# Patient Record
Sex: FEMALE | Race: BLACK OR AFRICAN AMERICAN | NOT HISPANIC OR LATINO | Employment: OTHER | URBAN - NONMETROPOLITAN AREA
[De-identification: names, ages, dates, MRNs, and addresses within clinical notes are randomized per-mention and may not be internally consistent; named-entity substitution may affect disease eponyms.]

---

## 2013-06-12 VITALS
TEMPERATURE: 98.7 F | TEMPERATURE: 98.7 F | SYSTOLIC BLOOD PRESSURE: 151 MMHG | TEMPERATURE: 98.7 F | TEMPERATURE: 98.7 F | DIASTOLIC BLOOD PRESSURE: 78 MMHG | TEMPERATURE: 98.7 F | TEMPERATURE: 98.7 F | TEMPERATURE: 98.7 F | TEMPERATURE: 98.7 F | TEMPERATURE: 98.7 F | TEMPERATURE: 98.7 F | TEMPERATURE: 98.7 F | TEMPERATURE: 98.7 F | TEMPERATURE: 98.7 F | TEMPERATURE: 98.7 F | TEMPERATURE: 98.7 F | TEMPERATURE: 98.7 F | TEMPERATURE: 98.7 F | TEMPERATURE: 98.7 F | TEMPERATURE: 98.7 F | TEMPERATURE: 98.7 F | TEMPERATURE: 98.7 F | TEMPERATURE: 98.7 F | TEMPERATURE: 98.7 F | TEMPERATURE: 98.7 F | TEMPERATURE: 98.7 F | TEMPERATURE: 98.7 F

## 2017-01-06 ENCOUNTER — HOSPITAL ENCOUNTER (OUTPATIENT)
Dept: GENERAL RADIOLOGY | Facility: HOSPITAL | Age: 42
Discharge: HOME | End: 2017-01-06
Admitting: NURSE PRACTITIONER

## 2017-01-06 ENCOUNTER — TELEPHONE (OUTPATIENT)
Dept: GASTROENTEROLOGY | Facility: CLINIC | Age: 42
End: 2017-01-06

## 2017-01-06 DIAGNOSIS — R10.13 EPIGASTRIC PAIN: ICD-10-CM

## 2017-01-06 PROCEDURE — 74240 X-RAY XM UPR GI TRC 1CNTRST: CPT

## 2017-01-09 ENCOUNTER — ANESTHESIA EVENT (OUTPATIENT)
Dept: GASTROENTEROLOGY | Facility: HOSPITAL | Age: 42
End: 2017-01-09

## 2017-01-10 ENCOUNTER — ANESTHESIA (OUTPATIENT)
Dept: GASTROENTEROLOGY | Facility: HOSPITAL | Age: 42
End: 2017-01-10

## 2017-01-10 ENCOUNTER — TELEPHONE (OUTPATIENT)
Dept: GASTROENTEROLOGY | Facility: CLINIC | Age: 42
End: 2017-01-10

## 2017-01-10 PROCEDURE — 25010000002 PROPOFOL 10 MG/ML EMULSION: Performed by: NURSE ANESTHETIST, CERTIFIED REGISTERED

## 2017-01-10 RX ORDER — LIDOCAINE HYDROCHLORIDE 20 MG/ML
INJECTION, SOLUTION INFILTRATION; PERINEURAL AS NEEDED
Status: DISCONTINUED | OUTPATIENT
Start: 2017-01-10 | End: 2017-01-10 | Stop reason: SURG

## 2017-01-10 RX ORDER — PROPOFOL 10 MG/ML
VIAL (ML) INTRAVENOUS AS NEEDED
Status: DISCONTINUED | OUTPATIENT
Start: 2017-01-10 | End: 2017-01-10 | Stop reason: SURG

## 2017-01-10 RX ADMIN — PROPOFOL 75 MG: 10 INJECTION, EMULSION INTRAVENOUS at 11:05

## 2017-01-10 RX ADMIN — LIDOCAINE HYDROCHLORIDE 50 MG: 20 INJECTION, SOLUTION INFILTRATION; PERINEURAL at 10:45

## 2017-01-10 RX ADMIN — PROPOFOL 75 MG: 10 INJECTION, EMULSION INTRAVENOUS at 11:00

## 2017-01-10 RX ADMIN — PROPOFOL 75 MG: 10 INJECTION, EMULSION INTRAVENOUS at 10:55

## 2017-01-10 RX ADMIN — PROPOFOL 75 MG: 10 INJECTION, EMULSION INTRAVENOUS at 10:50

## 2017-01-10 RX ADMIN — PROPOFOL 75 MG: 10 INJECTION, EMULSION INTRAVENOUS at 10:45

## 2017-01-10 NOTE — ANESTHESIA POSTPROCEDURE EVALUATION
Patient: Chelo Swenson    Procedure Summary     Date Anesthesia Start Anesthesia Stop Room / Location    01/10/17 1044 1123 [facility] ENDOSCOPY 6 / [facility]       Procedure Diagnosis Surgeon Provider    ESOPHAGOGASTRODUODENOSCOPY WITH ANESTHESIA (N/A Esophagus); COLONOSCOPY WITH ANESTHESIA (N/A ) Epigastric pain; Chronic constipation; Family hx of colon cancer; Hematemesis, presence of nausea not specified  (Epigastric pain [R10.13]; Chronic constipation [K59.09]; Family hx of colon cancer [Z80.0]; Hematemesis, presence of nausea not specified [K92.0]) MD Ashvin Lee CRNA          Anesthesia Type: general  Last vitals  BP      Temp      Pulse     Resp      SpO2        Post Anesthesia Care and Evaluation    Patient location during evaluation: PHASE II  Patient participation: complete - patient participated  Level of consciousness: awake  Pain score: 0  Pain management: adequate  Airway patency: patent  Anesthetic complications: No anesthetic complications  Cardiovascular status: acceptable  Respiratory status: acceptable  Hydration status: acceptable

## 2017-01-10 NOTE — ANESTHESIA PREPROCEDURE EVALUATION
Anesthesia Evaluation     Patient summary reviewed and Nursing notes reviewed    No history of anesthetic complications   Airway   Mallampati: I  TM distance: >3 FB  Neck ROM: full  no difficulty expected  Dental - normal exam     Pulmonary - normal exam   (+) hx of smoking,   Cardiovascular - normal exam  (+) hypertension well controlled,     Neuro/Psych  (+) neuromuscular disease, psychiatric history Schizophrenia, Anxiety, Depression and Bipolar,    GI/Hepatic/Renal/Endo      Musculoskeletal     (+) myalgias,   Abdominal  - normal exam   Substance History - negative use     OB/GYN negative ob/gyn ROS         Other       ROS/Med Hx Other: Hx MS, Fibromyalgia                        Anesthesia Plan    ASA 3     general     intravenous induction   Anesthetic plan and risks discussed with patient and spouse/significant other.

## 2017-01-16 ENCOUNTER — HOSPITAL ENCOUNTER (OUTPATIENT)
Dept: HOSPITAL 58 - RAD | Age: 42
Discharge: HOME | End: 2017-01-16
Attending: SPECIALIST

## 2017-01-16 VITALS — BODY MASS INDEX: 30.7 KG/M2

## 2017-01-16 DIAGNOSIS — M79.89: ICD-10-CM

## 2017-01-16 DIAGNOSIS — M79.671: Primary | ICD-10-CM

## 2017-01-18 ENCOUNTER — TELEPHONE (OUTPATIENT)
Dept: GASTROENTEROLOGY | Facility: CLINIC | Age: 42
End: 2017-01-18

## 2017-01-18 NOTE — TELEPHONE ENCOUNTER
Pt called stating she is having severe abd pain. N/V, can't burp or pass gas with abd distention. She is coming to the ER later this evening when her son gets home.

## 2017-01-19 ENCOUNTER — HOSPITAL ENCOUNTER (EMERGENCY)
Facility: HOSPITAL | Age: 42
Discharge: HOME | End: 2017-01-19
Attending: FAMILY MEDICINE | Admitting: EMERGENCY MEDICINE

## 2017-01-19 ENCOUNTER — APPOINTMENT (OUTPATIENT)
Dept: CT IMAGING | Facility: HOSPITAL | Age: 42
End: 2017-01-19

## 2017-01-19 VITALS
DIASTOLIC BLOOD PRESSURE: 92 MMHG | OXYGEN SATURATION: 96 % | HEIGHT: 66 IN | RESPIRATION RATE: 18 BRPM | BODY MASS INDEX: 28.45 KG/M2 | WEIGHT: 177 LBS | SYSTOLIC BLOOD PRESSURE: 146 MMHG | HEART RATE: 80 BPM | TEMPERATURE: 97.9 F

## 2017-01-19 DIAGNOSIS — R10.84 GENERALIZED ABDOMINAL PAIN: Primary | ICD-10-CM

## 2017-01-19 DIAGNOSIS — K59.00 CONSTIPATION, UNSPECIFIED CONSTIPATION TYPE: ICD-10-CM

## 2017-01-19 LAB
ALBUMIN SERPL-MCNC: 3.8 G/DL (ref 3.5–5)
ALBUMIN/GLOB SERPL: 1.1 G/DL (ref 1.1–2.5)
ALP SERPL-CCNC: 60 U/L (ref 24–120)
ALT SERPL W P-5'-P-CCNC: 28 U/L (ref 0–54)
AMPHET+METHAMPHET UR QL: NEGATIVE
AMYLASE SERPL-CCNC: 57 U/L (ref 30–110)
ANION GAP SERPL CALCULATED.3IONS-SCNC: 11 MMOL/L (ref 4–13)
APTT PPP: 27.4 SECONDS (ref 24.1–34.8)
AST SERPL-CCNC: 25 U/L (ref 7–45)
BARBITURATES UR QL SCN: NEGATIVE
BENZODIAZ UR QL SCN: POSITIVE
BILIRUB SERPL-MCNC: 0.4 MG/DL (ref 0.1–1)
BUN BLD-MCNC: 7 MG/DL (ref 5–21)
BUN/CREAT SERPL: 8.5 (ref 7–25)
CALCIUM SPEC-SCNC: 8.9 MG/DL (ref 8.4–10.4)
CANNABINOIDS SERPL QL: NEGATIVE
CHLORIDE SERPL-SCNC: 102 MMOL/L (ref 98–110)
CO2 SERPL-SCNC: 25 MMOL/L (ref 24–31)
COCAINE UR QL: NEGATIVE
CREAT BLD-MCNC: 0.82 MG/DL (ref 0.5–1.4)
DEPRECATED RDW RBC AUTO: 41.8 FL (ref 40–54)
ERYTHROCYTE [DISTWIDTH] IN BLOOD BY AUTOMATED COUNT: 13.8 % (ref 12–15)
ETHANOL UR QL: <0.01 %
GFR SERPL CREATININE-BSD FRML MDRD: 94 ML/MIN/1.73
GLOBULIN UR ELPH-MCNC: 3.4 GM/DL
GLUCOSE BLD-MCNC: 73 MG/DL (ref 70–100)
HCT VFR BLD AUTO: 37.5 % (ref 37–47)
HGB BLD-MCNC: 13.1 G/DL (ref 12–16)
INR PPP: 1.01 (ref 0.91–1.09)
LIPASE SERPL-CCNC: 25 U/L (ref 23–203)
LYMPHOCYTES # BLD MANUAL: 1.7 10*3/MM3 (ref 0.72–4.86)
LYMPHOCYTES NFR BLD MANUAL: 10 % (ref 4–12)
LYMPHOCYTES NFR BLD MANUAL: 29 % (ref 15–45)
MCH RBC QN AUTO: 29.8 PG (ref 28–32)
MCHC RBC AUTO-ENTMCNC: 34.9 G/DL (ref 33–36)
MCV RBC AUTO: 85.2 FL (ref 82–98)
METHADONE UR QL SCN: NEGATIVE
MONOCYTES # BLD AUTO: 0.59 10*3/MM3 (ref 0.19–1.3)
NEUTROPHILS # BLD AUTO: 3.57 10*3/MM3 (ref 1.87–8.4)
NEUTROPHILS NFR BLD MANUAL: 60 % (ref 39–78)
NEUTS BAND NFR BLD MANUAL: 1 % (ref 0–10)
OPIATES UR QL: NEGATIVE
PCP UR QL SCN: NEGATIVE
PLAT MORPH BLD: NORMAL
PLATELET # BLD AUTO: 244 10*3/MM3 (ref 130–400)
PMV BLD AUTO: 9.7 FL (ref 6–12)
POTASSIUM BLD-SCNC: 2.9 MMOL/L (ref 3.5–5.3)
PROT SERPL-MCNC: 7.2 G/DL (ref 6.3–8.7)
PROTHROMBIN TIME: 13.6 SECONDS (ref 11.9–14.6)
RBC # BLD AUTO: 4.4 10*6/MM3 (ref 4.2–5.4)
RBC MORPH BLD: NORMAL
SODIUM BLD-SCNC: 138 MMOL/L (ref 135–145)
WBC MORPH BLD: NORMAL
WBC NRBC COR # BLD: 5.85 10*3/MM3 (ref 4.8–10.8)

## 2017-01-19 PROCEDURE — 85730 THROMBOPLASTIN TIME PARTIAL: CPT | Performed by: FAMILY MEDICINE

## 2017-01-19 PROCEDURE — 83690 ASSAY OF LIPASE: CPT | Performed by: FAMILY MEDICINE

## 2017-01-19 PROCEDURE — 80307 DRUG TEST PRSMV CHEM ANLYZR: CPT | Performed by: FAMILY MEDICINE

## 2017-01-19 PROCEDURE — 0 IOHEXOL 300 MG/ML SOLUTION: Performed by: FAMILY MEDICINE

## 2017-01-19 PROCEDURE — 99283 EMERGENCY DEPT VISIT LOW MDM: CPT

## 2017-01-19 PROCEDURE — 0 IOPAMIDOL 61 % SOLUTION: Performed by: EMERGENCY MEDICINE

## 2017-01-19 PROCEDURE — 80053 COMPREHEN METABOLIC PANEL: CPT | Performed by: FAMILY MEDICINE

## 2017-01-19 PROCEDURE — 74177 CT ABD & PELVIS W/CONTRAST: CPT

## 2017-01-19 PROCEDURE — 82150 ASSAY OF AMYLASE: CPT | Performed by: FAMILY MEDICINE

## 2017-01-19 PROCEDURE — 85610 PROTHROMBIN TIME: CPT | Performed by: FAMILY MEDICINE

## 2017-01-19 PROCEDURE — 25010000002 KETOROLAC TROMETHAMINE PER 15 MG: Performed by: FAMILY MEDICINE

## 2017-01-19 PROCEDURE — 85007 BL SMEAR W/DIFF WBC COUNT: CPT | Performed by: FAMILY MEDICINE

## 2017-01-19 PROCEDURE — 96374 THER/PROPH/DIAG INJ IV PUSH: CPT

## 2017-01-19 PROCEDURE — 85025 COMPLETE CBC W/AUTO DIFF WBC: CPT | Performed by: FAMILY MEDICINE

## 2017-01-19 RX ORDER — FENTANYL 50 UG/H
1 PATCH TRANSDERMAL
COMMUNITY

## 2017-01-19 RX ORDER — POTASSIUM CHLORIDE 750 MG/1
40 CAPSULE, EXTENDED RELEASE ORAL ONCE
Status: COMPLETED | OUTPATIENT
Start: 2017-01-19 | End: 2017-01-19

## 2017-01-19 RX ORDER — KETOROLAC TROMETHAMINE 30 MG/ML
30 INJECTION, SOLUTION INTRAMUSCULAR; INTRAVENOUS ONCE
Status: COMPLETED | OUTPATIENT
Start: 2017-01-19 | End: 2017-01-19

## 2017-01-19 RX ADMIN — IOHEXOL 50 ML: 300 INJECTION, SOLUTION INTRAVENOUS at 05:43

## 2017-01-19 RX ADMIN — IOPAMIDOL 100 ML: 612 INJECTION, SOLUTION INTRAVENOUS at 07:49

## 2017-01-19 RX ADMIN — POTASSIUM CHLORIDE 40 MEQ: 750 CAPSULE, EXTENDED RELEASE ORAL at 06:16

## 2017-01-19 RX ADMIN — KETOROLAC TROMETHAMINE 30 MG: 30 INJECTION, SOLUTION INTRAMUSCULAR at 05:01

## 2017-01-30 ENCOUNTER — OFFICE VISIT (OUTPATIENT)
Dept: NEUROLOGY | Age: 42
End: 2017-01-30
Payer: MEDICARE

## 2017-01-30 VITALS
RESPIRATION RATE: 18 BRPM | WEIGHT: 180 LBS | DIASTOLIC BLOOD PRESSURE: 90 MMHG | HEIGHT: 66 IN | HEART RATE: 94 BPM | BODY MASS INDEX: 28.93 KG/M2 | SYSTOLIC BLOOD PRESSURE: 138 MMHG

## 2017-01-30 DIAGNOSIS — M32.9 SYSTEMIC LUPUS ERYTHEMATOSUS (HCC): ICD-10-CM

## 2017-01-30 DIAGNOSIS — G35 MULTIPLE SCLEROSIS (HCC): Primary | ICD-10-CM

## 2017-01-30 DIAGNOSIS — G47.01 INSOMNIA DUE TO MEDICAL CONDITION: ICD-10-CM

## 2017-01-30 DIAGNOSIS — G43.019 INTRACTABLE MIGRAINE WITHOUT AURA AND WITHOUT STATUS MIGRAINOSUS: ICD-10-CM

## 2017-01-30 PROCEDURE — G8484 FLU IMMUNIZE NO ADMIN: HCPCS | Performed by: PSYCHIATRY & NEUROLOGY

## 2017-01-30 PROCEDURE — 99214 OFFICE O/P EST MOD 30 MIN: CPT | Performed by: PSYCHIATRY & NEUROLOGY

## 2017-01-30 PROCEDURE — G8427 DOCREV CUR MEDS BY ELIG CLIN: HCPCS | Performed by: PSYCHIATRY & NEUROLOGY

## 2017-01-30 PROCEDURE — G8419 CALC BMI OUT NRM PARAM NOF/U: HCPCS | Performed by: PSYCHIATRY & NEUROLOGY

## 2017-01-30 PROCEDURE — 4004F PT TOBACCO SCREEN RCVD TLK: CPT | Performed by: PSYCHIATRY & NEUROLOGY

## 2017-01-30 RX ORDER — FENTANYL 50 UG/H
1 PATCH TRANSDERMAL
COMMUNITY
End: 2017-05-01

## 2017-01-30 RX ORDER — NORTRIPTYLINE HYDROCHLORIDE 25 MG/1
50 CAPSULE ORAL NIGHTLY
Qty: 60 CAPSULE | Refills: 5 | Status: ON HOLD | OUTPATIENT
Start: 2017-01-30 | End: 2020-07-22 | Stop reason: HOSPADM

## 2017-02-02 ENCOUNTER — TELEPHONE (OUTPATIENT)
Dept: NEUROLOGY | Age: 42
End: 2017-02-02

## 2017-02-07 ENCOUNTER — HOSPITAL ENCOUNTER (OUTPATIENT)
Dept: HOSPITAL 58 - LAB | Age: 42
Discharge: HOME | End: 2017-02-07
Attending: NURSE PRACTITIONER

## 2017-02-07 VITALS — BODY MASS INDEX: 30.7 KG/M2

## 2017-02-07 DIAGNOSIS — R52: ICD-10-CM

## 2017-02-07 DIAGNOSIS — R50.9: ICD-10-CM

## 2017-02-07 DIAGNOSIS — J02.9: Primary | ICD-10-CM

## 2017-02-07 LAB
FLU INTERNAL QC: (no result)
FLUAV AG NPH QL: NEGATIVE
FLUBV AG NPH QL: NEGATIVE

## 2017-02-07 PROCEDURE — 87880 STREP A ASSAY W/OPTIC: CPT

## 2017-02-07 PROCEDURE — 87804 INFLUENZA ASSAY W/OPTIC: CPT

## 2017-02-07 PROCEDURE — 87651 STREP A DNA AMP PROBE: CPT

## 2017-02-15 ENCOUNTER — HOSPITAL ENCOUNTER (OUTPATIENT)
Dept: MRI IMAGING | Age: 42
Discharge: HOME | End: 2017-02-15
Payer: MEDICARE

## 2017-02-15 DIAGNOSIS — M32.9 SYSTEMIC LUPUS ERYTHEMATOSUS (HCC): ICD-10-CM

## 2017-02-15 DIAGNOSIS — G43.019 INTRACTABLE MIGRAINE WITHOUT AURA AND WITHOUT STATUS MIGRAINOSUS: ICD-10-CM

## 2017-02-15 DIAGNOSIS — G35 MULTIPLE SCLEROSIS (HCC): ICD-10-CM

## 2017-02-15 PROCEDURE — 70544 MR ANGIOGRAPHY HEAD W/O DYE: CPT

## 2017-02-21 ENCOUNTER — TELEPHONE (OUTPATIENT)
Dept: NEUROLOGY | Age: 42
End: 2017-02-21

## 2017-02-27 ENCOUNTER — TRANSCRIBE ORDERS (OUTPATIENT)
Dept: ADMINISTRATIVE | Facility: HOSPITAL | Age: 42
End: 2017-02-27

## 2017-02-27 ENCOUNTER — APPOINTMENT (OUTPATIENT)
Dept: LAB | Facility: HOSPITAL | Age: 42
End: 2017-02-27

## 2017-02-27 DIAGNOSIS — M32.9 SYSTEMIC LUPUS ERYTHEMATOSUS (HCC): Primary | ICD-10-CM

## 2017-02-27 DIAGNOSIS — Z51.81 ENCOUNTER FOR THERAPEUTIC DRUG MONITORING: ICD-10-CM

## 2017-02-27 LAB
ALBUMIN SERPL-MCNC: 4.1 G/DL (ref 3.5–5)
ALBUMIN/GLOB SERPL: 1.4 G/DL (ref 1.1–2.5)
ALP SERPL-CCNC: 55 U/L (ref 24–120)
ALT SERPL W P-5'-P-CCNC: 31 U/L (ref 0–54)
ANION GAP SERPL CALCULATED.3IONS-SCNC: 10 MMOL/L (ref 4–13)
AST SERPL-CCNC: 27 U/L (ref 7–45)
BILIRUB SERPL-MCNC: 0.5 MG/DL (ref 0.1–1)
BILIRUB UR QL STRIP: NEGATIVE
BUN BLD-MCNC: 8 MG/DL (ref 5–21)
BUN/CREAT SERPL: 8.1 (ref 7–25)
CALCIUM SPEC-SCNC: 9.3 MG/DL (ref 8.4–10.4)
CHLORIDE SERPL-SCNC: 104 MMOL/L (ref 98–110)
CLARITY UR: CLEAR
CO2 SERPL-SCNC: 24 MMOL/L (ref 24–31)
COLOR UR: YELLOW
CREAT BLD-MCNC: 0.99 MG/DL (ref 0.5–1.4)
DEPRECATED RDW RBC AUTO: 42.2 FL (ref 40–54)
ERYTHROCYTE [DISTWIDTH] IN BLOOD BY AUTOMATED COUNT: 13.8 % (ref 12–15)
GFR SERPL CREATININE-BSD FRML MDRD: 76 ML/MIN/1.73
GLOBULIN UR ELPH-MCNC: 3 GM/DL
GLUCOSE BLD-MCNC: 85 MG/DL (ref 70–100)
GLUCOSE UR STRIP-MCNC: NEGATIVE MG/DL
HCT VFR BLD AUTO: 39 % (ref 37–47)
HGB BLD-MCNC: 13.8 G/DL (ref 12–16)
HGB UR QL STRIP.AUTO: NEGATIVE
KETONES UR QL STRIP: NEGATIVE
LEUKOCYTE ESTERASE UR QL STRIP.AUTO: NEGATIVE
MCH RBC QN AUTO: 29.9 PG (ref 28–32)
MCHC RBC AUTO-ENTMCNC: 35.4 G/DL (ref 33–36)
MCV RBC AUTO: 84.4 FL (ref 82–98)
NITRITE UR QL STRIP: NEGATIVE
PH UR STRIP.AUTO: 7 [PH] (ref 5–8)
PLATELET # BLD AUTO: 302 10*3/MM3 (ref 130–400)
PMV BLD AUTO: 10 FL (ref 6–12)
POTASSIUM BLD-SCNC: 3.1 MMOL/L (ref 3.5–5.3)
PROT SERPL-MCNC: 7.1 G/DL (ref 6.3–8.7)
PROT UR QL STRIP: NEGATIVE
RBC # BLD AUTO: 4.62 10*6/MM3 (ref 4.2–5.4)
SODIUM BLD-SCNC: 138 MMOL/L (ref 135–145)
SP GR UR STRIP: 1.01 (ref 1–1.03)
UROBILINOGEN UR QL STRIP: NORMAL
WBC NRBC COR # BLD: 6.75 10*3/MM3 (ref 4.8–10.8)

## 2017-02-27 PROCEDURE — 81003 URINALYSIS AUTO W/O SCOPE: CPT | Performed by: INTERNAL MEDICINE

## 2017-02-27 PROCEDURE — 86160 COMPLEMENT ANTIGEN: CPT | Performed by: INTERNAL MEDICINE

## 2017-02-27 PROCEDURE — 80053 COMPREHEN METABOLIC PANEL: CPT | Performed by: INTERNAL MEDICINE

## 2017-02-27 PROCEDURE — 36415 COLL VENOUS BLD VENIPUNCTURE: CPT | Performed by: INTERNAL MEDICINE

## 2017-02-27 PROCEDURE — 85027 COMPLETE CBC AUTOMATED: CPT | Performed by: INTERNAL MEDICINE

## 2017-02-27 PROCEDURE — 86225 DNA ANTIBODY NATIVE: CPT | Performed by: INTERNAL MEDICINE

## 2017-02-28 LAB
C3 SERPL-MCNC: 77 MG/DL (ref 82–167)
C4 SERPL-MCNC: 14 MG/DL (ref 14–44)
DSDNA AB SER-ACNC: 11 IU/ML (ref 0–9)

## 2017-03-04 ENCOUNTER — HOSPITAL ENCOUNTER (EMERGENCY)
Facility: HOSPITAL | Age: 42
Discharge: HOME | End: 2017-03-04
Admitting: EMERGENCY MEDICINE

## 2017-03-04 VITALS
BODY MASS INDEX: 27.42 KG/M2 | DIASTOLIC BLOOD PRESSURE: 96 MMHG | SYSTOLIC BLOOD PRESSURE: 141 MMHG | HEART RATE: 87 BPM | HEIGHT: 66 IN | RESPIRATION RATE: 18 BRPM | OXYGEN SATURATION: 98 % | WEIGHT: 170.6 LBS | TEMPERATURE: 99.1 F

## 2017-03-04 DIAGNOSIS — G43.009 NONINTRACTABLE MIGRAINE, UNSPECIFIED MIGRAINE TYPE: Primary | ICD-10-CM

## 2017-03-04 PROCEDURE — 99283 EMERGENCY DEPT VISIT LOW MDM: CPT

## 2017-03-04 PROCEDURE — 25010000002 HYDROMORPHONE PER 4 MG: Performed by: EMERGENCY MEDICINE

## 2017-03-04 PROCEDURE — 96372 THER/PROPH/DIAG INJ SC/IM: CPT

## 2017-03-04 PROCEDURE — 25010000002 DIPHENHYDRAMINE PER 50 MG: Performed by: EMERGENCY MEDICINE

## 2017-03-04 PROCEDURE — 25010000002 PROMETHAZINE PER 50 MG: Performed by: EMERGENCY MEDICINE

## 2017-03-04 RX ORDER — DIPHENHYDRAMINE HYDROCHLORIDE 50 MG/ML
25 INJECTION INTRAMUSCULAR; INTRAVENOUS ONCE
Status: COMPLETED | OUTPATIENT
Start: 2017-03-04 | End: 2017-03-04

## 2017-03-04 RX ORDER — HYDROMORPHONE HCL 110MG/55ML
2 PATIENT CONTROLLED ANALGESIA SYRINGE INTRAVENOUS ONCE
Status: COMPLETED | OUTPATIENT
Start: 2017-03-04 | End: 2017-03-04

## 2017-03-04 RX ORDER — PREDNISONE 1 MG/1
8 TABLET ORAL DAILY
COMMUNITY

## 2017-03-04 RX ORDER — PROMETHAZINE HYDROCHLORIDE 25 MG/ML
25 INJECTION, SOLUTION INTRAMUSCULAR; INTRAVENOUS ONCE
Status: COMPLETED | OUTPATIENT
Start: 2017-03-04 | End: 2017-03-04

## 2017-03-04 RX ADMIN — HYDROMORPHONE HYDROCHLORIDE 2 MG: 2 INJECTION, SOLUTION INTRAMUSCULAR; INTRAVENOUS; SUBCUTANEOUS at 15:36

## 2017-03-04 RX ADMIN — PROMETHAZINE HYDROCHLORIDE 25 MG: 25 INJECTION INTRAMUSCULAR; INTRAVENOUS at 15:39

## 2017-03-04 RX ADMIN — DIPHENHYDRAMINE HYDROCHLORIDE 25 MG: 50 INJECTION, SOLUTION INTRAMUSCULAR; INTRAVENOUS at 15:39

## 2017-03-04 NOTE — ED PROVIDER NOTES
Subjective   Patient is a 41 y.o. female presenting with migraines.   Migraine   Associated symptoms: fatigue    Associated symptoms: no dizziness, no numbness, no seizures and no weakness        Patient is a pleasant 41-year-old black female with chief complaint of migraine.  She reports this began yesterday.  She felt pain in her bilateral temporal region going to her occipital region as well.  She has a history of chronic migraines that occurs infrequently.  She reports usually when she takes ibuprofen and rest, then headache resides within the day.  However, her headache persisted.  She came to the ER for evaluation.  The patient does have history of multiple sclerosis and lupus as well. she is being closely followed by her neurologist and rheumatologist.  The patient denies any fever.  She does complain of nausea without any vomiting.  She denies neck stiffness.  She denies recent illnesses.  She denies any injections.  She denies any focal deficits. She reports this a typical, long-acting headache.    Review of Systems   Constitutional: Positive for activity change and fatigue.   Eyes: Negative.    Respiratory: Negative.    Cardiovascular: Negative.    Genitourinary: Negative.    Neurological: Positive for headaches. Negative for dizziness, tremors, seizures, syncope, facial asymmetry, speech difficulty, weakness, light-headedness and numbness.         Past Medical History   Diagnosis Date   • Bipolar disorder    • Fibromyalgia    • H. pylori infection    • Lupus    • Multiple sclerosis    • Schizophrenia        Allergies   Allergen Reactions   • Avelox [Moxifloxacin]    • Copaxone [Glatiramer Acetate]    • Depakote  [Divalproex Sodium]    • Lamictal [Lamotrigine]    • Lithium Itching   • Paliperidone    • Plaquenil [Hydroxychloroquine] Other (See Comments)     Vision loss   • Pregabalin Mental Status Change   • Glatiramer Rash       Past Surgical History   Procedure Laterality Date   • Gastric bypass     •  Hernia repair     • Septoplasty     • Tonsillectomy     • Gastric banding     • Colonoscopy  03/2007   • Endoscopy  05/01/2007   • Endoscopy N/A 1/10/2017     Procedure: ESOPHAGOGASTRODUODENOSCOPY WITH ANESTHESIA;  Surgeon: Andreas Fairbanks MD;  Location:  [facility];  Service:    • Colonoscopy N/A 1/10/2017     Procedure: COLONOSCOPY WITH ANESTHESIA;  Surgeon: Andreas Fairbanks MD;  Location:  [facility];  Service:        Family History   Problem Relation Age of Onset   • Colon polyps Father    • Colon cancer Father      Late 50s       Social History     Social History   • Marital status: Single     Spouse name: N/A   • Number of children: N/A   • Years of education: N/A     Social History Main Topics   • Smoking status: Current Every Day Smoker   • Smokeless tobacco: None   • Alcohol use Yes      Comment: rare   • Drug use: No   • Sexual activity: Not Asked     Other Topics Concern   • None     Social History Narrative       Prior to Admission medications    Medication Sig Start Date End Date Taking? Authorizing Provider   alendronate (FOSAMAX) 70 MG tablet Take 70 mg by mouth Every 7 (Seven) Days.   Yes Historical Provider, MD   ALPRAZolam (XANAX) 0.5 MG tablet Take 0.5 mg by mouth 2 (Two) Times a Day As Needed for anxiety.   Yes Historical Provider, MD   busPIRone (BUSPAR) 10 MG tablet Take 10 mg by mouth 3 (Three) Times a Day.   Yes Historical Provider, MD   Calcium Carbonate-Vit D-Min (CALCIUM 1200 PO) Take  by mouth.   Yes Historical Provider, MD   cetirizine (ZyrTEC) 10 MG tablet Take 10 mg by mouth Daily.   Yes Historical Provider, MD   cyanocobalamin 1000 MCG/ML injection Inject 1,000 mcg into the muscle once Indications: monthly   Yes Historical Provider, MD   desvenlafaxine (PRISTIQ) 100 MG 24 hr tablet Take by mouth daily   Yes Historical Provider, MD   ferrous sulfate 325 (65 FE) MG tablet Take 325 mg by mouth daily (with breakfast)   Yes Historical Provider, MD   furosemide (LASIX) 20 MG  tablet Take 1 tablet by mouth daily 11/14/16  Yes Historical Provider, MD   hydrochlorothiazide (HYDRODIURIL) 12.5 MG tablet Take 12.5 mg by mouth Daily.   Yes Historical Provider, MD   hydrocortisone 2.5 % cream  10/19/16  Yes Historical Provider, MD   hydroxychloroquine (PLAQUENIL) 200 MG tablet Take 200 mg by mouth daily   Yes Historical Provider, MD   hydrOXYzine (ATARAX) 50 MG tablet Take 50 mg by mouth 3 (Three) Times a Day As Needed for itching.   Yes Historical Provider, MD   Interferon Beta-1a (AVONEX IM) Inject  into the shoulder, thigh, or buttocks.   Yes Historical Provider, MD   ketoconazole (NIZORAL) 2 % cream  10/19/16  Yes Historical Provider, MD   ketoconazole (NIZORAL) 2 % shampoo  10/19/16  Yes Historical Provider, MD   LUPRON DEPOT 3.75 MG injection  12/6/16  Yes Historical Provider, MD   Multiple Vitamins-Minerals (MULTIVITAMIN ADULT PO) Take  by mouth.   Yes Historical Provider, MD   nortriptyline (PAMELOR) 10 MG capsule Take 10 mg by mouth Every Night.   Yes Historical Provider, MD   pantoprazole (PROTONIX) 40 MG EC tablet Take 40 mg by mouth bid   Yes Historical Provider, MD   prazosin (MINIPRESS) 2 MG capsule Take 2 mg by mouth Every Night.   Yes Historical Provider, MD   predniSONE (DELTASONE) 1 MG tablet Take 8 mg by mouth Daily.   Yes Historical Provider, MD   topiramate (TOPAMAX) 50 MG tablet Take 200 mg by mouth Daily.   Yes Historical Provider, MD   ALPRAZolam (XANAX) 1 MG tablet  11/25/16   Historical Provider, MD   amantadine (SYMMETREL) 100 MG capsule Take 100 mg by mouth    Historical Provider, MD   amantadine (SYMMETREL) 100 MG tablet  11/28/16   Historical Provider, MD   amoxicillin-clavulanate (AUGMENTIN) 500-125 MG per tablet  11/23/16   Historical Provider, MD   amoxicillin-clavulanate (AUGMENTIN) 875-125 MG per tablet  9/28/16   Historical Provider, MD   aspirin 81 MG tablet Take 81 mg by mouth daily    Historical Provider, MD   baclofen (LIORESAL) 10 MG tablet Take 10 mg by  mouth 3 times daily    Historical Provider, MD   baclofen (LIORESAL) 10 MG tablet TAKE ONE TABLET THREE TIMES DAILY 11/3/16   Historical Provider, MD   busPIRone (BUSPAR) 30 MG tablet  12/2/16   Historical Provider, MD   calcium carbonate (OS-KLEBER) 600 MG tablet Take 1 tablet by mouth daily    Historical Provider, MD   Cholecalciferol 1000 UNITS tablet Take 1,000 Units by mouth daily    Historical Provider, MD   citalopram (CeleXA) 40 MG tablet Take 40 mg by mouth Daily.    Historical Provider, MD   fentaNYL (DURAGESIC) 50 MCG/HR patch Place 1 patch on the skin Every 72 (Seventy-Two) Hours.    Historical Provider, MD   gabapentin, once-daily, (GRALISE) 600 MG tablet tablet 3 (Three) Times a Day.    Historical Provider, MD   HYDROcodone-acetaminophen (NORCO)  MG per tablet Take 1 tablet by mouth Every 6 (Six) Hours As Needed for moderate pain (4-6).    Historical Provider, MD   hydroxychloroquine (PLAQUENIL) 200 MG tablet  12/7/16   Historical Provider, MD   Leuprolide Acetate 11.25 MG kit Inject into the muscle every 30 days    Historical Provider, MD   meloxicam (MOBIC) 15 MG tablet Take 15 mg by mouth daily    Historical Provider, MD   meloxicam (MOBIC) 7.5 MG tablet Take 7.5 mg by mouth.    Historical Provider, MD   naproxen (NAPROSYN) 250 MG tablet Take 250 mg by mouth. 4/14/16   Historical Provider, MD   norethindrone (AYGESTIN) 5 MG tablet Take 5 mg by mouth Daily.    Historical Provider, MD   ondansetron (ZOFRAN) 4 MG tablet Take 4 mg by mouth Every 8 (Eight) Hours As Needed for nausea or vomiting.    Historical Provider, MD   oxyCODONE-acetaminophen (PERCOCET) 7.5-325 MG per tablet Every 8 (Eight) Hours.    Historical Provider, MD   potassium chloride (K-DUR,KLOR-CON) 20 MEQ CR tablet  10/3/16   Historical Provider, MD   predniSONE (DELTASONE) 1 MG tablet Take 5 mg by mouth daily Indications: 2 PO QD     Historical Provider, MD   sucralfate (CARAFATE) 1 G tablet Take 1 g by mouth 4 (Four) Times a Day.    " Historical Provider, MD   tiZANidine (ZANAFLEX) 4 MG tablet  11/5/16   Historical Provider, MD   topiramate (TOPAMAX) 200 MG tablet  11/17/16   Historical Provider, MD   vitamin D (ERGOCALCIFEROL) 79580 UNITS capsule capsule Take 50,000 Units by mouth once a week    Historical Provider, MD       Medications   HYDROmorphone (DILAUDID) injection 2 mg (not administered)   promethazine (PHENERGAN) injection 25 mg (not administered)   diphenhydrAMINE (BENADRYL) injection 25 mg (not administered)       Visit Vitals   • /86   • Pulse 88   • Temp 98.9 °F (37.2 °C) (Temporal Artery )   • Resp 12   • Ht 66\" (167.6 cm)   • Wt 170 lb 9.6 oz (77.4 kg)   • SpO2 98%   • BMI 27.54 kg/m2         Objective   Physical Exam   Constitutional: She is oriented to person, place, and time. She appears well-developed and well-nourished. No distress.   HENT:   Head: Normocephalic and atraumatic.   Eyes: Conjunctivae and EOM are normal. Pupils are equal, round, and reactive to light.   Neck: Normal range of motion. Neck supple. No tracheal deviation present.   Cardiovascular: Normal rate, regular rhythm, normal heart sounds and intact distal pulses.    No murmur heard.  Pulmonary/Chest: Effort normal and breath sounds normal.   Abdominal: Soft. Bowel sounds are normal. She exhibits no distension and no mass. There is no tenderness. There is no rebound and no guarding.   Musculoskeletal: Normal range of motion. She exhibits no edema.   Neurological: She is alert and oriented to person, place, and time. She has normal strength and normal reflexes. No cranial nerve deficit or sensory deficit. She displays a negative Romberg sign. Coordination and gait normal. GCS eye subscore is 4. GCS verbal subscore is 5. GCS motor subscore is 6.   Cranial nerve evaluation:  No aphasia.  PERRLA. Normal visual fields. Normal smooth eye movement.   No facial assymetry.  Tongue is midline with normal gag reflex.   Symmetrical/normal sternocleidomastoid " movement.   No pronator drift.  No sensory deficits.  No motor deficits.   No ataxia.    Skin: Skin is warm and dry. She is not diaphoretic.   Psychiatric: She has a normal mood and affect. Her behavior is normal. Judgment and thought content normal.   Nursing note and vitals reviewed.      Procedures         Lab Results (last 24 hours)     ** No results found for the last 24 hours. **          No results found.    ED Course  ED Course          MDM    Final diagnoses:   Nonintractable migraine, unspecified migraine type          Thu-Emma DERICK Marcos  03/04/17 9878

## 2017-03-06 NOTE — ED PROVIDER NOTES
Subjective   Patient is a 41 y.o. female presenting with abdominal pain.   History provided by:  Patient   used: No    Abdominal Pain   Pain location:  Generalized  Pain quality: aching, bloating and cramping    Pain radiates to:  Does not radiate  Pain severity:  Severe  Onset quality:  Gradual  Timing:  Constant  Progression:  Unchanged  Chronicity:  New  Context: not alcohol use, not awakening from sleep, not diet changes, not eating, not laxative use, not medication withdrawal, not previous surgeries, not recent illness, not recent sexual activity, not recent travel, not retching, not sick contacts, not suspicious food intake and not trauma    Relieved by:  None tried  Worsened by:  Nothing  Ineffective treatments:  None tried  Associated symptoms: no anorexia, no belching, no chest pain, no chills, no constipation, no cough, no diarrhea, no dysuria, no fatigue, no fever, no flatus, no hematemesis, no hematochezia, no hematuria, no melena, no nausea, no shortness of breath, no sore throat, no vaginal bleeding, no vaginal discharge and no vomiting    Risk factors: no alcohol abuse, no aspirin use, not elderly, has not had multiple surgeries, no NSAID use, not obese, not pregnant and no recent hospitalization        Review of Systems   Constitutional: Negative for activity change, appetite change, chills, fatigue and fever.   HENT: Negative for congestion, dental problem, drooling and sore throat.    Eyes: Negative for discharge and itching.   Respiratory: Negative for apnea, cough, chest tightness and shortness of breath.    Cardiovascular: Negative for chest pain.   Gastrointestinal: Positive for abdominal pain. Negative for anorexia, constipation, diarrhea, flatus, hematemesis, hematochezia, melena, nausea and vomiting.   Endocrine: Negative for polydipsia and polyuria.   Genitourinary: Negative for difficulty urinating, dysuria, hematuria, vaginal bleeding and vaginal discharge.   Skin:  Negative for color change, pallor and rash.   Neurological: Negative for dizziness, facial asymmetry and headaches.   Hematological: Negative for adenopathy. Does not bruise/bleed easily.   Psychiatric/Behavioral: Negative for behavioral problems and suicidal ideas.   All other systems reviewed and are negative.      Past Medical History   Diagnosis Date   • Bipolar disorder    • Fibromyalgia    • H. pylori infection    • Lupus    • Multiple sclerosis    • Schizophrenia        Allergies   Allergen Reactions   • Avelox [Moxifloxacin]    • Copaxone [Glatiramer Acetate]    • Depakote  [Divalproex Sodium]    • Lamictal [Lamotrigine]    • Lithium Itching   • Paliperidone    • Plaquenil [Hydroxychloroquine] Other (See Comments)     Vision loss   • Pregabalin Mental Status Change   • Glatiramer Rash       Past Surgical History   Procedure Laterality Date   • Gastric bypass     • Hernia repair     • Septoplasty     • Tonsillectomy     • Gastric banding     • Colonoscopy  03/2007   • Endoscopy  05/01/2007   • Endoscopy N/A 1/10/2017     Procedure: ESOPHAGOGASTRODUODENOSCOPY WITH ANESTHESIA;  Surgeon: Andreas Fairbanks MD;  Location: [facility] ENDOSCOPY;  Service:    • Colonoscopy N/A 1/10/2017     Procedure: COLONOSCOPY WITH ANESTHESIA;  Surgeon: Andreas Fairbanks MD;  Location: [facility] ENDOSCOPY;  Service:        Family History   Problem Relation Age of Onset   • Colon polyps Father    • Colon cancer Father      Late 50s       Social History     Social History   • Marital status: Single     Spouse name: N/A   • Number of children: N/A   • Years of education: N/A     Social History Main Topics   • Smoking status: Current Every Day Smoker   • Smokeless tobacco: None   • Alcohol use Yes      Comment: rare   • Drug use: No   • Sexual activity: Not Asked     Other Topics Concern   • None     Social History Narrative       Lab Results (last 24 hours)     ** No results found for the last 24 hours. **          Objective   Physical Exam  "  Constitutional: She is oriented to person, place, and time. She appears well-developed and well-nourished.   HENT:   Head: Normocephalic and atraumatic.   Eyes: Conjunctivae and EOM are normal. Pupils are equal, round, and reactive to light.   Neck: Normal range of motion. Neck supple.   Cardiovascular: Normal rate, regular rhythm, normal heart sounds and intact distal pulses.    Pulmonary/Chest: Effort normal and breath sounds normal.   Abdominal: Soft. Bowel sounds are normal.   Neurological: She is alert and oriented to person, place, and time.   Skin: Skin is warm and dry.   Psychiatric: She has a normal mood and affect. Her behavior is normal.   Nursing note and vitals reviewed.      Procedures         CT Abdomen Pelvis With Contrast   Final Result          Visit Vitals   • /92   • Pulse 80   • Temp 97.9 °F (36.6 °C) (Oral)   • Resp 18   • Ht 66\" (167.6 cm)   • Wt 177 lb (80.3 kg)   • LMP 01/10/2013   • SpO2 96%   • BMI 28.57 kg/m2       ED Course    ED Course   Comment By Time    THIS PAT WAS SIGNED OUT BY DR. Johnston patient has been apprised of the findings.  She has pain medication at home and continues to complain of this abdominal discomfort.  I basically have recommended that she return to the emergency room in 24 hours for recheck.  Her CT does not reveal any evidence of an acute process. Ann Villegas MD 01/19 9665       Medications   ketorolac (TORADOL) injection 30 mg (30 mg Intravenous Given 1/19/17 0771)   potassium chloride (MICRO-K) CR capsule 40 mEq (40 mEq Oral Given 1/19/17 0616)   iohexol (OMNIPAQUE) 300 MG/ML injection 50 mL (50 mL Oral Given 1/19/17 7297)   iopamidol (ISOVUE-300) 61 % injection 100 mL (100 mL Intravenous Given 1/19/17 4027)            MDM  Number of Diagnoses or Management Options  Constipation, unspecified constipation type: new and requires workup  Generalized abdominal pain: new and requires workup     Amount and/or Complexity of Data Reviewed  Clinical " lab tests: ordered and reviewed  Tests in the radiology section of CPT®: ordered and reviewed  Tests in the medicine section of CPT®: ordered and reviewed    Risk of Complications, Morbidity, and/or Mortality  Presenting problems: moderate  Diagnostic procedures: moderate  Management options: moderate    Patient Progress  Patient progress: improved      Final diagnoses:   Generalized abdominal pain   Constipation, unspecified constipation type          Nayla Dickey DO  03/06/17 0542

## 2017-03-24 ENCOUNTER — TELEPHONE (OUTPATIENT)
Dept: NEUROLOGY | Age: 42
End: 2017-03-24

## 2017-05-01 ENCOUNTER — OFFICE VISIT (OUTPATIENT)
Dept: NEUROLOGY | Age: 42
End: 2017-05-01
Payer: MEDICARE

## 2017-05-01 VITALS
HEIGHT: 66 IN | SYSTOLIC BLOOD PRESSURE: 136 MMHG | RESPIRATION RATE: 18 BRPM | BODY MASS INDEX: 25.88 KG/M2 | WEIGHT: 161 LBS | DIASTOLIC BLOOD PRESSURE: 86 MMHG | HEART RATE: 92 BPM

## 2017-05-01 DIAGNOSIS — G35 MULTIPLE SCLEROSIS (HCC): Primary | ICD-10-CM

## 2017-05-01 DIAGNOSIS — G43.719 INTRACTABLE CHRONIC MIGRAINE WITHOUT AURA AND WITHOUT STATUS MIGRAINOSUS: ICD-10-CM

## 2017-05-01 PROCEDURE — G8427 DOCREV CUR MEDS BY ELIG CLIN: HCPCS | Performed by: PSYCHIATRY & NEUROLOGY

## 2017-05-01 PROCEDURE — 4004F PT TOBACCO SCREEN RCVD TLK: CPT | Performed by: PSYCHIATRY & NEUROLOGY

## 2017-05-01 PROCEDURE — 99213 OFFICE O/P EST LOW 20 MIN: CPT | Performed by: PSYCHIATRY & NEUROLOGY

## 2017-05-01 PROCEDURE — G8419 CALC BMI OUT NRM PARAM NOF/U: HCPCS | Performed by: PSYCHIATRY & NEUROLOGY

## 2017-05-17 ENCOUNTER — TELEPHONE (OUTPATIENT)
Dept: NEUROLOGY | Age: 42
End: 2017-05-17

## 2017-05-19 ENCOUNTER — TELEPHONE (OUTPATIENT)
Dept: NEUROLOGY | Age: 42
End: 2017-05-19

## 2017-06-20 ENCOUNTER — TELEPHONE (OUTPATIENT)
Dept: NEUROLOGY | Age: 42
End: 2017-06-20

## 2017-07-18 ENCOUNTER — HOSPITAL ENCOUNTER (EMERGENCY)
Dept: HOSPITAL 58 - ED | Age: 42
LOS: 1 days | Discharge: HOME | End: 2017-07-19
Payer: COMMERCIAL

## 2017-07-18 ENCOUNTER — HOSPITAL ENCOUNTER (OUTPATIENT)
Dept: HOSPITAL 58 - LAB | Age: 42
Discharge: HOME | End: 2017-07-18
Attending: NURSE PRACTITIONER

## 2017-07-18 VITALS — BODY MASS INDEX: 30.7 KG/M2

## 2017-07-18 VITALS — SYSTOLIC BLOOD PRESSURE: 134 MMHG | TEMPERATURE: 98.2 F | DIASTOLIC BLOOD PRESSURE: 89 MMHG

## 2017-07-18 VITALS — BODY MASS INDEX: 26.8 KG/M2

## 2017-07-18 DIAGNOSIS — J02.9: ICD-10-CM

## 2017-07-18 DIAGNOSIS — T50.1X5A: Primary | ICD-10-CM

## 2017-07-18 DIAGNOSIS — Z79.899: ICD-10-CM

## 2017-07-18 DIAGNOSIS — E87.6: Primary | ICD-10-CM

## 2017-07-18 LAB
ALBUMIN SERPL-MCNC: 4 G/DL (ref 3.4–5)
ALBUMIN/GLOB SERPL: 1.18 {RATIO}
ALP SERPL-CCNC: 43 U/L (ref 42–98)
ALT SERPL-CCNC: 28 U/L (ref 12–78)
ANION GAP SERPL CALC-SCNC: 17.7 MMOL/L
AST SERPL-CCNC: 38 U/L (ref 15–37)
BILIRUB SERPL-MCNC: 0.64 MG/DL (ref 0–1.2)
BUN SERPL-MCNC: 6 MG/DL (ref 7–18)
BUN/CREAT SERPL: 6.31
CALCIUM SERPL-MCNC: 9.1 MG/DL (ref 8.2–10.2)
CHLORIDE SERPL-SCNC: 94 MMOL/L (ref 98–107)
CO2 BLD-SCNC: 22 MMOL/L (ref 21–32)
CREAT SERPL-MCNC: 0.95 MG/DL (ref 0.6–1.3)
GFR SERPLBLD BASED ON 1.73 SQ M-ARVRAT: 79 ML/MIN
GLOBULIN SER CALC-MCNC: 3.4 G/L
GLUCOSE SERPL-MCNC: 78 MG/DL (ref 70–110)
POTASSIUM SERPL-SCNC: 2.7 MMOL/L (ref 3.5–5.1)
PROT SERPL-MCNC: 7.4 G/DL (ref 6.4–8.2)
SODIUM SERPL-SCNC: 131 MMOL/L (ref 136–145)

## 2017-07-18 PROCEDURE — 80053 COMPREHEN METABOLIC PANEL: CPT

## 2017-07-18 PROCEDURE — 99283 EMERGENCY DEPT VISIT LOW MDM: CPT

## 2017-07-18 PROCEDURE — 36415 COLL VENOUS BLD VENIPUNCTURE: CPT

## 2017-07-18 PROCEDURE — 96361 HYDRATE IV INFUSION ADD-ON: CPT

## 2017-07-18 PROCEDURE — 84132 ASSAY OF SERUM POTASSIUM: CPT

## 2017-07-18 PROCEDURE — 96360 HYDRATION IV INFUSION INIT: CPT

## 2017-07-18 PROCEDURE — 87880 STREP A ASSAY W/OPTIC: CPT

## 2017-07-26 ENCOUNTER — HOSPITAL ENCOUNTER (INPATIENT)
Dept: HOSPITAL 58 - ED | Age: 42
LOS: 1 days | Discharge: LEFT BEFORE DISCHARGE | DRG: 313 | End: 2017-07-27
Attending: INTERNAL MEDICINE | Admitting: INTERNAL MEDICINE

## 2017-07-26 VITALS — BODY MASS INDEX: 26.8 KG/M2

## 2017-07-26 DIAGNOSIS — F32.9: ICD-10-CM

## 2017-07-26 DIAGNOSIS — K27.9: ICD-10-CM

## 2017-07-26 DIAGNOSIS — E87.6: ICD-10-CM

## 2017-07-26 DIAGNOSIS — R07.9: Primary | ICD-10-CM

## 2017-07-26 DIAGNOSIS — M32.9: ICD-10-CM

## 2017-07-26 DIAGNOSIS — J06.9: ICD-10-CM

## 2017-07-26 DIAGNOSIS — Z79.899: ICD-10-CM

## 2017-07-26 DIAGNOSIS — G35: ICD-10-CM

## 2017-07-26 DIAGNOSIS — F10.129: ICD-10-CM

## 2017-07-26 LAB
ALBUMIN SERPL-MCNC: 3.4 G/DL (ref 3.4–5)
ALBUMIN/GLOB SERPL: 1.21 {RATIO}
ALP SERPL-CCNC: 36 U/L (ref 42–98)
ALT SERPL-CCNC: 23 U/L (ref 12–78)
ANION GAP SERPL CALC-SCNC: 12.4 MMOL/L
ANION GAP SERPL CALC-SCNC: 12.8 MMOL/L
AST SERPL-CCNC: 26 U/L (ref 15–37)
BASOPHILS # BLD AUTO: 0 K/UL (ref 0–0.2)
BASOPHILS NFR BLD AUTO: 0.5 % (ref 0–3)
BILIRUB SERPL-MCNC: 0.23 MG/DL (ref 0–1.2)
BUN SERPL-MCNC: 7 MG/DL (ref 7–18)
BUN SERPL-MCNC: 7 MG/DL (ref 7–18)
BUN/CREAT SERPL: 8.64
BUN/CREAT SERPL: 9.58
CALCIUM SERPL-MCNC: 8.6 MG/DL (ref 8.2–10.2)
CALCIUM SERPL-MCNC: 8.8 MG/DL (ref 8.2–10.2)
CHLORIDE SERPL-SCNC: 106 MMOL/L (ref 98–107)
CHLORIDE SERPL-SCNC: 106 MMOL/L (ref 98–107)
CK MB SERPL-MCNC: 1.1 NG/ML (ref 0–3.6)
CK MB SERPL-MCNC: 1.3 NG/ML (ref 0–3.6)
CK MB SERPL-MCNC: 1.8 NG/ML (ref 0–3.6)
CK SERPL-CCNC: 183 U/L
CK SERPL-CCNC: 215 U/L
CK SERPL-CCNC: 260 U/L
CO2 BLD-SCNC: 23 MMOL/L (ref 21–32)
CO2 BLD-SCNC: 23 MMOL/L (ref 21–32)
COCAINE UR QL SCN: NEGATIVE
CREAT SERPL-MCNC: 0.73 MG/DL (ref 0.6–1.3)
CREAT SERPL-MCNC: 0.81 MG/DL (ref 0.6–1.3)
EOSINOPHIL # BLD AUTO: 0.1 K/UL (ref 0–0.7)
EOSINOPHIL NFR BLD AUTO: 0.6 % (ref 0–7)
GFR SERPLBLD BASED ON 1.73 SQ M-ARVRAT: 108 ML/MIN
GFR SERPLBLD BASED ON 1.73 SQ M-ARVRAT: 95 ML/MIN
GLOBULIN SER CALC-MCNC: 2.8 G/L
GLUCOSE SERPL-MCNC: 60 MG/DL (ref 70–110)
GLUCOSE SERPL-MCNC: 70 MG/DL (ref 70–110)
HCT VFR BLD AUTO: 36.2 % (ref 37–47)
HGB BLD-MCNC: 13.3 G/DL (ref 12–16)
IMM GRANULOCYTES NFR BLD AUTO: 0.8 % (ref 0–5)
LYMPHOCYTES # SPEC AUTO: 2.2 K/UL (ref 0.6–3.4)
LYMPHOCYTES NFR BLD AUTO: 26.8 % (ref 10–50)
MCH RBC QN: 32 PG (ref 27–31)
MCHC RBC AUTO-ENTMCNC: 36.7 G/DL (ref 31.8–35.4)
MCV RBC: 87.2 FL (ref 81–99)
MONOCYTES # BLD AUTO: 0.8 K/UL (ref 0.4–2)
MONOCYTES NFR BLD AUTO: 9.1 % (ref 0–10)
NEUTROPHILS # BLD AUTO: 5.2 K/UL (ref 2–6.9)
NEUTROPHILS NFR BLD AUTO: 62.2 %
PLATELET # BLD AUTO: 281 10^3/UL (ref 140–440)
POTASSIUM SERPL-SCNC: 2.8 MMOL/L (ref 3.5–5.1)
POTASSIUM SERPL-SCNC: 3.4 MMOL/L (ref 3.5–5.1)
PROT SERPL-MCNC: 6.2 G/DL (ref 6.4–8.2)
RBC # BLD AUTO: 4.15 10^6/UL (ref 4.2–5.4)
SERUM PREGNANCY INTERNAL QC: (no result)
SODIUM SERPL-SCNC: 138 MMOL/L (ref 136–145)
SODIUM SERPL-SCNC: 139 MMOL/L (ref 136–145)
WBC # BLD AUTO: 8.37 K/UL (ref 4.6–10.2)

## 2017-07-26 PROCEDURE — 83735 ASSAY OF MAGNESIUM: CPT

## 2017-07-26 PROCEDURE — 84132 ASSAY OF SERUM POTASSIUM: CPT

## 2017-07-26 PROCEDURE — 80048 BASIC METABOLIC PNL TOTAL CA: CPT

## 2017-07-26 PROCEDURE — 85025 COMPLETE CBC W/AUTO DIFF WBC: CPT

## 2017-07-26 PROCEDURE — 82553 CREATINE MB FRACTION: CPT

## 2017-07-26 PROCEDURE — 93010 ELECTROCARDIOGRAM REPORT: CPT

## 2017-07-26 PROCEDURE — 84703 CHORIONIC GONADOTROPIN ASSAY: CPT

## 2017-07-26 PROCEDURE — 84484 ASSAY OF TROPONIN QUANT: CPT

## 2017-07-26 PROCEDURE — 99284 EMERGENCY DEPT VISIT MOD MDM: CPT

## 2017-07-26 PROCEDURE — 80307 DRUG TEST PRSMV CHEM ANLYZR: CPT

## 2017-07-26 PROCEDURE — 93005 ELECTROCARDIOGRAM TRACING: CPT

## 2017-07-26 PROCEDURE — 82550 ASSAY OF CK (CPK): CPT

## 2017-07-26 PROCEDURE — 80053 COMPREHEN METABOLIC PANEL: CPT

## 2017-07-26 PROCEDURE — 80306 DRUG TEST PRSMV INSTRMNT: CPT

## 2017-07-26 PROCEDURE — 36415 COLL VENOUS BLD VENIPUNCTURE: CPT

## 2017-07-26 RX ADMIN — AMOXICILLIN AND CLAVULANATE POTASSIUM SCH TAB: 875; 125 TABLET, FILM COATED ORAL at 17:16

## 2017-07-26 RX ADMIN — ALPRAZOLAM SCH MG: 0.5 TABLET ORAL at 20:40

## 2017-07-26 RX ADMIN — AMOXICILLIN AND CLAVULANATE POTASSIUM SCH TAB: 875; 125 TABLET, FILM COATED ORAL at 10:16

## 2017-07-26 RX ADMIN — AMANTADINE HYDROCHLORIDE SCH MG: 100 CAPSULE ORAL at 10:15

## 2017-07-26 RX ADMIN — SODIUM CHLORIDE SCH MLS/HR: 0.9 INJECTION, SOLUTION INTRAVENOUS at 10:18

## 2017-07-26 RX ADMIN — GABAPENTIN SCH MG: 300 CAPSULE ORAL at 20:41

## 2017-07-26 RX ADMIN — AMANTADINE HYDROCHLORIDE SCH MG: 100 CAPSULE ORAL at 20:40

## 2017-07-26 RX ADMIN — ALPRAZOLAM SCH MG: 0.5 TABLET ORAL at 10:16

## 2017-07-26 RX ADMIN — ALPRAZOLAM SCH MG: 0.5 TABLET ORAL at 14:44

## 2017-07-27 VITALS — TEMPERATURE: 98 F | SYSTOLIC BLOOD PRESSURE: 109 MMHG | DIASTOLIC BLOOD PRESSURE: 84 MMHG

## 2017-07-27 LAB
ALBUMIN SERPL-MCNC: 2.8 G/DL (ref 3.4–5)
ALBUMIN/GLOB SERPL: 1.27 {RATIO}
ALP SERPL-CCNC: 33 U/L (ref 42–98)
ALT SERPL-CCNC: 17 U/L (ref 12–78)
ANION GAP SERPL CALC-SCNC: 12.2 MMOL/L
AST SERPL-CCNC: 21 U/L (ref 15–37)
BASOPHILS # BLD AUTO: 0 K/UL (ref 0–0.2)
BASOPHILS NFR BLD AUTO: 0.5 % (ref 0–3)
BILIRUB SERPL-MCNC: 0.38 MG/DL (ref 0–1.2)
BUN SERPL-MCNC: 11 MG/DL (ref 7–18)
BUN/CREAT SERPL: 15.94
CALCIUM SERPL-MCNC: 8.1 MG/DL (ref 8.2–10.2)
CHLORIDE SERPL-SCNC: 109 MMOL/L (ref 98–107)
CO2 BLD-SCNC: 20 MMOL/L (ref 21–32)
CREAT SERPL-MCNC: 0.69 MG/DL (ref 0.6–1.3)
EOSINOPHIL # BLD AUTO: 0.1 K/UL (ref 0–0.7)
EOSINOPHIL NFR BLD AUTO: 1.3 % (ref 0–7)
GFR SERPLBLD BASED ON 1.73 SQ M-ARVRAT: 115 ML/MIN
GLOBULIN SER CALC-MCNC: 2.2 G/L
GLUCOSE SERPL-MCNC: 69 MG/DL (ref 70–110)
HCT VFR BLD AUTO: 32.4 % (ref 37–47)
HGB BLD-MCNC: 11.5 G/DL (ref 12–16)
IMM GRANULOCYTES NFR BLD AUTO: 0.5 % (ref 0–5)
LYMPHOCYTES # SPEC AUTO: 2.5 K/UL (ref 0.6–3.4)
LYMPHOCYTES NFR BLD AUTO: 40.3 % (ref 10–50)
MAGNESIUM SERPL-MCNC: 2.1 MG/DL (ref 1.7–2.2)
MCH RBC QN: 31.8 PG (ref 27–31)
MCHC RBC AUTO-ENTMCNC: 35.5 G/DL (ref 31.8–35.4)
MCV RBC: 89.5 FL (ref 81–99)
MONOCYTES # BLD AUTO: 0.5 K/UL (ref 0.4–2)
MONOCYTES NFR BLD AUTO: 8 % (ref 0–10)
NEUTROPHILS # BLD AUTO: 3.1 K/UL (ref 2–6.9)
NEUTROPHILS NFR BLD AUTO: 49.4 %
PLATELET # BLD AUTO: 219 10^3/UL (ref 140–440)
POTASSIUM SERPL-SCNC: 3.2 MMOL/L (ref 3.5–5.1)
POTASSIUM SERPL-SCNC: 4 MMOL/L (ref 3.5–5.1)
PROT SERPL-MCNC: 5 G/DL (ref 6.4–8.2)
RBC # BLD AUTO: 3.62 10^6/UL (ref 4.2–5.4)
SODIUM SERPL-SCNC: 138 MMOL/L (ref 136–145)
WBC # BLD AUTO: 6.28 K/UL (ref 4.6–10.2)

## 2017-07-27 RX ADMIN — ALPRAZOLAM SCH MG: 0.5 TABLET ORAL at 20:27

## 2017-07-27 RX ADMIN — AMANTADINE HYDROCHLORIDE SCH MG: 100 CAPSULE ORAL at 08:57

## 2017-07-27 RX ADMIN — ALPRAZOLAM SCH MG: 0.5 TABLET ORAL at 16:56

## 2017-07-27 RX ADMIN — SODIUM CHLORIDE SCH MLS/HR: 0.9 INJECTION, SOLUTION INTRAVENOUS at 02:30

## 2017-07-27 RX ADMIN — AMOXICILLIN AND CLAVULANATE POTASSIUM SCH TAB: 875; 125 TABLET, FILM COATED ORAL at 08:57

## 2017-07-27 RX ADMIN — AMOXICILLIN AND CLAVULANATE POTASSIUM SCH TAB: 875; 125 TABLET, FILM COATED ORAL at 16:56

## 2017-07-27 RX ADMIN — AMANTADINE HYDROCHLORIDE SCH MG: 100 CAPSULE ORAL at 20:27

## 2017-07-27 RX ADMIN — SODIUM CHLORIDE SCH: 0.9 INJECTION, SOLUTION INTRAVENOUS at 16:57

## 2017-07-27 RX ADMIN — SODIUM CHLORIDE SCH MLS/HR: 0.9 INJECTION, SOLUTION INTRAVENOUS at 19:22

## 2017-07-27 RX ADMIN — GABAPENTIN SCH MG: 300 CAPSULE ORAL at 20:27

## 2017-07-27 RX ADMIN — ALPRAZOLAM SCH MG: 0.5 TABLET ORAL at 11:10

## 2017-07-29 ENCOUNTER — APPOINTMENT (OUTPATIENT)
Dept: GENERAL RADIOLOGY | Facility: HOSPITAL | Age: 42
End: 2017-07-29

## 2017-07-29 ENCOUNTER — HOSPITAL ENCOUNTER (EMERGENCY)
Facility: HOSPITAL | Age: 42
Discharge: HOME | End: 2017-07-29
Admitting: FAMILY MEDICINE

## 2017-07-29 VITALS
OXYGEN SATURATION: 100 % | BODY MASS INDEX: 28.51 KG/M2 | HEART RATE: 61 BPM | SYSTOLIC BLOOD PRESSURE: 151 MMHG | HEIGHT: 66 IN | DIASTOLIC BLOOD PRESSURE: 95 MMHG | RESPIRATION RATE: 16 BRPM | WEIGHT: 177.4 LBS | TEMPERATURE: 98.1 F

## 2017-07-29 DIAGNOSIS — S20.211A CHEST WALL CONTUSION, RIGHT, INITIAL ENCOUNTER: Primary | ICD-10-CM

## 2017-07-29 LAB
B-HCG UR QL: NEGATIVE
INTERNAL NEGATIVE CONTROL: NEGATIVE
INTERNAL POSITIVE CONTROL: POSITIVE
Lab: NORMAL

## 2017-07-29 PROCEDURE — 25010000002 KETOROLAC TROMETHAMINE PER 15 MG: Performed by: PHYSICIAN ASSISTANT

## 2017-07-29 PROCEDURE — 96372 THER/PROPH/DIAG INJ SC/IM: CPT

## 2017-07-29 PROCEDURE — 71020 HC CHEST PA AND LATERAL: CPT

## 2017-07-29 PROCEDURE — 99283 EMERGENCY DEPT VISIT LOW MDM: CPT

## 2017-07-29 RX ORDER — KETOROLAC TROMETHAMINE 30 MG/ML
60 INJECTION, SOLUTION INTRAMUSCULAR; INTRAVENOUS ONCE
Status: COMPLETED | OUTPATIENT
Start: 2017-07-29 | End: 2017-07-29

## 2017-07-29 RX ORDER — DICLOFENAC SODIUM 75 MG/1
75 TABLET, DELAYED RELEASE ORAL 2 TIMES DAILY
Qty: 14 TABLET | Refills: 0 | Status: SHIPPED | OUTPATIENT
Start: 2017-07-29

## 2017-07-29 RX ADMIN — KETOROLAC TROMETHAMINE 60 MG: 30 INJECTION, SOLUTION INTRAMUSCULAR at 22:57

## 2017-08-01 ENCOUNTER — HOSPITAL ENCOUNTER (OUTPATIENT)
Dept: HOSPITAL 58 - LAB | Age: 42
Discharge: HOME | End: 2017-08-01
Attending: INTERNAL MEDICINE

## 2017-08-01 VITALS — BODY MASS INDEX: 26.8 KG/M2

## 2017-08-01 DIAGNOSIS — E87.6: Primary | ICD-10-CM

## 2017-08-01 LAB
ALBUMIN SERPL-MCNC: 3.7 G/DL (ref 3.4–5)
ALBUMIN/GLOB SERPL: 1.09 {RATIO}
ALP SERPL-CCNC: 44 U/L (ref 42–98)
ALT SERPL-CCNC: 31 U/L (ref 12–78)
ANION GAP SERPL CALC-SCNC: 13.3 MMOL/L
AST SERPL-CCNC: 30 U/L (ref 15–37)
BILIRUB SERPL-MCNC: 0.37 MG/DL (ref 0–1.2)
BUN SERPL-MCNC: 7 MG/DL (ref 7–18)
BUN/CREAT SERPL: 7.29
CALCIUM SERPL-MCNC: 9.6 MG/DL (ref 8.2–10.2)
CHLORIDE SERPL-SCNC: 104 MMOL/L (ref 98–107)
CO2 BLD-SCNC: 24 MMOL/L (ref 21–32)
CREAT SERPL-MCNC: 0.96 MG/DL (ref 0.6–1.3)
GFR SERPLBLD BASED ON 1.73 SQ M-ARVRAT: 78 ML/MIN
GLOBULIN SER CALC-MCNC: 3.4 G/L
GLUCOSE SERPL-MCNC: 68 MG/DL (ref 70–110)
POTASSIUM SERPL-SCNC: 3.3 MMOL/L (ref 3.5–5.1)
PROT SERPL-MCNC: 7.1 G/DL (ref 6.4–8.2)
SODIUM SERPL-SCNC: 138 MMOL/L (ref 136–145)

## 2017-08-01 PROCEDURE — 80053 COMPREHEN METABOLIC PANEL: CPT

## 2017-08-01 PROCEDURE — 36415 COLL VENOUS BLD VENIPUNCTURE: CPT

## 2017-08-30 ENCOUNTER — HOSPITAL ENCOUNTER (OUTPATIENT)
Dept: HOSPITAL 58 - LAB | Age: 42
Discharge: HOME | End: 2017-08-30
Attending: INTERNAL MEDICINE

## 2017-08-30 VITALS — BODY MASS INDEX: 26.8 KG/M2

## 2017-08-30 DIAGNOSIS — M32.9: ICD-10-CM

## 2017-08-30 DIAGNOSIS — Z51.81: Primary | ICD-10-CM

## 2017-08-30 LAB
ADD URINE MICROSCOPIC: NO
ALBUMIN SERPL-MCNC: 3.6 G/DL (ref 3.4–5)
ALBUMIN/GLOB SERPL: 1.09 {RATIO}
ALP SERPL-CCNC: 56 U/L (ref 42–98)
ALT SERPL-CCNC: 22 U/L (ref 12–78)
ANION GAP SERPL CALC-SCNC: 16.7 MMOL/L
AST SERPL-CCNC: 26 U/L (ref 15–37)
BILIRUB SERPL-MCNC: 0.35 MG/DL (ref 0–1.2)
BUN SERPL-MCNC: 6 MG/DL (ref 7–18)
BUN/CREAT SERPL: 6.25
CALCIUM SERPL-MCNC: 9.4 MG/DL (ref 8.2–10.2)
CHLORIDE SERPL-SCNC: 108 MMOL/L (ref 98–107)
CO2 BLD-SCNC: 19 MMOL/L (ref 21–32)
CREAT SERPL-MCNC: 0.96 MG/DL (ref 0.6–1.3)
GFR SERPLBLD BASED ON 1.73 SQ M-ARVRAT: 78 ML/MIN
GLOBULIN SER CALC-MCNC: 3.3 G/L
GLUCOSE SERPL-MCNC: 62 MG/DL (ref 70–110)
HCT VFR BLD AUTO: 37.8 % (ref 37–47)
HGB BLD-MCNC: 13.2 G/DL (ref 12–16)
MCH RBC QN: 32 PG (ref 27–31)
MCHC RBC AUTO-ENTMCNC: 34.9 G/DL (ref 31.8–35.4)
MCV RBC: 91.7 FL (ref 81–99)
PH UR: 6 [PH] (ref 5–9)
PLATELET # BLD AUTO: 274 10^3/UL (ref 140–440)
POTASSIUM SERPL-SCNC: 3.7 MMOL/L (ref 3.5–5.1)
PROT SERPL-MCNC: 6.9 G/DL (ref 6.4–8.2)
RBC # BLD AUTO: 4.12 10^6/UL (ref 4.2–5.4)
SODIUM SERPL-SCNC: 140 MMOL/L (ref 136–145)
SP GR UR: 1.01 (ref 1–1.03)
WBC # BLD AUTO: 6.11 K/UL (ref 4.6–10.2)

## 2017-08-30 PROCEDURE — 86160 COMPLEMENT ANTIGEN: CPT

## 2017-08-30 PROCEDURE — 80053 COMPREHEN METABOLIC PANEL: CPT

## 2017-08-30 PROCEDURE — 36415 COLL VENOUS BLD VENIPUNCTURE: CPT

## 2017-08-30 PROCEDURE — 85027 COMPLETE CBC AUTOMATED: CPT

## 2017-08-30 PROCEDURE — 86225 DNA ANTIBODY NATIVE: CPT

## 2017-08-30 PROCEDURE — 81001 URINALYSIS AUTO W/SCOPE: CPT

## 2017-08-30 PROCEDURE — 85025 COMPLETE CBC W/AUTO DIFF WBC: CPT

## 2017-08-31 ENCOUNTER — TRANSCRIBE ORDERS (OUTPATIENT)
Dept: ADMINISTRATIVE | Facility: HOSPITAL | Age: 42
End: 2017-08-31

## 2017-08-31 DIAGNOSIS — G35 MS (MULTIPLE SCLEROSIS) (HCC): Primary | ICD-10-CM

## 2017-08-31 LAB
C3 SERPL-MCNC: 87 MG/DL (ref 82–167)
C4 SERPL-MCNC: 16 MG/DL (ref 14–44)

## 2017-09-05 ENCOUNTER — APPOINTMENT (OUTPATIENT)
Dept: MRI IMAGING | Facility: HOSPITAL | Age: 42
End: 2017-09-05

## 2017-09-07 ENCOUNTER — TRANSCRIBE ORDERS (OUTPATIENT)
Dept: ADMINISTRATIVE | Facility: HOSPITAL | Age: 42
End: 2017-09-07

## 2017-09-07 DIAGNOSIS — M85.80 OSTEOPENIA: Primary | ICD-10-CM

## 2017-09-07 DIAGNOSIS — Z79.52 LONG TERM CURRENT USE OF SYSTEMIC STEROIDS: ICD-10-CM

## 2017-09-12 ENCOUNTER — APPOINTMENT (OUTPATIENT)
Dept: MRI IMAGING | Facility: HOSPITAL | Age: 42
End: 2017-09-12

## 2017-09-12 ENCOUNTER — APPOINTMENT (OUTPATIENT)
Dept: BONE DENSITY | Facility: HOSPITAL | Age: 42
End: 2017-09-12

## 2017-09-20 ENCOUNTER — HOSPITAL ENCOUNTER (OUTPATIENT)
Dept: MRI IMAGING | Facility: HOSPITAL | Age: 42
Discharge: HOME | End: 2017-09-20

## 2017-09-20 ENCOUNTER — HOSPITAL ENCOUNTER (OUTPATIENT)
Dept: BONE DENSITY | Facility: HOSPITAL | Age: 42
Discharge: HOME | End: 2017-09-20
Admitting: INTERNAL MEDICINE

## 2017-09-20 DIAGNOSIS — Z79.52 LONG TERM CURRENT USE OF SYSTEMIC STEROIDS: ICD-10-CM

## 2017-09-20 DIAGNOSIS — G35 MS (MULTIPLE SCLEROSIS) (HCC): ICD-10-CM

## 2017-09-20 LAB — CREAT BLDA-MCNC: 1 MG/DL (ref 0.6–1.3)

## 2017-09-20 PROCEDURE — A9577 INJ MULTIHANCE: HCPCS | Performed by: INTERNAL MEDICINE

## 2017-09-20 PROCEDURE — 77080 DXA BONE DENSITY AXIAL: CPT

## 2017-09-20 PROCEDURE — 25510000001 GADOBENATE DIMEGLUMINE 529 MG/ML SOLUTION: Performed by: INTERNAL MEDICINE

## 2017-09-20 PROCEDURE — 82565 ASSAY OF CREATININE: CPT

## 2017-09-20 PROCEDURE — 70553 MRI BRAIN STEM W/O & W/DYE: CPT

## 2017-09-20 RX ADMIN — GADOBENATE DIMEGLUMINE 15 ML: 529 INJECTION, SOLUTION INTRAVENOUS at 12:30

## 2017-11-02 ENCOUNTER — OFFICE VISIT (OUTPATIENT)
Dept: NEUROLOGY | Age: 42
End: 2017-11-02
Payer: MEDICARE

## 2017-11-02 VITALS
HEART RATE: 80 BPM | RESPIRATION RATE: 16 BRPM | WEIGHT: 169 LBS | BODY MASS INDEX: 27.16 KG/M2 | HEIGHT: 66 IN | SYSTOLIC BLOOD PRESSURE: 117 MMHG | DIASTOLIC BLOOD PRESSURE: 77 MMHG

## 2017-11-02 DIAGNOSIS — G35 MULTIPLE SCLEROSIS (HCC): Primary | ICD-10-CM

## 2017-11-02 DIAGNOSIS — G43.719 INTRACTABLE CHRONIC MIGRAINE WITHOUT AURA AND WITHOUT STATUS MIGRAINOSUS: ICD-10-CM

## 2017-11-02 PROCEDURE — 4004F PT TOBACCO SCREEN RCVD TLK: CPT | Performed by: PSYCHIATRY & NEUROLOGY

## 2017-11-02 PROCEDURE — G8427 DOCREV CUR MEDS BY ELIG CLIN: HCPCS | Performed by: PSYCHIATRY & NEUROLOGY

## 2017-11-02 PROCEDURE — G8419 CALC BMI OUT NRM PARAM NOF/U: HCPCS | Performed by: PSYCHIATRY & NEUROLOGY

## 2017-11-02 PROCEDURE — G8484 FLU IMMUNIZE NO ADMIN: HCPCS | Performed by: PSYCHIATRY & NEUROLOGY

## 2017-11-02 PROCEDURE — 99214 OFFICE O/P EST MOD 30 MIN: CPT | Performed by: PSYCHIATRY & NEUROLOGY

## 2017-11-02 RX ORDER — AMANTADINE HYDROCHLORIDE 100 MG/1
TABLET ORAL
Refills: 0 | Status: ON HOLD | COMMUNITY
Start: 2017-10-23 | End: 2018-09-13 | Stop reason: HOSPADM

## 2017-11-02 RX ORDER — HYDROXYZINE 50 MG/1
50 TABLET, FILM COATED ORAL
Status: ON HOLD | COMMUNITY
End: 2018-09-13 | Stop reason: HOSPADM

## 2017-11-02 RX ORDER — LISINOPRIL 40 MG/1
TABLET ORAL
Refills: 0 | Status: ON HOLD | COMMUNITY
Start: 2017-08-30 | End: 2018-09-13 | Stop reason: HOSPADM

## 2017-11-02 RX ORDER — AZELASTINE 1 MG/ML
SPRAY, METERED NASAL
Refills: 0 | COMMUNITY
Start: 2017-10-03 | End: 2021-05-13

## 2017-11-02 RX ORDER — HYDROCODONE BITARTRATE AND ACETAMINOPHEN 5; 325 MG/1; MG/1
TABLET ORAL
Refills: 0 | Status: ON HOLD | COMMUNITY
Start: 2017-10-20 | End: 2018-09-13 | Stop reason: HOSPADM

## 2017-11-02 RX ORDER — POTASSIUM CHLORIDE 1500 MG/1
TABLET, FILM COATED, EXTENDED RELEASE ORAL
Refills: 0 | Status: ON HOLD | COMMUNITY
Start: 2017-09-12 | End: 2019-05-10

## 2017-11-02 RX ORDER — HYDROCODONE BITARTRATE AND ACETAMINOPHEN 10; 325 MG/1; MG/1
1 TABLET ORAL
COMMUNITY
End: 2017-11-02

## 2017-11-02 NOTE — PROGRESS NOTES
tobacco. She reports that she does not drink alcohol or use drugs. History reviewed. No pertinent family history. Social History  Jessi Stevens is single, lives in [place], [place], and is disabled.     Medications:  Current Outpatient Prescriptions   Medication Sig Dispense Refill    hydrOXYzine (ATARAX) 50 MG tablet Take 50 mg by mouth      Amantadine (SYMMETREL) 100 MG TABS tablet   0    azelastine (ASTELIN) 0.1 % nasal spray SPRAY IN EACH NOSTRIL QD  0    HYDROcodone-acetaminophen (NORCO) 5-325 MG per tablet TK 1 T PO BID PRN FOR PAIN  0    lisinopril (PRINIVIL;ZESTRIL) 40 MG tablet TK 1 T PO D  0    potassium chloride (KLOR-CON M) 20 MEQ TBCR extended release tablet TK 1 T PO BID  0    leuprolide (LUPRON) 11.25 MG injection Inject 11.25 mg into the muscle every 30 days      norethindrone (AYGESTIN) 5 MG tablet Take 5 mg by mouth daily      alendronate (FOSAMAX) 70 MG tablet Take 70 mg by mouth every 7 days      Multiple Vitamins-Minerals (THERAPEUTIC MULTIVITAMIN-MINERALS) tablet Take 1 tablet by mouth daily      ferrous sulfate 325 (65 FE) MG tablet Take 325 mg by mouth daily (with breakfast)      calcium carbonate 600 MG TABS tablet Take 1 tablet by mouth daily      hydrochlorothiazide (MICROZIDE) 12.5 MG capsule Take 12.5 mg by mouth every morning      hydroxychloroquine (PLAQUENIL) 200 MG tablet Take 200 mg by mouth 2 times daily      Gabapentin, Once-Daily, (GRALISE) 600 MG TABS Take by mouth 3 times daily as needed      interferon beta-1a (AVONEX) 30 MCG injection Inject 30 mcg into the muscle once a week      desvenlafaxine succinate (PRISTIQ) 100 MG TB24 Take by mouth daily      busPIRone (BUSPAR) 15 MG tablet Take 30 mg by mouth daily       pantoprazole (PROTONIX) 40 MG tablet Take 40 mg by mouth three times daily      ALPRAZolam (XANAX) 1 MG tablet Take 1 mg by mouth Indications: 1 PO QIS PRN       cetirizine (ZYRTEC) 10 MG tablet Take 10 mg by mouth daily      topiramate (TOPAMAX) 200 MG tablet Take 200 mg by mouth daily       prazosin (MINIPRESS) 2 MG capsule Take 2 mg by mouth nightly      predniSONE (DELTASONE) 1 MG tablet Take 5 mg by mouth daily Indications: 2 PO QD       cyanocobalamin 1000 MCG/ML injection Inject 1,000 mcg into the muscle once Indications: monthly      nortriptyline (PAMELOR) 25 MG capsule Take 2 capsules by mouth nightly 60 capsule 5     No current facility-administered medications for this visit. Allergies: Allergies as of 11/02/2017 - Review Complete 11/02/2017   Allergen Reaction Noted    Sugar-protein-starch Other (See Comments) 11/02/2017    Avelox [moxifloxacin]  02/09/2016    Copaxone [glatiramer acetate]  02/09/2016    Depakote [divalproex sodium]  10/27/2016    Invega [paliperidone]  02/09/2016    Lamictal [lamotrigine]  02/09/2016    Lithium  02/09/2016       Examination:  Vitals:  /77   Pulse 80   Resp 16   Ht 5' 6\" (1.676 m)   Wt 169 lb (76.7 kg)   BMI 27.28 kg/m²   General appearance: alert and cooperative with exam  HEENT: Sclera clear, anicteric, Oropharynx clear, no lesions, Neck supple with midline trachea, Thyroid without masses and Trachea midline  Heart[de-identified] regular rate and rhythm, S1, S2 normal, no murmur, click, rub or gallop  Lungs: clear to auscultation bilaterally  Extremities: extremities normal, atraumatic, no cyanosis or edema  Neurologic: Extraocular movements are intact without nystagmus. Visual fields are full to confrontation. Facial movements are symmetrical and normal. Speech is precise. Extremity strength is normal in both uppers and lowers. Deep tendon reflexes are intact and symmetrical. Rapid alternating movements are unimpaired. Finger-to-nose testing is performed well, without dysmetria. Gait is normal.    Pertinent Diagnostic Studies:  Result Impression   1. Unchanged burden of demyelinating disease in bilateral cerebral  hemispheres. 2. No evidence of active or new disease.        This

## 2017-11-02 NOTE — PATIENT INSTRUCTIONS
INSTRUCTIONS:  1. Continue the same medications  2. Get the blood test for the PIERO virus at a [address] [place] [address] laboratory  3.  Will arrange the BOTOX for the migraines

## 2017-11-21 ENCOUNTER — HOSPITAL ENCOUNTER (OUTPATIENT)
Dept: HOSPITAL 58 - LAB | Age: 42
Discharge: HOME | End: 2017-11-21
Attending: PSYCHIATRY & NEUROLOGY

## 2017-11-21 VITALS — BODY MASS INDEX: 26.8 KG/M2

## 2017-11-21 DIAGNOSIS — G35: Primary | ICD-10-CM

## 2017-11-21 PROCEDURE — 36415 COLL VENOUS BLD VENIPUNCTURE: CPT

## 2017-12-19 ENCOUNTER — HOSPITAL ENCOUNTER (OUTPATIENT)
Dept: HOSPITAL 58 - LAB | Age: 42
Discharge: HOME | End: 2017-12-19
Attending: NURSE PRACTITIONER

## 2017-12-19 VITALS — BODY MASS INDEX: 26.8 KG/M2

## 2017-12-19 DIAGNOSIS — R52: ICD-10-CM

## 2017-12-19 DIAGNOSIS — J02.9: Primary | ICD-10-CM

## 2017-12-19 LAB
FLU INTERNAL QC: (no result)
FLUAV AG NPH QL: NEGATIVE
FLUBV AG NPH QL: NEGATIVE

## 2017-12-19 PROCEDURE — 87880 STREP A ASSAY W/OPTIC: CPT

## 2017-12-19 PROCEDURE — 87804 INFLUENZA ASSAY W/OPTIC: CPT

## 2017-12-19 PROCEDURE — 87651 STREP A DNA AMP PROBE: CPT

## 2017-12-29 ENCOUNTER — TELEPHONE (OUTPATIENT)
Dept: NEUROLOGY | Age: 42
End: 2017-12-29

## 2017-12-29 NOTE — TELEPHONE ENCOUNTER
Patient called she stated she was seen a month ago an hasn't heard from anyone about her Botox. She stated that she has been having some incontinence.

## 2018-01-02 ENCOUNTER — TELEPHONE (OUTPATIENT)
Dept: NEUROLOGY | Age: 43
End: 2018-01-02

## 2018-01-02 NOTE — TELEPHONE ENCOUNTER
Patient called to check on Botox prior auth. This was done on Medicaid of [place] which we don't take, so will need to get prior auth on botox thru her Medicare.

## 2018-01-09 ENCOUNTER — TELEPHONE (OUTPATIENT)
Dept: NEUROLOGY | Age: 43
End: 2018-01-09

## 2018-01-09 NOTE — TELEPHONE ENCOUNTER
Left message for patient to return call to let us know if she has Medicare Part D and if so which insurancy.

## 2018-02-05 ENCOUNTER — HOSPITAL ENCOUNTER (EMERGENCY)
Dept: HOSPITAL 58 - ED | Age: 43
Discharge: HOME | End: 2018-02-05

## 2018-02-05 VITALS — TEMPERATURE: 98.1 F | DIASTOLIC BLOOD PRESSURE: 88 MMHG | SYSTOLIC BLOOD PRESSURE: 135 MMHG

## 2018-02-05 VITALS — BODY MASS INDEX: 28.7 KG/M2

## 2018-02-05 DIAGNOSIS — F17.210: ICD-10-CM

## 2018-02-05 DIAGNOSIS — R53.1: Primary | ICD-10-CM

## 2018-02-05 DIAGNOSIS — I10: ICD-10-CM

## 2018-02-05 DIAGNOSIS — E78.5: ICD-10-CM

## 2018-02-05 DIAGNOSIS — Z79.899: ICD-10-CM

## 2018-02-05 PROCEDURE — 99283 EMERGENCY DEPT VISIT LOW MDM: CPT

## 2018-02-05 PROCEDURE — 93010 ELECTROCARDIOGRAM REPORT: CPT

## 2018-02-05 PROCEDURE — 80053 COMPREHEN METABOLIC PANEL: CPT

## 2018-02-05 PROCEDURE — 81025 URINE PREGNANCY TEST: CPT

## 2018-02-05 PROCEDURE — 36415 COLL VENOUS BLD VENIPUNCTURE: CPT

## 2018-02-05 PROCEDURE — 93005 ELECTROCARDIOGRAM TRACING: CPT

## 2018-02-05 PROCEDURE — 87651 STREP A DNA AMP PROBE: CPT

## 2018-02-05 PROCEDURE — 87502 INFLUENZA DNA AMP PROBE: CPT

## 2018-02-05 PROCEDURE — 85025 COMPLETE CBC W/AUTO DIFF WBC: CPT

## 2018-02-16 ENCOUNTER — HOSPITAL ENCOUNTER (OUTPATIENT)
Dept: HOSPITAL 58 - LAB | Age: 43
Discharge: HOME | End: 2018-02-16
Attending: NURSE PRACTITIONER

## 2018-02-16 VITALS — BODY MASS INDEX: 28.7 KG/M2

## 2018-02-16 DIAGNOSIS — R05: Primary | ICD-10-CM

## 2018-02-16 PROCEDURE — 87804 INFLUENZA ASSAY W/OPTIC: CPT

## 2018-02-19 ENCOUNTER — HOSPITAL ENCOUNTER (EMERGENCY)
Age: 43
Discharge: LEFT BEFORE DISCHARGE | End: 2018-02-19
Payer: MEDICARE

## 2018-02-19 VITALS
RESPIRATION RATE: 18 BRPM | DIASTOLIC BLOOD PRESSURE: 77 MMHG | BODY MASS INDEX: 27.97 KG/M2 | SYSTOLIC BLOOD PRESSURE: 118 MMHG | HEART RATE: 73 BPM | TEMPERATURE: 98.3 F | WEIGHT: 174 LBS | OXYGEN SATURATION: 98 % | HEIGHT: 66 IN

## 2018-02-19 PROCEDURE — 4500000002 HC ER NO CHARGE

## 2018-02-19 ASSESSMENT — PAIN SCALES - GENERAL: PAINLEVEL_OUTOF10: 7

## 2018-02-19 ASSESSMENT — PAIN DESCRIPTION - DESCRIPTORS: DESCRIPTORS: ACHING

## 2018-02-19 ASSESSMENT — PAIN DESCRIPTION - LOCATION: LOCATION: GENERALIZED

## 2018-02-20 ENCOUNTER — HOSPITAL ENCOUNTER (OUTPATIENT)
Dept: HOSPITAL 58 - LAB | Age: 43
Discharge: HOME | End: 2018-02-20
Attending: NURSE PRACTITIONER
Payer: COMMERCIAL

## 2018-02-20 VITALS — BODY MASS INDEX: 28.7 KG/M2

## 2018-02-20 DIAGNOSIS — J02.9: Primary | ICD-10-CM

## 2018-02-20 PROCEDURE — 87651 STREP A DNA AMP PROBE: CPT

## 2018-02-26 ENCOUNTER — HOSPITAL ENCOUNTER (OUTPATIENT)
Dept: HOSPITAL 58 - FCC-LAB | Age: 43
Discharge: HOME | End: 2018-02-26
Attending: SPECIALIST

## 2018-02-26 VITALS — BODY MASS INDEX: 28.7 KG/M2

## 2018-02-26 DIAGNOSIS — R50.9: Primary | ICD-10-CM

## 2018-02-26 DIAGNOSIS — M32.9: ICD-10-CM

## 2018-02-26 PROCEDURE — 36415 COLL VENOUS BLD VENIPUNCTURE: CPT

## 2018-02-26 PROCEDURE — 85025 COMPLETE CBC W/AUTO DIFF WBC: CPT

## 2018-02-26 PROCEDURE — 80053 COMPREHEN METABOLIC PANEL: CPT

## 2018-02-26 PROCEDURE — 87804 INFLUENZA ASSAY W/OPTIC: CPT

## 2018-02-27 ENCOUNTER — HOSPITAL ENCOUNTER (OUTPATIENT)
Dept: HOSPITAL 58 - FCC-LAB | Age: 43
Discharge: HOME | End: 2018-02-27
Attending: SPECIALIST
Payer: COMMERCIAL

## 2018-02-27 VITALS — BODY MASS INDEX: 28.7 KG/M2

## 2018-02-27 DIAGNOSIS — M32.9: Primary | ICD-10-CM

## 2018-02-27 PROCEDURE — 36415 COLL VENOUS BLD VENIPUNCTURE: CPT

## 2018-02-27 PROCEDURE — 85651 RBC SED RATE NONAUTOMATED: CPT

## 2018-03-26 ENCOUNTER — HOSPITAL ENCOUNTER (OUTPATIENT)
Dept: HOSPITAL 58 - RAD | Age: 43
Discharge: HOME | End: 2018-03-26
Attending: NURSE PRACTITIONER

## 2018-03-26 VITALS — BODY MASS INDEX: 28.7 KG/M2

## 2018-03-26 DIAGNOSIS — M79.601: ICD-10-CM
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
## 2018-03-26 DIAGNOSIS — M54.6: Primary | ICD-10-CM

## 2018-04-24 ENCOUNTER — TELEPHONE (OUTPATIENT)
Dept: NEUROLOGY | Age: 43
End: 2018-04-24

## 2018-04-25 ENCOUNTER — TRANSCRIBE ORDERS (OUTPATIENT)
Dept: ADMINISTRATIVE | Facility: HOSPITAL | Age: 43
End: 2018-04-25

## 2018-04-25 DIAGNOSIS — G35 MULTIPLE SCLEROSIS (HCC): Primary | ICD-10-CM

## 2018-05-02 ENCOUNTER — APPOINTMENT (OUTPATIENT)
Dept: MRI IMAGING | Facility: HOSPITAL | Age: 43
End: 2018-05-02

## 2018-05-07 ENCOUNTER — APPOINTMENT (OUTPATIENT)
Dept: MRI IMAGING | Facility: HOSPITAL | Age: 43
End: 2018-05-07

## 2018-05-08 ENCOUNTER — APPOINTMENT (OUTPATIENT)
Dept: MRI IMAGING | Facility: HOSPITAL | Age: 43
End: 2018-05-08

## 2018-05-21 ENCOUNTER — APPOINTMENT (OUTPATIENT)
Dept: MRI IMAGING | Facility: HOSPITAL | Age: 43
End: 2018-05-21

## 2018-05-22 ENCOUNTER — HOSPITAL ENCOUNTER (OUTPATIENT)
Dept: MRI IMAGING | Facility: HOSPITAL | Age: 43
Discharge: HOME | End: 2018-05-22
Admitting: INTERNAL MEDICINE

## 2018-05-22 DIAGNOSIS — G35 MULTIPLE SCLEROSIS (HCC): ICD-10-CM

## 2018-05-22 LAB — CREAT BLDA-MCNC: 0.9 MG/DL (ref 0.6–1.3)

## 2018-05-22 PROCEDURE — 72156 MRI NECK SPINE W/O & W/DYE: CPT

## 2018-05-22 PROCEDURE — 82565 ASSAY OF CREATININE: CPT

## 2018-05-22 PROCEDURE — A9577 INJ MULTIHANCE: HCPCS | Performed by: INTERNAL MEDICINE

## 2018-05-22 PROCEDURE — 0 GADOBENATE DIMEGLUMINE 529 MG/ML SOLUTION: Performed by: INTERNAL MEDICINE

## 2018-05-22 RX ADMIN — GADOBENATE DIMEGLUMINE 16 ML: 529 INJECTION, SOLUTION INTRAVENOUS at 10:15

## 2018-05-30 ENCOUNTER — APPOINTMENT (OUTPATIENT)
Dept: MRI IMAGING | Facility: HOSPITAL | Age: 43
End: 2018-05-30

## 2018-05-30 ENCOUNTER — HOSPITAL ENCOUNTER (OUTPATIENT)
Dept: MRI IMAGING | Facility: HOSPITAL | Age: 43
End: 2018-05-30

## 2018-06-14 ENCOUNTER — HOSPITAL ENCOUNTER (EMERGENCY)
Dept: HOSPITAL 58 - ED | Age: 43
LOS: 1 days | Discharge: HOME | End: 2018-06-15

## 2018-06-14 VITALS — TEMPERATURE: 97.9 F | SYSTOLIC BLOOD PRESSURE: 125 MMHG | DIASTOLIC BLOOD PRESSURE: 76 MMHG

## 2018-06-14 VITALS — BODY MASS INDEX: 28.8 KG/M2

## 2018-06-14 DIAGNOSIS — R51: Primary | ICD-10-CM

## 2018-06-14 DIAGNOSIS — F17.210: ICD-10-CM

## 2018-06-14 PROCEDURE — 96372 THER/PROPH/DIAG INJ SC/IM: CPT

## 2018-06-14 PROCEDURE — 99282 EMERGENCY DEPT VISIT SF MDM: CPT

## 2018-06-18 ENCOUNTER — HOSPITAL ENCOUNTER (OUTPATIENT)
Dept: HOSPITAL 58 - RAD | Age: 43
Discharge: HOME | End: 2018-06-18
Attending: SPECIALIST

## 2018-06-18 VITALS — BODY MASS INDEX: 28.8 KG/M2

## 2018-06-18 DIAGNOSIS — R10.84: Primary | ICD-10-CM

## 2018-06-18 DIAGNOSIS — J02.9: ICD-10-CM

## 2018-06-18 DIAGNOSIS — R19.7: ICD-10-CM

## 2018-06-18 PROCEDURE — 83690 ASSAY OF LIPASE: CPT

## 2018-06-18 PROCEDURE — 81001 URINALYSIS AUTO W/SCOPE: CPT

## 2018-06-18 PROCEDURE — 87651 STREP A DNA AMP PROBE: CPT

## 2018-06-18 PROCEDURE — 82150 ASSAY OF AMYLASE: CPT

## 2018-06-18 PROCEDURE — 80053 COMPREHEN METABOLIC PANEL: CPT

## 2018-06-18 PROCEDURE — 36415 COLL VENOUS BLD VENIPUNCTURE: CPT

## 2018-06-18 PROCEDURE — 85025 COMPLETE CBC W/AUTO DIFF WBC: CPT

## 2018-06-20 ENCOUNTER — HOSPITAL ENCOUNTER (INPATIENT)
Dept: HOSPITAL 58 - MEDSURG B | Age: 43
LOS: 3 days | Discharge: HOME | DRG: 392 | End: 2018-06-23
Attending: INTERNAL MEDICINE | Admitting: INTERNAL MEDICINE
Payer: COMMERCIAL

## 2018-06-20 VITALS — BODY MASS INDEX: 29.9 KG/M2

## 2018-06-20 DIAGNOSIS — E78.5: ICD-10-CM

## 2018-06-20 DIAGNOSIS — F31.9: ICD-10-CM

## 2018-06-20 DIAGNOSIS — Z98.890: ICD-10-CM

## 2018-06-20 DIAGNOSIS — F32.9: ICD-10-CM

## 2018-06-20 DIAGNOSIS — I10: ICD-10-CM

## 2018-06-20 DIAGNOSIS — E66.9: ICD-10-CM

## 2018-06-20 DIAGNOSIS — M19.90: ICD-10-CM

## 2018-06-20 DIAGNOSIS — Z72.0: ICD-10-CM

## 2018-06-20 DIAGNOSIS — R19.7: ICD-10-CM

## 2018-06-20 DIAGNOSIS — R10.9: ICD-10-CM

## 2018-06-20 DIAGNOSIS — K52.9: Primary | ICD-10-CM

## 2018-06-20 DIAGNOSIS — G35: ICD-10-CM

## 2018-06-20 DIAGNOSIS — M79.7: ICD-10-CM

## 2018-06-20 DIAGNOSIS — M32.9: ICD-10-CM

## 2018-06-20 DIAGNOSIS — Z87.898: ICD-10-CM

## 2018-06-20 PROCEDURE — 84443 ASSAY THYROID STIM HORMONE: CPT

## 2018-06-20 PROCEDURE — 85025 COMPLETE CBC W/AUTO DIFF WBC: CPT

## 2018-06-20 PROCEDURE — 84479 ASSAY OF THYROID (T3 OR T4): CPT

## 2018-06-20 PROCEDURE — 80053 COMPREHEN METABOLIC PANEL: CPT

## 2018-06-20 PROCEDURE — 36415 COLL VENOUS BLD VENIPUNCTURE: CPT

## 2018-06-20 PROCEDURE — 81001 URINALYSIS AUTO W/SCOPE: CPT

## 2018-06-20 PROCEDURE — 84436 ASSAY OF TOTAL THYROXINE: CPT

## 2018-06-20 PROCEDURE — 81025 URINE PREGNANCY TEST: CPT

## 2018-06-20 RX ADMIN — ENOXAPARIN SODIUM SCH MG: 40 INJECTION SUBCUTANEOUS at 16:10

## 2018-06-20 RX ADMIN — SUCRALFATE SCH GM: 1 TABLET ORAL at 21:03

## 2018-06-20 RX ADMIN — Medication SCH PATCH: at 16:45

## 2018-06-20 RX ADMIN — MEPERIDINE HYDROCHLORIDE PRN MG: 50 INJECTION INTRAMUSCULAR; INTRAVENOUS; SUBCUTANEOUS at 16:48

## 2018-06-20 RX ADMIN — PANTOPRAZOLE SODIUM SCH MG: 40 INJECTION, POWDER, FOR SOLUTION INTRAVENOUS at 16:10

## 2018-06-20 RX ADMIN — ALPRAZOLAM SCH MG: 0.5 TABLET ORAL at 21:03

## 2018-06-20 RX ADMIN — POTASSIUM CHLORIDE SCH MEQ: 20 TABLET, EXTENDED RELEASE ORAL at 21:03

## 2018-06-20 RX ADMIN — PRAZOSIN HYDROCHLORIDE SCH MG: 1 CAPSULE ORAL at 21:03

## 2018-06-20 RX ADMIN — SODIUM CHLORIDE SCH MLS/HR: 0.9 INJECTION, SOLUTION INTRAVENOUS at 15:45

## 2018-06-20 RX ADMIN — SUCRALFATE SCH GM: 1 TABLET ORAL at 16:46

## 2018-06-21 ENCOUNTER — APPOINTMENT (OUTPATIENT)
Dept: MRI IMAGING | Facility: HOSPITAL | Age: 43
End: 2018-06-21

## 2018-06-21 RX ADMIN — POTASSIUM CHLORIDE SCH MEQ: 20 TABLET, EXTENDED RELEASE ORAL at 08:44

## 2018-06-21 RX ADMIN — POTASSIUM CHLORIDE SCH MEQ: 20 TABLET, EXTENDED RELEASE ORAL at 20:27

## 2018-06-21 RX ADMIN — HYDROCODONE BITARTRATE AND ACETAMINOPHEN PRN TAB: 5; 325 TABLET ORAL at 01:13

## 2018-06-21 RX ADMIN — HYDROCHLOROTHIAZIDE SCH MG: 25 TABLET ORAL at 08:43

## 2018-06-21 RX ADMIN — SODIUM CHLORIDE SCH MLS/HR: 0.9 INJECTION, SOLUTION INTRAVENOUS at 04:33

## 2018-06-21 RX ADMIN — DESVENLAFAXINE SUCCINATE SCH MG: 50 TABLET, EXTENDED RELEASE ORAL at 08:44

## 2018-06-21 RX ADMIN — PREDNISONE SCH MG: 1 TABLET ORAL at 09:14

## 2018-06-21 RX ADMIN — PRAZOSIN HYDROCHLORIDE SCH MG: 1 CAPSULE ORAL at 20:27

## 2018-06-21 RX ADMIN — SUCRALFATE SCH GM: 1 TABLET ORAL at 10:52

## 2018-06-21 RX ADMIN — ENOXAPARIN SODIUM SCH MG: 40 INJECTION SUBCUTANEOUS at 08:46

## 2018-06-21 RX ADMIN — SUCRALFATE SCH GM: 1 TABLET ORAL at 05:51

## 2018-06-21 RX ADMIN — PANTOPRAZOLE SODIUM SCH MG: 40 INJECTION, POWDER, FOR SOLUTION INTRAVENOUS at 08:44

## 2018-06-21 RX ADMIN — SODIUM CHLORIDE SCH MLS/HR: 0.9 INJECTION, SOLUTION INTRAVENOUS at 16:44

## 2018-06-21 RX ADMIN — ALPRAZOLAM SCH MG: 0.5 TABLET ORAL at 08:44

## 2018-06-21 RX ADMIN — SUCRALFATE SCH GM: 1 TABLET ORAL at 16:38

## 2018-06-21 RX ADMIN — ALPRAZOLAM SCH MG: 0.5 TABLET ORAL at 15:03

## 2018-06-21 RX ADMIN — ALPRAZOLAM SCH MG: 0.5 TABLET ORAL at 20:27

## 2018-06-21 RX ADMIN — CETIRIZINE HYDROCHLORIDE SCH MG: 10 TABLET, FILM COATED ORAL at 08:44

## 2018-06-21 RX ADMIN — HYDROXYZINE HYDROCHLORIDE SCH MG: 25 TABLET, FILM COATED ORAL at 08:43

## 2018-06-21 RX ADMIN — Medication SCH PATCH: at 08:44

## 2018-06-21 RX ADMIN — HYDROCODONE BITARTRATE AND ACETAMINOPHEN PRN TAB: 5; 325 TABLET ORAL at 20:27

## 2018-06-21 RX ADMIN — LISINOPRIL SCH MG: 40 TABLET ORAL at 08:44

## 2018-06-21 RX ADMIN — MULTIVITAMIN TABLET SCH TAB: TABLET at 08:43

## 2018-06-21 RX ADMIN — SUCRALFATE SCH GM: 1 TABLET ORAL at 20:27

## 2018-06-22 RX ADMIN — MEPERIDINE HYDROCHLORIDE PRN MG: 50 INJECTION INTRAMUSCULAR; INTRAVENOUS; SUBCUTANEOUS at 13:02

## 2018-06-22 RX ADMIN — HYDROCODONE BITARTRATE AND ACETAMINOPHEN PRN TAB: 5; 325 TABLET ORAL at 16:45

## 2018-06-22 RX ADMIN — SUCRALFATE SCH GM: 1 TABLET ORAL at 20:55

## 2018-06-22 RX ADMIN — HYDROCODONE BITARTRATE AND ACETAMINOPHEN PRN TAB: 5; 325 TABLET ORAL at 22:28

## 2018-06-22 RX ADMIN — Medication SCH PATCH: at 09:01

## 2018-06-22 RX ADMIN — POTASSIUM CHLORIDE SCH MEQ: 20 TABLET, EXTENDED RELEASE ORAL at 08:59

## 2018-06-22 RX ADMIN — ALPRAZOLAM SCH MG: 0.5 TABLET ORAL at 20:55

## 2018-06-22 RX ADMIN — PREDNISONE SCH MG: 1 TABLET ORAL at 08:59

## 2018-06-22 RX ADMIN — SUCRALFATE SCH GM: 1 TABLET ORAL at 16:45

## 2018-06-22 RX ADMIN — CETIRIZINE HYDROCHLORIDE SCH MG: 10 TABLET, FILM COATED ORAL at 09:00

## 2018-06-22 RX ADMIN — ALPRAZOLAM SCH MG: 0.5 TABLET ORAL at 08:59

## 2018-06-22 RX ADMIN — POTASSIUM CHLORIDE SCH MEQ: 20 TABLET, EXTENDED RELEASE ORAL at 20:55

## 2018-06-22 RX ADMIN — HYDROCODONE BITARTRATE AND ACETAMINOPHEN PRN TAB: 5; 325 TABLET ORAL at 09:00

## 2018-06-22 RX ADMIN — SODIUM CHLORIDE SCH MLS/HR: 0.9 INJECTION, SOLUTION INTRAVENOUS at 05:02

## 2018-06-22 RX ADMIN — HYDROXYZINE HYDROCHLORIDE SCH MG: 25 TABLET, FILM COATED ORAL at 08:59

## 2018-06-22 RX ADMIN — MULTIVITAMIN TABLET SCH TAB: TABLET at 08:58

## 2018-06-22 RX ADMIN — DESVENLAFAXINE SUCCINATE SCH MG: 50 TABLET, EXTENDED RELEASE ORAL at 08:59

## 2018-06-22 RX ADMIN — SUCRALFATE SCH GM: 1 TABLET ORAL at 13:01

## 2018-06-22 RX ADMIN — ENOXAPARIN SODIUM SCH MG: 40 INJECTION SUBCUTANEOUS at 09:01

## 2018-06-22 RX ADMIN — LISINOPRIL SCH MG: 40 TABLET ORAL at 09:00

## 2018-06-22 RX ADMIN — PANTOPRAZOLE SODIUM SCH MG: 40 INJECTION, POWDER, FOR SOLUTION INTRAVENOUS at 09:00

## 2018-06-22 RX ADMIN — PRAZOSIN HYDROCHLORIDE SCH MG: 1 CAPSULE ORAL at 20:55

## 2018-06-22 RX ADMIN — HYDROCHLOROTHIAZIDE SCH MG: 25 TABLET ORAL at 09:00

## 2018-06-22 RX ADMIN — ALPRAZOLAM SCH MG: 0.5 TABLET ORAL at 15:23

## 2018-06-22 RX ADMIN — SODIUM CHLORIDE SCH: 0.9 INJECTION, SOLUTION INTRAVENOUS at 09:02

## 2018-06-22 RX ADMIN — SUCRALFATE SCH GM: 1 TABLET ORAL at 05:45

## 2018-06-22 RX ADMIN — SODIUM CHLORIDE SCH MLS/HR: 0.9 INJECTION, SOLUTION INTRAVENOUS at 21:37

## 2018-06-23 VITALS — DIASTOLIC BLOOD PRESSURE: 66 MMHG | SYSTOLIC BLOOD PRESSURE: 101 MMHG

## 2018-06-23 VITALS — TEMPERATURE: 98 F

## 2018-06-23 RX ADMIN — ALPRAZOLAM SCH MG: 0.5 TABLET ORAL at 08:10

## 2018-06-23 RX ADMIN — LISINOPRIL SCH MG: 40 TABLET ORAL at 08:10

## 2018-06-23 RX ADMIN — SUCRALFATE SCH GM: 1 TABLET ORAL at 05:50

## 2018-06-23 RX ADMIN — HYDROXYZINE HYDROCHLORIDE SCH MG: 25 TABLET, FILM COATED ORAL at 08:09

## 2018-06-23 RX ADMIN — DESVENLAFAXINE SUCCINATE SCH MG: 50 TABLET, EXTENDED RELEASE ORAL at 08:09

## 2018-06-23 RX ADMIN — SUCRALFATE SCH: 1 TABLET ORAL at 17:12

## 2018-06-23 RX ADMIN — CETIRIZINE HYDROCHLORIDE SCH MG: 10 TABLET, FILM COATED ORAL at 08:09

## 2018-06-23 RX ADMIN — POTASSIUM CHLORIDE SCH MEQ: 20 TABLET, EXTENDED RELEASE ORAL at 08:09

## 2018-06-23 RX ADMIN — ALPRAZOLAM SCH MG: 0.5 TABLET ORAL at 16:59

## 2018-06-23 RX ADMIN — MULTIVITAMIN TABLET SCH TAB: TABLET at 08:10

## 2018-06-23 RX ADMIN — HYDROCHLOROTHIAZIDE SCH MG: 25 TABLET ORAL at 08:10

## 2018-06-23 RX ADMIN — SUCRALFATE SCH GM: 1 TABLET ORAL at 11:25

## 2018-06-23 RX ADMIN — PANTOPRAZOLE SODIUM SCH: 40 INJECTION, POWDER, FOR SOLUTION INTRAVENOUS at 08:11

## 2018-06-23 RX ADMIN — PREDNISONE SCH MG: 1 TABLET ORAL at 08:09

## 2018-06-23 RX ADMIN — MEPERIDINE HYDROCHLORIDE PRN MG: 50 INJECTION INTRAMUSCULAR; INTRAVENOUS; SUBCUTANEOUS at 00:25

## 2018-06-23 RX ADMIN — Medication SCH PATCH: at 08:10

## 2018-06-23 RX ADMIN — ENOXAPARIN SODIUM SCH: 40 INJECTION SUBCUTANEOUS at 10:23

## 2018-06-27 ENCOUNTER — HOSPITAL ENCOUNTER (OUTPATIENT)
Dept: HOSPITAL 58 - CAR | Age: 43
Discharge: HOME | End: 2018-06-27
Attending: INTERNAL MEDICINE

## 2018-06-27 VITALS — BODY MASS INDEX: 29.9 KG/M2

## 2018-06-27 DIAGNOSIS — R55: ICD-10-CM

## 2018-06-27 DIAGNOSIS — R00.2: Primary | ICD-10-CM

## 2018-06-29 ENCOUNTER — HOSPITAL ENCOUNTER (OUTPATIENT)
Dept: HOSPITAL 58 - CAR | Age: 43
Discharge: HOME | End: 2018-06-29
Attending: INTERNAL MEDICINE

## 2018-06-29 VITALS — BODY MASS INDEX: 29.9 KG/M2

## 2018-06-29 DIAGNOSIS — R55: Primary | ICD-10-CM

## 2018-06-29 DIAGNOSIS — R07.2: ICD-10-CM

## 2018-06-29 DIAGNOSIS — R00.2: ICD-10-CM

## 2018-06-29 PROCEDURE — 93227 XTRNL ECG REC<48 HR R&I: CPT

## 2018-08-27 ENCOUNTER — HOSPITAL ENCOUNTER (OUTPATIENT)
Dept: HOSPITAL 58 - RAD | Age: 43
Discharge: HOME | End: 2018-08-27
Attending: INTERNAL MEDICINE
Payer: COMMERCIAL

## 2018-08-27 VITALS — BODY MASS INDEX: 29.9 KG/M2

## 2018-08-27 DIAGNOSIS — M54.2: Primary | ICD-10-CM

## 2018-08-27 DIAGNOSIS — M54.9: ICD-10-CM

## 2018-08-27 DIAGNOSIS — G35: ICD-10-CM

## 2018-08-27 DIAGNOSIS — M54.5: ICD-10-CM

## 2018-08-27 PROCEDURE — 36415 COLL VENOUS BLD VENIPUNCTURE: CPT

## 2018-08-27 PROCEDURE — 82565 ASSAY OF CREATININE: CPT

## 2018-09-04 ENCOUNTER — HOSPITAL ENCOUNTER (OUTPATIENT)
Dept: PSYCHIATRY | Age: 43
Setting detail: THERAPIES SERIES
Discharge: HOME | End: 2018-09-04
Payer: MEDICARE

## 2018-09-04 VITALS
HEART RATE: 62 BPM | SYSTOLIC BLOOD PRESSURE: 141 MMHG | OXYGEN SATURATION: 100 % | BODY MASS INDEX: 31.34 KG/M2 | WEIGHT: 195 LBS | RESPIRATION RATE: 18 BRPM | HEIGHT: 66 IN | DIASTOLIC BLOOD PRESSURE: 93 MMHG | TEMPERATURE: 97.5 F

## 2018-09-04 DIAGNOSIS — F41.1 GENERALIZED ANXIETY DISORDER: Primary | ICD-10-CM

## 2018-09-04 PROCEDURE — 90832 PSYTX W PT 30 MINUTES: CPT | Performed by: SOCIAL WORKER

## 2018-09-04 RX ORDER — BUSPIRONE HYDROCHLORIDE 15 MG/1
15 TABLET ORAL 3 TIMES DAILY
Qty: 90 TABLET | Refills: 1 | Status: ON HOLD | OUTPATIENT
Start: 2018-09-04 | End: 2018-09-13 | Stop reason: HOSPADM

## 2018-09-04 RX ORDER — PRAZOSIN HYDROCHLORIDE 2 MG/1
2 CAPSULE ORAL NIGHTLY
Status: ON HOLD | COMMUNITY
End: 2018-09-13 | Stop reason: HOSPADM

## 2018-09-04 RX ORDER — DESVENLAFAXINE 100 MG/1
100 TABLET, EXTENDED RELEASE ORAL DAILY
Status: ON HOLD | COMMUNITY
End: 2018-09-13 | Stop reason: HOSPADM

## 2018-09-04 RX ORDER — HYDROCHLOROTHIAZIDE 12.5 MG/1
12.5 CAPSULE, GELATIN COATED ORAL DAILY
Status: ON HOLD | COMMUNITY
End: 2018-09-13 | Stop reason: HOSPADM

## 2018-09-04 RX ORDER — GABAPENTIN 600 MG/1
1800 TABLET ORAL NIGHTLY
Status: ON HOLD | COMMUNITY
End: 2018-09-13 | Stop reason: HOSPADM

## 2018-09-04 RX ORDER — CLONAZEPAM 1 MG/1
1 TABLET ORAL 3 TIMES DAILY PRN
Qty: 90 TABLET | Refills: 0 | Status: ON HOLD | OUTPATIENT
Start: 2018-09-04 | End: 2018-09-13 | Stop reason: HOSPADM

## 2018-09-04 RX ORDER — AMANTADINE HYDROCHLORIDE 100 MG/1
100 TABLET ORAL 2 TIMES DAILY
COMMUNITY
End: 2021-05-13

## 2018-09-04 RX ORDER — ALPRAZOLAM 1 MG/1
1 TABLET ORAL 3 TIMES DAILY
Status: ON HOLD | COMMUNITY
End: 2018-09-13 | Stop reason: HOSPADM

## 2018-09-04 RX ORDER — LISINOPRIL 40 MG/1
40 TABLET ORAL DAILY
COMMUNITY
End: 2022-03-18

## 2018-09-04 RX ORDER — POTASSIUM BICARBONATE 25 MEQ/1
25 TABLET, EFFERVESCENT ORAL 2 TIMES DAILY
Status: ON HOLD | COMMUNITY
End: 2020-07-20

## 2018-09-04 RX ORDER — BUSPIRONE HYDROCHLORIDE 15 MG/1
15 TABLET ORAL 2 TIMES DAILY
Status: ON HOLD | COMMUNITY
End: 2018-09-13 | Stop reason: HOSPADM

## 2018-09-04 RX ORDER — CYANOCOBALAMIN 1000 UG/ML
1000 INJECTION INTRAMUSCULAR; SUBCUTANEOUS
COMMUNITY

## 2018-09-04 RX ORDER — PANTOPRAZOLE SODIUM 40 MG/1
40 GRANULE, DELAYED RELEASE ORAL 2 TIMES DAILY
COMMUNITY

## 2018-09-04 RX ORDER — HYDROXYZINE HYDROCHLORIDE 25 MG/1
25 TABLET, FILM COATED ORAL 2 TIMES DAILY
Status: ON HOLD | COMMUNITY
End: 2018-09-13 | Stop reason: HOSPADM

## 2018-09-04 RX ORDER — DESVENLAFAXINE 100 MG/1
100 TABLET, EXTENDED RELEASE ORAL DAILY
Qty: 30 TABLET | Refills: 1 | Status: ON HOLD | OUTPATIENT
Start: 2018-09-04 | End: 2018-09-13 | Stop reason: HOSPADM

## 2018-09-04 RX ORDER — PRAZOSIN HYDROCHLORIDE 1 MG/1
3 CAPSULE ORAL NIGHTLY
Qty: 90 CAPSULE | Refills: 1 | Status: ON HOLD | OUTPATIENT
Start: 2018-09-04 | End: 2018-09-13 | Stop reason: HOSPADM

## 2018-09-04 RX ORDER — PREDNISONE 10 MG/1
13 TABLET ORAL DAILY
COMMUNITY
End: 2019-10-05 | Stop reason: ALTCHOICE

## 2018-09-04 ASSESSMENT — SLEEP AND FATIGUE QUESTIONNAIRES
RESTFUL SLEEP: NO
SLEEP PATTERN: DIFFICULTY FALLING ASLEEP;DISTURBED/INTERRUPTED SLEEP;NIGHTMARES/TERRORS
DIFFICULTY FALLING ASLEEP: YES
DIFFICULTY STAYING ASLEEP: YES
DO YOU HAVE DIFFICULTY SLEEPING: YES
DO YOU USE A SLEEP AID: YES
AVERAGE NUMBER OF SLEEP HOURS: 3
DIFFICULTY ARISING: NO

## 2018-09-04 ASSESSMENT — PATIENT HEALTH QUESTIONNAIRE - PHQ9: SUM OF ALL RESPONSES TO PHQ QUESTIONS 1-9: 8

## 2018-09-04 ASSESSMENT — LIFESTYLE VARIABLES: HISTORY_ALCOHOL_USE: COMMENT

## 2018-09-04 NOTE — PROGRESS NOTES
Admission Note      Reason for admission/Target Symptom: increasing anxiety and depression was in IOP in the past and is reaching out for help. Denies SI, HI, AVH. Diagnoses: Bipolar      Pt was admitted to [facility]- Intensive Outpatient Group Therapy Program for 3 days a week from 8:00-12:30pm.     LCSW and Treatment team has  identified patients discharge needs as medication management and therapy. Pt validated need for appointments. Pt was also provided a handout of contact information for drug and alcohol treatment centers and other community support service such as [facility] and [facility] . LCSW met with pt for 30+ minutes to complete Psychosocial, CSSRS, initial treatment plan, and treatment plan signature sheet. In the last 6 months has the pt been a danger to self: NO  In the last 6 months has the pt been a danger to others: NO    Patient refused/declined practical counseling of tobacco practical counseling during the hospital stay. Community Support Groups Pt was informed about: [facility], DREA VALERO, NA, [facility] [address] [place], [facility], Div[facility], Grief Support, and offered to connect with any other community support groups. Pt was also informed about [facility] numbers, [facility], Local ER, and 911.

## 2018-09-04 NOTE — PROGRESS NOTES
CARE PLAN/ TREATMENT PLAN:      LCSW met with pt to review care plan goals. Pt verbalized understanding of care plan goals and is in agreement with goals. Treatment team and pt completed the signature sheet.

## 2018-09-10 ENCOUNTER — HOSPITAL ENCOUNTER (OUTPATIENT)
Dept: HOSPITAL 58 - AMBL | Age: 43
Discharge: ANOTHER HOSPITAL | End: 2018-09-10
Attending: FAMILY MEDICINE

## 2018-09-10 ENCOUNTER — HOSPITAL ENCOUNTER (EMERGENCY)
Facility: HOSPITAL | Age: 43
Discharge: HOME | End: 2018-09-11
Attending: EMERGENCY MEDICINE | Admitting: EMERGENCY MEDICINE

## 2018-09-10 VITALS — BODY MASS INDEX: 29.9 KG/M2

## 2018-09-10 DIAGNOSIS — R45.851: Primary | ICD-10-CM

## 2018-09-10 DIAGNOSIS — F43.20 ADJUSTMENT DISORDER, UNSPECIFIED TYPE: Primary | ICD-10-CM

## 2018-09-10 DIAGNOSIS — R40.4: ICD-10-CM

## 2018-09-10 DIAGNOSIS — F32.9: ICD-10-CM

## 2018-09-10 DIAGNOSIS — M79.7: ICD-10-CM

## 2018-09-10 DIAGNOSIS — M32.9: ICD-10-CM

## 2018-09-10 DIAGNOSIS — G35: ICD-10-CM

## 2018-09-10 PROCEDURE — 99285 EMERGENCY DEPT VISIT HI MDM: CPT

## 2018-09-11 ENCOUNTER — TELEPHONE (OUTPATIENT)
Dept: PSYCHIATRY | Age: 43
End: 2018-09-11

## 2018-09-11 ENCOUNTER — HOSPITAL ENCOUNTER (INPATIENT)
Age: 43
LOS: 2 days | Discharge: PSYCHIATRIC OR SUBSTANCE USE FACILITY | DRG: 885 | End: 2018-09-13
Attending: EMERGENCY MEDICINE | Admitting: PSYCHIATRY & NEUROLOGY
Payer: MEDICARE

## 2018-09-11 VITALS
HEIGHT: 66 IN | HEART RATE: 100 BPM | BODY MASS INDEX: 31.98 KG/M2 | DIASTOLIC BLOOD PRESSURE: 67 MMHG | WEIGHT: 199 LBS | SYSTOLIC BLOOD PRESSURE: 109 MMHG | OXYGEN SATURATION: 99 % | TEMPERATURE: 98.1 F | RESPIRATION RATE: 16 BRPM

## 2018-09-11 DIAGNOSIS — R45.851 DEPRESSION WITH SUICIDAL IDEATION: Primary | ICD-10-CM

## 2018-09-11 DIAGNOSIS — F31.9 BIPOLAR 1 DISORDER (HCC): ICD-10-CM

## 2018-09-11 DIAGNOSIS — F32.A DEPRESSION WITH SUICIDAL IDEATION: Primary | ICD-10-CM

## 2018-09-11 LAB
ACETAMINOPHEN LEVEL: <15 UG/ML
ALBUMIN SERPL-MCNC: 3.7 G/DL (ref 3.5–5)
ALBUMIN SERPL-MCNC: 4 G/DL (ref 3.5–5.2)
ALBUMIN/GLOB SERPL: 1.3 G/DL (ref 1.1–2.5)
ALP BLD-CCNC: 62 U/L (ref 35–104)
ALP SERPL-CCNC: 49 U/L (ref 24–120)
ALT SERPL W P-5'-P-CCNC: 36 U/L (ref 0–54)
ALT SERPL-CCNC: 26 U/L (ref 5–33)
AMPHET+METHAMPHET UR QL: NEGATIVE
AMPHETAMINE SCREEN, URINE: NEGATIVE
ANION GAP SERPL CALCULATED.3IONS-SCNC: 14 MMOL/L (ref 4–13)
ANION GAP SERPL CALCULATED.3IONS-SCNC: 15 MMOL/L (ref 7–19)
APAP SERPL-MCNC: <10 MCG/ML (ref 10–30)
AST SERPL-CCNC: 26 U/L (ref 5–32)
AST SERPL-CCNC: 36 U/L (ref 7–45)
BARBITURATE SCREEN URINE: NEGATIVE
BARBITURATES UR QL SCN: NEGATIVE
BASOPHILS # BLD AUTO: 0.03 10*3/MM3 (ref 0–0.2)
BASOPHILS ABSOLUTE: 0 K/UL (ref 0–0.2)
BASOPHILS NFR BLD AUTO: 0.3 % (ref 0–2)
BASOPHILS RELATIVE PERCENT: 0.5 % (ref 0–1)
BENZODIAZ UR QL SCN: NEGATIVE
BENZODIAZEPINE SCREEN, URINE: POSITIVE
BILIRUB SERPL-MCNC: 0.2 MG/DL (ref 0.1–1)
BILIRUB SERPL-MCNC: 0.3 MG/DL (ref 0.2–1.2)
BILIRUB UR QL STRIP: NEGATIVE
BILIRUBIN URINE: NEGATIVE
BLOOD, URINE: NEGATIVE
BUN BLD-MCNC: 17 MG/DL (ref 5–21)
BUN BLDV-MCNC: 16 MG/DL (ref 6–20)
BUN/CREAT SERPL: 15.5 (ref 7–25)
CALCIUM SERPL-MCNC: 9 MG/DL (ref 8.6–10)
CALCIUM SPEC-SCNC: 9 MG/DL (ref 8.4–10.4)
CANNABINOID SCREEN URINE: POSITIVE
CANNABINOIDS SERPL QL: NEGATIVE
CHLORIDE BLD-SCNC: 108 MMOL/L (ref 98–111)
CHLORIDE SERPL-SCNC: 110 MMOL/L (ref 98–110)
CLARITY UR: CLEAR
CLARITY: CLEAR
CO2 SERPL-SCNC: 17 MMOL/L (ref 24–31)
CO2: 18 MMOL/L (ref 22–29)
COCAINE METABOLITE SCREEN URINE: NEGATIVE
COCAINE UR QL: NEGATIVE
COLOR UR: YELLOW
COLOR: YELLOW
CREAT BLD-MCNC: 1.1 MG/DL (ref 0.5–1.4)
CREAT SERPL-MCNC: 1.1 MG/DL (ref 0.5–0.9)
DEPRECATED RDW RBC AUTO: 49.5 FL (ref 40–54)
EOSINOPHIL # BLD AUTO: 0.09 10*3/MM3 (ref 0–0.7)
EOSINOPHIL NFR BLD AUTO: 1 % (ref 0–4)
EOSINOPHILS ABSOLUTE: 0.1 K/UL (ref 0–0.6)
EOSINOPHILS RELATIVE PERCENT: 1.8 % (ref 0–5)
ERYTHROCYTE [DISTWIDTH] IN BLOOD BY AUTOMATED COUNT: 15.2 % (ref 12–15)
ETHANOL UR QL: <0.01 %
ETHANOL: <10 MG/DL (ref 0–0.08)
GFR NON-AFRICAN AMERICAN: 55
GFR SERPL CREATININE-BSD FRML MDRD: 66 ML/MIN/1.73
GLOBULIN UR ELPH-MCNC: 2.9 GM/DL
GLUCOSE BLD-MCNC: 75 MG/DL (ref 74–109)
GLUCOSE BLD-MCNC: 86 MG/DL (ref 70–100)
GLUCOSE UR STRIP-MCNC: NEGATIVE MG/DL
GLUCOSE URINE: NEGATIVE MG/DL
HCG(URINE) PREGNANCY TEST: NEGATIVE
HCT VFR BLD AUTO: 37.7 % (ref 37–47)
HCT VFR BLD CALC: 41.9 % (ref 37–47)
HEMOGLOBIN: 13.7 G/DL (ref 12–16)
HGB BLD-MCNC: 12.8 G/DL (ref 12–16)
HGB UR QL STRIP.AUTO: NEGATIVE
HOLD SPECIMEN: NORMAL
HOLD SPECIMEN: NORMAL
IMM GRANULOCYTES # BLD: 0.08 10*3/MM3 (ref 0–0.03)
IMM GRANULOCYTES NFR BLD: 0.9 % (ref 0–5)
INR BLD: 1.2 (ref 0.88–1.18)
KETONES UR QL STRIP: NEGATIVE
KETONES, URINE: NEGATIVE MG/DL
LEUKOCYTE ESTERASE UR QL STRIP.AUTO: NEGATIVE
LEUKOCYTE ESTERASE, URINE: NEGATIVE
LYMPHOCYTES # BLD AUTO: 2.66 10*3/MM3 (ref 0.72–4.86)
LYMPHOCYTES ABSOLUTE: 2.3 K/UL (ref 1.1–4.5)
LYMPHOCYTES NFR BLD AUTO: 28.9 % (ref 15–45)
LYMPHOCYTES RELATIVE PERCENT: 29.1 % (ref 20–40)
Lab: ABNORMAL
MCH RBC QN AUTO: 29.8 PG (ref 27–31)
MCH RBC QN AUTO: 30.4 PG (ref 28–32)
MCHC RBC AUTO-ENTMCNC: 32.7 G/DL (ref 33–37)
MCHC RBC AUTO-ENTMCNC: 34 G/DL (ref 33–36)
MCV RBC AUTO: 89.5 FL (ref 82–98)
MCV RBC AUTO: 91.1 FL (ref 81–99)
METHADONE UR QL SCN: NEGATIVE
MONOCYTES # BLD AUTO: 0.39 10*3/MM3 (ref 0.19–1.3)
MONOCYTES ABSOLUTE: 0.4 K/UL (ref 0–0.9)
MONOCYTES NFR BLD AUTO: 4.2 % (ref 4–12)
MONOCYTES RELATIVE PERCENT: 5.3 % (ref 0–10)
NEUTROPHILS # BLD AUTO: 5.94 10*3/MM3 (ref 1.87–8.4)
NEUTROPHILS ABSOLUTE: 4.9 K/UL (ref 1.5–7.5)
NEUTROPHILS NFR BLD AUTO: 64.7 % (ref 39–78)
NEUTROPHILS RELATIVE PERCENT: 62.2 % (ref 50–65)
NITRITE UR QL STRIP: NEGATIVE
NITRITE, URINE: NEGATIVE
NRBC BLD MANUAL-RTO: 0 /100 WBC (ref 0–0)
OPIATE SCREEN URINE: NEGATIVE
OPIATES UR QL: NEGATIVE
PCP UR QL SCN: NEGATIVE
PDW BLD-RTO: 15.3 % (ref 11.5–14.5)
PH UA: 6
PH UR STRIP.AUTO: <=5 [PH] (ref 5–8)
PLATELET # BLD AUTO: 247 10*3/MM3 (ref 130–400)
PLATELET # BLD: 254 K/UL (ref 130–400)
PMV BLD AUTO: 9.2 FL (ref 9.4–12.3)
PMV BLD AUTO: 9.5 FL (ref 6–12)
POTASSIUM BLD-SCNC: 3.7 MMOL/L (ref 3.5–5.3)
POTASSIUM SERPL-SCNC: 3.9 MMOL/L (ref 3.5–5)
PROT SERPL-MCNC: 6.6 G/DL (ref 6.3–8.7)
PROT UR QL STRIP: NEGATIVE
PROTEIN UA: NEGATIVE MG/DL
PROTHROMBIN TIME: 15.1 SEC (ref 12–14.6)
RBC # BLD AUTO: 4.21 10*6/MM3 (ref 4.2–5.4)
RBC # BLD: 4.6 M/UL (ref 4.2–5.4)
SALICYLATE, SERUM: <3 MG/DL (ref 3–10)
SALICYLATES SERPL-MCNC: <1 MG/DL (ref 15–30)
SODIUM BLD-SCNC: 141 MMOL/L (ref 135–145)
SODIUM BLD-SCNC: 141 MMOL/L (ref 136–145)
SP GR UR STRIP: 1.01 (ref 1–1.03)
SPECIFIC GRAVITY UA: 1.02
TOTAL PROTEIN: 6.5 G/DL (ref 6.6–8.7)
URINE REFLEX TO CULTURE: NORMAL
UROBILINOGEN UR QL STRIP: NORMAL
UROBILINOGEN, URINE: 0.2 E.U./DL
WBC # BLD: 7.9 K/UL (ref 4.8–10.8)
WBC NRBC COR # BLD: 9.19 10*3/MM3 (ref 4.8–10.8)
WHOLE BLOOD HOLD SPECIMEN: NORMAL
WHOLE BLOOD HOLD SPECIMEN: NORMAL

## 2018-09-11 PROCEDURE — 80307 DRUG TEST PRSMV CHEM ANLYZR: CPT | Performed by: EMERGENCY MEDICINE

## 2018-09-11 PROCEDURE — 36415 COLL VENOUS BLD VENIPUNCTURE: CPT

## 2018-09-11 PROCEDURE — 6370000000 HC RX 637 (ALT 250 FOR IP): Performed by: PSYCHIATRY & NEUROLOGY

## 2018-09-11 PROCEDURE — 99285 EMERGENCY DEPT VISIT HI MDM: CPT

## 2018-09-11 PROCEDURE — 81003 URINALYSIS AUTO W/O SCOPE: CPT | Performed by: EMERGENCY MEDICINE

## 2018-09-11 PROCEDURE — 85025 COMPLETE CBC W/AUTO DIFF WBC: CPT

## 2018-09-11 PROCEDURE — 1240000000 HC EMOTIONAL WELLNESS R&B

## 2018-09-11 PROCEDURE — 85610 PROTHROMBIN TIME: CPT

## 2018-09-11 PROCEDURE — 25010000002 LORAZEPAM PER 2 MG: Performed by: EMERGENCY MEDICINE

## 2018-09-11 PROCEDURE — 81025 URINE PREGNANCY TEST: CPT

## 2018-09-11 PROCEDURE — 6370000000 HC RX 637 (ALT 250 FOR IP): Performed by: FAMILY MEDICINE

## 2018-09-11 PROCEDURE — 96374 THER/PROPH/DIAG INJ IV PUSH: CPT

## 2018-09-11 PROCEDURE — 80053 COMPREHEN METABOLIC PANEL: CPT

## 2018-09-11 PROCEDURE — 80053 COMPREHEN METABOLIC PANEL: CPT | Performed by: EMERGENCY MEDICINE

## 2018-09-11 PROCEDURE — G0480 DRUG TEST DEF 1-7 CLASSES: HCPCS

## 2018-09-11 PROCEDURE — 99285 EMERGENCY DEPT VISIT HI MDM: CPT | Performed by: EMERGENCY MEDICINE

## 2018-09-11 PROCEDURE — 85025 COMPLETE CBC W/AUTO DIFF WBC: CPT | Performed by: EMERGENCY MEDICINE

## 2018-09-11 PROCEDURE — 81003 URINALYSIS AUTO W/O SCOPE: CPT

## 2018-09-11 PROCEDURE — 80307 DRUG TEST PRSMV CHEM ANLYZR: CPT

## 2018-09-11 RX ORDER — PROMETHAZINE HYDROCHLORIDE 12.5 MG/1
12.5 TABLET ORAL EVERY 8 HOURS PRN
Status: ON HOLD | COMMUNITY
End: 2018-09-13 | Stop reason: HOSPADM

## 2018-09-11 RX ORDER — PANTOPRAZOLE SODIUM 40 MG/1
40 TABLET, DELAYED RELEASE ORAL
Status: DISCONTINUED | OUTPATIENT
Start: 2018-09-11 | End: 2018-09-13 | Stop reason: HOSPADM

## 2018-09-11 RX ORDER — CLONAZEPAM 1 MG/1
1 TABLET ORAL 3 TIMES DAILY PRN
Status: DISCONTINUED | OUTPATIENT
Start: 2018-09-11 | End: 2018-09-12

## 2018-09-11 RX ORDER — METRONIDAZOLE 500 MG/1
500 TABLET ORAL 2 TIMES DAILY
Status: ON HOLD | COMMUNITY
End: 2018-09-13 | Stop reason: HOSPADM

## 2018-09-11 RX ORDER — PREDNISONE 10 MG/1
10 TABLET ORAL 2 TIMES DAILY
Status: DISCONTINUED | OUTPATIENT
Start: 2018-09-11 | End: 2018-09-13 | Stop reason: HOSPADM

## 2018-09-11 RX ORDER — TEMAZEPAM 15 MG/1
30 CAPSULE ORAL ONCE
Status: COMPLETED | OUTPATIENT
Start: 2018-09-11 | End: 2018-09-11

## 2018-09-11 RX ORDER — OSELTAMIVIR PHOSPHATE 75 MG/1
75 CAPSULE ORAL 2 TIMES DAILY
Status: ON HOLD | COMMUNITY
End: 2018-09-13 | Stop reason: HOSPADM

## 2018-09-11 RX ORDER — BUSPIRONE HYDROCHLORIDE 15 MG/1
15 TABLET ORAL 3 TIMES DAILY
Status: DISCONTINUED | OUTPATIENT
Start: 2018-09-11 | End: 2018-09-12

## 2018-09-11 RX ORDER — LISINOPRIL 40 MG/1
40 TABLET ORAL 2 TIMES DAILY
COMMUNITY

## 2018-09-11 RX ORDER — PANTOPRAZOLE SODIUM 40 MG/10ML
40 INJECTION, POWDER, LYOPHILIZED, FOR SOLUTION INTRAVENOUS 2 TIMES DAILY
Status: DISCONTINUED | OUTPATIENT
Start: 2018-09-11 | End: 2018-09-11

## 2018-09-11 RX ORDER — POTASSIUM CHLORIDE 20 MEQ/1
20 TABLET, EXTENDED RELEASE ORAL 2 TIMES DAILY
Status: DISCONTINUED | OUTPATIENT
Start: 2018-09-11 | End: 2018-09-13 | Stop reason: HOSPADM

## 2018-09-11 RX ORDER — CETIRIZINE HYDROCHLORIDE 10 MG/1
10 TABLET ORAL DAILY
Status: DISCONTINUED | OUTPATIENT
Start: 2018-09-11 | End: 2018-09-13 | Stop reason: HOSPADM

## 2018-09-11 RX ORDER — CYCLOBENZAPRINE HCL 10 MG
10 TABLET ORAL 2 TIMES DAILY PRN
COMMUNITY
End: 2021-01-18

## 2018-09-11 RX ORDER — ONDANSETRON HYDROCHLORIDE 8 MG/1
8 TABLET, FILM COATED ORAL EVERY 12 HOURS PRN
Status: ON HOLD | COMMUNITY
End: 2018-09-13 | Stop reason: HOSPADM

## 2018-09-11 RX ORDER — HYDROXYZINE HYDROCHLORIDE 25 MG/1
25 TABLET, FILM COATED ORAL 2 TIMES DAILY
Status: DISCONTINUED | OUTPATIENT
Start: 2018-09-11 | End: 2018-09-13 | Stop reason: HOSPADM

## 2018-09-11 RX ORDER — CYCLOBENZAPRINE HCL 5 MG
5 TABLET ORAL 3 TIMES DAILY PRN
Status: DISCONTINUED | OUTPATIENT
Start: 2018-09-11 | End: 2018-09-13 | Stop reason: HOSPADM

## 2018-09-11 RX ORDER — AMANTADINE HYDROCHLORIDE 100 MG/1
100 CAPSULE, GELATIN COATED ORAL 2 TIMES DAILY
Status: DISCONTINUED | OUTPATIENT
Start: 2018-09-11 | End: 2018-09-13 | Stop reason: HOSPADM

## 2018-09-11 RX ORDER — TOPIRAMATE 100 MG/1
200 TABLET, FILM COATED ORAL NIGHTLY
Status: DISCONTINUED | OUTPATIENT
Start: 2018-09-11 | End: 2018-09-13 | Stop reason: HOSPADM

## 2018-09-11 RX ORDER — SUMATRIPTAN 50 MG/1
50 TABLET, FILM COATED ORAL DAILY PRN
Status: ON HOLD | COMMUNITY
End: 2018-09-13 | Stop reason: HOSPADM

## 2018-09-11 RX ORDER — LORAZEPAM 2 MG/ML
1 INJECTION INTRAMUSCULAR ONCE
Status: COMPLETED | OUTPATIENT
Start: 2018-09-11 | End: 2018-09-11

## 2018-09-11 RX ORDER — ACETAMINOPHEN 500 MG
1000 TABLET ORAL ONCE
Status: COMPLETED | OUTPATIENT
Start: 2018-09-11 | End: 2018-09-11

## 2018-09-11 RX ORDER — LISINOPRIL 20 MG/1
40 TABLET ORAL DAILY
Status: DISCONTINUED | OUTPATIENT
Start: 2018-09-11 | End: 2018-09-13 | Stop reason: HOSPADM

## 2018-09-11 RX ADMIN — PREDNISONE 10 MG: 10 TABLET ORAL at 22:14

## 2018-09-11 RX ADMIN — BUSPIRONE HYDROCHLORIDE 15 MG: 15 TABLET ORAL at 22:09

## 2018-09-11 RX ADMIN — PANTOPRAZOLE SODIUM 40 MG: 40 TABLET, DELAYED RELEASE ORAL at 22:09

## 2018-09-11 RX ADMIN — LURASIDONE HYDROCHLORIDE 40 MG: 40 TABLET, FILM COATED ORAL at 22:15

## 2018-09-11 RX ADMIN — ACETAMINOPHEN 1000 MG: 500 TABLET, FILM COATED ORAL at 06:18

## 2018-09-11 RX ADMIN — AMANTADINE HYDROCHLORIDE 100 MG: 100 CAPSULE ORAL at 22:13

## 2018-09-11 RX ADMIN — CETIRIZINE HYDROCHLORIDE 10 MG: 10 TABLET ORAL at 22:30

## 2018-09-11 RX ADMIN — LORAZEPAM 1 MG: 2 INJECTION INTRAMUSCULAR at 01:03

## 2018-09-11 RX ADMIN — TEMAZEPAM 30 MG: 15 CAPSULE ORAL at 22:09

## 2018-09-11 RX ADMIN — POTASSIUM CHLORIDE 20 MEQ: 20 TABLET, EXTENDED RELEASE ORAL at 22:14

## 2018-09-11 RX ADMIN — TOPIRAMATE 200 MG: 100 TABLET, FILM COATED ORAL at 22:08

## 2018-09-11 RX ADMIN — LISINOPRIL 40 MG: 20 TABLET ORAL at 22:15

## 2018-09-11 RX ADMIN — HYDROXYZINE HYDROCHLORIDE 25 MG: 25 TABLET, FILM COATED ORAL at 22:09

## 2018-09-11 RX ADMIN — PRAZOSIN HYDROCHLORIDE 3 MG: 2 CAPSULE ORAL at 22:09

## 2018-09-11 ASSESSMENT — SLEEP AND FATIGUE QUESTIONNAIRES
DIFFICULTY STAYING ASLEEP: YES
DO YOU HAVE DIFFICULTY SLEEPING: YES
DIFFICULTY FALLING ASLEEP: YES
AVERAGE NUMBER OF SLEEP HOURS: 3
RESTFUL SLEEP: NO
DIFFICULTY ARISING: YES
DO YOU USE A SLEEP AID: YES
SLEEP PATTERN: DIFFICULTY FALLING ASLEEP;DISTURBED/INTERRUPTED SLEEP;NIGHTMARES/TERRORS

## 2018-09-11 ASSESSMENT — ENCOUNTER SYMPTOMS
COUGH: 0
ABDOMINAL PAIN: 0

## 2018-09-11 ASSESSMENT — PATIENT HEALTH QUESTIONNAIRE - PHQ9: SUM OF ALL RESPONSES TO PHQ QUESTIONS 1-9: 27

## 2018-09-11 ASSESSMENT — LIFESTYLE VARIABLES: HISTORY_ALCOHOL_USE: YES

## 2018-09-11 NOTE — ED NOTES
PT ASSISTED TO BATHROOM PER [facility] FOR URINE SPECIMEN COLLECTION .     Reji Christian, RN  09/11/18 0017

## 2018-09-11 NOTE — ED PROVIDER NOTES
problem          SURGICAL HISTORY       Past Surgical History:   Procedure Laterality Date    ABDOMINAL EXPLORATION SURGERY      ADENOIDECTOMY       SECTION      x 2     SECTION, CLASSIC N/A     DILATATION, ESOPHAGUS      EXTERNAL EAR SURGERY      lobe    GASTRIC BAND      GASTRIC BYPASS SURGERY      HERNIA REPAIR      HERNIA REPAIR N/A     SEPTOPLASTY      TONSILLECTOMY      TONSILLECTOMY AND ADENOIDECTOMY      UPPER GASTROINTESTINAL ENDOSCOPY           CURRENT MEDICATIONS       Current Discharge Medication List      CONTINUE these medications which have NOT CHANGED    Details   !! lurasidone (LATUDA) 40 MG TABS tablet Take 1 tablet by mouth daily  Qty: 30 tablet, Refills: 1      !! prazosin (MINIPRESS) 1 MG capsule Take 3 capsules by mouth nightly For nightmares  Qty: 90 capsule, Refills: 1      !! desvenlafaxine succinate (PRISTIQ) 100 MG TB24 extended release tablet Take 1 tablet by mouth daily For mood and anxiety  Qty: 30 tablet, Refills: 1      !! busPIRone (BUSPAR) 15 MG tablet Take 15 mg by mouth 3 times daily  Qty: 90 tablet, Refills: 1      !! topiramate (TOPAMAX) 200 MG tablet Take 1 tablet by mouth nightly For mood  Qty: 30 tablet, Refills: 1      clonazePAM (KLONOPIN) 1 MG tablet Take 1 tablet by mouth 3 times daily as needed for Anxiety for up to 30 days. Baomelissa Rafiq: 90 tablet, Refills: 0    Associated Diagnoses: Generalized anxiety disorder      !! leuprolide (LUPRON) 22.5 MG injection Inject 22.5 mg into the muscle once      !! hydrOXYzine (ATARAX) 25 MG tablet Take 25 mg by mouth 2 times daily      !! topiramate (TOPAMAX) 200 MG tablet Take 200 mg by mouth daily      gabapentin (NEURONTIN) 600 MG tablet Take 1,800 mg by mouth nightly. .      !! predniSONE (DELTASONE) 1 MG tablet Take 5 mg by mouth daily      !! prazosin (MINIPRESS) 2 MG capsule Take 2 mg by mouth nightly      !!  Amantadine (SYMMETREL) 100 MG TABS tablet Take 100 mg by mouth 2 times daily      !! desvenlafaxine succinate (PRISTIQ) 100 MG TB24 extended release tablet Take 100 mg by mouth daily      !! norethindrone (AYGESTIN) 5 MG tablet Take 5 mg by mouth daily      !! lisinopril (PRINIVIL;ZESTRIL) 40 MG tablet Take 40 mg by mouth daily      !! lurasidone (LATUDA) 40 MG TABS tablet Take 40 mg by mouth daily      potassium bicarbonate (K-LYTE) 25 MEQ disintegrating tablet Take 25 mEq by mouth 2 times daily      !! cyanocobalamin 1000 MCG/ML injection Inject 1,000 mcg into the muscle once      !! ALPRAZolam (XANAX) 1 MG tablet Take 1 mg by mouth three times daily. .      !! hydrochlorothiazide (MICROZIDE) 12.5 MG capsule Take 12.5 mg by mouth daily      !! busPIRone (BUSPAR) 15 MG tablet Take 15 mg by mouth 2 times daily      pantoprazole sodium (PROTONIX) 40 MG PACK packet Take 40 mg by mouth 2 times daily      !! hydrOXYzine (ATARAX) 50 MG tablet Take 50 mg by mouth      !! Amantadine (SYMMETREL) 100 MG TABS tablet Refills: 0      azelastine (ASTELIN) 0.1 % nasal spray SPRAY IN EACH NOSTRIL QD  Refills: 0      HYDROcodone-acetaminophen (NORCO) 5-325 MG per tablet TK 1 T PO BID PRN FOR PAIN  Refills: 0      !! lisinopril (PRINIVIL;ZESTRIL) 40 MG tablet TK 1 T PO D  Refills: 0      potassium chloride (KLOR-CON M) 20 MEQ TBCR extended release tablet TK 1 T PO BID  Refills: 0      !! leuprolide (LUPRON) 11.25 MG injection Inject 11.25 mg into the muscle every 30 days      !! norethindrone (AYGESTIN) 5 MG tablet Take 5 mg by mouth daily      nortriptyline (PAMELOR) 25 MG capsule Take 2 capsules by mouth nightly  Qty: 60 capsule, Refills: 5      alendronate (FOSAMAX) 70 MG tablet Take 70 mg by mouth every 7 days      Multiple Vitamins-Minerals (THERAPEUTIC MULTIVITAMIN-MINERALS) tablet Take 1 tablet by mouth daily      ferrous sulfate 325 (65 FE) MG tablet Take 325 mg by mouth daily (with breakfast)      calcium carbonate 600 MG TABS tablet Take 1 tablet by mouth daily      !! hydrochlorothiazide (MICROZIDE) 12.5 MG capsule Take 12.5 mg by mouth every morning      hydroxychloroquine (PLAQUENIL) 200 MG tablet Take 200 mg by mouth 2 times daily      Gabapentin, Once-Daily, (GRALISE) 600 MG TABS Take by mouth 3 times daily as needed      interferon beta-1a (AVONEX) 30 MCG injection Inject 30 mcg into the muscle once a week      !! desvenlafaxine succinate (PRISTIQ) 100 MG TB24 Take by mouth daily      !! busPIRone (BUSPAR) 15 MG tablet Take 30 mg by mouth daily       pantoprazole (PROTONIX) 40 MG tablet Take 40 mg by mouth three times daily      !! ALPRAZolam (XANAX) 1 MG tablet Take 1 mg by mouth Indications: 1 PO QIS PRN       cetirizine (ZYRTEC) 10 MG tablet Take 10 mg by mouth daily      !! topiramate (TOPAMAX) 200 MG tablet Take 200 mg by mouth daily       !! prazosin (MINIPRESS) 2 MG capsule Take 2 mg by mouth nightly      !! predniSONE (DELTASONE) 1 MG tablet Take 5 mg by mouth daily Indications: 2 PO QD       !! cyanocobalamin 1000 MCG/ML injection Inject 1,000 mcg into the muscle once Indications: monthly       !! - Potential duplicate medications found. Please discuss with provider. ALLERGIES     Avelox [moxifloxacin]; Sugar-protein-starch; Avelox [moxifloxacin]; Copaxone [glatiramer acetate]; Depakote [divalproex sodium]; Gabapentin; Invega [paliperidone]; Lamictal [lamotrigine];  Lithium; Lamictal [lamotrigine]; and Siletz    FAMILY HISTORY       Family History   Problem Relation Age of Onset    Anemia Mother     Breast Cancer Mother     Depression Mother     High Blood Pressure Mother     Mental Illness Mother    [de-identified] / [place] Mother     Colon Cancer Father     Depression Father     High Blood Pressure Father     Mental Illness Father     Breast Cancer Maternal Grandmother     Alcohol Abuse Maternal Grandfather     Anemia Paternal Grandmother     Alcohol Abuse Paternal Grandfather     Osteoporosis Paternal Grandfather     Stroke Paternal Grandfather     Substance Abuse
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
## 2018-09-11 NOTE — ED NOTES
PSYCH EVAL COMPLETED WITH EVALUATOR SPEAKING TO MD LAGOS ADVISING DISCHARGE WITH FOLLOW UP.      Reji Christian, RN  09/11/18 6203
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
## 2018-09-11 NOTE — ED NOTES
PT ATTEMPTING TO PHONE SOMEONE FOR TRANSPORT UPON DISCHARGE .     Reji Christian, RN  09/11/18 0557
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
## 2018-09-11 NOTE — ED NOTES
[facility] STILL COMPLETING ASSESSMENT.   PT RESTING IN POSITION OF COMFORT.  WATER PROVIDED.      Reji Christian, RN  09/11/18 6750

## 2018-09-12 PROBLEM — F33.1 MAJOR DEPRESSIVE DISORDER, RECURRENT, MODERATE (HCC): Status: ACTIVE | Noted: 2018-09-12

## 2018-09-12 LAB
ANION GAP SERPL CALCULATED.3IONS-SCNC: 11 MMOL/L (ref 7–19)
BUN BLDV-MCNC: 11 MG/DL (ref 6–20)
CALCIUM SERPL-MCNC: 9.1 MG/DL (ref 8.6–10)
CHLORIDE BLD-SCNC: 111 MMOL/L (ref 98–111)
CO2: 18 MMOL/L (ref 22–29)
CREAT SERPL-MCNC: 0.7 MG/DL (ref 0.5–0.9)
GFR NON-AFRICAN AMERICAN: >60
GLUCOSE BLD-MCNC: 86 MG/DL (ref 74–109)
POTASSIUM SERPL-SCNC: 3.8 MMOL/L (ref 3.5–5)
SODIUM BLD-SCNC: 140 MMOL/L (ref 136–145)
TSH SERPL DL<=0.05 MIU/L-ACNC: 0.87 UIU/ML (ref 0.27–4.2)
VITAMIN B-12: 753 PG/ML (ref 211–946)
VITAMIN D 25-HYDROXY: 36.6 NG/ML

## 2018-09-12 PROCEDURE — 1240000000 HC EMOTIONAL WELLNESS R&B

## 2018-09-12 PROCEDURE — 84443 ASSAY THYROID STIM HORMONE: CPT

## 2018-09-12 PROCEDURE — 6370000000 HC RX 637 (ALT 250 FOR IP): Performed by: PSYCHIATRY & NEUROLOGY

## 2018-09-12 PROCEDURE — 99222 1ST HOSP IP/OBS MODERATE 55: CPT | Performed by: PSYCHIATRY & NEUROLOGY

## 2018-09-12 PROCEDURE — 36415 COLL VENOUS BLD VENIPUNCTURE: CPT

## 2018-09-12 PROCEDURE — 82306 VITAMIN D 25 HYDROXY: CPT

## 2018-09-12 PROCEDURE — 6370000000 HC RX 637 (ALT 250 FOR IP): Performed by: FAMILY MEDICINE

## 2018-09-12 PROCEDURE — 82607 VITAMIN B-12: CPT

## 2018-09-12 PROCEDURE — 6360000002 HC RX W HCPCS

## 2018-09-12 PROCEDURE — 80048 BASIC METABOLIC PNL TOTAL CA: CPT

## 2018-09-12 RX ORDER — LORAZEPAM 2 MG/ML
INJECTION INTRAMUSCULAR
Status: COMPLETED
Start: 2018-09-12 | End: 2018-09-12

## 2018-09-12 RX ORDER — M-VIT,TX,IRON,MINS/CALC/FOLIC 27MG-0.4MG
1 TABLET ORAL DAILY
Status: DISCONTINUED | OUTPATIENT
Start: 2018-09-12 | End: 2018-09-13 | Stop reason: HOSPADM

## 2018-09-12 RX ORDER — HALOPERIDOL 5 MG/ML
INJECTION INTRAMUSCULAR
Status: COMPLETED
Start: 2018-09-12 | End: 2018-09-12

## 2018-09-12 RX ORDER — BENZTROPINE MESYLATE 1 MG/ML
1 INJECTION INTRAMUSCULAR; INTRAVENOUS ONCE
Status: COMPLETED | OUTPATIENT
Start: 2018-09-12 | End: 2018-09-12

## 2018-09-12 RX ORDER — DESVENLAFAXINE 100 MG/1
100 TABLET, EXTENDED RELEASE ORAL DAILY
Status: DISCONTINUED | OUTPATIENT
Start: 2018-09-12 | End: 2018-09-13 | Stop reason: HOSPADM

## 2018-09-12 RX ORDER — LORAZEPAM 2 MG/ML
2 INJECTION INTRAMUSCULAR ONCE
Status: COMPLETED | OUTPATIENT
Start: 2018-09-12 | End: 2018-09-12

## 2018-09-12 RX ORDER — CLONAZEPAM 0.5 MG/1
0.5 TABLET ORAL 3 TIMES DAILY PRN
Status: DISCONTINUED | OUTPATIENT
Start: 2018-09-12 | End: 2018-09-13 | Stop reason: HOSPADM

## 2018-09-12 RX ORDER — HALOPERIDOL 5 MG/ML
5 INJECTION INTRAMUSCULAR ONCE
Status: DISCONTINUED | OUTPATIENT
Start: 2018-09-12 | End: 2018-09-13 | Stop reason: HOSPADM

## 2018-09-12 RX ORDER — BENZTROPINE MESYLATE 1 MG/ML
INJECTION INTRAMUSCULAR; INTRAVENOUS
Status: COMPLETED
Start: 2018-09-12 | End: 2018-09-12

## 2018-09-12 RX ORDER — HALOPERIDOL 5 MG/ML
5 INJECTION INTRAMUSCULAR ONCE
Status: COMPLETED | OUTPATIENT
Start: 2018-09-12 | End: 2018-09-12

## 2018-09-12 RX ORDER — OLANZAPINE 5 MG/1
5 TABLET ORAL 2 TIMES DAILY PRN
Status: DISCONTINUED | OUTPATIENT
Start: 2018-09-12 | End: 2018-09-13 | Stop reason: HOSPADM

## 2018-09-12 RX ORDER — HYDROXYZINE HYDROCHLORIDE 25 MG/1
50 TABLET, FILM COATED ORAL 4 TIMES DAILY PRN
Status: DISCONTINUED | OUTPATIENT
Start: 2018-09-12 | End: 2018-09-13 | Stop reason: HOSPADM

## 2018-09-12 RX ADMIN — BUSPIRONE HYDROCHLORIDE 15 MG: 15 TABLET ORAL at 10:23

## 2018-09-12 RX ADMIN — AMANTADINE HYDROCHLORIDE 100 MG: 100 CAPSULE ORAL at 19:42

## 2018-09-12 RX ADMIN — LISINOPRIL 40 MG: 20 TABLET ORAL at 10:23

## 2018-09-12 RX ADMIN — HALOPERIDOL LACTATE 5 MG: 5 INJECTION INTRAMUSCULAR at 19:20

## 2018-09-12 RX ADMIN — TOPIRAMATE 200 MG: 100 TABLET, FILM COATED ORAL at 19:44

## 2018-09-12 RX ADMIN — MULTIPLE VITAMINS W/ MINERALS TAB 1 TABLET: TAB at 14:33

## 2018-09-12 RX ADMIN — PREDNISONE 10 MG: 10 TABLET ORAL at 10:23

## 2018-09-12 RX ADMIN — PREDNISONE 10 MG: 10 TABLET ORAL at 19:43

## 2018-09-12 RX ADMIN — POTASSIUM CHLORIDE 20 MEQ: 20 TABLET, EXTENDED RELEASE ORAL at 19:43

## 2018-09-12 RX ADMIN — LURASIDONE HYDROCHLORIDE 40 MG: 40 TABLET, FILM COATED ORAL at 10:22

## 2018-09-12 RX ADMIN — PANTOPRAZOLE SODIUM 40 MG: 40 TABLET, DELAYED RELEASE ORAL at 12:20

## 2018-09-12 RX ADMIN — BENZTROPINE MESYLATE 1 MG: 1 INJECTION INTRAMUSCULAR; INTRAVENOUS at 19:20

## 2018-09-12 RX ADMIN — CETIRIZINE HYDROCHLORIDE 10 MG: 10 TABLET ORAL at 10:22

## 2018-09-12 RX ADMIN — LORAZEPAM 2 MG: 2 INJECTION INTRAMUSCULAR; INTRAVENOUS at 19:20

## 2018-09-12 RX ADMIN — HALOPERIDOL 5 MG: 5 INJECTION INTRAMUSCULAR at 19:20

## 2018-09-12 RX ADMIN — POTASSIUM CHLORIDE 20 MEQ: 20 TABLET, EXTENDED RELEASE ORAL at 10:22

## 2018-09-12 RX ADMIN — CLONAZEPAM 0.5 MG: 0.5 TABLET ORAL at 14:33

## 2018-09-12 RX ADMIN — AMANTADINE HYDROCHLORIDE 100 MG: 100 CAPSULE ORAL at 10:22

## 2018-09-12 RX ADMIN — HYDROXYZINE HYDROCHLORIDE 25 MG: 25 TABLET, FILM COATED ORAL at 19:43

## 2018-09-12 RX ADMIN — HYDROXYZINE HYDROCHLORIDE 25 MG: 25 TABLET, FILM COATED ORAL at 10:23

## 2018-09-12 RX ADMIN — PRAZOSIN HYDROCHLORIDE 3 MG: 2 CAPSULE ORAL at 19:43

## 2018-09-12 RX ADMIN — HALOPERIDOL LACTATE 5 MG: 5 INJECTION INTRAMUSCULAR at 19:48

## 2018-09-12 RX ADMIN — LORAZEPAM 2 MG: 2 INJECTION INTRAMUSCULAR at 19:20

## 2018-09-12 NOTE — PROGRESS NOTES
Dr Facundo Mccullough notified that patient can only take name brand neurontin and has no one to bring it from home therefore we will not be ordering it.  md also ordered patient something to help her sleep.
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
## 2018-09-12 NOTE — PROGRESS NOTES
Group Therapy Note    Date: 9/11/18  Start Time: 2030  End Time:  2045  Number of Participants: 10    Type of Group: Wrap-up    Notes:  Patient refused to attend Wrap-Up group, staff encouraged patient to attend group sessions. Notified nurse. Status After Intervention: pt. Refused to attend group    Participation Level: Pt. Refused to participate in group session at this time. Participation Quality: patient refused to attend. Speech: Thought Process/Content: pt. Did not participate in group session      Affective Functioning: non-participating      Mood: pt declined to participate      Level of consciousness:        Response to Learning: pt. Declined patient education and group therapy at this time. Endings: Patient did not participate in session at this time. Modes of Intervention: Staff did encourage patient to participate in session, pt. Continued to decline at this time.       Discipline Responsible: Behavioral Health Tech      Signature:  Shanika Sanches

## 2018-09-12 NOTE — PLAN OF CARE
Problem: Depressive Behavior With or Without Suicide Precautions:  Intervention: Assess coping skills and behavior                                                                      Group Therapy Note    Date: 9/12/2018  Start Time: 1430  End Time:  9428  Number of Participants: 6    Type of Group: Cognitive Skills    Wellness Binder Information  Module Name:  Emotional wellness  Session Number:  3    Patient's Goal:  Happiness    Notes:  Pt was verbally prompted to attend group. Pt refused. Information about emotional wellness was provided. Status After Intervention:      Participation Level:     Participation Quality:       Speech: Thought Process/Content:       Affective Functioning:       Mood:       Level of consciousness:        Response to Learning:       Endings:     Modes of Intervention:       Discipline Responsible: Psychoeducational Specialist      Signature:  Josefina Avila      Problem: Altered Mood, Depressive Behavior:  Intervention: Assess coping skills and behavior                                                                      Group Therapy Note    Date: 9/12/2018  Start Time: 1430  End Time:  7370  Number of Participants: 6    Type of Group: Cognitive Skills    Wellness Binder Information  Module Name:  Emotional wellness  Session Number:  3    Patient's Goal:  Happiness    Notes:  Pt was verbally prompted to attend group. Pt refused. Information about emotional wellness was provided. Status After Intervention:      Participation Level:     Participation Quality:       Speech:         Thought Process/Content:       Affective Functioning:       Mood:       Level of consciousness:        Response to Learning:       Endings:     Modes of Intervention:       Discipline Responsible: Psychoeducational Specialist      Signature:  Josefina Avila

## 2018-09-12 NOTE — PROGRESS NOTES
Treatment Team Note:    SW met with [address] team to discuss Pts [address] plans. Progress/Behavior/Group Attendance: did not attend groups    Target Symptoms/Reason for admission: transferred by [facility], Patient admitted to [facility] due to Pt stated she present to [facility] early morning hours with SI, no plan.  Assessed by [facility], and released from [facility].  Pt denies being SI at present while on the phone with Lovilia.  Pt reports anxiety to the point she is \"eating my Buspar and anxiety meds    Diagnoses: Major Depressive Disorder    UDS: [facility]     BAL: Neg    AftercarePlan: IOP    Pt lives with: alone with her two childrenW will meet with pt to gather information. Collateral obtained from: patients mother.   On:9/12/18    Family Session: TIARA    Misc:

## 2018-09-12 NOTE — PROGRESS NOTES
Patient is loud, angry, and noncompliant with care. Patient, at one point, was in the dayroom with the  when she was heard screaming. Presented to see what was wrong with patient and patient screamed \"Stop fucking staring at me! You just keep looking at me because I'm black. \" After this outburst patient went back to her room, but then presented to the desk complaining because she wanted her injectable medications. Explain to patient that the medications had not been ordered and that they would have to be prescribed by the medical doctor. Patient stated, \"No this is how it's going to be. You are going to give me my medications whether they are ordered or not. \" Again explained to the patient that I could not administer anything that was not prescribed. Patient then stated, \"You are a fucking bitch and I am not talking to you anymore. \" Patient then threatened nursing staff stating, \"I am going to kick your ass before I leave here. I hope your kids get autoimmune disorders. That is my curse for you. \" Dr. Miah Lambert was contacted and some of patient's medications were ordered. JOURDAN Tapia attempted to administer the patient's medications but patient became angry because all of her medications were not supplied by the pharmacy. Patient then stated that she wanted a . See progress notes by Precilla Core as well.

## 2018-09-12 NOTE — PROGRESS NOTES
Nursing staff continues to try and calm patient through out the shift. Clinical house did come to the floor and spoke with patient to try and calm patient. Patient is med compliant and continues to complain about wanting to go home tonight that we have lied to her. Patient was provided a complaint form to fill out and informed where to place in the patient satisfaction box and shown that we have no access to the box or her note once placed in the box.
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
## 2018-09-12 NOTE — PROGRESS NOTES
Patient has continued to come to the desk multiple times and curse, cry and scream at staff about her medications and leaving and that she needs them all including ones that she has not had filled in 2-3 months stating that she has refilled for multiple month. Pharmacy was called and med was verified and ordered. Patient has also stated that she is suppose to get 3 injections and she has to have them all tomorrow. staff will report to day shift to clarify if these meds are to be resumed. Every time staff has tried to clarify any information about patients home medications patient becomes very belligerent, volital and argumentative. Patient continues to ask for the house supervisor.

## 2018-09-12 NOTE — H&P
HISTORY and PHYSICAL      CHIEF COMPLAINT:  Depression, SI    Reason for Admission:  Depression, SI    History Obtained From:  patient    HISTORY OF PRESENT ILLNESS:      The patient is a 41 y.o. female who is admitted to the [place] [address] unit with worsening mood issues. She has no c/o CP or SOA. She has no c/o abdominal pain or N/V. No dysuria. No new pain issues. No HA or dizziness currently. No fevers.       Past Medical History:        Diagnosis Date    Arthritis     [facility]     Fibromyalgia     GERD (gastroesophageal reflux disease)     Hypertension     Lupus     Movement disorder     Multiple sclerosis (HCC)     [facility]rom[facility]     Psychiatric problem      Past Surgical History:        Procedure Laterality Date    ABDOMINAL EXPLORATION SURGERY      ADENOIDECTOMY       SECTION      x 2     SECTION, CLASSIC N/A     DILATATION, ESOPHAGUS      EXTERNAL EAR SURGERY      lobe    GASTRIC BAND      GASTRIC BYPASS SURGERY      HERNIA REPAIR      HERNIA REPAIR N/A     SEPTOPLASTY      TONSILLECTOMY      TONSILLECTOMY AND ADENOIDECTOMY      UPPER GASTROINTESTINAL ENDOSCOPY           Medications Prior to Admission:    Prescriptions Prior to Admission: cyclobenzaprine (FLEXERIL) 10 MG tablet, Take 10 mg by mouth 2 times daily as needed for Muscle spasms  metroNIDAZOLE (FLAGYL) 500 MG tablet, Take 500 mg by mouth 2 times daily  ondansetron (ZOFRAN) 8 MG tablet, Take 8 mg by mouth every 12 hours as needed for Nausea or Vomiting  oseltamivir (TAMIFLU) 75 MG capsule, Take 75 mg by mouth 2 times daily  promethazine (PHENERGAN) 12.5 MG tablet, Take 12.5 mg by mouth every 8 hours as needed for Nausea  SUMAtriptan (IMITREX) 50 MG tablet, Take 50 mg by mouth daily as needed for Migraine  lurasidone (LATUDA) 40 MG TABS tablet, Take 1 tablet by mouth daily  prazosin (MINIPRESS) 1 MG capsule, Take 3 capsules by mouth nightly For nightmares  desvenlafaxine succinate (PRISTIQ) 100 MG TB24 extended release tablet, Take 1 tablet by mouth daily For mood and anxiety  busPIRone (BUSPAR) 15 MG tablet, Take 15 mg by mouth 3 times daily  topiramate (TOPAMAX) 200 MG tablet, Take 1 tablet by mouth nightly For mood  clonazePAM (KLONOPIN) 1 MG tablet, Take 1 tablet by mouth 3 times daily as needed for Anxiety for up to 30 days. Jayne Christopher leuprolide (LUPRON) 22.5 MG injection, Inject 22.5 mg into the muscle once  hydrOXYzine (ATARAX) 25 MG tablet, Take 25 mg by mouth 2 times daily  topiramate (TOPAMAX) 200 MG tablet, Take 200 mg by mouth daily  gabapentin (NEURONTIN) 600 MG tablet, Take 1,800 mg by mouth nightly. .  predniSONE (DELTASONE) 10 MG tablet, Take 10 mg by mouth 2 times daily   prazosin (MINIPRESS) 2 MG capsule, Take 2 mg by mouth nightly  Amantadine (SYMMETREL) 100 MG TABS tablet, Take 100 mg by mouth 2 times daily  desvenlafaxine succinate (PRISTIQ) 100 MG TB24 extended release tablet, Take 100 mg by mouth daily  norethindrone (AYGESTIN) 5 MG tablet, Take 5 mg by mouth daily  lisinopril (PRINIVIL;ZESTRIL) 40 MG tablet, Take 40 mg by mouth daily  lurasidone (LATUDA) 40 MG TABS tablet, Take 40 mg by mouth daily  potassium bicarbonate (K-LYTE) 25 MEQ disintegrating tablet, Take 25 mEq by mouth 2 times daily  cyanocobalamin 1000 MCG/ML injection, Inject 1,000 mcg into the muscle every 21 days   ALPRAZolam (XANAX) 1 MG tablet, Take 1 mg by mouth three times daily. .  hydrochlorothiazide (MICROZIDE) 12.5 MG capsule, Take 12.5 mg by mouth daily  busPIRone (BUSPAR) 15 MG tablet, Take 15 mg by mouth 2 times daily  pantoprazole sodium (PROTONIX) 40 MG PACK packet, Take 40 mg by mouth 2 times daily  hydrOXYzine (ATARAX) 50 MG tablet, Take 50 mg by mouth  Amantadine (SYMMETREL) 100 MG TABS tablet,   azelastine (ASTELIN) 0.1 % nasal spray, SPRAY IN EACH NOSTRIL QD  HYDROcodone-acetaminophen (NORCO) 5-325 MG per tablet, TK 1 T PO BID PRN FOR PAIN  lisinopril (PRINIVIL;ZESTRIL) 40 MG tablet, TK 1 T PO D  potassium chloride (KLOR-CON M) 20 MEQ TBCR extended release tablet, TK 1 T PO BID  LEUPROLIDE ACETATE, 3 MONTH, IM, Inject 7.5 mg into the muscle every 30 days IM injection every 2 months as directed. norethindrone (AYGESTIN) 5 MG tablet, Take 5 mg by mouth daily  nortriptyline (PAMELOR) 25 MG capsule, Take 2 capsules by mouth nightly  alendronate (FOSAMAX) 70 MG tablet, Take 70 mg by mouth every 7 days  Multiple Vitamins-Minerals (THERAPEUTIC MULTIVITAMIN-MINERALS) tablet, Take 1 tablet by mouth daily  ferrous sulfate 325 (65 FE) MG tablet, Take 325 mg by mouth daily (with breakfast)  calcium carbonate 600 MG TABS tablet, Take 1 tablet by mouth daily  hydrochlorothiazide (MICROZIDE) 12.5 MG capsule, Take 12.5 mg by mouth every morning  hydroxychloroquine (PLAQUENIL) 200 MG tablet, Take 200 mg by mouth 2 times daily  Gabapentin, Once-Daily, (GRALISE) 600 MG TABS, Take 1,800 mg by mouth nightly. Take 3 tablets nightly at bedtime. interferon beta-1a (AVONEX) 30 MCG injection, Inject 30 mcg into the muscle once a week  desvenlafaxine succinate (PRISTIQ) 100 MG TB24, Take by mouth daily  busPIRone (BUSPAR) 15 MG tablet, Take 30 mg by mouth daily   pantoprazole (PROTONIX) 40 MG tablet, Take 40 mg by mouth three times daily  ALPRAZolam (XANAX) 1 MG tablet, Take 1 mg by mouth Indications: 1 PO QIS PRN   cetirizine (ZYRTEC) 10 MG tablet, Take 10 mg by mouth daily  topiramate (TOPAMAX) 200 MG tablet, Take 200 mg by mouth daily   prazosin (MINIPRESS) 2 MG capsule, Take 2 mg by mouth nightly  predniSONE (DELTASONE) 1 MG tablet, Take 5 mg by mouth daily Indications: 2 PO QD   cyanocobalamin 1000 MCG/ML injection, Inject 1,000 mcg into the muscle once Indications: monthly    Allergies: Avelox [moxifloxacin]; Sugar-protein-starch; Avelox [moxifloxacin]; Copaxone [glatiramer acetate]; Depakote [divalproex sodium]; Gabapentin; Invega [paliperidone];  Lamictal [lamotrigine]; Lithium; Lamictal [lamotrigine]; and Lithium    Social History:   TOBACCO:   reports that she has been smoking Cigarettes. She has a 15.00 pack-year smoking history. She has never used smokeless tobacco.  ETOH:   reports that she drinks about 1.2 oz of alcohol per week . DRUGS:   reports that she does not use drugs. MARITAL STATUS: single  OCCUPATION:  Not working  Patient currently lives with family       Family History:   Family History   Problem Relation Age of Onset    Anemia Mother     Breast Cancer Mother     Depression Mother     High Blood Pressure Mother     Mental Illness Mother    [de-identified] / [place] Mother     Colon Cancer Father     Depression Father     High Blood Pressure Father     Mental Illness Father     Breast Cancer Maternal Grandmother     Alcohol Abuse Maternal Grandfather     Anemia Paternal Grandmother     Alcohol Abuse Paternal Grandfather     Osteoporosis Paternal Grandfather     Stroke Paternal Grandfather     Substance Abuse Maternal Cousin     Substance Abuse Paternal Cousin     Asthma Other      REVIEW OF SYSTEMS:  Constitutional: neg  CV: neg  Pulmonary: neg  GI: neg  : neg  Psych: depression, SI  Neuro: neg  Skin: neg  MusculoSkeletal: chronic pain  HEENT: neg  Joints: neg    Vitals:  /87   Pulse 94   Temp 97 °F (36.1 °C) (Temporal)   Resp 16   Ht 5' 6\" (1.676 m)   Wt 190 lb (86.2 kg)   LMP  (Approximate) Comment: 3 mths ago  SpO2 95%   BMI 30.67 kg/m²     PHYSICAL EXAM:  Gen: NAD, alert  HEENT: WNL  Lymph: no LAD  Neck: no JVD or masses  Chest: CTA bilat  CV: RRR  Abdomen: NT/ND  Extrem: no C/C/E  Neuro: non focal  Skin: no rashes  Joints: no redness    DATA:  I have reviewed the admission labs and imaging tests.     ASSESSMENT AND PLAN:      Patient Active Hospital Problem List:  Depression, SI--follow with Psych  Chronic Pain, Fibromyalgia--supportive care  HTN--follow with BP  GERD--follow with care  H/O MS  THC Use          Nasim Bustillo MD  12:14 AM 9/12/2018

## 2018-09-13 ENCOUNTER — APPOINTMENT (OUTPATIENT)
Dept: PSYCHIATRY | Age: 43
End: 2018-09-13
Payer: MEDICARE

## 2018-09-13 VITALS
BODY MASS INDEX: 30.53 KG/M2 | WEIGHT: 190 LBS | HEIGHT: 66 IN | SYSTOLIC BLOOD PRESSURE: 133 MMHG | HEART RATE: 75 BPM | OXYGEN SATURATION: 98 % | TEMPERATURE: 98.1 F | DIASTOLIC BLOOD PRESSURE: 83 MMHG | RESPIRATION RATE: 18 BRPM

## 2018-09-13 PROCEDURE — 6370000000 HC RX 637 (ALT 250 FOR IP): Performed by: PSYCHIATRY & NEUROLOGY

## 2018-09-13 PROCEDURE — 5130000000 HC BRIDGE APPOINTMENT

## 2018-09-13 PROCEDURE — 99238 HOSP IP/OBS DSCHRG MGMT 30/<: CPT | Performed by: PSYCHIATRY & NEUROLOGY

## 2018-09-13 PROCEDURE — 6370000000 HC RX 637 (ALT 250 FOR IP): Performed by: FAMILY MEDICINE

## 2018-09-13 RX ORDER — LORAZEPAM 2 MG/ML
2 INJECTION INTRAMUSCULAR EVERY 6 HOURS PRN
Status: DISCONTINUED | OUTPATIENT
Start: 2018-09-13 | End: 2018-09-13 | Stop reason: HOSPADM

## 2018-09-13 RX ORDER — DIPHENHYDRAMINE HYDROCHLORIDE 50 MG/ML
50 INJECTION INTRAMUSCULAR; INTRAVENOUS EVERY 6 HOURS PRN
Status: DISCONTINUED | OUTPATIENT
Start: 2018-09-13 | End: 2018-09-13 | Stop reason: HOSPADM

## 2018-09-13 RX ORDER — SUMATRIPTAN 25 MG/1
50 TABLET, FILM COATED ORAL DAILY PRN
Status: DISCONTINUED | OUTPATIENT
Start: 2018-09-13 | End: 2018-09-13 | Stop reason: HOSPADM

## 2018-09-13 RX ORDER — HALOPERIDOL 2 MG/ML
10 SOLUTION ORAL EVERY 6 HOURS PRN
Status: DISCONTINUED | OUTPATIENT
Start: 2018-09-13 | End: 2018-09-13 | Stop reason: HOSPADM

## 2018-09-13 RX ADMIN — POTASSIUM CHLORIDE 20 MEQ: 20 TABLET, EXTENDED RELEASE ORAL at 10:53

## 2018-09-13 RX ADMIN — AMANTADINE HYDROCHLORIDE 100 MG: 100 CAPSULE ORAL at 19:59

## 2018-09-13 RX ADMIN — LISINOPRIL 40 MG: 20 TABLET ORAL at 10:53

## 2018-09-13 RX ADMIN — OLANZAPINE 5 MG: 5 TABLET, FILM COATED ORAL at 13:40

## 2018-09-13 RX ADMIN — POTASSIUM CHLORIDE 20 MEQ: 20 TABLET, EXTENDED RELEASE ORAL at 19:59

## 2018-09-13 RX ADMIN — HYDROXYZINE HYDROCHLORIDE 25 MG: 25 TABLET, FILM COATED ORAL at 20:00

## 2018-09-13 RX ADMIN — TOPIRAMATE 200 MG: 100 TABLET, FILM COATED ORAL at 19:58

## 2018-09-13 RX ADMIN — CETIRIZINE HYDROCHLORIDE 10 MG: 10 TABLET ORAL at 10:52

## 2018-09-13 RX ADMIN — OLANZAPINE 5 MG: 5 TABLET, FILM COATED ORAL at 19:59

## 2018-09-13 RX ADMIN — AMANTADINE HYDROCHLORIDE 100 MG: 100 CAPSULE ORAL at 10:53

## 2018-09-13 RX ADMIN — DESVENLAFAXINE SUCCINATE 100 MG: 100 TABLET, EXTENDED RELEASE ORAL at 10:53

## 2018-09-13 RX ADMIN — HYDROXYZINE HYDROCHLORIDE 25 MG: 25 TABLET, FILM COATED ORAL at 10:53

## 2018-09-13 RX ADMIN — SUMATRIPTAN SUCCINATE 50 MG: 25 TABLET ORAL at 13:40

## 2018-09-13 RX ADMIN — LURASIDONE HYDROCHLORIDE 40 MG: 40 TABLET, FILM COATED ORAL at 10:55

## 2018-09-13 RX ADMIN — PANTOPRAZOLE SODIUM 40 MG: 40 TABLET, DELAYED RELEASE ORAL at 10:54

## 2018-09-13 RX ADMIN — PREDNISONE 10 MG: 10 TABLET ORAL at 20:00

## 2018-09-13 RX ADMIN — PREDNISONE 10 MG: 10 TABLET ORAL at 10:53

## 2018-09-13 RX ADMIN — PRAZOSIN HYDROCHLORIDE 3 MG: 2 CAPSULE ORAL at 19:59

## 2018-09-13 RX ADMIN — CLONAZEPAM 0.5 MG: 0.5 TABLET ORAL at 20:00

## 2018-09-13 RX ADMIN — CYCLOBENZAPRINE HYDROCHLORIDE 5 MG: 5 TABLET, FILM COATED ORAL at 19:59

## 2018-09-13 RX ADMIN — MULTIPLE VITAMINS W/ MINERALS TAB 1 TABLET: TAB at 10:54

## 2018-09-13 RX ADMIN — CLONAZEPAM 0.5 MG: 0.5 TABLET ORAL at 10:52

## 2018-09-13 ASSESSMENT — PAIN SCALES - GENERAL: PAINLEVEL_OUTOF10: 6
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
## 2018-09-13 NOTE — DISCHARGE INSTR - DIET

## 2018-09-13 NOTE — PLAN OF CARE
Problem: Fluid Volume - Deficit:  Goal: Absence of fluid volume deficit signs and symptoms  Absence of fluid volume deficit signs and symptoms   Outcome: Ongoing      Problem: Sleep Pattern Disturbance:  Goal: Appears well-rested  Appears well-rested   Outcome: Ongoing

## 2018-09-13 NOTE — PROGRESS NOTES
[facility] Daily Shift Assessment  Nursing Progress Note    Room: 0602/602-01 Name: Danelle Bashir Age: 41 y.o. Ethnicity: -American Gender: female   Dx: <principal problem not specified>  Precautions: suicide risk  CPAP: No Accu-Chek: No    Sleep: Yes, Very good, no sleep issues Hours Slept: 12 Sched Sleep Meds: No PRN Sleep Meds: Yes Other PRN Meds: Yes Med Compliant: No Appetite: poor Percent Meals: skipped breakfast Social: No ADLs: Yes Speech: pressured Depression: wouldn't rate Anxiety: wouldn't rate  Patient is irritable and agitated. Pressured speech. Flat affect noted. Patient says staff is not listening to her and there is no need to be here. Appearance and attire is appropriate. Said her [employer] and family are meeting up today to have a meeting about how all this hospital wants is her money and how her  mom is going to call [facility] today so the news can know about what is going on in here. Dr Miky Ross told patient she is going to [place] [facility] today. Patient didn't understand why and said she didn't need to go. She was however calm.     Bandar Villafuerte RN
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
## 2018-09-13 NOTE — PROGRESS NOTES
[facility] transfer paperwork completed, signed by , and notarized. Called report to Dada Koch. Awaiting transport.

## 2018-09-14 ENCOUNTER — APPOINTMENT (OUTPATIENT)
Dept: PSYCHIATRY | Age: 43
End: 2018-09-14
Payer: MEDICARE

## 2018-09-14 NOTE — PROGRESS NOTES
Progress Note  Laura Cadena  9/13/2018 11:55 PM  Subjective:   Admit Date:   9/11/2018      CC/ADMIT DX:       Interval History:   Reviewed overnight events and nursing notes. She denies any new physical complaints. No CP or SOA. No new pain issues. I have reviewed all labs/diagnostics from the last 24hrs. ROS:   I have done a 10 point ROS and all are negative, except what is mentioned in the HPI. DIET GENERAL; Carb Control: 3 carb choices (45 gms)/meal    Medications:      therapeutic multivitamin-minerals  1 tablet Oral Daily    desvenlafaxine succinate  100 mg Oral Daily    interferon beta-1a  30 mcg Intramuscular Weekly    leuprolide  7.5 mg Intramuscular Once    haloperidol lactate  5 mg Intramuscular Once    topiramate  200 mg Oral Nightly    predniSONE  10 mg Oral BID    amantadine  100 mg Oral BID    cetirizine  10 mg Oral Daily    lisinopril  40 mg Oral Daily    hydrOXYzine  25 mg Oral BID    prazosin  3 mg Oral Nightly    lurasidone  40 mg Oral Daily    potassium chloride  20 mEq Oral BID    pantoprazole  40 mg Oral BID AC           Objective:   Vitals: /83   Pulse 75   Temp 98.1 °F (36.7 °C) (Temporal)   Resp 18   Ht 5' 6\" (1.676 m)   Wt 190 lb (86.2 kg)   LMP  (Approximate) Comment: 3 mths ago  SpO2 98%   BMI 30.67 kg/m²  No intake or output data in the 24 hours ending 09/13/18 2355  General appearance: alert in bed  Lungs: clear to auscultation bilaterally  Heart: RRR  Abdomen: NT, ND  Extremities: extremities normal, atraumatic, no cyanosis or edema  Neurologic:  No obvious focal neurologic deficits. Assessment and Plan: Active Problems:    Major depressive disorder, recurrent, moderate (HCC)  Resolved Problems:    * No resolved hospital problems. *    MS    Chronic Pain    Plan:  1. Continue present medication(s)   2. Follow with Psych  3. She remains medically stable. I will follow for any changes or any new concerns.        Discharge planning:   her home     Reviewed treatment plans with the patient and/or family.              Electronically signed by Kymberly Lynn MD on 9/13/2018 at 11:55 PM

## 2018-09-21 ENCOUNTER — HOSPITAL ENCOUNTER (EMERGENCY)
Dept: HOSPITAL 58 - ED | Age: 43
LOS: 1 days | Discharge: HOME | End: 2018-09-22
Payer: COMMERCIAL

## 2018-09-21 VITALS — BODY MASS INDEX: 31.6 KG/M2

## 2018-09-21 VITALS — DIASTOLIC BLOOD PRESSURE: 74 MMHG | TEMPERATURE: 97.8 F | SYSTOLIC BLOOD PRESSURE: 120 MMHG

## 2018-09-21 DIAGNOSIS — M32.9: ICD-10-CM

## 2018-09-21 DIAGNOSIS — Z79.899: ICD-10-CM

## 2018-09-21 DIAGNOSIS — E78.5: ICD-10-CM

## 2018-09-21 DIAGNOSIS — G89.29: ICD-10-CM

## 2018-09-21 DIAGNOSIS — I10: ICD-10-CM

## 2018-09-21 DIAGNOSIS — G35: ICD-10-CM

## 2018-09-21 DIAGNOSIS — F17.210: ICD-10-CM

## 2018-09-21 DIAGNOSIS — F32.0: Primary | ICD-10-CM

## 2018-09-21 PROCEDURE — 36415 COLL VENOUS BLD VENIPUNCTURE: CPT

## 2018-09-21 PROCEDURE — 80306 DRUG TEST PRSMV INSTRMNT: CPT

## 2018-09-21 PROCEDURE — 85025 COMPLETE CBC W/AUTO DIFF WBC: CPT

## 2018-09-21 PROCEDURE — 80053 COMPREHEN METABOLIC PANEL: CPT

## 2018-09-21 PROCEDURE — 99283 EMERGENCY DEPT VISIT LOW MDM: CPT

## 2018-09-21 PROCEDURE — 80307 DRUG TEST PRSMV CHEM ANLYZR: CPT

## 2018-09-21 PROCEDURE — 81001 URINALYSIS AUTO W/SCOPE: CPT

## 2018-09-30 ENCOUNTER — HOSPITAL ENCOUNTER (EMERGENCY)
Facility: HOSPITAL | Age: 43
Discharge: HOME | End: 2018-09-30
Attending: EMERGENCY MEDICINE | Admitting: EMERGENCY MEDICINE

## 2018-09-30 VITALS
WEIGHT: 187.6 LBS | OXYGEN SATURATION: 100 % | HEART RATE: 66 BPM | BODY MASS INDEX: 30.15 KG/M2 | DIASTOLIC BLOOD PRESSURE: 101 MMHG | SYSTOLIC BLOOD PRESSURE: 132 MMHG | TEMPERATURE: 98.2 F | HEIGHT: 66 IN | RESPIRATION RATE: 15 BRPM

## 2018-09-30 DIAGNOSIS — F13.930 BENZODIAZEPINE WITHDRAWAL WITHOUT COMPLICATION (HCC): Primary | ICD-10-CM

## 2018-09-30 PROCEDURE — 99283 EMERGENCY DEPT VISIT LOW MDM: CPT

## 2018-09-30 RX ORDER — CLONAZEPAM 0.5 MG/1
0.5 TABLET ORAL ONCE
Status: COMPLETED | OUTPATIENT
Start: 2018-09-30 | End: 2018-09-30

## 2018-09-30 RX ORDER — CLONAZEPAM 1 MG/1
1 TABLET ORAL NIGHTLY PRN
Qty: 5 TABLET | Refills: 0 | Status: SHIPPED | OUTPATIENT
Start: 2018-09-30

## 2018-09-30 RX ADMIN — CLONAZEPAM 0.5 MG: 0.5 TABLET ORAL at 19:49

## 2018-10-01 NOTE — ED PROVIDER NOTES
Subjective   History of Present Illness   43-year-old female presenting with benzodiazepine withdrawal.    Patient notes that her psychiatrist and primary care provider have recently quit.  She no longer has any doctor from which to receive her Klonopin.  Patient was recently hospitalized at a psychiatric facility last month for suicidal ideation.  At this time patient denies any suicidal or homicidal ideation, auditory or visual hallucinations.  Patient does report some withdrawal symptoms such as anxiety, tremors, nausea.    Patient states she has an appointment with his psychiatrist coming up in 5 days    Patient denies any associated seizures, confusion, hallucinations.    Review of Systems   Constitutional: Negative for fever.   HENT: Negative for nosebleeds.    Eyes: Negative for redness.   Respiratory: Negative for shortness of breath.    Cardiovascular: Negative for chest pain.   Gastrointestinal: Positive for nausea. Negative for abdominal pain.   Genitourinary: Negative for flank pain.   Musculoskeletal: Negative for gait problem.   Skin: Negative for wound.   Neurological: Negative for seizures, syncope and weakness.   Psychiatric/Behavioral: Negative for hallucinations. The patient is nervous/anxious. The patient is not hyperactive.        Past Medical History:   Diagnosis Date   • Bipolar disorder (CMS/HCC)    • Fibromyalgia    • H. pylori infection    • Lupus    • Multiple sclerosis (CMS/HCC)    • Schizophrenia (CMS/HCC)        Allergies   Allergen Reactions   • Avelox [Moxifloxacin]    • Copaxone [Glatiramer Acetate]    • Depakote  [Divalproex Sodium]    • Lamictal [Lamotrigine]    • Lithium Itching   • Plaquenil [Hydroxychloroquine] Other (See Comments)     Vision loss   • Pregabalin Mental Status Change   • Glatiramer Rash   • Paliperidone Rash       Past Surgical History:   Procedure Laterality Date   • COLONOSCOPY  03/2007   • COLONOSCOPY N/A 1/10/2017    Procedure: COLONOSCOPY WITH ANESTHESIA;   Surgeon: Andreas Fairbanks MD;  Location: [facility] ENDOSCOPY;  Service:    • ENDOSCOPY  05/01/2007   • ENDOSCOPY N/A 1/10/2017    Procedure: ESOPHAGOGASTRODUODENOSCOPY WITH ANESTHESIA;  Surgeon: Andreas Fairbanks MD;  Location: [facility] ENDOSCOPY;  Service:    • GASTRIC BANDING     • GASTRIC BYPASS     • HERNIA REPAIR     • SEPTOPLASTY     • TONSILLECTOMY         Family History   Problem Relation Age of Onset   • Colon polyps Father    • Colon cancer Father         Late 50s       Social History     Social History   • Marital status: Single     Social History Main Topics   • Smoking status: Current Every Day Smoker     Packs/day: 0.50   • Alcohol use Yes      Comment: 1 BEER PTA @ 2100   • Drug use: No     Other Topics Concern   • Not on file           Objective   Physical Exam   Constitutional: She is oriented to person, place, and time. She appears well-developed and well-nourished.   HENT:   Head: Normocephalic and atraumatic.   Eyes: Conjunctivae are normal.   Neck: Normal range of motion.   Cardiovascular: Normal rate.    Pulmonary/Chest: Effort normal.   Abdominal: She exhibits no distension.   Musculoskeletal: She exhibits no edema.   Neurological: She is alert and oriented to person, place, and time.   Skin: Skin is warm and dry.   Psychiatric: Her speech is normal and behavior is normal. Judgment and thought content normal. Her mood appears anxious. She is not actively hallucinating. Cognition and memory are normal. She is attentive.       Procedures           ED Course                  MDM  Number of Diagnoses or Management Options  Diagnosis management comments: 43-year-old female presenting with request for medication refill.  Patient has run out of her Klonopin prescription and feels like she is going into benzodiazepine withdrawal.  We will provide 5 days worth of Klonopin to avoid the most severe symptoms.  Patient follow up with psychiatry in 5 days.  Time patient does not display any signs of suicidal  ideation, or severe benzodiazepine withdrawal.  Patient has normal vital signs.        Final diagnoses:   Benzodiazepine withdrawal without complication (CMS/HCC)            Reza Luis MD  10/01/18 2683

## 2018-10-01 NOTE — DISCHARGE INSTRUCTIONS
Follow up with one of the [facility] physician groups below to setup primary care. If you have trouble following up, please call the [facility] Nurse Line at [phone]    (Dr. Vik Chan DO,  Jumana Fu APRJOHANNA, and MOE Goldstein)  [facility], Primary Care   [address], [address], [place], [place] [address] [phone]     (Dr. Kassidy Carolina MD, MOE Michaels, and MOE Reveles)  [facility], Primary Care   [address], [place], [place] [address] [phone]    (Dr. Eduard Yang MD and Dr. Luis Fernando Pride MD)  [facility], Primary Care  [address], [address] [phone]    (Dr. Jem Caldwell MD)  [facility], Primary Care  [address], [address], [place], [place], [address] [phone]

## 2018-10-09 ENCOUNTER — HOSPITAL ENCOUNTER (OUTPATIENT)
Dept: HOSPITAL 58 - LAB | Age: 43
Discharge: HOME | End: 2018-10-09
Attending: PSYCHIATRY & NEUROLOGY

## 2018-10-09 VITALS — BODY MASS INDEX: 31.6 KG/M2

## 2018-10-09 DIAGNOSIS — Z79.899: Primary | ICD-10-CM

## 2018-10-09 PROCEDURE — 82542 COL CHROMOTOGRAPHY QUAL/QUAN: CPT

## 2018-10-09 PROCEDURE — 80306 DRUG TEST PRSMV INSTRMNT: CPT

## 2018-10-09 PROCEDURE — 36415 COLL VENOUS BLD VENIPUNCTURE: CPT

## 2018-10-20 ENCOUNTER — NURSE TRIAGE (OUTPATIENT)
Dept: CALL CENTER | Facility: HOSPITAL | Age: 43
End: 2018-10-20

## 2018-10-20 NOTE — TELEPHONE ENCOUNTER
States she was in [place] a month ago. States she had several shots of Haldol. States she has not been the same since. States she shakes all the time involuntarily, states she tried stopping caffeine, hasn't had benzos in a month. States she is freaking out. Sees psychiatrist at [facility]. Asking what to do? Provided her with crisis number for [facility]. Reassurance given.  Instructed she will need to see her MD when office open as well.     Reason for Disposition  • Muscle jerks, tics, or shudders are a chronic symptom (recurrent or ongoing AND present > 4 weeks)    Additional Information  • Negative: Difficult to awaken or acting confused (e.g., disoriented, slurred speech)  • Negative: Sounds like a life-threatening emergency to the triager  • Negative: Abnormal twitch, blinking, tic, or spasm of eyelid(s)  • Negative: Sounds like a seizure (generalized or focal)  • Negative: Alcohol withdrawal suspected  • Negative: Substance abuse or withdrawal suspected  • Negative: [1] Shivering or chills AND [2] fever  • Negative: [1] Shivering or chills AND [2] cold exposure (R/O hypothermia)  • Negative: Face, arm, or leg weakness  • Negative: [1] Muscle rigidity or tightness AND [2] present now  • Negative: [1] New-onset muscle jerks (twitches, spasms) AND [2] present now  • Negative: [1] New-onset muscle jerks AND [2] episode lasts > 1 minute AND [3] resolved  • Negative: Patient sounds very sick or weak to the triager  • Negative: [1] Muscle rigidity or tightness AND [2] brief (now gone)  • Negative: [1] New-onset muscle jerks AND [2] unexplained AND [3] 3 or more times/day  • Negative: Shuddering (trembling) occurs without an obvious cause  • Negative: [1] Muscle jerks or tics without an obvious cause AND [2] brief (seconds, gone now) AND [3] otherwise feels normal (well)  • Negative: Tics occur frequently  • Negative: Tics interfere with work, school, or relationships    Answer Assessment -  "Initial Assessment Questions  1. APPEARANCE of MOVEMENT: \"What did the jerking or twitching look like?\" (e.g., body area)      \"involuntary shaking\"  2. ONSET: \"When did this start happening?\" (e.g., hours, days, weeks, months ago)      A month; worsening  3. DURATION: \"How long does the jerk, twitch, or spasm last?\"      constant  4. FREQUENCY:  \"How often does this happen?\"       constant  5. WHEN: \"When does this happen?\" (e.g., while awake, while falling asleep, while sleeping)      While awake  6. CAUSE: \"What do you think caused the ___________?\"      unknown  7. OTHER SYMPTOMS: \"Are there any other symptoms?\" (e.g., fever, headache)      anxiety  8. PREGNANCY: \"Is there any chance you are pregnant?\" \"When was your last menstrual period?\"      no    Protocols used: MUSCLE JERKS - TICS - SHUDDERS-ADULT-AH      "

## 2019-01-30 ENCOUNTER — HOSPITAL ENCOUNTER (OUTPATIENT)
Dept: HOSPITAL 58 - RHC-LAB | Age: 44
Discharge: HOME | End: 2019-01-30
Attending: NURSE PRACTITIONER
Payer: COMMERCIAL

## 2019-01-30 VITALS — BODY MASS INDEX: 31.6 KG/M2

## 2019-01-30 DIAGNOSIS — R50.9: Primary | ICD-10-CM

## 2019-01-30 DIAGNOSIS — R11.0: ICD-10-CM

## 2019-01-30 DIAGNOSIS — R51: ICD-10-CM

## 2019-01-30 PROCEDURE — 87502 INFLUENZA DNA AMP PROBE: CPT
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
## 2019-02-14 ENCOUNTER — HOSPITAL ENCOUNTER (OUTPATIENT)
Dept: HOSPITAL 58 - RHC-LAB | Age: 44
Discharge: HOME | End: 2019-02-14
Attending: NURSE PRACTITIONER

## 2019-02-14 VITALS — BODY MASS INDEX: 31.6 KG/M2

## 2019-02-14 DIAGNOSIS — R05: Primary | ICD-10-CM

## 2019-02-14 PROCEDURE — 87502 INFLUENZA DNA AMP PROBE: CPT

## 2019-02-19 ENCOUNTER — HOSPITAL ENCOUNTER (EMERGENCY)
Dept: HOSPITAL 58 - ED | Age: 44
Discharge: HOME | End: 2019-02-19
Payer: COMMERCIAL

## 2019-02-19 VITALS — DIASTOLIC BLOOD PRESSURE: 107 MMHG | SYSTOLIC BLOOD PRESSURE: 160 MMHG | TEMPERATURE: 98.8 F

## 2019-02-19 VITALS — BODY MASS INDEX: 33 KG/M2

## 2019-02-19 DIAGNOSIS — Z98.84: ICD-10-CM

## 2019-02-19 DIAGNOSIS — F17.210: ICD-10-CM

## 2019-02-19 DIAGNOSIS — K52.9: Primary | ICD-10-CM

## 2019-02-19 PROCEDURE — 99283 EMERGENCY DEPT VISIT LOW MDM: CPT

## 2019-03-03 ENCOUNTER — HOSPITAL ENCOUNTER (EMERGENCY)
Dept: HOSPITAL 58 - ED | Age: 44
Discharge: HOME | End: 2019-03-03
Payer: COMMERCIAL

## 2019-03-03 VITALS — SYSTOLIC BLOOD PRESSURE: 146 MMHG | TEMPERATURE: 98.9 F | DIASTOLIC BLOOD PRESSURE: 94 MMHG

## 2019-03-03 VITALS — BODY MASS INDEX: 32.7 KG/M2

## 2019-03-03 DIAGNOSIS — G35: Primary | ICD-10-CM

## 2019-03-03 DIAGNOSIS — F17.210: ICD-10-CM

## 2019-03-03 DIAGNOSIS — M79.10: ICD-10-CM

## 2019-03-03 DIAGNOSIS — M32.9: ICD-10-CM

## 2019-03-03 DIAGNOSIS — Z79.899: ICD-10-CM

## 2019-03-03 PROCEDURE — 80053 COMPREHEN METABOLIC PANEL: CPT

## 2019-03-03 PROCEDURE — 93010 ELECTROCARDIOGRAM REPORT: CPT

## 2019-03-03 PROCEDURE — 86140 C-REACTIVE PROTEIN: CPT

## 2019-03-03 PROCEDURE — 84443 ASSAY THYROID STIM HORMONE: CPT

## 2019-03-03 PROCEDURE — 81001 URINALYSIS AUTO W/SCOPE: CPT

## 2019-03-03 PROCEDURE — 99283 EMERGENCY DEPT VISIT LOW MDM: CPT

## 2019-03-03 PROCEDURE — 85651 RBC SED RATE NONAUTOMATED: CPT

## 2019-03-03 PROCEDURE — 80306 DRUG TEST PRSMV INSTRMNT: CPT

## 2019-03-03 PROCEDURE — 36415 COLL VENOUS BLD VENIPUNCTURE: CPT

## 2019-03-03 PROCEDURE — 82550 ASSAY OF CK (CPK): CPT

## 2019-03-03 PROCEDURE — 86038 ANTINUCLEAR ANTIBODIES: CPT

## 2019-03-03 PROCEDURE — 87502 INFLUENZA DNA AMP PROBE: CPT

## 2019-03-03 PROCEDURE — 86430 RHEUMATOID FACTOR TEST QUAL: CPT

## 2019-03-03 PROCEDURE — 83735 ASSAY OF MAGNESIUM: CPT

## 2019-03-03 PROCEDURE — 93005 ELECTROCARDIOGRAM TRACING: CPT

## 2019-03-03 PROCEDURE — 85025 COMPLETE CBC W/AUTO DIFF WBC: CPT

## 2019-03-03 PROCEDURE — 82553 CREATINE MB FRACTION: CPT

## 2019-03-03 PROCEDURE — 96372 THER/PROPH/DIAG INJ SC/IM: CPT

## 2019-03-13 ENCOUNTER — HOSPITAL ENCOUNTER (OUTPATIENT)
Dept: HOSPITAL 58 - RAD | Age: 44
Discharge: HOME | End: 2019-03-13
Attending: INTERNAL MEDICINE

## 2019-03-13 VITALS — BODY MASS INDEX: 32.7 KG/M2

## 2019-03-13 DIAGNOSIS — M25.562: ICD-10-CM

## 2019-03-13 DIAGNOSIS — G35: Primary | ICD-10-CM

## 2019-03-20 ENCOUNTER — HOSPITAL ENCOUNTER (EMERGENCY)
Facility: HOSPITAL | Age: 44
Discharge: HOME | End: 2019-03-20
Admitting: EMERGENCY MEDICINE

## 2019-03-20 VITALS
HEIGHT: 66 IN | BODY MASS INDEX: 31.98 KG/M2 | WEIGHT: 199 LBS | HEART RATE: 66 BPM | RESPIRATION RATE: 16 BRPM | SYSTOLIC BLOOD PRESSURE: 156 MMHG | DIASTOLIC BLOOD PRESSURE: 105 MMHG | OXYGEN SATURATION: 100 % | TEMPERATURE: 98 F

## 2019-03-20 DIAGNOSIS — R10.9 ABDOMINAL PAIN, UNSPECIFIED ABDOMINAL LOCATION: Primary | ICD-10-CM

## 2019-03-20 LAB
ALBUMIN SERPL-MCNC: 4.3 G/DL (ref 3.5–5)
ALBUMIN/GLOB SERPL: 1.5 G/DL (ref 1.1–2.5)
ALP SERPL-CCNC: 59 U/L (ref 24–120)
ALT SERPL W P-5'-P-CCNC: 17 U/L (ref 0–54)
ANION GAP SERPL CALCULATED.3IONS-SCNC: 10 MMOL/L (ref 4–13)
AST SERPL-CCNC: 22 U/L (ref 7–45)
B-HCG UR QL: NEGATIVE
BASOPHILS # BLD AUTO: 0.03 10*3/MM3 (ref 0–0.2)
BASOPHILS NFR BLD AUTO: 0.4 % (ref 0–2)
BILIRUB SERPL-MCNC: 0.4 MG/DL (ref 0.1–1)
BILIRUB UR QL STRIP: NEGATIVE
BUN BLD-MCNC: 7 MG/DL (ref 5–21)
BUN/CREAT SERPL: 8 (ref 7–25)
CALCIUM SPEC-SCNC: 9 MG/DL (ref 8.4–10.4)
CHLORIDE SERPL-SCNC: 105 MMOL/L (ref 98–110)
CLARITY UR: CLEAR
CO2 SERPL-SCNC: 23 MMOL/L (ref 24–31)
COLOR UR: YELLOW
CREAT BLD-MCNC: 0.88 MG/DL (ref 0.5–1.4)
DEPRECATED RDW RBC AUTO: 46.7 FL (ref 40–54)
EOSINOPHIL # BLD AUTO: 0.07 10*3/MM3 (ref 0–0.7)
EOSINOPHIL NFR BLD AUTO: 0.8 % (ref 0–4)
ERYTHROCYTE [DISTWIDTH] IN BLOOD BY AUTOMATED COUNT: 14.2 % (ref 12–15)
GFR SERPL CREATININE-BSD FRML MDRD: 86 ML/MIN/1.73
GLOBULIN UR ELPH-MCNC: 2.8 GM/DL
GLUCOSE BLD-MCNC: 82 MG/DL (ref 70–100)
GLUCOSE UR STRIP-MCNC: NEGATIVE MG/DL
HCT VFR BLD AUTO: 40.7 % (ref 37–47)
HGB BLD-MCNC: 13.7 G/DL (ref 12–16)
HGB UR QL STRIP.AUTO: NEGATIVE
IMM GRANULOCYTES # BLD AUTO: 0.07 10*3/MM3 (ref 0–0.05)
IMM GRANULOCYTES NFR BLD AUTO: 0.8 % (ref 0–5)
INTERNAL NEGATIVE CONTROL: NEGATIVE
INTERNAL POSITIVE CONTROL: POSITIVE
KETONES UR QL STRIP: NEGATIVE
LEUKOCYTE ESTERASE UR QL STRIP.AUTO: NEGATIVE
LIPASE SERPL-CCNC: 46 U/L (ref 23–203)
LYMPHOCYTES # BLD AUTO: 2.73 10*3/MM3 (ref 0.72–4.86)
LYMPHOCYTES NFR BLD AUTO: 32.7 % (ref 15–45)
Lab: NORMAL
MCH RBC QN AUTO: 30.3 PG (ref 28–32)
MCHC RBC AUTO-ENTMCNC: 33.7 G/DL (ref 33–36)
MCV RBC AUTO: 90 FL (ref 82–98)
MONOCYTES # BLD AUTO: 0.46 10*3/MM3 (ref 0.19–1.3)
MONOCYTES NFR BLD AUTO: 5.5 % (ref 4–12)
NEUTROPHILS # BLD AUTO: 4.98 10*3/MM3 (ref 1.87–8.4)
NEUTROPHILS NFR BLD AUTO: 59.8 % (ref 39–78)
NITRITE UR QL STRIP: NEGATIVE
NRBC BLD AUTO-RTO: 0 /100 WBC (ref 0–0)
PH UR STRIP.AUTO: 6.5 [PH] (ref 5–8)
PLATELET # BLD AUTO: 300 10*3/MM3 (ref 130–400)
PMV BLD AUTO: 10 FL (ref 6–12)
POTASSIUM BLD-SCNC: 3.6 MMOL/L (ref 3.5–5.3)
PROT SERPL-MCNC: 7.1 G/DL (ref 6.3–8.7)
PROT UR QL STRIP: NEGATIVE
RBC # BLD AUTO: 4.52 10*6/MM3 (ref 4.2–5.4)
SODIUM BLD-SCNC: 138 MMOL/L (ref 135–145)
SP GR UR STRIP: 1.01 (ref 1–1.03)
UROBILINOGEN UR QL STRIP: NORMAL
WBC NRBC COR # BLD: 8.34 10*3/MM3 (ref 4.8–10.8)

## 2019-03-20 PROCEDURE — 85025 COMPLETE CBC W/AUTO DIFF WBC: CPT | Performed by: EMERGENCY MEDICINE

## 2019-03-20 PROCEDURE — 81003 URINALYSIS AUTO W/O SCOPE: CPT | Performed by: EMERGENCY MEDICINE

## 2019-03-20 PROCEDURE — 83690 ASSAY OF LIPASE: CPT | Performed by: EMERGENCY MEDICINE

## 2019-03-20 PROCEDURE — 99283 EMERGENCY DEPT VISIT LOW MDM: CPT

## 2019-03-20 PROCEDURE — 80053 COMPREHEN METABOLIC PANEL: CPT | Performed by: EMERGENCY MEDICINE

## 2019-03-20 PROCEDURE — 81025 URINE PREGNANCY TEST: CPT | Performed by: EMERGENCY MEDICINE

## 2019-03-20 RX ORDER — ACETAMINOPHEN 500 MG
1000 TABLET ORAL ONCE
Status: DISCONTINUED | OUTPATIENT
Start: 2019-03-20 | End: 2019-03-20 | Stop reason: HOSPADM

## 2019-03-20 NOTE — ED PROVIDER NOTES
Subjective   History of Present Illness  43-year-old female presents the chief complaint of intermittent abdominal pain for the past 2-6 weeks.  The patient reports when she performs abdominal crunches she feels like her lower abdomen is rolling she feels a discomfort.  The patient reports she has had this pain for weeks.  She had been evaluated for previously and had a CAT scan performed which lower that her lap band was in good placement and I did not see a hernia.  The patient believes she must have a hernia as she had one repaired previously.  She had seen Dr. Boone at [facility] for a hernia repair and she had seen a surgeon in [place] for the lap band.  She requests referral to a general surgeon and a bariatric surgeon.  Patient denies fevers nausea vomiting or diarrhea.  She presents whilst eating a bag of chips and a Diet Coke provided by the emergency room.  Review of Systems   All other systems reviewed and are negative.      Past Medical History:   Diagnosis Date   • Bipolar disorder (CMS/HCC)    • Fibromyalgia    • H. pylori infection    • Lupus    • Multiple sclerosis (CMS/HCC)    • Schizophrenia (CMS/HCC)        Allergies   Allergen Reactions   • Avelox [Moxifloxacin]    • Copaxone [Glatiramer Acetate]    • Depakote  [Divalproex Sodium]    • Lamictal [Lamotrigine]    • Lithium Itching   • Plaquenil [Hydroxychloroquine] Other (See Comments)     Vision loss   • Pregabalin Mental Status Change   • Glatiramer Rash   • Paliperidone Rash       Past Surgical History:   Procedure Laterality Date   • COLONOSCOPY  03/2007   • COLONOSCOPY N/A 1/10/2017    Procedure: COLONOSCOPY WITH ANESTHESIA;  Surgeon: Andreas Fairbanks MD;  Location: [facility] ENDOSCOPY;  Service:    • ENDOSCOPY  05/01/2007   • ENDOSCOPY N/A 1/10/2017    Procedure: ESOPHAGOGASTRODUODENOSCOPY WITH ANESTHESIA;  Surgeon: Andreas Fairbanks MD;  Location: [facility] ENDOSCOPY;  Service:    • GASTRIC BANDING     • GASTRIC BYPASS     • HERNIA REPAIR     •  SEPTOPLASTY     • TONSILLECTOMY         Family History   Problem Relation Age of Onset   • Colon polyps Father    • Colon cancer Father         Late 50s       Social History     Socioeconomic History   • Marital status: Single     Spouse name: Not on file   • Number of children: Not on file   • Years of education: Not on file   • Highest education level: Not on file   Tobacco Use   • Smoking status: Current Every Day Smoker     Packs/day: 0.50   Substance and Sexual Activity   • Alcohol use: Yes     Comment: 1 BEER PTA @ 2100   • Drug use: No           Objective   Physical Exam   Constitutional: She is oriented to person, place, and time. She appears well-developed and well-nourished.   HENT:   Head: Normocephalic and atraumatic.   Eyes: EOM are normal. Pupils are equal, round, and reactive to light.   Cardiovascular: Normal rate and normal heart sounds.   Pulmonary/Chest: Effort normal and breath sounds normal.   Abdominal: Normal appearance and normal aorta.   Neurological: She is alert and oriented to person, place, and time.   Skin: Skin is warm and dry. Capillary refill takes less than 2 seconds.   Psychiatric: She has a normal mood and affect. Her behavior is normal.   Nursing note and vitals reviewed.      Procedures           ED Course          Labs Reviewed   COMPREHENSIVE METABOLIC PANEL - Abnormal; Notable for the following components:       Result Value    CO2 23.0 (*)     All other components within normal limits    Narrative:     GFR Normal >60  Chronic Kidney Disease <60  Kidney Failure <15   CBC WITH AUTO DIFFERENTIAL - Abnormal; Notable for the following components:    Immature Grans, Absolute 0.07 (*)     All other components within normal limits   LIPASE - Normal   URINALYSIS W/ CULTURE IF INDICATED - Normal    Narrative:     Urine microscopic not indicated.   POCT PEFORM URINE PREGNANCY - Normal   CBC AND DIFFERENTIAL    Narrative:     The following orders were created for panel order CBC &  Differential.  Procedure                               Abnormality         Status                     ---------                               -----------         ------                     CBC Auto Differential[[id number]]        Abnormal            Final result                 Please view results for these tests on the individual orders.     No orders to display             MDM  Number of Diagnoses or Management Options  Diagnosis management comments: Negative labs, I have ordered tylenol for patient, she has refused, she is asking for narcotics, I have informed patient it is not indicated to treat her pain with narcotics at this time.  Her abdomen is soft and non-distended.         Amount and/or Complexity of Data Reviewed  Clinical lab tests: reviewed and ordered  Tests in the radiology section of CPT®: ordered and reviewed  Tests in the medicine section of CPT®: ordered and reviewed    Risk of Complications, Morbidity, and/or Mortality  Presenting problems: moderate  Diagnostic procedures: moderate  Management options: moderate    Patient Progress  Patient progress: stable        Final diagnoses:   Abdominal pain, unspecified abdominal location            Gianni Townsend PA-C  03/20/19 1861

## 2019-05-10 ENCOUNTER — HOSPITAL ENCOUNTER (INPATIENT)
Age: 44
LOS: 2 days | Discharge: HOME | DRG: 123 | End: 2019-05-12
Attending: INTERNAL MEDICINE | Admitting: INTERNAL MEDICINE
Payer: MEDICARE

## 2019-05-10 ENCOUNTER — HOSPITAL ENCOUNTER (OUTPATIENT)
Dept: HOSPITAL 58 - AMBL | Age: 44
Discharge: ANOTHER HOSPITAL | End: 2019-05-10
Attending: EMERGENCY MEDICINE

## 2019-05-10 ENCOUNTER — APPOINTMENT (OUTPATIENT)
Dept: GENERAL RADIOLOGY | Age: 44
DRG: 123 | End: 2019-05-10
Attending: INTERNAL MEDICINE
Payer: MEDICARE

## 2019-05-10 ENCOUNTER — HOSPITAL ENCOUNTER (EMERGENCY)
Dept: HOSPITAL 58 - ED | Age: 44
Discharge: ANOTHER HOSPITAL | End: 2019-05-10
Payer: COMMERCIAL

## 2019-05-10 VITALS — BODY MASS INDEX: 32.8 KG/M2

## 2019-05-10 VITALS — SYSTOLIC BLOOD PRESSURE: 128 MMHG | TEMPERATURE: 98.8 F | DIASTOLIC BLOOD PRESSURE: 86 MMHG

## 2019-05-10 DIAGNOSIS — H46.9: Primary | ICD-10-CM

## 2019-05-10 DIAGNOSIS — Z79.899: ICD-10-CM

## 2019-05-10 DIAGNOSIS — E78.5: ICD-10-CM

## 2019-05-10 DIAGNOSIS — R10.9: ICD-10-CM

## 2019-05-10 DIAGNOSIS — G35: ICD-10-CM

## 2019-05-10 DIAGNOSIS — M32.9: ICD-10-CM

## 2019-05-10 DIAGNOSIS — I10: ICD-10-CM

## 2019-05-10 DIAGNOSIS — F17.210: ICD-10-CM

## 2019-05-10 DIAGNOSIS — H54.62: ICD-10-CM

## 2019-05-10 PROBLEM — H57.12 PAIN OF LEFT EYE: Status: ACTIVE | Noted: 2019-05-10

## 2019-05-10 LAB
ANION GAP SERPL CALCULATED.3IONS-SCNC: 13 MMOL/L (ref 7–19)
BASOPHILS ABSOLUTE: 0 K/UL (ref 0–0.2)
BASOPHILS RELATIVE PERCENT: 0.2 % (ref 0–1)
BILIRUBIN URINE: NEGATIVE
BLOOD, URINE: NEGATIVE
BUN BLDV-MCNC: 9 MG/DL (ref 6–20)
CALCIUM SERPL-MCNC: 9.1 MG/DL (ref 8.6–10)
CHLORIDE BLD-SCNC: 107 MMOL/L (ref 98–111)
CLARITY: CLEAR
CO2: 21 MMOL/L (ref 22–29)
COLOR: YELLOW
CREAT SERPL-MCNC: 0.6 MG/DL (ref 0.5–0.9)
EOSINOPHILS ABSOLUTE: 0 K/UL (ref 0–0.6)
EOSINOPHILS RELATIVE PERCENT: 0.3 % (ref 0–5)
GFR NON-AFRICAN AMERICAN: >60
GLUCOSE BLD-MCNC: 205 MG/DL (ref 70–99)
GLUCOSE BLD-MCNC: 85 MG/DL (ref 74–109)
GLUCOSE BLD-MCNC: 99 MG/DL (ref 70–99)
GLUCOSE URINE: NEGATIVE MG/DL
HCG UR QL: NEGATIVE
HCT VFR BLD CALC: 38.4 % (ref 37–47)
HEMOGLOBIN: 13 G/DL (ref 12–16)
KETONES, URINE: NEGATIVE MG/DL
LACTIC ACID: 0.9 MMOL/L (ref 0.5–1.9)
LEUKOCYTE ESTERASE, URINE: NEGATIVE
LYMPHOCYTES ABSOLUTE: 2.2 K/UL (ref 1.1–4.5)
LYMPHOCYTES RELATIVE PERCENT: 22.4 % (ref 20–40)
MCH RBC QN AUTO: 30.4 PG (ref 27–31)
MCHC RBC AUTO-ENTMCNC: 33.9 G/DL (ref 33–37)
MCV RBC AUTO: 89.9 FL (ref 81–99)
MONOCYTES ABSOLUTE: 0.7 K/UL (ref 0–0.9)
MONOCYTES RELATIVE PERCENT: 6.7 % (ref 0–10)
NEUTROPHILS ABSOLUTE: 6.8 K/UL (ref 1.5–7.5)
NEUTROPHILS RELATIVE PERCENT: 69.9 % (ref 50–65)
NITRITE, URINE: NEGATIVE
PDW BLD-RTO: 14.3 % (ref 11.5–14.5)
PERFORMED ON: ABNORMAL
PERFORMED ON: NORMAL
PH UA: 7.5 (ref 5–8)
PLATELET # BLD: 280 K/UL (ref 130–400)
PMV BLD AUTO: 9.7 FL (ref 9.4–12.3)
POTASSIUM SERPL-SCNC: 3.7 MMOL/L (ref 3.5–5)
PROTEIN UA: NEGATIVE MG/DL
RBC # BLD: 4.27 M/UL (ref 4.2–5.4)
SODIUM BLD-SCNC: 141 MMOL/L (ref 136–145)
SPECIFIC GRAVITY UA: 1.01 (ref 1–1.03)
UROBILINOGEN, URINE: 1 E.U./DL
WBC # BLD: 9.7 K/UL (ref 4.8–10.8)

## 2019-05-10 PROCEDURE — 81025 URINE PREGNANCY TEST: CPT

## 2019-05-10 PROCEDURE — 99285 EMERGENCY DEPT VISIT HI MDM: CPT

## 2019-05-10 PROCEDURE — 6370000000 HC RX 637 (ALT 250 FOR IP): Performed by: HOSPITALIST

## 2019-05-10 PROCEDURE — 85025 COMPLETE CBC W/AUTO DIFF WBC: CPT

## 2019-05-10 PROCEDURE — 71046 X-RAY EXAM CHEST 2 VIEWS: CPT

## 2019-05-10 PROCEDURE — 36415 COLL VENOUS BLD VENIPUNCTURE: CPT

## 2019-05-10 PROCEDURE — 80048 BASIC METABOLIC PNL TOTAL CA: CPT

## 2019-05-10 PROCEDURE — 1210000000 HC MED SURG R&B

## 2019-05-10 PROCEDURE — 81001 URINALYSIS AUTO W/SCOPE: CPT

## 2019-05-10 PROCEDURE — 81003 URINALYSIS AUTO W/O SCOPE: CPT

## 2019-05-10 PROCEDURE — 2580000003 HC RX 258: Performed by: HOSPITALIST

## 2019-05-10 PROCEDURE — 82948 REAGENT STRIP/BLOOD GLUCOSE: CPT

## 2019-05-10 PROCEDURE — 6370000000 HC RX 637 (ALT 250 FOR IP): Performed by: PSYCHIATRY & NEUROLOGY

## 2019-05-10 PROCEDURE — 6360000002 HC RX W HCPCS: Performed by: PSYCHIATRY & NEUROLOGY

## 2019-05-10 PROCEDURE — 85651 RBC SED RATE NONAUTOMATED: CPT

## 2019-05-10 PROCEDURE — 80053 COMPREHEN METABOLIC PANEL: CPT

## 2019-05-10 PROCEDURE — 87040 BLOOD CULTURE FOR BACTERIA: CPT

## 2019-05-10 PROCEDURE — 83605 ASSAY OF LACTIC ACID: CPT

## 2019-05-10 PROCEDURE — 99223 1ST HOSP IP/OBS HIGH 75: CPT | Performed by: PSYCHIATRY & NEUROLOGY

## 2019-05-10 PROCEDURE — 99222 1ST HOSP IP/OBS MODERATE 55: CPT | Performed by: HOSPITALIST

## 2019-05-10 PROCEDURE — 2500000003 HC RX 250 WO HCPCS: Performed by: HOSPITALIST

## 2019-05-10 PROCEDURE — 2580000003 HC RX 258: Performed by: PSYCHIATRY & NEUROLOGY

## 2019-05-10 RX ORDER — POTASSIUM BICARBONATE 25 MEQ/1
25 TABLET, EFFERVESCENT ORAL 2 TIMES DAILY
Status: DISCONTINUED | OUTPATIENT
Start: 2019-05-10 | End: 2019-05-10 | Stop reason: RX

## 2019-05-10 RX ORDER — METHYLPREDNISOLONE SODIUM SUCCINATE 125 MG/2ML
80 INJECTION, POWDER, LYOPHILIZED, FOR SOLUTION INTRAMUSCULAR; INTRAVENOUS EVERY 6 HOURS
Status: DISCONTINUED | OUTPATIENT
Start: 2019-05-10 | End: 2019-05-10

## 2019-05-10 RX ORDER — CYCLOBENZAPRINE HCL 10 MG
10 TABLET ORAL 2 TIMES DAILY PRN
Status: DISCONTINUED | OUTPATIENT
Start: 2019-05-10 | End: 2019-05-12 | Stop reason: HOSPADM

## 2019-05-10 RX ORDER — SODIUM CHLORIDE 9 MG/ML
INJECTION, SOLUTION INTRAVENOUS CONTINUOUS
Status: DISCONTINUED | OUTPATIENT
Start: 2019-05-10 | End: 2019-05-12 | Stop reason: HOSPADM

## 2019-05-10 RX ORDER — CETIRIZINE HYDROCHLORIDE 10 MG/1
10 TABLET ORAL DAILY
Status: DISCONTINUED | OUTPATIENT
Start: 2019-05-10 | End: 2019-05-12 | Stop reason: HOSPADM

## 2019-05-10 RX ORDER — PHENOL 1.4 %
600 AEROSOL, SPRAY (ML) MUCOUS MEMBRANE DAILY
Status: DISCONTINUED | OUTPATIENT
Start: 2019-05-10 | End: 2019-05-12 | Stop reason: HOSPADM

## 2019-05-10 RX ORDER — HYDROCODONE BITARTRATE AND ACETAMINOPHEN 5; 325 MG/1; MG/1
1 TABLET ORAL EVERY 6 HOURS PRN
Status: DISCONTINUED | OUTPATIENT
Start: 2019-05-10 | End: 2019-05-12 | Stop reason: HOSPADM

## 2019-05-10 RX ORDER — PANTOPRAZOLE SODIUM 40 MG/1
40 TABLET, DELAYED RELEASE ORAL 2 TIMES DAILY
Status: DISCONTINUED | OUTPATIENT
Start: 2019-05-10 | End: 2019-05-12 | Stop reason: HOSPADM

## 2019-05-10 RX ORDER — NICOTINE 21 MG/24HR
1 PATCH, TRANSDERMAL 24 HOURS TRANSDERMAL DAILY
Status: DISCONTINUED | OUTPATIENT
Start: 2019-05-10 | End: 2019-05-12 | Stop reason: HOSPADM

## 2019-05-10 RX ORDER — M-VIT,TX,IRON,MINS/CALC/FOLIC 27MG-0.4MG
1 TABLET ORAL DAILY
Status: DISCONTINUED | OUTPATIENT
Start: 2019-05-10 | End: 2019-05-12 | Stop reason: HOSPADM

## 2019-05-10 RX ORDER — NICOTINE POLACRILEX 4 MG
15 LOZENGE BUCCAL PRN
Status: DISCONTINUED | OUTPATIENT
Start: 2019-05-10 | End: 2019-05-12 | Stop reason: HOSPADM

## 2019-05-10 RX ORDER — DEXTROSE MONOHYDRATE 25 G/50ML
12.5 INJECTION, SOLUTION INTRAVENOUS PRN
Status: DISCONTINUED | OUTPATIENT
Start: 2019-05-10 | End: 2019-05-12 | Stop reason: HOSPADM

## 2019-05-10 RX ORDER — FERROUS SULFATE 325(65) MG
325 TABLET ORAL
Status: DISCONTINUED | OUTPATIENT
Start: 2019-05-11 | End: 2019-05-12 | Stop reason: HOSPADM

## 2019-05-10 RX ORDER — DEXTROSE MONOHYDRATE 50 MG/ML
100 INJECTION, SOLUTION INTRAVENOUS PRN
Status: DISCONTINUED | OUTPATIENT
Start: 2019-05-10 | End: 2019-05-12 | Stop reason: HOSPADM

## 2019-05-10 RX ORDER — AMANTADINE HYDROCHLORIDE 100 MG/1
100 CAPSULE, GELATIN COATED ORAL 2 TIMES DAILY
Status: DISCONTINUED | OUTPATIENT
Start: 2019-05-10 | End: 2019-05-12 | Stop reason: HOSPADM

## 2019-05-10 RX ORDER — DESVENLAFAXINE 100 MG/1
100 TABLET, EXTENDED RELEASE ORAL DAILY
Status: DISCONTINUED | OUTPATIENT
Start: 2019-05-10 | End: 2019-05-12 | Stop reason: HOSPADM

## 2019-05-10 RX ADMIN — INSULIN LISPRO 1 UNITS: 100 INJECTION, SOLUTION INTRAVENOUS; SUBCUTANEOUS at 20:44

## 2019-05-10 RX ADMIN — DOXYCYCLINE 100 MG: 100 INJECTION, POWDER, LYOPHILIZED, FOR SOLUTION INTRAVENOUS at 23:48

## 2019-05-10 RX ADMIN — POTASSIUM BICARBONATE 20 MEQ: 782 TABLET, EFFERVESCENT ORAL at 20:40

## 2019-05-10 RX ADMIN — HYDROCODONE BITARTRATE AND ACETAMINOPHEN 1 TABLET: 5; 325 TABLET ORAL at 21:07

## 2019-05-10 RX ADMIN — MULTIPLE VITAMINS W/ MINERALS TAB 1 TABLET: TAB at 14:30

## 2019-05-10 RX ADMIN — CETIRIZINE HYDROCHLORIDE 10 MG: 10 TABLET, FILM COATED ORAL at 13:22

## 2019-05-10 RX ADMIN — AMANTADINE HYDROCHLORIDE 100 MG: 100 CAPSULE ORAL at 20:41

## 2019-05-10 RX ADMIN — CYCLOBENZAPRINE HYDROCHLORIDE 10 MG: 10 TABLET, FILM COATED ORAL at 20:41

## 2019-05-10 RX ADMIN — SODIUM CHLORIDE: 9 INJECTION, SOLUTION INTRAVENOUS at 14:30

## 2019-05-10 RX ADMIN — Medication 600 MG: at 13:21

## 2019-05-10 RX ADMIN — PANTOPRAZOLE SODIUM 40 MG: 40 TABLET, DELAYED RELEASE ORAL at 20:41

## 2019-05-10 RX ADMIN — AMANTADINE HYDROCHLORIDE 100 MG: 100 CAPSULE ORAL at 13:22

## 2019-05-10 RX ADMIN — DESVENLAFAXINE SUCCINATE 100 MG: 100 TABLET, FILM COATED, EXTENDED RELEASE ORAL at 14:30

## 2019-05-10 RX ADMIN — SODIUM CHLORIDE 1000 MG: 9 INJECTION, SOLUTION INTRAVENOUS at 14:30

## 2019-05-10 RX ADMIN — POTASSIUM BICARBONATE 20 MEQ: 782 TABLET, EFFERVESCENT ORAL at 14:30

## 2019-05-10 RX ADMIN — PANTOPRAZOLE SODIUM 40 MG: 40 TABLET, DELAYED RELEASE ORAL at 13:21

## 2019-05-10 ASSESSMENT — PAIN SCALES - GENERAL
PAINLEVEL_OUTOF10: 0
PAINLEVEL_OUTOF10: 0
PAINLEVEL_OUTOF10: 9

## 2019-05-10 NOTE — CONSULTS
Inpatient consult to Neurology  Consult performed by: Cirilo Osullivan MD  Consult ordered by: Stefanie Brooks MD            [facility] Neurology Consult        Patient:   Guille Joyce  MR#:    [id number]  Account Number:                   [id number]      Room:    0505/505-01   YOB: 1975  Date of Progress Note: 5/10/2019  Time of Note                           2:42 PM  Attending Physician:  Stefanie Brooks, *  Consulting Physician:   Cirilo Osullivan M.D.        [address]:  Left visual change eye pain       HISTORY OF PRESENT ILLNESS:   This 41 y.o. female with a history of multiple sclerosis and lupus along with chronic headaches is seen for left visual change which began about 2 days ago with eye pain. This is quite similar to her previous episodes of optic neuritis, of which she has had about 4 previous episodes treated with steroids. She was tried on oral steroids as an outpatient but was not effective and now transferred from outside ER for further management. She has some chronic weakness and numbness and weakness on the left. She does have bowel and bladder incontinence but denies any recent illnesses although her temperature slightly elevated here. She is on a avonex. Has not followed up with Dr Renetta Fortune in about 1 1/2 years. REVIEW OF SYSTEMS:  Constitutional - No fever or chills. No diaphoresis or significant fatigue. HENT -  No tinnitus or significant hearing loss. Eyes - no sudden vision change or eye pain  Respiratory - no significant shortness of breath or cough  Cardiovascular - no chest pain No palpitations or significant leg swelling  Gastrointestinal - no abdominal swelling or pain. Genitourinary - No difficulty urinating, dysuria  Musculoskeletal - no back pain or myalgia. Skin - no color change or rash  Neurologic - No seizures. No lateralizing weakness. Hematologic - no easy bruising or excessive bleeding.   Psychiatric - no severe anxiety or nervousness. All other review of systems are negative.       Past Medical History:      Diagnosis Date    Arthritis     Bipolar 1 disorder ([facility] [address]     Fibromyalgia     GERD (gastroesophageal reflux disease)     Hypertension     Lupus     Movement disorder     Multiple sclerosis (HCC)     Neuromuscular disorder ([facility] [address]     Psychiatric problem        Past Surgical History:      Procedure Laterality Date    ABDOMINAL EXPLORATION SURGERY      ADENOIDECTOMY       SECTION      x 2     SECTION, CLASSIC N/A     DILATATION, ESOPHAGUS      EXTERNAL EAR SURGERY      lobe    GASTRIC BAND      GASTRIC BYPASS SURGERY      HERNIA REPAIR      HERNIA REPAIR N/A     SEPTOPLASTY      TONSILLECTOMY      TONSILLECTOMY AND ADENOIDECTOMY      UPPER GASTROINTESTINAL ENDOSCOPY         Medications in Hospital:      Current Facility-Administered Medications:     amantadine (SYMMETREL) capsule 100 mg, 100 mg, Oral, BID, Brit Doan MD, 100 mg at 05/10/19 1322    calcium carbonate tablet 600 mg, 600 mg, Oral, Daily, Brit Doan MD, 600 mg at 05/10/19 1321    cetirizine (ZYRTEC) tablet 10 mg, 10 mg, Oral, Daily, Brit Doan MD, 10 mg at 05/10/19 1322    cyclobenzaprine (FLEXERIL) tablet 10 mg, 10 mg, Oral, BID PRN, Brit Doan MD  [facility]  [START ON 2019] ferrous sulfate tablet 325 mg, 325 mg, Oral, Daily with breakfast, Brit Doan MD    pantoprazole (PROTONIX) tablet 40 mg, 40 mg, Oral, BID, Brit Doan MD, 40 mg at 05/10/19 1321    methylPREDNISolone sodium (SOLU-MEDROL) 1,000 mg in sodium chloride 0.9 % 250 mL IVPB, 1,000 mg, Intravenous, Daily, Chava Lacey MD, Last Rate: 500 mL/hr at 05/10/19 1430, 1,000 mg at 05/10/19 1430    desvenlafaxine succinate (PRISTIQ) extended release tablet 100 mg, 100 mg, Oral, Daily, Brit Doan MD, 100 mg at 05/10/19 1430    therapeutic multivitamin-minerals 1 tablet, 1 tablet, Paternal Cousin     Asthma Other            Physical Exam:    Vitals: /85   Pulse 65   Temp 100.4 °F (38 °C) (Tympanic)   Resp 14   Ht 5' 6\" (1.676 m)   Wt 200 lb (90.7 kg)   SpO2 99%   BMI 32.28 kg/m²     Constitutional - well developed, well nourished. Eyes - conjunctiva normal.  Pupils react to light  Ear, nose, throat -hearing intact to finger rub No scars, masses, or lesions over external nose or ears, no atrophy of tongue  Neck-symmetric, no masses noted, no jugular vein distension  Respiration- chest wall appears symmetric, good expansion,   normal effort without use of accessory muscles  Musculoskeletal - no significant wasting of muscles noted, no bony deformities  Extremities-no clubbing, cyanosis or edema  Skin - warm, dry, and intact. No rash, erythema, or pallor.   Psychiatric - mood, affect, and behavior appear normal.      Neurological exam  Awake, alert, fluent oriented x 3 appropriate affect  Attention and concentration appear appropriate  Recent and remote memory appears unremarkable  Speech normal without dysarthria  No clear issues with language of fund of knowledge    Cranial Nerve Exam   CN II- Visual fields grossly unremarkable decreased vision left eye medially  CN III, IV,VI-EOMI, No nystagmus, conjugate eye movements, no ptosis  CN V-sensation intact to LT over face  CN VII-no facial assymetry  CN VIII-Hearing intact to finger rub  CN IX and X- Palate elevates in midline  CN XI-good shoulder shrug  CN XII-Tongue midline with no fasciculations or fibrillations    Motor Exam  V/V throughout upper and lower extremities bilaterally, no cogwheeling, normal tone    Sensory Exam  Sensation intact to light touch and temperature upper and lower extremities bilaterally    Reflexes   2+ biceps bilaterally  2+ brachioradialis  2+patella  2+ ankle jerks  No clonus ankles  No Hartman's sign bilateral hands    Tremors- no tremors in hands or head noted    Gait  Not tested    Coordination  Finger to nose-unremarkable        CBC:   Recent Labs     05/10/19  1244   WBC 9.7   HGB 13.0          BMP:    Recent Labs     05/10/19  1244      K 3.7      CO2 21*   BUN 9   CREATININE 0.6   GLUCOSE 85       Hepatic: No results for input(s): AST, ALT, ALB, BILITOT, ALKPHOS in the last 72 hours. Lipids: No results for input(s): CHOL, HDL in the last 72 hours. Invalid input(s): LDLCALCU    INR: No results for input(s): INR in the last 72 hours. Assessment and Plan     Optic neuritis similar to her previous episodes  Solumedrol 1 gm IV x 3 days  Monitor blood sugars  On PPI  PT/OT  Supportive care        Please feel free to call with any questions   [phone] (cell phone)    Dr Stormy Monteiro certified in Neurology  Board Certified in Clinical Neurophysiology  Fellowship Trained in [facility] Neurophysiology    EMR Dragon/transcription disclaimer:Significant part of this  encounter note is electronic transcription/translation of spoken language to printed text. The electronic translation of spoken language may be erroneous, or at times, nonsensical words or phrases may be inadvertently transcribed.  Although I have reviewed the note for such errors, some may still exist.

## 2019-05-10 NOTE — H&P
History and Physical    Patient Name:  Yi Espinosa    :  1975    Chief Complaint:   Visual changes     History of Present Illness:   iY Espinosa with PMH of MS and SLE presents to [facility] with 2 day history of blurry vision in her right eye and limit field of vision of the left eye. She has had visual loss in the past related to MS AE. She also noticed urinary incontinence and tingling/numbness/some weakness of her LLE. She was treated with IV steroid as OP (low dose) but her symptoms got worse. She has had cough for the last 2 days as well, no fever or chills at home. Here she is febrile with temp 100.4. UA and CXR are pending. She is started on 1g solumedrol daily for 3 days for MS AE. Past Medical History:   has a past medical history of Arthritis, Bipolar 1 disorder ([address] Fibromyalgia, GERD (gastroesophageal reflux disease), Hypertension, Lupus, Movement disorder, Multiple sclerosis ([address] Neuromuscular disorder ([address] and Psychiatric problem. Surgical History:   has a past surgical history that includes Septoplasty; External ear surgery; hernia repair; Gastric bypass surgery; Gastric Band; Tonsillectomy; Adenoidectomy;  section; Abdominal exploration surgery; Upper gastrointestinal endoscopy; Dilatation, esophagus; hernia repair (N/A);  section, classic (N/A); and Tonsillectomy and adenoidectomy. Social History:   reports that she has been smoking cigarettes. She has a 15.00 pack-year smoking history. She has never used smokeless tobacco. She reports that she drinks about 1.2 oz of alcohol per week. She reports that she does not use drugs. Family History:  family history includes Alcohol Abuse in her maternal grandfather and paternal grandfather;  Anemia in her mother and paternal grandmother; Asthma in an other family member; Breast Cancer in her maternal grandmother and mother; Colon Cancer in her father; Depression in her father and mother; High Blood Pressure in her father and mother; Mental Illness in her father and mother; [place] in her mother; Osteoporosis in her paternal grandfather; Stroke in her paternal grandfather; Substance Abuse in her maternal cousin and paternal cousin. Medications:  Prior to Admission medications    Medication Sig Start Date End Date Taking?  Authorizing Provider   topiramate (TOPAMAX) 200 MG tablet Take 1 tablet by mouth nightly 9/13/18   Jamil Dorman DO   cyclobenzaprine (FLEXERIL) 10 MG tablet Take 10 mg by mouth 2 times daily as needed for Muscle spasms    Historical Provider, MD   leuprolide (LUPRON) 22.5 MG injection Inject 22.5 mg into the muscle once Indications: Endometriosis, Started 2 yrs ago Takes very two months and had last month 4/2019    Historical Provider, MD   predniSONE (DELTASONE) 10 MG tablet Take 13 mg by mouth daily     Historical Provider, MD   Amantadine (SYMMETREL) 100 MG TABS tablet Take 100 mg by mouth 2 times daily    Historical Provider, MD   lisinopril (PRINIVIL;ZESTRIL) 40 MG tablet Take 40 mg by mouth daily    Historical Provider, MD   potassium bicarbonate (K-LYTE) 25 MEQ disintegrating tablet Take 25 mEq by mouth 2 times daily    Historical Provider, MD   cyanocobalamin 1000 MCG/ML injection Inject 1,000 mcg into the muscle every 21 days     Historical Provider, MD   pantoprazole sodium (PROTONIX) 40 MG PACK packet Take 40 mg by mouth 2 times daily    Historical Provider, MD   azelastine (ASTELIN) 0.1 % nasal spray SPRAY IN EACH NOSTRIL PRN 10/3/17   Historical Provider, MD   nortriptyline (PAMELOR) 25 MG capsule Take 2 capsules by mouth nightly 1/30/17 5/1/17  Kristin Antoine MD   alendronate (FOSAMAX) 70 MG tablet Take 70 mg by mouth every 7 days    Historical Provider, MD   Multiple Vitamins-Minerals (THERAPEUTIC MULTIVITAMIN-MINERALS) tablet Take 1 tablet by mouth daily    Historical Provider, MD   ferrous sulfate 325 (65 FE) MG tablet Take 325 mg by mouth daily (with breakfast)    Historical Provider, MD   calcium carbonate 600 MG TABS tablet Take 1 tablet by mouth daily    Historical Provider, MD   interferon beta-1a (AVONEX) 30 MCG injection Inject 30 mcg into the muscle once a week    Historical Provider, MD   desvenlafaxine succinate (PRISTIQ) 100 MG TB24 Take by mouth daily    Historical Provider, MD   cetirizine (ZYRTEC) 10 MG tablet Take 10 mg by mouth daily    Historical Provider, MD       Allergies: Avelox [moxifloxacin]; Sugar-protein-starch; Avelox [moxifloxacin]; Copaxone [glatiramer acetate]; Depakote [divalproex sodium]; Gabapentin; Invega [paliperidone]; Lamictal [lamotrigine]; Lithium; Lamictal [lamotrigine]; and Lithium     Review of Systems:   · Constitutional: there has been no unanticipated weight loss. There's been no change in energy level, sleep pattern, or activity level. · Eyes: visual changes, no diplopia. No scleral icterus. · ENT: Headaches, no hearing loss or vertigo. No mouth sores or sore throat. · Cardiovascular: No chest pain, palpitations or loss of consciousness. No pleuritic pain or phlebitis. No orthopnea, PND or peripheral edema. · Respiratory: cough, no wheezing, no sputum production. No hemoptysis. No dyspnea     · Gastrointestinal: No abdominal pain, appetite loss, blood in stools. No change in bowel habits. No N/V. No blood per rectum. · Genitourinary: no dysuria but urinary incontinence   · Musculoskeletal:  gait disturbance with LLE weakness   · Integumentary: No rash or pruritis. · Neurological: headache, no diplopia, she has change in muscle strength, numbness or tingling of LLE. · Psychiatric: anxiety, depression. · Endocrine: No temperature intolerance. No excessive thirst, fluid intake, or urination. No tremor. · Hematologic/Lymphatic: No abnormal bruising or bleeding, blood clots or swollen lymph nodes. · Allergic/Immunologic: No nasal congestion or hives.     Physical Examination:    Vital Signs: /85   Pulse 65   Temp 100.4 °F (38 °C) (Tympanic)   Resp 14   Ht 5' 6\" (1.676 m)   Wt 200 lb (90.7 kg)   SpO2 99%   BMI 32.28 kg/m²   General appearance: Well preserved, mesomorphic body habitus, alert, no distress. Skin: Skin color, texture, turgor normal. No rashes or lesions. No induration or tightening palpated. Head: Normocephalic. No masses, lesions, tenderness or abnormalities  Eyes: conjunctivae/corneas clear. EOM's intact. Sclera non icteric. Ears: External ears normal.  Hearing normal to finger rub. Nose/Sinuses: Nares normal. Septum midline. Mucosa normal. No drainage or sinus tenderness. Oropharynx: Lips, mucosa, and tongue normal. Oropharynx clear with no exudate seen. Neck: Neck supple, and symmetric. No adenopathy. Trachea is midline. Carotids brisk in upstroke without bruits, No abnormal JVP noted at 45°. Lungs: Lungs clear to auscultation bilaterally. No retractions or use of accessory muscles. No vocal fremitus. No ronchi, crackle or rale. Heart:  S1 > S2. Regular rate and rhythm. No gallop, murmur, rub, palpable thrill or heave noted. Abdomen: Abdomen soft, non-tender. BS normal. No masses, organomegaly. No hernia noted. Extremities: Extremities normal. No deformities, edema, or skin discoloration. No cyanosis or clubbing noted to the nails. Peripheral pulses 4/4. Musculoskeletal: Spine ROM normal. Muscular strength intact. Neuro: Cranial nerves intact. Motor: Strength 5/5 in all extremities. No focal weakness. Sensory: grossly normal to touch.      Pertinent Labs:  CBC:   Recent Labs     05/10/19  1244   WBC 9.7   RBC 4.27   HGB 13.0   HCT 38.4   MCV 89.9   MCH 30.4   MCHC 33.9   RDW 14.3      MPV 9.7     BMP: pending    TSH:   Lab Results   Component Value Date    TSH 0.868 09/12/2018         Assessment/Plan:  · MS AE- steroids IV  · Fever- cxr, UA, lactic   · Anxiety with depression- home meds  · GERD- home meds                   I have reviewed my findings and recommendations in detail with Guille Joyce.     Leda Valdivia MD

## 2019-05-11 PROBLEM — J40 BRONCHITIS: Status: ACTIVE | Noted: 2019-05-11

## 2019-05-11 LAB
ANION GAP SERPL CALCULATED.3IONS-SCNC: 10 MMOL/L (ref 7–19)
BUN BLDV-MCNC: 13 MG/DL (ref 6–20)
CALCIUM SERPL-MCNC: 8.3 MG/DL (ref 8.6–10)
CHLORIDE BLD-SCNC: 106 MMOL/L (ref 98–111)
CO2: 21 MMOL/L (ref 22–29)
CREAT SERPL-MCNC: 0.7 MG/DL (ref 0.5–0.9)
GFR NON-AFRICAN AMERICAN: >60
GLUCOSE BLD-MCNC: 128 MG/DL (ref 70–99)
GLUCOSE BLD-MCNC: 152 MG/DL (ref 70–99)
GLUCOSE BLD-MCNC: 166 MG/DL (ref 70–99)
GLUCOSE BLD-MCNC: 172 MG/DL (ref 70–99)
GLUCOSE BLD-MCNC: 216 MG/DL (ref 74–109)
HCT VFR BLD CALC: 36.5 % (ref 37–47)
HEMOGLOBIN: 12 G/DL (ref 12–16)
MAGNESIUM: 2.2 MG/DL (ref 1.6–2.6)
MCH RBC QN AUTO: 29.7 PG (ref 27–31)
MCHC RBC AUTO-ENTMCNC: 32.9 G/DL (ref 33–37)
MCV RBC AUTO: 90.3 FL (ref 81–99)
PDW BLD-RTO: 14.4 % (ref 11.5–14.5)
PERFORMED ON: ABNORMAL
PLATELET # BLD: 242 K/UL (ref 130–400)
PMV BLD AUTO: 10 FL (ref 9.4–12.3)
POTASSIUM SERPL-SCNC: 4.6 MMOL/L (ref 3.5–5)
RBC # BLD: 4.04 M/UL (ref 4.2–5.4)
SODIUM BLD-SCNC: 137 MMOL/L (ref 136–145)
TSH SERPL DL<=0.05 MIU/L-ACNC: 0.29 UIU/ML (ref 0.27–4.2)
WBC # BLD: 5.9 K/UL (ref 4.8–10.8)

## 2019-05-11 PROCEDURE — 2500000003 HC RX 250 WO HCPCS: Performed by: HOSPITALIST

## 2019-05-11 PROCEDURE — 85027 COMPLETE CBC AUTOMATED: CPT

## 2019-05-11 PROCEDURE — 6370000000 HC RX 637 (ALT 250 FOR IP): Performed by: PSYCHIATRY & NEUROLOGY

## 2019-05-11 PROCEDURE — 80048 BASIC METABOLIC PNL TOTAL CA: CPT

## 2019-05-11 PROCEDURE — 99232 SBSQ HOSP IP/OBS MODERATE 35: CPT | Performed by: INTERNAL MEDICINE

## 2019-05-11 PROCEDURE — 82948 REAGENT STRIP/BLOOD GLUCOSE: CPT

## 2019-05-11 PROCEDURE — 84443 ASSAY THYROID STIM HORMONE: CPT

## 2019-05-11 PROCEDURE — 2580000003 HC RX 258: Performed by: HOSPITALIST

## 2019-05-11 PROCEDURE — 36415 COLL VENOUS BLD VENIPUNCTURE: CPT

## 2019-05-11 PROCEDURE — 97161 PT EVAL LOW COMPLEX 20 MIN: CPT

## 2019-05-11 PROCEDURE — 2580000003 HC RX 258: Performed by: PSYCHIATRY & NEUROLOGY

## 2019-05-11 PROCEDURE — 97116 GAIT TRAINING THERAPY: CPT

## 2019-05-11 PROCEDURE — 1210000000 HC MED SURG R&B

## 2019-05-11 PROCEDURE — 83735 ASSAY OF MAGNESIUM: CPT

## 2019-05-11 PROCEDURE — 6370000000 HC RX 637 (ALT 250 FOR IP): Performed by: HOSPITALIST

## 2019-05-11 PROCEDURE — 6360000002 HC RX W HCPCS: Performed by: PSYCHIATRY & NEUROLOGY

## 2019-05-11 PROCEDURE — 99233 SBSQ HOSP IP/OBS HIGH 50: CPT | Performed by: PSYCHIATRY & NEUROLOGY

## 2019-05-11 RX ADMIN — AMANTADINE HYDROCHLORIDE 100 MG: 100 CAPSULE ORAL at 20:28

## 2019-05-11 RX ADMIN — DOXYCYCLINE 100 MG: 100 INJECTION, POWDER, LYOPHILIZED, FOR SOLUTION INTRAVENOUS at 10:26

## 2019-05-11 RX ADMIN — INSULIN LISPRO 1 UNITS: 100 INJECTION, SOLUTION INTRAVENOUS; SUBCUTANEOUS at 20:27

## 2019-05-11 RX ADMIN — HYDROCODONE BITARTRATE AND ACETAMINOPHEN 1 TABLET: 5; 325 TABLET ORAL at 18:35

## 2019-05-11 RX ADMIN — CETIRIZINE HYDROCHLORIDE 10 MG: 10 TABLET, FILM COATED ORAL at 09:38

## 2019-05-11 RX ADMIN — MULTIPLE VITAMINS W/ MINERALS TAB 1 TABLET: TAB at 09:38

## 2019-05-11 RX ADMIN — CYCLOBENZAPRINE HYDROCHLORIDE 10 MG: 10 TABLET, FILM COATED ORAL at 20:28

## 2019-05-11 RX ADMIN — DESVENLAFAXINE SUCCINATE 100 MG: 100 TABLET, FILM COATED, EXTENDED RELEASE ORAL at 09:37

## 2019-05-11 RX ADMIN — FERROUS SULFATE TAB 325 MG (65 MG ELEMENTAL FE) 325 MG: 325 (65 FE) TAB at 09:40

## 2019-05-11 RX ADMIN — PANTOPRAZOLE SODIUM 40 MG: 40 TABLET, DELAYED RELEASE ORAL at 20:28

## 2019-05-11 RX ADMIN — POTASSIUM BICARBONATE 20 MEQ: 782 TABLET, EFFERVESCENT ORAL at 09:38

## 2019-05-11 RX ADMIN — SODIUM CHLORIDE 1000 MG: 9 INJECTION, SOLUTION INTRAVENOUS at 09:37

## 2019-05-11 RX ADMIN — HYDROCODONE BITARTRATE AND ACETAMINOPHEN 1 TABLET: 5; 325 TABLET ORAL at 09:40

## 2019-05-11 RX ADMIN — POTASSIUM BICARBONATE 20 MEQ: 782 TABLET, EFFERVESCENT ORAL at 20:27

## 2019-05-11 RX ADMIN — PANTOPRAZOLE SODIUM 40 MG: 40 TABLET, DELAYED RELEASE ORAL at 09:38

## 2019-05-11 RX ADMIN — Medication 600 MG: at 09:38

## 2019-05-11 RX ADMIN — INSULIN LISPRO 1 UNITS: 100 INJECTION, SOLUTION INTRAVENOUS; SUBCUTANEOUS at 12:12

## 2019-05-11 RX ADMIN — HYDROCODONE BITARTRATE AND ACETAMINOPHEN 1 TABLET: 5; 325 TABLET ORAL at 02:44

## 2019-05-11 RX ADMIN — DOXYCYCLINE 100 MG: 100 INJECTION, POWDER, LYOPHILIZED, FOR SOLUTION INTRAVENOUS at 20:27

## 2019-05-11 RX ADMIN — AMANTADINE HYDROCHLORIDE 100 MG: 100 CAPSULE ORAL at 09:38

## 2019-05-11 ASSESSMENT — PAIN SCALES - GENERAL
PAINLEVEL_OUTOF10: 0
PAINLEVEL_OUTOF10: 5
PAINLEVEL_OUTOF10: 0
PAINLEVEL_OUTOF10: 8
PAINLEVEL_OUTOF10: 9
PAINLEVEL_OUTOF10: 7

## 2019-05-11 ASSESSMENT — PAIN SCALES - WONG BAKER: WONGBAKER_NUMERICALRESPONSE: 4

## 2019-05-11 ASSESSMENT — PAIN DESCRIPTION - LOCATION: LOCATION: GENERALIZED

## 2019-05-11 ASSESSMENT — PAIN DESCRIPTION - PAIN TYPE: TYPE: CHRONIC PAIN

## 2019-05-11 NOTE — PROGRESS NOTES
Patient noted to have headache this am and norco po given, up x 1 to void, will cont to monitor,  noted bilaterally, pupils equal and reactive Electronically signed by Anna Skinner RN on 5/11/2019 at 5:40 AM

## 2019-05-11 NOTE — PROGRESS NOTES
Patient:   China Zuniga  MR#:    [id number]   Room:    0505/505-01   YOB: 1975  Date of Progress Note: 2019  Time of Note                           3:10 PM  Consulting Physician:   Mera Sandhu M.D. Attending Physician:  Gonzalo Chen DO     Chief complaint left visual field deficit    S:This 41 y.o. female  with a history of multiple sclerosis and lupus along with chronic headaches is seen for left visual change which began about 2 days ago with eye pain. This is quite similar to her previous episodes of optic neuritis, of which she has had about 4 previous episodes treated with steroids. She was tried on oral steroids as an outpatient but was not effective and now transferred from outside ER for further management. She has some chronic weakness and numbness and weakness on the left. She does have bowel and bladder incontinence but denies any recent illnesses although her temperature slightly elevated here. She is on a avonex. Has not followed up with Dr Tahmina Awan in about 1 1/2 years. No new issues. Vision in left eye slightly better.         REVIEW OF SYSTEMS:  Constitutional: No fevers No chills  Neck:No stiffness  Respiratory: No shortness of breath  Cardiovascular: No chest pain No palpitations  Gastrointestinal: No abdominal pain    Genitourinary: No Dysuria  Neurological: No headache, no confusion    Past Medical History:      Diagnosis Date    Arthritis     Bipolar 1 disorder ([address]     Fibromyalgia     GERD (gastroesophageal reflux disease)     Hypertension     Lupus     Movement disorder     Multiple sclerosis (HCC)     Neuromuscular disorder ([address]     Psychiatric problem        Past Surgical History:      Procedure Laterality Date    ABDOMINAL EXPLORATION SURGERY      ADENOIDECTOMY       SECTION      x 2     SECTION, CLASSIC N/A     DILATATION, ESOPHAGUS      EXTERNAL EAR SURGERY      lobe    GASTRIC BAND      GASTRIC BYPASS SURGERY      HERNIA REPAIR      HERNIA REPAIR N/A     SEPTOPLASTY      TONSILLECTOMY      TONSILLECTOMY AND ADENOIDECTOMY      UPPER GASTROINTESTINAL ENDOSCOPY         Medications in Hospital:      Current Facility-Administered Medications:     amantadine (SYMMETREL) capsule 100 mg, 100 mg, Oral, BID, Claudette Muñoz MD, 100 mg at 05/11/19 2354    calcium carbonate tablet 600 mg, 600 mg, Oral, Daily, Claudette Muñoz MD, 600 mg at 05/11/19 6559    cetirizine (ZYRTEC) tablet 10 mg, 10 mg, Oral, Daily, Claudette Muñoz MD, 10 mg at 05/11/19 6301    cyclobenzaprine (FLEXERIL) tablet 10 mg, 10 mg, Oral, BID PRN, Claudette Muñoz MD, 10 mg at 05/10/19 2041    ferrous sulfate tablet 325 mg, 325 mg, Oral, Daily with breakfast, Claudette Muñoz MD, 325 mg at 05/11/19 0940    pantoprazole (PROTONIX) tablet 40 mg, 40 mg, Oral, BID, Claudette Mñuoz MD, 40 mg at 05/11/19 8342    methylPREDNISolone sodium (SOLU-MEDROL) 1,000 mg in sodium chloride 0.9 % 250 mL IVPB, 1,000 mg, Intravenous, Daily, Jt Cook MD, Stopped at 05/11/19 1007    desvenlafaxine succinate (PRISTIQ) extended release tablet 100 mg, 100 mg, Oral, Daily, Claudette Muñoz MD, 100 mg at 05/11/19 4673    therapeutic multivitamin-minerals 1 tablet, 1 tablet, Oral, Daily, Claudette Muñoz MD, 1 tablet at 05/11/19 9007    potassium bicarb-citric acid (EFFER-K) effervescent tablet 20 mEq, 20 mEq, Oral, BID, Claudette Muñoz MD, 20 mEq at 05/11/19 0938    0.9 % sodium chloride infusion, , Intravenous, Continuous, Claudette Muñoz MD, Last Rate: 100 mL/hr at 05/10/19 1430    insulin lispro (HUMALOG) injection vial 0-6 Units, 0-6 Units, Subcutaneous, TID WC, Jt Cook MD, 1 Units at 05/11/19 1212    insulin lispro (HUMALOG) injection vial 0-3 Units, 0-3 Units, Subcutaneous, Nightly, Jt Cook MD, 1 Units at 05/10/19 2044    glucose (GLUTOSE) 40 % oral gel 15 g, 15 g, Oral, PRN, Jim Hager MD    dextrose 50 % solution 12.5 g, 12.5 g, Intravenous, PRN, Jim Hager MD    glucagon (rDNA) injection 1 mg, 1 mg, Intramuscular, PRN, Jim Hager MD    dextrose 5 % solution, 100 mL/hr, Intravenous, PRN, Jim Hager MD    nicotine (NICODERM CQ) 14 MG/24HR 1 patch, 1 patch, Transdermal, Daily, Jeremiah Odom MD, 1 patch at 05/11/19 0939    HYDROcodone-acetaminophen (NORCO) 5-325 MG per tablet 1 tablet, 1 tablet, Oral, Q6H PRN, Jeremiah Odom MD, 1 tablet at 05/11/19 0940    doxycycline (VIBRAMYCIN) 100 mg in dextrose 5 % 100 mL IVPB, 100 mg, Intravenous, Q12H, Jeremiah Odom MD, Stopped at 05/11/19 1126    Allergies: Avelox [moxifloxacin]; Sugar-protein-starch; Avelox [moxifloxacin]; Copaxone [glatiramer acetate]; Depakote [divalproex sodium]; Gabapentin; Invega [paliperidone]; Lamictal [lamotrigine]; Lithium; Lamictal [lamotrigine]; and Lithium    Social History:   TOBACCO:   reports that she has been smoking cigarettes. She has a 15.00 pack-year smoking history. She has never used smokeless tobacco.  ETOH:   reports that she drinks about 1.2 oz of alcohol per week.     Family History:       Problem Relation Age of Onset    Anemia Mother     Breast Cancer Mother     Depression Mother     High Blood Pressure Mother     Mental Illness Mother    [de-identified] / [place] Mother     Colon Cancer Father     Depression Father     High Blood Pressure Father     Mental Illness Father     Breast Cancer Maternal Grandmother     Alcohol Abuse Maternal Grandfather     Anemia Paternal Grandmother     Alcohol Abuse Paternal Grandfather     Osteoporosis Paternal Grandfather     Stroke Paternal Grandfather     Substance Abuse Maternal Cousin     Substance Abuse Paternal Cousin     Asthma Other          PHYSICAL EXAM:  /89   Pulse 62   Temp 99.2 °F (37.3 °C) (Temporal)   Resp 16   Ht 5' 6\" (1.676 m)   Wt 200 lb (90.7 kg)   SpO2 98%   BMI 32.28 kg/m²     Constitutional - well developed, well nourished. Eyes - conjunctiva normal.   Ear, nose, throat - No scars, masses, or lesions over external nose or ears, no atrophy of tongue  Neck-symmetric, no masses noted, no jugular vein distension  Respiration- chest wall appears symmetric, good expansion,   normal effort without use of accessory muscles  Musculoskeletal - no significant wasting of muscles noted, no bony deformities  Extremities-no clubbing, cyanosis or edema  Skin - warm, dry, and intact. No rash, erythema, or pallor. Psychiatric - mood, affect, and behavior appear normal.      Neurological exam  Awake, alert, fluent oriented appropriate affect  Attention and concentration appear appropriate  Recent and remote memory appears unremarkable  Speech normal without dysarthria  No clear issues with language of fund of knowledge     Cranial Nerve Exam     CN III, IV,VI-EOMI, No nystagmus, conjugate eye movements, no ptosis    CN VII-no facial assymetry       Motor Exam  antigravity throughout upper and lower extremities bilaterally      Tremors- no tremors in hands or head noted     Gait  Not tested      Nursing/pcp notes, imaging,labs and vitals reviewed.      PT,OT and/or speech notes reviewed    Lab Results   Component Value Date    WBC 5.9 05/11/2019    HGB 12.0 05/11/2019    HCT 36.5 (L) 05/11/2019    MCV 90.3 05/11/2019     05/11/2019     Lab Results   Component Value Date     05/11/2019    K 4.6 05/11/2019     05/11/2019    CO2 21 (L) 05/11/2019    BUN 13 05/11/2019    CREATININE 0.7 05/11/2019    GLUCOSE 216 (H) 05/11/2019    CALCIUM 8.3 (L) 05/11/2019    PROT 6.5 (L) 09/11/2018    LABALBU 4.0 09/11/2018    BILITOT 0.3 09/11/2018    ALKPHOS 62 09/11/2018    AST 26 09/11/2018    ALT 26 09/11/2018    LABGLOM >60 05/11/2019    GLOB 3.0 11/14/2016     Lab Results   Component Value Date    INR 1.20 (H) 09/11/2018    INR 1.01 11/14/2016    INR 0.99 11/01/2015    PROTIME 15.1 (H) 09/11/2018    PROTIME 13.3 11/14/2016    PROTIME 12.8 11/01/2015             RECORD REVIEW: Previous medical records, medications were reviewed at today's visit    IMPRESSION:   Optic neuritis similar to her previous episodes  Solumedrol 1 gm IV x day3 days  Monitor blood sugars  On PPI  PT/OT  Supportive care    Ok to dc home after dose of solumedrol with medrol dose blake to complete as outpatient      CALL WITH ANY QUESTIONS  [phone] CELL  Dr Jim Hager

## 2019-05-11 NOTE — PROGRESS NOTES
Physical Therapy  Colten Arroyo  [id number]     05/11/19 1444   Subjective   Subjective Attempt, patient states not feeling well at this time and asked if therapy would check back. Will cont to follow.    Electronically signed by Jose White PTA on 5/11/2019 at 2:46 PM

## 2019-05-11 NOTE — PROGRESS NOTES
Physical Therapy    Facility/Department: [facility] SURG SERVICES  Initial Assessment    NAME: Liana Medeiros  : 1975  MRN: [id number]    Date of Service: 2019    Discharge Recommendations:  Continue to assess pending progress        Assessment   Body structures, Functions, Activity limitations: Decreased functional mobility ; Decreased strength;Decreased endurance  Assessment: Pt FATIGUES WITH INC DISTANCE. WILL FOLLOW FOR INC ACTIVITY. REQUIRES PT FOLLOW UP: Yes  Activity Tolerance  Activity Tolerance: Patient Tolerated treatment well;Patient limited by endurance       Patient Diagnosis(es): There were no encounter diagnoses. has a past medical history of Arthritis, Bipolar 1 disorder ([facility] [address] Fibromyalgia, GERD (gastroesophageal reflux disease), Hypertension, Lupus, Movement disorder, Multiple sclerosis ([facility] [address] Neuromuscular disorder ([facility] [address] and Psychiatric problem. has a past surgical history that includes Septoplasty; External ear surgery; hernia repair; Gastric bypass surgery; Gastric Band; Tonsillectomy; Adenoidectomy;  section; Abdominal exploration surgery; Upper gastrointestinal endoscopy; Dilatation, esophagus; hernia repair (N/A);  section, classic (N/A); and Tonsillectomy and adenoidectomy.     Restrictions  Restrictions/Precautions  Restrictions/Precautions: Fall Risk  Vision/Hearing  Vision: Within Functional Limits  Hearing: Within functional limits     Subjective  General  Diagnosis: MS, LUPUS  Subjective  Subjective: Pt HAS BEEN UP WITH ASSISTANCE, WILLING TO PARTICIPATE  Pain Screening  Patient Currently in Pain: Yes  Pain Assessment  Pain Assessment: Faces  Ku-Baker Pain Rating: Hurts little more  Pain Type: Chronic pain  Pain Location: Generalized  Vital Signs  Patient Currently in Pain: Yes       Orientation  Orientation  Overall Orientation Status: Within Normal Limits  Social/Functional History  Social/Functional History  Lives With: Family  Type of Home: Apartment  Home Layout: One level  Home Access: Level entry  Bathroom Shower/Tub: Tub/Shower unit  Bathroom Toilet: Standard  Bathroom Equipment: Grab bars in shower  Home Equipment: Rolling walker  ADL Assistance: [address]: [address] [place] [address] Responsibilities: Yes  Ambulation Assistance: Independent  Transfer Assistance: Independent  Active : Yes  Cognition   Cognition  Overall Cognitive Status: WNL    Objective          AROM RLE (degrees)  RLE AROM: WFL  AROM LLE (degrees)  LLE AROM : WFL  Strength RLE  R Hip Flexion: 4+/5  R Knee Extension: 5/5  R Ankle Dorsiflexion: 5/5  Strength LLE  L Hip Flexion: 4-/5  L Knee Extension: 5/5  L Ankle Dorsiflexion: 5/5  Strength RUE  Strength RUE: WFL  Strength LUE  Strength LUE: WFL        Bed mobility  Supine to Sit: Independent  Sit to Supine: Independent  Transfers  Sit to Stand: Supervision  Bed to Chair: Supervision  Ambulation 1  Device: No Device  Assistance: Contact guard assistance  Quality of Gait: GUARDED PACE, DEC STEP LENGTH, LE'S SLIGHTLY SHAKY  Distance: 100'     Balance  Sitting - Dynamic: Good  Standing - Dynamic: Good;-        Plan   Plan  Times per week: AT LEAST 6-7  Current Treatment Recommendations: Strengthening, Balance Training, Functional Mobility Training, Transfer Training, Gait Training, Patient/Caregiver Education & Training, Safety Education & Training, Endurance Training  Safety Devices  Type of devices: Left in bed, Call light within reach    G-Code       OutComes Score                                                  AM-PAC Score             Goals  Short term goals  Time Frame for Short term goals: 14 DAYS  Short term goal 1: TRANSFERS INDEPENDENT  Short term goal 2: ' SUP-IND       Therapy Time   Individual Concurrent Group Co-treatment   Time In           Time Out           Minutes                   Liz Yates, PT

## 2019-05-11 NOTE — PROGRESS NOTES
Progress Note  5/11/2019 12:51 PM  Subjective:   Admit Date: 5/10/2019  PCP: Robina Briones MD    CC: vision loss/eye pain    Subjective: pt seen and examined, states she feels her vision is slowly improving. Still complains of general body aches. No dizziness. No focal weakness. No chest pain or sob. ROS: 14 point review of systems is negative except as specifically addressed above. DIET GENERAL;     Intake/Output Summary (Last 24 hours) at 5/11/2019 1251  Last data filed at 5/11/2019 1035  Gross per 24 hour   Intake 2755 ml   Output 1970 ml   Net 785 ml     Medications:   sodium chloride 100 mL/hr at 05/10/19 1430    dextrose       Current Facility-Administered Medications   Medication Dose Route Frequency Provider Last Rate Last Dose    amantadine (SYMMETREL) capsule 100 mg  100 mg Oral BID Kehinde Houser MD   100 mg at 05/11/19 5220    calcium carbonate tablet 600 mg  600 mg Oral Daily Kehinde Houser MD   600 mg at 05/11/19 0358    cetirizine (ZYRTEC) tablet 10 mg  10 mg Oral Daily Kehinde Houser MD   10 mg at 05/11/19 5969    cyclobenzaprine (FLEXERIL) tablet 10 mg  10 mg Oral BID PRN Kehinde Houser MD   10 mg at 05/10/19 2041    ferrous sulfate tablet 325 mg  325 mg Oral Daily with breakfast Kehinde Houser MD   325 mg at 05/11/19 0940    pantoprazole (PROTONIX) tablet 40 mg  40 mg Oral BID Kehinde Houser MD   40 mg at 05/11/19 6559    methylPREDNISolone sodium (SOLU-MEDROL) 1,000 mg in sodium chloride 0.9 % 250 mL IVPB  1,000 mg Intravenous Daily Julieta Bustos MD   Stopped at 05/11/19 1007    desvenlafaxine succinate (PRISTIQ) extended release tablet 100 mg  100 mg Oral Daily Kehinde Houser MD   100 mg at 05/11/19 0738    therapeutic multivitamin-minerals 1 tablet  1 tablet Oral Daily Kehinde Houser MD   1 tablet at 05/11/19 0938    potassium bicarb-citric acid (EFFER-K) effervescent tablet 20 mEq  20 mEq Oral BID Charles Jordan MD   20 mEq at 05/11/19 0938    0.9 % sodium chloride infusion   Intravenous Continuous Charles Jordan  mL/hr at 05/10/19 1430      insulin lispro (HUMALOG) injection vial 0-6 Units  0-6 Units Subcutaneous TID WC Chetan Alonzo MD   1 Units at 05/11/19 1212    insulin lispro (HUMALOG) injection vial 0-3 Units  0-3 Units Subcutaneous Nightly Chetan Alonzo MD   1 Units at 05/10/19 2044    glucose (GLUTOSE) 40 % oral gel 15 g  15 g Oral PRN Chetan Alonzo MD        dextrose 50 % solution 12.5 g  12.5 g Intravenous PRN Chetan Alonzo MD        glucagon (rDNA) injection 1 mg  1 mg Intramuscular PRN Chetan Alonzo MD        dextrose 5 % solution  100 mL/hr Intravenous PRN Chetan Alonzo MD        nicotine (NICODERM CQ) 14 MG/24HR 1 patch  1 patch Transdermal Daily Charles Jordan MD   1 patch at 05/11/19 0939    HYDROcodone-acetaminophen (NORCO) 5-325 MG per tablet 1 tablet  1 tablet Oral Q6H PRN Charles Jordan MD   1 tablet at 05/11/19 0940    doxycycline (VIBRAMYCIN) 100 mg in dextrose 5 % 100 mL IVPB  100 mg Intravenous Q12H Charles Jordan MD   Stopped at 05/11/19 1126        Labs:     Recent Labs     05/10/19  1244 05/11/19  0205   WBC 9.7 5.9   RBC 4.27 4.04*   HGB 13.0 12.0   HCT 38.4 36.5*   MCV 89.9 90.3   MCH 30.4 29.7   MCHC 33.9 32.9*    242     Recent Labs     05/10/19  1244 05/11/19  0205    137   K 3.7 4.6   ANIONGAP 13 10    106   CO2 21* 21*   BUN 9 13   CREATININE 0.6 0.7   GLUCOSE 85 216*   CALCIUM 9.1 8.3*     Recent Labs     05/11/19  0205   MG 2.2     TSH:    Lab Results   Component Value Date    TSH 0.290 05/11/2019     XR CHEST STANDARD (2 VW) [[id number]] Resulted: 05/10/19 1548      Order Status: Completed Specimen: Chest Updated: 05/10/19 1550     Narrative:       History:  41year-old cough. Reference:  Chest radiograph November 2016. Findings:  Frontal and lateral chest radiographs performed.   Normal

## 2019-05-12 VITALS
TEMPERATURE: 98.9 F | RESPIRATION RATE: 16 BRPM | SYSTOLIC BLOOD PRESSURE: 137 MMHG | DIASTOLIC BLOOD PRESSURE: 93 MMHG | OXYGEN SATURATION: 100 % | HEART RATE: 69 BPM | HEIGHT: 66 IN | BODY MASS INDEX: 32.14 KG/M2 | WEIGHT: 200 LBS

## 2019-05-12 LAB
ANION GAP SERPL CALCULATED.3IONS-SCNC: 12 MMOL/L (ref 7–19)
BUN BLDV-MCNC: 12 MG/DL (ref 6–20)
CALCIUM SERPL-MCNC: 8.3 MG/DL (ref 8.6–10)
CHLORIDE BLD-SCNC: 104 MMOL/L (ref 98–111)
CO2: 21 MMOL/L (ref 22–29)
CREAT SERPL-MCNC: 0.7 MG/DL (ref 0.5–0.9)
GFR NON-AFRICAN AMERICAN: >60
GLUCOSE BLD-MCNC: 127 MG/DL (ref 70–99)
GLUCOSE BLD-MCNC: 137 MG/DL (ref 74–109)
HCT VFR BLD CALC: 36.1 % (ref 37–47)
HEMOGLOBIN: 11.8 G/DL (ref 12–16)
MCH RBC QN AUTO: 29.2 PG (ref 27–31)
MCHC RBC AUTO-ENTMCNC: 32.7 G/DL (ref 33–37)
MCV RBC AUTO: 89.4 FL (ref 81–99)
PDW BLD-RTO: 14.3 % (ref 11.5–14.5)
PERFORMED ON: ABNORMAL
PLATELET # BLD: 248 K/UL (ref 130–400)
PMV BLD AUTO: 10.7 FL (ref 9.4–12.3)
POTASSIUM SERPL-SCNC: 4.6 MMOL/L (ref 3.5–5)
RBC # BLD: 4.04 M/UL (ref 4.2–5.4)
SODIUM BLD-SCNC: 137 MMOL/L (ref 136–145)
WBC # BLD: 12.2 K/UL (ref 4.8–10.8)

## 2019-05-12 PROCEDURE — 99238 HOSP IP/OBS DSCHRG MGMT 30/<: CPT | Performed by: INTERNAL MEDICINE

## 2019-05-12 PROCEDURE — 6360000002 HC RX W HCPCS: Performed by: PSYCHIATRY & NEUROLOGY

## 2019-05-12 PROCEDURE — 6370000000 HC RX 637 (ALT 250 FOR IP): Performed by: HOSPITALIST

## 2019-05-12 PROCEDURE — 85027 COMPLETE CBC AUTOMATED: CPT

## 2019-05-12 PROCEDURE — 2580000003 HC RX 258: Performed by: PSYCHIATRY & NEUROLOGY

## 2019-05-12 PROCEDURE — 80048 BASIC METABOLIC PNL TOTAL CA: CPT

## 2019-05-12 PROCEDURE — 36415 COLL VENOUS BLD VENIPUNCTURE: CPT

## 2019-05-12 PROCEDURE — 97116 GAIT TRAINING THERAPY: CPT

## 2019-05-12 PROCEDURE — 97110 THERAPEUTIC EXERCISES: CPT

## 2019-05-12 PROCEDURE — 2580000003 HC RX 258: Performed by: HOSPITALIST

## 2019-05-12 PROCEDURE — 99233 SBSQ HOSP IP/OBS HIGH 50: CPT | Performed by: PSYCHIATRY & NEUROLOGY

## 2019-05-12 PROCEDURE — 82948 REAGENT STRIP/BLOOD GLUCOSE: CPT

## 2019-05-12 RX ORDER — METHYLPREDNISOLONE 4 MG/1
TABLET ORAL
Qty: 1 KIT | Refills: 0 | Status: SHIPPED | OUTPATIENT
Start: 2019-05-13 | End: 2019-05-18

## 2019-05-12 RX ADMIN — AMANTADINE HYDROCHLORIDE 100 MG: 100 CAPSULE ORAL at 07:42

## 2019-05-12 RX ADMIN — PANTOPRAZOLE SODIUM 40 MG: 40 TABLET, DELAYED RELEASE ORAL at 07:41

## 2019-05-12 RX ADMIN — POTASSIUM BICARBONATE 20 MEQ: 782 TABLET, EFFERVESCENT ORAL at 07:41

## 2019-05-12 RX ADMIN — MULTIPLE VITAMINS W/ MINERALS TAB 1 TABLET: TAB at 07:42

## 2019-05-12 RX ADMIN — HYDROCODONE BITARTRATE AND ACETAMINOPHEN 1 TABLET: 5; 325 TABLET ORAL at 06:02

## 2019-05-12 RX ADMIN — Medication 600 MG: at 07:42

## 2019-05-12 RX ADMIN — SODIUM CHLORIDE 1000 MG: 9 INJECTION, SOLUTION INTRAVENOUS at 07:42

## 2019-05-12 RX ADMIN — FERROUS SULFATE TAB 325 MG (65 MG ELEMENTAL FE) 325 MG: 325 (65 FE) TAB at 07:42

## 2019-05-12 RX ADMIN — DESVENLAFAXINE SUCCINATE 100 MG: 100 TABLET, FILM COATED, EXTENDED RELEASE ORAL at 07:42

## 2019-05-12 RX ADMIN — CETIRIZINE HYDROCHLORIDE 10 MG: 10 TABLET, FILM COATED ORAL at 07:41

## 2019-05-12 RX ADMIN — SODIUM CHLORIDE: 9 INJECTION, SOLUTION INTRAVENOUS at 07:42

## 2019-05-12 ASSESSMENT — PAIN SCALES - GENERAL
PAINLEVEL_OUTOF10: 8
PAINLEVEL_OUTOF10: 0

## 2019-05-12 NOTE — DISCHARGE SUMMARY
Hospitalist  Discharge Summary    Patient ID: Luis Alberto Harrington      Patient's PCP: Truman Parrish MD    Admit Date: 5/10/2019     Discharge Date:   5/12/2019    Admitting Physician: Roni Todd DO     Discharge Physician: Lenin Diaz DO     Discharge Diagnoses:  Principal Problem:    Optic neuritis  Active Problems:    Intractable chronic migraine without aura and without status migrainosus    Major depressive disorder, recurrent, moderate (HCC)    Exacerbation of multiple sclerosis ([facility] [address]    Bronchitis  Resolved Problems:    * No resolved hospital problems. *       Active Hospital Problems    Diagnosis Date Noted    Bronchitis [J40] 05/11/2019    Exacerbation of multiple sclerosis ([address] [G35] 05/10/2019    Optic neuritis [H46.9] 05/10/2019    Major depressive disorder, recurrent, moderate (HCC) [F33.1] 09/12/2018    Intractable chronic migraine without aura and without status migrainosus [G43.719] 05/01/2017       The patient was seen and examined on day of discharge and this discharge summary is in conjunction with any daily progress note from day of discharge. Hospital Course:   Ms Farhan Roy is a 42 y/o female w/ hx of MS and lupus, previous optic neuritis, presenting to OSH due to eye pain and blurred vision. Pt transferred to [facility] for neuro evaluation. Recommendation for 1gm daily solumedrol x 3 days. Pt did well and improved, vision not back to baseline but improved. At this time recs for d/c home on medrol dose pack. Will d/c home today. F/u as outpatient w/ pcp, neuro, and rheumatology.          Exam:   Vitals: BP (!) 137/93   Pulse 69   Temp 98.9 °F (37.2 °C) (Temporal)   Resp 16   Ht 5' 6\" (1.676 m)   Wt 200 lb (90.7 kg)   SpO2 100%   BMI 32.28 kg/m²   24HR INTAKE/OUTPUT:      Intake/Output Summary (Last 24 hours) at 5/12/2019 1409  Last data filed at 5/12/2019 0853  Gross per 24 hour   Intake 2304 ml   Output 710 ml   Net 1594 ml     General appearance: NAD, alert and cooperative with exam  HEENT: atraumatic, PERRLA, EOMI, anicteric, trachea midline  Lungs: no increased work of breathing, CTAB, no wheezing/rhonchi/rales  Heart: RRR, +s1/s2, no rub  Abdomen: soft, nt/nd, +BS, no rebound/gaurding  Extremities: atraumatic, no edema, no cyanosis   Neurologic: AAOx3, no focal deficits  Skin: no rashes  Psych: Normal affect        Consults:   IP CONSULT TO NEUROLOGY    Significant Diagnostic Studies:     XR CHEST STANDARD (2 VW) [[id number]] Resulted: 05/10/19 1548      Order Status: Completed Specimen: Chest Updated: 05/10/19 1550     Narrative:       History:  41year-old cough. Reference:  Chest radiograph November 2016. Findings:  Frontal and lateral chest radiographs performed. Normal heart size. Lungs and pleural spaces are clear. Peribronchial  thickening. No pleural effusion or pneumothorax. The gastric lap band  has near horizontal orientation which is abnormal. This is unchanged  however from at least 2014.     Impression:       Peribronchial thickening, correlate for asthma or bronchitis. Can also  be seen with long-term smoking. No pneumonia. Signed by Dr Jessica Cox on 5/10/2019 3:48 PM          Disposition:  home     Condition at discharge: stable/improved     Discharge Instructions/Follow-up:  Pcp, neurology, rheumatology    Code Status:  Prior     Activity: activity as tolerated    Labs:  For convenience and continuity at follow-up the following most recent labs are provided:  CBC:    Lab Results   Component Value Date    WBC 12.2 05/12/2019    HGB 11.8 05/12/2019    HCT 36.1 05/12/2019    HCT 41.1 06/04/2012     05/12/2019     06/04/2012       Renal:    Lab Results   Component Value Date     05/12/2019     06/04/2012    K 4.6 05/12/2019    K 4.0 06/04/2012     05/12/2019     06/04/2012    CO2 21 05/12/2019    BUN 12 05/12/2019    CREATININE 0.7 05/12/2019    CREATININE 0.9 06/04/2012    CALCIUM 8.3 05/12/2019       Discharge Medications:   Current Discharge Medication List           Details   methylPREDNISolone (MEDROL DOSEPACK) 4 MG tablet Take by mouth.   Qty: 1 kit, Refills: 0              Details   topiramate (TOPAMAX) 200 MG tablet Take 1 tablet by mouth nightly  Qty: 60 tablet, Refills: 3      cyclobenzaprine (FLEXERIL) 10 MG tablet Take 10 mg by mouth 2 times daily as needed for Muscle spasms      leuprolide (LUPRON) 22.5 MG injection Inject 22.5 mg into the muscle once Indications: Endometriosis, Started 2 yrs ago Takes very two months and had last month 4/2019      predniSONE (DELTASONE) 10 MG tablet Take 13 mg by mouth daily       Amantadine (SYMMETREL) 100 MG TABS tablet Take 100 mg by mouth 2 times daily      lisinopril (PRINIVIL;ZESTRIL) 40 MG tablet Take 40 mg by mouth daily      potassium bicarbonate (K-LYTE) 25 MEQ disintegrating tablet Take 25 mEq by mouth 2 times daily      cyanocobalamin 1000 MCG/ML injection Inject 1,000 mcg into the muscle every 21 days       pantoprazole sodium (PROTONIX) 40 MG PACK packet Take 40 mg by mouth 2 times daily      azelastine (ASTELIN) 0.1 % nasal spray SPRAY IN EACH NOSTRIL PRN  Refills: 0      nortriptyline (PAMELOR) 25 MG capsule Take 2 capsules by mouth nightly  Qty: 60 capsule, Refills: 5      alendronate (FOSAMAX) 70 MG tablet Take 70 mg by mouth every 7 days      Multiple Vitamins-Minerals (THERAPEUTIC MULTIVITAMIN-MINERALS) tablet Take 1 tablet by mouth daily      ferrous sulfate 325 (65 FE) MG tablet Take 325 mg by mouth daily (with breakfast)      calcium carbonate 600 MG TABS tablet Take 1 tablet by mouth daily      interferon beta-1a (AVONEX) 30 MCG injection Inject 30 mcg into the muscle once a week      desvenlafaxine succinate (PRISTIQ) 100 MG TB24 Take by mouth daily      cetirizine (ZYRTEC) 10 MG tablet Take 10 mg by mouth daily           Current Facility-Administered Medications   Medication Dose Route Frequency Provider Last Rate Last Dose    amantadine (SYMMETREL) Oral Q6H PRN Damion Goldberg MD   1 tablet at 05/12/19 0602    doxycycline (VIBRAMYCIN) 100 mg in dextrose 5 % 100 mL IVPB  100 mg Intravenous Q12H Damion Goldberg MD   Stopped at 05/11/19 2632       Time Spent on discharge is more than 25 minutes in the examination, evaluation, counseling and review of medications and discharge plan. Jackie Vasquez D.O. Internal Medicine Hospitalist    Thank you Maynor Garcia MD for the opportunity to be involved in this patient's care.  If you have any questions or concerns please feel free to contact me at [phone]

## 2019-05-12 NOTE — PROGRESS NOTES
Discharge instructions, follow up appointments, and prescriptions reviewed with patient who verbalized understanding. Patient agreeable to schedule follow up appointments tomorrow morning. Patient stable and agreeable to discharge. Patient to discharge home via private vehicle.

## 2019-05-12 NOTE — PROGRESS NOTES
Physical Therapy  Divinaoral Crista  [id number]     05/12/19 0951   Subjective   Subjective Agrees to work with therapy. Patient states she is hoping to go home today. Bed Mobility   Supine to Sit Independent   Transfers   Sit to Stand Supervision   Stand to sit Supervision   Ambulation   Ambulation? Yes   Ambulation 1   Surface level tile   Device No Device   Other Apparatus   (IV)   Assistance Stand by assistance   Distance 500'   Comments tolerated increased gait distance this treatment   Exercises   Hip Flexion 10   Hip Abduction 10   Knee Long Arc Quad 10   Ankle Pumps 10   Comments B LE ex's in sitting   Other Activities   Comment Patient did well with therapy. Patient agreed to sit up in recliner after treatment. Patient positioned for comfort with all needs in reach. Short term goals   Time Frame for Short term goals 14 DAYS   Short term goal 1 TRANSFERS INDEPENDENT   Short term goal 2 ' SUP-IND   Activity Tolerance   Activity Tolerance Patient Tolerated treatment well   Safety Devices   Type of devices Call light within reach; Left in chair   Electronically signed by Valeriano Arora PTA on 5/12/2019 at 9:54 AM

## 2019-05-12 NOTE — PROGRESS NOTES
Patient:   Yemi Jurado  MR#:    [id number]   Room:    0505/505-01   YOB: 1975  Date of Progress Note: 2019  Time of Note                           10:33 AM  Consulting Physician:   Goran Wiley M.D. Attending Physician:  Brianna Stewart DO     Chief complaint left visual field deficit    S:This 41 y.o. female  with a history of multiple sclerosis and lupus along with chronic headaches is seen for left visual change which began about 2 days ago with eye pain. This is quite similar to her previous episodes of optic neuritis, of which she has had about 4 previous episodes treated with steroids. She was tried on oral steroids as an outpatient but was not effective and now transferred from outside ER for further management. She has some chronic weakness and numbness and weakness on the left. She does have bowel and bladder incontinence but denies any recent illnesses although her temperature slightly elevated here. She is on a avonex. Has not followed up with Dr Hamilton Reyes in about 1 1/2 years. No new issues. Vision in left eye continues to improve.         REVIEW OF SYSTEMS:  Constitutional: No fevers No chills  Neck:No stiffness  Respiratory: No shortness of breath  Cardiovascular: No chest pain No palpitations  Gastrointestinal: No abdominal pain    Genitourinary: No Dysuria  Neurological: No headache, no confusion    Past Medical History:      Diagnosis Date    Arthritis     Bipolar 1 disorder ([address]     Fibromyalgia     GERD (gastroesophageal reflux disease)     Hypertension     Lupus     Movement disorder     Multiple sclerosis (HCC)     Neuromuscular disorder ([address]     Psychiatric problem        Past Surgical History:      Procedure Laterality Date    ABDOMINAL EXPLORATION SURGERY      ADENOIDECTOMY       SECTION      x 2     SECTION, CLASSIC N/A     DILATATION, ESOPHAGUS      EXTERNAL EAR SURGERY      lobe    GASTRIC BAND      GASTRIC BYPASS SURGERY  HERNIA REPAIR      HERNIA REPAIR N/A     SEPTOPLASTY      TONSILLECTOMY      TONSILLECTOMY AND ADENOIDECTOMY      UPPER GASTROINTESTINAL ENDOSCOPY         Medications in Hospital:      Current Facility-Administered Medications:     amantadine (SYMMETREL) capsule 100 mg, 100 mg, Oral, BID, Scott Arriaza MD, 100 mg at 05/12/19 2486    calcium carbonate tablet 600 mg, 600 mg, Oral, Daily, Scott Arriaza MD, 600 mg at 05/12/19 1879    cetirizine (ZYRTEC) tablet 10 mg, 10 mg, Oral, Daily, Scott Arriaza MD, 10 mg at 05/12/19 0741    cyclobenzaprine (FLEXERIL) tablet 10 mg, 10 mg, Oral, BID PRN, Scott Arriaza MD, 10 mg at 05/11/19 2028    ferrous sulfate tablet 325 mg, 325 mg, Oral, Daily with breakfast, Scott Arriaza MD, 325 mg at 05/12/19 0742    pantoprazole (PROTONIX) tablet 40 mg, 40 mg, Oral, BID, Scott Arriaza MD, 40 mg at 05/12/19 0741    desvenlafaxine succinate (PRISTIQ) extended release tablet 100 mg, 100 mg, Oral, Daily, Scott Arriaza MD, 100 mg at 05/12/19 4831    therapeutic multivitamin-minerals 1 tablet, 1 tablet, Oral, Daily, Scott Arrizaa MD, 1 tablet at 05/12/19 0742    potassium bicarb-citric acid (EFFER-K) effervescent tablet 20 mEq, 20 mEq, Oral, BID, Scott Arriaza MD, 20 mEq at 05/12/19 0741    0.9 % sodium chloride infusion, , Intravenous, Continuous, Scott Arriaza MD, Last Rate: 100 mL/hr at 05/12/19 0742    insulin lispro (HUMALOG) injection vial 0-6 Units, 0-6 Units, Subcutaneous, TID WC, Jt Cook MD, 1 Units at 05/11/19 1212    insulin lispro (HUMALOG) injection vial 0-3 Units, 0-3 Units, Subcutaneous, Nightly, Jt Cook MD, 1 Units at 05/11/19 2027    glucose (GLUTOSE) 40 % oral gel 15 g, 15 g, Oral, PRN, Jt Cook MD    dextrose 50 % solution 12.5 g, 12.5 g, Intravenous, PRN, Jt Cook MD    glucagon (rDNA) injection 1 mg, 1 mg, Intramuscular, PRN, Cirilo Osullivan MD    dextrose 5 % solution, 100 mL/hr, Intravenous, PRN, Cirilo Osullivan MD    nicotine (NICODERM CQ) 14 MG/24HR 1 patch, 1 patch, Transdermal, Daily, Leda Valdivia MD, 1 patch at 05/12/19 0743    HYDROcodone-acetaminophen (NORCO) 5-325 MG per tablet 1 tablet, 1 tablet, Oral, Q6H PRN, Leda Valdivia MD, 1 tablet at 05/12/19 0602    doxycycline (VIBRAMYCIN) 100 mg in dextrose 5 % 100 mL IVPB, 100 mg, Intravenous, Q12H, Leda Valdivia MD, Stopped at 05/11/19 2232    Allergies: Avelox [moxifloxacin]; Sugar-protein-starch; Avelox [moxifloxacin]; Copaxone [glatiramer acetate]; Depakote [divalproex sodium]; Gabapentin; Invega [paliperidone]; Lamictal [lamotrigine]; Lithium; Lamictal [lamotrigine]; and Lithium    Social History:   TOBACCO:   reports that she has been smoking cigarettes. She has a 15.00 pack-year smoking history. She has never used smokeless tobacco.  ETOH:   reports that she drinks about 1.2 oz of alcohol per week. Family History:       Problem Relation Age of Onset    Anemia Mother     Breast Cancer Mother     Depression Mother     High Blood Pressure Mother     Mental Illness Mother    [facility]     Colon Cancer Father     Depression Father     High Blood Pressure Father     Mental Illness Father     Breast Cancer Maternal Grandmother     Alcohol Abuse Maternal Grandfather     Anemia Paternal Grandmother     Alcohol Abuse Paternal Grandfather     Osteoporosis Paternal Grandfather     Stroke Paternal Grandfather     Substance Abuse Maternal Cousin     Substance Abuse Paternal Cousin     Asthma Other          PHYSICAL EXAM:  BP (!) 137/93   Pulse 69   Temp 98.9 °F (37.2 °C) (Temporal)   Resp 16   Ht 5' 6\" (1.676 m)   Wt 200 lb (90.7 kg)   SpO2 100%   BMI 32.28 kg/m²     Constitutional - well developed, well nourished.    Eyes - conjunctiva normal.   Ear, nose, throat - No scars, masses, or lesions over external nose or ears, no atrophy of tongue  Neck-symmetric, no masses noted, no jugular vein distension  Respiration- chest wall appears symmetric, good expansion,   normal effort without use of accessory muscles  Musculoskeletal - no significant wasting of muscles noted, no bony deformities  Extremities-no clubbing, cyanosis or edema  Skin - warm, dry, and intact. No rash, erythema, or pallor. Psychiatric - mood, affect, and behavior appear normal.      Neurological exam  Awake, alert, fluent oriented appropriate affect  Attention and concentration appear appropriate  Recent and remote memory appears unremarkable  Speech normal without dysarthria  No clear issues with language of fund of knowledge     Cranial Nerve Exam     CN III, IV,VI-EOMI, No nystagmus, conjugate eye movements, no ptosis    CN VII-no facial assymetry       Motor Exam  antigravity throughout upper and lower extremities bilaterally      Tremors- no tremors in hands or head noted     Gait  Not tested      Nursing/pcp notes, imaging,labs and vitals reviewed.      PT,OT and/or speech notes reviewed    Lab Results   Component Value Date    WBC 12.2 (H) 05/12/2019    HGB 11.8 (L) 05/12/2019    HCT 36.1 (L) 05/12/2019    MCV 89.4 05/12/2019     05/12/2019     Lab Results   Component Value Date     05/12/2019    K 4.6 05/12/2019     05/12/2019    CO2 21 (L) 05/12/2019    BUN 12 05/12/2019    CREATININE 0.7 05/12/2019    GLUCOSE 137 (H) 05/12/2019    CALCIUM 8.3 (L) 05/12/2019    PROT 6.5 (L) 09/11/2018    LABALBU 4.0 09/11/2018    BILITOT 0.3 09/11/2018    ALKPHOS 62 09/11/2018    AST 26 09/11/2018    ALT 26 09/11/2018    LABGLOM >60 05/12/2019    GLOB 3.0 11/14/2016     Lab Results   Component Value Date    INR 1.20 (H) 09/11/2018    INR 1.01 11/14/2016    INR 0.99 11/01/2015    PROTIME 15.1 (H) 09/11/2018    PROTIME 13.3 11/14/2016    PROTIME 12.8 11/01/2015             RECORD REVIEW: Previous medical records, medications were

## 2019-05-15 LAB — BLOOD CULTURE, ROUTINE: NORMAL

## 2019-05-19 ENCOUNTER — APPOINTMENT (OUTPATIENT)
Dept: GENERAL RADIOLOGY | Age: 44
End: 2019-05-19
Payer: MEDICARE

## 2019-05-19 ENCOUNTER — HOSPITAL ENCOUNTER (OUTPATIENT)
Dept: HOSPITAL 58 - AMBL | Age: 44
Discharge: ANOTHER HOSPITAL | End: 2019-05-19
Attending: GENERAL PRACTICE

## 2019-05-19 ENCOUNTER — HOSPITAL ENCOUNTER (EMERGENCY)
Age: 44
Discharge: HOME | End: 2019-05-19
Attending: FAMILY MEDICINE
Payer: MEDICARE

## 2019-05-19 VITALS
TEMPERATURE: 98.6 F | SYSTOLIC BLOOD PRESSURE: 129 MMHG | OXYGEN SATURATION: 100 % | HEART RATE: 78 BPM | RESPIRATION RATE: 17 BRPM | DIASTOLIC BLOOD PRESSURE: 104 MMHG

## 2019-05-19 VITALS — BODY MASS INDEX: 32.8 KG/M2

## 2019-05-19 DIAGNOSIS — R07.1: Primary | ICD-10-CM

## 2019-05-19 DIAGNOSIS — R07.89 OTHER CHEST PAIN: Primary | ICD-10-CM

## 2019-05-19 DIAGNOSIS — M79.601: ICD-10-CM

## 2019-05-19 DIAGNOSIS — R05: ICD-10-CM

## 2019-05-19 LAB
ALBUMIN SERPL-MCNC: 3.3 G/DL (ref 3.5–5.2)
ALP BLD-CCNC: 52 U/L (ref 35–104)
ALT SERPL-CCNC: 18 U/L (ref 5–33)
ANION GAP SERPL CALCULATED.3IONS-SCNC: 9 MMOL/L (ref 7–19)
AST SERPL-CCNC: 14 U/L (ref 5–32)
BASOPHILS ABSOLUTE: 0 K/UL (ref 0–0.2)
BASOPHILS RELATIVE PERCENT: 0.5 % (ref 0–1)
BILIRUB SERPL-MCNC: <0.2 MG/DL (ref 0.2–1.2)
BUN BLDV-MCNC: 13 MG/DL (ref 6–20)
CALCIUM SERPL-MCNC: 8.6 MG/DL (ref 8.6–10)
CHLORIDE BLD-SCNC: 104 MMOL/L (ref 98–111)
CO2: 24 MMOL/L (ref 22–29)
CREAT SERPL-MCNC: 0.7 MG/DL (ref 0.5–0.9)
D DIMER: <0.27 UG/ML FEU (ref 0–0.48)
EOSINOPHILS ABSOLUTE: 0.1 K/UL (ref 0–0.6)
EOSINOPHILS RELATIVE PERCENT: 0.6 % (ref 0–5)
GFR NON-AFRICAN AMERICAN: >60
GLUCOSE BLD-MCNC: 115 MG/DL (ref 74–109)
HCT VFR BLD CALC: 40.7 % (ref 37–47)
HEMOGLOBIN: 13.1 G/DL (ref 12–16)
LYMPHOCYTES ABSOLUTE: 2.2 K/UL (ref 1.1–4.5)
LYMPHOCYTES RELATIVE PERCENT: 26.3 % (ref 20–40)
MCH RBC QN AUTO: 30.3 PG (ref 27–31)
MCHC RBC AUTO-ENTMCNC: 32.2 G/DL (ref 33–37)
MCV RBC AUTO: 94 FL (ref 81–99)
MONOCYTES ABSOLUTE: 0.6 K/UL (ref 0–0.9)
MONOCYTES RELATIVE PERCENT: 7.7 % (ref 0–10)
NEUTROPHILS ABSOLUTE: 5.2 K/UL (ref 1.5–7.5)
NEUTROPHILS RELATIVE PERCENT: 62.7 % (ref 50–65)
PDW BLD-RTO: 14.7 % (ref 11.5–14.5)
PLATELET # BLD: 273 K/UL (ref 130–400)
PMV BLD AUTO: 9.7 FL (ref 9.4–12.3)
POTASSIUM REFLEX MAGNESIUM: 4.4 MMOL/L (ref 3.5–5)
RBC # BLD: 4.33 M/UL (ref 4.2–5.4)
SODIUM BLD-SCNC: 137 MMOL/L (ref 136–145)
TOTAL PROTEIN: 5.8 G/DL (ref 6.6–8.7)
TROPONIN: <0.01 NG/ML (ref 0–0.03)
WBC # BLD: 8.3 K/UL (ref 4.8–10.8)

## 2019-05-19 PROCEDURE — 93005 ELECTROCARDIOGRAM TRACING: CPT

## 2019-05-19 PROCEDURE — 80053 COMPREHEN METABOLIC PANEL: CPT

## 2019-05-19 PROCEDURE — 99284 EMERGENCY DEPT VISIT MOD MDM: CPT | Performed by: FAMILY MEDICINE

## 2019-05-19 PROCEDURE — 85025 COMPLETE CBC W/AUTO DIFF WBC: CPT

## 2019-05-19 PROCEDURE — 71045 X-RAY EXAM CHEST 1 VIEW: CPT

## 2019-05-19 PROCEDURE — 85379 FIBRIN DEGRADATION QUANT: CPT

## 2019-05-19 PROCEDURE — 84484 ASSAY OF TROPONIN QUANT: CPT

## 2019-05-19 PROCEDURE — 99285 EMERGENCY DEPT VISIT HI MDM: CPT

## 2019-05-19 PROCEDURE — 36415 COLL VENOUS BLD VENIPUNCTURE: CPT

## 2019-05-19 ASSESSMENT — ENCOUNTER SYMPTOMS
APNEA: 0
SORE THROAT: 0
VOMITING: 0
SHORTNESS OF BREATH: 0
BACK PAIN: 0
DIARRHEA: 0
ABDOMINAL DISTENTION: 0
COUGH: 0
CHEST TIGHTNESS: 0
CONSTIPATION: 0
ABDOMINAL PAIN: 0
TROUBLE SWALLOWING: 0
WHEEZING: 0

## 2019-05-19 ASSESSMENT — PAIN SCALES - GENERAL: PAINLEVEL_OUTOF10: 9

## 2019-05-19 ASSESSMENT — PAIN DESCRIPTION - PAIN TYPE: TYPE: ACUTE PAIN

## 2019-05-19 NOTE — ED NOTES
Bed: 03  Expected date:   Expected time:   Means of arrival:   Comments:  KAIA Krueger RN  05/19/19 3941

## 2019-05-19 NOTE — ED PROVIDER NOTES
[facility] [place] [facility] EMERGENCY DEPT  eMERGENCY dEPARTMENT eNCOUnter      Pt Name: Vania Mata  MRN: [id number]  [place] 1975  Date of evaluation: 5/19/2019  Provider: Khushbu Paul MD    CHIEF COMPLAINT       Chief Complaint   Patient presents with    Chest Pain         HISTORY OF PRESENT ILLNESS   (Location/Symptom, Timing/Onset,Context/Setting, Quality, Duration, Modifying Factors, Severity)  Note limiting factors. Vania Mata is a 41 y.o. female who presents to the emergency department . Patient is a 46-year-old female with a history of fibromyalgia, multiple sclerosis, hypertension, anxiety, depression who presents with intermittent chest pain ×2 days. She states it is sharp. There is no shortness of breath. She states she had cardiac stress test done approximately 2 months ago which was negative. HPI    NursingNotes were reviewed. REVIEW OF SYSTEMS    (2-9 systems for level 4, 10 or more for level 5)     Review of Systems   Constitutional: Negative for diaphoresis and fever. HENT: Negative for sore throat and trouble swallowing. Eyes: Negative for visual disturbance. Respiratory: Negative for apnea, cough, chest tightness, shortness of breath and wheezing. Cardiovascular: Positive for chest pain. Negative for palpitations and leg swelling. Gastrointestinal: Negative for abdominal distention, abdominal pain, constipation, diarrhea and vomiting. Musculoskeletal: Negative for back pain and myalgias. Skin: Negative for pallor. Neurological: Negative for dizziness, weakness, light-headedness and headaches. Psychiatric/Behavioral: Negative for behavioral problems and suicidal ideas. All other systems reviewed and are negative.            PAST MEDICALHISTORY     Past Medical History:   Diagnosis Date    Arthritis     Bipolar 1 disorder ([facility] [address]     Fibromyalgia     GERD (gastroesophageal reflux disease)     Hypertension     Lupus     Movement disorder     Multiple sclerosis ([facility]     Neuromuscular disorder ([facility]     Psychiatric problem          SURGICAL HISTORY       Past Surgical History:   Procedure Laterality Date    ABDOMINAL EXPLORATION SURGERY      ADENOIDECTOMY       SECTION      x 2     SECTION, CLASSIC N/A     DILATATION, ESOPHAGUS      EXTERNAL EAR SURGERY      lobe    GASTRIC BAND      GASTRIC BYPASS SURGERY      HERNIA REPAIR      HERNIA REPAIR N/A     SEPTOPLASTY      TONSILLECTOMY      TONSILLECTOMY AND ADENOIDECTOMY      UPPER GASTROINTESTINAL ENDOSCOPY           CURRENT MEDICATIONS     Previous Medications    ALENDRONATE (FOSAMAX) 70 MG TABLET    Take 70 mg by mouth every 7 days    AMANTADINE (SYMMETREL) 100 MG TABS TABLET    Take 100 mg by mouth 2 times daily    AZELASTINE (ASTELIN) 0.1 % NASAL SPRAY    SPRAY IN EACH NOSTRIL PRN    CALCIUM CARBONATE 600 MG TABS TABLET    Take 1 tablet by mouth daily    CETIRIZINE (ZYRTEC) 10 MG TABLET    Take 10 mg by mouth daily    CYANOCOBALAMIN 1000 MCG/ML INJECTION    Inject 1,000 mcg into the muscle every 21 days     CYCLOBENZAPRINE (FLEXERIL) 10 MG TABLET    Take 10 mg by mouth 2 times daily as needed for Muscle spasms    DESVENLAFAXINE SUCCINATE (PRISTIQ) 100 MG TB24    Take by mouth daily    FERROUS SULFATE 325 (65 FE) MG TABLET    Take 325 mg by mouth daily (with breakfast)    INTERFERON BETA-1A (AVONEX) 30 MCG INJECTION    Inject 30 mcg into the muscle once a week    LEUPROLIDE (LUPRON) 22.5 MG INJECTION    Inject 22.5 mg into the muscle once Indications: Endometriosis, Started 2 yrs ago Takes very two months and had last month 2019    LISINOPRIL (PRINIVIL;ZESTRIL) 40 MG TABLET    Take 40 mg by mouth daily    MULTIPLE VITAMINS-MINERALS (THERAPEUTIC MULTIVITAMIN-MINERALS) TABLET    Take 1 tablet by mouth daily    NORTRIPTYLINE (PAMELOR) 25 MG CAPSULE    Take 2 capsules by mouth nightly    PANTOPRAZOLE SODIUM (PROTONIX) 40 MG PACK PACKET    Take 40 mg by mouth 2 times daily    POTASSIUM follow up with her cardiologist on an outpatient basis. CONSULTS:  None    PROCEDURES:  Unless otherwise noted below, none     Procedures    FINAL IMPRESSION      1.  Other chest pain          DISPOSITION/PLAN   DISPOSITION Decision To Discharge 05/19/2019 03:35:36 PM      PATIENT REFERRED TO:  Judi Marie [facility]  [phone]    In 3 days        DISCHARGE MEDICATIONS:  New Prescriptions    No medications on file          (Please note that portions of this note were completed with a voice recognition program.  Efforts were made to edit thedictations but occasionally words are mis-transcribed.)    Andrew Martins MD (electronically signed)  Attending Emergency Physician          Andrew Martins MD  05/19/19 [id number]

## 2019-05-20 LAB
EKG P AXIS: 38 DEGREES
EKG P-R INTERVAL: 120 MS
EKG Q-T INTERVAL: 358 MS
EKG QRS DURATION: 76 MS
EKG QTC CALCULATION (BAZETT): 384 MS
EKG T AXIS: 11 DEGREES

## 2019-08-16 ENCOUNTER — HOSPITAL ENCOUNTER (OUTPATIENT)
Dept: HOSPITAL 58 - AMBL | Age: 44
Discharge: ANOTHER HOSPITAL | End: 2019-08-16
Attending: INTERNAL MEDICINE

## 2019-08-16 ENCOUNTER — HOSPITAL ENCOUNTER (EMERGENCY)
Dept: HOSPITAL 58 - ED | Age: 44
Discharge: HOME | End: 2019-08-16

## 2019-08-16 VITALS — DIASTOLIC BLOOD PRESSURE: 97 MMHG | SYSTOLIC BLOOD PRESSURE: 147 MMHG | TEMPERATURE: 97.5 F

## 2019-08-16 VITALS — BODY MASS INDEX: 35.2 KG/M2

## 2019-08-16 DIAGNOSIS — R32: ICD-10-CM

## 2019-08-16 DIAGNOSIS — I10: ICD-10-CM

## 2019-08-16 DIAGNOSIS — R20.0: Primary | ICD-10-CM

## 2019-08-16 DIAGNOSIS — R25.2: ICD-10-CM

## 2019-08-16 DIAGNOSIS — M32.9: ICD-10-CM

## 2019-08-16 DIAGNOSIS — Z98.84: ICD-10-CM

## 2019-08-16 DIAGNOSIS — R52: Primary | ICD-10-CM

## 2019-08-16 DIAGNOSIS — G35: ICD-10-CM

## 2019-08-16 DIAGNOSIS — Z79.899: ICD-10-CM

## 2019-08-16 DIAGNOSIS — F17.210: ICD-10-CM

## 2019-08-16 DIAGNOSIS — E78.5: ICD-10-CM

## 2019-08-16 DIAGNOSIS — S99.919A: ICD-10-CM

## 2019-08-16 PROCEDURE — 36415 COLL VENOUS BLD VENIPUNCTURE: CPT

## 2019-08-16 PROCEDURE — 80053 COMPREHEN METABOLIC PANEL: CPT

## 2019-08-16 PROCEDURE — 85025 COMPLETE CBC W/AUTO DIFF WBC: CPT

## 2019-08-16 PROCEDURE — 99283 EMERGENCY DEPT VISIT LOW MDM: CPT

## 2019-08-16 PROCEDURE — 96372 THER/PROPH/DIAG INJ SC/IM: CPT

## 2019-08-16 PROCEDURE — 93005 ELECTROCARDIOGRAM TRACING: CPT

## 2019-08-16 PROCEDURE — 83735 ASSAY OF MAGNESIUM: CPT

## 2019-08-16 PROCEDURE — 81001 URINALYSIS AUTO W/SCOPE: CPT

## 2019-08-16 PROCEDURE — 93010 ELECTROCARDIOGRAM REPORT: CPT

## 2019-09-04 ENCOUNTER — HOSPITAL ENCOUNTER (OUTPATIENT)
Dept: HOSPITAL 58 - RAD | Age: 44
Discharge: HOME | End: 2019-09-04
Attending: NURSE PRACTITIONER
Payer: COMMERCIAL

## 2019-09-04 VITALS — BODY MASS INDEX: 35.2 KG/M2

## 2019-09-04 DIAGNOSIS — M25.551: Primary | ICD-10-CM

## 2019-09-04 DIAGNOSIS — M32.9: ICD-10-CM

## 2019-09-04 DIAGNOSIS — R20.0: ICD-10-CM

## 2019-09-04 DIAGNOSIS — M79.645: ICD-10-CM

## 2019-09-04 DIAGNOSIS — M25.552: ICD-10-CM

## 2019-09-04 DIAGNOSIS — G35: ICD-10-CM

## 2019-10-05 ENCOUNTER — HOSPITAL ENCOUNTER (EMERGENCY)
Age: 44
Discharge: ANOTHER HOSPITAL | End: 2019-10-05
Attending: EMERGENCY MEDICINE
Payer: MEDICARE

## 2019-10-05 ENCOUNTER — APPOINTMENT (OUTPATIENT)
Dept: CT IMAGING | Age: 44
End: 2019-10-05
Payer: MEDICARE

## 2019-10-05 VITALS
TEMPERATURE: 98.7 F | HEIGHT: 65 IN | DIASTOLIC BLOOD PRESSURE: 74 MMHG | BODY MASS INDEX: 33.32 KG/M2 | SYSTOLIC BLOOD PRESSURE: 120 MMHG | HEART RATE: 70 BPM | OXYGEN SATURATION: 98 % | WEIGHT: 200 LBS | RESPIRATION RATE: 17 BRPM

## 2019-10-05 DIAGNOSIS — K56.691 OTHER COMPLETE INTESTINAL OBSTRUCTION (HCC): Primary | ICD-10-CM

## 2019-10-05 DIAGNOSIS — Z98.84 HX OF GASTRIC BYPASS: ICD-10-CM

## 2019-10-05 DIAGNOSIS — Z98.84 HX OF LAPAROSCOPIC GASTRIC BANDING: ICD-10-CM

## 2019-10-05 LAB
ALBUMIN SERPL-MCNC: 4.3 G/DL (ref 3.5–5.2)
ALP BLD-CCNC: 74 U/L (ref 35–104)
ALT SERPL-CCNC: 47 U/L (ref 5–33)
ANION GAP SERPL CALCULATED.3IONS-SCNC: 15 MMOL/L (ref 7–19)
AST SERPL-CCNC: 73 U/L (ref 5–32)
BASOPHILS ABSOLUTE: 0 K/UL (ref 0–0.2)
BASOPHILS RELATIVE PERCENT: 0.6 % (ref 0–1)
BILIRUB SERPL-MCNC: 0.3 MG/DL (ref 0.2–1.2)
BILIRUBIN URINE: NEGATIVE
BLOOD, URINE: NEGATIVE
BUN BLDV-MCNC: 13 MG/DL (ref 6–20)
CALCIUM SERPL-MCNC: 9.6 MG/DL (ref 8.6–10)
CHLORIDE BLD-SCNC: 100 MMOL/L (ref 98–111)
CLARITY: ABNORMAL
CO2: 22 MMOL/L (ref 22–29)
COLOR: ABNORMAL
CREAT SERPL-MCNC: 1 MG/DL (ref 0.5–0.9)
EOSINOPHILS ABSOLUTE: 0 K/UL (ref 0–0.6)
EOSINOPHILS RELATIVE PERCENT: 0.6 % (ref 0–5)
GFR NON-AFRICAN AMERICAN: >60
GLUCOSE BLD-MCNC: 107 MG/DL
GLUCOSE BLD-MCNC: 107 MG/DL (ref 70–99)
GLUCOSE BLD-MCNC: 65 MG/DL
GLUCOSE BLD-MCNC: 65 MG/DL (ref 70–99)
GLUCOSE BLD-MCNC: 68 MG/DL (ref 74–109)
GLUCOSE BLD-MCNC: 94 MG/DL
GLUCOSE BLD-MCNC: 94 MG/DL (ref 70–99)
GLUCOSE URINE: NEGATIVE MG/DL
HCT VFR BLD CALC: 43 % (ref 37–47)
HEMOGLOBIN: 14.7 G/DL (ref 12–16)
IMMATURE GRANULOCYTES #: 0.1 K/UL
INR BLD: 1.09 (ref 0.88–1.18)
KETONES, URINE: ABNORMAL MG/DL
LACTIC ACID: 1.7 MMOL/L (ref 0.5–1.9)
LEUKOCYTE ESTERASE, URINE: NEGATIVE
LIPASE: 23 U/L (ref 13–60)
LYMPHOCYTES ABSOLUTE: 1.5 K/UL (ref 1.1–4.5)
LYMPHOCYTES RELATIVE PERCENT: 21.1 % (ref 20–40)
MCH RBC QN AUTO: 30.1 PG (ref 27–31)
MCHC RBC AUTO-ENTMCNC: 34.2 G/DL (ref 33–37)
MCV RBC AUTO: 88.1 FL (ref 81–99)
MONOCYTES ABSOLUTE: 0.7 K/UL (ref 0–0.9)
MONOCYTES RELATIVE PERCENT: 9.3 % (ref 0–10)
NEUTROPHILS ABSOLUTE: 4.7 K/UL (ref 1.5–7.5)
NEUTROPHILS RELATIVE PERCENT: 67.3 % (ref 50–65)
NITRITE, URINE: NEGATIVE
PDW BLD-RTO: 14 % (ref 11.5–14.5)
PERFORMED ON: ABNORMAL
PERFORMED ON: ABNORMAL
PERFORMED ON: NORMAL
PH UA: 5.5 (ref 5–8)
PLATELET # BLD: 314 K/UL (ref 130–400)
PMV BLD AUTO: 9.5 FL (ref 9.4–12.3)
POTASSIUM SERPL-SCNC: 4.1 MMOL/L (ref 3.5–5)
PROTEIN UA: ABNORMAL MG/DL
PROTHROMBIN TIME: 13.5 SEC (ref 12–14.6)
RBC # BLD: 4.88 M/UL (ref 4.2–5.4)
SODIUM BLD-SCNC: 137 MMOL/L (ref 136–145)
SPECIFIC GRAVITY UA: 1.03 (ref 1–1.03)
TOTAL PROTEIN: 8.2 G/DL (ref 6.6–8.7)
URINE REFLEX TO CULTURE: ABNORMAL
UROBILINOGEN, URINE: 0.2 E.U./DL
WBC # BLD: 7 K/UL (ref 4.8–10.8)

## 2019-10-05 PROCEDURE — 85610 PROTHROMBIN TIME: CPT

## 2019-10-05 PROCEDURE — 83605 ASSAY OF LACTIC ACID: CPT

## 2019-10-05 PROCEDURE — 36415 COLL VENOUS BLD VENIPUNCTURE: CPT

## 2019-10-05 PROCEDURE — 6360000002 HC RX W HCPCS: Performed by: EMERGENCY MEDICINE

## 2019-10-05 PROCEDURE — 6360000004 HC RX CONTRAST MEDICATION: Performed by: EMERGENCY MEDICINE

## 2019-10-05 PROCEDURE — 96374 THER/PROPH/DIAG INJ IV PUSH: CPT

## 2019-10-05 PROCEDURE — 82948 REAGENT STRIP/BLOOD GLUCOSE: CPT

## 2019-10-05 PROCEDURE — 80053 COMPREHEN METABOLIC PANEL: CPT

## 2019-10-05 PROCEDURE — 96376 TX/PRO/DX INJ SAME DRUG ADON: CPT

## 2019-10-05 PROCEDURE — 2580000003 HC RX 258: Performed by: EMERGENCY MEDICINE

## 2019-10-05 PROCEDURE — 96375 TX/PRO/DX INJ NEW DRUG ADDON: CPT

## 2019-10-05 PROCEDURE — 74177 CT ABD & PELVIS W/CONTRAST: CPT

## 2019-10-05 PROCEDURE — 85025 COMPLETE CBC W/AUTO DIFF WBC: CPT

## 2019-10-05 PROCEDURE — 93005 ELECTROCARDIOGRAM TRACING: CPT | Performed by: EMERGENCY MEDICINE

## 2019-10-05 PROCEDURE — 81003 URINALYSIS AUTO W/O SCOPE: CPT

## 2019-10-05 PROCEDURE — 83690 ASSAY OF LIPASE: CPT

## 2019-10-05 PROCEDURE — 99284 EMERGENCY DEPT VISIT MOD MDM: CPT

## 2019-10-05 RX ORDER — BUSPIRONE HYDROCHLORIDE 15 MG/1
20 TABLET ORAL 2 TIMES DAILY
COMMUNITY
End: 2021-01-18

## 2019-10-05 RX ORDER — SODIUM CHLORIDE, SODIUM LACTATE, POTASSIUM CHLORIDE, CALCIUM CHLORIDE 600; 310; 30; 20 MG/100ML; MG/100ML; MG/100ML; MG/100ML
INJECTION, SOLUTION INTRAVENOUS CONTINUOUS
Status: DISCONTINUED | OUTPATIENT
Start: 2019-10-05 | End: 2019-10-05 | Stop reason: HOSPADM

## 2019-10-05 RX ORDER — PROMETHAZINE HYDROCHLORIDE 25 MG/ML
12.5 INJECTION, SOLUTION INTRAMUSCULAR; INTRAVENOUS ONCE
Status: COMPLETED | OUTPATIENT
Start: 2019-10-05 | End: 2019-10-05

## 2019-10-05 RX ORDER — ALPRAZOLAM 1 MG/1
1 TABLET ORAL 3 TIMES DAILY
COMMUNITY
End: 2020-10-20

## 2019-10-05 RX ORDER — MORPHINE SULFATE 4 MG/ML
4 INJECTION, SOLUTION INTRAMUSCULAR; INTRAVENOUS ONCE
Status: COMPLETED | OUTPATIENT
Start: 2019-10-05 | End: 2019-10-05

## 2019-10-05 RX ORDER — ONDANSETRON 2 MG/ML
4 INJECTION INTRAMUSCULAR; INTRAVENOUS ONCE
Status: COMPLETED | OUTPATIENT
Start: 2019-10-05 | End: 2019-10-05

## 2019-10-05 RX ORDER — DEXTROSE AND SODIUM CHLORIDE 5; .9 G/100ML; G/100ML
INJECTION, SOLUTION INTRAVENOUS CONTINUOUS
Status: DISCONTINUED | OUTPATIENT
Start: 2019-10-05 | End: 2019-10-05 | Stop reason: HOSPADM

## 2019-10-05 RX ORDER — SODIUM CHLORIDE, SODIUM LACTATE, POTASSIUM CHLORIDE, CALCIUM CHLORIDE 600; 310; 30; 20 MG/100ML; MG/100ML; MG/100ML; MG/100ML
1000 INJECTION, SOLUTION INTRAVENOUS ONCE
Status: COMPLETED | OUTPATIENT
Start: 2019-10-05 | End: 2019-10-05

## 2019-10-05 RX ADMIN — IOPAMIDOL 90 ML: 755 INJECTION, SOLUTION INTRAVENOUS at 13:57

## 2019-10-05 RX ADMIN — DEXTROSE AND SODIUM CHLORIDE: 5; 900 INJECTION, SOLUTION INTRAVENOUS at 13:35

## 2019-10-05 RX ADMIN — ONDANSETRON 4 MG: 2 INJECTION INTRAMUSCULAR; INTRAVENOUS at 12:15

## 2019-10-05 RX ADMIN — SODIUM CHLORIDE, POTASSIUM CHLORIDE, SODIUM LACTATE AND CALCIUM CHLORIDE 1000 ML: 600; 310; 30; 20 INJECTION, SOLUTION INTRAVENOUS at 11:53

## 2019-10-05 RX ADMIN — ONDANSETRON 4 MG: 2 INJECTION INTRAMUSCULAR; INTRAVENOUS at 18:19

## 2019-10-05 RX ADMIN — MORPHINE SULFATE 4 MG: 4 INJECTION INTRAVENOUS at 18:19

## 2019-10-05 RX ADMIN — PROMETHAZINE HYDROCHLORIDE 12.5 MG: 25 INJECTION INTRAMUSCULAR; INTRAVENOUS at 15:08

## 2019-10-05 RX ADMIN — MORPHINE SULFATE 4 MG: 4 INJECTION INTRAVENOUS at 12:15

## 2019-10-05 RX ADMIN — SODIUM CHLORIDE, POTASSIUM CHLORIDE, SODIUM LACTATE AND CALCIUM CHLORIDE: 600; 310; 30; 20 INJECTION, SOLUTION INTRAVENOUS at 17:03

## 2019-10-05 ASSESSMENT — ENCOUNTER SYMPTOMS
ABDOMINAL PAIN: 1
VOMITING: 1
COUGH: 0
BACK PAIN: 0
CONSTIPATION: 1
SHORTNESS OF BREATH: 0
NAUSEA: 1

## 2019-10-05 ASSESSMENT — PAIN SCALES - GENERAL
PAINLEVEL_OUTOF10: 7
PAINLEVEL_OUTOF10: 3

## 2019-10-06 LAB
EKG P AXIS: 45 DEGREES
EKG P-R INTERVAL: 120 MS
EKG Q-T INTERVAL: 384 MS
EKG QRS DURATION: 82 MS
EKG QTC CALCULATION (BAZETT): 387 MS
EKG T AXIS: 37 DEGREES

## 2019-10-06 PROCEDURE — 93010 ELECTROCARDIOGRAM REPORT: CPT | Performed by: INTERNAL MEDICINE

## 2019-12-19 ENCOUNTER — HOSPITAL ENCOUNTER (EMERGENCY)
Age: 44
Discharge: HOME | End: 2019-12-20
Attending: FAMILY MEDICINE
Payer: MEDICARE

## 2019-12-19 ENCOUNTER — APPOINTMENT (OUTPATIENT)
Dept: CT IMAGING | Age: 44
End: 2019-12-19
Payer: MEDICARE

## 2019-12-19 ENCOUNTER — APPOINTMENT (OUTPATIENT)
Dept: GENERAL RADIOLOGY | Age: 44
End: 2019-12-19
Payer: MEDICARE

## 2019-12-19 VITALS
BODY MASS INDEX: 32.32 KG/M2 | OXYGEN SATURATION: 100 % | HEIGHT: 65 IN | SYSTOLIC BLOOD PRESSURE: 148 MMHG | TEMPERATURE: 97.9 F | DIASTOLIC BLOOD PRESSURE: 102 MMHG | HEART RATE: 61 BPM | RESPIRATION RATE: 18 BRPM | WEIGHT: 194 LBS

## 2019-12-19 DIAGNOSIS — R55 NEAR SYNCOPE: Primary | ICD-10-CM

## 2019-12-19 DIAGNOSIS — T88.7XXA SIDE EFFECT OF DRUG: ICD-10-CM

## 2019-12-19 DIAGNOSIS — I10 HYPERTENSION, UNSPECIFIED TYPE: ICD-10-CM

## 2019-12-19 DIAGNOSIS — F41.1 ANXIETY STATE: ICD-10-CM

## 2019-12-19 LAB
ALBUMIN SERPL-MCNC: 4.1 G/DL (ref 3.5–5.2)
ALP BLD-CCNC: 74 U/L (ref 35–104)
ALT SERPL-CCNC: 37 U/L (ref 5–33)
AMPHETAMINE SCREEN, URINE: NEGATIVE
ANION GAP SERPL CALCULATED.3IONS-SCNC: 13 MMOL/L (ref 7–19)
AST SERPL-CCNC: 28 U/L (ref 5–32)
BARBITURATE SCREEN URINE: NEGATIVE
BASOPHILS ABSOLUTE: 0 K/UL (ref 0–0.2)
BASOPHILS RELATIVE PERCENT: 0.4 % (ref 0–1)
BENZODIAZEPINE SCREEN, URINE: POSITIVE
BILIRUB SERPL-MCNC: 0.3 MG/DL (ref 0.2–1.2)
BILIRUBIN URINE: NEGATIVE
BLOOD, URINE: NEGATIVE
BUN BLDV-MCNC: 11 MG/DL (ref 6–20)
CALCIUM SERPL-MCNC: 9 MG/DL (ref 8.6–10)
CANNABINOID SCREEN URINE: POSITIVE
CHLORIDE BLD-SCNC: 100 MMOL/L (ref 98–111)
CLARITY: CLEAR
CO2: 23 MMOL/L (ref 22–29)
COCAINE METABOLITE SCREEN URINE: NEGATIVE
COLOR: YELLOW
CREAT SERPL-MCNC: 0.9 MG/DL (ref 0.5–0.9)
EOSINOPHILS ABSOLUTE: 0.1 K/UL (ref 0–0.6)
EOSINOPHILS RELATIVE PERCENT: 0.8 % (ref 0–5)
GFR NON-AFRICAN AMERICAN: >60
GLUCOSE BLD-MCNC: 84 MG/DL (ref 74–109)
GLUCOSE URINE: NEGATIVE MG/DL
HCT VFR BLD CALC: 41.2 % (ref 37–47)
HEMOGLOBIN: 13.8 G/DL (ref 12–16)
IMMATURE GRANULOCYTES #: 0.1 K/UL
KETONES, URINE: NEGATIVE MG/DL
LEUKOCYTE ESTERASE, URINE: NEGATIVE
LYMPHOCYTES ABSOLUTE: 2.2 K/UL (ref 1.1–4.5)
LYMPHOCYTES RELATIVE PERCENT: 29 % (ref 20–40)
Lab: ABNORMAL
MCH RBC QN AUTO: 30 PG (ref 27–31)
MCHC RBC AUTO-ENTMCNC: 33.5 G/DL (ref 33–37)
MCV RBC AUTO: 89.6 FL (ref 81–99)
MONOCYTES ABSOLUTE: 0.5 K/UL (ref 0–0.9)
MONOCYTES RELATIVE PERCENT: 6.7 % (ref 0–10)
NEUTROPHILS ABSOLUTE: 4.8 K/UL (ref 1.5–7.5)
NEUTROPHILS RELATIVE PERCENT: 62.5 % (ref 50–65)
NITRITE, URINE: NEGATIVE
OPIATE SCREEN URINE: NEGATIVE
PDW BLD-RTO: 14.2 % (ref 11.5–14.5)
PH UA: 6 (ref 5–8)
PLATELET # BLD: 264 K/UL (ref 130–400)
PMV BLD AUTO: 9.5 FL (ref 9.4–12.3)
POTASSIUM REFLEX MAGNESIUM: 3.9 MMOL/L (ref 3.5–5)
PROTEIN UA: NEGATIVE MG/DL
RAPID INFLUENZA  B AGN: NEGATIVE
RAPID INFLUENZA A AGN: NEGATIVE
RBC # BLD: 4.6 M/UL (ref 4.2–5.4)
SODIUM BLD-SCNC: 136 MMOL/L (ref 136–145)
SPECIFIC GRAVITY UA: 1.02 (ref 1–1.03)
TOTAL PROTEIN: 6.8 G/DL (ref 6.6–8.7)
TROPONIN: <0.01 NG/ML (ref 0–0.03)
UROBILINOGEN, URINE: 0.2 E.U./DL
WBC # BLD: 7.7 K/UL (ref 4.8–10.8)

## 2019-12-19 PROCEDURE — 87804 INFLUENZA ASSAY W/OPTIC: CPT

## 2019-12-19 PROCEDURE — 85025 COMPLETE CBC W/AUTO DIFF WBC: CPT

## 2019-12-19 PROCEDURE — 80307 DRUG TEST PRSMV CHEM ANLYZR: CPT

## 2019-12-19 PROCEDURE — 36415 COLL VENOUS BLD VENIPUNCTURE: CPT

## 2019-12-19 PROCEDURE — 80053 COMPREHEN METABOLIC PANEL: CPT

## 2019-12-19 PROCEDURE — 93005 ELECTROCARDIOGRAM TRACING: CPT | Performed by: FAMILY MEDICINE

## 2019-12-19 PROCEDURE — 6370000000 HC RX 637 (ALT 250 FOR IP): Performed by: FAMILY MEDICINE

## 2019-12-19 PROCEDURE — 99284 EMERGENCY DEPT VISIT MOD MDM: CPT

## 2019-12-19 PROCEDURE — 70450 CT HEAD/BRAIN W/O DYE: CPT

## 2019-12-19 PROCEDURE — 84484 ASSAY OF TROPONIN QUANT: CPT

## 2019-12-19 PROCEDURE — 71045 X-RAY EXAM CHEST 1 VIEW: CPT

## 2019-12-19 PROCEDURE — 2580000003 HC RX 258: Performed by: FAMILY MEDICINE

## 2019-12-19 PROCEDURE — 81003 URINALYSIS AUTO W/O SCOPE: CPT

## 2019-12-19 RX ORDER — OXYCODONE AND ACETAMINOPHEN 10; 325 MG/1; MG/1
1 TABLET ORAL ONCE
Status: COMPLETED | OUTPATIENT
Start: 2019-12-19 | End: 2019-12-19

## 2019-12-19 RX ORDER — 0.9 % SODIUM CHLORIDE 0.9 %
1000 INTRAVENOUS SOLUTION INTRAVENOUS ONCE
Status: COMPLETED | OUTPATIENT
Start: 2019-12-19 | End: 2019-12-19

## 2019-12-19 RX ORDER — ALPRAZOLAM 0.5 MG/1
0.5 TABLET ORAL ONCE
Status: COMPLETED | OUTPATIENT
Start: 2019-12-19 | End: 2019-12-19

## 2019-12-19 RX ADMIN — ALPRAZOLAM 0.5 MG: 0.5 TABLET ORAL at 23:14

## 2019-12-19 RX ADMIN — SODIUM CHLORIDE 1000 ML: 9 INJECTION, SOLUTION INTRAVENOUS at 20:19

## 2019-12-19 RX ADMIN — OXYCODONE HYDROCHLORIDE AND ACETAMINOPHEN 1 TABLET: 10; 325 TABLET ORAL at 20:31

## 2019-12-19 ASSESSMENT — ENCOUNTER SYMPTOMS
BACK PAIN: 0
APNEA: 0
CONSTIPATION: 0
ABDOMINAL PAIN: 0
TROUBLE SWALLOWING: 0
WHEEZING: 0
CHEST TIGHTNESS: 0
DIARRHEA: 0
ABDOMINAL DISTENTION: 0
SHORTNESS OF BREATH: 0
SORE THROAT: 0
VOMITING: 0
COUGH: 0

## 2019-12-19 ASSESSMENT — PAIN DESCRIPTION - LOCATION: LOCATION: GENERALIZED

## 2019-12-19 ASSESSMENT — PAIN SCALES - GENERAL
PAINLEVEL_OUTOF10: 9
PAINLEVEL_OUTOF10: 9

## 2019-12-19 ASSESSMENT — PAIN DESCRIPTION - DESCRIPTORS: DESCRIPTORS: PATIENT UNABLE TO DESCRIBE

## 2019-12-20 LAB
EKG P AXIS: 29 DEGREES
EKG P-R INTERVAL: 130 MS
EKG Q-T INTERVAL: 400 MS
EKG QRS DURATION: 82 MS
EKG QTC CALCULATION (BAZETT): 401 MS
EKG T AXIS: 32 DEGREES

## 2019-12-20 PROCEDURE — 93010 ELECTROCARDIOGRAM REPORT: CPT | Performed by: INTERNAL MEDICINE

## 2020-02-06 ENCOUNTER — TELEPHONE (OUTPATIENT)
Dept: GASTROENTEROLOGY | Facility: CLINIC | Age: 45
End: 2020-02-06

## 2020-07-04 ENCOUNTER — APPOINTMENT (OUTPATIENT)
Dept: CT IMAGING | Age: 45
End: 2020-07-04
Payer: MEDICARE

## 2020-07-04 ENCOUNTER — HOSPITAL ENCOUNTER (EMERGENCY)
Age: 45
Discharge: HOME | End: 2020-07-04
Attending: EMERGENCY MEDICINE
Payer: MEDICARE

## 2020-07-04 ENCOUNTER — APPOINTMENT (OUTPATIENT)
Dept: MRI IMAGING | Age: 45
End: 2020-07-04
Payer: MEDICARE

## 2020-07-04 VITALS
DIASTOLIC BLOOD PRESSURE: 88 MMHG | OXYGEN SATURATION: 98 % | BODY MASS INDEX: 33.28 KG/M2 | SYSTOLIC BLOOD PRESSURE: 131 MMHG | HEART RATE: 67 BPM | TEMPERATURE: 98.7 F | RESPIRATION RATE: 15 BRPM | WEIGHT: 200 LBS

## 2020-07-04 LAB
ALBUMIN SERPL-MCNC: 4 G/DL (ref 3.5–5.2)
ALP BLD-CCNC: 74 U/L (ref 35–104)
ALT SERPL-CCNC: 21 U/L (ref 5–33)
AMPHETAMINE SCREEN, URINE: NEGATIVE
ANION GAP SERPL CALCULATED.3IONS-SCNC: 13 MMOL/L (ref 7–19)
AST SERPL-CCNC: 24 U/L (ref 5–32)
BARBITURATE SCREEN URINE: POSITIVE
BASOPHILS ABSOLUTE: 0 K/UL (ref 0–0.2)
BASOPHILS RELATIVE PERCENT: 0.6 % (ref 0–1)
BENZODIAZEPINE SCREEN, URINE: POSITIVE
BILIRUB SERPL-MCNC: <0.2 MG/DL (ref 0.2–1.2)
BILIRUBIN URINE: NEGATIVE
BLOOD, URINE: NEGATIVE
BUN BLDV-MCNC: 14 MG/DL (ref 6–20)
C-REACTIVE PROTEIN: 0.06 MG/DL (ref 0–0.5)
CALCIUM SERPL-MCNC: 9 MG/DL (ref 8.6–10)
CANNABINOID SCREEN URINE: POSITIVE
CHLORIDE BLD-SCNC: 103 MMOL/L (ref 98–111)
CLARITY: CLEAR
CO2: 20 MMOL/L (ref 22–29)
COCAINE METABOLITE SCREEN URINE: NEGATIVE
COLOR: YELLOW
CREAT SERPL-MCNC: 1.2 MG/DL (ref 0.5–0.9)
EOSINOPHILS ABSOLUTE: 0.1 K/UL (ref 0–0.6)
EOSINOPHILS RELATIVE PERCENT: 1.4 % (ref 0–5)
GFR NON-AFRICAN AMERICAN: 49
GLUCOSE BLD-MCNC: 77 MG/DL (ref 74–109)
GLUCOSE URINE: NEGATIVE MG/DL
HCG(URINE) PREGNANCY TEST: NEGATIVE
HCT VFR BLD CALC: 34.5 % (ref 37–47)
HEMOGLOBIN: 11.7 G/DL (ref 12–16)
IMMATURE GRANULOCYTES #: 0 K/UL
KETONES, URINE: ABNORMAL MG/DL
LEUKOCYTE ESTERASE, URINE: NEGATIVE
LYMPHOCYTES ABSOLUTE: 2.3 K/UL (ref 1.1–4.5)
LYMPHOCYTES RELATIVE PERCENT: 33.2 % (ref 20–40)
Lab: ABNORMAL
MCH RBC QN AUTO: 30.6 PG (ref 27–31)
MCHC RBC AUTO-ENTMCNC: 33.9 G/DL (ref 33–37)
MCV RBC AUTO: 90.3 FL (ref 81–99)
MONOCYTES ABSOLUTE: 0.6 K/UL (ref 0–0.9)
MONOCYTES RELATIVE PERCENT: 8.9 % (ref 0–10)
NEUTROPHILS ABSOLUTE: 3.9 K/UL (ref 1.5–7.5)
NEUTROPHILS RELATIVE PERCENT: 55.5 % (ref 50–65)
NITRITE, URINE: NEGATIVE
OPIATE SCREEN URINE: NEGATIVE
PDW BLD-RTO: 13.8 % (ref 11.5–14.5)
PH UA: 6 (ref 5–8)
PLATELET # BLD: 265 K/UL (ref 130–400)
PMV BLD AUTO: 9.7 FL (ref 9.4–12.3)
POTASSIUM SERPL-SCNC: 4 MMOL/L (ref 3.5–5)
PROTEIN UA: NEGATIVE MG/DL
RBC # BLD: 3.82 M/UL (ref 4.2–5.4)
SEDIMENTATION RATE, ERYTHROCYTE: 21 MM/HR (ref 0–20)
SODIUM BLD-SCNC: 136 MMOL/L (ref 136–145)
SPECIFIC GRAVITY UA: 1.01 (ref 1–1.03)
TOTAL PROTEIN: 6.7 G/DL (ref 6.6–8.7)
UROBILINOGEN, URINE: 0.2 E.U./DL
WBC # BLD: 7 K/UL (ref 4.8–10.8)

## 2020-07-04 PROCEDURE — 2580000003 HC RX 258: Performed by: PHYSICIAN ASSISTANT

## 2020-07-04 PROCEDURE — 6360000002 HC RX W HCPCS: Performed by: EMERGENCY MEDICINE

## 2020-07-04 PROCEDURE — 85652 RBC SED RATE AUTOMATED: CPT

## 2020-07-04 PROCEDURE — 70450 CT HEAD/BRAIN W/O DYE: CPT

## 2020-07-04 PROCEDURE — 84703 CHORIONIC GONADOTROPIN ASSAY: CPT

## 2020-07-04 PROCEDURE — 96365 THER/PROPH/DIAG IV INF INIT: CPT

## 2020-07-04 PROCEDURE — 6360000004 HC RX CONTRAST MEDICATION: Performed by: PHYSICIAN ASSISTANT

## 2020-07-04 PROCEDURE — 80053 COMPREHEN METABOLIC PANEL: CPT

## 2020-07-04 PROCEDURE — 85025 COMPLETE CBC W/AUTO DIFF WBC: CPT

## 2020-07-04 PROCEDURE — 70553 MRI BRAIN STEM W/O & W/DYE: CPT

## 2020-07-04 PROCEDURE — 81003 URINALYSIS AUTO W/O SCOPE: CPT

## 2020-07-04 PROCEDURE — A9577 INJ MULTIHANCE: HCPCS | Performed by: PHYSICIAN ASSISTANT

## 2020-07-04 PROCEDURE — 86140 C-REACTIVE PROTEIN: CPT

## 2020-07-04 PROCEDURE — 80307 DRUG TEST PRSMV CHEM ANLYZR: CPT

## 2020-07-04 PROCEDURE — 36415 COLL VENOUS BLD VENIPUNCTURE: CPT

## 2020-07-04 PROCEDURE — 99283 EMERGENCY DEPT VISIT LOW MDM: CPT

## 2020-07-04 PROCEDURE — 6360000002 HC RX W HCPCS: Performed by: PHYSICIAN ASSISTANT

## 2020-07-04 PROCEDURE — 96375 TX/PRO/DX INJ NEW DRUG ADDON: CPT

## 2020-07-04 PROCEDURE — 2580000003 HC RX 258: Performed by: EMERGENCY MEDICINE

## 2020-07-04 RX ORDER — ZOLPIDEM TARTRATE 10 MG/1
TABLET ORAL NIGHTLY PRN
COMMUNITY
End: 2021-05-13

## 2020-07-04 RX ORDER — MORPHINE SULFATE 4 MG/ML
2 INJECTION, SOLUTION INTRAMUSCULAR; INTRAVENOUS ONCE
Status: COMPLETED | OUTPATIENT
Start: 2020-07-04 | End: 2020-07-04

## 2020-07-04 RX ORDER — 0.9 % SODIUM CHLORIDE 0.9 %
500 INTRAVENOUS SOLUTION INTRAVENOUS ONCE
Status: COMPLETED | OUTPATIENT
Start: 2020-07-04 | End: 2020-07-04

## 2020-07-04 RX ORDER — HYDROXYZINE 50 MG/1
50 TABLET, FILM COATED ORAL 2 TIMES DAILY
COMMUNITY

## 2020-07-04 RX ORDER — METHYLPHENIDATE HYDROCHLORIDE 36 MG/1
54 TABLET ORAL EVERY MORNING
COMMUNITY
End: 2021-01-18

## 2020-07-04 RX ORDER — METHYLPREDNISOLONE SODIUM SUCCINATE 125 MG/2ML
125 INJECTION, POWDER, LYOPHILIZED, FOR SOLUTION INTRAMUSCULAR; INTRAVENOUS ONCE
Status: DISCONTINUED | OUTPATIENT
Start: 2020-07-04 | End: 2020-07-04

## 2020-07-04 RX ORDER — TOPIRAMATE 25 MG/1
25 CAPSULE, COATED PELLETS ORAL 2 TIMES DAILY
Status: ON HOLD | COMMUNITY
End: 2020-07-20

## 2020-07-04 RX ORDER — BUTALBITAL, ACETAMINOPHEN AND CAFFEINE 50; 325; 40 MG/1; MG/1; MG/1
1 TABLET ORAL EVERY 4 HOURS PRN
COMMUNITY

## 2020-07-04 RX ORDER — HYDROCHLOROTHIAZIDE 12.5 MG/1
12.5 CAPSULE, GELATIN COATED ORAL DAILY
COMMUNITY
End: 2022-03-18

## 2020-07-04 RX ADMIN — GADOBENATE DIMEGLUMINE 18 ML: 529 INJECTION, SOLUTION INTRAVENOUS at 18:52

## 2020-07-04 RX ADMIN — MORPHINE SULFATE 2 MG: 4 INJECTION INTRAVENOUS at 16:52

## 2020-07-04 RX ADMIN — SODIUM CHLORIDE 500 ML: 9 INJECTION, SOLUTION INTRAVENOUS at 17:22

## 2020-07-04 RX ADMIN — SODIUM CHLORIDE 1000 MG: 900 INJECTION, SOLUTION INTRAVENOUS at 16:36

## 2020-07-04 NOTE — ED PROVIDER NOTES
[facility] EMERGENCY DEPT  eMERGENCYdEPARTMENT eNCOUnter      Pt Name: Evert Renteria  MRN: [id number]  [place] 1975  Date of evaluation: 7/4/2020  Provider:CELENA Mckee    CHIEF COMPLAINT       Chief Complaint   Patient presents with    Generalized Body Aches    Numbness     hx of MS and lupus and think she is having a flare up         HISTORY OF PRESENT ILLNESS  (Location/Symptom, Timing/Onset, Context/Setting, Quality, Duration, Modifying Factors, Severity.)   Evert Renteria is a 45 y.o. female who presents to the emergency department with complaints of generalized body aches she has history of fibromyalgia lupus and multiple sclerosis. Patient admits to history of optic neuritis she states I typically get lupus in my right eye and MS in my left she is having a migraine today denies any new symptoms in that regard. She does admit to speech difficulty she is trembling she has right sided arm and leg weakness no facial droop. She told me that Dr. Ashley Champion her PCP told her to come to ER 2 weeks ago she has tried to treat at home. She thinks she is having a flareup at this time. When asked what normally works she states \"pain medicine and steroids\" she was followed by Dr. Marko Solomon with neurology has not been seen by them in 2 years. She ambulated him here without issue I observed. No fever. She was taking atarax that seemed to help. No longer taking. HPI    Nursing Notes were reviewed and I agree. REVIEW OF SYSTEMS    (2-9 systems for level 4, 10 or more for level 5)     Review of Systems   Constitutional: Negative for activity change, appetite change, chills and fever. HENT: Negative for congestion, postnasal drip, rhinorrhea and sore throat. Eyes: Positive for pain. Negative for photophobia, discharge and visual disturbance. Respiratory: Negative for apnea, cough and shortness of breath. Cardiovascular: Negative for chest pain and leg swelling.    Gastrointestinal: Negative for abdominal distention, abdominal pain and nausea. Genitourinary: Negative for vaginal bleeding. Musculoskeletal: Positive for myalgias. Negative for arthralgias, back pain, joint swelling, neck pain and neck stiffness. Skin: Negative for color change and rash. Neurological: Positive for speech difficulty, weakness, numbness and headaches. Negative for dizziness, syncope and facial asymmetry. Hematological: Negative for adenopathy. Does not bruise/bleed easily. Psychiatric/Behavioral: Negative for agitation, behavioral problems and confusion. Except as noted above the remainder of the review of systems was reviewed and negative. PAST MEDICAL HISTORY     Past Medical History:   Diagnosis Date    Arthritis     B[facility] 1 disorder ([facility] [address]     Fibromyalgia     GERD (gastroesophageal reflux disease)     Hypertension     Lupus ([facility] [address]     Movement disorder     Multiple sclerosis ([facility] [address] [facility]     Neuromuscular disorder ([facility] [address]     Psychiatric problem          SURGICAL HISTORY       Past Surgical History:   Procedure Laterality Date    ABDOMINAL EXPLORATION SURGERY      ADENOIDECTOMY       SECTION      x 2     SECTION, CLASSIC N/A     DILATATION, ESOPHAGUS      EXTERNAL EAR SURGERY      lobe    GASTRIC BAND      GASTRIC BYPASS SURGERY      HERNIA REPAIR      HERNIA REPAIR N/A     SEPTOPLASTY      TONSILLECTOMY      TONSILLECTOMY AND ADENOIDECTOMY      UPPER GASTROINTESTINAL ENDOSCOPY           CURRENT MEDICATIONS       Discharge Medication List as of 2020  7:49 PM      CONTINUE these medications which have NOT CHANGED    Details   hydrOXYzine (ATARAX) 50 MG tablet Take 50 mg by mouth 3 times daily as needed for ItchingHistorical Med      hydroCHLOROthiazide (MICROZIDE) 12.5 MG capsule Take 12.5 mg by mouth dailyHistorical Med      methylphenidate (CONCERTA) 36 MG extended release tablet Take 36 mg by mouth every morning. Historical Med      topiramate (TOPAMAX SPRINKLE) 25 MG capsule Take 25 mg by mouth 2 times dailyHistorical Med      zolpidem (AMBIEN) 10 MG tablet Take by mouth nightly as needed for Sleep. Historical Med      lurasidone (LATUDA) 60 MG TABS tablet Take 60 mg by mouth dailyHistorical Med      butalbital-acetaminophen-caffeine (FIORICET, ESGIC) -40 MG per tablet Take 1 tablet by mouth every 4 hours as needed for HeadachesHistorical Med      busPIRone (BUSPAR) 15 MG tablet Take 15 mg by mouth 3 times dailyHistorical Med      ALPRAZolam (XANAX) 1 MG tablet Take 1 mg by mouth 4 times daily. Historical Med      Amantadine (SYMMETREL) 100 MG TABS tablet Take 100 mg by mouth 2 times dailyHistorical Med      lisinopril (PRINIVIL;ZESTRIL) 40 MG tablet Take 40 mg by mouth dailyHistorical Med      pantoprazole sodium (PROTONIX) 40 MG PACK packet Take 40 mg by mouth 2 times dailyHistorical Med      alendronate (FOSAMAX) 70 MG tablet Take 70 mg by mouth every 7 days      desvenlafaxine succinate (PRISTIQ) 100 MG TB24 Take by mouth daily      cyclobenzaprine (FLEXERIL) 10 MG tablet Take 10 mg by mouth 2 times daily as needed for Muscle spasmsHistorical Med      potassium bicarbonate (K-LYTE) 25 MEQ disintegrating tablet Take 25 mEq by mouth 2 times dailyHistorical Med      cyanocobalamin 1000 MCG/ML injection Inject 1,000 mcg into the muscle every 21 days Historical Med      azelastine (ASTELIN) 0.1 % nasal spray SPRAY IN EACH NOSTRIL PRN, R-0Historical Med      nortriptyline (PAMELOR) 25 MG capsule Take 2 capsules by mouth nightly, Disp-60 capsule, R-5Normal      Multiple Vitamins-Minerals (THERAPEUTIC MULTIVITAMIN-MINERALS) tablet Take 1 tablet by mouth daily      ferrous sulfate 325 (65 FE) MG tablet Take 325 mg by mouth daily (with breakfast)      calcium carbonate 600 MG TABS tablet Take 1 tablet by mouth daily      interferon beta-1a (AVONEX) 30 MCG injection Inject 30 mcg into the muscle once a week      cetirizine (ZYRTEC) 10 MG tablet Take 10 mg by mouth daily ALLERGIES     Avelox [moxifloxacin]; Sugar-protein-starch; Avelox [moxifloxacin]; Copaxone [glatiramer acetate]; Depakote [divalproex sodium]; Gabapentin; Hydroxychloroquine; Invega [paliperidone]; Lamictal [lamotrigine]; Lithium; Pregabalin; Lamictal [lamotrigine]; and [place]    FAMILY HISTORY       Family History   Problem Relation Age of Onset    Anemia Mother     Breast Cancer Mother     Depression Mother     High Blood Pressure Mother     Mental Illness Mother    Shannan Cons / Parvin See Mother     Colon Cancer Father     Depression Father     High Blood Pressure Father     Mental Illness Father     Breast Cancer Maternal Grandmother     Alcohol Abuse Maternal Grandfather     Anemia Paternal Grandmother     Alcohol Abuse Paternal Grandfather     Osteoporosis Paternal Grandfather     Stroke Paternal Grandfather     Substance Abuse Maternal Cousin     Substance Abuse Paternal Cousin     Asthma Other           SOCIAL HISTORY       Social History     Socioeconomic History    Marital status: Single     Spouse name: None    Number of children: 3    Years of education: Some College    Highest education level: None   Occupational History    Occupation: Disability   Social Needs    Financial resource strain: None    Food insecurity     Worry: None     Inability: None    Transportation needs     Medical: None     Non-medical: None   Tobacco Use    Smoking status: Current Every Day Smoker     Packs/day: 1.00     Years: 15.00     Pack years: 15.00     Types: Cigarettes    Smokeless tobacco: Never Used   Substance and Sexual Activity    Alcohol use:  Yes     Alcohol/week: 2.0 standard drinks     Types: 2 Shots of liquor per week     Comment: couple of shots per day    Drug use: No    Sexual activity: Never     Partners: Male   Lifestyle    Physical activity     Days per week: None     Minutes per session: None    Stress: None   Relationships    Social connections     Talks on phone: None     Gets together: None     Attends Latter-day service: None     Active member of club or organization: None     Attends meetings of clubs or organizations: None     Relationship status: None    Intimate partner violence     Fear of current or ex partner: None     Emotionally abused: None     Physically abused: None     Forced sexual activity: None   Other Topics Concern    None   Social History Narrative    ** Merged History Encounter **            SCREENINGS           PHYSICAL EXAM    (up to 7 forlevel 4, 8 or more for level 5)     ED Triage Vitals [07/04/20 1552]   BP Temp Temp Source Pulse Resp SpO2 Height Weight   138/89 98.7 °F (37.1 °C) Oral 77 20 99 % -- 200 lb (90.7 kg)       Physical Exam  Vitals signs and nursing note reviewed. Constitutional:       General: She is not in acute distress. Appearance: She is well-developed. She is not diaphoretic. HENT:      Head: Normocephalic and atraumatic. Right Ear: External ear normal.      Left Ear: External ear normal.      Mouth/Throat:      Pharynx: No oropharyngeal exudate. Eyes:      General:         Right eye: No discharge. Left eye: No discharge. Pupils: Pupils are equal, round, and reactive to light. Neck:      Musculoskeletal: Normal range of motion and neck supple. Thyroid: No thyromegaly. Cardiovascular:      Rate and Rhythm: Normal rate and regular rhythm. Heart sounds: Normal heart sounds. No murmur. No friction rub. Pulmonary:      Effort: Pulmonary effort is normal. No respiratory distress. Breath sounds: Normal breath sounds. No stridor. No wheezing. Abdominal:      General: Bowel sounds are normal. There is no distension. Palpations: Abdomen is soft. Tenderness: There is no abdominal tenderness. Musculoskeletal: Normal range of motion. Skin:     General: Skin is warm and dry. Capillary Refill: Capillary refill takes less than 2 seconds. Findings: No rash. Neurological:      General: No focal deficit present. Mental Status: She is alert and oriented to person, place, and time. Mental status is at baseline. Cranial Nerves: No cranial nerve deficit. Sensory: No sensory deficit. Motor: No weakness. Coordination: Coordination normal.      Comments: No obvious right sided weakness to me on exam.   Psychiatric:         Mood and Affect: Mood normal.         Behavior: Behavior normal.         Thought Content: Thought content normal.         Judgment: Judgment normal.           DIAGNOSTIC RESULTS     RADIOLOGY:   Non-plain film images such as CT, Ultrasound and MRI are read by the radiologist. Plain radiographic images are visualized and preliminarilyinterpreted by No att. providers found with the below findings:      Interpretation per the Radiologist below, if available at the time of this note:    MRI BRAIN W WO CONTRAST   Final Result   1. No diffusion restriction to indicate acute ischemia. 2. Scattered nonenhancing hyperintense T2/FLAIR signal changes within   the periventricular and white matter regions favorable for MS plaques. No abnormal enhancement to indicate active demyelination. 3. Partial opacification inferior right mastoid air cells, mastoid   effusion considered. Signed by Dr Neil Interiano on 7/4/2020 7:10 PM      CT Head WO Contrast   Final Result   1. No acute intracranial process identified. No intracranial   hemorrhage. 2. Scattered white matter hypodensities are similar and may relate to   sequelae of multiple sclerosis.    Signed by Dr Neil Interiano on 7/4/2020 5:00 PM          LABS:  Labs Reviewed   CBC WITH AUTO DIFFERENTIAL - Abnormal; Notable for the following components:       Result Value    RBC 3.82 (*)     Hemoglobin 11.7 (*)     Hematocrit 34.5 (*)     All other components within normal limits   COMPREHENSIVE METABOLIC PANEL - Abnormal; Notable for the following components:    CO2 20 (*)     CREATININE 1.2 (*)     GFR Non- 49 (*)     All other components within normal limits   URINE RT REFLEX TO CULTURE - Abnormal; Notable for the following components:    Ketones, Urine TRACE (*)     All other components within normal limits   URINE DRUG SCREEN - Abnormal; Notable for the following components:    Barbiturate Screen, Ur Positive (*)     Benzodiazepine Screen, Urine Positive (*)     Cannabinoid Scrn, Ur Positive (*)     All other components within normal limits   SEDIMENTATION RATE - Abnormal; Notable for the following components:    Sed Rate 21 (*)     All other components within normal limits   PREGNANCY, URINE   C-REACTIVE PROTEIN       All other labs were within normal range or notreturned as of this dictation. RE-ASSESSMENT        EMERGENCY DEPARTMENT COURSE and DIFFERENTIAL DIAGNOSIS/MDM:   Vitals:    Vitals:    07/04/20 1552 07/04/20 1951   BP: 138/89 131/88   Pulse: 77 67   Resp: 20 15   Temp: 98.7 °F (37.1 °C)    TempSrc: Oral    SpO2: 99% 98%   Weight: 200 lb (90.7 kg)          MDM  Spoke with Dr. Josef Couch on call for neurology who asked us to get MRI went over results with him her speech remains an issue has been now for 1 mo. No emergent findings on CT or MRI head today. Has been given 1 g solumedrol for possible flare. [facility] per Dr Josef Couch. PROCEDURES:    Procedures      FINAL IMPRESSION      1. Difficulty with speech    2.  Right sided weakness          DISPOSITION/PLAN   DISPOSITION Decision To Discharge 07/04/2020 07:37:07 PM      PATIENT REFERRED TO:  [facility] EMERGENCY DEPT  Doe Chapman  [phone]    If symptoms worsen    Channing Deluca MD  [id number] [facility]      Call [phone] Monday      DISCHARGE MEDICATIONS:  Discharge Medication List as of 7/4/2020  7:49 PM          (Please note that portions of this note were completed with a voice recognition program.  Efforts were made to edit the dictations but occasionallywords are mis-transcribed.)    CELENA Dias [place]  07/05/20 1003

## 2020-07-04 NOTE — ED NOTES
Pt to ED with muscle spasms, numbness, aphasia, and bilateral eye pain. Pt has hx of lupus and and MS and believes she is having a flare up.       Sofiya Cortez RN  07/04/20 0270

## 2020-07-05 ASSESSMENT — ENCOUNTER SYMPTOMS
COUGH: 0
SHORTNESS OF BREATH: 0
SORE THROAT: 0
PHOTOPHOBIA: 0
COLOR CHANGE: 0
NAUSEA: 0
ABDOMINAL DISTENTION: 0
APNEA: 0
ABDOMINAL PAIN: 0
RHINORRHEA: 0
EYE PAIN: 1
EYE DISCHARGE: 0
BACK PAIN: 0

## 2020-07-05 NOTE — ED PROVIDER NOTES
See PA note. Reviewed HPI, PE, labs, imaging. PA discussed with Neurology. Agree with A/P.      Ronaldo Mullen MD  07/05/20 [id number]

## 2020-07-06 ENCOUNTER — CARE COORDINATION (OUTPATIENT)
Dept: CARE COORDINATION | Age: 45
End: 2020-07-06

## 2020-07-06 ENCOUNTER — TELEPHONE (OUTPATIENT)
Dept: NEUROLOGY | Age: 45
End: 2020-07-06

## 2020-07-06 NOTE — TELEPHONE ENCOUNTER
Patient called needing to get in to see Dr Englnad. Dr England is completely booked until Sept, Oct. Dr Denys Hager had an opening on July 7, 2020 at 1:30pm. Patient was placed on the schedule for that date and time, patient voiced ok thank you. Patient was marked as a new patient due to not being seen since 2017. Patient saw Dr Denys Hager in hospital as a consult in May of 2019.

## 2020-07-07 ENCOUNTER — OFFICE VISIT (OUTPATIENT)
Dept: NEUROLOGY | Age: 45
End: 2020-07-07
Payer: MEDICARE

## 2020-07-07 VITALS
HEART RATE: 65 BPM | BODY MASS INDEX: 28.99 KG/M2 | WEIGHT: 174 LBS | HEIGHT: 65 IN | DIASTOLIC BLOOD PRESSURE: 78 MMHG | SYSTOLIC BLOOD PRESSURE: 106 MMHG

## 2020-07-07 DIAGNOSIS — G35 MULTIPLE SCLEROSIS (HCC): ICD-10-CM

## 2020-07-07 PROBLEM — R25.1 TREMOR: Status: ACTIVE | Noted: 2020-07-07

## 2020-07-07 PROBLEM — M79.7 FIBROMYALGIA: Status: ACTIVE | Noted: 2020-07-07

## 2020-07-07 PROBLEM — R47.9 DIFFICULTY WITH SPEECH: Status: ACTIVE | Noted: 2020-07-07

## 2020-07-07 PROBLEM — R20.0 NUMBNESS: Status: ACTIVE | Noted: 2020-07-07

## 2020-07-07 PROBLEM — R53.1 WEAKNESS: Status: ACTIVE | Noted: 2020-07-07

## 2020-07-07 PROBLEM — M32.9 LUPUS (HCC): Status: ACTIVE | Noted: 2020-07-07

## 2020-07-07 PROBLEM — H53.9 VISUAL DISTURBANCE: Status: ACTIVE | Noted: 2020-07-07

## 2020-07-07 LAB
ALBUMIN SERPL-MCNC: 4.1 G/DL (ref 3.5–5.2)
ALP BLD-CCNC: 78 U/L (ref 35–104)
ALT SERPL-CCNC: 25 U/L (ref 5–33)
ANION GAP SERPL CALCULATED.3IONS-SCNC: 12 MMOL/L (ref 7–19)
AST SERPL-CCNC: 22 U/L (ref 5–32)
BILIRUB SERPL-MCNC: <0.2 MG/DL (ref 0.2–1.2)
BUN BLDV-MCNC: 18 MG/DL (ref 6–20)
CALCIUM SERPL-MCNC: 9.2 MG/DL (ref 8.6–10)
CHLORIDE BLD-SCNC: 102 MMOL/L (ref 98–111)
CO2: 25 MMOL/L (ref 22–29)
CREAT SERPL-MCNC: 0.8 MG/DL (ref 0.5–0.9)
GFR AFRICAN AMERICAN: >59
GFR NON-AFRICAN AMERICAN: >60
GLUCOSE BLD-MCNC: 60 MG/DL (ref 74–109)
HEPATITIS C ANTIBODY INTERPRETATION: NORMAL
POTASSIUM SERPL-SCNC: 3.5 MMOL/L (ref 3.5–5)
SODIUM BLD-SCNC: 139 MMOL/L (ref 136–145)
TOTAL PROTEIN: 6.9 G/DL (ref 6.6–8.7)

## 2020-07-07 PROCEDURE — 4004F PT TOBACCO SCREEN RCVD TLK: CPT | Performed by: PSYCHIATRY & NEUROLOGY

## 2020-07-07 PROCEDURE — 99215 OFFICE O/P EST HI 40 MIN: CPT | Performed by: PSYCHIATRY & NEUROLOGY

## 2020-07-07 PROCEDURE — G8427 DOCREV CUR MEDS BY ELIG CLIN: HCPCS | Performed by: PSYCHIATRY & NEUROLOGY

## 2020-07-07 PROCEDURE — G8419 CALC BMI OUT NRM PARAM NOF/U: HCPCS | Performed by: PSYCHIATRY & NEUROLOGY

## 2020-07-07 RX ORDER — METHYLPREDNISOLONE SODIUM SUCCINATE 1 G/16ML
1000 INJECTION, POWDER, LYOPHILIZED, FOR SOLUTION INTRAMUSCULAR; INTRAVENOUS DAILY
Qty: 3 EACH | Refills: 0 | Status: SHIPPED | OUTPATIENT
Start: 2020-07-07 | End: 2020-07-10

## 2020-07-07 RX ORDER — DIMETHYL FUMARATE 240 MG/1
240 CAPSULE ORAL 2 TIMES DAILY
Status: ON HOLD | COMMUNITY
End: 2020-07-20 | Stop reason: ALTCHOICE

## 2020-07-07 NOTE — PROGRESS NOTES
Review of Systems    Constitutional - No fever or chills. yes diaphoresis or significant fatigue. HENT -  No tinnitus or significant hearing loss. Eyes - no sudden vision change or eye pain  Respiratory - no significant shortness of breath or cough  Cardiovascular - no chest pain No palpitations or significant leg swelling  Gastrointestinal - no abdominal swelling or pain. Genitourinary - No difficulty urinating, dysuria  Musculoskeletal - no back pain or myalgia. Skin - no color change or rash  Neurologic - No seizures. No lateralizing weakness. Hematologic - no easy bruising or excessive bleeding. Psychiatric - no severe anxiety or nervousness. All other review of systems are negative.

## 2020-07-07 NOTE — PROGRESS NOTES
Chief Complaint   Patient presents with    Follow-up     pt is new to provider. she was referred back due to having trouble with her speech, writing, facial twitching, and trouble walking       [facility] 45y.[facility] female who is seen for evaluation of follow for multiple sclerosis following an admission in 5/19 for optic neuritis treated with IV solumedrol. The patient has a history of multiple sclerosis, fibromyalgia and lupus along with chronic headaches was seen at that time for left visual change which began about 2 days prior to admission with eye pain. This was quite similar to her previous episodes of optic neuritis, of which she has had about 4 previous episodes treated with steroids. She was tried on oral steroids as an outpatient but this was not effective and was transferred from outside ER for further management. She has some chronic weakness and numbness and weakness on the left. She does have bowel and bladder incontinence. She is on a Avonex. Has not followed up with Dr Ines Patrick since 2017. She did not follow up in the office since 5/19. On 7/4/20 she was in the ER with generalized body aches. She apparently had some trembling on the right side. She had an MRI of the brain in the ER which revealed no acute ischemic change but did have scattered non enhancing white matter lesions in periventricular and white matter. She was given 1 gm Solumedrol for a possible flair up and told to follow up with neurology. She indicates that she typically does relatively well for a few weeks and then she feels severely fatigued or worsening vision. She has been in a wheelchair in 2017. She had her initial testing in 2017 at [facility] which was significant for non enhancing white matter lesions and a CSF with 6 oligoclonal bands seen only in the CSF. She still feels her face twitching and right sided numbness. He speech is not normal for her as she has to think about things carefully.  Her Paternal Grandmother     Alcohol Abuse Paternal Grandfather     Osteoporosis Paternal Grandfather     Stroke Paternal Grandfather     Substance Abuse Maternal Cousin     Substance Abuse Paternal Cousin     Asthma Other        Allergies   Allergen Reactions    Avelox [Moxifloxacin] Anaphylaxis    Sugar-Protein-Starch Other (See Comments)    Avelox [Moxifloxacin]     Copaxone [Glatiramer Acetate]     Depakote [Divalproex Sodium]     Gabapentin Itching    Hydroxychloroquine      Other reaction(s): Other (See Comments)  Vision loss   [place] [Paliperidone]     Lamictal [Lamotrigine]     Lithium     Pregabalin      Other reaction(s): Mental Status Change    Lamictal [Lamotrigine] Rash    Lithium Rash       Social History     Socioeconomic History    Marital status: Single     Spouse name: Not on file    Number of children: 3    Years of education: Some College    Highest education level: Not on file   Occupational History    Occupation: Disability   Social Needs    Financial resource strain: Not on file    Food insecurity     Worry: Not on file     Inability: Not on file   [employer] needs     Medical: Not on file     Non-medical: Not on file   Tobacco Use    Smoking status: Current Every Day Smoker     Packs/day: 1.50     Years: 15.00     Pack years: 22.50     Types: Cigarettes    Smokeless tobacco: Never Used   Substance and Sexual Activity    Alcohol use:  Yes     Alcohol/week: 2.0 standard drinks     Types: 2 Shots of liquor per week     Comment: couple of shots per day    Drug use: No    Sexual activity: Never     Partners: Male   Lifestyle    Physical activity     Days per week: Not on file     Minutes per session: Not on file    Stress: Not on file   Relationships    Social connections     Talks on phone: Not on file     Gets together: Not on file     Attends Jew service: Not on file     Active member of club or organization: Not on file     Attends meetings of clubs or organizations: Not on file     Relationship status: Not on file    Intimate partner violence     Fear of current or ex partner: Not on file     Emotionally abused: Not on file     Physically abused: Not on file     Forced sexual activity: Not on file   Other Topics Concern    Not on file   Social History Narrative    ** Merged History Encounter **            Review of Systems     Constitutional - No fever or chills. yes diaphoresis or significant fatigue. HENT -  No tinnitus or significant hearing loss. Eyes - no sudden vision change or eye pain  Respiratory - no significant shortness of breath or cough  Cardiovascular - no chest pain No palpitations or significant leg swelling  Gastrointestinal - no abdominal swelling or pain. Genitourinary - No difficulty urinating, dysuria  Musculoskeletal - no back pain or myalgia. Skin - no color change or rash  Neurologic - No seizures. No lateralizing weakness. Hematologic - no easy bruising or excessive bleeding. Psychiatric - no severe anxiety or nervousness. All other review of systems are negative. Current Outpatient Medications   Medication Sig Dispense Refill    dimethyl fumarate (TECFIDERA) 240 MG delayed release capsule Take 240 mg by mouth 2 times daily      methylPREDNISolone sodium (SOLU-MEDROL) 1000 MG injection Infuse 16 mLs intravenously daily for 3 days 3 each 0    hydrOXYzine (ATARAX) 50 MG tablet Take 50 mg by mouth 3 times daily as needed for Itching      hydroCHLOROthiazide (MICROZIDE) 12.5 MG capsule Take 12.5 mg by mouth daily      methylphenidate (CONCERTA) 36 MG extended release tablet Take 36 mg by mouth every morning.  topiramate (TOPAMAX SPRINKLE) 25 MG capsule Take 25 mg by mouth 2 times daily      zolpidem (AMBIEN) 10 MG tablet Take by mouth nightly as needed for Sleep.       lurasidone (LATUDA) 60 MG TABS tablet Take 60 mg by mouth daily      butalbital-acetaminophen-caffeine (FIORICET, ESGIC) -40 MG per tablet muscles noted, no bony deformities  Extremities-no clubbing, cyanosis or edema  Skin - warm, dry, and intact. No rash, erythema, or pallor. Psychiatric - mood, affect, and behavior appear normal.      Neurological exam  Awake, alert, fluent oriented  appropriate affect  Attention and concentration appear appropriate  Recent and remote memory appears unremarkable  Speech normal without dysarthria  Some hesitancy of speech  No clear issues with language of fund of knowledge    Cranial Nerve Exam     CN III, IV,VI-EOMI, No nystagmus, conjugate eye movements, no ptosis  CN VII-no facial assymetry        Motor Exam  Generalized mild giveway weakness    Sensory  Decreased sensation right arm and right leg worse in the foot    Tremors- no tremors in hands or head noted    Gait  Slightly slow    Lab Results   Component Value Date    [id number] 753 09/12/2018     Lab Results   Component Value Date    WBC 7.0 07/04/2020    HGB 11.7 (L) 07/04/2020    HCT 34.5 (L) 07/04/2020    MCV 90.3 07/04/2020     07/04/2020     Lab Results   Component Value Date     07/04/2020    K 4.0 07/04/2020     07/04/2020    CO2 20 (L) 07/04/2020    BUN 14 07/04/2020    CREATININE 1.2 (H) 07/04/2020    GLUCOSE 77 07/04/2020    CALCIUM 9.0 07/04/2020    PROT 6.7 07/04/2020    LABALBU 4.0 07/04/2020    BILITOT <0.2 07/04/2020    ALKPHOS 74 07/04/2020    AST 24 07/04/2020    ALT 21 07/04/2020    LABGLOM 49 (A) 07/04/2020    GLOB 3.0 11/14/2016       Impression   1. No diffusion restriction to indicate acute ischemia. 2. Scattered nonenhancing hyperintense T2/FLAIR signal changes within   the periventricular and white matter regions favorable for MS plaques. No abnormal enhancement to indicate active demyelination. 3. Partial opacification inferior right mastoid air cells, mastoid   effusion considered.    Signed by Dr Cortney Brunner on 7/4/2020 7:10 PM         Assessment    ICD-10-CM    1. Multiple sclerosis ([facility] [address] G35 PIERO Virus Miscellaneous Sendout 1     CBC Auto Differential     Comprehensive Metabolic Panel     Hepatitis C RNA QNT W Genotype RFLX     Hepatitis B surface antigen confirmation reflex     Hepatitis Be Antibody     Hepatitis C Antibody     Hepatitis B Surface Antigen   2. Visual disturbance H53.9    3. Tremor R25.1    4. Weakness R53.1    5. Numbness R20.0    6. Difficulty with speech R47.9    7. Lupus ([address] M32.9    8. Fibromyalgia M79.7        Her neurological examination was significant for some hesitance of speech, decreased visual acutiy, right sided numbness and some mild slowness of speech. She appears to have having a flair up  Of her multiple sclerosis at this time. At this time she will be started on Solumedrol 1 gm IV daily x 3 days. She will be switched from Tecfidera to Gloria Fend as she does not want to be on Tecfidera due to side effects. She will have an hepatitis panel followed by CMP, CBC and PIERO virus titers every 3 months. She is scheduled to see an MS specialist in [place]. The patient indicated understanding of the management plan. She is to follow up with me in one month and call with any issues. Plan    Orders Placed This Encounter   Procedures    PIERO Virus    Miscellaneous Sendout 1    CBC Auto Differential    Comprehensive Metabolic Panel    Hepatitis C RNA QNT W Genotype RFLX    Hepatitis B surface antigen confirmation reflex    Hepatitis Be Antibody    Hepatitis C Antibody    Hepatitis B Surface Antigen       Orders Placed This Encounter   Medications    methylPREDNISolone sodium (SOLU-MEDROL) 1000 MG injection     Sig: Infuse 16 mLs intravenously daily for 3 days     Dispense:  3 each     Refill:  0       Return in about 4 weeks (around 8/4/2020). EMR Dragon/transcription disclaimer:Significant part of this  encounter note is electronic transcription/translation of spoken language to printed text.  The electronic translation of spoken language may be erroneous, or at times, nonsensical words or phrases may be inadvertently transcribed.  Although I have reviewed the note for such errors, some may still exist.

## 2020-07-10 LAB
HEPATITIS B SURFACE ANTIGEN CONFIRMATION: NORMAL
HEPATITIS BE ANTIBODY: NEGATIVE

## 2020-07-12 LAB
HCV QNT BY NAAT IU/ML: NOT DETECTED IU/ML
HCV QNT BY NAAT LOG IU/ML: NOT DETECTED LOG IU/ML
INTERPRETATION: NOT DETECTED

## 2020-07-13 ENCOUNTER — TELEPHONE (OUTPATIENT)
Dept: NEUROLOGY | Age: 45
End: 2020-07-13

## 2020-07-13 NOTE — TELEPHONE ENCOUNTER
Antoni BAEZA requests that someone return their call. The best time to reach her is Anytime today. Pt has finished her Infusion at [address] [place] [address] Dr. She is still having vision loss left and numbness on right side. Thank you.

## 2020-07-14 LAB
JC VIRUS SOURCE: NORMAL
JC VIRUS, DNA: NOT DETECTED

## 2020-07-14 RX ORDER — METHYLPREDNISOLONE SODIUM SUCCINATE 1 G/16ML
1000 INJECTION, POWDER, LYOPHILIZED, FOR SOLUTION INTRAMUSCULAR; INTRAVENOUS DAILY
Qty: 2 EACH | Refills: 0 | Status: SHIPPED | OUTPATIENT
Start: 2020-07-14 | End: 2020-07-17

## 2020-07-15 LAB
Lab: NORMAL
REPORT: NORMAL
THIS TEST SENT TO: NORMAL

## 2020-07-16 ENCOUNTER — OFFICE VISIT (OUTPATIENT)
Age: 45
End: 2020-07-16

## 2020-07-16 VITALS — OXYGEN SATURATION: 99 % | HEART RATE: 71 BPM | TEMPERATURE: 97.4 F

## 2020-07-18 LAB
REPORT: NORMAL
SARS-COV-2: NOT DETECTED
THIS TEST SENT TO: NORMAL

## 2020-07-20 ENCOUNTER — HOSPITAL ENCOUNTER (OUTPATIENT)
Dept: NEUROLOGY | Age: 45
Setting detail: OBSERVATION
Discharge: HOME | End: 2020-07-22
Attending: PSYCHIATRY & NEUROLOGY | Admitting: PSYCHIATRY & NEUROLOGY
Payer: MEDICARE

## 2020-07-20 PROBLEM — R56.9 CONVULSIONS (HCC): Status: ACTIVE | Noted: 2020-07-20

## 2020-07-20 PROCEDURE — 95714 VEEG EA 12-26 HR UNMNTR: CPT

## 2020-07-20 PROCEDURE — 99219 PR INITIAL OBSERVATION CARE/DAY 50 MINUTES: CPT | Performed by: PSYCHIATRY & NEUROLOGY

## 2020-07-20 PROCEDURE — 6370000000 HC RX 637 (ALT 250 FOR IP): Performed by: PSYCHIATRY & NEUROLOGY

## 2020-07-20 PROCEDURE — G0378 HOSPITAL OBSERVATION PER HR: HCPCS

## 2020-07-20 RX ORDER — ZOLPIDEM TARTRATE 5 MG/1
5 TABLET ORAL NIGHTLY PRN
Status: DISCONTINUED | OUTPATIENT
Start: 2020-07-20 | End: 2020-07-22 | Stop reason: HOSPADM

## 2020-07-20 RX ORDER — AZELASTINE 1 MG/ML
1 SPRAY, METERED NASAL 2 TIMES DAILY PRN
Status: DISCONTINUED | OUTPATIENT
Start: 2020-07-20 | End: 2020-07-20 | Stop reason: RX

## 2020-07-20 RX ORDER — LORAZEPAM 2 MG/ML
1 INJECTION INTRAMUSCULAR PRN
Status: DISCONTINUED | OUTPATIENT
Start: 2020-07-20 | End: 2020-07-22 | Stop reason: HOSPADM

## 2020-07-20 RX ORDER — ALPRAZOLAM 0.25 MG/1
1 TABLET ORAL 4 TIMES DAILY
Status: DISCONTINUED | OUTPATIENT
Start: 2020-07-20 | End: 2020-07-20

## 2020-07-20 RX ORDER — BUTALBITAL, ACETAMINOPHEN AND CAFFEINE 50; 325; 40 MG/1; MG/1; MG/1
1 TABLET ORAL EVERY 4 HOURS PRN
Status: DISCONTINUED | OUTPATIENT
Start: 2020-07-20 | End: 2020-07-22 | Stop reason: HOSPADM

## 2020-07-20 RX ORDER — PRAZOSIN HYDROCHLORIDE 2 MG/1
2 CAPSULE ORAL NIGHTLY
Status: DISCONTINUED | OUTPATIENT
Start: 2020-07-20 | End: 2020-07-22 | Stop reason: HOSPADM

## 2020-07-20 RX ORDER — PANTOPRAZOLE SODIUM 40 MG/1
40 TABLET, DELAYED RELEASE ORAL
Status: DISCONTINUED | OUTPATIENT
Start: 2020-07-20 | End: 2020-07-22 | Stop reason: HOSPADM

## 2020-07-20 RX ORDER — PHENOL 1.4 %
1 AEROSOL, SPRAY (ML) MUCOUS MEMBRANE DAILY
Status: DISCONTINUED | OUTPATIENT
Start: 2020-07-20 | End: 2020-07-20

## 2020-07-20 RX ORDER — ACETAMINOPHEN 325 MG/1
650 TABLET ORAL EVERY 6 HOURS PRN
Status: DISCONTINUED | OUTPATIENT
Start: 2020-07-20 | End: 2020-07-22 | Stop reason: HOSPADM

## 2020-07-20 RX ORDER — POTASSIUM CHLORIDE 20 MEQ/1
20 TABLET, EXTENDED RELEASE ORAL 2 TIMES DAILY
COMMUNITY

## 2020-07-20 RX ORDER — SUCRALFATE 1 G/1
1 TABLET ORAL 2 TIMES DAILY
COMMUNITY
End: 2021-09-28

## 2020-07-20 RX ORDER — DESVENLAFAXINE 50 MG/1
50 TABLET, EXTENDED RELEASE ORAL DAILY
Status: DISCONTINUED | OUTPATIENT
Start: 2020-07-21 | End: 2020-07-22 | Stop reason: HOSPADM

## 2020-07-20 RX ORDER — ALPRAZOLAM 0.25 MG/1
1 TABLET ORAL 3 TIMES DAILY
Status: DISCONTINUED | OUTPATIENT
Start: 2020-07-20 | End: 2020-07-22 | Stop reason: HOSPADM

## 2020-07-20 RX ORDER — HYDROXYZINE HYDROCHLORIDE 25 MG/1
50 TABLET, FILM COATED ORAL 3 TIMES DAILY PRN
Status: DISCONTINUED | OUTPATIENT
Start: 2020-07-20 | End: 2020-07-22 | Stop reason: HOSPADM

## 2020-07-20 RX ORDER — METHYLPHENIDATE HYDROCHLORIDE 18 MG/1
36 TABLET ORAL EVERY MORNING
Status: DISCONTINUED | OUTPATIENT
Start: 2020-07-20 | End: 2020-07-22 | Stop reason: HOSPADM

## 2020-07-20 RX ORDER — AMANTADINE HYDROCHLORIDE 100 MG/1
100 CAPSULE, GELATIN COATED ORAL 2 TIMES DAILY
Status: DISCONTINUED | OUTPATIENT
Start: 2020-07-20 | End: 2020-07-22 | Stop reason: HOSPADM

## 2020-07-20 RX ORDER — FOLIC ACID 1 MG/1
1 TABLET ORAL DAILY
Status: DISCONTINUED | OUTPATIENT
Start: 2020-07-20 | End: 2020-07-22 | Stop reason: HOSPADM

## 2020-07-20 RX ORDER — TOPIRAMATE 25 MG/1
25 TABLET ORAL DAILY
COMMUNITY
End: 2021-05-13

## 2020-07-20 RX ORDER — LISINOPRIL 20 MG/1
40 TABLET ORAL DAILY
Status: DISCONTINUED | OUTPATIENT
Start: 2020-07-20 | End: 2020-07-22 | Stop reason: HOSPADM

## 2020-07-20 RX ORDER — M-VIT,TX,IRON,MINS/CALC/FOLIC 27MG-0.4MG
1 TABLET ORAL DAILY
Status: DISCONTINUED | OUTPATIENT
Start: 2020-07-20 | End: 2020-07-22 | Stop reason: HOSPADM

## 2020-07-20 RX ORDER — FERROUS SULFATE 325(65) MG
325 TABLET ORAL
Status: DISCONTINUED | OUTPATIENT
Start: 2020-07-21 | End: 2020-07-20

## 2020-07-20 RX ORDER — SODIUM CHLORIDE 0.9 % (FLUSH) 0.9 %
10 SYRINGE (ML) INJECTION PRN
Status: DISCONTINUED | OUTPATIENT
Start: 2020-07-20 | End: 2020-07-22 | Stop reason: HOSPADM

## 2020-07-20 RX ORDER — SUCRALFATE 1 G/1
1 TABLET ORAL 2 TIMES DAILY
Status: DISCONTINUED | OUTPATIENT
Start: 2020-07-20 | End: 2020-07-22 | Stop reason: HOSPADM

## 2020-07-20 RX ORDER — NORTRIPTYLINE HYDROCHLORIDE 25 MG/1
50 CAPSULE ORAL NIGHTLY
Status: DISCONTINUED | OUTPATIENT
Start: 2020-07-20 | End: 2020-07-20

## 2020-07-20 RX ORDER — HYDROCHLOROTHIAZIDE 12.5 MG/1
12.5 CAPSULE, GELATIN COATED ORAL DAILY
Status: DISCONTINUED | OUTPATIENT
Start: 2020-07-20 | End: 2020-07-22 | Stop reason: HOSPADM

## 2020-07-20 RX ORDER — DESVENLAFAXINE 50 MG/1
100 TABLET, EXTENDED RELEASE ORAL DAILY
Status: DISCONTINUED | OUTPATIENT
Start: 2020-07-20 | End: 2020-07-20

## 2020-07-20 RX ORDER — TOPIRAMATE 50 MG/1
25 TABLET, FILM COATED ORAL DAILY
Status: DISCONTINUED | OUTPATIENT
Start: 2020-07-20 | End: 2020-07-22 | Stop reason: HOSPADM

## 2020-07-20 RX ORDER — TOPIRAMATE 25 MG/1
25 CAPSULE, COATED PELLETS ORAL 2 TIMES DAILY
Status: DISCONTINUED | OUTPATIENT
Start: 2020-07-20 | End: 2020-07-20

## 2020-07-20 RX ORDER — PRAZOSIN HYDROCHLORIDE 2 MG/1
2 CAPSULE ORAL NIGHTLY
COMMUNITY
End: 2021-05-13

## 2020-07-20 RX ORDER — FOLIC ACID 1 MG/1
1 TABLET ORAL DAILY
COMMUNITY
End: 2022-03-18

## 2020-07-20 RX ORDER — GABAPENTIN 600 MG/1
1800 TABLET, FILM COATED ORAL NIGHTLY
COMMUNITY
End: 2021-09-28

## 2020-07-20 RX ORDER — BUSPIRONE HYDROCHLORIDE 15 MG/1
15 TABLET ORAL 3 TIMES DAILY
Status: DISCONTINUED | OUTPATIENT
Start: 2020-07-20 | End: 2020-07-20

## 2020-07-20 RX ORDER — BUSPIRONE HYDROCHLORIDE 10 MG/1
20 TABLET ORAL 2 TIMES DAILY
Status: DISCONTINUED | OUTPATIENT
Start: 2020-07-20 | End: 2020-07-22 | Stop reason: HOSPADM

## 2020-07-20 RX ORDER — SODIUM CHLORIDE 0.9 % (FLUSH) 0.9 %
10 SYRINGE (ML) INJECTION EVERY 12 HOURS SCHEDULED
Status: DISCONTINUED | OUTPATIENT
Start: 2020-07-20 | End: 2020-07-22 | Stop reason: HOSPADM

## 2020-07-20 RX ORDER — CETIRIZINE HYDROCHLORIDE 10 MG/1
10 TABLET ORAL DAILY
Status: DISCONTINUED | OUTPATIENT
Start: 2020-07-20 | End: 2020-07-20

## 2020-07-20 RX ORDER — NICOTINE 21 MG/24HR
1 PATCH, TRANSDERMAL 24 HOURS TRANSDERMAL DAILY
Status: DISCONTINUED | OUTPATIENT
Start: 2020-07-20 | End: 2020-07-22 | Stop reason: HOSPADM

## 2020-07-20 RX ADMIN — LURASIDONE HYDROCHLORIDE 60 MG: 40 TABLET, FILM COATED ORAL at 21:22

## 2020-07-20 RX ADMIN — SUCRALFATE 1 G: 1 TABLET ORAL at 21:23

## 2020-07-20 RX ADMIN — BUSPIRONE HYDROCHLORIDE 20 MG: 10 TABLET ORAL at 21:23

## 2020-07-20 RX ADMIN — AMANTADINE HYDROCHLORIDE 100 MG: 100 CAPSULE ORAL at 21:23

## 2020-07-20 RX ADMIN — PRAZOSIN HYDROCHLORIDE 2 MG: 2 CAPSULE ORAL at 21:23

## 2020-07-20 RX ADMIN — PANTOPRAZOLE SODIUM 40 MG: 40 TABLET, DELAYED RELEASE ORAL at 17:56

## 2020-07-20 RX ADMIN — ALPRAZOLAM 1 MG: 0.25 TABLET ORAL at 21:22

## 2020-07-20 NOTE — H&P
HISTORY AND PHYSICAL      Patient:   Caleb Shrestha  MR#:    [id number]  Account Number:                   [id number]      Room:    57418-35   YOB: 1975  Date of Progress Note: 7/20/2020  Time of Note                           1:06 PM   Attending Physician:  Kian Olivia DO.       CHIEF COMPLAINT:  Convulsions    HISTORY OF PRESENT ILLNESS:   This 45 y.o. female who presents with possible seizures. The patient is followed by Dr. Ping Mascorro. She has an underlying history of multiple sclerosis. She has been treated with Tecfidera but is currently not on any MS therapy. She has been having recurrent episodes of a transient loss of awareness with intermittent generalized shaking and tremulousness. No overt tongue biting noted. She does report possible incontinence. No prior history of traumatic brain injury meningitis or encephalitis. She is not currently on antiepileptic medications. REVIEW OF SYSTEMS    Constitutional:  Denies fever or chills   Eyes:  Denies change in visual acuity   HENT:  Denies nasal congestion or sore throat   Respiratory:  Denies cough or shortness of breath   Cardiovascular:  Denies chest pain or edema   GI:  Denies abdominal pain, nausea, vomiting, bloody stools or diarrhea   :  Denies dysuria   Musculoskeletal:  Denies back pain or joint pain   Integument:  Denies rash   Neurologic:  Denies headache, focal weakness or sensory changes   Endocrine:  Denies polyuria or polydipsia   Lymphatic:  Denies swollen glands   Psychiatric:  Denies depression or anxiety      All other review of systems are negative.     Past Medical History:  Past Medical History:   Diagnosis Date    Arthritis     Bipolar 1 disorder ([facility] [address]     Convulsions ([facility] [address] 7/20/2020    Fibromyalgia     GERD (gastroesophageal reflux disease)     Hypertension     Lupus ([facility] [address]     Movement disorder     Multiple sclerosis (HCC)     Neuromuscular disorder ([facility] [address]     Psychiatric problem        Past cetirizine (ZYRTEC) tablet 10 mg, 10 mg, Oral, Daily, Jose Purdue, DO    desvenlafaxine succinate (PRISTIQ) extended release tablet 100 mg, 100 mg, Oral, Daily, Jose Purdue, DO    [START ON 7/21/2020] ferrous sulfate (IRON 325) tablet 325 mg, 325 mg, Oral, Daily with breakfast, Jose Purdue, DO    hydroCHLOROthiazide (MICROZIDE) capsule 12.5 mg, 12.5 mg, Oral, Daily, Jose Purdue, DO    hydrOXYzine (ATARAX) tablet 50 mg, 50 mg, Oral, TID PRN, Jose Purdue, DO    lisinopril (PRINIVIL;ZESTRIL) tablet 40 mg, 40 mg, Oral, Daily, Jose Purdue, DO    lurasidone (LATUDA) tablet 60 mg, 60 mg, Oral, Daily, Jose Purdue, DO    methylphenidate (CONCERTA) extended release tablet 36 mg, 36 mg, Oral, QAM, Jose Purdue, DO    therapeutic multivitamin-minerals 1 tablet, 1 tablet, Oral, Daily, Jose Purdue, DO    nortriptyline (PAMELOR) capsule 50 mg, 50 mg, Oral, Nightly, Ronnie Woods Couasad, DO    pantoprazole (PROTONIX) tablet 40 mg, 40 mg, Oral, BID AC, Ronnie Woods Couasad,     potassium bicarbonate (K-LYTE) disintegrating tablet 25 mEq, 25 mEq, Oral, BID, Jose Purdue, DO    topiramate (TOPAMAX SPRINKLE) capsule 25 mg, 25 mg, Oral, BID, Jose Purdue, DO    zolpidem (AMBIEN) tablet 5 mg, 5 mg, Oral, Nightly PRN, Jose Purdue, DO    sodium chloride flush 0.9 % injection 10 mL, 10 mL, Intravenous, 2 times per day, Jose Purdue, DO    sodium chloride flush 0.9 % injection 10 mL, 10 mL, Intravenous, PRN, Jose Purdue, DO    enoxaparin (LOVENOX) injection 40 mg, 40 mg, Subcutaneous, Daily, Jose Purdue, DO    nicotine (NICODERM CQ) 14 MG/24HR 1 patch, 1 patch, Transdermal, Daily, Jose Purdue, DO    acetaminophen (TYLENOL) tablet 650 mg, 650 mg, Oral, Q6H PRN, Jose Muro, DO    LORazepam (ATIVAN) injection 1 mg, 1 mg, Intravenous, PRN, Jose Muro, DO    Allergies: Avelox [moxifloxacin]; Sugar-protein-starch; Avelox [moxifloxacin]; Copaxone [glatiramer acetate];  Depakote [divalproex sodium]; Gabapentin; Hydroxychloroquine; Invega [paliperidone]; Lamictal [lamotrigine]; Lithium; Pregabalin; Lamictal [lamotrigine]; and Lithium    PHYSICAL EXAM:    Constitutional -   /73   Pulse 69   Temp 97.7 °F (36.5 °C) (Temporal)   Resp 20   SpO2 100%   General appearance: No acute distress   EYES -   Conjunctiva normal  Pupillary exam as below, see CN exam in the neurologic exam  ENT-    No scars, masses, or lesions over external nose or ears  Hearing normal bilaterally to finger rub  Cardiovascular -   No clubbing, cyanosis, or edema   Respiratory-   Good expansion, normal effort without use of accessory muscles  Musculoskeletal -   No significant wasting of muscles noted  Gait as below, see gait exam in the neurologic exam  Muscle strength, tone, stability as below. No bony deformities  Skin -   Warm, dry, and intact to inspection and palpation. No rash, erythema, or pallor  Psychiatric -   Mood, affect, and behavior appear normal    Memory as below see mental status examination in the neurologic exam      NEUROLOGICAL EXAM    Mental status   [x]Awake, alert, oriented   [x]Affect attention and concentration appear appropriate  [x]Recent and remote memory appears unremarkable  [x]Speech normal without dysarthria or aphasia, comprehension and repetition intact.    COMMENTS:    Cranial Nerves [x]No VF deficit to confrontation  [x]PERRLA, EOMI, no nystagmus, conjugate eye movements, no ptosis  [x]Face symmetric  [x]Facial sensation intact  [x]Tongue midline no atrophy or fasciculations present  [x]Palate midline, hearing to finger rub normal  [x]Shoulder shrug and SCM testing normal  COMMENTS:   Motor   []5/5 strength x 4 extremities  [x]Normal bulk and tone  [x]No tremor present  [x]No rigidity or bradykinesia noted  COMMENTS: 4+/5 globally   Sensory  []Sensation intact to light touch, pin prick, vibration, and proprioception BLE  []Sensation intact to light touch, pin prick, vibration, and proprioception SHIELA  COMMENTS:   Coordination [x]FTN normal bilaterally   []HTS normal bilaterally  []NATALIE normal.   COMMENTS:   Reflexes  [x]Symmetric and non-pathological  [x]Toes down going bilaterally  [x]No clonus present  COMMENTS:   Gait                  []Normal steady gait    []Ataxic    []Spastic     []Magnetic     []Shuffling  COMMENTS: Not assessed             LABS AND IMAGING:  As below and per HPI. ASSESSMENT:  45 y.o. here with possible seizures. The patient has been admitted to epilepsy monitoring unit for event clarification. The patient was referred by Dr. Mervyn Prader. PLAN:  1. Video EEG monitoring 48-72 hours. 2.  Ativan prn, seizure precautions. 3.  Final AED recommendations pending above.      Rosa Neal DO  Board Certified Neurologist

## 2020-07-20 NOTE — PROGRESS NOTES
PHARMACY NOTE  Evert Myra was ordered Astelin nasal spray prn. . Per the [address], this medication is non-formulary and not stocked by pharmacy. It has been discontinued. The medication can be reordered at discharge.      Electronically signed by Yelitza [facility] on 7/20/2020 at 1:20 PM

## 2020-07-21 ENCOUNTER — TELEPHONE (OUTPATIENT)
Dept: NEUROLOGY | Age: 45
End: 2020-07-21

## 2020-07-21 PROCEDURE — 99225 PR SBSQ OBSERVATION CARE/DAY 25 MINUTES: CPT | Performed by: PSYCHIATRY & NEUROLOGY

## 2020-07-21 PROCEDURE — 6370000000 HC RX 637 (ALT 250 FOR IP): Performed by: PSYCHIATRY & NEUROLOGY

## 2020-07-21 PROCEDURE — G0378 HOSPITAL OBSERVATION PER HR: HCPCS

## 2020-07-21 PROCEDURE — 2580000003 HC RX 258: Performed by: PSYCHIATRY & NEUROLOGY

## 2020-07-21 PROCEDURE — 95720 EEG PHY/QHP EA INCR W/VEEG: CPT | Performed by: PSYCHIATRY & NEUROLOGY

## 2020-07-21 RX ADMIN — TOPIRAMATE 25 MG: 50 TABLET, FILM COATED ORAL at 09:25

## 2020-07-21 RX ADMIN — SODIUM CHLORIDE, PRESERVATIVE FREE 10 ML: 5 INJECTION INTRAVENOUS at 20:37

## 2020-07-21 RX ADMIN — ALPRAZOLAM 1 MG: 0.25 TABLET ORAL at 09:25

## 2020-07-21 RX ADMIN — FOLIC ACID 1 MG: 1 TABLET ORAL at 09:25

## 2020-07-21 RX ADMIN — BUSPIRONE HYDROCHLORIDE 20 MG: 10 TABLET ORAL at 20:29

## 2020-07-21 RX ADMIN — AMANTADINE HYDROCHLORIDE 100 MG: 100 CAPSULE ORAL at 20:29

## 2020-07-21 RX ADMIN — HYDROCHLOROTHIAZIDE 12.5 MG: 12.5 CAPSULE ORAL at 09:25

## 2020-07-21 RX ADMIN — PANTOPRAZOLE SODIUM 40 MG: 40 TABLET, DELAYED RELEASE ORAL at 17:44

## 2020-07-21 RX ADMIN — SUCRALFATE 1 G: 1 TABLET ORAL at 09:25

## 2020-07-21 RX ADMIN — PANTOPRAZOLE SODIUM 40 MG: 40 TABLET, DELAYED RELEASE ORAL at 06:19

## 2020-07-21 RX ADMIN — SODIUM CHLORIDE, PRESERVATIVE FREE 10 ML: 5 INJECTION INTRAVENOUS at 09:24

## 2020-07-21 RX ADMIN — BUSPIRONE HYDROCHLORIDE 20 MG: 10 TABLET ORAL at 09:25

## 2020-07-21 RX ADMIN — LURASIDONE HYDROCHLORIDE 60 MG: 40 TABLET, FILM COATED ORAL at 20:30

## 2020-07-21 RX ADMIN — AMANTADINE HYDROCHLORIDE 100 MG: 100 CAPSULE ORAL at 09:25

## 2020-07-21 RX ADMIN — SUCRALFATE 1 G: 1 TABLET ORAL at 20:29

## 2020-07-21 RX ADMIN — MULTIPLE VITAMINS W/ MINERALS TAB 1 TABLET: TAB at 09:25

## 2020-07-21 RX ADMIN — PRAZOSIN HYDROCHLORIDE 2 MG: 2 CAPSULE ORAL at 20:29

## 2020-07-21 RX ADMIN — DESVENLAFAXINE SUCCINATE 50 MG: 50 TABLET, EXTENDED RELEASE ORAL at 09:25

## 2020-07-21 RX ADMIN — ALPRAZOLAM 1 MG: 0.25 TABLET ORAL at 20:30

## 2020-07-21 RX ADMIN — LISINOPRIL 40 MG: 20 TABLET ORAL at 09:25

## 2020-07-21 NOTE — PLAN OF CARE
Problem: Coping:  Goal: Ability to cope will improve  Description: Ability to cope will improve  7/21/2020 0431 by Joanne Hollins RN  Outcome: Ongoing  7/21/2020 0425 by Joanne Hollins RN  Outcome: Ongoing  Goal: Ability to identify appropriate support needs will improve  Description: Ability to identify appropriate support needs will improve  7/21/2020 0431 by Joanne Hollins RN  Outcome: Ongoing  7/21/2020 0425 by Joanne Hollins RN  Outcome: Ongoing     Problem: Health Behavior:  Goal: Ability to manage health-related needs will improve  Description: Ability to manage health-related needs will improve  7/21/2020 0431 by Joanne Hollins RN  Outcome: Ongoing  7/21/2020 0425 by Joanne Hollins RN  Outcome: Ongoing     Problem: Physical Regulation:  Goal: Signs of adequate cerebral perfusion will increase  Description: Signs of adequate cerebral perfusion will increase  7/21/2020 0431 by Joanne Hollins RN  Outcome: Ongoing  7/21/2020 0425 by Joanne Hollins RN  Outcome: Ongoing  Goal: Ability to maintain a stable neurologic state will improve  Description: Ability to maintain a stable neurologic state will improve  7/21/2020 0431 by Joanne Hollins RN  Outcome: Ongoing  7/21/2020 0425 by Joanne Hollins RN  Outcome: Ongoing     Problem: Safety:  Goal: Ability to remain free from injury will improve  Description: Ability to remain free from injury will improve  7/21/2020 0431 by Joanne Hollins RN  Outcome: Ongoing  7/21/2020 0425 by Joanne Hollins RN  Outcome: Ongoing     Problem: Self-Concept:  Goal: Level of anxiety will decrease  Description: Level of anxiety will decrease  7/21/2020 0431 by Joanne Hollins RN  Outcome: Ongoing  7/21/2020 0425 by Joanne Hollins RN  Outcome: Ongoing  Goal: Ability to verbalize feelings about condition will improve  Description: Ability to verbalize feelings about condition will improve  7/21/2020 0431 by Joanne Hollins RN  Outcome: Ongoing  7/21/2020 0425 by Joanne Hollins RN  Outcome: RN  Outcome: Ongoing

## 2020-07-21 NOTE — TELEPHONE ENCOUNTER
Pt called requesting you to order an upper and lower Nerve Conduction Study. Pt also needs the results faxed to Dr. Bin Schmid office when completed. Pt says she used to get them frequently but she hasn't had one done in a year and she would like to know the condition of her nerves. Please advise.

## 2020-07-21 NOTE — PROGRESS NOTES
[facility] Progress Note      Patient:   Chioma Philip  MR#:    [id number]   Room:    [facility] [place]29   YOB: 1975  Date of Progress Note: 7/21/2020  Time of Note                           8:47 AM  Attending Physician:  Joe Christian DO      INTERVAL HISTORY:  No acute events overnight, no seizures. REVIEW OF SYSTEMS:  Constitutional: No fevers No chills  Neck:No stiffness  Respiratory: No shortness of breath  Cardiovascular: No chest pain No palpitations  Gastrointestinal: No abdominal pain    Genitourinary: No Dysuria  Neurological: No headache, no confusion    PHYSICAL EXAM:    Constitutional -   /69   Pulse 66   Temp 97.9 °F (36.6 °C) (Temporal)   Resp 20   SpO2 99%   General appearance: No acute distress   EYES -   Conjunctiva normal  Pupillary exam as below, see CN exam in the neurologic exam  ENT-    No scars, masses, or lesions over external nose or ears  Hearing normal bilaterally to finger rub  Neck-   No neck masses noted  Thyroid normal   No jugular vein distension  Cardiovascular -   RRR  No clubbing, cyanosis, or edema   Pulmonary-   Good expansion, normal effort without use of accessory muscles  CTA  Musculoskeletal -   No significant wasting of muscles noted  Gait as below, see gait exam in the neurologic exam  Muscle strength, tone, stability as below see the motor exam in the neurologic exam.   No bony deformities  Skin -   Warm, dry, and intact to inspection and palpation. No rash, erythema, or pallor  Psychiatric -   Mood, affect, and behavior appear normal    Memory as below see mental status examination in the neurologic exam      NEUROLOGICAL EXAM    Mental status   [x] Awake, alert, oriented   [x] Affect attention and concentration appear appropriate  [x] Recent and remote memory appears unremarkable  [x] Speech normal without dysarthria or aphasia, comprehension and repetition intact.    COMMENTS:   Cranial Nerves [x] No VF deficit to confrontation  [x] PERRLA, EOMI, no nystagmus, conjugate eye movements, no ptosis  [x] Face symmetric  [x] Facial sensation intact  [x] Tongue midline no atrophy or fasciculations present  [x] Palate midline, hearing to finger rub normal  [x] Shoulder shrug and SCM testing normal  COMMENTS:   Motor   [x] 5/5 strength x 4 extremities  [x] Normal bulk and tone  [x] No tremor present  [x] No rigidity or bradykinesia noted  COMMENTS:   Sensory  [x] Sensation intact to light touch, pin prick, vibration, and proprioception BLE  [] Sensation intact to light touch, pin prick, vibration, and proprioception BUE  COMMENTS:   Coordination [x] FTN normal bilaterally   [] HTS normal bilaterally  [] NATALIE normal.   COMMENTS:   Reflexes  [x] Symmetric and non-pathological  [x] Toes downgoing bilaterally  [x] No clonus present  COMMENTS:   Gait                  [x] Normal steady gait    [] Ataxic    [] Spastic     [] Magnetic     [] Shuffling  [] Not assessed  COMMENTS:       LABS/IMAGING:    Lab Results   Component Value Date    WBC 7.0 07/04/2020    HGB 11.7 (L) 07/04/2020    HCT 34.5 (L) 07/04/2020    MCV 90.3 07/04/2020     07/04/2020     Lab Results   Component Value Date     07/07/2020    K 3.5 07/07/2020     07/07/2020    CO2 25 07/07/2020    BUN 18 07/07/2020    CREATININE 0.8 07/07/2020    GLUCOSE 60 (L) 07/07/2020    CALCIUM 9.2 07/07/2020    PROT 6.9 07/07/2020    LABALBU 4.1 07/07/2020    BILITOT <0.2 07/07/2020    ALKPHOS 78 07/07/2020    AST 22 07/07/2020    ALT 25 07/07/2020    LABGLOM >60 07/07/2020    GFRAA >59 07/07/2020    GLOB 3.0 11/14/2016     Lab Results   Component Value Date    INR 1.09 10/05/2019    INR 1.20 (H) 09/11/2018    INR 1.01 11/14/2016    PROTIME 13.5 10/05/2019    PROTIME 15.1 (H) 09/11/2018    PROTIME 13.3 11/14/2016       RECORD REVIEW:   Previous medical records, medications were reviewed at today's visit. Nursing/physician notes, imaging, labs and vitals reviewed.  PT,OT and/or speech notes reviewed       ASSESSMENT:  45 y.o. here with possible seizures. The patient has been admitted to our epilepsy monitoring unit for event clarification. The patient was referred by Dr. Jacob Nieves. No events thus far.      PLAN:  1. Continue Video EEG monitoring 72-96 hours. 2.  Ativan prn, seizure precautions. 3.  Final AED recommendations pending above. Please feel free to call with any questions. [phone] (cell phone).       Yovani Edwards DO  Board Certified Neurology

## 2020-07-21 NOTE — PLAN OF CARE
Problem: Coping:  Goal: Ability to cope will improve  Description: Ability to cope will improve  Outcome: Ongoing  Goal: Ability to identify appropriate support needs will improve  Description: Ability to identify appropriate support needs will improve  Outcome: Ongoing     Problem: Health Behavior:  Goal: Ability to manage health-related needs will improve  Description: Ability to manage health-related needs will improve  Outcome: Ongoing     Problem: Physical Regulation:  Goal: Signs of adequate cerebral perfusion will increase  Description: Signs of adequate cerebral perfusion will increase  Outcome: Ongoing  Goal: Ability to maintain a stable neurologic state will improve  Description: Ability to maintain a stable neurologic state will improve  Outcome: Ongoing     Problem: Safety:  Goal: Ability to remain free from injury will improve  Description: Ability to remain free from injury will improve  Outcome: Ongoing     Problem: Self-Concept:  Goal: Level of anxiety will decrease  Description: Level of anxiety will decrease  Outcome: Ongoing  Goal: Ability to verbalize feelings about condition will improve  Description: Ability to verbalize feelings about condition will improve  Outcome: Ongoing

## 2020-07-21 NOTE — TELEPHONE ENCOUNTER
Im not sure both arms and both legs at the same time will be covered. Lets start with the right arm and right leg and go from there.  Order placed

## 2020-07-21 NOTE — PROCEDURES
DAILY ADULT INPATIENT VIDEO-EEG EVENT MONITORING REPORT    Patient:   Amber Curiel  MR#:    [id number]  Room #:    INPATIENT   YOB: 1975  Study Date:    July 20, 2020  Primary Physician:     LYSSA Her   Referring Physician:   Rosa Neal DO      CLINICAL INFORMATION:     This patient is a 45 y.o. female with a history of seizures. The specific reason we are doing this study is for event clarification and to evaluate for an underlying seizure focus. MEDICATIONS:     See MAR    RECORDING CONDITIONS:     Continuous video-EEG monitoring was performed with [employer] equiptment. The recorded period occurred on July 20, 2020. Electrodes were applied according to the International 10-20 System. Data were obtained, stored, and interpreted according to ACNS guidelines (J Clin Neurophysiol 2006;23(2):) utilizing referential montage recording, with reformatting to longitudinal, transverse bipolar, and referential montages as necessary for interpretation, along with digital/automated EEG analysis. Physician access and review of the EEG and Video data occurred actively during the recording. RECORDING DURATION:  14 hours, 1 minute. DESCRIPTION OF BASELINE RECORD:    During the waking state, the predominant posterior resting frequency was rhythmic, symmetric, 9-10 Hz, 30-40 uV rhythm with reactivity to eye opening and closure demonstrated. Drowsiness was demonstrated by slow rolling eye movements followed by loss of background waking activities. Onset of Stage I sleep was demonstrated by gradual disappearance of background waking rhythms and the onset of gradual symmetric mixed-frequency 4-7 Hz slowing. Stage II sleep was characterized by vertex transients, sleep-spindles, and K-complexes, at times with shifting asymmetry demonstrated. Stage III and IV of sleep were characterized by progressive proportion of high voltage slowing in the delta frequency.  No ECG abnormalities were

## 2020-07-22 VITALS
SYSTOLIC BLOOD PRESSURE: 107 MMHG | RESPIRATION RATE: 20 BRPM | OXYGEN SATURATION: 100 % | TEMPERATURE: 98.7 F | HEART RATE: 60 BPM | DIASTOLIC BLOOD PRESSURE: 66 MMHG

## 2020-07-22 PROCEDURE — 95714 VEEG EA 12-26 HR UNMNTR: CPT

## 2020-07-22 PROCEDURE — 95720 EEG PHY/QHP EA INCR W/VEEG: CPT | Performed by: PSYCHIATRY & NEUROLOGY

## 2020-07-22 PROCEDURE — 2580000003 HC RX 258: Performed by: PSYCHIATRY & NEUROLOGY

## 2020-07-22 PROCEDURE — G0378 HOSPITAL OBSERVATION PER HR: HCPCS

## 2020-07-22 PROCEDURE — 6370000000 HC RX 637 (ALT 250 FOR IP): Performed by: PSYCHIATRY & NEUROLOGY

## 2020-07-22 PROCEDURE — 99217 PR OBSERVATION CARE DISCHARGE MANAGEMENT: CPT | Performed by: PSYCHIATRY & NEUROLOGY

## 2020-07-22 RX ORDER — DESVENLAFAXINE 50 MG/1
50 TABLET, EXTENDED RELEASE ORAL DAILY
Qty: 30 TABLET | Refills: 3 | Status: SHIPPED | OUTPATIENT
Start: 2020-07-23 | End: 2021-01-18

## 2020-07-22 RX ADMIN — TOPIRAMATE 25 MG: 50 TABLET, FILM COATED ORAL at 09:37

## 2020-07-22 RX ADMIN — AMANTADINE HYDROCHLORIDE 100 MG: 100 CAPSULE ORAL at 09:37

## 2020-07-22 RX ADMIN — PANTOPRAZOLE SODIUM 40 MG: 40 TABLET, DELAYED RELEASE ORAL at 09:40

## 2020-07-22 RX ADMIN — SUCRALFATE 1 G: 1 TABLET ORAL at 09:37

## 2020-07-22 RX ADMIN — LISINOPRIL 40 MG: 20 TABLET ORAL at 09:37

## 2020-07-22 RX ADMIN — SODIUM CHLORIDE, PRESERVATIVE FREE 10 ML: 5 INJECTION INTRAVENOUS at 09:40

## 2020-07-22 RX ADMIN — FOLIC ACID 1 MG: 1 TABLET ORAL at 09:37

## 2020-07-22 RX ADMIN — MULTIPLE VITAMINS W/ MINERALS TAB 1 TABLET: TAB at 09:37

## 2020-07-22 RX ADMIN — ALPRAZOLAM 1 MG: 0.25 TABLET ORAL at 09:36

## 2020-07-22 RX ADMIN — BUSPIRONE HYDROCHLORIDE 20 MG: 10 TABLET ORAL at 09:38

## 2020-07-22 RX ADMIN — DESVENLAFAXINE SUCCINATE 50 MG: 50 TABLET, EXTENDED RELEASE ORAL at 09:38

## 2020-07-22 RX ADMIN — HYDROCHLOROTHIAZIDE 12.5 MG: 12.5 CAPSULE ORAL at 09:37

## 2020-07-22 NOTE — PROCEDURES
DAILY ADULT INPATIENT VIDEO-EEG EVENT MONITORING REPORT    Patient:   Rocco Velazco  MR#:    [id number]  Room #:    INPATIENT   YOB: 1975  Study Date:    July 21, 2020  Primary Physician:     LYSSA Jack   Referring Physician:   Lincoln Nelson DO      CLINICAL INFORMATION:     This patient is a 45 y.o. female with a history of seizures. The specific reason we are doing this study is for event clarification and to evaluate for an underlying seizure focus. MEDICATIONS:     See MAR    RECORDING CONDITIONS:     Continuous video-EEG monitoring was performed with [employer] equiptment. The recorded period occurred on July 21, 2020. Electrodes were applied according to the International 10-20 System. Data were obtained, stored, and interpreted according to ACNS guidelines (J Clin Neurophysiol 2006;23(2):) utilizing referential montage recording, with reformatting to longitudinal, transverse bipolar, and referential montages as necessary for interpretation, along with digital/automated EEG analysis. Physician access and review of the EEG and Video data occurred actively during the recording. RECORDING DURATION:  24 hours. DESCRIPTION OF BASELINE RECORD:    During the waking state, the predominant posterior resting frequency was rhythmic, symmetric, 9-10 Hz, 30-40 uV rhythm with reactivity to eye opening and closure demonstrated. Drowsiness was demonstrated by slow rolling eye movements followed by loss of background waking activities. Onset of Stage I sleep was demonstrated by gradual disappearance of background waking rhythms and the onset of gradual symmetric mixed-frequency 4-7 Hz slowing. Stage II sleep was characterized by vertex transients, sleep-spindles, and K-complexes, at times with shifting asymmetry demonstrated. Stage III and IV of sleep were characterized by progressive proportion of high voltage slowing in the delta frequency. No ECG abnormalities were noted.  Muscle,

## 2020-07-22 NOTE — DISCHARGE SUMMARY
EPILEPSY MONITORING UNIT DISCHARGE SUMMARY      Patient Name: Jia Priest    :  1975    MRN:  [id number]  Admit date:  2020    Discharge date:  2020  Admitting Physician:  Jayy Don DO    Primary Care Physician:  LYSSA Schuler    Discharge Diagnoses:  Convulsions    Diagnostic Studies: 24 hour video EEG monitoring    Hospital Course: The patient was admitted to epilepsy monitoring unit. Seizure precautions were put in place. Continuous video EEG monitoring was obtained. No events were captured during her hospitalization. No clear overt epileptiform abnormalities were noted. The patient will be discharged home today. She will continue to follow event precautions as outlined below. She will follow-up with Dr. Martin Morfin in 2 weeks. Discharge Medications:      Manisha Lies   Home Medication Instructions [facility]:[id number]    Printed on:20 1231   Medication Information                      alendronate (FOSAMAX) 70 MG tablet  Take 70 mg by mouth every 7 days             ALPRAZolam (XANAX) 1 MG tablet  Take 1 mg by mouth 3 times daily.               Amantadine (SYMMETREL) 100 MG TABS tablet  Take 100 mg by mouth 2 times daily             azelastine (ASTELIN) 0.1 % nasal spray  SPRAY IN EACH NOSTRIL PRN             busPIRone (BUSPAR) 15 MG tablet  Take 20 mg by mouth 2 times daily              butalbital-acetaminophen-caffeine (FIORICET, ESGIC) -40 MG per tablet  Take 1 tablet by mouth every 4 hours as needed for Headaches             cyanocobalamin 1000 MCG/ML injection  Inject 1,000 mcg into the muscle every 21 days              cyclobenzaprine (FLEXERIL) 10 MG tablet  Take 10 mg by mouth 2 times daily as needed for Muscle spasms             desvenlafaxine succinate (PRISTIQ) 50 MG TB24 extended release tablet  Take 1 tablet by mouth daily             ferrous sulfate 325 (65 FE) MG tablet  Take 325 mg by mouth daily (with breakfast)             folic acid (FOLVITE) 1 MG tablet  Take 1 mg by mouth daily             Gabapentin, Once-Daily, (GRALISE) 600 MG TABS  Take 1,800 mg by mouth nightly. hydroCHLOROthiazide (MICROZIDE) 12.5 MG capsule  Take 12.5 mg by mouth daily             hydrOXYzine (ATARAX) 50 MG tablet  Take 50 mg by mouth 2 times daily              lisinopril (PRINIVIL;ZESTRIL) 40 MG tablet  Take 40 mg by mouth daily             lurasidone (LATUDA) 60 MG TABS tablet  Take 60 mg by mouth nightly              methylphenidate (CONCERTA) 36 MG extended release tablet  Take 54 mg by mouth every morning. Multiple Vitamins-Minerals (THERAPEUTIC MULTIVITAMIN-MINERALS) tablet  Take 1 tablet by mouth daily             pantoprazole sodium (PROTONIX) 40 MG PACK packet  Take 40 mg by mouth 2 times daily             potassium chloride (KLOR-CON M) 20 MEQ extended release tablet  Take 20 mEq by mouth 2 times daily             prazosin (MINIPRESS) 2 MG capsule  Take 2 mg by mouth nightly             sucralfate (CARAFATE) 1 GM tablet  Take 1 g by mouth 2 times daily             topiramate (TOPAMAX) 25 MG tablet  Take 25 mg by mouth daily             zolpidem (AMBIEN) 10 MG tablet  Take by mouth nightly as needed for Sleep. Discharge Instructions: Follow up with Dr. Sonya Zaidi in 2 weeks. Take medications as directed. Resume activity as tolerated. Epilepsy precautions and seizure first aid discussed. No driving, heights, swimming, tub baths, open flames, or heavy machinery. Disposition: Patient is medically stable and will be discharged home. Time spent on discharge 20 min.       Gaudencio Bright DO  Board Certified Neurologist

## 2020-07-22 NOTE — PROGRESS NOTES
confrontation  [x] PERRLA, EOMI, no nystagmus, conjugate eye movements, no ptosis  [x] Face symmetric  [x] Facial sensation intact  [x] Tongue midline no atrophy or fasciculations present  [x] Palate midline, hearing to finger rub normal  [x] Shoulder shrug and SCM testing normal  COMMENTS:   Motor   [x] 5/5 strength x 4 extremities  [x] Normal bulk and tone  [x] No tremor present  [x] No rigidity or bradykinesia noted  COMMENTS:   Sensory  [x] Sensation intact to light touch, pin prick, vibration, and proprioception BLE  [] Sensation intact to light touch, pin prick, vibration, and proprioception BUE  COMMENTS:   Coordination [x] FTN normal bilaterally   [] HTS normal bilaterally  [] NATALIE normal.   COMMENTS:   Reflexes  [x] Symmetric and non-pathological  [x] Toes downgoing bilaterally  [x] No clonus present  COMMENTS:   Gait                  [x] Normal steady gait    [] Ataxic    [] Spastic     [] Magnetic     [] Shuffling  [] Not assessed  COMMENTS:       LABS/IMAGING:    Lab Results   Component Value Date    WBC 7.0 07/04/2020    HGB 11.7 (L) 07/04/2020    HCT 34.5 (L) 07/04/2020    MCV 90.3 07/04/2020     07/04/2020     Lab Results   Component Value Date     07/07/2020    K 3.5 07/07/2020     07/07/2020    CO2 25 07/07/2020    BUN 18 07/07/2020    CREATININE 0.8 07/07/2020    GLUCOSE 60 (L) 07/07/2020    CALCIUM 9.2 07/07/2020    PROT 6.9 07/07/2020    LABALBU 4.1 07/07/2020    BILITOT <0.2 07/07/2020    ALKPHOS 78 07/07/2020    AST 22 07/07/2020    ALT 25 07/07/2020    LABGLOM >60 07/07/2020    GFRAA >59 07/07/2020    GLOB 3.0 11/14/2016     Lab Results   Component Value Date    INR 1.09 10/05/2019    INR 1.20 (H) 09/11/2018    INR 1.01 11/14/2016    PROTIME 13.5 10/05/2019    PROTIME 15.1 (H) 09/11/2018    PROTIME 13.3 11/14/2016       RECORD REVIEW:   Previous medical records, medications were reviewed at today's visit. Nursing/physician notes, imaging, labs and vitals reviewed.  PT,OT and/or speech notes reviewed       ASSESSMENT:  45 y.o. here with possible seizures. The patient has been admitted to our epilepsy monitoring unit for event clarification. The patient was referred by Dr. Jamila Hood. Still no events thus far.      PLAN:  1. Continue Video EEG monitoring  hours. 2.  Ativan prn, seizure precautions. 3.  Final AED recommendations pending above. Please feel free to call with any questions. [phone] (cell phone).       Fabiola Lizarraga DO  Board Certified Neurology

## 2020-07-22 NOTE — TELEPHONE ENCOUNTER
Called pt and spoke with her about the NCV. I voiced that he would start with her right side and then proceed. I also gave her the scheduling information.

## 2020-07-22 NOTE — PROCEDURES
DAILY ADULT INPATIENT VIDEO-EEG EVENT MONITORING REPORT    Patient:   [facility]  MR#:    [id number]  Room #:    INPATIENT   YOB: 1975  Study Date:    July 22, 2020  Primary Physician:     LYSSA Bradley   Referring Physician:   Jeremy Flores DO      CLINICAL INFORMATION:     This patient is a 45 y.o. female with a history of seizures. The specific reason we are doing this study is for event clarification and to evaluate for an underlying seizure focus. MEDICATIONS:     See MAR    RECORDING CONDITIONS:     Continuous video-EEG monitoring was performed with [employer] equiptment. The recorded period occurred on July 22, 2020. Electrodes were applied according to the International 10-20 System. Data were obtained, stored, and interpreted according to ACNS guidelines (J Clin Neurophysiol 2006;23(2):) utilizing referential montage recording, with reformatting to longitudinal, transverse bipolar, and referential montages as necessary for interpretation, along with digital/automated EEG analysis. Physician access and review of the EEG and Video data occurred actively during the recording. RECORDING DURATION:  12 hours, 39 minutes. DESCRIPTION OF BASELINE RECORD:    During the waking state, the predominant posterior resting frequency was rhythmic, symmetric, 9-10 Hz, 30-40 uV rhythm with reactivity to eye opening and closure demonstrated. Drowsiness was demonstrated by slow rolling eye movements followed by loss of background waking activities. Onset of Stage I sleep was demonstrated by gradual disappearance of background waking rhythms and the onset of gradual symmetric mixed-frequency 4-7 Hz slowing. Stage II sleep was characterized by vertex transients, sleep-spindles, and K-complexes, at times with shifting asymmetry demonstrated. Stage III and IV of sleep were characterized by progressive proportion of high voltage slowing in the delta frequency.  No ECG abnormalities were noted. Muscle, motion, and eye movement artifacts were occasionally noted. DESCRIPTION OF EVENTS:    July 22, 2020  The Interical files were as described in trhe baseline recording. Digital spike and event analysis reviewed and showed no abnormalities. Left temporal sharply contoured discharges were noted but are most suggestive of benign wicket spikes. No events were recorded. E.E.G. INTERPRETATION:    1. Interictal Abnormalities: None. Left temporal sharply contoured discharges were noted but are most suggestive of benign wicket spikes. 2. Ictal Events: None. 3. Non-Epileptic Events: None. CLINICAL CORRELATION:    No events were captured. No interictal abnormalities were noted. Discontinue video EEG monitoring. EMU FINAL RESULTS SUMMARY:  During this 3-day study no overt epileptiform abnormalities were noted. No events were captured.     Marcela Blas DO  Board Certified Neurologist

## 2020-07-22 NOTE — PROGRESS NOTES
Called to room per patient states, \"I cant do this anymore, my head is itching I stink and I want outta here\". Call placed to Dr Connie Muñoz awaiting orders.

## 2020-07-30 ENCOUNTER — TELEPHONE (OUTPATIENT)
Dept: NEUROSURGERY | Age: 45
End: 2020-07-30

## 2020-07-30 NOTE — TELEPHONE ENCOUNTER
Patient called and states a few weeks ago she had trouble speaking. Words would get discombobulated and she couldn't quite get words out6. She states she ended up going to the er at that time. Its happening again and she doesn't want to keep going to the er. Is there another solution? What can she do?

## 2020-07-30 NOTE — TELEPHONE ENCOUNTER
I tried to call patient to notify her of message but no answer. It says vm is full and cannot take another message.

## 2020-07-31 NOTE — TELEPHONE ENCOUNTER
Tried to reach patient Per Dr Kuldeep Jackson to let her know that he would talk to her on Monday at her NC appointment. Patient has a vm but full unable to leave message.

## 2020-07-31 NOTE — TELEPHONE ENCOUNTER
The patient is scheduled for a nerve conduction on Monday at 1:15. Do you still want her to come in the office Monday morning?

## 2020-07-31 NOTE — TELEPHONE ENCOUNTER
Patient called she needs to speak with a nurse she still having issue speaking its been going on for few days. Patient LVM  Today.

## 2020-08-05 ENCOUNTER — OFFICE VISIT (OUTPATIENT)
Dept: NEUROLOGY | Age: 45
End: 2020-08-05
Payer: MEDICARE

## 2020-08-05 VITALS
WEIGHT: 175 LBS | SYSTOLIC BLOOD PRESSURE: 114 MMHG | HEART RATE: 60 BPM | RESPIRATION RATE: 14 BRPM | BODY MASS INDEX: 29.16 KG/M2 | DIASTOLIC BLOOD PRESSURE: 79 MMHG | HEIGHT: 65 IN

## 2020-08-05 PROCEDURE — G8419 CALC BMI OUT NRM PARAM NOF/U: HCPCS | Performed by: PSYCHIATRY & NEUROLOGY

## 2020-08-05 PROCEDURE — 99214 OFFICE O/P EST MOD 30 MIN: CPT | Performed by: PSYCHIATRY & NEUROLOGY

## 2020-08-05 PROCEDURE — G8427 DOCREV CUR MEDS BY ELIG CLIN: HCPCS | Performed by: PSYCHIATRY & NEUROLOGY

## 2020-08-05 PROCEDURE — 4004F PT TOBACCO SCREEN RCVD TLK: CPT | Performed by: PSYCHIATRY & NEUROLOGY

## 2020-08-05 RX ORDER — TIZANIDINE 4 MG/1
TABLET ORAL
COMMUNITY
Start: 2020-07-12 | End: 2021-01-18

## 2020-08-05 NOTE — PROGRESS NOTES
Chief Complaint   Patient presents with    Follow-up    Multiple Sclerosis       Marilee Bustamante is a 45y.o. year old female who is seen for evaluation of follow for multiple sclerosis following an admission in 5/19 for optic neuritis treated with IV solumedrol. The patient has a history of multiple sclerosis, fibromyalgia and lupus along with chronic headaches was seen at that time for left visual change which began about 2 days prior to admission with eye pain. This was quite similar to her previous episodes of optic neuritis, of which she has had about 4 previous episodes treated with steroids. She was tried on oral steroids as an outpatient but this was not effective and was transferred from outside ER for further management. She has some chronic weakness and numbness and weakness on the left. She does have bowel and bladder incontinence. She was on Avonex. Had not followed up with Dr Xenia Penn since 2017. She did not follow up in the office since 5/19. On 7/4/20 she was in the ER with generalized body aches. She apparently had some trembling on the right side. She had an MRI of the brain in the ER which revealed no acute ischemic change but did have scattered non enhancing white matter lesions in periventricular and white matter. She was given 1 gm Solumedrol for a possible flair up and told to follow up with neurology. She indicates that she typically does relatively well for a few weeks and then she feels severely fatigued or worsening vision. She has been in a wheelchair in 2017. She had her initial testing in 2017 at [facility] which was significant for non enhancing white matter lesions and a CSF with 6 oligoclonal bands seen only in the CSF. She still feels her face twitching and right sided numbness. He speech is not normal for her as she has to think about things carefully. Her rheumatologist started her on Tecfidera but she felt worse on this.  She had been on Avonex since her diagnosis prior to with no clear benefit according to her. Still waiting on Ocrevus. Having a lupus flair up.      Active Ambulatory Problems     Diagnosis Date Noted    Multiple sclerosis ([address]     Intractable chronic migraine without aura and without status migrainosus 2017    Major depressive disorder, recurrent, moderate (HCC) 2018    Exacerbation of multiple sclerosis ([address] 05/10/2019    Optic neuritis 05/10/2019    Pain of left eye 05/10/2019    Bronchitis 2019    Visual disturbance 2020    Tremor 2020    Weakness 2020    Numbness 2020    Difficulty with speech 2020    Lupus ([address] 2020    Fibromyalgia 2020    Convulsions ([address] 2020     Resolved Ambulatory Problems     Diagnosis Date Noted    No Resolved Ambulatory Problems     Past Medical History:   Diagnosis Date    Arthritis     [facility] ([facility]     GERD (gastroesophageal reflux disease)     Hypertension     Movement disorder     Neuromuscular disorder ([address]     Psychiatric problem        Past Surgical History:   Procedure Laterality Date    ABDOMINAL EXPLORATION SURGERY      ADENOIDECTOMY       SECTION      x 2     SECTION, CLASSIC N/A     DILATATION, ESOPHAGUS      EXTERNAL EAR SURGERY      lobe    GASTRIC BAND      GASTRIC BYPASS SURGERY      HERNIA REPAIR      HERNIA REPAIR N/A     SEPTOPLASTY      TONSILLECTOMY      TONSILLECTOMY AND ADENOIDECTOMY      UPPER GASTROINTESTINAL ENDOSCOPY         Family History   Problem Relation Age of Onset    Anemia Mother     Breast Cancer Mother     Depression Mother     High Blood Pressure Mother     Mental Illness Mother    Mary De La Cruz / [place] Mother     Colon Cancer Father     Depression Father     High Blood Pressure Father     Mental Illness Father     Breast Cancer Maternal Grandmother     Alcohol Abuse Maternal Grandfather     Anemia Paternal Grandmother     Alcohol Abuse Paternal Grandfather     Osteoporosis Paternal Grandfather     Stroke Paternal Grandfather     Substance Abuse Maternal Cousin     Substance Abuse Paternal Cousin     Asthma Other        Allergies   Allergen Reactions    Avelox [Moxifloxacin] Anaphylaxis    Sugar-Protein-Starch Other (See Comments)    Avelox [Moxifloxacin]     Copaxone [Glatiramer Acetate]     Depakote [Divalproex Sodium]     Gabapentin Itching    Hydroxychloroquine      Other reaction(s): Other (See Comments)  Vision loss   Gerhard Hazy [Paliperidone]     Lamictal [Lamotrigine]     Lithium     Pregabalin      Other reaction(s): Mental Status Change    Lamictal [Lamotrigine] Rash    Lithium Rash       Social History     Socioeconomic History    Marital status: Single     Spouse name: Not on file    Number of children: 3    Years of education: Some College    Highest education level: Not on file   Occupational History    Occupation: Disability   Social Needs    Financial resource strain: Not on file    Food insecurity     Worry: Not on file     Inability: Not on file   [employer] needs     Medical: Not on file     Non-medical: Not on file   Tobacco Use    Smoking status: Current Every Day Smoker     Packs/day: 1.50     Years: 15.00     Pack years: 22.50     Types: Cigarettes    Smokeless tobacco: Never Used   Substance and Sexual Activity    Alcohol use:  Yes     Alcohol/week: 2.0 standard drinks     Types: 2 Shots of liquor per week     Comment: couple of shots per day    Drug use: No    Sexual activity: Never     Partners: Male   Lifestyle    Physical activity     Days per week: Not on file     Minutes per session: Not on file    Stress: Not on file   Relationships    Social connections     Talks on phone: Not on file     Gets together: Not on file     Attends Church service: Not on file     Active member of club or organization: Not on file     Attends meetings of clubs or organizations: Not on file Relationship status: Not on file    Intimate partner violence     Fear of current or ex partner: Not on file     Emotionally abused: Not on file     Physically abused: Not on file     Forced sexual activity: Not on file   Other Topics Concern    Not on file   Social History Narrative    ** Merged History Encounter **            Review of Systems     Constitutional - No fever or chills. yes diaphoresis or significant fatigue. HENT -  No tinnitus or significant hearing loss. Eyes - no sudden vision change or eye pain  Respiratory - no significant shortness of breath or cough  Cardiovascular - no chest pain No palpitations or significant leg swelling  Gastrointestinal - no abdominal swelling or pain. Genitourinary - No difficulty urinating, dysuria  Musculoskeletal - no back pain or myalgia. Skin - no color change or rash  Neurologic - No seizures. No lateralizing weakness. Hematologic - no easy bruising or excessive bleeding. Psychiatric - no severe anxiety or nervousness. All other review of systems are negative. Current Outpatient Medications   Medication Sig Dispense Refill    tiZANidine (ZANAFLEX) 4 MG tablet       desvenlafaxine succinate (PRISTIQ) 50 MG TB24 extended release tablet Take 1 tablet by mouth daily 30 tablet 3    Gabapentin, Once-Daily, (GRALISE) 600 MG TABS Take 1,800 mg by mouth nightly.       folic acid (FOLVITE) 1 MG tablet Take 1 mg by mouth daily      potassium chloride (KLOR-CON M) 20 MEQ extended release tablet Take 20 mEq by mouth 2 times daily      sucralfate (CARAFATE) 1 GM tablet Take 1 g by mouth 2 times daily      topiramate (TOPAMAX) 25 MG tablet Take 25 mg by mouth daily      prazosin (MINIPRESS) 2 MG capsule Take 2 mg by mouth nightly      hydrOXYzine (ATARAX) 50 MG tablet Take 50 mg by mouth 2 times daily       hydroCHLOROthiazide (MICROZIDE) 12.5 MG capsule Take 12.5 mg by mouth daily      methylphenidate (CONCERTA) 36 MG extended release tablet Take 54 mg by mouth every morning.  zolpidem (AMBIEN) 10 MG tablet Take by mouth nightly as needed for Sleep.  lurasidone (LATUDA) 60 MG TABS tablet Take 60 mg by mouth nightly       butalbital-acetaminophen-caffeine (FIORICET, ESGIC) -40 MG per tablet Take 1 tablet by mouth every 4 hours as needed for Headaches      busPIRone (BUSPAR) 15 MG tablet Take 20 mg by mouth 2 times daily       ALPRAZolam (XANAX) 1 MG tablet Take 1 mg by mouth 3 times daily.  Amantadine (SYMMETREL) 100 MG TABS tablet Take 100 mg by mouth 2 times daily      lisinopril (PRINIVIL;ZESTRIL) 40 MG tablet Take 40 mg by mouth daily      cyanocobalamin 1000 MCG/ML injection Inject 1,000 mcg into the muscle every 21 days       pantoprazole sodium (PROTONIX) 40 MG PACK packet Take 40 mg by mouth 2 times daily      azelastine (ASTELIN) 0.1 % nasal spray SPRAY IN EACH NOSTRIL PRN  0    alendronate (FOSAMAX) 70 MG tablet Take 70 mg by mouth every 7 days      Multiple Vitamins-Minerals (THERAPEUTIC MULTIVITAMIN-MINERALS) tablet Take 1 tablet by mouth daily      ferrous sulfate 325 (65 FE) MG tablet Take 325 mg by mouth daily (with breakfast)      cyclobenzaprine (FLEXERIL) 10 MG tablet Take 10 mg by mouth 2 times daily as needed for Muscle spasms       No current facility-administered medications for this visit. /79   Pulse 60   Resp 14   Ht 5' 5\" (1.651 m)   Wt 175 lb (79.4 kg)   BMI 29.12 kg/m²     Constitutional - well developed, well nourished. Eyes - conjunctiva normal.  Ear, nose, throat - No scars, masses, or lesions over external nose or ears, no atrophy of tongue  Neck-symmetric, no masses noted, no jugular vein distension  Respiration- chest wall appears symmetric, good expansion,   normal effort without use of accessory muscles  Musculoskeletal - no significant wasting of muscles noted, no bony deformities  Extremities-no clubbing, cyanosis or edema  Skin - warm, dry, and intact.   No rash, erythema, or pallor. Psychiatric - mood, affect, and behavior appear normal.      Neurological exam  Awake, alert, fluent oriented  appropriate affect  Attention and concentration appear appropriate  Recent and remote memory appears unremarkable  Speech normal without dysarthria  Some hesitancy of speech  No clear issues with language of fund of knowledge    Cranial Nerve Exam     CN III, IV,VI-EOMI, No nystagmus, conjugate eye movements, no ptosis  CN VII-no facial assymetry        Motor Exam  Generalized mild giveway weakness    Sensory  Decreased sensation right arm and right leg worse in the foot    Tremors- no tremors in hands or head noted    Gait  Slightly slow    Lab Results   Component Value Date    [id number] 753 09/12/2018     Lab Results   Component Value Date    WBC 7.0 07/04/2020    HGB 11.7 (L) 07/04/2020    HCT 34.5 (L) 07/04/2020    MCV 90.3 07/04/2020     07/04/2020     Lab Results   Component Value Date     07/07/2020    K 3.5 07/07/2020     07/07/2020    CO2 25 07/07/2020    BUN 18 07/07/2020    CREATININE 0.8 07/07/2020    GLUCOSE 60 (L) 07/07/2020    CALCIUM 9.2 07/07/2020    PROT 6.9 07/07/2020    LABALBU 4.1 07/07/2020    BILITOT <0.2 07/07/2020    ALKPHOS 78 07/07/2020    AST 22 07/07/2020    ALT 25 07/07/2020    LABGLOM >60 07/07/2020    GFRAA >59 07/07/2020    GLOB 3.0 11/14/2016       Impression   1. No diffusion restriction to indicate acute ischemia. 2. Scattered nonenhancing hyperintense T2/FLAIR signal changes within   the periventricular and white matter regions favorable for MS plaques. No abnormal enhancement to indicate active demyelination. 3. Partial opacification inferior right mastoid air cells, mastoid   effusion considered. Signed by Dr Hien Solomon on 7/4/2020 7:10 PM         Assessment    ICD-10-CM    1. Multiple sclerosis ([address]  G35    2. Convulsions, unspecified convulsion type ([address]  R56.9    3. Visual disturbance  H53.9    4.  Tremor  R25.1 5. Weakness  R53.1    6. Numbness  R20.0    7. Fibromyalgia  M79.7    8. Lupus ([address]  M32.9    9. Optic neuritis  H46.9        Her neurological examination was significant for some hesitance of speech, decreased visual acutiy, right sided numbness and some mild slowness of speech. She appears to have having a flair up  Of her multiple sclerosis at this time. At this time she will be started on Solumedrol 1 gm IV daily x 5 days. She will be [facility] from [facility] to [facility] as she does not want to be on Tecfidera due to side effects. Her hepatitis panel was unremarkable. She is to have a CMP, CBC and PIERO virus titers every 3 months. Her PIERO virus titer was negative in 7/20. She is seeing  MS specialist in [place]. Her video EEG x 3 days was unremarkable with no events captured. She is awaiting another rheumatologist in [facility],The patient indicated understanding of the management plan. She is to follow up with me in two months and call with any issues. Plan    No orders of the defined types were placed in this encounter. No orders of the defined types were placed in this encounter. Return in about 2 months (around 10/5/2020). EMR Dragon/transcription disclaimer:Significant part of this  encounter note is electronic transcription/translation of spoken language to printed text. The electronic translation of spoken language may be erroneous, or at times, nonsensical words or phrases may be inadvertently transcribed.  Although I have reviewed the note for such errors, some may still exist.

## 2020-08-31 ENCOUNTER — TELEPHONE (OUTPATIENT)
Dept: NEUROLOGY | Age: 45
End: 2020-08-31

## 2020-08-31 NOTE — TELEPHONE ENCOUNTER
Patient  Left message stating she is having some speech difficulties connecting words, and her jaw is having spasms, she also reports having went to the [address] [place] [address] Dr HYLTON on Saturday and was given a small script of Norco 5mg for pain. Patient believes she is having an MS flare. Patient complains of being in pain still and has already used all the Norcos. Allyssa Innocent in chart.

## 2020-09-01 RX ORDER — DEXAMETHASONE 6 MG/1
TABLET ORAL
Qty: 25 TABLET | Refills: 0 | Status: SHIPPED | OUTPATIENT
Start: 2020-09-01 | End: 2020-10-20

## 2020-09-01 NOTE — TELEPHONE ENCOUNTER
Spoke with Monica Ortiz and she is filling out the Ocrevus orders and will hand deliver them to [facility]. I have notified the patient via voicemail.

## 2020-09-04 RX ORDER — DIPHENHYDRAMINE HCL 25 MG
50 TABLET ORAL ONCE
Status: CANCELLED
Start: 2020-09-07

## 2020-09-04 RX ORDER — ACETAMINOPHEN 325 MG/1
650 TABLET ORAL ONCE
Status: CANCELLED | OUTPATIENT
Start: 2020-09-07

## 2020-09-04 RX ORDER — METHYLPREDNISOLONE SODIUM SUCCINATE 125 MG/2ML
125 INJECTION, POWDER, LYOPHILIZED, FOR SOLUTION INTRAMUSCULAR; INTRAVENOUS ONCE
Status: CANCELLED | OUTPATIENT
Start: 2020-09-07

## 2020-09-04 RX ORDER — DIPHENHYDRAMINE HYDROCHLORIDE 50 MG/ML
25 INJECTION INTRAMUSCULAR; INTRAVENOUS PRN
Status: CANCELLED | OUTPATIENT
Start: 2020-09-07

## 2020-09-04 RX ORDER — EPINEPHRINE 1 MG/ML
0.3 INJECTION, SOLUTION, CONCENTRATE INTRAVENOUS
Status: CANCELLED | OUTPATIENT
Start: 2020-09-07

## 2020-09-18 ENCOUNTER — TELEPHONE (OUTPATIENT)
Dept: NEUROLOGY | Age: 45
End: 2020-09-18

## 2020-09-18 NOTE — TELEPHONE ENCOUNTER
Patient called and I voiced that she would need to come to the office to sign a medical records release. Patient voiced she would come to the office ASAP.

## 2020-09-18 NOTE — TELEPHONE ENCOUNTER
Patient left message requesting medical records sent to the . I attempted to call patient back but she did not answer so I left a voicemail.

## 2020-09-21 ENCOUNTER — HOSPITAL ENCOUNTER (OUTPATIENT)
Dept: INFUSION THERAPY | Age: 45
Setting detail: INFUSION SERIES
Discharge: HOME | End: 2020-09-21
Payer: MEDICARE

## 2020-09-21 VITALS
TEMPERATURE: 97.4 F | HEART RATE: 79 BPM | RESPIRATION RATE: 17 BRPM | WEIGHT: 170 LBS | DIASTOLIC BLOOD PRESSURE: 90 MMHG | BODY MASS INDEX: 28.29 KG/M2 | SYSTOLIC BLOOD PRESSURE: 133 MMHG

## 2020-09-21 DIAGNOSIS — G35 MULTIPLE SCLEROSIS (HCC): Primary | ICD-10-CM

## 2020-09-21 PROCEDURE — 96413 CHEMO IV INFUSION 1 HR: CPT

## 2020-09-21 PROCEDURE — 96375 TX/PRO/DX INJ NEW DRUG ADDON: CPT

## 2020-09-21 PROCEDURE — 6370000000 HC RX 637 (ALT 250 FOR IP): Performed by: PSYCHIATRY & NEUROLOGY

## 2020-09-21 PROCEDURE — 96415 CHEMO IV INFUSION ADDL HR: CPT

## 2020-09-21 PROCEDURE — 2580000003 HC RX 258: Performed by: PSYCHIATRY & NEUROLOGY

## 2020-09-21 PROCEDURE — 6360000002 HC RX W HCPCS: Performed by: PSYCHIATRY & NEUROLOGY

## 2020-09-21 RX ORDER — METHYLPREDNISOLONE SODIUM SUCCINATE 125 MG/2ML
125 INJECTION, POWDER, LYOPHILIZED, FOR SOLUTION INTRAMUSCULAR; INTRAVENOUS ONCE
Status: CANCELLED | OUTPATIENT
Start: 2020-10-05

## 2020-09-21 RX ORDER — DIPHENHYDRAMINE HCL 25 MG
50 TABLET ORAL ONCE
Status: CANCELLED
Start: 2020-10-05

## 2020-09-21 RX ORDER — EPINEPHRINE 1 MG/ML
0.3 INJECTION, SOLUTION, CONCENTRATE INTRAVENOUS
Status: CANCELLED | OUTPATIENT
Start: 2020-10-05

## 2020-09-21 RX ORDER — METHYLPREDNISOLONE SODIUM SUCCINATE 125 MG/2ML
125 INJECTION, POWDER, LYOPHILIZED, FOR SOLUTION INTRAMUSCULAR; INTRAVENOUS ONCE
Status: COMPLETED | OUTPATIENT
Start: 2020-09-21 | End: 2020-09-21

## 2020-09-21 RX ORDER — ACETAMINOPHEN 325 MG/1
650 TABLET ORAL ONCE
Status: CANCELLED | OUTPATIENT
Start: 2020-10-05

## 2020-09-21 RX ORDER — DIPHENHYDRAMINE HYDROCHLORIDE 50 MG/ML
25 INJECTION INTRAMUSCULAR; INTRAVENOUS PRN
Status: CANCELLED | OUTPATIENT
Start: 2020-10-05

## 2020-09-21 RX ORDER — DIPHENHYDRAMINE HCL 25 MG
50 TABLET ORAL ONCE
Status: COMPLETED | OUTPATIENT
Start: 2020-09-21 | End: 2020-09-21

## 2020-09-21 RX ORDER — ACETAMINOPHEN 325 MG/1
650 TABLET ORAL ONCE
Status: COMPLETED | OUTPATIENT
Start: 2020-09-21 | End: 2020-09-21

## 2020-09-21 RX ADMIN — ACETAMINOPHEN 650 MG: 325 TABLET ORAL at 08:41

## 2020-09-21 RX ADMIN — DIPHENHYDRAMINE HCL 50 MG: 25 TABLET ORAL at 08:41

## 2020-09-21 RX ADMIN — METHYLPREDNISOLONE SODIUM SUCCINATE 125 MG: 125 INJECTION, POWDER, FOR SOLUTION INTRAMUSCULAR; INTRAVENOUS at 08:41

## 2020-09-21 RX ADMIN — OCRELIZUMAB 300 MG: 300 INJECTION INTRAVENOUS at 09:09

## 2020-09-21 ASSESSMENT — PAIN SCALES - GENERAL: PAINLEVEL_OUTOF10: 0

## 2020-09-22 ENCOUNTER — TELEPHONE (OUTPATIENT)
Dept: NEUROSURGERY | Age: 45
End: 2020-09-22

## 2020-09-22 NOTE — TELEPHONE ENCOUNTER
Patient called stated that she must have had a reaction to the Ocrevus infusion she  received yesterday went to ED vomiting, hurting really bad also can't stay awake.  Please advise

## 2020-09-23 ENCOUNTER — TELEPHONE (OUTPATIENT)
Dept: NEUROSURGERY | Age: 45
End: 2020-09-23

## 2020-09-23 NOTE — TELEPHONE ENCOUNTER
Called patient per Dr Meek Doctor to check in on her, she stated that the vomiting is better but she was pre medicated with benadryl. She also stated that the hurting was in her muscles in her arms like she had been lifting weights.

## 2020-09-25 ENCOUNTER — TELEPHONE (OUTPATIENT)
Dept: NEUROLOGY | Age: 45
End: 2020-09-25

## 2020-09-27 ENCOUNTER — HOSPITAL ENCOUNTER (EMERGENCY)
Age: 45
Discharge: HOME | End: 2020-09-27
Attending: EMERGENCY MEDICINE
Payer: MEDICARE

## 2020-09-27 ENCOUNTER — APPOINTMENT (OUTPATIENT)
Dept: GENERAL RADIOLOGY | Age: 45
End: 2020-09-27
Payer: MEDICARE

## 2020-09-27 VITALS
HEART RATE: 65 BPM | HEIGHT: 65 IN | TEMPERATURE: 98.2 F | DIASTOLIC BLOOD PRESSURE: 71 MMHG | SYSTOLIC BLOOD PRESSURE: 122 MMHG | RESPIRATION RATE: 14 BRPM | BODY MASS INDEX: 28.32 KG/M2 | OXYGEN SATURATION: 97 % | WEIGHT: 170 LBS

## 2020-09-27 LAB
ALBUMIN SERPL-MCNC: 3.8 G/DL (ref 3.5–5.2)
ALP BLD-CCNC: 93 U/L (ref 35–104)
ALT SERPL-CCNC: 20 U/L (ref 5–33)
AMPHETAMINE SCREEN, URINE: NEGATIVE
ANION GAP SERPL CALCULATED.3IONS-SCNC: 14 MMOL/L (ref 7–19)
AST SERPL-CCNC: 24 U/L (ref 5–32)
BARBITURATE SCREEN URINE: POSITIVE
BASOPHILS ABSOLUTE: 0 K/UL (ref 0–0.2)
BASOPHILS RELATIVE PERCENT: 0.4 % (ref 0–1)
BENZODIAZEPINE SCREEN, URINE: POSITIVE
BILIRUB SERPL-MCNC: <0.2 MG/DL (ref 0.2–1.2)
BILIRUBIN URINE: NEGATIVE
BLOOD, URINE: NEGATIVE
BUN BLDV-MCNC: 8 MG/DL (ref 6–20)
CALCIUM SERPL-MCNC: 9.3 MG/DL (ref 8.6–10)
CANNABINOID SCREEN URINE: POSITIVE
CHLORIDE BLD-SCNC: 100 MMOL/L (ref 98–111)
CLARITY: CLEAR
CO2: 20 MMOL/L (ref 22–29)
COCAINE METABOLITE SCREEN URINE: NEGATIVE
COLOR: YELLOW
CREAT SERPL-MCNC: 0.9 MG/DL (ref 0.5–0.9)
EOSINOPHILS ABSOLUTE: 0.1 K/UL (ref 0–0.6)
EOSINOPHILS RELATIVE PERCENT: 1.1 % (ref 0–5)
ETHANOL: <10 MG/DL (ref 0–0.08)
GFR AFRICAN AMERICAN: >59
GFR NON-AFRICAN AMERICAN: >60
GLUCOSE BLD-MCNC: 88 MG/DL (ref 74–109)
GLUCOSE URINE: NEGATIVE MG/DL
HCG QUALITATIVE: NEGATIVE
HCT VFR BLD CALC: 36.2 % (ref 37–47)
HEMOGLOBIN: 11.9 G/DL (ref 12–16)
IMMATURE GRANULOCYTES #: 0 K/UL
KETONES, URINE: NEGATIVE MG/DL
LEUKOCYTE ESTERASE, URINE: NEGATIVE
LYMPHOCYTES ABSOLUTE: 1.1 K/UL (ref 1.1–4.5)
LYMPHOCYTES RELATIVE PERCENT: 24.4 % (ref 20–40)
Lab: ABNORMAL
MCH RBC QN AUTO: 28.6 PG (ref 27–31)
MCHC RBC AUTO-ENTMCNC: 32.9 G/DL (ref 33–37)
MCV RBC AUTO: 87 FL (ref 81–99)
MONOCYTES ABSOLUTE: 0.3 K/UL (ref 0–0.9)
MONOCYTES RELATIVE PERCENT: 5.8 % (ref 0–10)
NEUTROPHILS ABSOLUTE: 3.1 K/UL (ref 1.5–7.5)
NEUTROPHILS RELATIVE PERCENT: 67.7 % (ref 50–65)
NITRITE, URINE: NEGATIVE
OPIATE SCREEN URINE: NEGATIVE
PDW BLD-RTO: 14.6 % (ref 11.5–14.5)
PH UA: 6.5 (ref 5–8)
PLATELET # BLD: 240 K/UL (ref 130–400)
PMV BLD AUTO: 10.1 FL (ref 9.4–12.3)
POTASSIUM SERPL-SCNC: 3.9 MMOL/L (ref 3.5–5)
PROTEIN UA: NEGATIVE MG/DL
RBC # BLD: 4.16 M/UL (ref 4.2–5.4)
SODIUM BLD-SCNC: 134 MMOL/L (ref 136–145)
SPECIFIC GRAVITY UA: 1.01 (ref 1–1.03)
TOTAL PROTEIN: 6.7 G/DL (ref 6.6–8.7)
UROBILINOGEN, URINE: 1 E.U./DL
WBC # BLD: 4.6 K/UL (ref 4.8–10.8)

## 2020-09-27 PROCEDURE — 81003 URINALYSIS AUTO W/O SCOPE: CPT

## 2020-09-27 PROCEDURE — 71045 X-RAY EXAM CHEST 1 VIEW: CPT

## 2020-09-27 PROCEDURE — G0480 DRUG TEST DEF 1-7 CLASSES: HCPCS

## 2020-09-27 PROCEDURE — 6370000000 HC RX 637 (ALT 250 FOR IP): Performed by: EMERGENCY MEDICINE

## 2020-09-27 PROCEDURE — 99284 EMERGENCY DEPT VISIT MOD MDM: CPT

## 2020-09-27 PROCEDURE — 85025 COMPLETE CBC W/AUTO DIFF WBC: CPT

## 2020-09-27 PROCEDURE — 80307 DRUG TEST PRSMV CHEM ANLYZR: CPT

## 2020-09-27 PROCEDURE — 80053 COMPREHEN METABOLIC PANEL: CPT

## 2020-09-27 PROCEDURE — 99285 EMERGENCY DEPT VISIT HI MDM: CPT

## 2020-09-27 PROCEDURE — 36415 COLL VENOUS BLD VENIPUNCTURE: CPT

## 2020-09-27 PROCEDURE — 99999 PR OFFICE/OUTPT VISIT,PROCEDURE ONLY: CPT | Performed by: EMERGENCY MEDICINE

## 2020-09-27 PROCEDURE — 84703 CHORIONIC GONADOTROPIN ASSAY: CPT

## 2020-09-27 RX ORDER — LORAZEPAM 1 MG/1
1 TABLET ORAL ONCE
Status: COMPLETED | OUTPATIENT
Start: 2020-09-27 | End: 2020-09-27

## 2020-09-27 RX ADMIN — LORAZEPAM 1 MG: 1 TABLET ORAL at 13:52

## 2020-09-27 ASSESSMENT — PAIN DESCRIPTION - LOCATION
LOCATION: RIB CAGE
LOCATION: BACK

## 2020-09-27 ASSESSMENT — PAIN DESCRIPTION - PAIN TYPE
TYPE: ACUTE PAIN
TYPE: ACUTE PAIN

## 2020-09-27 ASSESSMENT — PAIN SCALES - GENERAL: PAINLEVEL_OUTOF10: 3

## 2020-09-27 ASSESSMENT — PAIN DESCRIPTION - ORIENTATION: ORIENTATION: RIGHT
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
## 2020-09-27 NOTE — ED NOTES
Patient resting with eyes closed. Placed on monitor. Now cooperative for blood work and medical evaluation.      Elizabeth Delgado RN  09/27/20 3427
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
## 2020-09-28 ENCOUNTER — LAB REQUISITION (OUTPATIENT)
Dept: LAB | Facility: HOSPITAL | Age: 45
End: 2020-09-28

## 2020-09-28 DIAGNOSIS — Z00.00 ENCOUNTER FOR GENERAL ADULT MEDICAL EXAMINATION WITHOUT ABNORMAL FINDINGS: ICD-10-CM

## 2020-09-28 LAB — SARS-COV-2 RNA RESP QL NAA+PROBE: NOT DETECTED

## 2020-09-28 PROCEDURE — 87635 SARS-COV-2 COVID-19 AMP PRB: CPT | Performed by: NURSE PRACTITIONER

## 2020-09-28 NOTE — ED NOTES
Patient c/o right side ribs hurting and states she would like XR. Requested XR from Dr. Sonya Duckworth.        Homer Dee RN  09/27/20 6295

## 2020-10-05 ENCOUNTER — TELEPHONE (OUTPATIENT)
Dept: NEUROLOGY | Age: 45
End: 2020-10-05

## 2020-10-05 NOTE — TELEPHONE ENCOUNTER
Patient needs a letter faxed to [address] [place] [address] that tells them she has appointment for her Ocrevus infusion on 10-6-20  faxed [phone]

## 2020-10-06 ENCOUNTER — HOSPITAL ENCOUNTER (OUTPATIENT)
Dept: INFUSION THERAPY | Age: 45
Setting detail: INFUSION SERIES
Discharge: HOME | End: 2020-10-06
Payer: MEDICARE

## 2020-10-06 VITALS
DIASTOLIC BLOOD PRESSURE: 84 MMHG | HEART RATE: 78 BPM | TEMPERATURE: 98 F | SYSTOLIC BLOOD PRESSURE: 132 MMHG | RESPIRATION RATE: 18 BRPM

## 2020-10-06 DIAGNOSIS — G35 MULTIPLE SCLEROSIS (HCC): Primary | ICD-10-CM

## 2020-10-06 PROCEDURE — 6370000000 HC RX 637 (ALT 250 FOR IP): Performed by: PSYCHIATRY & NEUROLOGY

## 2020-10-06 PROCEDURE — 6360000002 HC RX W HCPCS: Performed by: PSYCHIATRY & NEUROLOGY

## 2020-10-06 PROCEDURE — 96413 CHEMO IV INFUSION 1 HR: CPT

## 2020-10-06 PROCEDURE — 96415 CHEMO IV INFUSION ADDL HR: CPT

## 2020-10-06 PROCEDURE — 2580000003 HC RX 258: Performed by: PSYCHIATRY & NEUROLOGY

## 2020-10-06 RX ORDER — ACETAMINOPHEN 325 MG/1
650 TABLET ORAL ONCE
Status: COMPLETED | OUTPATIENT
Start: 2020-10-06 | End: 2020-10-06

## 2020-10-06 RX ORDER — EPINEPHRINE 1 MG/ML
0.3 INJECTION, SOLUTION, CONCENTRATE INTRAVENOUS
Status: CANCELLED | OUTPATIENT
Start: 2021-03-22

## 2020-10-06 RX ORDER — DIPHENHYDRAMINE HYDROCHLORIDE 50 MG/ML
25 INJECTION INTRAMUSCULAR; INTRAVENOUS PRN
Status: CANCELLED | OUTPATIENT
Start: 2021-03-22

## 2020-10-06 RX ORDER — METHYLPREDNISOLONE SODIUM SUCCINATE 125 MG/2ML
125 INJECTION, POWDER, LYOPHILIZED, FOR SOLUTION INTRAMUSCULAR; INTRAVENOUS ONCE
Status: CANCELLED | OUTPATIENT
Start: 2021-03-22

## 2020-10-06 RX ORDER — METHYLPREDNISOLONE SODIUM SUCCINATE 125 MG/2ML
125 INJECTION, POWDER, LYOPHILIZED, FOR SOLUTION INTRAMUSCULAR; INTRAVENOUS ONCE
Status: COMPLETED | OUTPATIENT
Start: 2020-10-06 | End: 2020-10-06

## 2020-10-06 RX ORDER — ACETAMINOPHEN 325 MG/1
650 TABLET ORAL ONCE
Status: CANCELLED | OUTPATIENT
Start: 2021-03-22

## 2020-10-06 RX ORDER — DIPHENHYDRAMINE HCL 25 MG
50 TABLET ORAL ONCE
Status: CANCELLED
Start: 2021-03-22

## 2020-10-06 RX ORDER — DIPHENHYDRAMINE HCL 25 MG
50 TABLET ORAL ONCE
Status: COMPLETED | OUTPATIENT
Start: 2020-10-06 | End: 2020-10-06

## 2020-10-06 RX ADMIN — DIPHENHYDRAMINE HCL 50 MG: 25 TABLET ORAL at 09:43

## 2020-10-06 RX ADMIN — METHYLPREDNISOLONE SODIUM SUCCINATE 125 MG: 125 INJECTION, POWDER, FOR SOLUTION INTRAMUSCULAR; INTRAVENOUS at 09:43

## 2020-10-06 RX ADMIN — ACETAMINOPHEN 650 MG: 325 TABLET ORAL at 09:43

## 2020-10-06 RX ADMIN — OCRELIZUMAB 300 MG: 300 INJECTION INTRAVENOUS at 09:58

## 2020-10-06 ASSESSMENT — PAIN SCALES - GENERAL: PAINLEVEL_OUTOF10: 0

## 2020-10-15 ENCOUNTER — TELEPHONE (OUTPATIENT)
Dept: NEUROLOGY | Age: 45
End: 2020-10-15

## 2020-10-15 RX ORDER — METHYLPREDNISOLONE 4 MG/1
TABLET ORAL
Qty: 1 KIT | Refills: 1 | Status: SHIPPED | OUTPATIENT
Start: 2020-10-15 | End: 2020-10-20

## 2020-10-15 NOTE — TELEPHONE ENCOUNTER
Pt left message stating that after getting her Ocrevus infusion she felt some MS symptoms She states that her feet are numb and swollen and \"she can't feel them\", stated that her face is breaking out, and she has had diarrhea, and her vision was affected making things blurry.  Please advise

## 2020-10-15 NOTE — TELEPHONE ENCOUNTER
Called the patient and let her know what Dr. Lesly Justin said. I also told her that we can change her premeds before her next infusion and to please call me next week if she is not feeling better. I told her if she gets worse to go to the ED. She gave a verbal understanding.

## 2020-10-15 NOTE — TELEPHONE ENCOUNTER
Its probably an allergic reaction. I sent in a medrol dose blake. She can take benadryl 25 mg q 6 prn if she wants to as well.  If she gets worse she should go to the ER

## 2020-10-20 ENCOUNTER — OFFICE VISIT (OUTPATIENT)
Dept: NEUROLOGY | Age: 45
End: 2020-10-20
Payer: MEDICARE

## 2020-10-20 ENCOUNTER — TELEPHONE (OUTPATIENT)
Dept: NEUROLOGY | Age: 45
End: 2020-10-20

## 2020-10-20 VITALS
DIASTOLIC BLOOD PRESSURE: 70 MMHG | SYSTOLIC BLOOD PRESSURE: 100 MMHG | WEIGHT: 170 LBS | HEART RATE: 72 BPM | BODY MASS INDEX: 28.32 KG/M2 | HEIGHT: 65 IN

## 2020-10-20 DIAGNOSIS — G35 MULTIPLE SCLEROSIS (HCC): ICD-10-CM

## 2020-10-20 DIAGNOSIS — M32.9 LUPUS (HCC): ICD-10-CM

## 2020-10-20 DIAGNOSIS — R53.1 WEAKNESS: ICD-10-CM

## 2020-10-20 DIAGNOSIS — M79.7 FIBROMYALGIA: ICD-10-CM

## 2020-10-20 LAB
ALBUMIN SERPL-MCNC: 3.7 G/DL (ref 3.5–5.2)
ALP BLD-CCNC: 70 U/L (ref 35–104)
ALT SERPL-CCNC: 11 U/L (ref 5–33)
ANION GAP SERPL CALCULATED.3IONS-SCNC: 11 MMOL/L (ref 7–19)
AST SERPL-CCNC: 15 U/L (ref 5–32)
BASOPHILS ABSOLUTE: 0 K/UL (ref 0–0.2)
BASOPHILS RELATIVE PERCENT: 0.6 % (ref 0–1)
BILIRUB SERPL-MCNC: <0.2 MG/DL (ref 0.2–1.2)
BUN BLDV-MCNC: 8 MG/DL (ref 6–20)
CALCIUM SERPL-MCNC: 8 MG/DL (ref 8.6–10)
CHLORIDE BLD-SCNC: 102 MMOL/L (ref 98–111)
CO2: 22 MMOL/L (ref 22–29)
CREAT SERPL-MCNC: 0.7 MG/DL (ref 0.5–0.9)
EOSINOPHILS ABSOLUTE: 0.1 K/UL (ref 0–0.6)
EOSINOPHILS RELATIVE PERCENT: 0.8 % (ref 0–5)
GFR AFRICAN AMERICAN: >59
GFR NON-AFRICAN AMERICAN: >60
GLUCOSE BLD-MCNC: 73 MG/DL (ref 74–109)
HCT VFR BLD CALC: 32.3 % (ref 37–47)
HEMOGLOBIN: 10.6 G/DL (ref 12–16)
IMMATURE GRANULOCYTES #: 0 K/UL
LYMPHOCYTES ABSOLUTE: 2.3 K/UL (ref 1.1–4.5)
LYMPHOCYTES RELATIVE PERCENT: 36.4 % (ref 20–40)
MCH RBC QN AUTO: 28.3 PG (ref 27–31)
MCHC RBC AUTO-ENTMCNC: 32.8 G/DL (ref 33–37)
MCV RBC AUTO: 86.1 FL (ref 81–99)
MONOCYTES ABSOLUTE: 0.6 K/UL (ref 0–0.9)
MONOCYTES RELATIVE PERCENT: 9.1 % (ref 0–10)
NEUTROPHILS ABSOLUTE: 3.3 K/UL (ref 1.5–7.5)
NEUTROPHILS RELATIVE PERCENT: 52.6 % (ref 50–65)
PDW BLD-RTO: 15.8 % (ref 11.5–14.5)
PLATELET # BLD: 328 K/UL (ref 130–400)
PMV BLD AUTO: 10 FL (ref 9.4–12.3)
POTASSIUM SERPL-SCNC: 4.4 MMOL/L (ref 3.5–5)
RBC # BLD: 3.75 M/UL (ref 4.2–5.4)
SODIUM BLD-SCNC: 135 MMOL/L (ref 136–145)
TOTAL PROTEIN: 6.2 G/DL (ref 6.6–8.7)
WBC # BLD: 6.4 K/UL (ref 4.8–10.8)

## 2020-10-20 PROCEDURE — G8484 FLU IMMUNIZE NO ADMIN: HCPCS | Performed by: PSYCHIATRY & NEUROLOGY

## 2020-10-20 PROCEDURE — 99214 OFFICE O/P EST MOD 30 MIN: CPT | Performed by: PSYCHIATRY & NEUROLOGY

## 2020-10-20 PROCEDURE — 4004F PT TOBACCO SCREEN RCVD TLK: CPT | Performed by: PSYCHIATRY & NEUROLOGY

## 2020-10-20 PROCEDURE — G8427 DOCREV CUR MEDS BY ELIG CLIN: HCPCS | Performed by: PSYCHIATRY & NEUROLOGY

## 2020-10-20 PROCEDURE — G8419 CALC BMI OUT NRM PARAM NOF/U: HCPCS | Performed by: PSYCHIATRY & NEUROLOGY

## 2020-10-20 RX ORDER — DRONABINOL 5 MG/1
5 CAPSULE ORAL
Qty: 90 CAPSULE | Refills: 5 | Status: SHIPPED | OUTPATIENT
Start: 2020-10-20 | End: 2021-01-18 | Stop reason: SDUPTHER

## 2020-10-20 RX ORDER — CLONAZEPAM 0.5 MG/1
TABLET ORAL
COMMUNITY
Start: 2020-10-14 | End: 2021-04-05

## 2020-10-20 NOTE — PROGRESS NOTES
Chief Complaint   Patient presents with    Multiple Sclerosis     2 month follow up        Fco Reed is a 39y.o. year old female who is seen for evaluation of follow for multiple sclerosis following an admission in 5/19 for optic neuritis treated with IV solumedrol. The patient has a history of multiple sclerosis, fibromyalgia and lupus along with chronic headaches was seen at that time for left visual change which began about 2 days prior to admission with eye pain. This was quite similar to her previous episodes of optic neuritis, of which she has had about 4 previous episodes treated with steroids. She was tried on oral steroids as an outpatient but this was not effective and was transferred from outside ER for further management. She has some chronic weakness and numbness and weakness on the left. She does have bowel and bladder incontinence. She was on Avonex. Had not followed up with Dr Sung Navas since 2017. She did not follow up in the office since 5/19. On 7/4/20 she was in the ER with generalized body aches. She apparently had some trembling on the right side. She had an MRI of the brain in the ER which revealed no acute ischemic change but did have scattered non enhancing white matter lesions in periventricular and white matter. She was given 1 gm Solumedrol for a possible flair up and told to follow up with neurology. She indicates that she typically does relatively well for a few weeks and then she feels severely fatigued or worsening vision. She has been in a wheelchair in 2017. She had her initial testing in 2017 at [facility] which was significant for non enhancing white matter lesions and a CSF with 6 oligoclonal bands seen only in the CSF. She still feels her face twitching and right sided numbness. He speech is not normal for her as she has to think about things carefully. Her rheumatologist started her on Tecfidera but she felt worse on this.  She had been on Avonex since her diagnosis prior to with no clear benefit according to her. Having a lupus flair up. [place] since with Ocrevus infusion 2020.     Active Ambulatory Problems     Diagnosis Date Noted    Multiple sclerosis ([address]     Intractable chronic migraine without aura and without status migrainosus 2017    Major depressive disorder, recurrent, moderate (HCC) 2018    Exacerbation of multiple sclerosis ([address] 05/10/2019    Optic neuritis 05/10/2019    Pain of left eye 05/10/2019    Bronchitis 2019    Visual disturbance 2020    Tremor 2020    Weakness 2020    Numbness 2020    Difficulty with speech 2020    Lupus ([address] 2020    Fibromyalgia 2020    Convulsions ([address] 2020     Resolved Ambulatory Problems     Diagnosis Date Noted    No Resolved Ambulatory Problems     Past Medical History:   Diagnosis Date    Arthritis     B[facility] ([facility]     GERD (gastroesophageal reflux disease)     Hypertension     Movement disorder     Neuromuscular disorder ([address]     Psychiatric problem        Past Surgical History:   Procedure Laterality Date    ABDOMINAL EXPLORATION SURGERY      ADENOIDECTOMY       SECTION      x 2     SECTION, CLASSIC N/A     DILATATION, ESOPHAGUS      EXTERNAL EAR SURGERY      lobe    GASTRIC BAND      GASTRIC BYPASS SURGERY      HERNIA REPAIR      HERNIA REPAIR N/A     SEPTOPLASTY      TONSILLECTOMY      TONSILLECTOMY AND ADENOIDECTOMY      UPPER GASTROINTESTINAL ENDOSCOPY         Family History   Problem Relation Age of Onset    Anemia Mother     Breast Cancer Mother     Depression Mother     High Blood Pressure Mother     Mental Illness Mother    Lonie Clyde / [place] Mother     Colon Cancer Father     Depression Father     High Blood Pressure Father     Mental Illness Father     Breast Cancer Maternal Grandmother     Alcohol Abuse Maternal Grandfather     Anemia Paternal Grandmother     Alcohol Abuse Paternal Grandfather     Osteoporosis Paternal Grandfather     Stroke Paternal Grandfather     Substance Abuse Maternal Cousin     Substance Abuse Paternal Cousin     Asthma Other        Allergies   Allergen Reactions    Avelox [Moxifloxacin] Anaphylaxis    Sugar-Protein-Starch Other (See Comments)    Avelox [Moxifloxacin]     Copaxone [Glatiramer Acetate]     Depakote [Divalproex Sodium]     Gabapentin Itching    Hydroxychloroquine      Other reaction(s): Other (See Comments)  Vision loss   Caty Pine [Paliperidone]     Lamictal [Lamotrigine]     Lithium     Pregabalin      Other reaction(s): Mental Status Change    Lamictal [Lamotrigine] Rash    Lithium Rash       Social History     Socioeconomic History    Marital status: Single     Spouse name: Not on file    Number of children: 3    Years of education: Some College    Highest education level: Not on file   Occupational History    Occupation: Disability   Social Needs    Financial resource strain: Not on file    Food insecurity     Worry: Not on file     Inability: Not on file   [employer] needs     Medical: Not on file     Non-medical: Not on file   Tobacco Use    Smoking status: Current Every Day Smoker     Packs/day: 1.50     Years: 15.00     Pack years: 22.50     Types: Cigarettes    Smokeless tobacco: Never Used   Substance and Sexual Activity    Alcohol use:  Yes     Alcohol/week: 2.0 standard drinks     Types: 2 Shots of liquor per week     Comment: couple of shots per day    Drug use: No    Sexual activity: Never     Partners: Male   Lifestyle    Physical activity     Days per week: Not on file     Minutes per session: Not on file    Stress: Not on file   Relationships    Social connections     Talks on phone: Not on file     Gets together: Not on file     Attends Anabaptist service: Not on file     Active member of club or organization: Not on file     Attends meetings of clubs or organizations: Not on file     Relationship status: Not on file    Intimate partner violence     Fear of current or ex partner: Not on file     Emotionally abused: Not on file     Physically abused: Not on file     Forced sexual activity: Not on file   Other Topics Concern    Not on file   Social History Narrative    ** Merged History Encounter **            Review of Systems     Constitutional - No fever or chills. yes diaphoresis or significant fatigue. HENT -  No tinnitus or significant hearing loss. Eyes - no sudden vision change or eye pain  Respiratory - no significant shortness of breath or cough  Cardiovascular - no chest pain No palpitations or significant leg swelling  Gastrointestinal - no abdominal swelling or pain. Genitourinary - No difficulty urinating, dysuria  Musculoskeletal - no back pain or myalgia. Skin - no color change or rash  Neurologic - No seizures. No lateralizing weakness. Hematologic - no easy bruising or excessive bleeding. Psychiatric - yes severe anxiety or nervousness. All other review of systems are negative. Current Outpatient Medications   Medication Sig Dispense Refill    clonazePAM (KLONOPIN) 0.5 MG tablet TK 1 T PO TID PRA      dronabinol (MARINOL) 5 MG capsule Take 1 capsule by mouth 2 times daily (before meals) for 30 days. 90 capsule 5    tiZANidine (ZANAFLEX) 4 MG tablet       desvenlafaxine succinate (PRISTIQ) 50 MG TB24 extended release tablet Take 1 tablet by mouth daily 30 tablet 3    Gabapentin, Once-Daily, (GRALISE) 600 MG TABS Take 1,800 mg by mouth nightly.       potassium chloride (KLOR-CON M) 20 MEQ extended release tablet Take 20 mEq by mouth 2 times daily      sucralfate (CARAFATE) 1 GM tablet Take 1 g by mouth 2 times daily      topiramate (TOPAMAX) 25 MG tablet Take 25 mg by mouth daily      prazosin (MINIPRESS) 2 MG capsule Take 2 mg by mouth nightly      hydrOXYzine (ATARAX) 50 MG tablet Take 50 mg by mouth 2 times daily       edema  Skin - warm, dry, and intact. No rash, erythema, or pallor. Psychiatric - mood, affect, and behavior appear normal.      Neurological exam  Awake, alert, fluent oriented  appropriate affect  Attention and concentration appear appropriate  Recent and remote memory appears unremarkable  Speech normal without dysarthria  Some hesitancy of speech  No clear issues with language of fund of knowledge    Cranial Nerve Exam     CN III, IV,VI-EOMI, No nystagmus, conjugate eye movements, no ptosis  CN VII-no facial assymetry        Motor Exam  Generalized mild giveway weakness    Sensory  Decreased sensation right arm and right leg worse in the foot    Tremors- no tremors in hands or head noted    Gait  Slightly slow    Lab Results   Component Value Date    LLNVFRVF19 753 09/12/2018     Lab Results   Component Value Date    WBC 4.6 (L) 09/27/2020    HGB 11.9 (L) 09/27/2020    HCT 36.2 (L) 09/27/2020    MCV 87.0 09/27/2020     09/27/2020     Lab Results   Component Value Date     (L) 09/27/2020    K 3.9 09/27/2020     09/27/2020    CO2 20 (L) 09/27/2020    BUN 8 09/27/2020    CREATININE 0.9 09/27/2020    GLUCOSE 88 09/27/2020    CALCIUM 9.3 09/27/2020    PROT 6.7 09/27/2020    LABALBU 3.8 09/27/2020    BILITOT <0.2 09/27/2020    ALKPHOS 93 09/27/2020    AST 24 09/27/2020    ALT 20 09/27/2020    LABGLOM >60 09/27/2020    GFRAA >59 09/27/2020    GLOB 3.0 11/14/2016       Impression   1. No diffusion restriction to indicate acute ischemia. 2. Scattered nonenhancing hyperintense T2/FLAIR signal changes within   the periventricular and white matter regions favorable for MS plaques. No abnormal enhancement to indicate active demyelination. 3. Partial opacification inferior right mastoid air cells, mastoid   effusion considered.    Signed by Dr Kya Franco on 7/4/2020 7:10 PM         Assessment    ICD-10-CM    1. Multiple sclerosis (HCC)  G35 Miscellaneous Sendout 1     PIERO Virus     CBC Auto Differential     Aquaporin-4 Receptor Antibody     Comprehensive Metabolic Panel     dronabinol (MARINOL) 5 MG capsule   2. Convulsions, unspecified convulsion type ([address]  R56.9    3. Visual disturbance  H53.9    4. Weakness  R53.1 Miscellaneous Sendout 1     PIERO Virus     CBC Auto Differential     Aquaporin-4 Receptor Antibody     Comprehensive Metabolic Panel     dronabinol (MARINOL) 5 MG capsule   5. Fibromyalgia  M79.7 Miscellaneous Sendout 1     PIERO Virus     CBC Auto Differential     Aquaporin-4 Receptor Antibody     Comprehensive Metabolic Panel     dronabinol (MARINOL) 5 MG capsule   6. Tremor  R25.1    7. Lupus (HCC)  M32.9 Miscellaneous Sendout 1     PIERO Virus     CBC Auto Differential     Aquaporin-4 Receptor Antibody     Comprehensive Metabolic Panel     dronabinol (MARINOL) 5 MG capsule   8. Optic neuritis  H46.9    9. Pain, neck  M54.2    10. Difficulty with speech  R47.9        Her neurological examination was significant for some hesitance of speech, decreased visual acutiy, right sided numbness and some mild slowness of speech. She appears to have having a flair up  Of her multiple sclerosis at this time. At this time she will be started on Solumedrol 1 gm IV daily x 5 days. [facility] will be [facility] as she does not want to be on Tecfidera due to side effects. Her hepatitis panel was unremarkable. She is to have a CMP, CBC and PIERO virus titers every 3 months. Her PIERO virus titer was negative in 7/20. She is seeing  MS specialist in [place]. Her video EEG x 3 days was unremarkable with no events captured. She is awaiting another rheumatologist in [facility],She will be tried on Marinol She will need something for fatigue. The patient indicated understanding of the management plan. She is to follow up with me in one months and call with any issues.      Plan    Orders Placed This Encounter   Procedures    Miscellaneous Sendout 1    PIERO Virus    CBC Auto Differential    Aquaporin-4 Receptor Antibody    Comprehensive Metabolic Panel       Orders Placed This Encounter   Medications    dronabinol (MARINOL) 5 MG capsule     Sig: Take 1 capsule by mouth 2 times daily (before meals) for 30 days. Dispense:  90 capsule     Refill:  5       Return in about 4 weeks (around 11/17/2020). EMR Dragon/transcription disclaimer:Significant part of this  encounter note is electronic transcription/translation of spoken language to printed text. The electronic translation of spoken language may be erroneous, or at times, nonsensical words or phrases may be inadvertently transcribed.  Although I have reviewed the note for such errors, some may still exist.

## 2020-10-26 LAB — AQUAPORIN 4 RECEPTOR AB: <1.5 U/ML

## 2020-10-27 LAB
JC VIRUS SOURCE: NORMAL
JC VIRUS, DNA: NOT DETECTED

## 2020-11-05 ENCOUNTER — TELEPHONE (OUTPATIENT)
Dept: NEUROLOGY | Age: 45
End: 2020-11-05

## 2020-11-05 RX ORDER — DEXAMETHASONE 6 MG/1
TABLET ORAL
Qty: 15 TABLET | Refills: 0 | Status: SHIPPED | OUTPATIENT
Start: 2020-11-05 | End: 2021-01-18

## 2020-11-05 NOTE — TELEPHONE ENCOUNTER
Its too early to know if Lydia Ivy is working or not. I sent in a 3 day course of Decadron.  She might want to call her MS specialist as well to see if he has any other thoughts

## 2020-11-10 NOTE — TELEPHONE ENCOUNTER
Called the patient and she has already taken the Decadron and doesn't feel any better. I did tell her that Dr. Radha Hernandez is out of the office until next week but I would send a message to the doctor on call and see if he has any suggestions?

## 2020-11-16 NOTE — TELEPHONE ENCOUNTER
Spoke with patient and she has contacted her MS specialist. She was advised to go to the ED but she said that's a waste of her time and money. Patient said her main complaint is the uncontrollable shaking. She states that the amantadine isn't helping at all. I advised pt to keep follow up appointment and discuss with Dr. Gabrielle Kothari then. Pt voiced understanding.

## 2020-11-17 NOTE — TELEPHONE ENCOUNTER
Pt left another message stating that her hands are trembling excessively. She is on amantadine 100mg twice daily and it isn't helping at all. Pt is complaining of lots of pain in her hands especially in the joints and her wrists. Dr. Michelle Barrera didn't have any recommendations for her. Please advise.      Pt has upcoming appointment on 11/24/20

## 2020-11-18 RX ORDER — TRIHEXYPHENIDYL HYDROCHLORIDE 2 MG/1
2 TABLET ORAL 2 TIMES DAILY
Qty: 60 TABLET | Refills: 0 | Status: SHIPPED | OUTPATIENT
Start: 2020-11-18 | End: 2021-01-18 | Stop reason: SDUPTHER

## 2020-11-19 NOTE — TELEPHONE ENCOUNTER
Called the patient and let her know that Dr. Hong Wang sent some medication in for her and to start that and I would either get back with her once the on call doctor responds or when Dr. Hong Wang gets back. She gave a verbal understanding.

## 2020-11-19 NOTE — TELEPHONE ENCOUNTER
Pt called stating that she is having extreme leg weakness and is falling multiple times in her house. This has been going on all week but the last couple of days has been worse.  Please advise    Arnulfo pt hog

## 2020-11-24 NOTE — TELEPHONE ENCOUNTER
Called the patient to check on her and she stated that she is still having a little trouble with her Lupus. She was scheduled for a follow up with us today but stated she is doing ok right now and would reschedule.

## 2020-11-30 ENCOUNTER — TELEPHONE (OUTPATIENT)
Dept: NEUROLOGY | Age: 45
End: 2020-11-30

## 2020-11-30 RX ORDER — REPOSITORY CORTICOTROPIN 80 [USP'U]/ML
80 INJECTION INTRAMUSCULAR; SUBCUTANEOUS DAILY
Qty: 3 ML | Refills: 0 | Status: SHIPPED | OUTPATIENT
Start: 2020-11-30 | End: 2020-12-22 | Stop reason: SDUPTHER

## 2020-12-14 NOTE — TELEPHONE ENCOUNTER
Tara BAEZA called stating that their Acthar gel  medication requires prior authorization Please call her to advise.     Last Appt:  10/20/2020  Next Appt:   Visit date not found  Preferred Pharmacy: [facility]

## 2020-12-22 RX ORDER — REPOSITORY CORTICOTROPIN 80 [USP'U]/ML
80 INJECTION INTRAMUSCULAR; SUBCUTANEOUS DAILY
Qty: 3 ML | Refills: 5 | Status: SHIPPED | OUTPATIENT
Start: 2020-12-22 | End: 2021-06-15 | Stop reason: SDUPTHER

## 2020-12-22 NOTE — TELEPHONE ENCOUNTER
Submitted today via cover my meds, awaiting response from patients insurance plan. [place]: [id number] - [place] Case ID: [id number] - Rx #: [id number] Need help?  Call us at [phone]   Status   [facility]   DrugActhar 80UNIT/ML gel   [facility] [place] [facility]   Original [facility]

## 2020-12-22 NOTE — TELEPHONE ENCOUNTER
Medication has no been approved. [place]: [id number] - [place] Case ID: [id number] - Rx #: [id number] Need help? Call us at [phone]   Outcome   Approvedtoday   CaseId:[id number];Status:Approved; Review Type:Prior Auth; Coverage Start Date:11/22/2020; Coverage End Date:12/22/2021;   DrugActhar 80UNIT/ML gel   [facility] [place] [facility]   Original Claim [facility]

## 2020-12-29 ENCOUNTER — HOSPITAL ENCOUNTER (EMERGENCY)
Age: 45
Discharge: HOME | End: 2020-12-30
Attending: EMERGENCY MEDICINE
Payer: MEDICARE

## 2020-12-29 PROCEDURE — 99285 EMERGENCY DEPT VISIT HI MDM: CPT

## 2020-12-29 PROCEDURE — 96374 THER/PROPH/DIAG INJ IV PUSH: CPT

## 2020-12-29 PROCEDURE — 99999 PR OFFICE/OUTPT VISIT,PROCEDURE ONLY: CPT | Performed by: EMERGENCY MEDICINE

## 2020-12-29 PROCEDURE — 96375 TX/PRO/DX INJ NEW DRUG ADDON: CPT

## 2020-12-29 RX ORDER — DIPHENHYDRAMINE HYDROCHLORIDE 50 MG/ML
50 INJECTION INTRAMUSCULAR; INTRAVENOUS ONCE
Status: COMPLETED | OUTPATIENT
Start: 2020-12-29 | End: 2020-12-30

## 2020-12-29 RX ORDER — DEXAMETHASONE SODIUM PHOSPHATE 10 MG/ML
10 INJECTION, SOLUTION INTRAMUSCULAR; INTRAVENOUS ONCE
Status: COMPLETED | OUTPATIENT
Start: 2020-12-29 | End: 2020-12-30

## 2020-12-29 RX ORDER — METOCLOPRAMIDE HYDROCHLORIDE 5 MG/ML
10 INJECTION INTRAMUSCULAR; INTRAVENOUS ONCE
Status: COMPLETED | OUTPATIENT
Start: 2020-12-29 | End: 2020-12-30

## 2020-12-29 RX ORDER — KETOROLAC TROMETHAMINE 30 MG/ML
30 INJECTION, SOLUTION INTRAMUSCULAR; INTRAVENOUS ONCE
Status: COMPLETED | OUTPATIENT
Start: 2020-12-29 | End: 2020-12-30

## 2020-12-29 ASSESSMENT — PAIN SCALES - GENERAL: PAINLEVEL_OUTOF10: 5

## 2020-12-30 VITALS
OXYGEN SATURATION: 99 % | WEIGHT: 170 LBS | SYSTOLIC BLOOD PRESSURE: 122 MMHG | RESPIRATION RATE: 18 BRPM | DIASTOLIC BLOOD PRESSURE: 75 MMHG | TEMPERATURE: 97.9 F | HEIGHT: 65 IN | HEART RATE: 78 BPM | BODY MASS INDEX: 28.32 KG/M2

## 2020-12-30 PROCEDURE — 6360000002 HC RX W HCPCS: Performed by: EMERGENCY MEDICINE

## 2020-12-30 PROCEDURE — 6370000000 HC RX 637 (ALT 250 FOR IP): Performed by: EMERGENCY MEDICINE

## 2020-12-30 RX ORDER — BUTALBITAL, ACETAMINOPHEN, CAFFEINE AND CODEINE PHOSPHATE 50; 325; 40; 30 MG/1; MG/1; MG/1; MG/1
1 CAPSULE ORAL ONCE
Status: DISCONTINUED | OUTPATIENT
Start: 2020-12-30 | End: 2020-12-30 | Stop reason: CLARIF

## 2020-12-30 RX ORDER — BUTALBITAL, ACETAMINOPHEN AND CAFFEINE 50; 325; 40 MG/1; MG/1; MG/1
1 TABLET ORAL ONCE
Status: COMPLETED | OUTPATIENT
Start: 2020-12-30 | End: 2020-12-30

## 2020-12-30 RX ORDER — BUTALBITAL, ACETAMINOPHEN AND CAFFEINE 50; 325; 40 MG/1; MG/1; MG/1
1 TABLET ORAL EVERY 4 HOURS PRN
Qty: 18 TABLET | Refills: 0 | Status: SHIPPED | OUTPATIENT
Start: 2020-12-30 | End: 2021-01-18

## 2020-12-30 RX ORDER — CODEINE SULFATE 30 MG/1
30 TABLET ORAL ONCE
Status: COMPLETED | OUTPATIENT
Start: 2020-12-30 | End: 2020-12-30

## 2020-12-30 RX ADMIN — DIPHENHYDRAMINE HYDROCHLORIDE 50 MG: 50 INJECTION, SOLUTION INTRAMUSCULAR; INTRAVENOUS at 00:01

## 2020-12-30 RX ADMIN — CODEINE SULFATE 30 MG: 30 TABLET ORAL at 04:25

## 2020-12-30 RX ADMIN — KETOROLAC TROMETHAMINE 30 MG: 30 INJECTION, SOLUTION INTRAMUSCULAR; INTRAVENOUS at 00:01

## 2020-12-30 RX ADMIN — DEXAMETHASONE SODIUM PHOSPHATE 10 MG: 10 INJECTION INTRAMUSCULAR; INTRAVENOUS at 00:00

## 2020-12-30 RX ADMIN — BUTALBITAL, ACETAMINOPHEN, AND CAFFEINE 1 TABLET: 50; 325; 40 TABLET ORAL at 04:25

## 2020-12-30 RX ADMIN — METOCLOPRAMIDE 10 MG: 5 INJECTION, SOLUTION INTRAMUSCULAR; INTRAVENOUS at 00:01

## 2020-12-30 ASSESSMENT — ENCOUNTER SYMPTOMS
WHEEZING: 0
COLOR CHANGE: 0
CHEST TIGHTNESS: 0
COUGH: 0
PHOTOPHOBIA: 0
SORE THROAT: 0
BACK PAIN: 0
RHINORRHEA: 0
TROUBLE SWALLOWING: 0
VOMITING: 0
NAUSEA: 0
ABDOMINAL PAIN: 0
EYE PAIN: 0
SHORTNESS OF BREATH: 0
CONSTIPATION: 0
DIARRHEA: 0
ABDOMINAL DISTENTION: 0

## 2020-12-30 ASSESSMENT — PAIN SCALES - GENERAL
PAINLEVEL_OUTOF10: 5
PAINLEVEL_OUTOF10: 2
PAINLEVEL_OUTOF10: 4

## 2020-12-30 ASSESSMENT — VISUAL ACUITY
OU: 20/50
OS: 20/70
OD: 20/70

## 2020-12-30 NOTE — ED PROVIDER NOTES
[facility] EMERGENCY DEPT  eMERGENCY dEPARTMENT eNCOUnter      Pt Name: Everett Calvillo  MRN: [id number]  [place] 1975  Date of evaluation: 12/29/2020  Provider: Katia Bates MD    [address] [place] [address]       Chief Complaint   Patient presents with    Migraine     vision changes         HISTORY OF PRESENT ILLNESS   (Location/Symptom, Timing/Onset,Context/Setting, Quality, Duration, Modifying Factors, Severity)  Note limiting factors. Everett Calvillo is a 39 y.o. female who presents to the emergency department for headache and vision changes. Patient reports a history of migraines. She tells me that the headache began roughly 4 days ago. Patient reports that the headache is in the back of her head and is \"burning\" in nature. Patient states that she has blurred vision with a headache this time. This is not typical for what she experiences with her headaches. The history is limited. I treated her for migraine and prior to being able to get it to fully evaluate her she was groggy from the medication. She did tell me that her headache has improved with treatment though. HPI    NursingNotes were reviewed. REVIEW OF SYSTEMS    (2-9 systems for level 4, 10 or more for level 5)     Review of Systems   Constitutional: Negative for activity change, appetite change, chills, fatigue and fever. HENT: Negative for congestion, ear pain, rhinorrhea, sore throat and trouble swallowing. Eyes: Positive for visual disturbance (blurry). Negative for photophobia and pain. Respiratory: Negative for cough, chest tightness, shortness of breath and wheezing. Cardiovascular: Negative for chest pain, palpitations and leg swelling. Gastrointestinal: Negative for abdominal distention, abdominal pain, constipation, diarrhea, nausea and vomiting. Genitourinary: Negative for difficulty urinating, dysuria, flank pain, urgency, vaginal bleeding and vaginal discharge.    Musculoskeletal: Negative for back pain, myalgias and neck stiffness. Skin: Negative for color change, pallor and rash. Neurological: Positive for headaches. Negative for dizziness, weakness, light-headedness and numbness. Psychiatric/Behavioral: Negative for agitation, behavioral problems, confusion, hallucinations and suicidal ideas.             PAST MEDICALHISTORY     Past Medical History:   Diagnosis Date    Arthritis     Bipolar 1 disorder ([facility]     Convulsions ([facility] 2020    Fibromyalgia     GERD (gastroesophageal reflux disease)     Hypertension     Lupus ([facility]     Movement disorder     [facility]     [facility]     Psychiatric problem          SURGICAL HISTORY       Past Surgical History:   Procedure Laterality Date    ABDOMINAL EXPLORATION SURGERY      ADENOIDECTOMY       SECTION      x 2     SECTION, CLASSIC N/A     DILATATION, ESOPHAGUS      EXTERNAL EAR SURGERY      lobe    GASTRIC BAND      GASTRIC BYPASS SURGERY      HERNIA REPAIR      HERNIA REPAIR N/A     SEPTOPLASTY      TONSILLECTOMY      TONSILLECTOMY AND ADENOIDECTOMY      UPPER GASTROINTESTINAL ENDOSCOPY           CURRENT MEDICATIONS     Previous Medications    ALENDRONATE (FOSAMAX) 70 MG TABLET    Take 70 mg by mouth every 7 days    AMANTADINE (SYMMETREL) 100 MG TABS TABLET    Take 100 mg by mouth 2 times daily    AZELASTINE (ASTELIN) 0.1 % NASAL SPRAY    SPRAY IN EACH NOSTRIL PRN    BUSPIRONE (BUSPAR) 15 MG TABLET    Take 20 mg by mouth 2 times daily     BUTALBITAL-ACETAMINOPHEN-CAFFEINE (FIORICET, ESGIC) -40 MG PER TABLET    Take 1 tablet by mouth every 4 hours as needed for Headaches    CLONAZEPAM (KLONOPIN) 0.5 MG TABLET    TK 1 T PO TID PRA    CORTICOTROPIN (ACTHAR) 80 UNIT/ML INJECTABLE GEL    Inject 1 mL into the muscle daily for 3 days    CYANOCOBALAMIN 1000 MCG/ML INJECTION    Inject 1,000 mcg into the muscle every 21 days     CYCLOBENZAPRINE (FLEXERIL) 10 MG TABLET    Take 10 mg by mouth 2 times daily as needed for Muscle spasms    DESVENLAFAXINE SUCCINATE (PRISTIQ) 50 MG TB24 EXTENDED RELEASE TABLET    Take 1 tablet by mouth daily    DEXAMETHASONE (DECADRON) 6 MG TABLET    5 tabs daily x 3 days    ELAGOLIX SODIUM (ORILISSA PO)    Take by mouth    FERROUS SULFATE 325 (65 FE) MG TABLET    Take 325 mg by mouth daily (with breakfast)    FOLIC ACID (FOLVITE) 1 MG TABLET    Take 1 mg by mouth daily    GABAPENTIN, ONCE-DAILY, (GRALISE) 600 MG TABS    Take 1,800 mg by mouth nightly. HYDROCHLOROTHIAZIDE (MICROZIDE) 12.5 MG CAPSULE    Take 12.5 mg by mouth daily    HYDROXYZINE (ATARAX) 50 MG TABLET    Take 50 mg by mouth 2 times daily     LISINOPRIL (PRINIVIL;ZESTRIL) 40 MG TABLET    Take 40 mg by mouth daily    METHYLPHENIDATE (CONCERTA) 36 MG EXTENDED RELEASE TABLET    Take 54 mg by mouth every morning. MULTIPLE VITAMINS-MINERALS (THERAPEUTIC MULTIVITAMIN-MINERALS) TABLET    Take 1 tablet by mouth daily    PANTOPRAZOLE SODIUM (PROTONIX) 40 MG PACK PACKET    Take 40 mg by mouth 2 times daily    POTASSIUM CHLORIDE (KLOR-CON M) 20 MEQ EXTENDED RELEASE TABLET    Take 20 mEq by mouth 2 times daily    PRAZOSIN (MINIPRESS) 2 MG CAPSULE    Take 2 mg by mouth nightly    SUCRALFATE (CARAFATE) 1 GM TABLET    Take 1 g by mouth 2 times daily    TIZANIDINE (ZANAFLEX) 4 MG TABLET        TOPIRAMATE (TOPAMAX) 25 MG TABLET    Take 25 mg by mouth daily    TRIHEXYPHENIDYL (ARTANE) 2 MG TABLET    Take 1 tablet by mouth 2 times daily    ZOLPIDEM (AMBIEN) 10 MG TABLET    Take by mouth nightly as needed for Sleep.        ALLERGIES     Avelox [moxifloxacin], Sugar-protein-starch, Avelox [moxifloxacin], Copaxone [glatiramer acetate], Depakote [divalproex sodium], Gabapentin, Hydroxychloroquine, Invega [paliperidone], Lamictal [lamotrigine], Lithium, Pregabalin, Lamictal [lamotrigine], and Lithium    FAMILY HISTORY       Family History   Problem Relation Age of Onset    Anemia Mother    Daksha Donis Breast Cancer Mother     Depression Mother Merged History Encounter **            SCREENINGS    [place] Coma Scale  Eye Opening: Spontaneous  Best Verbal Response: Oriented  Best Motor Response: Obeys commands  Jesus Coma Scale Score: 15        PHYSICAL EXAM    (up to 7 for level 4, 8 or more for level 5)     ED Triage Vitals   BP Temp Temp src Pulse Resp SpO2 Height Weight   12/29/20 2342 12/29/20 2339 -- 12/29/20 2339 12/29/20 2339 12/29/20 2339 12/29/20 2338 12/29/20 2338   (!) 119/95 97.9 °F (36.6 °C)  93 18 95 % 5' 5\" (1.651 m) 170 lb (77.1 kg)       Physical Exam  Vitals signs and nursing note reviewed. Constitutional:       General: She is not in acute distress. Appearance: She is well-developed. HENT:      Head: Normocephalic and atraumatic. Mouth/Throat:      Mouth: Mucous membranes are moist.      Pharynx: Oropharynx is clear. Eyes:      Conjunctiva/sclera: Conjunctivae normal.      Pupils: Pupils are equal, round, and reactive to light. Neck:      Musculoskeletal: Normal range of motion and neck supple. Vascular: No JVD. Cardiovascular:      Rate and Rhythm: Normal rate and regular rhythm. Heart sounds: Normal heart sounds. No murmur. Pulmonary:      Effort: Pulmonary effort is normal.      Breath sounds: Normal breath sounds. No wheezing or rales. Abdominal:      General: Bowel sounds are normal. There is no distension. Palpations: Abdomen is soft. Tenderness: There is no abdominal tenderness. There is no guarding or rebound. Musculoskeletal: Normal range of motion. Skin:     General: Skin is warm and dry. Capillary Refill: Capillary refill takes less than 2 seconds. Neurological:      General: No focal deficit present. Mental Status: She is alert and oriented to person, place, and time. Cranial Nerves: No cranial nerve deficit. Psychiatric:         Behavior: Behavior normal.         Thought Content:  Thought content normal.         Judgment: Judgment normal.         DIAGNOSTIC RESULTS     EKG: All EKG's areinterpreted by the Emergency Department Physician who either signs or Co-signs this chart in the absence of a cardiologist.        RADIOLOGY:  Non-plain film images such as CT, Ultrasound and MRI are read by the radiologist. Plain radiographic images are visualized and preliminarily interpreted bythe emergency physician with the below findings:          No orders to display           LABS:  Labs Reviewed - No data to display    All other labs were within normal range or not returned as of this dictation. EMERGENCY DEPARTMENT COURSE and DIFFERENTIAL DIAGNOSIS/MDM:   Vitals:    Vitals:    12/30/20 0030 12/30/20 0130 12/30/20 0200 12/30/20 0345   BP: (!) 129/99 (!) 133/104 (!) 138/91    Pulse:   84 84   Resp:   18    Temp:       SpO2: 99% 98% 97%    Weight:       Height:           MDM  Number of Diagnoses or Management Options  Migraine without status migrainosus, not intractable, unspecified migraine type: new and requires workup  Diagnosis management comments: Patient reports that her headache has improved, but not fully resolved. I was able to go back and speak to and examine her more thoroughly once the medication had an opportunity to wear off. She is neurologically intact. She looks much better than she did when she initially arrived. Patient has made a trip to the bathroom unassisted this evening. She tells me that she has not had a her Fioricet to help [place] the headaches here recently. She does have a history of lupus and MS which contribute to her visual disturbances. Tonight she is 20/50 OU. She asked if I could refill her Fioricet and I agreed to give her 3 days until she can get in to see one of her primaries. The patient has no history of constitutional symptoms, \"Thunder clap\"/acute onset of symptoms, or vision changes; no physical exam evidence of meningeal signs, neurologic deficit, or fever.  The patient was reexamined prior to discharge, the patient feels improved. Exam continues to be nonfocal. Given lack of fever, reassuring history, lack of meningismus, nonfocal exam, and reassuring clinical picture, I feel the likelihood of SAH, meningitis, or other significant neurologic pathology is low. Questions were invited and answered. Strict return precautions reviewed. Patient and/or guardian demonstrate understanding and agreement with the proposed plan. Patient Progress  Patient progress: stable      Reassessment  Visual Acuity  Bilateral Distance: 20/50  R Distance: 20/70  L Distance: 20/70  Visual aides: none    CONSULTS:  None    PROCEDURES:  Unless otherwise noted below, none     Procedures    FINAL IMPRESSION      1.  Migraine without status migrainosus, not intractable, unspecified migraine type          DISPOSITION/PLAN   DISPOSITION Decision To Discharge 12/30/2020 04:10:22 AM      PATIENT REFERRED TO:  Colleen Bobby MD  [address] [phone]    Call in 3 days  For follow up      [address]:  New Prescriptions    BUTALBITAL-ACETAMINOPHEN-CAFFEINE (FIORICET, ESGIC) -40 MG PER TABLET    Take 1 tablet by mouth every 4 hours as needed for Headaches          (Please note that portions of this note were completed with a voice recognition program.  Efforts were made to edit thedictations but occasionally words are mis-transcribed.)    Nicanor Logan MD (electronically signed)  Attending Emergency Physician         Edgardo Bello MD  12/30/20 1961

## 2020-12-31 NOTE — TELEPHONE ENCOUNTER
Spoke with Pharmacist Flo Martinez, she actually needed an order for supplies for the patient. I voiced it was ok to fill needles, syringes and alcohol pads. Flo Martinez voiced understanding and stated the patient would probably have the medicine by Tuesday.       Any Questions call Flo Martinez at [phone]

## 2021-01-04 ENCOUNTER — HOSPITAL ENCOUNTER (OUTPATIENT)
Dept: PAIN MANAGEMENT | Age: 46
Discharge: HOME | End: 2021-01-04
Payer: MEDICARE

## 2021-01-04 DIAGNOSIS — M79.18 BILATERAL MYOFASCIAL PAIN: ICD-10-CM

## 2021-01-04 PROCEDURE — 99204 OFFICE O/P NEW MOD 45 MIN: CPT | Performed by: NURSE PRACTITIONER

## 2021-01-04 PROCEDURE — 99215 OFFICE O/P EST HI 40 MIN: CPT

## 2021-01-04 ASSESSMENT — ENCOUNTER SYMPTOMS
CONSTIPATION: 0
BACK PAIN: 1

## 2021-01-04 NOTE — H&P
[facility] Physical & Pain Medicine    History and Physical    Patient Name: Jose Miguel Mena    MR #: [id number]    Account [de-identified]    : 1975    Age: 39 y.o. Sex: female    Date: 2021    PCP: LYSSA Jaquez    Referring Provider: Dr. Su Manriquez    Chief Complaint:   Chief Complaint   Patient presents with    Neck Pain       History of Present Illness: The patient is a 39 y.o. female who was referrred with primary complaints of chronic neck pain. Patient has an extensive medical health history of fibromyalgia, MS. Lupus, neuromuscular disorder etc. Patient had been seen by [address] [place] [address] in which she is to bring a dc letters. Patient has had numerous UDS + for THC in this system since 2019 with most recent 2020. Patient no showed her first appointment in October. Provider agreed to see patient as non-narcotic only. Review of records indicate that patient had an MRI of C-SPine in  which was normal.     Patient is on gralise and baclofen. Patient was on flexeril and tizanidine but this was changed baclofen. Patient gets [place] 5 from her rheumatologies. Patient has tried trigger points in the past which caused more pain than relief. Patient states that she has had imaging at [facility] Patient has not under gone PT in some time. Patient states that Pt did not help. Neck Pain   This is a chronic problem. The current episode started more than 1 year ago. The problem occurs constantly. The problem has been gradually worsening. The pain is associated with nothing. The pain is present in the midline. Quality: pulling apart. The symptoms are aggravated by stress. Worse during: progresses during the day. Associated symptoms include headaches and weakness. Pertinent negatives include no numbness.        Screening Tools:     PE    ORT: 6    PHQ-9: 0    Current Pain Assessment       Employment: various job - house keeping, desk work    Past Medical History  Past Medical History:   Diagnosis Date    Arthritis     Bipolar 1 disorder ([facility]     Convulsions ([facility] 7/20/2020    Fibromyalgia     GERD (gastroesophageal reflux disease)     Hypertension     Lupus ([facility]     Movement disorder     Multiple sclerosis (HCC)     Neuromuscular disorder ([facility]     Psychiatric problem        Allergies  Avelox [moxifloxacin], Sugar-protein-starch, Avelox [moxifloxacin], Copaxone [glatiramer acetate], Depakote [divalproex sodium], Gabapentin, Hydroxychloroquine, Invega [paliperidone], Lamictal [lamotrigine], Lithium, Pregabalin, Lamictal [lamotrigine], and Lithium    Current Medications  Current Outpatient Medications   Medication Sig Dispense Refill    butalbital-acetaminophen-caffeine (FIORICET, ESGIC) -40 MG per tablet Take 1 tablet by mouth every 4 hours as needed for Headaches 18 tablet 0    Elagolix Sodium (ORILISSA PO) Take by mouth      corticotropin (ACTHAR) 80 UNIT/ML injectable gel Inject 1 mL into the muscle daily for 3 days 3 mL 5    trihexyphenidyl (ARTANE) 2 MG tablet Take 1 tablet by mouth 2 times daily 60 tablet 0    dexamethasone (DECADRON) 6 MG tablet 5 tabs daily x 3 days 15 tablet 0    clonazePAM (KLONOPIN) 0.5 MG tablet TK 1 T PO TID PRA      tiZANidine (ZANAFLEX) 4 MG tablet       desvenlafaxine succinate (PRISTIQ) 50 MG TB24 extended release tablet Take 1 tablet by mouth daily 30 tablet 3    Gabapentin, Once-Daily, (GRALISE) 600 MG TABS Take 1,800 mg by mouth nightly.       folic acid (FOLVITE) 1 MG tablet Take 1 mg by mouth daily      potassium chloride (KLOR-CON M) 20 MEQ extended release tablet Take 20 mEq by mouth 2 times daily      sucralfate (CARAFATE) 1 GM tablet Take 1 g by mouth 2 times daily      topiramate (TOPAMAX) 25 MG tablet Take 25 mg by mouth daily      prazosin (MINIPRESS) 2 MG capsule Take 2 mg by mouth nightly      hydrOXYzine (ATARAX) 50 MG tablet Take 50 mg by mouth 2 times daily       hydroCHLOROthiazide (MICROZIDE) 12.5 MG capsule Take 12.5 mg by mouth daily      methylphenidate (CONCERTA) 36 MG extended release tablet Take 54 mg by mouth every morning.  zolpidem (AMBIEN) 10 MG tablet Take by mouth nightly as needed for Sleep.  butalbital-acetaminophen-caffeine (FIORICET, ESGIC) -40 MG per tablet Take 1 tablet by mouth every 4 hours as needed for Headaches      busPIRone (BUSPAR) 15 MG tablet Take 20 mg by mouth 2 times daily       cyclobenzaprine (FLEXERIL) 10 MG tablet Take 10 mg by mouth 2 times daily as needed for Muscle spasms      Amantadine (SYMMETREL) 100 MG TABS tablet Take 100 mg by mouth 2 times daily      lisinopril (PRINIVIL;ZESTRIL) 40 MG tablet Take 40 mg by mouth daily      cyanocobalamin 1000 MCG/ML injection Inject 1,000 mcg into the muscle every 21 days       pantoprazole sodium (PROTONIX) 40 MG PACK packet Take 40 mg by mouth 2 times daily      azelastine (ASTELIN) 0.1 % nasal spray SPRAY IN EACH NOSTRIL PRN  0    alendronate (FOSAMAX) 70 MG tablet Take 70 mg by mouth every 7 days      Multiple Vitamins-Minerals (THERAPEUTIC MULTIVITAMIN-MINERALS) tablet Take 1 tablet by mouth daily      ferrous sulfate 325 (65 FE) MG tablet Take 325 mg by mouth daily (with breakfast)       No current facility-administered medications for this encounter.         Social History    Social History     Socioeconomic History    Marital status: Single     Spouse name: Not on file    Number of children: 3    Years of education: Some College    Highest education level: Not on file   Occupational History    Occupation: Disability   Social Needs    Financial resource strain: Not on file    Food insecurity     Worry: Not on file     Inability: Not on file   [employer] needs     Medical: Not on file     Non-medical: Not on file   Tobacco Use    Smoking status: Current Every Day Smoker     Packs/day: 1.50     Years: 15.00     Pack years: 22.50     Types: Cigarettes    Smokeless tobacco: Never Used   Substance and Sexual Activity    Alcohol use: Yes     Alcohol/week: 2.0 standard drinks     Types: 2 Shots of liquor per week     Comment: couple of shots per day    Drug use: No    Sexual activity: Never     Partners: Male   Lifestyle    Physical activity     Days per week: Not on file     Minutes per session: Not on file    Stress: Not on file   Relationships    Social connections     Talks on phone: Not on file     Gets together: Not on file     Attends Jainism service: Not on file     Active member of club or organization: Not on file     Attends meetings of clubs or organizations: Not on file     Relationship status: Not on file    Intimate partner violence     Fear of current or ex partner: Not on file     Emotionally abused: Not on file     Physically abused: Not on file     Forced sexual activity: Not on file   Other Topics Concern    Not on file   Social History Narrative    ** Merged History Encounter **              Family History  family history includes Alcohol Abuse in her maternal grandfather and paternal grandfather; Anemia in her mother and paternal grandmother; Asthma in an other family member; Breast Cancer in her maternal grandmother and mother; Colon Cancer in her father; Depression in her father and mother; High Blood Pressure in her father and mother; Mental Illness in her father and mother; Miscarriages / Van Pinch in her mother; Osteoporosis in her paternal grandfather; Stroke in her paternal grandfather; Substance Abuse in her maternal cousin and paternal cousin. Review of Systems:  Review of Systems   Constitutional: Positive for activity change. Gastrointestinal: Negative for constipation. Musculoskeletal: Positive for arthralgias, back pain, gait problem, myalgias, neck pain and neck stiffness. Neurological: Positive for weakness and headaches. Negative for numbness. Psychiatric/Behavioral: Positive for sleep disturbance.  Negative for agitation, self-injury and suicidal ideas. The patient is not nervous/anxious. 14 point ROS negative besides that noted in HPI    Physical exam:     There were no vitals filed for this visit. There is no height or weight on file to calculate BMI. Physical Exam  Vitals signs and nursing note reviewed. Constitutional:       General: She is not in acute distress. Appearance: She is well-developed. HENT:      Head: Normocephalic. Right Ear: External ear normal.      Left Ear: External ear normal.      Nose: Nose normal.   Eyes:      Conjunctiva/sclera: Conjunctivae normal.      Pupils: Pupils are equal, round, and reactive to light. Neck:      Vascular: No JVD. Trachea: No tracheal deviation. Cardiovascular:      Rate and Rhythm: Normal rate. Pulmonary:      Effort: Pulmonary effort is normal.   Abdominal:      General: There is no distension. Tenderness: There is no abdominal tenderness. Musculoskeletal:      Cervical back: She exhibits tenderness, pain and spasm. Thoracic back: She exhibits tenderness, pain and spasm. Comments: Trigger points indentified  Strengths normal   Skin:     General: Skin is warm and dry. Neurological:      Mental Status: She is alert and oriented to person, place, and time. Cranial Nerves: No cranial nerve deficit. Psychiatric:         Behavior: Behavior normal.         Thought Content:  Thought content normal.         Judgment: Judgment normal.       Labs:     Lab Results   Component Value Date     10/20/2020     06/04/2012    K 4.4 10/20/2020    K 3.9 12/19/2019    K 4.0 06/04/2012     10/20/2020     06/04/2012    CO2 22 10/20/2020    BUN 8 10/20/2020    CREATININE 0.7 10/20/2020    CREATININE 0.9 06/04/2012    GLUCOSE 73 10/20/2020    CALCIUM 8.0 10/20/2020        Lab Results   Component Value Date    WBC 6.4 10/20/2020    HGB 10.6 (L) 10/20/2020    HCT 32.3 (L) 10/20/2020    MCV 86.1 10/20/2020     10/20/2020 Assessment:                                                                                                                                        Active Problems:    Multiple sclerosis (HCC)    Weakness    Fibromyalgia    Bilateral myofascial pain  Resolved Problems:    * No resolved hospital problems. *      PLAN:    1. Bilateral myofascial pain     Obtain images from [facility]. Depending on when images  my need to repeat. No pain medication from this office    Patient was offered injections however patient refused at this time stating that injections were more painful than relief received. Discussed possible use of L DN therapy however patient would have to come off her Norco and this is a compound drug which patient states she cannot afford. Will make patient 3-month follow-up if she chooses to attend. Informed patient that we will review imaging received from [facility] however patient is already on medication for fibromyalgia and without receiving injections there is not much this office can offer patient as she smokes THC and narcotics will not be used. Discussion: Discussed exam findings and plan of care with patient. Patient agreed with POC and questions were asked and answered. Activity: discussed exercise as beneficial to pain reduction, encouraged stretching exercise with a focus on torso strengthening. Goal:  Pain Management Goals of Therapy:   [] Resolution in pain  [x] Decrease in pain level  [x] Improvement in ADL's  [x] Increase in activities with less pain  [] Decrease in medication     Controlled substance monitoring:    [x] Discussed medication side effects, risk of tolerance and/or dependence, and/or alternative treatment  [] Discussed the detrimental effects of long term narcotic use in younger patients especially women of childbearing years.   [] No signs and symptoms of potential drug abuse or diversion were identified  [x] Signs of potential drug abuse or diversion were identified   [] ORT Score   [] UDS non-compliant   [x] See Note  [] Random urine drug screen sent today  [x] Random urine drug screen not completed today   [x] Deferred New Patient  [] Compliant   [] Not Compliant see note  [] Medication agreement with provider signed today  [] Medication agreement with provider on file under media   [] Medication regimen effective with no c/o side effects and continued   [] New patient continuing current medication while developing POC   [] On going assessment and evaluation of medication regimen  [] Medication regimen not effective see plan for changes  [] Shelly Nguyen reviewed & on file under media     CC:  Maria Dolores Gloria, LYSSA Valdes, APRN - CNP, 1/4/2021 at 1:11 PM    EMR dragon/transcription disclaimer: Much of this encounter note is electronic transcription/translation of spoken language to printed tach. Electronic translation of spoken language may be erroneous, or at times, nonsensical words or phrases may be inadvertently transcribed.  Although, I have reviewed the note for such errors, some may still exist.

## 2021-01-18 ENCOUNTER — OFFICE VISIT (OUTPATIENT)
Dept: NEUROLOGY | Age: 46
End: 2021-01-18
Payer: MEDICARE

## 2021-01-18 VITALS
HEART RATE: 73 BPM | WEIGHT: 170 LBS | DIASTOLIC BLOOD PRESSURE: 92 MMHG | HEIGHT: 65 IN | BODY MASS INDEX: 28.32 KG/M2 | SYSTOLIC BLOOD PRESSURE: 137 MMHG

## 2021-01-18 DIAGNOSIS — M79.7 FIBROMYALGIA: ICD-10-CM

## 2021-01-18 DIAGNOSIS — M32.9 LUPUS (HCC): ICD-10-CM

## 2021-01-18 DIAGNOSIS — R53.1 WEAKNESS: ICD-10-CM

## 2021-01-18 DIAGNOSIS — M54.2 PAIN, NECK: ICD-10-CM

## 2021-01-18 DIAGNOSIS — R25.1 TREMOR: ICD-10-CM

## 2021-01-18 DIAGNOSIS — G35 MULTIPLE SCLEROSIS (HCC): Primary | ICD-10-CM

## 2021-01-18 DIAGNOSIS — R47.9 DIFFICULTY WITH SPEECH: ICD-10-CM

## 2021-01-18 DIAGNOSIS — H46.9 OPTIC NEURITIS: ICD-10-CM

## 2021-01-18 DIAGNOSIS — R56.9 CONVULSIONS, UNSPECIFIED CONVULSION TYPE (HCC): ICD-10-CM

## 2021-01-18 DIAGNOSIS — H53.9 VISUAL DISTURBANCE: ICD-10-CM

## 2021-01-18 DIAGNOSIS — R20.0 NUMBNESS: ICD-10-CM

## 2021-01-18 PROCEDURE — 4004F PT TOBACCO SCREEN RCVD TLK: CPT | Performed by: PSYCHIATRY & NEUROLOGY

## 2021-01-18 PROCEDURE — G8484 FLU IMMUNIZE NO ADMIN: HCPCS | Performed by: PSYCHIATRY & NEUROLOGY

## 2021-01-18 PROCEDURE — 99214 OFFICE O/P EST MOD 30 MIN: CPT | Performed by: PSYCHIATRY & NEUROLOGY

## 2021-01-18 PROCEDURE — G8427 DOCREV CUR MEDS BY ELIG CLIN: HCPCS | Performed by: PSYCHIATRY & NEUROLOGY

## 2021-01-18 PROCEDURE — G8419 CALC BMI OUT NRM PARAM NOF/U: HCPCS | Performed by: PSYCHIATRY & NEUROLOGY

## 2021-01-18 RX ORDER — DRONABINOL 5 MG/1
5 CAPSULE ORAL
Qty: 60 CAPSULE | Refills: 5 | Status: SHIPPED | OUTPATIENT
Start: 2021-01-18 | End: 2021-03-03

## 2021-01-18 RX ORDER — DIVALPROEX SODIUM 500 MG/1
500 TABLET, DELAYED RELEASE ORAL
COMMUNITY
End: 2021-05-13

## 2021-01-18 RX ORDER — FLUOXETINE HYDROCHLORIDE 20 MG/1
40 CAPSULE ORAL DAILY
COMMUNITY
End: 2021-05-13

## 2021-01-18 RX ORDER — TRIHEXYPHENIDYL HYDROCHLORIDE 2 MG/1
2 TABLET ORAL 3 TIMES DAILY
Qty: 90 TABLET | Refills: 5 | Status: SHIPPED | OUTPATIENT
Start: 2021-01-18 | End: 2021-05-13

## 2021-01-18 NOTE — PROGRESS NOTES
Chief Complaint   Patient presents with    Multiple Sclerosis     3 month follow up, pt states she took 3 doses of Acthar last week and still feels bad. She also has not beed taking the Artane but she is out of the medication and she states she has been shaking a lot more        Mace Ana is a 39y.o. year old female who is seen for evaluation of follow for multiple sclerosis following an admission in 5/19 for optic neuritis treated with IV solumedrol. The patient has a history of multiple sclerosis, fibromyalgia and lupus along with chronic headaches was seen at that time for left visual change which began about 2 days prior to admission with eye pain. This was quite similar to her previous episodes of optic neuritis, of which she has had about 4 previous episodes treated with steroids. She was tried on oral steroids as an outpatient but this was not effective and was transferred from outside ER for further management. She has some chronic weakness and numbness and weakness on the left. She does have bowel and bladder incontinence. She was on Avonex. Had not followed up with Dr Michelle Banegas since 2017. She did not follow up in the office since 5/19. On 7/4/20 she was in the ER with generalized body aches. She apparently had some trembling on the right side. She had an MRI of the brain in the ER which revealed no acute ischemic change but did have scattered non enhancing white matter lesions in periventricular and white matter. She was given 1 gm Solumedrol for a possible flair up and told to follow up with neurology. She indicates that she typically does relatively well for a few weeks and then she feels severely fatigued or worsening vision. She has been in a wheelchair in 2017. She had her initial testing in 2017 at [facility] which was significant for non enhancing white matter lesions and a CSF with 6 oligoclonal bands seen only in the CSF.  She still feels her face twitching and right sided numbness. He speech is not normal for her as she has to think about things carefully. Her rheumatologist started her on Tecfidera but she felt worse on this. She had been on Avonex since her diagnosis prior to with no clear benefit according to her. Having a lupus flair up. This usually feels pain in eyes, skin of in mouth may fall off. Felt since with 2  Ocrevus infusion 2020 and a bit better . Mario Bowman with unclear benefit. Saw rheumatologist in [place] and plan a new infusion.     Active Ambulatory Problems     Diagnosis Date Noted    Multiple sclerosis ([address]     Intractable chronic migraine without aura and without status migrainosus 2017    Major depressive disorder, recurrent, moderate ([address] 2018    Exacerbation of multiple sclerosis ([address] 05/10/2019    Optic neuritis 05/10/2019    Pain of left eye 05/10/2019    Bronchitis 2019    Visual disturbance 2020    Tremor 2020    Weakness 2020    Numbness 2020    Difficulty with speech 2020    Lupus ([address] 2020    Fibromyalgia 2020    Convulsions ([address] 2020    Bilateral myofascial pain 2021     Resolved Ambulatory Problems     Diagnosis Date Noted    No Resolved Ambulatory Problems     Past Medical History:   Diagnosis Date    Arthritis     Bipolar 1 disorder ([address]     GERD (gastroesophageal reflux disease)     Hypertension     Movement disorder     Neuromuscular disorder ([address]     Psychiatric problem        Past Surgical History:   Procedure Laterality Date    ABDOMINAL EXPLORATION SURGERY      ADENOIDECTOMY       SECTION      x 2     SECTION, CLASSIC N/A     DILATATION, ESOPHAGUS      EXTERNAL EAR SURGERY      lobe    GASTRIC BAND      GASTRIC BYPASS SURGERY      HERNIA REPAIR      HERNIA REPAIR N/A     SEPTOPLASTY      TONSILLECTOMY      TONSILLECTOMY AND ADENOIDECTOMY      UPPER GASTROINTESTINAL ENDOSCOPY         Family History Problem Relation Age of Onset    Anemia Mother     Breast Cancer Mother     Depression Mother     High Blood Pressure Mother     Mental Illness Mother    [de-identified] / [place] Mother     Colon Cancer Father     Depression Father     High Blood Pressure Father     Mental Illness Father     Breast Cancer Maternal Grandmother     Alcohol Abuse Maternal Grandfather     Anemia Paternal Grandmother     Alcohol Abuse Paternal Grandfather     Osteoporosis Paternal Grandfather     Stroke Paternal Grandfather     Substance Abuse Maternal Cousin     Substance Abuse Paternal Cousin     Asthma Other        Allergies   Allergen Reactions    Avelox [Moxifloxacin] Anaphylaxis    Sugar-Protein-Starch Other (See Comments)    Avelox [Moxifloxacin]     Copaxone [Glatiramer Acetate]     Depakote [Divalproex Sodium]     Gabapentin Itching    Hydroxychloroquine      Other reaction(s): Other (See Comments)  Vision loss   Opal Stalling [Paliperidone]     Lamictal [Lamotrigine]     Lithium     Pregabalin      Other reaction(s): Mental Status Change    Lamictal [Lamotrigine] Rash    Lithium Rash       Social History     Socioeconomic History    Marital status: Single     Spouse name: Not on file    Number of children: 3    Years of education: Some College    Highest education level: Not on file   Occupational History    Occupation: Disability   Social Needs    Financial resource strain: Not on file    Food insecurity     Worry: Not on file     Inability: Not on file   [employer] needs     Medical: Not on file     Non-medical: Not on file   Tobacco Use    Smoking status: Current Every Day Smoker     Packs/day: 1.00     Years: 15.00     Pack years: 15.00     Types: Cigarettes    Smokeless tobacco: Never Used   Substance and Sexual Activity    Alcohol use:  Yes     Alcohol/week: 2.0 standard drinks     Types: 2 Shots of liquor per week     Comment: couple of shots per day    Drug use: No    Sexual activity: Never     Partners: Male   Lifestyle    Physical activity     Days per week: Not on file     Minutes per session: Not on file    Stress: Not on file   Relationships    Social connections     Talks on phone: Not on file     Gets together: Not on file     Attends Christianity service: Not on file     Active member of club or organization: Not on file     Attends meetings of clubs or organizations: Not on file     Relationship status: Not on file    Intimate partner violence     Fear of current or ex partner: Not on file     Emotionally abused: Not on file     Physically abused: Not on file     Forced sexual activity: Not on file   Other Topics Concern    Not on file   Social History Narrative    ** Merged History Encounter **            Review of Systems     Constitutional - No fever or chills. yes diaphoresis or significant fatigue. HENT -  No tinnitus or significant hearing loss. Eyes - no sudden vision change or eye pain  Respiratory - no significant shortness of breath or cough  Cardiovascular - no chest pain No palpitations or significant leg swelling  Gastrointestinal - no abdominal swelling or pain. Genitourinary - No difficulty urinating, dysuria  Musculoskeletal - no back pain or myalgia. Skin - no color change or rash  Neurologic - No seizures. No lateralizing weakness. Hematologic - no easy bruising or excessive bleeding. Psychiatric - yes severe anxiety or nervousness. All other review of systems are negative. Current Outpatient Medications   Medication Sig Dispense Refill    FLUoxetine (PROZAC) 20 MG capsule Take 40 mg by mouth daily      divalproex (DEPAKOTE) 500 MG DR tablet Take 500 mg by mouth 2 nightly      norethindrone (AYGESTIN) 5 MG tablet 5 mg      trihexyphenidyl (ARTANE) 2 MG tablet Take 1 tablet by mouth 3 times daily 90 tablet 5    dronabinol (MARINOL) 5 MG capsule Take 1 capsule by mouth 2 times daily (before meals) for 30 days.  60 capsule 5    Elagolix Sodium (ORILISSA PO) Take by mouth      clonazePAM (KLONOPIN) 0.5 MG tablet TK 1 T PO TID PRA      Gabapentin, Once-Daily, (GRALISE) 600 MG TABS Take 1,800 mg by mouth nightly.  folic acid (FOLVITE) 1 MG tablet Take 1 mg by mouth daily      potassium chloride (KLOR-CON M) 20 MEQ extended release tablet Take 20 mEq by mouth 2 times daily      sucralfate (CARAFATE) 1 GM tablet Take 1 g by mouth 2 times daily      topiramate (TOPAMAX) 25 MG tablet Take 25 mg by mouth daily      prazosin (MINIPRESS) 2 MG capsule Take 2 mg by mouth nightly      hydrOXYzine (ATARAX) 50 MG tablet Take 50 mg by mouth 2 times daily       hydroCHLOROthiazide (MICROZIDE) 12.5 MG capsule Take 12.5 mg by mouth daily      zolpidem (AMBIEN) 10 MG tablet Take by mouth nightly as needed for Sleep.  butalbital-acetaminophen-caffeine (FIORICET, ESGIC) -40 MG per tablet Take 1 tablet by mouth every 4 hours as needed for Headaches      Amantadine (SYMMETREL) 100 MG TABS tablet Take 100 mg by mouth 2 times daily      lisinopril (PRINIVIL;ZESTRIL) 40 MG tablet Take 40 mg by mouth daily      cyanocobalamin 1000 MCG/ML injection Inject 1,000 mcg into the muscle every 21 days       pantoprazole sodium (PROTONIX) 40 MG PACK packet Take 40 mg by mouth 2 times daily      azelastine (ASTELIN) 0.1 % nasal spray SPRAY IN EACH NOSTRIL PRN  0    alendronate (FOSAMAX) 70 MG tablet Take 70 mg by mouth every 7 days      Multiple Vitamins-Minerals (THERAPEUTIC MULTIVITAMIN-MINERALS) tablet Take 1 tablet by mouth daily      ferrous sulfate 325 (65 FE) MG tablet Take 325 mg by mouth daily (with breakfast)      corticotropin (ACTHAR) 80 UNIT/ML injectable gel Inject 1 mL into the muscle daily for 3 days 3 mL 5     No current facility-administered medications for this visit. BP (!) 137/92   Pulse 73   Ht 5' 5\" (1.651 m)   Wt 170 lb (77.1 kg)   BMI 28.29 kg/m²     Constitutional - well developed, well nourished.     Eyes - conjunctiva normal.  Ear, nose, throat - No scars, masses, or lesions over external nose or ears, no atrophy of tongue  Neck-symmetric, no masses noted, no jugular vein distension  Respiration- chest wall appears symmetric, good expansion,   normal effort without use of accessory muscles  Musculoskeletal - no significant wasting of muscles noted, no bony deformities  Extremities-no clubbing, cyanosis or edema  Skin - warm, dry, and intact. No rash, erythema, or pallor. Psychiatric - mood, affect, and behavior appear normal.      Neurological exam  Awake, alert, fluent oriented  appropriate affect  Attention and concentration appear appropriate  Recent and remote memory appears unremarkable  Speech normal without dysarthria  Some hesitancy of speech  No clear issues with language of fund of knowledge    Cranial Nerve Exam     CN III, IV,VI-EOMI, No nystagmus, conjugate eye movements, no ptosis  CN VII-no facial assymetry        Motor Exam  Generalized mild giveway weakness    Sensory  Decreased sensation right arm and right leg worse in the foot    Tremors- no tremors in hands or head noted    Gait  Slightly slow    Lab Results   Component Value Date    UGBMCIGA35 753 09/12/2018     Lab Results   Component Value Date    WBC 6.4 10/20/2020    HGB 10.6 (L) 10/20/2020    HCT 32.3 (L) 10/20/2020    MCV 86.1 10/20/2020     10/20/2020     Lab Results   Component Value Date     (L) 10/20/2020    K 4.4 10/20/2020     10/20/2020    CO2 22 10/20/2020    BUN 8 10/20/2020    CREATININE 0.7 10/20/2020    GLUCOSE 73 (L) 10/20/2020    CALCIUM 8.0 (L) 10/20/2020    PROT 6.2 (L) 10/20/2020    LABALBU 3.7 10/20/2020    BILITOT <0.2 10/20/2020    ALKPHOS 70 10/20/2020    AST 15 10/20/2020    ALT 11 10/20/2020    LABGLOM >60 10/20/2020    GFRAA >59 10/20/2020    GLOB 3.0 11/14/2016       Impression   1. No diffusion restriction to indicate acute ischemia.    2. Scattered nonenhancing hyperintense T2/FLAIR signal changes within   the periventricular and white matter regions favorable for MS plaques. No abnormal enhancement to indicate active demyelination. 3. Partial opacification inferior right mastoid air cells, mastoid   effusion considered. Signed by Dr Carla Lance on 7/4/2020 7:10 PM         Assessment    ICD-10-CM    1. Multiple sclerosis ([facility] [address]  G35 dronabinol (MARINOL) 5 MG capsule   2. Weakness  R53.1 dronabinol (MARINOL) 5 MG capsule   3. Fibromyalgia  M79.7 dronabinol (MARINOL) 5 MG capsule   4. Lupus (H[facility])  M32.9 dronabinol (MARINOL) 5 MG capsule   5. Convulsions, unspecified convulsion type ([address]  R56.9    6. Visual disturbance  H53.9    7. Tremor  R25.1    8. Optic neuritis  H46.9    9. Pain, neck  M54.2    10. Numbness  R20.0    11. Difficulty with speech  R47.9        Her neurological examination was significant for some hesitance of speech, decreased visual acuity, right sided numbness and some mild slowness of speech. Previously she was given Solumedrol 1 gm IV daily x 5 days. She was switched from Tecfidera to [place] as she does not want to be on Tecfidera due to side effects. Her hepatitis panel was unremarkable. She is to have a CMP, CBC and PIERO virus titers every 3 months. Her PIERO virus titer was negative in 7/20. She is seeing  MS specialist in [place]. Her video EEG x 3 days was unremarkable with no events captured. She is seeing another rheumatologist in [facility],She will be tried on Marinol She will need something for fatigue. Artane for tremor helps. She can come off Ocrevus start lupus medicines. The patient indicated understanding of the management plan. She is to follow up with me in  6 weeks and call with any issues. Plan    No orders of the defined types were placed in this encounter.       Orders Placed This Encounter   Medications    trihexyphenidyl (ARTANE) 2 MG tablet     Sig: Take 1 tablet by mouth 3 times daily     Dispense:  90 tablet     Refill:  5    dronabinol (Chantel Osorio) 5 MG capsule     Sig: Take 1 capsule by mouth 2 times daily (before meals) for 30 days. Dispense:  60 capsule     Refill:  5       Return in about 6 weeks (around 3/1/2021). EMR Dragon/transcription disclaimer:Significant part of this  encounter note is electronic transcription/translation of spoken language to printed text. The electronic translation of spoken language may be erroneous, or at times, nonsensical words or phrases may be inadvertently transcribed.  Although I have reviewed the note for such errors, some may still exist.

## 2021-01-20 ENCOUNTER — APPOINTMENT (OUTPATIENT)
Dept: GENERAL RADIOLOGY | Age: 46
DRG: 546 | End: 2021-01-20
Payer: MEDICARE

## 2021-01-20 ENCOUNTER — HOSPITAL ENCOUNTER (INPATIENT)
Age: 46
LOS: 2 days | Discharge: HOME HEALTH | DRG: 546 | End: 2021-01-22
Attending: EMERGENCY MEDICINE | Admitting: FAMILY MEDICINE
Payer: MEDICARE

## 2021-01-20 DIAGNOSIS — R63.1 POLYDIPSIA: ICD-10-CM

## 2021-01-20 DIAGNOSIS — M25.50 PAIN IN JOINTS: ICD-10-CM

## 2021-01-20 DIAGNOSIS — E87.1 HYPONATREMIA: ICD-10-CM

## 2021-01-20 DIAGNOSIS — R53.83 OTHER FATIGUE: Primary | ICD-10-CM

## 2021-01-20 LAB
ADENOVIRUS BY PCR: NOT DETECTED
ALBUMIN SERPL-MCNC: 3.4 G/DL (ref 3.5–5.2)
ALP BLD-CCNC: 63 U/L (ref 35–104)
ALT SERPL-CCNC: 14 U/L (ref 5–33)
ANION GAP SERPL CALCULATED.3IONS-SCNC: 7 MMOL/L (ref 7–19)
AST SERPL-CCNC: 14 U/L (ref 5–32)
BASOPHILS ABSOLUTE: 0.1 K/UL (ref 0–0.2)
BASOPHILS RELATIVE PERCENT: 0.9 % (ref 0–1)
BILIRUB SERPL-MCNC: <0.2 MG/DL (ref 0.2–1.2)
BILIRUBIN URINE: NEGATIVE
BLOOD, URINE: NEGATIVE
BORDETELLA PARAPERTUSSIS BY PCR: NOT DETECTED
BORDETELLA PERTUSSIS BY PCR: NOT DETECTED
BUN BLDV-MCNC: 8 MG/DL (ref 6–20)
CALCIUM SERPL-MCNC: 8.4 MG/DL (ref 8.6–10)
CHLAMYDOPHILIA PNEUMONIAE BY PCR: NOT DETECTED
CHLORIDE BLD-SCNC: 93 MMOL/L (ref 98–111)
CLARITY: CLEAR
CO2: 25 MMOL/L (ref 22–29)
COLOR: YELLOW
CORONAVIRUS 229E BY PCR: NOT DETECTED
CORONAVIRUS HKU1 BY PCR: NOT DETECTED
CORONAVIRUS NL63 BY PCR: NOT DETECTED
CORONAVIRUS OC43 BY PCR: NOT DETECTED
CREAT SERPL-MCNC: 0.7 MG/DL (ref 0.5–0.9)
EOSINOPHILS ABSOLUTE: 0.1 K/UL (ref 0–0.6)
EOSINOPHILS RELATIVE PERCENT: 1.3 % (ref 0–5)
GFR AFRICAN AMERICAN: >59
GFR NON-AFRICAN AMERICAN: >60
GLUCOSE BLD-MCNC: 76 MG/DL (ref 74–109)
GLUCOSE URINE: NEGATIVE MG/DL
HCG QUALITATIVE: NEGATIVE
HCT VFR BLD CALC: 35.2 % (ref 37–47)
HEMOGLOBIN: 11.6 G/DL (ref 12–16)
HUMAN METAPNEUMOVIRUS BY PCR: NOT DETECTED
HUMAN RHINOVIRUS/ENTEROVIRUS BY PCR: NOT DETECTED
IMMATURE GRANULOCYTES #: 0 K/UL
INFLUENZA A BY PCR: NOT DETECTED
INFLUENZA B BY PCR: NOT DETECTED
KETONES, URINE: NEGATIVE MG/DL
LEUKOCYTE ESTERASE, URINE: NEGATIVE
LYMPHOCYTES ABSOLUTE: 1.6 K/UL (ref 1.1–4.5)
LYMPHOCYTES RELATIVE PERCENT: 30.2 % (ref 20–40)
MCH RBC QN AUTO: 26.5 PG (ref 27–31)
MCHC RBC AUTO-ENTMCNC: 33 G/DL (ref 33–37)
MCV RBC AUTO: 80.4 FL (ref 81–99)
MONOCYTES ABSOLUTE: 0.6 K/UL (ref 0–0.9)
MONOCYTES RELATIVE PERCENT: 10.7 % (ref 0–10)
MYCOPLASMA PNEUMONIAE BY PCR: NOT DETECTED
NEUTROPHILS ABSOLUTE: 3 K/UL (ref 1.5–7.5)
NEUTROPHILS RELATIVE PERCENT: 56.3 % (ref 50–65)
NITRITE, URINE: NEGATIVE
OSMOLALITY URINE: 571 MOSM/KG (ref 250–1200)
OSMOLALITY: 259 MOSM/KG (ref 275–300)
PARAINFLUENZA VIRUS 1 BY PCR: NOT DETECTED
PARAINFLUENZA VIRUS 2 BY PCR: NOT DETECTED
PARAINFLUENZA VIRUS 3 BY PCR: NOT DETECTED
PARAINFLUENZA VIRUS 4 BY PCR: NOT DETECTED
PDW BLD-RTO: 15.5 % (ref 11.5–14.5)
PH UA: 8.5 (ref 5–8)
PLATELET # BLD: 369 K/UL (ref 130–400)
PMV BLD AUTO: 8.7 FL (ref 9.4–12.3)
POTASSIUM REFLEX MAGNESIUM: 5 MMOL/L (ref 3.5–5)
PRO-BNP: 150 PG/ML (ref 0–450)
PROTEIN UA: NEGATIVE MG/DL
RBC # BLD: 4.38 M/UL (ref 4.2–5.4)
RESPIRATORY SYNCYTIAL VIRUS BY PCR: NOT DETECTED
SARS-COV-2, PCR: NOT DETECTED
SODIUM BLD-SCNC: 125 MMOL/L (ref 136–145)
SODIUM URINE: 120 MMOL/L
SPECIFIC GRAVITY UA: 1.02 (ref 1–1.03)
TOTAL PROTEIN: 6.4 G/DL (ref 6.6–8.7)
UROBILINOGEN, URINE: 1 E.U./DL
VALPROIC ACID LEVEL: 22.1 UG/ML (ref 50–100)
WBC # BLD: 5.4 K/UL (ref 4.8–10.8)

## 2021-01-20 PROCEDURE — 84703 CHORIONIC GONADOTROPIN ASSAY: CPT

## 2021-01-20 PROCEDURE — 83935 ASSAY OF URINE OSMOLALITY: CPT

## 2021-01-20 PROCEDURE — 6360000002 HC RX W HCPCS: Performed by: PHYSICIAN ASSISTANT

## 2021-01-20 PROCEDURE — 80164 ASSAY DIPROPYLACETIC ACD TOT: CPT

## 2021-01-20 PROCEDURE — 83930 ASSAY OF BLOOD OSMOLALITY: CPT

## 2021-01-20 PROCEDURE — 6370000000 HC RX 637 (ALT 250 FOR IP): Performed by: STUDENT IN AN ORGANIZED HEALTH CARE EDUCATION/TRAINING PROGRAM

## 2021-01-20 PROCEDURE — 71045 X-RAY EXAM CHEST 1 VIEW: CPT

## 2021-01-20 PROCEDURE — 81003 URINALYSIS AUTO W/O SCOPE: CPT

## 2021-01-20 PROCEDURE — 1210000000 HC MED SURG R&B

## 2021-01-20 PROCEDURE — 36415 COLL VENOUS BLD VENIPUNCTURE: CPT

## 2021-01-20 PROCEDURE — 0202U NFCT DS 22 TRGT SARS-COV-2: CPT

## 2021-01-20 PROCEDURE — 6370000000 HC RX 637 (ALT 250 FOR IP): Performed by: PHYSICIAN ASSISTANT

## 2021-01-20 PROCEDURE — 83880 ASSAY OF NATRIURETIC PEPTIDE: CPT

## 2021-01-20 PROCEDURE — 99284 EMERGENCY DEPT VISIT MOD MDM: CPT

## 2021-01-20 PROCEDURE — 85025 COMPLETE CBC W/AUTO DIFF WBC: CPT

## 2021-01-20 PROCEDURE — 2580000003 HC RX 258: Performed by: PHYSICIAN ASSISTANT

## 2021-01-20 PROCEDURE — 80053 COMPREHEN METABOLIC PANEL: CPT

## 2021-01-20 PROCEDURE — 84300 ASSAY OF URINE SODIUM: CPT

## 2021-01-20 RX ORDER — POTASSIUM CHLORIDE 7.45 MG/ML
10 INJECTION INTRAVENOUS PRN
Status: DISCONTINUED | OUTPATIENT
Start: 2021-01-20 | End: 2021-01-22 | Stop reason: HOSPADM

## 2021-01-20 RX ORDER — SUCRALFATE 1 G/1
1 TABLET ORAL 2 TIMES DAILY
Status: DISCONTINUED | OUTPATIENT
Start: 2021-01-20 | End: 2021-01-21

## 2021-01-20 RX ORDER — ONDANSETRON 2 MG/ML
4 INJECTION INTRAMUSCULAR; INTRAVENOUS EVERY 6 HOURS PRN
Status: DISCONTINUED | OUTPATIENT
Start: 2021-01-20 | End: 2021-01-22 | Stop reason: HOSPADM

## 2021-01-20 RX ORDER — PANTOPRAZOLE SODIUM 40 MG/1
40 TABLET, DELAYED RELEASE ORAL
Status: DISCONTINUED | OUTPATIENT
Start: 2021-01-21 | End: 2021-01-22 | Stop reason: HOSPADM

## 2021-01-20 RX ORDER — TOPIRAMATE 25 MG/1
25 TABLET ORAL DAILY
Status: DISCONTINUED | OUTPATIENT
Start: 2021-01-20 | End: 2021-01-22 | Stop reason: HOSPADM

## 2021-01-20 RX ORDER — PRAZOSIN HYDROCHLORIDE 1 MG/1
2 CAPSULE ORAL NIGHTLY
Status: DISCONTINUED | OUTPATIENT
Start: 2021-01-20 | End: 2021-01-22 | Stop reason: HOSPADM

## 2021-01-20 RX ORDER — ALENDRONATE SODIUM 70 MG/1
70 TABLET ORAL
Status: DISCONTINUED | OUTPATIENT
Start: 2021-01-20 | End: 2021-01-20

## 2021-01-20 RX ORDER — MAGNESIUM SULFATE IN WATER 40 MG/ML
2000 INJECTION, SOLUTION INTRAVENOUS PRN
Status: DISCONTINUED | OUTPATIENT
Start: 2021-01-20 | End: 2021-01-22 | Stop reason: HOSPADM

## 2021-01-20 RX ORDER — FLUOXETINE HYDROCHLORIDE 20 MG/1
40 CAPSULE ORAL DAILY
Status: DISCONTINUED | OUTPATIENT
Start: 2021-01-20 | End: 2021-01-22 | Stop reason: HOSPADM

## 2021-01-20 RX ORDER — SODIUM CHLORIDE 0.9 % (FLUSH) 0.9 %
10 SYRINGE (ML) INJECTION EVERY 12 HOURS SCHEDULED
Status: DISCONTINUED | OUTPATIENT
Start: 2021-01-20 | End: 2021-01-22 | Stop reason: HOSPADM

## 2021-01-20 RX ORDER — KETOROLAC TROMETHAMINE 30 MG/ML
30 INJECTION, SOLUTION INTRAMUSCULAR; INTRAVENOUS EVERY 6 HOURS PRN
Status: DISCONTINUED | OUTPATIENT
Start: 2021-01-20 | End: 2021-01-22 | Stop reason: HOSPADM

## 2021-01-20 RX ORDER — DRONABINOL 2.5 MG/1
5 CAPSULE ORAL
Status: DISCONTINUED | OUTPATIENT
Start: 2021-01-20 | End: 2021-01-22 | Stop reason: HOSPADM

## 2021-01-20 RX ORDER — DEXAMETHASONE SODIUM PHOSPHATE 10 MG/ML
10 INJECTION, SOLUTION INTRAMUSCULAR; INTRAVENOUS ONCE
Status: COMPLETED | OUTPATIENT
Start: 2021-01-20 | End: 2021-01-20

## 2021-01-20 RX ORDER — LISINOPRIL 20 MG/1
40 TABLET ORAL DAILY
Status: DISCONTINUED | OUTPATIENT
Start: 2021-01-20 | End: 2021-01-22 | Stop reason: HOSPADM

## 2021-01-20 RX ORDER — HYDROXYZINE HYDROCHLORIDE 25 MG/1
50 TABLET, FILM COATED ORAL 2 TIMES DAILY
Status: DISCONTINUED | OUTPATIENT
Start: 2021-01-20 | End: 2021-01-22 | Stop reason: HOSPADM

## 2021-01-20 RX ORDER — BUTALBITAL, ACETAMINOPHEN AND CAFFEINE 50; 325; 40 MG/1; MG/1; MG/1
1 TABLET ORAL EVERY 4 HOURS PRN
Status: DISCONTINUED | OUTPATIENT
Start: 2021-01-20 | End: 2021-01-22 | Stop reason: HOSPADM

## 2021-01-20 RX ORDER — AMANTADINE HYDROCHLORIDE 100 MG/1
100 CAPSULE, GELATIN COATED ORAL 2 TIMES DAILY
Status: DISCONTINUED | OUTPATIENT
Start: 2021-01-20 | End: 2021-01-22 | Stop reason: HOSPADM

## 2021-01-20 RX ORDER — HYDROCHLOROTHIAZIDE 12.5 MG/1
12.5 CAPSULE, GELATIN COATED ORAL DAILY
Status: DISCONTINUED | OUTPATIENT
Start: 2021-01-20 | End: 2021-01-22 | Stop reason: HOSPADM

## 2021-01-20 RX ORDER — DIVALPROEX SODIUM 500 MG/1
500 TABLET, DELAYED RELEASE ORAL EVERY 12 HOURS SCHEDULED
Status: DISCONTINUED | OUTPATIENT
Start: 2021-01-20 | End: 2021-01-22 | Stop reason: HOSPADM

## 2021-01-20 RX ORDER — SODIUM CHLORIDE 0.9 % (FLUSH) 0.9 %
10 SYRINGE (ML) INJECTION PRN
Status: DISCONTINUED | OUTPATIENT
Start: 2021-01-20 | End: 2021-01-22 | Stop reason: HOSPADM

## 2021-01-20 RX ORDER — PROMETHAZINE HYDROCHLORIDE 12.5 MG/1
12.5 TABLET ORAL EVERY 6 HOURS PRN
Status: DISCONTINUED | OUTPATIENT
Start: 2021-01-20 | End: 2021-01-22 | Stop reason: HOSPADM

## 2021-01-20 RX ORDER — PREDNISONE 20 MG/1
20 TABLET ORAL DAILY
Status: DISCONTINUED | OUTPATIENT
Start: 2021-01-20 | End: 2021-01-22 | Stop reason: HOSPADM

## 2021-01-20 RX ORDER — ACETAMINOPHEN 325 MG/1
650 TABLET ORAL EVERY 6 HOURS PRN
Status: DISCONTINUED | OUTPATIENT
Start: 2021-01-20 | End: 2021-01-22 | Stop reason: HOSPADM

## 2021-01-20 RX ORDER — NICOTINE 21 MG/24HR
1 PATCH, TRANSDERMAL 24 HOURS TRANSDERMAL DAILY
Status: DISCONTINUED | OUTPATIENT
Start: 2021-01-20 | End: 2021-01-22 | Stop reason: HOSPADM

## 2021-01-20 RX ORDER — BACLOFEN 10 MG/1
10 TABLET ORAL 2 TIMES DAILY
COMMUNITY
End: 2021-05-13

## 2021-01-20 RX ORDER — ACETAMINOPHEN 650 MG/1
650 SUPPOSITORY RECTAL EVERY 6 HOURS PRN
Status: DISCONTINUED | OUTPATIENT
Start: 2021-01-20 | End: 2021-01-22 | Stop reason: HOSPADM

## 2021-01-20 RX ORDER — TRIHEXYPHENIDYL HYDROCHLORIDE 2 MG/1
2 TABLET ORAL 3 TIMES DAILY
Status: DISCONTINUED | OUTPATIENT
Start: 2021-01-20 | End: 2021-01-22 | Stop reason: HOSPADM

## 2021-01-20 RX ORDER — POTASSIUM CHLORIDE 20 MEQ/1
40 TABLET, EXTENDED RELEASE ORAL PRN
Status: DISCONTINUED | OUTPATIENT
Start: 2021-01-20 | End: 2021-01-22 | Stop reason: HOSPADM

## 2021-01-20 RX ORDER — HYDRALAZINE HYDROCHLORIDE 20 MG/ML
10 INJECTION INTRAMUSCULAR; INTRAVENOUS EVERY 4 HOURS PRN
Status: DISCONTINUED | OUTPATIENT
Start: 2021-01-20 | End: 2021-01-22 | Stop reason: HOSPADM

## 2021-01-20 RX ORDER — POLYETHYLENE GLYCOL 3350 17 G/17G
17 POWDER, FOR SOLUTION ORAL DAILY PRN
Status: DISCONTINUED | OUTPATIENT
Start: 2021-01-20 | End: 2021-01-22 | Stop reason: HOSPADM

## 2021-01-20 RX ADMIN — ONDANSETRON HYDROCHLORIDE 4 MG: 2 SOLUTION INTRAMUSCULAR; INTRAVENOUS at 15:13

## 2021-01-20 RX ADMIN — KETOROLAC TROMETHAMINE 30 MG: 30 INJECTION, SOLUTION INTRAMUSCULAR; INTRAVENOUS at 15:13

## 2021-01-20 RX ADMIN — DRONABINOL 5 MG: 2.5 CAPSULE ORAL at 17:09

## 2021-01-20 RX ADMIN — HYDROXYZINE HYDROCHLORIDE 50 MG: 25 TABLET, FILM COATED ORAL at 20:16

## 2021-01-20 RX ADMIN — BUTALBITAL, ACETAMINOPHEN, AND CAFFEINE 1 TABLET: 50; 325; 40 TABLET ORAL at 17:09

## 2021-01-20 RX ADMIN — HYDROCHLOROTHIAZIDE 12.5 MG: 12.5 CAPSULE ORAL at 17:08

## 2021-01-20 RX ADMIN — HYDRALAZINE HYDROCHLORIDE 10 MG: 20 INJECTION INTRAMUSCULAR; INTRAVENOUS at 22:15

## 2021-01-20 RX ADMIN — AMANTADINE HYDROCHLORIDE 100 MG: 100 CAPSULE ORAL at 20:16

## 2021-01-20 RX ADMIN — KETOROLAC TROMETHAMINE 30 MG: 30 INJECTION, SOLUTION INTRAMUSCULAR; INTRAVENOUS at 22:14

## 2021-01-20 RX ADMIN — DIVALPROEX SODIUM 500 MG: 500 TABLET, DELAYED RELEASE ORAL at 20:16

## 2021-01-20 RX ADMIN — LISINOPRIL 40 MG: 20 TABLET ORAL at 17:08

## 2021-01-20 RX ADMIN — PRAZOSIN HYDROCHLORIDE 2 MG: 1 CAPSULE ORAL at 20:16

## 2021-01-20 RX ADMIN — ENOXAPARIN SODIUM 40 MG: 40 INJECTION SUBCUTANEOUS at 15:14

## 2021-01-20 RX ADMIN — DEXAMETHASONE SODIUM PHOSPHATE 10 MG: 10 INJECTION, SOLUTION INTRAMUSCULAR; INTRAVENOUS at 13:29

## 2021-01-20 RX ADMIN — SODIUM CHLORIDE, PRESERVATIVE FREE 10 ML: 5 INJECTION INTRAVENOUS at 20:16

## 2021-01-20 RX ADMIN — FLUOXETINE HYDROCHLORIDE 40 MG: 20 CAPSULE ORAL at 17:09

## 2021-01-20 RX ADMIN — PREDNISONE 20 MG: 20 TABLET ORAL at 15:14

## 2021-01-20 ASSESSMENT — ENCOUNTER SYMPTOMS
EYE PAIN: 0
DIARRHEA: 0
WHEEZING: 0
BACK PAIN: 1
BACK PAIN: 0
TROUBLE SWALLOWING: 0
SINUS PAIN: 0
EYE DISCHARGE: 0
SORE THROAT: 0
PHOTOPHOBIA: 0
VOMITING: 0
ABDOMINAL DISTENTION: 0
RHINORRHEA: 0
APNEA: 0
COLOR CHANGE: 0
ABDOMINAL PAIN: 0
NAUSEA: 1
SHORTNESS OF BREATH: 0
CONSTIPATION: 0
NAUSEA: 0
STRIDOR: 0
COUGH: 0

## 2021-01-20 ASSESSMENT — PAIN SCALES - GENERAL
PAINLEVEL_OUTOF10: 6
PAINLEVEL_OUTOF10: 7
PAINLEVEL_OUTOF10: 7

## 2021-01-20 NOTE — ED PROVIDER NOTES
[facility] 5 SURG SERVICES  eMERGENCYdEPARTMENT eNCOUnter      Pt Name: Armand Perez  MRN: [id number]  [place] 1975  Date of evaluation: 1/20/2021  Provider:CELENA Mckee    CHIEF COMPLAINT       Chief Complaint   Patient presents with    Lupus     flare. had toradol and zofran per ems         HISTORY OF PRESENT ILLNESS  (Location/Symptom, Timing/Onset, Context/Setting, Quality, Duration, Modifying Factors, Severity.)   Armand Perez is a 39 y.o. female who presents to the emergency department with complaints of lupus flare up. Patient has history of seizures she has a history of fibromyalgia she has a history of lupus she has a history of multiple sclerosis. Patient feels that this is a lupus flareup this is been ongoing for couple of weeks worsened this past Sunday she saw her neurologist locally Dr. Conner Marcus who felt this was non-MS related but more lupus-like symptoms with her systemic joint aching and pain. He recommended she call [facility] [place] where she sees her rheumatologist.  She has been having trouble coming up with the name of this provider is looking it up in her phone. She tells me this is the worst when she has had in the 5 years since being diagnosed with lupus she averages a flareup 3-4 times a year she tells me. She denies any fever 99 temperature here she denies any respiratory symptoms or chills. No loss of taste or smell. Has been tested for Covid in the past. Does not take hydroxychloroquine due to vision loss allergy complaint. New meds include dronabinol with Dr Conner Marcus on Monday just picked up started taking and baclofen with PCP. She admits to drinking fluids orange juice and tea. Admits to constant thirst. Sodium 125 today. [facility] request #[id number]    0 active cumulative morphine equivalents    Per care every sees Dr Leia Mejia with  group in [place]. HPI    Nursing Notes were reviewed and I agree.     REVIEW OF SYSTEMS    (2-9 systems for level 4, 10 or more for level 5)     Review of Systems   Constitutional: Positive for fatigue. Negative for activity change, appetite change, chills and fever. HENT: Negative for congestion, postnasal drip, rhinorrhea and sore throat. Eyes: Negative for photophobia, pain, discharge and visual disturbance. Respiratory: Negative for apnea, cough and shortness of breath. Cardiovascular: Negative for chest pain and leg swelling. Gastrointestinal: Negative for abdominal distention, abdominal pain and nausea. Endocrine: Positive for polydipsia. Genitourinary: Negative for vaginal bleeding. Musculoskeletal: Positive for arthralgias and myalgias. Negative for back pain, joint swelling, neck pain and neck stiffness. Skin: Negative for color change and rash. Neurological: Negative for dizziness, syncope, facial asymmetry and headaches. Hematological: Negative for adenopathy. Does not bruise/bleed easily. Psychiatric/Behavioral: Negative for agitation, behavioral problems and confusion. Except as noted above the remainder of the review of systems was reviewed and negative.        PAST MEDICAL HISTORY     Past Medical History:   Diagnosis Date    Arthritis     Bipolar 1 disorder ([address]     Convulsions ([address] 2020    Fibromyalgia     GERD (gastroesophageal reflux disease)     Hypertension     Lupus ([address]     Movement disorder     Multiple sclerosis ([address]     Neuromuscular disorder ([address]     Psychiatric problem          SURGICAL HISTORY       Past Surgical History:   Procedure Laterality Date    ABDOMINAL EXPLORATION SURGERY      ADENOIDECTOMY       SECTION      x 2     SECTION, CLASSIC N/A     DILATATION, ESOPHAGUS      EXTERNAL EAR SURGERY      lobe    GASTRIC BAND      GASTRIC BYPASS SURGERY      HERNIA REPAIR      HERNIA REPAIR N/A     SEPTOPLASTY      TONSILLECTOMY      TONSILLECTOMY AND ADENOIDECTOMY      UPPER GASTROINTESTINAL ENDOSCOPY           CURRENT MEDICATIONS pantoprazole sodium (PROTONIX) 40 MG PACK packet Take 40 mg by mouth 2 times daily      azelastine (ASTELIN) 0.1 % nasal spray SPRAY IN EACH NOSTRIL PRN  Refills: 0      alendronate (FOSAMAX) 70 MG tablet Take 70 mg by mouth every 7 days      Multiple Vitamins-Minerals (THERAPEUTIC MULTIVITAMIN-MINERALS) tablet Take 1 tablet by mouth daily      ferrous sulfate 325 (65 FE) MG tablet Take 325 mg by mouth daily (with breakfast)             ALLERGIES     Avelox [moxifloxacin], Sugar-protein-starch, Avelox [moxifloxacin], Copaxone [glatiramer acetate], Depakote [divalproex sodium], Gabapentin, Hydroxychloroquine, Invega [paliperidone], Lamictal [lamotrigine], Lithium, Pregabalin, Lamictal [lamotrigine], and Lithium    FAMILY HISTORY       Family History   Problem Relation Age of Onset    Anemia Mother     Breast Cancer Mother     Depression Mother     High Blood Pressure Mother     Mental Illness Mother     Miscarriages / [place] Mother     Colon Cancer Father     Depression Father     High Blood Pressure Father     Mental Illness Father     Breast Cancer Maternal Grandmother     Alcohol Abuse Maternal Grandfather     Anemia Paternal Grandmother     Alcohol Abuse Paternal Grandfather     Osteoporosis Paternal Grandfather     Stroke Paternal Grandfather     Substance Abuse Maternal Cousin     Substance Abuse Paternal Cousin     Asthma Other           SOCIAL HISTORY       Social History     Socioeconomic History    Marital status: Single     Spouse name: None    Number of children: 3    Years of education: Some College    Highest education level: None   Occupational History    Occupation: Disability   Social Needs    Financial resource strain: None    Food insecurity     Worry: None     Inability: None    Transportation needs     Medical: None     Non-medical: None   Tobacco Use    Smoking status: Current Every Day Smoker     Packs/day: 1.00     Years: 15.00     Pack years: 15.00 Types: Cigarettes    Smokeless tobacco: Never Used   Substance and Sexual Activity    Alcohol use: Yes     Alcohol/week: 2.0 standard drinks     Types: 2 Shots of liquor per week     Comment: couple of shots per day    Drug use: No    Sexual activity: Never     Partners: Male   Lifestyle    Physical activity     Days per week: None     Minutes per session: None    Stress: None   Relationships    Social connections     Talks on phone: None     Gets together: None     Attends Confucianist service: None     Active member of club or organization: None     Attends meetings of clubs or organizations: None     Relationship status: None    Intimate partner violence     Fear of current or ex partner: None     Emotionally abused: None     Physically abused: None     Forced sexual activity: None   Other Topics Concern    None   Social History Narrative    ** Merged History Encounter **            SCREENINGS    Merrill Coma Scale  Eye Opening: Spontaneous  Best Verbal Response: Oriented  Best Motor Response: Obeys commands  Jesus Coma Scale Score: 15      PHYSICAL EXAM    (up to 7 forlevel 4, 8 or more for level 5)     ED Triage Vitals [01/20/21 0955]   BP Temp Temp src Pulse Resp SpO2 Height Weight   (!) 157/112 99 °F (37.2 °C) -- 73 20 100 % 5' 5\" (1.651 m) 170 lb (77.1 kg)       Physical Exam  Vitals signs and nursing note reviewed. Constitutional:       General: She is not in acute distress. Appearance: Normal appearance. She is well-developed. She is not diaphoretic. HENT:      Head: Normocephalic and atraumatic. Right Ear: Tympanic membrane, ear canal and external ear normal.      Left Ear: Tympanic membrane, ear canal and external ear normal.      Nose: Nose normal.      Mouth/Throat:      Mouth: Mucous membranes are moist.      Pharynx: No oropharyngeal exudate. Eyes:      General:         Right eye: No discharge. Left eye: No discharge.       Pupils: Pupils are equal, round, and reactive to light. Neck:      Musculoskeletal: Normal range of motion and neck supple. Thyroid: No thyromegaly. Cardiovascular:      Rate and Rhythm: Normal rate and regular rhythm. Pulses: Normal pulses. Heart sounds: Normal heart sounds. No murmur. No friction rub. Pulmonary:      Effort: Pulmonary effort is normal. No respiratory distress. Breath sounds: Normal breath sounds. No stridor. No wheezing. Abdominal:      General: Abdomen is flat. Bowel sounds are normal. There is no distension. Palpations: Abdomen is soft. Tenderness: There is no abdominal tenderness. Musculoskeletal: Normal range of motion. Skin:     General: Skin is warm and dry. Capillary Refill: Capillary refill takes less than 2 seconds. Findings: No rash. Neurological:      General: No focal deficit present. Mental Status: She is alert and oriented to person, place, and time. Mental status is at baseline. Cranial Nerves: No cranial nerve deficit. Sensory: No sensory deficit. Coordination: Coordination normal.   Psychiatric:         Mood and Affect: Mood normal.         Behavior: Behavior normal.         Thought Content: Thought content normal.         Judgment: Judgment normal.           DIAGNOSTIC RESULTS     RADIOLOGY:   Non-plain film images such as CT, Ultrasound and MRI are read by the radiologist. Plain radiographic images are visualized and preliminarilyinterpreted by Kami Rosales MD with the below findings:      Interpretation per the Radiologist below, if available at the time of this note:    XR CHEST PORTABLE   Final Result   Impression:   No acute cardiopulmonary disease.    Signed by Dr Jen Collins on 1/20/2021 1:44 PM          LABS:  Labs Reviewed   CBC WITH AUTO DIFFERENTIAL - Abnormal; Notable for the following components:       Result Value    Hemoglobin 11.6 (*)     Hematocrit 35.2 (*)     MCV 80.4 (*)     MCH 26.5 (*)     RDW 15.5 (*)     MPV 8.7 (*) Monocytes % 10.7 (*)     All other components within normal limits   COMPREHENSIVE METABOLIC PANEL W/ REFLEX TO MG FOR LOW K - Abnormal; Notable for the following components:    Sodium 125 (*)     Chloride 93 (*)     Calcium 8.4 (*)     Total Protein 6.4 (*)     Alb 3.4 (*)     All other components within normal limits   VALPROIC ACID LEVEL, TOTAL - Abnormal; Notable for the following components:    Valproic Acid Lvl 22.1 (*)     All other components within normal limits   URINE RT REFLEX TO CULTURE - Abnormal; Notable for the following components:    pH, UA 8.5 (*)     All other components within normal limits   OSMOLALITY, SERUM - Abnormal; Notable for the following components:    Osmolality 259 (*)     All other components within normal limits   RESPIRATORY PANEL, MOLECULAR, WITH COVID-19   SODIUM, URINE, RANDOM   OSMOLALITY, URINE   HCG, SERUM, QUALITATIVE   BRAIN NATRIURETIC PEPTIDE       All other labs were within normal range or notreturned as of this dictation. RE-ASSESSMENT        EMERGENCY DEPARTMENT COURSE and DIFFERENTIAL DIAGNOSIS/MDM:   Vitals:    Vitals:    01/20/21 1028 01/20/21 1644 01/20/21 1849 01/20/21 2237   BP:  (!) 154/102 (!) 164/100 (!) 150/88   Pulse: 70 78 75 105   Resp:  18 18 16   Temp:  98.7 °F (37.1 °C) 99.2 °F (37.3 °C) 98.6 °F (37 °C)   TempSrc:  Temporal Temporal Temporal   SpO2:  100% 97% 96%   Weight:       Height:           MDM  At this time the plan will be for admission treatment of the hyponatremia and pain control. With her having fatigue and polydipsia we are waiting on a urine osmolality to result. Dr. Jacob Viera agrees to take over care of patient with hospitalist group. PROCEDURES:    Procedures      FINAL IMPRESSION      1. Other fatigue    2. Hyponatremia    3. Polydipsia          DISPOSITION/PLAN   DISPOSITION Admitted 01/20/2021 01:19:02 PM      PATIENT REFERRED TO:  No follow-up provider specified.     DISCHARGE MEDICATIONS:  Current Discharge Medication List (Please note that portions of this note were completed with a voice recognition program.  Efforts were made to edit the dictations but occasionallywords are mis-transcribed.)    Vanessa G[address], [place]  01/20/21 5526

## 2021-01-20 NOTE — H&P
[facility] - History & Physical      PCP: LYSSA Chua    Date of Admission: 2021    Date of Service: 2021    Chief Complaint:  Arthralgias     History Of Present Illness: The patient is a 39 y.o. female with PMH of Lupus, fibromyalgia, Bipolar type 1, MS, and HTN who presented to [address] [place] [address] ED complaining of joint pain. She is resting comfortably in bed in no acute distress. Pt states she began to have pain \"everywhere\" Monday. She admits to knots under her skin in BUE, neck and jaw, face pain, uterus pain \"endometreosis\", BLE pain in joints, muscles and skin. She states that her BLE are also weak and tingling. She states she drinks a lot of fluids but has been nauseous over the last few days. She was seen by Dr. Maria Del Rosario Singh who treats her MS on Monday and he advised her to reach out to her Dr. At [facility]. She states she has been in e-mail communication and they planned on ordering her blood work. Pt also states that she recently had her gastric band adjusted. Upon arrival pt is afebrile at 99 and Hypertensive at 157/112 with SpO2 at 100% room air. Sodium is low at 125. CXR with no acute process.        Past Medical History:        Diagnosis Date    Arthritis     Bipolar 1 disorder ([address]     Convulsions ([address] 2020    Fibromyalgia     GERD (gastroesophageal reflux disease)     Hypertension     Lupus ([address]     Movement disorder     Multiple sclerosis (HCC)     Neuromuscular disorder ([address]     Psychiatric problem        Past Surgical History:        Procedure Laterality Date    ABDOMINAL EXPLORATION SURGERY      ADENOIDECTOMY       SECTION      x 2     SECTION, CLASSIC N/A     DILATATION, ESOPHAGUS      EXTERNAL EAR SURGERY      lobe    GASTRIC BAND      GASTRIC BYPASS SURGERY      HERNIA REPAIR      HERNIA REPAIR N/A     SEPTOPLASTY      TONSILLECTOMY      TONSILLECTOMY AND ADENOIDECTOMY      UPPER GASTROINTESTINAL ENDOSCOPY         Home Medications:  Prior to Admission medications    Medication Sig Start Date End Date Taking? Authorizing Provider   FLUoxetine (PROZAC) 20 MG capsule Take 40 mg by mouth daily    Historical Provider, MD   divalproex (DEPAKOTE) 500 MG DR tablet Take 500 mg by mouth 2 nightly    Historical Provider, MD   norethindrone (AYGESTIN) 5 MG tablet 5 mg    Historical Provider, MD   trihexyphenidyl (ARTANE) 2 MG tablet Take 1 tablet by mouth 3 times daily 1/18/21   [facility] MD Brennen   dronabinol (MARINOL) 5 MG capsule Take 1 capsule by mouth 2 times daily (before meals) for 30 days. 1/18/21 2/17/21  [facility] MD Brennen   Elagolix Sodium (ORILISSA PO) Take by mouth    Historical Provider, MD   corticotropin (ACTHAR) 80 UNIT/ML injectable gel Inject 1 mL into the muscle daily for 3 days 12/22/20 12/25/20  [facility] MD Brennen   clonazePAM (KLONOPIN) 0.5 MG tablet TK 1 T PO TID PRA 10/14/20   Historical Provider, MD   Gabapentin, Once-Daily, (GRALISE) 600 MG TABS Take 1,800 mg by mouth nightly. Historical Provider, MD   folic acid (FOLVITE) 1 MG tablet Take 1 mg by mouth daily    Historical Provider, MD   potassium chloride (KLOR-CON M) 20 MEQ extended release tablet Take 20 mEq by mouth 2 times daily    Historical Provider, MD   sucralfate (CARAFATE) 1 GM tablet Take 1 g by mouth 2 times daily    Historical Provider, MD   topiramate (TOPAMAX) 25 MG tablet Take 25 mg by mouth daily    Historical Provider, MD   prazosin (MINIPRESS) 2 MG capsule Take 2 mg by mouth nightly    Historical Provider, MD   hydrOXYzine (ATARAX) 50 MG tablet Take 50 mg by mouth 2 times daily     Historical Provider, MD   hydroCHLOROthiazide (MICROZIDE) 12.5 MG capsule Take 12.5 mg by mouth daily    Historical Provider, MD   zolpidem (AMBIEN) 10 MG tablet Take by mouth nightly as needed for Sleep.     Historical Provider, MD   butalbital-acetaminophen-caffeine (FIORICET, ESGIC) -40 MG per tablet Take 1 tablet by mouth every 4 hours as needed for Headaches    Historical Provider, MD   Amantadine (SYMMETREL) 100 MG TABS tablet Take 100 mg by mouth 2 times daily    Historical Provider, MD   lisinopril (PRINIVIL;ZESTRIL) 40 MG tablet Take 40 mg by mouth daily    Historical Provider, MD   cyanocobalamin 1000 MCG/ML injection Inject 1,000 mcg into the muscle every 21 days     Historical Provider, MD   pantoprazole sodium (PROTONIX) 40 MG PACK packet Take 40 mg by mouth 2 times daily    Historical Provider, MD   azelastine (ASTELIN) 0.1 % nasal spray SPRAY IN EACH NOSTRIL PRN 10/3/17   Historical Provider, MD   alendronate (FOSAMAX) 70 MG tablet Take 70 mg by mouth every 7 days    Historical Provider, MD   Multiple Vitamins-Minerals (THERAPEUTIC MULTIVITAMIN-MINERALS) tablet Take 1 tablet by mouth daily    Historical Provider, MD   ferrous sulfate 325 (65 FE) MG tablet Take 325 mg by mouth daily (with breakfast)    Historical Provider, MD       Allergies: Avelox [moxifloxacin], Sugar-protein-starch, Avelox [moxifloxacin], Copaxone [glatiramer acetate], Depakote [divalproex sodium], Gabapentin, Hydroxychloroquine, Invega [paliperidone], Lamictal [lamotrigine], Lithium, Pregabalin, Lamictal [lamotrigine], and Lithium    Social History:    Tobacco:   reports that she has been smoking cigarettes. She has a 15.00 pack-year smoking history. She has never used smokeless tobacco.  Alcohol:   reports current alcohol use of about 2.0 standard drinks of alcohol per week.       Family History:      Problem Relation Age of Onset    Anemia Mother     Breast Cancer Mother     Depression Mother     High Blood Pressure Mother     Mental Illness Mother    Asim Louis / [place] Mother     Colon Cancer Father     Depression Father     High Blood Pressure Father     Mental Illness Father     Breast Cancer Maternal Grandmother     Alcohol Abuse Maternal Grandfather     Anemia Paternal Grandmother     Alcohol Abuse Paternal Jamie Larsen Osteoporosis Paternal Grandfather     Stroke Paternal Grandfather     Substance Abuse Maternal Cousin     Substance Abuse Paternal Cousin     Asthma Other        Review of Systems:   Review of Systems   Constitutional: Negative for chills, diaphoresis, fatigue and fever. HENT: Negative for congestion, ear pain, sinus pain, sore throat and trouble swallowing. Eyes: Negative for photophobia and visual disturbance. Respiratory: Negative for cough, shortness of breath, wheezing and stridor. Cardiovascular: Negative for chest pain, palpitations and leg swelling. Gastrointestinal: Positive for nausea. Negative for abdominal distention, abdominal pain, constipation, diarrhea and vomiting. Endocrine: Negative for cold intolerance and heat intolerance. Genitourinary: Negative for difficulty urinating, flank pain, frequency and urgency. Musculoskeletal: Positive for arthralgias, back pain, gait problem, myalgias and neck pain. Neurological: Negative for dizziness, syncope, weakness, light-headedness, numbness and headaches. Hematological: Does not bruise/bleed easily. Psychiatric/Behavioral: Negative for agitation, confusion and dysphoric mood. Physical Examination:  VITALS: BP (!) 157/112   Pulse 70   Temp 99 °F (37.2 °C)   Resp 20   Ht 5' 5\" (1.651 m)   Wt 170 lb (77.1 kg)   SpO2 100%   BMI 28.29 kg/m²   Physical Exam  Constitutional:       General: She is not in acute distress. Appearance: Normal appearance. She is not toxic-appearing or diaphoretic. HENT:      Head: Normocephalic and atraumatic. Right Ear: External ear normal.      Left Ear: External ear normal.      Nose: Nose normal. No congestion or rhinorrhea. Mouth/Throat:      Mouth: Mucous membranes are moist.      Pharynx: Oropharynx is clear. Eyes:      General: No scleral icterus. Extraocular Movements: Extraocular movements intact.       Conjunctiva/sclera: Conjunctivae normal.   Neck: Musculoskeletal: Normal range of motion and neck supple. Cardiovascular:      Rate and Rhythm: Normal rate and regular rhythm. Pulses: Normal pulses. Heart sounds: Normal heart sounds. No murmur. No friction rub. No gallop. Pulmonary:      Effort: Pulmonary effort is normal. No respiratory distress. Breath sounds: Normal breath sounds. No stridor. No wheezing, rhonchi or rales. Abdominal:      General: Abdomen is flat. Bowel sounds are normal. There is no distension. Palpations: Abdomen is soft. Tenderness: There is no abdominal tenderness. Musculoskeletal: Normal range of motion. General: Tenderness present. No swelling. Right lower leg: No edema. Left lower leg: No edema. Skin:     General: Skin is warm and dry. Coloration: Skin is not jaundiced. Findings: No erythema, lesion or rash. Neurological:      General: No focal deficit present. Mental Status: She is alert and oriented to person, place, and time. Mental status is at baseline. Cranial Nerves: No cranial nerve deficit. Sensory: No sensory deficit. Motor: No weakness. Psychiatric:         Mood and Affect: Mood normal.         Behavior: Behavior normal.         Thought Content: Thought content normal.         Judgment: Judgment normal.          Diagnostic Data:  CBC:  Recent Labs     01/20/21  1035   WBC 5.4   HGB 11.6*   HCT 35.2*        BMP:  Recent Labs     01/20/21  1035   *   K 5.0   CL 93*   CO2 25   BUN 8   CREATININE 0.7   CALCIUM 8.4*     Recent Labs     01/20/21  1035   AST 14   ALT 14   BILITOT <0.2   ALKPHOS 63     Urinalysis:  Lab Results   Component Value Date    NITRU Negative 01/20/2021    WBCUA 0-1 02/09/2016    BACTERIA trace 02/09/2016    BLOODU Negative 01/20/2021    SPECGRAV 1.017 01/20/2021    GLUCOSEU Negative 01/20/2021       Radiology:  XR CHEST PORTABLE   Impression:   No acute cardiopulmonary disease.    Signed by Dr Pennie Enciso on 1/20/2021 1:44 PM    Assessment/Plan:  Principal Problem:    Pain in joints most likely due to Lupus flare- Pt had adverse reaction to Hydroxychloroquine in past, will avoid. Initiate Torodol and Prednisone 20daily for pain control. PT/OT. Pt has [facility] at [facility] with Dr. Sha Beatty. Active Problems:    Multiple sclerosis ([address]- Follow with Dr. Zia Hester. Continue home medications. HTN- monitor closely. Likely elevated here secondary to pain. Bipolar 1 disorder- continue Depakote      Intractable chronic migraine without aura and without status migrainosus- continue Fioricet as needed. Weakness- PT/OT      Difficulty with speech- noted. pt had no obvious difficulties today. Lupus ([address]- noted, treat as above and continue home meds. Fibromyalgia- continue home meds.        Convulsions ([address]- continue Artane      DVT prophylaxis- Lovenox     Further orders per clinical course/attending    Signed:  Svetlana Urban PA-C  1/20/2021, 2:58 PM

## 2021-01-20 NOTE — ED PROVIDER NOTES
Attending Supervisory Note/Shared Visit   I have personally performed a face to face diagnostic evaluation on this patient. I have reviewed the mid-levels findings and agree. Ms. Lyudmila Viramontes is here for generalized weakness and fatigue today and hurting all over. Hx of MS and Lupus. Found to have hyponatremia which supposed could contribute to her gen weakness but complicated underlying history. Admits to polydipsia. Will admit for obs to make sure Na improves. I did not re interview or re examine but Ani Le and I have discussed patient in detail. FINAL IMPRESSION      1. Other fatigue    2. Hyponatremia    3.  Luis Angel Valdivia MD  Attending Emergency Physician        Ramon Leslei MD  01/20/21 4311

## 2021-01-20 NOTE — PROGRESS NOTES
PHARMACY NOTE  Lexie Joyce was ordered Fosamax. Per the [address], this medication is non-formulary and not stocked by pharmacy. It has been discontinued. The medication can be reordered at discharge.      Electronically signed by Qasim Breaux, [address] [place] [address] on 1/20/2021 at 4:51 PM

## 2021-01-20 NOTE — ED NOTES
Bed: 18  Expected date:   Expected time:   Means of arrival:   Comments:  Open at [place], [address]  01/20/21 5429

## 2021-01-21 LAB
ALBUMIN SERPL-MCNC: 3.4 G/DL (ref 3.5–5.2)
ALP BLD-CCNC: 61 U/L (ref 35–104)
ALT SERPL-CCNC: 14 U/L (ref 5–33)
ANION GAP SERPL CALCULATED.3IONS-SCNC: 12 MMOL/L (ref 7–19)
AST SERPL-CCNC: 18 U/L (ref 5–32)
BILIRUB SERPL-MCNC: <0.2 MG/DL (ref 0.2–1.2)
BUN BLDV-MCNC: 10 MG/DL (ref 6–20)
C-REACTIVE PROTEIN: 0.08 MG/DL (ref 0–0.5)
CALCIUM SERPL-MCNC: 8.1 MG/DL (ref 8.6–10)
CHLORIDE BLD-SCNC: 92 MMOL/L (ref 98–111)
CO2: 21 MMOL/L (ref 22–29)
CREAT SERPL-MCNC: 0.6 MG/DL (ref 0.5–0.9)
EKG P AXIS: 70 DEGREES
EKG P-R INTERVAL: 128 MS
EKG Q-T INTERVAL: 390 MS
EKG QRS DURATION: 84 MS
EKG QTC CALCULATION (BAZETT): 421 MS
EKG T AXIS: 33 DEGREES
GFR AFRICAN AMERICAN: >59
GFR NON-AFRICAN AMERICAN: >60
GLUCOSE BLD-MCNC: 133 MG/DL (ref 74–109)
HCT VFR BLD CALC: 32.9 % (ref 37–47)
HEMOGLOBIN: 11 G/DL (ref 12–16)
MCH RBC QN AUTO: 26.6 PG (ref 27–31)
MCHC RBC AUTO-ENTMCNC: 33.4 G/DL (ref 33–37)
MCV RBC AUTO: 79.5 FL (ref 81–99)
PDW BLD-RTO: 15 % (ref 11.5–14.5)
PLATELET # BLD: 341 K/UL (ref 130–400)
PMV BLD AUTO: 8.7 FL (ref 9.4–12.3)
POTASSIUM SERPL-SCNC: 3.8 MMOL/L (ref 3.5–5)
RBC # BLD: 4.14 M/UL (ref 4.2–5.4)
SEDIMENTATION RATE, ERYTHROCYTE: 8 MM/HR (ref 0–20)
SODIUM BLD-SCNC: 125 MMOL/L (ref 136–145)
TOTAL CK: 96 U/L (ref 26–192)
TOTAL PROTEIN: 5.6 G/DL (ref 6.6–8.7)
WBC # BLD: 8.3 K/UL (ref 4.8–10.8)

## 2021-01-21 PROCEDURE — 6360000002 HC RX W HCPCS: Performed by: STUDENT IN AN ORGANIZED HEALTH CARE EDUCATION/TRAINING PROGRAM

## 2021-01-21 PROCEDURE — 1210000000 HC MED SURG R&B

## 2021-01-21 PROCEDURE — 36415 COLL VENOUS BLD VENIPUNCTURE: CPT

## 2021-01-21 PROCEDURE — 86038 ANTINUCLEAR ANTIBODIES: CPT

## 2021-01-21 PROCEDURE — 6370000000 HC RX 637 (ALT 250 FOR IP): Performed by: STUDENT IN AN ORGANIZED HEALTH CARE EDUCATION/TRAINING PROGRAM

## 2021-01-21 PROCEDURE — 85613 RUSSELL VIPER VENOM DILUTED: CPT

## 2021-01-21 PROCEDURE — 2580000003 HC RX 258: Performed by: PHYSICIAN ASSISTANT

## 2021-01-21 PROCEDURE — 85027 COMPLETE CBC AUTOMATED: CPT

## 2021-01-21 PROCEDURE — 6370000000 HC RX 637 (ALT 250 FOR IP): Performed by: FAMILY MEDICINE

## 2021-01-21 PROCEDURE — 86160 COMPLEMENT ANTIGEN: CPT

## 2021-01-21 PROCEDURE — 6360000002 HC RX W HCPCS: Performed by: PHYSICIAN ASSISTANT

## 2021-01-21 PROCEDURE — 85610 PROTHROMBIN TIME: CPT

## 2021-01-21 PROCEDURE — 82550 ASSAY OF CK (CPK): CPT

## 2021-01-21 PROCEDURE — 85652 RBC SED RATE AUTOMATED: CPT

## 2021-01-21 PROCEDURE — 6370000000 HC RX 637 (ALT 250 FOR IP): Performed by: PHYSICIAN ASSISTANT

## 2021-01-21 PROCEDURE — 97161 PT EVAL LOW COMPLEX 20 MIN: CPT

## 2021-01-21 PROCEDURE — 86146 BETA-2 GLYCOPROTEIN ANTIBODY: CPT

## 2021-01-21 PROCEDURE — 86140 C-REACTIVE PROTEIN: CPT

## 2021-01-21 PROCEDURE — 93005 ELECTROCARDIOGRAM TRACING: CPT | Performed by: STUDENT IN AN ORGANIZED HEALTH CARE EDUCATION/TRAINING PROGRAM

## 2021-01-21 PROCEDURE — 86235 NUCLEAR ANTIGEN ANTIBODY: CPT

## 2021-01-21 PROCEDURE — 80053 COMPREHEN METABOLIC PANEL: CPT

## 2021-01-21 PROCEDURE — 85730 THROMBOPLASTIN TIME PARTIAL: CPT

## 2021-01-21 PROCEDURE — 86147 CARDIOLIPIN ANTIBODY EA IG: CPT

## 2021-01-21 PROCEDURE — 82085 ASSAY OF ALDOLASE: CPT

## 2021-01-21 PROCEDURE — 86225 DNA ANTIBODY NATIVE: CPT

## 2021-01-21 RX ORDER — SUCRALFATE 1 G/1
1 TABLET ORAL EVERY 6 HOURS SCHEDULED
Status: DISCONTINUED | OUTPATIENT
Start: 2021-01-21 | End: 2021-01-22 | Stop reason: HOSPADM

## 2021-01-21 RX ORDER — MORPHINE SULFATE 4 MG/ML
INJECTION, SOLUTION INTRAMUSCULAR; INTRAVENOUS
Status: DISPENSED
Start: 2021-01-21 | End: 2021-01-21

## 2021-01-21 RX ORDER — ZOLPIDEM TARTRATE 5 MG/1
5 TABLET ORAL NIGHTLY PRN
Status: DISCONTINUED | OUTPATIENT
Start: 2021-01-21 | End: 2021-01-22 | Stop reason: HOSPADM

## 2021-01-21 RX ORDER — BACLOFEN 10 MG/1
10 TABLET ORAL 2 TIMES DAILY
Status: DISCONTINUED | OUTPATIENT
Start: 2021-01-21 | End: 2021-01-22 | Stop reason: HOSPADM

## 2021-01-21 RX ORDER — LORAZEPAM 2 MG/ML
0.5 INJECTION INTRAMUSCULAR ONCE
Status: COMPLETED | OUTPATIENT
Start: 2021-01-21 | End: 2021-01-21

## 2021-01-21 RX ORDER — CLONAZEPAM 0.5 MG/1
0.5 TABLET ORAL 3 TIMES DAILY
Status: DISCONTINUED | OUTPATIENT
Start: 2021-01-21 | End: 2021-01-22 | Stop reason: HOSPADM

## 2021-01-21 RX ORDER — SODIUM CHLORIDE 1000 MG
1 TABLET, SOLUBLE MISCELLANEOUS
Status: DISCONTINUED | OUTPATIENT
Start: 2021-01-21 | End: 2021-01-22 | Stop reason: HOSPADM

## 2021-01-21 RX ORDER — FOLIC ACID 1 MG/1
1 TABLET ORAL DAILY
Status: DISCONTINUED | OUTPATIENT
Start: 2021-01-21 | End: 2021-01-22 | Stop reason: HOSPADM

## 2021-01-21 RX ORDER — FERROUS SULFATE 325(65) MG
325 TABLET ORAL
Status: DISCONTINUED | OUTPATIENT
Start: 2021-01-21 | End: 2021-01-22 | Stop reason: HOSPADM

## 2021-01-21 RX ORDER — LORAZEPAM 2 MG/ML
INJECTION INTRAMUSCULAR
Status: DISPENSED
Start: 2021-01-21 | End: 2021-01-21

## 2021-01-21 RX ORDER — MORPHINE SULFATE 4 MG/ML
1 INJECTION, SOLUTION INTRAMUSCULAR; INTRAVENOUS ONCE
Status: COMPLETED | OUTPATIENT
Start: 2021-01-21 | End: 2021-01-21

## 2021-01-21 RX ORDER — SUMATRIPTAN 25 MG/1
50 TABLET, FILM COATED ORAL ONCE
Status: COMPLETED | OUTPATIENT
Start: 2021-01-21 | End: 2021-01-21

## 2021-01-21 RX ADMIN — PRAZOSIN HYDROCHLORIDE 2 MG: 1 CAPSULE ORAL at 20:12

## 2021-01-21 RX ADMIN — HYDROCHLOROTHIAZIDE 12.5 MG: 12.5 CAPSULE ORAL at 07:57

## 2021-01-21 RX ADMIN — PANTOPRAZOLE SODIUM 40 MG: 40 TABLET, DELAYED RELEASE ORAL at 07:58

## 2021-01-21 RX ADMIN — Medication 1 G: at 17:43

## 2021-01-21 RX ADMIN — TRIHEXYPHENIDYL HYDROCHLORIDE 2 MG: 2 TABLET ORAL at 20:12

## 2021-01-21 RX ADMIN — LISINOPRIL 40 MG: 20 TABLET ORAL at 07:57

## 2021-01-21 RX ADMIN — SUMATRIPTAN 50 MG: 25 TABLET ORAL at 14:23

## 2021-01-21 RX ADMIN — SODIUM CHLORIDE, PRESERVATIVE FREE 10 ML: 5 INJECTION INTRAVENOUS at 08:10

## 2021-01-21 RX ADMIN — KETOROLAC TROMETHAMINE 30 MG: 30 INJECTION, SOLUTION INTRAMUSCULAR; INTRAVENOUS at 12:47

## 2021-01-21 RX ADMIN — TRIHEXYPHENIDYL HYDROCHLORIDE 2 MG: 2 TABLET ORAL at 07:58

## 2021-01-21 RX ADMIN — ONDANSETRON HYDROCHLORIDE 4 MG: 2 SOLUTION INTRAMUSCULAR; INTRAVENOUS at 12:47

## 2021-01-21 RX ADMIN — CLONAZEPAM 0.5 MG: 0.5 TABLET ORAL at 08:44

## 2021-01-21 RX ADMIN — KETOROLAC TROMETHAMINE 30 MG: 30 INJECTION, SOLUTION INTRAMUSCULAR; INTRAVENOUS at 20:12

## 2021-01-21 RX ADMIN — DIVALPROEX SODIUM 500 MG: 500 TABLET, DELAYED RELEASE ORAL at 20:12

## 2021-01-21 RX ADMIN — FERROUS SULFATE TAB 325 MG (65 MG ELEMENTAL FE) 325 MG: 325 (65 FE) TAB at 08:44

## 2021-01-21 RX ADMIN — SUCRALFATE 1 G: 1 TABLET ORAL at 17:43

## 2021-01-21 RX ADMIN — BACLOFEN 10 MG: 10 TABLET ORAL at 08:44

## 2021-01-21 RX ADMIN — DIVALPROEX SODIUM 500 MG: 500 TABLET, DELAYED RELEASE ORAL at 07:56

## 2021-01-21 RX ADMIN — LORAZEPAM 0.5 MG: 2 INJECTION INTRAMUSCULAR; INTRAVENOUS at 06:36

## 2021-01-21 RX ADMIN — DRONABINOL 5 MG: 2.5 CAPSULE ORAL at 07:57

## 2021-01-21 RX ADMIN — HYDROXYZINE HYDROCHLORIDE 50 MG: 25 TABLET, FILM COATED ORAL at 20:12

## 2021-01-21 RX ADMIN — AMANTADINE HYDROCHLORIDE 100 MG: 100 CAPSULE ORAL at 20:12

## 2021-01-21 RX ADMIN — SODIUM CHLORIDE, PRESERVATIVE FREE 10 ML: 5 INJECTION INTRAVENOUS at 20:11

## 2021-01-21 RX ADMIN — CLONAZEPAM 0.5 MG: 0.5 TABLET ORAL at 20:11

## 2021-01-21 RX ADMIN — DRONABINOL 5 MG: 2.5 CAPSULE ORAL at 15:42

## 2021-01-21 RX ADMIN — CLONAZEPAM 0.5 MG: 0.5 TABLET ORAL at 14:24

## 2021-01-21 RX ADMIN — HYDROXYZINE HYDROCHLORIDE 50 MG: 25 TABLET, FILM COATED ORAL at 07:57

## 2021-01-21 RX ADMIN — PROMETHAZINE HYDROCHLORIDE 12.5 MG: 12.5 TABLET ORAL at 05:29

## 2021-01-21 RX ADMIN — FLUOXETINE HYDROCHLORIDE 40 MG: 20 CAPSULE ORAL at 07:59

## 2021-01-21 RX ADMIN — TRIHEXYPHENIDYL HYDROCHLORIDE 2 MG: 2 TABLET ORAL at 14:24

## 2021-01-21 RX ADMIN — FOLIC ACID 1 MG: 1 TABLET ORAL at 08:44

## 2021-01-21 RX ADMIN — AMANTADINE HYDROCHLORIDE 100 MG: 100 CAPSULE ORAL at 07:58

## 2021-01-21 RX ADMIN — ENOXAPARIN SODIUM 40 MG: 40 INJECTION SUBCUTANEOUS at 15:42

## 2021-01-21 RX ADMIN — MORPHINE SULFATE 1 MG: 4 INJECTION, SOLUTION INTRAMUSCULAR; INTRAVENOUS at 06:37

## 2021-01-21 RX ADMIN — ZOLPIDEM TARTRATE 5 MG: 5 TABLET ORAL at 20:12

## 2021-01-21 RX ADMIN — PREDNISONE 20 MG: 20 TABLET ORAL at 07:59

## 2021-01-21 RX ADMIN — BACLOFEN 10 MG: 10 TABLET ORAL at 20:12

## 2021-01-21 RX ADMIN — ONDANSETRON HYDROCHLORIDE 4 MG: 2 SOLUTION INTRAMUSCULAR; INTRAVENOUS at 20:12

## 2021-01-21 ASSESSMENT — ENCOUNTER SYMPTOMS
ABDOMINAL DISTENTION: 0
NAUSEA: 0
DIARRHEA: 0
PHOTOPHOBIA: 0
TROUBLE SWALLOWING: 0
ABDOMINAL PAIN: 0
SINUS PAIN: 0
COUGH: 0
CONSTIPATION: 0
STRIDOR: 0
SORE THROAT: 0
VOMITING: 0
WHEEZING: 0
SHORTNESS OF BREATH: 0

## 2021-01-21 ASSESSMENT — PAIN SCALES - GENERAL
PAINLEVEL_OUTOF10: 5
PAINLEVEL_OUTOF10: 8

## 2021-01-21 NOTE — PROGRESS NOTES
Physical Therapy    Facility/Department: [facility] SURG SERVICES  Initial Assessment    NAME: Ashley Milton  : 1975  MRN: [id number]    Date of Service: 2021    Discharge Recommendations:  Home with assist PRN   PT Equipment Recommendations  Other: possibly a bsc. Assessment   Assessment: per pt report she appears to be at baseline for mobility and plans to d/c home IND with adl's and amb. reports she might be interested in a [facility]. Prognosis: Good  Decision Making: Low Complexity  PT Education: General Safety  Patient Education: advised Pt to obtain the following: hand rails for steps, grab bar for shower, and possibly a reacher. No Skilled PT: No PT goals identified  REQUIRES PT FOLLOW UP: No  Activity Tolerance  Activity Tolerance: Patient Tolerated treatment well  Activity Tolerance: subjective report of le pain       Patient Diagnosis(es): The primary encounter diagnosis was Other fatigue. Diagnoses of Hyponatremia and Polydipsia were also pertinent to this visit. has a past medical history of Arthritis, Bipolar 1 disorder ([address] Convulsions ([address] Fibromyalgia, GERD (gastroesophageal reflux disease), Hypertension, Lupus ([address] Movement disorder, Multiple sclerosis ([address] Neuromuscular disorder ([address] and Psychiatric problem. has a past surgical history that includes Septoplasty; External ear surgery; hernia repair; Gastric bypass surgery; Gastric Band; Tonsillectomy; Adenoidectomy;  section; Abdominal exploration surgery; Upper gastrointestinal endoscopy; Dilatation, esophagus; hernia repair (N/A);  section, classic (N/A); and Tonsillectomy and adenoidectomy. Restrictions     Vision/Hearing        Subjective  General  Diagnosis: pain in joints. Pt has a hx of lupus, fibro and MS.   Subjective  Subjective: Pt states she normally is ind with amb/adls and has been up ad anisha in room to BR. reports she feels like she would be able to d/c home and return to ind adl's and amb Orientation  Orientation  Overall Orientation Status: Within Functional Limits  Social/Functional History  Social/Functional History  Lives With: Alone  Home Access: Stairs to enter without rails  Bathroom Shower/Tub: Tub/Shower unit  Bathroom Toilet: Standard  Home Equipment: Cane  Cognition        Objective          AROM RLE (degrees)  RLE AROM: WFL  AROM LLE (degrees)  LLE AROM : WFL  Strength Other  Other: antigravity bilat le's  Tone RLE  RLE Tone: Normotonic  Tone LLE  LLE Tone: Normotonic  Motor Control  Gross Motor?: WFL  Sensation  Overall Sensation Status: (subjective report of numbness in bilat hands/le's)  Bed mobility  Sit to Supine: Independent  Transfers  Sit to Stand: Independent  Stand to sit:  Independent  Bed to Chair: Independent  Ambulation  Ambulation?: Yes  Ambulation 1  Surface: level tile  Device: Single point cane  Assistance: Independent  Quality of Gait: slow  Distance: approx 35 ft  Comments: demonstrates ability to continue with ind amb              Plan   Plan  Plan weeks: eval only    G-Code       OutComes Score                                                  AM-PAC Score             Goals  Short term goals  Time Frame for Short term goals: eval only       Therapy Time   Individual Concurrent Group Co-treatment   Time In           Time Out           Minutes                   Ruchi Cantrell PT    Electronically signed by Ruchi Cantrell PT on 1/21/2021 at 11:38 AM

## 2021-01-21 NOTE — PROGRESS NOTES
Physician Progress Note      PATIENT:               Marco Santoyo  CSN #:                  [id number]  :                       1975  ADMIT DATE:       2021 9:54 AM  DISCH DATE:  RESPONDING  PROVIDER #:        Amarilis Colindres MD          QUERY TEXT:    Pt admitted with hyponatremia. Pt noted to have pain in joints most likely   due to Lupus flare. If possible, please document in progress notes and   discharge summary if you are evaluating and/or treating any of the following: The medical record reflects the following:  Risk Factors: hx of htn  Clinical Indicators: bp's of 158/112 and 157/112; c/o \"pressure behind her   eyes\"  Treatment: labetalol IV prn    Hypertensive Crisis, unspecified: at least 2 consecutive readings of SBP > 180   mmHg or DBP > 110 mmHg    - Hypertensive Urgency: Hypertensive crisis w/o associated organ dysfunction. S/s may or may not be present, but can include severe headache, SOB,   epistaxis, severe anxiety. Tx: adjustment of oral antihypertensives; IV meds   not usually required. [email or web address]    Thank you,  Nichol Ryan RN, CDS  [phone]  Options provided:  -- Hypertensive Emergency  -- Hypertensive Crisis  -- Other - I will add my own diagnosis  -- Disagree - Not applicable / Not valid  -- Disagree - Clinically unable to determine / Unknown  -- Refer to Clinical Documentation Reviewer    PROVIDER RESPONSE TEXT:    This patient has hypertensive crisis.     Query created by: Joaquin Amaya on 2021 12:06 PM      Electronically signed by:  Amarilis Colindres MD 2021 1:24 PM

## 2021-01-21 NOTE — PROGRESS NOTES
[facility] Hospitalists      Patient:  Laurita Stephens  YOB: 1975  Date of Service: 1/21/2021  MRN: [id number]   Acct: [de-identified]   Primary Care Physician: LYSSA Zaragoza  Advance Directive: Full Code  Admit Date: 1/20/2021       Hospital Day: 1   Portions of this note have been copied forward, however, updated to reflect the most current clinical status of this patient. CHIEF COMPLAINT Arthralgias    SUBJECTIVE:  Ms. Maxime Gibson is laying in bed sleeping, easily awoken. She states that she has a headache today, that it has been going on for about 5 days. She states her legs are  but feels a slight improvement from yesterday. Cumulative Hospital Course:     1/20/21 The patient is a 39 y.o. female with PMH of Lupus, fibromyalgia, Bipolar type 1, MS, and HTN who presented to [address] [place] [address] ED complaining of joint pain. She is resting comfortably in bed in no acute distress. Pt states she began to have pain \"everywhere\" Monday. She admits to knots under her skin in BUE, neck and jaw, face pain, uterus pain \"endometreosis\", BLE pain in joints, muscles and skin. She states that her BLE are also weak and tingling. She states she drinks a lot of fluids but has been nauseous over the last few days. She was seen by Dr. Yared Mitchell who treats her MS on Monday and he advised her to reach out to her Dr. At [facility]. She states she has been in e-mail communication and they planned on ordering her blood work. Pt also states that she recently had her gastric band adjusted. Upon arrival pt is afebrile at 99 and Hypertensive at 157/112 with SpO2 at 100% room air. Sodium is low at 125. CXR with no acute process. 1/21/21 Pt still with pain and headache. Slight improvement today. Labs ordered for in-house that pt will need for rheumatology follow up. Sodium remains 125. Review of Systems:   Review of Systems   Constitutional: Positive for fatigue. Negative for chills, diaphoresis and fever. HENT: Negative for congestion, ear pain, sinus pain, sore throat and trouble swallowing. Eyes: Negative for photophobia and visual disturbance. Respiratory: Negative for cough, shortness of breath, wheezing and stridor. Cardiovascular: Negative for chest pain, palpitations and leg swelling. Gastrointestinal: Negative for abdominal distention, abdominal pain, constipation, diarrhea, nausea and vomiting. Endocrine: Negative for cold intolerance and heat intolerance. Genitourinary: Negative for difficulty urinating, flank pain, frequency and urgency. Musculoskeletal: Positive for arthralgias, myalgias and neck pain. Neurological: Positive for headaches. Negative for dizziness, syncope, weakness, light-headedness and numbness. Hematological: Does not bruise/bleed easily. Psychiatric/Behavioral: Negative for agitation, confusion and dysphoric mood. Objective:   VITALS:  /89   Pulse 75   Temp 98.4 °F (36.9 °C)   Resp 16   Ht 5' 5\" (1.651 m)   Wt 170 lb (77.1 kg)   SpO2 98%   BMI 28.29 kg/m²   24HR INTAKE/OUTPUT:      Intake/Output Summary (Last 24 hours) at 1/21/2021 1313  Last data filed at 1/21/2021 0900  Gross per 24 hour   Intake 450 ml   Output --   Net 450 ml       Physical Exam  Constitutional:       General: She is not in acute distress. Appearance: Normal appearance. She is not toxic-appearing or diaphoretic. HENT:      Head: Normocephalic and atraumatic. Right Ear: External ear normal.      Left Ear: External ear normal.      Nose: Nose normal. No congestion or rhinorrhea. Mouth/Throat:      Mouth: Mucous membranes are moist.      Pharynx: Oropharynx is clear. Eyes:      General: No scleral icterus. Extraocular Movements: Extraocular movements intact. Conjunctiva/sclera: Conjunctivae normal.   Neck:      Musculoskeletal: Normal range of motion and neck supple. Cardiovascular:      Rate and Rhythm: Normal rate and regular rhythm.  pantoprazole  40 mg Oral QAM AC    Gabapentin (Once-Daily)  1,800 mg Oral Nightly    nicotine  1 patch Transdermal Daily     zolpidem, sodium chloride flush, promethazine **OR** ondansetron, polyethylene glycol, acetaminophen **OR** acetaminophen, potassium chloride **OR** potassium alternative oral replacement **OR** potassium chloride, magnesium sulfate, ketorolac, butalbital-acetaminophen-caffeine, hydrALAZINE  DIET GENERAL; Daily Fluid Restriction: 1800 ml     Lab and other Data:     Recent Labs     01/20/21  1035 01/21/21  0921   WBC 5.4 8.3   HGB 11.6* 11.0*    341     Recent Labs     01/20/21  1035 01/21/21  0921   * 125*   K 5.0 3.8   CL 93* 92*   CO2 25 21*   BUN 8 10   CREATININE 0.7 0.6   GLUCOSE 76 133*     Recent Labs     01/20/21  1035 01/21/21  0921   AST 14 18   ALT 14 14   BILITOT <0.2 <0.2   ALKPHOS 63 61     UA:  Recent Labs     01/20/21  1130   COLORU YELLOW   PHUR 8.5*   CLARITYU Clear   SPECGRAV 1.017   LEUKOCYTESUR Negative   UROBILINOGEN 1.0   BILIRUBINUR Negative   BLOODU Negative   GLUCOSEU Negative       RAD:   Xr Chest Portable  Result Date: 1/20/2021  Impression: No acute cardiopulmonary disease. Signed by Dr Matt Balbuena on 1/20/2021 1:44 PM      Micro: Respiratory panel with COVID: negative    Assessment/Plan   Principal Problem:    Pain in joints most likely due to Lupus flare- Pt had adverse reaction to Hydroxychloroquine in past, will avoid. Initiate Torodol and Prednisone 20daily for pain control. PT/OT. Pt has established care at [facility] with Dr. Lilia Fraga. Labs ordered for pt to have in-house for follow up with Rheumatology.      Active Problems:    Multiple sclerosis ([address]- Follow with Dr. Bigg Freedman. Continue home medications.        HTN- monitor closely.  Likely elevated here secondary to pain.       Bipolar 1 disorder- continue Depakote       Intractable chronic migraine without aura and without status migrainosus- continue Fioricet as needed.     Weakness- PT/OT       Difficulty with speech- noted.  pt had no obvious difficulties today.        Lupus ([facility]- noted, treat as above and continue home meds.        Fibromyalgia- continue home meds.        Convulsions ([address]- continue Artane       DVT prophylaxis- Lovenox      Further orders per clinical course/attending    Chelsie Choi PA-C  1/21/2021, 1:13 PM

## 2021-01-21 NOTE — PROGRESS NOTES
Patient refused toradol. Patient states that toradol does not help and she is calling her son to pick her up.

## 2021-01-21 NOTE — PLAN OF CARE
Chart Note    Date:2021  Patient: Lexie Joyce  : 1975  SXS:[id number]  PCP:Eron Wagoner [facility] rheumatology office phone  [phone]  Attempted call to office of Dr. Scooby Cordero    Outstanding labs have been reviewed in care everywhere, we will obtain these modalities while patient is in-house so she can appropriately follow-up with rheumatology upon discharge    BRITTANY qualitative with reflex to BRITTANY quantitative  JUAN antibody evaluation with reflex  C4 complement  C3 complement  Creatinine kinase, total  Aldolase  Antidouble-stranded DNA  CRP  Erythrocyte sedimentation rate  Cardiolipin IgG, IgM  Beta-2 glycoprotein I Ab, IgG, IgM, IgA  Lupus anticoagulant  Protein/creatinine ratio urine, random  Miscellaneous Send-out - Myomarker panel 3          EMR Dragon/Transcription disclaimer:   Much of this encounter note is an electronic transcription/translation of spoken language to printed text.  The electronic translation of spoken language may permit erroneous, or at times, nonsensical words or phrases to be inadvertently transcribed; although attempts have made to review the note for such errors, some may still exist.    Electronically signed by   Salud Aguayo   Internal Medicine Hospitalist  On 2021  At 12:12 PM

## 2021-01-22 VITALS
WEIGHT: 170 LBS | HEIGHT: 65 IN | RESPIRATION RATE: 16 BRPM | HEART RATE: 71 BPM | BODY MASS INDEX: 28.32 KG/M2 | DIASTOLIC BLOOD PRESSURE: 75 MMHG | TEMPERATURE: 99.2 F | SYSTOLIC BLOOD PRESSURE: 114 MMHG | OXYGEN SATURATION: 97 %

## 2021-01-22 LAB
ALBUMIN SERPL-MCNC: 3 G/DL (ref 3.5–5.2)
ALP BLD-CCNC: 53 U/L (ref 35–104)
ALT SERPL-CCNC: 11 U/L (ref 5–33)
ANION GAP SERPL CALCULATED.3IONS-SCNC: 9 MMOL/L (ref 7–19)
AST SERPL-CCNC: 13 U/L (ref 5–32)
BILIRUB SERPL-MCNC: <0.2 MG/DL (ref 0.2–1.2)
BUN BLDV-MCNC: 13 MG/DL (ref 6–20)
CALCIUM SERPL-MCNC: 7.6 MG/DL (ref 8.6–10)
CHLORIDE BLD-SCNC: 95 MMOL/L (ref 98–111)
CO2: 23 MMOL/L (ref 22–29)
CREAT SERPL-MCNC: 0.7 MG/DL (ref 0.5–0.9)
CREATININE URINE: 136.6 MG/DL (ref 4.2–622)
GFR AFRICAN AMERICAN: >59
GFR NON-AFRICAN AMERICAN: >60
GLUCOSE BLD-MCNC: 67 MG/DL (ref 74–109)
HCT VFR BLD CALC: 30.1 % (ref 37–47)
HEMOGLOBIN: 10 G/DL (ref 12–16)
MCH RBC QN AUTO: 26.3 PG (ref 27–31)
MCHC RBC AUTO-ENTMCNC: 33.2 G/DL (ref 33–37)
MCV RBC AUTO: 79.2 FL (ref 81–99)
MICROALBUMIN UR-MCNC: <1.2 MG/DL (ref 0–19)
MICROALBUMIN/CREAT UR-RTO: NORMAL MG/G
PDW BLD-RTO: 15.2 % (ref 11.5–14.5)
PLATELET # BLD: 303 K/UL (ref 130–400)
PMV BLD AUTO: 9.1 FL (ref 9.4–12.3)
POTASSIUM SERPL-SCNC: 4.4 MMOL/L (ref 3.5–5)
RBC # BLD: 3.8 M/UL (ref 4.2–5.4)
SODIUM BLD-SCNC: 127 MMOL/L (ref 136–145)
TOTAL PROTEIN: 4.8 G/DL (ref 6.6–8.7)
WBC # BLD: 6.8 K/UL (ref 4.8–10.8)

## 2021-01-22 PROCEDURE — 6370000000 HC RX 637 (ALT 250 FOR IP): Performed by: FAMILY MEDICINE

## 2021-01-22 PROCEDURE — 6370000000 HC RX 637 (ALT 250 FOR IP): Performed by: STUDENT IN AN ORGANIZED HEALTH CARE EDUCATION/TRAINING PROGRAM

## 2021-01-22 PROCEDURE — 80053 COMPREHEN METABOLIC PANEL: CPT

## 2021-01-22 PROCEDURE — 6370000000 HC RX 637 (ALT 250 FOR IP): Performed by: PHYSICIAN ASSISTANT

## 2021-01-22 PROCEDURE — 82043 UR ALBUMIN QUANTITATIVE: CPT

## 2021-01-22 PROCEDURE — 6360000002 HC RX W HCPCS: Performed by: PHYSICIAN ASSISTANT

## 2021-01-22 PROCEDURE — 82570 ASSAY OF URINE CREATININE: CPT

## 2021-01-22 PROCEDURE — 36415 COLL VENOUS BLD VENIPUNCTURE: CPT

## 2021-01-22 PROCEDURE — 2580000003 HC RX 258: Performed by: PHYSICIAN ASSISTANT

## 2021-01-22 PROCEDURE — 85027 COMPLETE CBC AUTOMATED: CPT

## 2021-01-22 RX ORDER — SODIUM CHLORIDE 1000 MG
1 TABLET, SOLUBLE MISCELLANEOUS
Qty: 9 TABLET | Refills: 0 | Status: SHIPPED | OUTPATIENT
Start: 2021-01-22 | End: 2021-09-28

## 2021-01-22 RX ORDER — PREDNISONE 20 MG/1
TABLET ORAL
Qty: 8 TABLET | Refills: 0 | Status: SHIPPED | OUTPATIENT
Start: 2021-01-22 | End: 2021-02-01

## 2021-01-22 RX ORDER — PROMETHAZINE HYDROCHLORIDE 12.5 MG/1
12.5 TABLET ORAL 4 TIMES DAILY PRN
Qty: 20 TABLET | Refills: 0 | Status: SHIPPED | OUTPATIENT
Start: 2021-01-22 | End: 2021-01-29

## 2021-01-22 RX ORDER — TRAMADOL HYDROCHLORIDE 50 MG/1
50 TABLET ORAL EVERY 6 HOURS PRN
Qty: 12 TABLET | Refills: 0 | Status: SHIPPED | OUTPATIENT
Start: 2021-01-22 | End: 2021-01-25

## 2021-01-22 RX ORDER — NAPROXEN 500 MG/1
500 TABLET ORAL DAILY PRN
Qty: 10 TABLET | Refills: 0 | Status: SHIPPED | OUTPATIENT
Start: 2021-01-22 | End: 2021-05-13

## 2021-01-22 RX ORDER — DEXTROSE MONOHYDRATE 25 G/50ML
12.5 INJECTION, SOLUTION INTRAVENOUS PRN
Status: DISCONTINUED | OUTPATIENT
Start: 2021-01-22 | End: 2021-01-22 | Stop reason: HOSPADM

## 2021-01-22 RX ORDER — NICOTINE POLACRILEX 4 MG
15 LOZENGE BUCCAL PRN
Status: DISCONTINUED | OUTPATIENT
Start: 2021-01-22 | End: 2021-01-22 | Stop reason: HOSPADM

## 2021-01-22 RX ORDER — DEXTROSE MONOHYDRATE 50 MG/ML
100 INJECTION, SOLUTION INTRAVENOUS PRN
Status: DISCONTINUED | OUTPATIENT
Start: 2021-01-22 | End: 2021-01-22 | Stop reason: HOSPADM

## 2021-01-22 RX ADMIN — KETOROLAC TROMETHAMINE 30 MG: 30 INJECTION, SOLUTION INTRAMUSCULAR; INTRAVENOUS at 02:43

## 2021-01-22 RX ADMIN — TRIHEXYPHENIDYL HYDROCHLORIDE 2 MG: 2 TABLET ORAL at 08:11

## 2021-01-22 RX ADMIN — SODIUM CHLORIDE, PRESERVATIVE FREE 10 ML: 5 INJECTION INTRAVENOUS at 08:17

## 2021-01-22 RX ADMIN — DIVALPROEX SODIUM 500 MG: 500 TABLET, DELAYED RELEASE ORAL at 08:11

## 2021-01-22 RX ADMIN — FOLIC ACID 1 MG: 1 TABLET ORAL at 08:11

## 2021-01-22 RX ADMIN — PANTOPRAZOLE SODIUM 40 MG: 40 TABLET, DELAYED RELEASE ORAL at 06:17

## 2021-01-22 RX ADMIN — Medication 1 G: at 08:11

## 2021-01-22 RX ADMIN — ONDANSETRON HYDROCHLORIDE 4 MG: 2 SOLUTION INTRAMUSCULAR; INTRAVENOUS at 02:43

## 2021-01-22 RX ADMIN — FERROUS SULFATE TAB 325 MG (65 MG ELEMENTAL FE) 325 MG: 325 (65 FE) TAB at 08:11

## 2021-01-22 RX ADMIN — HYDROXYZINE HYDROCHLORIDE 50 MG: 25 TABLET, FILM COATED ORAL at 08:11

## 2021-01-22 RX ADMIN — FLUOXETINE HYDROCHLORIDE 40 MG: 20 CAPSULE ORAL at 08:11

## 2021-01-22 RX ADMIN — BUTALBITAL, ACETAMINOPHEN, AND CAFFEINE 1 TABLET: 50; 325; 40 TABLET ORAL at 02:43

## 2021-01-22 RX ADMIN — CLONAZEPAM 0.5 MG: 0.5 TABLET ORAL at 08:11

## 2021-01-22 RX ADMIN — AMANTADINE HYDROCHLORIDE 100 MG: 100 CAPSULE ORAL at 08:11

## 2021-01-22 RX ADMIN — PREDNISONE 20 MG: 20 TABLET ORAL at 08:11

## 2021-01-22 RX ADMIN — DRONABINOL 5 MG: 2.5 CAPSULE ORAL at 06:17

## 2021-01-22 RX ADMIN — HYDROCHLOROTHIAZIDE 12.5 MG: 12.5 CAPSULE ORAL at 08:11

## 2021-01-22 RX ADMIN — BACLOFEN 10 MG: 10 TABLET ORAL at 08:11

## 2021-01-22 RX ADMIN — SUCRALFATE 1 G: 1 TABLET ORAL at 06:17

## 2021-01-22 RX ADMIN — LISINOPRIL 40 MG: 20 TABLET ORAL at 08:11

## 2021-01-22 RX ADMIN — ACETAMINOPHEN 650 MG: 325 TABLET ORAL at 02:43

## 2021-01-22 ASSESSMENT — PAIN SCALES - GENERAL: PAINLEVEL_OUTOF10: 9

## 2021-01-22 NOTE — DISCHARGE SUMMARY
Kathleen Mcdonnell  :  1975  MRN:  [id number]    Admit date:  2021  Discharge date:   2021    Discharging Physician:  Luz Maria Davis MD    Advance Directive: Full Code    Consults: none    Primary Care Physician:  LYSSA Vladez    Discharge Diagnoses:  Principal Problem:    Pain in joints  Active Problems:    Multiple sclerosis ([address]    Intractable chronic migraine without aura and without status migrainosus    Weakness    Difficulty with speech    Lupus ([address]    Fibromyalgia    Convulsions ([facility]    Hypertension    Bipolar 1 disorder ([facility] [address]  Resolved Problems:    * No resolved hospital problems. *    Portions of this note have been copied forward, however, changed to reflect the most current clinical status of this patient. Hospital Course:    21 The patient is a 39 y.o. female with PMH of Lupus, fibromyalgia, Bipolar type 1, MS, and HTN who presented to [facility] ED complaining of joint pain. She is resting comfortably in bed in no acute distress. Pt states she began to have pain \"everywhere\" Monday. She admits to knots under her skin in BUE, neck and jaw, face pain, uterus pain \"endometreosis\", BLE pain in joints, muscles and skin. She states that her BLE are also weak and tingling. She states she drinks a lot of fluids but has been nauseous over the last few days. She was seen by Dr. Marlene Vázquez who treats her MS on Monday and he advised her to reach out to her Dr. At [facility]. She states she has been in e-mail communication and they planned on ordering her blood work. Pt also states that she recently had her gastric band adjusted. Upon arrival pt is afebrile at 99 and Hypertensive at 157/112 with SpO2 at 100% room air. Sodium is low at 125. CXR with no acute process.               21 Pt still with pain and headache. Slight improvement today. Labs ordered for in-house that pt will need for rheumatology follow up.  Sodium remains 125.   21 Pt feeling significantly better today. Only complaint is headache, but states it is better today than it has been. Pt is stable and ready for discharge per attending Hospialist. Pt is encouraged to have close follow up with her Rheumatologist. Pt expresses understanding. She is to continue on prednisone 20 mg for 5 days then 10 mg for 5 days after that. Pt to also have close follow up with PCP. Significant Diagnostic Studies:   Xr Chest Portable  Impression: No acute cardiopulmonary disease. Signed by Dr Rob Yoo on 1/20/2021 1:44 PM      Pertinent Labs:   CBC:   Recent Labs     01/20/21  1035 01/21/21  0921 01/22/21  0328   WBC 5.4 8.3 6.8   HGB 11.6* 11.0* 10.0*    341 303     BMP:    Recent Labs     01/20/21  1035 01/21/21  0921 01/22/21  0328   * 125* 127*   K 5.0 3.8 4.4   CL 93* 92* 95*   CO2 25 21* 23   BUN 8 10 13   CREATININE 0.7 0.6 0.7   GLUCOSE 76 133* 67*       Physical Exam:  Vital Signs: /75   Pulse 71   Temp 99.2 °F (37.3 °C) (Temporal)   Resp 16   Ht 5' 5\" (1.651 m)   Wt 170 lb (77.1 kg)   SpO2 97%   BMI 28.29 kg/m²   General appearance:. Alert and Cooperative   HEENT: Normocephalic. Chest: clear to auscultation bilaterally without wheezes or rhonchi. Cardiac: Normal heart tones with regular rate and rhythm, S1, S2 normal. No murmurs, gallops, or rubs auscultated. Abdomen:soft, non-tender; non-distended normal bowel sounds no masses, no organomegaly. Extremities: No clubbing or cyanosis. No peripheral edema. Peripheral pulses palpable. Neurologic: Grossly intact.     Discharge Medications:       Imelda Parker   Home Medication Instructions FRK:[id number]    Printed on:01/22/21 7774   Medication Information                      alendronate (FOSAMAX) 70 MG tablet  Take 70 mg by mouth every 7 days             Amantadine (SYMMETREL) 100 MG TABS tablet  Take 100 mg by mouth 2 times daily             azelastine (ASTELIN) 0.1 % nasal spray  SPRAY IN EACH NOSTRIL PRN baclofen (LIORESAL) 10 MG tablet  Take 10 mg by mouth 2 times daily             butalbital-acetaminophen-caffeine (FIORICET, ESGIC) -40 MG per tablet  Take 1 tablet by mouth every 4 hours as needed for Headaches             clonazePAM (KLONOPIN) 0.5 MG tablet  TK 1 T PO TID PRA             corticotropin (ACTHAR) 80 UNIT/ML injectable gel  Inject 1 mL into the muscle daily for 3 days             cyanocobalamin 1000 MCG/ML injection  Inject 1,000 mcg into the muscle every 21 days              divalproex (DEPAKOTE) 500 MG DR tablet  Take 500 mg by mouth 2 nightly             dronabinol (MARINOL) 5 MG capsule  Take 1 capsule by mouth 2 times daily (before meals) for 30 days. Elagolix Sodium (ORILISSA PO)  Take by mouth             ferrous sulfate 325 (65 FE) MG tablet  Take 325 mg by mouth daily (with breakfast)             FLUoxetine (PROZAC) 20 MG capsule  Take 40 mg by mouth daily             folic acid (FOLVITE) 1 MG tablet  Take 1 mg by mouth daily             Gabapentin, Once-Daily, (GRALISE) 600 MG TABS  Take 1,800 mg by mouth nightly.              hydroCHLOROthiazide (MICROZIDE) 12.5 MG capsule  Take 12.5 mg by mouth daily             hydrOXYzine (ATARAX) 50 MG tablet  Take 50 mg by mouth 2 times daily              lisinopril (PRINIVIL;ZESTRIL) 40 MG tablet  Take 40 mg by mouth daily             Multiple Vitamins-Minerals (THERAPEUTIC MULTIVITAMIN-MINERALS) tablet  Take 1 tablet by mouth daily             naproxen (NAPROSYN) 500 MG tablet  Take 1 tablet by mouth daily as needed for Pain             norethindrone (AYGESTIN) 5 MG tablet  Take 1 tablet by mouth daily             pantoprazole sodium (PROTONIX) 40 MG PACK packet  Take 40 mg by mouth 2 times daily             potassium chloride (KLOR-CON M) 20 MEQ extended release tablet  Take 20 mEq by mouth 2 times daily             prazosin (MINIPRESS) 2 MG capsule  Take 2 mg by mouth nightly             predniSONE (DELTASONE) 20 MG tablet  Take 1 tablet by mouth daily for 5 days, THEN 0.5 tablets daily for 5 days. promethazine (PHENERGAN) 12.5 MG tablet  Take 1 tablet by mouth 4 times daily as needed for Nausea             sodium chloride 1 g tablet  Take 1 tablet by mouth 3 times daily (with meals) for 3 days             sucralfate (CARAFATE) 1 GM tablet  Take 1 g by mouth 2 times daily             topiramate (TOPAMAX) 25 MG tablet  Take 25 mg by mouth daily             traMADol (ULTRAM) 50 MG tablet  Take 1 tablet by mouth every 6 hours as needed for Pain for up to 3 days. Intended supply: 3 days. Take lowest dose possible to manage pain             trihexyphenidyl (ARTANE) 2 MG tablet  Take 1 tablet by mouth 3 times daily             zolpidem (AMBIEN) 10 MG tablet  Take by mouth nightly as needed for Sleep. Discharge Instructions: Follow up with LYSSA Lee in 3-5 days. Take medications as directed. Resume activity as tolerated. Diet: DIET GENERAL; Daily Fluid Restriction: 1800 ml     Disposition: Patient is medically stable and will be discharged home. Time spent on discharge 35 minutes spent in assessing patient, reviewing medications, discussion with nursing, confirming safe discharge plan and preparation of discharge summary.     Signed:  Ada Umaña PA-C

## 2021-01-22 NOTE — DISCHARGE INSTR - DIET

## 2021-01-22 NOTE — CARE COORDINATION
Spoke with patient regarding MD orders for [facility] services. Patient agreeable and has chosen [facility]. Referral Faxed. [address] [place] [address] [phone]. [phone]. Please notify [address] [place] [address] when patient discharges and fax DC Summary,  DC med list and any new [facility] orders. The Patient  was provided with a choice of provider and agrees   with the discharge plan. [x] Yes [] No    Freedom of choice list was provided with basic dialogue that supports the patient's individualized plan of care/goals, treatment preferences and shares the quality data associated with the providers.  [x] Yes [] No  Electronically signed by Darlyn Mohs on 1/22/2021 at 10:17 AM

## 2021-01-23 LAB — ALDOLASE: 2.3 U/L (ref 1.5–8.1)

## 2021-01-24 LAB
ANA IGG, ELISA: NORMAL
BETA-2 GLYCOPROTEIN 1 IGG ANTIBODY: 0 SGU (ref 0–20)
BETA-2 GLYCOPROTEIN 1 IGM ANTIBODY: 3 SMU (ref 0–20)
C3 COMPLEMENT: 87 MG/DL (ref 88–201)
C4 COMPLEMENT: 8 MG/DL (ref 10–40)

## 2021-01-25 ENCOUNTER — TELEPHONE (OUTPATIENT)
Dept: NEUROLOGY | Age: 46
End: 2021-01-25

## 2021-01-25 LAB
DRVVT CONFIRMATION TEST: ABNORMAL RATIO
DRVVT SCREEN: 32 SEC (ref 33–44)
DRVVT,DIL: ABNORMAL SEC (ref 33–44)
HEXAGONAL PHOSPHOLIPID NEUTRALIZAT TEST: ABNORMAL
LUPUS ANTICOAG INTERP: ABNORMAL
PLT NEUTA: ABNORMAL
PT D: 12.9 SEC (ref 12–15.5)
PTT D: 34 SEC (ref 32–48)
PTT-D CORR REFLEX: ABNORMAL SEC (ref 32–48)
PTT-HEPARIN NEUTRALIZED: ABNORMAL SEC (ref 32–48)
REPTILASE TIME: ABNORMAL SEC
THROMBIN TIME: ABNORMAL SEC (ref 14.7–19.5)

## 2021-01-25 NOTE — TELEPHONE ENCOUNTER
The patient stated she would like to try the oral steroids first. I told her to check with her pharmacy tomorrow.

## 2021-01-26 ENCOUNTER — TELEPHONE (OUTPATIENT)
Dept: NEUROLOGY | Age: 46
End: 2021-01-26

## 2021-01-26 LAB
ANTICARDIOLIPIN IGG ANTIBODY: 5 GPL (ref 0–14)
CARDIOLIPIN AB IGM: 8 MPL (ref 0–12)

## 2021-01-27 ENCOUNTER — TELEPHONE (OUTPATIENT)
Dept: NEUROLOGY | Age: 46
End: 2021-01-27

## 2021-01-27 NOTE — TELEPHONE ENCOUNTER
Spoke to [facility] about the script for dexamethasone 20mg tablets 4 PO Daily X 5 days. They are not able to get 20mg tablets, the highest dose they can get is 6mg tablets. Instructed to give dexamethasone 6mg tablets 10 PO QD X 5 days per Dr Rafia Nicole order.

## 2021-01-28 LAB — ENA TO SMITH (SM) ANTIBODY: 0 AU/ML (ref 0–40)

## 2021-02-01 LAB — ANTI DNA DOUBLE STRANDED: 0 IU/ML

## 2021-02-02 ENCOUNTER — TELEPHONE (OUTPATIENT)
Dept: NEUROLOGY | Age: 46
End: 2021-02-02

## 2021-02-03 ENCOUNTER — TELEPHONE (OUTPATIENT)
Dept: NEUROLOGY | Age: 46
End: 2021-02-03

## 2021-02-03 NOTE — TELEPHONE ENCOUNTER
The patient stated she would like to do the IV steroids. I told her that Dr. Conner Marcus would place an order and someone from our infusion center would give her a call. She also stated that she is needing clearance to have a hysterectomy. I told her as long as you approved of it then we would get something typed up and she could pick it up when she comes to have her infusion she can stop by the office to get it. She gave a verbal understanding. Patient called stating she is scheduled to have a hysterectomy in 2 weeks and she will be needing clearance from our office.     I attempted to call patient back so that the requesting office could fax a document to us requesting clearance but the patient;s phone line was busy and I could not leave a voicemail.

## 2021-02-03 NOTE — TELEPHONE ENCOUNTER
Patient called stating she is scheduled to have a hysterectomy in 2 weeks and she will be needing clearance from our office. I attempted to call patient back so that the requesting office could fax a document to us requesting clearance but the patient;s phone line was busy and I could not leave a voicemail.

## 2021-02-04 RX ORDER — SODIUM CHLORIDE 0.9 % (FLUSH) 0.9 %
5 SYRINGE (ML) INJECTION PRN
Status: CANCELLED | OUTPATIENT
Start: 2021-02-04

## 2021-02-04 RX ORDER — SODIUM CHLORIDE 0.9 % (FLUSH) 0.9 %
10 SYRINGE (ML) INJECTION PRN
Status: CANCELLED | OUTPATIENT
Start: 2021-02-04

## 2021-02-04 RX ORDER — HEPARIN SODIUM (PORCINE) LOCK FLUSH IV SOLN 100 UNIT/ML 100 UNIT/ML
500 SOLUTION INTRAVENOUS PRN
Status: CANCELLED | OUTPATIENT
Start: 2021-02-04

## 2021-02-09 ENCOUNTER — HOSPITAL ENCOUNTER (OUTPATIENT)
Dept: INFUSION THERAPY | Age: 46
Setting detail: INFUSION SERIES
Discharge: HOME | End: 2021-02-09
Payer: MEDICARE

## 2021-02-09 DIAGNOSIS — M32.9 LUPUS (HCC): Primary | ICD-10-CM

## 2021-02-09 DIAGNOSIS — G35 EXACERBATION OF MULTIPLE SCLEROSIS (HCC): ICD-10-CM

## 2021-02-09 PROCEDURE — 2580000003 HC RX 258: Performed by: PSYCHIATRY & NEUROLOGY

## 2021-02-09 PROCEDURE — 6360000002 HC RX W HCPCS: Performed by: PSYCHIATRY & NEUROLOGY

## 2021-02-09 PROCEDURE — 96365 THER/PROPH/DIAG IV INF INIT: CPT

## 2021-02-09 RX ORDER — ACETAMINOPHEN 325 MG/1
650 TABLET ORAL ONCE
Status: CANCELLED | OUTPATIENT
Start: 2021-03-23

## 2021-02-09 RX ORDER — EPINEPHRINE 1 MG/ML
0.3 INJECTION, SOLUTION, CONCENTRATE INTRAVENOUS
Status: CANCELLED | OUTPATIENT
Start: 2021-03-23

## 2021-02-09 RX ORDER — METHYLPREDNISOLONE SODIUM SUCCINATE 125 MG/2ML
125 INJECTION, POWDER, LYOPHILIZED, FOR SOLUTION INTRAMUSCULAR; INTRAVENOUS ONCE
Status: CANCELLED | OUTPATIENT
Start: 2021-03-23

## 2021-02-09 RX ORDER — DIPHENHYDRAMINE HCL 25 MG
50 TABLET ORAL ONCE
Status: CANCELLED
Start: 2021-03-23 | End: 2021-03-23

## 2021-02-09 RX ORDER — DIPHENHYDRAMINE HYDROCHLORIDE 50 MG/ML
25 INJECTION INTRAMUSCULAR; INTRAVENOUS PRN
Status: CANCELLED | OUTPATIENT
Start: 2021-03-23

## 2021-02-09 RX ORDER — SODIUM CHLORIDE 0.9 % (FLUSH) 0.9 %
10 SYRINGE (ML) INJECTION PRN
Status: CANCELLED | OUTPATIENT
Start: 2021-02-10

## 2021-02-09 RX ORDER — SODIUM CHLORIDE 0.9 % (FLUSH) 0.9 %
5 SYRINGE (ML) INJECTION PRN
Status: CANCELLED | OUTPATIENT
Start: 2021-02-10

## 2021-02-09 RX ORDER — HEPARIN SODIUM (PORCINE) LOCK FLUSH IV SOLN 100 UNIT/ML 100 UNIT/ML
500 SOLUTION INTRAVENOUS PRN
Status: CANCELLED | OUTPATIENT
Start: 2021-02-10

## 2021-02-09 RX ADMIN — SODIUM CHLORIDE 1000 MG: 9 INJECTION, SOLUTION INTRAVENOUS at 14:14

## 2021-02-12 ENCOUNTER — HOSPITAL ENCOUNTER (OUTPATIENT)
Dept: INFUSION THERAPY | Age: 46
Setting detail: INFUSION SERIES
Discharge: HOME | End: 2021-02-12
Payer: MEDICARE

## 2021-02-12 VITALS
SYSTOLIC BLOOD PRESSURE: 124 MMHG | DIASTOLIC BLOOD PRESSURE: 87 MMHG | OXYGEN SATURATION: 100 % | TEMPERATURE: 98.6 F | RESPIRATION RATE: 18 BRPM | HEART RATE: 84 BPM

## 2021-02-12 DIAGNOSIS — M32.9 LUPUS (HCC): Primary | ICD-10-CM

## 2021-02-12 DIAGNOSIS — G35 EXACERBATION OF MULTIPLE SCLEROSIS (HCC): ICD-10-CM

## 2021-02-12 PROCEDURE — 2580000003 HC RX 258: Performed by: PSYCHIATRY & NEUROLOGY

## 2021-02-12 PROCEDURE — 96365 THER/PROPH/DIAG IV INF INIT: CPT

## 2021-02-12 PROCEDURE — 6360000002 HC RX W HCPCS: Performed by: PSYCHIATRY & NEUROLOGY

## 2021-02-12 RX ORDER — HEPARIN SODIUM (PORCINE) LOCK FLUSH IV SOLN 100 UNIT/ML 100 UNIT/ML
500 SOLUTION INTRAVENOUS PRN
Status: CANCELLED | OUTPATIENT
Start: 2021-02-13

## 2021-02-12 RX ORDER — SODIUM CHLORIDE 0.9 % (FLUSH) 0.9 %
10 SYRINGE (ML) INJECTION PRN
Status: CANCELLED | OUTPATIENT
Start: 2021-02-13

## 2021-02-12 RX ORDER — SODIUM CHLORIDE 0.9 % (FLUSH) 0.9 %
5 SYRINGE (ML) INJECTION PRN
Status: CANCELLED | OUTPATIENT
Start: 2021-02-13

## 2021-02-12 RX ADMIN — SODIUM CHLORIDE 1000 MG: 9 INJECTION, SOLUTION INTRAVENOUS at 14:15

## 2021-02-13 ENCOUNTER — HOSPITAL ENCOUNTER (OUTPATIENT)
Age: 46
Discharge: HOME | End: 2021-02-13
Attending: INTERNAL MEDICINE | Admitting: INTERNAL MEDICINE
Payer: MEDICARE

## 2021-02-13 VITALS
HEART RATE: 79 BPM | OXYGEN SATURATION: 100 % | SYSTOLIC BLOOD PRESSURE: 126 MMHG | DIASTOLIC BLOOD PRESSURE: 85 MMHG | RESPIRATION RATE: 17 BRPM | TEMPERATURE: 99.3 F

## 2021-02-13 DIAGNOSIS — G35 EXACERBATION OF MULTIPLE SCLEROSIS (HCC): ICD-10-CM

## 2021-02-13 DIAGNOSIS — M32.9 LUPUS (HCC): Primary | ICD-10-CM

## 2021-02-13 PROCEDURE — 6360000002 HC RX W HCPCS: Performed by: PSYCHIATRY & NEUROLOGY

## 2021-02-13 PROCEDURE — 96365 THER/PROPH/DIAG IV INF INIT: CPT

## 2021-02-13 PROCEDURE — 2580000003 HC RX 258: Performed by: PSYCHIATRY & NEUROLOGY

## 2021-02-13 RX ORDER — HEPARIN SODIUM (PORCINE) LOCK FLUSH IV SOLN 100 UNIT/ML 100 UNIT/ML
500 SOLUTION INTRAVENOUS PRN
Status: CANCELLED | OUTPATIENT
Start: 2021-02-13

## 2021-02-13 RX ORDER — SODIUM CHLORIDE 0.9 % (FLUSH) 0.9 %
10 SYRINGE (ML) INJECTION PRN
Status: CANCELLED | OUTPATIENT
Start: 2021-02-13

## 2021-02-13 RX ORDER — SODIUM CHLORIDE 0.9 % (FLUSH) 0.9 %
5 SYRINGE (ML) INJECTION PRN
Status: CANCELLED | OUTPATIENT
Start: 2021-02-13

## 2021-02-13 RX ADMIN — SODIUM CHLORIDE 1000 MG: 9 INJECTION, SOLUTION INTRAVENOUS at 14:13

## 2021-02-13 NOTE — PROGRESS NOTES
Patient arrived for outpatient infusion patient is alert and oriented and able to make needs know has IV to right wrist flushes well no issues noted VSS IV solumedrol hung for patients infusion, snacks provided to patient while receiving infusion will continue to monitor throughout process

## 2021-02-16 VITALS
HEART RATE: 68 BPM | RESPIRATION RATE: 17 BRPM | TEMPERATURE: 99.4 F | SYSTOLIC BLOOD PRESSURE: 141 MMHG | DIASTOLIC BLOOD PRESSURE: 87 MMHG

## 2021-02-19 ENCOUNTER — TELEPHONE (OUTPATIENT)
Dept: NEUROSURGERY | Age: 46
End: 2021-02-19

## 2021-02-20 NOTE — TELEPHONE ENCOUNTER
I would prefer not to give her more medicine. Its better if I see her in person to look at her tremors and decide what to do. Its pretty busy this week.  Maybe the week or two after that unless its emergent

## 2021-02-22 ENCOUNTER — HOSPITAL ENCOUNTER (EMERGENCY)
Age: 46
Discharge: HOME | End: 2021-02-22
Attending: EMERGENCY MEDICINE
Payer: MEDICARE

## 2021-02-22 VITALS
WEIGHT: 170 LBS | HEART RATE: 71 BPM | HEIGHT: 65 IN | SYSTOLIC BLOOD PRESSURE: 119 MMHG | RESPIRATION RATE: 16 BRPM | OXYGEN SATURATION: 95 % | TEMPERATURE: 98.9 F | DIASTOLIC BLOOD PRESSURE: 80 MMHG | BODY MASS INDEX: 28.32 KG/M2

## 2021-02-22 DIAGNOSIS — K64.8 INTERNAL HEMORRHOIDS: Primary | ICD-10-CM

## 2021-02-22 DIAGNOSIS — E87.1 HYPONATREMIA: ICD-10-CM

## 2021-02-22 LAB
ALBUMIN SERPL-MCNC: 3.4 G/DL (ref 3.5–5.2)
ALP BLD-CCNC: 46 U/L (ref 35–104)
ALT SERPL-CCNC: 13 U/L (ref 5–33)
ANION GAP SERPL CALCULATED.3IONS-SCNC: 6 MMOL/L (ref 7–19)
AST SERPL-CCNC: 14 U/L (ref 5–32)
BASOPHILS ABSOLUTE: 0.1 K/UL (ref 0–0.2)
BASOPHILS RELATIVE PERCENT: 0.8 % (ref 0–1)
BILIRUB SERPL-MCNC: <0.2 MG/DL (ref 0.2–1.2)
BUN BLDV-MCNC: 11 MG/DL (ref 6–20)
CALCIUM SERPL-MCNC: 8.1 MG/DL (ref 8.6–10)
CHLORIDE BLD-SCNC: 95 MMOL/L (ref 98–111)
CO2: 26 MMOL/L (ref 22–29)
CREAT SERPL-MCNC: 0.6 MG/DL (ref 0.5–0.9)
EOSINOPHILS ABSOLUTE: 0.1 K/UL (ref 0–0.6)
EOSINOPHILS RELATIVE PERCENT: 0.9 % (ref 0–5)
GFR AFRICAN AMERICAN: >59
GFR NON-AFRICAN AMERICAN: >60
GLUCOSE BLD-MCNC: 68 MG/DL (ref 74–109)
HCT VFR BLD CALC: 34.6 % (ref 37–47)
HEMOGLOBIN: 11.2 G/DL (ref 12–16)
IMMATURE GRANULOCYTES #: 0.3 K/UL
LYMPHOCYTES ABSOLUTE: 1.9 K/UL (ref 1.1–4.5)
LYMPHOCYTES RELATIVE PERCENT: 30 % (ref 20–40)
MCH RBC QN AUTO: 27.3 PG (ref 27–31)
MCHC RBC AUTO-ENTMCNC: 32.4 G/DL (ref 33–37)
MCV RBC AUTO: 84.4 FL (ref 81–99)
MONOCYTES ABSOLUTE: 0.7 K/UL (ref 0–0.9)
MONOCYTES RELATIVE PERCENT: 10.1 % (ref 0–10)
NEUTROPHILS ABSOLUTE: 3.4 K/UL (ref 1.5–7.5)
NEUTROPHILS RELATIVE PERCENT: 53.5 % (ref 50–65)
PDW BLD-RTO: 18.4 % (ref 11.5–14.5)
PLATELET # BLD: 361 K/UL (ref 130–400)
PMV BLD AUTO: 9.6 FL (ref 9.4–12.3)
POTASSIUM SERPL-SCNC: 3.9 MMOL/L (ref 3.5–5)
RBC # BLD: 4.1 M/UL (ref 4.2–5.4)
REASON FOR REJECTION: NORMAL
REASON FOR REJECTION: NORMAL
REJECTED TEST: NORMAL
REJECTED TEST: NORMAL
SODIUM BLD-SCNC: 127 MMOL/L (ref 136–145)
TOTAL PROTEIN: 5.5 G/DL (ref 6.6–8.7)
WBC # BLD: 6.4 K/UL (ref 4.8–10.8)

## 2021-02-22 PROCEDURE — 36415 COLL VENOUS BLD VENIPUNCTURE: CPT

## 2021-02-22 PROCEDURE — 80053 COMPREHEN METABOLIC PANEL: CPT

## 2021-02-22 PROCEDURE — 85025 COMPLETE CBC W/AUTO DIFF WBC: CPT

## 2021-02-22 PROCEDURE — 99284 EMERGENCY DEPT VISIT MOD MDM: CPT

## 2021-02-22 PROCEDURE — 2580000003 HC RX 258: Performed by: EMERGENCY MEDICINE

## 2021-02-22 RX ORDER — SODIUM CHLORIDE, SODIUM LACTATE, POTASSIUM CHLORIDE, AND CALCIUM CHLORIDE .6; .31; .03; .02 G/100ML; G/100ML; G/100ML; G/100ML
1000 INJECTION, SOLUTION INTRAVENOUS ONCE
Status: COMPLETED | OUTPATIENT
Start: 2021-02-22 | End: 2021-02-22

## 2021-02-22 RX ADMIN — SODIUM CHLORIDE, SODIUM LACTATE, POTASSIUM CHLORIDE, AND CALCIUM CHLORIDE 1000 ML: 600; 310; 30; 20 INJECTION, SOLUTION INTRAVENOUS at 17:54

## 2021-02-22 ASSESSMENT — PAIN SCALES - GENERAL: PAINLEVEL_OUTOF10: 9

## 2021-02-22 ASSESSMENT — PAIN DESCRIPTION - DESCRIPTORS: DESCRIPTORS: ACHING

## 2021-02-22 ASSESSMENT — PAIN DESCRIPTION - LOCATION: LOCATION: ABDOMEN

## 2021-02-22 NOTE — ED NOTES
Pt states she has been having some abdominal pain for the last week. She states she has been passing blood rectally today.        Fabrizio Addison  02/22/21 0826

## 2021-02-23 NOTE — ED NOTES
Assisted Dr. Jhon Martinez with rectal exam. Pt tolerated well.       Annette Méndez RN  02/22/21 0598

## 2021-02-23 NOTE — ED NOTES
Pt reports increased pain and nausea. Dr. Ava Currie notified. No further orders at this time.       Yuan Claudio RN  02/22/21 2028

## 2021-02-23 NOTE — ED PROVIDER NOTES
[facility]  eMERGENCY dEPARTMENT eNCOUnter      Pt Name: Brian Drummond  MRN: [id number]  [place] 1975  Date of evaluation: 2/22/2021  Provider: Kaiden Goodwin MD    [place]       Chief Complaint   Patient presents with    Rectal Bleeding         HISTORY OF PRESENT ILLNESS   (Location/Symptom, Timing/Onset,Context/Setting, Quality, Duration, Modifying Factors, Severity)  Note limiting factors. Brian Drummond is a 39 y.o. female who presents to the emergency department for evaluation patient states that she does have a prior history of constipation along with prior internal hemorrhoids. Patient reports over the last couple of days she has noticed is bright red blood in her stool. States that she is currently maintained on oral anticoagulant occasions. States she has not had passage of any clots. She denies chest pain, palpitations, dizziness or syncopal events. She does have a prior history of previous colonoscopy several years ago. The symptoms are described as mild in severity. HPI    NursingNotes were reviewed. REVIEW OF SYSTEMS    (2-9 systems for level 4, 10 or more for level 5)     Review of Systems   Constitutional: Negative for chills, fatigue and fever. Respiratory: Negative for shortness of breath. Cardiovascular: Negative for chest pain. Gastrointestinal: Positive for blood in stool. Negative for abdominal pain, nausea and vomiting. Neurological: Negative for dizziness and syncope. All other systems reviewed and are negative.            PAST MEDICALHISTORY     Past Medical History:   Diagnosis Date    Arthritis     Bipolar 1 disorder ([address]     Convulsions ([address] 7/20/2020    Fibromyalgia     GERD (gastroesophageal reflux disease)     Hypertension     Lupus ([address]     Movement disorder     Multiple sclerosis ([address]     Neuromuscular disorder ([address]     Psychiatric problem          SURGICAL HISTORY       Past Surgical History:   Procedure dailyHistorical Med      topiramate (TOPAMAX) 25 MG tablet Take 25 mg by mouth dailyHistorical Med      prazosin (MINIPRESS) 2 MG capsule Take 2 mg by mouth nightlyHistorical Med      hydrOXYzine (ATARAX) 50 MG tablet Take 50 mg by mouth 2 times daily Historical Med      hydroCHLOROthiazide (MICROZIDE) 12.5 MG capsule Take 12.5 mg by mouth dailyHistorical Med      zolpidem (AMBIEN) 10 MG tablet Take by mouth nightly as needed for Sleep. Historical Med      butalbital-acetaminophen-caffeine (FIORICET, ESGIC) -40 MG per tablet Take 1 tablet by mouth every 4 hours as needed for HeadachesHistorical Med      Amantadine (SYMMETREL) 100 MG TABS tablet Take 100 mg by mouth 2 times dailyHistorical Med      lisinopril (PRINIVIL;ZESTRIL) 40 MG tablet Take 40 mg by mouth dailyHistorical Med      cyanocobalamin 1000 MCG/ML injection Inject 1,000 mcg into the muscle every 21 days Historical Med      pantoprazole sodium (PROTONIX) 40 MG PACK packet Take 40 mg by mouth 2 times dailyHistorical Med      azelastine (ASTELIN) 0.1 % nasal spray SPRAY IN EACH NOSTRIL PRN, R-0Historical Med      alendronate (FOSAMAX) 70 MG tablet Take 70 mg by mouth every 7 days      Multiple Vitamins-Minerals (THERAPEUTIC MULTIVITAMIN-MINERALS) tablet Take 1 tablet by mouth daily      ferrous sulfate 325 (65 FE) MG tablet Take 325 mg by mouth daily (with breakfast)             ALLERGIES     Avelox [moxifloxacin], Sugar-protein-starch, Avelox [moxifloxacin], Copaxone [glatiramer acetate], Depakote [divalproex sodium], Gabapentin, Hydroxychloroquine, Invega [paliperidone], Lamictal [lamotrigine], Lithium, Pregabalin, Lamictal [lamotrigine], and Lithium    FAMILY HISTORY       Family History   Problem Relation Age of Onset    Anemia Mother     Breast Cancer Mother     Depression Mother     High Blood Pressure Mother     Mental Illness Mother    Polina Rakers / Stillbirths Mother     Colon Cancer Father     Depression Father     High Blood Pressure Father     Mental Illness Father     Breast Cancer Maternal Grandmother     Alcohol Abuse Maternal Grandfather     Anemia Paternal Grandmother     Alcohol Abuse Paternal Grandfather     Osteoporosis Paternal Grandfather     Stroke Paternal Grandfather     Substance Abuse Maternal Cousin     Substance Abuse Paternal Cousin     Asthma Other           SOCIAL HISTORY       Social History     Socioeconomic History    Marital status: Single     Spouse name: None    Number of children: 3    Years of education: Some College    Highest education level: None   Occupational History    Occupation: Disability   Social Needs    Financial resource strain: None    Food insecurity     Worry: None     Inability: None    Transportation needs     Medical: None     Non-medical: None   Tobacco Use    Smoking status: Current Every Day Smoker     Packs/day: 1.00     Years: 15.00     Pack years: 15.00     Types: Cigarettes    Smokeless tobacco: Never Used   Substance and Sexual Activity    Alcohol use:  Yes     Alcohol/week: 2.0 standard drinks     Types: 2 Shots of liquor per week     Comment: couple of shots per day    Drug use: No    Sexual activity: Never     Partners: Male   Lifestyle    Physical activity     Days per week: None     Minutes per session: None    Stress: None   Relationships    Social connections     Talks on phone: None     Gets together: None     Attends Jehovah's witness service: None     Active member of club or organization: None     Attends meetings of clubs or organizations: None     Relationship status: None    Intimate partner violence     Fear of current or ex partner: None     Emotionally abused: None     Physically abused: None     Forced sexual activity: None   Other Topics Concern    None   Social History Narrative    ** Merged History Encounter **            SCREENINGS    [place] Coma Scale  Eye Opening: Spontaneous  Best Verbal Response: Oriented  Best Motor Response: Obeys commands  Jesus Coma Scale Score: 15        PHYSICAL EXAM    (up to 7 for level 4, 8 or more for level 5)     ED Triage Vitals [02/22/21 1545]   BP Temp Temp Source Pulse Resp SpO2 Height Weight   122/60 98.9 °F (37.2 °C) Tympanic 79 16 96 % 5' 5\" (1.651 m) 170 lb (77.1 kg)       Physical Exam  Vitals signs and nursing note reviewed. HENT:      Head: Atraumatic. Mouth/Throat:      Mouth: Mucous membranes are not dry. Pharynx: No posterior oropharyngeal erythema. Eyes:      General: No scleral icterus. Pupils: Pupils are equal, round, and reactive to light. Neck:      Trachea: No tracheal deviation. Cardiovascular:      Rate and Rhythm: Normal rate and regular rhythm. Heart sounds: Normal heart sounds. No murmur. Pulmonary:      Effort: Pulmonary effort is normal. No respiratory distress. Breath sounds: Normal breath sounds. No stridor. Abdominal:      General: There is no distension. Palpations: Abdomen is soft. Tenderness: There is no abdominal tenderness. There is no guarding. Genitourinary:     Rectum: Internal hemorrhoid present. No anal fissure. Skin:     Coloration: Skin is not pale. Findings: No rash. Neurological:      Mental Status: She is alert and oriented to person, place, and time. Psychiatric:         Behavior: Behavior is cooperative.          DIAGNOSTIC RESULTS         LABS:  Labs Reviewed   CBC WITH AUTO DIFFERENTIAL - Abnormal; Notable for the following components:       Result Value    RBC 4.10 (*)     Hemoglobin 11.2 (*)     Hematocrit 34.6 (*)     MCHC 32.4 (*)     RDW 18.4 (*)     Monocytes % 10.1 (*)     All other components within normal limits   COMPREHENSIVE METABOLIC PANEL - Abnormal; Notable for the following components:    Sodium 127 (*)     Chloride 95 (*)     Anion Gap 6 (*)     Glucose 68 (*)     Calcium 8.1 (*)     Total Protein 5.5 (*)     Albumin 3.4 (*)     All other components within normal limits    Narrative: Previous Hemolyzed. Notified Emy/RN/ER   SPECIMEN REJECTION   SPECIMEN REJECTION    Narrative:     Previous Hemolyzed. Notified Emy/RN/ER       All other labs were within normal range or not returned as of this dictation. EMERGENCY DEPARTMENT COURSE and DIFFERENTIAL DIAGNOSIS/MDM:   Vitals:    Vitals:    02/22/21 1545 02/22/21 1830 02/22/21 1908   BP: 122/60 123/66 119/80   Pulse: 79 80 71   Resp: 16 15 16   Temp: 98.9 °F (37.2 °C)     TempSrc: Tympanic     SpO2: 96% 96% 95%   Weight: 170 lb (77.1 kg)     Height: 5' 5\" (1.651 m)         MDM    Reassessment    Patient's hemoglobin is stable. Vital signs are stable. We discussed the importance of outpatient follow-up. Also encouraged to follow-up with her primary care doctor as she will likely need repeat laboratory studies to monitor her hyponatremia. PROCEDURES:  Unless otherwise noted below, none     Procedures    FINAL IMPRESSION      1. Internal hemorrhoids    2. Hyponatremia          DISPOSITION/PLAN   DISPOSITION Decision To Discharge 02/22/2021 10:08:04 PM      PATIENT REFERRED TO:  Earl Dejesus MD  [id number] [address]            DISCHARGE MEDICATIONS:  Discharge Medication List as of 2/22/2021 10:10 PM             (Please note that portions of this note were completed with a voice recognition program.  Efforts were made to edit thedictations but occasionally words are mis-transcribed.)    Helane Sandifer, MD (electronically signed)  Attending Emergency Physician         Helane Sandifer, MD  02/28/21 0134

## 2021-02-28 ASSESSMENT — ENCOUNTER SYMPTOMS
ABDOMINAL PAIN: 0
NAUSEA: 0
VOMITING: 0
SHORTNESS OF BREATH: 0
BLOOD IN STOOL: 1

## 2021-03-03 ENCOUNTER — TELEMEDICINE (OUTPATIENT)
Dept: NEUROLOGY | Age: 46
End: 2021-03-03
Payer: MEDICARE

## 2021-03-03 DIAGNOSIS — M79.7 FIBROMYALGIA: ICD-10-CM

## 2021-03-03 DIAGNOSIS — R20.0 NUMBNESS: ICD-10-CM

## 2021-03-03 DIAGNOSIS — M54.2 PAIN, NECK: ICD-10-CM

## 2021-03-03 DIAGNOSIS — R56.9 CONVULSIONS, UNSPECIFIED CONVULSION TYPE (HCC): ICD-10-CM

## 2021-03-03 DIAGNOSIS — M32.9 LUPUS (HCC): ICD-10-CM

## 2021-03-03 DIAGNOSIS — H46.9 OPTIC NEURITIS: ICD-10-CM

## 2021-03-03 DIAGNOSIS — G35 MULTIPLE SCLEROSIS (HCC): Primary | ICD-10-CM

## 2021-03-03 DIAGNOSIS — R25.1 TREMOR: ICD-10-CM

## 2021-03-03 DIAGNOSIS — H53.9 VISUAL DISTURBANCE: ICD-10-CM

## 2021-03-03 DIAGNOSIS — R53.1 WEAKNESS: ICD-10-CM

## 2021-03-03 PROCEDURE — 99214 OFFICE O/P EST MOD 30 MIN: CPT | Performed by: PSYCHIATRY & NEUROLOGY

## 2021-03-03 PROCEDURE — G8428 CUR MEDS NOT DOCUMENT: HCPCS | Performed by: PSYCHIATRY & NEUROLOGY

## 2021-03-03 RX ORDER — TRIHEXYPHENIDYL HYDROCHLORIDE 5 MG/1
5 TABLET ORAL 3 TIMES DAILY
Qty: 90 TABLET | Refills: 3 | Status: SHIPPED | OUTPATIENT
Start: 2021-03-03 | End: 2021-09-28

## 2021-03-03 RX ORDER — DRONABINOL 10 MG/1
10 CAPSULE ORAL
Qty: 60 CAPSULE | Refills: 5 | Status: SHIPPED | OUTPATIENT
Start: 2021-03-03 | End: 2021-04-02

## 2021-03-03 NOTE — PROGRESS NOTES
Chief Complaint   Patient presents with    Multiple Sclerosis     2 month follow up        Swapnil Li is a 39y.o. year old female who is seen for evaluation of follow for multiple sclerosis following an admission in 5/19 for optic neuritis treated with IV solumedrol. The patient has a history of multiple sclerosis, fibromyalgia and lupus along with chronic headaches was seen at that time for left visual change which began about 2 days prior to admission with eye pain. This was quite similar to her previous episodes of optic neuritis, of which she has had about 4 previous episodes treated with steroids. She was tried on oral steroids as an outpatient but this was not effective and was transferred from outside ER for further management. She has some chronic weakness and numbness and weakness on the left. She does have bowel and bladder incontinence. She was on Avonex. Had not followed up with Dr Hal De La Cruz since 2017. She did not follow up in the office since 5/19. On 7/4/20 she was in the ER with generalized body aches. She apparently had some trembling on the right side. She had an MRI of the brain in the ER which revealed no acute ischemic change but did have scattered non enhancing white matter lesions in periventricular and white matter. She was given 1 gm Solumedrol for a possible flair up and told to follow up with neurology. She indicates that she typically does relatively well for a few weeks and then she feels severely fatigued or worsening vision. She has been in a wheelchair in 2017. She had her initial testing in 2017 at [facility] which was significant for non enhancing white matter lesions and a CSF with 6 oligoclonal bands seen only in the CSF. She still feels her face twitching and right sided numbness. He speech is not normal for her as she has to think about things carefully. Her rheumatologist started her on Tecfidera but she felt worse on this.  She had been on Avonex since her diagnosis prior to with no clear benefit according to her. Having a lupus flair up. This usually feels pain in eyes, skin of in mouth may fall off. Felt since with 2  Ocrevus infusion 2020 and a bit better but now off. Benedetta Pulse with unclear benefit. Saw rheumatologist in [place] and plan a new infusion. Azathioprine started for her Lupus. Stopped Ocrevus. .Artane helped for about 2 days.      Active Ambulatory Problems     Diagnosis Date Noted    Multiple sclerosis ([address]     Intractable chronic migraine without aura and without status migrainosus 2017    Major depressive disorder, recurrent, moderate ([address] 2018    Exacerbation of multiple sclerosis ([address] 05/10/2019    Optic neuritis 05/10/2019    Pain of left eye 05/10/2019    Bronchitis 2019    Visual disturbance 2020    Tremor 2020    Weakness 2020    Numbness 2020    Difficulty with speech 2020    Lupus ([address] 2020    Fibromyalgia 2020    Convulsions ([address] 2020    Bilateral myofascial pain 2021    Pain in joints 2021    Hypertension     Bipolar 1 disorder ([address]      Resolved Ambulatory Problems     Diagnosis Date Noted    No Resolved Ambulatory Problems     Past Medical History:   Diagnosis Date    Arthritis     GERD (gastroesophageal reflux disease)     Movement disorder     Neuromuscular disorder ([address]     Psychiatric problem        Past Surgical History:   Procedure Laterality Date    ABDOMINAL EXPLORATION SURGERY      ADENOIDECTOMY       SECTION      x 2     SECTION, CLASSIC N/A     DILATATION, ESOPHAGUS      EXTERNAL EAR SURGERY      lobe    GASTRIC BAND      GASTRIC BYPASS SURGERY      HERNIA REPAIR      HERNIA REPAIR N/A     SEPTOPLASTY      TONSILLECTOMY      TONSILLECTOMY AND ADENOIDECTOMY      UPPER GASTROINTESTINAL ENDOSCOPY         Family History   Problem Relation Age of Onset    Anemia Mother     Breast activity     Days per week: Not on file     Minutes per session: Not on file    Stress: Not on file   Relationships    Social connections     Talks on phone: Not on file     Gets together: Not on file     Attends Bahai service: Not on file     Active member of club or organization: Not on file     Attends meetings of clubs or organizations: Not on file     Relationship status: Not on file    Intimate partner violence     Fear of current or ex partner: Not on file     Emotionally abused: Not on file     Physically abused: Not on file     Forced sexual activity: Not on file   Other Topics Concern    Not on file   Social History Narrative    ** Merged History Encounter **            Review of Systems     Constitutional - No fever or chills. No diaphoresis or significant fatigue. HENT -  No tinnitus or significant hearing loss. Eyes - no sudden vision change or eye pain  Respiratory - no significant shortness of breath or cough  Cardiovascular - no chest pain No palpitations or significant leg swelling  Gastrointestinal - no abdominal swelling or pain. Genitourinary - No difficulty urinating, dysuria  Musculoskeletal - no back pain or myalgia. Skin - no color change or rash  Neurologic - No seizures. No lateralizing weakness. Hematologic - no easy bruising or excessive bleeding. Psychiatric - no severe anxiety or nervousness. All other review of systems are negative. Current Outpatient Medications   Medication Sig Dispense Refill    trihexyphenidyl (ARTANE) 5 MG tablet Take 1 tablet by mouth 3 times daily 90 tablet 3    dronabinol (MARINOL) 10 MG capsule Take 1 capsule by mouth 2 times daily (before meals) for 30 days.  60 capsule 5    dexamethasone 20 MG TABS 4 tabs daily x 5 days 20 tablet 0    naproxen (NAPROSYN) 500 MG tablet Take 1 tablet by mouth daily as needed for Pain 10 tablet 0    sodium chloride 1 g tablet Take 1 tablet by mouth 3 times daily (with meals) for 3 days 9 tablet 0    norethindrone (AYGESTIN) 5 MG tablet Take 1 tablet by mouth daily 30 tablet 0    baclofen (LIORESAL) 10 MG tablet Take 10 mg by mouth 2 times daily      FLUoxetine (PROZAC) 20 MG capsule Take 40 mg by mouth daily      divalproex (DEPAKOTE) 500 MG DR tablet Take 500 mg by mouth 2 nightly      trihexyphenidyl (ARTANE) 2 MG tablet Take 1 tablet by mouth 3 times daily 90 tablet 5    Elagolix Sodium (ORILISSA PO) Take by mouth      corticotropin (ACTHAR) 80 UNIT/ML injectable gel Inject 1 mL into the muscle daily for 3 days 3 mL 5    clonazePAM (KLONOPIN) 0.5 MG tablet TK 1 T PO TID PRA      Gabapentin, Once-Daily, (GRALISE) 600 MG TABS Take 1,800 mg by mouth nightly.  folic acid (FOLVITE) 1 MG tablet Take 1 mg by mouth daily      potassium chloride (KLOR-CON M) 20 MEQ extended release tablet Take 20 mEq by mouth 2 times daily      sucralfate (CARAFATE) 1 GM tablet Take 1 g by mouth 2 times daily      topiramate (TOPAMAX) 25 MG tablet Take 25 mg by mouth daily      prazosin (MINIPRESS) 2 MG capsule Take 2 mg by mouth nightly      hydrOXYzine (ATARAX) 50 MG tablet Take 50 mg by mouth 2 times daily       hydroCHLOROthiazide (MICROZIDE) 12.5 MG capsule Take 12.5 mg by mouth daily      zolpidem (AMBIEN) 10 MG tablet Take by mouth nightly as needed for Sleep.       butalbital-acetaminophen-caffeine (FIORICET, ESGIC) -40 MG per tablet Take 1 tablet by mouth every 4 hours as needed for Headaches      Amantadine (SYMMETREL) 100 MG TABS tablet Take 100 mg by mouth 2 times daily      lisinopril (PRINIVIL;ZESTRIL) 40 MG tablet Take 40 mg by mouth daily      cyanocobalamin 1000 MCG/ML injection Inject 1,000 mcg into the muscle every 21 days       pantoprazole sodium (PROTONIX) 40 MG PACK packet Take 40 mg by mouth 2 times daily      azelastine (ASTELIN) 0.1 % nasal spray SPRAY IN EACH NOSTRIL PRN  0    alendronate (FOSAMAX) 70 MG tablet Take 70 mg by mouth every 7 days      Multiple Vitamins-Minerals (THERAPEUTIC MULTIVITAMIN-MINERALS) tablet Take 1 tablet by mouth daily      ferrous sulfate 325 (65 FE) MG tablet Take 325 mg by mouth daily (with breakfast)       No current facility-administered medications for this visit. There were no vitals taken for this visit. Constitutional - well developed, well nourished. Eyes - conjunctiva normal.  Ear, nose, throat - No scars, masses, or lesions over external nose or ears, no atrophy of tongue  Neck-symmetric, no masses noted, no jugular vein distension  Respiration- chest wall appears symmetric, good expansion,   normal effort without use of accessory muscles  Musculoskeletal - no significant wasting of muscles noted, no bony deformities  Extremities-no clubbing, cyanosis or edema  Skin - warm, dry, and intact. No rash, erythema, or pallor.   Psychiatric - mood, affect, and behavior appear normal.      Neurological exam  Awake, alert, fluent oriented  appropriate affect  Attention and concentration appear appropriate  Recent and remote memory appears unremarkable  Speech normal without dysarthria  No clear issues with language of fund of knowledge    Cranial Nerve Exam     CN III, IV,VI-EOMI, No nystagmus, conjugate eye movements, no ptosis  CN VII-no facial assymetry        Motor Exam  antigravity throughout upper and lower extremities bilaterally    Tremors- mild postural tremors in hands noted      Lab Results   Component Value Date    [id number] 753 09/12/2018     Lab Results   Component Value Date    WBC 6.4 02/22/2021    HGB 11.2 (L) 02/22/2021    HCT 34.6 (L) 02/22/2021    MCV 84.4 02/22/2021     02/22/2021     Lab Results   Component Value Date     (L) 02/22/2021    K 3.9 02/22/2021    CL 95 (L) 02/22/2021    CO2 26 02/22/2021    BUN 11 02/22/2021    CREATININE 0.6 02/22/2021    GLUCOSE 68 (L) 02/22/2021    CALCIUM 8.1 (L) 02/22/2021    PROT 5.5 (L) 02/22/2021    LABALBU 3.4 (L) 02/22/2021    BILITOT <0.2 02/22/2021    ALKPHOS 46 02/22/2021    AST 14 02/22/2021    ALT 13 02/22/2021    LABGLOM >60 02/22/2021    GFRAA >59 02/22/2021    GLOB 3.0 11/14/2016       Impression   1. No diffusion restriction to indicate acute ischemia. 2. Scattered nonenhancing hyperintense T2/FLAIR signal changes within   the periventricular and white matter regions favorable for MS plaques. No abnormal enhancement to indicate active demyelination. 3. Partial opacification inferior right mastoid air cells, mastoid   effusion considered. Signed by Dr Daniele Peña on 7/4/2020 7:10 PM         Assessment    ICD-10-CM    1. Multiple sclerosis ([address]  G35 dronabinol (MARINOL) 10 MG capsule   2. Lupus (HCC)  M32.9 dronabinol (MARINOL) 10 MG capsule   3. Weakness  R53.1 dronabinol (MARINOL) 10 MG capsule   4. Fibromyalgia  M79.7 dronabinol (MARINOL) 10 MG capsule   5. Convulsions, unspecified convulsion type ([address]  R56.9    6. Visual disturbance  H53.9    7. Tremor  R25.1    8. Optic neuritis  H46.9    9. Pain, neck  M54.2    10. Numbness  R20.0        Her neurological examination previously significant for some hesitance of speech, decreased visual acuity, right sided numbness and some mild slowness of speech. She has some mild postural tremor in the hands. Previously she was given Solumedrol 1 gm IV daily x 5 days. She is off Tecfidera and Ocrevus. She did not want to be on Tecfidera due to side effects. Her hepatitis panel was unremarkable. She is seeing  MS specialist in [place]. Her video EEG x 3 days was unremarkable with no events captured. She is seeing another rheumatologist in [facility],She will have her Marinol increased to 10 mg bid. She will need something for fatigue. Artane will be increased for tremor. The patient indicated understanding of the management plan. She is to follow up with me in  6 weeks and call with any issues.      Tele video call 15 minutes  Both parties in [facility] to E&M services    Plan    No orders of the defined types were placed in this encounter. Orders Placed This Encounter   Medications    trihexyphenidyl (ARTANE) 5 MG tablet     Sig: Take 1 tablet by mouth 3 times daily     Dispense:  90 tablet     Refill:  3    dronabinol (MARINOL) 10 MG capsule     Sig: Take 1 capsule by mouth 2 times daily (before meals) for 30 days. Dispense:  60 capsule     Refill:  5       Return in about 6 weeks (around 4/14/2021). EMR Dragon/transcription disclaimer:Significant part of this  encounter note is electronic transcription/translation of spoken language to printed text. The electronic translation of spoken language may be erroneous, or at times, nonsensical words or phrases may be inadvertently transcribed.  Although I have reviewed the note for such errors, some may still exist.

## 2021-03-12 NOTE — TELEPHONE ENCOUNTER
----- Message from MOE Prater sent at 1/6/2017 11:53 AM CST -----  Let her know upper gi ok, keep appt for scope.   13-Mar-2021

## 2021-03-23 ENCOUNTER — TELEPHONE (OUTPATIENT)
Dept: NEUROLOGY | Age: 46
End: 2021-03-23

## 2021-03-23 NOTE — TELEPHONE ENCOUNTER
Patient left message stating that she only received the Acthar needles but not the actual medication. She states that she is having increased tremors and they are very noticeable. Patient also states that she feels she has lost muscle and strength in her arms and hands.

## 2021-04-04 ENCOUNTER — HOSPITAL ENCOUNTER (EMERGENCY)
Age: 46
Discharge: HOME | End: 2021-04-05
Attending: PEDIATRICS
Payer: MEDICARE

## 2021-04-04 DIAGNOSIS — B34.8 RHINOVIRUS INFECTION: ICD-10-CM

## 2021-04-04 DIAGNOSIS — G35 MULTIPLE SCLEROSIS EXACERBATION (HCC): Primary | ICD-10-CM

## 2021-04-04 LAB
ALBUMIN SERPL-MCNC: 3.5 G/DL (ref 3.5–5.2)
ALP BLD-CCNC: 40 U/L (ref 35–104)
ALT SERPL-CCNC: 12 U/L (ref 5–33)
ANION GAP SERPL CALCULATED.3IONS-SCNC: 11 MMOL/L (ref 7–19)
AST SERPL-CCNC: 23 U/L (ref 5–32)
BASOPHILS ABSOLUTE: 0 K/UL (ref 0–0.2)
BASOPHILS RELATIVE PERCENT: 0.4 % (ref 0–1)
BILIRUB SERPL-MCNC: <0.2 MG/DL (ref 0.2–1.2)
BUN BLDV-MCNC: 9 MG/DL (ref 6–20)
CALCIUM SERPL-MCNC: 8.1 MG/DL (ref 8.6–10)
CHLORIDE BLD-SCNC: 93 MMOL/L (ref 98–111)
CO2: 24 MMOL/L (ref 22–29)
CREAT SERPL-MCNC: 0.7 MG/DL (ref 0.5–0.9)
EOSINOPHILS ABSOLUTE: 0.1 K/UL (ref 0–0.6)
EOSINOPHILS RELATIVE PERCENT: 1.3 % (ref 0–5)
GFR AFRICAN AMERICAN: >59
GFR NON-AFRICAN AMERICAN: >60
GLUCOSE BLD-MCNC: 79 MG/DL (ref 74–109)
HCT VFR BLD CALC: 31.7 % (ref 37–47)
HEMOGLOBIN: 10.5 G/DL (ref 12–16)
IMMATURE GRANULOCYTES #: 0 K/UL
LYMPHOCYTES ABSOLUTE: 1.9 K/UL (ref 1.1–4.5)
LYMPHOCYTES RELATIVE PERCENT: 27.1 % (ref 20–40)
MCH RBC QN AUTO: 28.8 PG (ref 27–31)
MCHC RBC AUTO-ENTMCNC: 33.1 G/DL (ref 33–37)
MCV RBC AUTO: 87.1 FL (ref 81–99)
MONOCYTES ABSOLUTE: 0.7 K/UL (ref 0–0.9)
MONOCYTES RELATIVE PERCENT: 10.3 % (ref 0–10)
NEUTROPHILS ABSOLUTE: 4.2 K/UL (ref 1.5–7.5)
NEUTROPHILS RELATIVE PERCENT: 60.5 % (ref 50–65)
PDW BLD-RTO: 17.6 % (ref 11.5–14.5)
PLATELET # BLD: 228 K/UL (ref 130–400)
PLATELET SLIDE REVIEW: ADEQUATE
PMV BLD AUTO: 10.1 FL (ref 9.4–12.3)
POTASSIUM SERPL-SCNC: 4.7 MMOL/L (ref 3.5–5)
RBC # BLD: 3.64 M/UL (ref 4.2–5.4)
SODIUM BLD-SCNC: 128 MMOL/L (ref 136–145)
TOTAL PROTEIN: 6 G/DL (ref 6.6–8.7)
WBC # BLD: 6.9 K/UL (ref 4.8–10.8)

## 2021-04-04 PROCEDURE — 84443 ASSAY THYROID STIM HORMONE: CPT

## 2021-04-04 PROCEDURE — 99284 EMERGENCY DEPT VISIT MOD MDM: CPT

## 2021-04-04 PROCEDURE — 96365 THER/PROPH/DIAG IV INF INIT: CPT

## 2021-04-04 PROCEDURE — 96361 HYDRATE IV INFUSION ADD-ON: CPT

## 2021-04-04 PROCEDURE — 80053 COMPREHEN METABOLIC PANEL: CPT

## 2021-04-04 PROCEDURE — 84484 ASSAY OF TROPONIN QUANT: CPT

## 2021-04-04 PROCEDURE — 36415 COLL VENOUS BLD VENIPUNCTURE: CPT

## 2021-04-04 PROCEDURE — 85025 COMPLETE CBC W/AUTO DIFF WBC: CPT

## 2021-04-04 PROCEDURE — 96375 TX/PRO/DX INJ NEW DRUG ADDON: CPT

## 2021-04-04 PROCEDURE — 83690 ASSAY OF LIPASE: CPT

## 2021-04-04 PROCEDURE — 96376 TX/PRO/DX INJ SAME DRUG ADON: CPT

## 2021-04-04 RX ORDER — 0.9 % SODIUM CHLORIDE 0.9 %
1000 INTRAVENOUS SOLUTION INTRAVENOUS ONCE
Status: COMPLETED | OUTPATIENT
Start: 2021-04-04 | End: 2021-04-05

## 2021-04-05 ENCOUNTER — APPOINTMENT (OUTPATIENT)
Dept: GENERAL RADIOLOGY | Age: 46
End: 2021-04-05
Payer: MEDICARE

## 2021-04-05 VITALS
SYSTOLIC BLOOD PRESSURE: 120 MMHG | HEIGHT: 65 IN | DIASTOLIC BLOOD PRESSURE: 78 MMHG | RESPIRATION RATE: 17 BRPM | WEIGHT: 172 LBS | BODY MASS INDEX: 28.66 KG/M2 | HEART RATE: 69 BPM | TEMPERATURE: 98.7 F | OXYGEN SATURATION: 95 %

## 2021-04-05 LAB
ADENOVIRUS BY PCR: NOT DETECTED
AMPHETAMINE SCREEN, URINE: NEGATIVE
BARBITURATE SCREEN URINE: NEGATIVE
BENZODIAZEPINE SCREEN, URINE: NEGATIVE
BILIRUBIN URINE: NEGATIVE
BLOOD, URINE: NEGATIVE
BORDETELLA PARAPERTUSSIS BY PCR: NOT DETECTED
BORDETELLA PERTUSSIS BY PCR: NOT DETECTED
CANNABINOID SCREEN URINE: POSITIVE
CHLAMYDOPHILIA PNEUMONIAE BY PCR: NOT DETECTED
CLARITY: CLEAR
COCAINE METABOLITE SCREEN URINE: NEGATIVE
COLOR: YELLOW
CORONAVIRUS 229E BY PCR: NOT DETECTED
CORONAVIRUS HKU1 BY PCR: NOT DETECTED
CORONAVIRUS NL63 BY PCR: NOT DETECTED
CORONAVIRUS OC43 BY PCR: NOT DETECTED
GLUCOSE URINE: NEGATIVE MG/DL
HCG(URINE) PREGNANCY TEST: NEGATIVE
HUMAN METAPNEUMOVIRUS BY PCR: NOT DETECTED
HUMAN RHINOVIRUS/ENTEROVIRUS BY PCR: DETECTED
INFLUENZA A BY PCR: NOT DETECTED
INFLUENZA B BY PCR: NOT DETECTED
KETONES, URINE: NEGATIVE MG/DL
LEUKOCYTE ESTERASE, URINE: NEGATIVE
LIPASE: 37 U/L (ref 13–60)
Lab: ABNORMAL
MYCOPLASMA PNEUMONIAE BY PCR: NOT DETECTED
NITRITE, URINE: NEGATIVE
OPIATE SCREEN URINE: POSITIVE
PARAINFLUENZA VIRUS 1 BY PCR: NOT DETECTED
PARAINFLUENZA VIRUS 2 BY PCR: NOT DETECTED
PARAINFLUENZA VIRUS 3 BY PCR: NOT DETECTED
PARAINFLUENZA VIRUS 4 BY PCR: NOT DETECTED
PH UA: 8 (ref 5–8)
PROTEIN UA: NEGATIVE MG/DL
RESPIRATORY SYNCYTIAL VIRUS BY PCR: NOT DETECTED
SARS-COV-2, PCR: NOT DETECTED
SPECIFIC GRAVITY UA: 1.01 (ref 1–1.03)
TROPONIN: <0.01 NG/ML (ref 0–0.03)
TSH REFLEX FT4: 2.56 UIU/ML (ref 0.35–5.5)
UROBILINOGEN, URINE: 1 E.U./DL
VALPROIC ACID LEVEL: 22.1 UG/ML (ref 50–100)

## 2021-04-05 PROCEDURE — 2580000003 HC RX 258: Performed by: PEDIATRICS

## 2021-04-05 PROCEDURE — 81003 URINALYSIS AUTO W/O SCOPE: CPT

## 2021-04-05 PROCEDURE — 6360000002 HC RX W HCPCS: Performed by: PEDIATRICS

## 2021-04-05 PROCEDURE — 84703 CHORIONIC GONADOTROPIN ASSAY: CPT

## 2021-04-05 PROCEDURE — 80307 DRUG TEST PRSMV CHEM ANLYZR: CPT

## 2021-04-05 PROCEDURE — 0202U NFCT DS 22 TRGT SARS-COV-2: CPT

## 2021-04-05 PROCEDURE — 80164 ASSAY DIPROPYLACETIC ACD TOT: CPT

## 2021-04-05 PROCEDURE — 71045 X-RAY EXAM CHEST 1 VIEW: CPT

## 2021-04-05 PROCEDURE — 36415 COLL VENOUS BLD VENIPUNCTURE: CPT

## 2021-04-05 RX ORDER — LORAZEPAM 2 MG/ML
0.5 INJECTION INTRAMUSCULAR ONCE
Status: COMPLETED | OUTPATIENT
Start: 2021-04-05 | End: 2021-04-05

## 2021-04-05 RX ORDER — MORPHINE SULFATE 4 MG/ML
2 INJECTION, SOLUTION INTRAMUSCULAR; INTRAVENOUS ONCE
Status: COMPLETED | OUTPATIENT
Start: 2021-04-05 | End: 2021-04-05

## 2021-04-05 RX ORDER — CLONAZEPAM 0.5 MG/1
0.5 TABLET ORAL 3 TIMES DAILY PRN
Qty: 9 TABLET | Refills: 0 | Status: SHIPPED | OUTPATIENT
Start: 2021-04-05 | End: 2021-09-28

## 2021-04-05 RX ORDER — HYDROCODONE BITARTRATE AND ACETAMINOPHEN 7.5; 325 MG/1; MG/1
1 TABLET ORAL EVERY 6 HOURS PRN
Qty: 12 TABLET | Refills: 0 | Status: SHIPPED | OUTPATIENT
Start: 2021-04-05 | End: 2021-04-08

## 2021-04-05 RX ORDER — 0.9 % SODIUM CHLORIDE 0.9 %
1000 INTRAVENOUS SOLUTION INTRAVENOUS ONCE
Status: COMPLETED | OUTPATIENT
Start: 2021-04-05 | End: 2021-04-05

## 2021-04-05 RX ADMIN — SODIUM CHLORIDE 1000 ML: 9 INJECTION, SOLUTION INTRAVENOUS at 00:07

## 2021-04-05 RX ADMIN — LORAZEPAM 0.5 MG: 2 INJECTION INTRAMUSCULAR; INTRAVENOUS at 07:09

## 2021-04-05 RX ADMIN — SODIUM CHLORIDE 1000 MG: 9 INJECTION, SOLUTION INTRAVENOUS at 04:23

## 2021-04-05 RX ADMIN — LORAZEPAM 0.5 MG: 2 INJECTION INTRAMUSCULAR; INTRAVENOUS at 00:53

## 2021-04-05 RX ADMIN — SODIUM CHLORIDE 1000 ML: 9 INJECTION, SOLUTION INTRAVENOUS at 01:30

## 2021-04-05 RX ADMIN — MORPHINE SULFATE 2 MG: 4 INJECTION, SOLUTION INTRAMUSCULAR; INTRAVENOUS at 07:10

## 2021-04-05 RX ADMIN — MORPHINE SULFATE 2 MG: 4 INJECTION, SOLUTION INTRAMUSCULAR; INTRAVENOUS at 00:53

## 2021-04-05 ASSESSMENT — PAIN SCALES - GENERAL: PAINLEVEL_OUTOF10: 8

## 2021-04-05 NOTE — ED NOTES
Bed: 09  Expected date:   Expected time:   Means of arrival:   Comments:  Corey Rondon RN  04/04/21 2103

## 2021-04-06 NOTE — TELEPHONE ENCOUNTER
LOS - 1 day    CPR2    SW met briefly with pt and wife to assess needs and discuss DCP  Pt requesting to be discharged  Pt lives at home with wife  Wife assists pt with ADLs and mobility  Pt has tube feedings at home which he receives from [facility]  Per wife pt has also been receiving home care services through [facility]  Wife requesting services be resumed  Referral has been made to [facility]  No other discharge needs expressed  Wife planning to transport pt home  Order for Katie Stein has been faxed to [facility] at [phone]. For patient.

## 2021-04-07 ASSESSMENT — ENCOUNTER SYMPTOMS
RHINORRHEA: 0
NAUSEA: 1
ABDOMINAL PAIN: 0
COLOR CHANGE: 0
VOMITING: 1
COUGH: 0
EYE DISCHARGE: 0
BACK PAIN: 1
SHORTNESS OF BREATH: 0

## 2021-04-07 NOTE — ED PROVIDER NOTES
[facility]  eMERGENCY dEPARTMENT eNCOUnter      Pt Name: Maite Yen  MRN: [id number]  [place] 1975  Date of evaluation: 4/4/2021  Provider: Lonnie Nichols MD    CHIEF COMPLAINT       Chief Complaint   Patient presents with    Muscle Pain     pt reports hx of MS; hurting all over         HISTORY OF PRESENT ILLNESS   (Location/Symptom, Timing/Onset,Context/Setting, Quality, Duration, Modifying Factors, Severity)  Note limiting factors. Maite Yen is a 39 y.o. female who presents to the emergency department with muscle pain and weakness. Patient has a history of MS. Patient states \"I am hurting all over. \"  Patient normally takes Sofia Reveal but she ran out yesterday. Patient also ran out of her Ngozi Perone couple of days ago. \"  Patient states \"I tried to wait until I can get into see Dr. Gómez Muro but I could not hold my head up. \"  Patient was afraid she would be unable to contact anyone for help if she allowed herself to get to week. Patient states that \"I was shaking and could not sleep. \"  Patient complains of chest heaviness, low back pain, congestion, vomiting, and increased frequency of urination. HPI    NursingNotes were reviewed. REVIEW OF SYSTEMS    (2-9 systems for level 4, 10 or more for level 5)     Review of Systems   Constitutional: Negative for chills and fever. HENT: Positive for congestion. Negative for rhinorrhea. Eyes: Negative for discharge. Respiratory: Negative for cough and shortness of breath. Cardiovascular: Negative for chest pain and palpitations. Gastrointestinal: Positive for nausea and vomiting. Negative for abdominal pain. Genitourinary: Negative for difficulty urinating and dysuria. Musculoskeletal: Positive for back pain. Negative for neck pain. Skin: Negative for color change and pallor. Neurological: Positive for tremors, weakness and light-headedness. Negative for syncope.    Psychiatric/Behavioral: Positive for agitation, decreased concentration and sleep disturbance. The patient is nervous/anxious. All other systems reviewed and are negative. PAST MEDICALHISTORY     Past Medical History:   Diagnosis Date    Arthritis     Bipolar 1 disorder ([facility] [address] [facility]     Convulsions ([facility] [address] 2020    Fibromyalgia     GERD (gastroesophageal reflux disease)     Hypertension     Lupus ([facility] [facility]     Movement disorder     Multiple sclerosis ([facility] [address] [facility]     Neuromuscular disorder ([facility] [address] [facility]     Psychiatric problem          SURGICAL HISTORY       Past Surgical History:   Procedure Laterality Date    ABDOMINAL EXPLORATION SURGERY      ADENOIDECTOMY       SECTION      x 2     SECTION, CLASSIC N/A     DILATATION, ESOPHAGUS      EXTERNAL EAR SURGERY      lobe    GASTRIC BAND      GASTRIC BYPASS SURGERY      HERNIA REPAIR      HERNIA REPAIR N/A     SEPTOPLASTY      TONSILLECTOMY      TONSILLECTOMY AND ADENOIDECTOMY      UPPER GASTROINTESTINAL ENDOSCOPY           CURRENT MEDICATIONS     Discharge Medication List as of 2021  7:00 AM      CONTINUE these medications which have NOT CHANGED    Details   FLUoxetine (PROZAC) 20 MG capsule Take 40 mg by mouth dailyHistorical Med      divalproex (DEPAKOTE) 500 MG DR tablet Take 500 mg by mouth 2 nightlyHistorical Med      Elagolix Sodium (ORILISSA PO) Take by mouthHistorical Med      Gabapentin, Once-Daily, (GRALISE) 600 MG TABS Take 1,800 mg by mouth nightly. Historical Med      folic acid (FOLVITE) 1 MG tablet Take 1 mg by mouth dailyHistorical Med      potassium chloride (KLOR-CON M) 20 MEQ extended release tablet Take 20 mEq by mouth 2 times dailyHistorical Med      sucralfate (CARAFATE) 1 GM tablet Take 1 g by mouth 2 times dailyHistorical Med      hydrOXYzine (ATARAX) 50 MG tablet Take 50 mg by mouth 2 times daily Historical Med      hydroCHLOROthiazide (MICROZIDE) 12.5 MG capsule Take 12.5 mg by mouth dailyHistorical Med      lisinopril (PRINIVIL;ZESTRIL) 40 MG tablet Take 40 mg by mouth dailyHistorical Med      pantoprazole sodium (PROTONIX) 40 MG PACK packet Take 40 mg by mouth 2 times dailyHistorical Med      alendronate (FOSAMAX) 70 MG tablet Take 70 mg by mouth every 7 days      Multiple Vitamins-Minerals (THERAPEUTIC MULTIVITAMIN-MINERALS) tablet Take 1 tablet by mouth daily      !! trihexyphenidyl (ARTANE) 5 MG tablet Take 1 tablet by mouth 3 times daily, Disp-90 tablet, R-3Normal      dexamethasone 20 MG TABS 4 tabs daily x 5 days, Disp-20 tablet, R-0Normal      naproxen (NAPROSYN) 500 MG tablet Take 1 tablet by mouth daily as needed for Pain, Disp-10 tablet, R-0Normal      sodium chloride 1 g tablet Take 1 tablet by mouth 3 times daily (with meals) for 3 days, Disp-9 tablet, R-0Normal      norethindrone (AYGESTIN) 5 MG tablet Take 1 tablet by mouth daily, Disp-30 tablet, R-0Normal      baclofen (LIORESAL) 10 MG tablet Take 10 mg by mouth 2 times dailyHistorical Med      !! trihexyphenidyl (ARTANE) 2 MG tablet Take 1 tablet by mouth 3 times daily, Disp-90 tablet, R-5Normal      corticotropin (ACTHAR) 80 UNIT/ML injectable gel Inject 1 mL into the muscle daily for 3 days, Disp-3 mL, R-5Normal      topiramate (TOPAMAX) 25 MG tablet Take 25 mg by mouth dailyHistorical Med      prazosin (MINIPRESS) 2 MG capsule Take 2 mg by mouth nightlyHistorical Med      zolpidem (AMBIEN) 10 MG tablet Take by mouth nightly as needed for Sleep. Historical Med      butalbital-acetaminophen-caffeine (FIORICET, ESGIC) -40 MG per tablet Take 1 tablet by mouth every 4 hours as needed for HeadachesHistorical Med      Amantadine (SYMMETREL) 100 MG TABS tablet Take 100 mg by mouth 2 times dailyHistorical Med      cyanocobalamin 1000 MCG/ML injection Inject 1,000 mcg into the muscle every 21 days Historical Med      azelastine (ASTELIN) 0.1 % nasal spray SPRAY IN EACH NOSTRIL PRN, R-0Historical Med      ferrous sulfate 325 (65 FE) MG tablet Take 325 mg by mouth daily (with breakfast)       ! ! - Potential duplicate medications found. Please discuss with provider. ALLERGIES     Avelox [moxifloxacin], Sugar-protein-starch, Avelox [moxifloxacin], Copaxone [glatiramer acetate], Depakote [divalproex sodium], Gabapentin, Hydroxychloroquine, Invega [paliperidone], Lamictal [lamotrigine], Lithium, Pregabalin, Lamictal [lamotrigine], and Lithium    FAMILY HISTORY       Family History   Problem Relation Age of Onset    Anemia Mother     Breast Cancer Mother     Depression Mother     High Blood Pressure Mother     Mental Illness Mother    [de-identified] / Stillbirths Mother     Colon Cancer Father     Depression Father     High Blood Pressure Father     Mental Illness Father     Breast Cancer Maternal Grandmother     Alcohol Abuse Maternal Grandfather     Anemia Paternal Grandmother     Alcohol Abuse Paternal Grandfather     Osteoporosis Paternal Grandfather     Stroke Paternal Grandfather     Substance Abuse Maternal Cousin     Substance Abuse Paternal Cousin     Asthma Other           SOCIAL HISTORY       Social History     Socioeconomic History    Marital status: Single     Spouse name: None    Number of children: 3    Years of education: Some College    Highest education level: None   Occupational History    Occupation: Disability   Social Needs    Financial resource strain: None    Food insecurity     Worry: None     Inability: None    Transportation needs     Medical: None     Non-medical: None   Tobacco Use    Smoking status: Current Every Day Smoker     Packs/day: 1.00     Years: 15.00     Pack years: 15.00     Types: Cigarettes    Smokeless tobacco: Never Used   Substance and Sexual Activity    Alcohol use:  Yes     Alcohol/week: 2.0 standard drinks     Types: 2 Shots of liquor per week     Comment: occassional    Drug use: No    Sexual activity: Never     Partners: Male   Lifestyle    Physical activity     Days per week: None     Minutes per session: None    Stress: None   Relationships    Social connections     Talks on phone: None     Gets together: None     Attends [employer] service: None     Active member of club or organization: None     Attends meetings of clubs or organizations: None     Relationship status: None    Intimate partner violence     Fear of current or ex partner: None     Emotionally abused: None     Physically abused: None     Forced sexual activity: None   Other Topics Concern    None   Social History Narrative    ** Merged History Encounter **            SCREENINGS             PHYSICAL EXAM    (up to 7 for level 4, 8 or more for level 5)     ED Triage Vitals [04/04/21 2107]   BP Temp Temp Source Pulse Resp SpO2 Height Weight   125/78 98.7 °F (37.1 °C) Oral 77 17 95 % 5' 5\" (1.651 m) 172 lb (78 kg)       Physical Exam  Vitals signs and nursing note reviewed. Constitutional:       General: She is not in acute distress. Comments: Generalized weakness and tremors. HENT:      Head: Normocephalic and atraumatic. Right Ear: External ear normal.      Left Ear: External ear normal.      Nose: Congestion present. Mouth/Throat:      Pharynx: Oropharynx is clear. No oropharyngeal exudate. Comments: Tacky mucous membranes  Eyes:      General: No scleral icterus. Conjunctiva/sclera: Conjunctivae normal.      Pupils: Pupils are equal, round, and reactive to light. Neck:      Musculoskeletal: Neck supple. No neck rigidity. Cardiovascular:      Rate and Rhythm: Normal rate and regular rhythm. Pulses: Normal pulses. Heart sounds: Normal heart sounds. Pulmonary:      Effort: Pulmonary effort is normal. No respiratory distress. Breath sounds: Normal breath sounds. No wheezing, rhonchi or rales. Abdominal:      General: Bowel sounds are normal.      Palpations: Abdomen is soft. Tenderness: There is no abdominal tenderness. There is no guarding. Musculoskeletal:         General: No tenderness or deformity.    Skin: General: Skin is warm and dry. Capillary Refill: Capillary refill takes less than 2 seconds. Coloration: Skin is not jaundiced. Neurological:      General: No focal deficit present. Mental Status: She is alert and oriented to person, place, and time. GCS: GCS eye subscore is 4. GCS verbal subscore is 5. GCS motor subscore is 6. Cranial Nerves: No dysarthria or facial asymmetry. Motor: Weakness (Generalized) and tremor present. No abnormal muscle tone. Coordination: Coordination normal.   Psychiatric:         Mood and Affect: Mood is anxious. Behavior: Behavior is agitated. Thought Content: Thought content does not include suicidal ideation. DIAGNOSTIC RESULTS     RADIOLOGY:  Non-plain film images such as CT, Ultrasound and MRI are read by the radiologist. Plain radiographic images are visualized and preliminarily interpreted bythe emergency physician with the below findings:          XR CHEST PORTABLE   Final Result   Right lower lung infiltrate.    Signed by Dr Paula Hunt on 4/5/2021 7:12 AM              LABS:  Labs Reviewed   RESPIRATORY PANEL, MOLECULAR, WITH COVID-19 - Abnormal; Notable for the following components:       Result Value    Human Rhinovirus/Enterovirus by PCR DETECTED (*)     All other components within normal limits   CBC WITH AUTO DIFFERENTIAL - Abnormal; Notable for the following components:    RBC 3.64 (*)     Hemoglobin 10.5 (*)     Hematocrit 31.7 (*)     RDW 17.6 (*)     Monocytes % 10.3 (*)     All other components within normal limits   COMPREHENSIVE METABOLIC PANEL - Abnormal; Notable for the following components:    Sodium 128 (*)     Chloride 93 (*)     Calcium 8.1 (*)     Total Protein 6.0 (*)     All other components within normal limits   VALPROIC ACID LEVEL, TOTAL - Abnormal; Notable for the following components:    Valproic Acid Lvl 22.1 (*)     All other components within normal limits   URINE DRUG SCREEN - Abnormal; Notable for the following components:    Cannabinoid Scrn, Ur Positive (*)     Opiate Scrn, Ur Positive (*)     All other components within normal limits   URINE RT REFLEX TO CULTURE   TROPONIN   LIPASE   TSH WITH REFLEX TO FT4   PREGNANCY, URINE       All other labs were within normal range or not returned as of this dictation. EMERGENCY DEPARTMENT COURSE and DIFFERENTIAL DIAGNOSIS/MDM:   Vitals:    Vitals:    04/05/21 0249 04/05/21 0301 04/05/21 0401 04/05/21 0501   BP: 120/78 122/78 124/78 120/78   Pulse: 69      Resp: 17      Temp:       TempSrc:       SpO2: 95% 95% 95%    Weight:       Height:           MDM  57-year-old female with history of MS and lupus presents with generalized weakness and muscle pain. Patient has recently run out of her Norco and Codotaonopin. Lab and radiology results reviewed. Patient given low-dose morphine and Ativan x2 doses in the emergency department. Patient has relief of tremor and muscle pain. Discussed with Dr. Amy William, neurologist, who recommends Solu-Medrol 1 g IV and follow-up with Dr. Hanna Coates in the morning. Patient verbalizes understanding and agreement with plan of care. Patient is currently experiencing effects of rhinovirus in addition to her MS and lupus. Patient will symptomatically treat viral illness. Patient will follow up with Dr. Hanna Cotaes and Taya OMALLEY, PCP. Patient will return with increasing or severe pain, difficulty breathing, signs of dehydration such as dry mouth for diminished urine output, or other concerns. I AM ATs Riding #[id number] reviewed. CONSULTS:  None    PROCEDURES:  Unless otherwise noted below, none     Procedures    FINAL IMPRESSION      1.  Multiple sclerosis exacerbation ([facility] [address]    2. Rhinovirus infection          DISPOSITION/PLAN   DISPOSITION Decision To Discharge 04/05/2021 06:42:29 AM      PATIENT REFERRED TO:  LYSSA Troncoso  [address] [address]  [phone]    Schedule an appointment as soon as possible for a visit       Heladio Pizarro MD  [address] [facility] [address] [facility]      Call Dr. Lisha Gardner office for a visit today if possible. DISCHARGE MEDICATIONS:  Discharge Medication List as of 4/5/2021  7:00 AM      START taking these medications    Details   HYDROcodone-acetaminophen (NORCO) 7.5-325 MG per tablet Take 1 tablet by mouth every 6 hours as needed for Pain for up to 3 days. Intended supply: 3 days. Take lowest dose possible to manage pain. May cause drowsiness.   Do not take and drive., [id number] tablet, R-0Normal                (Please note that portions of this note were completed with a voice recognition program.  Efforts were made to edit thedictations but occasionally words are mis-transcribed.)    Eric Ni MD (electronically signed)  Attending Emergency Physician          Eric Ni MD  04/07/21 5483

## 2021-04-09 NOTE — TELEPHONE ENCOUNTER
Lisandro John with [employer] left message stating that a verbal PA will need to be completed. To do so, call the PA line at [phone]    If we receive an approval or denial please fax the information to [phone]. To reach Lisandro John directly call [phone]      Celestine Cooper with Patient Support left message stating that the form is missing the needle information.    Please re-fax the form to [phone]  . [phone]

## 2021-04-15 ENCOUNTER — TELEPHONE (OUTPATIENT)
Dept: NEUROLOGY | Age: 46
End: 2021-04-15

## 2021-04-15 NOTE — TELEPHONE ENCOUNTER
Devon Stovall - PA Case ID: [id number] Need help?  Call us at [phone]   Status   Sent to [facility]   DrugDronabinol 5MG capsules   [employer] Electronic PA Form ([id number])

## 2021-05-10 NOTE — TELEPHONE ENCOUNTER
[employer] patient support called to check the status of patient's PA. They are also still needing the start form re-faxed with the needle information completed. This can be found on page 1, section 4E. Please fax updated form and any PA approval or denial notices to [phone].

## 2021-05-11 NOTE — TELEPHONE ENCOUNTER
I tried to submit a PA because we never received an approval or a denial but once I hit submit this is the response I received on [employer]. Information regarding your request  An active PA is already on file with expiration date of 12/22/2021. Please wait to resubmit request within 60 days of that expiration date to obtain a PA renewal.      I have refaxed the start form.  I also called and spoke with Mily at [employer] and they are going to refax the approval.

## 2021-05-13 ENCOUNTER — OFFICE VISIT (OUTPATIENT)
Dept: NEUROLOGY | Age: 46
End: 2021-05-13
Payer: MEDICARE

## 2021-05-13 ENCOUNTER — TELEPHONE (OUTPATIENT)
Dept: NEUROLOGY | Age: 46
End: 2021-05-13

## 2021-05-13 VITALS
DIASTOLIC BLOOD PRESSURE: 73 MMHG | SYSTOLIC BLOOD PRESSURE: 118 MMHG | WEIGHT: 172 LBS | BODY MASS INDEX: 28.66 KG/M2 | HEIGHT: 65 IN | HEART RATE: 73 BPM

## 2021-05-13 DIAGNOSIS — M54.2 PAIN, NECK: ICD-10-CM

## 2021-05-13 DIAGNOSIS — R25.1 TREMOR: ICD-10-CM

## 2021-05-13 DIAGNOSIS — H46.9 OPTIC NEURITIS: ICD-10-CM

## 2021-05-13 DIAGNOSIS — M79.7 FIBROMYALGIA: ICD-10-CM

## 2021-05-13 DIAGNOSIS — H53.9 VISUAL DISTURBANCE: ICD-10-CM

## 2021-05-13 DIAGNOSIS — R20.0 NUMBNESS: ICD-10-CM

## 2021-05-13 DIAGNOSIS — M32.9 LUPUS (HCC): ICD-10-CM

## 2021-05-13 DIAGNOSIS — R47.9 DIFFICULTY WITH SPEECH: ICD-10-CM

## 2021-05-13 DIAGNOSIS — R56.9 CONVULSIONS, UNSPECIFIED CONVULSION TYPE (HCC): ICD-10-CM

## 2021-05-13 DIAGNOSIS — R53.1 WEAKNESS: ICD-10-CM

## 2021-05-13 DIAGNOSIS — G35 MULTIPLE SCLEROSIS (HCC): Primary | ICD-10-CM

## 2021-05-13 PROCEDURE — G8427 DOCREV CUR MEDS BY ELIG CLIN: HCPCS | Performed by: PSYCHIATRY & NEUROLOGY

## 2021-05-13 PROCEDURE — 99214 OFFICE O/P EST MOD 30 MIN: CPT | Performed by: PSYCHIATRY & NEUROLOGY

## 2021-05-13 PROCEDURE — 4004F PT TOBACCO SCREEN RCVD TLK: CPT | Performed by: PSYCHIATRY & NEUROLOGY

## 2021-05-13 PROCEDURE — G8419 CALC BMI OUT NRM PARAM NOF/U: HCPCS | Performed by: PSYCHIATRY & NEUROLOGY

## 2021-05-13 RX ORDER — DRONABINOL 5 MG/1
10 CAPSULE ORAL
COMMUNITY
End: 2021-05-13 | Stop reason: SDUPTHER

## 2021-05-13 RX ORDER — DRONABINOL 10 MG/1
CAPSULE ORAL
Qty: 180 CAPSULE | Refills: 2 | Status: SHIPPED | OUTPATIENT
Start: 2021-05-13 | End: 2021-05-18 | Stop reason: SDUPTHER

## 2021-05-13 RX ORDER — ALPRAZOLAM 0.5 MG/1
0.5 TABLET ORAL 2 TIMES DAILY PRN
COMMUNITY
End: 2022-03-18

## 2021-05-13 RX ORDER — HYDROCODONE BITARTRATE AND ACETAMINOPHEN 5; 325 MG/1; MG/1
TABLET ORAL
COMMUNITY
Start: 2021-05-10 | End: 2021-09-28

## 2021-05-13 RX ORDER — BREXPIPRAZOLE 1 MG/1
TABLET ORAL
COMMUNITY
Start: 2021-05-06 | End: 2022-05-03

## 2021-05-13 NOTE — TELEPHONE ENCOUNTER
Called the patient and let her know that we can't send that medication out of state. She is going to call us back when she finds a pharmacy in [address] [place] [address] that has it in [address] [place] [address].

## 2021-05-13 NOTE — PROGRESS NOTES
things carefully. Her rheumatologist started her on Tecfidera but she felt worse on this. She had been on Avonex since her diagnosis prior to with no clear benefit according to her. Having a lupus flair up. This usually feels pain in eyes, skin of in mouth may fall off. Felt since with 2  Ocrevus infusion 2020 and a bit better but now off. Randi Arrow with unclear benefit. Saw rheumatologist in [place] and plan a new infusion. Azathioprine started for her Lupus. Stopped Ocrevus. .Artane helped for about 2 days not help.     Active Ambulatory Problems     Diagnosis Date Noted    Multiple sclerosis ([address]     Intractable chronic migraine without aura and without status migrainosus 2017    Major depressive disorder, recurrent, moderate (HCC) 2018    Exacerbation of multiple sclerosis ([address] 05/10/2019    Optic neuritis 05/10/2019    Pain of left eye 05/10/2019    Bronchitis 2019    Visual disturbance 2020    Tremor 2020    Weakness 2020    Numbness 2020    Difficulty with speech 2020    Lupus ([address] 2020    Fibromyalgia 2020    Convulsions ([address] 2020    Bilateral myofascial pain 2021    Pain in joints 2021    Hypertension     Bipolar 1 disorder ([address]      Resolved Ambulatory Problems     Diagnosis Date Noted    No Resolved Ambulatory Problems     Past Medical History:   Diagnosis Date    Arthritis     GERD (gastroesophageal reflux disease)     Movement disorder     Neuromuscular disorder ([address]     Psychiatric problem        Past Surgical History:   Procedure Laterality Date    ABDOMINAL EXPLORATION SURGERY      ADENOIDECTOMY       SECTION      x 2     SECTION, CLASSIC N/A     DILATATION, ESOPHAGUS      EXTERNAL EAR SURGERY      lobe    GASTRIC BAND      GASTRIC BYPASS SURGERY      HERNIA REPAIR      HERNIA REPAIR N/A     SEPTOPLASTY      TONSILLECTOMY      TONSILLECTOMY AND ADENOIDECTOMY      UPPER GASTROINTESTINAL ENDOSCOPY         Family History   Problem Relation Age of Onset    Anemia Mother     Breast Cancer Mother     Depression Mother     High Blood Pressure Mother     Mental Illness Mother    Sarah King / [place] Mother     Colon Cancer Father     Depression Father     High Blood Pressure Father     Mental Illness Father     Breast Cancer Maternal Grandmother     Alcohol Abuse Maternal Grandfather     Anemia Paternal Grandmother     Alcohol Abuse Paternal Grandfather     Osteoporosis Paternal Grandfather     Stroke Paternal Grandfather     Substance Abuse Maternal Cousin     Substance Abuse Paternal Cousin     Asthma Other        Allergies   Allergen Reactions    Avelox [Moxifloxacin] Anaphylaxis    Sugar-Protein-Starch Other (See Comments)    Avelox [Moxifloxacin]     Copaxone [Glatiramer Acetate]     Depakote [Divalproex Sodium]     Gabapentin Itching    Hydroxychloroquine      Other reaction(s): Other (See Comments)  Vision loss   Connie Sessions [Paliperidone]     Lamictal [Lamotrigine]     Lithium     Pregabalin      Other reaction(s): Mental Status Change    Lamictal [Lamotrigine] Rash    Lithium Rash       Social History     Socioeconomic History    Marital status: Single     Spouse name: Not on file    Number of children: 3    Years of education: Some College    Highest education level: Not on file   Occupational History    Occupation: Disability   Social Needs    Financial resource strain: Not on file    Food insecurity     Worry: Not on file     Inability: Not on file   [employer] needs     Medical: Not on file     Non-medical: Not on file   Tobacco Use    Smoking status: Current Every Day Smoker     Packs/day: 1.00     Years: 15.00     Pack years: 15.00     Types: Cigarettes    Smokeless tobacco: Never Used   Substance and Sexual Activity    Alcohol use:  Yes     Alcohol/week: 2.0 standard drinks     Types: 2 Shots of or lesions over external nose or ears, no atrophy of tongue  Neck-symmetric, no masses noted, no jugular vein distension  Respiration- chest wall appears symmetric, good expansion,   normal effort without use of accessory muscles  Musculoskeletal - no significant wasting of muscles noted, no bony deformities  Extremities-no clubbing, cyanosis or edema  Skin - warm, dry, and intact. No rash, erythema, or pallor. Psychiatric - mood, affect, and behavior appear normal.      Neurological exam  Awake, alert, fluent oriented  appropriate affect  Attention and concentration appear appropriate  Recent and remote memory appears unremarkable  Speech normal without dysarthria  No clear issues with language of fund of knowledge    Cranial Nerve Exam     CN III, IV,VI-EOMI, No nystagmus, conjugate eye movements, no ptosis  CN VII-no facial assymetry        Motor Exam  antigravity throughout upper and lower extremities bilaterally    Tremors- mild postural tremors in hands noted      Lab Results   Component Value Date    CQENAZSA30 753 09/12/2018     Lab Results   Component Value Date    WBC 6.9 04/04/2021    HGB 10.5 (L) 04/04/2021    HCT 31.7 (L) 04/04/2021    MCV 87.1 04/04/2021     04/04/2021     Lab Results   Component Value Date     (L) 04/04/2021    K 4.7 04/04/2021    CL 93 (L) 04/04/2021    CO2 24 04/04/2021    BUN 9 04/04/2021    CREATININE 0.7 04/04/2021    GLUCOSE 79 04/04/2021    CALCIUM 8.1 (L) 04/04/2021    PROT 6.0 (L) 04/04/2021    LABALBU 3.5 04/04/2021    BILITOT <0.2 04/04/2021    ALKPHOS 40 04/04/2021    AST 23 04/04/2021    ALT 12 04/04/2021    LABGLOM >60 04/04/2021    GFRAA >59 04/04/2021    GLOB 3.0 11/14/2016       Impression   1. No diffusion restriction to indicate acute ischemia. 2. Scattered nonenhancing hyperintense T2/FLAIR signal changes within   the periventricular and white matter regions favorable for MS plaques. No abnormal enhancement to indicate active demyelination.    3. Partial opacification inferior right mastoid air cells, mastoid   effusion considered. Signed by Dr Meggan Cheung on 2020 7:10 PM         Assessment    ICD-10-CM    1. Multiple sclerosis ([address]  G35    2. Lupus ([address]  M32.9    3. Weakness  R53.1    4. Fibromyalgia  M79.7 dronabinol (MARINOL) 10 MG capsule   5. Convulsions, unspecified convulsion type ([address]  R56.9    6. Visual disturbance  H53.9    7. Tremor  R25.1    8. Optic neuritis  H46.9    9. Pain, neck  M54.2    10. Numbness  R20.0 dronabinol (MARINOL) 10 MG capsule   11. Difficulty with speech  R47.9        Her neurological examination previously significant for some hesitance of speech, decreased visual acuity, right sided numbness and some mild slowness of speech. She has some mild postural tremor in the hands. Previously she was given Solumedrol 1 gm IV daily x 5 days. She is off Tecfidera and Ocrevus. She did not want to be on Tecfidera due to side effects. Her hepatitis panel was unremarkable. She is seeing  MS specialist in [place]. Her video EEG x 3 days was unremarkable with no events captured. She is seeing another rheumatologist in [facility],She will have her Marinol increased to 20 mg tid. She will need something for fatigue. The patient indicated understanding of the management plan. She is to follow up with me in  6 weeks and call with any issues. Plan    No orders of the defined types were placed in this encounter. Orders Placed This Encounter   Medications    dronabinol (MARINOL) 10 MG capsule     Si tab TID     Dispense:  180 capsule     Refill:  2       Return in about 6 weeks (around 2021). EMR Dragon/transcription disclaimer:Significant part of this  encounter note is electronic transcription/translation of spoken language to printed text. The electronic translation of spoken language may be erroneous, or at times, nonsensical words or phrases may be inadvertently transcribed.  Although I have reviewed the note for such errors, some may still exist.

## 2021-05-13 NOTE — PROGRESS NOTES

## 2021-05-13 NOTE — TELEPHONE ENCOUNTER
Pt called asking to have dronabinol (MARINOL) 10 MG capsule sent to:   [facility] #[id number] - [address], [address]  [address] [facility] [phone]    [facility] in [place] doesn't have medication in stock and not sure if they can even get it. Please contact pt if needed.     Thank you

## 2021-05-18 RX ORDER — DRONABINOL 10 MG/1
CAPSULE ORAL
Qty: 180 CAPSULE | Refills: 0 | Status: SHIPPED | OUTPATIENT
Start: 2021-05-18 | End: 2021-06-15

## 2021-05-18 NOTE — TELEPHONE ENCOUNTER
Patient called and she would like the Marinol sent to [facility] outpatient pharmacy. Medication is teed up.

## 2021-06-01 ENCOUNTER — NURSE TRIAGE (OUTPATIENT)
Dept: OTHER | Facility: CLINIC | Age: 46
End: 2021-06-01

## 2021-06-01 NOTE — TELEPHONE ENCOUNTER
of the following symptoms: headache, dizziness, vision loss, double vision, changes in speech, unsteady on your feet? \"      Headache for a week, dizziness, vision is \"awful\" but it is chronic, yes, what happens is she loses track of what she is saying while trying to say it. Unsteady on feet has gotten worse and doesn't go anywhere without cane. 7. OTHER SYMPTOMS: \"Do you have any other symptoms? \"     Right foot is numb at times. It is currently numb for a week or so. Left foot is now numb as well. 8. PREGNANCY: \"Is there any chance you are pregnant? \" \"When was your last menstrual period? \"      no    Protocols used: NEUROLOGIC DEFICIT-ADULT-[place]

## 2021-06-02 ENCOUNTER — HOSPITAL ENCOUNTER (EMERGENCY)
Age: 46
Discharge: HOME | End: 2021-06-02
Payer: MEDICARE

## 2021-06-02 VITALS
HEART RATE: 72 BPM | SYSTOLIC BLOOD PRESSURE: 112 MMHG | RESPIRATION RATE: 18 BRPM | TEMPERATURE: 98.2 F | OXYGEN SATURATION: 97 % | DIASTOLIC BLOOD PRESSURE: 85 MMHG

## 2021-06-02 DIAGNOSIS — G35 MULTIPLE SCLEROSIS EXACERBATION (HCC): Primary | ICD-10-CM

## 2021-06-02 LAB
ALBUMIN SERPL-MCNC: 3.7 G/DL (ref 3.5–5.2)
ALP BLD-CCNC: 50 U/L (ref 35–104)
ALT SERPL-CCNC: 12 U/L (ref 5–33)
ANION GAP SERPL CALCULATED.3IONS-SCNC: 9 MMOL/L (ref 7–19)
AST SERPL-CCNC: 13 U/L (ref 5–32)
BACTERIA: NEGATIVE /HPF
BASOPHILS ABSOLUTE: 0 K/UL (ref 0–0.2)
BASOPHILS RELATIVE PERCENT: 0.2 % (ref 0–1)
BILIRUB SERPL-MCNC: <0.2 MG/DL (ref 0.2–1.2)
BILIRUBIN URINE: NEGATIVE
BLOOD, URINE: NEGATIVE
BUN BLDV-MCNC: 14 MG/DL (ref 6–20)
CALCIUM SERPL-MCNC: 9.2 MG/DL (ref 8.6–10)
CHLORIDE BLD-SCNC: 96 MMOL/L (ref 98–111)
CLARITY: CLEAR
CO2: 25 MMOL/L (ref 22–29)
COLOR: YELLOW
CREAT SERPL-MCNC: 1.1 MG/DL (ref 0.5–0.9)
CRYSTALS, UA: ABNORMAL /HPF
EOSINOPHILS ABSOLUTE: 0 K/UL (ref 0–0.6)
EOSINOPHILS RELATIVE PERCENT: 0 % (ref 0–5)
EPITHELIAL CELLS, UA: 1 /HPF (ref 0–5)
GFR AFRICAN AMERICAN: >59
GFR NON-AFRICAN AMERICAN: 54
GLUCOSE BLD-MCNC: 106 MG/DL (ref 74–109)
GLUCOSE URINE: NEGATIVE MG/DL
HCG QUALITATIVE: NEGATIVE
HCT VFR BLD CALC: 35.3 % (ref 37–47)
HEMOGLOBIN: 11.6 G/DL (ref 12–16)
HYALINE CASTS: 2 /HPF (ref 0–8)
IMMATURE GRANULOCYTES #: 0.2 K/UL
KETONES, URINE: NEGATIVE MG/DL
LEUKOCYTE ESTERASE, URINE: ABNORMAL
LIPASE: 15 U/L (ref 13–60)
LYMPHOCYTES ABSOLUTE: 0.8 K/UL (ref 1.1–4.5)
LYMPHOCYTES RELATIVE PERCENT: 6.3 % (ref 20–40)
MCH RBC QN AUTO: 29.4 PG (ref 27–31)
MCHC RBC AUTO-ENTMCNC: 32.9 G/DL (ref 33–37)
MCV RBC AUTO: 89.4 FL (ref 81–99)
MONOCYTES ABSOLUTE: 0.3 K/UL (ref 0–0.9)
MONOCYTES RELATIVE PERCENT: 2.5 % (ref 0–10)
NEUTROPHILS ABSOLUTE: 10.8 K/UL (ref 1.5–7.5)
NEUTROPHILS RELATIVE PERCENT: 89.5 % (ref 50–65)
NITRITE, URINE: NEGATIVE
PDW BLD-RTO: 15.9 % (ref 11.5–14.5)
PH UA: 7.5 (ref 5–8)
PLATELET # BLD: 291 K/UL (ref 130–400)
PMV BLD AUTO: 8.6 FL (ref 9.4–12.3)
POTASSIUM SERPL-SCNC: 5 MMOL/L (ref 3.5–5)
PROTEIN UA: NEGATIVE MG/DL
RBC # BLD: 3.95 M/UL (ref 4.2–5.4)
RBC UA: 1 /HPF (ref 0–4)
SODIUM BLD-SCNC: 130 MMOL/L (ref 136–145)
SPECIFIC GRAVITY UA: 1.01 (ref 1–1.03)
TOTAL PROTEIN: 6.9 G/DL (ref 6.6–8.7)
UROBILINOGEN, URINE: 1 E.U./DL
WBC # BLD: 12 K/UL (ref 4.8–10.8)
WBC UA: 5 /HPF (ref 0–5)

## 2021-06-02 PROCEDURE — 85025 COMPLETE CBC W/AUTO DIFF WBC: CPT

## 2021-06-02 PROCEDURE — 6360000002 HC RX W HCPCS: Performed by: PHYSICIAN ASSISTANT

## 2021-06-02 PROCEDURE — 99283 EMERGENCY DEPT VISIT LOW MDM: CPT

## 2021-06-02 PROCEDURE — 81001 URINALYSIS AUTO W/SCOPE: CPT

## 2021-06-02 PROCEDURE — 96374 THER/PROPH/DIAG INJ IV PUSH: CPT

## 2021-06-02 PROCEDURE — 84703 CHORIONIC GONADOTROPIN ASSAY: CPT

## 2021-06-02 PROCEDURE — 36415 COLL VENOUS BLD VENIPUNCTURE: CPT

## 2021-06-02 PROCEDURE — 83690 ASSAY OF LIPASE: CPT

## 2021-06-02 PROCEDURE — 80053 COMPREHEN METABOLIC PANEL: CPT

## 2021-06-02 PROCEDURE — 2580000003 HC RX 258: Performed by: PHYSICIAN ASSISTANT

## 2021-06-02 RX ORDER — PREDNISONE 10 MG/1
TABLET ORAL
Qty: 24 TABLET | Refills: 0 | Status: SHIPPED | OUTPATIENT
Start: 2021-06-02 | End: 2021-06-10

## 2021-06-02 RX ORDER — METHYLPREDNISOLONE SODIUM SUCCINATE 125 MG/2ML
250 INJECTION, POWDER, LYOPHILIZED, FOR SOLUTION INTRAMUSCULAR; INTRAVENOUS ONCE
Status: COMPLETED | OUTPATIENT
Start: 2021-06-02 | End: 2021-06-02

## 2021-06-02 RX ORDER — 0.9 % SODIUM CHLORIDE 0.9 %
1000 INTRAVENOUS SOLUTION INTRAVENOUS ONCE
Status: COMPLETED | OUTPATIENT
Start: 2021-06-02 | End: 2021-06-02

## 2021-06-02 RX ADMIN — METHYLPREDNISOLONE SODIUM SUCCINATE 250 MG: 125 INJECTION, POWDER, FOR SOLUTION INTRAMUSCULAR; INTRAVENOUS at 19:28

## 2021-06-02 RX ADMIN — SODIUM CHLORIDE 1000 ML: 9 INJECTION, SOLUTION INTRAVENOUS at 19:03

## 2021-06-02 ASSESSMENT — PAIN SCALES - GENERAL: PAINLEVEL_OUTOF10: 9

## 2021-06-02 ASSESSMENT — PAIN DESCRIPTION - LOCATION: LOCATION: BACK

## 2021-06-02 ASSESSMENT — PAIN DESCRIPTION - PAIN TYPE: TYPE: ACUTE PAIN

## 2021-06-02 NOTE — ED NOTES
Bed: 02-A  Expected date:   Expected time:   Means of arrival:   Comments:  Walt Dennis, RN  06/02/21 7811

## 2021-06-02 NOTE — ED PROVIDER NOTES
[facility] EMERGENCY DEPT  eMERGENCYdEPARTMENT eNCOUnter      Pt Name: Elisha Macedo  MRN: [id number]  [place] 1975  Date of evaluation: 6/2/2021  Provider:CELENA Mckee    CHIEF COMPLAINT       Chief Complaint   Patient presents with    Fatigue     arrived via EMS from home with co of all over pain and weakness, hx of Lupus and MS         HISTORY OF PRESENT ILLNESS  (Location/Symptom, Timing/Onset, Context/Setting, Quality, Duration, Modifying Factors, Severity.)   Elisha Macedo is a 39 y.o. female who presents to the emergency department with complaints of pain all over worse in her legs \"band like\" consistent with flare of MS. Followed by Dr Julisa Gonzalez typically given high dose prednisone. Ambulating hx of hyponatremia and kalemia. No fever or chills. No SOA or chest pain. HPI    Nursing Notes were reviewed and I agree. REVIEW OF SYSTEMS    (2-9 systems for level 4, 10 or more for level 5)     Review of Systems   Constitutional: Positive for fatigue. Negative for activity change, appetite change, chills and fever. HENT: Negative for congestion, postnasal drip, rhinorrhea and sore throat. Eyes: Negative for photophobia, pain, discharge and visual disturbance. Respiratory: Negative for apnea, cough and shortness of breath. Cardiovascular: Negative for chest pain and leg swelling. Gastrointestinal: Negative for abdominal distention, abdominal pain and nausea. Genitourinary: Negative for vaginal bleeding. Musculoskeletal: Positive for myalgias. Negative for arthralgias, back pain, joint swelling, neck pain and neck stiffness. Skin: Negative for color change and rash. Neurological: Negative for dizziness, syncope, facial asymmetry and headaches. Hematological: Negative for adenopathy. Does not bruise/bleed easily. Psychiatric/Behavioral: Negative for agitation, behavioral problems and confusion.         Except as noted above the remainder of the review of systems was reviewed and negative. PAST MEDICAL HISTORY     Past Medical History:   Diagnosis Date    Arthritis     Bipolar 1 disorder ([facility]     Convulsions ([facility] 2020    Fibromyalgia     GERD (gastroesophageal reflux disease)     Hypertension     Lupus ([facility]     Movement disorder     Multiple sclerosis ([facility]     Neuromuscular disorder ([facility] [address]     Psychiatric problem          SURGICAL HISTORY       Past Surgical History:   Procedure Laterality Date    ABDOMINAL EXPLORATION SURGERY      ADENOIDECTOMY       SECTION      x 2     SECTION, CLASSIC N/A     DILATATION, ESOPHAGUS      EXTERNAL EAR SURGERY      lobe    GASTRIC BAND      GASTRIC BYPASS SURGERY      HERNIA REPAIR      HERNIA REPAIR N/A     SEPTOPLASTY      TONSILLECTOMY      TONSILLECTOMY AND ADENOIDECTOMY      UPPER GASTROINTESTINAL ENDOSCOPY           CURRENT MEDICATIONS       Discharge Medication List as of 2021  8:19 PM      CONTINUE these medications which have NOT CHANGED    Details   dronabinol (MARINOL) 10 MG capsule 2 tab TID, Disp-180 capsule, R-0Normal      ALPRAZolam (XANAX) 0.5 MG tablet Take 0.5 mg by mouth 2 times daily as needed for Sleep. Historical Med      REXULTI 1 MG TABS tablet DAWHistorical Med      HYDROcodone-acetaminophen (NORCO) 5-325 MG per tablet TAKE 1 TABLET BY MOUTH EVERY 4 TO 6 EVERY AS NEEDED FOR PAINHistorical Med      clonazePAM (KLONOPIN) 0.5 MG tablet Take 1 tablet by mouth 3 times daily as needed for Anxiety for up to 3 days. May cause drowsiness.   Do not take and drive., Disp-9 tablet, R-0Normal      trihexyphenidyl (ARTANE) 5 MG tablet Take 1 tablet by mouth 3 times daily, Disp-90 tablet, R-3Normal      sodium chloride 1 g tablet Take 1 tablet by mouth 3 times daily (with meals) for 3 days, Disp-9 tablet, R-0Normal      norethindrone (AYGESTIN) 5 MG tablet Take 1 tablet by mouth daily, Disp-30 tablet, R-0Normal      Elagolix Sodium (ORILISSA PO) Take by mouthHistorical Med      corticotropin (ACTHAR) 80 UNIT/ML injectable gel Inject 1 mL into the muscle daily for 3 days, Disp-3 mL, R-5Normal      Gabapentin, Once-Daily, (GRALISE) 600 MG TABS Take 1,800 mg by mouth nightly. Historical Med      folic acid (FOLVITE) 1 MG tablet Take 1 mg by mouth dailyHistorical Med      potassium chloride (KLOR-CON M) 20 MEQ extended release tablet Take 20 mEq by mouth 2 times dailyHistorical Med      sucralfate (CARAFATE) 1 GM tablet Take 1 g by mouth 2 times dailyHistorical Med      hydrOXYzine (ATARAX) 50 MG tablet Take 50 mg by mouth 2 times daily Historical Med      hydroCHLOROthiazide (MICROZIDE) 12.5 MG capsule Take 12.5 mg by mouth dailyHistorical Med      butalbital-acetaminophen-caffeine (FIORICET, ESGIC) -40 MG per tablet Take 1 tablet by mouth every 4 hours as needed for HeadachesHistorical Med      lisinopril (PRINIVIL;ZESTRIL) 40 MG tablet Take 40 mg by mouth dailyHistorical Med      cyanocobalamin 1000 MCG/ML injection Inject 1,000 mcg into the muscle every 21 days Historical Med      pantoprazole sodium (PROTONIX) 40 MG PACK packet Take 40 mg by mouth 2 times dailyHistorical Med      alendronate (FOSAMAX) 70 MG tablet Take 70 mg by mouth every 7 days      Multiple Vitamins-Minerals (THERAPEUTIC MULTIVITAMIN-MINERALS) tablet Take 1 tablet by mouth daily             ALLERGIES     Avelox [moxifloxacin], Sugar-protein-starch, Avelox [moxifloxacin], Copaxone [glatiramer acetate], Depakote [divalproex sodium], Gabapentin, Hydroxychloroquine, Invega [paliperidone], Lamictal [lamotrigine], Lithium, Pregabalin, Lamictal [lamotrigine], and Lithium    FAMILY HISTORY       Family History   Problem Relation Age of Onset    Anemia Mother     Breast Cancer Mother     Depression Mother     High Blood Pressure Mother     Mental Illness Mother    Samreen Hooks / [place] Mother     Colon Cancer Father     Depression Father     High Blood Pressure Father     Mental Illness Father     Breast Cancer Maternal Grandmother     Alcohol Abuse Maternal Grandfather     Anemia Paternal Grandmother     Alcohol Abuse Paternal Grandfather     Osteoporosis Paternal Grandfather     Stroke Paternal Grandfather     Substance Abuse Maternal Cousin     Substance Abuse Paternal Cousin     Asthma Other           SOCIAL HISTORY       Social History     Socioeconomic History    Marital status: Single     Spouse name: None    Number of children: 3    Years of education: Some College    Highest education level: None   Occupational History    Occupation: Disability   Tobacco Use    Smoking status: Current Every Day Smoker     Packs/day: 1.00     Years: 15.00     Pack years: 15.00     Types: Cigarettes    Smokeless tobacco: Never Used   Vaping Use    Vaping Use: Never used   Substance and Sexual Activity    Alcohol use: Yes     Alcohol/week: 2.0 standard drinks     Types: 2 Shots of liquor per week     Comment: occassional    Drug use: No    Sexual activity: Never     Partners: Male   Other Topics Concern    None   Social History Narrative    ** Merged History Encounter **          Social Determinants of Health     Financial Resource Strain:     Difficulty of Paying Living Expenses:    Food Insecurity:     Worried About Running Out of Food in the Last Year:     Ran Out of Food in the Last Year:    Transportation Needs:     Lack of Transportation (Medical):      Lack of Transportation (Non-Medical):    Physical Activity:     Days of Exercise per Week:     Minutes of Exercise per Session:    Stress:     Feeling of Stress :    Social Connections:     Frequency of Communication with Friends and Family:     Frequency of Social Gatherings with Friends and Family:     Attends [employer] Services:     Active Member of Clubs or Organizations:     Attends Club or Organization Meetings:     Marital Status:    Intimate Partner Violence:     Fear of Current or Ex-Partner:     Emotionally Abused:     Physically Abused:     Sexually Abused:        SCREENINGS           PHYSICAL EXAM    (up to 7 forlevel 4, 8 or more for level 5)     ED Triage Vitals [06/02/21 1712]   BP Temp Temp src Pulse Resp SpO2 Height Weight   116/78 98.2 °F (36.8 °C) -- 75 18 98 % -- --       Physical Exam  Vitals and nursing note reviewed. Constitutional:       General: She is not in acute distress. Appearance: Normal appearance. She is well-developed. She is not diaphoretic. HENT:      Head: Normocephalic and atraumatic. Right Ear: External ear normal.      Left Ear: External ear normal.      Mouth/Throat:      Pharynx: No oropharyngeal exudate. Eyes:      General:         Right eye: No discharge. Left eye: No discharge. Pupils: Pupils are equal, round, and reactive to light. Neck:      Thyroid: No thyromegaly. Cardiovascular:      Rate and Rhythm: Normal rate and regular rhythm. Pulses: Normal pulses. Heart sounds: Normal heart sounds. No murmur heard. No friction rub. Pulmonary:      Effort: Pulmonary effort is normal. No respiratory distress. Breath sounds: Normal breath sounds. No stridor. No wheezing. Abdominal:      General: Bowel sounds are normal. There is no distension. Palpations: Abdomen is soft. Tenderness: There is no abdominal tenderness. Musculoskeletal:         General: Normal range of motion. Cervical back: Normal range of motion and neck supple. Skin:     General: Skin is warm and dry. Capillary Refill: Capillary refill takes less than 2 seconds. Findings: No rash. Neurological:      General: No focal deficit present. Mental Status: She is alert and oriented to person, place, and time. Mental status is at baseline. Cranial Nerves: No cranial nerve deficit. Sensory: No sensory deficit. Motor: No weakness.       Coordination: Coordination normal.      Gait: Gait normal.      Deep Tendon Reflexes: Reflexes normal.      Comments: I do not feel or 75 72   Resp: 18 18   Temp: 98.2 °F (36.8 °C)    SpO2: 98% 97%       MDM  No acute findings on patient's blood work she feels that this is a typical flareup like symptom with a bandlike pain in her legs and fatigue she typically warrants IV prednisone I offer her a taper and to follow closely with Dr. Teresita Montiel which she is agreeable to which she request to go home we offer admission which she declines would like to attempt to treat in outpatient setting she will call Dr. Teresita Montiel her neurologist tomorrow. I have notified my attending who is agreeable to this plan of care. I do advise that if anything should worsen acutely before getting high-dose steroids she can always return to ED. PROCEDURES:    Procedures      FINAL IMPRESSION      1. Multiple sclerosis exacerbation [facility])          DISPOSITION/PLAN   DISPOSITION Decision To Discharge 06/02/2021 08:19:42 PM      PATIENT REFERRED TO:  [facility] EMERGENCY DEPT  [facility]  [phone]    If symptoms worsen    MD Suleman [address]  [place] [address]    Call         DISCHARGE MEDICATIONS:  Discharge Medication List as of 6/2/2021  8:19 PM      START taking these medications    Details   predniSONE (DELTASONE) 10 MG tablet Take 5 tablets by mouth daily for 2 days, THEN 2 tablets 2 times daily for 2 days, THEN 1 tablet 2 times daily for 2 days, THEN 0.5 tablets 2 times daily for 2 days. , Disp-24 tablet, R-0Normal             (Please note that portions of this note were completed with a voice recognition program.  Efforts were made to edit the dictations but occasionallywords are mis-transcribed.)    Vanessa Ramirez [address] [place] [address]  06/03/21 9909

## 2021-06-03 ASSESSMENT — ENCOUNTER SYMPTOMS
BACK PAIN: 0
APNEA: 0
EYE DISCHARGE: 0
RHINORRHEA: 0
PHOTOPHOBIA: 0
NAUSEA: 0
ABDOMINAL PAIN: 0
COUGH: 0
EYE PAIN: 0
ABDOMINAL DISTENTION: 0
SORE THROAT: 0
COLOR CHANGE: 0
SHORTNESS OF BREATH: 0

## 2021-06-04 ENCOUNTER — TELEPHONE (OUTPATIENT)
Dept: NEUROLOGY | Age: 46
End: 2021-06-04

## 2021-06-04 NOTE — TELEPHONE ENCOUNTER
Patient left message stating that she went to [facility] ED yesterday for an apparent MS exacerbation. She was extremely shaky, and had several episodes where she \"passed out\" for no reason. Patient reports \"ED did nothing\". Please advise. Per ED note she was offered IV prednisone and admission stay but patient did not agree and she wanted to treat at home, she agreed to call neurology the following day.        Arnulfo patient

## 2021-06-08 NOTE — TELEPHONE ENCOUNTER
Can you see how she is doing. The question will be if she wants to be set up for steroids.  Acthar is another option which is an injection she gives her self x 5 days if her insurance approves it

## 2021-06-11 NOTE — TELEPHONE ENCOUNTER
Called the patient and left her a message to see if she wanted to do the steroid infusion next week. I left our number for her to call us back.

## 2021-06-14 ENCOUNTER — TELEPHONE (OUTPATIENT)
Dept: NEUROLOGY | Age: 46
End: 2021-06-14

## 2021-06-14 NOTE — TELEPHONE ENCOUNTER
Trish BAEZA requests that  A nurse return their call. Has questions about getting injection. The best time to reach her is Anytime. Thank you.

## 2021-06-15 ENCOUNTER — TELEPHONE (OUTPATIENT)
Dept: NEUROLOGY | Age: 46
End: 2021-06-15

## 2021-06-15 RX ORDER — REPOSITORY CORTICOTROPIN 80 [USP'U]/ML
80 INJECTION INTRAMUSCULAR; SUBCUTANEOUS DAILY
Qty: 3 ML | Refills: 5 | Status: SHIPPED | OUTPATIENT
Start: 2021-06-15 | End: 2021-06-29 | Stop reason: SDUPTHER

## 2021-06-15 NOTE — TELEPHONE ENCOUNTER
Andrade Claros with [employer] called requesting a script for 1 bottle of ACTHAR. Pt called pharmancy about bottle received being defective. Fax #: [phone]. . [phone]. Thank you.

## 2021-06-23 ENCOUNTER — TELEPHONE (OUTPATIENT)
Dept: NEUROLOGY | Age: 46
End: 2021-06-23

## 2021-06-23 NOTE — TELEPHONE ENCOUNTER
I submitted the prior authorization and faxed clinical notes. This medication is very hard to get approved. We should have a response within the next 72 hours.

## 2021-06-25 NOTE — TELEPHONE ENCOUNTER
Pt called again asking if authorization has been obtained for Marinol. Please contact to discuss.     Thank you

## 2021-06-28 ENCOUNTER — TELEMEDICINE (OUTPATIENT)
Dept: NEUROLOGY | Age: 46
End: 2021-06-28
Payer: MEDICARE

## 2021-06-28 DIAGNOSIS — M32.9 LUPUS (HCC): ICD-10-CM

## 2021-06-28 DIAGNOSIS — R53.1 WEAKNESS: ICD-10-CM

## 2021-06-28 DIAGNOSIS — M54.2 PAIN, NECK: ICD-10-CM

## 2021-06-28 DIAGNOSIS — R47.9 DIFFICULTY WITH SPEECH: ICD-10-CM

## 2021-06-28 DIAGNOSIS — R56.9 CONVULSIONS, UNSPECIFIED CONVULSION TYPE (HCC): ICD-10-CM

## 2021-06-28 DIAGNOSIS — R20.0 NUMBNESS: ICD-10-CM

## 2021-06-28 DIAGNOSIS — H46.9 OPTIC NEURITIS: ICD-10-CM

## 2021-06-28 DIAGNOSIS — M79.7 FIBROMYALGIA: ICD-10-CM

## 2021-06-28 DIAGNOSIS — G35 MULTIPLE SCLEROSIS (HCC): Primary | ICD-10-CM

## 2021-06-28 DIAGNOSIS — R25.1 TREMOR: ICD-10-CM

## 2021-06-28 DIAGNOSIS — H53.9 VISUAL DISTURBANCE: ICD-10-CM

## 2021-06-28 PROCEDURE — 99214 OFFICE O/P EST MOD 30 MIN: CPT | Performed by: PSYCHIATRY & NEUROLOGY

## 2021-06-28 PROCEDURE — G8428 CUR MEDS NOT DOCUMENT: HCPCS | Performed by: PSYCHIATRY & NEUROLOGY

## 2021-06-28 NOTE — PROGRESS NOTES
Chief Complaint   Patient presents with    Multiple Sclerosis       Rodolfo Fitzpatrick is a 39y.o. year old female who is seen for evaluation of follow for multiple sclerosis following an admission in 5/19 for optic neuritis treated with IV solumedrol. The patient has a history of multiple sclerosis, fibromyalgia and lupus along with chronic headaches was seen at that time for left visual change which began about 2 days prior to admission with eye pain. This was quite similar to her previous episodes of optic neuritis, of which she has had about 4 previous episodes treated with steroids. She was tried on oral steroids as an outpatient but this was not effective and was transferred from outside ER for further management. She has some chronic weakness and numbness and weakness on the left. She does have bowel and bladder incontinence. She was on Avonex. Had not followed up with Dr Javed Le since 2017. She did not follow up in the office since 5/19. On 7/4/20 she was in the ER with generalized body aches. She apparently had some trembling on the right side. She had an MRI of the brain in the ER which revealed no acute ischemic change but did have scattered non enhancing white matter lesions in periventricular and white matter. She was given 1 gm Solumedrol for a possible flair up and told to follow up with neurology. She indicates that she typically does relatively well for a few weeks and then she feels severely fatigued or worsening vision. She has been in a wheelchair in 2017. She had her initial testing in 2017 at [facility] which was significant for non enhancing white matter lesions and a CSF with 6 oligoclonal bands seen only in the CSF. She still feels her face twitching and right sided numbness. He speech is not normal for her as she has to think about things carefully. Her rheumatologist started her on Tecfidera but she felt worse on this.  She had been on Avonex since her diagnosis prior to with no clear benefit according to her. Having a lupus flair up. This usually feels pain in eyes, skin of in mouth may fall off. Felt since with 2  Ocrevus infusion 2020 and a bit better but now off. Chiqui Ortiz with unclear benefit. Saw rheumatologist in [place] and plan a new infusion. Azathioprine started for her Lupus. Stopped Ocrevus. Artane helped for about 2 days not help. Lupus flairs. Is doing well with Marinol and Acthar. Acthar 5 days month. Complete benefit for 3 days then lasts about a month with some benefit.  .     Active Ambulatory Problems     Diagnosis Date Noted    Multiple sclerosis ([address]     Intractable chronic migraine without aura and without status migrainosus 2017    Major depressive disorder, recurrent, moderate (HCC) 2018    Exacerbation of multiple sclerosis ([address] 05/10/2019    Optic neuritis 05/10/2019    Pain of left eye 05/10/2019    Bronchitis 2019    Visual disturbance 2020    Tremor 2020    Weakness 2020    Numbness 2020    Difficulty with speech 2020    Lupus ([address] 2020    Fibromyalgia 2020    Convulsions ([address] 2020    Bilateral myofascial pain 2021    Pain in joints 2021    Hypertension     Bipolar 1 disorder ([address]      Resolved Ambulatory Problems     Diagnosis Date Noted    No Resolved Ambulatory Problems     Past Medical History:   Diagnosis Date    Arthritis     GERD (gastroesophageal reflux disease)     Movement disorder     Neuromuscular disorder ([address]     Psychiatric problem        Past Surgical History:   Procedure Laterality Date    ABDOMINAL EXPLORATION SURGERY      ADENOIDECTOMY       SECTION      x 2     SECTION, CLASSIC N/A     DILATATION, ESOPHAGUS      EXTERNAL EAR SURGERY      lobe    GASTRIC BAND      GASTRIC BYPASS SURGERY      HERNIA REPAIR      HERNIA REPAIR N/A     SEPTOPLASTY      TONSILLECTOMY      TONSILLECTOMY AND ADENOIDECTOMY  UPPER GASTROINTESTINAL ENDOSCOPY         Family History   Problem Relation Age of Onset    Anemia Mother     Breast Cancer Mother     Depression Mother     High Blood Pressure Mother     Mental Illness Mother    [de-identified] / [place] Mother     Colon Cancer Father     Depression Father     High Blood Pressure Father     Mental Illness Father     Breast Cancer Maternal Grandmother     Alcohol Abuse Maternal Grandfather     Anemia Paternal Grandmother     Alcohol Abuse Paternal Grandfather     Osteoporosis Paternal Grandfather     Stroke Paternal Grandfather     Substance Abuse Maternal Cousin     Substance Abuse Paternal Cousin     Asthma Other        Allergies   Allergen Reactions    Avelox [Moxifloxacin] Anaphylaxis    Sugar-Protein-Starch Other (See Comments)    Avelox [Moxifloxacin]     Copaxone [Glatiramer Acetate]     Depakote [Divalproex Sodium]     Gabapentin Itching    Hydroxychloroquine      Other reaction(s): Other (See Comments)  Vision loss   Olam Fulling [Paliperidone]     Lamictal [Lamotrigine]     Lithium     Pregabalin      Other reaction(s): Mental Status Change    Lamictal [Lamotrigine] Rash    Lithium Rash       Social History     Socioeconomic History    Marital status: Single     Spouse name: Not on file    Number of children: 3    Years of education: Some College    Highest education level: Not on file   Occupational History    Occupation: Disability   Tobacco Use    Smoking status: Current Every Day Smoker     Packs/day: 1.00     Years: 15.00     Pack years: 15.00     Types: Cigarettes    Smokeless tobacco: Never Used   Vaping Use    Vaping Use: Never used   Substance and Sexual Activity    Alcohol use:  Yes     Alcohol/week: 2.0 standard drinks     Types: 2 Shots of liquor per week     Comment: occassional    Drug use: No    Sexual activity: Never     Partners: Male   Other Topics Concern    Not on file   Social History Narrative    ** Merged History Encounter **          Social Determinants of Health     Financial Resource Strain:     Difficulty of Paying Living Expenses:    Food Insecurity:     Worried About Running Out of Food in the Last Year:     [address] in the Last Year:    Transportation Needs:     Lack of Transportation (Medical):  Lack of Transportation (Non-Medical):    Physical Activity:     Days of Exercise per Week:     Minutes of Exercise per Session:    Stress:     Feeling of Stress :    Social Connections:     Frequency of Communication with Friends and Family:     Frequency of Social Gatherings with Friends and Family:     Attends Yarsanism Services:     Active Member of Clubs or Organizations:     Attends Club or Organization Meetings:     Marital Status:    Intimate Partner Violence:     Fear of Current or Ex-Partner:     Emotionally Abused:     Physically Abused:     Sexually Abused:        Review of Systems     Constitutional - No fever or chills. No diaphoresis or significant fatigue. HENT -  No tinnitus or significant hearing loss. Eyes - no sudden vision change or eye pain  Respiratory - no significant shortness of breath or cough  Cardiovascular - no chest pain No palpitations or significant leg swelling  Gastrointestinal - no abdominal swelling or pain. Genitourinary - No difficulty urinating, dysuria  Musculoskeletal - no back pain or myalgia. Skin - no color change or rash  Neurologic - No seizures. No lateralizing weakness. Hematologic - no easy bruising or excessive bleeding. Psychiatric - no severe anxiety or nervousness. All other review of systems are negative.     Current Outpatient Medications   Medication Sig Dispense Refill    dronabinol (MARINOL) 10 MG capsule TAKE TWO CAPSULES BY MOUTH THREE TIMES A  capsule 5    corticotropin (ACTHAR) 80 UNIT/ML injectable gel Inject 1 mL into the muscle daily for 3 days 3 mL 5    ALPRAZolam (XANAX) 0.5 MG tablet Take 0.5 mg by well nourished. Eyes - conjunctiva normal.  Ear, nose, throat - No scars, masses, or lesions over external nose or ears, no atrophy of tongue  Neck-symmetric, no masses noted, no jugular vein distension  Respiration- chest wall appears symmetric, good expansion,   normal effort without use of accessory muscles  Musculoskeletal - no significant wasting of muscles noted, no bony deformities  Extremities-no clubbing, cyanosis or edema  Skin - warm, dry, and intact. No rash, erythema, or pallor. Psychiatric - mood, affect, and behavior appear normal.      Neurological exam  Awake, alert, fluent oriented  appropriate affect  Attention and concentration appear appropriate  Recent and remote memory appears unremarkable  Speech normal without dysarthria  No clear issues with language of fund of knowledge    Cranial Nerve Exam     CN III, IV,VI-EOMI, No nystagmus, conjugate eye movements, no ptosis  CN VII-no facial assymetry        Motor Exam  antigravity throughout upper and lower extremities bilaterally    Tremors- mild postural tremors in hands noted      Lab Results   Component Value Date    OVQFEMRQ16 753 09/12/2018     Lab Results   Component Value Date    WBC 12.0 (H) 06/02/2021    HGB 11.6 (L) 06/02/2021    HCT 35.3 (L) 06/02/2021    MCV 89.4 06/02/2021     06/02/2021     Lab Results   Component Value Date     (L) 06/02/2021    K 5.0 06/02/2021    CL 96 (L) 06/02/2021    CO2 25 06/02/2021    BUN 14 06/02/2021    CREATININE 1.1 (H) 06/02/2021    GLUCOSE 106 06/02/2021    CALCIUM 9.2 06/02/2021    PROT 6.9 06/02/2021    LABALBU 3.7 06/02/2021    BILITOT <0.2 06/02/2021    ALKPHOS 50 06/02/2021    AST 13 06/02/2021    ALT 12 06/02/2021    LABGLOM 54 (A) 06/02/2021    GFRAA >59 06/02/2021    GLOB 3.0 11/14/2016       Impression   1. No diffusion restriction to indicate acute ischemia.    2. Scattered nonenhancing hyperintense T2/FLAIR signal changes within   the periventricular and white matter regions favorable for MS plaques. No abnormal enhancement to indicate active demyelination. 3. Partial opacification inferior right mastoid air cells, mastoid   effusion considered. Signed by Dr John Marquez on 7/4/2020 7:10 PM         Assessment    ICD-10-CM    1. Multiple sclerosis ([address]  G35    2. Numbness  R20.0    3. Lupus ([address]  M32.9    4. Weakness  R53.1    5. Convulsions, unspecified convulsion type ([address]  R56.9    6. Fibromyalgia  M79.7    7. Visual disturbance  H53.9    8. Tremor  R25.1    9. Optic neuritis  H46.9    10. Pain, neck  M54.2    11. Difficulty with speech  R47.9        Her neurological examination previously significant for some hesitance of speech, decreased visual acuity, right sided numbness and some mild slowness of speech. She has some mild postural tremor in the hands. Previously she was given Solumedrol 1 gm IV daily x 5 days. She is off Tecfidera and Ocrevus. She did not want to be on Tecfidera due to side effects. Her hepatitis panel was unremarkable. She is seeing  MS specialist in [place]. Her video EEG x 3 days was unremarkable with no events captured. She is seeing another rheumatologist in [facility],She will have her Marinol increased to 20 mg tid. She will need something for fatigue. She plans to research long term side effects and benefits of more frequent ACTHAR. The patient indicated understanding of the management plan. She is to follow up with me in  6 weeks and call with any issues. Tele video call 15 minutes  Both parties in [facility] to E&M services  Plan    No orders of the defined types were placed in this encounter. No orders of the defined types were placed in this encounter. Return in about 6 weeks (around 8/9/2021). EMR Dragon/transcription disclaimer:Significant part of this  encounter note is electronic transcription/translation of spoken language to printed text.  The electronic translation of spoken language may be erroneous, or at times, nonsensical words or phrases may be inadvertently transcribed.  Although I have reviewed the note for such errors, some may still exist.

## 2021-06-28 NOTE — TELEPHONE ENCOUNTER
[employer] stated that we will receive a fax determination. As of right now we have not received a determination yet. Patient had a video visit with Emi Avila this morning so she is aware.

## 2021-06-29 RX ORDER — REPOSITORY CORTICOTROPIN 80 [USP'U]/ML
80 INJECTION INTRAMUSCULAR; SUBCUTANEOUS DAILY
Qty: 5 VIAL | Refills: 11 | Status: SHIPPED | OUTPATIENT
Start: 2021-06-29 | End: 2022-09-06 | Stop reason: SDUPTHER

## 2021-06-29 NOTE — TELEPHONE ENCOUNTER
Vicenta from [address] left message stating that the patient needs a new script for Acthar sent to [facility], also she states that patient wanted a PA update.

## 2021-06-30 NOTE — TELEPHONE ENCOUNTER
Submitted a 3rd PA on Marinol. [facility] Medicare Electronic PA Form ([id number])    We do not have a determination yet.

## 2021-07-07 ENCOUNTER — TELEPHONE (OUTPATIENT)
Dept: NEUROLOGY | Age: 46
End: 2021-07-07

## 2021-07-07 ENCOUNTER — TELEPHONE (OUTPATIENT)
Dept: INFUSION THERAPY | Age: 46
End: 2021-07-07

## 2021-07-07 DIAGNOSIS — G35 MULTIPLE SCLEROSIS (HCC): Primary | ICD-10-CM

## 2021-07-07 RX ORDER — SODIUM CHLORIDE 0.9 % (FLUSH) 0.9 %
5-40 SYRINGE (ML) INJECTION PRN
Status: CANCELLED | OUTPATIENT
Start: 2021-07-08

## 2021-07-07 RX ORDER — SODIUM CHLORIDE 9 MG/ML
25 INJECTION, SOLUTION INTRAVENOUS PRN
Status: CANCELLED | OUTPATIENT
Start: 2021-07-08

## 2021-07-07 RX ORDER — HEPARIN SODIUM (PORCINE) LOCK FLUSH IV SOLN 100 UNIT/ML 100 UNIT/ML
500 SOLUTION INTRAVENOUS PRN
Status: CANCELLED | OUTPATIENT
Start: 2021-07-08

## 2021-07-07 NOTE — TELEPHONE ENCOUNTER
Called pt to schedule for 3 days Solumedrol. No answer. Left message with callback number for scheduling.  Electronically signed by Annette Castaneda RN on 7/7/2021 at 12:02 PM

## 2021-07-07 NOTE — TELEPHONE ENCOUNTER
Patient called stating she finished the Acthar and is still feeling bad. She has blurry vision and her scalp is sore and feels bruised. She is very fatigued. She is requesting steroids. Please advise.

## 2021-07-07 NOTE — TELEPHONE ENCOUNTER
Called and s/w patient, made her aware that Dr Alina Coleman has ordered a 3 day course of solumedrol to be done at our infusion center. Patient voiced understanding and states she has spoken with infusion and is now scheduled to receive her steroids.

## 2021-07-08 ENCOUNTER — TELEPHONE (OUTPATIENT)
Dept: NEUROLOGY | Age: 46
End: 2021-07-08

## 2021-07-08 NOTE — TELEPHONE ENCOUNTER
[address] called and left voicemail needing clarification on patient's Acthar. It was ordered intramuscular and we usually order it subcutaneous. Spoke with Dr. Nae Martinez MA, Jacqueline Moyer, and verified it should be subcutaneous. Returned call to Pharmacist Jaguar Hunt verbal to change route from intramuscular to subcutaneous.

## 2021-07-12 ENCOUNTER — HOSPITAL ENCOUNTER (OUTPATIENT)
Dept: INFUSION THERAPY | Age: 46
Setting detail: INFUSION SERIES
Discharge: HOME | End: 2021-07-12
Payer: MEDICARE

## 2021-07-12 VITALS
DIASTOLIC BLOOD PRESSURE: 69 MMHG | SYSTOLIC BLOOD PRESSURE: 115 MMHG | TEMPERATURE: 98.5 F | HEART RATE: 72 BPM | RESPIRATION RATE: 18 BRPM

## 2021-07-12 DIAGNOSIS — M32.9 LUPUS (HCC): Primary | ICD-10-CM

## 2021-07-12 DIAGNOSIS — G35 EXACERBATION OF MULTIPLE SCLEROSIS (HCC): ICD-10-CM

## 2021-07-12 PROCEDURE — 2580000003 HC RX 258: Performed by: PSYCHIATRY & NEUROLOGY

## 2021-07-12 PROCEDURE — 6360000002 HC RX W HCPCS: Performed by: PSYCHIATRY & NEUROLOGY

## 2021-07-12 PROCEDURE — 96365 THER/PROPH/DIAG IV INF INIT: CPT

## 2021-07-12 RX ORDER — SODIUM CHLORIDE 0.9 % (FLUSH) 0.9 %
5-40 SYRINGE (ML) INJECTION PRN
Status: CANCELLED | OUTPATIENT
Start: 2021-07-13

## 2021-07-12 RX ORDER — EPINEPHRINE 1 MG/ML
0.3 INJECTION, SOLUTION, CONCENTRATE INTRAVENOUS
Status: CANCELLED | OUTPATIENT
Start: 2021-09-06

## 2021-07-12 RX ORDER — HEPARIN SODIUM (PORCINE) LOCK FLUSH IV SOLN 100 UNIT/ML 100 UNIT/ML
500 SOLUTION INTRAVENOUS PRN
Status: CANCELLED | OUTPATIENT
Start: 2021-07-13

## 2021-07-12 RX ORDER — DIPHENHYDRAMINE HCL 25 MG
50 TABLET ORAL ONCE
Status: CANCELLED
Start: 2021-09-06 | End: 2021-09-06

## 2021-07-12 RX ORDER — DIPHENHYDRAMINE HYDROCHLORIDE 50 MG/ML
25 INJECTION INTRAMUSCULAR; INTRAVENOUS PRN
Status: CANCELLED | OUTPATIENT
Start: 2021-09-06

## 2021-07-12 RX ORDER — METHYLPREDNISOLONE SODIUM SUCCINATE 125 MG/2ML
125 INJECTION, POWDER, LYOPHILIZED, FOR SOLUTION INTRAMUSCULAR; INTRAVENOUS ONCE
Status: CANCELLED | OUTPATIENT
Start: 2021-09-06

## 2021-07-12 RX ORDER — ACETAMINOPHEN 325 MG/1
650 TABLET ORAL ONCE
Status: CANCELLED | OUTPATIENT
Start: 2021-09-06

## 2021-07-12 RX ORDER — SODIUM CHLORIDE 9 MG/ML
25 INJECTION, SOLUTION INTRAVENOUS PRN
Status: CANCELLED | OUTPATIENT
Start: 2021-07-13

## 2021-07-12 RX ADMIN — METHYLPREDNISOLONE SODIUM SUCCINATE 1000 MG: 1 INJECTION, POWDER, LYOPHILIZED, FOR SOLUTION INTRAMUSCULAR; INTRAVENOUS at 14:28

## 2021-07-13 ENCOUNTER — HOSPITAL ENCOUNTER (OUTPATIENT)
Dept: INFUSION THERAPY | Age: 46
Setting detail: INFUSION SERIES
Discharge: HOME | End: 2021-07-13
Payer: MEDICARE

## 2021-07-13 VITALS
RESPIRATION RATE: 18 BRPM | SYSTOLIC BLOOD PRESSURE: 120 MMHG | DIASTOLIC BLOOD PRESSURE: 74 MMHG | TEMPERATURE: 98 F | HEART RATE: 76 BPM

## 2021-07-13 DIAGNOSIS — M32.9 LUPUS (HCC): Primary | ICD-10-CM

## 2021-07-13 DIAGNOSIS — G35 EXACERBATION OF MULTIPLE SCLEROSIS (HCC): ICD-10-CM

## 2021-07-13 PROCEDURE — 2580000003 HC RX 258: Performed by: PSYCHIATRY & NEUROLOGY

## 2021-07-13 PROCEDURE — 6360000002 HC RX W HCPCS: Performed by: PSYCHIATRY & NEUROLOGY

## 2021-07-13 PROCEDURE — 96365 THER/PROPH/DIAG IV INF INIT: CPT

## 2021-07-13 RX ORDER — SODIUM CHLORIDE 9 MG/ML
25 INJECTION, SOLUTION INTRAVENOUS PRN
Status: CANCELLED | OUTPATIENT
Start: 2021-07-14

## 2021-07-13 RX ORDER — SODIUM CHLORIDE 0.9 % (FLUSH) 0.9 %
5-40 SYRINGE (ML) INJECTION PRN
Status: CANCELLED | OUTPATIENT
Start: 2021-07-14

## 2021-07-13 RX ORDER — HEPARIN SODIUM (PORCINE) LOCK FLUSH IV SOLN 100 UNIT/ML 100 UNIT/ML
500 SOLUTION INTRAVENOUS PRN
Status: CANCELLED | OUTPATIENT
Start: 2021-07-14

## 2021-07-13 RX ADMIN — METHYLPREDNISOLONE SODIUM SUCCINATE 1000 MG: 1 INJECTION, POWDER, LYOPHILIZED, FOR SOLUTION INTRAMUSCULAR; INTRAVENOUS at 14:22

## 2021-07-14 ENCOUNTER — HOSPITAL ENCOUNTER (OUTPATIENT)
Dept: INFUSION THERAPY | Age: 46
Setting detail: INFUSION SERIES
Discharge: HOME | End: 2021-07-14
Payer: MEDICARE

## 2021-07-14 VITALS
DIASTOLIC BLOOD PRESSURE: 79 MMHG | HEART RATE: 82 BPM | SYSTOLIC BLOOD PRESSURE: 120 MMHG | RESPIRATION RATE: 18 BRPM | OXYGEN SATURATION: 100 % | TEMPERATURE: 97.8 F

## 2021-07-14 DIAGNOSIS — G35 EXACERBATION OF MULTIPLE SCLEROSIS (HCC): ICD-10-CM

## 2021-07-14 DIAGNOSIS — M32.9 LUPUS (HCC): Primary | ICD-10-CM

## 2021-07-14 PROCEDURE — 6360000002 HC RX W HCPCS: Performed by: PSYCHIATRY & NEUROLOGY

## 2021-07-14 PROCEDURE — 96365 THER/PROPH/DIAG IV INF INIT: CPT

## 2021-07-14 PROCEDURE — 2580000003 HC RX 258: Performed by: PSYCHIATRY & NEUROLOGY

## 2021-07-14 RX ORDER — DIPHENHYDRAMINE HCL 25 MG
50 TABLET ORAL ONCE
Status: CANCELLED
Start: 2021-09-06 | End: 2021-09-06

## 2021-07-14 RX ORDER — HEPARIN SODIUM (PORCINE) LOCK FLUSH IV SOLN 100 UNIT/ML 100 UNIT/ML
500 SOLUTION INTRAVENOUS PRN
Status: CANCELLED | OUTPATIENT
Start: 2021-07-14

## 2021-07-14 RX ORDER — SODIUM CHLORIDE 0.9 % (FLUSH) 0.9 %
5-40 SYRINGE (ML) INJECTION PRN
Status: CANCELLED | OUTPATIENT
Start: 2021-07-14

## 2021-07-14 RX ORDER — DIPHENHYDRAMINE HYDROCHLORIDE 50 MG/ML
25 INJECTION INTRAMUSCULAR; INTRAVENOUS PRN
Status: CANCELLED | OUTPATIENT
Start: 2021-09-06

## 2021-07-14 RX ORDER — SODIUM CHLORIDE 9 MG/ML
25 INJECTION, SOLUTION INTRAVENOUS PRN
Status: CANCELLED | OUTPATIENT
Start: 2021-07-14

## 2021-07-14 RX ORDER — METHYLPREDNISOLONE SODIUM SUCCINATE 125 MG/2ML
125 INJECTION, POWDER, LYOPHILIZED, FOR SOLUTION INTRAMUSCULAR; INTRAVENOUS ONCE
Status: CANCELLED | OUTPATIENT
Start: 2021-09-06

## 2021-07-14 RX ORDER — EPINEPHRINE 1 MG/ML
0.3 INJECTION, SOLUTION, CONCENTRATE INTRAVENOUS
Status: CANCELLED | OUTPATIENT
Start: 2021-09-06

## 2021-07-14 RX ORDER — ACETAMINOPHEN 325 MG/1
650 TABLET ORAL ONCE
Status: CANCELLED | OUTPATIENT
Start: 2021-09-06

## 2021-07-14 RX ADMIN — METHYLPREDNISOLONE SODIUM SUCCINATE 1000 MG: 1 INJECTION, POWDER, LYOPHILIZED, FOR SOLUTION INTRAMUSCULAR; INTRAVENOUS at 14:17

## 2021-09-08 ENCOUNTER — TELEPHONE (OUTPATIENT)
Dept: NEUROLOGY | Age: 46
End: 2021-09-08

## 2021-09-08 NOTE — TELEPHONE ENCOUNTER
Patient called to report MS flare with vision loss and pain. She was on Acthar but had to hold due to Hysterectomy on 8-5-21, her incision opened a few days later and she still has an open wound which is being pack daily. She request 3 days of IV steroids. She did have IV steroids on 7-12, 13, and 14th.

## 2021-09-17 ENCOUNTER — ANESTHESIA EVENT (OUTPATIENT)
Dept: PERIOP | Facility: HOSPITAL | Age: 46
End: 2021-09-17

## 2021-09-17 ENCOUNTER — HOSPITAL ENCOUNTER (INPATIENT)
Facility: HOSPITAL | Age: 46
LOS: 2 days | Discharge: LEFT BEFORE DISCHARGE | End: 2021-09-19
Attending: STUDENT IN AN ORGANIZED HEALTH CARE EDUCATION/TRAINING PROGRAM | Admitting: STUDENT IN AN ORGANIZED HEALTH CARE EDUCATION/TRAINING PROGRAM

## 2021-09-17 ENCOUNTER — ANESTHESIA (OUTPATIENT)
Dept: PERIOP | Facility: HOSPITAL | Age: 46
End: 2021-09-17

## 2021-09-17 DIAGNOSIS — Z78.9 DECREASED ACTIVITIES OF DAILY LIVING (ADL): ICD-10-CM

## 2021-09-17 DIAGNOSIS — K56.2 VOLVULUS (HCC): ICD-10-CM

## 2021-09-17 PROBLEM — K45.8 INTERNAL HERNIA: Status: ACTIVE | Noted: 2021-09-17

## 2021-09-17 LAB
ABO GROUP BLD: NORMAL
BLD GP AB SCN SERPL QL: NEGATIVE
RH BLD: POSITIVE
T&S EXPIRATION DATE: NORMAL

## 2021-09-17 PROCEDURE — 25010000002 HYDROMORPHONE PER 4 MG: Performed by: ANESTHESIOLOGY

## 2021-09-17 PROCEDURE — 0DQV0ZZ REPAIR MESENTERY, OPEN APPROACH: ICD-10-PCS | Performed by: STUDENT IN AN ORGANIZED HEALTH CARE EDUCATION/TRAINING PROGRAM

## 2021-09-17 PROCEDURE — 94799 UNLISTED PULMONARY SVC/PX: CPT

## 2021-09-17 PROCEDURE — G0378 HOSPITAL OBSERVATION PER HR: HCPCS

## 2021-09-17 PROCEDURE — 25010000002 HYDROMORPHONE PER 4 MG: Performed by: STUDENT IN AN ORGANIZED HEALTH CARE EDUCATION/TRAINING PROGRAM

## 2021-09-17 PROCEDURE — 25010000002 MIDAZOLAM PER 1 MG: Performed by: ANESTHESIOLOGY

## 2021-09-17 PROCEDURE — 0WPF0JZ REMOVAL OF SYNTHETIC SUBSTITUTE FROM ABDOMINAL WALL, OPEN APPROACH: ICD-10-PCS | Performed by: STUDENT IN AN ORGANIZED HEALTH CARE EDUCATION/TRAINING PROGRAM

## 2021-09-17 PROCEDURE — 25010000002 HEPARIN (PORCINE) PER 1000 UNITS: Performed by: STUDENT IN AN ORGANIZED HEALTH CARE EDUCATION/TRAINING PROGRAM

## 2021-09-17 PROCEDURE — 0DJ08ZZ INSPECTION OF UPPER INTESTINAL TRACT, VIA NATURAL OR ARTIFICIAL OPENING ENDOSCOPIC: ICD-10-PCS | Performed by: STUDENT IN AN ORGANIZED HEALTH CARE EDUCATION/TRAINING PROGRAM

## 2021-09-17 PROCEDURE — 25010000002 CEFAZOLIN PER 500 MG: Performed by: STUDENT IN AN ORGANIZED HEALTH CARE EDUCATION/TRAINING PROGRAM

## 2021-09-17 PROCEDURE — 43999 UNLISTED PROCEDURE STOMACH: CPT | Performed by: SURGERY

## 2021-09-17 PROCEDURE — 25010000002 LORAZEPAM PER 2 MG: Performed by: STUDENT IN AN ORGANIZED HEALTH CARE EDUCATION/TRAINING PROGRAM

## 2021-09-17 PROCEDURE — 0DP60CZ REMOVAL OF EXTRALUMINAL DEVICE FROM STOMACH, OPEN APPROACH: ICD-10-PCS | Performed by: SURGERY

## 2021-09-17 PROCEDURE — 0DNW0ZZ RELEASE PERITONEUM, OPEN APPROACH: ICD-10-PCS | Performed by: STUDENT IN AN ORGANIZED HEALTH CARE EDUCATION/TRAINING PROGRAM

## 2021-09-17 PROCEDURE — 86901 BLOOD TYPING SEROLOGIC RH(D): CPT | Performed by: ANESTHESIOLOGY

## 2021-09-17 PROCEDURE — 86850 RBC ANTIBODY SCREEN: CPT | Performed by: ANESTHESIOLOGY

## 2021-09-17 PROCEDURE — 86900 BLOOD TYPING SEROLOGIC ABO: CPT | Performed by: ANESTHESIOLOGY

## 2021-09-17 PROCEDURE — 87040 BLOOD CULTURE FOR BACTERIA: CPT | Performed by: SPECIALIST

## 2021-09-17 PROCEDURE — 88300 SURGICAL PATH GROSS: CPT | Performed by: STUDENT IN AN ORGANIZED HEALTH CARE EDUCATION/TRAINING PROGRAM

## 2021-09-17 PROCEDURE — 94664 DEMO&/EVAL PT USE INHALER: CPT

## 2021-09-17 PROCEDURE — 25010000002 PROPOFOL 10 MG/ML EMULSION: Performed by: NURSE ANESTHETIST, CERTIFIED REGISTERED

## 2021-09-17 RX ORDER — LIDOCAINE HYDROCHLORIDE 10 MG/ML
0.5 INJECTION, SOLUTION EPIDURAL; INFILTRATION; INTRACAUDAL; PERINEURAL ONCE AS NEEDED
Status: DISCONTINUED | OUTPATIENT
Start: 2021-09-17 | End: 2021-09-17 | Stop reason: HOSPADM

## 2021-09-17 RX ORDER — FLUMAZENIL 0.1 MG/ML
0.2 INJECTION INTRAVENOUS AS NEEDED
Status: DISCONTINUED | OUTPATIENT
Start: 2021-09-17 | End: 2021-09-17 | Stop reason: HOSPADM

## 2021-09-17 RX ORDER — FAMOTIDINE 10 MG/ML
20 INJECTION, SOLUTION INTRAVENOUS
Status: COMPLETED | OUTPATIENT
Start: 2021-09-17 | End: 2021-09-17

## 2021-09-17 RX ORDER — SODIUM CHLORIDE, SODIUM LACTATE, POTASSIUM CHLORIDE, CALCIUM CHLORIDE 600; 310; 30; 20 MG/100ML; MG/100ML; MG/100ML; MG/100ML
1000 INJECTION, SOLUTION INTRAVENOUS CONTINUOUS
Status: DISCONTINUED | OUTPATIENT
Start: 2021-09-17 | End: 2021-09-17

## 2021-09-17 RX ORDER — SODIUM CHLORIDE, SODIUM LACTATE, POTASSIUM CHLORIDE, CALCIUM CHLORIDE 600; 310; 30; 20 MG/100ML; MG/100ML; MG/100ML; MG/100ML
100 INJECTION, SOLUTION INTRAVENOUS CONTINUOUS
Status: DISCONTINUED | OUTPATIENT
Start: 2021-09-17 | End: 2021-09-17

## 2021-09-17 RX ORDER — DIVALPROEX SODIUM 500 MG/1
1000 TABLET, DELAYED RELEASE ORAL
COMMUNITY

## 2021-09-17 RX ORDER — MAGNESIUM HYDROXIDE 1200 MG/15ML
LIQUID ORAL AS NEEDED
Status: DISCONTINUED | OUTPATIENT
Start: 2021-09-17 | End: 2021-09-17 | Stop reason: HOSPADM

## 2021-09-17 RX ORDER — HYDROMORPHONE HYDROCHLORIDE 1 MG/ML
0.5 INJECTION, SOLUTION INTRAMUSCULAR; INTRAVENOUS; SUBCUTANEOUS
Status: DISCONTINUED | OUTPATIENT
Start: 2021-09-17 | End: 2021-09-19 | Stop reason: HOSPADM

## 2021-09-17 RX ORDER — PROPOFOL 10 MG/ML
VIAL (ML) INTRAVENOUS AS NEEDED
Status: DISCONTINUED | OUTPATIENT
Start: 2021-09-17 | End: 2021-09-17 | Stop reason: SURG

## 2021-09-17 RX ORDER — LIDOCAINE 50 MG/G
2 PATCH TOPICAL
Status: DISCONTINUED | OUTPATIENT
Start: 2021-09-17 | End: 2021-09-19 | Stop reason: HOSPADM

## 2021-09-17 RX ORDER — SODIUM CHLORIDE, SODIUM LACTATE, POTASSIUM CHLORIDE, CALCIUM CHLORIDE 600; 310; 30; 20 MG/100ML; MG/100ML; MG/100ML; MG/100ML
75 INJECTION, SOLUTION INTRAVENOUS CONTINUOUS
Status: DISCONTINUED | OUTPATIENT
Start: 2021-09-17 | End: 2021-09-19 | Stop reason: HOSPADM

## 2021-09-17 RX ORDER — MIDAZOLAM HYDROCHLORIDE 1 MG/ML
1 INJECTION INTRAMUSCULAR; INTRAVENOUS
Status: DISCONTINUED | OUTPATIENT
Start: 2021-09-17 | End: 2021-09-17 | Stop reason: HOSPADM

## 2021-09-17 RX ORDER — AMLODIPINE BESYLATE 10 MG/1
10 TABLET ORAL DAILY
COMMUNITY

## 2021-09-17 RX ORDER — ALPRAZOLAM 0.5 MG/1
0.5 TABLET ORAL 2 TIMES DAILY
COMMUNITY

## 2021-09-17 RX ORDER — DIAZEPAM 5 MG/1
5 TABLET ORAL 2 TIMES DAILY PRN
COMMUNITY

## 2021-09-17 RX ORDER — FENTANYL CITRATE 50 UG/ML
25 INJECTION, SOLUTION INTRAMUSCULAR; INTRAVENOUS
Status: DISCONTINUED | OUTPATIENT
Start: 2021-09-17 | End: 2021-09-17 | Stop reason: HOSPADM

## 2021-09-17 RX ORDER — SODIUM CHLORIDE 0.9 % (FLUSH) 0.9 %
3-10 SYRINGE (ML) INJECTION AS NEEDED
Status: DISCONTINUED | OUTPATIENT
Start: 2021-09-17 | End: 2021-09-17 | Stop reason: HOSPADM

## 2021-09-17 RX ORDER — LIDOCAINE HYDROCHLORIDE 20 MG/ML
INJECTION, SOLUTION EPIDURAL; INFILTRATION; INTRACAUDAL; PERINEURAL AS NEEDED
Status: DISCONTINUED | OUTPATIENT
Start: 2021-09-17 | End: 2021-09-17 | Stop reason: SURG

## 2021-09-17 RX ORDER — EPHEDRINE SULFATE 50 MG/ML
INJECTION, SOLUTION INTRAVENOUS AS NEEDED
Status: DISCONTINUED | OUTPATIENT
Start: 2021-09-17 | End: 2021-09-17 | Stop reason: SURG

## 2021-09-17 RX ORDER — AZELASTINE 1 MG/ML
1 SPRAY, METERED NASAL 2 TIMES DAILY PRN
COMMUNITY

## 2021-09-17 RX ORDER — DRONABINOL 5 MG/1
10 CAPSULE ORAL
COMMUNITY

## 2021-09-17 RX ORDER — LIDOCAINE HYDROCHLORIDE 40 MG/ML
SOLUTION TOPICAL AS NEEDED
Status: DISCONTINUED | OUTPATIENT
Start: 2021-09-17 | End: 2021-09-17 | Stop reason: SURG

## 2021-09-17 RX ORDER — PHENYLEPHRINE HCL IN 0.9% NACL 1 MG/10 ML
SYRINGE (ML) INTRAVENOUS AS NEEDED
Status: DISCONTINUED | OUTPATIENT
Start: 2021-09-17 | End: 2021-09-17 | Stop reason: SURG

## 2021-09-17 RX ORDER — SODIUM CHLORIDE 0.9 % (FLUSH) 0.9 %
10 SYRINGE (ML) INJECTION EVERY 12 HOURS SCHEDULED
Status: DISCONTINUED | OUTPATIENT
Start: 2021-09-17 | End: 2021-09-19 | Stop reason: HOSPADM

## 2021-09-17 RX ORDER — HYDROMORPHONE HYDROCHLORIDE 1 MG/ML
0.5 INJECTION, SOLUTION INTRAMUSCULAR; INTRAVENOUS; SUBCUTANEOUS
Status: DISCONTINUED | OUTPATIENT
Start: 2021-09-17 | End: 2021-09-17 | Stop reason: HOSPADM

## 2021-09-17 RX ORDER — ROCURONIUM BROMIDE 10 MG/ML
INJECTION, SOLUTION INTRAVENOUS AS NEEDED
Status: DISCONTINUED | OUTPATIENT
Start: 2021-09-17 | End: 2021-09-17 | Stop reason: SURG

## 2021-09-17 RX ORDER — SODIUM CHLORIDE 0.9 % (FLUSH) 0.9 %
3 SYRINGE (ML) INJECTION EVERY 12 HOURS SCHEDULED
Status: DISCONTINUED | OUTPATIENT
Start: 2021-09-17 | End: 2021-09-17 | Stop reason: HOSPADM

## 2021-09-17 RX ORDER — LABETALOL HYDROCHLORIDE 5 MG/ML
5 INJECTION, SOLUTION INTRAVENOUS
Status: DISCONTINUED | OUTPATIENT
Start: 2021-09-17 | End: 2021-09-17 | Stop reason: HOSPADM

## 2021-09-17 RX ORDER — SUFENTANIL CITRATE 50 UG/ML
INJECTION EPIDURAL; INTRAVENOUS AS NEEDED
Status: DISCONTINUED | OUTPATIENT
Start: 2021-09-17 | End: 2021-09-17 | Stop reason: SURG

## 2021-09-17 RX ORDER — DIPHENHYDRAMINE HYDROCHLORIDE 50 MG/ML
25 INJECTION INTRAMUSCULAR; INTRAVENOUS EVERY 6 HOURS PRN
Status: DISCONTINUED | OUTPATIENT
Start: 2021-09-17 | End: 2021-09-19 | Stop reason: HOSPADM

## 2021-09-17 RX ORDER — NALOXONE HCL 0.4 MG/ML
0.04 VIAL (ML) INJECTION AS NEEDED
Status: DISCONTINUED | OUTPATIENT
Start: 2021-09-17 | End: 2021-09-17 | Stop reason: HOSPADM

## 2021-09-17 RX ORDER — HEPARIN SODIUM 5000 [USP'U]/ML
5000 INJECTION, SOLUTION INTRAVENOUS; SUBCUTANEOUS EVERY 8 HOURS SCHEDULED
Status: DISCONTINUED | OUTPATIENT
Start: 2021-09-17 | End: 2021-09-19 | Stop reason: HOSPADM

## 2021-09-17 RX ORDER — FLUCONAZOLE 150 MG/1
150 TABLET ORAL 3 TIMES DAILY
COMMUNITY

## 2021-09-17 RX ORDER — BUPIVACAINE HCL/0.9 % NACL/PF 0.1 %
2 PLASTIC BAG, INJECTION (ML) EPIDURAL ONCE
Status: COMPLETED | OUTPATIENT
Start: 2021-09-17 | End: 2021-09-17

## 2021-09-17 RX ORDER — ONDANSETRON 2 MG/ML
4 INJECTION INTRAMUSCULAR; INTRAVENOUS EVERY 6 HOURS PRN
Status: DISCONTINUED | OUTPATIENT
Start: 2021-09-17 | End: 2021-09-19 | Stop reason: HOSPADM

## 2021-09-17 RX ORDER — HEPARIN SODIUM 5000 [USP'U]/ML
5000 INJECTION, SOLUTION INTRAVENOUS; SUBCUTANEOUS ONCE
Status: COMPLETED | OUTPATIENT
Start: 2021-09-17 | End: 2021-09-17

## 2021-09-17 RX ORDER — LIDOCAINE HYDROCHLORIDE 10 MG/ML
0.5 INJECTION, SOLUTION EPIDURAL; INFILTRATION; INTRACAUDAL; PERINEURAL ONCE AS NEEDED
Status: COMPLETED | OUTPATIENT
Start: 2021-09-17 | End: 2021-09-17

## 2021-09-17 RX ORDER — ONDANSETRON 4 MG/1
4 TABLET, FILM COATED ORAL EVERY 6 HOURS PRN
Status: DISCONTINUED | OUTPATIENT
Start: 2021-09-17 | End: 2021-09-19 | Stop reason: HOSPADM

## 2021-09-17 RX ORDER — NALOXONE HCL 0.4 MG/ML
0.4 VIAL (ML) INJECTION
Status: DISCONTINUED | OUTPATIENT
Start: 2021-09-17 | End: 2021-09-19 | Stop reason: HOSPADM

## 2021-09-17 RX ORDER — IPRATROPIUM BROMIDE AND ALBUTEROL SULFATE 2.5; .5 MG/3ML; MG/3ML
3 SOLUTION RESPIRATORY (INHALATION) EVERY 4 HOURS PRN
Status: DISCONTINUED | OUTPATIENT
Start: 2021-09-17 | End: 2021-09-19 | Stop reason: HOSPADM

## 2021-09-17 RX ORDER — LORAZEPAM 2 MG/ML
0.5 INJECTION INTRAMUSCULAR EVERY 4 HOURS PRN
Status: DISCONTINUED | OUTPATIENT
Start: 2021-09-17 | End: 2021-09-19 | Stop reason: HOSPADM

## 2021-09-17 RX ORDER — BUPIVACAINE HCL/0.9 % NACL/PF 0.1 %
2 PLASTIC BAG, INJECTION (ML) EPIDURAL EVERY 8 HOURS
Status: DISCONTINUED | OUTPATIENT
Start: 2021-09-17 | End: 2021-09-19 | Stop reason: HOSPADM

## 2021-09-17 RX ORDER — SODIUM CHLORIDE 0.9 % (FLUSH) 0.9 %
3 SYRINGE (ML) INJECTION AS NEEDED
Status: DISCONTINUED | OUTPATIENT
Start: 2021-09-17 | End: 2021-09-17 | Stop reason: HOSPADM

## 2021-09-17 RX ORDER — SODIUM CHLORIDE 0.9 % (FLUSH) 0.9 %
10 SYRINGE (ML) INJECTION AS NEEDED
Status: DISCONTINUED | OUTPATIENT
Start: 2021-09-17 | End: 2021-09-17 | Stop reason: HOSPADM

## 2021-09-17 RX ORDER — ONDANSETRON 2 MG/ML
4 INJECTION INTRAMUSCULAR; INTRAVENOUS AS NEEDED
Status: DISCONTINUED | OUTPATIENT
Start: 2021-09-17 | End: 2021-09-17 | Stop reason: HOSPADM

## 2021-09-17 RX ORDER — PANTOPRAZOLE SODIUM 40 MG/10ML
40 INJECTION, POWDER, LYOPHILIZED, FOR SOLUTION INTRAVENOUS
Status: DISCONTINUED | OUTPATIENT
Start: 2021-09-18 | End: 2021-09-19 | Stop reason: HOSPADM

## 2021-09-17 RX ORDER — DICYCLOMINE HYDROCHLORIDE 10 MG/1
10 CAPSULE ORAL 2 TIMES DAILY PRN
COMMUNITY

## 2021-09-17 RX ORDER — SODIUM CHLORIDE 0.9 % (FLUSH) 0.9 %
10 SYRINGE (ML) INJECTION AS NEEDED
Status: DISCONTINUED | OUTPATIENT
Start: 2021-09-17 | End: 2021-09-19 | Stop reason: HOSPADM

## 2021-09-17 RX ORDER — DIVALPROEX SODIUM 500 MG/1
500 TABLET, DELAYED RELEASE ORAL DAILY
COMMUNITY

## 2021-09-17 RX ADMIN — SODIUM CHLORIDE, POTASSIUM CHLORIDE, SODIUM LACTATE AND CALCIUM CHLORIDE 1000 ML: 600; 310; 30; 20 INJECTION, SOLUTION INTRAVENOUS at 08:00

## 2021-09-17 RX ADMIN — MIDAZOLAM 2 MG: 1 INJECTION INTRAMUSCULAR; INTRAVENOUS at 07:59

## 2021-09-17 RX ADMIN — FAMOTIDINE 20 MG: 10 INJECTION INTRAVENOUS at 07:59

## 2021-09-17 RX ADMIN — SODIUM CHLORIDE, POTASSIUM CHLORIDE, SODIUM LACTATE AND CALCIUM CHLORIDE 125 ML/HR: 600; 310; 30; 20 INJECTION, SOLUTION INTRAVENOUS at 12:16

## 2021-09-17 RX ADMIN — Medication 2 G: at 08:15

## 2021-09-17 RX ADMIN — SUFENTANIL CITRATE 10 MCG: 50 INJECTION EPIDURAL; INTRAVENOUS at 10:01

## 2021-09-17 RX ADMIN — SUFENTANIL CITRATE 10 MCG: 50 INJECTION EPIDURAL; INTRAVENOUS at 08:03

## 2021-09-17 RX ADMIN — EPHEDRINE SULFATE 25 MG: 50 INJECTION INTRAVENOUS at 10:17

## 2021-09-17 RX ADMIN — HYDROMORPHONE HYDROCHLORIDE 0.5 MG: 1 INJECTION, SOLUTION INTRAMUSCULAR; INTRAVENOUS; SUBCUTANEOUS at 14:16

## 2021-09-17 RX ADMIN — SODIUM CHLORIDE, POTASSIUM CHLORIDE, SODIUM LACTATE AND CALCIUM CHLORIDE 1000 ML: 600; 310; 30; 20 INJECTION, SOLUTION INTRAVENOUS at 10:54

## 2021-09-17 RX ADMIN — SUFENTANIL CITRATE 15 MCG: 50 INJECTION EPIDURAL; INTRAVENOUS at 09:46

## 2021-09-17 RX ADMIN — LIDOCAINE HYDROCHLORIDE 1 EACH: 40 SOLUTION TOPICAL at 08:03

## 2021-09-17 RX ADMIN — HYDROMORPHONE HYDROCHLORIDE 0.5 MG: 1 INJECTION, SOLUTION INTRAMUSCULAR; INTRAVENOUS; SUBCUTANEOUS at 11:35

## 2021-09-17 RX ADMIN — SUFENTANIL CITRATE 15 MCG: 50 INJECTION EPIDURAL; INTRAVENOUS at 08:13

## 2021-09-17 RX ADMIN — LORAZEPAM 0.5 MG: 2 INJECTION INTRAMUSCULAR; INTRAVENOUS at 19:34

## 2021-09-17 RX ADMIN — HYDROMORPHONE HYDROCHLORIDE 0.5 MG: 1 INJECTION, SOLUTION INTRAMUSCULAR; INTRAVENOUS; SUBCUTANEOUS at 23:02

## 2021-09-17 RX ADMIN — LIDOCAINE HYDROCHLORIDE 0.5 ML: 10 INJECTION, SOLUTION EPIDURAL; INFILTRATION; INTRACAUDAL; PERINEURAL at 07:59

## 2021-09-17 RX ADMIN — HEPARIN SODIUM 5000 UNITS: 5000 INJECTION, SOLUTION INTRAVENOUS; SUBCUTANEOUS at 07:59

## 2021-09-17 RX ADMIN — SUGAMMADEX 200 MG: 100 INJECTION, SOLUTION INTRAVENOUS at 10:04

## 2021-09-17 RX ADMIN — CEFAZOLIN 2 G: 10 INJECTION, POWDER, FOR SOLUTION INTRAVENOUS at 17:13

## 2021-09-17 RX ADMIN — HEPARIN SODIUM 5000 UNITS: 5000 INJECTION INTRAVENOUS; SUBCUTANEOUS at 14:59

## 2021-09-17 RX ADMIN — ROCURONIUM BROMIDE 20 MG: 50 INJECTION INTRAVENOUS at 08:03

## 2021-09-17 RX ADMIN — HEPARIN SODIUM 5000 UNITS: 5000 INJECTION INTRAVENOUS; SUBCUTANEOUS at 21:48

## 2021-09-17 RX ADMIN — SODIUM CHLORIDE, POTASSIUM CHLORIDE, SODIUM LACTATE AND CALCIUM CHLORIDE 1000 ML: 600; 310; 30; 20 INJECTION, SOLUTION INTRAVENOUS at 22:45

## 2021-09-17 RX ADMIN — Medication 1000 MCG: at 08:10

## 2021-09-17 RX ADMIN — EPHEDRINE SULFATE 25 MG: 50 INJECTION INTRAVENOUS at 10:22

## 2021-09-17 RX ADMIN — HYDROMORPHONE HYDROCHLORIDE 0.5 MG: 1 INJECTION, SOLUTION INTRAMUSCULAR; INTRAVENOUS; SUBCUTANEOUS at 17:14

## 2021-09-17 RX ADMIN — PROPOFOL 200 MG: 10 INJECTION, EMULSION INTRAVENOUS at 08:03

## 2021-09-17 RX ADMIN — Medication 50 MCG: at 09:54

## 2021-09-17 RX ADMIN — SODIUM CHLORIDE, POTASSIUM CHLORIDE, SODIUM LACTATE AND CALCIUM CHLORIDE 100 ML/HR: 600; 310; 30; 20 INJECTION, SOLUTION INTRAVENOUS at 08:00

## 2021-09-17 RX ADMIN — LIDOCAINE 2 PATCH: 50 PATCH CUTANEOUS at 14:17

## 2021-09-17 RX ADMIN — LIDOCAINE HYDROCHLORIDE 100 MG: 20 INJECTION, SOLUTION EPIDURAL; INFILTRATION; INTRACAUDAL; PERINEURAL at 08:03

## 2021-09-17 NOTE — PROGRESS NOTES
RT Nebulizer Protocol    Assessment tool to be used for patients with existing breathing treatments ordered by hospitalists                                                                  0  1  2  3  4      Respiratory History   No Smoking      Smoking History      1 Pack/Day   X   Pulmonary Disease      Exacerbation        Respiratory Rate   Normal   X   20-25      Dyspneic      Accessory Muscles      Severe Dyspnea        Breath Sounds   Clear   X   Crackles      Crackles/ Rhonchi      Wheezing      Absent/ Severe Wheezing        Chest   X-ray   Clear      1 Lobe Infiltration/ Consolidation/ PE      2 Lobe Same Lung Infiltration/ Consolidation/ PE       2 Lobe Infiltration/ Both Lungs/ Consolidation/ PE      Both Lungs/ More Than 1 Lobe/ Atelectasis/ Consolidation/  PE        Cough   Strong Non- Productive   X   Excessive Secretions/ Strong Cough      Excessive Secretions/ Weak Cough      Thick Bronchial Secretions/ Weak Cough      Thick Bronchial Secretions/ No Cough        Total Patient Score =  2  0-4=Q4 PRN  5-9=TID and Q4 PRN  10-14=QID and Q3 PRN  15-19=Q4 and Q2 PRN  20=Q3 and Q2 PRN    Bronchopulmonary Hygiene (CPT)   Q4 ATC Copious secretions, dyspnea, unable to sleep, mucus plug    QID & Q4 PRN Moderate secretions    TID Small amounts of secretions w/ poor cough and history of secretions    BID Unable to deep breathe and cough spontaneously       Lung Expansion Therapy (PEP)   Q4 & PRN at night Severe atelectasis, poor oxygenation    QID  High risk for persistent atelectasis, existence of same    TID At risk for developing atelectasis    BID Unable to deep breathe and cough spontaneously     Instruct, 1 follow up Patients able to perform well on their own          DUO Q4PRN, RECOMMENDATIONS WILL CONTINUE TO MONITOR

## 2021-09-17 NOTE — ANESTHESIA POSTPROCEDURE EVALUATION
"Patient: Chelo Swenson    Procedure Summary     Date: 09/17/21 Room / Location:  [facility] OR 06 /  [facility] OR    Anesthesia Start: 0800 Anesthesia Stop: 1014    Procedures:       LAPAROTOMY EXPLORATORY, REDUCTION OF INTERNAL HERNIA, LYSIS OF ADHESIONS, LAP BAND REMOVAL, REMOVAL OF ABD WALL MESH, (N/A Abdomen)      ESOPHAGOGASTRODUODENOSCOPY, gastric band removal (N/A Esophagus) Diagnosis:     Surgeons: Jazmin Morgan MD; Pierre Arreguin MD Provider: Rolf Cobb CRNA    Anesthesia Type: Not recorded ASA Status: 3 - Emergent          Anesthesia Type: No value filed.    Vitals  Vitals Value Taken Time   /67 09/17/21 1150   Temp 97.6 °F (36.4 °C) 09/17/21 1145   Pulse 75 09/17/21 1152   Resp 14 09/17/21 1150   SpO2 100 % 09/17/21 1150   Vitals shown include unvalidated device data.        Post Anesthesia Care and Evaluation    Patient location during evaluation: PACU  Patient participation: complete - patient participated  Level of consciousness: awake and alert  Pain management: adequate  Airway patency: patent  Anesthetic complications: No anesthetic complications    Cardiovascular status: acceptable  Respiratory status: acceptable  Hydration status: acceptable    Comments: Blood pressure 101/67, pulse 76, temperature 97.6 °F (36.4 °C), temperature source Temporal, resp. rate 14, height 165.1 cm (65\"), weight 71.3 kg (157 lb 1.6 oz), last menstrual period 01/10/2013, SpO2 100 %, not currently breastfeeding.    Pt discharged from PACU based on iliana score >8      "

## 2021-09-17 NOTE — ANESTHESIA PREPROCEDURE EVALUATION
Anesthesia Evaluation     Patient summary reviewed and Nursing notes reviewed   no history of anesthetic complications:  NPO Solid Status: > 8 hours  NPO Liquid Status: > 8 hours           Airway   Mallampati: I  TM distance: >3 FB  Neck ROM: full  No difficulty expected  Dental      Pulmonary    (+) a smoker Current,   Cardiovascular   Exercise tolerance: good (4-7 METS)    (+) hypertension,       Neuro/Psych  (+) psychiatric history Bipolar and Schizophrenia,     (-) seizures, TIA, CVA  GI/Hepatic/Renal/Endo    (-) liver disease, no renal disease, diabetes    ROS Comment: S/p gastric bypass 2008, band placed over bypass 2012  Admitted with concern for volvulus, n/v pain x 2 days  Hysterectomy august 5 2021, myriam wolf  Open wound going to wound care    Musculoskeletal         ROS comment: Multiple sclerosis  lupus  Abdominal    Substance History      OB/GYN          Other                        Anesthesia Plan    ASA 3 - emergent       Rapid sequence  intravenous induction     Anesthetic plan, all risks, benefits, and alternatives have been provided, discussed and informed consent has been obtained with: patient.

## 2021-09-17 NOTE — H&P
Patient came in from [facility] this AM with concern for internal volvulus. Patient with peritonitis on exam. Outside hospital transferring provider did not give adequate history. Patient has had a gastric bypass with band over bypass, hysterectomy on 8/5/21 with open wound, and hernia repair. To OR emergently this AM. Prophylactic heparin and ancef. Place al in OR. Did discuss the patient with Dr. Arreguin this AM due to patient's bariatric history. Full H&P to follow.     Jazmin Morgan MD  09/17/21

## 2021-09-17 NOTE — OP NOTE
Exploratory laparotomy with removal of gastric band system and lysis of adhesions and intraoperative upper endoscopy     Pre operative diagnosis: Internal hernia with small bowel obstruction    Post op diagnosis: as above and findings of adhesions as well as gastric band tubing contributing to a focal cause of patient's small bowel obstruction    Indications: The patient was admitted to the hospital with history of small bowel obstruction concerns of internal hernia and volvulus of the small intestines.  Patient noted to have a history of a Deisi-en-Y gastric bypass and gastric band subsequently placed over the pouch several years ago.       Surgeons:  Pierre Arreguin MD, Dr. Jazmin Morgan MD    Anesthesia: General endotracheal anesthesia    ASA Class: 3      Procedure Details:    I was consulted for an intraoperative evaluation of the patient's gastric band and to remove the gastric band.  History was obtained intraoperatively which noted one of the focal causes of the patient's small bowel obstruction was related to the gastric band tubing.  It was decided to remove the gastric band and its system which I agreed with.  Dr. Morgan had subsequently taken down the majority of the intra-abdominal adhesions upon my arrival.  With further exploration.  She discovered the gastric band tubing.  I scrubbed into the case to remove the gastric band system.  The gastric band tubing was transected.  We subsequently followed the tubing to the proximal side where the band was positioned around the gastric pouch.  Careful dissection utilizing sharp instrumentation and cautery was used to follow the tract of the gastric band tubing until the gastric band buckle was identified.  Lysis of adhesions to free and identify the gastric band.  I utilized a hemostat bluntly to position underneath the gastric band and pouch.  I utilized Metzenbaum to transect across the gastric band safely.  It was noted that methylene blue was within  the gastric band balloon.  The band was then subsequently removed from around the gastric pouch.  I decided to do an intra-operative endoscopy to assess the lumen of the patient's gastric pouch and we performed a leak test.  This involved insufflating the gastric pouch with air and submerging the gastric pouch under saline.  No identification of gas bubbles were noted and intraluminally there was no evidence of injury.  I proceeded to the inferior limb which was also obtained.  I evacuated the air from the lumen of the inferior limb as well as the pouch and then safely removed the scope under direct visualization.  An NG tube was placed by anesthesia.  The gastric port was removed separately.  The 2 pieces of the gastric band system was reconnected at the back table to confirm all pieces were intact and removed from the patient.  Irrigation was suctioned away.  Subsequent instrument and lap pad count, closure, and dressings were performed by Dr. Morgan and team.    Estimated Blood Loss:  minimal           Drains: NG tube            Total IV Fluids: Please refer to main operative procedure notes            Specimens:   Specimens     ID Source Type Tests Collected By Collected At Frozen?    A Abdomen, Lower Hardware / Foreign Body · TISSUE PATHOLOGY EXAM   Jazmin Morgan MD 9/17/21 0134 No    Description: Gastric Band for gross identification    Comment: For gross identification    B Abdomen, Lower Hardware / Foreign Body · TISSUE PATHOLOGY EXAM   Jazmin Morgan MD 9/17/21 3266 No    Description: mesh for gross identification                 Implants: Gastric band removed as well as tubing and port site           Complications: None           Disposition: PACU - hemodynamically stable.           Condition: stable

## 2021-09-17 NOTE — ANESTHESIA PROCEDURE NOTES
Airway  Urgency: emergent    Date/Time: 9/17/2021 8:04 AM  Airway not difficult    General Information and Staff    Patient location during procedure: OR  CRNA: Rolf Cobb CRNA    Consent for Airway (if performed for an anesthetic, see related documentation for consents)  Patient identity confirmed: verbally with patient  Consent: No emergent situation. Verbal consent obtained. Written consent obtained.  Risks and benefits: risks, benefits and alternatives were discussed  Consent given by: patient      Indications and Patient Condition  Indications for airway management: airway protection    Preoxygenated: yes  MILS maintained throughout  Mask difficulty assessment: 1 - vent by mask    Final Airway Details  Final airway type: endotracheal airway      Successful airway: ETT  Cuffed: yes   Successful intubation technique: direct laryngoscopy  Endotracheal tube insertion site: oral  Blade: Scott  Blade size: 2  ETT size (mm): 7.5  Cormack-Lehane Classification: grade IIa - partial view of glottis  Placement verified by: chest auscultation   Cuff volume (mL): 10  Measured from: lips  ETT/EBT  to lips (cm): 21  Number of attempts at approach: 1  Assessment: lips, teeth, and gum same as pre-op and atraumatic intubation    Additional Comments  Rsi with cricoid

## 2021-09-17 NOTE — OP NOTE
Exploratory Laparotomy Operative Report:     Patient: Chelo Swenson  MRN: [id number]    YOB: 1975  Age: 46 y.o.  Sex: female  Unit: [address] Room/Bed: 364/1 Location: [facility]      Admitting Physician: TAY CAGE    Primary Care Physician: Provider, No Known             INDICATIONS: This is a 46 y.o. year old female who presented this AM with peritonitis. She has a significant past surgical history, most notable for a h/o gastric bypass. With imaging showing swirling of the mesentery (reviewed with Dr. Hodges), this is concerning for an internal hernia. She is covid negative. I had a long discussion with the patient on the surgical plan. I have recommended an exploratory laparotomy with reduction of internal hernia, possible bowel resection, possible removal of lap band, possible mesh removal. We discussed the risks of surgery including bleeding, infection, damage to surrounding structures, cardiovascular complications, and pulmonary complications. I also called Dr. Arreguin, bariatric surgeon, to request he be available in the case I need his assistance. He will be available if needed. Ancef and subcutaneous heparin ordered. Proceed to OR.     DATE OF OPERATION: 9/17/2021     Surgeon(s) and Role:  Panel 1:     * Tay Cage MD - Primary  Panel 2:     * Pierre Arreguin MD - Primary    ANESTHESIA: General     PREOPERATIVE DIAGNOSIS: Internal Hernia     POSTOPERATIVE DIAGNOSIS: Same    PROCEDURES PERFORMED:    (1) Exploratory Laparotomy   (2) Reduction of internal hernia   (3) Lysis of Adhesions >45 minutes   (4) Removal of Gastric Band (see Dr. Arreguin's operative report)   (5) EGD (see Dr. Arreguin's operative report)   (6) Excision of Abdominal Wall Mesh.     PROCEDURE DETAILS:   Consent was obtained. The patient was given pre-operative ancef and subcutaneous heparin. She was transferred to the OR and general anesthesia was induced. An NGT and al were placed. The abdomen was  prepped with chloraprep and draped in sterile fashion. A time out was performed. A midline incision was made. Dissection was carried down through the subcutaneous tissues with cautery. The mesh was encountered. I was able to get under the mesh inferiorly and enter the abdomen. Holding the mesh up to ensure no damage to underlying structures, the mesh was divided with the 10 blade. There were significant intra-abdominal adhesions which I spent >45 minutes lysing with a combination of sharp and cautery dissection. The small bowel was noted to be volvulized around the gastric band catheter as an internal hernia. Starting distally with the terminal ileum, I ran the small bowel proximally, and de-volvulized the small bowel. The small bowel was then splayed out and the mesentery was noted to be straight. I ran the small bowel from distal to proximal again. I did lyse intra-loop adhesions with scissors. At this point, the gastric band needed to be removed to prevent future episodes. I called Dr. Arreguin who came in to remove the gastric band and perform an EGD to rule out leak from the band removal. See his dictated operative report for the details of this portion of the operation. The gastric band was removed in two pieces and the two ends of the catheter aligned. It was sent for gross identification to pathology. At this point, I ran the bowel one final time from distal to proximal and there were no obstructions. Before I could close the abdomen, I inspected the old mesh. It appeared to be Gortex; it was balled up and concerning for a future small bowel obstruction. I removed the old mesh with cautery. Due to her weight loss, I was able to primary close the fascia. The fascia was closed from cephalad and caudad with #1 looped PDS. The skin was closed with staples. All laps, instrument and needle counts were correct. The patient tolerated the procedure well and was transferred to PACU in good condition.     Findings:  "internal hernia around the gastric band catheter, significant adhesions, Gortex mesh that was \"balled up\" in the  anterior abdominal wall    Estimated Blood Loss: 75mL  Complications: none apparent            Specimens:  (1) gastric band for gross identification   (2) intra-abdominal mesh for gross identification      Disposition: PACU - hemodynamically stable.           Condition: stable    Jazmin Morgan MD  09/17/2021       "

## 2021-09-17 NOTE — PLAN OF CARE
Goal Outcome Evaluation:           Progress: no change  Outcome Summary: SURGERY TODAY.  MIDLINE INCISION NOTED WITH GAUZE AND MEDIPORE TAPE. ABDOMINAL BINDER INTACT. NG PATENT - PT. NPO.  GOMEZ PATENT - URINE CLEAR. IV FLUIDS PER ORDER. ROOM AIR. PRN PAIN MED AVAILABLE AND PT. HAS REQUIRED THIS SHIFT. NO DISTRESS NOTED.

## 2021-09-17 NOTE — H&P
"Jazmin Morgan MD - General Surgery History and Physical     Referring Provider: Jazmin Morgan MD    Patient Care Team:  Provider, No Known as PCP - General    Chief complaint abdominal pain     Subjective .     History of present illness:  The patient is a 46 y.o. female with a history of a gastric bypass, gastric band, hernia repair, and hysterectomy (8/5/21 with open lateral wound) who presents with 3 days of abdominal pain. It was intermittent in nature at first but has progressed to constant. She complains of abdominal distention and nausea. She is not on blood thinners. She is currently smoking 0.5 PPD. Her BMI is 26.14. She was seen at an outside ER and was diagnosed with an \"internal volvulus\" and transferred here for further management.      Review of Systems    Review of Systems - General ROS: positive for  - fatigue  ENT ROS: negative  Respiratory ROS: no cough, shortness of breath, or wheezing  Cardiovascular ROS: no chest pain or dyspnea on exertion  Gastrointestinal ROS: positive for - abdominal pain, appetite loss, gas/bloating and nausea/vomiting  Genito-Urinary ROS: no dysuria, trouble voiding, or hematuria  Dermatological ROS: negative   Breast ROS: negative for breast lumps  Hematological and Lymphatic ROS: negative  Musculoskeletal ROS: negative   Neurological ROS: no TIA or stroke symptoms    Psychological ROS: negative  Endocrine ROS: negative    History  Past Medical History:   Diagnosis Date   • Bipolar disorder (CMS/HCC)    • Fibromyalgia    • H. pylori infection    • Lupus (CMS/HCC)    • Multiple sclerosis (CMS/HCC)    • Schizophrenia (CMS/HCC)    ,   Past Surgical History:   Procedure Laterality Date   • COLONOSCOPY  03/2007   • COLONOSCOPY N/A 1/10/2017    Procedure: COLONOSCOPY WITH ANESTHESIA;  Surgeon: Andreas Fairbanks MD;  Location: [facility] ENDOSCOPY;  Service:    • ENDOSCOPY  05/01/2007   • ENDOSCOPY N/A 1/10/2017    Procedure: ESOPHAGOGASTRODUODENOSCOPY WITH ANESTHESIA;  " Surgeon: Andreas Fairbanks MD;  Location: [facility] ENDOSCOPY;  Service:    • GASTRIC BANDING     • GASTRIC BYPASS     • HERNIA REPAIR     • SEPTOPLASTY     • TONSILLECTOMY     ,   Family History   Problem Relation Age of Onset   • Colon polyps Father    • Colon cancer Father         Late 50s   ,   Social History     Tobacco Use   • Smoking status: Current Every Day Smoker     Packs/day: 0.50   Substance Use Topics   • Alcohol use: Yes     Comment: 1 BEER PTA @ 2100   • Drug use: No   ,   Medications Prior to Admission   Medication Sig Dispense Refill Last Dose   • alendronate (FOSAMAX) 70 MG tablet Take 70 mg by mouth Every 7 (Seven) Days.      • amantadine (SYMMETREL) 100 MG capsule Take 100 mg by mouth      • amantadine (SYMMETREL) 100 MG tablet       • amoxicillin-clavulanate (AUGMENTIN) 500-125 MG per tablet       • amoxicillin-clavulanate (AUGMENTIN) 875-125 MG per tablet       • aspirin 81 MG tablet Take 81 mg by mouth daily      • baclofen (LIORESAL) 10 MG tablet Take 10 mg by mouth 3 times daily      • baclofen (LIORESAL) 10 MG tablet TAKE ONE TABLET THREE TIMES DAILY  0    • busPIRone (BUSPAR) 10 MG tablet Take 10 mg by mouth 3 (Three) Times a Day.      • busPIRone (BUSPAR) 30 MG tablet Take 15 mg by mouth 3 (Three) Times a Day.      • calcium carbonate (OS-KLEBER) 600 MG tablet Take 1 tablet by mouth daily      • Calcium Carbonate-Vit D-Min (CALCIUM 1200 PO) Take  by mouth.      • cetirizine (ZyrTEC) 10 MG tablet Take 10 mg by mouth Daily.      • Cholecalciferol 1000 UNITS tablet Take 1,000 Units by mouth daily      • citalopram (CeleXA) 40 MG tablet Take 40 mg by mouth Daily.      • clonazePAM (KlonoPIN) 1 MG tablet Take 1 tablet by mouth At Night As Needed for Anxiety. 5 tablet 0    • cyanocobalamin 1000 MCG/ML injection Inject 1,000 mcg into the muscle once Indications: monthly      • desvenlafaxine (PRISTIQ) 100 MG 24 hr tablet Take by mouth daily      • diclofenac (VOLTAREN) 75 MG EC tablet Take 1 tablet  by mouth 2 (Two) Times a Day. 14 tablet 0    • fentaNYL (DURAGESIC) 50 MCG/HR patch Place 1 patch on the skin Every 72 (Seventy-Two) Hours.      • ferrous sulfate 325 (65 FE) MG tablet Take 325 mg by mouth daily (with breakfast)      • furosemide (LASIX) 20 MG tablet Take 1 tablet by mouth daily      • gabapentin, once-daily, (GRALISE) 600 MG tablet tablet 3 (Three) Times a Day.      • hydrochlorothiazide (HYDRODIURIL) 12.5 MG tablet Take 12.5 mg by mouth Daily.      • HYDROcodone-acetaminophen (NORCO)  MG per tablet Take 1 tablet by mouth Every 6 (Six) Hours As Needed for moderate pain (4-6).      • hydrocortisone 2.5 % cream       • hydroxychloroquine (PLAQUENIL) 200 MG tablet Take 200 mg by mouth daily      • hydroxychloroquine (PLAQUENIL) 200 MG tablet       • hydrOXYzine (ATARAX) 50 MG tablet Take 50 mg by mouth 3 (Three) Times a Day As Needed for itching.      • Interferon Beta-1a (AVONEX IM) Inject  into the shoulder, thigh, or buttocks.      • ketoconazole (NIZORAL) 2 % cream       • ketoconazole (NIZORAL) 2 % shampoo       • Leuprolide Acetate 11.25 MG kit Inject into the muscle every 30 days      • lisinopril (PRINIVIL,ZESTRIL) 40 MG tablet Take 40 mg by mouth Daily.      • LUPRON DEPOT 3.75 MG injection       • meloxicam (MOBIC) 15 MG tablet Take 15 mg by mouth daily      • meloxicam (MOBIC) 7.5 MG tablet Take 7.5 mg by mouth.      • Multiple Vitamins-Minerals (MULTIVITAMIN ADULT PO) Take  by mouth.      • naproxen (NAPROSYN) 250 MG tablet Take 250 mg by mouth.      • norethindrone (AYGESTIN) 5 MG tablet Take 5 mg by mouth Daily.      • nortriptyline (PAMELOR) 10 MG capsule Take 10 mg by mouth Every Night.      • ondansetron (ZOFRAN) 4 MG tablet Take 4 mg by mouth Every 8 (Eight) Hours As Needed for nausea or vomiting.      • oxyCODONE-acetaminophen (PERCOCET) 7.5-325 MG per tablet Every 8 (Eight) Hours.      • pantoprazole (PROTONIX) 40 MG EC tablet Take 40 mg by mouth bid      • potassium chloride  (K-DUR,KLOR-CON) 20 MEQ CR tablet       • prazosin (MINIPRESS) 2 MG capsule Take 2 mg by mouth Every Night.      • predniSONE (DELTASONE) 1 MG tablet Take 5 mg by mouth daily Indications: 2 PO QD       • predniSONE (DELTASONE) 1 MG tablet Take 8 mg by mouth Daily.      • sucralfate (CARAFATE) 1 G tablet Take 1 g by mouth 4 (Four) Times a Day.      • tiZANidine (ZANAFLEX) 4 MG tablet       • topiramate (TOPAMAX) 200 MG tablet       • topiramate (TOPAMAX) 50 MG tablet Take 200 mg by mouth Daily.      • vitamin D (ERGOCALCIFEROL) 49217 UNITS capsule capsule Take 50,000 Units by mouth once a week       and Allergies:  Avelox [moxifloxacin], Copaxone [glatiramer acetate], Depakote  [divalproex sodium], Lamictal [lamotrigine], Lithium, Plaquenil [hydroxychloroquine], Pregabalin, Glatiramer, and Paliperidone    Current Facility-Administered Medications:   •  atropine sulfate injection 0.5 mg, 0.5 mg, Intravenous, Once PRN, Rachel Butts MD  •  fentaNYL citrate (PF) (SUBLIMAZE) injection 25 mcg, 25 mcg, Intravenous, Q5 Min PRN, Rachel Butts MD  •  flumazenil (ROMAZICON) injection 0.2 mg, 0.2 mg, Intravenous, PRN, Rachel Butts MD  •  HYDROmorphone (DILAUDID) injection 0.5 mg, 0.5 mg, Intravenous, Q10 Min PRN, Rachel Butts MD  •  HYDROmorphone (DILAUDID) injection 0.5 mg, 0.5 mg, Intravenous, Q10 Min PRN, Rachel Butts MD  •  labetalol (NORMODYNE,TRANDATE) injection 5 mg, 5 mg, Intravenous, Q5 Min PRN, Rachel Butts MD  •  lactated ringers infusion 1,000 mL, 1,000 mL, Intravenous, Continuous, Jazmin Morgan MD  •  lactated ringers infusion 1,000 mL, 1,000 mL, Intravenous, Continuous, Jazmin Morgan MD, Last Rate: 25 mL/hr at 09/17/21 0800, Restarted at 09/17/21 0804  •  lactated ringers infusion, 100 mL/hr, Intravenous, Continuous, Rachel Butts MD, Last Rate: 100 mL/hr at 09/17/21 0800, Restarted at 09/17/21 0806  •  naloxone (NARCAN) injection 0.04 mg,  0.04 mg, Intravenous, PRN, Rachel Butts MD  •  ondansetron (ZOFRAN) injection 4 mg, 4 mg, Intravenous, PRN, Rachel Butts MD    Objective     Vital Signs   Temp:  [97.2 °F (36.2 °C)-98 °F (36.7 °C)] 97.2 °F (36.2 °C)  Heart Rate:  [55-70] 55  Resp:  [8-18] 8  BP: ()/(36-88) 61/36    Physical Exam:  General appearance - ill-appearing, uncomfortable   Mental status - alert, oriented to person, place, and time  Eyes - sclera anicteric  Neck - supple, no significant adenopathy  Chest - no tachypnea, retractions or cyanosis  Heart - normal rate and regular rhythm  Abdomen - distended, tympanic, significant tenderness to palpation with guarding and rebound, peritoneal signs on eam   Neurological - alert, oriented, normal speech, no focal findings or movement disorder noted  Musculoskeletal - no joint tenderness, deformity or swelling    Results Review:     Lab Results (last 24 hours)     Procedure Component Value Units Date/Time    Tissue Pathology Exam [[id number]] Collected: 09/17/21 0933    Specimen: Hardware / Foreign Body from Abdomen, Lower; Hardware / Foreign Body from Abdomen, Lower Updated: 09/17/21 1003        Imaging Results (Last 24 Hours)     ** No results found for the last 24 hours. **            Assessment/Plan       Ms. Swenson is a 46 year old female who presented this AM with peritonitis. She has a significant past surgical history, most notable for a h/o gastric bypass. With imaging showing swirling of the mesentery (reviewed with Dr. Hodges), this is concerning for an internal hernia. She is covid negative. I had a long discussion with the patient on the surgical plan. I have recommended an exploratory laparotomy with reduction of internal hernia, possible bowel resection, possible removal of lap band, possible mesh removal. We discussed the risks of surgery including bleeding, infection, damage to surrounding structures, cardiovascular complications, and pulmonary complications.  I also called Dr. Arerguin, bariatric surgeon, to request he be available in the case I need his assistance. He will be available if needed. Ancef and subcutaneous heparin ordered. Proceed to OR.       Jazmin Morgan MD  09/17/21  10:26 CDT

## 2021-09-17 NOTE — BRIEF OP NOTE
LAPAROTOMY EXPLORATORY  Progress Note    Chelo Swenson  9/17/2021    Pre-op Diagnosis:   Internal hernia        Post-Op Diagnosis Codes:   same     Procedure/CPT® Codes:        Procedure(s):  LAPAROTOMY EXPLORATORY, REDUCTION OF INTERNAL HERNIA, LYSIS OF ADHESIONS, LAP BAND REMOVAL, REMOVAL OF ABD WALL MESH,  ESOPHAGOGASTRODUODENOSCOPY, gastric band removal    Surgeon(s):  Jazmin Morgan MD Davis, Anthony K, MD    Anesthesia: General    Staff:   Circulator: Paola Drew RN; Philip Hall RN  Scrub Person: Wanda Frazier; Ameena Santos         Estimated Blood Loss: 75mL    Urine Voided: * No values recorded between 9/17/2021  7:58 AM and 9/17/2021 10:12 AM *    Specimens:                Specimens     ID Source Type Tests Collected By Collected At Frozen?    A Abdomen, Lower Hardware / Foreign Body · TISSUE PATHOLOGY EXAM   Jazmin Morgan MD 9/17/21 0933 No    Description: Gastric Band for gross identification    Comment: For gross identification    B Abdomen, Lower Hardware / Foreign Body · TISSUE PATHOLOGY EXAM   Jazmin Morgan MD 9/17/21 0948 No    Description: mesh for gross identification                Drains:   NG/OG Tube Nasogastric 18 Fr Left nostril (Active)       Urethral Catheter Straight-tip 16 Fr. (Active)       Findings: internal hernia 2/2 lap band catheter, balled up mesh in the anterior abdominal wall, adhesions     Complications: none apparent           Jazmin Morgan MD     Date: 9/17/2021  Time: 10:20 CDT

## 2021-09-18 LAB
ALBUMIN SERPL-MCNC: 3 G/DL (ref 3.5–5.2)
ALBUMIN/GLOB SERPL: 1.4 G/DL
ALP SERPL-CCNC: 57 U/L (ref 39–117)
ALT SERPL W P-5'-P-CCNC: 14 U/L (ref 1–33)
ANION GAP SERPL CALCULATED.3IONS-SCNC: 6 MMOL/L (ref 5–15)
ANISOCYTOSIS BLD QL: ABNORMAL
AST SERPL-CCNC: 25 U/L (ref 1–32)
BACTERIA UR QL AUTO: ABNORMAL /HPF
BILIRUB SERPL-MCNC: 0.3 MG/DL (ref 0–1.2)
BILIRUB UR QL STRIP: NEGATIVE
BUN SERPL-MCNC: 9 MG/DL (ref 6–20)
BUN/CREAT SERPL: 18.4 (ref 7–25)
BURR CELLS BLD QL SMEAR: ABNORMAL
CALCIUM SPEC-SCNC: 7.3 MG/DL (ref 8.6–10.5)
CHLORIDE SERPL-SCNC: 103 MMOL/L (ref 98–107)
CLARITY UR: CLEAR
CO2 SERPL-SCNC: 23 MMOL/L (ref 22–29)
COLOR UR: YELLOW
CREAT SERPL-MCNC: 0.49 MG/DL (ref 0.57–1)
DEPRECATED RDW RBC AUTO: 53.1 FL (ref 37–54)
ERYTHROCYTE [DISTWIDTH] IN BLOOD BY AUTOMATED COUNT: 16.1 % (ref 12.3–15.4)
GFR SERPL CREATININE-BSD FRML MDRD: >150 ML/MIN/1.73
GLOBULIN UR ELPH-MCNC: 2.1 GM/DL
GLUCOSE SERPL-MCNC: 176 MG/DL (ref 65–99)
GLUCOSE UR STRIP-MCNC: NEGATIVE MG/DL
HCT VFR BLD AUTO: 34.4 % (ref 34–46.6)
HGB BLD-MCNC: 11.6 G/DL (ref 12–15.9)
HGB UR QL STRIP.AUTO: ABNORMAL
HYALINE CASTS UR QL AUTO: ABNORMAL /LPF
KETONES UR QL STRIP: NEGATIVE
LEUKOCYTE ESTERASE UR QL STRIP.AUTO: NEGATIVE
LYMPHOCYTES # BLD MANUAL: 0.66 10*3/MM3 (ref 0.7–3.1)
LYMPHOCYTES NFR BLD MANUAL: 2 % (ref 5–12)
LYMPHOCYTES NFR BLD MANUAL: 6 % (ref 19.6–45.3)
MAGNESIUM SERPL-MCNC: 2.4 MG/DL (ref 1.6–2.6)
MCH RBC QN AUTO: 30.1 PG (ref 26.6–33)
MCHC RBC AUTO-ENTMCNC: 33.7 G/DL (ref 31.5–35.7)
MCV RBC AUTO: 89.1 FL (ref 79–97)
MONOCYTES # BLD AUTO: 0.19 10*3/MM3 (ref 0.1–0.9)
NEUTROPHILS # BLD AUTO: 8.54 10*3/MM3 (ref 1.7–7)
NEUTROPHILS NFR BLD MANUAL: 88 % (ref 42.7–76)
NEUTS BAND NFR BLD MANUAL: 3 % (ref 0–5)
NITRITE UR QL STRIP: NEGATIVE
PH UR STRIP.AUTO: 5.5 [PH] (ref 5–8)
PLAT MORPH BLD: NORMAL
PLATELET # BLD AUTO: 381 10*3/MM3 (ref 140–450)
PMV BLD AUTO: 8.6 FL (ref 6–12)
POIKILOCYTOSIS BLD QL SMEAR: ABNORMAL
POTASSIUM SERPL-SCNC: 4.2 MMOL/L (ref 3.5–5.2)
PROT SERPL-MCNC: 5.1 G/DL (ref 6–8.5)
PROT UR QL STRIP: ABNORMAL
RBC # BLD AUTO: 3.86 10*6/MM3 (ref 3.77–5.28)
RBC # UR: ABNORMAL /HPF
REF LAB TEST METHOD: ABNORMAL
SODIUM SERPL-SCNC: 132 MMOL/L (ref 136–145)
SP GR UR STRIP: 1.02 (ref 1–1.03)
SQUAMOUS #/AREA URNS HPF: ABNORMAL /HPF
UROBILINOGEN UR QL STRIP: ABNORMAL
VARIANT LYMPHS NFR BLD MANUAL: 1 % (ref 0–5)
WBC # BLD AUTO: 9.39 10*3/MM3 (ref 3.4–10.8)
WBC MORPH BLD: NORMAL
WBC UR QL AUTO: ABNORMAL /HPF

## 2021-09-18 PROCEDURE — 25010000002 CEFAZOLIN PER 500 MG: Performed by: SPECIALIST

## 2021-09-18 PROCEDURE — 97165 OT EVAL LOW COMPLEX 30 MIN: CPT

## 2021-09-18 PROCEDURE — 25010000002 HEPARIN (PORCINE) PER 1000 UNITS: Performed by: STUDENT IN AN ORGANIZED HEALTH CARE EDUCATION/TRAINING PROGRAM

## 2021-09-18 PROCEDURE — 81001 URINALYSIS AUTO W/SCOPE: CPT | Performed by: SPECIALIST

## 2021-09-18 PROCEDURE — 25010000002 HYDROMORPHONE PER 4 MG: Performed by: STUDENT IN AN ORGANIZED HEALTH CARE EDUCATION/TRAINING PROGRAM

## 2021-09-18 PROCEDURE — 80053 COMPREHEN METABOLIC PANEL: CPT | Performed by: STUDENT IN AN ORGANIZED HEALTH CARE EDUCATION/TRAINING PROGRAM

## 2021-09-18 PROCEDURE — 85025 COMPLETE CBC W/AUTO DIFF WBC: CPT | Performed by: STUDENT IN AN ORGANIZED HEALTH CARE EDUCATION/TRAINING PROGRAM

## 2021-09-18 PROCEDURE — 83735 ASSAY OF MAGNESIUM: CPT | Performed by: STUDENT IN AN ORGANIZED HEALTH CARE EDUCATION/TRAINING PROGRAM

## 2021-09-18 PROCEDURE — 25010000002 LORAZEPAM PER 2 MG: Performed by: STUDENT IN AN ORGANIZED HEALTH CARE EDUCATION/TRAINING PROGRAM

## 2021-09-18 PROCEDURE — 85007 BL SMEAR W/DIFF WBC COUNT: CPT | Performed by: STUDENT IN AN ORGANIZED HEALTH CARE EDUCATION/TRAINING PROGRAM

## 2021-09-18 RX ADMIN — SODIUM CHLORIDE, POTASSIUM CHLORIDE, SODIUM LACTATE AND CALCIUM CHLORIDE 125 ML/HR: 600; 310; 30; 20 INJECTION, SOLUTION INTRAVENOUS at 21:38

## 2021-09-18 RX ADMIN — SODIUM CHLORIDE, POTASSIUM CHLORIDE, SODIUM LACTATE AND CALCIUM CHLORIDE 125 ML/HR: 600; 310; 30; 20 INJECTION, SOLUTION INTRAVENOUS at 11:50

## 2021-09-18 RX ADMIN — HYDROMORPHONE HYDROCHLORIDE 0.5 MG: 1 INJECTION, SOLUTION INTRAMUSCULAR; INTRAVENOUS; SUBCUTANEOUS at 19:32

## 2021-09-18 RX ADMIN — CEFAZOLIN 2 G: 10 INJECTION, POWDER, FOR SOLUTION INTRAVENOUS at 00:13

## 2021-09-18 RX ADMIN — SODIUM CHLORIDE, POTASSIUM CHLORIDE, SODIUM LACTATE AND CALCIUM CHLORIDE 125 ML/HR: 600; 310; 30; 20 INJECTION, SOLUTION INTRAVENOUS at 03:21

## 2021-09-18 RX ADMIN — PANTOPRAZOLE SODIUM 40 MG: 40 INJECTION, POWDER, FOR SOLUTION INTRAVENOUS at 06:20

## 2021-09-18 RX ADMIN — SODIUM CHLORIDE, PRESERVATIVE FREE 10 ML: 5 INJECTION INTRAVENOUS at 21:38

## 2021-09-18 RX ADMIN — HEPARIN SODIUM 5000 UNITS: 5000 INJECTION INTRAVENOUS; SUBCUTANEOUS at 13:54

## 2021-09-18 RX ADMIN — CEFAZOLIN 2 G: 10 INJECTION, POWDER, FOR SOLUTION INTRAVENOUS at 15:22

## 2021-09-18 RX ADMIN — CEFAZOLIN 2 G: 10 INJECTION, POWDER, FOR SOLUTION INTRAVENOUS at 08:44

## 2021-09-18 RX ADMIN — HYDROMORPHONE HYDROCHLORIDE 0.5 MG: 1 INJECTION, SOLUTION INTRAMUSCULAR; INTRAVENOUS; SUBCUTANEOUS at 11:38

## 2021-09-18 RX ADMIN — HEPARIN SODIUM 5000 UNITS: 5000 INJECTION INTRAVENOUS; SUBCUTANEOUS at 06:08

## 2021-09-18 RX ADMIN — CEFAZOLIN 2 G: 10 INJECTION, POWDER, FOR SOLUTION INTRAVENOUS at 23:30

## 2021-09-18 RX ADMIN — HYDROMORPHONE HYDROCHLORIDE 0.5 MG: 1 INJECTION, SOLUTION INTRAMUSCULAR; INTRAVENOUS; SUBCUTANEOUS at 06:09

## 2021-09-18 RX ADMIN — HEPARIN SODIUM 5000 UNITS: 5000 INJECTION INTRAVENOUS; SUBCUTANEOUS at 21:38

## 2021-09-18 RX ADMIN — HYDROMORPHONE HYDROCHLORIDE 0.5 MG: 1 INJECTION, SOLUTION INTRAMUSCULAR; INTRAVENOUS; SUBCUTANEOUS at 08:37

## 2021-09-18 RX ADMIN — HYDROMORPHONE HYDROCHLORIDE 0.5 MG: 1 INJECTION, SOLUTION INTRAMUSCULAR; INTRAVENOUS; SUBCUTANEOUS at 15:09

## 2021-09-18 RX ADMIN — HYDROMORPHONE HYDROCHLORIDE 0.5 MG: 1 INJECTION, SOLUTION INTRAMUSCULAR; INTRAVENOUS; SUBCUTANEOUS at 03:16

## 2021-09-18 RX ADMIN — LORAZEPAM 0.5 MG: 2 INJECTION INTRAMUSCULAR; INTRAVENOUS at 03:16

## 2021-09-18 NOTE — PROGRESS NOTES
"Patient Care Team:  Provider, No Known as PCP - General    Chief Complaint: S/P ex lap due to sbo    Subjective     Malindaeren Swenson is POD 1 day ago from the above.  The patient presented yesterday with reported sbo and possible sb volvulus.  She was take to the OR with findings of sbo due to gastric band tubing and adhesions and intra operative upper endoscopy.  The patient underwent LOS with removal of the gastric band.  Patient tolerated the procedure well.  This am the patient denies N/V and currently passing flatus. NGT output this am reported to be 100cc.    Pat        Review of Systems:     Review of Systems - General ROS: positive for  - fatigue  Respiratory ROS: no cough, shortness of breath, or wheezing  Cardiovascular ROS: no chest pain or dyspnea on exertion  Gastrointestinal ROS: positive for - abdominal pain  negative for - nausea/vomiting    Objective     Vital Signs  /72 (BP Location: Left arm, Patient Position: Standing)   Pulse 109   Temp 99.5 °F (37.5 °C) (Oral)   Resp 14   Ht 165.1 cm (65\")   Wt 71.3 kg (157 lb 1.6 oz)   LMP 01/10/2013   SpO2 99%   Breastfeeding No   BMI 26.14 kg/m²     Physical Exam:    HEENT: extra ocular movement intact and sclera clear, anicteric  Respiratory: appears well, vitals normal, no respiratory distress, acyanotic, normal RR, chest clear, no wheezing, crepitations, rhonchi, normal symmetric air entry  Cardiovascular: Regular rate and rhythm, S1, S2 normal, no murmur, click, rub or gallop  GI: Auscultation: Abnormal bowel sounds: diminished  Tenderness: midline  Musculoskeletal: inspection - no abnormality  Neurologic: alert, oriented, normal speech, no focal findings or movement disorder noted     Results Review:    WBC   Date Value Ref Range Status   09/18/2021 9.39 3.40 - 10.80 10*3/mm3 Final     RBC   Date Value Ref Range Status   09/18/2021 3.86 3.77 - 5.28 10*6/mm3 Final     Hemoglobin   Date Value Ref Range Status   09/18/2021 11.6 (L) 12.0 - " 15.9 g/dL Final     Hematocrit   Date Value Ref Range Status   09/18/2021 34.4 34.0 - 46.6 % Final     MCV   Date Value Ref Range Status   09/18/2021 89.1 79.0 - 97.0 fL Final     MCH   Date Value Ref Range Status   09/18/2021 30.1 26.6 - 33.0 pg Final     MCHC   Date Value Ref Range Status   09/18/2021 33.7 31.5 - 35.7 g/dL Final     RDW   Date Value Ref Range Status   09/18/2021 16.1 (H) 12.3 - 15.4 % Final     RDW-SD   Date Value Ref Range Status   09/18/2021 53.1 37.0 - 54.0 fl Final     MPV   Date Value Ref Range Status   09/18/2021 8.6 6.0 - 12.0 fL Final     Platelets   Date Value Ref Range Status   09/18/2021 381 140 - 450 10*3/mm3 Final     Neutrophils Absolute   Date Value Ref Range Status   09/18/2021 8.54 (H) 1.70 - 7.00 10*3/mm3 Final      Lab Results   Component Value Date    GLUCOSE 176 (H) 09/18/2021    BUN 9 09/18/2021    CREATININE 0.49 (L) 09/18/2021    EGFRIFNONA 54 (A) 06/02/2021    EGFRIFAFRI >150 09/18/2021    BCR 18.4 09/18/2021    K 4.2 09/18/2021    CO2 23.0 09/18/2021    CALCIUM 7.3 (L) 09/18/2021    ALBUMIN 3.00 (L) 09/18/2021    AST 25 09/18/2021    ALT 14 09/18/2021         Medication Reviewed:   Current Facility-Administered Medications   Medication Dose Route Frequency Provider Last Rate Last Admin   • ceFAZolin in 0.9% normal saline (ANCEF) IVPB solution 2 g  2 g Intravenous Q8H Spike Gómez  mL/hr at 09/18/21 0844 2 g at 09/18/21 0844   • diphenhydrAMINE (BENADRYL) injection 25 mg  25 mg Intravenous Q6H PRN Jazmin Morgan MD       • heparin (porcine) 5000 UNIT/ML injection 5,000 Units  5,000 Units Subcutaneous Q8H Jazmin Morgan MD   5,000 Units at 09/18/21 0608   • HYDROmorphone (DILAUDID) injection 0.5 mg  0.5 mg Intravenous Q2H PRN Jazmin Morgan MD   0.5 mg at 09/18/21 0837    And   • naloxone (NARCAN) injection 0.4 mg  0.4 mg Intravenous Q5 Min PRN Jazmin Morgan MD       • ipratropium-albuterol (DUO-NEB) nebulizer solution 3 mL  3 mL  Nebulization Q4H PRN Jazmin Morgan MD       • lactated ringers infusion  125 mL/hr Intravenous Continuous Jazmin Morgan  mL/hr at 09/18/21 0843 125 mL/hr at 09/18/21 0843   • lidocaine (LIDODERM) 5 % 2 patch  2 patch Transdermal Q24H Jazmin Morgan MD   2 patch at 09/17/21 1417   • LORazepam (ATIVAN) injection 0.5 mg  0.5 mg Intravenous Q4H PRN Jazmin Morgan MD   0.5 mg at 09/18/21 0316   • ondansetron (ZOFRAN) tablet 4 mg  4 mg Oral Q6H PRN Jazmni Morgan MD        Or   • ondansetron (ZOFRAN) injection 4 mg  4 mg Intravenous Q6H PRN Jazmin Morgan MD       • pantoprazole (PROTONIX) injection 40 mg  40 mg Intravenous Q AM Jazmin Morgan MD   40 mg at 09/18/21 0620   • sodium chloride 0.9 % flush 10 mL  10 mL Intravenous Q12H Jazmin Morgan MD       • sodium chloride 0.9 % flush 10 mL  10 mL Intravenous PRN Jazmin Morgan MD           Assessment/Plan  POD 1 day ago from the above proceure.      Patient starting to have an appetite and positive flatus.  Still with hypoactive bowel sounds and possible post op ileus.  Recommend continued ice and sips as tolerated.  Consider advancing in the am if GI continues to progress.  Will attempt to contact GS team as well.    Pierre Arreguin MD  09/18/21  09:27 CDT

## 2021-09-18 NOTE — THERAPY EVALUATION
Acute Care - Occupational Therapy Initial Evaluation  [facility]     Patient Name: Chelo Swenson  : 1975  MRN: [id number]  Today's Date: 2021  Onset of Illness/Injury or Date of Surgery: 21     Referring Physician: Dr. Morgan    Admit Date: 2021       ICD-10-CM ICD-9-CM   1. Volvulus (CMS/HCC)  K56.2 560.2   2. Decreased activities of daily living (ADL)  Z78.9 V49.89     Patient Active Problem List   Diagnosis   • Internal hernia   • Volvulus (CMS/HCC)     Past Medical History:   Diagnosis Date   • Bipolar disorder (CMS/HCC)    • Fibromyalgia    • H. pylori infection    • Lupus (CMS/HCC)    • Multiple sclerosis (CMS/HCC)    • Schizophrenia (CMS/HCC)      Past Surgical History:   Procedure Laterality Date   • COLONOSCOPY  2007   • COLONOSCOPY N/A 1/10/2017    Procedure: COLONOSCOPY WITH ANESTHESIA;  Surgeon: Andreas Fairbanks MD;  Location: [facility] ENDOSCOPY;  Service:    • ENDOSCOPY  2007   • ENDOSCOPY N/A 1/10/2017    Procedure: ESOPHAGOGASTRODUODENOSCOPY WITH ANESTHESIA;  Surgeon: Andreas Fairbanks MD;  Location: [facility] ENDOSCOPY;  Service:    • GASTRIC BANDING     • GASTRIC BYPASS     • HERNIA REPAIR     • SEPTOPLASTY     • TONSILLECTOMY              OT ASSESSMENT FLOWSHEET (last 12 hours)      OT Evaluation and Treatment     Row Name 21 0733                   OT Time and Intention    Subjective Information  complains of;pain;nausea/vomiting;fatigue  -        Document Type  evaluation  -AC        Mode of Treatment  occupational therapy  -           General Information    Patient Profile Reviewed  yes  -AC        Onset of Illness/Injury or Date of Surgery  21  -        Referring Physician  Dr. Morgan  -        General Observations of Patient  up in chair, NG, IV  -        Prior Level of Function  independent:;all household mobility;community mobility;gait;transfer;bed mobility;ADL's;home management;cooking;cleaning;driving  -        Equipment  Currently Used at Home  none  -AC        Pertinent History of Current Functional Problem  3 day history of abdominal pain, distention, nausea; transferred to [facility] from [facility] hospital with peritonitis; s/p ex lap, reduction of internal hernia, lysis of adhesions, removal of gastric band, EGD, excision of abdominal wall mesh on 9/17/21 (PMH: gastric bypass, bipolar disorder, schizophrenia, MS, lupus, fibromyalgia)  -        Existing Precautions/Restrictions  fall  -        Barriers to Rehab  medically complex  -           Living Environment    Current Living Arrangements  home/apartment/condo tub shower combo  -AC        Home Accessibility  stairs to enter home  -AC        Lives With  child(naomi), adult;child(naomi), dependent 15 y/o twin girls, 21 year old son  -           Home Main Entrance    Number of Stairs, Main Entrance  seven  -AC        Stair Railings, Main Entrance  none  -AC           Cognition    Orientation Status (Cognition)  oriented x 4  -AC        Follows Commands (Cognition)  WNL  -        Personal Safety Interventions  fall prevention program maintained;gait belt;muscle strengthening facilitated;nonskid shoes/slippers when out of bed;supervised activity  -AC           Pain Assessment    Additional Documentation  Pain Scale: Numbers Pre/Post-Treatment (Group)  -AC           Pain Scale: Numbers Pre/Post-Treatment    Pretreatment Pain Rating  9/10  -AC        Posttreatment Pain Rating  9/10  -AC        Pain Location  abdomen  -AC        Pain Intervention(s)  Repositioned;Ambulation/increased activity  -AC           Range of Motion Comprehensive    Comment, General Range of Motion  WFL AROM BUE  -AC           Strength Comprehensive (MMT)    Comment, General Manual Muscle Testing (MMT) Assessment  not formally assessed d/t pain, functionally 4/5 BUE  -AC           Bed Mobility    Comment (Bed Mobility)  up in chair  -AC           Functional Mobility    Functional Mobility- Ind. Level  contact  guard assist  -AC        Functional Mobility- Comment  to BR, back to chair  -AC           Transfer Assessment/Treatment    Transfers  sit-stand transfer;stand-sit transfer  -AC           Transfers    Sit-Stand Ross (Transfers)  contact guard  -AC        Stand-Sit Ross (Transfers)  contact guard  -AC           Safety Issues, Functional Mobility    Impairments Affecting Function (Mobility)  pain;endurance/activity tolerance  -AC           Balance    Balance Assessment  sitting static balance;sitting dynamic balance;standing static balance;standing dynamic balance  -AC        Static Sitting Balance  WNL;supported  -AC        Dynamic Sitting Balance  WNL;supported  -AC        Static Standing Balance  WFL;supported  -AC        Dynamic Standing Balance  WFL;supported  -AC           Activities of Daily Living    BADL Assessment/Intervention  lower body dressing;grooming;feeding  -AC           Lower Body Dressing Assessment/Training    Ross Level (Lower Body Dressing)  don;doff;socks;standby assist  -AC        Position (Lower Body Dressing)  edge of bed sitting  -AC        Comment (Lower Body Dressing)  simulated by crossing LEs over knees  -AC           Grooming Assessment/Training    Ross Level (Grooming)  oral care regimen  -AC        Position (Grooming)  supported sitting  -AC           Self-Feeding Assessment/Training    Ross Level (Feeding)  feeding skills;scoop food and bring to mouth;independent  -AC        Position (Self-Feeding)  supported sitting  -AC        Comment (Feeding)  ice chips  -AC           BADL Safety/Performance    Impairments, BADL Safety/Performance  endurance/activity tolerance;pain  -AC           Wound 09/17/21 0835 midline abdomen Incision    Wound - Properties Group Placement Date: 09/17/21  -KR Placement Time: 0835  -KR Present on Hospital Admission: N  -KR Orientation: midline  -KR Location: abdomen  -KR Primary Wound Type: Incision  -KR    Retired  Wound - Properties Group Date first assessed: 09/17/21  -KR Time first assessed: 0835 -KR Present on Hospital Admission: N  -KR Location: abdomen  -KR Primary Wound Type: Incision  -KR       Vital Signs    Pretreatment Heart Rate (beats/min)  112  -AC        Pre SpO2 (%)  98  -AC        O2 Delivery Pre Treatment  room air  -AC        Pre Patient Position  Sitting  -AC           OT Goals    Transfer Goal Selection (OT)  transfer, OT goal 1  -AC        Dressing Goal Selection (OT)  dressing, OT goal 1  -AC           Transfer Goal 1 (OT)    Activity/Assistive Device (Transfer Goal 1, OT)  tub  -AC        Premium Level/Cues Needed (Transfer Goal 1, OT)  independent  -AC        Time Frame (Transfer Goal 1, OT)  long term goal (LTG);10 days  -AC        Progress/Outcome (Transfer Goal 1, OT)  goal ongoing  -AC           Dressing Goal 1 (OT)    Activity/Device (Dressing Goal 1, OT)  dressing skills, all  -AC        Premium/Cues Needed (Dressing Goal 1, OT)  independent  -AC        Time Frame (Dressing Goal 1, OT)  long term goal (LTG);10 days  -AC        Progress/Outcome (Dressing Goal 1, OT)  goal ongoing  -AC           Positioning and Restraints    Pre-Treatment Position  sitting in chair/recliner  -AC        Post Treatment Position  chair  -AC        In Chair  reclined;call light within reach;encouraged to call for assist;legs elevated  -AC          User Key  (r) = Recorded By, (t) = Taken By, (c) = Cosigned By    Initials Name Effective Dates    AC Pedro Bustamante, OTR/L, CNT 04/09/19 -     Philip Nugent --           Occupational Therapy Education                 Title: PT OT SLP Therapies (Done)     Topic: Occupational Therapy (Done)     Point: ADL training (Done)     Description:   Instruct learner(s) on proper safety adaptation and remediation techniques during self care or transfers.   Instruct in proper use of assistive devices.              Learning Progress Summary           Patient Acceptance, E,  VU by  at 9/18/2021 0825    Comment: OT POC                   Point: Home exercise program (Done)     Description:   Instruct learner(s) on appropriate technique for monitoring, assisting and/or progressing therapeutic exercises/activities.              Learning Progress Summary           Patient DEAN Harmon, VU by  at 9/18/2021 0825    Comment: OT POC                               User Key     Initials Effective Dates Name Provider Type Discipline     04/09/19 -  Pedro Bustamante, OTR/L, CNT Occupational Therapist OT                  OT Recommendation and Plan     Plan of Care Review  Plan of Care Reviewed With: patient  Progress: no change  Outcome Summary: OT eval completed.  Pt is initially lethargic but awakens and is cooperative with therapy.  She is guarded with all of her movement d/t 9/10 abdominal pain.  She demo full AROM in BUE and functional strength of 4/5.  Pt simulated LB dressing with SBA and transferred and ambulated to BR and back to chair.  Pt demo forward flexed posture while walking d/t abdominal pain.  OT will continue to treat pt to increase independence and safety with ADL.  She will need to practice tub shower transfers and is also concerned about 7 steps she has to climb to get into her home.  Anticipate discharge home with assist.  Plan of Care Reviewed With: patient  Outcome Summary: OT eval completed.  Pt is initially lethargic but awakens and is cooperative with therapy.  She is guarded with all of her movement d/t 9/10 abdominal pain.  She demo full AROM in BUE and functional strength of 4/5.  Pt simulated LB dressing with SBA and transferred and ambulated to BR and back to chair.  Pt demo forward flexed posture while walking d/t abdominal pain.  OT will continue to treat pt to increase independence and safety with ADL.  She will need to practice tub shower transfers and is also concerned about 7 steps she has to climb to get into her home.  Anticipate discharge home with  assist.    Outcome Measures     Row Name 09/18/21 0733             How much help from another is currently needed...    Putting on and taking off regular lower body clothing?  3  -AC      Bathing (including washing, rinsing, and drying)  3  -AC      Toileting (which includes using toilet bed pan or urinal)  3  -AC      Putting on and taking off regular upper body clothing  3  -AC      Taking care of personal grooming (such as brushing teeth)  4  -AC      Eating meals  4  -AC      AM-PAC 6 Clicks Score (OT)  20  -AC         Functional Assessment    Outcome Measure Options  AM-[facility] 6 Clicks Daily Activity (OT)  -AC        User Key  (r) = Recorded By, (t) = Taken By, (c) = Cosigned By    Initials Name Provider Type    Pedro Snow OTR/L, KALEIGH Occupational Therapist          Time Calculation:   Time Calculation- OT     Row Name 09/18/21 0825             Time Calculation- OT    OT Start Time  0733  -      OT Stop Time  0815  -      OT Time Calculation (min)  42 min  -      OT Received On  09/18/21  -      OT Goal Re-Cert Due Date  09/28/21  -        User Key  (r) = Recorded By, (t) = Taken By, (c) = Cosigned By    Initials Name Provider Type    Pedro Snow OTR/L, KALEIGH Occupational Therapist        Therapy Charges for Today     Code Description Service Date Service Provider Modifiers Qty    65372313589 HC OT EVAL LOW COMPLEXITY 3 9/18/2021 Pedro Bustamante OTR/L, CNT GO 1               DARIAN Banks/L, CNT  9/18/2021

## 2021-09-18 NOTE — PLAN OF CARE
Goal Outcome Evaluation:  Plan of Care Reviewed With: patient        Progress: no change  Outcome Summary: OT eval completed.  Pt is initially lethargic but awakens and is cooperative with therapy.  She is guarded with all of her movement d/t 9/10 abdominal pain.  She demo full AROM in BUE and functional strength of 4/5.  Pt simulated LB dressing with SBA and transferred and ambulated to BR and back to chair.  Pt demo forward flexed posture while walking d/t abdominal pain.  OT will continue to treat pt to increase independence and safety with ADL.  She will need to practice tub shower transfers and is also concerned about 7 steps she has to climb to get into her home.  Anticipate discharge home with assist.

## 2021-09-18 NOTE — PLAN OF CARE
Goal Outcome Evaluation:            Patient c/o pain x 3 this shift; IV pain medication given per orders. F/c to bedside drainage. Up in chair. Dressing c/d/i and abdominal binder utilized. No distress noted at this time. Sepsis +; protocol initiated this shift.

## 2021-09-18 NOTE — PLAN OF CARE
Goal Outcome Evaluation:  Plan of Care Reviewed With: patient        Progress: no change  Outcome Summary: Pt c/o pain most of the shift. Medicated for pain x3. Up in chair. Refused other activity. NG in place. Garcia, clear yellow urine. Tachycardic. Safety maintained.

## 2021-09-18 NOTE — PROGRESS NOTES
LOS: 1 day   Patient Care Team:  Provider, No Known as PCP - General    Chief Complaint: Status post exploratory laparotomy for internal hernia around a lap band.  Subjective     Subjective     Postoperative day one from exploratory laparotomy due to a internal hernia around a lap band with small bowel volvulus.  She has expected incisional pain she is not moving much not using her spirometry tolerating her pain at present appreciate greatly Dr. Arreguin seeing her and plans to see her tomorrow for us.  Objective      Objective     Vital Signs  Temp:  [98.4 °F (36.9 °C)-100.8 °F (38.2 °C)] 99.3 °F (37.4 °C)  Heart Rate:  [109-128] 112  Resp:  [14-18] 18  BP: (109-133)/(62-85) 128/81    Intake & Output (last 3 days)       09/15 0701 - 09/16 0700 09/16 0701 - 09/17 0700 09/17 0701 - 09/18 0700 09/18 0701 - 09/19 0700    I.V. (mL/kg)   2617 (36.7) 1078 (15.1)    Total Intake(mL/kg)   2617 (36.7) 1078 (15.1)    Urine (mL/kg/hr)   625 (0.4) 575 (0.9)    Emesis/NG output   100     Total Output   725 575    Net   +1892 +503                  Physical Exam:     General Appearance:    Alert, cooperative, in no acute distress   Lungs:     Clear to auscultation,respirations regular, even and                  unlabored    Heart:    Regular rhythm and normal rate, normal S1 and S2, no            murmur, no gallop, no rub, no click   Chest Wall:    No abnormalities observed   Abdomen:    Soft abdomen, flat, nondistended expected incisional discomfort.  White count is 9 hematocrit 34 electrolytes within normal limits        Results Review:     I reviewed the patient's new clinical results.  I reviewed the patient's new imaging results and agree with the interpretation.    Results from last 7 days   Lab Units 09/18/21  0627   WBC 10*3/mm3 9.39   HEMOGLOBIN g/dL 11.6*   HEMATOCRIT % 34.4   PLATELETS 10*3/mm3 381        Results from last 7 days   Lab Units 09/18/21  0627   SODIUM mmol/L 132*   POTASSIUM mmol/L 4.2   CHLORIDE mmol/L  103   CO2 mmol/L 23.0   BUN mg/dL 9   CREATININE mg/dL 0.49*   CALCIUM mg/dL 7.3*   BILIRUBIN mg/dL 0.3   ALK PHOS U/L 57   ALT (SGPT) U/L 14   AST (SGOT) U/L 25   GLUCOSE mg/dL 176*       Assessment/Plan     Assessment/Plan       Internal hernia    Volvulus (CMS/HCC)      46-year-old with the above problems, presently has a postoperative ileus gradually resolving continue ice and sips only advancing diet as GI function returns appreciate Dr. Arreguin seeing and following Mrs. Swenson with this!    Spike Gómez MD  09/18/21  16:14 CDT      Time: time spent with patient 15 minutes     EMR Dragon/Transcription disclaimer: Much of this encounter note is an electronic transcription/translation of spoken language to printed text. The electronic translation of spoken language may permit erroneous, or at times, nonsensical words or phrases to be inadvertently transcribed; although I have reviewed the note for such errors, some may still exist.

## 2021-09-19 VITALS
OXYGEN SATURATION: 100 % | DIASTOLIC BLOOD PRESSURE: 89 MMHG | SYSTOLIC BLOOD PRESSURE: 137 MMHG | WEIGHT: 157.1 LBS | HEART RATE: 101 BPM | TEMPERATURE: 97.8 F | BODY MASS INDEX: 26.17 KG/M2 | RESPIRATION RATE: 16 BRPM | HEIGHT: 65 IN

## 2021-09-19 LAB
BASOPHILS # BLD AUTO: 0.01 10*3/MM3 (ref 0–0.2)
BASOPHILS NFR BLD AUTO: 0.1 % (ref 0–1.5)
DEPRECATED RDW RBC AUTO: 53.5 FL (ref 37–54)
EOSINOPHIL # BLD AUTO: 0.25 10*3/MM3 (ref 0–0.4)
EOSINOPHIL NFR BLD AUTO: 2.7 % (ref 0.3–6.2)
ERYTHROCYTE [DISTWIDTH] IN BLOOD BY AUTOMATED COUNT: 16 % (ref 12.3–15.4)
HCT VFR BLD AUTO: 29 % (ref 34–46.6)
HGB BLD-MCNC: 9.6 G/DL (ref 12–15.9)
IMM GRANULOCYTES # BLD AUTO: 0.05 10*3/MM3 (ref 0–0.05)
IMM GRANULOCYTES NFR BLD AUTO: 0.5 % (ref 0–0.5)
LYMPHOCYTES # BLD AUTO: 0.78 10*3/MM3 (ref 0.7–3.1)
LYMPHOCYTES NFR BLD AUTO: 8.3 % (ref 19.6–45.3)
MAGNESIUM SERPL-MCNC: 1.9 MG/DL (ref 1.6–2.6)
MCH RBC QN AUTO: 29.8 PG (ref 26.6–33)
MCHC RBC AUTO-ENTMCNC: 33.1 G/DL (ref 31.5–35.7)
MCV RBC AUTO: 90.1 FL (ref 79–97)
MONOCYTES # BLD AUTO: 0.45 10*3/MM3 (ref 0.1–0.9)
MONOCYTES NFR BLD AUTO: 4.8 % (ref 5–12)
NEUTROPHILS NFR BLD AUTO: 7.88 10*3/MM3 (ref 1.7–7)
NEUTROPHILS NFR BLD AUTO: 83.6 % (ref 42.7–76)
NRBC BLD AUTO-RTO: 0 /100 WBC (ref 0–0.2)
PLATELET # BLD AUTO: 326 10*3/MM3 (ref 140–450)
PMV BLD AUTO: 8.7 FL (ref 6–12)
RBC # BLD AUTO: 3.22 10*6/MM3 (ref 3.77–5.28)
WBC # BLD AUTO: 9.42 10*3/MM3 (ref 3.4–10.8)

## 2021-09-19 PROCEDURE — 83735 ASSAY OF MAGNESIUM: CPT | Performed by: STUDENT IN AN ORGANIZED HEALTH CARE EDUCATION/TRAINING PROGRAM

## 2021-09-19 PROCEDURE — 25010000002 ONDANSETRON PER 1 MG: Performed by: STUDENT IN AN ORGANIZED HEALTH CARE EDUCATION/TRAINING PROGRAM

## 2021-09-19 PROCEDURE — 85025 COMPLETE CBC W/AUTO DIFF WBC: CPT | Performed by: STUDENT IN AN ORGANIZED HEALTH CARE EDUCATION/TRAINING PROGRAM

## 2021-09-19 PROCEDURE — 25010000002 HYDROMORPHONE PER 4 MG: Performed by: STUDENT IN AN ORGANIZED HEALTH CARE EDUCATION/TRAINING PROGRAM

## 2021-09-19 PROCEDURE — 25010000002 CEFAZOLIN PER 500 MG: Performed by: SPECIALIST

## 2021-09-19 PROCEDURE — 25010000002 HEPARIN (PORCINE) PER 1000 UNITS: Performed by: STUDENT IN AN ORGANIZED HEALTH CARE EDUCATION/TRAINING PROGRAM

## 2021-09-19 RX ORDER — KETOROLAC TROMETHAMINE 30 MG/ML
30 INJECTION, SOLUTION INTRAMUSCULAR; INTRAVENOUS EVERY 6 HOURS PRN
Status: DISCONTINUED | OUTPATIENT
Start: 2021-09-19 | End: 2021-09-19 | Stop reason: HOSPADM

## 2021-09-19 RX ADMIN — SODIUM CHLORIDE, POTASSIUM CHLORIDE, SODIUM LACTATE AND CALCIUM CHLORIDE 75 ML/HR: 600; 310; 30; 20 INJECTION, SOLUTION INTRAVENOUS at 12:29

## 2021-09-19 RX ADMIN — HYDROMORPHONE HYDROCHLORIDE 0.5 MG: 1 INJECTION, SOLUTION INTRAMUSCULAR; INTRAVENOUS; SUBCUTANEOUS at 06:42

## 2021-09-19 RX ADMIN — SODIUM CHLORIDE, POTASSIUM CHLORIDE, SODIUM LACTATE AND CALCIUM CHLORIDE 1000 ML: 600; 310; 30; 20 INJECTION, SOLUTION INTRAVENOUS at 12:30

## 2021-09-19 RX ADMIN — PANTOPRAZOLE SODIUM 40 MG: 40 INJECTION, POWDER, FOR SOLUTION INTRAVENOUS at 06:52

## 2021-09-19 RX ADMIN — SODIUM CHLORIDE, POTASSIUM CHLORIDE, SODIUM LACTATE AND CALCIUM CHLORIDE 125 ML/HR: 600; 310; 30; 20 INJECTION, SOLUTION INTRAVENOUS at 06:44

## 2021-09-19 RX ADMIN — HEPARIN SODIUM 5000 UNITS: 5000 INJECTION INTRAVENOUS; SUBCUTANEOUS at 14:15

## 2021-09-19 RX ADMIN — HYDROMORPHONE HYDROCHLORIDE 0.5 MG: 1 INJECTION, SOLUTION INTRAMUSCULAR; INTRAVENOUS; SUBCUTANEOUS at 12:34

## 2021-09-19 RX ADMIN — HEPARIN SODIUM 5000 UNITS: 5000 INJECTION INTRAVENOUS; SUBCUTANEOUS at 06:41

## 2021-09-19 RX ADMIN — HYDROMORPHONE HYDROCHLORIDE 0.5 MG: 1 INJECTION, SOLUTION INTRAMUSCULAR; INTRAVENOUS; SUBCUTANEOUS at 00:16

## 2021-09-19 RX ADMIN — ONDANSETRON 4 MG: 2 INJECTION INTRAMUSCULAR; INTRAVENOUS at 12:34

## 2021-09-19 RX ADMIN — CEFAZOLIN 2 G: 10 INJECTION, POWDER, FOR SOLUTION INTRAVENOUS at 09:08

## 2021-09-19 RX ADMIN — LIDOCAINE 2 PATCH: 50 PATCH CUTANEOUS at 09:08

## 2021-09-19 NOTE — PROGRESS NOTES
"Patient Care Team:  Provider, No Known as PCP - General    Chief Complaint: S/P exploratory laparotomy, lysis of adhesions and removal of gastric band    Subjective     Chelo Swenson is POD 2 days ago from the above procedure.  Last night the NG tube was in place or removed and the patient refused to have it placed back in.  Patient denied nausea vomiting episodes and tolerated ice chips and sips.  Patient continued to have flatus which she describes as \"wind\".  No bowel movement as of yet.  Patient's abdominal pain slightly improved and the patient is motivated and wanting to ambulate this a.m.  Patient states that her \"phlegm\" causes nausea and she was encouraged to now utilize her incentive spirometer.       Review of Systems:     Review of Systems - General ROS: positive for  - fatigue  Respiratory ROS: no cough, shortness of breath, or wheezing  positive for -nasal congestion  Cardiovascular ROS: no chest pain or dyspnea on exertion  Gastrointestinal ROS: positive for - abdominal pain  negative for - diarrhea, hematemesis or melena  Genito-Urinary ROS: positive for -Garcia in place  Musculoskeletal ROS: negative for - swelling in leg - both sides    Objective     Vital Signs  /83 (BP Location: Right arm, Patient Position: Sitting)   Pulse 94   Temp 98.5 °F (36.9 °C) (Oral)   Resp 16   Ht 165.1 cm (65\")   Wt 71.3 kg (157 lb 1.6 oz)   LMP 01/10/2013   SpO2 97%   Breastfeeding No   BMI 26.14 kg/m²     Physical Exam:    HEENT: extra ocular movement intact and sclera clear, anicteric  Respiratory: appears well, vitals normal, no respiratory distress, acyanotic, normal RR, chest clear, no wheezing, crepitations, rhonchi, normal symmetric air entry  Cardiovascular: Regular rate and rhythm, S1, S2 normal, no murmur, click, rub or gallop  GI: Abnormal shape: obese  Skin: scars- suprapubic, midline and dressings removed and replaced.  Wounds are clean dry and intact.  Auscultation: Normal bowel " sounds.  No bruits.  Tenderness: midline  Musculoskeletal: inspection - no abnormality  Neurologic: alert, oriented, normal speech, no focal findings or movement disorder noted     Results Review:    Labs reviewed  WBC   Date Value Ref Range Status   09/19/2021 9.42 3.40 - 10.80 10*3/mm3 Final     RBC   Date Value Ref Range Status   09/19/2021 3.22 (L) 3.77 - 5.28 10*6/mm3 Final     Hemoglobin   Date Value Ref Range Status   09/19/2021 9.6 (L) 12.0 - 15.9 g/dL Final     Hematocrit   Date Value Ref Range Status   09/19/2021 29.0 (L) 34.0 - 46.6 % Final     MCV   Date Value Ref Range Status   09/19/2021 90.1 79.0 - 97.0 fL Final     MCH   Date Value Ref Range Status   09/19/2021 29.8 26.6 - 33.0 pg Final     MCHC   Date Value Ref Range Status   09/19/2021 33.1 31.5 - 35.7 g/dL Final     RDW   Date Value Ref Range Status   09/19/2021 16.0 (H) 12.3 - 15.4 % Final     RDW-SD   Date Value Ref Range Status   09/19/2021 53.5 37.0 - 54.0 fl Final     MPV   Date Value Ref Range Status   09/19/2021 8.7 6.0 - 12.0 fL Final     Platelets   Date Value Ref Range Status   09/19/2021 326 140 - 450 10*3/mm3 Final     Neutrophil %   Date Value Ref Range Status   09/19/2021 83.6 (H) 42.7 - 76.0 % Final     Lymphocyte %   Date Value Ref Range Status   09/19/2021 8.3 (L) 19.6 - 45.3 % Final     Monocyte %   Date Value Ref Range Status   09/19/2021 4.8 (L) 5.0 - 12.0 % Final     Eosinophil %   Date Value Ref Range Status   09/19/2021 2.7 0.3 - 6.2 % Final     Basophil %   Date Value Ref Range Status   09/19/2021 0.1 0.0 - 1.5 % Final     Immature Grans %   Date Value Ref Range Status   09/19/2021 0.5 0.0 - 0.5 % Final     Neutrophils, Absolute   Date Value Ref Range Status   09/19/2021 7.88 (H) 1.70 - 7.00 10*3/mm3 Final     Lymphocytes, Absolute   Date Value Ref Range Status   09/19/2021 0.78 0.70 - 3.10 10*3/mm3 Final     Monocytes, Absolute   Date Value Ref Range Status   09/19/2021 0.45 0.10 - 0.90 10*3/mm3 Final     Eosinophils,  Absolute   Date Value Ref Range Status   09/19/2021 0.25 0.00 - 0.40 10*3/mm3 Final     Basophils, Absolute   Date Value Ref Range Status   09/19/2021 0.01 0.00 - 0.20 10*3/mm3 Final     Immature Grans, Absolute   Date Value Ref Range Status   09/19/2021 0.05 0.00 - 0.05 10*3/mm3 Final     nRBC   Date Value Ref Range Status   09/19/2021 0.0 0.0 - 0.2 /100 WBC Final         Intake/Output Summary (Last 24 hours) at 9/19/2021 1041  Last data filed at 9/19/2021 1000  Gross per 24 hour   Intake 2652 ml   Output 2075 ml   Net 577 ml       Medication Reviewed:     Assessment/Plan  POD 2 days ago from exploratory laparotomy, lysis of adhesions, correction of small bowel volvulus and removal of gastric band system.      Plan: Patient is tolerating ice chips and sips without NG tube in place.  We will start bariatric stage I diet.  Garcia catheter will be removed due to the patient ambulating.  Encourage patient to utilize her incentive spirometer to help with her sputum and respiratory status.  It appears that her postoperative ileus has improved with evidence of increased bowel sounds as well as flatus.  No bowel movement as of yet and will await for progression of her GI tract.  Urine output has been decreased the past 24 hours.    Pierre Arreguin MD  09/19/21  10:31 CDT

## 2021-09-19 NOTE — PLAN OF CARE
Goal Outcome Evaluation:  Plan of Care Reviewed With: patient        Progress: improving  Outcome Summary: Pt was threatening to leave AMA. Pt is very focused on not wanting to gain weight. Dr. Arreguin came back to the hospital and spoke with the patient. Patient agreed to stay. IVF. FC was dc'd, pt is due to void. Dressings changed per Dr. Arreguin. Safety maintained.

## 2021-09-19 NOTE — PROGRESS NOTES
"Note:  I was called by the patient's nurse that the patient wanted to leave [place].  I returned back to discuss with the patient her rationale for leaving.  The patient stated she was concerned about her weight regain and that her band was removed.  She used inappropriate, derogatory statements towards me and my profession.  That being said I explained to the patient the band tubing compromised her small intestines and due to the concern it was recommended to be removed.  It was a cause of her obstruction which was explained to the patient by myself the previous day.  Of note it was documented in her H&P that she might require her band to be removed.  The patient and I discussed her care again this am, and I recommended that she stay to allow for her GI system to progress, which meant tolerating PO and having a BM.  I explained to the patient that she may choose to have her band replaced but I advised her against getting it replaced due to this possibly happening again and not having a closer than 3 hour drive facility to manage her \"band over bypass\" adjustments and maintenance.  She stated her last adjustment was in [facility].  I have also informed the patient that our facility does not manage or adjust bands over bypass.   She agreed to stay and allow us to further care for her.  I will reassess her medical status in the am.    " Patient

## 2021-09-19 NOTE — PROGRESS NOTES
LOS: 2 days   Patient Care Team:  Provider, No Known as PCP - General    Chief Complaint: Central abdominal pain  Subjective     Subjective     Postoperative day 2 status post exploratory laparotomy secondary to internal hernia from a band around the laparoscopic band causing small bowel volvulus.  She is not moving much only travel to the restroom several times, she is somewhat congested, not coughing well.  No flatus no bowel movements marginal urine output of 625 NG output 100  Objective      Objective     Vital Signs  Temp:  [98.5 °F (36.9 °C)-99.8 °F (37.7 °C)] 98.5 °F (36.9 °C)  Heart Rate:  [] 94  Resp:  [16-18] 16  BP: (125-137)/(76-83) 132/83    Intake & Output (last 3 days)       09/16 0701 - 09/17 0700 09/17 0701 - 09/18 0700 09/18 0701 - 09/19 0700 09/19 0701 - 09/20 0700    I.V. (mL/kg)  2617 (36.7) 3317 (46.5)     Total Intake(mL/kg)  2617 (36.7) 3317 (46.5)     Urine (mL/kg/hr)  625 (0.4) 1375 (0.8)     Emesis/NG output  100      Total Output  725 1375     Net  +1892 +1942                   Physical Exam:     General Appearance:    Alert, cooperative, in no acute distress   Lungs:    Shallow breath sounds, congested, nonproductive    Heart:    Regular rhythm and normal rate, normal S1 and S2, no            murmur, no gallop, no rub, no click   Chest Wall:    No abnormalities observed   Abdomen:    Rare bowel sounds, expected tenderness, dressing is dry        Results Review:     I reviewed the patient's new clinical results.  I reviewed the patient's new imaging results and agree with the interpretation.    Results from last 7 days   Lab Units 09/19/21  0645 09/18/21  0627   WBC 10*3/mm3 9.42 9.39   HEMOGLOBIN g/dL 9.6* 11.6*   HEMATOCRIT % 29.0* 34.4   PLATELETS 10*3/mm3 326 381        Results from last 7 days   Lab Units 09/18/21  0627   SODIUM mmol/L 132*   POTASSIUM mmol/L 4.2   CHLORIDE mmol/L 103   CO2 mmol/L 23.0   BUN mg/dL 9   CREATININE mg/dL 0.49*   CALCIUM mg/dL 7.3*   BILIRUBIN  mg/dL 0.3   ALK PHOS U/L 57   ALT (SGPT) U/L 14   AST (SGOT) U/L 25   GLUCOSE mg/dL 176*       Assessment/Plan     Assessment/Plan       Internal hernia    Volvulus (CMS/HCC)      Will give a bolus of lactated Ringer's for low urine output, support with pulmonary toilet, incentive spirometer and ambulation.      Spike Gómez MD  09/19/21  09:31 CDT      Time: time spent with patient 15 minutes     EMR Dragon/Transcription disclaimer: Much of this encounter note is an electronic transcription/translation of spoken language to printed text. The electronic translation of spoken language may permit erroneous, or at times, nonsensical words or phrases to be inadvertently transcribed; although I have reviewed the note for such errors, some may still exist.

## 2021-09-19 NOTE — NURSING NOTE
"Patient was threatening to leave AMA. She stated\" All I'm worried about is losing weight. I dont care what that doctor (Dr. Arreguin) said. Im leaving.\" I called Dr. Arreguin and he came back to the hospital to speak with the patient. Dr. Arreguin discussed all the options and treatment plan with the patient. Patient agrees to stay.   "

## 2021-09-19 NOTE — NURSING NOTE
NG tube came out per pt, attempted to replace NG with RENATO Gaspar. Patient refused at this time.

## 2021-09-20 NOTE — NURSING NOTE
"Patient called out. Upon entering room, IV pump was beeping and I started to restart the fluids when the patient said, \"It hurt so I pulled it out.\" Patient then handed me her intact IV cathlon and dressing. I asked patient if she pulled out her IV and she stated that she did and that she didn't want a new one. I put IV cathlon and dressing in trash can, alerted primary nurse.  "

## 2021-09-20 NOTE — PROGRESS NOTES
Upon arriving to the hospital this a.m. to round on Chelo Swenson. I noted she was not in the computer system.  I visited [facility] and was told this a.m. the patient left AMA.  They stated the patient was combative.  I discussed with the charge nurse to please notify me of any mutual patients that I am involved with the next time.  I was not notified if the patient was tolerating clears or had a bowel movement prior to leaving AMA.  Attempt was made to contact the patient on her home number.  The patient had no identifying information on said telephone number therefore no detailed message was left.  Time of phone call 9:41 AM September 20, 2021.

## 2021-09-21 LAB
LAB AP CASE REPORT: NORMAL
LAB AP CLINICAL INFORMATION: NORMAL
PATH REPORT.FINAL DX SPEC: NORMAL
PATH REPORT.GROSS SPEC: NORMAL

## 2021-09-21 NOTE — DISCHARGE SUMMARY
Consults     No orders found from 8/19/2021 to 9/18/2021.       Jazmin Morgan MD - Discharge Summary    Date of Discharge:  9/21/2021    Discharge Diagnosis: internal hernia     Presenting Problem/History of Present Illness  Volvulus (CMS/HCC) [K56.2]     Hospital Course  Patient is a 46 y.o. female presented with peritonitis. She has a significant past surgical history, most notable for a h/o gastric bypass. With imaging showing swirling of the mesentery (reviewed with Dr. Hodges), this is concerning for an internal hernia. She is covid negative. I had a long discussion with the patient on the surgical plan. I have recommended an exploratory laparotomy with reduction of internal hernia, possible bowel resection, possible removal of lap band, possible mesh removal. We discussed the risks of surgery including bleeding, infection, damage to surrounding structures, cardiovascular complications, and pulmonary complications. She underwent an ex lap with reduction of internal hernia, lysis of adhesions, lap band removal with Dr. Arreguin, and excision of mesh. Post operatively she was transferred to floor. Garcia was removed. She removed her NGT. Unfortunately, the patient left AMA on POD 2. I attempted to call the patient on 9/20/21 the day after she left AMA. No one answered the phone and she did not identify herself on her voicemail.     Procedures Performed  Procedure(s):  LAPAROTOMY EXPLORATORY, REDUCTION OF INTERNAL HERNIA, LYSIS OF ADHESIONS, LAP BAND REMOVAL, REMOVAL OF ABD WALL MESH,  ESOPHAGOGASTRODUODENOSCOPY, gastric band removal       Consults:   Consults     No orders found from 8/19/2021 to 9/18/2021.          Condition on Discharge:  Not medically ready for discharge, patient left AMA     Vital Signs       Physical Exam:   See History and Physical found in chart.    Discharge Disposition  Left Against Medical Advice    Discharge Medications     Discharge Medications      ASK your doctor about these  medications      Instructions Start Date   alendronate 70 MG tablet  Commonly known as: FOSAMAX   70 mg, Oral, Every 7 Days, Every Monday      ALPRAZolam 0.5 MG tablet  Commonly known as: XANAX   0.5 mg, Oral, 2 Times Daily      amantadine 100 MG tablet  Commonly known as: SYMMETREL   No dose, route, or frequency recorded.      amantadine 100 MG capsule  Commonly known as: SYMMETREL   Oral, 2 Times Daily      amLODIPine 10 MG tablet  Commonly known as: NORVASC   10 mg, Oral, Daily      aspirin 81 MG tablet   81 mg, Oral, Daily      AVONEX IM   Intramuscular      azelastine 0.1 % nasal spray  Commonly known as: ASTELIN   1 spray, Nasal, 2 Times Daily PRN, Use in each nostril as directed       baclofen 10 MG tablet  Commonly known as: LIORESAL   TAKE ONE TABLET THREE TIMES DAILY      baclofen 10 MG tablet  Commonly known as: LIORESAL   10 mg, Oral, 2 Times Daily      busPIRone 10 MG tablet  Commonly known as: BUSPAR   10 mg, Oral, 3 Times Daily      busPIRone 30 MG tablet  Commonly known as: BUSPAR   15 mg, Oral, 3 Times Daily      CALCIUM 1200 PO   Oral      calcium carbonate 600 MG tablet  Commonly known as: OS-KLEBER   Take 1 tablet by mouth daily      cetirizine 10 MG tablet  Commonly known as: zyrTEC   10 mg, Oral, Daily      cholecalciferol 25 MCG (1000 UT) tablet  Commonly known as: VITAMIN D3   Take 1,000 Units by mouth daily      citalopram 40 MG tablet  Commonly known as: CeleXA   40 mg, Oral, Daily      clonazePAM 1 MG tablet  Commonly known as: KlonoPIN   1 mg, Oral, Nightly PRN      cyanocobalamin 1000 MCG/ML injection   Intramuscular, Every 28 Days      desvenlafaxine 100 MG 24 hr tablet  Commonly known as: PRISTIQ   Take by mouth daily      diazePAM 5 MG tablet  Commonly known as: VALIUM   5 mg, Oral, 2 Times Daily PRN      diclofenac 75 MG EC tablet  Commonly known as: VOLTAREN   75 mg, Oral, 2 Times Daily      dicyclomine 10 MG capsule  Commonly known as: BENTYL   10 mg, Oral, 2 Times Daily PRN       divalproex 500 MG DR tablet  Commonly known as: DEPAKOTE   500 mg, Oral, Daily      divalproex 500 MG DR tablet  Commonly known as: DEPAKOTE   1,000 mg, Oral, Every Night at Bedtime      dronabinol 5 MG capsule  Commonly known as: MARINOL   10 mg, Oral, 2 Times Daily Before Meals      fentaNYL 50 MCG/HR patch  Commonly known as: DURAGESIC   1 patch, Transdermal, Every 72 Hours      ferrous sulfate 325 (65 FE) MG tablet   Take 325 mg by mouth daily (with breakfast)      fluconazole 150 MG tablet  Commonly known as: DIFLUCAN   150 mg, Oral, 3 Times Daily      Gralise 600 MG tablet tablet  Generic drug: gabapentin (once-daily)   3 Times Daily      hydroCHLOROthiazide 12.5 MG tablet  Commonly known as: HYDRODIURIL   12.5 mg, Oral, 2 Times Daily      HYDROcodone-acetaminophen  MG per tablet  Commonly known as: NORCO   1 tablet, Oral, Every 6 Hours PRN      hydrocortisone 2.5 % cream   No dose, route, or frequency recorded.      hydroxychloroquine 200 MG tablet  Commonly known as: PLAQUENIL   No dose, route, or frequency recorded.      hydroxychloroquine 200 MG tablet  Commonly known as: PLAQUENIL   Take 200 mg by mouth daily      hydrOXYzine 50 MG tablet  Commonly known as: ATARAX   50 mg, Oral, 4 Times Daily PRN      ketoconazole 2 % shampoo  Commonly known as: NIZORAL   No dose, route, or frequency recorded.      ketoconazole 2 % cream  Commonly known as: NIZORAL   No dose, route, or frequency recorded.      Lasix 20 MG tablet  Generic drug: furosemide   Take 1 tablet by mouth daily      Leuprolide Acetate 11.25 MG kit   Inject into the muscle every 30 days      lisinopril 40 MG tablet  Commonly known as: PRINIVIL,ZESTRIL   40 mg, Oral, 2 times daily      Lupron Depot (1-Month) 3.75 MG injection  Generic drug: leuprolide   No dose, route, or frequency recorded.      meloxicam 15 MG tablet  Commonly known as: MOBIC   Take 15 mg by mouth daily      meloxicam 7.5 MG tablet  Commonly known as: MOBIC   7.5 mg,  Oral      multivitamin with minerals tablet tablet   Oral      naproxen 250 MG tablet  Commonly known as: NAPROSYN   250 mg, Oral      norethindrone 5 MG tablet  Commonly known as: AYGESTIN   5 mg, Oral, Daily      nortriptyline 10 MG capsule  Commonly known as: PAMELOR   10 mg, Oral, Nightly      ondansetron 4 MG tablet  Commonly known as: ZOFRAN   4 mg, Oral, Every 8 Hours PRN      oxyCODONE-acetaminophen 7.5-325 MG per tablet  Commonly known as: PERCOCET   Every 8 Hours      pantoprazole 40 MG EC tablet  Commonly known as: PROTONIX   Take 40 mg by mouth bid      potassium chloride 20 MEQ CR tablet  Commonly known as: K-DUR,KLOR-CON   No dose, route, or frequency recorded.      prazosin 2 MG capsule  Commonly known as: MINIPRESS   2 mg, Oral, Nightly      predniSONE 1 MG tablet  Commonly known as: DELTASONE   Take 5 mg by mouth daily Indications: 2 PO QD       predniSONE 1 MG tablet  Commonly known as: DELTASONE   8 mg, Oral, Daily      sucralfate 1 g tablet  Commonly known as: CARAFATE   1 g, Oral, 4 Times Daily      tiZANidine 4 MG tablet  Commonly known as: ZANAFLEX   No dose, route, or frequency recorded.      topiramate 200 MG tablet  Commonly known as: TOPAMAX   No dose, route, or frequency recorded.      topiramate 50 MG tablet  Commonly known as: TOPAMAX   200 mg, Oral, Daily      vitamin D 1.25 MG (11502 UT) capsule capsule  Commonly known as: ERGOCALCIFEROL   Take 50,000 Units by mouth once a week             Follow-up Appointments  No future appointments.      Test Results Pending at Discharge  Pending Labs     Order Current Status    Tissue Pathology Exam In process    Blood Culture With CHANDA - Blood, Arm, Left Preliminary result    Blood Culture With CHANDA - Blood, Arm, Right Preliminary result           Jazmin Morgan MD  09/21/21  08:20 CDT

## 2021-09-22 NOTE — THERAPY DISCHARGE NOTE
Acute Care - Occupational Therapy Discharge Summary  [facility]     Patient Name: Chelo Swenson  : 1975  MRN: [id number]    Today's Date: 2021  Onset of Illness/Injury or Date of Surgery: 21       Referring Physician: Dr. Morgan      Admit Date: 2021        OT Recommendation and Plan    Visit Dx:    ICD-10-CM ICD-9-CM   1. Volvulus (CMS/HCC)  K56.2 560.2   2. Decreased activities of daily living (ADL)  Z78.9 V49.89               OT Rehab Goals     Row Name 21 0700             Transfer Goal 1 (OT)    Activity/Assistive Device (Transfer Goal 1, OT)  tub  -TS      [place] Level/Cues Needed (Transfer Goal 1, OT)  independent  -TS      Time Frame (Transfer Goal 1, OT)  long term goal (LTG);10 days  -TS      Progress/Outcome (Transfer Goal 1, OT)  goal not met  -TS         Dressing Goal 1 (OT)    Activity/Device (Dressing Goal 1, OT)  dressing skills, all  -TS      [place]/Cues Needed (Dressing Goal 1, OT)  independent  -TS      Time Frame (Dressing Goal 1, OT)  long term goal (LTG);10 days  -TS      Progress/Outcome (Dressing Goal 1, OT)  goal not met  -TS        User Key  (r) = Recorded By, (t) = Taken By, (c) = Cosigned By    Initials Name Provider Type Discipline    Wanda Goldman COTA Occupational Therapy Assistant OT                      OT Discharge Summary  Anticipated Discharge Disposition (OT): home  Reason for Discharge: Non-compliant (left AMA)  Outcomes Achieved: Refer to plan of care for updates on goals achieved  Discharge Destination: Home      NAOMI Willett  2021

## 2021-09-23 LAB
BACTERIA SPEC AEROBE CULT: NORMAL
BACTERIA SPEC AEROBE CULT: NORMAL

## 2021-09-28 ENCOUNTER — HOSPITAL ENCOUNTER (EMERGENCY)
Age: 46
Discharge: ANOTHER HOSPITAL | End: 2021-09-28
Attending: EMERGENCY MEDICINE
Payer: MEDICARE

## 2021-09-28 ENCOUNTER — APPOINTMENT (OUTPATIENT)
Dept: CT IMAGING | Age: 46
End: 2021-09-28
Payer: MEDICARE

## 2021-09-28 VITALS
OXYGEN SATURATION: 98 % | TEMPERATURE: 98.3 F | HEIGHT: 65 IN | DIASTOLIC BLOOD PRESSURE: 78 MMHG | SYSTOLIC BLOOD PRESSURE: 111 MMHG | RESPIRATION RATE: 20 BRPM | WEIGHT: 157 LBS | HEART RATE: 61 BPM | BODY MASS INDEX: 26.16 KG/M2

## 2021-09-28 DIAGNOSIS — R10.0 ACUTE ABDOMEN: Primary | ICD-10-CM

## 2021-09-28 LAB
ALBUMIN SERPL-MCNC: 3.2 G/DL (ref 3.5–5.2)
ALP BLD-CCNC: 70 U/L (ref 35–104)
ALT SERPL-CCNC: 8 U/L (ref 5–33)
AMYLASE: 52 U/L (ref 28–100)
ANION GAP SERPL CALCULATED.3IONS-SCNC: 10 MMOL/L (ref 7–19)
AST SERPL-CCNC: 13 U/L (ref 5–32)
BASOPHILS ABSOLUTE: 0 K/UL (ref 0–0.2)
BASOPHILS RELATIVE PERCENT: 0.5 % (ref 0–1)
BILIRUB SERPL-MCNC: <0.2 MG/DL (ref 0.2–1.2)
BILIRUBIN URINE: NEGATIVE
BLOOD, URINE: NEGATIVE
BUN BLDV-MCNC: 5 MG/DL (ref 6–20)
CALCIUM SERPL-MCNC: 9 MG/DL (ref 8.6–10)
CHLORIDE BLD-SCNC: 102 MMOL/L (ref 98–111)
CLARITY: CLEAR
CO2: 22 MMOL/L (ref 22–29)
COLOR: YELLOW
CREAT SERPL-MCNC: 0.7 MG/DL (ref 0.5–0.9)
EOSINOPHILS ABSOLUTE: 0.3 K/UL (ref 0–0.6)
EOSINOPHILS RELATIVE PERCENT: 3.9 % (ref 0–5)
GFR AFRICAN AMERICAN: >59
GFR NON-AFRICAN AMERICAN: >60
GLUCOSE BLD-MCNC: 88 MG/DL (ref 74–109)
GLUCOSE URINE: NEGATIVE MG/DL
HCT VFR BLD CALC: 31.6 % (ref 37–47)
HEMOGLOBIN: 10.2 G/DL (ref 12–16)
IMMATURE GRANULOCYTES #: 0.1 K/UL
KETONES, URINE: NEGATIVE MG/DL
LACTIC ACID: 0.8 MMOL/L (ref 0.5–1.9)
LEUKOCYTE ESTERASE, URINE: NEGATIVE
LIPASE: 21 U/L (ref 13–60)
LYMPHOCYTES ABSOLUTE: 1.7 K/UL (ref 1.1–4.5)
LYMPHOCYTES RELATIVE PERCENT: 20.1 % (ref 20–40)
MCH RBC QN AUTO: 29.7 PG (ref 27–31)
MCHC RBC AUTO-ENTMCNC: 32.3 G/DL (ref 33–37)
MCV RBC AUTO: 91.9 FL (ref 81–99)
MONOCYTES ABSOLUTE: 0.4 K/UL (ref 0–0.9)
MONOCYTES RELATIVE PERCENT: 4.6 % (ref 0–10)
NEUTROPHILS ABSOLUTE: 6.1 K/UL (ref 1.5–7.5)
NEUTROPHILS RELATIVE PERCENT: 70.3 % (ref 50–65)
NITRITE, URINE: NEGATIVE
PDW BLD-RTO: 15.8 % (ref 11.5–14.5)
PH UA: 6.5 (ref 5–8)
PLATELET # BLD: 543 K/UL (ref 130–400)
PMV BLD AUTO: 8.6 FL (ref 9.4–12.3)
POTASSIUM REFLEX MAGNESIUM: 4.6 MMOL/L (ref 3.5–5)
PROTEIN UA: NEGATIVE MG/DL
RBC # BLD: 3.44 M/UL (ref 4.2–5.4)
SARS-COV-2, NAAT: NOT DETECTED
SODIUM BLD-SCNC: 134 MMOL/L (ref 136–145)
SPECIFIC GRAVITY UA: 1.03 (ref 1–1.03)
TOTAL PROTEIN: 6.1 G/DL (ref 6.6–8.7)
UROBILINOGEN, URINE: 0.2 E.U./DL
WBC # BLD: 8.7 K/UL (ref 4.8–10.8)

## 2021-09-28 PROCEDURE — 96365 THER/PROPH/DIAG IV INF INIT: CPT

## 2021-09-28 PROCEDURE — 96366 THER/PROPH/DIAG IV INF ADDON: CPT

## 2021-09-28 PROCEDURE — 83690 ASSAY OF LIPASE: CPT

## 2021-09-28 PROCEDURE — 6360000002 HC RX W HCPCS: Performed by: EMERGENCY MEDICINE

## 2021-09-28 PROCEDURE — 80053 COMPREHEN METABOLIC PANEL: CPT

## 2021-09-28 PROCEDURE — 74177 CT ABD & PELVIS W/CONTRAST: CPT

## 2021-09-28 PROCEDURE — 83605 ASSAY OF LACTIC ACID: CPT

## 2021-09-28 PROCEDURE — 99283 EMERGENCY DEPT VISIT LOW MDM: CPT | Performed by: SURGERY

## 2021-09-28 PROCEDURE — 82150 ASSAY OF AMYLASE: CPT

## 2021-09-28 PROCEDURE — 87635 SARS-COV-2 COVID-19 AMP PRB: CPT

## 2021-09-28 PROCEDURE — 2580000003 HC RX 258: Performed by: EMERGENCY MEDICINE

## 2021-09-28 PROCEDURE — 6360000004 HC RX CONTRAST MEDICATION: Performed by: EMERGENCY MEDICINE

## 2021-09-28 PROCEDURE — 81003 URINALYSIS AUTO W/O SCOPE: CPT

## 2021-09-28 PROCEDURE — 96376 TX/PRO/DX INJ SAME DRUG ADON: CPT

## 2021-09-28 PROCEDURE — 96375 TX/PRO/DX INJ NEW DRUG ADDON: CPT

## 2021-09-28 PROCEDURE — 96374 THER/PROPH/DIAG INJ IV PUSH: CPT

## 2021-09-28 PROCEDURE — 85025 COMPLETE CBC W/AUTO DIFF WBC: CPT

## 2021-09-28 PROCEDURE — 99285 EMERGENCY DEPT VISIT HI MDM: CPT

## 2021-09-28 PROCEDURE — 36415 COLL VENOUS BLD VENIPUNCTURE: CPT

## 2021-09-28 PROCEDURE — 2500000003 HC RX 250 WO HCPCS: Performed by: EMERGENCY MEDICINE

## 2021-09-28 RX ORDER — 0.9 % SODIUM CHLORIDE 0.9 %
1000 INTRAVENOUS SOLUTION INTRAVENOUS ONCE
Status: COMPLETED | OUTPATIENT
Start: 2021-09-28 | End: 2021-09-28

## 2021-09-28 RX ORDER — DIAZEPAM 5 MG/1
5 TABLET ORAL 2 TIMES DAILY PRN
COMMUNITY
End: 2022-03-18

## 2021-09-28 RX ORDER — SUMATRIPTAN 100 MG/1
TABLET, FILM COATED ORAL
COMMUNITY
End: 2022-05-03

## 2021-09-28 RX ORDER — HYDROMORPHONE HYDROCHLORIDE 1 MG/ML
0.5 INJECTION, SOLUTION INTRAMUSCULAR; INTRAVENOUS; SUBCUTANEOUS EVERY 30 MIN PRN
Status: DISCONTINUED | OUTPATIENT
Start: 2021-09-28 | End: 2021-09-28 | Stop reason: HOSPADM

## 2021-09-28 RX ORDER — ONDANSETRON 2 MG/ML
4 INJECTION INTRAMUSCULAR; INTRAVENOUS EVERY 30 MIN PRN
Status: DISCONTINUED | OUTPATIENT
Start: 2021-09-28 | End: 2021-09-28 | Stop reason: HOSPADM

## 2021-09-28 RX ORDER — PRAZOSIN HYDROCHLORIDE 5 MG/1
CAPSULE ORAL
COMMUNITY
End: 2022-06-03

## 2021-09-28 RX ORDER — LISDEXAMFETAMINE DIMESYLATE 30 MG/1
CAPSULE ORAL
COMMUNITY
Start: 2021-07-29 | End: 2022-05-03

## 2021-09-28 RX ORDER — HYDROMORPHONE HYDROCHLORIDE 1 MG/ML
0.5 INJECTION, SOLUTION INTRAMUSCULAR; INTRAVENOUS; SUBCUTANEOUS EVERY 30 MIN PRN
Status: COMPLETED | OUTPATIENT
Start: 2021-09-28 | End: 2021-09-28

## 2021-09-28 RX ORDER — SODIUM CHLORIDE 9 MG/ML
1000 INJECTION, SOLUTION INTRAVENOUS CONTINUOUS
Status: DISCONTINUED | OUTPATIENT
Start: 2021-09-28 | End: 2021-09-28 | Stop reason: HOSPADM

## 2021-09-28 RX ORDER — ONDANSETRON 2 MG/ML
4 INJECTION INTRAMUSCULAR; INTRAVENOUS EVERY 30 MIN PRN
Status: DISCONTINUED | OUTPATIENT
Start: 2021-09-28 | End: 2021-09-28

## 2021-09-28 RX ADMIN — HYDROMORPHONE HYDROCHLORIDE 0.5 MG: 1 INJECTION, SOLUTION INTRAMUSCULAR; INTRAVENOUS; SUBCUTANEOUS at 12:07

## 2021-09-28 RX ADMIN — ONDANSETRON HYDROCHLORIDE 4 MG: 2 SOLUTION INTRAMUSCULAR; INTRAVENOUS at 12:06

## 2021-09-28 RX ADMIN — IOPAMIDOL 90 ML: 755 INJECTION, SOLUTION INTRAVENOUS at 12:18

## 2021-09-28 RX ADMIN — HYDROMORPHONE HYDROCHLORIDE 0.5 MG: 1 INJECTION, SOLUTION INTRAMUSCULAR; INTRAVENOUS; SUBCUTANEOUS at 17:44

## 2021-09-28 RX ADMIN — HYDROMORPHONE HYDROCHLORIDE 0.5 MG: 1 INJECTION, SOLUTION INTRAMUSCULAR; INTRAVENOUS; SUBCUTANEOUS at 20:01

## 2021-09-28 RX ADMIN — SODIUM CHLORIDE 1000 ML: 9 INJECTION, SOLUTION INTRAVENOUS at 12:04

## 2021-09-28 RX ADMIN — PIPERACILLIN SODIUM AND TAZOBACTAM SODIUM 4500 MG: 4; .5 INJECTION, POWDER, LYOPHILIZED, FOR SOLUTION INTRAVENOUS at 15:35

## 2021-09-28 RX ADMIN — ONDANSETRON HYDROCHLORIDE 4 MG: 2 SOLUTION INTRAMUSCULAR; INTRAVENOUS at 17:44

## 2021-09-28 RX ADMIN — SODIUM CHLORIDE 1000 ML: 9 INJECTION, SOLUTION INTRAVENOUS at 17:49

## 2021-09-28 RX ADMIN — FAMOTIDINE 40 MG: 10 INJECTION, SOLUTION INTRAVENOUS at 12:08

## 2021-09-28 ASSESSMENT — PAIN DESCRIPTION - DESCRIPTORS
DESCRIPTORS: PRESSURE
DESCRIPTORS: ACHING;PRESSURE

## 2021-09-28 ASSESSMENT — ENCOUNTER SYMPTOMS
DIARRHEA: 0
CHOKING: 0
ABDOMINAL DISTENTION: 1
BACK PAIN: 0
EYE REDNESS: 0
CONSTIPATION: 0
SINUS PRESSURE: 0
EYE PAIN: 0
APNEA: 0
EYE DISCHARGE: 0
VOMITING: 0
ABDOMINAL PAIN: 1
CHEST TIGHTNESS: 0
SHORTNESS OF BREATH: 0
WHEEZING: 0
BLOOD IN STOOL: 0
VOMITING: 1
SORE THROAT: 0
VOICE CHANGE: 0
NAUSEA: 1
FACIAL SWELLING: 0

## 2021-09-28 ASSESSMENT — PAIN DESCRIPTION - ONSET: ONSET: ON-GOING

## 2021-09-28 ASSESSMENT — PAIN SCALES - GENERAL
PAINLEVEL_OUTOF10: 9

## 2021-09-28 ASSESSMENT — PAIN DESCRIPTION - FREQUENCY: FREQUENCY: INTERMITTENT

## 2021-09-28 ASSESSMENT — PAIN DESCRIPTION - PAIN TYPE: TYPE: ACUTE PAIN

## 2021-09-28 ASSESSMENT — PAIN DESCRIPTION - PROGRESSION: CLINICAL_PROGRESSION: NOT CHANGED

## 2021-09-28 ASSESSMENT — PAIN DESCRIPTION - LOCATION
LOCATION: ABDOMEN
LOCATION: ABDOMEN

## 2021-09-28 ASSESSMENT — PAIN DESCRIPTION - ORIENTATION: ORIENTATION: MID

## 2021-09-28 NOTE — ED PROVIDER NOTES
[facility] EMERGENCY DEPT  eMERGENCY dEPARTMENT eNCOUnter      Pt Name: Dimitrios Bush  MRN: [id number]  [place] 1975  Date of evaluation: 9/28/2021  Provider: Yusef Coello MD    [address] [place] [address]       Chief Complaint   Patient presents with    Abdominal Pain     SBO surgery at [facility] 2 weeks ago, hysterectomy 8/5         HISTORY OF PRESENT ILLNESS   (Location/Symptom, Timing/Onset,Context/Setting, Quality, Duration, Modifying Factors, Severity)  Note limiting factors. Dimitrios Bush is a 42 y.o. female who presents to the emergency department evaluation of abdominal pain    82-year-old female arrived by ambulance with distended painful abdomen. Review of records in her history she had developed a bowel obstruction on around the 17th of this month and had surgery at [facility]. At which time they found internal hernia they felt the LAP-BAND was the culprit and it had to be removed. Patient apparently became extremely upset about that. Though it was discussed in her consent. And apparently she left on postop day 2 AMA. She must do done well to about 4 days ago she states she developed nausea vomiting distended abdomen unable to eat or drink. States she is not passing any gas. Constant abdominal pain no fever. Apparently she had her gastric bypass and lap band placed on around 2013. But she has had several trips apparently back to [address] [place] [address] for adjustments. The history is provided by the patient and medical records. NursingNotes were reviewed. REVIEW OF SYSTEMS    (2-9 systems for level 4, 10 or more for level 5)     Review of Systems   Constitutional: Positive for appetite change. Negative for chills and fever. HENT: Negative for congestion, drooling, facial swelling, nosebleeds, sinus pressure, sore throat and voice change. Eyes: Negative for discharge. Respiratory: Negative for apnea, choking and shortness of breath.     Cardiovascular: Negative for chest pain and leg swelling. Gastrointestinal: Positive for abdominal distention, abdominal pain, nausea and vomiting. Negative for blood in stool, constipation and diarrhea. Genitourinary: Negative for dysuria and enuresis. Musculoskeletal: Negative for joint swelling. Skin: Negative for rash and wound. Neurological: Negative for seizures and syncope. Psychiatric/Behavioral: Negative for behavioral problems, hallucinations and suicidal ideas. All other systems reviewed and are negative. A complete review of systems was performed and is negative except as noted above in the HPI. PAST MEDICAL HISTORY     Past Medical History:   Diagnosis Date    Arthritis     Bipolar 1 disorder ([address]     Convulsions ([facility] 2020    Fibromyalgia     GERD (gastroesophageal reflux disease)     Hypertension     Lupus ([facility]     Movement disorder     Multiple sclerosis ([facility]     Neuromuscular disorder ([facility]     Psychiatric problem          SURGICAL HISTORY       Past Surgical History:   Procedure Laterality Date    ABDOMINAL EXPLORATION SURGERY      ADENOIDECTOMY       SECTION      x 2     SECTION, CLASSIC N/A     DILATATION, ESOPHAGUS      EXTERNAL EAR SURGERY      lobe    GASTRIC BAND      GASTRIC BYPASS SURGERY      HERNIA REPAIR      HERNIA REPAIR N/A     HYSTERECTOMY      SEPTOPLASTY      SMALL INTESTINE SURGERY      TONSILLECTOMY      TONSILLECTOMY AND ADENOIDECTOMY      UPPER GASTROINTESTINAL ENDOSCOPY           CURRENT MEDICATIONS       Previous Medications    ALENDRONATE (FOSAMAX) 70 MG TABLET    Take 70 mg by mouth every 7 days    ALPRAZOLAM (XANAX) 0.5 MG TABLET    Take 0.5 mg by mouth 2 times daily as needed for Sleep.     BUTALBITAL-ACETAMINOPHEN-CAFFEINE (FIORICET, ESGIC) -40 MG PER TABLET    Take 1 tablet by mouth every 4 hours as needed for Headaches    CORTICOTROPIN (ACTHAR) 80 UNIT/ML INJECTABLE GEL    Inject 1 mL into the muscle daily for 5 days    CYANOCOBALAMIN 1000 MCG/ML INJECTION    Inject 1,000 mcg into the muscle every 21 days     DIAZEPAM (VALIUM) 5 MG TABLET    Take 5 mg by mouth 2 times daily as needed.     FOLIC ACID (FOLVITE) 1 MG TABLET    Take 1 mg by mouth daily    HYDROCHLOROTHIAZIDE (MICROZIDE) 12.5 MG CAPSULE    Take 12.5 mg by mouth daily    HYDROXYZINE (ATARAX) 50 MG TABLET    Take 50 mg by mouth 2 times daily     LISINOPRIL (PRINIVIL;ZESTRIL) 40 MG TABLET    Take 40 mg by mouth daily    MULTIPLE VITAMINS-MINERALS (THERAPEUTIC MULTIVITAMIN-MINERALS) TABLET    Take 1 tablet by mouth daily    PANTOPRAZOLE SODIUM (PROTONIX) 40 MG PACK PACKET    Take 40 mg by mouth 2 times daily    POTASSIUM CHLORIDE (KLOR-CON M) 20 MEQ EXTENDED RELEASE TABLET    Take 20 mEq by mouth 2 times daily    PRAZOSIN (MINIPRESS) 5 MG CAPSULE    prazosin hydrochloride 2 mg caps    REXULTI 1 MG TABS TABLET        SUMATRIPTAN (IMITREX) 100 MG TABLET    sumatriptan succinate 100 mg tabs    VYVANSE 30 MG CAPSULE           ALLERGIES     Avelox [moxifloxacin], Glatiramer, Sugar-protein-starch, Avelox [moxifloxacin], Copaxone [glatiramer acetate], Depakote [divalproex sodium], Gabapentin, Hydroxychloroquine, Invega [paliperidone], Lamictal [lamotrigine], Lithium, Pregabalin, Saquinavir, Varenicline tartrate, Lamictal [lamotrigine], and Lithium    FAMILY HISTORY       Family History   Problem Relation Age of Onset    Anemia Mother     Breast Cancer Mother     Depression Mother     High Blood Pressure Mother     Mental Illness Mother    Sindy Gutierrez / Roberto Carlos Moreno Mother     Colon Cancer Father     Depression Father     High Blood Pressure Father     Mental Illness Father     Breast Cancer Maternal Grandmother     Alcohol Abuse Maternal Grandfather     Anemia Paternal Grandmother     Alcohol Abuse Paternal Grandfather     Osteoporosis Paternal Grandfather     Stroke Paternal Grandfather     Substance Abuse Maternal Cousin     Substance Abuse Paternal Cousin     Asthma Other SOCIAL HISTORY       Social History     Socioeconomic History    Marital status: Single     Spouse name: None    Number of children: 3    Years of education: Some College    Highest education level: None   Occupational History    Occupation: Disability   Tobacco Use    Smoking status: Current Every Day Smoker     Packs/day: 1.00     Years: 15.00     Pack years: 15.00     Types: Cigarettes    Smokeless tobacco: Never Used   Vaping Use    Vaping Use: Never used   Substance and Sexual Activity    Alcohol use: Not Currently     Alcohol/week: 2.0 standard drinks     Types: 2 Shots of liquor per week    Drug use: Yes     Types: Marijuana    Sexual activity: Never     Partners: Male   Other Topics Concern    None   Social History Narrative    ** Merged History Encounter **          Social Determinants of Health     Financial Resource Strain:     Difficulty of Paying Living Expenses:    Food Insecurity:     Worried About Running Out of Food in the Last Year:     Ran Out of Food in the Last Year:    Transportation Needs:     Lack of Transportation (Medical):      Lack of Transportation (Non-Medical):    Physical Activity:     Days of Exercise per Week:     Minutes of Exercise per Session:    Stress:     Feeling of Stress :    Social Connections:     Frequency of Communication with Friends and Family:     Frequency of Social Gatherings with Friends and Family:     Attends Orthodoxy Services:     Active Member of Clubs or Organizations:     Attends Club or Organization Meetings:     Marital Status:    Intimate Partner Violence:     Fear of Current or Ex-Partner:     Emotionally Abused:     Physically Abused:     Sexually Abused:        SCREENINGS    [place] Coma Scale  Eye Opening: Spontaneous  Best Verbal Response: Oriented  Best Motor Response: Obeys commands  Jesus Coma Scale Score: 15        PHYSICAL EXAM    (up to 7 for level 4, 8 or more for level 5)     ED Triage Vitals [09/28/21 1115]   BP Temp Temp Source Pulse Resp SpO2 Height Weight   103/65 98.3 °F (36.8 °C) Oral 72 14 99 % 5' 5\" (1.651 m) 157 lb (71.2 kg)       Physical Exam  Vitals and nursing note reviewed. Constitutional:       Appearance: She is well-developed. Comments: She looks uncomfortable. HENT:      Head: Normocephalic and atraumatic. Right Ear: External ear normal.      Left Ear: External ear normal.      Mouth/Throat:      Mouth: Mucous membranes are moist.   Eyes:      General: No scleral icterus. Conjunctiva/sclera: Conjunctivae normal.      Pupils: Pupils are equal, round, and reactive to light. Cardiovascular:      Rate and Rhythm: Normal rate and regular rhythm. Heart sounds: Normal heart sounds. Pulmonary:      Effort: Pulmonary effort is normal.      Breath sounds: Normal breath sounds. Abdominal:      General: There is distension. Tenderness: There is abdominal tenderness. There is guarding. Comments: She has diminished bowel sounds and tinkling bowel sounds heard in the left lower quadrant   Musculoskeletal:         General: Normal range of motion. Cervical back: Normal range of motion and neck supple. Skin:     General: Skin is warm and dry. Coloration: Skin is not jaundiced. Neurological:      General: No focal deficit present. Mental Status: She is alert and oriented to person, place, and time. Psychiatric:         Attention and Perception: Attention normal.         Mood and Affect: Mood is anxious. Speech: Speech is rapid and pressured.          Cognition and Memory: Cognition normal.         DIAGNOSTIC RESULTS     EKG: All EKG's are interpreted by the Emergency Department Physician who either signs or Co-signs this chart in the absence of a cardiologist.        RADIOLOGY:   Non-plain film images such as CT, Ultrasound and MRI are read by the radiologist. Plainradiographic images are visualized and preliminarily interpreted by the emergency physician with the below findings:    I have reviewed the images and results discussed the case with the interpretative radiologist.    Interpretation per the Radiologist below, if available at the time of this note:    CT ABDOMEN PELVIS W IV CONTRAST Additional Contrast? Oral   Final Result   1. Dilated loop of bowel containing gas and fluid measures up to 11 cm   in the mid abdomen. This is at a bowel anastomosis, concerning for   closed loop obstruction. 2. At the left upper quadrant, there are definitely small foci of free   air. There is also a 20 x 6.6 cm area of gas, which may represent a   loop of bowel, but is concerning for free air as a definite leading or   exiting loop of bowel is not identified. 3. There are 2 focal fluid collections, one in the epigastric   musculature and the other deep to the surgical staples. These are   concerning for abscess. A loop of bowel was also considered as a cause   for the fluid collection superficial to the incision. However, the   abdominal wall appears intact on sagittal images. There is a separate   fat-containing ventral hernia at the umbilicus. 4. Free fluid in the pelvis. 5. Gastric bypass. Results were discussed in detail with Dr. Tanner Dunne at 1:06 PM   on 9/28/2021.    Signed by Dr Ignacio Sage            ED BEDSIDE ULTRASOUND:   Performed by ED Physician - none    LABS:  Labs Reviewed   CBC WITH AUTO DIFFERENTIAL - Abnormal; Notable for the following components:       Result Value    RBC 3.44 (*)     Hemoglobin 10.2 (*)     Hematocrit 31.6 (*)     MCHC 32.3 (*)     RDW 15.8 (*)     Platelets 521 (*)     MPV 8.6 (*)     Neutrophils % 70.3 (*)     All other components within normal limits   COMPREHENSIVE METABOLIC PANEL W/ REFLEX TO MG FOR LOW K - Abnormal; Notable for the following components:    Sodium 134 (*)     BUN 5 (*)     Total Protein 6.1 (*)     Albumin 3.2 (*)     All other components within normal limits   COVID-19, RAPID   LIPASE AMYLASE   URINE RT REFLEX TO CULTURE   LACTIC ACID, PLASMA       All other labs were within normal range or not returned as of this dictation. EMERGENCY DEPARTMENT COURSE and DIFFERENTIALDIAGNOSIS/MDM:   Vitals:    Vitals:    09/28/21 1115 09/28/21 1820   BP: 103/65 106/72   Pulse: 72 58   Resp: 14 18   Temp: 98.3 °F (36.8 °C)    TempSrc: Oral    SpO2: 99% 98%   Weight: 157 lb (71.2 kg)    Height: 5' 5\" (1.651 m)        MDM  Number of Diagnoses or Management Options  Acute abdomen  Diagnosis management comments: Looks like probable acute abdomen and recurrent bowel obstruction. Cannot rule out a large amount of free air it is either that or on the way the bowel is laying in the abdomen but there is definitely other abnormalities on the scan. The patient seems inappropriately angry at [facility] because of her lap band she seems almost morbidly fixed on her lap band she wants her lap and back because she is worried about is her weight. After some pain medication and time in emergency room she seems to have calmed down some. She does not want to go back to [facility]. I spoke to my general surgeon on call Dr. Dee Aguilar. They feels it be best the patient returns to origin of her surgery. Because she is a bariatric patient I attempted to transfer her to [address] [place] [address] where she had the procedure and has had follow-up. Her doctor Tony is not on-call today and is out of the office. I went through the transfer service and spoke to one of her colleagues. I am told that they do not feel it is appropriate to send this patient 4 hours with an acute abdomen when she could have a laparotomy here. The physician states she would talk with the patient's doctor and if any challenges call me back. All about an hour later the transfer service called back confirming that they are declining to receive the patient when they feel that she should be treated here. I discussed this with the patient.   She is lizbeth now. She is willing to be transferred back to her surgeon at [facility].  I contacted the transfer center and I am told that they are on diversion because of lack of capacity and beds. I discussed this with Dr. Rahel Chaparro my general surgeon on call. He is going to come and evaluate the patient and the situation. CONSULTS:  IP CONSULT TO GENERAL SURGERY    PROCEDURES:  Unless otherwise notedbelow, none     Procedures    FINAL IMPRESSION     1.  Acute abdomen          DISPOSITION/PLAN   DISPOSITION        PATIENT REFERRED TO:  @FUP@    DISCHARGE MEDICATIONS:  New Prescriptions    No medications on file          (Please note that portions of this note were completed with a voice recognition program.  Efforts were made to edit the dictations butoccasionally words are mis-transcribed.)    Tima Mendoza MD (electronically signed)  AttendingEmergency Physician          Negin Walker MD  09/28/21 [facility] Kristen Olivares MD  09/28/21 6069

## 2021-09-28 NOTE — ED NOTES
[facility] transfer center, they stated unable to take pt due to \"high capacity and no beds. 12 pts waiting for admit beds and 33 in the er\" per rhonda at transfer center.  Dr [place] notified     [facility]  09/28/21 [id number] [phone]

## 2021-09-29 NOTE — ED NOTES
Called [employer] to get a weather check. [address] out of [place] on stand by at [employer]. Called back at [address]  ETA 21mts      Kiko Colbert  09/28/21 2052

## 2021-09-29 NOTE — ED NOTES
[facility] called back at [address]. Per Yancy Jay supervisor declined they are at capacity. [address] called at [employer]	[place]. Per Karli Santana they are at Capacity. Called [facility]ra[facility] Bridterry back and spoke with Karli Santana and to let her know that our Mei Ching. Would like to speak with her. At [address]  Accepted at [facility].             Luna Bustos  09/28/21 1928       Luna Bustos  09/28/21 1949       Luna Bustos  09/28/21 [address]  09/28/21 2051

## 2021-11-01 ENCOUNTER — TELEPHONE (OUTPATIENT)
Dept: NEUROLOGY | Age: 46
End: 2021-11-01

## 2021-11-01 NOTE — TELEPHONE ENCOUNTER
Patient states she has healed from her recent surgeries and wants to know if you think its ok for her to go back on Acthar now? She states that she can tell she is having a flare up.

## 2021-11-09 DIAGNOSIS — G43.719 INTRACTABLE CHRONIC MIGRAINE WITHOUT AURA AND WITHOUT STATUS MIGRAINOSUS: Primary | ICD-10-CM

## 2021-11-09 RX ORDER — DRONABINOL 5 MG/1
10 CAPSULE ORAL
Qty: 120 CAPSULE | Refills: 5 | Status: SHIPPED | OUTPATIENT
Start: 2021-11-09 | End: 2021-11-10 | Stop reason: SDUPTHER

## 2021-11-10 ENCOUNTER — TELEPHONE (OUTPATIENT)
Dept: NEUROLOGY | Age: 46
End: 2021-11-10

## 2021-11-10 DIAGNOSIS — G43.719 INTRACTABLE CHRONIC MIGRAINE WITHOUT AURA AND WITHOUT STATUS MIGRAINOSUS: ICD-10-CM

## 2021-11-10 RX ORDER — DRONABINOL 5 MG/1
10 CAPSULE ORAL
Qty: 120 CAPSULE | Refills: 5 | Status: SHIPPED | OUTPATIENT
Start: 2021-11-10 | End: 2021-12-10

## 2021-11-10 NOTE — TELEPHONE ENCOUNTER
Requested Prescriptions     Pending Prescriptions Disp Refills    dronabinol (MARINOL) 5 MG capsule 120 capsule 5     Sig: Take 2 capsules by mouth 2 times daily (before meals) for 30 days. Last Office Visit:  6/28/2021  Next Office Visit:  12/30/2021  Last Medication Refill:   This medication was sent to [facility] but should had been sent to [facility] request that was sent to [facility] has been canceled and medication is teed up now for Garth Diaz up to date:  Up todate    *RX updated to reflect 11/09/2021

## 2021-11-11 ENCOUNTER — TELEPHONE (OUTPATIENT)
Dept: NEUROLOGY | Age: 46
End: 2021-11-11

## 2021-11-11 NOTE — TELEPHONE ENCOUNTER
PA was approved per plan on Dronabinol 5MG capsules.       Anisha Mares [place]: [id number] - PA Case ID: [id number] - Rx #: [id number]  Outcome  Approved on November 9  CaseId:[id number]  Status:Approved  Review Type:Qty  Coverage Start Date:10/10/2021  Coverage End Date:11/09/2022  Drug  Dronabinol 5MG capsules

## 2021-11-29 ENCOUNTER — TELEPHONE (OUTPATIENT)
Dept: NEUROLOGY | Age: 46
End: 2021-11-29

## 2021-11-29 NOTE — TELEPHONE ENCOUNTER
Patient states she is having a MS and Lupus flare up and she is wanting to be scheduled for a 3 day solumedrol infusion. I told her I would call her back if there were any problems but if she doesn't hear back from me then it has been taken care of and the order will be sent to the infusion center.

## 2021-11-30 RX ORDER — SODIUM CHLORIDE 0.9 % (FLUSH) 0.9 %
5-40 SYRINGE (ML) INJECTION PRN
Status: CANCELLED | OUTPATIENT
Start: 2021-12-01

## 2021-11-30 RX ORDER — SODIUM CHLORIDE 9 MG/ML
25 INJECTION, SOLUTION INTRAVENOUS PRN
Status: CANCELLED | OUTPATIENT
Start: 2021-12-01

## 2021-11-30 RX ORDER — HEPARIN SODIUM (PORCINE) LOCK FLUSH IV SOLN 100 UNIT/ML 100 UNIT/ML
500 SOLUTION INTRAVENOUS PRN
Status: CANCELLED | OUTPATIENT
Start: 2021-12-01

## 2021-11-30 NOTE — TELEPHONE ENCOUNTER
Solumedrol x 3 days ordered Vitamin K/erythromycin  Blood glucose per protocol  In isolette, wean to crib  Parental support/education

## 2021-12-03 ENCOUNTER — HOSPITAL ENCOUNTER (OUTPATIENT)
Dept: INFUSION THERAPY | Age: 46
Setting detail: INFUSION SERIES
Discharge: HOME | End: 2021-12-03
Payer: MEDICARE

## 2021-12-03 VITALS
OXYGEN SATURATION: 100 % | TEMPERATURE: 98.2 F | SYSTOLIC BLOOD PRESSURE: 138 MMHG | RESPIRATION RATE: 18 BRPM | DIASTOLIC BLOOD PRESSURE: 90 MMHG | HEART RATE: 68 BPM

## 2021-12-03 DIAGNOSIS — M32.9 LUPUS (HCC): Primary | ICD-10-CM

## 2021-12-03 DIAGNOSIS — G35 EXACERBATION OF MULTIPLE SCLEROSIS (HCC): ICD-10-CM

## 2021-12-03 PROCEDURE — 6360000002 HC RX W HCPCS: Performed by: PSYCHIATRY & NEUROLOGY

## 2021-12-03 PROCEDURE — 2580000003 HC RX 258: Performed by: PSYCHIATRY & NEUROLOGY

## 2021-12-03 PROCEDURE — 96365 THER/PROPH/DIAG IV INF INIT: CPT

## 2021-12-03 RX ORDER — HEPARIN SODIUM (PORCINE) LOCK FLUSH IV SOLN 100 UNIT/ML 100 UNIT/ML
500 SOLUTION INTRAVENOUS PRN
Status: CANCELLED | OUTPATIENT
Start: 2021-12-04

## 2021-12-03 RX ORDER — SODIUM CHLORIDE 0.9 % (FLUSH) 0.9 %
5-40 SYRINGE (ML) INJECTION PRN
Status: DISCONTINUED | OUTPATIENT
Start: 2021-12-03 | End: 2021-12-04 | Stop reason: HOSPADM

## 2021-12-03 RX ORDER — SODIUM CHLORIDE 9 MG/ML
25 INJECTION, SOLUTION INTRAVENOUS PRN
Status: CANCELLED | OUTPATIENT
Start: 2021-12-04

## 2021-12-03 RX ORDER — SODIUM CHLORIDE 0.9 % (FLUSH) 0.9 %
5-40 SYRINGE (ML) INJECTION PRN
Status: CANCELLED | OUTPATIENT
Start: 2021-12-04

## 2021-12-03 RX ADMIN — SODIUM CHLORIDE 1000 MG: 9 INJECTION, SOLUTION INTRAVENOUS at 14:05

## 2021-12-04 ENCOUNTER — HOSPITAL ENCOUNTER (OUTPATIENT)
Age: 46
Discharge: HOME | End: 2021-12-04
Attending: PSYCHIATRY & NEUROLOGY | Admitting: PSYCHIATRY & NEUROLOGY
Payer: MEDICARE

## 2021-12-04 VITALS
SYSTOLIC BLOOD PRESSURE: 121 MMHG | RESPIRATION RATE: 16 BRPM | DIASTOLIC BLOOD PRESSURE: 83 MMHG | OXYGEN SATURATION: 98 % | TEMPERATURE: 99 F | HEART RATE: 68 BPM

## 2021-12-04 DIAGNOSIS — M32.9 LUPUS (HCC): Primary | ICD-10-CM

## 2021-12-04 DIAGNOSIS — G35 EXACERBATION OF MULTIPLE SCLEROSIS (HCC): ICD-10-CM

## 2021-12-04 PROCEDURE — 6360000002 HC RX W HCPCS: Performed by: PSYCHIATRY & NEUROLOGY

## 2021-12-04 PROCEDURE — 96365 THER/PROPH/DIAG IV INF INIT: CPT

## 2021-12-04 PROCEDURE — 2580000003 HC RX 258: Performed by: PSYCHIATRY & NEUROLOGY

## 2021-12-04 RX ORDER — SODIUM CHLORIDE 9 MG/ML
25 INJECTION, SOLUTION INTRAVENOUS PRN
Status: DISCONTINUED | OUTPATIENT
Start: 2021-12-04 | End: 2021-12-04 | Stop reason: HOSPADM

## 2021-12-04 RX ORDER — HEPARIN SODIUM (PORCINE) LOCK FLUSH IV SOLN 100 UNIT/ML 100 UNIT/ML
500 SOLUTION INTRAVENOUS PRN
Status: CANCELLED | OUTPATIENT
Start: 2021-12-05

## 2021-12-04 RX ORDER — HEPARIN SODIUM (PORCINE) LOCK FLUSH IV SOLN 100 UNIT/ML 100 UNIT/ML
500 SOLUTION INTRAVENOUS PRN
Status: DISCONTINUED | OUTPATIENT
Start: 2021-12-04 | End: 2021-12-04 | Stop reason: HOSPADM

## 2021-12-04 RX ORDER — SODIUM CHLORIDE 9 MG/ML
25 INJECTION, SOLUTION INTRAVENOUS PRN
Status: CANCELLED | OUTPATIENT
Start: 2021-12-05

## 2021-12-04 RX ORDER — HEPARIN SODIUM (PORCINE) LOCK FLUSH IV SOLN 100 UNIT/ML 100 UNIT/ML
300 SOLUTION INTRAVENOUS PRN
Status: DISCONTINUED | OUTPATIENT
Start: 2021-12-04 | End: 2021-12-04 | Stop reason: HOSPADM

## 2021-12-04 RX ORDER — SODIUM CHLORIDE 0.9 % (FLUSH) 0.9 %
5-40 SYRINGE (ML) INJECTION PRN
Status: CANCELLED | OUTPATIENT
Start: 2021-12-05

## 2021-12-04 RX ORDER — SODIUM CHLORIDE 0.9 % (FLUSH) 0.9 %
5-40 SYRINGE (ML) INJECTION PRN
Status: DISCONTINUED | OUTPATIENT
Start: 2021-12-04 | End: 2021-12-04 | Stop reason: HOSPADM

## 2021-12-04 RX ADMIN — SODIUM CHLORIDE 1000 MG: 9 INJECTION, SOLUTION INTRAVENOUS at 15:02

## 2021-12-06 ENCOUNTER — TELEPHONE (OUTPATIENT)
Dept: NEUROLOGY | Age: 46
End: 2021-12-06

## 2021-12-06 ENCOUNTER — HOSPITAL ENCOUNTER (OUTPATIENT)
Dept: INFUSION THERAPY | Age: 46
Setting detail: INFUSION SERIES
Discharge: HOME | End: 2021-12-06
Payer: MEDICARE

## 2021-12-06 VITALS
DIASTOLIC BLOOD PRESSURE: 85 MMHG | RESPIRATION RATE: 17 BRPM | HEART RATE: 54 BPM | TEMPERATURE: 97.4 F | OXYGEN SATURATION: 100 % | SYSTOLIC BLOOD PRESSURE: 130 MMHG

## 2021-12-06 DIAGNOSIS — M32.9 LUPUS (HCC): Primary | ICD-10-CM

## 2021-12-06 DIAGNOSIS — G35 EXACERBATION OF MULTIPLE SCLEROSIS (HCC): ICD-10-CM

## 2021-12-06 PROCEDURE — 2580000003 HC RX 258: Performed by: PSYCHIATRY & NEUROLOGY

## 2021-12-06 PROCEDURE — 96365 THER/PROPH/DIAG IV INF INIT: CPT

## 2021-12-06 PROCEDURE — 6360000002 HC RX W HCPCS: Performed by: PSYCHIATRY & NEUROLOGY

## 2021-12-06 RX ORDER — SODIUM CHLORIDE 9 MG/ML
25 INJECTION, SOLUTION INTRAVENOUS PRN
Status: CANCELLED | OUTPATIENT
Start: 2021-12-06

## 2021-12-06 RX ORDER — SODIUM CHLORIDE 0.9 % (FLUSH) 0.9 %
5-40 SYRINGE (ML) INJECTION PRN
Status: CANCELLED | OUTPATIENT
Start: 2021-12-06

## 2021-12-06 RX ORDER — HEPARIN SODIUM (PORCINE) LOCK FLUSH IV SOLN 100 UNIT/ML 100 UNIT/ML
500 SOLUTION INTRAVENOUS PRN
Status: CANCELLED | OUTPATIENT
Start: 2021-12-06

## 2021-12-06 RX ADMIN — SODIUM CHLORIDE 1000 MG: 9 INJECTION, SOLUTION INTRAVENOUS at 14:57

## 2021-12-06 NOTE — TELEPHONE ENCOUNTER
Socorro Toscano (Land: [id number])  Rx #: [id number]  Acthar 80UNIT/ML gel     Form  [facility] Medicare Electronic PA Form (1544 NCP[address])    Created  1 day ago  Sent to Plan  5 minutes ago  Plan Response  5 minutes ago  Submit Clinical Questions  2 minutes ago  Determination  Favorable  2 minutes ago    Message from Plan  Case [id number]  Status: Approved  Review Type:Prior Auth  Coverage Start Date:11/06/2021  Coverage End Date:12/06/2022

## 2021-12-13 ENCOUNTER — TELEPHONE (OUTPATIENT)
Dept: NEUROLOGY | Age: 46
End: 2021-12-13

## 2021-12-13 NOTE — TELEPHONE ENCOUNTER
Patient called stating she just completed IV solumedrol last week and has also taken Acthar, she is still c/o a \"weird feeling\" in both feet and her right hand. States it feels like they are asleep but also having some pain that feels like \"someone popped me with a rubber band\" Patient is asking if this is just par for the course with MS or if she needs to do something else. Please advise.

## 2021-12-30 ENCOUNTER — OFFICE VISIT (OUTPATIENT)
Dept: NEUROLOGY | Age: 46
End: 2021-12-30
Payer: MEDICARE

## 2021-12-30 VITALS
OXYGEN SATURATION: 100 % | WEIGHT: 159 LBS | HEART RATE: 63 BPM | SYSTOLIC BLOOD PRESSURE: 119 MMHG | HEIGHT: 65 IN | BODY MASS INDEX: 26.49 KG/M2 | DIASTOLIC BLOOD PRESSURE: 78 MMHG | TEMPERATURE: 98.9 F

## 2021-12-30 DIAGNOSIS — M32.9 LUPUS (HCC): ICD-10-CM

## 2021-12-30 DIAGNOSIS — G35 MULTIPLE SCLEROSIS (HCC): Primary | ICD-10-CM

## 2021-12-30 DIAGNOSIS — M79.7 FIBROMYALGIA: ICD-10-CM

## 2021-12-30 DIAGNOSIS — H53.9 VISUAL DISTURBANCE: ICD-10-CM

## 2021-12-30 DIAGNOSIS — G43.719 INTRACTABLE CHRONIC MIGRAINE WITHOUT AURA AND WITHOUT STATUS MIGRAINOSUS: ICD-10-CM

## 2021-12-30 DIAGNOSIS — H46.9 OPTIC NEURITIS: ICD-10-CM

## 2021-12-30 DIAGNOSIS — R25.1 TREMOR: ICD-10-CM

## 2021-12-30 DIAGNOSIS — R20.0 NUMBNESS: ICD-10-CM

## 2021-12-30 DIAGNOSIS — M54.2 PAIN, NECK: ICD-10-CM

## 2021-12-30 DIAGNOSIS — R56.9 CONVULSIONS, UNSPECIFIED CONVULSION TYPE (HCC): ICD-10-CM

## 2021-12-30 DIAGNOSIS — R53.1 WEAKNESS: ICD-10-CM

## 2021-12-30 DIAGNOSIS — R47.9 DIFFICULTY WITH SPEECH: ICD-10-CM

## 2021-12-30 PROCEDURE — G8427 DOCREV CUR MEDS BY ELIG CLIN: HCPCS | Performed by: PSYCHIATRY & NEUROLOGY

## 2021-12-30 PROCEDURE — 4004F PT TOBACCO SCREEN RCVD TLK: CPT | Performed by: PSYCHIATRY & NEUROLOGY

## 2021-12-30 PROCEDURE — 99214 OFFICE O/P EST MOD 30 MIN: CPT | Performed by: PSYCHIATRY & NEUROLOGY

## 2021-12-30 PROCEDURE — G8419 CALC BMI OUT NRM PARAM NOF/U: HCPCS | Performed by: PSYCHIATRY & NEUROLOGY

## 2021-12-30 PROCEDURE — G8484 FLU IMMUNIZE NO ADMIN: HCPCS | Performed by: PSYCHIATRY & NEUROLOGY

## 2021-12-30 RX ORDER — ATOGEPANT 60 MG/1
60 TABLET ORAL DAILY
Qty: 30 TABLET | Refills: 11 | Status: SHIPPED | OUTPATIENT
Start: 2021-12-30 | End: 2022-03-18

## 2021-12-30 RX ORDER — SUMATRIPTAN 100 MG/1
100 TABLET, FILM COATED ORAL
Qty: 9 TABLET | Refills: 5 | Status: SHIPPED | OUTPATIENT
Start: 2021-12-30 | End: 2021-12-30

## 2021-12-30 RX ORDER — RIZATRIPTAN BENZOATE 10 MG/1
10 TABLET ORAL
Qty: 9 TABLET | Refills: 5 | Status: SHIPPED | OUTPATIENT
Start: 2021-12-30 | End: 2022-11-02

## 2021-12-30 NOTE — PROGRESS NOTES
Chief Complaint   Patient presents with    Follow-up     c/o vision loss x3 months and lower back pain    Multiple Sclerosis    Loss of Vision       Dominique Cordero is a 42y.o. year old female who is seen for evaluation of follow for multiple sclerosis following an admission in 5/19 for optic neuritis treated with IV solumedrol. The patient has a history of multiple sclerosis, fibromyalgia and lupus along with chronic headaches was seen at that time for left visual change which began about 2 days prior to admission with eye pain. This was quite similar to her previous episodes of optic neuritis, of which she has had about 4 previous episodes treated with steroids. She was tried on oral steroids as an outpatient but this was not effective and was transferred from outside ER for further management. She has some chronic weakness and numbness and weakness on the left. She does have bowel and bladder incontinence. She was on Avonex. Had not followed up with Dr Burgess Ordonez since 2017. She did not follow up in the office since 5/19. On 7/4/20 she was in the ER with generalized body aches. She apparently had some trembling on the right side. She had an MRI of the brain in the ER which revealed no acute ischemic change but did have scattered non enhancing white matter lesions in periventricular and white matter. She was given 1 gm Solumedrol for a possible flair up and told to follow up with neurology. She indicates that she typically does relatively well for a few weeks and then she feels severely fatigued or worsening vision. She has been in a wheelchair in 2017. She had her initial testing in 2017 at [facility] which was significant for non enhancing white matter lesions and a CSF with 6 oligoclonal bands seen only in the CSF. She still feels her face twitching and right sided numbness. He speech is not normal for her as she has to think about things carefully.  Her rheumatologist started her on Tecfidera but she felt worse on this. She had been on Avonex since her diagnosis prior to with no clear benefit according to her. Having a lupus flair up. This usually feels pain in eyes, skin of in mouth may fall off. Felt since with 2  Ocrevus infusion 2020 and a bit better but now off. Robyn Helen with unclear benefit. Saw rheumatologist in [place] and plan a new infusion. Azathioprine started for her Lupus. had bowel obruction with gastric band. Stopped Ocrevus. Artane helped for about 2 days not help. Lupus flairs. Is doing well with Marinol and Acthar. Marinol only occasionally. Acthar 5 days month. Recurrent flair. Solumedrol intermittently. bad migraines recently. Multiple a week. Imitrex used to work.      Active Ambulatory Problems     Diagnosis Date Noted    Multiple sclerosis ([address]     Intractable chronic migraine without aura and without status migrainosus 2017    Major depressive disorder, recurrent, moderate (HCC) 2018    Exacerbation of multiple sclerosis ([address] 05/10/2019    Optic neuritis 05/10/2019    Pain of left eye 05/10/2019    Bronchitis 2019    Visual disturbance 2020    Tremor 2020    Weakness 2020    Numbness 2020    Difficulty with speech 2020    Lupus ([address] 2020    Fibromyalgia 2020    Convulsions ([address] 2020    Bilateral myofascial pain 2021    Pain in joints 2021    Hypertension     Bipolar 1 disorder ([address]     Acute abdomen      Resolved Ambulatory Problems     Diagnosis Date Noted    No Resolved Ambulatory Problems     Past Medical History:   Diagnosis Date    Arthritis     GERD (gastroesophageal reflux disease)     Movement disorder     Neuromuscular disorder ([address]     Psychiatric problem        Past Surgical History:   Procedure Laterality Date    ABDOMINAL EXPLORATION SURGERY      ADENOIDECTOMY       SECTION      x 2     SECTION, CLASSIC N/A     DILATATION, ESOPHAGUS  EXTERNAL EAR SURGERY      lobe    GASTRIC BAND      GASTRIC BYPASS SURGERY      HERNIA REPAIR      HERNIA REPAIR N/A     HYSTERECTOMY      SEPTOPLASTY      SMALL INTESTINE SURGERY      TONSILLECTOMY      TONSILLECTOMY AND ADENOIDECTOMY      UPPER GASTROINTESTINAL ENDOSCOPY         Family History   Problem Relation Age of Onset    Anemia Mother     Breast Cancer Mother     Depression Mother     High Blood Pressure Mother     Mental Illness Mother    [de-identified] / [place] Mother     Colon Cancer Father     Depression Father     High Blood Pressure Father     Mental Illness Father     Breast Cancer Maternal Grandmother     Alcohol Abuse Maternal Grandfather     Anemia Paternal Grandmother     Alcohol Abuse Paternal Grandfather     Osteoporosis Paternal Grandfather     Stroke Paternal Grandfather     Substance Abuse Maternal Cousin     Substance Abuse Paternal Cousin     Asthma Other        Allergies   Allergen Reactions    Avelox [Moxifloxacin] Anaphylaxis    Glatiramer Rash    Sugar-Protein-Starch Other (See Comments)    Avelox [Moxifloxacin]     Copaxone [Glatiramer Acetate]     Depakote [Divalproex Sodium]     Gabapentin Itching    Hydroxychloroquine      Other reaction(s):  Other (See Comments)  Vision loss   Ladarius Neither [Paliperidone]     Lamictal [Lamotrigine]     Lithium     Pregabalin      Other reaction(s): Mental Status Change    Saquinavir     Varenicline Tartrate     Lamictal [Lamotrigine] Rash    Lithium Rash       Social History     Socioeconomic History    Marital status: Single     Spouse name: Not on file    Number of children: 3    Years of education: Some College    Highest education level: Not on file   Occupational History    Occupation: Disability   Tobacco Use    Smoking status: Current Every Day Smoker     Packs/day: 1.00     Years: 15.00     Pack years: 15.00     Types: Cigarettes    Smokeless tobacco: Never Used   Vaping Use    Vaping Use: Never used   Substance and Sexual Activity    Alcohol use: Not Currently     Alcohol/week: 2.0 standard drinks     Types: 2 Shots of liquor per week    Drug use: Yes     Types: Marijuana Ashley Mcdaniel    Sexual activity: Never     Partners: Male   Other Topics Concern    Not on file   Social History Narrative    ** Merged History Encounter **          Social Determinants of Health     Financial Resource Strain:     Difficulty of Paying Living Expenses: Not on file   Food Insecurity:     Worried About Running Out of Food in the Last Year: Not on file    [place] of Food in the Last Year: Not on file   Transportation Needs:     Lack of Transportation (Medical): Not on file    Lack of Transportation (Non-Medical): Not on file   Physical Activity:     Days of Exercise per Week: Not on file    Minutes of Exercise per Session: Not on file   Stress:     Feeling of Stress : Not on file   Social Connections:     Frequency of Communication with Friends and Family: Not on file    Frequency of Social Gatherings with Friends and Family: Not on file    Attends Religion Services: Not on file    Active Member of [address] [place] [address] or Organizations: Not on file    Attends Club or Organization Meetings: Not on file    Marital Status: Not on file   Intimate Partner Violence:     Fear of Current or Ex-Partner: Not on file    Emotionally Abused: Not on file    Physically Abused: Not on file    Sexually Abused: Not on file   Housing Stability:     Unable to Pay for Housing in the Last Year: Not on file    Number of [place] in the Last Year: Not on file    Unstable Housing in the Last Year: Not on file     Review of Systems     Constitutional - No fever or chills. No diaphoresis or significant fatigue. HENT -  No tinnitus or significant hearing loss.   Eyes - no sudden vision change or eye pain  Respiratory - no significant shortness of breath or cough  Cardiovascular - no chest pain No palpitations or significant leg swelling  Gastrointestinal - no abdominal swelling or pain. Genitourinary - No difficulty urinating, dysuria  Musculoskeletal - no back pain or myalgia. Skin - no color change or rash  Neurologic - No seizures. No lateralizing weakness. Hematologic - no easy bruising or excessive bleeding. Psychiatric - no severe anxiety or nervousness. All other review of systems are negative. Current Outpatient Medications   Medication Sig Dispense Refill    SUMAtriptan (IMITREX) 100 MG tablet Take 1 tablet by mouth once as needed for Migraine (max 2 in 24 hrs) 9 tablet 5    rizatriptan (MAXALT) 10 MG tablet Take 1 tablet by mouth once as needed for Migraine (max 2 in 24 hrs) May repeat in 2 hours if needed 9 tablet 5    Ubrogepant 100 MG TABS Take 100 mg by mouth as needed (max 2 in 24 hours) 10 tablet 11    Atogepant (QULIPTA) 60 MG TABS Take 60 mg by mouth daily 30 tablet 11    prazosin (MINIPRESS) 5 MG capsule prazosin hydrochloride 2 mg caps      SUMAtriptan (IMITREX) 100 MG tablet sumatriptan succinate 100 mg tabs      diazePAM (VALIUM) 5 MG tablet Take 5 mg by mouth 2 times daily as needed.  VYVANSE 30 MG capsule       corticotropin (ACTHAR) 80 UNIT/ML injectable gel Inject 1 mL into the muscle daily for 5 days 5 vial 11    ALPRAZolam (XANAX) 0.5 MG tablet Take 0.5 mg by mouth 2 times daily as needed for Sleep.       REXULTI 1 MG TABS tablet       folic acid (FOLVITE) 1 MG tablet Take 1 mg by mouth daily      hydrOXYzine (ATARAX) 50 MG tablet Take 50 mg by mouth 2 times daily       hydroCHLOROthiazide (MICROZIDE) 12.5 MG capsule Take 12.5 mg by mouth daily      lisinopril (PRINIVIL;ZESTRIL) 40 MG tablet Take 40 mg by mouth daily      cyanocobalamin 1000 MCG/ML injection Inject 1,000 mcg into the muscle every 21 days       pantoprazole sodium (PROTONIX) 40 MG PACK packet Take 40 mg by mouth 2 times daily      alendronate (FOSAMAX) 70 MG tablet Take 70 mg by mouth every 7 days      Multiple Vitamins-Minerals (THERAPEUTIC MULTIVITAMIN-MINERALS) tablet Take 1 tablet by mouth daily      potassium chloride (KLOR-CON M) 20 MEQ extended release tablet Take 20 mEq by mouth 2 times daily      butalbital-acetaminophen-caffeine (FIORICET, ESGIC) -40 MG per tablet Take 1 tablet by mouth every 4 hours as needed for Headaches       No current facility-administered medications for this visit. /78   Pulse 63   Temp 98.9 °F (37.2 °C)   Ht 5' 5\" (1.651 m)   Wt 159 lb (72.1 kg)   LMP 05/18/2021   SpO2 100%   BMI 26.46 kg/m²     Constitutional - well developed, well nourished. Eyes - conjunctiva normal.  Ear, nose, throat - No scars, masses, or lesions over external nose or ears, no atrophy of tongue  Neck-symmetric, no masses noted, no jugular vein distension  Respiration- chest wall appears symmetric, good expansion,   normal effort without use of accessory muscles  Musculoskeletal - no significant wasting of muscles noted, no bony deformities  Extremities-no clubbing, cyanosis or edema  Skin - warm, dry, and intact. No rash, erythema, or pallor.   Psychiatric - mood, affect, and behavior appear normal.      Neurological exam  Awake, alert, fluent oriented  appropriate affect  Attention and concentration appear appropriate  Recent and remote memory appears unremarkable  Speech normal without dysarthria  No clear issues with language of fund of knowledge    Cranial Nerve Exam     CN III, IV,VI-EOMI, No nystagmus, conjugate eye movements, no ptosis  CN VII-no facial assymetry        Motor Exam  antigravity throughout upper and lower extremities bilaterally    Tremors- no tremors in hands or head noted    Gait  Normal base and speed  No ataxia    Lab Results   Component Value Date    [id number] 753 09/12/2018     Lab Results   Component Value Date    WBC 8.7 09/28/2021    HGB 10.2 (L) 09/28/2021    HCT 31.6 (L) 09/28/2021    MCV 91.9 09/28/2021     (H) 09/28/2021     Lab Results for fatigue. She plans to research long term side effects and benefits of more frequent ACTHAR. She will be tried on Alissa Loupe, Imitrex, [place]. We talked about constipation with Radha Benton. we may consider Tysabri and take off Azathioprine. The patient indicated understanding of the management plan. She is to follow up with me in 2 and call with any issues. Plan    No orders of the defined types were placed in this encounter. Orders Placed This Encounter   Medications    SUMAtriptan (IMITREX) 100 MG tablet     Sig: Take 1 tablet by mouth once as needed for Migraine (max 2 in 24 hrs)     Dispense:  9 tablet     Refill:  5    rizatriptan (MAXALT) 10 MG tablet     Sig: Take 1 tablet by mouth once as needed for Migraine (max 2 in 24 hrs) May repeat in 2 hours if needed     Dispense:  9 tablet     Refill:  5    Ubrogepant 100 MG TABS     Sig: Take 100 mg by mouth as needed (max 2 in 24 hours)     Dispense:  10 tablet     Refill:  11    Atogepant (QULIPTA) 60 MG TABS     Sig: Take 60 mg by mouth daily     Dispense:  30 tablet     Refill:  11       Return in about 2 months (around 2/28/2022). EMR Dragon/transcription disclaimer:Significant part of this  encounter note is electronic transcription/translation of spoken language to printed text. The electronic translation of spoken language may be erroneous, or at times, nonsensical words or phrases may be inadvertently transcribed.  Although I have reviewed the note for such errors, some may still exist.

## 2022-01-21 DIAGNOSIS — R20.0 NUMBNESS: ICD-10-CM

## 2022-01-21 DIAGNOSIS — M79.7 FIBROMYALGIA: ICD-10-CM

## 2022-01-21 RX ORDER — DRONABINOL 10 MG/1
CAPSULE ORAL
Qty: 180 CAPSULE | Refills: 5 | Status: SHIPPED | OUTPATIENT
Start: 2022-01-21 | End: 2022-05-31 | Stop reason: SDUPTHER

## 2022-01-21 NOTE — TELEPHONE ENCOUNTER
Roz Ruiz has requested a refill on her medication. Last office visit : 12/30/2021   Next office visit : 3/10/2022   Last medication refill : 6/17/2021 w/cyndi You : up to date 1/18/2022      Requested Prescriptions     Pending Prescriptions Disp Refills    dronabinol (MARINOL) 10 MG capsule 180 capsule 5     Sig: TAKE TWO CAPSULES BY MOUTH THREE TIMES A DAY         **Dr Jamel Hernandes patient please.

## 2022-03-18 ENCOUNTER — OFFICE VISIT (OUTPATIENT)
Dept: NEUROLOGY | Age: 47
End: 2022-03-18
Payer: MEDICARE

## 2022-03-18 VITALS
HEIGHT: 65 IN | WEIGHT: 159 LBS | DIASTOLIC BLOOD PRESSURE: 84 MMHG | SYSTOLIC BLOOD PRESSURE: 123 MMHG | BODY MASS INDEX: 26.49 KG/M2 | HEART RATE: 69 BPM

## 2022-03-18 DIAGNOSIS — M54.2 PAIN, NECK: ICD-10-CM

## 2022-03-18 DIAGNOSIS — R56.9 CONVULSIONS, UNSPECIFIED CONVULSION TYPE (HCC): ICD-10-CM

## 2022-03-18 DIAGNOSIS — M32.9 LUPUS (HCC): ICD-10-CM

## 2022-03-18 DIAGNOSIS — G35 MULTIPLE SCLEROSIS (HCC): Primary | ICD-10-CM

## 2022-03-18 DIAGNOSIS — R25.1 TREMOR: ICD-10-CM

## 2022-03-18 DIAGNOSIS — G43.719 INTRACTABLE CHRONIC MIGRAINE WITHOUT AURA AND WITHOUT STATUS MIGRAINOSUS: ICD-10-CM

## 2022-03-18 DIAGNOSIS — R53.1 WEAKNESS: ICD-10-CM

## 2022-03-18 DIAGNOSIS — H53.9 VISUAL DISTURBANCE: ICD-10-CM

## 2022-03-18 DIAGNOSIS — R20.0 NUMBNESS: ICD-10-CM

## 2022-03-18 DIAGNOSIS — H46.9 OPTIC NEURITIS: ICD-10-CM

## 2022-03-18 PROCEDURE — G8419 CALC BMI OUT NRM PARAM NOF/U: HCPCS | Performed by: PSYCHIATRY & NEUROLOGY

## 2022-03-18 PROCEDURE — G8484 FLU IMMUNIZE NO ADMIN: HCPCS | Performed by: PSYCHIATRY & NEUROLOGY

## 2022-03-18 PROCEDURE — 4004F PT TOBACCO SCREEN RCVD TLK: CPT | Performed by: PSYCHIATRY & NEUROLOGY

## 2022-03-18 PROCEDURE — G8427 DOCREV CUR MEDS BY ELIG CLIN: HCPCS | Performed by: PSYCHIATRY & NEUROLOGY

## 2022-03-18 PROCEDURE — 99214 OFFICE O/P EST MOD 30 MIN: CPT | Performed by: PSYCHIATRY & NEUROLOGY

## 2022-03-18 RX ORDER — BACLOFEN 10 MG/1
10 TABLET ORAL 2 TIMES DAILY
Qty: 90 TABLET | Refills: 1 | Status: SHIPPED | OUTPATIENT
Start: 2022-03-18 | End: 2022-06-10 | Stop reason: SDUPTHER

## 2022-03-18 NOTE — PROGRESS NOTES
Chief Complaint   Patient presents with    Multiple Sclerosis     follow up        Mei Cheek is a 42y.o. year old female who is seen for evaluation of follow for multiple sclerosis following an admission in 5/19 for optic neuritis treated with IV solumedrol. The patient has a history of multiple sclerosis, fibromyalgia and lupus along with chronic headaches was seen at that time for left visual change which began about 2 days prior to admission with eye pain. This was quite similar to her previous episodes of optic neuritis, of which she has had about 4 previous episodes treated with steroids. She was tried on oral steroids as an outpatient but this was not effective and was transferred from outside ER for further management. She has some chronic weakness and numbness and weakness on the left. She does have bowel and bladder incontinence. She was on Avonex. Had not followed up with Dr Malinda Joshi since 2017. She did not follow up in the office since 5/19. On 7/4/20 she was in the ER with generalized body aches. She apparently had some trembling on the right side. She had an MRI of the brain in the ER which revealed no acute ischemic change but did have scattered non enhancing white matter lesions in periventricular and white matter. She was given 1 gm Solumedrol for a possible flair up and told to follow up with neurology. She indicates that she typically does relatively well for a few weeks and then she feels severely fatigued or worsening vision. She has been in a wheelchair in 2017. She had her initial testing in 2017 at [facility] which was significant for non enhancing white matter lesions and a CSF with 6 oligoclonal bands seen only in the CSF. She still feels her face twitching and right sided numbness. He speech is not normal for her as she has to think about things carefully. Her rheumatologist started her on Tecfidera but she felt worse on this.  She had been on Avonex since her diagnosis prior to with no clear benefit according to her. Having a lupus flair up. This usually feels pain in eyes, skin of in mouth may fall off. Felt since with 2  Ocrevus infusion 2020 and a bit better but now off. Francisco Javier Bray with unclear benefit. Saw rheumatologist in [place] and plan a new infusion. Azathioprine started for her Lupus. had bowel obruction with gastric band. Stopped Ocrevus. Artane helped for about 2 days not help. Lupus flairs. Is doing well with Marinol and Acthar. Marinol only occasionally. Acthar 5 days month. Recurrent flair. Solumedrol intermittently. bad migraines recently. Multiple a week. Imitrex used to work. Planning on see cardiology heart attack. In dual flair up. Other than ACTHAR nothing else for immune modulation.      Active Ambulatory Problems     Diagnosis Date Noted    Multiple sclerosis ([address]     Intractable chronic migraine without aura and without status migrainosus 2017    Major depressive disorder, recurrent, moderate (HCC) 2018    Exacerbation of multiple sclerosis ([address] 05/10/2019    Optic neuritis 05/10/2019    Pain of left eye 05/10/2019    Bronchitis 2019    Visual disturbance 2020    Tremor 2020    Weakness 2020    Numbness 2020    Difficulty with speech 2020    Lupus ([address] 2020    Fibromyalgia 2020    Convulsions ([address] 2020    Bilateral myofascial pain 2021    Pain in joints 2021    Hypertension     Bipolar 1 disorder ([address]     Acute abdomen      Resolved Ambulatory Problems     Diagnosis Date Noted    No Resolved Ambulatory Problems     Past Medical History:   Diagnosis Date    Arthritis     GERD (gastroesophageal reflux disease)     Movement disorder     Neuromuscular disorder ([address]     Psychiatric problem        Past Surgical History:   Procedure Laterality Date    ABDOMINAL EXPLORATION SURGERY      ADENOIDECTOMY       SECTION      x 2     SECTION, CLASSIC N/A     COLONOSCOPY      DILATATION, ESOPHAGUS      EXTERNAL EAR SURGERY      lobe    GASTRIC BAND      GASTRIC BYPASS SURGERY      HERNIA REPAIR      HERNIA REPAIR N/A     HYSTERECTOMY      SEPTOPLASTY      SMALL INTESTINE SURGERY      TONSILLECTOMY      TONSILLECTOMY AND ADENOIDECTOMY      UPPER GASTROINTESTINAL ENDOSCOPY         Family History   Problem Relation Age of Onset    Anemia Mother     Breast Cancer Mother     Depression Mother     High Blood Pressure Mother     Mental Illness Mother    [de-identified] / [place] Mother     Colon Cancer Father     Depression Father     High Blood Pressure Father     Mental Illness Father     Breast Cancer Maternal Grandmother     Alcohol Abuse Maternal Grandfather     Anemia Paternal Grandmother     Alcohol Abuse Paternal Grandfather     Osteoporosis Paternal Grandfather     Stroke Paternal Grandfather     Substance Abuse Maternal Cousin     Substance Abuse Paternal Cousin     Asthma Other        Allergies   Allergen Reactions    Avelox [Moxifloxacin] Anaphylaxis    Glatiramer Rash    Sugar-Protein-Starch Other (See Comments)    Avelox [Moxifloxacin]     Copaxone [Glatiramer Acetate]     Depakote [Divalproex Sodium]     Gabapentin Itching    Hydroxychloroquine      Other reaction(s):  Other (See Comments)  Vision loss   Rain Joseph [Paliperidone]     Lamictal [Lamotrigine]     Lithium     Pregabalin      Other reaction(s): Mental Status Change    Saquinavir     Varenicline Tartrate     Lamictal [Lamotrigine] Rash    Lithium Rash       Social History     Socioeconomic History    Marital status: Single     Spouse name: Not on file    Number of children: 3    Years of education: Some College    Highest education level: Not on file   Occupational History    Occupation: Disability   Tobacco Use    Smoking status: Current Every Day Smoker     Packs/day: 1.00     Years: 15.00     Pack years: 15.00     Types: cough  Cardiovascular - no chest pain No palpitations or significant leg swelling  Gastrointestinal - no abdominal swelling or pain. Genitourinary - No difficulty urinating, dysuria  Musculoskeletal - no back pain or myalgia. Skin - no color change or rash  Neurologic - No seizures. No lateralizing weakness. Hematologic - no easy bruising or excessive bleeding. Psychiatric - no severe anxiety or nervousness. All other review of systems are negative. Current Outpatient Medications   Medication Sig Dispense Refill    baclofen (LIORESAL) 10 MG tablet Take 1 tablet by mouth 2 times daily 90 tablet 1    prazosin (MINIPRESS) 5 MG capsule prazosin hydrochloride 2 mg caps      SUMAtriptan (IMITREX) 100 MG tablet sumatriptan succinate 100 mg tabs      VYVANSE 30 MG capsule       REXULTI 1 MG TABS tablet       potassium chloride (KLOR-CON M) 20 MEQ extended release tablet Take 20 mEq by mouth 2 times daily      hydrOXYzine (ATARAX) 50 MG tablet Take 50 mg by mouth 2 times daily       butalbital-acetaminophen-caffeine (FIORICET, ESGIC) -40 MG per tablet Take 1 tablet by mouth every 4 hours as needed for Headaches      cyanocobalamin 1000 MCG/ML injection Inject 1,000 mcg into the muscle every 21 days       pantoprazole sodium (PROTONIX) 40 MG PACK packet Take 40 mg by mouth 2 times daily      alendronate (FOSAMAX) 70 MG tablet Take 70 mg by mouth every 7 days      Multiple Vitamins-Minerals (THERAPEUTIC MULTIVITAMIN-MINERALS) tablet Take 1 tablet by mouth daily      SUMAtriptan (IMITREX) 100 MG tablet Take 1 tablet by mouth once as needed for Migraine (max 2 in 24 hrs) 9 tablet 5    rizatriptan (MAXALT) 10 MG tablet Take 1 tablet by mouth once as needed for Migraine (max 2 in 24 hrs) May repeat in 2 hours if needed 9 tablet 5    corticotropin (ACTHAR) 80 UNIT/ML injectable gel Inject 1 mL into the muscle daily for 5 days 5 vial 11     No current facility-administered medications for this visit. /84   Pulse 69   Ht 5' 5\" (1.651 m)   Wt 159 lb (72.1 kg)   LMP 05/18/2021   BMI 26.46 kg/m²     Constitutional - well developed, well nourished. Eyes - conjunctiva normal.  Ear, nose, throat - No scars, masses, or lesions over external nose or ears, no atrophy of tongue  Neck-symmetric, no masses noted, no jugular vein distension  Respiration- chest wall appears symmetric, good expansion,   normal effort without use of accessory muscles  Musculoskeletal - no significant wasting of muscles noted, no bony deformities  Extremities-no clubbing, cyanosis or edema  Skin - warm, dry, and intact. No rash, erythema, or pallor.   Psychiatric - mood, affect, and behavior appear normal.      Neurological exam  Awake, alert, fluent oriented  appropriate affect  Attention and concentration appear appropriate  Recent and remote memory appears unremarkable  Speech normal without dysarthria  No clear issues with language of fund of knowledge    Cranial Nerve Exam     CN III, IV,VI-EOMI, No nystagmus, conjugate eye movements, no ptosis  CN VII-no facial assymetry        Motor Exam  antigravity throughout upper and lower extremities bilaterally    Tremors- no tremors in hands or head noted    Gait  Normal base and speed  No ataxia    Lab Results   Component Value Date    SCIFWDDM77 753 09/12/2018     Lab Results   Component Value Date    WBC 8.7 09/28/2021    HGB 10.2 (L) 09/28/2021    HCT 31.6 (L) 09/28/2021    MCV 91.9 09/28/2021     (H) 09/28/2021     Lab Results   Component Value Date     (L) 09/28/2021    K 4.6 09/28/2021     09/28/2021    CO2 22 09/28/2021    BUN 5 (L) 09/28/2021    CREATININE 0.7 09/28/2021    GLUCOSE 88 09/28/2021    CALCIUM 9.0 09/28/2021    PROT 6.1 (L) 09/28/2021    LABALBU 3.2 (L) 09/28/2021    BILITOT <0.2 09/28/2021    ALKPHOS 70 09/28/2021    AST 13 09/28/2021    ALT 8 09/28/2021    LABGLOM >60 09/28/2021    GFRAA >59 09/28/2021    GLOB 3.0 11/14/2016 Impression   1. No diffusion restriction to indicate acute ischemia. 2. Scattered nonenhancing hyperintense T2/FLAIR signal changes within   the periventricular and white matter regions favorable for MS plaques. No abnormal enhancement to indicate active demyelination. 3. Partial opacification inferior right mastoid air cells, mastoid   effusion considered. Signed by Dr Martin Hutton on 7/4/2020 7:10 PM         Assessment    ICD-10-CM    1. Multiple sclerosis ([facility] [address]  G35 External Referral To Neurology   2. Lupus ([address]  M32.9 External Referral To Neurology   3. Weakness  R53.1    4. Numbness  R20.0    5. Intractable chronic migraine without aura and without status migrainosus  G43.719    6. Convulsions, unspecified convulsion type ([address]  R56.9    7. Visual disturbance  H53.9    8. Tremor  R25.1    9. Optic neuritis  H46.9    10. Pain, neck  M54.2        Her neurological examination previously significant for some hesitance of speech, decreased visual acuity, right sided numbness and some mild slowness of speech. She has some mild postural tremor in the hands. Previously she was given Solumedrol 1 gm IV daily x 5 days. She is off Tecfidera and Ocrevus. She did not want to be on Tecfidera due to side effects. Her hepatitis panel was unremarkable. She is seeing  MS specialist in [place]. Her video EEG x 3 days was unremarkable with no events captured. She is seeing another rheumatologist in [facility],She will have her Marinol increased to 20 mg tid. She will need something for fatigue. She plans to research long term side effects and benefits of more frequent ACTHAR. She will be tried on [place] and [place], Imitrex, [place]. We talked about constipation with Jony Collins. we may consider Tysabri and take off Azathioprine. The patient indicated understanding of the management plan. She will be tried on Baclofen for spasms. She is to follow up with me in 2 and call with any issues. Continue care as noted.  She is to consider the [facility]. Plan    Orders Placed This Encounter   Procedures    External Referral To Neurology       Orders Placed This Encounter   Medications    baclofen (LIORESAL) 10 MG tablet     Sig: Take 1 tablet by mouth 2 times daily     Dispense:  90 tablet     Refill:  1       Return in about 2 months (around 5/18/2022). EMR Dragon/transcription disclaimer:Significant part of this  encounter note is electronic transcription/translation of spoken language to printed text. The electronic translation of spoken language may be erroneous, or at times, nonsensical words or phrases may be inadvertently transcribed.  Although I have reviewed the note for such errors, some may still exist.

## 2022-03-25 ENCOUNTER — TELEPHONE (OUTPATIENT)
Dept: NEUROLOGY | Age: 47
End: 2022-03-25

## 2022-03-25 NOTE — TELEPHONE ENCOUNTER
Left voicemail for patient regarding an appointment that has been scheduled at [facility]. Patient has an appointment June 2,22 at 11:00 am with Dr. Maikol Mendez. Address: [address] [place] [address] [address] [facility]# [phone]  Patient needs to bring insurance card and photo id, can have one visitor accompany her to appointment , must follow COVID-19 guidelines.    Left call back number [phone], my chart message sent to patient

## 2022-03-30 ENCOUNTER — TELEPHONE (OUTPATIENT)
Dept: NEUROLOGY | Age: 47
End: 2022-03-30

## 2022-03-30 NOTE — TELEPHONE ENCOUNTER
Pt left message requesting a steroid infusion because she is a having an MS flare that is affecting her vision in both eyes, she also states that her body is in a lot of pain and she's very uncomfortable. Please advise.

## 2022-03-31 RX ORDER — HEPARIN SODIUM (PORCINE) LOCK FLUSH IV SOLN 100 UNIT/ML 100 UNIT/ML
500 SOLUTION INTRAVENOUS PRN
Status: CANCELLED | OUTPATIENT
Start: 2022-03-31

## 2022-03-31 RX ORDER — SODIUM CHLORIDE 9 MG/ML
25 INJECTION, SOLUTION INTRAVENOUS PRN
Status: CANCELLED | OUTPATIENT
Start: 2022-03-31

## 2022-03-31 RX ORDER — SODIUM CHLORIDE 0.9 % (FLUSH) 0.9 %
5-40 SYRINGE (ML) INJECTION PRN
Status: CANCELLED | OUTPATIENT
Start: 2022-03-31

## 2022-03-31 NOTE — TELEPHONE ENCOUNTER
[facility] center has tried to reach the patient several times to get her set up today. I sent the patient a [employer] message letting her know to give them a call.

## 2022-04-01 ENCOUNTER — HOSPITAL ENCOUNTER (OUTPATIENT)
Dept: INFUSION THERAPY | Age: 47
Setting detail: INFUSION SERIES
Discharge: HOME | End: 2022-04-01
Payer: MEDICARE

## 2022-04-01 VITALS
SYSTOLIC BLOOD PRESSURE: 115 MMHG | HEART RATE: 56 BPM | RESPIRATION RATE: 16 BRPM | DIASTOLIC BLOOD PRESSURE: 80 MMHG | TEMPERATURE: 97.3 F | OXYGEN SATURATION: 100 %

## 2022-04-01 DIAGNOSIS — G35 MULTIPLE SCLEROSIS (HCC): Primary | ICD-10-CM

## 2022-04-01 PROCEDURE — 2580000003 HC RX 258: Performed by: PSYCHIATRY & NEUROLOGY

## 2022-04-01 PROCEDURE — 96365 THER/PROPH/DIAG IV INF INIT: CPT

## 2022-04-01 PROCEDURE — 6360000002 HC RX W HCPCS: Performed by: PSYCHIATRY & NEUROLOGY

## 2022-04-01 RX ORDER — SODIUM CHLORIDE 0.9 % (FLUSH) 0.9 %
5-40 SYRINGE (ML) INJECTION PRN
Status: DISCONTINUED | OUTPATIENT
Start: 2022-04-01 | End: 2022-04-02 | Stop reason: HOSPADM

## 2022-04-01 RX ORDER — SODIUM CHLORIDE 9 MG/ML
25 INJECTION, SOLUTION INTRAVENOUS PRN
Status: CANCELLED | OUTPATIENT
Start: 2022-04-02

## 2022-04-01 RX ORDER — SODIUM CHLORIDE 0.9 % (FLUSH) 0.9 %
5-40 SYRINGE (ML) INJECTION PRN
Status: CANCELLED | OUTPATIENT
Start: 2022-04-02

## 2022-04-01 RX ORDER — HEPARIN SODIUM (PORCINE) LOCK FLUSH IV SOLN 100 UNIT/ML 100 UNIT/ML
500 SOLUTION INTRAVENOUS PRN
Status: CANCELLED | OUTPATIENT
Start: 2022-04-02

## 2022-04-01 RX ADMIN — METHYLPREDNISOLONE SODIUM SUCCINATE 1000 MG: 1 INJECTION, POWDER, LYOPHILIZED, FOR SOLUTION INTRAMUSCULAR; INTRAVENOUS at 16:08

## 2022-04-02 ENCOUNTER — HOSPITAL ENCOUNTER (OUTPATIENT)
Age: 47
Discharge: HOME | End: 2022-04-02
Attending: PSYCHIATRY & NEUROLOGY | Admitting: PSYCHIATRY & NEUROLOGY
Payer: MEDICARE

## 2022-04-02 DIAGNOSIS — G35 MULTIPLE SCLEROSIS (HCC): Primary | ICD-10-CM

## 2022-04-02 PROCEDURE — 2580000003 HC RX 258: Performed by: PSYCHIATRY & NEUROLOGY

## 2022-04-02 PROCEDURE — 6360000002 HC RX W HCPCS: Performed by: PSYCHIATRY & NEUROLOGY

## 2022-04-02 PROCEDURE — 96365 THER/PROPH/DIAG IV INF INIT: CPT

## 2022-04-02 RX ORDER — SODIUM CHLORIDE 9 MG/ML
25 INJECTION, SOLUTION INTRAVENOUS PRN
Status: CANCELLED | OUTPATIENT
Start: 2022-04-03

## 2022-04-02 RX ORDER — SODIUM CHLORIDE 9 MG/ML
25 INJECTION, SOLUTION INTRAVENOUS PRN
Status: DISCONTINUED | OUTPATIENT
Start: 2022-04-02 | End: 2022-04-02 | Stop reason: HOSPADM

## 2022-04-02 RX ORDER — HEPARIN SODIUM (PORCINE) LOCK FLUSH IV SOLN 100 UNIT/ML 100 UNIT/ML
500 SOLUTION INTRAVENOUS PRN
Status: CANCELLED | OUTPATIENT
Start: 2022-04-03

## 2022-04-02 RX ORDER — SODIUM CHLORIDE 0.9 % (FLUSH) 0.9 %
5-40 SYRINGE (ML) INJECTION PRN
Status: CANCELLED | OUTPATIENT
Start: 2022-04-03

## 2022-04-02 RX ORDER — SODIUM CHLORIDE 0.9 % (FLUSH) 0.9 %
5-40 SYRINGE (ML) INJECTION PRN
Status: DISCONTINUED | OUTPATIENT
Start: 2022-04-02 | End: 2022-04-02 | Stop reason: HOSPADM

## 2022-04-02 RX ORDER — HEPARIN SODIUM (PORCINE) LOCK FLUSH IV SOLN 100 UNIT/ML 100 UNIT/ML
500 SOLUTION INTRAVENOUS PRN
Status: DISCONTINUED | OUTPATIENT
Start: 2022-04-02 | End: 2022-04-02 | Stop reason: HOSPADM

## 2022-04-02 RX ADMIN — METHYLPREDNISOLONE SODIUM SUCCINATE 1000 MG: 1 INJECTION, POWDER, LYOPHILIZED, FOR SOLUTION INTRAMUSCULAR; INTRAVENOUS at 17:42

## 2022-04-03 ENCOUNTER — HOSPITAL ENCOUNTER (OUTPATIENT)
Age: 47
Discharge: HOME | End: 2022-04-03
Attending: PSYCHIATRY & NEUROLOGY | Admitting: PSYCHIATRY & NEUROLOGY
Payer: MEDICARE

## 2022-04-03 DIAGNOSIS — G35 MULTIPLE SCLEROSIS (HCC): Primary | ICD-10-CM

## 2022-04-03 PROCEDURE — 96365 THER/PROPH/DIAG IV INF INIT: CPT

## 2022-04-03 PROCEDURE — 6360000002 HC RX W HCPCS: Performed by: PSYCHIATRY & NEUROLOGY

## 2022-04-03 PROCEDURE — 2580000003 HC RX 258: Performed by: PSYCHIATRY & NEUROLOGY

## 2022-04-03 RX ORDER — SODIUM CHLORIDE 0.9 % (FLUSH) 0.9 %
5-40 SYRINGE (ML) INJECTION PRN
Status: DISCONTINUED | OUTPATIENT
Start: 2022-04-03 | End: 2022-04-03 | Stop reason: HOSPADM

## 2022-04-03 RX ORDER — HEPARIN SODIUM (PORCINE) LOCK FLUSH IV SOLN 100 UNIT/ML 100 UNIT/ML
500 SOLUTION INTRAVENOUS PRN
Status: CANCELLED | OUTPATIENT
Start: 2022-04-03

## 2022-04-03 RX ORDER — SODIUM CHLORIDE 9 MG/ML
25 INJECTION, SOLUTION INTRAVENOUS PRN
Status: CANCELLED | OUTPATIENT
Start: 2022-04-03

## 2022-04-03 RX ORDER — HEPARIN SODIUM (PORCINE) LOCK FLUSH IV SOLN 100 UNIT/ML 100 UNIT/ML
500 SOLUTION INTRAVENOUS PRN
Status: DISCONTINUED | OUTPATIENT
Start: 2022-04-03 | End: 2022-04-03 | Stop reason: HOSPADM

## 2022-04-03 RX ORDER — SODIUM CHLORIDE 0.9 % (FLUSH) 0.9 %
5-40 SYRINGE (ML) INJECTION PRN
Status: CANCELLED | OUTPATIENT
Start: 2022-04-03

## 2022-04-03 RX ORDER — SODIUM CHLORIDE 9 MG/ML
25 INJECTION, SOLUTION INTRAVENOUS PRN
Status: DISCONTINUED | OUTPATIENT
Start: 2022-04-03 | End: 2022-04-03 | Stop reason: HOSPADM

## 2022-04-03 RX ADMIN — METHYLPREDNISOLONE SODIUM SUCCINATE 1000 MG: 1 INJECTION, POWDER, LYOPHILIZED, FOR SOLUTION INTRAMUSCULAR; INTRAVENOUS at 16:55

## 2022-04-05 ENCOUNTER — TELEPHONE (OUTPATIENT)
Dept: NEUROLOGY | Age: 47
End: 2022-04-05

## 2022-04-05 NOTE — TELEPHONE ENCOUNTER
Patient states that she has had her three infusions Friday Saturday and Sunday. Patient is complaining of lots of swelling and pain and vision blurred that she had previously But is has not have any improvement.

## 2022-04-06 NOTE — TELEPHONE ENCOUNTER
Pt has been called and scheduled to come in Friday at 8:15 to discuss other options. Pt is still complaining of extreme fatigue, vision issues, and all over body cramps.

## 2022-04-06 NOTE — TELEPHONE ENCOUNTER
Pt left message requesting a letter to dismiss her from court that she is required to attend due to reciting fines. I voiced that Dr. Lesly Justin will only write letters for jury duty dismissal and paperwork takes 7-10 business days. Pt voiced understanding and stated she may not be able to attend the appointment on Friday because if she didn't pay the fines she would be taken to senior living. I voiced understanding and asked that if she couldn't make the appt to call and r/s. Pt voiced understanding.

## 2022-04-08 ENCOUNTER — OFFICE VISIT (OUTPATIENT)
Dept: NEUROLOGY | Age: 47
End: 2022-04-08
Payer: MEDICARE

## 2022-04-08 VITALS
HEART RATE: 82 BPM | DIASTOLIC BLOOD PRESSURE: 106 MMHG | SYSTOLIC BLOOD PRESSURE: 145 MMHG | WEIGHT: 159 LBS | HEIGHT: 65 IN | BODY MASS INDEX: 26.49 KG/M2

## 2022-04-08 DIAGNOSIS — G35 MULTIPLE SCLEROSIS (HCC): Primary | ICD-10-CM

## 2022-04-08 DIAGNOSIS — M32.9 LUPUS (HCC): ICD-10-CM

## 2022-04-08 DIAGNOSIS — R25.1 TREMOR: ICD-10-CM

## 2022-04-08 DIAGNOSIS — M79.7 FIBROMYALGIA: ICD-10-CM

## 2022-04-08 DIAGNOSIS — R47.9 DIFFICULTY WITH SPEECH: ICD-10-CM

## 2022-04-08 DIAGNOSIS — R20.0 NUMBNESS: ICD-10-CM

## 2022-04-08 DIAGNOSIS — G43.719 INTRACTABLE CHRONIC MIGRAINE WITHOUT AURA AND WITHOUT STATUS MIGRAINOSUS: ICD-10-CM

## 2022-04-08 DIAGNOSIS — R53.1 WEAKNESS: ICD-10-CM

## 2022-04-08 DIAGNOSIS — M54.2 PAIN, NECK: ICD-10-CM

## 2022-04-08 DIAGNOSIS — R56.9 CONVULSIONS, UNSPECIFIED CONVULSION TYPE (HCC): ICD-10-CM

## 2022-04-08 DIAGNOSIS — H53.9 VISUAL DISTURBANCE: ICD-10-CM

## 2022-04-08 DIAGNOSIS — H46.9 OPTIC NEURITIS: ICD-10-CM

## 2022-04-08 PROCEDURE — 99214 OFFICE O/P EST MOD 30 MIN: CPT | Performed by: PSYCHIATRY & NEUROLOGY

## 2022-04-08 PROCEDURE — G8427 DOCREV CUR MEDS BY ELIG CLIN: HCPCS | Performed by: PSYCHIATRY & NEUROLOGY

## 2022-04-08 PROCEDURE — G8419 CALC BMI OUT NRM PARAM NOF/U: HCPCS | Performed by: PSYCHIATRY & NEUROLOGY

## 2022-04-08 PROCEDURE — 4004F PT TOBACCO SCREEN RCVD TLK: CPT | Performed by: PSYCHIATRY & NEUROLOGY

## 2022-04-08 RX ORDER — SODIUM CHLORIDE 9 MG/ML
25 INJECTION, SOLUTION INTRAVENOUS PRN
Status: CANCELLED | OUTPATIENT
Start: 2022-04-08

## 2022-04-08 RX ORDER — HEPARIN SODIUM (PORCINE) LOCK FLUSH IV SOLN 100 UNIT/ML 100 UNIT/ML
500 SOLUTION INTRAVENOUS PRN
Status: CANCELLED | OUTPATIENT
Start: 2022-04-08

## 2022-04-08 RX ORDER — SODIUM CHLORIDE 0.9 % (FLUSH) 0.9 %
5-40 SYRINGE (ML) INJECTION PRN
Status: CANCELLED | OUTPATIENT
Start: 2022-04-08

## 2022-04-08 RX ORDER — EPINEPHRINE 1 MG/ML
0.3 INJECTION, SOLUTION, CONCENTRATE INTRAVENOUS PRN
Status: CANCELLED | OUTPATIENT
Start: 2022-04-08

## 2022-04-08 RX ORDER — MEPERIDINE HYDROCHLORIDE 50 MG/ML
12.5 INJECTION INTRAMUSCULAR; INTRAVENOUS; SUBCUTANEOUS PRN
Status: CANCELLED | OUTPATIENT
Start: 2022-04-08

## 2022-04-08 RX ORDER — DIPHENHYDRAMINE HYDROCHLORIDE 50 MG/ML
50 INJECTION INTRAMUSCULAR; INTRAVENOUS
Status: CANCELLED | OUTPATIENT
Start: 2022-04-08

## 2022-04-08 RX ORDER — DIPHENHYDRAMINE HCL 25 MG
25 TABLET ORAL ONCE
Status: CANCELLED | OUTPATIENT
Start: 2022-04-08 | End: 2022-04-08

## 2022-04-08 RX ORDER — SODIUM CHLORIDE 9 MG/ML
INJECTION, SOLUTION INTRAVENOUS CONTINUOUS
Status: CANCELLED | OUTPATIENT
Start: 2022-04-08

## 2022-04-08 RX ORDER — ACETAMINOPHEN 325 MG/1
650 TABLET ORAL
Status: CANCELLED | OUTPATIENT
Start: 2022-04-08

## 2022-04-08 RX ORDER — ACETAMINOPHEN 325 MG/1
650 TABLET ORAL ONCE
Status: CANCELLED | OUTPATIENT
Start: 2022-04-08 | End: 2022-04-08

## 2022-04-08 RX ORDER — ONDANSETRON 2 MG/ML
8 INJECTION INTRAMUSCULAR; INTRAVENOUS
Status: CANCELLED | OUTPATIENT
Start: 2022-04-08

## 2022-04-08 RX ORDER — ALBUTEROL SULFATE 90 UG/1
4 AEROSOL, METERED RESPIRATORY (INHALATION) PRN
Status: CANCELLED | OUTPATIENT
Start: 2022-04-08

## 2022-04-08 NOTE — PROGRESS NOTES
Chief Complaint   Patient presents with    Multiple Sclerosis     MS flare up, pt has Acthar and just finished 3 days of solu medrol at [address] is a 42y.o. year old female who is seen for evaluation of follow for multiple sclerosis following an admission in 5/19 for optic neuritis treated with IV solumedrol. The patient has a history of multiple sclerosis, fibromyalgia and lupus along with chronic headaches was seen at that time for left visual change which began about 2 days prior to admission with eye pain. This was quite similar to her previous episodes of optic neuritis, of which she has had about 4 previous episodes treated with steroids. She was tried on oral steroids as an outpatient but this was not effective and was transferred from outside ER for further management. She has some chronic weakness and numbness and weakness on the left. She does have bowel and bladder incontinence. She was on Avonex. Had not followed up with Dr Tariq Schroeder since 2017. She did not follow up in the office since 5/19. On 7/4/20 she was in the ER with generalized body aches. She apparently had some trembling on the right side. She had an MRI of the brain in the ER which revealed no acute ischemic change but did have scattered non enhancing white matter lesions in periventricular and white matter. She was given 1 gm Solumedrol for a possible flair up and told to follow up with neurology. She indicates that she typically does relatively well for a few weeks and then she feels severely fatigued or worsening vision. She has been in a wheelchair in 2017. She had her initial testing in 2017 at [facility] which was significant for non enhancing white matter lesions and a CSF with 6 oligoclonal bands seen only in the CSF. She still feels her face twitching and right sided numbness. He speech is not normal for her as she has to think about things carefully.  Her rheumatologist started her on Tecfidera but she felt worse on this. She had been on Avonex since her diagnosis prior to with no clear benefit according to her. Having a lupus flair up. This usually feels pain in eyes, skin of in mouth may fall off. Felt since with 2  Ocrevus infusion October 2020 and a bit better but now off. Darrelyn Cheese with unclear benefit. Saw rheumatologist in [place] and plan a new infusion. Azathioprine started for her Lupus. had bowel obruction with gastric band. Stopped Ocrevus. Artane helped for about 2 days not help. Lupus flairs. Is doing well with Marinol and Acthar. Marinol only occasionally. Acthar 5 days month. Recurrent flair. Solumedrol intermittently. bad migraines recently. Multiple a week. Imitrex used to work. Planning on see cardiology heart attack. In dual flair up. Other than ACTHAR nothing else for immune modulation. Flair ups. Vision affected. Fatigue. Just finished steroid infusion.     Active Ambulatory Problems     Diagnosis Date Noted    Multiple sclerosis ([address]     Intractable chronic migraine without aura and without status migrainosus 05/01/2017    Major depressive disorder, recurrent, moderate (HCC) 09/12/2018    Exacerbation of multiple sclerosis ([address] 05/10/2019    Optic neuritis 05/10/2019    Pain of left eye 05/10/2019    Bronchitis 05/11/2019    Visual disturbance 07/07/2020    Tremor 07/07/2020    Weakness 07/07/2020    Numbness 07/07/2020    Difficulty with speech 07/07/2020    Lupus ([address] 07/07/2020    Fibromyalgia 07/07/2020    Convulsions ([address] 07/20/2020    Bilateral myofascial pain 01/04/2021    Pain in joints 01/20/2021    Hypertension     Bipolar 1 disorder ([address]     Acute abdomen      Resolved Ambulatory Problems     Diagnosis Date Noted    No Resolved Ambulatory Problems     Past Medical History:   Diagnosis Date    Arthritis     GERD (gastroesophageal reflux disease)     Movement disorder     Neuromuscular disorder ([address]     Psychiatric problem        Past Surgical History: Procedure Laterality Date    ABDOMINAL EXPLORATION SURGERY      ADENOIDECTOMY       SECTION      x 2     SECTION, CLASSIC N/A     COLONOSCOPY      DILATATION, ESOPHAGUS      EXTERNAL EAR SURGERY      lobe    GASTRIC BAND      GASTRIC BYPASS SURGERY      HERNIA REPAIR      HERNIA REPAIR N/A     HYSTERECTOMY      SEPTOPLASTY      SMALL INTESTINE SURGERY      TONSILLECTOMY      TONSILLECTOMY AND ADENOIDECTOMY      UPPER GASTROINTESTINAL ENDOSCOPY         Family History   Problem Relation Age of Onset    Anemia Mother     Breast Cancer Mother     Depression Mother     High Blood Pressure Mother     Mental Illness Mother    [place] / [place] Mother     Colon Cancer Father     Depression Father     High Blood Pressure Father     Mental Illness Father     Breast Cancer Maternal Grandmother     Alcohol Abuse Maternal Grandfather     Anemia Paternal Grandmother     Alcohol Abuse Paternal Grandfather     Osteoporosis Paternal Grandfather     Stroke Paternal Grandfather     Substance Abuse Maternal Cousin     Substance Abuse Paternal Cousin     Asthma Other        Allergies   Allergen Reactions    Avelox [Moxifloxacin] Anaphylaxis    Glatiramer Rash    Sugar-Protein-Starch Other (See Comments)    Avelox [Moxifloxacin]     Copaxone [Glatiramer Acetate]     Depakote [Divalproex Sodium]     Gabapentin Itching    Hydroxychloroquine      Other reaction(s):  Other (See Comments)  Vision loss   Nishant Needles [Paliperidone]     Lamictal [Lamotrigine]     Lithium     Pregabalin      Other reaction(s): Mental Status Change    Saquinavir     Varenicline Tartrate     Lamictal [Lamotrigine] Rash    Lithium Rash       Social History     Socioeconomic History    Marital status: Single     Spouse name: Not on file    Number of children: 3    Years of education: Some College    Highest education level: Not on file   Occupational History    Occupation: Disability Tobacco Use    Smoking status: Current Every Day Smoker     Packs/day: 1.00     Years: 15.00     Pack years: 15.00     Types: Cigarettes    Smokeless tobacco: Never Used   Vaping Use    Vaping Use: Never used   Substance and Sexual Activity    Alcohol use: Not Currently     Alcohol/week: 2.0 standard drinks     Types: 2 Shots of liquor per week    Drug use: Yes     Types: Marijuana [address]ie [address])    Sexual activity: Never     Partners: Male   Other Topics Concern    Not on file   Social History Narrative    ** Merged History Encounter **          Social Determinants of Health     Financial Resource Strain:     Difficulty of Paying Living Expenses: Not on file   Food Insecurity:     Worried About Running Out of Food in the Last Year: Not on file    [place] of Food in the Last Year: Not on file   Transportation Needs:     Lack of Transportation (Medical): Not on file    Lack of Transportation (Non-Medical): Not on file   Physical Activity:     Days of Exercise per Week: Not on file    Minutes of Exercise per Session: Not on file   Stress:     Feeling of Stress : Not on file   Social Connections:     Frequency of Communication with Friends and Family: Not on file    Frequency of Social Gatherings with Friends and Family: Not on file    Attends Denominational Services: Not on file    Active Member of [address] [place] [address] [employer] or Organizations: Not on file    Attends Club or Organization Meetings: Not on file    Marital Status: Not on file   Intimate Partner Violence:     Fear of Current or Ex-Partner: Not on file    Emotionally Abused: Not on file    Physically Abused: Not on file    Sexually Abused: Not on file   Housing Stability:     Unable to Pay for Housing in the Last Year: Not on file    Number of [place] in the Last Year: Not on file    Unstable Housing in the Last Year: Not on file     Review of Systems     Constitutional - No fever or chills. No diaphoresis or significant fatigue.   HENT -  No tinnitus or significant hearing loss. Eyes - no sudden vision change or eye pain  Respiratory - no significant shortness of breath or cough  Cardiovascular - no chest pain No palpitations or significant leg swelling  Gastrointestinal - no abdominal swelling or pain. Genitourinary - No difficulty urinating, dysuria  Musculoskeletal - no back pain or myalgia. Skin - no color change or rash  Neurologic - No seizures. No lateralizing weakness. Hematologic - no easy bruising or excessive bleeding. Psychiatric - no severe anxiety or nervousness. All other review of systems are negative.     Current Outpatient Medications   Medication Sig Dispense Refill    baclofen (LIORESAL) 10 MG tablet Take 1 tablet by mouth 2 times daily 90 tablet 1    prazosin (MINIPRESS) 5 MG capsule prazosin hydrochloride 2 mg caps      SUMAtriptan (IMITREX) 100 MG tablet sumatriptan succinate 100 mg tabs      VYVANSE 30 MG capsule       REXULTI 1 MG TABS tablet       potassium chloride (KLOR-CON M) 20 MEQ extended release tablet Take 20 mEq by mouth 2 times daily      hydrOXYzine (ATARAX) 50 MG tablet Take 50 mg by mouth 2 times daily       butalbital-acetaminophen-caffeine (FIORICET, ESGIC) -40 MG per tablet Take 1 tablet by mouth every 4 hours as needed for Headaches      cyanocobalamin 1000 MCG/ML injection Inject 1,000 mcg into the muscle every 21 days       pantoprazole sodium (PROTONIX) 40 MG PACK packet Take 40 mg by mouth 2 times daily      alendronate (FOSAMAX) 70 MG tablet Take 70 mg by mouth every 7 days      Multiple Vitamins-Minerals (THERAPEUTIC MULTIVITAMIN-MINERALS) tablet Take 1 tablet by mouth daily      SUMAtriptan (IMITREX) 100 MG tablet Take 1 tablet by mouth once as needed for Migraine (max 2 in 24 hrs) 9 tablet 5    rizatriptan (MAXALT) 10 MG tablet Take 1 tablet by mouth once as needed for Migraine (max 2 in 24 hrs) May repeat in 2 hours if needed 9 tablet 5    corticotropin (ACTHAR) 80 UNIT/ML injectable gel Inject 1 mL into the muscle daily for 5 days 5 vial 11     No current facility-administered medications for this visit. BP (!) 145/106   Pulse 82   Ht 5' 5\" (1.651 m)   Wt 159 lb (72.1 kg)   LMP 05/18/2021   BMI 26.46 kg/m²     Constitutional - well developed, well nourished. Eyes - conjunctiva normal.  Ear, nose, throat - No scars, masses, or lesions over external nose or ears, no atrophy of tongue  Neck-symmetric, no masses noted, no jugular vein distension  Respiration- chest wall appears symmetric, good expansion,   normal effort without use of accessory muscles  Musculoskeletal - no significant wasting of muscles noted, no bony deformities  Extremities-no clubbing, cyanosis or edema  Skin - warm, dry, and intact. No rash, erythema, or pallor.   Psychiatric - mood, affect, and behavior appear normal.      Neurological exam  Awake, alert, fluent oriented  appropriate affect  Attention and concentration appear appropriate  Recent and remote memory appears unremarkable  Speech normal without dysarthria  No clear issues with language of fund of knowledge    Cranial Nerve Exam     CN III, IV,VI-EOMI, No nystagmus, conjugate eye movements, no ptosis  CN VII-no facial assymetry        Motor Exam  antigravity throughout upper and lower extremities bilaterally    Tremors- no tremors in hands or head noted    Gait  Normal base and speed  No ataxia    Lab Results   Component Value Date    AMJGZYYH99 753 09/12/2018     Lab Results   Component Value Date    WBC 8.7 09/28/2021    HGB 10.2 (L) 09/28/2021    HCT 31.6 (L) 09/28/2021    MCV 91.9 09/28/2021     (H) 09/28/2021     Lab Results   Component Value Date     (L) 09/28/2021    K 4.6 09/28/2021     09/28/2021    CO2 22 09/28/2021    BUN 5 (L) 09/28/2021    CREATININE 0.7 09/28/2021    GLUCOSE 88 09/28/2021    CALCIUM 9.0 09/28/2021    PROT 6.1 (L) 09/28/2021    LABALBU 3.2 (L) 09/28/2021    BILITOT <0.2 09/28/2021    ALKPHOS 70 09/28/2021    AST 13 09/28/2021    ALT 8 09/28/2021    LABGLOM >60 09/28/2021    GFRAA >59 09/28/2021    GLOB 3.0 11/14/2016       Impression   1. No diffusion restriction to indicate acute ischemia. 2. Scattered nonenhancing hyperintense T2/FLAIR signal changes within   the periventricular and white matter regions favorable for MS plaques. No abnormal enhancement to indicate active demyelination. 3. Partial opacification inferior right mastoid air cells, mastoid   effusion considered. Signed by Dr Daniel Naranjo on 7/4/2020 7:10 PM         Assessment    ICD-10-CM    1. Multiple sclerosis ([address]  G35 External Referral To Neurology   2. Lupus ([address]  M32.9    3. Weakness  R53.1    4. Numbness  R20.0    5. Intractable chronic migraine without aura and without status migrainosus  G43.719    6. Convulsions, unspecified convulsion type ([address]  R56.9    7. Visual disturbance  H53.9    8. Tremor  R25.1    9. Optic neuritis  H46.9    10. Pain, neck  M54.2    11. Difficulty with speech  R47.9    12. Fibromyalgia  M79.7        Her neurological examination previously significant for some hesitance of speech, decreased visual acuity, right sided numbness and some mild slowness of speech. She has some mild postural tremor in the hands. Previously she was given Solumedrol 1 gm IV daily x 5 days. She is off Tecfidera and Ocrevus. She did not want to be on Tecfidera due to side effects. Her hepatitis panel was unremarkable. She is seeing  MS specialist in [place]. Her video EEG x 3 days was unremarkable with no events captured. She is seeing another rheumatologist in [facility],She will have her Marinol increased to 20 mg tid. She will need something for fatigue. She plans to research long term side effects and benefits of more frequent ACTHAR. She will be tried on [place] and [place], Imitrex, [place]. We talked about constipation with Yadira Vegas. we may consider Tysabri and take off Azathioprine. She is to be set up for IVIG  The patient indicated understanding of the management plan. She will be tried on Baclofen for spasms. She is to follow up with me in 2 and call with any issues. She will be set up for IVIG. Continue care as noted. She will be referred to the the [facility]. Plan    Orders Placed This Encounter   Procedures    External Referral To Neurology       No orders of the defined types were placed in this encounter. Return in about 4 weeks (around 5/6/2022). EMR Dragon/transcription disclaimer:Significant part of this  encounter note is electronic transcription/translation of spoken language to printed text. The electronic translation of spoken language may be erroneous, or at times, nonsensical words or phrases may be inadvertently transcribed.  Although I have reviewed the note for such errors, some may still exist.

## 2022-04-08 NOTE — PROGRESS NOTES
Review of Systems    Constitutional - No fever or chills. No diaphoresis or significant fatigue. HENT -  No tinnitus or significant hearing loss. Eyes - no sudden vision change or eye pain  Respiratory - yes significant shortness of breath or cough  Cardiovascular - yes chest pain No palpitations or significant leg swelling  Gastrointestinal - yes abdominal swelling or pain. Genitourinary - No difficulty urinating, dysuria  Musculoskeletal - no back pain or myalgia. Skin - no color change or rash  Neurologic - No seizures. No lateralizing weakness. Hematologic - no easy bruising or excessive bleeding. Psychiatric - yes severe anxiety or nervousness. All other review of systems are negative.

## 2022-04-08 NOTE — Clinical Note
Refer to [facility] neurology

## 2022-04-26 DIAGNOSIS — G35 MULTIPLE SCLEROSIS (HCC): Primary | ICD-10-CM

## 2022-04-26 RX ORDER — SODIUM CHLORIDE 0.9 % (FLUSH) 0.9 %
5-40 SYRINGE (ML) INJECTION PRN
Status: CANCELLED | OUTPATIENT
Start: 2022-04-26

## 2022-04-26 RX ORDER — DIPHENHYDRAMINE HYDROCHLORIDE 50 MG/ML
50 INJECTION INTRAMUSCULAR; INTRAVENOUS ONCE
Status: CANCELLED
Start: 2022-04-26 | End: 2022-04-26

## 2022-04-26 RX ORDER — DIPHENHYDRAMINE HYDROCHLORIDE 50 MG/ML
50 INJECTION INTRAMUSCULAR; INTRAVENOUS
Status: CANCELLED | OUTPATIENT
Start: 2022-04-26

## 2022-04-26 RX ORDER — SODIUM CHLORIDE 9 MG/ML
INJECTION, SOLUTION INTRAVENOUS CONTINUOUS
Status: CANCELLED | OUTPATIENT
Start: 2022-04-26

## 2022-04-26 RX ORDER — MEPERIDINE HYDROCHLORIDE 25 MG/ML
12.5 INJECTION INTRAMUSCULAR; INTRAVENOUS; SUBCUTANEOUS PRN
Status: CANCELLED | OUTPATIENT
Start: 2022-04-26

## 2022-04-26 RX ORDER — ACETAMINOPHEN 325 MG/1
650 TABLET ORAL
Status: CANCELLED | OUTPATIENT
Start: 2022-04-26

## 2022-04-26 RX ORDER — ALBUTEROL SULFATE 90 UG/1
4 AEROSOL, METERED RESPIRATORY (INHALATION) PRN
Status: CANCELLED | OUTPATIENT
Start: 2022-04-26

## 2022-04-26 RX ORDER — ONDANSETRON 2 MG/ML
8 INJECTION INTRAMUSCULAR; INTRAVENOUS
Status: CANCELLED | OUTPATIENT
Start: 2022-04-26

## 2022-04-26 RX ORDER — EPINEPHRINE 1 MG/ML
0.3 INJECTION, SOLUTION, CONCENTRATE INTRAVENOUS PRN
Status: CANCELLED | OUTPATIENT
Start: 2022-04-26

## 2022-04-26 RX ORDER — ACETAMINOPHEN 325 MG/1
650 TABLET ORAL ONCE
Status: CANCELLED | OUTPATIENT
Start: 2022-04-26 | End: 2022-04-26

## 2022-05-02 NOTE — PROGRESS NOTES
Patient called and is having transportation issues. Cancelled appt today and added on appt to the series of 5 IVIG appts, adjusted weekly appts.

## 2022-05-03 ENCOUNTER — HOSPITAL ENCOUNTER (OUTPATIENT)
Dept: INFUSION THERAPY | Age: 47
Setting detail: INFUSION SERIES
Discharge: HOME | End: 2022-05-03
Payer: MEDICARE

## 2022-05-03 VITALS
SYSTOLIC BLOOD PRESSURE: 119 MMHG | DIASTOLIC BLOOD PRESSURE: 77 MMHG | HEIGHT: 65 IN | OXYGEN SATURATION: 100 % | WEIGHT: 159 LBS | TEMPERATURE: 97.7 F | HEART RATE: 62 BPM | BODY MASS INDEX: 26.49 KG/M2 | RESPIRATION RATE: 17 BRPM

## 2022-05-03 DIAGNOSIS — G35 MULTIPLE SCLEROSIS (HCC): Primary | ICD-10-CM

## 2022-05-03 PROBLEM — G47.00 INSOMNIA: Status: ACTIVE | Noted: 2022-05-03

## 2022-05-03 PROBLEM — G89.29 CHRONIC BACK PAIN: Status: ACTIVE | Noted: 2021-12-07

## 2022-05-03 PROBLEM — K66.8 PNEUMOPERITONEUM OF UNKNOWN ETIOLOGY: Status: ACTIVE | Noted: 2021-09-28

## 2022-05-03 PROBLEM — M19.90 ARTHRITIS: Status: ACTIVE | Noted: 2022-05-03

## 2022-05-03 PROBLEM — M54.2 CHRONIC NECK PAIN: Status: ACTIVE | Noted: 2021-12-07

## 2022-05-03 PROBLEM — I10 HYPERTENSIVE DISORDER: Status: ACTIVE | Noted: 2021-12-07

## 2022-05-03 PROBLEM — Q21.20: Status: ACTIVE | Noted: 2022-03-11

## 2022-05-03 PROBLEM — J32.9 CHRONIC SINUSITIS: Status: ACTIVE | Noted: 2022-05-03

## 2022-05-03 PROBLEM — K95.09 GASTRIC BAND MALFUNCTION: Status: ACTIVE | Noted: 2020-12-10

## 2022-05-03 PROBLEM — F12.90 MARIJUANA USE: Status: ACTIVE | Noted: 2021-04-20

## 2022-05-03 PROBLEM — M26.609 TEMPOROMANDIBULAR JOINT DISORDER: Status: ACTIVE | Noted: 2021-12-07

## 2022-05-03 PROBLEM — K58.9 IRRITABLE BOWEL SYNDROME: Status: ACTIVE | Noted: 2022-05-03

## 2022-05-03 PROBLEM — K45.8 INTERNAL HERNIA: Status: ACTIVE | Noted: 2021-09-17

## 2022-05-03 PROBLEM — H46.8: Status: ACTIVE | Noted: 2021-12-07

## 2022-05-03 PROBLEM — K21.9 GERD WITHOUT ESOPHAGITIS: Status: ACTIVE | Noted: 2021-04-20

## 2022-05-03 PROBLEM — R10.2 PELVIC PAIN: Status: ACTIVE | Noted: 2021-08-05

## 2022-05-03 PROBLEM — E66.9 OBESITY: Status: ACTIVE | Noted: 2022-05-03

## 2022-05-03 PROBLEM — F32.9 MAJOR DEPRESSION, SINGLE EPISODE: Status: ACTIVE | Noted: 2022-05-03

## 2022-05-03 PROBLEM — K56.609 SMALL BOWEL OBSTRUCTION (HCC): Status: ACTIVE | Noted: 2019-10-06

## 2022-05-03 PROBLEM — G89.29 CHRONIC NECK PAIN: Status: ACTIVE | Noted: 2021-12-07

## 2022-05-03 PROBLEM — K21.9 GASTROESOPHAGEAL REFLUX DISEASE: Status: ACTIVE | Noted: 2022-05-03

## 2022-05-03 PROBLEM — I10 ESSENTIAL HYPERTENSION: Status: ACTIVE | Noted: 2020-11-12

## 2022-05-03 PROBLEM — K56.2 VOLVULUS (HCC): Status: ACTIVE | Noted: 2021-09-17

## 2022-05-03 PROBLEM — F41.9 ANXIETY: Status: ACTIVE | Noted: 2022-05-03

## 2022-05-03 PROBLEM — R11.2 NAUSEA AND VOMITING: Status: ACTIVE | Noted: 2021-04-20

## 2022-05-03 PROBLEM — F90.9 ATTENTION DEFICIT HYPERACTIVITY DISORDER: Status: ACTIVE | Noted: 2022-05-03

## 2022-05-03 PROBLEM — R00.2 PALPITATIONS: Status: ACTIVE | Noted: 2022-05-03

## 2022-05-03 PROBLEM — F10.20 ALCOHOL DEPENDENCE (HCC): Status: ACTIVE | Noted: 2022-05-03

## 2022-05-03 PROBLEM — G43.909 MIGRAINE: Status: ACTIVE | Noted: 2021-12-07

## 2022-05-03 PROBLEM — M54.30 SCIATICA: Status: ACTIVE | Noted: 2022-05-03

## 2022-05-03 PROBLEM — F17.200 SMOKER: Status: ACTIVE | Noted: 2021-04-20

## 2022-05-03 PROBLEM — Z20.822 SUSPECTED SEVERE ACUTE RESPIRATORY SYNDROME CORONAVIRUS 2 (SARS-COV-2) INFECTION: Status: ACTIVE | Noted: 2022-01-10

## 2022-05-03 PROBLEM — I49.3 VENTRICULAR PREMATURE COMPLEX: Status: ACTIVE | Noted: 2021-12-07

## 2022-05-03 PROBLEM — M54.9 CHRONIC BACK PAIN: Status: ACTIVE | Noted: 2021-12-07

## 2022-05-03 PROBLEM — M81.0 AGE RELATED OSTEOPOROSIS: Status: ACTIVE | Noted: 2022-05-03

## 2022-05-03 PROBLEM — R60.0 EDEMA OF LOWER EXTREMITY: Status: ACTIVE | Noted: 2022-04-05

## 2022-05-03 PROBLEM — T78.40XD ALLERGY, UNSPECIFIED, SUBSEQUENT ENCOUNTER: Status: ACTIVE | Noted: 2022-05-03

## 2022-05-03 PROBLEM — K31.6 GASTROGASTRIC FISTULA: Status: ACTIVE | Noted: 2021-10-07

## 2022-05-03 PROBLEM — M32.9 SYSTEMIC LUPUS ERYTHEMATOSUS (HCC): Status: ACTIVE | Noted: 2018-03-14

## 2022-05-03 PROBLEM — R12 HEARTBURN: Status: ACTIVE | Noted: 2021-04-20

## 2022-05-03 PROBLEM — H40.059: Status: ACTIVE | Noted: 2021-12-07

## 2022-05-03 PROBLEM — F13.10 SEDATIVE ABUSE (HCC): Status: ACTIVE | Noted: 2022-05-03

## 2022-05-03 PROBLEM — K25.9 MULTIPLE GASTRIC ULCERS: Status: ACTIVE | Noted: 2021-12-30

## 2022-05-03 PROBLEM — F43.10 POSTTRAUMATIC STRESS DISORDER: Status: ACTIVE | Noted: 2022-05-03

## 2022-05-03 PROBLEM — R14.0 BLOATING: Status: ACTIVE | Noted: 2021-04-20

## 2022-05-03 PROCEDURE — 6360000002 HC RX W HCPCS: Performed by: PSYCHIATRY & NEUROLOGY

## 2022-05-03 PROCEDURE — 96366 THER/PROPH/DIAG IV INF ADDON: CPT

## 2022-05-03 PROCEDURE — 96365 THER/PROPH/DIAG IV INF INIT: CPT

## 2022-05-03 PROCEDURE — 96375 TX/PRO/DX INJ NEW DRUG ADDON: CPT

## 2022-05-03 PROCEDURE — 2580000003 HC RX 258: Performed by: PSYCHIATRY & NEUROLOGY

## 2022-05-03 PROCEDURE — 6370000000 HC RX 637 (ALT 250 FOR IP): Performed by: PSYCHIATRY & NEUROLOGY

## 2022-05-03 RX ORDER — DIPHENHYDRAMINE HYDROCHLORIDE 50 MG/ML
50 INJECTION INTRAMUSCULAR; INTRAVENOUS
Status: CANCELLED | OUTPATIENT
Start: 2022-05-21

## 2022-05-03 RX ORDER — SODIUM CHLORIDE 0.9 % (FLUSH) 0.9 %
5-40 SYRINGE (ML) INJECTION PRN
Status: CANCELLED | OUTPATIENT
Start: 2022-05-04 | End: 2022-05-05

## 2022-05-03 RX ORDER — SODIUM CHLORIDE 0.9 % (FLUSH) 0.9 %
5-40 SYRINGE (ML) INJECTION PRN
Status: CANCELLED | OUTPATIENT
Start: 2022-05-21

## 2022-05-03 RX ORDER — ACETAMINOPHEN 325 MG/1
650 TABLET ORAL ONCE
Status: CANCELLED | OUTPATIENT
Start: 2022-05-04 | End: 2022-06-03

## 2022-05-03 RX ORDER — SYRINGE WITH NEEDLE, 1 ML 25GX5/8"
SYRINGE, EMPTY DISPOSABLE MISCELLANEOUS
COMMUNITY
Start: 2022-02-04

## 2022-05-03 RX ORDER — POTASSIUM CHLORIDE 750 MG/1
TABLET, FILM COATED, EXTENDED RELEASE ORAL
COMMUNITY
Start: 2022-03-10 | End: 2022-06-03

## 2022-05-03 RX ORDER — ACETAMINOPHEN 325 MG/1
650 TABLET ORAL ONCE
Status: COMPLETED | OUTPATIENT
Start: 2022-05-03 | End: 2022-05-03

## 2022-05-03 RX ORDER — DIPHENHYDRAMINE HYDROCHLORIDE 50 MG/ML
50 INJECTION INTRAMUSCULAR; INTRAVENOUS ONCE
Status: CANCELLED
Start: 2022-05-21 | End: 2022-05-21

## 2022-05-03 RX ORDER — MEPERIDINE HYDROCHLORIDE 25 MG/ML
12.5 INJECTION INTRAMUSCULAR; INTRAVENOUS; SUBCUTANEOUS PRN
Status: CANCELLED | OUTPATIENT
Start: 2022-05-21

## 2022-05-03 RX ORDER — VORTIOXETINE 10 MG/1
10 TABLET, FILM COATED ORAL DAILY
COMMUNITY
Start: 2022-04-01

## 2022-05-03 RX ORDER — ONDANSETRON 2 MG/ML
8 INJECTION INTRAMUSCULAR; INTRAVENOUS
Status: CANCELLED | OUTPATIENT
Start: 2022-05-21

## 2022-05-03 RX ORDER — BREXPIPRAZOLE 3 MG/1
TABLET ORAL
COMMUNITY
Start: 2022-04-18 | End: 2022-06-03

## 2022-05-03 RX ORDER — ACETAMINOPHEN 325 MG/1
650 TABLET ORAL ONCE
Status: CANCELLED | OUTPATIENT
Start: 2022-05-21 | End: 2022-05-21

## 2022-05-03 RX ORDER — ONDANSETRON 4 MG/1
TABLET, ORALLY DISINTEGRATING ORAL
COMMUNITY
Start: 2022-04-09 | End: 2022-09-01

## 2022-05-03 RX ORDER — SODIUM CHLORIDE 9 MG/ML
INJECTION, SOLUTION INTRAVENOUS CONTINUOUS
Status: CANCELLED | OUTPATIENT
Start: 2022-05-21

## 2022-05-03 RX ORDER — DIPHENHYDRAMINE HYDROCHLORIDE 50 MG/ML
50 INJECTION INTRAMUSCULAR; INTRAVENOUS ONCE
Status: CANCELLED
Start: 2022-05-04 | End: 2022-06-03

## 2022-05-03 RX ORDER — DIPHENHYDRAMINE HYDROCHLORIDE 50 MG/ML
50 INJECTION INTRAMUSCULAR; INTRAVENOUS ONCE
Status: COMPLETED | OUTPATIENT
Start: 2022-05-03 | End: 2022-05-03

## 2022-05-03 RX ORDER — EPINEPHRINE 1 MG/ML
0.3 INJECTION, SOLUTION, CONCENTRATE INTRAVENOUS PRN
Status: CANCELLED | OUTPATIENT
Start: 2022-05-21

## 2022-05-03 RX ORDER — ACETAMINOPHEN 325 MG/1
650 TABLET ORAL
Status: CANCELLED | OUTPATIENT
Start: 2022-05-21

## 2022-05-03 RX ORDER — TRAZODONE HYDROCHLORIDE 100 MG/1
150 TABLET ORAL NIGHTLY
COMMUNITY
Start: 2022-04-01

## 2022-05-03 RX ORDER — AZATHIOPRINE 50 MG/1
50 TABLET ORAL DAILY
COMMUNITY
Start: 2022-03-11

## 2022-05-03 RX ORDER — ALBUTEROL SULFATE 90 UG/1
AEROSOL, METERED RESPIRATORY (INHALATION)
COMMUNITY
Start: 2022-04-14 | End: 2022-09-01

## 2022-05-03 RX ORDER — OMEPRAZOLE 20 MG/1
20 CAPSULE, DELAYED RELEASE ORAL DAILY
COMMUNITY

## 2022-05-03 RX ORDER — SUCRALFATE 1 G/1
1 TABLET ORAL 4 TIMES DAILY
COMMUNITY
Start: 2022-02-03

## 2022-05-03 RX ORDER — SODIUM CHLORIDE 0.9 % (FLUSH) 0.9 %
5-40 SYRINGE (ML) INJECTION PRN
Status: DISCONTINUED | OUTPATIENT
Start: 2022-05-03 | End: 2022-05-04 | Stop reason: HOSPADM

## 2022-05-03 RX ORDER — ALBUTEROL SULFATE 90 UG/1
4 AEROSOL, METERED RESPIRATORY (INHALATION) PRN
Status: CANCELLED | OUTPATIENT
Start: 2022-05-21

## 2022-05-03 RX ORDER — FUROSEMIDE 20 MG/1
20 TABLET ORAL DAILY PRN
COMMUNITY
Start: 2022-04-05

## 2022-05-03 RX ADMIN — DIPHENHYDRAMINE HYDROCHLORIDE 50 MG: 50 INJECTION, SOLUTION INTRAMUSCULAR; INTRAVENOUS at 13:51

## 2022-05-03 RX ADMIN — ACETAMINOPHEN 650 MG: 325 TABLET ORAL at 13:51

## 2022-05-03 RX ADMIN — HYDROCORTISONE SODIUM SUCCINATE 100 MG: 100 INJECTION, POWDER, FOR SOLUTION INTRAMUSCULAR; INTRAVENOUS at 13:51

## 2022-05-03 RX ADMIN — IMMUNE GLOBULIN (HUMAN) 30 G: 10 INJECTION INTRAVENOUS; SUBCUTANEOUS at 14:05

## 2022-05-03 RX ADMIN — SODIUM CHLORIDE, PRESERVATIVE FREE 10 ML: 5 INJECTION INTRAVENOUS at 15:39

## 2022-05-03 ASSESSMENT — PAIN SCALES - GENERAL: PAINLEVEL_OUTOF10: 0

## 2022-05-03 NOTE — PROGRESS NOTES
GAMMUNEX-C infusion complete. Patient tolerated well without adverse event. Patient 22g peripheral IV in L wrist left in place for daily infusion. Patient educated on maintaining peripheral IV at home and what to do in the event IV comes out. Patient verbalized understanding. Patient given medication handout and appointment schedule. Patient verbalized no understanding and had no questions.  Electronically signed by Katherine Ortiz RN on 5/3/2022 at 3:49 PM

## 2022-05-03 NOTE — PROGRESS NOTES
GAMMUNEX-C rate increased per medication guidelines. Current rate 172ml/hr. Patient vitals WNL. Patient resting comfortably. No needs at this time.  Electronically signed by Ja Walker RN on 5/3/2022

## 2022-05-03 NOTE — PROGRESS NOTES
Patient present today; Day 1 of 5, 30gr GAMMUNEX-C. Patient medication/history reviewed and updated. Patient given education regarding medication/side effects. Patient has no questions at this time and chooses to proceed. Pre-medications given (see mar). Patient infusing at 86ml/hr per [facility] dosing guidelines of 1.2/ml/kg/hr for initial infusion. Patient resting. No needs at this time.  Electronically signed by Rene Bell RN on 5/3/2022 at 2:10 PM

## 2022-05-03 NOTE — PROGRESS NOTES
GAMMUNEX-C rate increased per medication guidelines. Current rate 344ml/hr. Patient tolerating well. Vitals WNL. No needs at this time.  Electronically signed by Bouchra Biswas RN on 5/3/2022 at 3:15 PM

## 2022-05-04 ENCOUNTER — HOSPITAL ENCOUNTER (OUTPATIENT)
Dept: INFUSION THERAPY | Age: 47
Setting detail: INFUSION SERIES
Discharge: HOME | End: 2022-05-04
Payer: MEDICARE

## 2022-05-04 VITALS
OXYGEN SATURATION: 100 % | HEART RATE: 63 BPM | TEMPERATURE: 97.2 F | SYSTOLIC BLOOD PRESSURE: 106 MMHG | DIASTOLIC BLOOD PRESSURE: 60 MMHG | RESPIRATION RATE: 16 BRPM

## 2022-05-04 DIAGNOSIS — G35 MULTIPLE SCLEROSIS (HCC): Primary | ICD-10-CM

## 2022-05-04 PROCEDURE — 96365 THER/PROPH/DIAG IV INF INIT: CPT

## 2022-05-04 PROCEDURE — 96374 THER/PROPH/DIAG INJ IV PUSH: CPT

## 2022-05-04 PROCEDURE — 96375 TX/PRO/DX INJ NEW DRUG ADDON: CPT

## 2022-05-04 PROCEDURE — 6370000000 HC RX 637 (ALT 250 FOR IP): Performed by: PSYCHIATRY & NEUROLOGY

## 2022-05-04 PROCEDURE — 6360000002 HC RX W HCPCS: Performed by: PSYCHIATRY & NEUROLOGY

## 2022-05-04 PROCEDURE — 96366 THER/PROPH/DIAG IV INF ADDON: CPT

## 2022-05-04 RX ORDER — ACETAMINOPHEN 325 MG/1
650 TABLET ORAL
Status: CANCELLED | OUTPATIENT
Start: 2022-06-03

## 2022-05-04 RX ORDER — ACETAMINOPHEN 325 MG/1
650 TABLET ORAL ONCE
Status: CANCELLED | OUTPATIENT
Start: 2022-06-03 | End: 2022-06-03

## 2022-05-04 RX ORDER — SODIUM CHLORIDE 0.9 % (FLUSH) 0.9 %
5-40 SYRINGE (ML) INJECTION PRN
Status: CANCELLED | OUTPATIENT
Start: 2022-06-03

## 2022-05-04 RX ORDER — DIPHENHYDRAMINE HYDROCHLORIDE 50 MG/ML
50 INJECTION INTRAMUSCULAR; INTRAVENOUS
Status: CANCELLED | OUTPATIENT
Start: 2022-06-03

## 2022-05-04 RX ORDER — EPINEPHRINE 1 MG/ML
0.3 INJECTION, SOLUTION, CONCENTRATE INTRAVENOUS PRN
Status: CANCELLED | OUTPATIENT
Start: 2022-06-03

## 2022-05-04 RX ORDER — SODIUM CHLORIDE 9 MG/ML
INJECTION, SOLUTION INTRAVENOUS CONTINUOUS
Status: CANCELLED | OUTPATIENT
Start: 2022-06-03

## 2022-05-04 RX ORDER — DIPHENHYDRAMINE HYDROCHLORIDE 50 MG/ML
50 INJECTION INTRAMUSCULAR; INTRAVENOUS ONCE
Status: COMPLETED | OUTPATIENT
Start: 2022-05-04 | End: 2022-05-04

## 2022-05-04 RX ORDER — DIPHENHYDRAMINE HYDROCHLORIDE 50 MG/ML
50 INJECTION INTRAMUSCULAR; INTRAVENOUS ONCE
Status: CANCELLED
Start: 2022-06-03 | End: 2022-06-03

## 2022-05-04 RX ORDER — MEPERIDINE HYDROCHLORIDE 25 MG/ML
12.5 INJECTION INTRAMUSCULAR; INTRAVENOUS; SUBCUTANEOUS PRN
Status: CANCELLED | OUTPATIENT
Start: 2022-06-03

## 2022-05-04 RX ORDER — SODIUM CHLORIDE 0.9 % (FLUSH) 0.9 %
5-40 SYRINGE (ML) INJECTION PRN
Status: DISCONTINUED | OUTPATIENT
Start: 2022-05-04 | End: 2022-05-05 | Stop reason: HOSPADM

## 2022-05-04 RX ORDER — ALBUTEROL SULFATE 90 UG/1
4 AEROSOL, METERED RESPIRATORY (INHALATION) PRN
Status: CANCELLED | OUTPATIENT
Start: 2022-06-03

## 2022-05-04 RX ORDER — ACETAMINOPHEN 325 MG/1
650 TABLET ORAL ONCE
Status: COMPLETED | OUTPATIENT
Start: 2022-05-04 | End: 2022-05-04

## 2022-05-04 RX ORDER — ONDANSETRON 2 MG/ML
8 INJECTION INTRAMUSCULAR; INTRAVENOUS
Status: CANCELLED | OUTPATIENT
Start: 2022-06-03

## 2022-05-04 RX ADMIN — IMMUNE GLOBULIN (HUMAN) 30 G: 10 INJECTION INTRAVENOUS; SUBCUTANEOUS at 09:23

## 2022-05-04 RX ADMIN — HYDROCORTISONE SODIUM SUCCINATE 100 MG: 100 INJECTION, POWDER, FOR SOLUTION INTRAMUSCULAR; INTRAVENOUS at 09:11

## 2022-05-04 RX ADMIN — DIPHENHYDRAMINE HYDROCHLORIDE 50 MG: 50 INJECTION, SOLUTION INTRAMUSCULAR; INTRAVENOUS at 09:11

## 2022-05-04 RX ADMIN — ACETAMINOPHEN 650 MG: 325 TABLET ORAL at 09:10

## 2022-05-05 ENCOUNTER — HOSPITAL ENCOUNTER (OUTPATIENT)
Dept: INFUSION THERAPY | Age: 47
Setting detail: INFUSION SERIES
Discharge: HOME | End: 2022-05-05
Payer: MEDICARE

## 2022-05-05 VITALS
HEART RATE: 64 BPM | RESPIRATION RATE: 20 BRPM | OXYGEN SATURATION: 100 % | TEMPERATURE: 97.1 F | DIASTOLIC BLOOD PRESSURE: 74 MMHG | SYSTOLIC BLOOD PRESSURE: 116 MMHG

## 2022-05-05 DIAGNOSIS — G35 MULTIPLE SCLEROSIS (HCC): Primary | ICD-10-CM

## 2022-05-05 PROCEDURE — 96366 THER/PROPH/DIAG IV INF ADDON: CPT

## 2022-05-05 PROCEDURE — 96365 THER/PROPH/DIAG IV INF INIT: CPT

## 2022-05-05 PROCEDURE — 6370000000 HC RX 637 (ALT 250 FOR IP): Performed by: PSYCHIATRY & NEUROLOGY

## 2022-05-05 PROCEDURE — 6360000002 HC RX W HCPCS: Performed by: PSYCHIATRY & NEUROLOGY

## 2022-05-05 RX ORDER — DIPHENHYDRAMINE HYDROCHLORIDE 50 MG/ML
50 INJECTION INTRAMUSCULAR; INTRAVENOUS ONCE
Status: CANCELLED
Start: 2022-06-03 | End: 2022-06-03

## 2022-05-05 RX ORDER — SODIUM CHLORIDE 0.9 % (FLUSH) 0.9 %
5-40 SYRINGE (ML) INJECTION PRN
Status: DISCONTINUED | OUTPATIENT
Start: 2022-05-05 | End: 2022-05-06 | Stop reason: HOSPADM

## 2022-05-05 RX ORDER — EPINEPHRINE 1 MG/ML
0.3 INJECTION, SOLUTION, CONCENTRATE INTRAVENOUS PRN
Status: CANCELLED | OUTPATIENT
Start: 2022-06-03

## 2022-05-05 RX ORDER — DIPHENHYDRAMINE HYDROCHLORIDE 50 MG/ML
50 INJECTION INTRAMUSCULAR; INTRAVENOUS
Status: CANCELLED | OUTPATIENT
Start: 2022-06-03

## 2022-05-05 RX ORDER — SODIUM CHLORIDE 0.9 % (FLUSH) 0.9 %
5-40 SYRINGE (ML) INJECTION PRN
Status: CANCELLED | OUTPATIENT
Start: 2022-06-03

## 2022-05-05 RX ORDER — DIPHENHYDRAMINE HYDROCHLORIDE 50 MG/ML
50 INJECTION INTRAMUSCULAR; INTRAVENOUS ONCE
Status: COMPLETED | OUTPATIENT
Start: 2022-05-05 | End: 2022-05-05

## 2022-05-05 RX ORDER — ACETAMINOPHEN 325 MG/1
650 TABLET ORAL ONCE
Status: CANCELLED | OUTPATIENT
Start: 2022-06-03 | End: 2022-06-03

## 2022-05-05 RX ORDER — SODIUM CHLORIDE 9 MG/ML
INJECTION, SOLUTION INTRAVENOUS CONTINUOUS
Status: CANCELLED | OUTPATIENT
Start: 2022-06-03

## 2022-05-05 RX ORDER — ALBUTEROL SULFATE 90 UG/1
4 AEROSOL, METERED RESPIRATORY (INHALATION) PRN
Status: CANCELLED | OUTPATIENT
Start: 2022-06-03

## 2022-05-05 RX ORDER — ACETAMINOPHEN 325 MG/1
650 TABLET ORAL
Status: CANCELLED | OUTPATIENT
Start: 2022-06-03

## 2022-05-05 RX ORDER — ONDANSETRON 2 MG/ML
8 INJECTION INTRAMUSCULAR; INTRAVENOUS
Status: CANCELLED | OUTPATIENT
Start: 2022-06-03

## 2022-05-05 RX ORDER — ACETAMINOPHEN 325 MG/1
650 TABLET ORAL ONCE
Status: COMPLETED | OUTPATIENT
Start: 2022-05-05 | End: 2022-05-05

## 2022-05-05 RX ORDER — MEPERIDINE HYDROCHLORIDE 25 MG/ML
12.5 INJECTION INTRAMUSCULAR; INTRAVENOUS; SUBCUTANEOUS PRN
Status: CANCELLED | OUTPATIENT
Start: 2022-06-03

## 2022-05-05 RX ADMIN — HYDROCORTISONE SODIUM SUCCINATE 100 MG: 100 INJECTION, POWDER, FOR SOLUTION INTRAMUSCULAR; INTRAVENOUS at 09:19

## 2022-05-05 RX ADMIN — DIPHENHYDRAMINE HYDROCHLORIDE 50 MG: 50 INJECTION, SOLUTION INTRAMUSCULAR; INTRAVENOUS at 09:19

## 2022-05-05 RX ADMIN — ACETAMINOPHEN 650 MG: 325 TABLET ORAL at 09:19

## 2022-05-05 RX ADMIN — IMMUNE GLOBULIN (HUMAN) 30 G: 10 INJECTION INTRAVENOUS; SUBCUTANEOUS at 09:50

## 2022-05-05 ASSESSMENT — PAIN SCALES - GENERAL: PAINLEVEL_OUTOF10: 0

## 2022-05-05 NOTE — PROGRESS NOTES
Pt tolerated gamunex  Iv flushed,and cap applied  Iv reforce dsg so it could be used tomorrow  Pt verbalized care of iv site

## 2022-05-06 ENCOUNTER — HOSPITAL ENCOUNTER (OUTPATIENT)
Dept: INFUSION THERAPY | Age: 47
Setting detail: INFUSION SERIES
Discharge: HOME | End: 2022-05-06
Payer: MEDICARE

## 2022-05-06 VITALS
DIASTOLIC BLOOD PRESSURE: 93 MMHG | HEART RATE: 54 BPM | RESPIRATION RATE: 17 BRPM | OXYGEN SATURATION: 100 % | TEMPERATURE: 98.2 F | SYSTOLIC BLOOD PRESSURE: 132 MMHG

## 2022-05-06 DIAGNOSIS — G35 MULTIPLE SCLEROSIS (HCC): Primary | ICD-10-CM

## 2022-05-06 PROCEDURE — 6370000000 HC RX 637 (ALT 250 FOR IP): Performed by: PSYCHIATRY & NEUROLOGY

## 2022-05-06 PROCEDURE — 96365 THER/PROPH/DIAG IV INF INIT: CPT

## 2022-05-06 PROCEDURE — 96366 THER/PROPH/DIAG IV INF ADDON: CPT

## 2022-05-06 PROCEDURE — 6360000002 HC RX W HCPCS: Performed by: PSYCHIATRY & NEUROLOGY

## 2022-05-06 PROCEDURE — 2580000003 HC RX 258: Performed by: PSYCHIATRY & NEUROLOGY

## 2022-05-06 PROCEDURE — 96375 TX/PRO/DX INJ NEW DRUG ADDON: CPT

## 2022-05-06 RX ORDER — SODIUM CHLORIDE 9 MG/ML
INJECTION, SOLUTION INTRAVENOUS CONTINUOUS
Status: CANCELLED | OUTPATIENT
Start: 2022-06-03

## 2022-05-06 RX ORDER — ALBUTEROL SULFATE 90 UG/1
4 AEROSOL, METERED RESPIRATORY (INHALATION) PRN
Status: CANCELLED | OUTPATIENT
Start: 2022-06-03

## 2022-05-06 RX ORDER — MEPERIDINE HYDROCHLORIDE 25 MG/ML
12.5 INJECTION INTRAMUSCULAR; INTRAVENOUS; SUBCUTANEOUS PRN
Status: CANCELLED | OUTPATIENT
Start: 2022-06-03

## 2022-05-06 RX ORDER — ACETAMINOPHEN 325 MG/1
650 TABLET ORAL ONCE
Status: CANCELLED | OUTPATIENT
Start: 2022-06-03 | End: 2022-06-03

## 2022-05-06 RX ORDER — ACETAMINOPHEN 325 MG/1
650 TABLET ORAL
Status: CANCELLED | OUTPATIENT
Start: 2022-06-03

## 2022-05-06 RX ORDER — ONDANSETRON 2 MG/ML
8 INJECTION INTRAMUSCULAR; INTRAVENOUS
Status: CANCELLED | OUTPATIENT
Start: 2022-06-03

## 2022-05-06 RX ORDER — DIPHENHYDRAMINE HYDROCHLORIDE 50 MG/ML
50 INJECTION INTRAMUSCULAR; INTRAVENOUS
Status: CANCELLED | OUTPATIENT
Start: 2022-06-03

## 2022-05-06 RX ORDER — SODIUM CHLORIDE 0.9 % (FLUSH) 0.9 %
5-40 SYRINGE (ML) INJECTION PRN
Status: DISCONTINUED | OUTPATIENT
Start: 2022-05-06 | End: 2022-05-07 | Stop reason: HOSPADM

## 2022-05-06 RX ORDER — DIPHENHYDRAMINE HYDROCHLORIDE 50 MG/ML
50 INJECTION INTRAMUSCULAR; INTRAVENOUS ONCE
Status: CANCELLED
Start: 2022-06-03 | End: 2022-06-03

## 2022-05-06 RX ORDER — DIPHENHYDRAMINE HYDROCHLORIDE 50 MG/ML
50 INJECTION INTRAMUSCULAR; INTRAVENOUS ONCE
Status: COMPLETED | OUTPATIENT
Start: 2022-05-06 | End: 2022-05-06

## 2022-05-06 RX ORDER — EPINEPHRINE 1 MG/ML
0.3 INJECTION, SOLUTION, CONCENTRATE INTRAVENOUS PRN
Status: CANCELLED | OUTPATIENT
Start: 2022-06-03

## 2022-05-06 RX ORDER — ACETAMINOPHEN 325 MG/1
650 TABLET ORAL ONCE
Status: COMPLETED | OUTPATIENT
Start: 2022-05-06 | End: 2022-05-06

## 2022-05-06 RX ORDER — SODIUM CHLORIDE 0.9 % (FLUSH) 0.9 %
5-40 SYRINGE (ML) INJECTION PRN
Status: CANCELLED | OUTPATIENT
Start: 2022-06-03

## 2022-05-06 RX ADMIN — SODIUM CHLORIDE, PRESERVATIVE FREE 10 ML: 5 INJECTION INTRAVENOUS at 07:21

## 2022-05-06 RX ADMIN — ACETAMINOPHEN 650 MG: 325 TABLET ORAL at 07:18

## 2022-05-06 RX ADMIN — DIPHENHYDRAMINE HYDROCHLORIDE 50 MG: 50 INJECTION, SOLUTION INTRAMUSCULAR; INTRAVENOUS at 07:19

## 2022-05-06 RX ADMIN — IMMUNE GLOBULIN (HUMAN) 30 G: 10 INJECTION INTRAVENOUS; SUBCUTANEOUS at 07:41

## 2022-05-06 RX ADMIN — HYDROCORTISONE SODIUM SUCCINATE 100 MG: 100 INJECTION, POWDER, FOR SOLUTION INTRAMUSCULAR; INTRAVENOUS at 07:22

## 2022-05-09 ENCOUNTER — HOSPITAL ENCOUNTER (OUTPATIENT)
Dept: INFUSION THERAPY | Age: 47
Setting detail: INFUSION SERIES
Discharge: HOME | End: 2022-05-09
Payer: MEDICARE

## 2022-05-09 VITALS
OXYGEN SATURATION: 100 % | TEMPERATURE: 98.5 F | HEART RATE: 56 BPM | SYSTOLIC BLOOD PRESSURE: 149 MMHG | RESPIRATION RATE: 17 BRPM | DIASTOLIC BLOOD PRESSURE: 97 MMHG

## 2022-05-09 DIAGNOSIS — G35 MULTIPLE SCLEROSIS (HCC): Primary | ICD-10-CM

## 2022-05-09 PROCEDURE — 96366 THER/PROPH/DIAG IV INF ADDON: CPT

## 2022-05-09 PROCEDURE — 96365 THER/PROPH/DIAG IV INF INIT: CPT

## 2022-05-09 PROCEDURE — 6360000002 HC RX W HCPCS: Performed by: PSYCHIATRY & NEUROLOGY

## 2022-05-09 PROCEDURE — 6370000000 HC RX 637 (ALT 250 FOR IP): Performed by: PSYCHIATRY & NEUROLOGY

## 2022-05-09 RX ORDER — DIPHENHYDRAMINE HYDROCHLORIDE 50 MG/ML
50 INJECTION INTRAMUSCULAR; INTRAVENOUS
Status: CANCELLED | OUTPATIENT
Start: 2022-06-03

## 2022-05-09 RX ORDER — SODIUM CHLORIDE 0.9 % (FLUSH) 0.9 %
5-40 SYRINGE (ML) INJECTION PRN
Status: CANCELLED | OUTPATIENT
Start: 2022-06-03

## 2022-05-09 RX ORDER — DIPHENHYDRAMINE HYDROCHLORIDE 50 MG/ML
50 INJECTION INTRAMUSCULAR; INTRAVENOUS ONCE
Status: CANCELLED
Start: 2022-06-03 | End: 2022-06-03

## 2022-05-09 RX ORDER — ONDANSETRON 2 MG/ML
8 INJECTION INTRAMUSCULAR; INTRAVENOUS
Status: CANCELLED | OUTPATIENT
Start: 2022-06-03

## 2022-05-09 RX ORDER — EPINEPHRINE 1 MG/ML
0.3 INJECTION, SOLUTION, CONCENTRATE INTRAVENOUS PRN
Status: CANCELLED | OUTPATIENT
Start: 2022-06-03

## 2022-05-09 RX ORDER — DIPHENHYDRAMINE HYDROCHLORIDE 50 MG/ML
50 INJECTION INTRAMUSCULAR; INTRAVENOUS ONCE
Status: COMPLETED | OUTPATIENT
Start: 2022-05-09 | End: 2022-05-09

## 2022-05-09 RX ORDER — ALBUTEROL SULFATE 90 UG/1
4 AEROSOL, METERED RESPIRATORY (INHALATION) PRN
Status: CANCELLED | OUTPATIENT
Start: 2022-06-03

## 2022-05-09 RX ORDER — SODIUM CHLORIDE 9 MG/ML
INJECTION, SOLUTION INTRAVENOUS CONTINUOUS
Status: CANCELLED | OUTPATIENT
Start: 2022-06-03

## 2022-05-09 RX ORDER — MEPERIDINE HYDROCHLORIDE 25 MG/ML
12.5 INJECTION INTRAMUSCULAR; INTRAVENOUS; SUBCUTANEOUS PRN
Status: CANCELLED | OUTPATIENT
Start: 2022-06-03

## 2022-05-09 RX ORDER — ACETAMINOPHEN 325 MG/1
650 TABLET ORAL ONCE
Status: COMPLETED | OUTPATIENT
Start: 2022-05-09 | End: 2022-05-09

## 2022-05-09 RX ORDER — ACETAMINOPHEN 325 MG/1
650 TABLET ORAL ONCE
Status: CANCELLED | OUTPATIENT
Start: 2022-06-03 | End: 2022-06-03

## 2022-05-09 RX ORDER — ACETAMINOPHEN 325 MG/1
650 TABLET ORAL
Status: CANCELLED | OUTPATIENT
Start: 2022-06-03

## 2022-05-09 RX ADMIN — HYDROCORTISONE SODIUM SUCCINATE 100 MG: 100 INJECTION, POWDER, FOR SOLUTION INTRAMUSCULAR; INTRAVENOUS at 09:36

## 2022-05-09 RX ADMIN — IMMUNE GLOBULIN (HUMAN) 30 G: 10 INJECTION INTRAVENOUS; SUBCUTANEOUS at 10:09

## 2022-05-09 RX ADMIN — ACETAMINOPHEN 650 MG: 325 TABLET ORAL at 09:36

## 2022-05-09 RX ADMIN — DIPHENHYDRAMINE HYDROCHLORIDE 50 MG: 50 INJECTION INTRAMUSCULAR; INTRAVENOUS at 09:36

## 2022-05-09 ASSESSMENT — PAIN SCALES - GENERAL: PAINLEVEL_OUTOF10: 0

## 2022-05-18 ENCOUNTER — TELEPHONE (OUTPATIENT)
Dept: NEUROLOGY | Age: 47
End: 2022-05-18

## 2022-05-18 ENCOUNTER — APPOINTMENT (OUTPATIENT)
Dept: INFUSION THERAPY | Age: 47
End: 2022-05-18
Payer: MEDICARE

## 2022-05-18 NOTE — TELEPHONE ENCOUNTER
Called patient to see if patient would like to reschedule no show appt, left a voicemail with a call back number to reschedule.

## 2022-05-19 ENCOUNTER — HOSPITAL ENCOUNTER (OUTPATIENT)
Dept: INFUSION THERAPY | Age: 47
Setting detail: INFUSION SERIES
Discharge: HOME | End: 2022-05-19
Payer: MEDICARE

## 2022-05-19 VITALS
TEMPERATURE: 97.2 F | SYSTOLIC BLOOD PRESSURE: 138 MMHG | RESPIRATION RATE: 17 BRPM | BODY MASS INDEX: 26.49 KG/M2 | HEART RATE: 61 BPM | DIASTOLIC BLOOD PRESSURE: 89 MMHG | HEIGHT: 65 IN | WEIGHT: 159 LBS | OXYGEN SATURATION: 100 %

## 2022-05-19 DIAGNOSIS — G35 MULTIPLE SCLEROSIS (HCC): Primary | ICD-10-CM

## 2022-05-19 PROCEDURE — 6370000000 HC RX 637 (ALT 250 FOR IP): Performed by: PSYCHIATRY & NEUROLOGY

## 2022-05-19 PROCEDURE — 96365 THER/PROPH/DIAG IV INF INIT: CPT

## 2022-05-19 PROCEDURE — 96375 TX/PRO/DX INJ NEW DRUG ADDON: CPT

## 2022-05-19 PROCEDURE — 96366 THER/PROPH/DIAG IV INF ADDON: CPT

## 2022-05-19 PROCEDURE — 6360000002 HC RX W HCPCS: Performed by: PSYCHIATRY & NEUROLOGY

## 2022-05-19 RX ORDER — MEPERIDINE HYDROCHLORIDE 25 MG/ML
12.5 INJECTION INTRAMUSCULAR; INTRAVENOUS; SUBCUTANEOUS PRN
Status: CANCELLED | OUTPATIENT
Start: 2022-06-03

## 2022-05-19 RX ORDER — ACETAMINOPHEN 325 MG/1
650 TABLET ORAL
Status: CANCELLED | OUTPATIENT
Start: 2022-06-03

## 2022-05-19 RX ORDER — DIPHENHYDRAMINE HYDROCHLORIDE 50 MG/ML
50 INJECTION INTRAMUSCULAR; INTRAVENOUS
Status: CANCELLED | OUTPATIENT
Start: 2022-06-03

## 2022-05-19 RX ORDER — SODIUM CHLORIDE 9 MG/ML
INJECTION, SOLUTION INTRAVENOUS CONTINUOUS
Status: CANCELLED | OUTPATIENT
Start: 2022-06-03

## 2022-05-19 RX ORDER — EPINEPHRINE 1 MG/ML
0.3 INJECTION, SOLUTION, CONCENTRATE INTRAVENOUS PRN
Status: CANCELLED | OUTPATIENT
Start: 2022-06-03

## 2022-05-19 RX ORDER — ACETAMINOPHEN 325 MG/1
650 TABLET ORAL ONCE
Status: COMPLETED | OUTPATIENT
Start: 2022-05-19 | End: 2022-05-19

## 2022-05-19 RX ORDER — ACETAMINOPHEN 325 MG/1
650 TABLET ORAL ONCE
Status: CANCELLED | OUTPATIENT
Start: 2022-06-03 | End: 2022-06-03

## 2022-05-19 RX ORDER — ALBUTEROL SULFATE 90 UG/1
4 AEROSOL, METERED RESPIRATORY (INHALATION) PRN
Status: CANCELLED | OUTPATIENT
Start: 2022-06-03

## 2022-05-19 RX ORDER — ONDANSETRON 2 MG/ML
8 INJECTION INTRAMUSCULAR; INTRAVENOUS
Status: CANCELLED | OUTPATIENT
Start: 2022-06-03

## 2022-05-19 RX ORDER — SODIUM CHLORIDE 0.9 % (FLUSH) 0.9 %
5-40 SYRINGE (ML) INJECTION PRN
Status: CANCELLED | OUTPATIENT
Start: 2022-06-03

## 2022-05-19 RX ORDER — DIPHENHYDRAMINE HYDROCHLORIDE 50 MG/ML
50 INJECTION INTRAMUSCULAR; INTRAVENOUS ONCE
Status: COMPLETED | OUTPATIENT
Start: 2022-05-19 | End: 2022-05-19

## 2022-05-19 RX ORDER — DIPHENHYDRAMINE HYDROCHLORIDE 50 MG/ML
50 INJECTION INTRAMUSCULAR; INTRAVENOUS ONCE
Status: CANCELLED
Start: 2022-06-03 | End: 2022-06-03

## 2022-05-19 RX ADMIN — ACETAMINOPHEN 650 MG: 325 TABLET ORAL at 07:00

## 2022-05-19 RX ADMIN — IMMUNE GLOBULIN (HUMAN) 30 G: 10 INJECTION INTRAVENOUS; SUBCUTANEOUS at 07:27

## 2022-05-19 RX ADMIN — HYDROCORTISONE SODIUM SUCCINATE 100 MG: 100 INJECTION, POWDER, FOR SOLUTION INTRAMUSCULAR; INTRAVENOUS at 07:00

## 2022-05-19 RX ADMIN — DIPHENHYDRAMINE HYDROCHLORIDE 50 MG: 50 INJECTION INTRAMUSCULAR; INTRAVENOUS at 07:00

## 2022-05-27 ENCOUNTER — HOSPITAL ENCOUNTER (OUTPATIENT)
Dept: INFUSION THERAPY | Age: 47
Setting detail: INFUSION SERIES
Discharge: HOME | End: 2022-05-27
Payer: MEDICARE

## 2022-05-27 VITALS
WEIGHT: 159 LBS | HEIGHT: 65 IN | TEMPERATURE: 97.6 F | DIASTOLIC BLOOD PRESSURE: 89 MMHG | OXYGEN SATURATION: 100 % | RESPIRATION RATE: 18 BRPM | BODY MASS INDEX: 26.49 KG/M2 | HEART RATE: 63 BPM | SYSTOLIC BLOOD PRESSURE: 125 MMHG

## 2022-05-27 DIAGNOSIS — G35 MULTIPLE SCLEROSIS (HCC): Primary | ICD-10-CM

## 2022-05-27 PROCEDURE — 6370000000 HC RX 637 (ALT 250 FOR IP): Performed by: PSYCHIATRY & NEUROLOGY

## 2022-05-27 PROCEDURE — 96365 THER/PROPH/DIAG IV INF INIT: CPT

## 2022-05-27 PROCEDURE — 96375 TX/PRO/DX INJ NEW DRUG ADDON: CPT

## 2022-05-27 PROCEDURE — 96366 THER/PROPH/DIAG IV INF ADDON: CPT

## 2022-05-27 PROCEDURE — 6360000002 HC RX W HCPCS: Performed by: PSYCHIATRY & NEUROLOGY

## 2022-05-27 RX ORDER — DIPHENHYDRAMINE HYDROCHLORIDE 50 MG/ML
50 INJECTION INTRAMUSCULAR; INTRAVENOUS
Status: CANCELLED | OUTPATIENT
Start: 2022-06-03

## 2022-05-27 RX ORDER — ONDANSETRON 2 MG/ML
8 INJECTION INTRAMUSCULAR; INTRAVENOUS
Status: CANCELLED | OUTPATIENT
Start: 2022-06-03

## 2022-05-27 RX ORDER — DIPHENHYDRAMINE HYDROCHLORIDE 50 MG/ML
50 INJECTION INTRAMUSCULAR; INTRAVENOUS ONCE
Status: COMPLETED | OUTPATIENT
Start: 2022-05-27 | End: 2022-05-27

## 2022-05-27 RX ORDER — EPINEPHRINE 1 MG/ML
0.3 INJECTION, SOLUTION, CONCENTRATE INTRAVENOUS PRN
Status: CANCELLED | OUTPATIENT
Start: 2022-06-03

## 2022-05-27 RX ORDER — ACETAMINOPHEN 325 MG/1
650 TABLET ORAL ONCE
Status: COMPLETED | OUTPATIENT
Start: 2022-05-27 | End: 2022-05-27

## 2022-05-27 RX ORDER — SODIUM CHLORIDE 0.9 % (FLUSH) 0.9 %
5-40 SYRINGE (ML) INJECTION PRN
Status: CANCELLED | OUTPATIENT
Start: 2022-06-03

## 2022-05-27 RX ORDER — MEPERIDINE HYDROCHLORIDE 25 MG/ML
12.5 INJECTION INTRAMUSCULAR; INTRAVENOUS; SUBCUTANEOUS PRN
Status: CANCELLED | OUTPATIENT
Start: 2022-06-03

## 2022-05-27 RX ORDER — SODIUM CHLORIDE 9 MG/ML
INJECTION, SOLUTION INTRAVENOUS CONTINUOUS
Status: CANCELLED | OUTPATIENT
Start: 2022-06-03

## 2022-05-27 RX ORDER — ALBUTEROL SULFATE 90 UG/1
4 AEROSOL, METERED RESPIRATORY (INHALATION) PRN
Status: CANCELLED | OUTPATIENT
Start: 2022-06-03

## 2022-05-27 RX ORDER — DIPHENHYDRAMINE HYDROCHLORIDE 50 MG/ML
50 INJECTION INTRAMUSCULAR; INTRAVENOUS ONCE
Status: CANCELLED
Start: 2022-06-03 | End: 2022-06-03

## 2022-05-27 RX ORDER — ACETAMINOPHEN 325 MG/1
650 TABLET ORAL ONCE
Status: CANCELLED | OUTPATIENT
Start: 2022-06-03 | End: 2022-06-03

## 2022-05-27 RX ORDER — ACETAMINOPHEN 325 MG/1
650 TABLET ORAL
Status: CANCELLED | OUTPATIENT
Start: 2022-06-03

## 2022-05-27 RX ORDER — SODIUM CHLORIDE 0.9 % (FLUSH) 0.9 %
5-40 SYRINGE (ML) INJECTION PRN
Status: DISCONTINUED | OUTPATIENT
Start: 2022-05-27 | End: 2022-05-28 | Stop reason: HOSPADM

## 2022-05-27 RX ADMIN — DIPHENHYDRAMINE HYDROCHLORIDE 50 MG: 50 INJECTION INTRAMUSCULAR; INTRAVENOUS at 08:07

## 2022-05-27 RX ADMIN — IMMUNE GLOBULIN (HUMAN) 30 G: 10 INJECTION INTRAVENOUS; SUBCUTANEOUS at 08:16

## 2022-05-27 RX ADMIN — ACETAMINOPHEN 650 MG: 325 TABLET ORAL at 08:07

## 2022-05-27 RX ADMIN — HYDROCORTISONE SODIUM SUCCINATE 100 MG: 100 INJECTION, POWDER, FOR SOLUTION INTRAMUSCULAR; INTRAVENOUS at 08:07

## 2022-05-27 ASSESSMENT — PAIN SCALES - GENERAL: PAINLEVEL_OUTOF10: 0

## 2022-05-31 DIAGNOSIS — M79.7 FIBROMYALGIA: ICD-10-CM

## 2022-05-31 DIAGNOSIS — R20.0 NUMBNESS: ICD-10-CM

## 2022-05-31 RX ORDER — DRONABINOL 10 MG/1
CAPSULE ORAL
Qty: 180 CAPSULE | Refills: 5 | Status: SHIPPED | OUTPATIENT
Start: 2022-05-31 | End: 2022-09-06 | Stop reason: SDUPTHER

## 2022-05-31 NOTE — TELEPHONE ENCOUNTER
Requested Prescriptions     Pending Prescriptions Disp Refills    dronabinol (MARINOL) 10 MG capsule 180 capsule 5     Sig: TAKE TWO CAPSULES BY MOUTH THREE TIMES A DAY       Last Office Visit:  4/8/2022  Next Office Visit:  6/14/2022  Last Medication Refill:  1/21/22 with 5 refills   Vania Paredes up to date:  5/31/22    *RX updated to reflect  5/31/22  fill date*

## 2022-06-03 ENCOUNTER — HOSPITAL ENCOUNTER (OUTPATIENT)
Dept: INFUSION THERAPY | Age: 47
Setting detail: INFUSION SERIES
Discharge: HOME | End: 2022-06-03
Payer: MEDICARE

## 2022-06-03 ENCOUNTER — TELEPHONE (OUTPATIENT)
Dept: NEUROLOGY | Age: 47
End: 2022-06-03

## 2022-06-03 VITALS
OXYGEN SATURATION: 100 % | RESPIRATION RATE: 17 BRPM | TEMPERATURE: 97.4 F | DIASTOLIC BLOOD PRESSURE: 81 MMHG | WEIGHT: 161 LBS | SYSTOLIC BLOOD PRESSURE: 134 MMHG | HEART RATE: 61 BPM | BODY MASS INDEX: 26.79 KG/M2

## 2022-06-03 DIAGNOSIS — G35 MULTIPLE SCLEROSIS (HCC): Primary | ICD-10-CM

## 2022-06-03 PROCEDURE — 6370000000 HC RX 637 (ALT 250 FOR IP): Performed by: PSYCHIATRY & NEUROLOGY

## 2022-06-03 PROCEDURE — 2580000003 HC RX 258: Performed by: PSYCHIATRY & NEUROLOGY

## 2022-06-03 PROCEDURE — 6360000002 HC RX W HCPCS: Performed by: PSYCHIATRY & NEUROLOGY

## 2022-06-03 PROCEDURE — 96375 TX/PRO/DX INJ NEW DRUG ADDON: CPT

## 2022-06-03 PROCEDURE — 96365 THER/PROPH/DIAG IV INF INIT: CPT

## 2022-06-03 PROCEDURE — 96366 THER/PROPH/DIAG IV INF ADDON: CPT

## 2022-06-03 RX ORDER — MEPERIDINE HYDROCHLORIDE 25 MG/ML
12.5 INJECTION INTRAMUSCULAR; INTRAVENOUS; SUBCUTANEOUS PRN
Status: CANCELLED | OUTPATIENT
Start: 2022-06-10

## 2022-06-03 RX ORDER — DIPHENHYDRAMINE HYDROCHLORIDE 50 MG/ML
50 INJECTION INTRAMUSCULAR; INTRAVENOUS
Status: CANCELLED | OUTPATIENT
Start: 2022-06-10

## 2022-06-03 RX ORDER — DIPHENHYDRAMINE HYDROCHLORIDE 50 MG/ML
50 INJECTION INTRAMUSCULAR; INTRAVENOUS ONCE
Status: COMPLETED | OUTPATIENT
Start: 2022-06-03 | End: 2022-06-03

## 2022-06-03 RX ORDER — SODIUM CHLORIDE 0.9 % (FLUSH) 0.9 %
5-40 SYRINGE (ML) INJECTION PRN
Status: CANCELLED | OUTPATIENT
Start: 2022-06-10

## 2022-06-03 RX ORDER — SODIUM CHLORIDE 0.9 % (FLUSH) 0.9 %
5-40 SYRINGE (ML) INJECTION PRN
Status: DISCONTINUED | OUTPATIENT
Start: 2022-06-03 | End: 2022-06-04 | Stop reason: HOSPADM

## 2022-06-03 RX ORDER — EPINEPHRINE 1 MG/ML
0.3 INJECTION, SOLUTION, CONCENTRATE INTRAVENOUS PRN
Status: CANCELLED | OUTPATIENT
Start: 2022-06-10

## 2022-06-03 RX ORDER — ALBUTEROL SULFATE 90 UG/1
4 AEROSOL, METERED RESPIRATORY (INHALATION) PRN
Status: CANCELLED | OUTPATIENT
Start: 2022-06-10

## 2022-06-03 RX ORDER — DIPHENHYDRAMINE HYDROCHLORIDE 50 MG/ML
50 INJECTION INTRAMUSCULAR; INTRAVENOUS ONCE
Status: CANCELLED
Start: 2022-06-10 | End: 2022-06-10

## 2022-06-03 RX ORDER — ACETAMINOPHEN 325 MG/1
650 TABLET ORAL ONCE
Status: CANCELLED | OUTPATIENT
Start: 2022-06-10 | End: 2022-06-10

## 2022-06-03 RX ORDER — ACETAMINOPHEN 325 MG/1
650 TABLET ORAL
Status: CANCELLED | OUTPATIENT
Start: 2022-06-10

## 2022-06-03 RX ORDER — SODIUM CHLORIDE 9 MG/ML
INJECTION, SOLUTION INTRAVENOUS CONTINUOUS
Status: CANCELLED | OUTPATIENT
Start: 2022-06-10

## 2022-06-03 RX ORDER — ONDANSETRON 2 MG/ML
8 INJECTION INTRAMUSCULAR; INTRAVENOUS
Status: CANCELLED | OUTPATIENT
Start: 2022-06-10

## 2022-06-03 RX ORDER — ACETAMINOPHEN 325 MG/1
650 TABLET ORAL ONCE
Status: COMPLETED | OUTPATIENT
Start: 2022-06-03 | End: 2022-06-03

## 2022-06-03 RX ADMIN — HYDROCORTISONE SODIUM SUCCINATE 100 MG: 100 INJECTION, POWDER, FOR SOLUTION INTRAMUSCULAR; INTRAVENOUS at 07:24

## 2022-06-03 RX ADMIN — SODIUM CHLORIDE, PRESERVATIVE FREE 10 ML: 5 INJECTION INTRAVENOUS at 07:25

## 2022-06-03 RX ADMIN — DIPHENHYDRAMINE HYDROCHLORIDE 50 MG: 50 INJECTION INTRAMUSCULAR; INTRAVENOUS at 07:25

## 2022-06-03 RX ADMIN — ACETAMINOPHEN 650 MG: 325 TABLET ORAL at 07:25

## 2022-06-03 RX ADMIN — IMMUNE GLOBULIN (HUMAN) 30 G: 10 INJECTION INTRAVENOUS; SUBCUTANEOUS at 07:46

## 2022-06-03 NOTE — TELEPHONE ENCOUNTER
Pt left voicemail asking on status of her Marinol refill. I called pt back and informed her the script was ready for her at the pharmacy and to give them a call. Pt voiced understanding.

## 2022-06-06 NOTE — TELEPHONE ENCOUNTER
Adonay Obdulio Key: [id number] - PA Case ID: [id number] - Rx #: [id number] help?  Call us at [phone]  Status  Sent to [facility]  Drug  Dronabinol 10MG capsules  Form  [employer] [facility] Medicare Electronic PA Form (2017 NCPDP)  Original Claim Info  [phone]    Waiting on a determination

## 2022-06-09 NOTE — TELEPHONE ENCOUNTER
Requested Prescriptions     Pending Prescriptions Disp Refills    baclofen (LIORESAL) 10 MG tablet 90 tablet 1     Sig: Take 1 tablet by mouth 2 times daily       Last Office Visit: 4/8/2022  Next Office Visit: 6/14/2022  Last Medication Refill: 3/18/2022 with 1 TAIWO Arredondo Pt

## 2022-06-10 ENCOUNTER — HOSPITAL ENCOUNTER (OUTPATIENT)
Dept: INFUSION THERAPY | Age: 47
Setting detail: INFUSION SERIES
Discharge: HOME | End: 2022-06-10
Payer: MEDICARE

## 2022-06-10 VITALS
OXYGEN SATURATION: 99 % | HEART RATE: 58 BPM | RESPIRATION RATE: 18 BRPM | DIASTOLIC BLOOD PRESSURE: 87 MMHG | TEMPERATURE: 98 F | SYSTOLIC BLOOD PRESSURE: 124 MMHG

## 2022-06-10 DIAGNOSIS — G35 MULTIPLE SCLEROSIS (HCC): Primary | ICD-10-CM

## 2022-06-10 PROCEDURE — 6360000002 HC RX W HCPCS: Performed by: PSYCHIATRY & NEUROLOGY

## 2022-06-10 PROCEDURE — 96366 THER/PROPH/DIAG IV INF ADDON: CPT

## 2022-06-10 PROCEDURE — 6370000000 HC RX 637 (ALT 250 FOR IP): Performed by: PSYCHIATRY & NEUROLOGY

## 2022-06-10 PROCEDURE — 96365 THER/PROPH/DIAG IV INF INIT: CPT

## 2022-06-10 PROCEDURE — 96375 TX/PRO/DX INJ NEW DRUG ADDON: CPT

## 2022-06-10 RX ORDER — DIPHENHYDRAMINE HYDROCHLORIDE 50 MG/ML
50 INJECTION INTRAMUSCULAR; INTRAVENOUS ONCE
Status: COMPLETED | OUTPATIENT
Start: 2022-06-10 | End: 2022-06-10

## 2022-06-10 RX ORDER — DIPHENHYDRAMINE HYDROCHLORIDE 50 MG/ML
50 INJECTION INTRAMUSCULAR; INTRAVENOUS ONCE
Status: CANCELLED
Start: 2022-06-17 | End: 2022-06-17

## 2022-06-10 RX ORDER — MEPERIDINE HYDROCHLORIDE 25 MG/ML
12.5 INJECTION INTRAMUSCULAR; INTRAVENOUS; SUBCUTANEOUS PRN
Status: CANCELLED | OUTPATIENT
Start: 2022-06-17

## 2022-06-10 RX ORDER — SODIUM CHLORIDE 0.9 % (FLUSH) 0.9 %
5-40 SYRINGE (ML) INJECTION PRN
Status: DISCONTINUED | OUTPATIENT
Start: 2022-06-10 | End: 2022-06-11 | Stop reason: HOSPADM

## 2022-06-10 RX ORDER — SODIUM CHLORIDE 0.9 % (FLUSH) 0.9 %
5-40 SYRINGE (ML) INJECTION PRN
Status: CANCELLED | OUTPATIENT
Start: 2022-06-17

## 2022-06-10 RX ORDER — BACLOFEN 10 MG/1
10 TABLET ORAL 3 TIMES DAILY
Qty: 90 TABLET | Refills: 5 | Status: SHIPPED | OUTPATIENT
Start: 2022-06-10

## 2022-06-10 RX ORDER — EPINEPHRINE 1 MG/ML
0.3 INJECTION, SOLUTION, CONCENTRATE INTRAVENOUS PRN
Status: CANCELLED | OUTPATIENT
Start: 2022-06-17

## 2022-06-10 RX ORDER — ONDANSETRON 2 MG/ML
8 INJECTION INTRAMUSCULAR; INTRAVENOUS
Status: CANCELLED | OUTPATIENT
Start: 2022-06-17

## 2022-06-10 RX ORDER — ACETAMINOPHEN 325 MG/1
650 TABLET ORAL
Status: CANCELLED | OUTPATIENT
Start: 2022-06-17

## 2022-06-10 RX ORDER — ACETAMINOPHEN 325 MG/1
650 TABLET ORAL ONCE
Status: CANCELLED | OUTPATIENT
Start: 2022-06-17 | End: 2022-06-17

## 2022-06-10 RX ORDER — ALBUTEROL SULFATE 90 UG/1
4 AEROSOL, METERED RESPIRATORY (INHALATION) PRN
Status: CANCELLED | OUTPATIENT
Start: 2022-06-17

## 2022-06-10 RX ORDER — ACETAMINOPHEN 325 MG/1
650 TABLET ORAL ONCE
Status: COMPLETED | OUTPATIENT
Start: 2022-06-10 | End: 2022-06-10

## 2022-06-10 RX ORDER — DIPHENHYDRAMINE HYDROCHLORIDE 50 MG/ML
50 INJECTION INTRAMUSCULAR; INTRAVENOUS
Status: CANCELLED | OUTPATIENT
Start: 2022-06-17

## 2022-06-10 RX ORDER — SODIUM CHLORIDE 9 MG/ML
INJECTION, SOLUTION INTRAVENOUS CONTINUOUS
Status: CANCELLED | OUTPATIENT
Start: 2022-06-17

## 2022-06-10 RX ADMIN — HYDROCORTISONE SODIUM SUCCINATE 100 MG: 100 INJECTION, POWDER, FOR SOLUTION INTRAMUSCULAR; INTRAVENOUS at 09:24

## 2022-06-10 RX ADMIN — IMMUNE GLOBULIN (HUMAN) 30 G: 10 INJECTION INTRAVENOUS; SUBCUTANEOUS at 10:03

## 2022-06-10 RX ADMIN — DIPHENHYDRAMINE HYDROCHLORIDE 50 MG: 50 INJECTION, SOLUTION INTRAMUSCULAR; INTRAVENOUS at 09:28

## 2022-06-10 RX ADMIN — ACETAMINOPHEN 650 MG: 325 TABLET ORAL at 09:22

## 2022-06-13 RX ORDER — BACLOFEN 10 MG/1
TABLET ORAL
Qty: 90 TABLET | Refills: 5 | OUTPATIENT
Start: 2022-06-13

## 2022-06-14 ENCOUNTER — OFFICE VISIT (OUTPATIENT)
Dept: NEUROLOGY | Age: 47
End: 2022-06-14
Payer: MEDICARE

## 2022-06-14 VITALS
WEIGHT: 158 LBS | DIASTOLIC BLOOD PRESSURE: 83 MMHG | HEIGHT: 65 IN | BODY MASS INDEX: 26.33 KG/M2 | SYSTOLIC BLOOD PRESSURE: 116 MMHG | HEART RATE: 73 BPM

## 2022-06-14 DIAGNOSIS — R56.9 CONVULSIONS, UNSPECIFIED CONVULSION TYPE (HCC): ICD-10-CM

## 2022-06-14 DIAGNOSIS — G35 MULTIPLE SCLEROSIS (HCC): Primary | ICD-10-CM

## 2022-06-14 DIAGNOSIS — M32.9 LUPUS (HCC): ICD-10-CM

## 2022-06-14 DIAGNOSIS — H46.9 OPTIC NEURITIS: ICD-10-CM

## 2022-06-14 DIAGNOSIS — M54.30 SCIATICA, UNSPECIFIED LATERALITY: ICD-10-CM

## 2022-06-14 DIAGNOSIS — M54.2 PAIN, NECK: ICD-10-CM

## 2022-06-14 DIAGNOSIS — G43.819 OTHER MIGRAINE WITHOUT STATUS MIGRAINOSUS, INTRACTABLE: ICD-10-CM

## 2022-06-14 DIAGNOSIS — H53.9 VISUAL DISTURBANCE: ICD-10-CM

## 2022-06-14 DIAGNOSIS — G43.719 INTRACTABLE CHRONIC MIGRAINE WITHOUT AURA AND WITHOUT STATUS MIGRAINOSUS: ICD-10-CM

## 2022-06-14 DIAGNOSIS — M79.7 FIBROMYALGIA: ICD-10-CM

## 2022-06-14 DIAGNOSIS — R25.1 TREMOR: ICD-10-CM

## 2022-06-14 DIAGNOSIS — R47.9 DIFFICULTY WITH SPEECH: ICD-10-CM

## 2022-06-14 DIAGNOSIS — R20.0 NUMBNESS: ICD-10-CM

## 2022-06-14 DIAGNOSIS — R53.1 WEAKNESS: ICD-10-CM

## 2022-06-14 PROCEDURE — 4004F PT TOBACCO SCREEN RCVD TLK: CPT | Performed by: PSYCHIATRY & NEUROLOGY

## 2022-06-14 PROCEDURE — 99214 OFFICE O/P EST MOD 30 MIN: CPT | Performed by: PSYCHIATRY & NEUROLOGY

## 2022-06-14 PROCEDURE — G8419 CALC BMI OUT NRM PARAM NOF/U: HCPCS | Performed by: PSYCHIATRY & NEUROLOGY

## 2022-06-14 PROCEDURE — G8427 DOCREV CUR MEDS BY ELIG CLIN: HCPCS | Performed by: PSYCHIATRY & NEUROLOGY

## 2022-06-14 RX ORDER — PRAZOSIN HYDROCHLORIDE 2 MG/1
2 CAPSULE ORAL NIGHTLY
COMMUNITY
Start: 2022-05-23

## 2022-06-14 RX ORDER — BREXPIPRAZOLE 4 MG/1
4 TABLET ORAL DAILY
COMMUNITY
Start: 2022-05-24

## 2022-06-14 RX ORDER — ESTRADIOL 0.5 MG/1
0.5 TABLET ORAL DAILY
COMMUNITY
Start: 2022-05-28

## 2022-06-14 NOTE — PROGRESS NOTES
Chief Complaint   Patient presents with    Multiple Sclerosis     follow up        Lyna Mohs is a 42y.o. year old female who is seen for evaluation of follow for multiple sclerosis following an admission in 5/19 for optic neuritis treated with IV solumedrol. The patient has a history of multiple sclerosis, fibromyalgia and lupus along with chronic headaches was seen at that time for left visual change which began about 2 days prior to admission with eye pain. This was quite similar to her previous episodes of optic neuritis, of which she has had about 4 previous episodes treated with steroids. She was tried on oral steroids as an outpatient but this was not effective and was transferred from outside ER for further management. She has some chronic weakness and numbness and weakness on the left. She does have bowel and bladder incontinence. She was on Avonex. Had not followed up with Dr Ciro Cottrell since 2017. She did not follow up in the office since 5/19. On 7/4/20 she was in the ER with generalized body aches. She apparently had some trembling on the right side. She had an MRI of the brain in the ER which revealed no acute ischemic change but did have scattered non enhancing white matter lesions in periventricular and white matter. She was given 1 gm Solumedrol for a possible flair up and told to follow up with neurology. She indicates that she typically does relatively well for a few weeks and then she feels severely fatigued or worsening vision. She has been in a wheelchair in 2017. She had her initial testing in 2017 at [facility] which was significant for non enhancing white matter lesions and a CSF with 6 oligoclonal bands seen only in the CSF. She still feels her face twitching and right sided numbness. He speech is not normal for her as she has to think about things carefully. Her rheumatologist started her on Tecfidera but she felt worse on this.  She had been on Avonex since her diagnosis prior to with no clear benefit according to her. Having a lupus flair up. This usually feels pain in eyes, skin of in mouth may fall off. Felt since with 2  Ocrevus infusion October 2020 and a bit better but now off. Alba Lemos with unclear benefit. Saw rheumatologist in [place] and plan a new infusion. Azathioprine started for her Lupus. had bowel obruction with gastric band. Stopped Ocrevus. Artane helped for about 2 days not help. Lupus flairs. Is doing well with Marinol and Acthar. Marinol only occasionally. Acthar 5 days month. Recurrent flair. Solumedrol intermittently. bad migraines recently. Multiple a week. Imitrex used to work. Planning on see cardiology heart attack. In dual flair up. Other than ACTHAR nothing else for immune modulation. Flair ups. Vision affected. Fatigue. Just finished steroid infusion. IVIG helps 75% better. Gets it once a week and then wears off at the end week.      Active Ambulatory Problems     Diagnosis Date Noted    Multiple sclerosis ([address]     Intractable chronic migraine without aura and without status migrainosus 05/01/2017    Major depressive disorder, recurrent, moderate (HCC) 09/12/2018    Exacerbation of multiple sclerosis ([address] 05/10/2019    Optic neuritis 05/10/2019    Pain of left eye 05/10/2019    Bronchitis 05/11/2019    Visual disturbance 07/07/2020    Tremor 07/07/2020    Weakness 07/07/2020    Numbness 07/07/2020    Difficulty with speech 07/07/2020    Lupus ([address] 07/07/2020    Fibromyalgia 07/07/2020    Convulsions ([address] 07/20/2020    Bilateral myofascial pain 01/04/2021    Pain in joints 01/20/2021    Hypertension     Bipolar disorder ([address] 08/18/2015    Pneumoperitoneum of unknown etiology 09/28/2021    Bloating 04/20/2021    Age related osteoporosis 05/03/2022    Alcohol dependence ([address] 05/03/2022    Allergy, unspecified, subsequent encounter 05/03/2022    Anxiety 05/03/2022    Atrioventricular septal defect with atrial and ventricular components 2022    Attention deficit hyperactivity disorder 2022    Multiple gastric ulcers 2021    Edema of lower extremity 2022    Chronic sinusitis 2022    Gastric band malfunction 12/10/2020    Gastroesophageal reflux disease 2022    GERD without esophagitis 2021    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    Heartburn 2021    Insomnia 2022    Internal hernia 2021    Irritable bowel syndrome 2022    Marijuana use 2021    Major depression, single episode 2022    Migraine 2021    Nausea and vomiting 2021    Obesity 2022    Palpitations 2022    Posttraumatic stress disorder 2022    Pelvic pain 2021    Sciatica 2022    Sedative abuse ([address] 2022    Small bowel obstruction ([address] 10/06/2019    Smoker 2021    Suspected severe acute respiratory syndrome coronavirus 2 (SARS-CoV-2) infection 01/10/2022    Ventricular premature complex 2021    Volvulus ([address] 2021    Arthritis 2022    Temporomandibular joint disorder 2021    Essential hypertension 2020    Hypertensive disorder 2021    Systemic lupus erythematosus ([address] 2018    Hypertensive optic neuropathy 2021    Chronic back pain 2021    Chronic neck pain 2021    Chronic pain disorder 04/15/2016     Resolved Ambulatory Problems     Diagnosis Date Noted    No Resolved Ambulatory Problems     Past Medical History:   Diagnosis Date    Bipolar 1 disorder ([address]     GERD (gastroesophageal reflux disease)     Movement disorder     Neuromuscular disorder ([address]     Psychiatric problem        Past Surgical History:   Procedure Laterality Date    ABDOMINAL EXPLORATION SURGERY      ADENOIDECTOMY       SECTION      x 2     SECTION, CLASSIC N/A     COLONOSCOPY      DILATATION, ESOPHAGUS      EXTERNAL EAR SURGERY      lobe    GASTRIC BAND  GASTRIC BYPASS SURGERY      HERNIA REPAIR      HERNIA REPAIR N/A     HYSTERECTOMY (CERVIX STATUS UNKNOWN)      SEPTOPLASTY      SMALL INTESTINE SURGERY      TONSILLECTOMY      TONSILLECTOMY AND ADENOIDECTOMY      UPPER GASTROINTESTINAL ENDOSCOPY         Family History   Problem Relation Age of Onset    Anemia Mother     Breast Cancer Mother     Depression Mother     High Blood Pressure Mother     Mental Illness Mother    [de-identified] / [place] Mother     Colon Cancer Father     Depression Father     High Blood Pressure Father     Mental Illness Father     Breast Cancer Maternal Grandmother     Alcohol Abuse Maternal Grandfather     Anemia Paternal Grandmother     Alcohol Abuse Paternal Grandfather     Osteoporosis Paternal Grandfather     Stroke Paternal Grandfather     Substance Abuse Maternal Cousin     Substance Abuse Paternal Cousin     Asthma Other        Allergies   Allergen Reactions    Avelox [Moxifloxacin] Anaphylaxis    Glatiramer Rash    Sugar-Protein-Starch Other (See Comments)    Avelox [Moxifloxacin]     Copaxone [Glatiramer Acetate]     Depakote [Divalproex Sodium]     Gabapentin Itching    Hydroxychloroquine      Other reaction(s):  Other (See Comments)  Vision loss   Asim Wendy [Paliperidone]     Lamictal [Lamotrigine]     Lithium     Pregabalin      Other reaction(s): Mental Status Change    Saquinavir     Varenicline Tartrate     Lamictal [Lamotrigine] Rash    Lithium Rash    Paliperidone Er Rash       Social History     Socioeconomic History    Marital status: Single     Spouse name: Not on file    Number of children: 1    Years of education: Some College    Highest education level: Not on file   Occupational History    Occupation: Disability   Tobacco Use    Smoking status: Current Every Day Smoker     Packs/day: 1.00     Years: 15.00     Pack years: 15.00     Types: Cigarettes    Smokeless tobacco: Never Used   Vaping Use    Vaping Use: Never used   Substance and Sexual Activity    Alcohol use: Not Currently     Alcohol/week: 2.0 standard drinks     Types: 2 Shots of liquor per week    Drug use: Yes     Types: Marijuana [place])    Sexual activity: Never     Partners: Male   Other Topics Concern    Not on file   Social History Narrative    ** Merged History Encounter **          Social Determinants of Health     Financial Resource Strain:     Difficulty of Paying Living Expenses: Not on file   Food Insecurity:     Worried About Running Out of Food in the Last Year: Not on file    [place] of Food in the Last Year: Not on file   Transportation Needs:     Lack of Transportation (Medical): Not on file    Lack of Transportation (Non-Medical): Not on file   Physical Activity:     Days of Exercise per Week: Not on file    Minutes of Exercise per Session: Not on file   Stress:     Feeling of Stress : Not on file   Social Connections:     Frequency of Communication with Friends and Family: Not on file    Frequency of Social Gatherings with Friends and Family: Not on file    Attends Congregational Services: Not on file    Active Member of [address] [place] [address] or Organizations: Not on file    Attends Club or Organization Meetings: Not on file    Marital Status: Not on file   Intimate Partner Violence:     Fear of Current or Ex-Partner: Not on file    Emotionally Abused: Not on file    Physically Abused: Not on file    Sexually Abused: Not on file   Housing Stability:     Unable to Pay for Housing in the Last Year: Not on file    Number of [place] in the Last Year: Not on file    Unstable Housing in the Last Year: Not on file     Review of Systems     Constitutional - No fever or chills. No diaphoresis or significant fatigue. HENT -  No tinnitus or significant hearing loss.   Eyes - no sudden vision change or eye pain  Respiratory - no significant shortness of breath or cough  Cardiovascular - no chest pain No palpitations or significant leg swelling  Gastrointestinal - yes abdominal swelling or pain. Genitourinary - No difficulty urinating, dysuria  Musculoskeletal - no back pain or myalgia. Skin - no color change or rash  Neurologic - No seizures. No lateralizing weakness. Hematologic - no easy bruising or excessive bleeding. Psychiatric - no severe anxiety or nervousness. All other review of systems are negative. Current Outpatient Medications   Medication Sig Dispense Refill    REXULTI 4 MG TABS tablet TAKE 1 TABLET BY MOUTH DAILY      estradiol (ESTRACE) 0.5 MG tablet TAKE 1 TABLET BY MOUTH EVERY DAY      prazosin (MINIPRESS) 2 MG capsule TAKE 1 CAPSULE BY MOUTH AT BEDTIME      linaCLOtide (LINZESS PO) Take by mouth      baclofen (LIORESAL) 10 MG tablet Take 1 tablet by mouth 3 times daily 90 tablet 5    Prenatal Vit-Fe Fumarate-FA (PRENATAL 1+1 PO) Prenatal   1 tab daily      dronabinol (MARINOL) 10 MG capsule TAKE TWO CAPSULES BY MOUTH THREE TIMES A  capsule 5    traZODone (DESYREL) 100 MG tablet TAKE 1 TABLET BY MOUTH EVERY DAY AT BEDTIME      TRINTELLIX 10 MG TABS tablet TAKE 1 TABLET BY MOUTH EVERY DAY      B-D 3CC LUER-ARNOLDO SYR 25GX1\" 25G X 1\" 3 ML MISC AS DIRECTED      sucralfate (CARAFATE) 1 GM tablet       ondansetron (ZOFRAN-ODT) 4 MG disintegrating tablet DISSOLVE 1 TABLET ON THE TONGUE EVERY 6 HOURS AS NEEDED FOR NAUSEA OR VOMITING      omeprazole (PRILOSEC) 20 MG delayed release capsule omeprazole 20 mg capsule,delayed release   Take 1 capsule every day by oral route.       furosemide (LASIX) 20 MG tablet TAKE 1 TABLET BY MOUTH EVERY DAY AS NEEDED      azaTHIOprine (IMURAN) 50 MG tablet       albuterol sulfate  (90 Base) MCG/ACT inhaler INHALE 2 PUFFS BY MOUTH EVERY 4 HOURS AS NEEDED      Calcium Carb-Cholecalciferol 600-500 MG-UNIT CAPS Calcium 600 with Vitamin D3   take one tablet daily      potassium chloride (KLOR-CON M) 20 MEQ extended release tablet Take 20 mEq by mouth 2 times daily  hydrOXYzine (ATARAX) 50 MG tablet Take 50 mg by mouth 2 times daily       butalbital-acetaminophen-caffeine (FIORICET, ESGIC) -40 MG per tablet Take 1 tablet by mouth every 4 hours as needed for Headaches      cyanocobalamin 1000 MCG/ML injection Inject 1,000 mcg into the muscle every 21 days       pantoprazole sodium (PROTONIX) 40 MG PACK packet Take 40 mg by mouth 2 times daily      alendronate (FOSAMAX) 70 MG tablet Take 70 mg by mouth every 7 days      SUMAtriptan (IMITREX) 100 MG tablet Take 1 tablet by mouth once as needed for Migraine (max 2 in 24 hrs) 9 tablet 5    rizatriptan (MAXALT) 10 MG tablet Take 1 tablet by mouth once as needed for Migraine (max 2 in 24 hrs) May repeat in 2 hours if needed 9 tablet 5    corticotropin (ACTHAR) 80 UNIT/ML injectable gel Inject 1 mL into the muscle daily for 5 days 5 vial 11     No current facility-administered medications for this visit. /83   Pulse 73   Ht 5' 5\" (1.651 m)   Wt 158 lb (71.7 kg)   LMP 05/18/2021   BMI 26.29 kg/m²     Constitutional - well developed, well nourished. Eyes - conjunctiva normal.  Ear, nose, throat - No scars, masses, or lesions over external nose or ears, no atrophy of tongue  Neck-symmetric, no masses noted, no jugular vein distension  Respiration- chest wall appears symmetric, good expansion,   normal effort without use of accessory muscles  Musculoskeletal - no significant wasting of muscles noted, no bony deformities  Extremities-no clubbing, cyanosis or edema  Skin - warm, dry, and intact. No rash, erythema, or pallor.   Psychiatric - mood, affect, and behavior appear normal.      Neurological exam  Awake, alert, fluent oriented  appropriate affect  Attention and concentration appear appropriate  Recent and remote memory appears unremarkable  Speech normal without dysarthria  No clear issues with language of fund of knowledge    Cranial Nerve Exam     CN III, IV,VI-EOMI, No nystagmus, conjugate eye movements, no ptosis  CN VII-no facial assymetry        Motor Exam  antigravity throughout upper and lower extremities bilaterally    Tremors- no tremors in hands or head noted    Gait  Normal base and speed  No ataxia    Lab Results   Component Value Date    [id number] 753 09/12/2018     Lab Results   Component Value Date    WBC 8.7 09/28/2021    HGB 10.2 (L) 09/28/2021    HCT 31.6 (L) 09/28/2021    MCV 91.9 09/28/2021     (H) 09/28/2021     Lab Results   Component Value Date     (L) 09/28/2021    K 4.6 09/28/2021     09/28/2021    CO2 22 09/28/2021    BUN 5 (L) 09/28/2021    CREATININE 0.7 09/28/2021    GLUCOSE 88 09/28/2021    CALCIUM 9.0 09/28/2021    PROT 6.1 (L) 09/28/2021    LABALBU 3.2 (L) 09/28/2021    BILITOT <0.2 09/28/2021    ALKPHOS 70 09/28/2021    AST 13 09/28/2021    ALT 8 09/28/2021    LABGLOM >60 09/28/2021    GFRAA >59 09/28/2021    GLOB 3.0 11/14/2016       Impression   1. No diffusion restriction to indicate acute ischemia. 2. Scattered nonenhancing hyperintense T2/FLAIR signal changes within   the periventricular and white matter regions favorable for MS plaques. No abnormal enhancement to indicate active demyelination. 3. Partial opacification inferior right mastoid air cells, mastoid   effusion considered. Signed by Dr Suan Duverney on 7/4/2020 7:10 PM         Assessment    ICD-10-CM    1. Multiple sclerosis ([facility]  G35    2. Intractable chronic migraine without aura and without status migrainosus  G43.719    3. Optic neuritis  H46.9    4. Other migraine without status migrainosus, intractable  G43.819    5. Sciatica, unspecified laterality  M54.30    6. Tremor  R25.1    7. Weakness  R53.1    8. Fibromyalgia  M79.7    9. Numbness  R20.0    10. Lupus ([address]  M32.9    11. Visual disturbance  H53.9    12. Convulsions, unspecified convulsion type ([facility]  R56.9    13. Pain, neck  M54.2    14.  Difficulty with speech  R47.9        Her neurological examination previously significant for some hesitance of speech, decreased visual acuity, right sided numbness and some mild slowness of speech. She has some mild postural tremor in the hands. Previously she was given Solumedrol 1 gm IV daily x 5 days. She is off Tecfidera and Ocrevus. She did not want to be on Tecfidera due to side effects. Her hepatitis panel was unremarkable. She is seeing  MS specialist in [place]. Her video EEG x 3 days was unremarkable with no events captured. She is seeing another rheumatologist in [facility],She will have her Marinol increased to 20 mg tid. She will need something for fatigue. She plans to research long term side effects and benefits of more frequent ACTHAR. She will be tried on [place], Imitrex, [place]. We talked about constipation with Daniel Primedson. we may consider Tysabri and take off Azathioprine. She is continue IVIG one a week or maybe every other week. The patient indicated understanding of the management plan. She will be tried on Baclofen for spasms. She is to follow up with me in 2 months and call with any issues. . Continue care as noted. She will be referred to the the [facility] clinic. Plan    No orders of the defined types were placed in this encounter. No orders of the defined types were placed in this encounter. Return in about 2 months (around 8/14/2022). EMR Dragon/transcription disclaimer:Significant part of this  encounter note is electronic transcription/translation of spoken language to printed text. The electronic translation of spoken language may be erroneous, or at times, nonsensical words or phrases may be inadvertently transcribed.  Although I have reviewed the note for such errors, some may still exist.

## 2022-06-17 ENCOUNTER — TELEPHONE (OUTPATIENT)
Dept: INFUSION THERAPY | Age: 47
End: 2022-06-17

## 2022-06-17 ENCOUNTER — HOSPITAL ENCOUNTER (OUTPATIENT)
Dept: INFUSION THERAPY | Age: 47
Setting detail: INFUSION SERIES
Discharge: HOME | End: 2022-06-17

## 2022-06-17 RX ORDER — ONDANSETRON 2 MG/ML
8 INJECTION INTRAMUSCULAR; INTRAVENOUS
Status: CANCELLED | OUTPATIENT
Start: 2022-06-20

## 2022-06-17 RX ORDER — FAMOTIDINE 10 MG/ML
20 INJECTION, SOLUTION INTRAVENOUS
Status: CANCELLED | OUTPATIENT
Start: 2022-06-20

## 2022-06-17 RX ORDER — ALBUTEROL SULFATE 90 UG/1
4 AEROSOL, METERED RESPIRATORY (INHALATION) PRN
Status: CANCELLED | OUTPATIENT
Start: 2022-06-20

## 2022-06-17 RX ORDER — SODIUM CHLORIDE 9 MG/ML
5-250 INJECTION, SOLUTION INTRAVENOUS PRN
Status: CANCELLED | OUTPATIENT
Start: 2022-06-20

## 2022-06-17 RX ORDER — DIPHENHYDRAMINE HYDROCHLORIDE 50 MG/ML
50 INJECTION INTRAMUSCULAR; INTRAVENOUS
Status: CANCELLED | OUTPATIENT
Start: 2022-06-20

## 2022-06-17 RX ORDER — SODIUM CHLORIDE 0.9 % (FLUSH) 0.9 %
5-40 SYRINGE (ML) INJECTION PRN
Status: CANCELLED | OUTPATIENT
Start: 2022-06-20

## 2022-06-17 RX ORDER — EPINEPHRINE 1 MG/ML
0.3 INJECTION, SOLUTION, CONCENTRATE INTRAVENOUS PRN
Status: CANCELLED | OUTPATIENT
Start: 2022-06-20

## 2022-06-17 RX ORDER — HEPARIN SODIUM (PORCINE) LOCK FLUSH IV SOLN 100 UNIT/ML 100 UNIT/ML
500 SOLUTION INTRAVENOUS PRN
Status: CANCELLED | OUTPATIENT
Start: 2022-06-20

## 2022-06-17 RX ORDER — MEPERIDINE HYDROCHLORIDE 50 MG/ML
12.5 INJECTION INTRAMUSCULAR; INTRAVENOUS; SUBCUTANEOUS PRN
Status: CANCELLED | OUTPATIENT
Start: 2022-06-20

## 2022-06-17 RX ORDER — DIPHENHYDRAMINE HCL 25 MG
25 TABLET ORAL ONCE
Status: CANCELLED | OUTPATIENT
Start: 2022-06-20 | End: 2022-06-20

## 2022-06-17 RX ORDER — ACETAMINOPHEN 325 MG/1
650 TABLET ORAL
Status: CANCELLED | OUTPATIENT
Start: 2022-06-20

## 2022-06-17 RX ORDER — ACETAMINOPHEN 325 MG/1
650 TABLET ORAL ONCE
Status: CANCELLED | OUTPATIENT
Start: 2022-06-20 | End: 2022-06-20

## 2022-06-17 RX ORDER — SODIUM CHLORIDE 9 MG/ML
INJECTION, SOLUTION INTRAVENOUS CONTINUOUS
Status: CANCELLED | OUTPATIENT
Start: 2022-06-20

## 2022-06-17 NOTE — TELEPHONE ENCOUNTER
Called patient about missed appointment this morning. She states that Dr Nickie Lu changed infusion to every other week, she will be at next weeks appointment.

## 2022-06-24 ENCOUNTER — HOSPITAL ENCOUNTER (OUTPATIENT)
Dept: INFUSION THERAPY | Age: 47
Setting detail: INFUSION SERIES
Discharge: HOME | End: 2022-06-24
Payer: MEDICARE

## 2022-06-24 ENCOUNTER — TELEPHONE (OUTPATIENT)
Dept: NEUROLOGY | Age: 47
End: 2022-06-24

## 2022-06-24 VITALS
TEMPERATURE: 97.3 F | RESPIRATION RATE: 18 BRPM | OXYGEN SATURATION: 100 % | HEART RATE: 70 BPM | DIASTOLIC BLOOD PRESSURE: 79 MMHG | SYSTOLIC BLOOD PRESSURE: 117 MMHG

## 2022-06-24 DIAGNOSIS — G35 MULTIPLE SCLEROSIS (HCC): Primary | ICD-10-CM

## 2022-06-24 PROCEDURE — 6360000002 HC RX W HCPCS: Performed by: PSYCHIATRY & NEUROLOGY

## 2022-06-24 PROCEDURE — 96374 THER/PROPH/DIAG INJ IV PUSH: CPT

## 2022-06-24 PROCEDURE — 96375 TX/PRO/DX INJ NEW DRUG ADDON: CPT

## 2022-06-24 PROCEDURE — 6370000000 HC RX 637 (ALT 250 FOR IP): Performed by: PSYCHIATRY & NEUROLOGY

## 2022-06-24 PROCEDURE — 96366 THER/PROPH/DIAG IV INF ADDON: CPT

## 2022-06-24 PROCEDURE — 96365 THER/PROPH/DIAG IV INF INIT: CPT

## 2022-06-24 RX ORDER — SODIUM CHLORIDE 9 MG/ML
INJECTION, SOLUTION INTRAVENOUS CONTINUOUS
Status: CANCELLED | OUTPATIENT
Start: 2022-06-24

## 2022-06-24 RX ORDER — MEPERIDINE HYDROCHLORIDE 25 MG/ML
12.5 INJECTION INTRAMUSCULAR; INTRAVENOUS; SUBCUTANEOUS PRN
Status: CANCELLED | OUTPATIENT
Start: 2022-06-24

## 2022-06-24 RX ORDER — EPINEPHRINE 1 MG/ML
0.3 INJECTION, SOLUTION, CONCENTRATE INTRAVENOUS PRN
Status: CANCELLED | OUTPATIENT
Start: 2022-06-24

## 2022-06-24 RX ORDER — DIPHENHYDRAMINE HYDROCHLORIDE 50 MG/ML
50 INJECTION INTRAMUSCULAR; INTRAVENOUS
Status: CANCELLED | OUTPATIENT
Start: 2022-06-24

## 2022-06-24 RX ORDER — SODIUM CHLORIDE 0.9 % (FLUSH) 0.9 %
5-40 SYRINGE (ML) INJECTION PRN
Status: CANCELLED | OUTPATIENT
Start: 2022-06-24

## 2022-06-24 RX ORDER — DIPHENHYDRAMINE HYDROCHLORIDE 50 MG/ML
50 INJECTION INTRAMUSCULAR; INTRAVENOUS ONCE
Status: CANCELLED
Start: 2022-06-24 | End: 2022-06-24

## 2022-06-24 RX ORDER — ACETAMINOPHEN 325 MG/1
650 TABLET ORAL ONCE
Status: COMPLETED | OUTPATIENT
Start: 2022-06-24 | End: 2022-06-24

## 2022-06-24 RX ORDER — SODIUM CHLORIDE 0.9 % (FLUSH) 0.9 %
5-40 SYRINGE (ML) INJECTION PRN
Status: DISCONTINUED | OUTPATIENT
Start: 2022-06-24 | End: 2022-06-25 | Stop reason: HOSPADM

## 2022-06-24 RX ORDER — ACETAMINOPHEN 325 MG/1
650 TABLET ORAL ONCE
Status: CANCELLED | OUTPATIENT
Start: 2022-06-24 | End: 2022-06-24

## 2022-06-24 RX ORDER — ACETAMINOPHEN 325 MG/1
650 TABLET ORAL
Status: CANCELLED | OUTPATIENT
Start: 2022-06-24

## 2022-06-24 RX ORDER — ALBUTEROL SULFATE 90 UG/1
4 AEROSOL, METERED RESPIRATORY (INHALATION) PRN
Status: CANCELLED | OUTPATIENT
Start: 2022-06-24

## 2022-06-24 RX ORDER — DIPHENHYDRAMINE HYDROCHLORIDE 50 MG/ML
50 INJECTION INTRAMUSCULAR; INTRAVENOUS ONCE
Status: COMPLETED | OUTPATIENT
Start: 2022-06-24 | End: 2022-06-24

## 2022-06-24 RX ORDER — ONDANSETRON 2 MG/ML
8 INJECTION INTRAMUSCULAR; INTRAVENOUS
Status: CANCELLED | OUTPATIENT
Start: 2022-06-24

## 2022-06-24 RX ADMIN — ACETAMINOPHEN 650 MG: 325 TABLET ORAL at 10:12

## 2022-06-24 RX ADMIN — IMMUNE GLOBULIN (HUMAN) 30 G: 10 INJECTION INTRAVENOUS; SUBCUTANEOUS at 10:44

## 2022-06-24 RX ADMIN — DIPHENHYDRAMINE HYDROCHLORIDE 50 MG: 50 INJECTION, SOLUTION INTRAMUSCULAR; INTRAVENOUS at 10:12

## 2022-06-24 RX ADMIN — HYDROCORTISONE SODIUM SUCCINATE 100 MG: 100 INJECTION, POWDER, FOR SOLUTION INTRAMUSCULAR; INTRAVENOUS at 10:12

## 2022-06-24 ASSESSMENT — PAIN SCALES - GENERAL: PAINLEVEL_OUTOF10: 0

## 2022-06-24 NOTE — TELEPHONE ENCOUNTER
Patient called stating her IVIV every two weeks is too long. Patient states that after day 9 She cant tolerate it.  Patient wanted to let us know

## 2022-06-27 ENCOUNTER — TELEPHONE (OUTPATIENT)
Dept: NEUROLOGY | Age: 47
End: 2022-06-27

## 2022-06-27 NOTE — TELEPHONE ENCOUNTER
Patient called stated that she has been having some nausea and she also has some nausea after her IVIG infusions which is going to be on Friday. She was wanting to know if you would call her in something for that please . Please advise.   Looks like she has Zofran on her med list no refills though

## 2022-06-28 RX ORDER — ONDANSETRON 4 MG/1
4 TABLET, FILM COATED ORAL DAILY PRN
Qty: 30 TABLET | Refills: 5 | Status: SHIPPED | OUTPATIENT
Start: 2022-06-28

## 2022-06-28 NOTE — TELEPHONE ENCOUNTER
Called the patient and left her a voicemail letting her know it was sent to the pharmacy.  (She identified herself on her voicemail)

## 2022-06-28 NOTE — TELEPHONE ENCOUNTER
Spoke with pt and voiced that the infusions have been changed to weekly. Pt thanked us and voiced understanding.

## 2022-07-01 ENCOUNTER — APPOINTMENT (OUTPATIENT)
Dept: INFUSION THERAPY | Age: 47
End: 2022-07-01
Payer: MEDICARE

## 2022-07-01 ENCOUNTER — HOSPITAL ENCOUNTER (OUTPATIENT)
Dept: INFUSION THERAPY | Age: 47
Setting detail: INFUSION SERIES
Discharge: HOME | End: 2022-07-01
Payer: MEDICARE

## 2022-07-01 VITALS
RESPIRATION RATE: 17 BRPM | HEIGHT: 65 IN | TEMPERATURE: 97.4 F | DIASTOLIC BLOOD PRESSURE: 72 MMHG | HEART RATE: 62 BPM | WEIGHT: 161 LBS | SYSTOLIC BLOOD PRESSURE: 108 MMHG | BODY MASS INDEX: 26.82 KG/M2 | OXYGEN SATURATION: 98 %

## 2022-07-01 DIAGNOSIS — G35 MULTIPLE SCLEROSIS (HCC): Primary | ICD-10-CM

## 2022-07-01 PROCEDURE — 6360000002 HC RX W HCPCS: Performed by: PSYCHIATRY & NEUROLOGY

## 2022-07-01 PROCEDURE — 6370000000 HC RX 637 (ALT 250 FOR IP): Performed by: PSYCHIATRY & NEUROLOGY

## 2022-07-01 PROCEDURE — 96366 THER/PROPH/DIAG IV INF ADDON: CPT

## 2022-07-01 PROCEDURE — 96365 THER/PROPH/DIAG IV INF INIT: CPT

## 2022-07-01 RX ORDER — ACETAMINOPHEN 325 MG/1
650 TABLET ORAL ONCE
Status: COMPLETED | OUTPATIENT
Start: 2022-07-01 | End: 2022-07-01

## 2022-07-01 RX ORDER — DIPHENHYDRAMINE HYDROCHLORIDE 50 MG/ML
50 INJECTION INTRAMUSCULAR; INTRAVENOUS
Status: CANCELLED | OUTPATIENT
Start: 2022-07-01

## 2022-07-01 RX ORDER — MEPERIDINE HYDROCHLORIDE 25 MG/ML
12.5 INJECTION INTRAMUSCULAR; INTRAVENOUS; SUBCUTANEOUS PRN
Status: CANCELLED | OUTPATIENT
Start: 2022-07-01

## 2022-07-01 RX ORDER — DIPHENHYDRAMINE HYDROCHLORIDE 50 MG/ML
50 INJECTION INTRAMUSCULAR; INTRAVENOUS ONCE
Status: COMPLETED | OUTPATIENT
Start: 2022-07-01 | End: 2022-07-01

## 2022-07-01 RX ORDER — ONDANSETRON 2 MG/ML
8 INJECTION INTRAMUSCULAR; INTRAVENOUS
Status: CANCELLED | OUTPATIENT
Start: 2022-07-01

## 2022-07-01 RX ORDER — ALBUTEROL SULFATE 90 UG/1
4 AEROSOL, METERED RESPIRATORY (INHALATION) PRN
Status: CANCELLED | OUTPATIENT
Start: 2022-07-01

## 2022-07-01 RX ORDER — DIPHENHYDRAMINE HYDROCHLORIDE 50 MG/ML
50 INJECTION INTRAMUSCULAR; INTRAVENOUS ONCE
Status: CANCELLED
Start: 2022-07-01 | End: 2022-07-01

## 2022-07-01 RX ORDER — SODIUM CHLORIDE 9 MG/ML
INJECTION, SOLUTION INTRAVENOUS CONTINUOUS
Status: CANCELLED | OUTPATIENT
Start: 2022-07-01

## 2022-07-01 RX ORDER — ACETAMINOPHEN 325 MG/1
650 TABLET ORAL ONCE
Status: CANCELLED | OUTPATIENT
Start: 2022-07-01 | End: 2022-07-01

## 2022-07-01 RX ORDER — SODIUM CHLORIDE 0.9 % (FLUSH) 0.9 %
5-40 SYRINGE (ML) INJECTION PRN
Status: CANCELLED | OUTPATIENT
Start: 2022-07-01

## 2022-07-01 RX ORDER — ACETAMINOPHEN 325 MG/1
650 TABLET ORAL
Status: CANCELLED | OUTPATIENT
Start: 2022-07-01

## 2022-07-01 RX ORDER — SODIUM CHLORIDE 0.9 % (FLUSH) 0.9 %
5-40 SYRINGE (ML) INJECTION PRN
Status: DISCONTINUED | OUTPATIENT
Start: 2022-07-01 | End: 2022-07-02 | Stop reason: HOSPADM

## 2022-07-01 RX ORDER — EPINEPHRINE 1 MG/ML
0.3 INJECTION, SOLUTION, CONCENTRATE INTRAVENOUS PRN
Status: CANCELLED | OUTPATIENT
Start: 2022-07-01

## 2022-07-01 RX ADMIN — HYDROCORTISONE SODIUM SUCCINATE 100 MG: 100 INJECTION, POWDER, FOR SOLUTION INTRAMUSCULAR; INTRAVENOUS at 08:49

## 2022-07-01 RX ADMIN — IMMUNE GLOBULIN (HUMAN) 30 G: 10 INJECTION INTRAVENOUS; SUBCUTANEOUS at 09:22

## 2022-07-01 RX ADMIN — ACETAMINOPHEN 650 MG: 325 TABLET ORAL at 08:49

## 2022-07-01 RX ADMIN — DIPHENHYDRAMINE HYDROCHLORIDE 50 MG: 50 INJECTION, SOLUTION INTRAMUSCULAR; INTRAVENOUS at 08:49

## 2022-07-08 ENCOUNTER — HOSPITAL ENCOUNTER (OUTPATIENT)
Dept: INFUSION THERAPY | Age: 47
Setting detail: INFUSION SERIES
Discharge: HOME | End: 2022-07-08
Payer: MEDICARE

## 2022-07-08 VITALS
DIASTOLIC BLOOD PRESSURE: 87 MMHG | RESPIRATION RATE: 18 BRPM | BODY MASS INDEX: 26.79 KG/M2 | HEART RATE: 74 BPM | SYSTOLIC BLOOD PRESSURE: 141 MMHG | WEIGHT: 161 LBS | TEMPERATURE: 98.7 F | OXYGEN SATURATION: 100 %

## 2022-07-08 DIAGNOSIS — G35 MULTIPLE SCLEROSIS (HCC): Primary | ICD-10-CM

## 2022-07-08 PROCEDURE — 96365 THER/PROPH/DIAG IV INF INIT: CPT

## 2022-07-08 PROCEDURE — 96366 THER/PROPH/DIAG IV INF ADDON: CPT

## 2022-07-08 PROCEDURE — 6370000000 HC RX 637 (ALT 250 FOR IP): Performed by: PSYCHIATRY & NEUROLOGY

## 2022-07-08 PROCEDURE — 6360000002 HC RX W HCPCS: Performed by: PSYCHIATRY & NEUROLOGY

## 2022-07-08 PROCEDURE — 96375 TX/PRO/DX INJ NEW DRUG ADDON: CPT

## 2022-07-08 RX ORDER — MEPERIDINE HYDROCHLORIDE 25 MG/ML
12.5 INJECTION INTRAMUSCULAR; INTRAVENOUS; SUBCUTANEOUS PRN
Status: CANCELLED | OUTPATIENT
Start: 2022-07-08

## 2022-07-08 RX ORDER — ALBUTEROL SULFATE 90 UG/1
4 AEROSOL, METERED RESPIRATORY (INHALATION) PRN
Status: CANCELLED | OUTPATIENT
Start: 2022-07-08

## 2022-07-08 RX ORDER — ACETAMINOPHEN 325 MG/1
650 TABLET ORAL ONCE
Status: COMPLETED | OUTPATIENT
Start: 2022-07-08 | End: 2022-07-08

## 2022-07-08 RX ORDER — SODIUM CHLORIDE 9 MG/ML
INJECTION, SOLUTION INTRAVENOUS CONTINUOUS
Status: CANCELLED | OUTPATIENT
Start: 2022-07-08

## 2022-07-08 RX ORDER — ONDANSETRON 2 MG/ML
8 INJECTION INTRAMUSCULAR; INTRAVENOUS
Status: CANCELLED | OUTPATIENT
Start: 2022-07-08

## 2022-07-08 RX ORDER — ACETAMINOPHEN 325 MG/1
650 TABLET ORAL ONCE
Status: CANCELLED | OUTPATIENT
Start: 2022-07-08 | End: 2022-07-08

## 2022-07-08 RX ORDER — ACETAMINOPHEN 325 MG/1
650 TABLET ORAL
Status: CANCELLED | OUTPATIENT
Start: 2022-07-08

## 2022-07-08 RX ORDER — SODIUM CHLORIDE 0.9 % (FLUSH) 0.9 %
5-40 SYRINGE (ML) INJECTION PRN
Status: CANCELLED | OUTPATIENT
Start: 2022-07-08

## 2022-07-08 RX ORDER — EPINEPHRINE 1 MG/ML
0.3 INJECTION, SOLUTION, CONCENTRATE INTRAVENOUS PRN
Status: CANCELLED | OUTPATIENT
Start: 2022-07-08

## 2022-07-08 RX ORDER — DIPHENHYDRAMINE HYDROCHLORIDE 50 MG/ML
50 INJECTION INTRAMUSCULAR; INTRAVENOUS ONCE
Status: COMPLETED | OUTPATIENT
Start: 2022-07-08 | End: 2022-07-08

## 2022-07-08 RX ORDER — DIPHENHYDRAMINE HYDROCHLORIDE 50 MG/ML
50 INJECTION INTRAMUSCULAR; INTRAVENOUS
Status: CANCELLED | OUTPATIENT
Start: 2022-07-08

## 2022-07-08 RX ORDER — DIPHENHYDRAMINE HYDROCHLORIDE 50 MG/ML
50 INJECTION INTRAMUSCULAR; INTRAVENOUS ONCE
Status: CANCELLED
Start: 2022-07-08 | End: 2022-07-08

## 2022-07-08 RX ORDER — SODIUM CHLORIDE 0.9 % (FLUSH) 0.9 %
5-40 SYRINGE (ML) INJECTION PRN
Status: DISCONTINUED | OUTPATIENT
Start: 2022-07-08 | End: 2022-07-09 | Stop reason: HOSPADM

## 2022-07-08 RX ADMIN — ACETAMINOPHEN 650 MG: 325 TABLET ORAL at 08:25

## 2022-07-08 RX ADMIN — IMMUNE GLOBULIN (HUMAN) 30 G: 10 INJECTION INTRAVENOUS; SUBCUTANEOUS at 08:58

## 2022-07-08 RX ADMIN — DIPHENHYDRAMINE HYDROCHLORIDE 50 MG: 50 INJECTION, SOLUTION INTRAMUSCULAR; INTRAVENOUS at 08:25

## 2022-07-08 RX ADMIN — HYDROCORTISONE SODIUM SUCCINATE 100 MG: 100 INJECTION, POWDER, FOR SOLUTION INTRAMUSCULAR; INTRAVENOUS at 08:24

## 2022-07-08 ASSESSMENT — PAIN SCALES - GENERAL: PAINLEVEL_OUTOF10: 0

## 2022-07-11 ENCOUNTER — TELEPHONE (OUTPATIENT)
Dept: NEUROLOGY | Age: 47
End: 2022-07-11

## 2022-07-11 NOTE — TELEPHONE ENCOUNTER
Pt called and states she became really sick yesterday 7/10/22 and went to the ED. She states the doctor there told her she was having a MS and Lupus flare up. She states she was given a steroid pack. Do you suggest any other precautions to continue with? Please advise.

## 2022-07-12 NOTE — TELEPHONE ENCOUNTER
Called pt to inform her of previous note saying to see how she does on the steroids. Pt informed me symptoms have worsened. She states her hands and feet are both very numb with very little feeling. She said she tried to exercise this morning and was unable to do so. She also states she is having brain fog. Pt states she is continuing to taking the steroids as prescribed.

## 2022-07-13 NOTE — TELEPHONE ENCOUNTER
Called the patient and she stated she just ordered more Acthar and it will be delivered by the end of the week.

## 2022-07-15 ENCOUNTER — HOSPITAL ENCOUNTER (OUTPATIENT)
Dept: INFUSION THERAPY | Age: 47
Setting detail: INFUSION SERIES
Discharge: HOME | End: 2022-07-15
Payer: MEDICARE

## 2022-07-15 ENCOUNTER — APPOINTMENT (OUTPATIENT)
Dept: INFUSION THERAPY | Age: 47
End: 2022-07-15
Payer: MEDICARE

## 2022-07-15 VITALS
SYSTOLIC BLOOD PRESSURE: 128 MMHG | RESPIRATION RATE: 16 BRPM | HEART RATE: 59 BPM | TEMPERATURE: 98.1 F | OXYGEN SATURATION: 99 % | DIASTOLIC BLOOD PRESSURE: 82 MMHG

## 2022-07-15 DIAGNOSIS — G35 MULTIPLE SCLEROSIS (HCC): Primary | ICD-10-CM

## 2022-07-15 PROCEDURE — 2580000003 HC RX 258: Performed by: PSYCHIATRY & NEUROLOGY

## 2022-07-15 PROCEDURE — 6370000000 HC RX 637 (ALT 250 FOR IP): Performed by: PSYCHIATRY & NEUROLOGY

## 2022-07-15 PROCEDURE — 96365 THER/PROPH/DIAG IV INF INIT: CPT

## 2022-07-15 PROCEDURE — 96366 THER/PROPH/DIAG IV INF ADDON: CPT

## 2022-07-15 PROCEDURE — 6360000002 HC RX W HCPCS: Performed by: PSYCHIATRY & NEUROLOGY

## 2022-07-15 RX ORDER — ALBUTEROL SULFATE 90 UG/1
4 AEROSOL, METERED RESPIRATORY (INHALATION) PRN
Status: CANCELLED | OUTPATIENT
Start: 2022-07-15

## 2022-07-15 RX ORDER — EPINEPHRINE 1 MG/ML
0.3 INJECTION, SOLUTION, CONCENTRATE INTRAVENOUS PRN
Status: CANCELLED | OUTPATIENT
Start: 2022-07-15

## 2022-07-15 RX ORDER — ACETAMINOPHEN 325 MG/1
650 TABLET ORAL ONCE
Status: COMPLETED | OUTPATIENT
Start: 2022-07-15 | End: 2022-07-15

## 2022-07-15 RX ORDER — DIPHENHYDRAMINE HYDROCHLORIDE 50 MG/ML
50 INJECTION INTRAMUSCULAR; INTRAVENOUS
Status: CANCELLED | OUTPATIENT
Start: 2022-07-15

## 2022-07-15 RX ORDER — SODIUM CHLORIDE 0.9 % (FLUSH) 0.9 %
5-40 SYRINGE (ML) INJECTION PRN
Status: CANCELLED | OUTPATIENT
Start: 2022-07-15

## 2022-07-15 RX ORDER — DIPHENHYDRAMINE HYDROCHLORIDE 50 MG/ML
50 INJECTION INTRAMUSCULAR; INTRAVENOUS ONCE
Status: CANCELLED
Start: 2022-07-15 | End: 2022-07-15

## 2022-07-15 RX ORDER — ONDANSETRON 2 MG/ML
8 INJECTION INTRAMUSCULAR; INTRAVENOUS
Status: CANCELLED | OUTPATIENT
Start: 2022-07-15

## 2022-07-15 RX ORDER — MEPERIDINE HYDROCHLORIDE 25 MG/ML
12.5 INJECTION INTRAMUSCULAR; INTRAVENOUS; SUBCUTANEOUS PRN
Status: CANCELLED | OUTPATIENT
Start: 2022-07-15

## 2022-07-15 RX ORDER — ACETAMINOPHEN 325 MG/1
650 TABLET ORAL
Status: CANCELLED | OUTPATIENT
Start: 2022-07-15

## 2022-07-15 RX ORDER — DIPHENHYDRAMINE HYDROCHLORIDE 50 MG/ML
50 INJECTION INTRAMUSCULAR; INTRAVENOUS ONCE
Status: COMPLETED | OUTPATIENT
Start: 2022-07-15 | End: 2022-07-15

## 2022-07-15 RX ORDER — SODIUM CHLORIDE 0.9 % (FLUSH) 0.9 %
5-40 SYRINGE (ML) INJECTION PRN
Status: DISCONTINUED | OUTPATIENT
Start: 2022-07-15 | End: 2022-07-16 | Stop reason: HOSPADM

## 2022-07-15 RX ORDER — ACETAMINOPHEN 325 MG/1
650 TABLET ORAL ONCE
Status: CANCELLED | OUTPATIENT
Start: 2022-07-15 | End: 2022-07-15

## 2022-07-15 RX ORDER — SODIUM CHLORIDE 9 MG/ML
INJECTION, SOLUTION INTRAVENOUS CONTINUOUS
Status: CANCELLED | OUTPATIENT
Start: 2022-07-15

## 2022-07-15 RX ADMIN — HYDROCORTISONE SODIUM SUCCINATE 100 MG: 100 INJECTION, POWDER, FOR SOLUTION INTRAMUSCULAR; INTRAVENOUS at 08:29

## 2022-07-15 RX ADMIN — IMMUNE GLOBULIN (HUMAN) 30 G: 10 INJECTION INTRAVENOUS; SUBCUTANEOUS at 08:57

## 2022-07-15 RX ADMIN — ACETAMINOPHEN 325MG 650 MG: 325 TABLET ORAL at 08:29

## 2022-07-15 RX ADMIN — SODIUM CHLORIDE, PRESERVATIVE FREE 20 ML: 5 INJECTION INTRAVENOUS at 11:06

## 2022-07-15 RX ADMIN — DIPHENHYDRAMINE HYDROCHLORIDE 50 MG: 50 INJECTION, SOLUTION INTRAMUSCULAR; INTRAVENOUS at 08:31

## 2022-07-15 ASSESSMENT — PAIN SCALES - GENERAL: PAINLEVEL_OUTOF10: 0

## 2022-07-22 ENCOUNTER — HOSPITAL ENCOUNTER (OUTPATIENT)
Dept: INFUSION THERAPY | Age: 47
Setting detail: INFUSION SERIES
Discharge: HOME | End: 2022-07-22
Payer: MEDICARE

## 2022-07-22 VITALS
WEIGHT: 173 LBS | RESPIRATION RATE: 18 BRPM | SYSTOLIC BLOOD PRESSURE: 138 MMHG | TEMPERATURE: 97.1 F | OXYGEN SATURATION: 99 % | BODY MASS INDEX: 28.79 KG/M2 | DIASTOLIC BLOOD PRESSURE: 92 MMHG | HEART RATE: 72 BPM

## 2022-07-22 DIAGNOSIS — G35 MULTIPLE SCLEROSIS (HCC): Primary | ICD-10-CM

## 2022-07-22 PROCEDURE — 96366 THER/PROPH/DIAG IV INF ADDON: CPT

## 2022-07-22 PROCEDURE — 6360000002 HC RX W HCPCS: Performed by: PSYCHIATRY & NEUROLOGY

## 2022-07-22 PROCEDURE — 96365 THER/PROPH/DIAG IV INF INIT: CPT

## 2022-07-22 PROCEDURE — 6370000000 HC RX 637 (ALT 250 FOR IP): Performed by: PSYCHIATRY & NEUROLOGY

## 2022-07-22 RX ORDER — DIPHENHYDRAMINE HYDROCHLORIDE 50 MG/ML
50 INJECTION INTRAMUSCULAR; INTRAVENOUS ONCE
Status: CANCELLED
Start: 2022-07-22 | End: 2022-07-22

## 2022-07-22 RX ORDER — ACETAMINOPHEN 325 MG/1
650 TABLET ORAL ONCE
Status: CANCELLED | OUTPATIENT
Start: 2022-07-22 | End: 2022-07-22

## 2022-07-22 RX ORDER — SODIUM CHLORIDE 0.9 % (FLUSH) 0.9 %
5-40 SYRINGE (ML) INJECTION PRN
Status: CANCELLED | OUTPATIENT
Start: 2022-07-22

## 2022-07-22 RX ORDER — DIPHENHYDRAMINE HYDROCHLORIDE 50 MG/ML
50 INJECTION INTRAMUSCULAR; INTRAVENOUS ONCE
Status: COMPLETED | OUTPATIENT
Start: 2022-07-22 | End: 2022-07-22

## 2022-07-22 RX ORDER — ALBUTEROL SULFATE 90 UG/1
4 AEROSOL, METERED RESPIRATORY (INHALATION) PRN
Status: CANCELLED | OUTPATIENT
Start: 2022-07-22

## 2022-07-22 RX ORDER — ONDANSETRON 2 MG/ML
8 INJECTION INTRAMUSCULAR; INTRAVENOUS
Status: CANCELLED | OUTPATIENT
Start: 2022-07-22

## 2022-07-22 RX ORDER — SODIUM CHLORIDE 0.9 % (FLUSH) 0.9 %
5-40 SYRINGE (ML) INJECTION PRN
Status: DISCONTINUED | OUTPATIENT
Start: 2022-07-22 | End: 2022-07-23 | Stop reason: HOSPADM

## 2022-07-22 RX ORDER — EPINEPHRINE 1 MG/ML
0.3 INJECTION, SOLUTION, CONCENTRATE INTRAVENOUS PRN
Status: CANCELLED | OUTPATIENT
Start: 2022-07-22

## 2022-07-22 RX ORDER — MEPERIDINE HYDROCHLORIDE 25 MG/ML
12.5 INJECTION INTRAMUSCULAR; INTRAVENOUS; SUBCUTANEOUS PRN
Status: CANCELLED | OUTPATIENT
Start: 2022-07-22

## 2022-07-22 RX ORDER — DIPHENHYDRAMINE HYDROCHLORIDE 50 MG/ML
50 INJECTION INTRAMUSCULAR; INTRAVENOUS
Status: CANCELLED | OUTPATIENT
Start: 2022-07-22

## 2022-07-22 RX ORDER — SODIUM CHLORIDE 9 MG/ML
INJECTION, SOLUTION INTRAVENOUS CONTINUOUS
Status: CANCELLED | OUTPATIENT
Start: 2022-07-22

## 2022-07-22 RX ORDER — ACETAMINOPHEN 325 MG/1
650 TABLET ORAL ONCE
Status: COMPLETED | OUTPATIENT
Start: 2022-07-22 | End: 2022-07-22

## 2022-07-22 RX ORDER — ACETAMINOPHEN 325 MG/1
650 TABLET ORAL
Status: CANCELLED | OUTPATIENT
Start: 2022-07-22

## 2022-07-22 RX ADMIN — HYDROCORTISONE SODIUM SUCCINATE 100 MG: 100 INJECTION, POWDER, FOR SOLUTION INTRAMUSCULAR; INTRAVENOUS at 08:13

## 2022-07-22 RX ADMIN — ACETAMINOPHEN 325MG 650 MG: 325 TABLET ORAL at 08:12

## 2022-07-22 RX ADMIN — IMMUNE GLOBULIN (HUMAN) 30 G: 10 INJECTION INTRAVENOUS; SUBCUTANEOUS at 08:45

## 2022-07-22 RX ADMIN — DIPHENHYDRAMINE HYDROCHLORIDE 50 MG: 50 INJECTION, SOLUTION INTRAMUSCULAR; INTRAVENOUS at 08:13

## 2022-07-22 ASSESSMENT — PAIN SCALES - GENERAL: PAINLEVEL_OUTOF10: 0

## 2022-07-22 NOTE — DISCHARGE INSTRUCTIONS
immune globulin (intravenous) (IGIV)  Pronunciation: dinora foley  Brand:  Bivigam, Carimune, Flebogamma, Gammagard S/D, Gammaplex, Octagam, Panzyga,Privigen  What is the most important information I should know about immune globulin intravenous? This medicine can cause blood clots. The risk is highest in older adults or in people who have had blood clots, heart problems, or blood circulation problems. Blood clots are also more likely during long-term bedrest, while using birth control pills or hormone replacement therapy, or while having a central intravenous (IV) catheter inplace. Call your doctor at once if you have chest pain, trouble breathing, fast heartbeats, numbness or weakness, or swelling and warmth or discoloration in anarm or leg. This medicine can also harm your kidneys, especially if you have kidney disease or you also use certain medicines. Tell your doctor right away if you have signs of kidney problems, such as swelling, rapid weight gain, and little or no urination. What is immune globulin intravenous (IGIV)? Immune globulin intravenous (IGIV, for injection into a vein) is used to treatprimary immunodeficiency. IGIV is also used to increase platelets (blood clotting cells) in people withimmune thrombocytopenic purpura. IGIV is also used in to help prevent certain infections in people with B-cellchronic lymphocytic leukemia. IGIV is also used in people with Kawasaki syndrome, to prevent aneurysm causedby a weakening of the main artery in the heart. IGIV may also be used for purposes not listed in this medication guide. What should I discuss with my healthcare provider before using IGIV? You may not be able to use this medicine if:  you have had an allergic reaction to an immune globulin or blood product;  you have immune globulin A (IgA) deficiency with antibody to IgA; or  you are allergic to corn.   IGIV can cause blood clots or kidney problems, especially in older adults or in people with certain conditions. Tell yourdoctor if you have ever had:  heart problems, blood circulation problems, or \"thick blood\";  a stroke or blood clot;  kidney disease;  diabetes;  an infection called sepsis;  if you use estrogens (birth control pills or hormone replacement therapy);  if you have been on long-term bedrest; or  if you have a central intravenous (IV) catheter in place. You may need a dose adjustment if you are exposed to measles, or if you travelto an area where this disease is common. Tell your doctor if you are pregnant or breastfeeding. Immune globulin is made from donated human plasma and may contain viruses or other infectious agents. Donated plasma is tested and treated to reduce the risk of contamination, but there is still a small possibility it could transmitdisease. Ask your doctor about any possible risk. How should I use IGIV? IGIV is given as an infusion into a vein, usually once every 3 to 4 weeks. [facility] provider will give you this injection. Drink plenty of liquids  while you are using this medicine to help improve your blood flow and keepyour kidneys working properly. You may need frequent blood or urine tests. This medicine can affect the results of certain medical tests. Tell any doctorwho treats you that you are using IGIV. What happens if I miss a dose? Call your doctor for instructions if you miss an appointment for your IGIVinjection. What happens if I overdose? Seek emergency medical attention or call the Poison Help line at [phone]. What should I avoid while using IGIV? Ask your doctor before receiving a \"live\" vaccine while using IGIV. The vaccine may not work as well and may not fully protect you from disease. Live vaccines include measles, mumps, rubella (MMR), rotavirus, typhoid, yellow fever, varicella (chickenpox), zoster (shingles), and nasal flu (influenza)vaccine. What are the possible side effects of IGIV?   Get emergency medical over-the-counter medicines, vitamins, and herbal products. Tell your doctor about all yourcurrent medicines and any medicine you start or stop using. Where can I get more information? Your pharmacist can provide more information about immune globulin intravenous. Remember, keep this and all other medicines out of the reach of children, never share your medicines with others, and use this medication only for the indication prescribed. Every effort has been made to ensure that the information provided by [address] is accurate, up-to-date, and complete, but no guarantee is made to that effect. Drug information contained herein may be time sensitive. [facility] information has been compiled for use by healthcare practitioners and consumers in the [place] and therefore [facility] does not warrant that uses outside of the [place] are appropriate, unless specifically indicated otherwise. [facility]'s drug information does not endorse drugs, diagnose patients or recommend therapy. [facility]'s drug information is an informational resource designed to assist licensed healthcare practitioners in caring for their patients and/or to serve consumers viewing this service as a supplement to, and not a substitute for, the expertise, skill, knowledge and judgment of healthcare practitioners. The absence of a warning for a given drug or drug combination in no way should be construed to indicate that the drug or drug combination is safe, effective or appropriate for any given patient. [facility] does not assume any responsibility for any aspect of healthcare administered with the aid of information [facility] provides. The information contained herein is not intended to cover all possible uses, directions, precautions, warnings, drug interactions, allergic reactions, or adverse effects.  If you have questions about the drugs you are taking, check with yourdoctor, nurse or pharmacist.  Copyright 9078-5459 [employer] Version: 6.02. Revision date: 2/26/2021. Care instructions adapted under license by [facility] ([facility]). If you have questions about a medical condition or this instruction, always ask your healthcare professional. [address] any warranty or liability for your use of this information.

## 2022-07-29 ENCOUNTER — HOSPITAL ENCOUNTER (OUTPATIENT)
Dept: INFUSION THERAPY | Age: 47
Setting detail: INFUSION SERIES
Discharge: HOME | End: 2022-07-29
Payer: MEDICARE

## 2022-07-29 VITALS
SYSTOLIC BLOOD PRESSURE: 112 MMHG | TEMPERATURE: 97.5 F | HEART RATE: 61 BPM | DIASTOLIC BLOOD PRESSURE: 85 MMHG | WEIGHT: 168 LBS | RESPIRATION RATE: 18 BRPM | BODY MASS INDEX: 27.96 KG/M2

## 2022-07-29 DIAGNOSIS — G35 MULTIPLE SCLEROSIS (HCC): Primary | ICD-10-CM

## 2022-07-29 PROCEDURE — 6370000000 HC RX 637 (ALT 250 FOR IP): Performed by: PSYCHIATRY & NEUROLOGY

## 2022-07-29 PROCEDURE — 96365 THER/PROPH/DIAG IV INF INIT: CPT

## 2022-07-29 PROCEDURE — 96366 THER/PROPH/DIAG IV INF ADDON: CPT

## 2022-07-29 PROCEDURE — 6360000002 HC RX W HCPCS: Performed by: PSYCHIATRY & NEUROLOGY

## 2022-07-29 RX ORDER — SODIUM CHLORIDE 9 MG/ML
INJECTION, SOLUTION INTRAVENOUS CONTINUOUS
Status: CANCELLED | OUTPATIENT
Start: 2022-07-29

## 2022-07-29 RX ORDER — SODIUM CHLORIDE 0.9 % (FLUSH) 0.9 %
5-40 SYRINGE (ML) INJECTION PRN
Status: DISCONTINUED | OUTPATIENT
Start: 2022-07-29 | End: 2022-07-30 | Stop reason: HOSPADM

## 2022-07-29 RX ORDER — DIPHENHYDRAMINE HYDROCHLORIDE 50 MG/ML
50 INJECTION INTRAMUSCULAR; INTRAVENOUS
Status: CANCELLED | OUTPATIENT
Start: 2022-07-29

## 2022-07-29 RX ORDER — ACETAMINOPHEN 325 MG/1
650 TABLET ORAL ONCE
Status: CANCELLED | OUTPATIENT
Start: 2022-07-29 | End: 2022-07-29

## 2022-07-29 RX ORDER — MEPERIDINE HYDROCHLORIDE 25 MG/ML
12.5 INJECTION INTRAMUSCULAR; INTRAVENOUS; SUBCUTANEOUS PRN
Status: CANCELLED | OUTPATIENT
Start: 2022-07-29

## 2022-07-29 RX ORDER — ALBUTEROL SULFATE 90 UG/1
4 AEROSOL, METERED RESPIRATORY (INHALATION) PRN
Status: CANCELLED | OUTPATIENT
Start: 2022-07-29

## 2022-07-29 RX ORDER — DIPHENHYDRAMINE HYDROCHLORIDE 50 MG/ML
50 INJECTION INTRAMUSCULAR; INTRAVENOUS ONCE
Status: COMPLETED | OUTPATIENT
Start: 2022-07-29 | End: 2022-07-29

## 2022-07-29 RX ORDER — ONDANSETRON 2 MG/ML
8 INJECTION INTRAMUSCULAR; INTRAVENOUS
Status: CANCELLED | OUTPATIENT
Start: 2022-07-29

## 2022-07-29 RX ORDER — EPINEPHRINE 1 MG/ML
0.3 INJECTION, SOLUTION, CONCENTRATE INTRAVENOUS PRN
Status: CANCELLED | OUTPATIENT
Start: 2022-07-29

## 2022-07-29 RX ORDER — DIPHENHYDRAMINE HYDROCHLORIDE 50 MG/ML
50 INJECTION INTRAMUSCULAR; INTRAVENOUS ONCE
Status: CANCELLED
Start: 2022-07-29 | End: 2022-07-29

## 2022-07-29 RX ORDER — SODIUM CHLORIDE 0.9 % (FLUSH) 0.9 %
5-40 SYRINGE (ML) INJECTION PRN
Status: CANCELLED | OUTPATIENT
Start: 2022-07-29

## 2022-07-29 RX ORDER — ACETAMINOPHEN 325 MG/1
650 TABLET ORAL
Status: CANCELLED | OUTPATIENT
Start: 2022-07-29

## 2022-07-29 RX ORDER — ACETAMINOPHEN 325 MG/1
650 TABLET ORAL ONCE
Status: COMPLETED | OUTPATIENT
Start: 2022-07-29 | End: 2022-07-29

## 2022-07-29 RX ADMIN — DIPHENHYDRAMINE HYDROCHLORIDE 50 MG: 50 INJECTION, SOLUTION INTRAMUSCULAR; INTRAVENOUS at 09:43

## 2022-07-29 RX ADMIN — IMMUNE GLOBULIN (HUMAN) 30 G: 10 INJECTION INTRAVENOUS; SUBCUTANEOUS at 09:55

## 2022-07-29 RX ADMIN — HYDROCORTISONE SODIUM SUCCINATE 100 MG: 100 INJECTION, POWDER, FOR SOLUTION INTRAMUSCULAR; INTRAVENOUS at 09:43

## 2022-07-29 RX ADMIN — ACETAMINOPHEN 650 MG: 325 TABLET ORAL at 09:43

## 2022-07-29 ASSESSMENT — PAIN SCALES - GENERAL: PAINLEVEL_OUTOF10: 0

## 2022-07-29 NOTE — DISCHARGE INSTRUCTIONS
immune globulin (intravenous and subcutaneous)  Pronunciation: dinora LACEY foley  Brand: Gammagard Liquid, Gammaked, Gamunex-C  What is the most important information I should know about immune globulin? This medicine can cause blood clots. The risk is highest in older adults or in people who have had blood clots, heart problems, or blood circulation problems. Blood clots are also more likely during long-term bedrest, while using birth control pills or hormone replacement therapy, or while having a central intravenous (IV) catheter inplace. Call your doctor at once if you have chest pain, trouble breathing, fast heartbeats, numbness or weakness, or swelling and warmth or discoloration in anarm or leg. This medicine can also harm your kidneys, especially if you have kidney disease or if you also use certain medicines. Tell your doctor right away if you have signs of kidney problems, such as swelling, rapid weight gain, and little or no urination. What is immune globulin? Immune globulin intravenous and subcutaneous (for injection into a vein orunder the skin) is used to treat primary immunodeficiency. Immune globulin is also used to increase platelets (blood clotting cells) inpeople with idiopathic thrombocytopenic purpura. Immune globulin is also used to treat certain debilitating nerve disorders thatcause muscle weakness and can affect daily activities. Immune globulin may also be used for purposes not listed in this medicationguide. What should I discuss with my healthcare provider before using immune globulin? You should not use this medicine if:  you have had an allergic reaction to an immune globulin or blood product; or  you have immune globulin A (IgA) deficiency with antibody to IgA. Immune globulin can cause blood clots or kidney problems, especially in older adults or in people with certain conditions.  Tell yourdoctor if you have ever had:  heart problems, blood circulation problems, or \"thick blood\";  a stroke or blood clot;  kidney disease;  diabetes;  an infection called sepsis;  if you use estrogens (birth control pills or hormone replacement therapy);  if you have been on long-term bedrest; or  if you have a central intravenous (IV) catheter in place. You may need a dose adjustment if you are exposed to measles, or if you travelto an area where this disease is common. Tell your doctor if you are pregnant or breastfeeding. Immune globulin is made from donated human plasma and may contain viruses or other infectious agents. Donated plasma is tested and treated to reduce the risk of contamination, but there is still a small possibility it could transmitdisease. Ask your doctor about any possible risk. How should I use immune globulin? Follow all directions on your prescription label and read all medication guides or instruction sheets. Your doctor may occasionally change your dose. Use themedicine exactly as directed. Immune globulin is given as an infusion into a vein, or injected under the skin using an infusion pump. A healthcare provider will give your first dose and mayteach you how to properly use the medication by yourself. Do not inject immune globulin into a vein if you have been instructed to give the medicine as a subcutaneous injection (under the skin). How you give this medication, how often you use it, and the length of yourinfusion time will depend on the condition being treated. Read and carefully follow any Instructions for Use provided with your medicine. Ask your doctor or pharmacist if you don't understand all instructions. Prepare an injection only when you are ready to give it. Do not use if the medicine looks cloudy, has changed colors, or has particles in it. Call your pharmacist for new medicine. Do not shake the medication bottle or you may ruin the medicine. Immune globulin must be given slowly.  You may need to use several catheters to inject this medicine into different body areas at the same time. Your healthcare provider will show you the best places on your body to inject the medication. Keep a diary of the days and times you gave the injection and whereyou injected it on your body. Drink plenty of liquids  while you are using this medicine to help improve your blood flow and keepyour kidneys working properly. You may need frequent blood or urine tests. This medicine can affect the results of certain medical tests. Tell any doctorwho treats you that you are using immune globulin. Store this medicine in its original carton in the refrigerator. Do not freeze immune globulin, and throw the medicine away if it has frozen. Take the medicine out of the refrigerator and let it reach room temperature forup to 1 hour before injecting your dose. You may also store immune globulin at room temperature. You will need to use immune globulin within a certain number months. This will depend on the how you store the medicine (at room temperature, or in a refrigerator). Carefully follow the storage instructions provided with your medicine. Do not use the medicine after the expiration date on the label haspassed. Each vial (bottle) is for one use only. Throw it away after one use, even ifthere is still medicine left inside. Use disposable injection items (needle, catheter, tubing) only once and then place them in a puncture-proof \"sharps\" container. Follow state or local laws about how to dispose of this container. Keep it out of the reach of childrenand pets. What happens if I miss a dose? Call your doctor for instructions if you miss a dose. What happens if I overdose? Seek emergency medical attention or call the Poison Help line at 1-971.448.6495. What should I avoid while using immune globulin? Do not receive a \"live\" vaccine while using immune globulin. The vaccine may not work as well and may not fully protect you from disease.  Live vaccines include measles, mumps, rubella (MMR), rotavirus, typhoid, yellow fever, varicella (chickenpox), zoster (shingles), and nasal flu (influenza)vaccine. What are the possible side effects of immune globulin? Get emergency medical help if you have signs of an allergic reaction: hives; wheezing, difficulty breathing; dizziness, feeling like you might passout; swelling of your face, lips, tongue, or throat. Some side effects may occur during the injection. Tell your caregiver if you feel light-headed, itchy, chilled, sweaty, or have chest discomfort, fastheartbeats, severe headache, or pounding in your neck or ears. Call your doctor at once if you have:  a blood cell disorder --pale or yellowed skin, dark colored urine, fever, confusion or weakness;  dehydration symptoms --feeling very thirsty or hot, being unable to urinate, heavy sweating, or hot and dry skin;  kidney problems --little or no urination, swelling, rapid weight gain, feeling short of breath;  lung problems --chest pain, wheezing, trouble breathing, blue colored lips, fingers, or toes;  signs of a new infection --fever with a severe headache, neck stiffness, eye pain, and increased sensitivity to light; or  signs of a blood clot --shortness of breath, chest pain with deep breathing, rapid heart rate, numbness or weakness on one side of the body, swelling and warmth or discoloration in an arm or leg. Common side effects may include:  runny or stuffy nose, sinus pain, cough, sore throat;  fever, chills, weakness;  headache, back pain, muscle or joint pain;  dizziness, tiredness, depressed mood;  swelling in your hands or feet;  skin rash, redness, or bruising;  blisters or ulcers in your mouth, red or swollen gums, trouble swallowing;  nausea, diarrhea, stomach pain, upset stomach;  increased blood pressure; or  redness, swelling, or itching where an injection was given. This is not a complete list of side effects and others may occur.  Call your doctor for medical advice about side effects. You may report side effects toFDA at [phone]-[phone]. What other drugs will affect immune globulin? Immune globulin can harm your kidneys, especially if you also use certain medicines for infections, cancer, osteoporosis, organ transplant rejection, bowel disorders, high blood pressure,or pain or arthritis (including Advil, Motrin, and Aleve). Other drugs may affect immune globulin, including prescription and over-the-counter medicines, vitamins, and herbal products. Tell your doctorabout all your current medicines and any medicine you start or stop using. Where can I get more information? Your doctor or pharmacist can provide more information about immune globulin. Remember, keep this and all other medicines out of the reach of children, never share your medicines with others, and use this medication only for the indication prescribed. Every effort has been made to ensure that the information provided by [facility] [address] is accurate, up-to-date, and complete, but no guarantee is made to that effect. Drug information contained herein may be time sensitive. [employer] information has been compiled for use by healthcare practitioners and consumers in the [place] and therefore [employer] does not warrant that uses outside of the [place] are appropriate, unless specifically indicated otherwise. [employer]'s drug information does not endorse drugs, diagnose patients or recommend therapy. [facility] drug information is an informational resource designed to assist licensed healthcare practitioners in caring for their patients and/or to serve consumers viewing this service as a supplement to, and not a substitute for, the expertise, skill, knowledge and judgment of healthcare practitioners.  The absence of a warning for a given drug or drug combination in no way should be construed to indicate that the drug or drug combination is safe, effective or appropriate for any given patient. [facility] does not assume any responsibility for any aspect of healthcare administered with the aid of information [facility] provides. The information contained herein is not intended to cover all possible uses, directions, precautions, warnings, drug interactions, allergic reactions, or adverse effects. If you have questions about the drugs you are taking, check with yourdoctor, nurse or pharmacist.  Copyright 6573-3854 [address] [place] [address] Dr SEVERINO. Version: 4.02. Revision date: 2/26/2021. Care instructions adapted under license by [facility] ([facility]). If you have questions about a medical condition or this instruction, always ask your healthcare professional. [address] any warranty or liability for your use of this information.

## 2022-08-05 ENCOUNTER — HOSPITAL ENCOUNTER (OUTPATIENT)
Dept: INFUSION THERAPY | Age: 47
Setting detail: INFUSION SERIES
Discharge: HOME | End: 2022-08-05
Payer: MEDICARE

## 2022-08-05 VITALS
OXYGEN SATURATION: 99 % | DIASTOLIC BLOOD PRESSURE: 92 MMHG | SYSTOLIC BLOOD PRESSURE: 145 MMHG | HEART RATE: 51 BPM | WEIGHT: 166 LBS | BODY MASS INDEX: 27.62 KG/M2 | TEMPERATURE: 98.6 F | RESPIRATION RATE: 17 BRPM

## 2022-08-05 DIAGNOSIS — G35 MULTIPLE SCLEROSIS (HCC): Primary | ICD-10-CM

## 2022-08-05 PROCEDURE — 6360000002 HC RX W HCPCS: Performed by: PSYCHIATRY & NEUROLOGY

## 2022-08-05 PROCEDURE — 6370000000 HC RX 637 (ALT 250 FOR IP): Performed by: PSYCHIATRY & NEUROLOGY

## 2022-08-05 PROCEDURE — 96365 THER/PROPH/DIAG IV INF INIT: CPT

## 2022-08-05 PROCEDURE — 96366 THER/PROPH/DIAG IV INF ADDON: CPT

## 2022-08-05 RX ORDER — ONDANSETRON 2 MG/ML
8 INJECTION INTRAMUSCULAR; INTRAVENOUS
Status: CANCELLED | OUTPATIENT
Start: 2022-08-05

## 2022-08-05 RX ORDER — DIPHENHYDRAMINE HYDROCHLORIDE 50 MG/ML
50 INJECTION INTRAMUSCULAR; INTRAVENOUS ONCE
Status: CANCELLED
Start: 2022-08-05 | End: 2022-08-05

## 2022-08-05 RX ORDER — ACETAMINOPHEN 325 MG/1
650 TABLET ORAL ONCE
Status: COMPLETED | OUTPATIENT
Start: 2022-08-05 | End: 2022-08-05

## 2022-08-05 RX ORDER — DIPHENHYDRAMINE HYDROCHLORIDE 50 MG/ML
50 INJECTION INTRAMUSCULAR; INTRAVENOUS
Status: CANCELLED | OUTPATIENT
Start: 2022-08-05

## 2022-08-05 RX ORDER — SODIUM CHLORIDE 0.9 % (FLUSH) 0.9 %
5-40 SYRINGE (ML) INJECTION PRN
Status: CANCELLED | OUTPATIENT
Start: 2022-08-05

## 2022-08-05 RX ORDER — SODIUM CHLORIDE 0.9 % (FLUSH) 0.9 %
5-40 SYRINGE (ML) INJECTION PRN
Status: DISCONTINUED | OUTPATIENT
Start: 2022-08-05 | End: 2022-08-06 | Stop reason: HOSPADM

## 2022-08-05 RX ORDER — ACETAMINOPHEN 325 MG/1
650 TABLET ORAL
Status: CANCELLED | OUTPATIENT
Start: 2022-08-05

## 2022-08-05 RX ORDER — ALBUTEROL SULFATE 90 UG/1
4 AEROSOL, METERED RESPIRATORY (INHALATION) PRN
Status: CANCELLED | OUTPATIENT
Start: 2022-08-05

## 2022-08-05 RX ORDER — EPINEPHRINE 1 MG/ML
0.3 INJECTION, SOLUTION, CONCENTRATE INTRAVENOUS PRN
Status: CANCELLED | OUTPATIENT
Start: 2022-08-05

## 2022-08-05 RX ORDER — DIPHENHYDRAMINE HYDROCHLORIDE 50 MG/ML
50 INJECTION INTRAMUSCULAR; INTRAVENOUS ONCE
Status: COMPLETED | OUTPATIENT
Start: 2022-08-05 | End: 2022-08-05

## 2022-08-05 RX ORDER — ACETAMINOPHEN 325 MG/1
650 TABLET ORAL ONCE
Status: CANCELLED | OUTPATIENT
Start: 2022-08-05 | End: 2022-08-05

## 2022-08-05 RX ORDER — MEPERIDINE HYDROCHLORIDE 25 MG/ML
12.5 INJECTION INTRAMUSCULAR; INTRAVENOUS; SUBCUTANEOUS PRN
Status: CANCELLED | OUTPATIENT
Start: 2022-08-05

## 2022-08-05 RX ORDER — SODIUM CHLORIDE 9 MG/ML
INJECTION, SOLUTION INTRAVENOUS CONTINUOUS
Status: CANCELLED | OUTPATIENT
Start: 2022-08-05

## 2022-08-05 RX ADMIN — ACETAMINOPHEN 325MG 650 MG: 325 TABLET ORAL at 08:23

## 2022-08-05 RX ADMIN — DIPHENHYDRAMINE HYDROCHLORIDE 50 MG: 50 INJECTION, SOLUTION INTRAMUSCULAR; INTRAVENOUS at 08:24

## 2022-08-05 RX ADMIN — HYDROCORTISONE SODIUM SUCCINATE 100 MG: 100 INJECTION, POWDER, FOR SOLUTION INTRAMUSCULAR; INTRAVENOUS at 08:23

## 2022-08-05 RX ADMIN — IMMUNE GLOBULIN (HUMAN) 30 G: 10 INJECTION INTRAVENOUS; SUBCUTANEOUS at 08:57

## 2022-08-05 NOTE — DISCHARGE INSTRUCTIONS
immune globulin (intravenous) (IGIV)  Pronunciation: dinora foley  Brand:  Bivigam, Carimune, Flebogamma, Gammagard S/D, Gammaplex, Octagam, Panzyga,Privigen  What is the most important information I should know about immune globulin intravenous? This medicine can cause blood clots. The risk is highest in older adults or in people who have had blood clots, heart problems, or blood circulation problems. Blood clots are also more likely during long-term bedrest, while using birth control pills or hormone replacement therapy, or while having a central intravenous (IV) catheter inplace. Call your doctor at once if you have chest pain, trouble breathing, fast heartbeats, numbness or weakness, or swelling and warmth or discoloration in anarm or leg. This medicine can also harm your kidneys, especially if you have kidney disease or you also use certain medicines. Tell your doctor right away if you have signs of kidney problems, such as swelling, rapid weight gain, and little or no urination. What is immune globulin intravenous (IGIV)? Immune globulin intravenous (IGIV, for injection into a vein) is used to treatprimary immunodeficiency. IGIV is also used to increase platelets (blood clotting cells) in people withimmune thrombocytopenic purpura. IGIV is also used in to help prevent certain infections in people with B-cellchronic lymphocytic leukemia. IGIV is also used in people with Kawasaki syndrome, to prevent aneurysm causedby a weakening of the main artery in the heart. IGIV may also be used for purposes not listed in this medication guide. What should I discuss with my healthcare provider before using IGIV? You may not be able to use this medicine if:  you have had an allergic reaction to an immune globulin or blood product;  you have immune globulin A (IgA) deficiency with antibody to IgA; or  you are allergic to corn.   IGIV can cause blood clots or kidney problems, especially in older adults or in people with certain conditions. Tell yourdoctor if you have ever had:  heart problems, blood circulation problems, or \"thick blood\";  a stroke or blood clot;  kidney disease;  diabetes;  an infection called sepsis;  if you use estrogens (birth control pills or hormone replacement therapy);  if you have been on long-term bedrest; or  if you have a central intravenous (IV) catheter in place. You may need a dose adjustment if you are exposed to measles, or if you travelto an area where this disease is common. Tell your doctor if you are pregnant or breastfeeding. Immune globulin is made from donated human plasma and may contain viruses or other infectious agents. Donated plasma is tested and treated to reduce the risk of contamination, but there is still a small possibility it could transmitdisease. Ask your doctor about any possible risk. How should I use IGIV? IGIV is given as an infusion into a vein, usually once every 3 to 4 weeks. [facility] provider will give you this injection. Drink plenty of liquids  while you are using this medicine to help improve your blood flow and keepyour kidneys working properly. You may need frequent blood or urine tests. This medicine can affect the results of certain medical tests. Tell any doctorwho treats you that you are using IGIV. What happens if I miss a dose? Call your doctor for instructions if you miss an appointment for your IGIVinjection. What happens if I overdose? Seek emergency medical attention or call the Poison Help line at 1-620.648.5903. What should I avoid while using IGIV? Ask your doctor before receiving a \"live\" vaccine while using IGIV. The vaccine may not work as well and may not fully protect you from disease. Live vaccines include measles, mumps, rubella (MMR), rotavirus, typhoid, yellow fever, varicella (chickenpox), zoster (shingles), and nasal flu (influenza)vaccine. What are the possible side effects of IGIV?   Get emergency medical help if you have signs of an allergic reaction: hives; difficult breathing; swelling of your face, lips, tongue, or throat. Some side effects may occur during the injection. Tell your caregiver if you feel dizzy, nauseated, light-headed, sweaty, or have a headache, pounding in your neck or ears, fever, chills, chest tightness, or warmth or redness in yourface. Call your doctor at once if you have:  a blood cell disorder --pale or yellowed skin, dark colored urine, fever, confusion or weakness;  dehydration symptoms --feeling very thirsty or hot, being unable to urinate, heavy sweating, or hot and dry skin;  kidney problems --little or no urination, swelling, rapid weight gain, feeling short of breath;  lung problems --chest pain, trouble breathing, blue colored lips, fingers, or toes;  signs of a new infection --fever with a severe headache, neck stiffness, eye pain, and increased sensitivity to light; or  signs of a blood clot --shortness of breath, chest pain with deep breathing, rapid heart rate, numbness or weakness on one side of the body, swelling and warmth or discoloration in an arm or leg. Common side effects may include:  headache, back pain, joint pain;  fever, chills, sweating, warmth or tingling;  stomach pain, nausea, diarrhea;  increased blood pressure, fast heartbeats;  dizziness, tiredness, lack of energy;  stuffy nose, sinus pain; or  pain, swelling, burning, or irritation around the IV needle. This is not a complete list of side effects and others may occur. Call your doctor for medical advice about side effects. You may report side effects toFDA at 8-632-FDA-8922. What other drugs will affect IGIV? IGIV can harm your kidneys, especially if you also use certain medicines for infections, cancer, osteoporosis, organ transplant rejection, bowel disorders, high blood pressure,or pain or arthritis (including Advil, Motrin, and Aleve).   Other drugs may affect IGIV, including prescription and over-the-counter medicines, vitamins, and herbal products. Tell your doctor about all yourcurrent medicines and any medicine you start or stop using. Where can I get more information? Your pharmacist can provide more information about immune globulin intravenous. Remember, keep this and all other medicines out of the reach of children, never share your medicines with others, and use this medication only for the indication prescribed. Every effort has been made to ensure that the information provided by [address] Laura Dom[address]uez [address] is accurate, up-to-date, and complete, but no guarantee is made to that effect. Drug information contained herein may be time sensitive. [facility] information has been compiled for use by healthcare practitioners and consumers in the [place] and therefore [facility] does not warrant that uses outside of the [place] are appropriate, unless specifically indicated otherwise. [facility]'s drug information does not endorse drugs, diagnose patients or recommend therapy. [facility]'s drug information is an informational resource designed to assist licensed healthcare practitioners in caring for their patients and/or to serve consumers viewing this service as a supplement to, and not a substitute for, the expertise, skill, knowledge and judgment of healthcare practitioners. The absence of a warning for a given drug or drug combination in no way should be construed to indicate that the drug or drug combination is safe, effective or appropriate for any given patient. [facility] does not assume any responsibility for any aspect of healthcare administered with the aid of information [facility] provides. The information contained herein is not intended to cover all possible uses, directions, precautions, warnings, drug interactions, allergic reactions, or adverse effects.  If you have questions about the drugs you are taking, check with yourdoctor, nurse or pharmacist.  Copyright 6699-7765 [employer] Version: 6.02. Revision date: 2/26/2021. Care instructions adapted under license by [facility] ([facility]). If you have questions about a medical condition or this instruction, always ask your healthcare professional. [address] any warranty or liability for your use of this information.

## 2022-08-12 ENCOUNTER — HOSPITAL ENCOUNTER (OUTPATIENT)
Dept: INFUSION THERAPY | Age: 47
Setting detail: INFUSION SERIES
Discharge: HOME | End: 2022-08-12
Payer: MEDICARE

## 2022-08-12 VITALS
SYSTOLIC BLOOD PRESSURE: 113 MMHG | RESPIRATION RATE: 20 BRPM | BODY MASS INDEX: 28.29 KG/M2 | DIASTOLIC BLOOD PRESSURE: 75 MMHG | OXYGEN SATURATION: 100 % | WEIGHT: 170 LBS | HEART RATE: 54 BPM | TEMPERATURE: 97.6 F

## 2022-08-12 DIAGNOSIS — G35 MULTIPLE SCLEROSIS (HCC): Primary | ICD-10-CM

## 2022-08-12 PROCEDURE — 96375 TX/PRO/DX INJ NEW DRUG ADDON: CPT

## 2022-08-12 PROCEDURE — 96366 THER/PROPH/DIAG IV INF ADDON: CPT

## 2022-08-12 PROCEDURE — 6360000002 HC RX W HCPCS: Performed by: PSYCHIATRY & NEUROLOGY

## 2022-08-12 PROCEDURE — 96365 THER/PROPH/DIAG IV INF INIT: CPT

## 2022-08-12 PROCEDURE — 6370000000 HC RX 637 (ALT 250 FOR IP): Performed by: PSYCHIATRY & NEUROLOGY

## 2022-08-12 RX ORDER — SODIUM CHLORIDE 9 MG/ML
INJECTION, SOLUTION INTRAVENOUS CONTINUOUS
Status: CANCELLED | OUTPATIENT
Start: 2022-08-12

## 2022-08-12 RX ORDER — DIPHENHYDRAMINE HYDROCHLORIDE 50 MG/ML
50 INJECTION INTRAMUSCULAR; INTRAVENOUS ONCE
Status: COMPLETED | OUTPATIENT
Start: 2022-08-12 | End: 2022-08-12

## 2022-08-12 RX ORDER — SODIUM CHLORIDE 0.9 % (FLUSH) 0.9 %
5-40 SYRINGE (ML) INJECTION PRN
Status: CANCELLED | OUTPATIENT
Start: 2022-08-12

## 2022-08-12 RX ORDER — ACETAMINOPHEN 325 MG/1
650 TABLET ORAL
Status: CANCELLED | OUTPATIENT
Start: 2022-08-12

## 2022-08-12 RX ORDER — DIPHENHYDRAMINE HYDROCHLORIDE 50 MG/ML
50 INJECTION INTRAMUSCULAR; INTRAVENOUS ONCE
Status: CANCELLED
Start: 2022-08-12 | End: 2022-08-12

## 2022-08-12 RX ORDER — ACETAMINOPHEN 325 MG/1
650 TABLET ORAL ONCE
Status: CANCELLED | OUTPATIENT
Start: 2022-08-12 | End: 2022-08-12

## 2022-08-12 RX ORDER — DIPHENHYDRAMINE HYDROCHLORIDE 50 MG/ML
50 INJECTION INTRAMUSCULAR; INTRAVENOUS
Status: CANCELLED | OUTPATIENT
Start: 2022-08-12

## 2022-08-12 RX ORDER — ONDANSETRON 2 MG/ML
8 INJECTION INTRAMUSCULAR; INTRAVENOUS
Status: CANCELLED | OUTPATIENT
Start: 2022-08-12

## 2022-08-12 RX ORDER — ALBUTEROL SULFATE 90 UG/1
4 AEROSOL, METERED RESPIRATORY (INHALATION) PRN
Status: CANCELLED | OUTPATIENT
Start: 2022-08-12

## 2022-08-12 RX ORDER — ACETAMINOPHEN 325 MG/1
650 TABLET ORAL ONCE
Status: COMPLETED | OUTPATIENT
Start: 2022-08-12 | End: 2022-08-12

## 2022-08-12 RX ORDER — MEPERIDINE HYDROCHLORIDE 25 MG/ML
12.5 INJECTION INTRAMUSCULAR; INTRAVENOUS; SUBCUTANEOUS PRN
Status: CANCELLED | OUTPATIENT
Start: 2022-08-12

## 2022-08-12 RX ORDER — EPINEPHRINE 1 MG/ML
0.3 INJECTION, SOLUTION, CONCENTRATE INTRAVENOUS PRN
Status: CANCELLED | OUTPATIENT
Start: 2022-08-12

## 2022-08-12 RX ADMIN — DIPHENHYDRAMINE HYDROCHLORIDE 50 MG: 50 INJECTION, SOLUTION INTRAMUSCULAR; INTRAVENOUS at 08:37

## 2022-08-12 RX ADMIN — HYDROCORTISONE SODIUM SUCCINATE 100 MG: 100 INJECTION, POWDER, FOR SOLUTION INTRAMUSCULAR; INTRAVENOUS at 08:37

## 2022-08-12 RX ADMIN — IMMUNE GLOBULIN (HUMAN) 30 G: 10 INJECTION INTRAVENOUS; SUBCUTANEOUS at 08:52

## 2022-08-12 RX ADMIN — ACETAMINOPHEN 650 MG: 325 TABLET, FILM COATED ORAL at 08:34

## 2022-08-12 NOTE — DISCHARGE INSTRUCTIONS
immune globulin (intravenous and subcutaneous)  Pronunciation: dinora LACEY foley  Brand: Gammagard Liquid, Gammaked, Gamunex-C  What is the most important information I should know about immune globulin? This medicine can cause blood clots. The risk is highest in older adults or in people who have had blood clots, heart problems, or blood circulation problems. Blood clots are also more likely during long-term bedrest, while using birth control pills or hormone replacement therapy, or while having a central intravenous (IV) catheter inplace. Call your doctor at once if you have chest pain, trouble breathing, fast heartbeats, numbness or weakness, or swelling and warmth or discoloration in anarm or leg. This medicine can also harm your kidneys, especially if you have kidney disease or if you also use certain medicines. Tell your doctor right away if you have signs of kidney problems, such as swelling, rapid weight gain, and little or no urination. What is immune globulin? Immune globulin intravenous and subcutaneous (for injection into a vein orunder the skin) is used to treat primary immunodeficiency. Immune globulin is also used to increase platelets (blood clotting cells) inpeople with idiopathic thrombocytopenic purpura. Immune globulin is also used to treat certain debilitating nerve disorders thatcause muscle weakness and can affect daily activities. Immune globulin may also be used for purposes not listed in this medicationguide. What should I discuss with my healthcare provider before using immune globulin? You should not use this medicine if:  you have had an allergic reaction to an immune globulin or blood product; or  you have immune globulin A (IgA) deficiency with antibody to IgA. Immune globulin can cause blood clots or kidney problems, especially in older adults or in people with certain conditions.  Tell yourdoctor if you have ever had:  heart problems, blood circulation problems, or \"thick blood\";  a stroke or blood clot;  kidney disease;  diabetes;  an infection called sepsis;  if you use estrogens (birth control pills or hormone replacement therapy);  if you have been on long-term bedrest; or  if you have a central intravenous (IV) catheter in place. You may need a dose adjustment if you are exposed to measles, or if you travelto an area where this disease is common. Tell your doctor if you are pregnant or breastfeeding. Immune globulin is made from donated human plasma and may contain viruses or other infectious agents. Donated plasma is tested and treated to reduce the risk of contamination, but there is still a small possibility it could transmitdisease. Ask your doctor about any possible risk. How should I use immune globulin? Follow all directions on your prescription label and read all medication guides or instruction sheets. Your doctor may occasionally change your dose. Use themedicine exactly as directed. Immune globulin is given as an infusion into a vein, or injected under the skin using an infusion pump. A healthcare provider will give your first dose and mayteach you how to properly use the medication by yourself. Do not inject immune globulin into a vein if you have been instructed to give the medicine as a subcutaneous injection (under the skin). How you give this medication, how often you use it, and the length of yourinfusion time will depend on the condition being treated. Read and carefully follow any Instructions for Use provided with your medicine. Ask your doctor or pharmacist if you don't understand all instructions. Prepare an injection only when you are ready to give it. Do not use if the medicine looks cloudy, has changed colors, or has particles in it. Call your pharmacist for new medicine. Do not shake the medication bottle or you may ruin the medicine. Immune globulin must be given slowly.  You may need to use several catheters to inject this medicine into different body areas at the same time. Your healthcare provider will show you the best places on your body to inject the medication. Keep a diary of the days and times you gave the injection and whereyou injected it on your body. Drink plenty of liquids  while you are using this medicine to help improve your blood flow and keepyour kidneys working properly. You may need frequent blood or urine tests. This medicine can affect the results of certain medical tests. Tell any doctorwho treats you that you are using immune globulin. Store this medicine in its original carton in the refrigerator. Do not freeze immune globulin, and throw the medicine away if it has frozen. Take the medicine out of the refrigerator and let it reach room temperature forup to 1 hour before injecting your dose. You may also store immune globulin at room temperature. You will need to use immune globulin within a certain number months. This will depend on the how you store the medicine (at room temperature, or in a refrigerator). Carefully follow the storage instructions provided with your medicine. Do not use the medicine after the expiration date on the label haspassed. Each vial (bottle) is for one use only. Throw it away after one use, even ifthere is still medicine left inside. Use disposable injection items (needle, catheter, tubing) only once and then place them in a puncture-proof \"sharps\" container. Follow state or local laws about how to dispose of this container. Keep it out of the reach of childrenand pets. What happens if I miss a dose? Call your doctor for instructions if you miss a dose. What happens if I overdose? Seek emergency medical attention or call the Poison Help line at 1-518.114.3242. What should I avoid while using immune globulin? Do not receive a \"live\" vaccine while using immune globulin. The vaccine may not work as well and may not fully protect you from disease.  Live vaccines include measles, mumps, rubella (MMR), rotavirus, typhoid, yellow fever, varicella (chickenpox), zoster (shingles), and nasal flu (influenza)vaccine. What are the possible side effects of immune globulin? Get emergency medical help if you have signs of an allergic reaction: hives; wheezing, difficulty breathing; dizziness, feeling like you might passout; swelling of your face, lips, tongue, or throat. Some side effects may occur during the injection. Tell your caregiver if you feel light-headed, itchy, chilled, sweaty, or have chest discomfort, fastheartbeats, severe headache, or pounding in your neck or ears. Call your doctor at once if you have:  a blood cell disorder --pale or yellowed skin, dark colored urine, fever, confusion or weakness;  dehydration symptoms --feeling very thirsty or hot, being unable to urinate, heavy sweating, or hot and dry skin;  kidney problems --little or no urination, swelling, rapid weight gain, feeling short of breath;  lung problems --chest pain, wheezing, trouble breathing, blue colored lips, fingers, or toes;  signs of a new infection --fever with a severe headache, neck stiffness, eye pain, and increased sensitivity to light; or  signs of a blood clot --shortness of breath, chest pain with deep breathing, rapid heart rate, numbness or weakness on one side of the body, swelling and warmth or discoloration in an arm or leg. Common side effects may include:  runny or stuffy nose, sinus pain, cough, sore throat;  fever, chills, weakness;  headache, back pain, muscle or joint pain;  dizziness, tiredness, depressed mood;  swelling in your hands or feet;  skin rash, redness, or bruising;  blisters or ulcers in your mouth, red or swollen gums, trouble swallowing;  nausea, diarrhea, stomach pain, upset stomach;  increased blood pressure; or  redness, swelling, or itching where an injection was given. This is not a complete list of side effects and others may occur.  Call your doctor for medical advice about side effects. You may report side effects toFDA at 2-242-VVU-2265. What other drugs will affect immune globulin? Immune globulin can harm your kidneys, especially if you also use certain medicines for infections, cancer, osteoporosis, organ transplant rejection, bowel disorders, high blood pressure,or pain or arthritis (including Advil, Motrin, and Aleve). Other drugs may affect immune globulin, including prescription and over-the-counter medicines, vitamins, and herbal products. Tell your doctorabout all your current medicines and any medicine you start or stop using. Where can I get more information? Your doctor or pharmacist can provide more information about immune globulin. Remember, keep this and all other medicines out of the reach of children, never share your medicines with others, and use this medication only for the indication prescribed. Every effort has been made to ensure that the information provided by [address] Laura Dom[address]nguez Dr is accurate, up-to-date, and complete, but no guarantee is made to that effect. Drug information contained herein may be time sensitive. [facility] information has been compiled for use by healthcare practitioners and consumers in the [facility] and therefore [facility] does not warrant that uses outside of the [facility] are appropriate, unless specifically indicated otherwise. [facility]'s drug information does not endorse drugs, diagnose patients or recommend therapy. [facility] drug information is an informational resource designed to assist licensed healthcare practitioners in caring for their patients and/or to serve consumers viewing this service as a supplement to, and not a substitute for, the expertise, skill, knowledge and judgment of healthcare practitioners.  The absence of a warning for a given drug or drug combination in no way should be construed to indicate that the drug or drug combination is safe, effective or appropriate for any given patient. [facility] does not assume any responsibility for any aspect of healthcare administered with the aid of information [facility] provides. The information contained herein is not intended to cover all possible uses, directions, precautions, warnings, drug interactions, allergic reactions, or adverse effects. If you have questions about the drugs you are taking, check with yourdoctor, nurse or pharmacist.  Copyright 2739-4612 [address] [place] [address] Dr SEVERINO. Version: 4.02. Revision date: 2/26/2021. Care instructions adapted under license by [facility] ([facility]). If you have questions about a medical condition or this instruction, always ask your healthcare professional. [place] [address] any warranty or liability for your use of this information.

## 2022-08-19 ENCOUNTER — HOSPITAL ENCOUNTER (OUTPATIENT)
Dept: INFUSION THERAPY | Age: 47
Setting detail: INFUSION SERIES
Discharge: HOME | End: 2022-08-19
Payer: MEDICARE

## 2022-08-19 VITALS
OXYGEN SATURATION: 94 % | BODY MASS INDEX: 29.09 KG/M2 | HEART RATE: 52 BPM | RESPIRATION RATE: 18 BRPM | WEIGHT: 174.8 LBS | SYSTOLIC BLOOD PRESSURE: 137 MMHG | TEMPERATURE: 97.6 F | DIASTOLIC BLOOD PRESSURE: 92 MMHG

## 2022-08-19 DIAGNOSIS — G35 MULTIPLE SCLEROSIS (HCC): Primary | ICD-10-CM

## 2022-08-19 PROCEDURE — 6360000002 HC RX W HCPCS: Performed by: PSYCHIATRY & NEUROLOGY

## 2022-08-19 PROCEDURE — 96375 TX/PRO/DX INJ NEW DRUG ADDON: CPT

## 2022-08-19 PROCEDURE — 6370000000 HC RX 637 (ALT 250 FOR IP): Performed by: PSYCHIATRY & NEUROLOGY

## 2022-08-19 PROCEDURE — 96365 THER/PROPH/DIAG IV INF INIT: CPT

## 2022-08-19 PROCEDURE — 96366 THER/PROPH/DIAG IV INF ADDON: CPT

## 2022-08-19 RX ORDER — SODIUM CHLORIDE 9 MG/ML
INJECTION, SOLUTION INTRAVENOUS CONTINUOUS
Status: CANCELLED | OUTPATIENT
Start: 2022-08-19

## 2022-08-19 RX ORDER — ACETAMINOPHEN 325 MG/1
650 TABLET ORAL ONCE
Status: CANCELLED | OUTPATIENT
Start: 2022-08-19 | End: 2022-08-19

## 2022-08-19 RX ORDER — DIPHENHYDRAMINE HYDROCHLORIDE 50 MG/ML
50 INJECTION INTRAMUSCULAR; INTRAVENOUS ONCE
Status: CANCELLED
Start: 2022-08-19 | End: 2022-08-19

## 2022-08-19 RX ORDER — ALBUTEROL SULFATE 90 UG/1
4 AEROSOL, METERED RESPIRATORY (INHALATION) PRN
Status: CANCELLED | OUTPATIENT
Start: 2022-08-19

## 2022-08-19 RX ORDER — DIPHENHYDRAMINE HYDROCHLORIDE 50 MG/ML
50 INJECTION INTRAMUSCULAR; INTRAVENOUS ONCE
Status: COMPLETED | OUTPATIENT
Start: 2022-08-19 | End: 2022-08-19

## 2022-08-19 RX ORDER — MEPERIDINE HYDROCHLORIDE 25 MG/ML
12.5 INJECTION INTRAMUSCULAR; INTRAVENOUS; SUBCUTANEOUS PRN
Status: CANCELLED | OUTPATIENT
Start: 2022-08-19

## 2022-08-19 RX ORDER — ONDANSETRON 2 MG/ML
8 INJECTION INTRAMUSCULAR; INTRAVENOUS
Status: CANCELLED | OUTPATIENT
Start: 2022-08-19

## 2022-08-19 RX ORDER — EPINEPHRINE 1 MG/ML
0.3 INJECTION, SOLUTION, CONCENTRATE INTRAVENOUS PRN
Status: CANCELLED | OUTPATIENT
Start: 2022-08-19

## 2022-08-19 RX ORDER — DIPHENHYDRAMINE HYDROCHLORIDE 50 MG/ML
50 INJECTION INTRAMUSCULAR; INTRAVENOUS
Status: CANCELLED | OUTPATIENT
Start: 2022-08-19

## 2022-08-19 RX ORDER — SODIUM CHLORIDE 0.9 % (FLUSH) 0.9 %
5-40 SYRINGE (ML) INJECTION PRN
Status: CANCELLED | OUTPATIENT
Start: 2022-08-19

## 2022-08-19 RX ORDER — SODIUM CHLORIDE 0.9 % (FLUSH) 0.9 %
5-40 SYRINGE (ML) INJECTION PRN
Status: DISCONTINUED | OUTPATIENT
Start: 2022-08-19 | End: 2022-08-20 | Stop reason: HOSPADM

## 2022-08-19 RX ORDER — ACETAMINOPHEN 325 MG/1
650 TABLET ORAL ONCE
Status: COMPLETED | OUTPATIENT
Start: 2022-08-19 | End: 2022-08-19

## 2022-08-19 RX ORDER — ACETAMINOPHEN 325 MG/1
650 TABLET ORAL
Status: CANCELLED | OUTPATIENT
Start: 2022-08-19

## 2022-08-19 RX ADMIN — ACETAMINOPHEN 650 MG: 325 TABLET, FILM COATED ORAL at 09:14

## 2022-08-19 RX ADMIN — IMMUNE GLOBULIN (HUMAN) 30 G: 10 INJECTION INTRAVENOUS; SUBCUTANEOUS at 09:38

## 2022-08-19 RX ADMIN — DIPHENHYDRAMINE HYDROCHLORIDE 50 MG: 50 INJECTION, SOLUTION INTRAMUSCULAR; INTRAVENOUS at 09:14

## 2022-08-19 RX ADMIN — HYDROCORTISONE SODIUM SUCCINATE 100 MG: 100 INJECTION, POWDER, FOR SOLUTION INTRAMUSCULAR; INTRAVENOUS at 09:14

## 2022-08-19 ASSESSMENT — PAIN SCALES - GENERAL: PAINLEVEL_OUTOF10: 0

## 2022-08-19 NOTE — DISCHARGE INSTRUCTIONS
immune globulin (intravenous and subcutaneous)  Pronunciation: dinora LACEY foley  Brand: Gammagard Liquid, Gammaked, Gamunex-C  What is the most important information I should know about immune globulin? This medicine can cause blood clots. The risk is highest in older adults or in people who have had blood clots, heart problems, or blood circulation problems. Blood clots are also more likely during long-term bedrest, while using birth control pills or hormone replacement therapy, or while having a central intravenous (IV) catheter inplace. Call your doctor at once if you have chest pain, trouble breathing, fast heartbeats, numbness or weakness, or swelling and warmth or discoloration in anarm or leg. This medicine can also harm your kidneys, especially if you have kidney disease or if you also use certain medicines. Tell your doctor right away if you have signs of kidney problems, such as swelling, rapid weight gain, and little or no urination. What is immune globulin? Immune globulin intravenous and subcutaneous (for injection into a vein orunder the skin) is used to treat primary immunodeficiency. Immune globulin is also used to increase platelets (blood clotting cells) inpeople with idiopathic thrombocytopenic purpura. Immune globulin is also used to treat certain debilitating nerve disorders thatcause muscle weakness and can affect daily activities. Immune globulin may also be used for purposes not listed in this medicationguide. What should I discuss with my healthcare provider before using immune globulin? You should not use this medicine if:  you have had an allergic reaction to an immune globulin or blood product; or  you have immune globulin A (IgA) deficiency with antibody to IgA. Immune globulin can cause blood clots or kidney problems, especially in older adults or in people with certain conditions.  Tell yourdoctor if you have ever had:  heart problems, blood circulation problems, or \"thick blood\";  a stroke or blood clot;  kidney disease;  diabetes;  an infection called sepsis;  if you use estrogens (birth control pills or hormone replacement therapy);  if you have been on long-term bedrest; or  if you have a central intravenous (IV) catheter in place. You may need a dose adjustment if you are exposed to measles, or if you travelto an area where this disease is common. Tell your doctor if you are pregnant or breastfeeding. Immune globulin is made from donated human plasma and may contain viruses or other infectious agents. Donated plasma is tested and treated to reduce the risk of contamination, but there is still a small possibility it could transmitdisease. Ask your doctor about any possible risk. How should I use immune globulin? Follow all directions on your prescription label and read all medication guides or instruction sheets. Your doctor may occasionally change your dose. Use themedicine exactly as directed. Immune globulin is given as an infusion into a vein, or injected under the skin using an infusion pump. A healthcare provider will give your first dose and mayteach you how to properly use the medication by yourself. Do not inject immune globulin into a vein if you have been instructed to give the medicine as a subcutaneous injection (under the skin). How you give this medication, how often you use it, and the length of yourinfusion time will depend on the condition being treated. Read and carefully follow any Instructions for Use provided with your medicine. Ask your doctor or pharmacist if you don't understand all instructions. Prepare an injection only when you are ready to give it. Do not use if the medicine looks cloudy, has changed colors, or has particles in it. Call your pharmacist for new medicine. Do not shake the medication bottle or you may ruin the medicine. Immune globulin must be given slowly.  You may need to use several catheters to inject this medicine into different body areas at the same time. Your healthcare provider will show you the best places on your body to inject the medication. Keep a diary of the days and times you gave the injection and whereyou injected it on your body. Drink plenty of liquids  while you are using this medicine to help improve your blood flow and keepyour kidneys working properly. You may need frequent blood or urine tests. This medicine can affect the results of certain medical tests. Tell any doctorwho treats you that you are using immune globulin. Store this medicine in its original carton in the refrigerator. Do not freeze immune globulin, and throw the medicine away if it has frozen. Take the medicine out of the refrigerator and let it reach room temperature forup to 1 hour before injecting your dose. You may also store immune globulin at room temperature. You will need to use immune globulin within a certain number months. This will depend on the how you store the medicine (at room temperature, or in a refrigerator). Carefully follow the storage instructions provided with your medicine. Do not use the medicine after the expiration date on the label haspassed. Each vial (bottle) is for one use only. Throw it away after one use, even ifthere is still medicine left inside. Use disposable injection items (needle, catheter, tubing) only once and then place them in a puncture-proof \"sharps\" container. Follow state or local laws about how to dispose of this container. Keep it out of the reach of childrenand pets. What happens if I miss a dose? Call your doctor for instructions if you miss a dose. What happens if I overdose? Seek emergency medical attention or call the Poison Help line at 1-735.413.6188. What should I avoid while using immune globulin? Do not receive a \"live\" vaccine while using immune globulin. The vaccine may not work as well and may not fully protect you from disease.  Live vaccines include measles, mumps, rubella (MMR), rotavirus, typhoid, yellow fever, varicella (chickenpox), zoster (shingles), and nasal flu (influenza)vaccine. What are the possible side effects of immune globulin? Get emergency medical help if you have signs of an allergic reaction: hives; wheezing, difficulty breathing; dizziness, feeling like you might passout; swelling of your face, lips, tongue, or throat. Some side effects may occur during the injection. Tell your caregiver if you feel light-headed, itchy, chilled, sweaty, or have chest discomfort, fastheartbeats, severe headache, or pounding in your neck or ears. Call your doctor at once if you have:  a blood cell disorder --pale or yellowed skin, dark colored urine, fever, confusion or weakness;  dehydration symptoms --feeling very thirsty or hot, being unable to urinate, heavy sweating, or hot and dry skin;  kidney problems --little or no urination, swelling, rapid weight gain, feeling short of breath;  lung problems --chest pain, wheezing, trouble breathing, blue colored lips, fingers, or toes;  signs of a new infection --fever with a severe headache, neck stiffness, eye pain, and increased sensitivity to light; or  signs of a blood clot --shortness of breath, chest pain with deep breathing, rapid heart rate, numbness or weakness on one side of the body, swelling and warmth or discoloration in an arm or leg. Common side effects may include:  runny or stuffy nose, sinus pain, cough, sore throat;  fever, chills, weakness;  headache, back pain, muscle or joint pain;  dizziness, tiredness, depressed mood;  swelling in your hands or feet;  skin rash, redness, or bruising;  blisters or ulcers in your mouth, red or swollen gums, trouble swallowing;  nausea, diarrhea, stomach pain, upset stomach;  increased blood pressure; or  redness, swelling, or itching where an injection was given. This is not a complete list of side effects and others may occur.  Call your doctor for medical advice about side effects. You may report side effects toFDA at [phone]-QHO-[phone]. What other drugs will affect immune globulin? Immune globulin can harm your kidneys, especially if you also use certain medicines for infections, cancer, osteoporosis, organ transplant rejection, bowel disorders, high blood pressure,or pain or arthritis (including Advil, Motrin, and Aleve). Other drugs may affect immune globulin, including prescription and over-the-counter medicines, vitamins, and herbal products. Tell your doctorabout all your current medicines and any medicine you start or stop using. Where can I get more information? Your doctor or pharmacist can provide more information about immune globulin. Remember, keep this and all other medicines out of the reach of children, never share your medicines with others, and use this medication only for the indication prescribed. Every effort has been made to ensure that the information provided by [address] is accurate, up-to-date, and complete, but no guarantee is made to that effect. Drug information contained herein may be time sensitive. [employer] information has been compiled for use by healthcare practitioners and consumers in the [place] and therefore [employer] does not warrant that uses outside of the [place] are appropriate, unless specifically indicated otherwise. [employer] drug information does not endorse drugs, diagnose patients or recommend therapy. [employer] drug information is an informational resource designed to assist licensed healthcare practitioners in caring for their patients and/or to serve consumers viewing this service as a supplement to, and not a substitute for, the expertise, skill, knowledge and judgment of healthcare practitioners.  The absence of a warning for a given drug or drug combination in no way should be construed to indicate that the drug or drug combination is safe, effective or appropriate for any given patient. [facility] does not assume any responsibility for any aspect of healthcare administered with the aid of information [facility] provides. The information contained herein is not intended to cover all possible uses, directions, precautions, warnings, drug interactions, allergic reactions, or adverse effects. If you have questions about the drugs you are taking, check with yourdoctor, nurse or pharmacist.  Copyright [id number] [facility] [place]. Version: 4.02. Revision date: 2/26/2021. Care instructions adapted under license by [facility] ([facility]). If you have questions about a medical condition or this instruction, always ask your healthcare professional. [address] any warranty or liability for your use of this information.

## 2022-08-24 ENCOUNTER — OFFICE VISIT (OUTPATIENT)
Dept: NEUROLOGY | Age: 47
End: 2022-08-24
Payer: MEDICARE

## 2022-08-24 VITALS
HEIGHT: 65 IN | WEIGHT: 174 LBS | DIASTOLIC BLOOD PRESSURE: 91 MMHG | HEART RATE: 58 BPM | SYSTOLIC BLOOD PRESSURE: 134 MMHG | BODY MASS INDEX: 28.99 KG/M2

## 2022-08-24 DIAGNOSIS — R53.1 WEAKNESS: ICD-10-CM

## 2022-08-24 DIAGNOSIS — M32.9 LUPUS (HCC): ICD-10-CM

## 2022-08-24 DIAGNOSIS — R25.1 TREMOR: ICD-10-CM

## 2022-08-24 DIAGNOSIS — R20.0 NUMBNESS: ICD-10-CM

## 2022-08-24 DIAGNOSIS — M54.30 SCIATICA, UNSPECIFIED LATERALITY: ICD-10-CM

## 2022-08-24 DIAGNOSIS — M79.7 FIBROMYALGIA: ICD-10-CM

## 2022-08-24 DIAGNOSIS — G43.719 INTRACTABLE CHRONIC MIGRAINE WITHOUT AURA AND WITHOUT STATUS MIGRAINOSUS: ICD-10-CM

## 2022-08-24 DIAGNOSIS — G35 MULTIPLE SCLEROSIS (HCC): Primary | ICD-10-CM

## 2022-08-24 DIAGNOSIS — H53.9 VISUAL DISTURBANCE: ICD-10-CM

## 2022-08-24 PROCEDURE — 99214 OFFICE O/P EST MOD 30 MIN: CPT | Performed by: PSYCHIATRY & NEUROLOGY

## 2022-08-24 PROCEDURE — G8419 CALC BMI OUT NRM PARAM NOF/U: HCPCS | Performed by: PSYCHIATRY & NEUROLOGY

## 2022-08-24 PROCEDURE — G8428 CUR MEDS NOT DOCUMENT: HCPCS | Performed by: PSYCHIATRY & NEUROLOGY

## 2022-08-24 PROCEDURE — 4004F PT TOBACCO SCREEN RCVD TLK: CPT | Performed by: PSYCHIATRY & NEUROLOGY

## 2022-08-24 RX ORDER — LURASIDONE HYDROCHLORIDE 40 MG/1
40 TABLET, FILM COATED ORAL DAILY
COMMUNITY
Start: 2022-08-22

## 2022-08-24 NOTE — PROGRESS NOTES
Chief Complaint   Patient presents with    Multiple Sclerosis     Patient would like to discuss getting a port due to OPIT mentioning it to her          Kathi Meyer is a 42y.o. year old female who is seen for evaluation of follow for multiple sclerosis following an admission in 5/19 for optic neuritis treated with IV solumedrol. The patient has a history of multiple sclerosis, fibromyalgia and lupus along with chronic headaches was seen at that time for left visual change which began about 2 days prior to admission with eye pain. This was quite similar to her previous episodes of optic neuritis, of which she has had about 4 previous episodes treated with steroids. She was tried on oral steroids as an outpatient but this was not effective and was transferred from outside ER for further management. She has some chronic weakness and numbness and weakness on the left. She does have bowel and bladder incontinence. She was on Avonex. Had not followed up with Dr Elvia Heath since 2017. She did not follow up in the office since 5/19. On 7/4/20 she was in the ER with generalized body aches. She apparently had some trembling on the right side. She had an MRI of the brain in the ER which revealed no acute ischemic change but did have scattered non enhancing white matter lesions in periventricular and white matter. She was given 1 gm Solumedrol for a possible flair up and told to follow up with neurology. She indicates that she typically does relatively well for a few weeks and then she feels severely fatigued or worsening vision. She had been in a wheelchair in 2017. She had her initial testing in 2017 at [facility] which was significant for non enhancing white matter lesions and a CSF with 6 oligoclonal bands seen only in the CSF. She still feels her face twitching and right sided numbness. He speech is not normal for her as she has to think about things carefully.  Her rheumatologist started her on Tecfidera but she felt worse on this. She had been on Avonex since her diagnosis prior to with no clear benefit according to her. Having a lupus flair up. This usually feels pain in eyes, skin of in mouth may fall off. Felt since with 2  Ocrevus infusion October 2020 and a bit better but now off. Luisa Lind with unclear benefit. Saw rheumatologist in [place] and plan a new infusion. Azathioprine started for her Lupus. had bowel obruction with gastric band. Stopped Ocrevus. Artane helped for about 2 days not help. Lupus flairs. Is doing well with Marinol and Acthar. Marinol only occasionally. Acthar 5 days month. Recurrent flair. Solumedrol intermittently. bad migraines recently. Multiple a week. Imitrex used to work. Planning on see cardiology heart attack. In dual flair up. Other than ACTHAR nothing else for immune modulation. Flair ups. Vision affected. Fatigue. finished steroid infusion. IVIG helps 75% better. Gets it once a week and then wears off at the end week. Doing well with her weekly infusion of IVIG. Request port placement due to poor access.     Active Ambulatory Problems     Diagnosis Date Noted    Multiple sclerosis ([address]     Intractable chronic migraine without aura and without status migrainosus 05/01/2017    Major depressive disorder, recurrent, moderate ([address] 09/12/2018    Exacerbation of multiple sclerosis ([address] 05/10/2019    Optic neuritis 05/10/2019    Pain of left eye 05/10/2019    Bronchitis 05/11/2019    Visual disturbance 07/07/2020    Tremor 07/07/2020    Weakness 07/07/2020    Numbness 07/07/2020    Difficulty with speech 07/07/2020    Lupus ([address] 07/07/2020    Fibromyalgia 07/07/2020    Convulsions ([address] 07/20/2020    Bilateral myofascial pain 01/04/2021    Pain in joints 01/20/2021    Hypertension     Bipolar disorder ([address] 08/18/2015    Pneumoperitoneum of unknown etiology 09/28/2021    Bloating 04/20/2021    Age related osteoporosis 05/03/2022    Alcohol dependence ([address] 05/03/2022    Allergy, unspecified, subsequent encounter 2022    Anxiety 2022    Atrioventricular septal defect with atrial and ventricular components 2022    Attention deficit hyperactivity disorder 2022    Multiple gastric ulcers 2021    Edema of lower extremity 2022    Chronic sinusitis 2022    Gastric band malfunction 12/10/2020    Gastroesophageal reflux disease 2022    GERD without esophagitis 2021    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    Heartburn 2021    Insomnia 2022    Internal hernia 2021    Irritable bowel syndrome 2022    Marijuana use 2021    Major depression, single episode 2022    Migraine 2021    Nausea and vomiting 2021    Obesity 2022    Palpitations 2022    Posttraumatic stress disorder 2022    Pelvic pain 2021    Sciatica 2022    Sedative abuse ([address] 2022    Small bowel obstruction ([address] 10/06/2019    Smoker 2021    Suspected severe acute respiratory syndrome coronavirus 2 (SARS-CoV-2) infection 01/10/2022    Ventricular premature complex 2021    Volvulus ([address] 2021    Arthritis 2022    Temporomandibular joint disorder 2021    Essential hypertension 2020    Hypertensive disorder 2021    Systemic lupus erythematosus ([address] 2018    Hypertensive optic neuropathy 2021    Chronic back pain 2021    Chronic neck pain 2021    Chronic pain disorder 04/15/2016     Resolved Ambulatory Problems     Diagnosis Date Noted    No Resolved Ambulatory Problems     Past Medical History:   Diagnosis Date    Bipolar 1 disorder ([address]     GERD (gastroesophageal reflux disease)     Movement disorder     Neuromuscular disorder ([address]     Psychiatric problem        Past Surgical History:   Procedure Laterality Date    ABDOMINAL EXPLORATION SURGERY      ADENOIDECTOMY       SECTION      x 2     SECTION, CLASSIC N/A     COLONOSCOPY Use: Never used   Substance and Sexual Activity    Alcohol use: Not Currently     Alcohol/week: 2.0 standard drinks     Types: 2 Shots of liquor per week    Drug use: Yes     Types: Marijuana [place])    Sexual activity: Never     Partners: Male   Other Topics Concern    Not on file   Social History Narrative    ** Merged History Encounter **          Social Determinants of Health     Financial Resource Strain: Not on file   Food Insecurity: Not on file   Transportation Needs: Not on file   Physical Activity: Not on file   Stress: Not on file   Social Connections: Not on file   Intimate Partner Violence: Not on file   Housing Stability: Not on file     Review of Systems     Constitutional - No fever or chills. No diaphoresis or significant fatigue. HENT -  No tinnitus or significant hearing loss. Eyes - no sudden vision change or eye pain  Respiratory - no significant shortness of breath or cough  Cardiovascular - no chest pain No palpitations or significant leg swelling  Gastrointestinal - no abdominal swelling or pain. Genitourinary - No difficulty urinating, dysuria  Musculoskeletal - no back pain or myalgia. Skin - no color change or rash  Neurologic - No seizures. No lateralizing weakness. Hematologic - no easy bruising or excessive bleeding. Psychiatric - no severe anxiety or nervousness. All other review of systems are negative.     Current Outpatient Medications   Medication Sig Dispense Refill    LATUDA 40 MG TABS tablet       ondansetron (ZOFRAN) 4 MG tablet Take 1 tablet by mouth daily as needed for Nausea or Vomiting 30 tablet 5    REXULTI 4 MG TABS tablet TAKE 1 TABLET BY MOUTH DAILY      estradiol (ESTRACE) 0.5 MG tablet TAKE 1 TABLET BY MOUTH EVERY DAY      prazosin (MINIPRESS) 2 MG capsule TAKE 1 CAPSULE BY MOUTH AT BEDTIME      linaCLOtide (LINZESS PO) Take by mouth      baclofen (LIORESAL) 10 MG tablet Take 1 tablet by mouth 3 times daily 90 tablet 5    Prenatal Vit-Fe Fumarate-FA (PRENATAL 1+1 PO) Prenatal   1 tab daily      traZODone (DESYREL) 100 MG tablet TAKE 1 TABLET BY MOUTH EVERY DAY AT BEDTIME      TRINTELLIX 10 MG TABS tablet TAKE 1 TABLET BY MOUTH EVERY DAY      B-D 3CC LUER-ARNOLDO SYR 25GX1\" 25G X 1\" 3 ML MISC AS DIRECTED      sucralfate (CARAFATE) 1 GM tablet       ondansetron (ZOFRAN-ODT) 4 MG disintegrating tablet DISSOLVE 1 TABLET ON THE TONGUE EVERY 6 HOURS AS NEEDED FOR NAUSEA OR VOMITING      omeprazole (PRILOSEC) 20 MG delayed release capsule omeprazole 20 mg capsule,delayed release   Take 1 capsule every day by oral route. furosemide (LASIX) 20 MG tablet TAKE 1 TABLET BY MOUTH EVERY DAY AS NEEDED      azaTHIOprine (IMURAN) 50 MG tablet       albuterol sulfate  (90 Base) MCG/ACT inhaler INHALE 2 PUFFS BY MOUTH EVERY 4 HOURS AS NEEDED      Calcium Carb-Cholecalciferol 600-500 MG-UNIT CAPS Calcium 600 with Vitamin D3   take one tablet daily      potassium chloride (KLOR-CON M) 20 MEQ extended release tablet Take 20 mEq by mouth 2 times daily      hydrOXYzine (ATARAX) 50 MG tablet Take 50 mg by mouth 2 times daily      butalbital-acetaminophen-caffeine (FIORICET, ESGIC) -40 MG per tablet Take 1 tablet by mouth every 4 hours as needed for Headaches      cyanocobalamin 1000 MCG/ML injection Inject 1,000 mcg into the muscle every 21 days       pantoprazole sodium (PROTONIX) 40 MG PACK packet Take 40 mg by mouth 2 times daily      alendronate (FOSAMAX) 70 MG tablet Take 70 mg by mouth every 7 days      SUMAtriptan (IMITREX) 100 MG tablet Take 1 tablet by mouth once as needed for Migraine (max 2 in 24 hrs) 9 tablet 5    rizatriptan (MAXALT) 10 MG tablet Take 1 tablet by mouth once as needed for Migraine (max 2 in 24 hrs) May repeat in 2 hours if needed 9 tablet 5    corticotropin (ACTHAR) 80 UNIT/ML injectable gel Inject 1 mL into the muscle daily for 5 days 5 vial 11     No current facility-administered medications for this visit.        BP (!) 134/91   Pulse 58 Ht 5' 5\" (1.651 m)   Wt 174 lb (78.9 kg)   LMP 05/18/2021   BMI 28.96 kg/m²     Constitutional - well developed, well nourished. Eyes - conjunctiva normal.  Ear, nose, throat - No scars, masses, or lesions over external nose or ears, no atrophy of tongue  Neck-symmetric, no masses noted, no jugular vein distension  Respiration- chest wall appears symmetric, good expansion,   normal effort without use of accessory muscles  Musculoskeletal - no significant wasting of muscles noted, no bony deformities  Extremities-no clubbing, cyanosis or edema  Skin - warm, dry, and intact. No rash, erythema, or pallor. Psychiatric - mood, affect, and behavior appear normal.      Neurological exam  Awake, alert, fluent oriented  appropriate affect  Attention and concentration appear appropriate  Recent and remote memory appears unremarkable  Speech normal without dysarthria  No clear issues with language of fund of knowledge    Cranial Nerve Exam     CN III, IV,VI-EOMI, No nystagmus, conjugate eye movements, no ptosis  CN VII-no facial assymetry        Motor Exam  antigravity throughout upper and lower extremities bilaterally    Tremors- no tremors in hands or head noted    Gait  Normal base and speed  No ataxia    Lab Results   Component Value Date    ICDMIXSA15 753 09/12/2018     Lab Results   Component Value Date    WBC 8.7 09/28/2021    HGB 10.2 (L) 09/28/2021    HCT 31.6 (L) 09/28/2021    MCV 91.9 09/28/2021     (H) 09/28/2021     Lab Results   Component Value Date     (L) 09/28/2021    K 4.6 09/28/2021     09/28/2021    CO2 22 09/28/2021    BUN 5 (L) 09/28/2021    CREATININE 0.7 09/28/2021    GLUCOSE 88 09/28/2021    CALCIUM 9.0 09/28/2021    PROT 6.1 (L) 09/28/2021    LABALBU 3.2 (L) 09/28/2021    BILITOT <0.2 09/28/2021    ALKPHOS 70 09/28/2021    AST 13 09/28/2021    ALT 8 09/28/2021    LABGLOM >60 09/28/2021    GFRAA >59 09/28/2021    GLOB 3.0 11/14/2016       Impression   1.  No diffusion restriction to indicate acute ischemia. 2. Scattered nonenhancing hyperintense T2/FLAIR signal changes within   the periventricular and white matter regions favorable for MS plaques. No abnormal enhancement to indicate active demyelination. 3. Partial opacification inferior right mastoid air cells, mastoid   effusion considered. Signed by Dr Cora Watts on 7/4/2020 7:10 PM         Assessment    ICD-10-CM    1. Multiple sclerosis [facility])  [address] [facility], [place]      2. Intractable chronic migraine without aura and without status migrainosus  G43.719       3. Sciatica, unspecified laterality  M54.30       4. Tremor  R25.1       5. Weakness  R53.1       6. Fibromyalgia  M79.7       7. Numbness  R20.0       8. Visual disturbance  H53.9       9. Lupus ([address]  M32.9             Her neurological examination previously significant for some hesitance of speech, decreased visual acuity, right sided numbness and some mild slowness of speech. She has some mild postural tremor in the hands. Previously she was given Solumedrol 1 gm IV daily x 5 days. She is off Tecfidera and Ocrevus. She did not want to be on Tecfidera due to side effects. Her hepatitis panel was unremarkable. She is seeing  MS specialist in [place]. Her video EEG x 3 days was unremarkable with no events captured. She is seeing another rheumatologist in [facility],She will have her Marinol increased to 20 mg tid. She will need something for fatigue. She plans to research long term side effects and benefits of more frequent ACTHAR. She will be tried on [place], Imitrex, [place] for migraines. We talked about constipation with Merline Ee. we may consider Tysabri. She is continue IVIG one a week. She will be referred to general surgery for port placement for IVIG. The patient indicated understanding of the management plan. She will be tried on Baclofen for spasms. She is to follow up with me in 3 months and call with any issues. . Continue care as noted. Plan    Orders Placed This Encounter   Procedures    [address]         No orders of the defined types were placed in this encounter. Return in about 3 months (around 11/24/2022). EMR Dragon/transcription disclaimer:Significant part of this  encounter note is electronic transcription/translation of spoken language to printed text. The electronic translation of spoken language may be erroneous, or at times, nonsensical words or phrases may be inadvertently transcribed.  Although I have reviewed the note for such errors, some may still exist.

## 2022-08-26 ENCOUNTER — HOSPITAL ENCOUNTER (OUTPATIENT)
Dept: INFUSION THERAPY | Age: 47
Setting detail: INFUSION SERIES
End: 2022-08-26
Payer: MEDICARE

## 2022-08-30 ENCOUNTER — OFFICE VISIT (OUTPATIENT)
Dept: SURGERY | Age: 47
End: 2022-08-30
Payer: MEDICARE

## 2022-08-30 ENCOUNTER — HOSPITAL ENCOUNTER (OUTPATIENT)
Dept: INFUSION THERAPY | Age: 47
Setting detail: INFUSION SERIES
Discharge: HOME | End: 2022-08-30
Payer: MEDICARE

## 2022-08-30 VITALS
BODY MASS INDEX: 28.16 KG/M2 | TEMPERATURE: 98.9 F | DIASTOLIC BLOOD PRESSURE: 68 MMHG | SYSTOLIC BLOOD PRESSURE: 100 MMHG | WEIGHT: 169 LBS | HEIGHT: 65 IN

## 2022-08-30 VITALS
SYSTOLIC BLOOD PRESSURE: 108 MMHG | RESPIRATION RATE: 17 BRPM | DIASTOLIC BLOOD PRESSURE: 69 MMHG | HEART RATE: 64 BPM | OXYGEN SATURATION: 100 % | WEIGHT: 169 LBS | TEMPERATURE: 97.2 F | BODY MASS INDEX: 28.12 KG/M2

## 2022-08-30 DIAGNOSIS — G35 MULTIPLE SCLEROSIS (HCC): Primary | ICD-10-CM

## 2022-08-30 PROCEDURE — 96375 TX/PRO/DX INJ NEW DRUG ADDON: CPT

## 2022-08-30 PROCEDURE — 99202 OFFICE O/P NEW SF 15 MIN: CPT | Performed by: SURGERY

## 2022-08-30 PROCEDURE — G8419 CALC BMI OUT NRM PARAM NOF/U: HCPCS | Performed by: SURGERY

## 2022-08-30 PROCEDURE — 6370000000 HC RX 637 (ALT 250 FOR IP): Performed by: PSYCHIATRY & NEUROLOGY

## 2022-08-30 PROCEDURE — 6360000002 HC RX W HCPCS: Performed by: PSYCHIATRY & NEUROLOGY

## 2022-08-30 PROCEDURE — G8427 DOCREV CUR MEDS BY ELIG CLIN: HCPCS | Performed by: SURGERY

## 2022-08-30 PROCEDURE — 2580000003 HC RX 258: Performed by: PSYCHIATRY & NEUROLOGY

## 2022-08-30 PROCEDURE — 4004F PT TOBACCO SCREEN RCVD TLK: CPT | Performed by: SURGERY

## 2022-08-30 PROCEDURE — 96366 THER/PROPH/DIAG IV INF ADDON: CPT

## 2022-08-30 PROCEDURE — 96365 THER/PROPH/DIAG IV INF INIT: CPT

## 2022-08-30 RX ORDER — ACETAMINOPHEN 325 MG/1
650 TABLET ORAL ONCE
Status: COMPLETED | OUTPATIENT
Start: 2022-08-30 | End: 2022-08-30

## 2022-08-30 RX ORDER — SODIUM CHLORIDE 0.9 % (FLUSH) 0.9 %
5-40 SYRINGE (ML) INJECTION PRN
Status: CANCELLED | OUTPATIENT
Start: 2022-08-30

## 2022-08-30 RX ORDER — ALBUTEROL SULFATE 90 UG/1
4 AEROSOL, METERED RESPIRATORY (INHALATION) PRN
Status: CANCELLED | OUTPATIENT
Start: 2022-08-30

## 2022-08-30 RX ORDER — EPINEPHRINE 1 MG/ML
0.3 INJECTION, SOLUTION, CONCENTRATE INTRAVENOUS PRN
Status: CANCELLED | OUTPATIENT
Start: 2022-08-30

## 2022-08-30 RX ORDER — DIPHENHYDRAMINE HYDROCHLORIDE 50 MG/ML
50 INJECTION INTRAMUSCULAR; INTRAVENOUS ONCE
Status: CANCELLED
Start: 2022-08-30 | End: 2022-08-30

## 2022-08-30 RX ORDER — ACETAMINOPHEN 325 MG/1
650 TABLET ORAL ONCE
Status: CANCELLED | OUTPATIENT
Start: 2022-08-30 | End: 2022-08-30

## 2022-08-30 RX ORDER — ONDANSETRON 2 MG/ML
8 INJECTION INTRAMUSCULAR; INTRAVENOUS
Status: CANCELLED | OUTPATIENT
Start: 2022-08-30

## 2022-08-30 RX ORDER — SODIUM CHLORIDE 0.9 % (FLUSH) 0.9 %
5-40 SYRINGE (ML) INJECTION PRN
Status: DISCONTINUED | OUTPATIENT
Start: 2022-08-30 | End: 2022-08-31 | Stop reason: HOSPADM

## 2022-08-30 RX ORDER — DIPHENHYDRAMINE HYDROCHLORIDE 50 MG/ML
50 INJECTION INTRAMUSCULAR; INTRAVENOUS ONCE
Status: COMPLETED | OUTPATIENT
Start: 2022-08-30 | End: 2022-08-30

## 2022-08-30 RX ORDER — DIPHENHYDRAMINE HYDROCHLORIDE 50 MG/ML
50 INJECTION INTRAMUSCULAR; INTRAVENOUS
Status: CANCELLED | OUTPATIENT
Start: 2022-08-30

## 2022-08-30 RX ORDER — MEPERIDINE HYDROCHLORIDE 25 MG/ML
12.5 INJECTION INTRAMUSCULAR; INTRAVENOUS; SUBCUTANEOUS PRN
Status: CANCELLED | OUTPATIENT
Start: 2022-08-30

## 2022-08-30 RX ORDER — SODIUM CHLORIDE 9 MG/ML
INJECTION, SOLUTION INTRAVENOUS CONTINUOUS
Status: CANCELLED | OUTPATIENT
Start: 2022-08-30

## 2022-08-30 RX ORDER — ACETAMINOPHEN 325 MG/1
650 TABLET ORAL
Status: CANCELLED | OUTPATIENT
Start: 2022-08-30

## 2022-08-30 RX ADMIN — IMMUNE GLOBULIN (HUMAN) 30 G: 10 INJECTION INTRAVENOUS; SUBCUTANEOUS at 12:04

## 2022-08-30 RX ADMIN — DIPHENHYDRAMINE HYDROCHLORIDE 50 MG: 50 INJECTION, SOLUTION INTRAMUSCULAR; INTRAVENOUS at 11:46

## 2022-08-30 RX ADMIN — ACETAMINOPHEN 650 MG: 325 TABLET, FILM COATED ORAL at 11:46

## 2022-08-30 RX ADMIN — HYDROCORTISONE SODIUM SUCCINATE 100 MG: 100 INJECTION, POWDER, FOR SOLUTION INTRAMUSCULAR; INTRAVENOUS at 11:47

## 2022-08-30 RX ADMIN — SODIUM CHLORIDE, PRESERVATIVE FREE 10 ML: 5 INJECTION INTRAVENOUS at 11:48

## 2022-08-30 NOTE — PROGRESS NOTES
Ms. Haley Arora is a 41year old female who presents with a complaint of need for venous access. She has a history of multiple sclerosis, and receives weekly infusions. This will be indefinite. She has never had a port before and is not on any blood thinners.      Past Medical History:   Diagnosis Date    Arthritis     Bipolar 1 disorder ([facility]     Convulsions ([facility] 2020    Fibromyalgia     GERD (gastroesophageal reflux disease)     Hypertension     Lupus ([facility])     Movement disorder     Multiple sclerosis ([facility])     Neuromuscular disorder ([facility]     Psychiatric problem      Past Surgical History:   Procedure Laterality Date    ABDOMINAL EXPLORATION SURGERY      ADENOIDECTOMY       SECTION      x 2     SECTION, CLASSIC N/A     COLONOSCOPY      DILATATION, ESOPHAGUS      EXTERNAL EAR SURGERY      lobe    GASTRIC BAND      GASTRIC BYPASS SURGERY      HERNIA REPAIR      HERNIA REPAIR N/A     HYSTERECTOMY (CERVIX STATUS UNKNOWN)      SEPTOPLASTY      SMALL INTESTINE SURGERY      TONSILLECTOMY      TONSILLECTOMY AND ADENOIDECTOMY      UPPER GASTROINTESTINAL ENDOSCOPY       Current Outpatient Medications   Medication Sig Dispense Refill    LATUDA 40 MG TABS tablet       ondansetron (ZOFRAN) 4 MG tablet Take 1 tablet by mouth daily as needed for Nausea or Vomiting 30 tablet 5    REXULTI 4 MG TABS tablet TAKE 1 TABLET BY MOUTH DAILY      estradiol (ESTRACE) 0.5 MG tablet TAKE 1 TABLET BY MOUTH EVERY DAY      prazosin (MINIPRESS) 2 MG capsule TAKE 1 CAPSULE BY MOUTH AT BEDTIME      linaCLOtide (LINZESS PO) Take by mouth      baclofen (LIORESAL) 10 MG tablet Take 1 tablet by mouth 3 times daily 90 tablet 5    Prenatal Vit-Fe Fumarate-FA (PRENATAL 1+1 PO) Prenatal   1 tab daily      TRINTELLIX 10 MG TABS tablet TAKE 1 TABLET BY MOUTH EVERY DAY      B-D 3CC LUER-ARNOLDO SYR 25GX1\" 25G X 1\" 3 ML MISC AS DIRECTED      sucralfate (CARAFATE) 1 GM tablet       ondansetron (ZOFRAN-ODT) 4 MG disintegrating tablet DISSOLVE 1 TABLET ON THE TONGUE EVERY 6 HOURS AS NEEDED FOR NAUSEA OR VOMITING      omeprazole (PRILOSEC) 20 MG delayed release capsule omeprazole 20 mg capsule,delayed release   Take 1 capsule every day by oral route. furosemide (LASIX) 20 MG tablet TAKE 1 TABLET BY MOUTH EVERY DAY AS NEEDED      azaTHIOprine (IMURAN) 50 MG tablet       Calcium Carb-Cholecalciferol 600-500 MG-UNIT CAPS Calcium 600 with Vitamin D3   take one tablet daily      potassium chloride (KLOR-CON M) 20 MEQ extended release tablet Take 20 mEq by mouth 2 times daily      hydrOXYzine (ATARAX) 50 MG tablet Take 50 mg by mouth 2 times daily      butalbital-acetaminophen-caffeine (FIORICET, ESGIC) -40 MG per tablet Take 1 tablet by mouth every 4 hours as needed for Headaches      cyanocobalamin 1000 MCG/ML injection Inject 1,000 mcg into the muscle every 21 days       pantoprazole sodium (PROTONIX) 40 MG PACK packet Take 40 mg by mouth 2 times daily      alendronate (FOSAMAX) 70 MG tablet Take 70 mg by mouth every 7 days      traZODone (DESYREL) 100 MG tablet TAKE 1 TABLET BY MOUTH EVERY DAY AT BEDTIME (Patient not taking: Reported on 8/30/2022)      albuterol sulfate  (90 Base) MCG/ACT inhaler INHALE 2 PUFFS BY MOUTH EVERY 4 HOURS AS NEEDED (Patient not taking: Reported on 8/30/2022)      SUMAtriptan (IMITREX) 100 MG tablet Take 1 tablet by mouth once as needed for Migraine (max 2 in 24 hrs) 9 tablet 5    rizatriptan (MAXALT) 10 MG tablet Take 1 tablet by mouth once as needed for Migraine (max 2 in 24 hrs) May repeat in 2 hours if needed 9 tablet 5    corticotropin (ACTHAR) 80 UNIT/ML injectable gel Inject 1 mL into the muscle daily for 5 days 5 vial 11     No current facility-administered medications for this visit. Allergies:  Avelox [moxifloxacin], Glatiramer, Sugar-protein-starch, Avelox [moxifloxacin], Copaxone [glatiramer acetate], Depakote [divalproex sodium], Gabapentin, Hydroxychloroquine, Invega [paliperidone], Lamictal [lamotrigine], Lithium, Pregabalin, Saquinavir, Varenicline tartrate, Lamictal [lamotrigine], Lithium, and Paliperidone er    Family History   Problem Relation Age of Onset    Anemia Mother     Breast Cancer Mother     Depression Mother     High Blood Pressure Mother     Mental Illness Mother     Miscarriages / Esther [place] Mother     Colon Cancer Father     Depression Father     High Blood Pressure Father     Mental Illness Father     Breast Cancer Maternal Grandmother     Alcohol Abuse Maternal Grandfather     Anemia Paternal Grandmother     Alcohol Abuse Paternal Grandfather     Osteoporosis Paternal Grandfather     Stroke Paternal Grandfather     Substance Abuse Maternal Cousin     Substance Abuse Paternal Cousin     Asthma Other        Social History     Tobacco Use    Smoking status: Every Day     Packs/day: 1.00     Years: 15.00     Pack years: 15.00     Types: Cigarettes    Smokeless tobacco: Never   Substance Use Topics    Alcohol use: Not Currently     Alcohol/week: 2.0 standard drinks     Types: 2 Shots of liquor per week       Review of Systems   Constitutional:  Positive for chills, fatigue and fever. HENT:  Negative for congestion and sore throat. Eyes:  Negative for pain and redness. Respiratory:  Negative for cough and shortness of breath. Cardiovascular:  Positive for palpitations and leg swelling. Gastrointestinal:  Positive for abdominal distention. Negative for abdominal pain. Endocrine: Positive for polydipsia and polyuria. Genitourinary:  Negative for dysuria and hematuria. Musculoskeletal:  Positive for arthralgias, back pain, joint swelling, myalgias and neck pain. Neurological:  Positive for numbness and headaches. Psychiatric/Behavioral:  Positive for decreased concentration, dysphoric mood and sleep disturbance. The patient is nervous/anxious. Physical Exam  Vitals reviewed. Constitutional:       General: She is not in acute distress.   HENT:      Head: Normocephalic and atraumatic. Nose: Nose normal.   Eyes:      General: No scleral icterus. Pupils: Pupils are equal, round, and reactive to light. Cardiovascular:      Rate and Rhythm: Normal rate and regular rhythm. Pulmonary:      Effort: Pulmonary effort is normal. No respiratory distress. Abdominal:      General: There is no distension. Palpations: Abdomen is soft. Tenderness: There is no abdominal tenderness. Musculoskeletal:         General: No swelling. Normal range of motion. Cervical back: Neck supple. No rigidity. Skin:     General: Skin is warm and dry. Neurological:      General: No focal deficit present. Mental Status: She is alert. Mental status is at baseline. Psychiatric:         Mood and Affect: Mood normal.         Behavior: Behavior normal.         Assessment and plan:  41year old female with MS  The risks and benefits of port placement with ultrasound and fluoro were discussed with the patient including but not limited to, bleeding, infection, and pneumothorax. The patient expressed understanding and would like to proceed with surgery.     Mami Kelsey MD  8/30/2022  10:41 AM

## 2022-08-30 NOTE — LETTER
Preop Orders:     Patient: Amy Barlow  : 1975    Hospital: [facility]    Admitting Physician:  Dr. Earl Snyder    Diagnosis: multiple sclerosis    Procedure: single lumen port placement with ultrasound and fluoro    Time: 1 hour    Anesthesia: MAC    Admission: Outpatient     Date: to be scheduled    Labs: per anesthesia     Special Instructions:  none    Cardiac Clearance:  none    Special Equipment:  none    Pre-Op Meds:  ancef    Latex Allergy: no    Beta Blocker: no    Medication Instructions: none    Post op visit: PRN      Electronically signed by Crispin Meneses MD   On 22   @ 10:45 AM

## 2022-08-30 NOTE — DISCHARGE INSTRUCTIONS
immune globulin (intravenous) (IGIV)  Pronunciation: dinora foley  Brand:  Bivigam, Carimune, Flebogamma, Gammagard S/D, Gammaplex, Octagam, Panzyga,Privigen  What is the most important information I should know about immune globulin intravenous? This medicine can cause blood clots. The risk is highest in older adults or in people who have had blood clots, heart problems, or blood circulation problems. Blood clots are also more likely during long-term bedrest, while using birth control pills or hormone replacement therapy, or while having a central intravenous (IV) catheter inplace. Call your doctor at once if you have chest pain, trouble breathing, fast heartbeats, numbness or weakness, or swelling and warmth or discoloration in anarm or leg. This medicine can also harm your kidneys, especially if you have kidney disease or you also use certain medicines. Tell your doctor right away if you have signs of kidney problems, such as swelling, rapid weight gain, and little or no urination. What is immune globulin intravenous (IGIV)? Immune globulin intravenous (IGIV, for injection into a vein) is used to treatprimary immunodeficiency. IGIV is also used to increase platelets (blood clotting cells) in people withimmune thrombocytopenic purpura. IGIV is also used in to help prevent certain infections in people with B-cellchronic lymphocytic leukemia. IGIV is also used in people with Kawasaki syndrome, to prevent aneurysm causedby a weakening of the main artery in the heart. IGIV may also be used for purposes not listed in this medication guide. What should I discuss with my healthcare provider before using IGIV? You may not be able to use this medicine if:  you have had an allergic reaction to an immune globulin or blood product;  you have immune globulin A (IgA) deficiency with antibody to IgA; or  you are allergic to corn.   IGIV can cause blood clots or kidney problems, especially in older adults or in people with certain conditions. Tell yourdoctor if you have ever had:  heart problems, blood circulation problems, or \"thick blood\";  a stroke or blood clot;  kidney disease;  diabetes;  an infection called sepsis;  if you use estrogens (birth control pills or hormone replacement therapy);  if you have been on long-term bedrest; or  if you have a central intravenous (IV) catheter in place. You may need a dose adjustment if you are exposed to measles, or if you travelto an area where this disease is common. Tell your doctor if you are pregnant or breastfeeding. Immune globulin is made from donated human plasma and may contain viruses or other infectious agents. Donated plasma is tested and treated to reduce the risk of contamination, but there is still a small possibility it could transmitdisease. Ask your doctor about any possible risk. How should I use IGIV? IGIV is given as an infusion into a vein, usually once every 3 to 4 weeks. [facility] provider will give you this injection. Drink plenty of liquids  while you are using this medicine to help improve your blood flow and keepyour kidneys working properly. You may need frequent blood or urine tests. This medicine can affect the results of certain medical tests. Tell any doctorwho treats you that you are using IGIV. What happens if I miss a dose? Call your doctor for instructions if you miss an appointment for your IGIVinjection. What happens if I overdose? Seek emergency medical attention or call the Poison Help line at 1-435.257.1676. What should I avoid while using IGIV? Ask your doctor before receiving a \"live\" vaccine while using IGIV. The vaccine may not work as well and may not fully protect you from disease. Live vaccines include measles, mumps, rubella (MMR), rotavirus, typhoid, yellow fever, varicella (chickenpox), zoster (shingles), and nasal flu (influenza)vaccine. What are the possible side effects of IGIV?   Get emergency medical help if you have signs of an allergic reaction: hives; difficult breathing; swelling of your face, lips, tongue, or throat. Some side effects may occur during the injection. Tell your caregiver if you feel dizzy, nauseated, light-headed, sweaty, or have a headache, pounding in your neck or ears, fever, chills, chest tightness, or warmth or redness in yourface. Call your doctor at once if you have:  a blood cell disorder --pale or yellowed skin, dark colored urine, fever, confusion or weakness;  dehydration symptoms --feeling very thirsty or hot, being unable to urinate, heavy sweating, or hot and dry skin;  kidney problems --little or no urination, swelling, rapid weight gain, feeling short of breath;  lung problems --chest pain, trouble breathing, blue colored lips, fingers, or toes;  signs of a new infection --fever with a severe headache, neck stiffness, eye pain, and increased sensitivity to light; or  signs of a blood clot --shortness of breath, chest pain with deep breathing, rapid heart rate, numbness or weakness on one side of the body, swelling and warmth or discoloration in an arm or leg. Common side effects may include:  headache, back pain, joint pain;  fever, chills, sweating, warmth or tingling;  stomach pain, nausea, diarrhea;  increased blood pressure, fast heartbeats;  dizziness, tiredness, lack of energy;  stuffy nose, sinus pain; or  pain, swelling, burning, or irritation around the IV needle. This is not a complete list of side effects and others may occur. Call your doctor for medical advice about side effects. You may report side effects toFDA at 1-223-FDA-8654. What other drugs will affect IGIV? IGIV can harm your kidneys, especially if you also use certain medicines for infections, cancer, osteoporosis, organ transplant rejection, bowel disorders, high blood pressure,or pain or arthritis (including Advil, Motrin, and Aleve).   Other drugs may affect IGIV, including prescription and over-the-counter medicines, vitamins, and herbal products. Tell your doctor about all yourcurrent medicines and any medicine you start or stop using. Where can I get more information? Your pharmacist can provide more information about immune globulin intravenous. Remember, keep this and all other medicines out of the reach of children, never share your medicines with others, and use this medication only for the indication prescribed. Every effort has been made to ensure that the information provided by [address] is accurate, up-to-date, and complete, but no guarantee is made to that effect. Drug information contained herein may be time sensitive. [facility] information has been compiled for use by healthcare practitioners and consumers in the [place] and therefore [facility] does not warrant that uses outside of the [place] are appropriate, unless specifically indicated otherwise. [facility]'s drug information does not endorse drugs, diagnose patients or recommend therapy. [facility]'s drug information is an informational resource designed to assist licensed healthcare practitioners in caring for their patients and/or to serve consumers viewing this service as a supplement to, and not a substitute for, the expertise, skill, knowledge and judgment of healthcare practitioners. The absence of a warning for a given drug or drug combination in no way should be construed to indicate that the drug or drug combination is safe, effective or appropriate for any given patient. [facility] does not assume any responsibility for any aspect of healthcare administered with the aid of information [facility] provides. The information contained herein is not intended to cover all possible uses, directions, precautions, warnings, drug interactions, allergic reactions, or adverse effects.  If you have questions about the drugs you are taking, check with yourdoctor, nurse or pharmacist.  Copyright 1397-3767 [employer] Version: 6.02. Revision date: 2/26/2021. Care instructions adapted under license by [facility] ([facility]). If you have questions about a medical condition or this instruction, always ask your healthcare professional. [address] any warranty or liability for your use of this information.

## 2022-08-30 NOTE — H&P (VIEW-ONLY)
TABLET ON THE TONGUE EVERY 6 HOURS AS NEEDED FOR NAUSEA OR VOMITING      omeprazole (PRILOSEC) 20 MG delayed release capsule omeprazole 20 mg capsule,delayed release   Take 1 capsule every day by oral route. furosemide (LASIX) 20 MG tablet TAKE 1 TABLET BY MOUTH EVERY DAY AS NEEDED      azaTHIOprine (IMURAN) 50 MG tablet       Calcium Carb-Cholecalciferol 600-500 MG-UNIT CAPS Calcium 600 with Vitamin D3   take one tablet daily      potassium chloride (KLOR-CON M) 20 MEQ extended release tablet Take 20 mEq by mouth 2 times daily      hydrOXYzine (ATARAX) 50 MG tablet Take 50 mg by mouth 2 times daily      butalbital-acetaminophen-caffeine (FIORICET, ESGIC) -40 MG per tablet Take 1 tablet by mouth every 4 hours as needed for Headaches      cyanocobalamin 1000 MCG/ML injection Inject 1,000 mcg into the muscle every 21 days       pantoprazole sodium (PROTONIX) 40 MG PACK packet Take 40 mg by mouth 2 times daily      alendronate (FOSAMAX) 70 MG tablet Take 70 mg by mouth every 7 days      traZODone (DESYREL) 100 MG tablet TAKE 1 TABLET BY MOUTH EVERY DAY AT BEDTIME (Patient not taking: Reported on 8/30/2022)      albuterol sulfate  (90 Base) MCG/ACT inhaler INHALE 2 PUFFS BY MOUTH EVERY 4 HOURS AS NEEDED (Patient not taking: Reported on 8/30/2022)      SUMAtriptan (IMITREX) 100 MG tablet Take 1 tablet by mouth once as needed for Migraine (max 2 in 24 hrs) 9 tablet 5    rizatriptan (MAXALT) 10 MG tablet Take 1 tablet by mouth once as needed for Migraine (max 2 in 24 hrs) May repeat in 2 hours if needed 9 tablet 5    corticotropin (ACTHAR) 80 UNIT/ML injectable gel Inject 1 mL into the muscle daily for 5 days 5 vial 11     No current facility-administered medications for this visit. Allergies:  Avelox [moxifloxacin], Glatiramer, Sugar-protein-starch, Avelox [moxifloxacin], Copaxone [glatiramer acetate], Depakote [divalproex sodium], Gabapentin, Hydroxychloroquine, Invega [paliperidone], Lamictal [lamotrigine], Lithium, Pregabalin, Saquinavir, Varenicline tartrate, Lamictal [lamotrigine], Lithium, and Paliperidone er    Family History   Problem Relation Age of Onset    Anemia Mother     Breast Cancer Mother     Depression Mother     High Blood Pressure Mother     Mental Illness Mother     Miscarriages / [place] Mother     Colon Cancer Father     Depression Father     High Blood Pressure Father     Mental Illness Father     Breast Cancer Maternal Grandmother     Alcohol Abuse Maternal Grandfather     Anemia Paternal Grandmother     Alcohol Abuse Paternal Grandfather     Osteoporosis Paternal Grandfather     Stroke Paternal Grandfather     Substance Abuse Maternal Cousin     Substance Abuse Paternal Cousin     Asthma Other        Social History     Tobacco Use    Smoking status: Every Day     Packs/day: 1.00     Years: 15.00     Pack years: 15.00     Types: Cigarettes    Smokeless tobacco: Never   Substance Use Topics    Alcohol use: Not Currently     Alcohol/week: 2.0 standard drinks     Types: 2 Shots of liquor per week       Review of Systems   Constitutional:  Positive for chills, fatigue and fever. HENT:  Negative for congestion and sore throat. Eyes:  Negative for pain and redness. Respiratory:  Negative for cough and shortness of breath. Cardiovascular:  Positive for palpitations and leg swelling. Gastrointestinal:  Positive for abdominal distention. Negative for abdominal pain. Endocrine: Positive for polydipsia and polyuria. Genitourinary:  Negative for dysuria and hematuria. Musculoskeletal:  Positive for arthralgias, back pain, joint swelling, myalgias and neck pain. Neurological:  Positive for numbness and headaches. Psychiatric/Behavioral:  Positive for decreased concentration, dysphoric mood and sleep disturbance. The patient is nervous/anxious. Physical Exam  Vitals reviewed. Constitutional:       General: She is not in acute distress.   HENT:      Head: Normocephalic and atraumatic. Nose: Nose normal.   Eyes:      General: No scleral icterus. Pupils: Pupils are equal, round, and reactive to light. Cardiovascular:      Rate and Rhythm: Normal rate and regular rhythm. Pulmonary:      Effort: Pulmonary effort is normal. No respiratory distress. Abdominal:      General: There is no distension. Palpations: Abdomen is soft. Tenderness: There is no abdominal tenderness. Musculoskeletal:         General: No swelling. Normal range of motion. Cervical back: Neck supple. No rigidity. Skin:     General: Skin is warm and dry. Neurological:      General: No focal deficit present. Mental Status: She is alert. Mental status is at baseline. Psychiatric:         Mood and Affect: Mood normal.         Behavior: Behavior normal.         Assessment and plan:  41year old female with MS  The risks and benefits of port placement with ultrasound and fluoro were discussed with the patient including but not limited to, bleeding, infection, and pneumothorax. The patient expressed understanding and would like to proceed with surgery.     Matt Taylor MD  8/30/2022  10:41 AM

## 2022-09-01 ENCOUNTER — ANESTHESIA EVENT (OUTPATIENT)
Dept: OPERATING ROOM | Age: 47
End: 2022-09-01
Payer: MEDICARE

## 2022-09-01 ENCOUNTER — HOSPITAL ENCOUNTER (OUTPATIENT)
Dept: PREADMISSION TESTING | Age: 47
Discharge: HOME | End: 2022-09-05
Payer: MEDICARE

## 2022-09-01 VITALS — BODY MASS INDEX: 27.82 KG/M2 | WEIGHT: 167 LBS | HEIGHT: 65 IN

## 2022-09-01 LAB
ALBUMIN SERPL-MCNC: 3.5 G/DL (ref 3.5–5.2)
ALP BLD-CCNC: 73 U/L (ref 35–104)
ALT SERPL-CCNC: 13 U/L (ref 5–33)
ANION GAP SERPL CALCULATED.3IONS-SCNC: 11 MMOL/L (ref 7–19)
AST SERPL-CCNC: 23 U/L (ref 5–32)
BASOPHILS ABSOLUTE: 0.1 K/UL (ref 0–0.2)
BASOPHILS RELATIVE PERCENT: 1 % (ref 0–1)
BILIRUB SERPL-MCNC: <0.2 MG/DL (ref 0.2–1.2)
BUN BLDV-MCNC: 12 MG/DL (ref 6–20)
CALCIUM SERPL-MCNC: 8.9 MG/DL (ref 8.6–10)
CHLORIDE BLD-SCNC: 102 MMOL/L (ref 98–111)
CO2: 21 MMOL/L (ref 22–29)
CREAT SERPL-MCNC: 0.7 MG/DL (ref 0.5–0.9)
EKG P AXIS: 50 DEGREES
EKG P-R INTERVAL: 126 MS
EKG Q-T INTERVAL: 402 MS
EKG QRS DURATION: 84 MS
EKG QTC CALCULATION (BAZETT): 392 MS
EKG T AXIS: 41 DEGREES
EOSINOPHILS ABSOLUTE: 0.1 K/UL (ref 0–0.6)
EOSINOPHILS RELATIVE PERCENT: 1 % (ref 0–5)
GFR AFRICAN AMERICAN: >59
GFR NON-AFRICAN AMERICAN: >60
GLUCOSE BLD-MCNC: 67 MG/DL (ref 74–109)
HCT VFR BLD CALC: 40.2 % (ref 37–47)
HEMOGLOBIN: 13.4 G/DL (ref 12–16)
IMMATURE GRANULOCYTES #: 0 K/UL
LYMPHOCYTES ABSOLUTE: 2.1 K/UL (ref 1.1–4.5)
LYMPHOCYTES RELATIVE PERCENT: 42.7 % (ref 20–40)
MCH RBC QN AUTO: 29.5 PG (ref 27–31)
MCHC RBC AUTO-ENTMCNC: 33.3 G/DL (ref 33–37)
MCV RBC AUTO: 88.5 FL (ref 81–99)
MONOCYTES ABSOLUTE: 0.4 K/UL (ref 0–0.9)
MONOCYTES RELATIVE PERCENT: 8 % (ref 0–10)
NEUTROPHILS ABSOLUTE: 2.4 K/UL (ref 1.5–7.5)
NEUTROPHILS RELATIVE PERCENT: 46.9 % (ref 50–65)
PDW BLD-RTO: 15.6 % (ref 11.5–14.5)
PLATELET # BLD: 282 K/UL (ref 130–400)
PMV BLD AUTO: 9.9 FL (ref 9.4–12.3)
POTASSIUM SERPL-SCNC: 4.5 MMOL/L (ref 3.5–5)
RBC # BLD: 4.54 M/UL (ref 4.2–5.4)
SODIUM BLD-SCNC: 134 MMOL/L (ref 136–145)
TOTAL PROTEIN: 7.7 G/DL (ref 6.6–8.7)
WBC # BLD: 5 K/UL (ref 4.8–10.8)

## 2022-09-01 PROCEDURE — 93010 ELECTROCARDIOGRAM REPORT: CPT | Performed by: INTERNAL MEDICINE

## 2022-09-01 PROCEDURE — 93005 ELECTROCARDIOGRAM TRACING: CPT | Performed by: SURGERY

## 2022-09-01 PROCEDURE — 85025 COMPLETE CBC W/AUTO DIFF WBC: CPT

## 2022-09-01 PROCEDURE — 80053 COMPREHEN METABOLIC PANEL: CPT

## 2022-09-01 NOTE — DISCHARGE INSTRUCTIONS
PREOPERATIVE GUIDELINES WHEN RECEIVING ANESTHESIA    Do not eat or drink anything after midnight, the night before your surgery. This is extremely important for your safety. Take a bath (or shower) the night before your surgery and you may brush your teeth the morning of your surgery. You will be scheduled to arrive at the hospital 2 hours before your surgery, or follow your surgeon's instructions. Dress comfortably. Wear loose clothing that will be easy to remove and comfortable for your trip home. You may wear eyeglasses or contacts but bring your cases with you as they must be remove before your surgery. Hearing aids and dentures will need to be removed before your surgery. Do not wear any jewelry, including body jewelry. All jewelry will need to be removed prior to your surgery. Do not wear fingernail polish or make-up. It is best not to bring any valuables with you. If you are to stay in the hospital overnight, bring your robe, slippers and personal toiletries that you may need. POSTOPERATIVE GUIDELINES AFTER RECEIVING ANESTHESIA    If you are to go home after your surgery, you will need a responsible adult to drive you home. You will not be able to take public transportation after your discharge from the Operative Care Unit unless you are accompanied by a        responsible adult. On returning home, be sure to follow your physician's orders regarding diet, activity and medications. Remember, surgery with general anesthesia or sedation may leave you sleepy, very tired and with a decreased appetite for 12 to 24 hours. If you develop any post-surgical complications or problems, call your surgeon or [facility] ([phone]). The day before your surgery, you will receive a phone call from the surgery nurse, to let you know what time to arrive on the day of surgery.   This call will usually be between 2-4 PM.  If you do not receive a phone call by 4 PM the day before your surgery, please call [phone] and let them know you have not received an arrival time. If your surgery is on Monday, your call will be on the Friday before your Monday surgery. MEDICATION INSTRUCTIONS PRIOR TO YOUR SURGERY      The morning of surgery: You can take all your usual prescribed medications with a small sip of water. DO NOT TAKE ANY DIABETIC MEDICATIONS the morning of your surgery. DO NOT TAKE ANY SUPPLEMENTS or over the counter medications the morning of  surgery. CHLORHEXIDINE GLUCONATE 4% SHOWERING    Patient should shower with this soap a minimum of 2 consecutive showers (the night before surgery and the morning of surgery) washing from the neck down (avoiding contact with genitalia). DO NOT [address] YOUR HAIR OR FACE WITH THIS SOAP. When washing with this soap, apply enough to suds up the body thoroughly, turn the water away from your body and allow the soap suds to remain on the body for 2 full minutes, then rinse body completely. After using this soap on the body, please do not apply powders or lotions to your body. After the shower the night before surgery, please dry off with a fresh towel, sleep in freshly laundered pj's, and change your bed linen before going to sleep.

## 2022-09-02 ENCOUNTER — ANESTHESIA (OUTPATIENT)
Dept: OPERATING ROOM | Age: 47
End: 2022-09-02
Payer: MEDICARE

## 2022-09-02 ENCOUNTER — HOSPITAL ENCOUNTER (OUTPATIENT)
Age: 47
Setting detail: OUTPATIENT SURGERY
Discharge: HOME | End: 2022-09-02
Attending: SURGERY | Admitting: SURGERY
Payer: MEDICARE

## 2022-09-02 ENCOUNTER — APPOINTMENT (OUTPATIENT)
Dept: GENERAL RADIOLOGY | Age: 47
End: 2022-09-02
Attending: SURGERY
Payer: MEDICARE

## 2022-09-02 ENCOUNTER — APPOINTMENT (OUTPATIENT)
Dept: INFUSION THERAPY | Age: 47
End: 2022-09-02
Payer: MEDICARE

## 2022-09-02 VITALS
SYSTOLIC BLOOD PRESSURE: 135 MMHG | HEART RATE: 63 BPM | TEMPERATURE: 97.5 F | WEIGHT: 167 LBS | HEIGHT: 65 IN | RESPIRATION RATE: 16 BRPM | OXYGEN SATURATION: 98 % | DIASTOLIC BLOOD PRESSURE: 89 MMHG | BODY MASS INDEX: 27.82 KG/M2

## 2022-09-02 DIAGNOSIS — G89.18 POST-OP PAIN: Primary | ICD-10-CM

## 2022-09-02 PROCEDURE — 76937 US GUIDE VASCULAR ACCESS: CPT | Performed by: SURGERY

## 2022-09-02 PROCEDURE — 7100000001 HC PACU RECOVERY - ADDTL 15 MIN: Performed by: SURGERY

## 2022-09-02 PROCEDURE — 6360000002 HC RX W HCPCS: Performed by: SURGERY

## 2022-09-02 PROCEDURE — 7100000010 HC PHASE II RECOVERY - FIRST 15 MIN: Performed by: SURGERY

## 2022-09-02 PROCEDURE — 77001 FLUOROGUIDE FOR VEIN DEVICE: CPT | Performed by: SURGERY

## 2022-09-02 PROCEDURE — 3700000001 HC ADD 15 MINUTES (ANESTHESIA): Performed by: SURGERY

## 2022-09-02 PROCEDURE — 3600000013 HC SURGERY LEVEL 3 ADDTL 15MIN: Performed by: SURGERY

## 2022-09-02 PROCEDURE — 2709999900 HC NON-CHARGEABLE SUPPLY: Performed by: SURGERY

## 2022-09-02 PROCEDURE — 71045 X-RAY EXAM CHEST 1 VIEW: CPT | Performed by: RADIOLOGY

## 2022-09-02 PROCEDURE — 2580000003 HC RX 258: Performed by: NURSE ANESTHETIST, CERTIFIED REGISTERED

## 2022-09-02 PROCEDURE — 71045 X-RAY EXAM CHEST 1 VIEW: CPT

## 2022-09-02 PROCEDURE — 6360000002 HC RX W HCPCS: Performed by: ANESTHESIOLOGY

## 2022-09-02 PROCEDURE — 36561 INSERT TUNNELED CV CATH: CPT | Performed by: SURGERY

## 2022-09-02 PROCEDURE — 7100000000 HC PACU RECOVERY - FIRST 15 MIN: Performed by: SURGERY

## 2022-09-02 PROCEDURE — 6360000002 HC RX W HCPCS: Performed by: NURSE ANESTHETIST, CERTIFIED REGISTERED

## 2022-09-02 PROCEDURE — 3700000000 HC ANESTHESIA ATTENDED CARE: Performed by: SURGERY

## 2022-09-02 PROCEDURE — C1788 PORT, INDWELLING, IMP: HCPCS | Performed by: SURGERY

## 2022-09-02 PROCEDURE — 3209999900 FLUORO FOR SURGICAL PROCEDURES

## 2022-09-02 PROCEDURE — 7100000011 HC PHASE II RECOVERY - ADDTL 15 MIN: Performed by: SURGERY

## 2022-09-02 PROCEDURE — 2500000003 HC RX 250 WO HCPCS: Performed by: SURGERY

## 2022-09-02 PROCEDURE — 3600000003 HC SURGERY LEVEL 3 BASE: Performed by: SURGERY

## 2022-09-02 DEVICE — PORT INFUS PLAS SGL LUMN W/ 9.6FR SIL CATH AIRGUARD VLV: Type: IMPLANTABLE DEVICE | Site: CHEST | Status: FUNCTIONAL

## 2022-09-02 RX ORDER — HYDROCODONE BITARTRATE AND ACETAMINOPHEN 5; 325 MG/1; MG/1
2 TABLET ORAL EVERY 4 HOURS PRN
Status: DISCONTINUED | OUTPATIENT
Start: 2022-09-02 | End: 2022-09-02 | Stop reason: HOSPADM

## 2022-09-02 RX ORDER — MIDAZOLAM HYDROCHLORIDE 1 MG/ML
INJECTION INTRAMUSCULAR; INTRAVENOUS PRN
Status: DISCONTINUED | OUTPATIENT
Start: 2022-09-02 | End: 2022-09-02 | Stop reason: SDUPTHER

## 2022-09-02 RX ORDER — CEFAZOLIN SODIUM 1 G/3ML
INJECTION, POWDER, FOR SOLUTION INTRAMUSCULAR; INTRAVENOUS PRN
Status: DISCONTINUED | OUTPATIENT
Start: 2022-09-02 | End: 2022-09-02 | Stop reason: SDUPTHER

## 2022-09-02 RX ORDER — MORPHINE SULFATE 4 MG/ML
2 INJECTION, SOLUTION INTRAMUSCULAR; INTRAVENOUS
Status: DISCONTINUED | OUTPATIENT
Start: 2022-09-02 | End: 2022-09-02 | Stop reason: HOSPADM

## 2022-09-02 RX ORDER — METOCLOPRAMIDE HYDROCHLORIDE 5 MG/ML
10 INJECTION INTRAMUSCULAR; INTRAVENOUS
Status: DISCONTINUED | OUTPATIENT
Start: 2022-09-02 | End: 2022-09-02 | Stop reason: HOSPADM

## 2022-09-02 RX ORDER — PROPOFOL 10 MG/ML
INJECTION, EMULSION INTRAVENOUS CONTINUOUS PRN
Status: DISCONTINUED | OUTPATIENT
Start: 2022-09-02 | End: 2022-09-02 | Stop reason: SDUPTHER

## 2022-09-02 RX ORDER — BUPIVACAINE HYDROCHLORIDE AND EPINEPHRINE 2.5; 5 MG/ML; UG/ML
INJECTION, SOLUTION EPIDURAL; INFILTRATION; INTRACAUDAL; PERINEURAL PRN
Status: DISCONTINUED | OUTPATIENT
Start: 2022-09-02 | End: 2022-09-02 | Stop reason: ALTCHOICE

## 2022-09-02 RX ORDER — SODIUM CHLORIDE 0.9 % (FLUSH) 0.9 %
5-40 SYRINGE (ML) INJECTION PRN
Status: CANCELLED | OUTPATIENT
Start: 2022-09-02

## 2022-09-02 RX ORDER — HEPARIN SODIUM,PORCINE 10 UNIT/ML
VIAL (ML) INTRAVENOUS PRN
Status: DISCONTINUED | OUTPATIENT
Start: 2022-09-02 | End: 2022-09-02 | Stop reason: ALTCHOICE

## 2022-09-02 RX ORDER — SCOLOPAMINE TRANSDERMAL SYSTEM 1 MG/1
1 PATCH, EXTENDED RELEASE TRANSDERMAL
Status: DISCONTINUED | OUTPATIENT
Start: 2022-09-02 | End: 2022-09-02 | Stop reason: HOSPADM

## 2022-09-02 RX ORDER — MORPHINE SULFATE 4 MG/ML
4 INJECTION, SOLUTION INTRAMUSCULAR; INTRAVENOUS
Status: DISCONTINUED | OUTPATIENT
Start: 2022-09-02 | End: 2022-09-02 | Stop reason: HOSPADM

## 2022-09-02 RX ORDER — MORPHINE SULFATE 2 MG/ML
2 INJECTION, SOLUTION INTRAMUSCULAR; INTRAVENOUS EVERY 5 MIN PRN
Status: DISCONTINUED | OUTPATIENT
Start: 2022-09-02 | End: 2022-09-02 | Stop reason: HOSPADM

## 2022-09-02 RX ORDER — FENTANYL CITRATE 50 UG/ML
INJECTION, SOLUTION INTRAMUSCULAR; INTRAVENOUS PRN
Status: DISCONTINUED | OUTPATIENT
Start: 2022-09-02 | End: 2022-09-02 | Stop reason: SDUPTHER

## 2022-09-02 RX ORDER — HYDROCODONE BITARTRATE AND ACETAMINOPHEN 5; 325 MG/1; MG/1
1 TABLET ORAL EVERY 4 HOURS PRN
Status: DISCONTINUED | OUTPATIENT
Start: 2022-09-02 | End: 2022-09-02 | Stop reason: HOSPADM

## 2022-09-02 RX ORDER — HYDROCODONE BITARTRATE AND ACETAMINOPHEN 5; 325 MG/1; MG/1
1 TABLET ORAL EVERY 8 HOURS PRN
Qty: 8 TABLET | Refills: 0 | Status: SHIPPED | OUTPATIENT
Start: 2022-09-02 | End: 2022-09-05

## 2022-09-02 RX ORDER — ONDANSETRON 2 MG/ML
INJECTION INTRAMUSCULAR; INTRAVENOUS PRN
Status: DISCONTINUED | OUTPATIENT
Start: 2022-09-02 | End: 2022-09-02 | Stop reason: SDUPTHER

## 2022-09-02 RX ORDER — DIPHENHYDRAMINE HYDROCHLORIDE 50 MG/ML
12.5 INJECTION INTRAMUSCULAR; INTRAVENOUS
Status: DISCONTINUED | OUTPATIENT
Start: 2022-09-02 | End: 2022-09-02 | Stop reason: HOSPADM

## 2022-09-02 RX ORDER — MEPERIDINE HYDROCHLORIDE 25 MG/ML
12.5 INJECTION INTRAMUSCULAR; INTRAVENOUS; SUBCUTANEOUS EVERY 5 MIN PRN
Status: DISCONTINUED | OUTPATIENT
Start: 2022-09-02 | End: 2022-09-02 | Stop reason: HOSPADM

## 2022-09-02 RX ORDER — SODIUM CHLORIDE, SODIUM LACTATE, POTASSIUM CHLORIDE, CALCIUM CHLORIDE 600; 310; 30; 20 MG/100ML; MG/100ML; MG/100ML; MG/100ML
INJECTION, SOLUTION INTRAVENOUS CONTINUOUS PRN
Status: DISCONTINUED | OUTPATIENT
Start: 2022-09-02 | End: 2022-09-02 | Stop reason: SDUPTHER

## 2022-09-02 RX ORDER — SODIUM CHLORIDE 0.9 % (FLUSH) 0.9 %
5-40 SYRINGE (ML) INJECTION EVERY 12 HOURS SCHEDULED
Status: CANCELLED | OUTPATIENT
Start: 2022-09-02

## 2022-09-02 RX ORDER — SODIUM CHLORIDE 9 MG/ML
INJECTION, SOLUTION INTRAVENOUS PRN
Status: CANCELLED | OUTPATIENT
Start: 2022-09-02

## 2022-09-02 RX ORDER — FAMOTIDINE 20 MG/1
20 TABLET, FILM COATED ORAL ONCE
Status: DISCONTINUED | OUTPATIENT
Start: 2022-09-02 | End: 2022-09-02 | Stop reason: HOSPADM

## 2022-09-02 RX ORDER — MORPHINE SULFATE 4 MG/ML
4 INJECTION, SOLUTION INTRAMUSCULAR; INTRAVENOUS EVERY 5 MIN PRN
Status: DISCONTINUED | OUTPATIENT
Start: 2022-09-02 | End: 2022-09-02 | Stop reason: HOSPADM

## 2022-09-02 RX ORDER — LIDOCAINE HYDROCHLORIDE 10 MG/ML
1 INJECTION, SOLUTION EPIDURAL; INFILTRATION; INTRACAUDAL; PERINEURAL
Status: DISCONTINUED | OUTPATIENT
Start: 2022-09-02 | End: 2022-09-02 | Stop reason: HOSPADM

## 2022-09-02 RX ORDER — MIDAZOLAM HYDROCHLORIDE 2 MG/2ML
2 INJECTION, SOLUTION INTRAMUSCULAR; INTRAVENOUS
Status: DISCONTINUED | OUTPATIENT
Start: 2022-09-02 | End: 2022-09-02 | Stop reason: HOSPADM

## 2022-09-02 RX ADMIN — MIDAZOLAM 2 MG: 1 INJECTION INTRAMUSCULAR; INTRAVENOUS at 10:52

## 2022-09-02 RX ADMIN — PROPOFOL 140 MCG/KG/MIN: 10 INJECTION, EMULSION INTRAVENOUS at 10:56

## 2022-09-02 RX ADMIN — FENTANYL CITRATE 25 MCG: 50 INJECTION, SOLUTION INTRAMUSCULAR; INTRAVENOUS at 11:14

## 2022-09-02 RX ADMIN — MORPHINE SULFATE 4 MG: 4 INJECTION, SOLUTION INTRAMUSCULAR; INTRAVENOUS at 11:58

## 2022-09-02 RX ADMIN — SODIUM CHLORIDE, SODIUM LACTATE, POTASSIUM CHLORIDE, AND CALCIUM CHLORIDE: 600; 310; 30; 20 INJECTION, SOLUTION INTRAVENOUS at 10:52

## 2022-09-02 RX ADMIN — FENTANYL CITRATE 50 MCG: 50 INJECTION, SOLUTION INTRAMUSCULAR; INTRAVENOUS at 10:56

## 2022-09-02 RX ADMIN — CEFAZOLIN SODIUM 2000 MG: 1 INJECTION, POWDER, FOR SOLUTION INTRAMUSCULAR; INTRAVENOUS at 11:11

## 2022-09-02 RX ADMIN — ONDANSETRON 4 MG: 2 INJECTION INTRAMUSCULAR; INTRAVENOUS at 11:39

## 2022-09-02 RX ADMIN — FENTANYL CITRATE 25 MCG: 50 INJECTION, SOLUTION INTRAMUSCULAR; INTRAVENOUS at 11:08

## 2022-09-02 ASSESSMENT — ENCOUNTER SYMPTOMS
SHORTNESS OF BREATH: 0
SHORTNESS OF BREATH: 0
ABDOMINAL DISTENTION: 1
COUGH: 0
BACK PAIN: 1
EYE REDNESS: 0
SORE THROAT: 0
EYE PAIN: 0
ABDOMINAL PAIN: 0

## 2022-09-02 ASSESSMENT — PAIN SCALES - GENERAL: PAINLEVEL_OUTOF10: 7

## 2022-09-02 ASSESSMENT — LIFESTYLE VARIABLES: SMOKING_STATUS: 0

## 2022-09-02 NOTE — OP NOTE
Operative Report    PATIENT NAME: 210Maria Elena [facility] RECORD NO. [id number]  SURGEON: Susie Menjivar MD    Primary Care Physician: Shy Micha, APRN - CNP  Date: 09/02/22        PROCEDURE PERFORMED: Right IJ single lumen PAC with US and fluoro  PREOPERATIVE DIAGNOSIS: multiple sclerosis  POSTOPERATIVE DIAGNOSIS: Same  SURGEON:  Marianne Astorga MD   Assistant: Dana Doty  ANESTHESIA:  Monitored Local Anesthesia with Sedation and local  ESTIMATED BLOOD LOSS:  minimal  SPECIMEN:  none  COMPLICATIONS:  None; patient tolerated the procedure well. FINDINGS: patent right IJ  DISPOSITION: PACU - hemodynamically stable. CONDITION: stable      Indications: The patient presented with a diagnosis or multiple sclerosis requiring IV infusions weekly, now in need of port placement. Procedure Details     After the risks and benefits of the procedure were explained, informed consent was obtained, and the patient was taken to the operating room. The patient was placed in supine position and sedation was begun. The neck and chest were prepped and draped in normal sterile fashion. Preoperative antibiotics had been given. A time out was performed. The patient was placed in head down position. An ultrasound was used to visualize the right IJ. Local anesthetic was injected and an 18 gauge cook needle was used to cannulate the vein. Dark red, non-pulsatile blood was returned. The guide wire was inserted through the needle and advanced without difficulty. Fluoro was used to insure correct placement of the guidewire. A location was then chosen on the chest for creating a pocket. Local anesthetic was injected at this location, as well as along the path for tunneling the catheter. A 3 cm incision was made, and cautery was used to dissect down to the chest wall. A pocket was created inferior to this. A small incision was then made in the skin of the neck at the level of the guidewire.  The catheter and port were assembled and flushed. The catheter was then tunneled from the chest incision out the small neck incision. The port was secured to the chest wall using PDS. Fluoro was used to guide trimming the catheter to the appropriate length. The sheath dilator was then placed over the guidewire and advanced. The dilator and guidewire were removed. The catheter was inserted in the sheath, the sheath was broken in half, and the catheter was advanced. Fluoro was used to insure appropriate positioning of the catheter tip. There were no signs of pneumothorax. The port was then aspirated and flushed with heparin solution. The small neck incision was closed with 4-0 monocryl subcuticular stitch. The chest incision was re approximated with 3-0 vicryl and then the skin was closed using 4-0 monocryl subcuticular stitches. Sterile dressings with dermabond were applied. The patient tolerated the procedure well, was removed from anesthesia, and taken to the recovery room in stable condition.                 Mehul Saab MD    Electronically signed 09/02/22 at 11:39 AM

## 2022-09-02 NOTE — DISCHARGE INSTR - ACTIVITY
Activity as tolerated except no driving if taking pain medication. Okay to shower over incisions but do not submerge under water like a tub or pool for one week. Watch for redness, drainage, or fever and call for any questions or concerns.
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
## 2022-09-02 NOTE — ANESTHESIA POSTPROCEDURE EVALUATION
Department of Anesthesiology  Postprocedure Note    Patient: Eren So  MRN: [id number]  YOB: 1975  Date of evaluation: 9/2/2022      Procedure Summary     Date: 09/02/22 Room / Location: [address]    Anesthesia Start: 8202 Anesthesia Stop: 0674    Procedure: PORT INSERTION WITH ULTRASOUND & FLUOROSCOPY (Chest) Diagnosis:       Multiple sclerosis ([address]      (Multiple sclerosis ([facility] [facility] [place]donado)    Surgeons: Mary Leal MD Responsible Provider: LYSSA Shaw CRNA    Anesthesia Type: MAC, TIVA ASA Status: 3          Anesthesia Type: No value filed.     Cara Phase I: Cara Score: 9    Cara Phase II:        Anesthesia Post Evaluation    Patient location during evaluation: PACU  Patient participation: complete - patient participated  Level of consciousness: awake and alert  Pain score: 0  Airway patency: patent  Nausea & Vomiting: no nausea and no vomiting  Complications: no  Cardiovascular status: blood pressure returned to baseline and hemodynamically stable  Respiratory status: room air and acceptable  Hydration status: euvolemic

## 2022-09-06 ENCOUNTER — TELEPHONE (OUTPATIENT)
Dept: SURGERY | Age: 47
End: 2022-09-06

## 2022-09-06 DIAGNOSIS — M79.7 FIBROMYALGIA: ICD-10-CM

## 2022-09-06 DIAGNOSIS — R20.0 NUMBNESS: ICD-10-CM

## 2022-09-06 RX ORDER — DRONABINOL 10 MG/1
CAPSULE ORAL
Qty: 180 CAPSULE | Refills: 5 | Status: SHIPPED | OUTPATIENT
Start: 2022-09-06 | End: 2022-10-06

## 2022-09-06 RX ORDER — REPOSITORY CORTICOTROPIN 80 [USP'U]/ML
80 INJECTION INTRAMUSCULAR; SUBCUTANEOUS DAILY
Qty: 5 EACH | Refills: 11 | Status: SHIPPED | OUTPATIENT
Start: 2022-09-06 | End: 2022-09-11

## 2022-09-06 NOTE — TELEPHONE ENCOUNTER
Requested Prescriptions     Pending Prescriptions Disp Refills    corticotropin (ACTHAR) 80 UNIT/ML injectable gel 5 each 11     Sig: Inject 1 mL into the muscle daily for 5 days       Last Office Visit: 8/24/2022  Next Office Visit: 12/1/2022  Last Medication Refill: 6/29/2021 with 11 RF

## 2022-09-06 NOTE — TELEPHONE ENCOUNTER
9/6/22 Patient called had port put in Friday and was told to ask about getting EMLA Cream for Friday's infusion. Patient was not sure if she would get that thru our office or different Dr.Office. Please call patient and advise. Call Back # [phone].   haresh

## 2022-09-06 NOTE — TELEPHONE ENCOUNTER
Requested Prescriptions     Pending Prescriptions Disp Refills    dronabinol (MARINOL) 10 MG capsule 180 capsule 5     Sig: TAKE TWO CAPSULES BY MOUTH THREE TIMES A DAY       Last Office Visit: 8/24/2022  Next Office Visit: 12/1/2022  Last Medication Refill: 5/31/2022 with 5 RF

## 2022-09-08 ENCOUNTER — TELEPHONE (OUTPATIENT)
Dept: SURGERY | Age: 47
End: 2022-09-08

## 2022-09-08 NOTE — TELEPHONE ENCOUNTER
Patient called having some pain where her port was placed she wanted to know if that was normal after it being placed.      Please call back at [phone]

## 2022-09-08 NOTE — TELEPHONE ENCOUNTER
I called patient back, no answer, left vm asking her to call me back to tell me more info & discuss.

## 2022-09-09 ENCOUNTER — HOSPITAL ENCOUNTER (OUTPATIENT)
Dept: INFUSION THERAPY | Age: 47
Setting detail: INFUSION SERIES
Discharge: HOME | End: 2022-09-09
Payer: MEDICARE

## 2022-09-09 VITALS
BODY MASS INDEX: 28.79 KG/M2 | DIASTOLIC BLOOD PRESSURE: 90 MMHG | RESPIRATION RATE: 20 BRPM | WEIGHT: 173 LBS | OXYGEN SATURATION: 100 % | SYSTOLIC BLOOD PRESSURE: 130 MMHG | TEMPERATURE: 97.2 F | HEART RATE: 61 BPM

## 2022-09-09 DIAGNOSIS — G35 MULTIPLE SCLEROSIS (HCC): Primary | ICD-10-CM

## 2022-09-09 PROCEDURE — 96365 THER/PROPH/DIAG IV INF INIT: CPT

## 2022-09-09 PROCEDURE — 96366 THER/PROPH/DIAG IV INF ADDON: CPT

## 2022-09-09 PROCEDURE — 6360000002 HC RX W HCPCS: Performed by: PSYCHIATRY & NEUROLOGY

## 2022-09-09 PROCEDURE — 6370000000 HC RX 637 (ALT 250 FOR IP): Performed by: PSYCHIATRY & NEUROLOGY

## 2022-09-09 PROCEDURE — 96375 TX/PRO/DX INJ NEW DRUG ADDON: CPT

## 2022-09-09 RX ORDER — ALBUTEROL SULFATE 90 UG/1
4 AEROSOL, METERED RESPIRATORY (INHALATION) PRN
Status: CANCELLED | OUTPATIENT
Start: 2022-09-09

## 2022-09-09 RX ORDER — SODIUM CHLORIDE 9 MG/ML
INJECTION, SOLUTION INTRAVENOUS CONTINUOUS
Status: CANCELLED | OUTPATIENT
Start: 2022-09-09

## 2022-09-09 RX ORDER — ACETAMINOPHEN 325 MG/1
650 TABLET ORAL ONCE
Status: COMPLETED | OUTPATIENT
Start: 2022-09-09 | End: 2022-09-09

## 2022-09-09 RX ORDER — DIPHENHYDRAMINE HYDROCHLORIDE 50 MG/ML
50 INJECTION INTRAMUSCULAR; INTRAVENOUS ONCE
Status: CANCELLED
Start: 2022-09-09 | End: 2022-09-09

## 2022-09-09 RX ORDER — DIPHENHYDRAMINE HYDROCHLORIDE 50 MG/ML
50 INJECTION INTRAMUSCULAR; INTRAVENOUS
Status: CANCELLED | OUTPATIENT
Start: 2022-09-09

## 2022-09-09 RX ORDER — EPINEPHRINE 1 MG/ML
0.3 INJECTION, SOLUTION, CONCENTRATE INTRAVENOUS PRN
Status: CANCELLED | OUTPATIENT
Start: 2022-09-09

## 2022-09-09 RX ORDER — SODIUM CHLORIDE 0.9 % (FLUSH) 0.9 %
5-40 SYRINGE (ML) INJECTION PRN
Status: DISCONTINUED | OUTPATIENT
Start: 2022-09-09 | End: 2022-09-10 | Stop reason: HOSPADM

## 2022-09-09 RX ORDER — MEPERIDINE HYDROCHLORIDE 25 MG/ML
12.5 INJECTION INTRAMUSCULAR; INTRAVENOUS; SUBCUTANEOUS PRN
Status: CANCELLED | OUTPATIENT
Start: 2022-09-09

## 2022-09-09 RX ORDER — ACETAMINOPHEN 325 MG/1
650 TABLET ORAL ONCE
Status: CANCELLED | OUTPATIENT
Start: 2022-09-09 | End: 2022-09-09

## 2022-09-09 RX ORDER — SODIUM CHLORIDE 0.9 % (FLUSH) 0.9 %
5-40 SYRINGE (ML) INJECTION PRN
Status: CANCELLED | OUTPATIENT
Start: 2022-09-09

## 2022-09-09 RX ORDER — ACETAMINOPHEN 325 MG/1
650 TABLET ORAL
Status: CANCELLED | OUTPATIENT
Start: 2022-09-09

## 2022-09-09 RX ORDER — DIPHENHYDRAMINE HYDROCHLORIDE 50 MG/ML
50 INJECTION INTRAMUSCULAR; INTRAVENOUS ONCE
Status: COMPLETED | OUTPATIENT
Start: 2022-09-09 | End: 2022-09-09

## 2022-09-09 RX ORDER — HEPARIN SODIUM (PORCINE) LOCK FLUSH IV SOLN 100 UNIT/ML 100 UNIT/ML
500 SOLUTION INTRAVENOUS PRN
Status: DISCONTINUED | OUTPATIENT
Start: 2022-09-09 | End: 2022-09-11 | Stop reason: HOSPADM

## 2022-09-09 RX ORDER — ONDANSETRON 2 MG/ML
8 INJECTION INTRAMUSCULAR; INTRAVENOUS
Status: CANCELLED | OUTPATIENT
Start: 2022-09-09

## 2022-09-09 RX ADMIN — IMMUNE GLOBULIN (HUMAN) 30 G: 10 INJECTION INTRAVENOUS; SUBCUTANEOUS at 09:22

## 2022-09-09 RX ADMIN — HYDROCORTISONE SODIUM SUCCINATE 100 MG: 100 INJECTION, POWDER, FOR SOLUTION INTRAMUSCULAR; INTRAVENOUS at 09:07

## 2022-09-09 RX ADMIN — DIPHENHYDRAMINE HYDROCHLORIDE 50 MG: 50 INJECTION, SOLUTION INTRAMUSCULAR; INTRAVENOUS at 09:07

## 2022-09-09 RX ADMIN — HEPARIN 500 UNITS: 100 SYRINGE at 11:05

## 2022-09-09 RX ADMIN — ACETAMINOPHEN 650 MG: 325 TABLET ORAL at 09:07

## 2022-09-09 ASSESSMENT — PAIN SCALES - GENERAL: PAINLEVEL_OUTOF10: 0

## 2022-09-09 NOTE — DISCHARGE INSTRUCTIONS
immune globulin (intravenous and subcutaneous)  Pronunciation: dinora LACEY foley  Brand: Gammagard Liquid, Gammaked, Gamunex-C  What is the most important information I should know about immune globulin? This medicine can cause blood clots. The risk is highest in older adults or in people who have had blood clots, heart problems, or blood circulation problems. Blood clots are also more likely during long-term bedrest, while using birth control pills or hormone replacement therapy, or while having a central intravenous (IV) catheter inplace. Call your doctor at once if you have chest pain, trouble breathing, fast heartbeats, numbness or weakness, or swelling and warmth or discoloration in anarm or leg. This medicine can also harm your kidneys, especially if you have kidney disease or if you also use certain medicines. Tell your doctor right away if you have signs of kidney problems, such as swelling, rapid weight gain, and little or no urination. What is immune globulin? Immune globulin intravenous and subcutaneous (for injection into a vein orunder the skin) is used to treat primary immunodeficiency. Immune globulin is also used to increase platelets (blood clotting cells) inpeople with idiopathic thrombocytopenic purpura. Immune globulin is also used to treat certain debilitating nerve disorders thatcause muscle weakness and can affect daily activities. Immune globulin may also be used for purposes not listed in this medicationguide. What should I discuss with my healthcare provider before using immune globulin? You should not use this medicine if:  you have had an allergic reaction to an immune globulin or blood product; or  you have immune globulin A (IgA) deficiency with antibody to IgA. Immune globulin can cause blood clots or kidney problems, especially in older adults or in people with certain conditions.  Tell yourdoctor if you have ever had:  heart problems, blood circulation problems, or \"thick blood\";  a stroke or blood clot;  kidney disease;  diabetes;  an infection called sepsis;  if you use estrogens (birth control pills or hormone replacement therapy);  if you have been on long-term bedrest; or  if you have a central intravenous (IV) catheter in place. You may need a dose adjustment if you are exposed to measles, or if you travelto an area where this disease is common. Tell your doctor if you are pregnant or breastfeeding. Immune globulin is made from donated human plasma and may contain viruses or other infectious agents. Donated plasma is tested and treated to reduce the risk of contamination, but there is still a small possibility it could transmitdisease. Ask your doctor about any possible risk. How should I use immune globulin? Follow all directions on your prescription label and read all medication guides or instruction sheets. Your doctor may occasionally change your dose. Use themedicine exactly as directed. Immune globulin is given as an infusion into a vein, or injected under the skin using an infusion pump. A healthcare provider will give your first dose and mayteach you how to properly use the medication by yourself. Do not inject immune globulin into a vein if you have been instructed to give the medicine as a subcutaneous injection (under the skin). How you give this medication, how often you use it, and the length of yourinfusion time will depend on the condition being treated. Read and carefully follow any Instructions for Use provided with your medicine. Ask your doctor or pharmacist if you don't understand all instructions. Prepare an injection only when you are ready to give it. Do not use if the medicine looks cloudy, has changed colors, or has particles in it. Call your pharmacist for new medicine. Do not shake the medication bottle or you may ruin the medicine. Immune globulin must be given slowly.  You may need to use several catheters to inject this medicine into different body areas at the same time. Your healthcare provider will show you the best places on your body to inject the medication. Keep a diary of the days and times you gave the injection and whereyou injected it on your body. Drink plenty of liquids  while you are using this medicine to help improve your blood flow and keepyour kidneys working properly. You may need frequent blood or urine tests. This medicine can affect the results of certain medical tests. Tell any doctorwho treats you that you are using immune globulin. Store this medicine in its original carton in the refrigerator. Do not freeze immune globulin, and throw the medicine away if it has frozen. Take the medicine out of the refrigerator and let it reach room temperature forup to 1 hour before injecting your dose. You may also store immune globulin at room temperature. You will need to use immune globulin within a certain number months. This will depend on the how you store the medicine (at room temperature, or in a refrigerator). Carefully follow the storage instructions provided with your medicine. Do not use the medicine after the expiration date on the label haspassed. Each vial (bottle) is for one use only. Throw it away after one use, even ifthere is still medicine left inside. Use disposable injection items (needle, catheter, tubing) only once and then place them in a puncture-proof \"sharps\" container. Follow state or local laws about how to dispose of this container. Keep it out of the reach of childrenand pets. What happens if I miss a dose? Call your doctor for instructions if you miss a dose. What happens if I overdose? Seek emergency medical attention or call the Poison Help line at 1-381.259.4425. What should I avoid while using immune globulin? Do not receive a \"live\" vaccine while using immune globulin. The vaccine may not work as well and may not fully protect you from disease.  Live vaccines include measles, mumps, rubella (MMR), rotavirus, typhoid, yellow fever, varicella (chickenpox), zoster (shingles), and nasal flu (influenza)vaccine. What are the possible side effects of immune globulin? Get emergency medical help if you have signs of an allergic reaction: hives; wheezing, difficulty breathing; dizziness, feeling like you might passout; swelling of your face, lips, tongue, or throat. Some side effects may occur during the injection. Tell your caregiver if you feel light-headed, itchy, chilled, sweaty, or have chest discomfort, fastheartbeats, severe headache, or pounding in your neck or ears. Call your doctor at once if you have:  a blood cell disorder --pale or yellowed skin, dark colored urine, fever, confusion or weakness;  dehydration symptoms --feeling very thirsty or hot, being unable to urinate, heavy sweating, or hot and dry skin;  kidney problems --little or no urination, swelling, rapid weight gain, feeling short of breath;  lung problems --chest pain, wheezing, trouble breathing, blue colored lips, fingers, or toes;  signs of a new infection --fever with a severe headache, neck stiffness, eye pain, and increased sensitivity to light; or  signs of a blood clot --shortness of breath, chest pain with deep breathing, rapid heart rate, numbness or weakness on one side of the body, swelling and warmth or discoloration in an arm or leg. Common side effects may include:  runny or stuffy nose, sinus pain, cough, sore throat;  fever, chills, weakness;  headache, back pain, muscle or joint pain;  dizziness, tiredness, depressed mood;  swelling in your hands or feet;  skin rash, redness, or bruising;  blisters or ulcers in your mouth, red or swollen gums, trouble swallowing;  nausea, diarrhea, stomach pain, upset stomach;  increased blood pressure; or  redness, swelling, or itching where an injection was given. This is not a complete list of side effects and others may occur.  Call your doctor for medical advice about side effects. You may report side effects toFDA at 1-232-FBT-9899. What other drugs will affect immune globulin? Immune globulin can harm your kidneys, especially if you also use certain medicines for infections, cancer, osteoporosis, organ transplant rejection, bowel disorders, high blood pressure,or pain or arthritis (including Advil, Motrin, and Aleve). Other drugs may affect immune globulin, including prescription and over-the-counter medicines, vitamins, and herbal products. Tell your doctorabout all your current medicines and any medicine you start or stop using. Where can I get more information? Your doctor or pharmacist can provide more information about immune globulin. Remember, keep this and all other medicines out of the reach of children, never share your medicines with others, and use this medication only for the indication prescribed. Every effort has been made to ensure that the information provided by [address] Laura [address] is accurate, up-to-date, and complete, but no guarantee is made to that effect. Drug information contained herein may be time sensitive. [facility] information has been compiled for use by healthcare practitioners and consumers in the [place] and therefore [employer] does not warrant that uses outside of the [place] are appropriate, unless specifically indicated otherwise. [facility]'s drug information does not endorse drugs, diagnose patients or recommend therapy. [facility] drug information is an informational resource designed to assist licensed healthcare practitioners in caring for their patients and/or to serve consumers viewing this service as a supplement to, and not a substitute for, the expertise, skill, knowledge and judgment of healthcare practitioners.  The absence of a warning for a given drug or drug combination in no way should be construed to indicate that the drug or drug combination is safe, effective or appropriate for any given patient. [facility] does not assume any responsibility for any aspect of healthcare administered with the aid of information [facility] provides. The information contained herein is not intended to cover all possible uses, directions, precautions, warnings, drug interactions, allergic reactions, or adverse effects. If you have questions about the drugs you are taking, check with yourdoctor, nurse or pharmacist.  Copyright 2283-3109 [address] [place] [address] Dr SEVERINO. Version: 4.02. Revision date: 2/26/2021. Care instructions adapted under license by [facility] ([facility]). If you have questions about a medical condition or this instruction, always ask your healthcare professional. [address] any warranty or liability for your use of this information.

## 2022-09-12 ENCOUNTER — TELEPHONE (OUTPATIENT)
Dept: NEUROLOGY | Age: 47
End: 2022-09-12

## 2022-09-13 NOTE — INTERVAL H&P NOTE
Update History & Physical    The patient's History and Physical of August 30, 2022 was reviewed with the patient and I examined the patient. There was no change. The surgical site was confirmed by the patient and me. Plan: The risks, benefits, expected outcome, and alternative to the recommended procedure have been discussed with the patient. Patient understands and wants to proceed with the procedure.      Electronically signed by Karina Woodson MD on 9/13/2022 at 1:05 PM

## 2022-09-16 ENCOUNTER — HOSPITAL ENCOUNTER (OUTPATIENT)
Dept: INFUSION THERAPY | Age: 47
Setting detail: INFUSION SERIES
Discharge: HOME | End: 2022-09-16
Payer: MEDICARE

## 2022-09-16 VITALS
OXYGEN SATURATION: 99 % | WEIGHT: 173 LBS | SYSTOLIC BLOOD PRESSURE: 121 MMHG | RESPIRATION RATE: 17 BRPM | DIASTOLIC BLOOD PRESSURE: 87 MMHG | BODY MASS INDEX: 28.79 KG/M2 | HEART RATE: 68 BPM | TEMPERATURE: 97.3 F

## 2022-09-16 DIAGNOSIS — G35 MULTIPLE SCLEROSIS (HCC): Primary | ICD-10-CM

## 2022-09-16 PROCEDURE — 2580000003 HC RX 258: Performed by: PSYCHIATRY & NEUROLOGY

## 2022-09-16 PROCEDURE — 96366 THER/PROPH/DIAG IV INF ADDON: CPT

## 2022-09-16 PROCEDURE — 6370000000 HC RX 637 (ALT 250 FOR IP): Performed by: PSYCHIATRY & NEUROLOGY

## 2022-09-16 PROCEDURE — 96365 THER/PROPH/DIAG IV INF INIT: CPT

## 2022-09-16 PROCEDURE — 6360000002 HC RX W HCPCS: Performed by: PSYCHIATRY & NEUROLOGY

## 2022-09-16 PROCEDURE — 96375 TX/PRO/DX INJ NEW DRUG ADDON: CPT

## 2022-09-16 RX ORDER — SODIUM CHLORIDE 0.9 % (FLUSH) 0.9 %
5-40 SYRINGE (ML) INJECTION PRN
Status: CANCELLED | OUTPATIENT
Start: 2022-09-16

## 2022-09-16 RX ORDER — ACETAMINOPHEN 325 MG/1
650 TABLET ORAL ONCE
Status: CANCELLED | OUTPATIENT
Start: 2022-09-16 | End: 2022-09-16

## 2022-09-16 RX ORDER — SODIUM CHLORIDE 9 MG/ML
INJECTION, SOLUTION INTRAVENOUS CONTINUOUS
Status: CANCELLED | OUTPATIENT
Start: 2022-09-16

## 2022-09-16 RX ORDER — ACETAMINOPHEN 325 MG/1
650 TABLET ORAL
Status: CANCELLED | OUTPATIENT
Start: 2022-09-16

## 2022-09-16 RX ORDER — ACETAMINOPHEN 325 MG/1
650 TABLET ORAL ONCE
Status: COMPLETED | OUTPATIENT
Start: 2022-09-16 | End: 2022-09-16

## 2022-09-16 RX ORDER — ONDANSETRON 2 MG/ML
8 INJECTION INTRAMUSCULAR; INTRAVENOUS
Status: CANCELLED | OUTPATIENT
Start: 2022-09-16

## 2022-09-16 RX ORDER — ALBUTEROL SULFATE 90 UG/1
4 AEROSOL, METERED RESPIRATORY (INHALATION) PRN
Status: CANCELLED | OUTPATIENT
Start: 2022-09-16

## 2022-09-16 RX ORDER — SODIUM CHLORIDE 0.9 % (FLUSH) 0.9 %
5-40 SYRINGE (ML) INJECTION PRN
Status: DISCONTINUED | OUTPATIENT
Start: 2022-09-16 | End: 2022-09-17 | Stop reason: HOSPADM

## 2022-09-16 RX ORDER — DIPHENHYDRAMINE HYDROCHLORIDE 50 MG/ML
50 INJECTION INTRAMUSCULAR; INTRAVENOUS
Status: CANCELLED | OUTPATIENT
Start: 2022-09-16

## 2022-09-16 RX ORDER — HEPARIN SODIUM (PORCINE) LOCK FLUSH IV SOLN 100 UNIT/ML 100 UNIT/ML
500 SOLUTION INTRAVENOUS PRN
Status: DISCONTINUED | OUTPATIENT
Start: 2022-09-16 | End: 2022-09-18 | Stop reason: HOSPADM

## 2022-09-16 RX ORDER — DIPHENHYDRAMINE HYDROCHLORIDE 50 MG/ML
50 INJECTION INTRAMUSCULAR; INTRAVENOUS ONCE
Status: COMPLETED | OUTPATIENT
Start: 2022-09-16 | End: 2022-09-16

## 2022-09-16 RX ORDER — EPINEPHRINE 1 MG/ML
0.3 INJECTION, SOLUTION, CONCENTRATE INTRAVENOUS PRN
Status: CANCELLED | OUTPATIENT
Start: 2022-09-16

## 2022-09-16 RX ORDER — MEPERIDINE HYDROCHLORIDE 25 MG/ML
12.5 INJECTION INTRAMUSCULAR; INTRAVENOUS; SUBCUTANEOUS PRN
Status: CANCELLED | OUTPATIENT
Start: 2022-09-16

## 2022-09-16 RX ORDER — DIPHENHYDRAMINE HYDROCHLORIDE 50 MG/ML
50 INJECTION INTRAMUSCULAR; INTRAVENOUS ONCE
Status: CANCELLED
Start: 2022-09-16 | End: 2022-09-16

## 2022-09-16 RX ADMIN — ACETAMINOPHEN 650 MG: 325 TABLET ORAL at 07:46

## 2022-09-16 RX ADMIN — HEPARIN 500 UNITS: 100 SYRINGE at 09:52

## 2022-09-16 RX ADMIN — SODIUM CHLORIDE, PRESERVATIVE FREE 20 ML: 5 INJECTION INTRAVENOUS at 09:52

## 2022-09-16 RX ADMIN — IMMUNE GLOBULIN (HUMAN) 30 G: 10 INJECTION INTRAVENOUS; SUBCUTANEOUS at 08:18

## 2022-09-16 RX ADMIN — DIPHENHYDRAMINE HYDROCHLORIDE 50 MG: 50 INJECTION INTRAMUSCULAR; INTRAVENOUS at 07:49

## 2022-09-16 RX ADMIN — HYDROCORTISONE SODIUM SUCCINATE 100 MG: 100 INJECTION, POWDER, FOR SOLUTION INTRAMUSCULAR; INTRAVENOUS at 07:48

## 2022-09-16 NOTE — DISCHARGE INSTRUCTIONS
immune globulin (intravenous and subcutaneous)  Pronunciation:  dinora LACEY foley  Brand:  Gammagard Liquid, Gammaked, Gamunex-C  What is the most important information I should know about immune globulin? This medicine can cause blood clots. The risk is highest in older adults or in people who have had blood clots, heart problems, or blood circulation problems. Blood clots are also more likely during long-term bedrest, while using birth control pills or hormone replacement therapy, or while having a central intravenous (IV) catheter in place. Call your doctor at once if you have chest pain, trouble breathing, fast heartbeats, numbness or weakness, or swelling and warmth or discoloration in an arm or leg. This medicine can also harm your kidneys, especially if you have kidney disease or if you also use certain medicines. Tell your doctor right away if you have signs of kidney problems, such as swelling, rapid weight gain, and little or no urination. What is immune globulin? Immune globulin intravenous and subcutaneous (for injection into a vein or under the skin) is used to treat primary immunodeficiency. Immune globulin is also used to increase platelets (blood clotting cells) in people with idiopathic thrombocytopenic purpura. Immune globulin is also used to treat certain debilitating nerve disorders that cause muscle weakness and can affect daily activities. Immune globulin may also be used for purposes not listed in this medication guide. What should I discuss with my healthcare provider before using immune globulin? You should not use this medicine if:  you have had an allergic reaction to an immune globulin or blood product; or  you have immune globulin A (IgA) deficiency with antibody to IgA. Immune globulin can cause blood clots or kidney problems, especially in older adults or in people with certain conditions.  Tell your doctor if you have ever had:  heart problems, blood circulation problems, or \"thick blood\";  a stroke or blood clot;  kidney disease;  diabetes;  an infection called sepsis;  if you use estrogens (birth control pills or hormone replacement therapy);  if you have been on long-term bedrest; or  if you have a central intravenous (IV) catheter in place. You may need a dose adjustment if you are exposed to measles, or if you travel to an area where this disease is common. Tell your doctor if you are pregnant or breastfeeding. Immune globulin is made from donated human plasma and may contain viruses or other infectious agents. Donated plasma is tested and treated to reduce the risk of contamination, but there is still a small possibility it could transmit disease. Ask your doctor about any possible risk. How should I use immune globulin? Follow all directions on your prescription label and read all medication guides or instruction sheets. Your doctor may occasionally change your dose. Use the medicine exactly as directed. Immune globulin is given as an infusion into a vein, or injected under the skin using an infusion pump. A healthcare provider will give your first dose and may teach you how to properly use the medication by yourself. Do not inject immune globulin into a vein if you have been instructed to give the medicine as a subcutaneous injection (under the skin). How you give this medication, how often you use it, and the length of your infusion time will depend on the condition being treated. Read and carefully follow any Instructions for Use provided with your medicine. Ask your doctor or pharmacist if you don't understand all instructions. Prepare an injection only when you are ready to give it. Do not use if the medicine looks cloudy, has changed colors, or has particles in it. Call your pharmacist for new medicine. Do not shake the medication bottle or you may ruin the medicine. Immune globulin must be given slowly.  You may need to use several catheters to inject this medicine into different body areas at the same time. Your healthcare provider will show you the best places on your body to inject the medication. Keep a diary of the days and times you gave the injection and where you injected it on your body. Drink plenty of liquids  while you are using this medicine to help improve your blood flow and keep your kidneys working properly. You may need frequent blood or urine tests. This medicine can affect the results of certain medical tests. Tell any doctor who treats you that you are using immune globulin. Store this medicine in its original carton in the refrigerator. Do not freeze immune globulin, and throw the medicine away if it has frozen. Take the medicine out of the refrigerator and let it reach room temperature for up to 1 hour before injecting your dose. You may also store immune globulin at room temperature. You will need to use immune globulin within a certain number months. This will depend on the how you store the medicine (at room temperature, or in a refrigerator). Carefully follow the storage instructions provided with your medicine. Do not use the medicine after the expiration date on the label has passed. Each vial (bottle) is for one use only. Throw it away after one use, even if there is still medicine left inside. Use disposable injection items (needle, catheter, tubing) only once and then place them in a puncture-proof \"sharps\" container. Follow state or local laws about how to dispose of this container. Keep it out of the reach of children and pets. What happens if I miss a dose? Call your doctor for instructions if you miss a dose. What happens if I overdose? Seek emergency medical attention or call the Poison Help line at 1-165.726.2487. What should I avoid while using immune globulin? Do not receive a \"live\" vaccine while using immune globulin. The vaccine may not work as well and may not fully protect you from disease.  Live vaccines include measles, mumps, rubella (MMR), rotavirus, typhoid, yellow fever, varicella (chickenpox), zoster (shingles), and nasal flu (influenza) vaccine. What are the possible side effects of immune globulin? Get emergency medical help if you have signs of an allergic reaction: hives; wheezing, difficulty breathing; dizziness, feeling like you might pass out; swelling of your face, lips, tongue, or throat. Some side effects may occur during the injection. Tell your caregiver if you feel light-headed, itchy, chilled, sweaty, or have chest discomfort, fast heartbeats, severe headache, or pounding in your neck or ears. Call your doctor at once if you have:  a blood cell disorder --pale or yellowed skin, dark colored urine, fever, confusion or weakness;  dehydration symptoms --feeling very thirsty or hot, being unable to urinate, heavy sweating, or hot and dry skin;  kidney problems --little or no urination, swelling, rapid weight gain, feeling short of breath;  lung problems --chest pain, wheezing, trouble breathing, blue colored lips, fingers, or toes;  signs of a new infection --fever with a severe headache, neck stiffness, eye pain, and increased sensitivity to light; or  signs of a blood clot --shortness of breath, chest pain with deep breathing, rapid heart rate, numbness or weakness on one side of the body, swelling and warmth or discoloration in an arm or leg. Common side effects may include:  runny or stuffy nose, sinus pain, cough, sore throat;  fever, chills, weakness;  headache, back pain, muscle or joint pain;  dizziness, tiredness, depressed mood;  swelling in your hands or feet;  skin rash, redness, or bruising;  blisters or ulcers in your mouth, red or swollen gums, trouble swallowing;  nausea, diarrhea, stomach pain, upset stomach;  increased blood pressure; or  redness, swelling, or itching where an injection was given. This is not a complete list of side effects and others may occur.  Call your doctor for medical advice about side effects. You may report side effects to FDA at 0-433-CAU-0230. What other drugs will affect immune globulin? Immune globulin can harm your kidneys, especially if you also use certain medicines for infections, cancer, osteoporosis, organ transplant rejection, bowel disorders, high blood pressure, or pain or arthritis (including Advil, Motrin, and Aleve). Other drugs may affect immune globulin, including prescription and over-the-counter medicines, vitamins, and herbal products. Tell your doctor about all your current medicines and any medicine you start or stop using. Where can I get more information? Your doctor or pharmacist can provide more information about immune globulin. Remember, keep this and all other medicines out of the reach of children, never share your medicines with others, and use this medication only for the indication prescribed. Every effort has been made to ensure that the information provided by Ana Laura Dom[address]ng[address] is accurate, up-to-date, and complete, but no guarantee is made to that effect. Drug information contained herein may be time sensitive. [employer] information has been compiled for use by healthcare practitioners and consumers in the [place] and therefore [employer] does not warrant that uses outside of the [place] are appropriate, unless specifically indicated otherwise. [employer] drug information does not endorse drugs, diagnose patients or recommend therapy. [facility] drug information is an informational resource designed to assist licensed healthcare practitioners in caring for their patients and/or to serve consumers viewing this service as a supplement to, and not a substitute for, the expertise, skill, knowledge and judgment of healthcare practitioners.  The absence of a warning for a given drug or drug combination in no way should be construed to indicate that the drug or drug combination is safe, effective or appropriate for any given patient. [facility] does not assume any responsibility for any aspect of healthcare administered with the aid of information [facility] provides. The information contained herein is not intended to cover all possible uses, directions, precautions, warnings, drug interactions, allergic reactions, or adverse effects. If you have questions about the drugs you are taking, check with your doctor, nurse or pharmacist.  Copyright 5356-6667 [address] [place] [address] Dr SEVERINO. Version: 4.02. Revision date: 2/26/2021. Care instructions adapted under license by [facility] ([facility]). If you have questions about a medical condition or this instruction, always ask your healthcare professional. [place] [address] any warranty or liability for your use of this information.

## 2022-09-23 ENCOUNTER — HOSPITAL ENCOUNTER (OUTPATIENT)
Dept: INFUSION THERAPY | Age: 47
Setting detail: INFUSION SERIES
Discharge: HOME | End: 2022-09-23
Payer: MEDICARE

## 2022-09-23 VITALS
HEART RATE: 63 BPM | RESPIRATION RATE: 18 BRPM | BODY MASS INDEX: 28.62 KG/M2 | SYSTOLIC BLOOD PRESSURE: 135 MMHG | WEIGHT: 172 LBS | OXYGEN SATURATION: 99 % | TEMPERATURE: 97.7 F | DIASTOLIC BLOOD PRESSURE: 92 MMHG

## 2022-09-23 DIAGNOSIS — G35 MULTIPLE SCLEROSIS (HCC): Primary | ICD-10-CM

## 2022-09-23 PROCEDURE — 96375 TX/PRO/DX INJ NEW DRUG ADDON: CPT

## 2022-09-23 PROCEDURE — 6360000002 HC RX W HCPCS: Performed by: PSYCHIATRY & NEUROLOGY

## 2022-09-23 PROCEDURE — 96365 THER/PROPH/DIAG IV INF INIT: CPT

## 2022-09-23 PROCEDURE — 6370000000 HC RX 637 (ALT 250 FOR IP): Performed by: PSYCHIATRY & NEUROLOGY

## 2022-09-23 PROCEDURE — 96366 THER/PROPH/DIAG IV INF ADDON: CPT

## 2022-09-23 PROCEDURE — 2580000003 HC RX 258: Performed by: PSYCHIATRY & NEUROLOGY

## 2022-09-23 RX ORDER — SODIUM CHLORIDE 9 MG/ML
INJECTION, SOLUTION INTRAVENOUS CONTINUOUS
Status: CANCELLED | OUTPATIENT
Start: 2022-09-23

## 2022-09-23 RX ORDER — HEPARIN SODIUM (PORCINE) LOCK FLUSH IV SOLN 100 UNIT/ML 100 UNIT/ML
500 SOLUTION INTRAVENOUS PRN
Status: DISCONTINUED | OUTPATIENT
Start: 2022-09-23 | End: 2022-09-25 | Stop reason: HOSPADM

## 2022-09-23 RX ORDER — ACETAMINOPHEN 325 MG/1
650 TABLET ORAL ONCE
Status: CANCELLED | OUTPATIENT
Start: 2022-09-23 | End: 2022-09-23

## 2022-09-23 RX ORDER — SODIUM CHLORIDE 0.9 % (FLUSH) 0.9 %
5-40 SYRINGE (ML) INJECTION PRN
Status: DISCONTINUED | OUTPATIENT
Start: 2022-09-23 | End: 2022-09-24 | Stop reason: HOSPADM

## 2022-09-23 RX ORDER — DIPHENHYDRAMINE HYDROCHLORIDE 50 MG/ML
50 INJECTION INTRAMUSCULAR; INTRAVENOUS ONCE
Status: COMPLETED | OUTPATIENT
Start: 2022-09-23 | End: 2022-09-23

## 2022-09-23 RX ORDER — DIPHENHYDRAMINE HYDROCHLORIDE 50 MG/ML
50 INJECTION INTRAMUSCULAR; INTRAVENOUS ONCE
Status: CANCELLED
Start: 2022-09-23 | End: 2022-09-23

## 2022-09-23 RX ORDER — EPINEPHRINE 1 MG/ML
0.3 INJECTION, SOLUTION, CONCENTRATE INTRAVENOUS PRN
Status: CANCELLED | OUTPATIENT
Start: 2022-09-23

## 2022-09-23 RX ORDER — ALBUTEROL SULFATE 90 UG/1
4 AEROSOL, METERED RESPIRATORY (INHALATION) PRN
Status: CANCELLED | OUTPATIENT
Start: 2022-09-23

## 2022-09-23 RX ORDER — ONDANSETRON 2 MG/ML
8 INJECTION INTRAMUSCULAR; INTRAVENOUS
Status: CANCELLED | OUTPATIENT
Start: 2022-09-23

## 2022-09-23 RX ORDER — DIPHENHYDRAMINE HYDROCHLORIDE 50 MG/ML
50 INJECTION INTRAMUSCULAR; INTRAVENOUS
Status: CANCELLED | OUTPATIENT
Start: 2022-09-23

## 2022-09-23 RX ORDER — ACETAMINOPHEN 325 MG/1
650 TABLET ORAL
Status: CANCELLED | OUTPATIENT
Start: 2022-09-23

## 2022-09-23 RX ORDER — SODIUM CHLORIDE 0.9 % (FLUSH) 0.9 %
5-40 SYRINGE (ML) INJECTION PRN
Status: CANCELLED | OUTPATIENT
Start: 2022-09-23

## 2022-09-23 RX ORDER — MEPERIDINE HYDROCHLORIDE 25 MG/ML
12.5 INJECTION INTRAMUSCULAR; INTRAVENOUS; SUBCUTANEOUS PRN
Status: CANCELLED | OUTPATIENT
Start: 2022-09-23

## 2022-09-23 RX ORDER — ACETAMINOPHEN 325 MG/1
650 TABLET ORAL ONCE
Status: COMPLETED | OUTPATIENT
Start: 2022-09-23 | End: 2022-09-23

## 2022-09-23 RX ADMIN — SODIUM CHLORIDE, PRESERVATIVE FREE 20 ML: 5 INJECTION INTRAVENOUS at 09:52

## 2022-09-23 RX ADMIN — HYDROCORTISONE SODIUM SUCCINATE 100 MG: 100 INJECTION, POWDER, FOR SOLUTION INTRAMUSCULAR; INTRAVENOUS at 07:40

## 2022-09-23 RX ADMIN — HEPARIN 500 UNITS: 100 SYRINGE at 09:52

## 2022-09-23 RX ADMIN — IMMUNE GLOBULIN (HUMAN) 30 G: 10 INJECTION INTRAVENOUS; SUBCUTANEOUS at 08:05

## 2022-09-23 RX ADMIN — ACETAMINOPHEN 650 MG: 325 TABLET ORAL at 07:40

## 2022-09-23 RX ADMIN — DIPHENHYDRAMINE HYDROCHLORIDE 50 MG: 50 INJECTION INTRAMUSCULAR; INTRAVENOUS at 07:40

## 2022-09-23 NOTE — DISCHARGE INSTRUCTIONS
immune globulin (intravenous and subcutaneous)  Pronunciation:  dinora LACEY foley  Brand:  Gammagard Liquid, Gammaked, Gamunex-C  What is the most important information I should know about immune globulin? This medicine can cause blood clots. The risk is highest in older adults or in people who have had blood clots, heart problems, or blood circulation problems. Blood clots are also more likely during long-term bedrest, while using birth control pills or hormone replacement therapy, or while having a central intravenous (IV) catheter in place. Call your doctor at once if you have chest pain, trouble breathing, fast heartbeats, numbness or weakness, or swelling and warmth or discoloration in an arm or leg. This medicine can also harm your kidneys, especially if you have kidney disease or if you also use certain medicines. Tell your doctor right away if you have signs of kidney problems, such as swelling, rapid weight gain, and little or no urination. What is immune globulin? Immune globulin intravenous and subcutaneous (for injection into a vein or under the skin) is used to treat primary immunodeficiency. Immune globulin is also used to increase platelets (blood clotting cells) in people with idiopathic thrombocytopenic purpura. Immune globulin is also used to treat certain debilitating nerve disorders that cause muscle weakness and can affect daily activities. Immune globulin may also be used for purposes not listed in this medication guide. What should I discuss with my healthcare provider before using immune globulin? You should not use this medicine if:  you have had an allergic reaction to an immune globulin or blood product; or  you have immune globulin A (IgA) deficiency with antibody to IgA. Immune globulin can cause blood clots or kidney problems, especially in older adults or in people with certain conditions.  Tell your doctor if you have ever had:  heart problems, blood circulation problems, or \"thick blood\";  a stroke or blood clot;  kidney disease;  diabetes;  an infection called sepsis;  if you use estrogens (birth control pills or hormone replacement therapy);  if you have been on long-term bedrest; or  if you have a central intravenous (IV) catheter in place. You may need a dose adjustment if you are exposed to measles, or if you travel to an area where this disease is common. Tell your doctor if you are pregnant or breastfeeding. Immune globulin is made from donated human plasma and may contain viruses or other infectious agents. Donated plasma is tested and treated to reduce the risk of contamination, but there is still a small possibility it could transmit disease. Ask your doctor about any possible risk. How should I use immune globulin? Follow all directions on your prescription label and read all medication guides or instruction sheets. Your doctor may occasionally change your dose. Use the medicine exactly as directed. Immune globulin is given as an infusion into a vein, or injected under the skin using an infusion pump. A healthcare provider will give your first dose and may teach you how to properly use the medication by yourself. Do not inject immune globulin into a vein if you have been instructed to give the medicine as a subcutaneous injection (under the skin). How you give this medication, how often you use it, and the length of your infusion time will depend on the condition being treated. Read and carefully follow any Instructions for Use provided with your medicine. Ask your doctor or pharmacist if you don't understand all instructions. Prepare an injection only when you are ready to give it. Do not use if the medicine looks cloudy, has changed colors, or has particles in it. Call your pharmacist for new medicine. Do not shake the medication bottle or you may ruin the medicine. Immune globulin must be given slowly.  You may need to use several catheters to inject this medicine into different body areas at the same time. Your healthcare provider will show you the best places on your body to inject the medication. Keep a diary of the days and times you gave the injection and where you injected it on your body. Drink plenty of liquids  while you are using this medicine to help improve your blood flow and keep your kidneys working properly. You may need frequent blood or urine tests. This medicine can affect the results of certain medical tests. Tell any doctor who treats you that you are using immune globulin. Store this medicine in its original carton in the refrigerator. Do not freeze immune globulin, and throw the medicine away if it has frozen. Take the medicine out of the refrigerator and let it reach room temperature for up to 1 hour before injecting your dose. You may also store immune globulin at room temperature. You will need to use immune globulin within a certain number months. This will depend on the how you store the medicine (at room temperature, or in a refrigerator). Carefully follow the storage instructions provided with your medicine. Do not use the medicine after the expiration date on the label has passed. Each vial (bottle) is for one use only. Throw it away after one use, even if there is still medicine left inside. Use disposable injection items (needle, catheter, tubing) only once and then place them in a puncture-proof \"sharps\" container. Follow state or local laws about how to dispose of this container. Keep it out of the reach of children and pets. What happens if I miss a dose? Call your doctor for instructions if you miss a dose. What happens if I overdose? Seek emergency medical attention or call the Poison Help line at 1-174.500.2832. What should I avoid while using immune globulin? Do not receive a \"live\" vaccine while using immune globulin. The vaccine may not work as well and may not fully protect you from disease.  Live vaccines include measles, mumps, rubella (MMR), rotavirus, typhoid, yellow fever, varicella (chickenpox), zoster (shingles), and nasal flu (influenza) vaccine. What are the possible side effects of immune globulin? Get emergency medical help if you have signs of an allergic reaction: hives; wheezing, difficulty breathing; dizziness, feeling like you might pass out; swelling of your face, lips, tongue, or throat. Some side effects may occur during the injection. Tell your caregiver if you feel light-headed, itchy, chilled, sweaty, or have chest discomfort, fast heartbeats, severe headache, or pounding in your neck or ears. Call your doctor at once if you have:  a blood cell disorder --pale or yellowed skin, dark colored urine, fever, confusion or weakness;  dehydration symptoms --feeling very thirsty or hot, being unable to urinate, heavy sweating, or hot and dry skin;  kidney problems --little or no urination, swelling, rapid weight gain, feeling short of breath;  lung problems --chest pain, wheezing, trouble breathing, blue colored lips, fingers, or toes;  signs of a new infection --fever with a severe headache, neck stiffness, eye pain, and increased sensitivity to light; or  signs of a blood clot --shortness of breath, chest pain with deep breathing, rapid heart rate, numbness or weakness on one side of the body, swelling and warmth or discoloration in an arm or leg. Common side effects may include:  runny or stuffy nose, sinus pain, cough, sore throat;  fever, chills, weakness;  headache, back pain, muscle or joint pain;  dizziness, tiredness, depressed mood;  swelling in your hands or feet;  skin rash, redness, or bruising;  blisters or ulcers in your mouth, red or swollen gums, trouble swallowing;  nausea, diarrhea, stomach pain, upset stomach;  increased blood pressure; or  redness, swelling, or itching where an injection was given. This is not a complete list of side effects and others may occur.  Call your doctor for medical advice about side effects. You may report side effects to FDA at 0-830-YIL-0653. What other drugs will affect immune globulin? Immune globulin can harm your kidneys, especially if you also use certain medicines for infections, cancer, osteoporosis, organ transplant rejection, bowel disorders, high blood pressure, or pain or arthritis (including Advil, Motrin, and Aleve). Other drugs may affect immune globulin, including prescription and over-the-counter medicines, vitamins, and herbal products. Tell your doctor about all your current medicines and any medicine you start or stop using. Where can I get more information? Your doctor or pharmacist can provide more information about immune globulin. Remember, keep this and all other medicines out of the reach of children, never share your medicines with others, and use this medication only for the indication prescribed. Every effort has been made to ensure that the information provided by [address] is accurate, up-to-date, and complete, but no guarantee is made to that effect. Drug information contained herein may be time sensitive. [employer] information has been compiled for use by healthcare practitioners and consumers in the [place] and therefore [employer] does not warrant that uses outside of the [place] are appropriate, unless specifically indicated otherwise. [facility]'s drug information does not endorse drugs, diagnose patients or recommend therapy. [employer] drug information is an informational resource designed to assist licensed healthcare practitioners in caring for their patients and/or to serve consumers viewing this service as a supplement to, and not a substitute for, the expertise, skill, knowledge and judgment of healthcare practitioners.  The absence of a warning for a given drug or drug combination in no way should be construed to indicate that the drug or drug combination is safe, effective or appropriate for any given patient. [facility] does not assume any responsibility for any aspect of healthcare administered with the aid of information [facility] provides. The information contained herein is not intended to cover all possible uses, directions, precautions, warnings, drug interactions, allergic reactions, or adverse effects. If you have questions about the drugs you are taking, check with your doctor, nurse or pharmacist.  Copyright 6886-6362 [address] [place] [address] Dr SEVERINO. Version: 4.02. Revision date: 2/26/2021. Care instructions adapted under license by [facility] ([facility]). If you have questions about a medical condition or this instruction, always ask your healthcare professional. [place] [address] any warranty or liability for your use of this information.

## 2022-09-30 ENCOUNTER — HOSPITAL ENCOUNTER (OUTPATIENT)
Dept: INFUSION THERAPY | Age: 47
Setting detail: INFUSION SERIES
Discharge: HOME | End: 2022-09-30
Payer: MEDICARE

## 2022-09-30 VITALS
SYSTOLIC BLOOD PRESSURE: 127 MMHG | WEIGHT: 173 LBS | HEART RATE: 95 BPM | BODY MASS INDEX: 28.82 KG/M2 | OXYGEN SATURATION: 99 % | DIASTOLIC BLOOD PRESSURE: 91 MMHG | HEIGHT: 65 IN | TEMPERATURE: 97.5 F | RESPIRATION RATE: 18 BRPM

## 2022-09-30 DIAGNOSIS — G35 MULTIPLE SCLEROSIS (HCC): Primary | ICD-10-CM

## 2022-09-30 PROCEDURE — 96366 THER/PROPH/DIAG IV INF ADDON: CPT

## 2022-09-30 PROCEDURE — 6360000002 HC RX W HCPCS: Performed by: PSYCHIATRY & NEUROLOGY

## 2022-09-30 PROCEDURE — 6370000000 HC RX 637 (ALT 250 FOR IP): Performed by: PSYCHIATRY & NEUROLOGY

## 2022-09-30 PROCEDURE — 2580000003 HC RX 258: Performed by: PSYCHIATRY & NEUROLOGY

## 2022-09-30 PROCEDURE — 96365 THER/PROPH/DIAG IV INF INIT: CPT

## 2022-09-30 RX ORDER — ALBUTEROL SULFATE 90 UG/1
4 AEROSOL, METERED RESPIRATORY (INHALATION) PRN
Status: CANCELLED | OUTPATIENT
Start: 2022-09-30

## 2022-09-30 RX ORDER — SODIUM CHLORIDE 9 MG/ML
INJECTION, SOLUTION INTRAVENOUS CONTINUOUS
Status: CANCELLED | OUTPATIENT
Start: 2022-09-30

## 2022-09-30 RX ORDER — SODIUM CHLORIDE 0.9 % (FLUSH) 0.9 %
5-40 SYRINGE (ML) INJECTION PRN
Status: DISCONTINUED | OUTPATIENT
Start: 2022-09-30 | End: 2022-10-01 | Stop reason: HOSPADM

## 2022-09-30 RX ORDER — EPINEPHRINE 1 MG/ML
0.3 INJECTION, SOLUTION, CONCENTRATE INTRAVENOUS PRN
Status: CANCELLED | OUTPATIENT
Start: 2022-09-30

## 2022-09-30 RX ORDER — ACETAMINOPHEN 325 MG/1
650 TABLET ORAL ONCE
Status: CANCELLED | OUTPATIENT
Start: 2022-09-30 | End: 2022-09-30

## 2022-09-30 RX ORDER — DIPHENHYDRAMINE HYDROCHLORIDE 50 MG/ML
50 INJECTION INTRAMUSCULAR; INTRAVENOUS ONCE
Status: COMPLETED | OUTPATIENT
Start: 2022-09-30 | End: 2022-09-30

## 2022-09-30 RX ORDER — ONDANSETRON 2 MG/ML
8 INJECTION INTRAMUSCULAR; INTRAVENOUS
Status: CANCELLED | OUTPATIENT
Start: 2022-09-30

## 2022-09-30 RX ORDER — ACETAMINOPHEN 325 MG/1
650 TABLET ORAL
Status: CANCELLED | OUTPATIENT
Start: 2022-09-30

## 2022-09-30 RX ORDER — SODIUM CHLORIDE 0.9 % (FLUSH) 0.9 %
5-40 SYRINGE (ML) INJECTION PRN
Status: CANCELLED | OUTPATIENT
Start: 2022-09-30

## 2022-09-30 RX ORDER — HEPARIN SODIUM (PORCINE) LOCK FLUSH IV SOLN 100 UNIT/ML 100 UNIT/ML
500 SOLUTION INTRAVENOUS PRN
Status: DISCONTINUED | OUTPATIENT
Start: 2022-09-30 | End: 2022-10-02 | Stop reason: HOSPADM

## 2022-09-30 RX ORDER — ACETAMINOPHEN 325 MG/1
650 TABLET ORAL ONCE
Status: COMPLETED | OUTPATIENT
Start: 2022-09-30 | End: 2022-09-30

## 2022-09-30 RX ORDER — MEPERIDINE HYDROCHLORIDE 25 MG/ML
12.5 INJECTION INTRAMUSCULAR; INTRAVENOUS; SUBCUTANEOUS PRN
Status: CANCELLED | OUTPATIENT
Start: 2022-09-30

## 2022-09-30 RX ORDER — DIPHENHYDRAMINE HYDROCHLORIDE 50 MG/ML
50 INJECTION INTRAMUSCULAR; INTRAVENOUS ONCE
Status: CANCELLED
Start: 2022-09-30 | End: 2022-09-30

## 2022-09-30 RX ORDER — DIPHENHYDRAMINE HYDROCHLORIDE 50 MG/ML
50 INJECTION INTRAMUSCULAR; INTRAVENOUS
Status: CANCELLED | OUTPATIENT
Start: 2022-09-30

## 2022-09-30 RX ADMIN — IMMUNE GLOBULIN (HUMAN) 30 G: 10 INJECTION INTRAVENOUS; SUBCUTANEOUS at 08:51

## 2022-09-30 RX ADMIN — DIPHENHYDRAMINE HYDROCHLORIDE 50 MG: 50 INJECTION, SOLUTION INTRAMUSCULAR; INTRAVENOUS at 08:06

## 2022-09-30 RX ADMIN — HEPARIN 500 UNITS: 100 SYRINGE at 10:30

## 2022-09-30 RX ADMIN — HYDROCORTISONE SODIUM SUCCINATE 100 MG: 100 INJECTION, POWDER, FOR SOLUTION INTRAMUSCULAR; INTRAVENOUS at 08:06

## 2022-09-30 RX ADMIN — SODIUM CHLORIDE, PRESERVATIVE FREE 20 ML: 5 INJECTION INTRAVENOUS at 10:30

## 2022-09-30 RX ADMIN — ACETAMINOPHEN 650 MG: 325 TABLET, FILM COATED ORAL at 08:06

## 2022-09-30 NOTE — DISCHARGE INSTRUCTIONS
immune globulin (intravenous) (IGIV)  Pronunciation:  dinora foley  Brand:  Bivigam, Carimune, Flebogamma, Gammagard S/D, Gammaplex, Octagam, Panzyga, Privigen  What is the most important information I should know about immune globulin intravenous? This medicine can cause blood clots. The risk is highest in older adults or in people who have had blood clots, heart problems, or blood circulation problems. Blood clots are also more likely during long-term bedrest, while using birth control pills or hormone replacement therapy, or while having a central intravenous (IV) catheter in place. Call your doctor at once if you have chest pain, trouble breathing, fast heartbeats, numbness or weakness, or swelling and warmth or discoloration in an arm or leg. This medicine can also harm your kidneys, especially if you have kidney disease or you also use certain medicines. Tell your doctor right away if you have signs of kidney problems, such as swelling, rapid weight gain, and little or no urination. What is immune globulin intravenous (IGIV)? Immune globulin intravenous (IGIV, for injection into a vein) is used to treat primary immunodeficiency. IGIV is also used to increase platelets (blood clotting cells) in people with immune thrombocytopenic purpura. IGIV is also used in to help prevent certain infections in people with B-cell chronic lymphocytic leukemia. IGIV is also used in people with Kawasaki syndrome, to prevent aneurysm caused by a weakening of the main artery in the heart. IGIV may also be used for purposes not listed in this medication guide. What should I discuss with my healthcare provider before using IGIV? You may not be able to use this medicine if:  you have had an allergic reaction to an immune globulin or blood product;  you have immune globulin A (IgA) deficiency with antibody to IgA; or  you are allergic to corn.   IGIV can cause blood clots or kidney problems, especially in older adults or in people with certain conditions. Tell your doctor if you have ever had:  heart problems, blood circulation problems, or \"thick blood\";  a stroke or blood clot;  kidney disease;  diabetes;  an infection called sepsis;  if you use estrogens (birth control pills or hormone replacement therapy);  if you have been on long-term bedrest; or  if you have a central intravenous (IV) catheter in place. You may need a dose adjustment if you are exposed to measles, or if you travel to an area where this disease is common. Tell your doctor if you are pregnant or breastfeeding. Immune globulin is made from donated human plasma and may contain viruses or other infectious agents. Donated plasma is tested and treated to reduce the risk of contamination, but there is still a small possibility it could transmit disease. Ask your doctor about any possible risk. How should I use IGIV? IGIV is given as an infusion into a vein, usually once every 3 to 4 weeks. A healthcare provider will give you this injection. Drink plenty of liquids  while you are using this medicine to help improve your blood flow and keep your kidneys working properly. You may need frequent blood or urine tests. This medicine can affect the results of certain medical tests. Tell any doctor who treats you that you are using IGIV. What happens if I miss a dose? Call your doctor for instructions if you miss an appointment for your IGIV injection. What happens if I overdose? Seek emergency medical attention or call the Poison Help line at 1-574.986.3293. What should I avoid while using IGIV? Ask your doctor before receiving a \"live\" vaccine while using IGIV. The vaccine may not work as well and may not fully protect you from disease. Live vaccines include measles, mumps, rubella (MMR), rotavirus, typhoid, yellow fever, varicella (chickenpox), zoster (shingles), and nasal flu (influenza) vaccine.   What are the possible side effects of IGIV?  Get emergency medical help if you have signs of an allergic reaction: hives; difficult breathing; swelling of your face, lips, tongue, or throat. Some side effects may occur during the injection. Tell your caregiver if you feel dizzy, nauseated, light-headed, sweaty, or have a headache, pounding in your neck or ears, fever, chills, chest tightness, or warmth or redness in your face. Call your doctor at once if you have:  a blood cell disorder --pale or yellowed skin, dark colored urine, fever, confusion or weakness;  dehydration symptoms --feeling very thirsty or hot, being unable to urinate, heavy sweating, or hot and dry skin;  kidney problems --little or no urination, swelling, rapid weight gain, feeling short of breath;  lung problems --chest pain, trouble breathing, blue colored lips, fingers, or toes;  signs of a new infection --fever with a severe headache, neck stiffness, eye pain, and increased sensitivity to light; or  signs of a blood clot --shortness of breath, chest pain with deep breathing, rapid heart rate, numbness or weakness on one side of the body, swelling and warmth or discoloration in an arm or leg. Common side effects may include:  headache, back pain, joint pain;  fever, chills, sweating, warmth or tingling;  stomach pain, nausea, diarrhea;  increased blood pressure, fast heartbeats;  dizziness, tiredness, lack of energy;  stuffy nose, sinus pain; or  pain, swelling, burning, or irritation around the IV needle. This is not a complete list of side effects and others may occur. Call your doctor for medical advice about side effects. You may report side effects to FDA at 2-345-FDA-8789. What other drugs will affect IGIV? IGIV can harm your kidneys, especially if you also use certain medicines for infections, cancer, osteoporosis, organ transplant rejection, bowel disorders, high blood pressure, or pain or arthritis (including Advil, Motrin, and Aleve).   Other drugs may affect IGIV, including prescription and over-the-counter medicines, vitamins, and herbal products. Tell your doctor about all your current medicines and any medicine you start or stop using. Where can I get more information? Your pharmacist can provide more information about immune globulin intravenous. Remember, keep this and all other medicines out of the reach of children, never share your medicines with others, and use this medication only for the indication prescribed. Every effort has been made to ensure that the information provided by [address] [facility] [address] is accurate, up-to-date, and complete, but no guarantee is made to that effect. Drug information contained herein may be time sensitive. [facility] information has been compiled for use by healthcare practitioners and consumers in the [place] and therefore [facility] does not warrant that uses outside of the [place] are appropriate, unless specifically indicated otherwise. [facility]'s drug information does not endorse drugs, diagnose patients or recommend therapy. [facility] drug information is an informational resource designed to assist licensed healthcare practitioners in caring for their patients and/or to serve consumers viewing this service as a supplement to, and not a substitute for, the expertise, skill, knowledge and judgment of healthcare practitioners. The absence of a warning for a given drug or drug combination in no way should be construed to indicate that the drug or drug combination is safe, effective or appropriate for any given patient. [facility] does not assume any responsibility for any aspect of healthcare administered with the aid of information [facility] provides. The information contained herein is not intended to cover all possible uses, directions, precautions, warnings, drug interactions, allergic reactions, or adverse effects.  If you have questions about the drugs you are taking, check with your doctor, nurse or pharmacist.  Copyright [id number] Jesus [address] Pratt. Version: 6.02. Revision date: 2/26/2021. Care instructions adapted under license by [facility] ([facility]). If you have questions about a medical condition or this instruction, always ask your healthcare professional. [address] any warranty or liability for your use of this information.

## 2022-10-07 ENCOUNTER — OFFICE VISIT (OUTPATIENT)
Dept: SURGERY | Age: 47
End: 2022-10-07

## 2022-10-07 ENCOUNTER — HOSPITAL ENCOUNTER (OUTPATIENT)
Dept: INFUSION THERAPY | Age: 47
Setting detail: INFUSION SERIES
Discharge: HOME | End: 2022-10-07
Payer: MEDICARE

## 2022-10-07 VITALS
BODY MASS INDEX: 28.82 KG/M2 | HEART RATE: 71 BPM | HEIGHT: 65 IN | TEMPERATURE: 98 F | WEIGHT: 173 LBS | DIASTOLIC BLOOD PRESSURE: 97 MMHG | SYSTOLIC BLOOD PRESSURE: 155 MMHG | RESPIRATION RATE: 18 BRPM | OXYGEN SATURATION: 100 %

## 2022-10-07 VITALS
TEMPERATURE: 98.1 F | WEIGHT: 176 LBS | HEART RATE: 63 BPM | OXYGEN SATURATION: 100 % | BODY MASS INDEX: 29.32 KG/M2 | HEIGHT: 65 IN

## 2022-10-07 DIAGNOSIS — Z95.828 PORT-A-CATH IN PLACE: Primary | ICD-10-CM

## 2022-10-07 DIAGNOSIS — G35 MULTIPLE SCLEROSIS (HCC): Primary | ICD-10-CM

## 2022-10-07 PROCEDURE — 6370000000 HC RX 637 (ALT 250 FOR IP): Performed by: PSYCHIATRY & NEUROLOGY

## 2022-10-07 PROCEDURE — 96375 TX/PRO/DX INJ NEW DRUG ADDON: CPT

## 2022-10-07 PROCEDURE — 2580000003 HC RX 258: Performed by: PSYCHIATRY & NEUROLOGY

## 2022-10-07 PROCEDURE — 96365 THER/PROPH/DIAG IV INF INIT: CPT

## 2022-10-07 PROCEDURE — 96366 THER/PROPH/DIAG IV INF ADDON: CPT

## 2022-10-07 PROCEDURE — 99024 POSTOP FOLLOW-UP VISIT: CPT | Performed by: SURGERY

## 2022-10-07 PROCEDURE — 6360000002 HC RX W HCPCS: Performed by: PSYCHIATRY & NEUROLOGY

## 2022-10-07 RX ORDER — ACETAMINOPHEN 325 MG/1
650 TABLET ORAL
Status: CANCELLED | OUTPATIENT
Start: 2022-10-07

## 2022-10-07 RX ORDER — DIPHENHYDRAMINE HYDROCHLORIDE 50 MG/ML
50 INJECTION INTRAMUSCULAR; INTRAVENOUS
Status: CANCELLED | OUTPATIENT
Start: 2022-10-07

## 2022-10-07 RX ORDER — ACETAMINOPHEN 325 MG/1
650 TABLET ORAL ONCE
Status: COMPLETED | OUTPATIENT
Start: 2022-10-07 | End: 2022-10-07

## 2022-10-07 RX ORDER — SODIUM CHLORIDE 0.9 % (FLUSH) 0.9 %
5-40 SYRINGE (ML) INJECTION PRN
Status: CANCELLED | OUTPATIENT
Start: 2022-10-07

## 2022-10-07 RX ORDER — DIPHENHYDRAMINE HYDROCHLORIDE 50 MG/ML
50 INJECTION INTRAMUSCULAR; INTRAVENOUS ONCE
Status: COMPLETED | OUTPATIENT
Start: 2022-10-07 | End: 2022-10-07

## 2022-10-07 RX ORDER — MEPERIDINE HYDROCHLORIDE 25 MG/ML
12.5 INJECTION INTRAMUSCULAR; INTRAVENOUS; SUBCUTANEOUS PRN
Status: CANCELLED | OUTPATIENT
Start: 2022-10-07

## 2022-10-07 RX ORDER — EPINEPHRINE 1 MG/ML
0.3 INJECTION, SOLUTION, CONCENTRATE INTRAVENOUS PRN
Status: CANCELLED | OUTPATIENT
Start: 2022-10-07

## 2022-10-07 RX ORDER — ONDANSETRON 2 MG/ML
8 INJECTION INTRAMUSCULAR; INTRAVENOUS
Status: CANCELLED | OUTPATIENT
Start: 2022-10-07

## 2022-10-07 RX ORDER — HEPARIN SODIUM (PORCINE) LOCK FLUSH IV SOLN 100 UNIT/ML 100 UNIT/ML
500 SOLUTION INTRAVENOUS ONCE
Status: COMPLETED | OUTPATIENT
Start: 2022-10-07 | End: 2022-10-07

## 2022-10-07 RX ORDER — DIPHENHYDRAMINE HYDROCHLORIDE 50 MG/ML
50 INJECTION INTRAMUSCULAR; INTRAVENOUS ONCE
Status: CANCELLED
Start: 2022-10-07 | End: 2022-10-07

## 2022-10-07 RX ORDER — ALBUTEROL SULFATE 90 UG/1
4 AEROSOL, METERED RESPIRATORY (INHALATION) PRN
Status: CANCELLED | OUTPATIENT
Start: 2022-10-07

## 2022-10-07 RX ORDER — SODIUM CHLORIDE 9 MG/ML
INJECTION, SOLUTION INTRAVENOUS CONTINUOUS
Status: CANCELLED | OUTPATIENT
Start: 2022-10-07

## 2022-10-07 RX ORDER — SODIUM CHLORIDE 0.9 % (FLUSH) 0.9 %
5-40 SYRINGE (ML) INJECTION PRN
Status: DISCONTINUED | OUTPATIENT
Start: 2022-10-07 | End: 2022-10-08 | Stop reason: HOSPADM

## 2022-10-07 RX ORDER — ACETAMINOPHEN 325 MG/1
650 TABLET ORAL ONCE
Status: CANCELLED | OUTPATIENT
Start: 2022-10-07 | End: 2022-10-07

## 2022-10-07 RX ADMIN — HEPARIN 500 UNITS: 100 SYRINGE at 09:35

## 2022-10-07 RX ADMIN — HYDROCORTISONE SODIUM SUCCINATE 100 MG: 100 INJECTION, POWDER, FOR SOLUTION INTRAMUSCULAR; INTRAVENOUS at 07:49

## 2022-10-07 RX ADMIN — IMMUNE GLOBULIN (HUMAN) 30 G: 10 INJECTION INTRAVENOUS; SUBCUTANEOUS at 07:57

## 2022-10-07 RX ADMIN — DIPHENHYDRAMINE HYDROCHLORIDE 50 MG: 50 INJECTION INTRAMUSCULAR; INTRAVENOUS at 07:42

## 2022-10-07 RX ADMIN — ACETAMINOPHEN 650 MG: 325 TABLET, FILM COATED ORAL at 07:40

## 2022-10-07 RX ADMIN — SODIUM CHLORIDE, PRESERVATIVE FREE 20 ML: 5 INJECTION INTRAVENOUS at 09:35

## 2022-10-07 NOTE — DISCHARGE INSTRUCTIONS
problems, or \"thick blood\";  a stroke or blood clot;  kidney disease;  diabetes;  an infection called sepsis;  if you use estrogens (birth control pills or hormone replacement therapy);  if you have been on long-term bedrest; or  if you have a central intravenous (IV) catheter in place. You may need a dose adjustment if you are exposed to measles, or if you travel to an area where this disease is common. Tell your doctor if you are pregnant or breastfeeding. Immune globulin is made from donated human plasma and may contain viruses or other infectious agents. Donated plasma is tested and treated to reduce the risk of contamination, but there is still a small possibility it could transmit disease. Ask your doctor about any possible risk. How should I use immune globulin? Follow all directions on your prescription label and read all medication guides or instruction sheets. Your doctor may occasionally change your dose. Use the medicine exactly as directed. Immune globulin is given as an infusion into a vein, or injected under the skin using an infusion pump. A healthcare provider will give your first dose and may teach you how to properly use the medication by yourself. Do not inject immune globulin into a vein if you have been instructed to give the medicine as a subcutaneous injection (under the skin). How you give this medication, how often you use it, and the length of your infusion time will depend on the condition being treated. Read and carefully follow any Instructions for Use provided with your medicine. Ask your doctor or pharmacist if you don't understand all instructions. Prepare an injection only when you are ready to give it. Do not use if the medicine looks cloudy, has changed colors, or has particles in it. Call your pharmacist for new medicine. Do not shake the medication bottle or you may ruin the medicine. Immune globulin must be given slowly.  You may need to use several catheters to inject this medicine into different body areas at the same time. Your healthcare provider will show you the best places on your body to inject the medication. Keep a diary of the days and times you gave the injection and where you injected it on your body. Drink plenty of liquids  while you are using this medicine to help improve your blood flow and keep your kidneys working properly. You may need frequent blood or urine tests. This medicine can affect the results of certain medical tests. Tell any doctor who treats you that you are using immune globulin. Store this medicine in its original carton in the refrigerator. Do not freeze immune globulin, and throw the medicine away if it has frozen. Take the medicine out of the refrigerator and let it reach room temperature for up to 1 hour before injecting your dose. You may also store immune globulin at room temperature. You will need to use immune globulin within a certain number months. This will depend on the how you store the medicine (at room temperature, or in a refrigerator). Carefully follow the storage instructions provided with your medicine. Do not use the medicine after the expiration date on the label has passed. Each vial (bottle) is for one use only. Throw it away after one use, even if there is still medicine left inside. Use disposable injection items (needle, catheter, tubing) only once and then place them in a puncture-proof \"sharps\" container. Follow state or local laws about how to dispose of this container. Keep it out of the reach of children and pets. What happens if I miss a dose? Call your doctor for instructions if you miss a dose. What happens if I overdose? Seek emergency medical attention or call the Poison Help line at 1-310.775.6404. What should I avoid while using immune globulin? Do not receive a \"live\" vaccine while using immune globulin. The vaccine may not work as well and may not fully protect you from disease.  Live vaccines include measles, mumps, rubella (MMR), rotavirus, typhoid, yellow fever, varicella (chickenpox), zoster (shingles), and nasal flu (influenza) vaccine. What are the possible side effects of immune globulin? Get emergency medical help if you have signs of an allergic reaction: hives; wheezing, difficulty breathing; dizziness, feeling like you might pass out; swelling of your face, lips, tongue, or throat. Some side effects may occur during the injection. Tell your caregiver if you feel light-headed, itchy, chilled, sweaty, or have chest discomfort, fast heartbeats, severe headache, or pounding in your neck or ears. Call your doctor at once if you have:  a blood cell disorder --pale or yellowed skin, dark colored urine, fever, confusion or weakness;  dehydration symptoms --feeling very thirsty or hot, being unable to urinate, heavy sweating, or hot and dry skin;  kidney problems --little or no urination, swelling, rapid weight gain, feeling short of breath;  lung problems --chest pain, wheezing, trouble breathing, blue colored lips, fingers, or toes;  signs of a new infection --fever with a severe headache, neck stiffness, eye pain, and increased sensitivity to light; or  signs of a blood clot --shortness of breath, chest pain with deep breathing, rapid heart rate, numbness or weakness on one side of the body, swelling and warmth or discoloration in an arm or leg. Common side effects may include:  runny or stuffy nose, sinus pain, cough, sore throat;  fever, chills, weakness;  headache, back pain, muscle or joint pain;  dizziness, tiredness, depressed mood;  swelling in your hands or feet;  skin rash, redness, or bruising;  blisters or ulcers in your mouth, red or swollen gums, trouble swallowing;  nausea, diarrhea, stomach pain, upset stomach;  increased blood pressure; or  redness, swelling, or itching where an injection was given. This is not a complete list of side effects and others may occur.  Call your doctor for medical advice about side effects. You may report side effects to FDA at 7-510-QLN-9310. What other drugs will affect immune globulin? Immune globulin can harm your kidneys, especially if you also use certain medicines for infections, cancer, osteoporosis, organ transplant rejection, bowel disorders, high blood pressure, or pain or arthritis (including Advil, Motrin, and Aleve). Other drugs may affect immune globulin, including prescription and over-the-counter medicines, vitamins, and herbal products. Tell your doctor about all your current medicines and any medicine you start or stop using. Where can I get more information? Your doctor or pharmacist can provide more information about immune globulin. Remember, keep this and all other medicines out of the reach of children, never share your medicines with others, and use this medication only for the indication prescribed. Every effort has been made to ensure that the information provided by [address] is accurate, up-to-date, and complete, but no guarantee is made to that effect. Drug information contained herein may be time sensitive. [employer] [facility] information has been compiled for use by healthcare practitioners and consumers in the [place] and therefore [employer] [facility] does not warrant that uses outside of the [place] are appropriate, unless specifically indicated otherwise. [employer]'s drug information does not endorse drugs, diagnose patients or recommend therapy. [employer] drug information is an informational resource designed to assist licensed healthcare practitioners in caring for their patients and/or to serve consumers viewing this service as a supplement to, and not a substitute for, the expertise, skill, knowledge and judgment of healthcare practitioners.  The absence of a warning for a given drug or drug combination in no way should be construed to indicate that the drug or drug combination is safe, effective or appropriate for any given patient. [facility] does not assume any responsibility for any aspect of healthcare administered with the aid of information [facility] provides. The information contained herein is not intended to cover all possible uses, directions, precautions, warnings, drug interactions, allergic reactions, or adverse effects. If you have questions about the drugs you are taking, check with your doctor, nurse or pharmacist.  Copyright 2892-0660 [address] [place]. Version: 4.02. Revision date: 2/26/2021. Care instructions adapted under license by [facility] ([facility]). If you have questions about a medical condition or this instruction, always ask your healthcare professional. [address] any warranty or liability for your use of this information.

## 2022-10-12 DIAGNOSIS — M32.9 LUPUS (HCC): Primary | ICD-10-CM

## 2022-10-12 DIAGNOSIS — G35 EXACERBATION OF MULTIPLE SCLEROSIS (HCC): ICD-10-CM

## 2022-10-12 RX ORDER — MEPERIDINE HYDROCHLORIDE 25 MG/ML
12.5 INJECTION INTRAMUSCULAR; INTRAVENOUS; SUBCUTANEOUS PRN
Status: CANCELLED | OUTPATIENT
Start: 2022-10-13

## 2022-10-12 RX ORDER — EPINEPHRINE 1 MG/ML
0.3 INJECTION, SOLUTION, CONCENTRATE INTRAVENOUS PRN
Status: CANCELLED | OUTPATIENT
Start: 2022-10-13

## 2022-10-12 RX ORDER — ALBUTEROL SULFATE 90 UG/1
4 AEROSOL, METERED RESPIRATORY (INHALATION) PRN
Status: CANCELLED | OUTPATIENT
Start: 2022-10-13

## 2022-10-12 RX ORDER — SODIUM CHLORIDE 0.9 % (FLUSH) 0.9 %
5-40 SYRINGE (ML) INJECTION PRN
Status: CANCELLED | OUTPATIENT
Start: 2022-10-13

## 2022-10-12 RX ORDER — DIPHENHYDRAMINE HYDROCHLORIDE 50 MG/ML
50 INJECTION INTRAMUSCULAR; INTRAVENOUS
Status: CANCELLED | OUTPATIENT
Start: 2022-10-13

## 2022-10-12 RX ORDER — DIPHENHYDRAMINE HCL 25 MG
25 TABLET ORAL ONCE
Status: CANCELLED | OUTPATIENT
Start: 2022-10-13 | End: 2022-10-13

## 2022-10-12 RX ORDER — ONDANSETRON 2 MG/ML
8 INJECTION INTRAMUSCULAR; INTRAVENOUS
Status: CANCELLED | OUTPATIENT
Start: 2022-10-13

## 2022-10-12 RX ORDER — SODIUM CHLORIDE 9 MG/ML
INJECTION, SOLUTION INTRAVENOUS CONTINUOUS
Status: CANCELLED | OUTPATIENT
Start: 2022-10-13

## 2022-10-12 RX ORDER — ACETAMINOPHEN 325 MG/1
650 TABLET ORAL ONCE
Status: CANCELLED | OUTPATIENT
Start: 2022-10-13 | End: 2022-10-13

## 2022-10-12 RX ORDER — SODIUM CHLORIDE 9 MG/ML
5-250 INJECTION, SOLUTION INTRAVENOUS PRN
Status: CANCELLED | OUTPATIENT
Start: 2022-10-13

## 2022-10-12 RX ORDER — ACETAMINOPHEN 325 MG/1
650 TABLET ORAL
Status: CANCELLED | OUTPATIENT
Start: 2022-10-13

## 2022-10-12 RX ORDER — HEPARIN SODIUM (PORCINE) LOCK FLUSH IV SOLN 100 UNIT/ML 100 UNIT/ML
500 SOLUTION INTRAVENOUS PRN
Status: CANCELLED | OUTPATIENT
Start: 2022-10-13

## 2022-10-13 RX ORDER — ALBUTEROL SULFATE 90 UG/1
4 AEROSOL, METERED RESPIRATORY (INHALATION) PRN
Status: CANCELLED | OUTPATIENT
Start: 2022-10-17

## 2022-10-13 RX ORDER — DIPHENHYDRAMINE HYDROCHLORIDE 50 MG/ML
50 INJECTION INTRAMUSCULAR; INTRAVENOUS
Status: CANCELLED | OUTPATIENT
Start: 2022-10-17

## 2022-10-13 RX ORDER — SODIUM CHLORIDE 0.9 % (FLUSH) 0.9 %
5-40 SYRINGE (ML) INJECTION PRN
Status: CANCELLED | OUTPATIENT
Start: 2022-10-17

## 2022-10-13 RX ORDER — ONDANSETRON 2 MG/ML
8 INJECTION INTRAMUSCULAR; INTRAVENOUS
Status: CANCELLED | OUTPATIENT
Start: 2022-10-17

## 2022-10-13 RX ORDER — ACETAMINOPHEN 325 MG/1
650 TABLET ORAL ONCE
Status: CANCELLED | OUTPATIENT
Start: 2022-10-17 | End: 2022-10-17

## 2022-10-13 RX ORDER — DIPHENHYDRAMINE HYDROCHLORIDE 50 MG/ML
50 INJECTION INTRAMUSCULAR; INTRAVENOUS ONCE
Status: CANCELLED
Start: 2022-10-17 | End: 2022-10-17

## 2022-10-13 RX ORDER — MEPERIDINE HYDROCHLORIDE 25 MG/ML
12.5 INJECTION INTRAMUSCULAR; INTRAVENOUS; SUBCUTANEOUS PRN
Status: CANCELLED | OUTPATIENT
Start: 2022-10-17

## 2022-10-13 RX ORDER — SODIUM CHLORIDE 9 MG/ML
INJECTION, SOLUTION INTRAVENOUS CONTINUOUS
Status: CANCELLED | OUTPATIENT
Start: 2022-10-17

## 2022-10-13 RX ORDER — HEPARIN SODIUM (PORCINE) LOCK FLUSH IV SOLN 100 UNIT/ML 100 UNIT/ML
500 SOLUTION INTRAVENOUS PRN
Status: CANCELLED | OUTPATIENT
Start: 2022-10-17

## 2022-10-13 RX ORDER — ACETAMINOPHEN 325 MG/1
650 TABLET ORAL
Status: CANCELLED | OUTPATIENT
Start: 2022-10-17

## 2022-10-13 RX ORDER — EPINEPHRINE 1 MG/ML
0.3 INJECTION, SOLUTION, CONCENTRATE INTRAVENOUS PRN
Status: CANCELLED | OUTPATIENT
Start: 2022-10-17

## 2022-10-13 RX ORDER — SODIUM CHLORIDE 9 MG/ML
5-250 INJECTION, SOLUTION INTRAVENOUS ONCE
Status: CANCELLED | OUTPATIENT
Start: 2022-10-17 | End: 2022-10-17

## 2022-10-13 NOTE — PROGRESS NOTES
Per Dr. Author Jacobson, patient to be scheduled for [facility] 30gr DAILY x5 as needed for MS exacerbation. Patient to continue weekly appointments as scheduled. Patient scheduled for Monday 10/17/2022-10/21/2022. MD to notify infusion center if patient will require daily visits every four weeks.  Electronically signed by Lexus Bowman RN on 10/13/2022 at 1:08 PM

## 2022-10-14 ENCOUNTER — HOSPITAL ENCOUNTER (OUTPATIENT)
Dept: INFUSION THERAPY | Age: 47
Setting detail: INFUSION SERIES
Discharge: HOME | End: 2022-10-14
Payer: MEDICARE

## 2022-10-14 VITALS
RESPIRATION RATE: 17 BRPM | HEART RATE: 59 BPM | SYSTOLIC BLOOD PRESSURE: 122 MMHG | WEIGHT: 176.1 LBS | DIASTOLIC BLOOD PRESSURE: 83 MMHG | BODY MASS INDEX: 29.3 KG/M2 | TEMPERATURE: 98.3 F | OXYGEN SATURATION: 100 %

## 2022-10-14 DIAGNOSIS — G35 MULTIPLE SCLEROSIS (HCC): Primary | ICD-10-CM

## 2022-10-14 PROCEDURE — 2580000003 HC RX 258: Performed by: PSYCHIATRY & NEUROLOGY

## 2022-10-14 PROCEDURE — 6360000002 HC RX W HCPCS: Performed by: PSYCHIATRY & NEUROLOGY

## 2022-10-14 PROCEDURE — 96366 THER/PROPH/DIAG IV INF ADDON: CPT

## 2022-10-14 PROCEDURE — 96375 TX/PRO/DX INJ NEW DRUG ADDON: CPT

## 2022-10-14 PROCEDURE — 96365 THER/PROPH/DIAG IV INF INIT: CPT

## 2022-10-14 PROCEDURE — 6370000000 HC RX 637 (ALT 250 FOR IP): Performed by: PSYCHIATRY & NEUROLOGY

## 2022-10-14 RX ORDER — ACETAMINOPHEN 325 MG/1
650 TABLET ORAL ONCE
Status: CANCELLED | OUTPATIENT
Start: 2022-10-14 | End: 2022-10-14

## 2022-10-14 RX ORDER — DIPHENHYDRAMINE HYDROCHLORIDE 50 MG/ML
50 INJECTION INTRAMUSCULAR; INTRAVENOUS ONCE
Status: CANCELLED
Start: 2022-10-14 | End: 2022-10-14

## 2022-10-14 RX ORDER — ACETAMINOPHEN 325 MG/1
650 TABLET ORAL
Status: CANCELLED | OUTPATIENT
Start: 2022-10-14

## 2022-10-14 RX ORDER — EPINEPHRINE 1 MG/ML
0.3 INJECTION, SOLUTION, CONCENTRATE INTRAVENOUS PRN
Status: CANCELLED | OUTPATIENT
Start: 2022-10-14

## 2022-10-14 RX ORDER — DIPHENHYDRAMINE HYDROCHLORIDE 50 MG/ML
50 INJECTION INTRAMUSCULAR; INTRAVENOUS
Status: CANCELLED | OUTPATIENT
Start: 2022-10-14

## 2022-10-14 RX ORDER — ALBUTEROL SULFATE 90 UG/1
4 AEROSOL, METERED RESPIRATORY (INHALATION) PRN
Status: CANCELLED | OUTPATIENT
Start: 2022-10-14

## 2022-10-14 RX ORDER — ACETAMINOPHEN 325 MG/1
650 TABLET ORAL ONCE
Status: COMPLETED | OUTPATIENT
Start: 2022-10-14 | End: 2022-10-14

## 2022-10-14 RX ORDER — DIPHENHYDRAMINE HYDROCHLORIDE 50 MG/ML
50 INJECTION INTRAMUSCULAR; INTRAVENOUS ONCE
Status: COMPLETED | OUTPATIENT
Start: 2022-10-14 | End: 2022-10-14

## 2022-10-14 RX ORDER — MEPERIDINE HYDROCHLORIDE 25 MG/ML
12.5 INJECTION INTRAMUSCULAR; INTRAVENOUS; SUBCUTANEOUS PRN
Status: CANCELLED | OUTPATIENT
Start: 2022-10-14

## 2022-10-14 RX ORDER — SODIUM CHLORIDE 0.9 % (FLUSH) 0.9 %
5-40 SYRINGE (ML) INJECTION PRN
Status: CANCELLED | OUTPATIENT
Start: 2022-10-14

## 2022-10-14 RX ORDER — SODIUM CHLORIDE 0.9 % (FLUSH) 0.9 %
5-40 SYRINGE (ML) INJECTION PRN
Status: DISCONTINUED | OUTPATIENT
Start: 2022-10-14 | End: 2022-10-15 | Stop reason: HOSPADM

## 2022-10-14 RX ORDER — SODIUM CHLORIDE 9 MG/ML
INJECTION, SOLUTION INTRAVENOUS CONTINUOUS
Status: DISCONTINUED | OUTPATIENT
Start: 2022-10-14 | End: 2022-10-16 | Stop reason: HOSPADM

## 2022-10-14 RX ORDER — SODIUM CHLORIDE 9 MG/ML
INJECTION, SOLUTION INTRAVENOUS CONTINUOUS
Status: CANCELLED | OUTPATIENT
Start: 2022-10-14

## 2022-10-14 RX ORDER — ONDANSETRON 2 MG/ML
8 INJECTION INTRAMUSCULAR; INTRAVENOUS
Status: CANCELLED | OUTPATIENT
Start: 2022-10-14

## 2022-10-14 RX ORDER — HEPARIN SODIUM (PORCINE) LOCK FLUSH IV SOLN 100 UNIT/ML 100 UNIT/ML
500 SOLUTION INTRAVENOUS PRN
Status: DISCONTINUED | OUTPATIENT
Start: 2022-10-14 | End: 2022-10-16 | Stop reason: HOSPADM

## 2022-10-14 RX ADMIN — DIPHENHYDRAMINE HYDROCHLORIDE 50 MG: 50 INJECTION INTRAMUSCULAR; INTRAVENOUS at 08:27

## 2022-10-14 RX ADMIN — IMMUNE GLOBULIN (HUMAN) 30 G: 10 INJECTION INTRAVENOUS; SUBCUTANEOUS at 09:08

## 2022-10-14 RX ADMIN — SODIUM CHLORIDE: 9 INJECTION, SOLUTION INTRAVENOUS at 08:25

## 2022-10-14 RX ADMIN — HYDROCORTISONE SODIUM SUCCINATE 100 MG: 100 INJECTION, POWDER, FOR SOLUTION INTRAMUSCULAR; INTRAVENOUS at 08:27

## 2022-10-14 RX ADMIN — SODIUM CHLORIDE, PRESERVATIVE FREE 20 ML: 5 INJECTION INTRAVENOUS at 10:43

## 2022-10-14 RX ADMIN — HEPARIN 500 UNITS: 100 SYRINGE at 10:43

## 2022-10-14 RX ADMIN — ACETAMINOPHEN 650 MG: 325 TABLET, FILM COATED ORAL at 08:26

## 2022-10-14 NOTE — DISCHARGE INSTRUCTIONS
immune globulin (intravenous) (IGIV)  Pronunciation:  dinora foley  Brand:  Bivigam, Carimune, Flebogamma, Gammagard S/D, Gammaplex, Octagam, Panzyga, Privigen  What is the most important information I should know about immune globulin intravenous? This medicine can cause blood clots. The risk is highest in older adults or in people who have had blood clots, heart problems, or blood circulation problems. Blood clots are also more likely during long-term bedrest, while using birth control pills or hormone replacement therapy, or while having a central intravenous (IV) catheter in place. Call your doctor at once if you have chest pain, trouble breathing, fast heartbeats, numbness or weakness, or swelling and warmth or discoloration in an arm or leg. This medicine can also harm your kidneys, especially if you have kidney disease or you also use certain medicines. Tell your doctor right away if you have signs of kidney problems, such as swelling, rapid weight gain, and little or no urination. What is immune globulin intravenous (IGIV)? Immune globulin intravenous (IGIV, for injection into a vein) is used to treat primary immunodeficiency. IGIV is also used to increase platelets (blood clotting cells) in people with immune thrombocytopenic purpura. IGIV is also used in to help prevent certain infections in people with B-cell chronic lymphocytic leukemia. IGIV is also used in people with Kawasaki syndrome, to prevent aneurysm caused by a weakening of the main artery in the heart. IGIV may also be used for purposes not listed in this medication guide. What should I discuss with my healthcare provider before using IGIV? You may not be able to use this medicine if:  you have had an allergic reaction to an immune globulin or blood product;  you have immune globulin A (IgA) deficiency with antibody to IgA; or  you are allergic to corn.   IGIV can cause blood clots or kidney problems, especially in older adults or in people with certain conditions. Tell your doctor if you have ever had:  heart problems, blood circulation problems, or \"thick blood\";  a stroke or blood clot;  kidney disease;  diabetes;  an infection called sepsis;  if you use estrogens (birth control pills or hormone replacement therapy);  if you have been on long-term bedrest; or  if you have a central intravenous (IV) catheter in place. You may need a dose adjustment if you are exposed to measles, or if you travel to an area where this disease is common. Tell your doctor if you are pregnant or breastfeeding. Immune globulin is made from donated human plasma and may contain viruses or other infectious agents. Donated plasma is tested and treated to reduce the risk of contamination, but there is still a small possibility it could transmit disease. Ask your doctor about any possible risk. How should I use IGIV? IGIV is given as an infusion into a vein, usually once every 3 to 4 weeks. A healthcare provider will give you this injection. Drink plenty of liquids  while you are using this medicine to help improve your blood flow and keep your kidneys working properly. You may need frequent blood or urine tests. This medicine can affect the results of certain medical tests. Tell any doctor who treats you that you are using IGIV. What happens if I miss a dose? Call your doctor for instructions if you miss an appointment for your IGIV injection. What happens if I overdose? Seek emergency medical attention or call the Poison Help line at 1-419.431.9016. What should I avoid while using IGIV? Ask your doctor before receiving a \"live\" vaccine while using IGIV. The vaccine may not work as well and may not fully protect you from disease. Live vaccines include measles, mumps, rubella (MMR), rotavirus, typhoid, yellow fever, varicella (chickenpox), zoster (shingles), and nasal flu (influenza) vaccine.   What are the possible side effects of IGIV?  Get emergency medical help if you have signs of an allergic reaction: hives; difficult breathing; swelling of your face, lips, tongue, or throat. Some side effects may occur during the injection. Tell your caregiver if you feel dizzy, nauseated, light-headed, sweaty, or have a headache, pounding in your neck or ears, fever, chills, chest tightness, or warmth or redness in your face. Call your doctor at once if you have:  a blood cell disorder --pale or yellowed skin, dark colored urine, fever, confusion or weakness;  dehydration symptoms --feeling very thirsty or hot, being unable to urinate, heavy sweating, or hot and dry skin;  kidney problems --little or no urination, swelling, rapid weight gain, feeling short of breath;  lung problems --chest pain, trouble breathing, blue colored lips, fingers, or toes;  signs of a new infection --fever with a severe headache, neck stiffness, eye pain, and increased sensitivity to light; or  signs of a blood clot --shortness of breath, chest pain with deep breathing, rapid heart rate, numbness or weakness on one side of the body, swelling and warmth or discoloration in an arm or leg. Common side effects may include:  headache, back pain, joint pain;  fever, chills, sweating, warmth or tingling;  stomach pain, nausea, diarrhea;  increased blood pressure, fast heartbeats;  dizziness, tiredness, lack of energy;  stuffy nose, sinus pain; or  pain, swelling, burning, or irritation around the IV needle. This is not a complete list of side effects and others may occur. Call your doctor for medical advice about side effects. You may report side effects to FDA at 8-079-FDA-8419. What other drugs will affect IGIV? IGIV can harm your kidneys, especially if you also use certain medicines for infections, cancer, osteoporosis, organ transplant rejection, bowel disorders, high blood pressure, or pain or arthritis (including Advil, Motrin, and Aleve).   Other drugs may affect IGIV, including prescription and over-the-counter medicines, vitamins, and herbal products. Tell your doctor about all your current medicines and any medicine you start or stop using. Where can I get more information? Your pharmacist can provide more information about immune globulin intravenous. Remember, keep this and all other medicines out of the reach of children, never share your medicines with others, and use this medication only for the indication prescribed. Every effort has been made to ensure that the information provided by [address] is accurate, up-to-date, and complete, but no guarantee is made to that effect. Drug information contained herein may be time sensitive. [facility] information has been compiled for use by healthcare practitioners and consumers in the [place] and therefore [facility] does not warrant that uses outside of the [place] are appropriate, unless specifically indicated otherwise. [facility]'s drug information does not endorse drugs, diagnose patients or recommend therapy. [facility] drug information is an informational resource designed to assist licensed healthcare practitioners in caring for their patients and/or to serve consumers viewing this service as a supplement to, and not a substitute for, the expertise, skill, knowledge and judgment of healthcare practitioners. The absence of a warning for a given drug or drug combination in no way should be construed to indicate that the drug or drug combination is safe, effective or appropriate for any given patient. [facility] does not assume any responsibility for any aspect of healthcare administered with the aid of information [facility] provides. The information contained herein is not intended to cover all possible uses, directions, precautions, warnings, drug interactions, allergic reactions, or adverse effects.  If you have questions about the drugs you are taking, check with your doctor, nurse or pharmacist.  Copyright 5148-7683 [address] Pratt. Version: 6.02. Revision date: 2/26/2021. Care instructions adapted under license by [facility] [place] ([facility]). If you have questions about a medical condition or this instruction, always ask your healthcare professional. [address] any warranty or liability for your use of this information.

## 2022-10-17 ENCOUNTER — HOSPITAL ENCOUNTER (OUTPATIENT)
Dept: INFUSION THERAPY | Age: 47
Setting detail: INFUSION SERIES
Discharge: HOME | End: 2022-10-17
Payer: MEDICARE

## 2022-10-17 VITALS
RESPIRATION RATE: 18 BRPM | TEMPERATURE: 98.8 F | HEART RATE: 64 BPM | WEIGHT: 181.31 LBS | BODY MASS INDEX: 30.17 KG/M2 | DIASTOLIC BLOOD PRESSURE: 99 MMHG | OXYGEN SATURATION: 100 % | SYSTOLIC BLOOD PRESSURE: 154 MMHG

## 2022-10-17 DIAGNOSIS — G35 EXACERBATION OF MULTIPLE SCLEROSIS (HCC): ICD-10-CM

## 2022-10-17 DIAGNOSIS — M32.9 LUPUS (HCC): Primary | ICD-10-CM

## 2022-10-17 PROCEDURE — 6360000002 HC RX W HCPCS: Performed by: PSYCHIATRY & NEUROLOGY

## 2022-10-17 PROCEDURE — 96365 THER/PROPH/DIAG IV INF INIT: CPT

## 2022-10-17 PROCEDURE — 2580000003 HC RX 258: Performed by: PSYCHIATRY & NEUROLOGY

## 2022-10-17 PROCEDURE — 96375 TX/PRO/DX INJ NEW DRUG ADDON: CPT

## 2022-10-17 PROCEDURE — 96366 THER/PROPH/DIAG IV INF ADDON: CPT

## 2022-10-17 PROCEDURE — A4217 STERILE WATER/SALINE, 500 ML: HCPCS | Performed by: PSYCHIATRY & NEUROLOGY

## 2022-10-17 PROCEDURE — 6370000000 HC RX 637 (ALT 250 FOR IP): Performed by: PSYCHIATRY & NEUROLOGY

## 2022-10-17 RX ORDER — HEPARIN SODIUM (PORCINE) LOCK FLUSH IV SOLN 100 UNIT/ML 100 UNIT/ML
500 SOLUTION INTRAVENOUS PRN
Status: DISCONTINUED | OUTPATIENT
Start: 2022-10-17 | End: 2022-10-18 | Stop reason: HOSPADM

## 2022-10-17 RX ORDER — DIPHENHYDRAMINE HYDROCHLORIDE 50 MG/ML
50 INJECTION INTRAMUSCULAR; INTRAVENOUS ONCE
Status: COMPLETED | OUTPATIENT
Start: 2022-10-17 | End: 2022-10-17

## 2022-10-17 RX ORDER — MEPERIDINE HYDROCHLORIDE 25 MG/ML
12.5 INJECTION INTRAMUSCULAR; INTRAVENOUS; SUBCUTANEOUS PRN
Status: CANCELLED | OUTPATIENT
Start: 2022-10-17

## 2022-10-17 RX ORDER — ONDANSETRON 2 MG/ML
8 INJECTION INTRAMUSCULAR; INTRAVENOUS
Status: CANCELLED | OUTPATIENT
Start: 2022-10-17

## 2022-10-17 RX ORDER — DIPHENHYDRAMINE HYDROCHLORIDE 50 MG/ML
50 INJECTION INTRAMUSCULAR; INTRAVENOUS ONCE
Status: CANCELLED
Start: 2022-10-17 | End: 2022-10-17

## 2022-10-17 RX ORDER — ALBUTEROL SULFATE 90 UG/1
4 AEROSOL, METERED RESPIRATORY (INHALATION) PRN
Status: CANCELLED | OUTPATIENT
Start: 2022-10-17

## 2022-10-17 RX ORDER — DIPHENHYDRAMINE HYDROCHLORIDE 50 MG/ML
50 INJECTION INTRAMUSCULAR; INTRAVENOUS
Status: CANCELLED | OUTPATIENT
Start: 2022-10-17

## 2022-10-17 RX ORDER — EPINEPHRINE 1 MG/ML
0.3 INJECTION, SOLUTION, CONCENTRATE INTRAVENOUS PRN
Status: CANCELLED | OUTPATIENT
Start: 2022-10-17

## 2022-10-17 RX ORDER — SODIUM CHLORIDE 9 MG/ML
INJECTION, SOLUTION INTRAVENOUS CONTINUOUS
Status: CANCELLED | OUTPATIENT
Start: 2022-10-17

## 2022-10-17 RX ORDER — SODIUM CHLORIDE 9 MG/ML
5-250 INJECTION, SOLUTION INTRAVENOUS ONCE
Status: COMPLETED | OUTPATIENT
Start: 2022-10-17 | End: 2022-10-17

## 2022-10-17 RX ORDER — HEPARIN SODIUM (PORCINE) LOCK FLUSH IV SOLN 100 UNIT/ML 100 UNIT/ML
500 SOLUTION INTRAVENOUS PRN
Status: CANCELLED | OUTPATIENT
Start: 2022-10-17

## 2022-10-17 RX ORDER — ACETAMINOPHEN 325 MG/1
650 TABLET ORAL ONCE
Status: COMPLETED | OUTPATIENT
Start: 2022-10-17 | End: 2022-10-17

## 2022-10-17 RX ORDER — ACETAMINOPHEN 325 MG/1
650 TABLET ORAL ONCE
Status: CANCELLED | OUTPATIENT
Start: 2022-10-17 | End: 2022-10-17

## 2022-10-17 RX ORDER — SODIUM CHLORIDE 0.9 % (FLUSH) 0.9 %
5-40 SYRINGE (ML) INJECTION PRN
Status: DISCONTINUED | OUTPATIENT
Start: 2022-10-17 | End: 2022-10-18 | Stop reason: HOSPADM

## 2022-10-17 RX ORDER — SODIUM CHLORIDE 9 MG/ML
5-250 INJECTION, SOLUTION INTRAVENOUS ONCE
Status: CANCELLED | OUTPATIENT
Start: 2022-10-17 | End: 2022-10-17

## 2022-10-17 RX ORDER — ACETAMINOPHEN 325 MG/1
650 TABLET ORAL
Status: CANCELLED | OUTPATIENT
Start: 2022-10-17

## 2022-10-17 RX ORDER — SODIUM CHLORIDE 0.9 % (FLUSH) 0.9 %
5-40 SYRINGE (ML) INJECTION PRN
Status: CANCELLED | OUTPATIENT
Start: 2022-10-17

## 2022-10-17 RX ADMIN — SODIUM CHLORIDE 20 ML/HR: 9 INJECTION, SOLUTION INTRAVENOUS at 08:22

## 2022-10-17 RX ADMIN — SODIUM CHLORIDE, PRESERVATIVE FREE 20 ML: 5 INJECTION INTRAVENOUS at 10:28

## 2022-10-17 RX ADMIN — HEPARIN 500 UNITS: 100 SYRINGE at 10:28

## 2022-10-17 RX ADMIN — HYDROCORTISONE SODIUM SUCCINATE 100 MG: 100 INJECTION, POWDER, FOR SOLUTION INTRAMUSCULAR; INTRAVENOUS at 08:20

## 2022-10-17 RX ADMIN — ACETAMINOPHEN 650 MG: 325 TABLET, FILM COATED ORAL at 08:20

## 2022-10-17 RX ADMIN — IMMUNE GLOBULIN (HUMAN) 30 G: 10 INJECTION INTRAVENOUS; SUBCUTANEOUS at 08:44

## 2022-10-17 RX ADMIN — DIPHENHYDRAMINE HYDROCHLORIDE 50 MG: 50 INJECTION INTRAMUSCULAR; INTRAVENOUS at 08:27

## 2022-10-17 NOTE — DISCHARGE INSTRUCTIONS
immune globulin (intravenous and subcutaneous)  Pronunciation:  dinora LACEY foley  Brand:  Gammagard Liquid, Gammaked, Gamunex-C  What is the most important information I should know about immune globulin? This medicine can cause blood clots. The risk is highest in older adults or in people who have had blood clots, heart problems, or blood circulation problems. Blood clots are also more likely during long-term bedrest, while using birth control pills or hormone replacement therapy, or while having a central intravenous (IV) catheter in place. Call your doctor at once if you have chest pain, trouble breathing, fast heartbeats, numbness or weakness, or swelling and warmth or discoloration in an arm or leg. This medicine can also harm your kidneys, especially if you have kidney disease or if you also use certain medicines. Tell your doctor right away if you have signs of kidney problems, such as swelling, rapid weight gain, and little or no urination. What is immune globulin? Immune globulin intravenous and subcutaneous (for injection into a vein or under the skin) is used to treat primary immunodeficiency. Immune globulin is also used to increase platelets (blood clotting cells) in people with idiopathic thrombocytopenic purpura. Immune globulin is also used to treat certain debilitating nerve disorders that cause muscle weakness and can affect daily activities. Immune globulin may also be used for purposes not listed in this medication guide. What should I discuss with my healthcare provider before using immune globulin? You should not use this medicine if:  you have had an allergic reaction to an immune globulin or blood product; or  you have immune globulin A (IgA) deficiency with antibody to IgA. Immune globulin can cause blood clots or kidney problems, especially in older adults or in people with certain conditions.  Tell your doctor if you have ever had:  heart problems, blood circulation problems, or \"thick blood\";  a stroke or blood clot;  kidney disease;  diabetes;  an infection called sepsis;  if you use estrogens (birth control pills or hormone replacement therapy);  if you have been on long-term bedrest; or  if you have a central intravenous (IV) catheter in place. You may need a dose adjustment if you are exposed to measles, or if you travel to an area where this disease is common. Tell your doctor if you are pregnant or breastfeeding. Immune globulin is made from donated human plasma and may contain viruses or other infectious agents. Donated plasma is tested and treated to reduce the risk of contamination, but there is still a small possibility it could transmit disease. Ask your doctor about any possible risk. How should I use immune globulin? Follow all directions on your prescription label and read all medication guides or instruction sheets. Your doctor may occasionally change your dose. Use the medicine exactly as directed. Immune globulin is given as an infusion into a vein, or injected under the skin using an infusion pump. A healthcare provider will give your first dose and may teach you how to properly use the medication by yourself. Do not inject immune globulin into a vein if you have been instructed to give the medicine as a subcutaneous injection (under the skin). How you give this medication, how often you use it, and the length of your infusion time will depend on the condition being treated. Read and carefully follow any Instructions for Use provided with your medicine. Ask your doctor or pharmacist if you don't understand all instructions. Prepare an injection only when you are ready to give it. Do not use if the medicine looks cloudy, has changed colors, or has particles in it. Call your pharmacist for new medicine. Do not shake the medication bottle or you may ruin the medicine. Immune globulin must be given slowly.  You may need to use several catheters to inject this medicine into different body areas at the same time. Your healthcare provider will show you the best places on your body to inject the medication. Keep a diary of the days and times you gave the injection and where you injected it on your body. Drink plenty of liquids  while you are using this medicine to help improve your blood flow and keep your kidneys working properly. You may need frequent blood or urine tests. This medicine can affect the results of certain medical tests. Tell any doctor who treats you that you are using immune globulin. Store this medicine in its original carton in the refrigerator. Do not freeze immune globulin, and throw the medicine away if it has frozen. Take the medicine out of the refrigerator and let it reach room temperature for up to 1 hour before injecting your dose. You may also store immune globulin at room temperature. You will need to use immune globulin within a certain number months. This will depend on the how you store the medicine (at room temperature, or in a refrigerator). Carefully follow the storage instructions provided with your medicine. Do not use the medicine after the expiration date on the label has passed. Each vial (bottle) is for one use only. Throw it away after one use, even if there is still medicine left inside. Use disposable injection items (needle, catheter, tubing) only once and then place them in a puncture-proof \"sharps\" container. Follow state or local laws about how to dispose of this container. Keep it out of the reach of children and pets. What happens if I miss a dose? Call your doctor for instructions if you miss a dose. What happens if I overdose? Seek emergency medical attention or call the Poison Help line at 1-473.761.2897. What should I avoid while using immune globulin? Do not receive a \"live\" vaccine while using immune globulin. The vaccine may not work as well and may not fully protect you from disease.  Live vaccines include measles, mumps, rubella (MMR), rotavirus, typhoid, yellow fever, varicella (chickenpox), zoster (shingles), and nasal flu (influenza) vaccine. What are the possible side effects of immune globulin? Get emergency medical help if you have signs of an allergic reaction: hives; wheezing, difficulty breathing; dizziness, feeling like you might pass out; swelling of your face, lips, tongue, or throat. Some side effects may occur during the injection. Tell your caregiver if you feel light-headed, itchy, chilled, sweaty, or have chest discomfort, fast heartbeats, severe headache, or pounding in your neck or ears. Call your doctor at once if you have:  a blood cell disorder --pale or yellowed skin, dark colored urine, fever, confusion or weakness;  dehydration symptoms --feeling very thirsty or hot, being unable to urinate, heavy sweating, or hot and dry skin;  kidney problems --little or no urination, swelling, rapid weight gain, feeling short of breath;  lung problems --chest pain, wheezing, trouble breathing, blue colored lips, fingers, or toes;  signs of a new infection --fever with a severe headache, neck stiffness, eye pain, and increased sensitivity to light; or  signs of a blood clot --shortness of breath, chest pain with deep breathing, rapid heart rate, numbness or weakness on one side of the body, swelling and warmth or discoloration in an arm or leg. Common side effects may include:  runny or stuffy nose, sinus pain, cough, sore throat;  fever, chills, weakness;  headache, back pain, muscle or joint pain;  dizziness, tiredness, depressed mood;  swelling in your hands or feet;  skin rash, redness, or bruising;  blisters or ulcers in your mouth, red or swollen gums, trouble swallowing;  nausea, diarrhea, stomach pain, upset stomach;  increased blood pressure; or  redness, swelling, or itching where an injection was given. This is not a complete list of side effects and others may occur.  Call your doctor for medical advice about side effects. You may report side effects to FDA at 7-557-GKE-1555. What other drugs will affect immune globulin? Immune globulin can harm your kidneys, especially if you also use certain medicines for infections, cancer, osteoporosis, organ transplant rejection, bowel disorders, high blood pressure, or pain or arthritis (including Advil, Motrin, and Aleve). Other drugs may affect immune globulin, including prescription and over-the-counter medicines, vitamins, and herbal products. Tell your doctor about all your current medicines and any medicine you start or stop using. Where can I get more information? Your doctor or pharmacist can provide more information about immune globulin. Remember, keep this and all other medicines out of the reach of children, never share your medicines with others, and use this medication only for the indication prescribed. Every effort has been made to ensure that the information provided by [address] is accurate, up-to-date, and complete, but no guarantee is made to that effect. Drug information contained herein may be time sensitive. [facility] information has been compiled for use by healthcare practitioners and consumers in the [facility] and therefore [facility] does not warrant that uses outside of the [facility] are appropriate, unless specifically indicated otherwise. [facility]'s drug information does not endorse drugs, diagnose patients or recommend therapy. [facility] drug information is an informational resource designed to assist licensed healthcare practitioners in caring for their patients and/or to serve consumers viewing this service as a supplement to, and not a substitute for, the expertise, skill, knowledge and judgment of healthcare practitioners.  The absence of a warning for a given drug or drug combination in no way should be construed to indicate that the drug or drug combination is safe, effective or appropriate for any given patient. [facility] does not assume any responsibility for any aspect of healthcare administered with the aid of information [facility] provides. The information contained herein is not intended to cover all possible uses, directions, precautions, warnings, drug interactions, allergic reactions, or adverse effects. If you have questions about the drugs you are taking, check with your doctor, nurse or pharmacist.  Copyright 0240-4762 [facility] [place]. Version: 4.02. Revision date: 2/26/2021. Care instructions adapted under license by [facility] ([facility]). If you have questions about a medical condition or this instruction, always ask your healthcare professional. [place] [address] any warranty or liability for your use of this information.

## 2022-10-18 ENCOUNTER — HOSPITAL ENCOUNTER (OUTPATIENT)
Dept: INFUSION THERAPY | Age: 47
Setting detail: INFUSION SERIES
Discharge: HOME | End: 2022-10-18
Payer: MEDICARE

## 2022-10-18 VITALS
WEIGHT: 180 LBS | HEART RATE: 52 BPM | TEMPERATURE: 98.3 F | OXYGEN SATURATION: 100 % | SYSTOLIC BLOOD PRESSURE: 171 MMHG | RESPIRATION RATE: 18 BRPM | BODY MASS INDEX: 29.95 KG/M2 | DIASTOLIC BLOOD PRESSURE: 98 MMHG

## 2022-10-18 DIAGNOSIS — M32.9 LUPUS (HCC): Primary | ICD-10-CM

## 2022-10-18 DIAGNOSIS — G35 EXACERBATION OF MULTIPLE SCLEROSIS (HCC): ICD-10-CM

## 2022-10-18 LAB
ALBUMIN SERPL-MCNC: 3.2 G/DL (ref 3.5–5.2)
ALP BLD-CCNC: 62 U/L (ref 35–104)
ALT SERPL-CCNC: 22 U/L (ref 5–33)
ANION GAP SERPL CALCULATED.3IONS-SCNC: 10 MMOL/L (ref 7–19)
AST SERPL-CCNC: 29 U/L (ref 5–32)
BASOPHILS ABSOLUTE: 0 K/UL (ref 0–0.2)
BASOPHILS RELATIVE PERCENT: 0.1 % (ref 0–1)
BILIRUB SERPL-MCNC: <0.2 MG/DL (ref 0.2–1.2)
BUN BLDV-MCNC: 17 MG/DL (ref 6–20)
CALCIUM SERPL-MCNC: 8.7 MG/DL (ref 8.6–10)
CHLORIDE BLD-SCNC: 100 MMOL/L (ref 98–111)
CO2: 26 MMOL/L (ref 22–29)
CREAT SERPL-MCNC: 0.8 MG/DL (ref 0.5–0.9)
EOSINOPHILS ABSOLUTE: 0 K/UL (ref 0–0.6)
EOSINOPHILS RELATIVE PERCENT: 0 % (ref 0–5)
GFR SERPL CREATININE-BSD FRML MDRD: >60 ML/MIN/{1.73_M2}
GLUCOSE BLD-MCNC: 131 MG/DL (ref 74–109)
HCT VFR BLD CALC: 39.1 % (ref 37–47)
HEMOGLOBIN: 13.2 G/DL (ref 12–16)
IMMATURE GRANULOCYTES #: 0.1 K/UL
LYMPHOCYTES ABSOLUTE: 0.7 K/UL (ref 1.1–4.5)
LYMPHOCYTES RELATIVE PERCENT: 7.2 % (ref 20–40)
MCH RBC QN AUTO: 30.3 PG (ref 27–31)
MCHC RBC AUTO-ENTMCNC: 33.8 G/DL (ref 33–37)
MCV RBC AUTO: 89.7 FL (ref 81–99)
MONOCYTES ABSOLUTE: 0.4 K/UL (ref 0–0.9)
MONOCYTES RELATIVE PERCENT: 4.3 % (ref 0–10)
NEUTROPHILS ABSOLUTE: 8.2 K/UL (ref 1.5–7.5)
NEUTROPHILS RELATIVE PERCENT: 87.8 % (ref 50–65)
PDW BLD-RTO: 14.3 % (ref 11.5–14.5)
PLATELET # BLD: 251 K/UL (ref 130–400)
PMV BLD AUTO: 9.8 FL (ref 9.4–12.3)
POTASSIUM SERPL-SCNC: 3.7 MMOL/L (ref 3.5–5)
RBC # BLD: 4.36 M/UL (ref 4.2–5.4)
SODIUM BLD-SCNC: 136 MMOL/L (ref 136–145)
TOTAL PROTEIN: 7.7 G/DL (ref 6.6–8.7)
WBC # BLD: 9.3 K/UL (ref 4.8–10.8)

## 2022-10-18 PROCEDURE — 96365 THER/PROPH/DIAG IV INF INIT: CPT

## 2022-10-18 PROCEDURE — 6370000000 HC RX 637 (ALT 250 FOR IP): Performed by: PSYCHIATRY & NEUROLOGY

## 2022-10-18 PROCEDURE — 96366 THER/PROPH/DIAG IV INF ADDON: CPT

## 2022-10-18 PROCEDURE — 6360000002 HC RX W HCPCS: Performed by: PSYCHIATRY & NEUROLOGY

## 2022-10-18 PROCEDURE — 80053 COMPREHEN METABOLIC PANEL: CPT

## 2022-10-18 PROCEDURE — 2580000003 HC RX 258: Performed by: PSYCHIATRY & NEUROLOGY

## 2022-10-18 PROCEDURE — 85025 COMPLETE CBC W/AUTO DIFF WBC: CPT

## 2022-10-18 RX ORDER — DIPHENHYDRAMINE HYDROCHLORIDE 50 MG/ML
50 INJECTION INTRAMUSCULAR; INTRAVENOUS ONCE
Status: COMPLETED | OUTPATIENT
Start: 2022-10-18 | End: 2022-10-18

## 2022-10-18 RX ORDER — DIPHENHYDRAMINE HYDROCHLORIDE 50 MG/ML
50 INJECTION INTRAMUSCULAR; INTRAVENOUS ONCE
Status: CANCELLED
Start: 2022-10-18 | End: 2022-10-18

## 2022-10-18 RX ORDER — SODIUM CHLORIDE 9 MG/ML
INJECTION, SOLUTION INTRAVENOUS CONTINUOUS
Status: CANCELLED | OUTPATIENT
Start: 2022-10-18

## 2022-10-18 RX ORDER — ONDANSETRON 2 MG/ML
8 INJECTION INTRAMUSCULAR; INTRAVENOUS
Status: CANCELLED | OUTPATIENT
Start: 2022-10-18

## 2022-10-18 RX ORDER — SODIUM CHLORIDE 9 MG/ML
5-250 INJECTION, SOLUTION INTRAVENOUS ONCE
Status: DISCONTINUED | OUTPATIENT
Start: 2022-10-18 | End: 2022-10-20 | Stop reason: HOSPADM

## 2022-10-18 RX ORDER — SODIUM CHLORIDE 0.9 % (FLUSH) 0.9 %
5-40 SYRINGE (ML) INJECTION PRN
Status: CANCELLED | OUTPATIENT
Start: 2022-10-18

## 2022-10-18 RX ORDER — SODIUM CHLORIDE 9 MG/ML
5-250 INJECTION, SOLUTION INTRAVENOUS ONCE
Status: CANCELLED | OUTPATIENT
Start: 2022-10-18 | End: 2022-10-18

## 2022-10-18 RX ORDER — EPINEPHRINE 1 MG/ML
0.3 INJECTION, SOLUTION, CONCENTRATE INTRAVENOUS PRN
Status: CANCELLED | OUTPATIENT
Start: 2022-10-18

## 2022-10-18 RX ORDER — ALBUTEROL SULFATE 90 UG/1
4 AEROSOL, METERED RESPIRATORY (INHALATION) PRN
Status: CANCELLED | OUTPATIENT
Start: 2022-10-18

## 2022-10-18 RX ORDER — HEPARIN SODIUM (PORCINE) LOCK FLUSH IV SOLN 100 UNIT/ML 100 UNIT/ML
500 SOLUTION INTRAVENOUS PRN
Status: DISCONTINUED | OUTPATIENT
Start: 2022-10-18 | End: 2022-10-19 | Stop reason: HOSPADM

## 2022-10-18 RX ORDER — MEPERIDINE HYDROCHLORIDE 25 MG/ML
12.5 INJECTION INTRAMUSCULAR; INTRAVENOUS; SUBCUTANEOUS PRN
Status: CANCELLED | OUTPATIENT
Start: 2022-10-18

## 2022-10-18 RX ORDER — ACETAMINOPHEN 325 MG/1
650 TABLET ORAL ONCE
Status: COMPLETED | OUTPATIENT
Start: 2022-10-18 | End: 2022-10-18

## 2022-10-18 RX ORDER — ACETAMINOPHEN 325 MG/1
650 TABLET ORAL ONCE
Status: CANCELLED | OUTPATIENT
Start: 2022-10-18 | End: 2022-10-18

## 2022-10-18 RX ORDER — ACETAMINOPHEN 325 MG/1
650 TABLET ORAL
Status: CANCELLED | OUTPATIENT
Start: 2022-10-18

## 2022-10-18 RX ORDER — DIPHENHYDRAMINE HYDROCHLORIDE 50 MG/ML
50 INJECTION INTRAMUSCULAR; INTRAVENOUS
Status: CANCELLED | OUTPATIENT
Start: 2022-10-18

## 2022-10-18 RX ORDER — HEPARIN SODIUM (PORCINE) LOCK FLUSH IV SOLN 100 UNIT/ML 100 UNIT/ML
500 SOLUTION INTRAVENOUS PRN
Status: CANCELLED | OUTPATIENT
Start: 2022-10-18

## 2022-10-18 RX ORDER — SODIUM CHLORIDE 0.9 % (FLUSH) 0.9 %
5-40 SYRINGE (ML) INJECTION PRN
Status: DISCONTINUED | OUTPATIENT
Start: 2022-10-18 | End: 2022-10-19 | Stop reason: HOSPADM

## 2022-10-18 RX ADMIN — HEPARIN 500 UNITS: 100 SYRINGE at 08:46

## 2022-10-18 RX ADMIN — DIPHENHYDRAMINE HYDROCHLORIDE 50 MG: 50 INJECTION INTRAMUSCULAR; INTRAVENOUS at 06:41

## 2022-10-18 RX ADMIN — ACETAMINOPHEN 650 MG: 325 TABLET, FILM COATED ORAL at 06:41

## 2022-10-18 RX ADMIN — SODIUM CHLORIDE, PRESERVATIVE FREE 20 ML: 5 INJECTION INTRAVENOUS at 06:33

## 2022-10-18 RX ADMIN — SODIUM CHLORIDE, PRESERVATIVE FREE 20 ML: 5 INJECTION INTRAVENOUS at 08:46

## 2022-10-18 RX ADMIN — IMMUNE GLOBULIN (HUMAN) 30 G: 10 INJECTION INTRAVENOUS; SUBCUTANEOUS at 07:02

## 2022-10-18 RX ADMIN — HYDROCORTISONE SODIUM SUCCINATE 100 MG: 100 INJECTION, POWDER, FOR SOLUTION INTRAMUSCULAR; INTRAVENOUS at 06:41

## 2022-10-19 ENCOUNTER — HOSPITAL ENCOUNTER (OUTPATIENT)
Dept: INFUSION THERAPY | Age: 47
Setting detail: INFUSION SERIES
Discharge: HOME | End: 2022-10-19
Payer: MEDICARE

## 2022-10-19 VITALS
HEART RATE: 51 BPM | SYSTOLIC BLOOD PRESSURE: 159 MMHG | RESPIRATION RATE: 18 BRPM | DIASTOLIC BLOOD PRESSURE: 93 MMHG | OXYGEN SATURATION: 100 % | TEMPERATURE: 98.1 F

## 2022-10-19 DIAGNOSIS — M32.9 LUPUS (HCC): Primary | ICD-10-CM

## 2022-10-19 DIAGNOSIS — G35 EXACERBATION OF MULTIPLE SCLEROSIS (HCC): ICD-10-CM

## 2022-10-19 PROCEDURE — 96374 THER/PROPH/DIAG INJ IV PUSH: CPT

## 2022-10-19 PROCEDURE — 96365 THER/PROPH/DIAG IV INF INIT: CPT

## 2022-10-19 PROCEDURE — 96375 TX/PRO/DX INJ NEW DRUG ADDON: CPT

## 2022-10-19 PROCEDURE — 2580000003 HC RX 258: Performed by: PSYCHIATRY & NEUROLOGY

## 2022-10-19 PROCEDURE — 6370000000 HC RX 637 (ALT 250 FOR IP): Performed by: PSYCHIATRY & NEUROLOGY

## 2022-10-19 PROCEDURE — 6360000002 HC RX W HCPCS: Performed by: PSYCHIATRY & NEUROLOGY

## 2022-10-19 PROCEDURE — 96366 THER/PROPH/DIAG IV INF ADDON: CPT

## 2022-10-19 RX ORDER — DIPHENHYDRAMINE HYDROCHLORIDE 50 MG/ML
50 INJECTION INTRAMUSCULAR; INTRAVENOUS
Status: CANCELLED | OUTPATIENT
Start: 2022-10-19

## 2022-10-19 RX ORDER — SODIUM CHLORIDE 9 MG/ML
5-250 INJECTION, SOLUTION INTRAVENOUS ONCE
Status: CANCELLED | OUTPATIENT
Start: 2022-10-19 | End: 2022-10-19

## 2022-10-19 RX ORDER — MEPERIDINE HYDROCHLORIDE 25 MG/ML
12.5 INJECTION INTRAMUSCULAR; INTRAVENOUS; SUBCUTANEOUS PRN
Status: CANCELLED | OUTPATIENT
Start: 2022-10-19

## 2022-10-19 RX ORDER — SODIUM CHLORIDE 0.9 % (FLUSH) 0.9 %
5-40 SYRINGE (ML) INJECTION PRN
Status: CANCELLED | OUTPATIENT
Start: 2022-10-19

## 2022-10-19 RX ORDER — SODIUM CHLORIDE 0.9 % (FLUSH) 0.9 %
5-40 SYRINGE (ML) INJECTION PRN
Status: DISCONTINUED | OUTPATIENT
Start: 2022-10-19 | End: 2022-10-20 | Stop reason: HOSPADM

## 2022-10-19 RX ORDER — ACETAMINOPHEN 325 MG/1
650 TABLET ORAL
Status: CANCELLED | OUTPATIENT
Start: 2022-10-19

## 2022-10-19 RX ORDER — ACETAMINOPHEN 325 MG/1
650 TABLET ORAL ONCE
Status: COMPLETED | OUTPATIENT
Start: 2022-10-19 | End: 2022-10-19

## 2022-10-19 RX ORDER — ACETAMINOPHEN 325 MG/1
650 TABLET ORAL ONCE
Status: CANCELLED | OUTPATIENT
Start: 2022-10-19 | End: 2022-10-19

## 2022-10-19 RX ORDER — ONDANSETRON 2 MG/ML
8 INJECTION INTRAMUSCULAR; INTRAVENOUS
Status: CANCELLED | OUTPATIENT
Start: 2022-10-19

## 2022-10-19 RX ORDER — SODIUM CHLORIDE 9 MG/ML
INJECTION, SOLUTION INTRAVENOUS CONTINUOUS
Status: CANCELLED | OUTPATIENT
Start: 2022-10-19

## 2022-10-19 RX ORDER — SODIUM CHLORIDE 9 MG/ML
5-250 INJECTION, SOLUTION INTRAVENOUS ONCE
Status: COMPLETED | OUTPATIENT
Start: 2022-10-19 | End: 2022-10-19

## 2022-10-19 RX ORDER — DIPHENHYDRAMINE HYDROCHLORIDE 50 MG/ML
50 INJECTION INTRAMUSCULAR; INTRAVENOUS ONCE
Status: CANCELLED
Start: 2022-10-19 | End: 2022-10-19

## 2022-10-19 RX ORDER — EPINEPHRINE 1 MG/ML
0.3 INJECTION, SOLUTION, CONCENTRATE INTRAVENOUS PRN
Status: CANCELLED | OUTPATIENT
Start: 2022-10-19

## 2022-10-19 RX ORDER — HEPARIN SODIUM (PORCINE) LOCK FLUSH IV SOLN 100 UNIT/ML 100 UNIT/ML
500 SOLUTION INTRAVENOUS PRN
Status: DISCONTINUED | OUTPATIENT
Start: 2022-10-19 | End: 2022-10-20 | Stop reason: HOSPADM

## 2022-10-19 RX ORDER — ALBUTEROL SULFATE 90 UG/1
4 AEROSOL, METERED RESPIRATORY (INHALATION) PRN
Status: CANCELLED | OUTPATIENT
Start: 2022-10-19

## 2022-10-19 RX ORDER — HEPARIN SODIUM (PORCINE) LOCK FLUSH IV SOLN 100 UNIT/ML 100 UNIT/ML
500 SOLUTION INTRAVENOUS PRN
Status: CANCELLED | OUTPATIENT
Start: 2022-10-19

## 2022-10-19 RX ORDER — DIPHENHYDRAMINE HYDROCHLORIDE 50 MG/ML
50 INJECTION INTRAMUSCULAR; INTRAVENOUS ONCE
Status: COMPLETED | OUTPATIENT
Start: 2022-10-19 | End: 2022-10-19

## 2022-10-19 RX ADMIN — ACETAMINOPHEN 650 MG: 325 TABLET, FILM COATED ORAL at 06:53

## 2022-10-19 RX ADMIN — SODIUM CHLORIDE 250 ML/HR: 9 INJECTION, SOLUTION INTRAVENOUS at 06:55

## 2022-10-19 RX ADMIN — HYDROCORTISONE SODIUM SUCCINATE 100 MG: 100 INJECTION, POWDER, FOR SOLUTION INTRAMUSCULAR; INTRAVENOUS at 06:55

## 2022-10-19 RX ADMIN — SODIUM CHLORIDE, PRESERVATIVE FREE 20 ML: 5 INJECTION INTRAVENOUS at 08:55

## 2022-10-19 RX ADMIN — HEPARIN 500 UNITS: 100 SYRINGE at 08:55

## 2022-10-19 RX ADMIN — IMMUNE GLOBULIN (HUMAN) 30 G: 10 INJECTION INTRAVENOUS; SUBCUTANEOUS at 07:21

## 2022-10-19 RX ADMIN — DIPHENHYDRAMINE HYDROCHLORIDE 50 MG: 50 INJECTION INTRAMUSCULAR; INTRAVENOUS at 06:56

## 2022-10-19 NOTE — DISCHARGE INSTRUCTIONS
immune globulin (intravenous) (IGIV)  Pronunciation:  dinora foley  Brand:  Bivigam, Carimune, Flebogamma, Gammagard S/D, Gammaplex, Octagam, Panzyga, Privigen  What is the most important information I should know about immune globulin intravenous? This medicine can cause blood clots. The risk is highest in older adults or in people who have had blood clots, heart problems, or blood circulation problems. Blood clots are also more likely during long-term bedrest, while using birth control pills or hormone replacement therapy, or while having a central intravenous (IV) catheter in place. Call your doctor at once if you have chest pain, trouble breathing, fast heartbeats, numbness or weakness, or swelling and warmth or discoloration in an arm or leg. This medicine can also harm your kidneys, especially if you have kidney disease or you also use certain medicines. Tell your doctor right away if you have signs of kidney problems, such as swelling, rapid weight gain, and little or no urination. What is immune globulin intravenous (IGIV)? Immune globulin intravenous (IGIV, for injection into a vein) is used to treat primary immunodeficiency. IGIV is also used to increase platelets (blood clotting cells) in people with immune thrombocytopenic purpura. IGIV is also used in to help prevent certain infections in people with B-cell chronic lymphocytic leukemia. IGIV is also used in people with Kawasaki syndrome, to prevent aneurysm caused by a weakening of the main artery in the heart. IGIV may also be used for purposes not listed in this medication guide. What should I discuss with my healthcare provider before using IGIV? You may not be able to use this medicine if:  you have had an allergic reaction to an immune globulin or blood product;  you have immune globulin A (IgA) deficiency with antibody to IgA; or  you are allergic to corn.   IGIV can cause blood clots or kidney problems, especially in older adults or in people with certain conditions. Tell your doctor if you have ever had:  heart problems, blood circulation problems, or \"thick blood\";  a stroke or blood clot;  kidney disease;  diabetes;  an infection called sepsis;  if you use estrogens (birth control pills or hormone replacement therapy);  if you have been on long-term bedrest; or  if you have a central intravenous (IV) catheter in place. You may need a dose adjustment if you are exposed to measles, or if you travel to an area where this disease is common. Tell your doctor if you are pregnant or breastfeeding. Immune globulin is made from donated human plasma and may contain viruses or other infectious agents. Donated plasma is tested and treated to reduce the risk of contamination, but there is still a small possibility it could transmit disease. Ask your doctor about any possible risk. How should I use IGIV? IGIV is given as an infusion into a vein, usually once every 3 to 4 weeks. A healthcare provider will give you this injection. Drink plenty of liquids  while you are using this medicine to help improve your blood flow and keep your kidneys working properly. You may need frequent blood or urine tests. This medicine can affect the results of certain medical tests. Tell any doctor who treats you that you are using IGIV. What happens if I miss a dose? Call your doctor for instructions if you miss an appointment for your IGIV injection. What happens if I overdose? Seek emergency medical attention or call the Poison Help line at 1-905.614.8579. What should I avoid while using IGIV? Ask your doctor before receiving a \"live\" vaccine while using IGIV. The vaccine may not work as well and may not fully protect you from disease. Live vaccines include measles, mumps, rubella (MMR), rotavirus, typhoid, yellow fever, varicella (chickenpox), zoster (shingles), and nasal flu (influenza) vaccine.   What are the possible side effects of including prescription and over-the-counter medicines, vitamins, and herbal products. Tell your doctor about all your current medicines and any medicine you start or stop using. Where can I get more information? Your pharmacist can provide more information about immune globulin intravenous. Remember, keep this and all other medicines out of the reach of children, never share your medicines with others, and use this medication only for the indication prescribed. Every effort has been made to ensure that the information provided by [address] is accurate, up-to-date, and complete, but no guarantee is made to that effect. Drug information contained herein may be time sensitive. [facility] information has been compiled for use by healthcare practitioners and consumers in the [facility] and therefore [facility] does not warrant that uses outside of the [facility] are appropriate, unless specifically indicated otherwise. [facility]'s drug information does not endorse drugs, diagnose patients or recommend therapy. [facility] drug information is an informational resource designed to assist licensed healthcare practitioners in caring for their patients and/or to serve consumers viewing this service as a supplement to, and not a substitute for, the expertise, skill, knowledge and judgment of healthcare practitioners. The absence of a warning for a given drug or drug combination in no way should be construed to indicate that the drug or drug combination is safe, effective or appropriate for any given patient. [facility] does not assume any responsibility for any aspect of healthcare administered with the aid of information [facility] provides. The information contained herein is not intended to cover all possible uses, directions, precautions, warnings, drug interactions, allergic reactions, or adverse effects.  If you have questions about the drugs you are taking, check with your doctor, nurse or pharmacist.  Copyright 5605-6566 [employer] Version: 6.02. Revision date: 2/26/2021. Care instructions adapted under license by [facility] ([facility]). If you have questions about a medical condition or this instruction, always ask your healthcare professional. [address] any warranty or liability for your use of this information.

## 2022-10-20 ENCOUNTER — HOSPITAL ENCOUNTER (OUTPATIENT)
Dept: INFUSION THERAPY | Age: 47
Setting detail: INFUSION SERIES
Discharge: HOME | End: 2022-10-20
Payer: MEDICARE

## 2022-10-20 VITALS
RESPIRATION RATE: 18 BRPM | TEMPERATURE: 97 F | SYSTOLIC BLOOD PRESSURE: 135 MMHG | OXYGEN SATURATION: 99 % | HEART RATE: 51 BPM | DIASTOLIC BLOOD PRESSURE: 90 MMHG

## 2022-10-20 DIAGNOSIS — G35 EXACERBATION OF MULTIPLE SCLEROSIS (HCC): ICD-10-CM

## 2022-10-20 DIAGNOSIS — M32.9 LUPUS (HCC): Primary | ICD-10-CM

## 2022-10-20 PROCEDURE — A4217 STERILE WATER/SALINE, 500 ML: HCPCS | Performed by: PSYCHIATRY & NEUROLOGY

## 2022-10-20 PROCEDURE — 96365 THER/PROPH/DIAG IV INF INIT: CPT

## 2022-10-20 PROCEDURE — 96374 THER/PROPH/DIAG INJ IV PUSH: CPT

## 2022-10-20 PROCEDURE — 2580000003 HC RX 258: Performed by: PSYCHIATRY & NEUROLOGY

## 2022-10-20 PROCEDURE — 96366 THER/PROPH/DIAG IV INF ADDON: CPT

## 2022-10-20 PROCEDURE — 96375 TX/PRO/DX INJ NEW DRUG ADDON: CPT

## 2022-10-20 PROCEDURE — 6370000000 HC RX 637 (ALT 250 FOR IP): Performed by: PSYCHIATRY & NEUROLOGY

## 2022-10-20 PROCEDURE — 6360000002 HC RX W HCPCS: Performed by: PSYCHIATRY & NEUROLOGY

## 2022-10-20 RX ORDER — SODIUM CHLORIDE 0.9 % (FLUSH) 0.9 %
5-40 SYRINGE (ML) INJECTION PRN
Status: DISCONTINUED | OUTPATIENT
Start: 2022-10-20 | End: 2022-10-21 | Stop reason: HOSPADM

## 2022-10-20 RX ORDER — ACETAMINOPHEN 325 MG/1
650 TABLET ORAL
OUTPATIENT
Start: 2022-10-20

## 2022-10-20 RX ORDER — DIPHENHYDRAMINE HYDROCHLORIDE 50 MG/ML
50 INJECTION INTRAMUSCULAR; INTRAVENOUS ONCE
Status: COMPLETED | OUTPATIENT
Start: 2022-10-20 | End: 2022-10-20

## 2022-10-20 RX ORDER — SODIUM CHLORIDE 9 MG/ML
INJECTION, SOLUTION INTRAVENOUS CONTINUOUS
OUTPATIENT
Start: 2022-10-20

## 2022-10-20 RX ORDER — ACETAMINOPHEN 325 MG/1
650 TABLET ORAL ONCE
Status: CANCELLED | OUTPATIENT
Start: 2022-10-20 | End: 2022-10-20

## 2022-10-20 RX ORDER — DIPHENHYDRAMINE HYDROCHLORIDE 50 MG/ML
50 INJECTION INTRAMUSCULAR; INTRAVENOUS
OUTPATIENT
Start: 2022-10-20

## 2022-10-20 RX ORDER — SODIUM CHLORIDE 9 MG/ML
5-250 INJECTION, SOLUTION INTRAVENOUS ONCE
OUTPATIENT
Start: 2022-10-20 | End: 2022-10-20

## 2022-10-20 RX ORDER — ONDANSETRON 2 MG/ML
8 INJECTION INTRAMUSCULAR; INTRAVENOUS
OUTPATIENT
Start: 2022-10-20

## 2022-10-20 RX ORDER — MEPERIDINE HYDROCHLORIDE 25 MG/ML
12.5 INJECTION INTRAMUSCULAR; INTRAVENOUS; SUBCUTANEOUS PRN
OUTPATIENT
Start: 2022-10-20

## 2022-10-20 RX ORDER — HEPARIN SODIUM (PORCINE) LOCK FLUSH IV SOLN 100 UNIT/ML 100 UNIT/ML
500 SOLUTION INTRAVENOUS PRN
Status: CANCELLED | OUTPATIENT
Start: 2022-10-20

## 2022-10-20 RX ORDER — SODIUM CHLORIDE 9 MG/ML
5-250 INJECTION, SOLUTION INTRAVENOUS ONCE
Status: COMPLETED | OUTPATIENT
Start: 2022-10-20 | End: 2022-10-20

## 2022-10-20 RX ORDER — HEPARIN SODIUM (PORCINE) LOCK FLUSH IV SOLN 100 UNIT/ML 100 UNIT/ML
500 SOLUTION INTRAVENOUS PRN
Status: DISCONTINUED | OUTPATIENT
Start: 2022-10-20 | End: 2022-10-21 | Stop reason: HOSPADM

## 2022-10-20 RX ORDER — ALBUTEROL SULFATE 90 UG/1
4 AEROSOL, METERED RESPIRATORY (INHALATION) PRN
OUTPATIENT
Start: 2022-10-20

## 2022-10-20 RX ORDER — DIPHENHYDRAMINE HYDROCHLORIDE 50 MG/ML
50 INJECTION INTRAMUSCULAR; INTRAVENOUS ONCE
Status: CANCELLED
Start: 2022-10-20 | End: 2022-10-20

## 2022-10-20 RX ORDER — SODIUM CHLORIDE 0.9 % (FLUSH) 0.9 %
5-40 SYRINGE (ML) INJECTION PRN
OUTPATIENT
Start: 2022-10-20

## 2022-10-20 RX ORDER — ACETAMINOPHEN 325 MG/1
650 TABLET ORAL ONCE
Status: COMPLETED | OUTPATIENT
Start: 2022-10-20 | End: 2022-10-20

## 2022-10-20 RX ORDER — EPINEPHRINE 1 MG/ML
0.3 INJECTION, SOLUTION, CONCENTRATE INTRAVENOUS PRN
OUTPATIENT
Start: 2022-10-20

## 2022-10-20 RX ADMIN — ACETAMINOPHEN 650 MG: 325 TABLET, FILM COATED ORAL at 06:54

## 2022-10-20 RX ADMIN — IMMUNE GLOBULIN (HUMAN) 30 G: 10 INJECTION INTRAVENOUS; SUBCUTANEOUS at 07:23

## 2022-10-20 RX ADMIN — HEPARIN 500 UNITS: 100 SYRINGE at 09:10

## 2022-10-20 RX ADMIN — SODIUM CHLORIDE, PRESERVATIVE FREE 20 ML: 5 INJECTION INTRAVENOUS at 09:10

## 2022-10-20 RX ADMIN — DIPHENHYDRAMINE HYDROCHLORIDE 50 MG: 50 INJECTION, SOLUTION INTRAMUSCULAR; INTRAVENOUS at 06:56

## 2022-10-20 RX ADMIN — SODIUM CHLORIDE 250 ML/HR: 9 INJECTION, SOLUTION INTRAVENOUS at 06:56

## 2022-10-20 RX ADMIN — HYDROCORTISONE SODIUM SUCCINATE 100 MG: 100 INJECTION, POWDER, FOR SOLUTION INTRAMUSCULAR; INTRAVENOUS at 06:57

## 2022-10-20 NOTE — DISCHARGE INSTRUCTIONS
immune globulin (intravenous) (IGIV)  Pronunciation:  dinora foley  Brand:  Bivigam, Carimune, Flebogamma, Gammagard S/D, Gammaplex, Octagam, Panzyga, Privigen  What is the most important information I should know about immune globulin intravenous? This medicine can cause blood clots. The risk is highest in older adults or in people who have had blood clots, heart problems, or blood circulation problems. Blood clots are also more likely during long-term bedrest, while using birth control pills or hormone replacement therapy, or while having a central intravenous (IV) catheter in place. Call your doctor at once if you have chest pain, trouble breathing, fast heartbeats, numbness or weakness, or swelling and warmth or discoloration in an arm or leg. This medicine can also harm your kidneys, especially if you have kidney disease or you also use certain medicines. Tell your doctor right away if you have signs of kidney problems, such as swelling, rapid weight gain, and little or no urination. What is immune globulin intravenous (IGIV)? Immune globulin intravenous (IGIV, for injection into a vein) is used to treat primary immunodeficiency. IGIV is also used to increase platelets (blood clotting cells) in people with immune thrombocytopenic purpura. IGIV is also used in to help prevent certain infections in people with B-cell chronic lymphocytic leukemia. IGIV is also used in people with Kawasaki syndrome, to prevent aneurysm caused by a weakening of the main artery in the heart. IGIV may also be used for purposes not listed in this medication guide. What should I discuss with my healthcare provider before using IGIV? You may not be able to use this medicine if:  you have had an allergic reaction to an immune globulin or blood product;  you have immune globulin A (IgA) deficiency with antibody to IgA; or  you are allergic to corn.   IGIV can cause blood clots or kidney problems, especially in older adults or in people with certain conditions. Tell your doctor if you have ever had:  heart problems, blood circulation problems, or \"thick blood\";  a stroke or blood clot;  kidney disease;  diabetes;  an infection called sepsis;  if you use estrogens (birth control pills or hormone replacement therapy);  if you have been on long-term bedrest; or  if you have a central intravenous (IV) catheter in place. You may need a dose adjustment if you are exposed to measles, or if you travel to an area where this disease is common. Tell your doctor if you are pregnant or breastfeeding. Immune globulin is made from donated human plasma and may contain viruses or other infectious agents. Donated plasma is tested and treated to reduce the risk of contamination, but there is still a small possibility it could transmit disease. Ask your doctor about any possible risk. How should I use IGIV? IGIV is given as an infusion into a vein, usually once every 3 to 4 weeks. A healthcare provider will give you this injection. Drink plenty of liquids  while you are using this medicine to help improve your blood flow and keep your kidneys working properly. You may need frequent blood or urine tests. This medicine can affect the results of certain medical tests. Tell any doctor who treats you that you are using IGIV. What happens if I miss a dose? Call your doctor for instructions if you miss an appointment for your IGIV injection. What happens if I overdose? Seek emergency medical attention or call the Poison Help line at 1-906.910.9786. What should I avoid while using IGIV? Ask your doctor before receiving a \"live\" vaccine while using IGIV. The vaccine may not work as well and may not fully protect you from disease. Live vaccines include measles, mumps, rubella (MMR), rotavirus, typhoid, yellow fever, varicella (chickenpox), zoster (shingles), and nasal flu (influenza) vaccine.   What are the possible side effects of IGIV?  Get emergency medical help if you have signs of an allergic reaction: hives; difficult breathing; swelling of your face, lips, tongue, or throat. Some side effects may occur during the injection. Tell your caregiver if you feel dizzy, nauseated, light-headed, sweaty, or have a headache, pounding in your neck or ears, fever, chills, chest tightness, or warmth or redness in your face. Call your doctor at once if you have:  a blood cell disorder --pale or yellowed skin, dark colored urine, fever, confusion or weakness;  dehydration symptoms --feeling very thirsty or hot, being unable to urinate, heavy sweating, or hot and dry skin;  kidney problems --little or no urination, swelling, rapid weight gain, feeling short of breath;  lung problems --chest pain, trouble breathing, blue colored lips, fingers, or toes;  signs of a new infection --fever with a severe headache, neck stiffness, eye pain, and increased sensitivity to light; or  signs of a blood clot --shortness of breath, chest pain with deep breathing, rapid heart rate, numbness or weakness on one side of the body, swelling and warmth or discoloration in an arm or leg. Common side effects may include:  headache, back pain, joint pain;  fever, chills, sweating, warmth or tingling;  stomach pain, nausea, diarrhea;  increased blood pressure, fast heartbeats;  dizziness, tiredness, lack of energy;  stuffy nose, sinus pain; or  pain, swelling, burning, or irritation around the IV needle. This is not a complete list of side effects and others may occur. Call your doctor for medical advice about side effects. You may report side effects to FDA at 7-567-FDA-6320. What other drugs will affect IGIV? IGIV can harm your kidneys, especially if you also use certain medicines for infections, cancer, osteoporosis, organ transplant rejection, bowel disorders, high blood pressure, or pain or arthritis (including Advil, Motrin, and Aleve).   Other drugs may affect IGIV, including prescription and over-the-counter medicines, vitamins, and herbal products. Tell your doctor about all your current medicines and any medicine you start or stop using. Where can I get more information? Your pharmacist can provide more information about immune globulin intravenous. Remember, keep this and all other medicines out of the reach of children, never share your medicines with others, and use this medication only for the indication prescribed. Every effort has been made to ensure that the information provided by [address] is accurate, up-to-date, and complete, but no guarantee is made to that effect. Drug information contained herein may be time sensitive. [facility] information has been compiled for use by healthcare practitioners and consumers in the [place] and therefore [facility] does not warrant that uses outside of the [place] are appropriate, unless specifically indicated otherwise. [facility]'s drug information does not endorse drugs, diagnose patients or recommend therapy. [facility] information is an informational resource designed to assist licensed healthcare practitioners in caring for their patients and/or to serve consumers viewing this service as a supplement to, and not a substitute for, the expertise, skill, knowledge and judgment of healthcare practitioners. The absence of a warning for a given drug or drug combination in no way should be construed to indicate that the drug or drug combination is safe, effective or appropriate for any given patient. [facility] does not assume any responsibility for any aspect of healthcare administered with the aid of information [facility] provides. The information contained herein is not intended to cover all possible uses, directions, precautions, warnings, drug interactions, allergic reactions, or adverse effects.  If you have questions about the drugs you are taking, check with your doctor, nurse or pharmacist.  Copyright 0288-1733 [employer] Version: 6.02. Revision date: 2/26/2021. Care instructions adapted under license by [facility] [place] ([facility]). If you have questions about a medical condition or this instruction, always ask your healthcare professional. [address] any warranty or liability for your use of this information.

## 2022-10-21 ENCOUNTER — HOSPITAL ENCOUNTER (OUTPATIENT)
Dept: INFUSION THERAPY | Age: 47
Setting detail: INFUSION SERIES
Discharge: HOME | End: 2022-10-21
Payer: MEDICARE

## 2022-10-21 VITALS
HEART RATE: 59 BPM | DIASTOLIC BLOOD PRESSURE: 82 MMHG | TEMPERATURE: 97.3 F | RESPIRATION RATE: 18 BRPM | SYSTOLIC BLOOD PRESSURE: 127 MMHG

## 2022-10-21 DIAGNOSIS — M32.9 LUPUS (HCC): Primary | ICD-10-CM

## 2022-10-21 DIAGNOSIS — G35 EXACERBATION OF MULTIPLE SCLEROSIS (HCC): ICD-10-CM

## 2022-10-21 PROCEDURE — 6370000000 HC RX 637 (ALT 250 FOR IP): Performed by: PSYCHIATRY & NEUROLOGY

## 2022-10-21 PROCEDURE — 96366 THER/PROPH/DIAG IV INF ADDON: CPT

## 2022-10-21 PROCEDURE — 96375 TX/PRO/DX INJ NEW DRUG ADDON: CPT

## 2022-10-21 PROCEDURE — 96365 THER/PROPH/DIAG IV INF INIT: CPT

## 2022-10-21 PROCEDURE — 6360000002 HC RX W HCPCS: Performed by: PSYCHIATRY & NEUROLOGY

## 2022-10-21 RX ORDER — ONDANSETRON 2 MG/ML
8 INJECTION INTRAMUSCULAR; INTRAVENOUS
OUTPATIENT
Start: 2022-10-21

## 2022-10-21 RX ORDER — DIPHENHYDRAMINE HYDROCHLORIDE 50 MG/ML
50 INJECTION INTRAMUSCULAR; INTRAVENOUS
Status: CANCELLED | OUTPATIENT
Start: 2022-10-21

## 2022-10-21 RX ORDER — DIPHENHYDRAMINE HYDROCHLORIDE 50 MG/ML
50 INJECTION INTRAMUSCULAR; INTRAVENOUS ONCE
Status: CANCELLED
Start: 2022-10-21 | End: 2022-10-21

## 2022-10-21 RX ORDER — DIPHENHYDRAMINE HYDROCHLORIDE 50 MG/ML
50 INJECTION INTRAMUSCULAR; INTRAVENOUS ONCE
Start: 2022-10-21 | End: 2022-10-21

## 2022-10-21 RX ORDER — DIPHENHYDRAMINE HYDROCHLORIDE 50 MG/ML
50 INJECTION INTRAMUSCULAR; INTRAVENOUS ONCE
Status: COMPLETED | OUTPATIENT
Start: 2022-10-21 | End: 2022-10-21

## 2022-10-21 RX ORDER — MEPERIDINE HYDROCHLORIDE 25 MG/ML
12.5 INJECTION INTRAMUSCULAR; INTRAVENOUS; SUBCUTANEOUS PRN
Status: CANCELLED | OUTPATIENT
Start: 2022-10-21

## 2022-10-21 RX ORDER — ALBUTEROL SULFATE 90 UG/1
4 AEROSOL, METERED RESPIRATORY (INHALATION) PRN
OUTPATIENT
Start: 2022-10-21

## 2022-10-21 RX ORDER — SODIUM CHLORIDE 0.9 % (FLUSH) 0.9 %
5-40 SYRINGE (ML) INJECTION PRN
Status: CANCELLED | OUTPATIENT
Start: 2022-10-21

## 2022-10-21 RX ORDER — EPINEPHRINE 1 MG/ML
0.3 INJECTION, SOLUTION, CONCENTRATE INTRAVENOUS PRN
Status: CANCELLED | OUTPATIENT
Start: 2022-10-21

## 2022-10-21 RX ORDER — SODIUM CHLORIDE 0.9 % (FLUSH) 0.9 %
5-40 SYRINGE (ML) INJECTION PRN
OUTPATIENT
Start: 2022-10-21

## 2022-10-21 RX ORDER — HEPARIN SODIUM (PORCINE) LOCK FLUSH IV SOLN 100 UNIT/ML 100 UNIT/ML
500 SOLUTION INTRAVENOUS PRN
OUTPATIENT
Start: 2022-10-21

## 2022-10-21 RX ORDER — ONDANSETRON 2 MG/ML
8 INJECTION INTRAMUSCULAR; INTRAVENOUS
Status: CANCELLED | OUTPATIENT
Start: 2022-10-21

## 2022-10-21 RX ORDER — MEPERIDINE HYDROCHLORIDE 25 MG/ML
12.5 INJECTION INTRAMUSCULAR; INTRAVENOUS; SUBCUTANEOUS PRN
OUTPATIENT
Start: 2022-10-21

## 2022-10-21 RX ORDER — SODIUM CHLORIDE 9 MG/ML
5-250 INJECTION, SOLUTION INTRAVENOUS ONCE
OUTPATIENT
Start: 2022-10-21 | End: 2022-10-21

## 2022-10-21 RX ORDER — DIPHENHYDRAMINE HYDROCHLORIDE 50 MG/ML
50 INJECTION INTRAMUSCULAR; INTRAVENOUS
OUTPATIENT
Start: 2022-10-21

## 2022-10-21 RX ORDER — HEPARIN SODIUM (PORCINE) LOCK FLUSH IV SOLN 100 UNIT/ML 100 UNIT/ML
500 SOLUTION INTRAVENOUS PRN
Status: DISCONTINUED | OUTPATIENT
Start: 2022-10-21 | End: 2022-10-22 | Stop reason: HOSPADM

## 2022-10-21 RX ORDER — ALBUTEROL SULFATE 90 UG/1
4 AEROSOL, METERED RESPIRATORY (INHALATION) PRN
Status: CANCELLED | OUTPATIENT
Start: 2022-10-21

## 2022-10-21 RX ORDER — ACETAMINOPHEN 325 MG/1
650 TABLET ORAL ONCE
Status: COMPLETED | OUTPATIENT
Start: 2022-10-21 | End: 2022-10-21

## 2022-10-21 RX ORDER — ACETAMINOPHEN 325 MG/1
650 TABLET ORAL
Status: CANCELLED | OUTPATIENT
Start: 2022-10-21

## 2022-10-21 RX ORDER — SODIUM CHLORIDE 9 MG/ML
INJECTION, SOLUTION INTRAVENOUS CONTINUOUS
OUTPATIENT
Start: 2022-10-21

## 2022-10-21 RX ORDER — ACETAMINOPHEN 325 MG/1
650 TABLET ORAL ONCE
Status: CANCELLED | OUTPATIENT
Start: 2022-10-21 | End: 2022-10-21

## 2022-10-21 RX ORDER — ACETAMINOPHEN 325 MG/1
650 TABLET ORAL ONCE
OUTPATIENT
Start: 2022-10-21 | End: 2022-10-21

## 2022-10-21 RX ORDER — EPINEPHRINE 1 MG/ML
0.3 INJECTION, SOLUTION, CONCENTRATE INTRAVENOUS PRN
OUTPATIENT
Start: 2022-10-21

## 2022-10-21 RX ORDER — ACETAMINOPHEN 325 MG/1
650 TABLET ORAL
OUTPATIENT
Start: 2022-10-21

## 2022-10-21 RX ORDER — SODIUM CHLORIDE 9 MG/ML
INJECTION, SOLUTION INTRAVENOUS CONTINUOUS
Status: CANCELLED | OUTPATIENT
Start: 2022-10-21

## 2022-10-21 RX ADMIN — HYDROCORTISONE SODIUM SUCCINATE 100 MG: 100 INJECTION, POWDER, FOR SOLUTION INTRAMUSCULAR; INTRAVENOUS at 07:49

## 2022-10-21 RX ADMIN — ACETAMINOPHEN 650 MG: 325 TABLET, FILM COATED ORAL at 07:49

## 2022-10-21 RX ADMIN — DIPHENHYDRAMINE HYDROCHLORIDE 50 MG: 50 INJECTION INTRAMUSCULAR; INTRAVENOUS at 07:49

## 2022-10-21 RX ADMIN — HEPARIN 500 UNITS: 100 SYRINGE at 09:42

## 2022-10-21 RX ADMIN — IMMUNE GLOBULIN (HUMAN) 30 G: 10 INJECTION INTRAVENOUS; SUBCUTANEOUS at 08:05

## 2022-10-21 ASSESSMENT — PAIN SCALES - GENERAL: PAINLEVEL_OUTOF10: 0

## 2022-10-21 NOTE — DISCHARGE INSTRUCTIONS
immune globulin (intravenous and subcutaneous)  Pronunciation:  dinora LACEY foley  Brand:  Gammagard Liquid, Gammaked, Gamunex-C  What is the most important information I should know about immune globulin? This medicine can cause blood clots. The risk is highest in older adults or in people who have had blood clots, heart problems, or blood circulation problems. Blood clots are also more likely during long-term bedrest, while using birth control pills or hormone replacement therapy, or while having a central intravenous (IV) catheter in place. Call your doctor at once if you have chest pain, trouble breathing, fast heartbeats, numbness or weakness, or swelling and warmth or discoloration in an arm or leg. This medicine can also harm your kidneys, especially if you have kidney disease or if you also use certain medicines. Tell your doctor right away if you have signs of kidney problems, such as swelling, rapid weight gain, and little or no urination. What is immune globulin? Immune globulin intravenous and subcutaneous (for injection into a vein or under the skin) is used to treat primary immunodeficiency. Immune globulin is also used to increase platelets (blood clotting cells) in people with idiopathic thrombocytopenic purpura. Immune globulin is also used to treat certain debilitating nerve disorders that cause muscle weakness and can affect daily activities. Immune globulin may also be used for purposes not listed in this medication guide. What should I discuss with my healthcare provider before using immune globulin? You should not use this medicine if:  you have had an allergic reaction to an immune globulin or blood product; or  you have immune globulin A (IgA) deficiency with antibody to IgA. Immune globulin can cause blood clots or kidney problems, especially in older adults or in people with certain conditions.  Tell your doctor if you have ever had:  heart problems, blood circulation problems, or \"thick blood\";  a stroke or blood clot;  kidney disease;  diabetes;  an infection called sepsis;  if you use estrogens (birth control pills or hormone replacement therapy);  if you have been on long-term bedrest; or  if you have a central intravenous (IV) catheter in place. You may need a dose adjustment if you are exposed to measles, or if you travel to an area where this disease is common. Tell your doctor if you are pregnant or breastfeeding. Immune globulin is made from donated human plasma and may contain viruses or other infectious agents. Donated plasma is tested and treated to reduce the risk of contamination, but there is still a small possibility it could transmit disease. Ask your doctor about any possible risk. How should I use immune globulin? Follow all directions on your prescription label and read all medication guides or instruction sheets. Your doctor may occasionally change your dose. Use the medicine exactly as directed. Immune globulin is given as an infusion into a vein, or injected under the skin using an infusion pump. A healthcare provider will give your first dose and may teach you how to properly use the medication by yourself. Do not inject immune globulin into a vein if you have been instructed to give the medicine as a subcutaneous injection (under the skin). How you give this medication, how often you use it, and the length of your infusion time will depend on the condition being treated. Read and carefully follow any Instructions for Use provided with your medicine. Ask your doctor or pharmacist if you don't understand all instructions. Prepare an injection only when you are ready to give it. Do not use if the medicine looks cloudy, has changed colors, or has particles in it. Call your pharmacist for new medicine. Do not shake the medication bottle or you may ruin the medicine. Immune globulin must be given slowly.  You may need to use several catheters to inject this medicine into different body areas at the same time. Your healthcare provider will show you the best places on your body to inject the medication. Keep a diary of the days and times you gave the injection and where you injected it on your body. Drink plenty of liquids  while you are using this medicine to help improve your blood flow and keep your kidneys working properly. You may need frequent blood or urine tests. This medicine can affect the results of certain medical tests. Tell any doctor who treats you that you are using immune globulin. Store this medicine in its original carton in the refrigerator. Do not freeze immune globulin, and throw the medicine away if it has frozen. Take the medicine out of the refrigerator and let it reach room temperature for up to 1 hour before injecting your dose. You may also store immune globulin at room temperature. You will need to use immune globulin within a certain number months. This will depend on the how you store the medicine (at room temperature, or in a refrigerator). Carefully follow the storage instructions provided with your medicine. Do not use the medicine after the expiration date on the label has passed. Each vial (bottle) is for one use only. Throw it away after one use, even if there is still medicine left inside. Use disposable injection items (needle, catheter, tubing) only once and then place them in a puncture-proof \"sharps\" container. Follow state or local laws about how to dispose of this container. Keep it out of the reach of children and pets. What happens if I miss a dose? Call your doctor for instructions if you miss a dose. What happens if I overdose? Seek emergency medical attention or call the Poison Help line at 1-543.368.6757. What should I avoid while using immune globulin? Do not receive a \"live\" vaccine while using immune globulin. The vaccine may not work as well and may not fully protect you from disease.  Live vaccines include measles, mumps, rubella (MMR), rotavirus, typhoid, yellow fever, varicella (chickenpox), zoster (shingles), and nasal flu (influenza) vaccine. What are the possible side effects of immune globulin? Get emergency medical help if you have signs of an allergic reaction: hives; wheezing, difficulty breathing; dizziness, feeling like you might pass out; swelling of your face, lips, tongue, or throat. Some side effects may occur during the injection. Tell your caregiver if you feel light-headed, itchy, chilled, sweaty, or have chest discomfort, fast heartbeats, severe headache, or pounding in your neck or ears. Call your doctor at once if you have:  a blood cell disorder --pale or yellowed skin, dark colored urine, fever, confusion or weakness;  dehydration symptoms --feeling very thirsty or hot, being unable to urinate, heavy sweating, or hot and dry skin;  kidney problems --little or no urination, swelling, rapid weight gain, feeling short of breath;  lung problems --chest pain, wheezing, trouble breathing, blue colored lips, fingers, or toes;  signs of a new infection --fever with a severe headache, neck stiffness, eye pain, and increased sensitivity to light; or  signs of a blood clot --shortness of breath, chest pain with deep breathing, rapid heart rate, numbness or weakness on one side of the body, swelling and warmth or discoloration in an arm or leg. Common side effects may include:  runny or stuffy nose, sinus pain, cough, sore throat;  fever, chills, weakness;  headache, back pain, muscle or joint pain;  dizziness, tiredness, depressed mood;  swelling in your hands or feet;  skin rash, redness, or bruising;  blisters or ulcers in your mouth, red or swollen gums, trouble swallowing;  nausea, diarrhea, stomach pain, upset stomach;  increased blood pressure; or  redness, swelling, or itching where an injection was given. This is not a complete list of side effects and others may occur.  Call your doctor for medical advice about side effects. You may report side effects to FDA at 5-306-IIX-8716. What other drugs will affect immune globulin? Immune globulin can harm your kidneys, especially if you also use certain medicines for infections, cancer, osteoporosis, organ transplant rejection, bowel disorders, high blood pressure, or pain or arthritis (including Advil, Motrin, and Aleve). Other drugs may affect immune globulin, including prescription and over-the-counter medicines, vitamins, and herbal products. Tell your doctor about all your current medicines and any medicine you start or stop using. Where can I get more information? Your doctor or pharmacist can provide more information about immune globulin. Remember, keep this and all other medicines out of the reach of children, never share your medicines with others, and use this medication only for the indication prescribed. Every effort has been made to ensure that the information provided by [address] is accurate, up-to-date, and complete, but no guarantee is made to that effect. Drug information contained herein may be time sensitive. [employer] information has been compiled for use by healthcare practitioners and consumers in the [place] and therefore [employer] does not warrant that uses outside of the [place] are appropriate, unless specifically indicated otherwise. [employer] drug information does not endorse drugs, diagnose patients or recommend therapy. [employer] drug information is an informational resource designed to assist licensed healthcare practitioners in caring for their patients and/or to serve consumers viewing this service as a supplement to, and not a substitute for, the expertise, skill, knowledge and judgment of healthcare practitioners.  The absence of a warning for a given drug or drug combination in no way should be construed to indicate that the drug or drug combination is safe, effective or appropriate for any given patient. [facility] does not assume any responsibility for any aspect of healthcare administered with the aid of information [facility] provides. The information contained herein is not intended to cover all possible uses, directions, precautions, warnings, drug interactions, allergic reactions, or adverse effects. If you have questions about the drugs you are taking, check with your doctor, nurse or pharmacist.  Copyright 1497-3223 [facility] [address] [employer]. Version: 4.02. Revision date: 2/26/2021. Care instructions adapted under license by [facility] ([facility]). If you have questions about a medical condition or this instruction, always ask your healthcare professional. [place] [address] any warranty or liability for your use of this information.

## 2022-10-28 ENCOUNTER — HOSPITAL ENCOUNTER (OUTPATIENT)
Dept: INFUSION THERAPY | Age: 47
Setting detail: INFUSION SERIES
Discharge: HOME | End: 2022-10-28
Payer: MEDICARE

## 2022-10-28 VITALS
WEIGHT: 185 LBS | HEART RATE: 50 BPM | RESPIRATION RATE: 18 BRPM | OXYGEN SATURATION: 99 % | DIASTOLIC BLOOD PRESSURE: 95 MMHG | BODY MASS INDEX: 30.79 KG/M2 | TEMPERATURE: 97 F | SYSTOLIC BLOOD PRESSURE: 150 MMHG

## 2022-10-28 DIAGNOSIS — G35 MULTIPLE SCLEROSIS (HCC): Primary | ICD-10-CM

## 2022-10-28 PROCEDURE — 96365 THER/PROPH/DIAG IV INF INIT: CPT

## 2022-10-28 PROCEDURE — 96366 THER/PROPH/DIAG IV INF ADDON: CPT

## 2022-10-28 PROCEDURE — 2580000003 HC RX 258: Performed by: PSYCHIATRY & NEUROLOGY

## 2022-10-28 PROCEDURE — 6370000000 HC RX 637 (ALT 250 FOR IP): Performed by: PSYCHIATRY & NEUROLOGY

## 2022-10-28 PROCEDURE — 96375 TX/PRO/DX INJ NEW DRUG ADDON: CPT

## 2022-10-28 PROCEDURE — 6360000002 HC RX W HCPCS: Performed by: PSYCHIATRY & NEUROLOGY

## 2022-10-28 RX ORDER — DIPHENHYDRAMINE HYDROCHLORIDE 50 MG/ML
50 INJECTION INTRAMUSCULAR; INTRAVENOUS ONCE
Status: CANCELLED
Start: 2022-10-28 | End: 2022-10-28

## 2022-10-28 RX ORDER — HEPARIN SODIUM (PORCINE) LOCK FLUSH IV SOLN 100 UNIT/ML 100 UNIT/ML
500 SOLUTION INTRAVENOUS PRN
Status: DISCONTINUED | OUTPATIENT
Start: 2022-10-28 | End: 2022-10-30 | Stop reason: HOSPADM

## 2022-10-28 RX ORDER — ACETAMINOPHEN 325 MG/1
650 TABLET ORAL ONCE
Status: COMPLETED | OUTPATIENT
Start: 2022-10-28 | End: 2022-10-28

## 2022-10-28 RX ORDER — SODIUM CHLORIDE 0.9 % (FLUSH) 0.9 %
5-40 SYRINGE (ML) INJECTION PRN
Status: DISCONTINUED | OUTPATIENT
Start: 2022-10-28 | End: 2022-10-29 | Stop reason: HOSPADM

## 2022-10-28 RX ORDER — EPINEPHRINE 1 MG/ML
0.3 INJECTION, SOLUTION, CONCENTRATE INTRAVENOUS PRN
Status: CANCELLED | OUTPATIENT
Start: 2022-10-28

## 2022-10-28 RX ORDER — ONDANSETRON 2 MG/ML
8 INJECTION INTRAMUSCULAR; INTRAVENOUS
Status: CANCELLED | OUTPATIENT
Start: 2022-10-28

## 2022-10-28 RX ORDER — MEPERIDINE HYDROCHLORIDE 25 MG/ML
12.5 INJECTION INTRAMUSCULAR; INTRAVENOUS; SUBCUTANEOUS PRN
Status: CANCELLED | OUTPATIENT
Start: 2022-10-28

## 2022-10-28 RX ORDER — ALBUTEROL SULFATE 90 UG/1
4 AEROSOL, METERED RESPIRATORY (INHALATION) PRN
Status: CANCELLED | OUTPATIENT
Start: 2022-10-28

## 2022-10-28 RX ORDER — SODIUM CHLORIDE 9 MG/ML
INJECTION, SOLUTION INTRAVENOUS CONTINUOUS
Status: CANCELLED | OUTPATIENT
Start: 2022-10-28

## 2022-10-28 RX ORDER — DIPHENHYDRAMINE HYDROCHLORIDE 50 MG/ML
50 INJECTION INTRAMUSCULAR; INTRAVENOUS
Status: CANCELLED | OUTPATIENT
Start: 2022-10-28

## 2022-10-28 RX ORDER — DIPHENHYDRAMINE HYDROCHLORIDE 50 MG/ML
50 INJECTION INTRAMUSCULAR; INTRAVENOUS ONCE
Status: COMPLETED | OUTPATIENT
Start: 2022-10-28 | End: 2022-10-28

## 2022-10-28 RX ORDER — ACETAMINOPHEN 325 MG/1
650 TABLET ORAL
Status: CANCELLED | OUTPATIENT
Start: 2022-10-28

## 2022-10-28 RX ORDER — ACETAMINOPHEN 325 MG/1
650 TABLET ORAL ONCE
Status: CANCELLED | OUTPATIENT
Start: 2022-10-28 | End: 2022-10-28

## 2022-10-28 RX ORDER — SODIUM CHLORIDE 0.9 % (FLUSH) 0.9 %
5-40 SYRINGE (ML) INJECTION PRN
Status: CANCELLED | OUTPATIENT
Start: 2022-10-28

## 2022-10-28 RX ADMIN — ACETAMINOPHEN 650 MG: 325 TABLET, FILM COATED ORAL at 06:55

## 2022-10-28 RX ADMIN — SODIUM CHLORIDE, PRESERVATIVE FREE 20 ML: 5 INJECTION INTRAVENOUS at 08:53

## 2022-10-28 RX ADMIN — IMMUNE GLOBULIN (HUMAN) 30 G: 10 INJECTION INTRAVENOUS; SUBCUTANEOUS at 07:16

## 2022-10-28 RX ADMIN — HEPARIN 500 UNITS: 100 SYRINGE at 08:53

## 2022-10-28 RX ADMIN — HYDROCORTISONE SODIUM SUCCINATE 100 MG: 100 INJECTION, POWDER, FOR SOLUTION INTRAMUSCULAR; INTRAVENOUS at 06:56

## 2022-10-28 RX ADMIN — DIPHENHYDRAMINE HYDROCHLORIDE 50 MG: 50 INJECTION, SOLUTION INTRAMUSCULAR; INTRAVENOUS at 06:56

## 2022-10-28 NOTE — DISCHARGE INSTRUCTIONS
immune globulin (intravenous and subcutaneous)  Pronunciation:  dinora LACEY foley  Brand:  Gammagard Liquid, Gammaked, Gamunex-C  What is the most important information I should know about immune globulin? This medicine can cause blood clots. The risk is highest in older adults or in people who have had blood clots, heart problems, or blood circulation problems. Blood clots are also more likely during long-term bedrest, while using birth control pills or hormone replacement therapy, or while having a central intravenous (IV) catheter in place. Call your doctor at once if you have chest pain, trouble breathing, fast heartbeats, numbness or weakness, or swelling and warmth or discoloration in an arm or leg. This medicine can also harm your kidneys, especially if you have kidney disease or if you also use certain medicines. Tell your doctor right away if you have signs of kidney problems, such as swelling, rapid weight gain, and little or no urination. What is immune globulin? Immune globulin intravenous and subcutaneous (for injection into a vein or under the skin) is used to treat primary immunodeficiency. Immune globulin is also used to increase platelets (blood clotting cells) in people with idiopathic thrombocytopenic purpura. Immune globulin is also used to treat certain debilitating nerve disorders that cause muscle weakness and can affect daily activities. Immune globulin may also be used for purposes not listed in this medication guide. What should I discuss with my healthcare provider before using immune globulin? You should not use this medicine if:  you have had an allergic reaction to an immune globulin or blood product; or  you have immune globulin A (IgA) deficiency with antibody to IgA. Immune globulin can cause blood clots or kidney problems, especially in older adults or in people with certain conditions.  Tell your doctor if you have ever had:  heart problems, blood circulation problems, or \"thick blood\";  a stroke or blood clot;  kidney disease;  diabetes;  an infection called sepsis;  if you use estrogens (birth control pills or hormone replacement therapy);  if you have been on long-term bedrest; or  if you have a central intravenous (IV) catheter in place. You may need a dose adjustment if you are exposed to measles, or if you travel to an area where this disease is common. Tell your doctor if you are pregnant or breastfeeding. Immune globulin is made from donated human plasma and may contain viruses or other infectious agents. Donated plasma is tested and treated to reduce the risk of contamination, but there is still a small possibility it could transmit disease. Ask your doctor about any possible risk. How should I use immune globulin? Follow all directions on your prescription label and read all medication guides or instruction sheets. Your doctor may occasionally change your dose. Use the medicine exactly as directed. Immune globulin is given as an infusion into a vein, or injected under the skin using an infusion pump. A healthcare provider will give your first dose and may teach you how to properly use the medication by yourself. Do not inject immune globulin into a vein if you have been instructed to give the medicine as a subcutaneous injection (under the skin). How you give this medication, how often you use it, and the length of your infusion time will depend on the condition being treated. Read and carefully follow any Instructions for Use provided with your medicine. Ask your doctor or pharmacist if you don't understand all instructions. Prepare an injection only when you are ready to give it. Do not use if the medicine looks cloudy, has changed colors, or has particles in it. Call your pharmacist for new medicine. Do not shake the medication bottle or you may ruin the medicine. Immune globulin must be given slowly.  You may need to use several catheters to inject this medicine into different body areas at the same time. Your healthcare provider will show you the best places on your body to inject the medication. Keep a diary of the days and times you gave the injection and where you injected it on your body. Drink plenty of liquids  while you are using this medicine to help improve your blood flow and keep your kidneys working properly. You may need frequent blood or urine tests. This medicine can affect the results of certain medical tests. Tell any doctor who treats you that you are using immune globulin. Store this medicine in its original carton in the refrigerator. Do not freeze immune globulin, and throw the medicine away if it has frozen. Take the medicine out of the refrigerator and let it reach room temperature for up to 1 hour before injecting your dose. You may also store immune globulin at room temperature. You will need to use immune globulin within a certain number months. This will depend on the how you store the medicine (at room temperature, or in a refrigerator). Carefully follow the storage instructions provided with your medicine. Do not use the medicine after the expiration date on the label has passed. Each vial (bottle) is for one use only. Throw it away after one use, even if there is still medicine left inside. Use disposable injection items (needle, catheter, tubing) only once and then place them in a puncture-proof \"sharps\" container. Follow state or local laws about how to dispose of this container. Keep it out of the reach of children and pets. What happens if I miss a dose? Call your doctor for instructions if you miss a dose. What happens if I overdose? Seek emergency medical attention or call the Poison Help line at 1-446.460.5877. What should I avoid while using immune globulin? Do not receive a \"live\" vaccine while using immune globulin. The vaccine may not work as well and may not fully protect you from disease.  Live vaccines include measles, mumps, rubella (MMR), rotavirus, typhoid, yellow fever, varicella (chickenpox), zoster (shingles), and nasal flu (influenza) vaccine. What are the possible side effects of immune globulin? Get emergency medical help if you have signs of an allergic reaction: hives; wheezing, difficulty breathing; dizziness, feeling like you might pass out; swelling of your face, lips, tongue, or throat. Some side effects may occur during the injection. Tell your caregiver if you feel light-headed, itchy, chilled, sweaty, or have chest discomfort, fast heartbeats, severe headache, or pounding in your neck or ears. Call your doctor at once if you have:  a blood cell disorder --pale or yellowed skin, dark colored urine, fever, confusion or weakness;  dehydration symptoms --feeling very thirsty or hot, being unable to urinate, heavy sweating, or hot and dry skin;  kidney problems --little or no urination, swelling, rapid weight gain, feeling short of breath;  lung problems --chest pain, wheezing, trouble breathing, blue colored lips, fingers, or toes;  signs of a new infection --fever with a severe headache, neck stiffness, eye pain, and increased sensitivity to light; or  signs of a blood clot --shortness of breath, chest pain with deep breathing, rapid heart rate, numbness or weakness on one side of the body, swelling and warmth or discoloration in an arm or leg. Common side effects may include:  runny or stuffy nose, sinus pain, cough, sore throat;  fever, chills, weakness;  headache, back pain, muscle or joint pain;  dizziness, tiredness, depressed mood;  swelling in your hands or feet;  skin rash, redness, or bruising;  blisters or ulcers in your mouth, red or swollen gums, trouble swallowing;  nausea, diarrhea, stomach pain, upset stomach;  increased blood pressure; or  redness, swelling, or itching where an injection was given. This is not a complete list of side effects and others may occur.  Call appropriate for any given patient. [facility] does not assume any responsibility for any aspect of healthcare administered with the aid of information [facility] provides. The information contained herein is not intended to cover all possible uses, directions, precautions, warnings, drug interactions, allergic reactions, or adverse effects. If you have questions about the drugs you are taking, check with your doctor, nurse or pharmacist.  Copyright 0303-2332 [facility] [place]. Version: 4.02. Revision date: 2/26/2021. Care instructions adapted under license by [facility]. If you have questions about a medical condition or this instruction, always ask your healthcare professional. [address] any warranty or liability for your use of this information.

## 2022-10-31 NOTE — PROGRESS NOTES
Subjective: The patient recently underwent uncomplicated port placement. She was downstairs receiving an infusion, where the nurses noticed a stitch protruding. The patient denies any issues such as redness, fever, or drainage. Objective:  Vitals reviewed  General: NAD, AAO  Chest: Right chest incision intact without any erythema or exudate. Small stitch protruding. This was able to be easily removed, no purulence    Assessment and plan:  41year old female s/p port placement with protruding stitch  Port with no signs of infection, okay to be used as needed.  Follow up as needed

## 2022-11-02 RX ORDER — RIZATRIPTAN BENZOATE 10 MG/1
TABLET ORAL
Qty: 9 TABLET | Refills: 5 | Status: SHIPPED | OUTPATIENT
Start: 2022-11-02

## 2022-11-02 NOTE — TELEPHONE ENCOUNTER
Requested Prescriptions     Pending Prescriptions Disp Refills    rizatriptan (MAXALT) 10 MG tablet [Pharmacy Med Name: RIZATRIPTAN 10MG TABLETS] 9 tablet 5     Sig: TAKE 1 TABLET BY MOUTH 1 TIME AS NEEDED FOR MIGRAINE. MAX 2 IN 24 HOURS.  MAY REPEAT IN 2 HOURS AS NEEDED       Last Office Visit: 8/24/2022  Next Office Visit: 12/1/2022  Last Medication Refill: 12/30/2021 with 5 RF       Arnulfo patient

## 2022-11-04 ENCOUNTER — HOSPITAL ENCOUNTER (OUTPATIENT)
Dept: INFUSION THERAPY | Age: 47
Setting detail: INFUSION SERIES
Discharge: HOME | End: 2022-11-04
Payer: MEDICARE

## 2022-11-04 VITALS
BODY MASS INDEX: 28.99 KG/M2 | DIASTOLIC BLOOD PRESSURE: 106 MMHG | RESPIRATION RATE: 17 BRPM | OXYGEN SATURATION: 99 % | HEIGHT: 65 IN | WEIGHT: 174 LBS | SYSTOLIC BLOOD PRESSURE: 160 MMHG | TEMPERATURE: 97.5 F | HEART RATE: 50 BPM

## 2022-11-04 DIAGNOSIS — G35 MULTIPLE SCLEROSIS (HCC): Primary | ICD-10-CM

## 2022-11-04 PROCEDURE — 96375 TX/PRO/DX INJ NEW DRUG ADDON: CPT

## 2022-11-04 PROCEDURE — 6360000002 HC RX W HCPCS: Performed by: PSYCHIATRY & NEUROLOGY

## 2022-11-04 PROCEDURE — 6370000000 HC RX 637 (ALT 250 FOR IP): Performed by: PSYCHIATRY & NEUROLOGY

## 2022-11-04 PROCEDURE — 96366 THER/PROPH/DIAG IV INF ADDON: CPT

## 2022-11-04 PROCEDURE — 96365 THER/PROPH/DIAG IV INF INIT: CPT

## 2022-11-04 PROCEDURE — 96374 THER/PROPH/DIAG INJ IV PUSH: CPT

## 2022-11-04 PROCEDURE — 2580000003 HC RX 258: Performed by: PSYCHIATRY & NEUROLOGY

## 2022-11-04 RX ORDER — ACETAMINOPHEN 325 MG/1
650 TABLET ORAL ONCE
Status: COMPLETED | OUTPATIENT
Start: 2022-11-04 | End: 2022-11-04

## 2022-11-04 RX ORDER — ONDANSETRON 2 MG/ML
8 INJECTION INTRAMUSCULAR; INTRAVENOUS
Status: CANCELLED | OUTPATIENT
Start: 2022-11-04

## 2022-11-04 RX ORDER — SODIUM CHLORIDE 0.9 % (FLUSH) 0.9 %
5-40 SYRINGE (ML) INJECTION PRN
Status: DISCONTINUED | OUTPATIENT
Start: 2022-11-04 | End: 2022-11-05 | Stop reason: HOSPADM

## 2022-11-04 RX ORDER — ACETAMINOPHEN 325 MG/1
650 TABLET ORAL ONCE
Status: CANCELLED | OUTPATIENT
Start: 2022-11-04 | End: 2022-11-04

## 2022-11-04 RX ORDER — DIPHENHYDRAMINE HYDROCHLORIDE 50 MG/ML
50 INJECTION INTRAMUSCULAR; INTRAVENOUS ONCE
Status: CANCELLED
Start: 2022-11-04 | End: 2022-11-04

## 2022-11-04 RX ORDER — MEPERIDINE HYDROCHLORIDE 25 MG/ML
12.5 INJECTION INTRAMUSCULAR; INTRAVENOUS; SUBCUTANEOUS PRN
Status: CANCELLED | OUTPATIENT
Start: 2022-11-04

## 2022-11-04 RX ORDER — SODIUM CHLORIDE 9 MG/ML
INJECTION, SOLUTION INTRAVENOUS CONTINUOUS
Status: CANCELLED | OUTPATIENT
Start: 2022-11-04

## 2022-11-04 RX ORDER — DIPHENHYDRAMINE HYDROCHLORIDE 50 MG/ML
50 INJECTION INTRAMUSCULAR; INTRAVENOUS
Status: CANCELLED | OUTPATIENT
Start: 2022-11-04

## 2022-11-04 RX ORDER — ACETAMINOPHEN 325 MG/1
650 TABLET ORAL
Status: CANCELLED | OUTPATIENT
Start: 2022-11-04

## 2022-11-04 RX ORDER — SODIUM CHLORIDE 9 MG/ML
INJECTION, SOLUTION INTRAVENOUS CONTINUOUS
Status: DISCONTINUED | OUTPATIENT
Start: 2022-11-04 | End: 2022-11-06 | Stop reason: HOSPADM

## 2022-11-04 RX ORDER — EPINEPHRINE 1 MG/ML
0.3 INJECTION, SOLUTION, CONCENTRATE INTRAVENOUS PRN
Status: CANCELLED | OUTPATIENT
Start: 2022-11-04

## 2022-11-04 RX ORDER — SODIUM CHLORIDE 0.9 % (FLUSH) 0.9 %
5-40 SYRINGE (ML) INJECTION PRN
Status: CANCELLED | OUTPATIENT
Start: 2022-11-04

## 2022-11-04 RX ORDER — ALBUTEROL SULFATE 90 UG/1
4 AEROSOL, METERED RESPIRATORY (INHALATION) PRN
Status: CANCELLED | OUTPATIENT
Start: 2022-11-04

## 2022-11-04 RX ORDER — HEPARIN SODIUM (PORCINE) LOCK FLUSH IV SOLN 100 UNIT/ML 100 UNIT/ML
500 SOLUTION INTRAVENOUS PRN
Status: DISCONTINUED | OUTPATIENT
Start: 2022-11-04 | End: 2022-11-06 | Stop reason: HOSPADM

## 2022-11-04 RX ORDER — DIPHENHYDRAMINE HYDROCHLORIDE 50 MG/ML
50 INJECTION INTRAMUSCULAR; INTRAVENOUS ONCE
Status: COMPLETED | OUTPATIENT
Start: 2022-11-04 | End: 2022-11-04

## 2022-11-04 RX ADMIN — SODIUM CHLORIDE, PRESERVATIVE FREE 20 ML: 5 INJECTION INTRAVENOUS at 09:25

## 2022-11-04 RX ADMIN — HEPARIN 500 UNITS: 100 SYRINGE at 09:25

## 2022-11-04 RX ADMIN — IMMUNE GLOBULIN (HUMAN) 30 G: 10 INJECTION INTRAVENOUS; SUBCUTANEOUS at 07:44

## 2022-11-04 RX ADMIN — SODIUM CHLORIDE: 9 INJECTION, SOLUTION INTRAVENOUS at 07:13

## 2022-11-04 RX ADMIN — ACETAMINOPHEN 650 MG: 325 TABLET ORAL at 07:12

## 2022-11-04 RX ADMIN — DIPHENHYDRAMINE HYDROCHLORIDE 50 MG: 50 INJECTION, SOLUTION INTRAMUSCULAR; INTRAVENOUS at 07:14

## 2022-11-04 RX ADMIN — HYDROCORTISONE SODIUM SUCCINATE 100 MG: 100 INJECTION, POWDER, FOR SOLUTION INTRAMUSCULAR; INTRAVENOUS at 07:13

## 2022-11-04 NOTE — DISCHARGE INSTRUCTIONS
immune globulin (intravenous) (IGIV)  Pronunciation:  dinora foley  Brand:  Bivigam, Carimune, Flebogamma, Gammagard S/D, Gammaplex, Octagam, Panzyga, Privigen  What is the most important information I should know about immune globulin intravenous? This medicine can cause blood clots. The risk is highest in older adults or in people who have had blood clots, heart problems, or blood circulation problems. Blood clots are also more likely during long-term bedrest, while using birth control pills or hormone replacement therapy, or while having a central intravenous (IV) catheter in place. Call your doctor at once if you have chest pain, trouble breathing, fast heartbeats, numbness or weakness, or swelling and warmth or discoloration in an arm or leg. This medicine can also harm your kidneys, especially if you have kidney disease or you also use certain medicines. Tell your doctor right away if you have signs of kidney problems, such as swelling, rapid weight gain, and little or no urination. What is immune globulin intravenous (IGIV)? Immune globulin intravenous (IGIV, for injection into a vein) is used to treat primary immunodeficiency. IGIV is also used to increase platelets (blood clotting cells) in people with immune thrombocytopenic purpura. IGIV is also used in to help prevent certain infections in people with B-cell chronic lymphocytic leukemia. IGIV is also used in people with Kawasaki syndrome, to prevent aneurysm caused by a weakening of the main artery in the heart. IGIV may also be used for purposes not listed in this medication guide. What should I discuss with my healthcare provider before using IGIV? You may not be able to use this medicine if:  you have had an allergic reaction to an immune globulin or blood product;  you have immune globulin A (IgA) deficiency with antibody to IgA; or  you are allergic to corn.   IGIV can cause blood clots or kidney problems, especially in older adults or in people with certain conditions. Tell your doctor if you have ever had:  heart problems, blood circulation problems, or \"thick blood\";  a stroke or blood clot;  kidney disease;  diabetes;  an infection called sepsis;  if you use estrogens (birth control pills or hormone replacement therapy);  if you have been on long-term bedrest; or  if you have a central intravenous (IV) catheter in place. You may need a dose adjustment if you are exposed to measles, or if you travel to an area where this disease is common. Tell your doctor if you are pregnant or breastfeeding. Immune globulin is made from donated human plasma and may contain viruses or other infectious agents. Donated plasma is tested and treated to reduce the risk of contamination, but there is still a small possibility it could transmit disease. Ask your doctor about any possible risk. How should I use IGIV? IGIV is given as an infusion into a vein, usually once every 3 to 4 weeks. A healthcare provider will give you this injection. Drink plenty of liquids  while you are using this medicine to help improve your blood flow and keep your kidneys working properly. You may need frequent blood or urine tests. This medicine can affect the results of certain medical tests. Tell any doctor who treats you that you are using IGIV. What happens if I miss a dose? Call your doctor for instructions if you miss an appointment for your IGIV injection. What happens if I overdose? Seek emergency medical attention or call the Poison Help line at 1-228.750.1825. What should I avoid while using IGIV? Ask your doctor before receiving a \"live\" vaccine while using IGIV. The vaccine may not work as well and may not fully protect you from disease. Live vaccines include measles, mumps, rubella (MMR), rotavirus, typhoid, yellow fever, varicella (chickenpox), zoster (shingles), and nasal flu (influenza) vaccine.   What are the possible side effects of IGIV?  Get emergency medical help if you have signs of an allergic reaction: hives; difficult breathing; swelling of your face, lips, tongue, or throat. Some side effects may occur during the injection. Tell your caregiver if you feel dizzy, nauseated, light-headed, sweaty, or have a headache, pounding in your neck or ears, fever, chills, chest tightness, or warmth or redness in your face. Call your doctor at once if you have:  a blood cell disorder --pale or yellowed skin, dark colored urine, fever, confusion or weakness;  dehydration symptoms --feeling very thirsty or hot, being unable to urinate, heavy sweating, or hot and dry skin;  kidney problems --little or no urination, swelling, rapid weight gain, feeling short of breath;  lung problems --chest pain, trouble breathing, blue colored lips, fingers, or toes;  signs of a new infection --fever with a severe headache, neck stiffness, eye pain, and increased sensitivity to light; or  signs of a blood clot --shortness of breath, chest pain with deep breathing, rapid heart rate, numbness or weakness on one side of the body, swelling and warmth or discoloration in an arm or leg. Common side effects may include:  headache, back pain, joint pain;  fever, chills, sweating, warmth or tingling;  stomach pain, nausea, diarrhea;  increased blood pressure, fast heartbeats;  dizziness, tiredness, lack of energy;  stuffy nose, sinus pain; or  pain, swelling, burning, or irritation around the IV needle. This is not a complete list of side effects and others may occur. Call your doctor for medical advice about side effects. You may report side effects to FDA at 2-983-FDA-2270. What other drugs will affect IGIV? IGIV can harm your kidneys, especially if you also use certain medicines for infections, cancer, osteoporosis, organ transplant rejection, bowel disorders, high blood pressure, or pain or arthritis (including Advil, Motrin, and Aleve).   Other drugs may affect IGIV, including prescription and over-the-counter medicines, vitamins, and herbal products. Tell your doctor about all your current medicines and any medicine you start or stop using. Where can I get more information? Your pharmacist can provide more information about immune globulin intravenous. Remember, keep this and all other medicines out of the reach of children, never share your medicines with others, and use this medication only for the indication prescribed. Every effort has been made to ensure that the information provided by [address] is accurate, up-to-date, and complete, but no guarantee is made to that effect. Drug information contained herein may be time sensitive. [facility] information has been compiled for use by healthcare practitioners and consumers in the [place] and therefore [facility] does not warrant that uses outside of the [place] are appropriate, unless specifically indicated otherwise. [facility]'s drug information does not endorse drugs, diagnose patients or recommend therapy. [facility] drug information is an informational resource designed to assist licensed healthcare practitioners in caring for their patients and/or to serve consumers viewing this service as a supplement to, and not a substitute for, the expertise, skill, knowledge and judgment of healthcare practitioners. The absence of a warning for a given drug or drug combination in no way should be construed to indicate that the drug or drug combination is safe, effective or appropriate for any given patient. [facility] does not assume any responsibility for any aspect of healthcare administered with the aid of information [facility] provides. The information contained herein is not intended to cover all possible uses, directions, precautions, warnings, drug interactions, allergic reactions, or adverse effects.  If you have questions about the drugs you are taking, check with your doctor, nurse or pharmacist.  Copyright 0021-6921 [employer]. Version: 6.02. Revision date: 2/26/2021. Care instructions adapted under license by [facility]. If you have questions about a medical condition or this instruction, always ask your healthcare professional. [address] any warranty or liability for your use of this information.

## 2022-11-10 ENCOUNTER — HOSPITAL ENCOUNTER (OUTPATIENT)
Dept: INFUSION THERAPY | Age: 47
Setting detail: INFUSION SERIES
Discharge: HOME | End: 2022-11-10
Payer: MEDICARE

## 2022-11-10 VITALS
SYSTOLIC BLOOD PRESSURE: 143 MMHG | DIASTOLIC BLOOD PRESSURE: 99 MMHG | OXYGEN SATURATION: 97 % | TEMPERATURE: 98.8 F | HEART RATE: 71 BPM | RESPIRATION RATE: 18 BRPM

## 2022-11-10 DIAGNOSIS — G35 MULTIPLE SCLEROSIS (HCC): Primary | ICD-10-CM

## 2022-11-10 PROCEDURE — 2580000003 HC RX 258: Performed by: PSYCHIATRY & NEUROLOGY

## 2022-11-10 PROCEDURE — 96375 TX/PRO/DX INJ NEW DRUG ADDON: CPT

## 2022-11-10 PROCEDURE — 6360000002 HC RX W HCPCS: Performed by: PSYCHIATRY & NEUROLOGY

## 2022-11-10 PROCEDURE — 96366 THER/PROPH/DIAG IV INF ADDON: CPT

## 2022-11-10 PROCEDURE — 96365 THER/PROPH/DIAG IV INF INIT: CPT

## 2022-11-10 PROCEDURE — 6370000000 HC RX 637 (ALT 250 FOR IP): Performed by: PSYCHIATRY & NEUROLOGY

## 2022-11-10 RX ORDER — DIPHENHYDRAMINE HYDROCHLORIDE 50 MG/ML
50 INJECTION INTRAMUSCULAR; INTRAVENOUS
Status: CANCELLED | OUTPATIENT
Start: 2022-11-10

## 2022-11-10 RX ORDER — ACETAMINOPHEN 325 MG/1
650 TABLET ORAL ONCE
Status: CANCELLED | OUTPATIENT
Start: 2022-11-10 | End: 2022-11-10

## 2022-11-10 RX ORDER — DIPHENHYDRAMINE HYDROCHLORIDE 50 MG/ML
50 INJECTION INTRAMUSCULAR; INTRAVENOUS ONCE
Status: COMPLETED | OUTPATIENT
Start: 2022-11-10 | End: 2022-11-10

## 2022-11-10 RX ORDER — HEPARIN SODIUM (PORCINE) LOCK FLUSH IV SOLN 100 UNIT/ML 100 UNIT/ML
500 SOLUTION INTRAVENOUS PRN
Status: DISCONTINUED | OUTPATIENT
Start: 2022-11-10 | End: 2022-11-12 | Stop reason: HOSPADM

## 2022-11-10 RX ORDER — DIPHENHYDRAMINE HYDROCHLORIDE 50 MG/ML
50 INJECTION INTRAMUSCULAR; INTRAVENOUS ONCE
Status: CANCELLED
Start: 2022-11-10 | End: 2022-11-10

## 2022-11-10 RX ORDER — MEPERIDINE HYDROCHLORIDE 25 MG/ML
12.5 INJECTION INTRAMUSCULAR; INTRAVENOUS; SUBCUTANEOUS PRN
Status: CANCELLED | OUTPATIENT
Start: 2022-11-10

## 2022-11-10 RX ORDER — SODIUM CHLORIDE 9 MG/ML
INJECTION, SOLUTION INTRAVENOUS CONTINUOUS
Status: CANCELLED | OUTPATIENT
Start: 2022-11-10

## 2022-11-10 RX ORDER — SODIUM CHLORIDE 0.9 % (FLUSH) 0.9 %
5-40 SYRINGE (ML) INJECTION PRN
Status: DISCONTINUED | OUTPATIENT
Start: 2022-11-10 | End: 2022-11-11 | Stop reason: HOSPADM

## 2022-11-10 RX ORDER — SODIUM CHLORIDE 0.9 % (FLUSH) 0.9 %
5-40 SYRINGE (ML) INJECTION PRN
Status: CANCELLED | OUTPATIENT
Start: 2022-11-10

## 2022-11-10 RX ORDER — EPINEPHRINE 1 MG/ML
0.3 INJECTION, SOLUTION, CONCENTRATE INTRAVENOUS PRN
Status: CANCELLED | OUTPATIENT
Start: 2022-11-10

## 2022-11-10 RX ORDER — ONDANSETRON 2 MG/ML
8 INJECTION INTRAMUSCULAR; INTRAVENOUS
Status: CANCELLED | OUTPATIENT
Start: 2022-11-10

## 2022-11-10 RX ORDER — ALBUTEROL SULFATE 90 UG/1
4 AEROSOL, METERED RESPIRATORY (INHALATION) PRN
Status: CANCELLED | OUTPATIENT
Start: 2022-11-10

## 2022-11-10 RX ORDER — HEPARIN SODIUM (PORCINE) LOCK FLUSH IV SOLN 100 UNIT/ML 100 UNIT/ML
500 SOLUTION INTRAVENOUS PRN
Status: CANCELLED
Start: 2022-11-10

## 2022-11-10 RX ORDER — ACETAMINOPHEN 325 MG/1
650 TABLET ORAL ONCE
Status: COMPLETED | OUTPATIENT
Start: 2022-11-10 | End: 2022-11-10

## 2022-11-10 RX ORDER — ACETAMINOPHEN 325 MG/1
650 TABLET ORAL
Status: CANCELLED | OUTPATIENT
Start: 2022-11-10

## 2022-11-10 RX ADMIN — DIPHENHYDRAMINE HYDROCHLORIDE 50 MG: 50 INJECTION, SOLUTION INTRAMUSCULAR; INTRAVENOUS at 08:01

## 2022-11-10 RX ADMIN — SODIUM CHLORIDE, PRESERVATIVE FREE 20 ML: 5 INJECTION INTRAVENOUS at 09:59

## 2022-11-10 RX ADMIN — IMMUNE GLOBULIN (HUMAN) 30 G: 10 INJECTION INTRAVENOUS; SUBCUTANEOUS at 08:24

## 2022-11-10 RX ADMIN — HEPARIN 500 UNITS: 100 SYRINGE at 10:00

## 2022-11-10 RX ADMIN — HYDROCORTISONE SODIUM SUCCINATE 100 MG: 100 INJECTION, POWDER, FOR SOLUTION INTRAMUSCULAR; INTRAVENOUS at 08:01

## 2022-11-10 RX ADMIN — ACETAMINOPHEN 650 MG: 325 TABLET ORAL at 08:01

## 2022-11-10 NOTE — DISCHARGE INSTRUCTIONS
immune globulin (intravenous and subcutaneous)  Pronunciation:  dinora LACEY foley  Brand:  Gammagard Liquid, Gammaked, Gamunex-C  What is the most important information I should know about immune globulin? This medicine can cause blood clots. The risk is highest in older adults or in people who have had blood clots, heart problems, or blood circulation problems. Blood clots are also more likely during long-term bedrest, while using birth control pills or hormone replacement therapy, or while having a central intravenous (IV) catheter in place. Call your doctor at once if you have chest pain, trouble breathing, fast heartbeats, numbness or weakness, or swelling and warmth or discoloration in an arm or leg. This medicine can also harm your kidneys, especially if you have kidney disease or if you also use certain medicines. Tell your doctor right away if you have signs of kidney problems, such as swelling, rapid weight gain, and little or no urination. What is immune globulin? Immune globulin intravenous and subcutaneous (for injection into a vein or under the skin) is used to treat primary immunodeficiency. Immune globulin is also used to increase platelets (blood clotting cells) in people with idiopathic thrombocytopenic purpura. Immune globulin is also used to treat certain debilitating nerve disorders that cause muscle weakness and can affect daily activities. Immune globulin may also be used for purposes not listed in this medication guide. What should I discuss with my healthcare provider before using immune globulin? You should not use this medicine if:  you have had an allergic reaction to an immune globulin or blood product; or  you have immune globulin A (IgA) deficiency with antibody to IgA. Immune globulin can cause blood clots or kidney problems, especially in older adults or in people with certain conditions.  Tell your doctor if you have ever had:  heart problems, blood circulation problems, or \"thick blood\";  a stroke or blood clot;  kidney disease;  diabetes;  an infection called sepsis;  if you use estrogens (birth control pills or hormone replacement therapy);  if you have been on long-term bedrest; or  if you have a central intravenous (IV) catheter in place. You may need a dose adjustment if you are exposed to measles, or if you travel to an area where this disease is common. Tell your doctor if you are pregnant or breastfeeding. Immune globulin is made from donated human plasma and may contain viruses or other infectious agents. Donated plasma is tested and treated to reduce the risk of contamination, but there is still a small possibility it could transmit disease. Ask your doctor about any possible risk. How should I use immune globulin? Follow all directions on your prescription label and read all medication guides or instruction sheets. Your doctor may occasionally change your dose. Use the medicine exactly as directed. Immune globulin is given as an infusion into a vein, or injected under the skin using an infusion pump. A healthcare provider will give your first dose and may teach you how to properly use the medication by yourself. Do not inject immune globulin into a vein if you have been instructed to give the medicine as a subcutaneous injection (under the skin). How you give this medication, how often you use it, and the length of your infusion time will depend on the condition being treated. Read and carefully follow any Instructions for Use provided with your medicine. Ask your doctor or pharmacist if you don't understand all instructions. Prepare an injection only when you are ready to give it. Do not use if the medicine looks cloudy, has changed colors, or has particles in it. Call your pharmacist for new medicine. Do not shake the medication bottle or you may ruin the medicine. Immune globulin must be given slowly.  You may need to use several catheters to inject this medicine into different body areas at the same time. Your healthcare provider will show you the best places on your body to inject the medication. Keep a diary of the days and times you gave the injection and where you injected it on your body. Drink plenty of liquids  while you are using this medicine to help improve your blood flow and keep your kidneys working properly. You may need frequent blood or urine tests. This medicine can affect the results of certain medical tests. Tell any doctor who treats you that you are using immune globulin. Store this medicine in its original carton in the refrigerator. Do not freeze immune globulin, and throw the medicine away if it has frozen. Take the medicine out of the refrigerator and let it reach room temperature for up to 1 hour before injecting your dose. You may also store immune globulin at room temperature. You will need to use immune globulin within a certain number months. This will depend on the how you store the medicine (at room temperature, or in a refrigerator). Carefully follow the storage instructions provided with your medicine. Do not use the medicine after the expiration date on the label has passed. Each vial (bottle) is for one use only. Throw it away after one use, even if there is still medicine left inside. Use disposable injection items (needle, catheter, tubing) only once and then place them in a puncture-proof \"sharps\" container. Follow state or local laws about how to dispose of this container. Keep it out of the reach of children and pets. What happens if I miss a dose? Call your doctor for instructions if you miss a dose. What happens if I overdose? Seek emergency medical attention or call the Poison Help line at 1-669.850.7731. What should I avoid while using immune globulin? Do not receive a \"live\" vaccine while using immune globulin. The vaccine may not work as well and may not fully protect you from disease.  Live vaccines include measles, mumps, rubella (MMR), rotavirus, typhoid, yellow fever, varicella (chickenpox), zoster (shingles), and nasal flu (influenza) vaccine. What are the possible side effects of immune globulin? Get emergency medical help if you have signs of an allergic reaction: hives; wheezing, difficulty breathing; dizziness, feeling like you might pass out; swelling of your face, lips, tongue, or throat. Some side effects may occur during the injection. Tell your caregiver if you feel light-headed, itchy, chilled, sweaty, or have chest discomfort, fast heartbeats, severe headache, or pounding in your neck or ears. Call your doctor at once if you have:  a blood cell disorder --pale or yellowed skin, dark colored urine, fever, confusion or weakness;  dehydration symptoms --feeling very thirsty or hot, being unable to urinate, heavy sweating, or hot and dry skin;  kidney problems --little or no urination, swelling, rapid weight gain, feeling short of breath;  lung problems --chest pain, wheezing, trouble breathing, blue colored lips, fingers, or toes;  signs of a new infection --fever with a severe headache, neck stiffness, eye pain, and increased sensitivity to light; or  signs of a blood clot --shortness of breath, chest pain with deep breathing, rapid heart rate, numbness or weakness on one side of the body, swelling and warmth or discoloration in an arm or leg. Common side effects may include:  runny or stuffy nose, sinus pain, cough, sore throat;  fever, chills, weakness;  headache, back pain, muscle or joint pain;  dizziness, tiredness, depressed mood;  swelling in your hands or feet;  skin rash, redness, or bruising;  blisters or ulcers in your mouth, red or swollen gums, trouble swallowing;  nausea, diarrhea, stomach pain, upset stomach;  increased blood pressure; or  redness, swelling, or itching where an injection was given. This is not a complete list of side effects and others may occur.  Call your doctor for medical advice about side effects. You may report side effects to FDA at 7-499-FPO-8031. What other drugs will affect immune globulin? Immune globulin can harm your kidneys, especially if you also use certain medicines for infections, cancer, osteoporosis, organ transplant rejection, bowel disorders, high blood pressure, or pain or arthritis (including Advil, Motrin, and Aleve). Other drugs may affect immune globulin, including prescription and over-the-counter medicines, vitamins, and herbal products. Tell your doctor about all your current medicines and any medicine you start or stop using. Where can I get more information? Your doctor or pharmacist can provide more information about immune globulin. Remember, keep this and all other medicines out of the reach of children, never share your medicines with others, and use this medication only for the indication prescribed. Every effort has been made to ensure that the information provided by [address] Laura Dom[address]nguez [address] is accurate, up-to-date, and complete, but no guarantee is made to that effect. Drug information contained herein may be time sensitive. [employer] information has been compiled for use by healthcare practitioners and consumers in the [place] and therefore [employer] does not warrant that uses outside of the [place] are appropriate, unless specifically indicated otherwise. [employer]'s drug information does not endorse drugs, diagnose patients or recommend therapy. [employer] drug information is an informational resource designed to assist licensed healthcare practitioners in caring for their patients and/or to serve consumers viewing this service as a supplement to, and not a substitute for, the expertise, skill, knowledge and judgment of healthcare practitioners.  The absence of a warning for a given drug or drug combination in no way should be construed to indicate that the drug or drug combination is safe, effective or appropriate for any given patient. [facility] does not assume any responsibility for any aspect of healthcare administered with the aid of information [facility] provides. The information contained herein is not intended to cover all possible uses, directions, precautions, warnings, drug interactions, allergic reactions, or adverse effects. If you have questions about the drugs you are taking, check with your doctor, nurse or pharmacist.  Copyright 3853-0451 [address] [place] [address] Dr SEVERINO. Version: 4.02. Revision date: 2/26/2021. Care instructions adapted under license by [facility] ([facility]). If you have questions about a medical condition or this instruction, always ask your healthcare professional. [place] [address] any warranty or liability for your use of this information.

## 2022-11-10 NOTE — PROGRESS NOTES
Pt accessed and tylenol, benadryl, and solu cortef given as ordered for premeds. 30 g Gamunex C given as ordered. Tolerated well.

## 2022-11-17 ENCOUNTER — HOSPITAL ENCOUNTER (OUTPATIENT)
Dept: INFUSION THERAPY | Age: 47
Setting detail: INFUSION SERIES
Discharge: HOME | End: 2022-11-17
Payer: MEDICARE

## 2022-11-17 VITALS
WEIGHT: 173.5 LBS | HEART RATE: 77 BPM | BODY MASS INDEX: 28.87 KG/M2 | DIASTOLIC BLOOD PRESSURE: 94 MMHG | OXYGEN SATURATION: 100 % | TEMPERATURE: 97.9 F | RESPIRATION RATE: 18 BRPM | SYSTOLIC BLOOD PRESSURE: 137 MMHG

## 2022-11-17 DIAGNOSIS — G35 MULTIPLE SCLEROSIS (HCC): Primary | ICD-10-CM

## 2022-11-17 PROCEDURE — 6360000002 HC RX W HCPCS: Performed by: PSYCHIATRY & NEUROLOGY

## 2022-11-17 PROCEDURE — 96374 THER/PROPH/DIAG INJ IV PUSH: CPT

## 2022-11-17 PROCEDURE — 96366 THER/PROPH/DIAG IV INF ADDON: CPT

## 2022-11-17 PROCEDURE — 96365 THER/PROPH/DIAG IV INF INIT: CPT

## 2022-11-17 PROCEDURE — 6370000000 HC RX 637 (ALT 250 FOR IP): Performed by: PSYCHIATRY & NEUROLOGY

## 2022-11-17 PROCEDURE — 96375 TX/PRO/DX INJ NEW DRUG ADDON: CPT

## 2022-11-17 RX ORDER — ACETAMINOPHEN 325 MG/1
650 TABLET ORAL ONCE
Status: CANCELLED | OUTPATIENT
Start: 2022-11-17 | End: 2022-11-17

## 2022-11-17 RX ORDER — ACETAMINOPHEN 325 MG/1
650 TABLET ORAL ONCE
Status: COMPLETED | OUTPATIENT
Start: 2022-11-17 | End: 2022-11-17

## 2022-11-17 RX ORDER — DIPHENHYDRAMINE HYDROCHLORIDE 50 MG/ML
50 INJECTION INTRAMUSCULAR; INTRAVENOUS ONCE
Status: COMPLETED | OUTPATIENT
Start: 2022-11-17 | End: 2022-11-17

## 2022-11-17 RX ORDER — EPINEPHRINE 1 MG/ML
0.3 INJECTION, SOLUTION, CONCENTRATE INTRAVENOUS PRN
OUTPATIENT
Start: 2022-11-17

## 2022-11-17 RX ORDER — DIPHENHYDRAMINE HYDROCHLORIDE 50 MG/ML
50 INJECTION INTRAMUSCULAR; INTRAVENOUS
OUTPATIENT
Start: 2022-11-17

## 2022-11-17 RX ORDER — ALBUTEROL SULFATE 90 UG/1
4 AEROSOL, METERED RESPIRATORY (INHALATION) PRN
OUTPATIENT
Start: 2022-11-17

## 2022-11-17 RX ORDER — ONDANSETRON 2 MG/ML
8 INJECTION INTRAMUSCULAR; INTRAVENOUS
OUTPATIENT
Start: 2022-11-17

## 2022-11-17 RX ORDER — SODIUM CHLORIDE 9 MG/ML
INJECTION, SOLUTION INTRAVENOUS CONTINUOUS
OUTPATIENT
Start: 2022-11-17

## 2022-11-17 RX ORDER — SODIUM CHLORIDE 0.9 % (FLUSH) 0.9 %
5-40 SYRINGE (ML) INJECTION PRN
Status: DISCONTINUED | OUTPATIENT
Start: 2022-11-17 | End: 2022-11-18 | Stop reason: HOSPADM

## 2022-11-17 RX ORDER — ACETAMINOPHEN 325 MG/1
650 TABLET ORAL
OUTPATIENT
Start: 2022-11-17

## 2022-11-17 RX ORDER — MEPERIDINE HYDROCHLORIDE 25 MG/ML
12.5 INJECTION INTRAMUSCULAR; INTRAVENOUS; SUBCUTANEOUS PRN
OUTPATIENT
Start: 2022-11-17

## 2022-11-17 RX ORDER — DIPHENHYDRAMINE HYDROCHLORIDE 50 MG/ML
50 INJECTION INTRAMUSCULAR; INTRAVENOUS ONCE
Status: CANCELLED
Start: 2022-11-17 | End: 2022-11-17

## 2022-11-17 RX ORDER — HEPARIN SODIUM (PORCINE) LOCK FLUSH IV SOLN 100 UNIT/ML 100 UNIT/ML
500 SOLUTION INTRAVENOUS PRN
Status: CANCELLED
Start: 2022-11-17

## 2022-11-17 RX ORDER — HEPARIN SODIUM (PORCINE) LOCK FLUSH IV SOLN 100 UNIT/ML 100 UNIT/ML
500 SOLUTION INTRAVENOUS PRN
Status: DISCONTINUED | OUTPATIENT
Start: 2022-11-17 | End: 2022-11-19 | Stop reason: HOSPADM

## 2022-11-17 RX ORDER — SODIUM CHLORIDE 0.9 % (FLUSH) 0.9 %
5-40 SYRINGE (ML) INJECTION PRN
Status: CANCELLED | OUTPATIENT
Start: 2022-11-17

## 2022-11-17 RX ADMIN — HYDROCORTISONE SODIUM SUCCINATE 100 MG: 100 INJECTION, POWDER, FOR SOLUTION INTRAMUSCULAR; INTRAVENOUS at 07:00

## 2022-11-17 RX ADMIN — DIPHENHYDRAMINE HYDROCHLORIDE 50 MG: 50 INJECTION, SOLUTION INTRAMUSCULAR; INTRAVENOUS at 07:00

## 2022-11-17 RX ADMIN — ACETAMINOPHEN 650 MG: 325 TABLET ORAL at 07:00

## 2022-11-17 RX ADMIN — HEPARIN 500 UNITS: 100 SYRINGE at 09:00

## 2022-11-17 RX ADMIN — IMMUNE GLOBULIN (HUMAN) 30 G: 10 INJECTION INTRAVENOUS; SUBCUTANEOUS at 07:14

## 2022-11-17 ASSESSMENT — PAIN SCALES - GENERAL: PAINLEVEL_OUTOF10: 0

## 2022-11-17 NOTE — DISCHARGE INSTRUCTIONS
immune globulin (intravenous and subcutaneous)  Pronunciation:  dinora LACEY foley  Brand:  Gammagard Liquid, Gammaked, Gamunex-C  What is the most important information I should know about immune globulin? This medicine can cause blood clots. The risk is highest in older adults or in people who have had blood clots, heart problems, or blood circulation problems. Blood clots are also more likely during long-term bedrest, while using birth control pills or hormone replacement therapy, or while having a central intravenous (IV) catheter in place. Call your doctor at once if you have chest pain, trouble breathing, fast heartbeats, numbness or weakness, or swelling and warmth or discoloration in an arm or leg. This medicine can also harm your kidneys, especially if you have kidney disease or if you also use certain medicines. Tell your doctor right away if you have signs of kidney problems, such as swelling, rapid weight gain, and little or no urination. What is immune globulin? Immune globulin intravenous and subcutaneous (for injection into a vein or under the skin) is used to treat primary immunodeficiency. Immune globulin is also used to increase platelets (blood clotting cells) in people with idiopathic thrombocytopenic purpura. Immune globulin is also used to treat certain debilitating nerve disorders that cause muscle weakness and can affect daily activities. Immune globulin may also be used for purposes not listed in this medication guide. What should I discuss with my healthcare provider before using immune globulin? You should not use this medicine if:  you have had an allergic reaction to an immune globulin or blood product; or  you have immune globulin A (IgA) deficiency with antibody to IgA. Immune globulin can cause blood clots or kidney problems, especially in older adults or in people with certain conditions.  Tell your doctor if you have ever had:  heart problems, blood circulation problems, or \"thick blood\";  a stroke or blood clot;  kidney disease;  diabetes;  an infection called sepsis;  if you use estrogens (birth control pills or hormone replacement therapy);  if you have been on long-term bedrest; or  if you have a central intravenous (IV) catheter in place. You may need a dose adjustment if you are exposed to measles, or if you travel to an area where this disease is common. Tell your doctor if you are pregnant or breastfeeding. Immune globulin is made from donated human plasma and may contain viruses or other infectious agents. Donated plasma is tested and treated to reduce the risk of contamination, but there is still a small possibility it could transmit disease. Ask your doctor about any possible risk. How should I use immune globulin? Follow all directions on your prescription label and read all medication guides or instruction sheets. Your doctor may occasionally change your dose. Use the medicine exactly as directed. Immune globulin is given as an infusion into a vein, or injected under the skin using an infusion pump. A healthcare provider will give your first dose and may teach you how to properly use the medication by yourself. Do not inject immune globulin into a vein if you have been instructed to give the medicine as a subcutaneous injection (under the skin). How you give this medication, how often you use it, and the length of your infusion time will depend on the condition being treated. Read and carefully follow any Instructions for Use provided with your medicine. Ask your doctor or pharmacist if you don't understand all instructions. Prepare an injection only when you are ready to give it. Do not use if the medicine looks cloudy, has changed colors, or has particles in it. Call your pharmacist for new medicine. Do not shake the medication bottle or you may ruin the medicine. Immune globulin must be given slowly.  You may need to use several catheters to inject this medicine into different body areas at the same time. Your healthcare provider will show you the best places on your body to inject the medication. Keep a diary of the days and times you gave the injection and where you injected it on your body. Drink plenty of liquids  while you are using this medicine to help improve your blood flow and keep your kidneys working properly. You may need frequent blood or urine tests. This medicine can affect the results of certain medical tests. Tell any doctor who treats you that you are using immune globulin. Store this medicine in its original carton in the refrigerator. Do not freeze immune globulin, and throw the medicine away if it has frozen. Take the medicine out of the refrigerator and let it reach room temperature for up to 1 hour before injecting your dose. You may also store immune globulin at room temperature. You will need to use immune globulin within a certain number months. This will depend on the how you store the medicine (at room temperature, or in a refrigerator). Carefully follow the storage instructions provided with your medicine. Do not use the medicine after the expiration date on the label has passed. Each vial (bottle) is for one use only. Throw it away after one use, even if there is still medicine left inside. Use disposable injection items (needle, catheter, tubing) only once and then place them in a puncture-proof \"sharps\" container. Follow state or local laws about how to dispose of this container. Keep it out of the reach of children and pets. What happens if I miss a dose? Call your doctor for instructions if you miss a dose. What happens if I overdose? Seek emergency medical attention or call the Poison Help line at 1-680.166.5689. What should I avoid while using immune globulin? Do not receive a \"live\" vaccine while using immune globulin. The vaccine may not work as well and may not fully protect you from disease.  Live vaccines include measles, mumps, rubella (MMR), rotavirus, typhoid, yellow fever, varicella (chickenpox), zoster (shingles), and nasal flu (influenza) vaccine. What are the possible side effects of immune globulin? Get emergency medical help if you have signs of an allergic reaction: hives; wheezing, difficulty breathing; dizziness, feeling like you might pass out; swelling of your face, lips, tongue, or throat. Some side effects may occur during the injection. Tell your caregiver if you feel light-headed, itchy, chilled, sweaty, or have chest discomfort, fast heartbeats, severe headache, or pounding in your neck or ears. Call your doctor at once if you have:  a blood cell disorder --pale or yellowed skin, dark colored urine, fever, confusion or weakness;  dehydration symptoms --feeling very thirsty or hot, being unable to urinate, heavy sweating, or hot and dry skin;  kidney problems --little or no urination, swelling, rapid weight gain, feeling short of breath;  lung problems --chest pain, wheezing, trouble breathing, blue colored lips, fingers, or toes;  signs of a new infection --fever with a severe headache, neck stiffness, eye pain, and increased sensitivity to light; or  signs of a blood clot --shortness of breath, chest pain with deep breathing, rapid heart rate, numbness or weakness on one side of the body, swelling and warmth or discoloration in an arm or leg. Common side effects may include:  runny or stuffy nose, sinus pain, cough, sore throat;  fever, chills, weakness;  headache, back pain, muscle or joint pain;  dizziness, tiredness, depressed mood;  swelling in your hands or feet;  skin rash, redness, or bruising;  blisters or ulcers in your mouth, red or swollen gums, trouble swallowing;  nausea, diarrhea, stomach pain, upset stomach;  increased blood pressure; or  redness, swelling, or itching where an injection was given. This is not a complete list of side effects and others may occur.  Call your doctor for medical advice about side effects. You may report side effects to FDA at 7-919-FQB-6559. What other drugs will affect immune globulin? Immune globulin can harm your kidneys, especially if you also use certain medicines for infections, cancer, osteoporosis, organ transplant rejection, bowel disorders, high blood pressure, or pain or arthritis (including Advil, Motrin, and Aleve). Other drugs may affect immune globulin, including prescription and over-the-counter medicines, vitamins, and herbal products. Tell your doctor about all your current medicines and any medicine you start or stop using. Where can I get more information? Your doctor or pharmacist can provide more information about immune globulin. Remember, keep this and all other medicines out of the reach of children, never share your medicines with others, and use this medication only for the indication prescribed. Every effort has been made to ensure that the information provided by Ana Laura Domínguez Dr is accurate, up-to-date, and complete, but no guarantee is made to that effect. Drug information contained herein may be time sensitive. [employer] information has been compiled for use by healthcare practitioners and consumers in the [place] and therefore [employer] does not warrant that uses outside of the [place] are appropriate, unless specifically indicated otherwise. [employer] drug information does not endorse drugs, diagnose patients or recommend therapy. [employer] drug information is an informational resource designed to assist licensed healthcare practitioners in caring for their patients and/or to serve consumers viewing this service as a supplement to, and not a substitute for, the expertise, skill, knowledge and judgment of healthcare practitioners.  The absence of a warning for a given drug or drug combination in no way should be construed to indicate that the drug or drug combination is safe, effective or appropriate for any given patient. [facility] does not assume any responsibility for any aspect of healthcare administered with the aid of information [facility] provides. The information contained herein is not intended to cover all possible uses, directions, precautions, warnings, drug interactions, allergic reactions, or adverse effects. If you have questions about the drugs you are taking, check with your doctor, nurse or pharmacist.  Copyright 7547-4315 [address] [place] [address] Dr SEVERINO. Version: 4.02. Revision date: 2/26/2021. Care instructions adapted under license by [facility] ([facility]). If you have questions about a medical condition or this instruction, always ask your healthcare professional. [place] [address] any warranty or liability for your use of this information.

## 2022-11-17 NOTE — PROGRESS NOTES
Here today for IVIG infusion. Port accessed with sterile technique. IVIG infused with no difficulty. Port deaccessed. Next appointments reviewed and patient discharged home.

## 2022-11-23 ENCOUNTER — HOSPITAL ENCOUNTER (OUTPATIENT)
Dept: INFUSION THERAPY | Age: 47
Setting detail: INFUSION SERIES
Discharge: HOME | End: 2022-11-23
Payer: MEDICARE

## 2022-11-23 VITALS
RESPIRATION RATE: 18 BRPM | HEART RATE: 64 BPM | OXYGEN SATURATION: 98 % | DIASTOLIC BLOOD PRESSURE: 89 MMHG | SYSTOLIC BLOOD PRESSURE: 131 MMHG | TEMPERATURE: 98 F

## 2022-11-23 DIAGNOSIS — G35 MULTIPLE SCLEROSIS (HCC): Primary | ICD-10-CM

## 2022-11-23 PROCEDURE — 6360000002 HC RX W HCPCS: Performed by: PSYCHIATRY & NEUROLOGY

## 2022-11-23 PROCEDURE — 6370000000 HC RX 637 (ALT 250 FOR IP): Performed by: PSYCHIATRY & NEUROLOGY

## 2022-11-23 PROCEDURE — 96366 THER/PROPH/DIAG IV INF ADDON: CPT

## 2022-11-23 PROCEDURE — 2580000003 HC RX 258: Performed by: PSYCHIATRY & NEUROLOGY

## 2022-11-23 PROCEDURE — 96375 TX/PRO/DX INJ NEW DRUG ADDON: CPT

## 2022-11-23 PROCEDURE — 96365 THER/PROPH/DIAG IV INF INIT: CPT

## 2022-11-23 RX ORDER — DIPHENHYDRAMINE HYDROCHLORIDE 50 MG/ML
50 INJECTION INTRAMUSCULAR; INTRAVENOUS ONCE
Status: CANCELLED
Start: 2022-11-23 | End: 2022-11-23

## 2022-11-23 RX ORDER — EPINEPHRINE 1 MG/ML
0.3 INJECTION, SOLUTION, CONCENTRATE INTRAVENOUS PRN
OUTPATIENT
Start: 2022-11-23

## 2022-11-23 RX ORDER — HEPARIN SODIUM (PORCINE) LOCK FLUSH IV SOLN 100 UNIT/ML 100 UNIT/ML
500 SOLUTION INTRAVENOUS PRN
Status: DISCONTINUED | OUTPATIENT
Start: 2022-11-23 | End: 2022-11-25 | Stop reason: HOSPADM

## 2022-11-23 RX ORDER — SODIUM CHLORIDE 0.9 % (FLUSH) 0.9 %
5-40 SYRINGE (ML) INJECTION PRN
Status: DISCONTINUED | OUTPATIENT
Start: 2022-11-23 | End: 2022-11-24 | Stop reason: HOSPADM

## 2022-11-23 RX ORDER — ACETAMINOPHEN 325 MG/1
650 TABLET ORAL ONCE
Status: CANCELLED | OUTPATIENT
Start: 2022-11-23 | End: 2022-11-23

## 2022-11-23 RX ORDER — DIPHENHYDRAMINE HYDROCHLORIDE 50 MG/ML
50 INJECTION INTRAMUSCULAR; INTRAVENOUS
OUTPATIENT
Start: 2022-11-23

## 2022-11-23 RX ORDER — ACETAMINOPHEN 325 MG/1
650 TABLET ORAL
OUTPATIENT
Start: 2022-11-23

## 2022-11-23 RX ORDER — ACETAMINOPHEN 325 MG/1
650 TABLET ORAL ONCE
Status: COMPLETED | OUTPATIENT
Start: 2022-11-23 | End: 2022-11-23

## 2022-11-23 RX ORDER — MEPERIDINE HYDROCHLORIDE 25 MG/ML
12.5 INJECTION INTRAMUSCULAR; INTRAVENOUS; SUBCUTANEOUS PRN
OUTPATIENT
Start: 2022-11-23

## 2022-11-23 RX ORDER — ALBUTEROL SULFATE 90 UG/1
4 AEROSOL, METERED RESPIRATORY (INHALATION) PRN
OUTPATIENT
Start: 2022-11-23

## 2022-11-23 RX ORDER — SODIUM CHLORIDE 9 MG/ML
INJECTION, SOLUTION INTRAVENOUS CONTINUOUS
OUTPATIENT
Start: 2022-11-23

## 2022-11-23 RX ORDER — DIPHENHYDRAMINE HYDROCHLORIDE 50 MG/ML
50 INJECTION INTRAMUSCULAR; INTRAVENOUS ONCE
Status: COMPLETED | OUTPATIENT
Start: 2022-11-23 | End: 2022-11-23

## 2022-11-23 RX ORDER — ONDANSETRON 2 MG/ML
8 INJECTION INTRAMUSCULAR; INTRAVENOUS
OUTPATIENT
Start: 2022-11-23

## 2022-11-23 RX ORDER — HEPARIN SODIUM (PORCINE) LOCK FLUSH IV SOLN 100 UNIT/ML 100 UNIT/ML
500 SOLUTION INTRAVENOUS PRN
Status: CANCELLED
Start: 2022-11-23

## 2022-11-23 RX ORDER — SODIUM CHLORIDE 0.9 % (FLUSH) 0.9 %
5-40 SYRINGE (ML) INJECTION PRN
Status: CANCELLED | OUTPATIENT
Start: 2022-11-23

## 2022-11-23 RX ADMIN — HYDROCORTISONE SODIUM SUCCINATE 100 MG: 100 INJECTION, POWDER, FOR SOLUTION INTRAMUSCULAR; INTRAVENOUS at 06:47

## 2022-11-23 RX ADMIN — IMMUNE GLOBULIN (HUMAN) 30 G: 10 INJECTION INTRAVENOUS; SUBCUTANEOUS at 07:06

## 2022-11-23 RX ADMIN — HEPARIN 500 UNITS: 100 SYRINGE at 08:44

## 2022-11-23 RX ADMIN — SODIUM CHLORIDE, PRESERVATIVE FREE 20 ML: 5 INJECTION INTRAVENOUS at 08:44

## 2022-11-23 RX ADMIN — DIPHENHYDRAMINE HYDROCHLORIDE 50 MG: 50 INJECTION, SOLUTION INTRAMUSCULAR; INTRAVENOUS at 06:47

## 2022-11-23 RX ADMIN — ACETAMINOPHEN 650 MG: 325 TABLET ORAL at 06:46

## 2022-11-23 NOTE — DISCHARGE INSTRUCTIONS
immune globulin (intravenous and subcutaneous)  Pronunciation:  dinora LACEY foley  Brand:  Gammagard Liquid, Gammaked, Gamunex-C  What is the most important information I should know about immune globulin? This medicine can cause blood clots. The risk is highest in older adults or in people who have had blood clots, heart problems, or blood circulation problems. Blood clots are also more likely during long-term bedrest, while using birth control pills or hormone replacement therapy, or while having a central intravenous (IV) catheter in place. Call your doctor at once if you have chest pain, trouble breathing, fast heartbeats, numbness or weakness, or swelling and warmth or discoloration in an arm or leg. This medicine can also harm your kidneys, especially if you have kidney disease or if you also use certain medicines. Tell your doctor right away if you have signs of kidney problems, such as swelling, rapid weight gain, and little or no urination. What is immune globulin? Immune globulin intravenous and subcutaneous (for injection into a vein or under the skin) is used to treat primary immunodeficiency. Immune globulin is also used to increase platelets (blood clotting cells) in people with idiopathic thrombocytopenic purpura. Immune globulin is also used to treat certain debilitating nerve disorders that cause muscle weakness and can affect daily activities. Immune globulin may also be used for purposes not listed in this medication guide. What should I discuss with my healthcare provider before using immune globulin? You should not use this medicine if:  you have had an allergic reaction to an immune globulin or blood product; or  you have immune globulin A (IgA) deficiency with antibody to IgA. Immune globulin can cause blood clots or kidney problems, especially in older adults or in people with certain conditions.  Tell your doctor if you have ever had:  heart problems, blood circulation problems, or \"thick blood\";  a stroke or blood clot;  kidney disease;  diabetes;  an infection called sepsis;  if you use estrogens (birth control pills or hormone replacement therapy);  if you have been on long-term bedrest; or  if you have a central intravenous (IV) catheter in place. You may need a dose adjustment if you are exposed to measles, or if you travel to an area where this disease is common. Tell your doctor if you are pregnant or breastfeeding. Immune globulin is made from donated human plasma and may contain viruses or other infectious agents. Donated plasma is tested and treated to reduce the risk of contamination, but there is still a small possibility it could transmit disease. Ask your doctor about any possible risk. How should I use immune globulin? Follow all directions on your prescription label and read all medication guides or instruction sheets. Your doctor may occasionally change your dose. Use the medicine exactly as directed. Immune globulin is given as an infusion into a vein, or injected under the skin using an infusion pump. A healthcare provider will give your first dose and may teach you how to properly use the medication by yourself. Do not inject immune globulin into a vein if you have been instructed to give the medicine as a subcutaneous injection (under the skin). How you give this medication, how often you use it, and the length of your infusion time will depend on the condition being treated. Read and carefully follow any Instructions for Use provided with your medicine. Ask your doctor or pharmacist if you don't understand all instructions. Prepare an injection only when you are ready to give it. Do not use if the medicine looks cloudy, has changed colors, or has particles in it. Call your pharmacist for new medicine. Do not shake the medication bottle or you may ruin the medicine. Immune globulin must be given slowly.  You may need to use several catheters to inject this medicine into different body areas at the same time. Your healthcare provider will show you the best places on your body to inject the medication. Keep a diary of the days and times you gave the injection and where you injected it on your body. Drink plenty of liquids  while you are using this medicine to help improve your blood flow and keep your kidneys working properly. You may need frequent blood or urine tests. This medicine can affect the results of certain medical tests. Tell any doctor who treats you that you are using immune globulin. Store this medicine in its original carton in the refrigerator. Do not freeze immune globulin, and throw the medicine away if it has frozen. Take the medicine out of the refrigerator and let it reach room temperature for up to 1 hour before injecting your dose. You may also store immune globulin at room temperature. You will need to use immune globulin within a certain number months. This will depend on the how you store the medicine (at room temperature, or in a refrigerator). Carefully follow the storage instructions provided with your medicine. Do not use the medicine after the expiration date on the label has passed. Each vial (bottle) is for one use only. Throw it away after one use, even if there is still medicine left inside. Use disposable injection items (needle, catheter, tubing) only once and then place them in a puncture-proof \"sharps\" container. Follow state or local laws about how to dispose of this container. Keep it out of the reach of children and pets. What happens if I miss a dose? Call your doctor for instructions if you miss a dose. What happens if I overdose? Seek emergency medical attention or call the Poison Help line at 1-422.788.7121. What should I avoid while using immune globulin? Do not receive a \"live\" vaccine while using immune globulin. The vaccine may not work as well and may not fully protect you from disease.  Live vaccines include measles, mumps, rubella (MMR), rotavirus, typhoid, yellow fever, varicella (chickenpox), zoster (shingles), and nasal flu (influenza) vaccine. What are the possible side effects of immune globulin? Get emergency medical help if you have signs of an allergic reaction: hives; wheezing, difficulty breathing; dizziness, feeling like you might pass out; swelling of your face, lips, tongue, or throat. Some side effects may occur during the injection. Tell your caregiver if you feel light-headed, itchy, chilled, sweaty, or have chest discomfort, fast heartbeats, severe headache, or pounding in your neck or ears. Call your doctor at once if you have:  a blood cell disorder --pale or yellowed skin, dark colored urine, fever, confusion or weakness;  dehydration symptoms --feeling very thirsty or hot, being unable to urinate, heavy sweating, or hot and dry skin;  kidney problems --little or no urination, swelling, rapid weight gain, feeling short of breath;  lung problems --chest pain, wheezing, trouble breathing, blue colored lips, fingers, or toes;  signs of a new infection --fever with a severe headache, neck stiffness, eye pain, and increased sensitivity to light; or  signs of a blood clot --shortness of breath, chest pain with deep breathing, rapid heart rate, numbness or weakness on one side of the body, swelling and warmth or discoloration in an arm or leg. Common side effects may include:  runny or stuffy nose, sinus pain, cough, sore throat;  fever, chills, weakness;  headache, back pain, muscle or joint pain;  dizziness, tiredness, depressed mood;  swelling in your hands or feet;  skin rash, redness, or bruising;  blisters or ulcers in your mouth, red or swollen gums, trouble swallowing;  nausea, diarrhea, stomach pain, upset stomach;  increased blood pressure; or  redness, swelling, or itching where an injection was given. This is not a complete list of side effects and others may occur.  Call your doctor for medical advice about side effects. You may report side effects to FDA at 7-613-JPK-7470. What other drugs will affect immune globulin? Immune globulin can harm your kidneys, especially if you also use certain medicines for infections, cancer, osteoporosis, organ transplant rejection, bowel disorders, high blood pressure, or pain or arthritis (including Advil, Motrin, and Aleve). Other drugs may affect immune globulin, including prescription and over-the-counter medicines, vitamins, and herbal products. Tell your doctor about all your current medicines and any medicine you start or stop using. Where can I get more information? Your doctor or pharmacist can provide more information about immune globulin. Remember, keep this and all other medicines out of the reach of children, never share your medicines with others, and use this medication only for the indication prescribed. Every effort has been made to ensure that the information provided by [address] is accurate, up-to-date, and complete, but no guarantee is made to that effect. Drug information contained herein may be time sensitive. [facility] information has been compiled for use by healthcare practitioners and consumers in the [facility] and therefore [facility] does not warrant that uses outside of the [facility] are appropriate, unless specifically indicated otherwise. [facility]'s drug information does not endorse drugs, diagnose patients or recommend therapy. [facility] drug information is an informational resource designed to assist licensed healthcare practitioners in caring for their patients and/or to serve consumers viewing this service as a supplement to, and not a substitute for, the expertise, skill, knowledge and judgment of healthcare practitioners.  The absence of a warning for a given drug or drug combination in no way should be construed to indicate that the drug or drug combination is safe, effective or appropriate for any given patient. [facility] does not assume any responsibility for any aspect of healthcare administered with the aid of information [facility] provides. The information contained herein is not intended to cover all possible uses, directions, precautions, warnings, drug interactions, allergic reactions, or adverse effects. If you have questions about the drugs you are taking, check with your doctor, nurse or pharmacist.  Copyright 9903-7030 [address] [place] [address] Dr SEVERINO. Version: 4.02. Revision date: 2/26/2021. Care instructions adapted under license by [facility] ([facility] If you have questions about a medical condition or this instruction, always ask your healthcare professional. [place] [address] any warranty or liability for your use of this information.

## 2022-12-02 ENCOUNTER — HOSPITAL ENCOUNTER (OUTPATIENT)
Dept: INFUSION THERAPY | Age: 47
Setting detail: INFUSION SERIES
Discharge: HOME | End: 2022-12-02
Payer: MEDICARE

## 2022-12-02 VITALS
TEMPERATURE: 97.8 F | OXYGEN SATURATION: 98 % | BODY MASS INDEX: 29.66 KG/M2 | DIASTOLIC BLOOD PRESSURE: 111 MMHG | HEART RATE: 57 BPM | HEIGHT: 65 IN | SYSTOLIC BLOOD PRESSURE: 171 MMHG | WEIGHT: 178 LBS | RESPIRATION RATE: 20 BRPM

## 2022-12-02 DIAGNOSIS — G35 MULTIPLE SCLEROSIS (HCC): Primary | ICD-10-CM

## 2022-12-02 PROCEDURE — 6360000002 HC RX W HCPCS: Performed by: PSYCHIATRY & NEUROLOGY

## 2022-12-02 PROCEDURE — 96365 THER/PROPH/DIAG IV INF INIT: CPT

## 2022-12-02 PROCEDURE — 96366 THER/PROPH/DIAG IV INF ADDON: CPT

## 2022-12-02 PROCEDURE — 6370000000 HC RX 637 (ALT 250 FOR IP): Performed by: PSYCHIATRY & NEUROLOGY

## 2022-12-02 PROCEDURE — 96375 TX/PRO/DX INJ NEW DRUG ADDON: CPT

## 2022-12-02 RX ORDER — MEPERIDINE HYDROCHLORIDE 25 MG/ML
12.5 INJECTION INTRAMUSCULAR; INTRAVENOUS; SUBCUTANEOUS PRN
OUTPATIENT
Start: 2022-12-02

## 2022-12-02 RX ORDER — ACETAMINOPHEN 325 MG/1
650 TABLET ORAL
OUTPATIENT
Start: 2022-12-02

## 2022-12-02 RX ORDER — ACETAMINOPHEN 325 MG/1
650 TABLET ORAL ONCE
Status: CANCELLED | OUTPATIENT
Start: 2022-12-02 | End: 2022-12-02

## 2022-12-02 RX ORDER — HEPARIN SODIUM (PORCINE) LOCK FLUSH IV SOLN 100 UNIT/ML 100 UNIT/ML
500 SOLUTION INTRAVENOUS PRN
Status: DISCONTINUED | OUTPATIENT
Start: 2022-12-02 | End: 2022-12-04 | Stop reason: HOSPADM

## 2022-12-02 RX ORDER — SODIUM CHLORIDE 9 MG/ML
INJECTION, SOLUTION INTRAVENOUS CONTINUOUS
OUTPATIENT
Start: 2022-12-02

## 2022-12-02 RX ORDER — SODIUM CHLORIDE 0.9 % (FLUSH) 0.9 %
5-40 SYRINGE (ML) INJECTION PRN
Status: CANCELLED | OUTPATIENT
Start: 2022-12-02

## 2022-12-02 RX ORDER — DIPHENHYDRAMINE HYDROCHLORIDE 50 MG/ML
50 INJECTION INTRAMUSCULAR; INTRAVENOUS ONCE
Status: COMPLETED | OUTPATIENT
Start: 2022-12-02 | End: 2022-12-02

## 2022-12-02 RX ORDER — ALBUTEROL SULFATE 90 UG/1
4 AEROSOL, METERED RESPIRATORY (INHALATION) PRN
OUTPATIENT
Start: 2022-12-02

## 2022-12-02 RX ORDER — ONDANSETRON 2 MG/ML
8 INJECTION INTRAMUSCULAR; INTRAVENOUS
OUTPATIENT
Start: 2022-12-02

## 2022-12-02 RX ORDER — DIPHENHYDRAMINE HYDROCHLORIDE 50 MG/ML
50 INJECTION INTRAMUSCULAR; INTRAVENOUS
OUTPATIENT
Start: 2022-12-02

## 2022-12-02 RX ORDER — HEPARIN SODIUM (PORCINE) LOCK FLUSH IV SOLN 100 UNIT/ML 100 UNIT/ML
500 SOLUTION INTRAVENOUS PRN
Status: CANCELLED
Start: 2022-12-02

## 2022-12-02 RX ORDER — ACETAMINOPHEN 325 MG/1
650 TABLET ORAL ONCE
Status: COMPLETED | OUTPATIENT
Start: 2022-12-02 | End: 2022-12-02

## 2022-12-02 RX ORDER — SODIUM CHLORIDE 0.9 % (FLUSH) 0.9 %
5-40 SYRINGE (ML) INJECTION PRN
Status: DISCONTINUED | OUTPATIENT
Start: 2022-12-02 | End: 2022-12-03 | Stop reason: HOSPADM

## 2022-12-02 RX ORDER — DIPHENHYDRAMINE HYDROCHLORIDE 50 MG/ML
50 INJECTION INTRAMUSCULAR; INTRAVENOUS ONCE
Status: CANCELLED
Start: 2022-12-02 | End: 2022-12-02

## 2022-12-02 RX ORDER — EPINEPHRINE 1 MG/ML
0.3 INJECTION, SOLUTION, CONCENTRATE INTRAVENOUS PRN
OUTPATIENT
Start: 2022-12-02

## 2022-12-02 RX ADMIN — HEPARIN 500 UNITS: 100 SYRINGE at 08:38

## 2022-12-02 RX ADMIN — IMMUNE GLOBULIN (HUMAN) 30 G: 10 INJECTION INTRAVENOUS; SUBCUTANEOUS at 06:59

## 2022-12-02 RX ADMIN — ACETAMINOPHEN 650 MG: 325 TABLET ORAL at 06:47

## 2022-12-02 RX ADMIN — DIPHENHYDRAMINE HYDROCHLORIDE 50 MG: 50 INJECTION, SOLUTION INTRAMUSCULAR; INTRAVENOUS at 06:48

## 2022-12-02 RX ADMIN — HYDROCORTISONE SODIUM SUCCINATE 100 MG: 100 INJECTION, POWDER, FOR SOLUTION INTRAMUSCULAR; INTRAVENOUS at 06:47

## 2022-12-02 NOTE — DISCHARGE INSTRUCTIONS
immune globulin (intravenous and subcutaneous)  Pronunciation:  dinora LACEY foley  Brand:  Gammagard Liquid, Gammaked, Gamunex-C  What is the most important information I should know about immune globulin? This medicine can cause blood clots. The risk is highest in older adults or in people who have had blood clots, heart problems, or blood circulation problems. Blood clots are also more likely during long-term bedrest, while using birth control pills or hormone replacement therapy, or while having a central intravenous (IV) catheter in place. Call your doctor at once if you have chest pain, trouble breathing, fast heartbeats, numbness or weakness, or swelling and warmth or discoloration in an arm or leg. This medicine can also harm your kidneys, especially if you have kidney disease or if you also use certain medicines. Tell your doctor right away if you have signs of kidney problems, such as swelling, rapid weight gain, and little or no urination. What is immune globulin? Immune globulin intravenous and subcutaneous (for injection into a vein or under the skin) is used to treat primary immunodeficiency. Immune globulin is also used to increase platelets (blood clotting cells) in people with idiopathic thrombocytopenic purpura. Immune globulin is also used to treat certain debilitating nerve disorders that cause muscle weakness and can affect daily activities. Immune globulin may also be used for purposes not listed in this medication guide. What should I discuss with my healthcare provider before using immune globulin? You should not use this medicine if:  you have had an allergic reaction to an immune globulin or blood product; or  you have immune globulin A (IgA) deficiency with antibody to IgA. Immune globulin can cause blood clots or kidney problems, especially in older adults or in people with certain conditions.  Tell your doctor if you have ever had:  heart problems, blood circulation problems, or \"thick blood\";  a stroke or blood clot;  kidney disease;  diabetes;  an infection called sepsis;  if you use estrogens (birth control pills or hormone replacement therapy);  if you have been on long-term bedrest; or  if you have a central intravenous (IV) catheter in place. You may need a dose adjustment if you are exposed to measles, or if you travel to an area where this disease is common. Tell your doctor if you are pregnant or breastfeeding. Immune globulin is made from donated human plasma and may contain viruses or other infectious agents. Donated plasma is tested and treated to reduce the risk of contamination, but there is still a small possibility it could transmit disease. Ask your doctor about any possible risk. How should I use immune globulin? Follow all directions on your prescription label and read all medication guides or instruction sheets. Your doctor may occasionally change your dose. Use the medicine exactly as directed. Immune globulin is given as an infusion into a vein, or injected under the skin using an infusion pump. A healthcare provider will give your first dose and may teach you how to properly use the medication by yourself. Do not inject immune globulin into a vein if you have been instructed to give the medicine as a subcutaneous injection (under the skin). How you give this medication, how often you use it, and the length of your infusion time will depend on the condition being treated. Read and carefully follow any Instructions for Use provided with your medicine. Ask your doctor or pharmacist if you don't understand all instructions. Prepare an injection only when you are ready to give it. Do not use if the medicine looks cloudy, has changed colors, or has particles in it. Call your pharmacist for new medicine. Do not shake the medication bottle or you may ruin the medicine. Immune globulin must be given slowly.  You may need to use several catheters to vaccines include measles, mumps, rubella (MMR), rotavirus, typhoid, yellow fever, varicella (chickenpox), zoster (shingles), and nasal flu (influenza) vaccine. What are the possible side effects of immune globulin? Get emergency medical help if you have signs of an allergic reaction: hives; wheezing, difficulty breathing; dizziness, feeling like you might pass out; swelling of your face, lips, tongue, or throat. Some side effects may occur during the injection. Tell your caregiver if you feel light-headed, itchy, chilled, sweaty, or have chest discomfort, fast heartbeats, severe headache, or pounding in your neck or ears. Call your doctor at once if you have:  a blood cell disorder --pale or yellowed skin, dark colored urine, fever, confusion or weakness;  dehydration symptoms --feeling very thirsty or hot, being unable to urinate, heavy sweating, or hot and dry skin;  kidney problems --little or no urination, swelling, rapid weight gain, feeling short of breath;  lung problems --chest pain, wheezing, trouble breathing, blue colored lips, fingers, or toes;  signs of a new infection --fever with a severe headache, neck stiffness, eye pain, and increased sensitivity to light; or  signs of a blood clot --shortness of breath, chest pain with deep breathing, rapid heart rate, numbness or weakness on one side of the body, swelling and warmth or discoloration in an arm or leg. Common side effects may include:  runny or stuffy nose, sinus pain, cough, sore throat;  fever, chills, weakness;  headache, back pain, muscle or joint pain;  dizziness, tiredness, depressed mood;  swelling in your hands or feet;  skin rash, redness, or bruising;  blisters or ulcers in your mouth, red or swollen gums, trouble swallowing;  nausea, diarrhea, stomach pain, upset stomach;  increased blood pressure; or  redness, swelling, or itching where an injection was given. This is not a complete list of side effects and others may occur.  Call your doctor for medical advice about side effects. You may report side effects to FDA at 3-471-DBP-5926. What other drugs will affect immune globulin? Immune globulin can harm your kidneys, especially if you also use certain medicines for infections, cancer, osteoporosis, organ transplant rejection, bowel disorders, high blood pressure, or pain or arthritis (including Advil, Motrin, and Aleve). Other drugs may affect immune globulin, including prescription and over-the-counter medicines, vitamins, and herbal products. Tell your doctor about all your current medicines and any medicine you start or stop using. Where can I get more information? Your doctor or pharmacist can provide more information about immune globulin. Remember, keep this and all other medicines out of the reach of children, never share your medicines with others, and use this medication only for the indication prescribed. Every effort has been made to ensure that the information provided by [address] is accurate, up-to-date, and complete, but no guarantee is made to that effect. Drug information contained herein may be time sensitive. [employer] information has been compiled for use by healthcare practitioners and consumers in the [place] and therefore [employer] does not warrant that uses outside of the [place] are appropriate, unless specifically indicated otherwise. [facility] drug information does not endorse drugs, diagnose patients or recommend therapy. [employer] drug information is an informational resource designed to assist licensed healthcare practitioners in caring for their patients and/or to serve consumers viewing this service as a supplement to, and not a substitute for, the expertise, skill, knowledge and judgment of healthcare practitioners.  The absence of a warning for a given drug or drug combination in no way should be construed to indicate that the drug or drug combination is safe, effective or appropriate for any given patient. [facility] does not assume any responsibility for any aspect of healthcare administered with the aid of information [facility] provides. The information contained herein is not intended to cover all possible uses, directions, precautions, warnings, drug interactions, allergic reactions, or adverse effects. If you have questions about the drugs you are taking, check with your doctor, nurse or pharmacist.  Copyright 0871-9600 [facility] SE[address]. Version: 4.02. Revision date: 2/26/2021. Care instructions adapted under license by [facility] [place] [address] If you have questions about a medical condition or this instruction, always ask your healthcare professional. [place] [id number] any warranty or liability for your use of this information.

## 2022-12-09 ENCOUNTER — HOSPITAL ENCOUNTER (OUTPATIENT)
Dept: INFUSION THERAPY | Age: 47
Setting detail: INFUSION SERIES
Discharge: HOME | End: 2022-12-09
Payer: MEDICARE

## 2022-12-09 VITALS
HEART RATE: 73 BPM | RESPIRATION RATE: 18 BRPM | DIASTOLIC BLOOD PRESSURE: 101 MMHG | OXYGEN SATURATION: 98 % | TEMPERATURE: 98 F | SYSTOLIC BLOOD PRESSURE: 143 MMHG

## 2022-12-09 DIAGNOSIS — G35 MULTIPLE SCLEROSIS (HCC): Primary | ICD-10-CM

## 2022-12-09 PROCEDURE — 6370000000 HC RX 637 (ALT 250 FOR IP): Performed by: PSYCHIATRY & NEUROLOGY

## 2022-12-09 PROCEDURE — 6360000002 HC RX W HCPCS: Performed by: PSYCHIATRY & NEUROLOGY

## 2022-12-09 PROCEDURE — 96365 THER/PROPH/DIAG IV INF INIT: CPT

## 2022-12-09 PROCEDURE — 96375 TX/PRO/DX INJ NEW DRUG ADDON: CPT

## 2022-12-09 PROCEDURE — 96366 THER/PROPH/DIAG IV INF ADDON: CPT

## 2022-12-09 PROCEDURE — 2580000003 HC RX 258: Performed by: PSYCHIATRY & NEUROLOGY

## 2022-12-09 RX ORDER — SODIUM CHLORIDE 9 MG/ML
INJECTION, SOLUTION INTRAVENOUS CONTINUOUS
OUTPATIENT
Start: 2022-12-09

## 2022-12-09 RX ORDER — SODIUM CHLORIDE 0.9 % (FLUSH) 0.9 %
5-40 SYRINGE (ML) INJECTION PRN
Status: DISCONTINUED | OUTPATIENT
Start: 2022-12-09 | End: 2022-12-10 | Stop reason: HOSPADM

## 2022-12-09 RX ORDER — ACETAMINOPHEN 325 MG/1
650 TABLET ORAL ONCE
Status: COMPLETED | OUTPATIENT
Start: 2022-12-09 | End: 2022-12-09

## 2022-12-09 RX ORDER — MEPERIDINE HYDROCHLORIDE 25 MG/ML
12.5 INJECTION INTRAMUSCULAR; INTRAVENOUS; SUBCUTANEOUS PRN
OUTPATIENT
Start: 2022-12-09

## 2022-12-09 RX ORDER — ONDANSETRON 2 MG/ML
8 INJECTION INTRAMUSCULAR; INTRAVENOUS
OUTPATIENT
Start: 2022-12-09

## 2022-12-09 RX ORDER — ACETAMINOPHEN 325 MG/1
650 TABLET ORAL
OUTPATIENT
Start: 2022-12-09

## 2022-12-09 RX ORDER — DIPHENHYDRAMINE HYDROCHLORIDE 50 MG/ML
50 INJECTION INTRAMUSCULAR; INTRAVENOUS ONCE
Status: COMPLETED | OUTPATIENT
Start: 2022-12-09 | End: 2022-12-09

## 2022-12-09 RX ORDER — HEPARIN SODIUM (PORCINE) LOCK FLUSH IV SOLN 100 UNIT/ML 100 UNIT/ML
500 SOLUTION INTRAVENOUS PRN
Status: DISCONTINUED | OUTPATIENT
Start: 2022-12-09 | End: 2022-12-11 | Stop reason: HOSPADM

## 2022-12-09 RX ORDER — SODIUM CHLORIDE 0.9 % (FLUSH) 0.9 %
5-40 SYRINGE (ML) INJECTION PRN
Status: CANCELLED | OUTPATIENT
Start: 2022-12-09

## 2022-12-09 RX ORDER — ALBUTEROL SULFATE 90 UG/1
4 AEROSOL, METERED RESPIRATORY (INHALATION) PRN
OUTPATIENT
Start: 2022-12-09

## 2022-12-09 RX ORDER — DIPHENHYDRAMINE HYDROCHLORIDE 50 MG/ML
50 INJECTION INTRAMUSCULAR; INTRAVENOUS
OUTPATIENT
Start: 2022-12-09

## 2022-12-09 RX ORDER — EPINEPHRINE 1 MG/ML
0.3 INJECTION, SOLUTION, CONCENTRATE INTRAVENOUS PRN
OUTPATIENT
Start: 2022-12-09

## 2022-12-09 RX ORDER — ACETAMINOPHEN 325 MG/1
650 TABLET ORAL ONCE
Status: CANCELLED | OUTPATIENT
Start: 2022-12-09 | End: 2022-12-09

## 2022-12-09 RX ORDER — HEPARIN SODIUM (PORCINE) LOCK FLUSH IV SOLN 100 UNIT/ML 100 UNIT/ML
500 SOLUTION INTRAVENOUS PRN
Status: CANCELLED
Start: 2022-12-09

## 2022-12-09 RX ORDER — DIPHENHYDRAMINE HYDROCHLORIDE 50 MG/ML
50 INJECTION INTRAMUSCULAR; INTRAVENOUS ONCE
Status: CANCELLED
Start: 2022-12-09 | End: 2022-12-09

## 2022-12-09 RX ADMIN — HYDROCORTISONE SODIUM SUCCINATE 100 MG: 100 INJECTION, POWDER, FOR SOLUTION INTRAMUSCULAR; INTRAVENOUS at 06:54

## 2022-12-09 RX ADMIN — SODIUM CHLORIDE, PRESERVATIVE FREE 20 ML: 5 INJECTION INTRAVENOUS at 08:47

## 2022-12-09 RX ADMIN — HEPARIN 500 UNITS: 100 SYRINGE at 08:47

## 2022-12-09 RX ADMIN — ACETAMINOPHEN 650 MG: 325 TABLET ORAL at 06:53

## 2022-12-09 RX ADMIN — IMMUNE GLOBULIN (HUMAN) 30 G: 10 INJECTION INTRAVENOUS; SUBCUTANEOUS at 07:12

## 2022-12-09 RX ADMIN — DIPHENHYDRAMINE HYDROCHLORIDE 50 MG: 50 INJECTION, SOLUTION INTRAMUSCULAR; INTRAVENOUS at 06:55

## 2022-12-09 NOTE — DISCHARGE INSTRUCTIONS
immune globulin (intravenous) (IGIV)  Pronunciation:  dinora foley  Brand:  Bivigam, Carimune, Flebogamma, Gammagard S/D, Gammaplex, Octagam, Panzyga, Privigen  What is the most important information I should know about immune globulin intravenous? This medicine can cause blood clots. The risk is highest in older adults or in people who have had blood clots, heart problems, or blood circulation problems. Blood clots are also more likely during long-term bedrest, while using birth control pills or hormone replacement therapy, or while having a central intravenous (IV) catheter in place. Call your doctor at once if you have chest pain, trouble breathing, fast heartbeats, numbness or weakness, or swelling and warmth or discoloration in an arm or leg. This medicine can also harm your kidneys, especially if you have kidney disease or you also use certain medicines. Tell your doctor right away if you have signs of kidney problems, such as swelling, rapid weight gain, and little or no urination. What is immune globulin intravenous (IGIV)? Immune globulin intravenous (IGIV, for injection into a vein) is used to treat primary immunodeficiency. IGIV is also used to increase platelets (blood clotting cells) in people with immune thrombocytopenic purpura. IGIV is also used in to help prevent certain infections in people with B-cell chronic lymphocytic leukemia. IGIV is also used in people with Kawasaki syndrome, to prevent aneurysm caused by a weakening of the main artery in the heart. IGIV may also be used for purposes not listed in this medication guide. What should I discuss with my healthcare provider before using IGIV? You may not be able to use this medicine if:  you have had an allergic reaction to an immune globulin or blood product;  you have immune globulin A (IgA) deficiency with antibody to IgA; or  you are allergic to corn.   IGIV can cause blood clots or kidney problems, especially in older adults or in people with certain conditions. Tell your doctor if you have ever had:  heart problems, blood circulation problems, or \"thick blood\";  a stroke or blood clot;  kidney disease;  diabetes;  an infection called sepsis;  if you use estrogens (birth control pills or hormone replacement therapy);  if you have been on long-term bedrest; or  if you have a central intravenous (IV) catheter in place. You may need a dose adjustment if you are exposed to measles, or if you travel to an area where this disease is common. Tell your doctor if you are pregnant or breastfeeding. Immune globulin is made from donated human plasma and may contain viruses or other infectious agents. Donated plasma is tested and treated to reduce the risk of contamination, but there is still a small possibility it could transmit disease. Ask your doctor about any possible risk. How should I use IGIV? IGIV is given as an infusion into a vein, usually once every 3 to 4 weeks. A healthcare provider will give you this injection. Drink plenty of liquids  while you are using this medicine to help improve your blood flow and keep your kidneys working properly. You may need frequent blood or urine tests. This medicine can affect the results of certain medical tests. Tell any doctor who treats you that you are using IGIV. What happens if I miss a dose? Call your doctor for instructions if you miss an appointment for your IGIV injection. What happens if I overdose? Seek emergency medical attention or call the Poison Help line at 1-465.153.1173. What should I avoid while using IGIV? Ask your doctor before receiving a \"live\" vaccine while using IGIV. The vaccine may not work as well and may not fully protect you from disease. Live vaccines include measles, mumps, rubella (MMR), rotavirus, typhoid, yellow fever, varicella (chickenpox), zoster (shingles), and nasal flu (influenza) vaccine.   What are the possible side effects of IGIV?  Get emergency medical help if you have signs of an allergic reaction: hives; difficult breathing; swelling of your face, lips, tongue, or throat. Some side effects may occur during the injection. Tell your caregiver if you feel dizzy, nauseated, light-headed, sweaty, or have a headache, pounding in your neck or ears, fever, chills, chest tightness, or warmth or redness in your face. Call your doctor at once if you have:  a blood cell disorder --pale or yellowed skin, dark colored urine, fever, confusion or weakness;  dehydration symptoms --feeling very thirsty or hot, being unable to urinate, heavy sweating, or hot and dry skin;  kidney problems --little or no urination, swelling, rapid weight gain, feeling short of breath;  lung problems --chest pain, trouble breathing, blue colored lips, fingers, or toes;  signs of a new infection --fever with a severe headache, neck stiffness, eye pain, and increased sensitivity to light; or  signs of a blood clot --shortness of breath, chest pain with deep breathing, rapid heart rate, numbness or weakness on one side of the body, swelling and warmth or discoloration in an arm or leg. Common side effects may include:  headache, back pain, joint pain;  fever, chills, sweating, warmth or tingling;  stomach pain, nausea, diarrhea;  increased blood pressure, fast heartbeats;  dizziness, tiredness, lack of energy;  stuffy nose, sinus pain; or  pain, swelling, burning, or irritation around the IV needle. This is not a complete list of side effects and others may occur. Call your doctor for medical advice about side effects. You may report side effects to FDA at 2-540-FDA-3596. What other drugs will affect IGIV? IGIV can harm your kidneys, especially if you also use certain medicines for infections, cancer, osteoporosis, organ transplant rejection, bowel disorders, high blood pressure, or pain or arthritis (including Advil, Motrin, and Aleve).   Other drugs may affect IGIV, including prescription and over-the-counter medicines, vitamins, and herbal products. Tell your doctor about all your current medicines and any medicine you start or stop using. Where can I get more information? Your pharmacist can provide more information about immune globulin intravenous. Remember, keep this and all other medicines out of the reach of children, never share your medicines with others, and use this medication only for the indication prescribed. Every effort has been made to ensure that the information provided by [address] is accurate, up-to-date, and complete, but no guarantee is made to that effect. Drug information contained herein may be time sensitive. [facility] information has been compiled for use by healthcare practitioners and consumers in the [place] and therefore [facility] does not warrant that uses outside of the [place] are appropriate, unless specifically indicated otherwise. [facility]'s drug information does not endorse drugs, diagnose patients or recommend therapy. [facility] information is an informational resource designed to assist licensed healthcare practitioners in caring for their patients and/or to serve consumers viewing this service as a supplement to, and not a substitute for, the expertise, skill, knowledge and judgment of healthcare practitioners. The absence of a warning for a given drug or drug combination in no way should be construed to indicate that the drug or drug combination is safe, effective or appropriate for any given patient. [facility] does not assume any responsibility for any aspect of healthcare administered with the aid of information [facility] provides. The information contained herein is not intended to cover all possible uses, directions, precautions, warnings, drug interactions, allergic reactions, or adverse effects.  If you have questions about the drugs you are taking, check with your doctor, nurse or pharmacist.  Copyright 4620-2006 [employer]. Version: 6.02. Revision date: 2/26/2021. Care instructions adapted under license by [facility] ([facility]). If you have questions about a medical condition or this instruction, always ask your healthcare professional. [address] any warranty or liability for your use of this information.

## 2022-12-11 ENCOUNTER — PATIENT MESSAGE (OUTPATIENT)
Dept: NEUROLOGY | Age: 47
End: 2022-12-11

## 2022-12-11 DIAGNOSIS — M79.7 FIBROMYALGIA: ICD-10-CM

## 2022-12-11 DIAGNOSIS — R20.0 NUMBNESS: ICD-10-CM

## 2022-12-12 NOTE — TELEPHONE ENCOUNTER
Requested Prescriptions     Pending Prescriptions Disp Refills    dronabinol (MARINOL) 10 MG capsule 180 capsule 5     Sig: TAKE TWO CAPSULES BY MOUTH THREE TIMES A DAY       Last Office Visit: 8/24/2022  Next Office Visit: 12/12/2022  Last Medication Refill: 9/6/2022 with 5 RF

## 2022-12-13 RX ORDER — DRONABINOL 10 MG/1
CAPSULE ORAL
Qty: 180 CAPSULE | Refills: 5 | Status: SHIPPED | OUTPATIENT
Start: 2022-12-13 | End: 2023-01-11

## 2022-12-15 ENCOUNTER — OFFICE VISIT (OUTPATIENT)
Dept: NEUROLOGY | Age: 47
End: 2022-12-15
Payer: MEDICARE

## 2022-12-15 VITALS
DIASTOLIC BLOOD PRESSURE: 87 MMHG | BODY MASS INDEX: 29.66 KG/M2 | SYSTOLIC BLOOD PRESSURE: 136 MMHG | HEART RATE: 97 BPM | WEIGHT: 178 LBS | HEIGHT: 65 IN

## 2022-12-15 DIAGNOSIS — R20.0 NUMBNESS: ICD-10-CM

## 2022-12-15 DIAGNOSIS — M54.30 SCIATICA, UNSPECIFIED LATERALITY: ICD-10-CM

## 2022-12-15 DIAGNOSIS — H53.9 VISUAL DISTURBANCE: ICD-10-CM

## 2022-12-15 DIAGNOSIS — R25.1 TREMOR: ICD-10-CM

## 2022-12-15 DIAGNOSIS — R53.1 WEAKNESS: ICD-10-CM

## 2022-12-15 DIAGNOSIS — G43.719 INTRACTABLE CHRONIC MIGRAINE WITHOUT AURA AND WITHOUT STATUS MIGRAINOSUS: ICD-10-CM

## 2022-12-15 DIAGNOSIS — M79.7 FIBROMYALGIA: ICD-10-CM

## 2022-12-15 DIAGNOSIS — H46.9 OPTIC NEURITIS: ICD-10-CM

## 2022-12-15 DIAGNOSIS — M32.9 LUPUS (HCC): ICD-10-CM

## 2022-12-15 DIAGNOSIS — G35 MULTIPLE SCLEROSIS (HCC): Primary | ICD-10-CM

## 2022-12-15 PROCEDURE — G8417 CALC BMI ABV UP PARAM F/U: HCPCS | Performed by: PSYCHIATRY & NEUROLOGY

## 2022-12-15 PROCEDURE — G8484 FLU IMMUNIZE NO ADMIN: HCPCS | Performed by: PSYCHIATRY & NEUROLOGY

## 2022-12-15 PROCEDURE — G8427 DOCREV CUR MEDS BY ELIG CLIN: HCPCS | Performed by: PSYCHIATRY & NEUROLOGY

## 2022-12-15 PROCEDURE — 4004F PT TOBACCO SCREEN RCVD TLK: CPT | Performed by: PSYCHIATRY & NEUROLOGY

## 2022-12-15 PROCEDURE — 99214 OFFICE O/P EST MOD 30 MIN: CPT | Performed by: PSYCHIATRY & NEUROLOGY

## 2022-12-15 PROCEDURE — 3078F DIAST BP <80 MM HG: CPT | Performed by: PSYCHIATRY & NEUROLOGY

## 2022-12-15 PROCEDURE — 3074F SYST BP LT 130 MM HG: CPT | Performed by: PSYCHIATRY & NEUROLOGY

## 2022-12-15 RX ORDER — RIMEGEPANT SULFATE 75 MG/75MG
75 TABLET, ORALLY DISINTEGRATING ORAL PRN
Qty: 30 TABLET | Refills: 5 | Status: SHIPPED | OUTPATIENT
Start: 2022-12-15

## 2022-12-15 RX ORDER — CLONIDINE HYDROCHLORIDE 0.1 MG/1
0.1 TABLET ORAL 2 TIMES DAILY
COMMUNITY

## 2022-12-15 RX ORDER — ATOGEPANT 60 MG/1
60 TABLET ORAL DAILY
Qty: 30 TABLET | Refills: 11 | Status: SHIPPED | OUTPATIENT
Start: 2022-12-15

## 2022-12-15 NOTE — PROGRESS NOTES
Chief Complaint   Patient presents with    Multiple Sclerosis         Shahrzad Farah is a 41y.o. year old female who is seen for evaluation of follow for multiple sclerosis following an admission in 5/19 for optic neuritis treated with IV solumedrol. The patient has a history of multiple sclerosis, fibromyalgia and lupus along with chronic headaches was seen at that time for left visual change which began about 2 days prior to admission with eye pain. This was quite similar to her previous episodes of optic neuritis, of which she has had about 4 previous episodes treated with steroids. She was tried on oral steroids as an outpatient but this was not effective and was transferred from outside ER for further management. She has some chronic weakness and numbness and weakness on the left. She does have bowel and bladder incontinence. She was on Avonex. Had not followed up with Dr Tutu Connell since 2017. She did not follow up in the office since 5/19. On 7/4/20 she was in the ER with generalized body aches. She apparently had some trembling on the right side. She had an MRI of the brain in the ER which revealed no acute ischemic change but did have scattered non enhancing white matter lesions in periventricular and white matter. She was given 1 gm Solumedrol for a possible flair up and told to follow up with neurology. She indicates that she typically does relatively well for a few weeks and then she feels severely fatigued or worsening vision. She had been in a wheelchair in 2017. She had her initial testing in 2017 at [facility] which was significant for non enhancing white matter lesions and a CSF with 6 oligoclonal bands seen only in the CSF. She still feels her face twitching and right sided numbness. He speech is not normal for her as she has to think about things carefully. Her rheumatologist started her on Tecfidera but she felt worse on this.  She had been on Avonex since her diagnosis prior to with no clear benefit according to her. Having a lupus flair up. This usually feels pain in eyes, skin of in mouth may fall off. Felt since with 2  Ocrevus infusion October 2020 and a bit better but now off. Brian Hahn with unclear benefit. Saw rheumatologist in [place] and plan a new infusion. Azathioprine started for her Lupus. had bowel obruction with gastric band. Stopped Ocrevus. Artane helped for about 2 days not help. Lupus flairs. Is doing well with Marinol and Acthar. Marinol only occasionally. Acthar 5 days month. Recurrent flair. Solumedrol intermittently. bad migraines recently. Multiple a week. Imitrex used to work. Planning on see cardiology heart attack. In dual flair up. Other than ACTHAR nothing else for immune modulation. Flair ups. Vision affected. Fatigue. finished steroid infusion. Gets it once a week and then wears off at the end week. Doing well with her weekly infusion of IVIG. Request port placement due to poor access. Had some flair up and treated with IVIG x 5 days. Gets IVIG once a week. Storm Mary makes blood pressure go up. Had heart attack. Baclofen and Marinol helps.     Active Ambulatory Problems     Diagnosis Date Noted    Multiple sclerosis ([address]     Intractable chronic migraine without aura and without status migrainosus 05/01/2017    Major depressive disorder, recurrent, moderate ([address] 09/12/2018    Exacerbation of multiple sclerosis ([address] 05/10/2019    Optic neuritis 05/10/2019    Pain of left eye 05/10/2019    Bronchitis 05/11/2019    Visual disturbance 07/07/2020    Tremor 07/07/2020    Weakness 07/07/2020    Numbness 07/07/2020    Difficulty with speech 07/07/2020    Lupus ([address] 07/07/2020    Fibromyalgia 07/07/2020    Convulsions ([address] 07/20/2020    Bilateral myofascial pain 01/04/2021    Pain in joints 01/20/2021    Hypertension     Bipolar disorder ([address] 08/18/2015    Pneumoperitoneum of unknown etiology 09/28/2021    Bloating 04/20/2021    Age related osteoporosis 05/03/2022    Alcohol dependence ([address] 2022    Allergy, unspecified, subsequent encounter 2022    Anxiety 2022    Atrioventricular septal defect with atrial and ventricular components 2022    Attention deficit hyperactivity disorder 2022    Multiple gastric ulcers 2021    Edema of lower extremity 2022    Chronic sinusitis 2022    Gastric band malfunction 12/10/2020    Gastroesophageal reflux disease 2022    GERD without esophagitis 2021    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    Heartburn 2021    Insomnia 2022    Internal hernia 2021    Irritable bowel syndrome 2022    Marijuana use 2021    Major depression, single episode 2022    Migraine 2021    Nausea and vomiting 2021    Obesity 2022    Palpitations 2022    Posttraumatic stress disorder 2022    Pelvic pain 2021    Sciatica 2022    Sedative abuse ([address] 2022    Small bowel obstruction ([address] 10/06/2019    Smoker 2021    Suspected severe acute respiratory syndrome coronavirus 2 (SARS-CoV-2) infection 01/10/2022    Ventricular premature complex 2021    Volvulus ([address] 2021    Arthritis 2022    Temporomandibular joint disorder 2021    Essential hypertension 2020    Hypertensive disorder 2021    Systemic lupus erythematosus ([address] 2018    Hypertensive optic neuropathy 2021    Chronic back pain 2021    Chronic neck pain 2021    Chronic pain disorder 04/15/2016     Resolved Ambulatory Problems     Diagnosis Date Noted    No Resolved Ambulatory Problems     Past Medical History:   Diagnosis Date    Bipolar 1 disorder ([address]     GERD (gastroesophageal reflux disease)     Migraines     Movement disorder     Neuromuscular disorder [facility])     Psychiatric problem        Past Surgical History:   Procedure Laterality Date    ABDOMINAL EXPLORATION SURGERY      ADENOIDECTOMY       SECTION x 2     SECTION, CLASSIC N/A     COLONOSCOPY      DILATATION, ESOPHAGUS      EXTERNAL EAR SURGERY      lobe    GASTRIC BAND      and removed     GASTRIC BYPASS SURGERY      HERNIA REPAIR      HERNIA REPAIR N/A     HYSTERECTOMY (CERVIX STATUS UNKNOWN)      PORT SURGERY N/A 2022    PORT INSERTION WITH ULTRASOUND & FLUOROSCOPY performed by Carrie Manuel MD at [address] [place] [address]      SMALL INTESTINE SURGERY      TONSILLECTOMY      TONSILLECTOMY AND ADENOIDECTOMY      UPPER GASTROINTESTINAL ENDOSCOPY         Family History   Problem Relation Age of Onset    Anemia Mother     Breast Cancer Mother     Depression Mother     High Blood Pressure Mother     Mental Illness Mother     Miscarriages / [place] Mother     Colon Cancer Father     Depression Father     High Blood Pressure Father     Mental Illness Father     Breast Cancer Maternal Grandmother     Alcohol Abuse Maternal Grandfather     Anemia Paternal Grandmother     Alcohol Abuse Paternal Grandfather     Osteoporosis Paternal Grandfather     Stroke Paternal Grandfather     Substance Abuse Maternal Cousin     Substance Abuse Paternal Cousin     Asthma Other        Allergies   Allergen Reactions    Avelox [Moxifloxacin] Anaphylaxis    Glatiramer Rash    Sugar-Protein-Starch Other (See Comments)    Avelox [Moxifloxacin]     Copaxone [Glatiramer Acetate]     Depakote [Divalproex Sodium]     Gabapentin Itching    Hydroxychloroquine      Other reaction(s):  Other (See Comments)  Vision loss    Invega [Paliperidone]     Lamictal [Lamotrigine]     Lithium     Pregabalin      Other reaction(s): Mental Status Change    Saquinavir     Varenicline Tartrate     Lamictal [Lamotrigine] Rash    Lithium Rash    Paliperidone Er Rash       Social History     Socioeconomic History    Marital status: Single     Spouse name: Not on file    Number of children: 3    Years of education: Some College    Highest education level: Not on file   Occupational History    Occupation: Disability   Tobacco Use    Smoking status: Every Day     Packs/day: 1.00     Years: 15.00     Pack years: 15.00     Types: Cigarettes    Smokeless tobacco: Never   Vaping Use    Vaping Use: Never used   Substance and Sexual Activity    Alcohol use: Not Currently     Alcohol/week: 2.0 standard drinks     Types: 2 Shots of liquor per week    Drug use: Yes     Types: Marijuana (Weed)     Comment: occ. Sexual activity: Never     Partners: Male   Other Topics Concern    Not on file   Social History Narrative    ** Merged History Encounter **          Social Determinants of Health     Financial Resource Strain: Not on file   Food Insecurity: Not on file   Transportation Needs: Not on file   Physical Activity: Not on file   Stress: Not on file   Social Connections: Not on file   Intimate Partner Violence: Not on file   Housing Stability: Not on file     Review of Systems     Constitutional - No fever or chills. No diaphoresis or significant fatigue. HENT -  No tinnitus or significant hearing loss. Eyes - no sudden vision change or eye pain  Respiratory - yes significant shortness of breath or cough  Cardiovascular - no chest pain yes palpitations or significant leg swelling  Gastrointestinal - no abdominal swelling or pain. Genitourinary - No difficulty urinating, dysuria  Musculoskeletal - yes back pain or myalgia. Skin - no color change or rash  Neurologic - No seizures. No lateralizing weakness. Hematologic - no easy bruising or excessive bleeding. Psychiatric - yes severe anxiety or nervousness. All other review of systems are negative.     Current Outpatient Medications   Medication Sig Dispense Refill    cloNIDine (CATAPRES) 0.1 MG tablet Take 0.1 mg by mouth 2 times daily      Atogepant (QULIPTA) 60 MG TABS Take 60 mg by mouth daily 30 tablet 11    Ubrogepant 100 MG TABS Take 100 mg by mouth as needed (max 2 in 24 hours) 10 tablet 11    Rimegepant Sulfate (NURTEC) 75 MG TBDP Take 75 mg by mouth as needed (max one in 24 hours) 30 tablet 5    dronabinol (MARINOL) 10 MG capsule TAKE TWO CAPSULES BY MOUTH THREE TIMES A  capsule 5    rizatriptan (MAXALT) 10 MG tablet TAKE 1 TABLET BY MOUTH 1 TIME AS NEEDED FOR MIGRAINE. MAX 2 IN 24 HOURS.  MAY REPEAT IN 2 HOURS AS NEEDED 9 tablet 5    linaclotide (LINZESS) 145 MCG capsule Take 145 mcg by mouth every morning (before breakfast)      LATUDA 40 MG TABS tablet Take 40 mg by mouth daily      ondansetron (ZOFRAN) 4 MG tablet Take 1 tablet by mouth daily as needed for Nausea or Vomiting 30 tablet 5    REXULTI 4 MG TABS tablet Take 4 mg by mouth daily      estradiol (ESTRACE) 0.5 MG tablet Take 0.5 mg by mouth daily      prazosin (MINIPRESS) 2 MG capsule Take 2 mg by mouth nightly      baclofen (LIORESAL) 10 MG tablet Take 1 tablet by mouth 3 times daily 90 tablet 5    Prenatal Vit-Fe Fumarate-FA (PRENATAL 1+1 PO) Take 1 tablet by mouth daily      traZODone (DESYREL) 100 MG tablet Take 150 mg by mouth nightly      TRINTELLIX 10 MG TABS tablet Take 10 mg by mouth daily      B-D 3CC LUER-ARNOLDO SYR 25GX1\" 25G X 1\" 3 ML MISC AS DIRECTED      sucralfate (CARAFATE) 1 GM tablet Take 1 g by mouth 4 times daily      omeprazole (PRILOSEC) 20 MG delayed release capsule Take 20 mg by mouth Daily      furosemide (LASIX) 20 MG tablet Take 20 mg by mouth daily as needed      azaTHIOprine (IMURAN) 50 MG tablet Take 50 mg by mouth daily      Calcium Carb-Cholecalciferol 600-500 MG-UNIT CAPS Take 1 capsule by mouth daily      potassium chloride (KLOR-CON M) 20 MEQ extended release tablet Take 20 mEq by mouth 2 times daily      hydrOXYzine (ATARAX) 50 MG tablet Take 50 mg by mouth 2 times daily      butalbital-acetaminophen-caffeine (FIORICET, ESGIC) -40 MG per tablet Take 1 tablet by mouth every 4 hours as needed for Headaches      cyanocobalamin 1000 MCG/ML injection Inject 1,000 mcg into the muscle every 21 days       pantoprazole sodium (PROTONIX) 40 MG PACK packet Take 40 mg by mouth 2 times daily      alendronate (FOSAMAX) 70 MG tablet Take 70 mg by mouth every 7 days      corticotropin (ACTHAR) 80 UNIT/ML injectable gel Inject 1 mL into the muscle daily for 5 days (Patient taking differently: Inject 80 Units into the muscle daily ON HOLD FOR NOW) 5 each 11    SUMAtriptan (IMITREX) 100 MG tablet Take 1 tablet by mouth once as needed for Migraine (max 2 in 24 hrs) 9 tablet 5     No current facility-administered medications for this visit. /87   Pulse 97   Ht 5' 5\" (1.651 m)   Wt 178 lb (80.7 kg)   LMP 05/18/2021   BMI 29.62 kg/m²     Constitutional - well developed, well nourished. Eyes - conjunctiva normal.  Ear, nose, throat - No scars, masses, or lesions over external nose or ears, no atrophy of tongue  Neck-symmetric, no masses noted, no jugular vein distension  Respiration- chest wall appears symmetric, good expansion,   normal effort without use of accessory muscles  Musculoskeletal - no significant wasting of muscles noted, no bony deformities  Extremities-no clubbing, cyanosis or edema  Skin - warm, dry, and intact. No rash, erythema, or pallor.   Psychiatric - mood, affect, and behavior appear normal.      Neurological exam  Awake, alert, fluent oriented  appropriate affect  Attention and concentration appear appropriate  Recent and remote memory appears unremarkable  Speech normal without dysarthria  No clear issues with language of fund of knowledge    Cranial Nerve Exam     CN III, IV,VI-EOMI, No nystagmus, conjugate eye movements, no ptosis  CN VII-no facial assymetry        Motor Exam  antigravity throughout upper and lower extremities bilaterally    Tremors- no tremors in hands or head noted    Gait  Normal base and speed  No ataxia    Lab Results   Component Value Date    AZVXJZXG19 753 09/12/2018     Lab Results   Component Value Date    WBC 9.3 10/18/2022    HGB 13.2 10/18/2022    HCT 39.1 10/18/2022    MCV 89.7 10/18/2022  10/18/2022     Lab Results   Component Value Date     10/18/2022    K 3.7 10/18/2022     10/18/2022    CO2 26 10/18/2022    BUN 17 10/18/2022    CREATININE 0.8 10/18/2022    GLUCOSE 131 (H) 10/18/2022    CALCIUM 8.7 10/18/2022    PROT 7.7 10/18/2022    LABALBU 3.2 (L) 10/18/2022    BILITOT <0.2 10/18/2022    ALKPHOS 62 10/18/2022    AST 29 10/18/2022    ALT 22 10/18/2022    LABGLOM >60 10/18/2022    GFRAA >59 09/01/2022    GLOB 3.0 11/14/2016       Impression   1. No diffusion restriction to indicate acute ischemia. 2. Scattered nonenhancing hyperintense T2/FLAIR signal changes within   the periventricular and white matter regions favorable for MS plaques. No abnormal enhancement to indicate active demyelination. 3. Partial opacification inferior right mastoid air cells, mastoid   effusion considered. Signed by Dr Emre Thomas on 7/4/2020 7:10 PM         Assessment    ICD-10-CM    1. Multiple sclerosis ([address]  G35       2. Intractable chronic migraine without aura and without status migrainosus  G43.719       3. Weakness  R53.1       4. Visual disturbance  H53.9       5. Tremor  R25.1       6. Sciatica, unspecified laterality  M54.30       7. Optic neuritis  H46.9       8. Lupus ([address]  M32.9       9. Numbness  R20.0       10. Fibromyalgia  M79.7             Her neurological examination previously significant for some hesitance of speech, decreased visual acuity, right sided numbness and some mild slowness of speech. She has some mild postural tremor in the hands. Previously she was given Solumedrol 1 gm IV daily x 5 days. She is off Tecfidera and Ocrevus. She did not want to be on Tecfidera due to side effects. Her hepatitis panel was unremarkable. She is seeing  MS specialist in [place]. Her video EEG x 3 days was unremarkable with no events captured. She is seeing another rheumatologist in [facility],She will have her Marinol increased to 20 mg tid. She will need something for fatigue. She plans to research long term side effects and benefits of more frequent ACTHAR. She will be tried on [place] and [place], Imitrex, [place] for migraines. We talked about constipation with Charito Butts. we may consider Tysabri. She is continue IVIG one a week. She will be referred to general surgery for port placement for IVIG. The patient indicated understanding of the management plan. She will be continued  on Baclofen for spasms. She will be tried on [place], [place]. She is aware of the increased risk of MI with Maxalt and Imitrex. She is to follow up with me in 3 months and call with any issues. . Continue present care as noted. Plan    No orders of the defined types were placed in this encounter. Orders Placed This Encounter   Medications    Atogepant (QULIPTA) 60 MG TABS     Sig: Take 60 mg by mouth daily     Dispense:  30 tablet     Refill:  11    Ubrogepant 100 MG TABS     Sig: Take 100 mg by mouth as needed (max 2 in 24 hours)     Dispense:  10 tablet     Refill:  11    Rimegepant Sulfate (NURTEC) 75 MG TBDP     Sig: Take 75 mg by mouth as needed (max one in 24 hours)     Dispense:  30 tablet     Refill:  5         Return in about 3 months (around 3/15/2023). EMR Dragon/transcription disclaimer:Significant part of this  encounter note is electronic transcription/translation of spoken language to printed text. The electronic translation of spoken language may be erroneous, or at times, nonsensical words or phrases may be inadvertently transcribed.  Although I have reviewed the note for such errors, some may still exist.

## 2022-12-15 NOTE — PROGRESS NOTES
Review of Systems    Constitutional - No fever or chills. No diaphoresis or significant fatigue. HENT -  No tinnitus or significant hearing loss. Eyes - no sudden vision change or eye pain  Respiratory - yes significant shortness of breath or cough  Cardiovascular - no chest pain yes palpitations or significant leg swelling  Gastrointestinal - no abdominal swelling or pain. Genitourinary - No difficulty urinating, dysuria  Musculoskeletal - yes back pain or myalgia. Skin - no color change or rash  Neurologic - No seizures. No lateralizing weakness. Hematologic - no easy bruising or excessive bleeding. Psychiatric - yes severe anxiety or nervousness. All other review of systems are negative.

## 2022-12-16 ENCOUNTER — HOSPITAL ENCOUNTER (OUTPATIENT)
Dept: INFUSION THERAPY | Age: 47
Setting detail: INFUSION SERIES
Discharge: HOME | End: 2022-12-16
Payer: MEDICARE

## 2022-12-16 VITALS
OXYGEN SATURATION: 100 % | HEART RATE: 65 BPM | SYSTOLIC BLOOD PRESSURE: 128 MMHG | BODY MASS INDEX: 29.42 KG/M2 | WEIGHT: 176.56 LBS | DIASTOLIC BLOOD PRESSURE: 85 MMHG | RESPIRATION RATE: 20 BRPM | HEIGHT: 65 IN | TEMPERATURE: 97.9 F

## 2022-12-16 DIAGNOSIS — G35 MULTIPLE SCLEROSIS (HCC): Primary | ICD-10-CM

## 2022-12-16 PROCEDURE — 6370000000 HC RX 637 (ALT 250 FOR IP): Performed by: PSYCHIATRY & NEUROLOGY

## 2022-12-16 PROCEDURE — 2580000003 HC RX 258: Performed by: PSYCHIATRY & NEUROLOGY

## 2022-12-16 PROCEDURE — 6360000002 HC RX W HCPCS: Performed by: PSYCHIATRY & NEUROLOGY

## 2022-12-16 PROCEDURE — 96365 THER/PROPH/DIAG IV INF INIT: CPT

## 2022-12-16 PROCEDURE — 96375 TX/PRO/DX INJ NEW DRUG ADDON: CPT

## 2022-12-16 PROCEDURE — 96366 THER/PROPH/DIAG IV INF ADDON: CPT

## 2022-12-16 RX ORDER — MEPERIDINE HYDROCHLORIDE 25 MG/ML
12.5 INJECTION INTRAMUSCULAR; INTRAVENOUS; SUBCUTANEOUS PRN
OUTPATIENT
Start: 2022-12-16

## 2022-12-16 RX ORDER — SODIUM CHLORIDE 0.9 % (FLUSH) 0.9 %
5-40 SYRINGE (ML) INJECTION PRN
OUTPATIENT
Start: 2022-12-16

## 2022-12-16 RX ORDER — ACETAMINOPHEN 325 MG/1
650 TABLET ORAL ONCE
OUTPATIENT
Start: 2022-12-16 | End: 2022-12-16

## 2022-12-16 RX ORDER — ONDANSETRON 2 MG/ML
8 INJECTION INTRAMUSCULAR; INTRAVENOUS
OUTPATIENT
Start: 2022-12-16

## 2022-12-16 RX ORDER — HEPARIN SODIUM (PORCINE) LOCK FLUSH IV SOLN 100 UNIT/ML 100 UNIT/ML
500 SOLUTION INTRAVENOUS PRN
Start: 2022-12-16

## 2022-12-16 RX ORDER — HEPARIN SODIUM (PORCINE) LOCK FLUSH IV SOLN 100 UNIT/ML 100 UNIT/ML
500 SOLUTION INTRAVENOUS PRN
Status: DISCONTINUED | OUTPATIENT
Start: 2022-12-16 | End: 2022-12-18 | Stop reason: HOSPADM

## 2022-12-16 RX ORDER — DIPHENHYDRAMINE HYDROCHLORIDE 50 MG/ML
50 INJECTION INTRAMUSCULAR; INTRAVENOUS ONCE
Status: COMPLETED | OUTPATIENT
Start: 2022-12-16 | End: 2022-12-16

## 2022-12-16 RX ORDER — ALBUTEROL SULFATE 90 UG/1
4 AEROSOL, METERED RESPIRATORY (INHALATION) PRN
OUTPATIENT
Start: 2022-12-16

## 2022-12-16 RX ORDER — SODIUM CHLORIDE 0.9 % (FLUSH) 0.9 %
5-40 SYRINGE (ML) INJECTION PRN
Status: DISCONTINUED | OUTPATIENT
Start: 2022-12-16 | End: 2022-12-17 | Stop reason: HOSPADM

## 2022-12-16 RX ORDER — ACETAMINOPHEN 325 MG/1
650 TABLET ORAL ONCE
Status: COMPLETED | OUTPATIENT
Start: 2022-12-16 | End: 2022-12-16

## 2022-12-16 RX ORDER — DIPHENHYDRAMINE HYDROCHLORIDE 50 MG/ML
50 INJECTION INTRAMUSCULAR; INTRAVENOUS
OUTPATIENT
Start: 2022-12-16

## 2022-12-16 RX ORDER — EPINEPHRINE 1 MG/ML
0.3 INJECTION, SOLUTION, CONCENTRATE INTRAVENOUS PRN
OUTPATIENT
Start: 2022-12-16

## 2022-12-16 RX ORDER — DIPHENHYDRAMINE HYDROCHLORIDE 50 MG/ML
50 INJECTION INTRAMUSCULAR; INTRAVENOUS ONCE
Start: 2022-12-16 | End: 2022-12-16

## 2022-12-16 RX ORDER — ACETAMINOPHEN 325 MG/1
650 TABLET ORAL
OUTPATIENT
Start: 2022-12-16

## 2022-12-16 RX ORDER — SODIUM CHLORIDE 9 MG/ML
INJECTION, SOLUTION INTRAVENOUS CONTINUOUS
OUTPATIENT
Start: 2022-12-16

## 2022-12-16 RX ADMIN — SODIUM CHLORIDE, PRESERVATIVE FREE 20 ML: 5 INJECTION INTRAVENOUS at 08:53

## 2022-12-16 RX ADMIN — DIPHENHYDRAMINE HYDROCHLORIDE 50 MG: 50 INJECTION, SOLUTION INTRAMUSCULAR; INTRAVENOUS at 06:59

## 2022-12-16 RX ADMIN — HYDROCORTISONE SODIUM SUCCINATE 100 MG: 100 INJECTION, POWDER, FOR SOLUTION INTRAMUSCULAR; INTRAVENOUS at 06:58

## 2022-12-16 RX ADMIN — IMMUNE GLOBULIN (HUMAN) 30 G: 10 INJECTION INTRAVENOUS; SUBCUTANEOUS at 07:16

## 2022-12-16 RX ADMIN — ACETAMINOPHEN 650 MG: 325 TABLET ORAL at 06:57

## 2022-12-16 RX ADMIN — Medication 500 UNITS: at 08:53

## 2022-12-16 NOTE — DISCHARGE INSTRUCTIONS
immune globulin (intravenous and subcutaneous)  Pronunciation:  dinora LACEY foley  Brand:  Gammagard Liquid, Gammaked, Gamunex-C  What is the most important information I should know about immune globulin? This medicine can cause blood clots. The risk is highest in older adults or in people who have had blood clots, heart problems, or blood circulation problems. Blood clots are also more likely during long-term bedrest, while using birth control pills or hormone replacement therapy, or while having a central intravenous (IV) catheter in place. Call your doctor at once if you have chest pain, trouble breathing, fast heartbeats, numbness or weakness, or swelling and warmth or discoloration in an arm or leg. This medicine can also harm your kidneys, especially if you have kidney disease or if you also use certain medicines. Tell your doctor right away if you have signs of kidney problems, such as swelling, rapid weight gain, and little or no urination. What is immune globulin? Immune globulin intravenous and subcutaneous (for injection into a vein or under the skin) is used to treat primary immunodeficiency. Immune globulin is also used to increase platelets (blood clotting cells) in people with idiopathic thrombocytopenic purpura. Immune globulin is also used to treat certain debilitating nerve disorders that cause muscle weakness and can affect daily activities. Immune globulin may also be used for purposes not listed in this medication guide. What should I discuss with my healthcare provider before using immune globulin? You should not use this medicine if:  you have had an allergic reaction to an immune globulin or blood product; or  you have immune globulin A (IgA) deficiency with antibody to IgA. Immune globulin can cause blood clots or kidney problems, especially in older adults or in people with certain conditions.  Tell your doctor if you have ever had:  heart problems, blood circulation problems, or \"thick blood\";  a stroke or blood clot;  kidney disease;  diabetes;  an infection called sepsis;  if you use estrogens (birth control pills or hormone replacement therapy);  if you have been on long-term bedrest; or  if you have a central intravenous (IV) catheter in place. You may need a dose adjustment if you are exposed to measles, or if you travel to an area where this disease is common. Tell your doctor if you are pregnant or breastfeeding. Immune globulin is made from donated human plasma and may contain viruses or other infectious agents. Donated plasma is tested and treated to reduce the risk of contamination, but there is still a small possibility it could transmit disease. Ask your doctor about any possible risk. How should I use immune globulin? Follow all directions on your prescription label and read all medication guides or instruction sheets. Your doctor may occasionally change your dose. Use the medicine exactly as directed. Immune globulin is given as an infusion into a vein, or injected under the skin using an infusion pump. A healthcare provider will give your first dose and may teach you how to properly use the medication by yourself. Do not inject immune globulin into a vein if you have been instructed to give the medicine as a subcutaneous injection (under the skin). How you give this medication, how often you use it, and the length of your infusion time will depend on the condition being treated. Read and carefully follow any Instructions for Use provided with your medicine. Ask your doctor or pharmacist if you don't understand all instructions. Prepare an injection only when you are ready to give it. Do not use if the medicine looks cloudy, has changed colors, or has particles in it. Call your pharmacist for new medicine. Do not shake the medication bottle or you may ruin the medicine. Immune globulin must be given slowly.  You may need to use several catheters to inject this medicine into different body areas at the same time. Your healthcare provider will show you the best places on your body to inject the medication. Keep a diary of the days and times you gave the injection and where you injected it on your body. Drink plenty of liquids  while you are using this medicine to help improve your blood flow and keep your kidneys working properly. You may need frequent blood or urine tests. This medicine can affect the results of certain medical tests. Tell any doctor who treats you that you are using immune globulin. Store this medicine in its original carton in the refrigerator. Do not freeze immune globulin, and throw the medicine away if it has frozen. Take the medicine out of the refrigerator and let it reach room temperature for up to 1 hour before injecting your dose. You may also store immune globulin at room temperature. You will need to use immune globulin within a certain number months. This will depend on the how you store the medicine (at room temperature, or in a refrigerator). Carefully follow the storage instructions provided with your medicine. Do not use the medicine after the expiration date on the label has passed. Each vial (bottle) is for one use only. Throw it away after one use, even if there is still medicine left inside. Use disposable injection items (needle, catheter, tubing) only once and then place them in a puncture-proof \"sharps\" container. Follow state or local laws about how to dispose of this container. Keep it out of the reach of children and pets. What happens if I miss a dose? Call your doctor for instructions if you miss a dose. What happens if I overdose? Seek emergency medical attention or call the Poison Help line at 1-118.805.5257. What should I avoid while using immune globulin? Do not receive a \"live\" vaccine while using immune globulin. The vaccine may not work as well and may not fully protect you from disease.  Live vaccines include measles, mumps, rubella (MMR), rotavirus, typhoid, yellow fever, varicella (chickenpox), zoster (shingles), and nasal flu (influenza) vaccine. What are the possible side effects of immune globulin? Get emergency medical help if you have signs of an allergic reaction: hives; wheezing, difficulty breathing; dizziness, feeling like you might pass out; swelling of your face, lips, tongue, or throat. Some side effects may occur during the injection. Tell your caregiver if you feel light-headed, itchy, chilled, sweaty, or have chest discomfort, fast heartbeats, severe headache, or pounding in your neck or ears. Call your doctor at once if you have:  a blood cell disorder --pale or yellowed skin, dark colored urine, fever, confusion or weakness;  dehydration symptoms --feeling very thirsty or hot, being unable to urinate, heavy sweating, or hot and dry skin;  kidney problems --little or no urination, swelling, rapid weight gain, feeling short of breath;  lung problems --chest pain, wheezing, trouble breathing, blue colored lips, fingers, or toes;  signs of a new infection --fever with a severe headache, neck stiffness, eye pain, and increased sensitivity to light; or  signs of a blood clot --shortness of breath, chest pain with deep breathing, rapid heart rate, numbness or weakness on one side of the body, swelling and warmth or discoloration in an arm or leg. Common side effects may include:  runny or stuffy nose, sinus pain, cough, sore throat;  fever, chills, weakness;  headache, back pain, muscle or joint pain;  dizziness, tiredness, depressed mood;  swelling in your hands or feet;  skin rash, redness, or bruising;  blisters or ulcers in your mouth, red or swollen gums, trouble swallowing;  nausea, diarrhea, stomach pain, upset stomach;  increased blood pressure; or  redness, swelling, or itching where an injection was given. This is not a complete list of side effects and others may occur.  Call your doctor for medical advice about side effects. You may report side effects to FDA at 9-707-FBT-8010. What other drugs will affect immune globulin? Immune globulin can harm your kidneys, especially if you also use certain medicines for infections, cancer, osteoporosis, organ transplant rejection, bowel disorders, high blood pressure, or pain or arthritis (including Advil, Motrin, and Aleve). Other drugs may affect immune globulin, including prescription and over-the-counter medicines, vitamins, and herbal products. Tell your doctor about all your current medicines and any medicine you start or stop using. Where can I get more information? Your doctor or pharmacist can provide more information about immune globulin. Remember, keep this and all other medicines out of the reach of children, never share your medicines with others, and use this medication only for the indication prescribed. Every effort has been made to ensure that the information provided by [address] is accurate, up-to-date, and complete, but no guarantee is made to that effect. Drug information contained herein may be time sensitive. [employer] information has been compiled for use by healthcare practitioners and consumers in the [place] and therefore [employer] does not warrant that uses outside of the [place] are appropriate, unless specifically indicated otherwise. [employer] drug information does not endorse drugs, diagnose patients or recommend therapy. [employer] [facility] [employer] drug information is an informational resource designed to assist licensed healthcare practitioners in caring for their patients and/or to serve consumers viewing this service as a supplement to, and not a substitute for, the expertise, skill, knowledge and judgment of healthcare practitioners.  The absence of a warning for a given drug or drug combination in no way should be construed to indicate that the drug or drug combination is safe, effective or appropriate for any given patient. [facility] does not assume any responsibility for any aspect of healthcare administered with the aid of information [facility] provides. The information contained herein is not intended to cover all possible uses, directions, precautions, warnings, drug interactions, allergic reactions, or adverse effects. If you have questions about the drugs you are taking, check with your doctor, nurse or pharmacist.  Copyright 7978-8049 [facility] [place]. Version: 4.02. Revision date: 2/26/2021. Care instructions adapted under license by [facility] ([facility]). If you have questions about a medical condition or this instruction, always ask your healthcare professional. [place] [address] any warranty or liability for your use of this information.

## 2022-12-19 RX ORDER — BACLOFEN 10 MG/1
TABLET ORAL
Qty: 90 TABLET | Refills: 5 | Status: SHIPPED | OUTPATIENT
Start: 2022-12-19

## 2022-12-19 NOTE — TELEPHONE ENCOUNTER
Requested Prescriptions     Pending Prescriptions Disp Refills    baclofen (LIORESAL) 10 MG tablet [Pharmacy Med Name: BACLOFEN 10MG TABLETS] 90 tablet 5     Sig: TAKE 1 TABLET BY MOUTH THREE TIMES DAILY       Last Office Visit: 12/15/2022  Next Office Visit: 3/15/2023  Last Medication Refill: 6/10/22 with [facility] MARAVILLA

## 2022-12-27 ENCOUNTER — TELEPHONE (OUTPATIENT)
Dept: NEUROLOGY | Age: 47
End: 2022-12-27

## 2022-12-27 DIAGNOSIS — M79.7 FIBROMYALGIA: ICD-10-CM

## 2022-12-27 DIAGNOSIS — R20.0 NUMBNESS: ICD-10-CM

## 2022-12-27 NOTE — TELEPHONE ENCOUNTER
Patients Marinol isn't covered at [facility]. Script canceled Patient wishes to have script sent to [facility].    PLEASE RESEND     Requested Prescriptions     Pending Prescriptions Disp Refills    dronabinol (MARINOL) 10 MG capsule 180 capsule 5     Sig: TAKE TWO CAPSULES BY MOUTH THREE TIMES A DAY       Last Office Visit:  12/15/2022  Next Office Visit:  3/15/2023  Last Medication Refill:  9/6/2022 with 5 RF   Claudina Rubinstein up to date:  12/27/2022    *RX updated to reflect   12/27/2022  fill date*

## 2022-12-28 RX ORDER — DRONABINOL 10 MG/1
CAPSULE ORAL
Qty: 180 CAPSULE | Refills: 5 | Status: SHIPPED | OUTPATIENT
Start: 2022-12-28 | End: 2023-01-25

## 2022-12-30 ENCOUNTER — HOSPITAL ENCOUNTER (OUTPATIENT)
Dept: INFUSION THERAPY | Age: 47
Setting detail: INFUSION SERIES
Discharge: HOME | End: 2022-12-30
Payer: MEDICARE

## 2022-12-30 VITALS
WEIGHT: 173 LBS | BODY MASS INDEX: 28.79 KG/M2 | OXYGEN SATURATION: 99 % | TEMPERATURE: 97 F | SYSTOLIC BLOOD PRESSURE: 123 MMHG | DIASTOLIC BLOOD PRESSURE: 89 MMHG | RESPIRATION RATE: 18 BRPM | HEART RATE: 74 BPM

## 2022-12-30 DIAGNOSIS — G35 MULTIPLE SCLEROSIS (HCC): Primary | ICD-10-CM

## 2022-12-30 PROCEDURE — 6370000000 HC RX 637 (ALT 250 FOR IP): Performed by: PSYCHIATRY & NEUROLOGY

## 2022-12-30 PROCEDURE — 96366 THER/PROPH/DIAG IV INF ADDON: CPT

## 2022-12-30 PROCEDURE — 96375 TX/PRO/DX INJ NEW DRUG ADDON: CPT

## 2022-12-30 PROCEDURE — 2580000003 HC RX 258: Performed by: PSYCHIATRY & NEUROLOGY

## 2022-12-30 PROCEDURE — 6360000002 HC RX W HCPCS: Performed by: PSYCHIATRY & NEUROLOGY

## 2022-12-30 PROCEDURE — 96365 THER/PROPH/DIAG IV INF INIT: CPT

## 2022-12-30 RX ORDER — DIPHENHYDRAMINE HYDROCHLORIDE 50 MG/ML
50 INJECTION INTRAMUSCULAR; INTRAVENOUS ONCE
Status: CANCELLED
Start: 2022-12-30 | End: 2022-12-30

## 2022-12-30 RX ORDER — ONDANSETRON 2 MG/ML
8 INJECTION INTRAMUSCULAR; INTRAVENOUS
OUTPATIENT
Start: 2022-12-30

## 2022-12-30 RX ORDER — DIPHENHYDRAMINE HYDROCHLORIDE 50 MG/ML
50 INJECTION INTRAMUSCULAR; INTRAVENOUS
OUTPATIENT
Start: 2022-12-30

## 2022-12-30 RX ORDER — ACETAMINOPHEN 325 MG/1
650 TABLET ORAL ONCE
Status: CANCELLED | OUTPATIENT
Start: 2022-12-30 | End: 2022-12-30

## 2022-12-30 RX ORDER — SODIUM CHLORIDE 0.9 % (FLUSH) 0.9 %
5-40 SYRINGE (ML) INJECTION PRN
Status: DISCONTINUED | OUTPATIENT
Start: 2022-12-30 | End: 2022-12-31 | Stop reason: HOSPADM

## 2022-12-30 RX ORDER — ACETAMINOPHEN 325 MG/1
650 TABLET ORAL
OUTPATIENT
Start: 2022-12-30

## 2022-12-30 RX ORDER — MEPERIDINE HYDROCHLORIDE 25 MG/ML
12.5 INJECTION INTRAMUSCULAR; INTRAVENOUS; SUBCUTANEOUS PRN
OUTPATIENT
Start: 2022-12-30

## 2022-12-30 RX ORDER — HEPARIN SODIUM (PORCINE) LOCK FLUSH IV SOLN 100 UNIT/ML 100 UNIT/ML
500 SOLUTION INTRAVENOUS PRN
Status: DISCONTINUED | OUTPATIENT
Start: 2022-12-30 | End: 2023-01-01 | Stop reason: HOSPADM

## 2022-12-30 RX ORDER — EPINEPHRINE 1 MG/ML
0.3 INJECTION, SOLUTION, CONCENTRATE INTRAVENOUS PRN
OUTPATIENT
Start: 2022-12-30

## 2022-12-30 RX ORDER — SODIUM CHLORIDE 9 MG/ML
INJECTION, SOLUTION INTRAVENOUS CONTINUOUS
OUTPATIENT
Start: 2022-12-30

## 2022-12-30 RX ORDER — DIPHENHYDRAMINE HYDROCHLORIDE 50 MG/ML
50 INJECTION INTRAMUSCULAR; INTRAVENOUS ONCE
Status: COMPLETED | OUTPATIENT
Start: 2022-12-30 | End: 2022-12-30

## 2022-12-30 RX ORDER — SODIUM CHLORIDE 0.9 % (FLUSH) 0.9 %
5-40 SYRINGE (ML) INJECTION PRN
Status: CANCELLED | OUTPATIENT
Start: 2022-12-30

## 2022-12-30 RX ORDER — ALBUTEROL SULFATE 90 UG/1
4 AEROSOL, METERED RESPIRATORY (INHALATION) PRN
OUTPATIENT
Start: 2022-12-30

## 2022-12-30 RX ORDER — ACETAMINOPHEN 325 MG/1
650 TABLET ORAL ONCE
Status: COMPLETED | OUTPATIENT
Start: 2022-12-30 | End: 2022-12-30

## 2022-12-30 RX ORDER — HEPARIN SODIUM (PORCINE) LOCK FLUSH IV SOLN 100 UNIT/ML 100 UNIT/ML
500 SOLUTION INTRAVENOUS PRN
Status: CANCELLED
Start: 2022-12-30

## 2022-12-30 RX ADMIN — IMMUNE GLOBULIN (HUMAN) 30 G: 10 INJECTION INTRAVENOUS; SUBCUTANEOUS at 07:25

## 2022-12-30 RX ADMIN — ACETAMINOPHEN 650 MG: 325 TABLET ORAL at 07:13

## 2022-12-30 RX ADMIN — Medication 500 UNITS: at 09:08

## 2022-12-30 RX ADMIN — HYDROCORTISONE SODIUM SUCCINATE 100 MG: 100 INJECTION, POWDER, FOR SOLUTION INTRAMUSCULAR; INTRAVENOUS at 07:13

## 2022-12-30 RX ADMIN — SODIUM CHLORIDE, PRESERVATIVE FREE 20 ML: 5 INJECTION INTRAVENOUS at 09:08

## 2022-12-30 RX ADMIN — DIPHENHYDRAMINE HYDROCHLORIDE 50 MG: 50 INJECTION, SOLUTION INTRAMUSCULAR; INTRAVENOUS at 07:14

## 2022-12-30 ASSESSMENT — PAIN SCALES - GENERAL: PAINLEVEL_OUTOF10: 0

## 2022-12-30 NOTE — DISCHARGE INSTRUCTIONS
immune globulin (intravenous and subcutaneous)  Pronunciation:  dinora LACEY foley  Brand:  Gammagard Liquid, Gammaked, Gamunex-C  What is the most important information I should know about immune globulin? This medicine can cause blood clots. The risk is highest in older adults or in people who have had blood clots, heart problems, or blood circulation problems. Blood clots are also more likely during long-term bedrest, while using birth control pills or hormone replacement therapy, or while having a central intravenous (IV) catheter in place. Call your doctor at once if you have chest pain, trouble breathing, fast heartbeats, numbness or weakness, or swelling and warmth or discoloration in an arm or leg. This medicine can also harm your kidneys, especially if you have kidney disease or if you also use certain medicines. Tell your doctor right away if you have signs of kidney problems, such as swelling, rapid weight gain, and little or no urination. What is immune globulin? Immune globulin intravenous and subcutaneous (for injection into a vein or under the skin) is used to treat primary immunodeficiency. Immune globulin is also used to increase platelets (blood clotting cells) in people with idiopathic thrombocytopenic purpura. Immune globulin is also used to treat certain debilitating nerve disorders that cause muscle weakness and can affect daily activities. Immune globulin may also be used for purposes not listed in this medication guide. What should I discuss with my healthcare provider before using immune globulin? You should not use this medicine if:  you have had an allergic reaction to an immune globulin or blood product; or  you have immune globulin A (IgA) deficiency with antibody to IgA. Immune globulin can cause blood clots or kidney problems, especially in older adults or in people with certain conditions.  Tell your doctor if you have ever had:  heart problems, blood circulation problems, or \"thick blood\";  a stroke or blood clot;  kidney disease;  diabetes;  an infection called sepsis;  if you use estrogens (birth control pills or hormone replacement therapy);  if you have been on long-term bedrest; or  if you have a central intravenous (IV) catheter in place. You may need a dose adjustment if you are exposed to measles, or if you travel to an area where this disease is common. Tell your doctor if you are pregnant or breastfeeding. Immune globulin is made from donated human plasma and may contain viruses or other infectious agents. Donated plasma is tested and treated to reduce the risk of contamination, but there is still a small possibility it could transmit disease. Ask your doctor about any possible risk. How should I use immune globulin? Follow all directions on your prescription label and read all medication guides or instruction sheets. Your doctor may occasionally change your dose. Use the medicine exactly as directed. Immune globulin is given as an infusion into a vein, or injected under the skin using an infusion pump. A healthcare provider will give your first dose and may teach you how to properly use the medication by yourself. Do not inject immune globulin into a vein if you have been instructed to give the medicine as a subcutaneous injection (under the skin). How you give this medication, how often you use it, and the length of your infusion time will depend on the condition being treated. Read and carefully follow any Instructions for Use provided with your medicine. Ask your doctor or pharmacist if you don't understand all instructions. Prepare an injection only when you are ready to give it. Do not use if the medicine looks cloudy, has changed colors, or has particles in it. Call your pharmacist for new medicine. Do not shake the medication bottle or you may ruin the medicine. Immune globulin must be given slowly.  You may need to use several catheters to inject this medicine into different body areas at the same time. Your healthcare provider will show you the best places on your body to inject the medication. Keep a diary of the days and times you gave the injection and where you injected it on your body. Drink plenty of liquids  while you are using this medicine to help improve your blood flow and keep your kidneys working properly. You may need frequent blood or urine tests. This medicine can affect the results of certain medical tests. Tell any doctor who treats you that you are using immune globulin. Store this medicine in its original carton in the refrigerator. Do not freeze immune globulin, and throw the medicine away if it has frozen. Take the medicine out of the refrigerator and let it reach room temperature for up to 1 hour before injecting your dose. You may also store immune globulin at room temperature. You will need to use immune globulin within a certain number months. This will depend on the how you store the medicine (at room temperature, or in a refrigerator). Carefully follow the storage instructions provided with your medicine. Do not use the medicine after the expiration date on the label has passed. Each vial (bottle) is for one use only. Throw it away after one use, even if there is still medicine left inside. Use disposable injection items (needle, catheter, tubing) only once and then place them in a puncture-proof \"sharps\" container. Follow state or local laws about how to dispose of this container. Keep it out of the reach of children and pets. What happens if I miss a dose? Call your doctor for instructions if you miss a dose. What happens if I overdose? Seek emergency medical attention or call the Poison Help line at 1-111.132.4475. What should I avoid while using immune globulin? Do not receive a \"live\" vaccine while using immune globulin. The vaccine may not work as well and may not fully protect you from disease.  Live vaccines include measles, mumps, rubella (MMR), rotavirus, typhoid, yellow fever, varicella (chickenpox), zoster (shingles), and nasal flu (influenza) vaccine. What are the possible side effects of immune globulin? Get emergency medical help if you have signs of an allergic reaction: hives; wheezing, difficulty breathing; dizziness, feeling like you might pass out; swelling of your face, lips, tongue, or throat. Some side effects may occur during the injection. Tell your caregiver if you feel light-headed, itchy, chilled, sweaty, or have chest discomfort, fast heartbeats, severe headache, or pounding in your neck or ears. Call your doctor at once if you have:  a blood cell disorder --pale or yellowed skin, dark colored urine, fever, confusion or weakness;  dehydration symptoms --feeling very thirsty or hot, being unable to urinate, heavy sweating, or hot and dry skin;  kidney problems --little or no urination, swelling, rapid weight gain, feeling short of breath;  lung problems --chest pain, wheezing, trouble breathing, blue colored lips, fingers, or toes;  signs of a new infection --fever with a severe headache, neck stiffness, eye pain, and increased sensitivity to light; or  signs of a blood clot --shortness of breath, chest pain with deep breathing, rapid heart rate, numbness or weakness on one side of the body, swelling and warmth or discoloration in an arm or leg. Common side effects may include:  runny or stuffy nose, sinus pain, cough, sore throat;  fever, chills, weakness;  headache, back pain, muscle or joint pain;  dizziness, tiredness, depressed mood;  swelling in your hands or feet;  skin rash, redness, or bruising;  blisters or ulcers in your mouth, red or swollen gums, trouble swallowing;  nausea, diarrhea, stomach pain, upset stomach;  increased blood pressure; or  redness, swelling, or itching where an injection was given. This is not a complete list of side effects and others may occur.  Call your doctor for medical advice about side effects. You may report side effects to FDA at 7-251-UPK-8992. What other drugs will affect immune globulin? Immune globulin can harm your kidneys, especially if you also use certain medicines for infections, cancer, osteoporosis, organ transplant rejection, bowel disorders, high blood pressure, or pain or arthritis (including Advil, Motrin, and Aleve). Other drugs may affect immune globulin, including prescription and over-the-counter medicines, vitamins, and herbal products. Tell your doctor about all your current medicines and any medicine you start or stop using. Where can I get more information? Your doctor or pharmacist can provide more information about immune globulin. Remember, keep this and all other medicines out of the reach of children, never share your medicines with others, and use this medication only for the indication prescribed. Every effort has been made to ensure that the information provided by [address] [facility] [address] is accurate, up-to-date, and complete, but no guarantee is made to that effect. Drug information contained herein may be time sensitive. [facility] information has been compiled for use by healthcare practitioners and consumers in the [facility] and therefore [employer] does not warrant that uses outside of the [facility] are appropriate, unless specifically indicated otherwise. [facility]'s drug information does not endorse drugs, diagnose patients or recommend therapy. [employer] drug information is an informational resource designed to assist licensed healthcare practitioners in caring for their patients and/or to serve consumers viewing this service as a supplement to, and not a substitute for, the expertise, skill, knowledge and judgment of healthcare practitioners.  The absence of a warning for a given drug or drug combination in no way should be construed to indicate that the drug or drug combination is safe, effective or appropriate for any given patient. [facility] does not assume any responsibility for any aspect of healthcare administered with the aid of information [facility] provides. The information contained herein is not intended to cover all possible uses, directions, precautions, warnings, drug interactions, allergic reactions, or adverse effects. If you have questions about the drugs you are taking, check with your doctor, nurse or pharmacist.  Copyright 6852-7950 [facility] [place]. Version: 4.02. Revision date: 2/26/2021. Care instructions adapted under license by [facility] ([facility]). If you have questions about a medical condition or this instruction, always ask your healthcare professional. [address] any warranty or liability for your use of this information.

## 2023-01-06 ENCOUNTER — HOSPITAL ENCOUNTER (OUTPATIENT)
Dept: INFUSION THERAPY | Age: 48
Setting detail: INFUSION SERIES
Discharge: HOME | End: 2023-01-06
Payer: MEDICARE

## 2023-01-06 VITALS
SYSTOLIC BLOOD PRESSURE: 142 MMHG | DIASTOLIC BLOOD PRESSURE: 95 MMHG | OXYGEN SATURATION: 95 % | RESPIRATION RATE: 16 BRPM | TEMPERATURE: 98.2 F | HEART RATE: 52 BPM

## 2023-01-06 DIAGNOSIS — G35 MULTIPLE SCLEROSIS (HCC): Primary | ICD-10-CM

## 2023-01-06 PROCEDURE — 6360000002 HC RX W HCPCS: Performed by: PSYCHIATRY & NEUROLOGY

## 2023-01-06 PROCEDURE — 96365 THER/PROPH/DIAG IV INF INIT: CPT

## 2023-01-06 PROCEDURE — 96375 TX/PRO/DX INJ NEW DRUG ADDON: CPT

## 2023-01-06 PROCEDURE — 96366 THER/PROPH/DIAG IV INF ADDON: CPT

## 2023-01-06 PROCEDURE — 2580000003 HC RX 258: Performed by: PSYCHIATRY & NEUROLOGY

## 2023-01-06 PROCEDURE — 6370000000 HC RX 637 (ALT 250 FOR IP): Performed by: PSYCHIATRY & NEUROLOGY

## 2023-01-06 RX ORDER — DIPHENHYDRAMINE HYDROCHLORIDE 50 MG/ML
50 INJECTION INTRAMUSCULAR; INTRAVENOUS
OUTPATIENT
Start: 2023-01-06

## 2023-01-06 RX ORDER — SODIUM CHLORIDE 9 MG/ML
INJECTION, SOLUTION INTRAVENOUS CONTINUOUS
OUTPATIENT
Start: 2023-01-06

## 2023-01-06 RX ORDER — HEPARIN SODIUM (PORCINE) LOCK FLUSH IV SOLN 100 UNIT/ML 100 UNIT/ML
500 SOLUTION INTRAVENOUS PRN
Status: CANCELLED
Start: 2023-01-06

## 2023-01-06 RX ORDER — ACETAMINOPHEN 325 MG/1
650 TABLET ORAL
OUTPATIENT
Start: 2023-01-06

## 2023-01-06 RX ORDER — HEPARIN SODIUM (PORCINE) LOCK FLUSH IV SOLN 100 UNIT/ML 100 UNIT/ML
500 SOLUTION INTRAVENOUS PRN
Status: DISCONTINUED | OUTPATIENT
Start: 2023-01-06 | End: 2023-01-08 | Stop reason: HOSPADM

## 2023-01-06 RX ORDER — DIPHENHYDRAMINE HYDROCHLORIDE 50 MG/ML
50 INJECTION INTRAMUSCULAR; INTRAVENOUS ONCE
Status: CANCELLED
Start: 2023-01-06 | End: 2023-01-06

## 2023-01-06 RX ORDER — DIPHENHYDRAMINE HYDROCHLORIDE 50 MG/ML
50 INJECTION INTRAMUSCULAR; INTRAVENOUS ONCE
Status: COMPLETED | OUTPATIENT
Start: 2023-01-06 | End: 2023-01-06

## 2023-01-06 RX ORDER — EPINEPHRINE 1 MG/ML
0.3 INJECTION, SOLUTION, CONCENTRATE INTRAVENOUS PRN
OUTPATIENT
Start: 2023-01-06

## 2023-01-06 RX ORDER — ALBUTEROL SULFATE 90 UG/1
4 AEROSOL, METERED RESPIRATORY (INHALATION) PRN
OUTPATIENT
Start: 2023-01-06

## 2023-01-06 RX ORDER — SODIUM CHLORIDE 0.9 % (FLUSH) 0.9 %
5-40 SYRINGE (ML) INJECTION PRN
Status: DISCONTINUED | OUTPATIENT
Start: 2023-01-06 | End: 2023-01-07 | Stop reason: HOSPADM

## 2023-01-06 RX ORDER — ACETAMINOPHEN 325 MG/1
650 TABLET ORAL ONCE
Status: COMPLETED | OUTPATIENT
Start: 2023-01-06 | End: 2023-01-06

## 2023-01-06 RX ORDER — ACETAMINOPHEN 325 MG/1
650 TABLET ORAL ONCE
Status: CANCELLED | OUTPATIENT
Start: 2023-01-06 | End: 2023-01-06

## 2023-01-06 RX ORDER — MEPERIDINE HYDROCHLORIDE 25 MG/ML
12.5 INJECTION INTRAMUSCULAR; INTRAVENOUS; SUBCUTANEOUS PRN
OUTPATIENT
Start: 2023-01-06

## 2023-01-06 RX ORDER — ONDANSETRON 2 MG/ML
8 INJECTION INTRAMUSCULAR; INTRAVENOUS
OUTPATIENT
Start: 2023-01-06

## 2023-01-06 RX ORDER — SODIUM CHLORIDE 0.9 % (FLUSH) 0.9 %
5-40 SYRINGE (ML) INJECTION PRN
Status: CANCELLED | OUTPATIENT
Start: 2023-01-06

## 2023-01-06 RX ADMIN — SODIUM CHLORIDE, PRESERVATIVE FREE 20 ML: 5 INJECTION INTRAVENOUS at 08:51

## 2023-01-06 RX ADMIN — HYDROCORTISONE SODIUM SUCCINATE 100 MG: 100 INJECTION, POWDER, FOR SOLUTION INTRAMUSCULAR; INTRAVENOUS at 06:58

## 2023-01-06 RX ADMIN — ACETAMINOPHEN 650 MG: 325 TABLET ORAL at 06:56

## 2023-01-06 RX ADMIN — IMMUNE GLOBULIN (HUMAN) 30 G: 10 INJECTION INTRAVENOUS; SUBCUTANEOUS at 07:11

## 2023-01-06 RX ADMIN — Medication 500 UNITS: at 08:51

## 2023-01-06 RX ADMIN — DIPHENHYDRAMINE HYDROCHLORIDE 50 MG: 50 INJECTION INTRAMUSCULAR; INTRAVENOUS at 07:00

## 2023-01-06 NOTE — DISCHARGE INSTRUCTIONS
immune globulin (intravenous and subcutaneous)  Pronunciation:  dinora LACEY foley  Brand:  Gammagard Liquid, Gammaked, Gamunex-C  What is the most important information I should know about immune globulin? This medicine can cause blood clots. The risk is highest in older adults or in people who have had blood clots, heart problems, or blood circulation problems. Blood clots are also more likely during long-term bedrest, while using birth control pills or hormone replacement therapy, or while having a central intravenous (IV) catheter in place. Call your doctor at once if you have chest pain, trouble breathing, fast heartbeats, numbness or weakness, or swelling and warmth or discoloration in an arm or leg. This medicine can also harm your kidneys, especially if you have kidney disease or if you also use certain medicines. Tell your doctor right away if you have signs of kidney problems, such as swelling, rapid weight gain, and little or no urination. What is immune globulin? Immune globulin intravenous and subcutaneous (for injection into a vein or under the skin) is used to treat primary immunodeficiency. Immune globulin is also used to increase platelets (blood clotting cells) in people with idiopathic thrombocytopenic purpura. Immune globulin is also used to treat certain debilitating nerve disorders that cause muscle weakness and can affect daily activities. Immune globulin may also be used for purposes not listed in this medication guide. What should I discuss with my healthcare provider before using immune globulin? You should not use this medicine if:  you have had an allergic reaction to an immune globulin or blood product; or  you have immune globulin A (IgA) deficiency with antibody to IgA. Immune globulin can cause blood clots or kidney problems, especially in older adults or in people with certain conditions.  Tell your doctor if you have ever had:  heart problems, blood circulation problems, or \"thick blood\";  a stroke or blood clot;  kidney disease;  diabetes;  an infection called sepsis;  if you use estrogens (birth control pills or hormone replacement therapy);  if you have been on long-term bedrest; or  if you have a central intravenous (IV) catheter in place. You may need a dose adjustment if you are exposed to measles, or if you travel to an area where this disease is common. Tell your doctor if you are pregnant or breastfeeding. Immune globulin is made from donated human plasma and may contain viruses or other infectious agents. Donated plasma is tested and treated to reduce the risk of contamination, but there is still a small possibility it could transmit disease. Ask your doctor about any possible risk. How should I use immune globulin? Follow all directions on your prescription label and read all medication guides or instruction sheets. Your doctor may occasionally change your dose. Use the medicine exactly as directed. Immune globulin is given as an infusion into a vein, or injected under the skin using an infusion pump. A healthcare provider will give your first dose and may teach you how to properly use the medication by yourself. Do not inject immune globulin into a vein if you have been instructed to give the medicine as a subcutaneous injection (under the skin). How you give this medication, how often you use it, and the length of your infusion time will depend on the condition being treated. Read and carefully follow any Instructions for Use provided with your medicine. Ask your doctor or pharmacist if you don't understand all instructions. Prepare an injection only when you are ready to give it. Do not use if the medicine looks cloudy, has changed colors, or has particles in it. Call your pharmacist for new medicine. Do not shake the medication bottle or you may ruin the medicine. Immune globulin must be given slowly.  You may need to use several catheters to inject this medicine into different body areas at the same time. Your healthcare provider will show you the best places on your body to inject the medication. Keep a diary of the days and times you gave the injection and where you injected it on your body. Drink plenty of liquids  while you are using this medicine to help improve your blood flow and keep your kidneys working properly. You may need frequent blood or urine tests. This medicine can affect the results of certain medical tests. Tell any doctor who treats you that you are using immune globulin. Store this medicine in its original carton in the refrigerator. Do not freeze immune globulin, and throw the medicine away if it has frozen. Take the medicine out of the refrigerator and let it reach room temperature for up to 1 hour before injecting your dose. You may also store immune globulin at room temperature. You will need to use immune globulin within a certain number months. This will depend on the how you store the medicine (at room temperature, or in a refrigerator). Carefully follow the storage instructions provided with your medicine. Do not use the medicine after the expiration date on the label has passed. Each vial (bottle) is for one use only. Throw it away after one use, even if there is still medicine left inside. Use disposable injection items (needle, catheter, tubing) only once and then place them in a puncture-proof \"sharps\" container. Follow state or local laws about how to dispose of this container. Keep it out of the reach of children and pets. What happens if I miss a dose? Call your doctor for instructions if you miss a dose. What happens if I overdose? Seek emergency medical attention or call the Poison Help line at 1-212.111.5669. What should I avoid while using immune globulin? Do not receive a \"live\" vaccine while using immune globulin. The vaccine may not work as well and may not fully protect you from disease.  Live vaccines include measles, mumps, rubella (MMR), rotavirus, typhoid, yellow fever, varicella (chickenpox), zoster (shingles), and nasal flu (influenza) vaccine. What are the possible side effects of immune globulin? Get emergency medical help if you have signs of an allergic reaction: hives; wheezing, difficulty breathing; dizziness, feeling like you might pass out; swelling of your face, lips, tongue, or throat. Some side effects may occur during the injection. Tell your caregiver if you feel light-headed, itchy, chilled, sweaty, or have chest discomfort, fast heartbeats, severe headache, or pounding in your neck or ears. Call your doctor at once if you have:  a blood cell disorder --pale or yellowed skin, dark colored urine, fever, confusion or weakness;  dehydration symptoms --feeling very thirsty or hot, being unable to urinate, heavy sweating, or hot and dry skin;  kidney problems --little or no urination, swelling, rapid weight gain, feeling short of breath;  lung problems --chest pain, wheezing, trouble breathing, blue colored lips, fingers, or toes;  signs of a new infection --fever with a severe headache, neck stiffness, eye pain, and increased sensitivity to light; or  signs of a blood clot --shortness of breath, chest pain with deep breathing, rapid heart rate, numbness or weakness on one side of the body, swelling and warmth or discoloration in an arm or leg. Common side effects may include:  runny or stuffy nose, sinus pain, cough, sore throat;  fever, chills, weakness;  headache, back pain, muscle or joint pain;  dizziness, tiredness, depressed mood;  swelling in your hands or feet;  skin rash, redness, or bruising;  blisters or ulcers in your mouth, red or swollen gums, trouble swallowing;  nausea, diarrhea, stomach pain, upset stomach;  increased blood pressure; or  redness, swelling, or itching where an injection was given. This is not a complete list of side effects and others may occur.  Call your doctor for medical advice about side effects. You may report side effects to FDA at 2-653-PXT-2705. What other drugs will affect immune globulin? Immune globulin can harm your kidneys, especially if you also use certain medicines for infections, cancer, osteoporosis, organ transplant rejection, bowel disorders, high blood pressure, or pain or arthritis (including Advil, Motrin, and Aleve). Other drugs may affect immune globulin, including prescription and over-the-counter medicines, vitamins, and herbal products. Tell your doctor about all your current medicines and any medicine you start or stop using. Where can I get more information? Your doctor or pharmacist can provide more information about immune globulin. Remember, keep this and all other medicines out of the reach of children, never share your medicines with others, and use this medication only for the indication prescribed. Every effort has been made to ensure that the information provided by [address] is accurate, up-to-date, and complete, but no guarantee is made to that effect. Drug information contained herein may be time sensitive. [employer] information has been compiled for use by healthcare practitioners and consumers in the [place] and therefore [employer] does not warrant that uses outside of the [place] are appropriate, unless specifically indicated otherwise. [employer] drug information does not endorse drugs, diagnose patients or recommend therapy. [employer] drug information is an informational resource designed to assist licensed healthcare practitioners in caring for their patients and/or to serve consumers viewing this service as a supplement to, and not a substitute for, the expertise, skill, knowledge and judgment of healthcare practitioners.  The absence of a warning for a given drug or drug combination in no way should be construed to indicate that the drug or drug combination is safe, effective or appropriate for any given patient. [facility] does not assume any responsibility for any aspect of healthcare administered with the aid of information [facility] provides. The information contained herein is not intended to cover all possible uses, directions, precautions, warnings, drug interactions, allergic reactions, or adverse effects. If you have questions about the drugs you are taking, check with your doctor, nurse or pharmacist.  Copyright 5388-0658 [facility] [place]. Version: 4.02. Revision date: 2/26/2021. Care instructions adapted under license by [facility] ([facility]). If you have questions about a medical condition or this instruction, always ask your healthcare professional. [place] [address] any warranty or liability for your use of this information.

## 2023-01-13 ENCOUNTER — HOSPITAL ENCOUNTER (OUTPATIENT)
Dept: INFUSION THERAPY | Age: 48
Setting detail: INFUSION SERIES
Discharge: HOME | End: 2023-01-13
Payer: MEDICARE

## 2023-01-13 VITALS
SYSTOLIC BLOOD PRESSURE: 107 MMHG | BODY MASS INDEX: 30.49 KG/M2 | WEIGHT: 183.25 LBS | DIASTOLIC BLOOD PRESSURE: 75 MMHG | HEART RATE: 65 BPM | TEMPERATURE: 96 F | OXYGEN SATURATION: 100 % | RESPIRATION RATE: 16 BRPM

## 2023-01-13 DIAGNOSIS — G35 MULTIPLE SCLEROSIS (HCC): Primary | ICD-10-CM

## 2023-01-13 PROCEDURE — 96365 THER/PROPH/DIAG IV INF INIT: CPT

## 2023-01-13 PROCEDURE — 2580000003 HC RX 258: Performed by: PSYCHIATRY & NEUROLOGY

## 2023-01-13 PROCEDURE — 6360000002 HC RX W HCPCS: Performed by: PSYCHIATRY & NEUROLOGY

## 2023-01-13 PROCEDURE — 6370000000 HC RX 637 (ALT 250 FOR IP): Performed by: PSYCHIATRY & NEUROLOGY

## 2023-01-13 PROCEDURE — 96366 THER/PROPH/DIAG IV INF ADDON: CPT

## 2023-01-13 PROCEDURE — 96375 TX/PRO/DX INJ NEW DRUG ADDON: CPT

## 2023-01-13 RX ORDER — ACETAMINOPHEN 325 MG/1
650 TABLET ORAL ONCE
Status: CANCELLED | OUTPATIENT
Start: 2023-01-13 | End: 2023-01-13

## 2023-01-13 RX ORDER — ACETAMINOPHEN 325 MG/1
650 TABLET ORAL ONCE
Status: COMPLETED | OUTPATIENT
Start: 2023-01-13 | End: 2023-01-13

## 2023-01-13 RX ORDER — ACETAMINOPHEN 325 MG/1
650 TABLET ORAL
OUTPATIENT
Start: 2023-01-13

## 2023-01-13 RX ORDER — DIPHENHYDRAMINE HYDROCHLORIDE 50 MG/ML
50 INJECTION INTRAMUSCULAR; INTRAVENOUS ONCE
Status: COMPLETED | OUTPATIENT
Start: 2023-01-13 | End: 2023-01-13

## 2023-01-13 RX ORDER — HEPARIN SODIUM (PORCINE) LOCK FLUSH IV SOLN 100 UNIT/ML 100 UNIT/ML
500 SOLUTION INTRAVENOUS PRN
Status: CANCELLED
Start: 2023-01-13

## 2023-01-13 RX ORDER — SODIUM CHLORIDE 9 MG/ML
INJECTION, SOLUTION INTRAVENOUS CONTINUOUS
OUTPATIENT
Start: 2023-01-13

## 2023-01-13 RX ORDER — DIPHENHYDRAMINE HYDROCHLORIDE 50 MG/ML
50 INJECTION INTRAMUSCULAR; INTRAVENOUS ONCE
Status: CANCELLED
Start: 2023-01-13 | End: 2023-01-13

## 2023-01-13 RX ORDER — ONDANSETRON 2 MG/ML
8 INJECTION INTRAMUSCULAR; INTRAVENOUS
OUTPATIENT
Start: 2023-01-13

## 2023-01-13 RX ORDER — HEPARIN SODIUM (PORCINE) LOCK FLUSH IV SOLN 100 UNIT/ML 100 UNIT/ML
500 SOLUTION INTRAVENOUS PRN
Status: DISCONTINUED | OUTPATIENT
Start: 2023-01-13 | End: 2023-01-15 | Stop reason: HOSPADM

## 2023-01-13 RX ORDER — ALBUTEROL SULFATE 90 UG/1
4 AEROSOL, METERED RESPIRATORY (INHALATION) PRN
OUTPATIENT
Start: 2023-01-13

## 2023-01-13 RX ORDER — SODIUM CHLORIDE 0.9 % (FLUSH) 0.9 %
5-40 SYRINGE (ML) INJECTION PRN
Status: CANCELLED | OUTPATIENT
Start: 2023-01-13

## 2023-01-13 RX ORDER — EPINEPHRINE 1 MG/ML
0.3 INJECTION, SOLUTION, CONCENTRATE INTRAVENOUS PRN
OUTPATIENT
Start: 2023-01-13

## 2023-01-13 RX ORDER — MEPERIDINE HYDROCHLORIDE 25 MG/ML
12.5 INJECTION INTRAMUSCULAR; INTRAVENOUS; SUBCUTANEOUS PRN
OUTPATIENT
Start: 2023-01-13

## 2023-01-13 RX ORDER — DIPHENHYDRAMINE HYDROCHLORIDE 50 MG/ML
50 INJECTION INTRAMUSCULAR; INTRAVENOUS
OUTPATIENT
Start: 2023-01-13

## 2023-01-13 RX ORDER — SODIUM CHLORIDE 0.9 % (FLUSH) 0.9 %
5-40 SYRINGE (ML) INJECTION PRN
Status: DISCONTINUED | OUTPATIENT
Start: 2023-01-13 | End: 2023-01-14 | Stop reason: HOSPADM

## 2023-01-13 RX ADMIN — HYDROCORTISONE SODIUM SUCCINATE 100 MG: 100 INJECTION, POWDER, FOR SOLUTION INTRAMUSCULAR; INTRAVENOUS at 06:47

## 2023-01-13 RX ADMIN — DIPHENHYDRAMINE HYDROCHLORIDE 50 MG: 50 INJECTION, SOLUTION INTRAMUSCULAR; INTRAVENOUS at 06:47

## 2023-01-13 RX ADMIN — IMMUNE GLOBULIN (HUMAN) 35 G: 10 INJECTION INTRAVENOUS; SUBCUTANEOUS at 07:05

## 2023-01-13 RX ADMIN — SODIUM CHLORIDE, PRESERVATIVE FREE 20 ML: 5 INJECTION INTRAVENOUS at 08:38

## 2023-01-13 RX ADMIN — HEPARIN 500 UNITS: 100 SYRINGE at 08:38

## 2023-01-13 RX ADMIN — ACETAMINOPHEN 650 MG: 325 TABLET ORAL at 06:47

## 2023-01-13 ASSESSMENT — PAIN SCALES - GENERAL: PAINLEVEL_OUTOF10: 0

## 2023-01-13 NOTE — DISCHARGE INSTRUCTIONS
immune globulin (intravenous and subcutaneous)  Pronunciation:  dinora LACEY foley  Brand:  Gammagard Liquid, Gammaked, Gamunex-C  What is the most important information I should know about immune globulin? This medicine can cause blood clots. The risk is highest in older adults or in people who have had blood clots, heart problems, or blood circulation problems. Blood clots are also more likely during long-term bedrest, while using birth control pills or hormone replacement therapy, or while having a central intravenous (IV) catheter in place. Call your doctor at once if you have chest pain, trouble breathing, fast heartbeats, numbness or weakness, or swelling and warmth or discoloration in an arm or leg. This medicine can also harm your kidneys, especially if you have kidney disease or if you also use certain medicines. Tell your doctor right away if you have signs of kidney problems, such as swelling, rapid weight gain, and little or no urination. What is immune globulin? Immune globulin intravenous and subcutaneous (for injection into a vein or under the skin) is used to treat primary immunodeficiency. Immune globulin is also used to increase platelets (blood clotting cells) in people with idiopathic thrombocytopenic purpura. Immune globulin is also used to treat certain debilitating nerve disorders that cause muscle weakness and can affect daily activities. Immune globulin may also be used for purposes not listed in this medication guide. What should I discuss with my healthcare provider before using immune globulin? You should not use this medicine if:  you have had an allergic reaction to an immune globulin or blood product; or  you have immune globulin A (IgA) deficiency with antibody to IgA. Immune globulin can cause blood clots or kidney problems, especially in older adults or in people with certain conditions.  Tell your doctor if you have ever had:  heart problems, blood circulation problems, or \"thick blood\";  a stroke or blood clot;  kidney disease;  diabetes;  an infection called sepsis;  if you use estrogens (birth control pills or hormone replacement therapy);  if you have been on long-term bedrest; or  if you have a central intravenous (IV) catheter in place. You may need a dose adjustment if you are exposed to measles, or if you travel to an area where this disease is common. Tell your doctor if you are pregnant or breastfeeding. Immune globulin is made from donated human plasma and may contain viruses or other infectious agents. Donated plasma is tested and treated to reduce the risk of contamination, but there is still a small possibility it could transmit disease. Ask your doctor about any possible risk. How should I use immune globulin? Follow all directions on your prescription label and read all medication guides or instruction sheets. Your doctor may occasionally change your dose. Use the medicine exactly as directed. Immune globulin is given as an infusion into a vein, or injected under the skin using an infusion pump. A healthcare provider will give your first dose and may teach you how to properly use the medication by yourself. Do not inject immune globulin into a vein if you have been instructed to give the medicine as a subcutaneous injection (under the skin). How you give this medication, how often you use it, and the length of your infusion time will depend on the condition being treated. Read and carefully follow any Instructions for Use provided with your medicine. Ask your doctor or pharmacist if you don't understand all instructions. Prepare an injection only when you are ready to give it. Do not use if the medicine looks cloudy, has changed colors, or has particles in it. Call your pharmacist for new medicine. Do not shake the medication bottle or you may ruin the medicine. Immune globulin must be given slowly.  You may need to use several catheters to inject this medicine into different body areas at the same time. Your healthcare provider will show you the best places on your body to inject the medication. Keep a diary of the days and times you gave the injection and where you injected it on your body. Drink plenty of liquids  while you are using this medicine to help improve your blood flow and keep your kidneys working properly. You may need frequent blood or urine tests. This medicine can affect the results of certain medical tests. Tell any doctor who treats you that you are using immune globulin. Store this medicine in its original carton in the refrigerator. Do not freeze immune globulin, and throw the medicine away if it has frozen. Take the medicine out of the refrigerator and let it reach room temperature for up to 1 hour before injecting your dose. You may also store immune globulin at room temperature. You will need to use immune globulin within a certain number months. This will depend on the how you store the medicine (at room temperature, or in a refrigerator). Carefully follow the storage instructions provided with your medicine. Do not use the medicine after the expiration date on the label has passed. Each vial (bottle) is for one use only. Throw it away after one use, even if there is still medicine left inside. Use disposable injection items (needle, catheter, tubing) only once and then place them in a puncture-proof \"sharps\" container. Follow state or local laws about how to dispose of this container. Keep it out of the reach of children and pets. What happens if I miss a dose? Call your doctor for instructions if you miss a dose. What happens if I overdose? Seek emergency medical attention or call the Poison Help line at 1-863.350.5904. What should I avoid while using immune globulin? Do not receive a \"live\" vaccine while using immune globulin. The vaccine may not work as well and may not fully protect you from disease.  Live vaccines include measles, mumps, rubella (MMR), rotavirus, typhoid, yellow fever, varicella (chickenpox), zoster (shingles), and nasal flu (influenza) vaccine. What are the possible side effects of immune globulin? Get emergency medical help if you have signs of an allergic reaction: hives; wheezing, difficulty breathing; dizziness, feeling like you might pass out; swelling of your face, lips, tongue, or throat. Some side effects may occur during the injection. Tell your caregiver if you feel light-headed, itchy, chilled, sweaty, or have chest discomfort, fast heartbeats, severe headache, or pounding in your neck or ears. Call your doctor at once if you have:  a blood cell disorder --pale or yellowed skin, dark colored urine, fever, confusion or weakness;  dehydration symptoms --feeling very thirsty or hot, being unable to urinate, heavy sweating, or hot and dry skin;  kidney problems --little or no urination, swelling, rapid weight gain, feeling short of breath;  lung problems --chest pain, wheezing, trouble breathing, blue colored lips, fingers, or toes;  signs of a new infection --fever with a severe headache, neck stiffness, eye pain, and increased sensitivity to light; or  signs of a blood clot --shortness of breath, chest pain with deep breathing, rapid heart rate, numbness or weakness on one side of the body, swelling and warmth or discoloration in an arm or leg. Common side effects may include:  runny or stuffy nose, sinus pain, cough, sore throat;  fever, chills, weakness;  headache, back pain, muscle or joint pain;  dizziness, tiredness, depressed mood;  swelling in your hands or feet;  skin rash, redness, or bruising;  blisters or ulcers in your mouth, red or swollen gums, trouble swallowing;  nausea, diarrhea, stomach pain, upset stomach;  increased blood pressure; or  redness, swelling, or itching where an injection was given. This is not a complete list of side effects and others may occur.  Call your doctor for medical advice about side effects. You may report side effects to FDA at 3-679-CCT-9906. What other drugs will affect immune globulin? Immune globulin can harm your kidneys, especially if you also use certain medicines for infections, cancer, osteoporosis, organ transplant rejection, bowel disorders, high blood pressure, or pain or arthritis (including Advil, Motrin, and Aleve). Other drugs may affect immune globulin, including prescription and over-the-counter medicines, vitamins, and herbal products. Tell your doctor about all your current medicines and any medicine you start or stop using. Where can I get more information? Your doctor or pharmacist can provide more information about immune globulin. Remember, keep this and all other medicines out of the reach of children, never share your medicines with others, and use this medication only for the indication prescribed. Every effort has been made to ensure that the information provided by [facility] Laura Dom[address]ng[address] is accurate, up-to-date, and complete, but no guarantee is made to that effect. Drug information contained herein may be time sensitive. [employer] information has been compiled for use by healthcare practitioners and consumers in the [place] and therefore [employer] does not warrant that uses outside of the [place] are appropriate, unless specifically indicated otherwise. [employer] drug information does not endorse drugs, diagnose patients or recommend therapy. [employer] drug information is an informational resource designed to assist licensed healthcare practitioners in caring for their patients and/or to serve consumers viewing this service as a supplement to, and not a substitute for, the expertise, skill, knowledge and judgment of healthcare practitioners.  The absence of a warning for a given drug or drug combination in no way should be construed to indicate that the drug or drug combination is safe, effective or appropriate for any given patient. [facility] does not assume any responsibility for any aspect of healthcare administered with the aid of information [facility] provides. The information contained herein is not intended to cover all possible uses, directions, precautions, warnings, drug interactions, allergic reactions, or adverse effects. If you have questions about the drugs you are taking, check with your doctor, nurse or pharmacist.  Copyright 2858-5187 [address] [place] [address] Dr [address]. Version: 4.02. Revision date: 2/26/2021. Care instructions adapted under license by [facility] ([facility]). If you have questions about a medical condition or this instruction, always ask your healthcare professional. [place] [address] any warranty or liability for your use of this information.

## 2023-01-20 ENCOUNTER — HOSPITAL ENCOUNTER (OUTPATIENT)
Dept: INFUSION THERAPY | Age: 48
Setting detail: INFUSION SERIES
Discharge: HOME | End: 2023-01-20
Payer: MEDICARE

## 2023-01-20 VITALS
SYSTOLIC BLOOD PRESSURE: 145 MMHG | BODY MASS INDEX: 31.22 KG/M2 | DIASTOLIC BLOOD PRESSURE: 95 MMHG | TEMPERATURE: 97.6 F | OXYGEN SATURATION: 99 % | RESPIRATION RATE: 17 BRPM | HEIGHT: 65 IN | WEIGHT: 187.38 LBS | HEART RATE: 61 BPM

## 2023-01-20 DIAGNOSIS — G35 MULTIPLE SCLEROSIS (HCC): Primary | ICD-10-CM

## 2023-01-20 PROCEDURE — 2580000003 HC RX 258: Performed by: PSYCHIATRY & NEUROLOGY

## 2023-01-20 PROCEDURE — 6360000002 HC RX W HCPCS: Performed by: PSYCHIATRY & NEUROLOGY

## 2023-01-20 PROCEDURE — 96375 TX/PRO/DX INJ NEW DRUG ADDON: CPT

## 2023-01-20 PROCEDURE — 96365 THER/PROPH/DIAG IV INF INIT: CPT

## 2023-01-20 PROCEDURE — 6370000000 HC RX 637 (ALT 250 FOR IP): Performed by: PSYCHIATRY & NEUROLOGY

## 2023-01-20 PROCEDURE — 96366 THER/PROPH/DIAG IV INF ADDON: CPT

## 2023-01-20 RX ORDER — HEPARIN SODIUM (PORCINE) LOCK FLUSH IV SOLN 100 UNIT/ML 100 UNIT/ML
500 SOLUTION INTRAVENOUS PRN
Status: CANCELLED
Start: 2023-01-20

## 2023-01-20 RX ORDER — ONDANSETRON 2 MG/ML
8 INJECTION INTRAMUSCULAR; INTRAVENOUS
OUTPATIENT
Start: 2023-01-20

## 2023-01-20 RX ORDER — DIPHENHYDRAMINE HYDROCHLORIDE 50 MG/ML
50 INJECTION INTRAMUSCULAR; INTRAVENOUS
OUTPATIENT
Start: 2023-01-20

## 2023-01-20 RX ORDER — DIPHENHYDRAMINE HYDROCHLORIDE 50 MG/ML
50 INJECTION INTRAMUSCULAR; INTRAVENOUS ONCE
Status: CANCELLED
Start: 2023-01-20 | End: 2023-01-20

## 2023-01-20 RX ORDER — ACETAMINOPHEN 325 MG/1
650 TABLET ORAL
OUTPATIENT
Start: 2023-01-20

## 2023-01-20 RX ORDER — SODIUM CHLORIDE 0.9 % (FLUSH) 0.9 %
5-40 SYRINGE (ML) INJECTION PRN
Status: CANCELLED | OUTPATIENT
Start: 2023-01-20

## 2023-01-20 RX ORDER — MEPERIDINE HYDROCHLORIDE 25 MG/ML
12.5 INJECTION INTRAMUSCULAR; INTRAVENOUS; SUBCUTANEOUS PRN
OUTPATIENT
Start: 2023-01-20

## 2023-01-20 RX ORDER — HEPARIN SODIUM (PORCINE) LOCK FLUSH IV SOLN 100 UNIT/ML 100 UNIT/ML
500 SOLUTION INTRAVENOUS PRN
Status: DISCONTINUED | OUTPATIENT
Start: 2023-01-20 | End: 2023-01-22 | Stop reason: HOSPADM

## 2023-01-20 RX ORDER — SODIUM CHLORIDE 9 MG/ML
INJECTION, SOLUTION INTRAVENOUS CONTINUOUS
OUTPATIENT
Start: 2023-01-20

## 2023-01-20 RX ORDER — EPINEPHRINE 1 MG/ML
0.3 INJECTION, SOLUTION, CONCENTRATE INTRAVENOUS PRN
OUTPATIENT
Start: 2023-01-20

## 2023-01-20 RX ORDER — SODIUM CHLORIDE 0.9 % (FLUSH) 0.9 %
5-40 SYRINGE (ML) INJECTION PRN
Status: DISCONTINUED | OUTPATIENT
Start: 2023-01-20 | End: 2023-01-21 | Stop reason: HOSPADM

## 2023-01-20 RX ORDER — DIPHENHYDRAMINE HYDROCHLORIDE 50 MG/ML
50 INJECTION INTRAMUSCULAR; INTRAVENOUS ONCE
Status: COMPLETED | OUTPATIENT
Start: 2023-01-20 | End: 2023-01-20

## 2023-01-20 RX ORDER — ACETAMINOPHEN 325 MG/1
650 TABLET ORAL ONCE
Status: CANCELLED | OUTPATIENT
Start: 2023-01-20 | End: 2023-01-20

## 2023-01-20 RX ORDER — ACETAMINOPHEN 325 MG/1
650 TABLET ORAL ONCE
Status: COMPLETED | OUTPATIENT
Start: 2023-01-20 | End: 2023-01-20

## 2023-01-20 RX ORDER — ALBUTEROL SULFATE 90 UG/1
4 AEROSOL, METERED RESPIRATORY (INHALATION) PRN
OUTPATIENT
Start: 2023-01-20

## 2023-01-20 RX ADMIN — Medication 500 UNITS: at 08:34

## 2023-01-20 RX ADMIN — ACETAMINOPHEN 650 MG: 325 TABLET ORAL at 06:39

## 2023-01-20 RX ADMIN — DIPHENHYDRAMINE HYDROCHLORIDE 50 MG: 50 INJECTION, SOLUTION INTRAMUSCULAR; INTRAVENOUS at 06:40

## 2023-01-20 RX ADMIN — IMMUNE GLOBULIN (HUMAN) 35 G: 10 INJECTION INTRAVENOUS; SUBCUTANEOUS at 06:56

## 2023-01-20 RX ADMIN — HYDROCORTISONE SODIUM SUCCINATE 100 MG: 100 INJECTION, POWDER, FOR SOLUTION INTRAMUSCULAR; INTRAVENOUS at 06:40

## 2023-01-20 RX ADMIN — SODIUM CHLORIDE, PRESERVATIVE FREE 20 ML: 5 INJECTION INTRAVENOUS at 08:34

## 2023-01-20 ASSESSMENT — PAIN SCALES - GENERAL: PAINLEVEL_OUTOF10: 0

## 2023-01-25 NOTE — TELEPHONE ENCOUNTER
Requested Prescriptions     Pending Prescriptions Disp Refills    ondansetron (ZOFRAN) 4 MG tablet [Pharmacy Med Name: ONDANSETRON 4MG TABLETS] 30 tablet 5     Sig: TAKE 1 TABLET BY MOUTH DAILY AS NEEDED FOR NAUSEA OR VOMITING       Last Office Visit: 12/15/2022  Next Office Visit: 3/15/2023  Last Medication Refill: 6/28/2022 with 5 RF

## 2023-01-26 ENCOUNTER — HOSPITAL ENCOUNTER (OUTPATIENT)
Dept: INFUSION THERAPY | Age: 48
Setting detail: INFUSION SERIES
Discharge: HOME | End: 2023-01-26
Payer: MEDICARE

## 2023-01-26 VITALS
WEIGHT: 186 LBS | HEART RATE: 60 BPM | OXYGEN SATURATION: 100 % | DIASTOLIC BLOOD PRESSURE: 90 MMHG | RESPIRATION RATE: 17 BRPM | BODY MASS INDEX: 30.95 KG/M2 | TEMPERATURE: 97.8 F | SYSTOLIC BLOOD PRESSURE: 126 MMHG

## 2023-01-26 DIAGNOSIS — G35 MULTIPLE SCLEROSIS (HCC): Primary | ICD-10-CM

## 2023-01-26 PROCEDURE — 96366 THER/PROPH/DIAG IV INF ADDON: CPT

## 2023-01-26 PROCEDURE — 6360000002 HC RX W HCPCS: Performed by: PSYCHIATRY & NEUROLOGY

## 2023-01-26 PROCEDURE — 6370000000 HC RX 637 (ALT 250 FOR IP): Performed by: PSYCHIATRY & NEUROLOGY

## 2023-01-26 PROCEDURE — 96365 THER/PROPH/DIAG IV INF INIT: CPT

## 2023-01-26 PROCEDURE — 96375 TX/PRO/DX INJ NEW DRUG ADDON: CPT

## 2023-01-26 PROCEDURE — 2580000003 HC RX 258: Performed by: PSYCHIATRY & NEUROLOGY

## 2023-01-26 RX ORDER — SODIUM CHLORIDE 0.9 % (FLUSH) 0.9 %
5-40 SYRINGE (ML) INJECTION PRN
Status: DISCONTINUED | OUTPATIENT
Start: 2023-01-26 | End: 2023-01-27 | Stop reason: HOSPADM

## 2023-01-26 RX ORDER — DIPHENHYDRAMINE HYDROCHLORIDE 50 MG/ML
50 INJECTION INTRAMUSCULAR; INTRAVENOUS
OUTPATIENT
Start: 2023-02-03 | End: 2023-02-04

## 2023-01-26 RX ORDER — SODIUM CHLORIDE 0.9 % (FLUSH) 0.9 %
5-40 SYRINGE (ML) INJECTION PRN
Status: CANCELLED | OUTPATIENT
Start: 2023-02-03 | End: 2023-02-04

## 2023-01-26 RX ORDER — ONDANSETRON 4 MG/1
4 TABLET, FILM COATED ORAL DAILY PRN
Qty: 30 TABLET | Refills: 5 | Status: SHIPPED | OUTPATIENT
Start: 2023-01-26

## 2023-01-26 RX ORDER — DIPHENHYDRAMINE HYDROCHLORIDE 50 MG/ML
50 INJECTION INTRAMUSCULAR; INTRAVENOUS ONCE
Status: COMPLETED | OUTPATIENT
Start: 2023-01-26 | End: 2023-01-26

## 2023-01-26 RX ORDER — ACETAMINOPHEN 325 MG/1
650 TABLET ORAL ONCE
Status: CANCELLED | OUTPATIENT
Start: 2023-02-03 | End: 2023-01-26

## 2023-01-26 RX ORDER — ALBUTEROL SULFATE 90 UG/1
4 AEROSOL, METERED RESPIRATORY (INHALATION) PRN
OUTPATIENT
Start: 2023-01-26

## 2023-01-26 RX ORDER — SODIUM CHLORIDE 9 MG/ML
INJECTION, SOLUTION INTRAVENOUS CONTINUOUS
OUTPATIENT
Start: 2023-02-03 | End: 2023-02-04

## 2023-01-26 RX ORDER — EPINEPHRINE 1 MG/ML
0.3 INJECTION, SOLUTION, CONCENTRATE INTRAVENOUS PRN
OUTPATIENT
Start: 2023-01-26

## 2023-01-26 RX ORDER — ACETAMINOPHEN 325 MG/1
650 TABLET ORAL
OUTPATIENT
Start: 2023-02-03 | End: 2023-02-04

## 2023-01-26 RX ORDER — HEPARIN SODIUM (PORCINE) LOCK FLUSH IV SOLN 100 UNIT/ML 100 UNIT/ML
500 SOLUTION INTRAVENOUS PRN
Status: DISCONTINUED | OUTPATIENT
Start: 2023-01-26 | End: 2023-01-28 | Stop reason: HOSPADM

## 2023-01-26 RX ORDER — HEPARIN SODIUM (PORCINE) LOCK FLUSH IV SOLN 100 UNIT/ML 100 UNIT/ML
500 SOLUTION INTRAVENOUS PRN
Status: CANCELLED
Start: 2023-01-26

## 2023-01-26 RX ORDER — ACETAMINOPHEN 325 MG/1
650 TABLET ORAL ONCE
Status: COMPLETED | OUTPATIENT
Start: 2023-01-26 | End: 2023-01-26

## 2023-01-26 RX ORDER — MEPERIDINE HYDROCHLORIDE 25 MG/ML
12.5 INJECTION INTRAMUSCULAR; INTRAVENOUS; SUBCUTANEOUS PRN
OUTPATIENT
Start: 2023-01-26

## 2023-01-26 RX ORDER — DIPHENHYDRAMINE HYDROCHLORIDE 50 MG/ML
50 INJECTION INTRAMUSCULAR; INTRAVENOUS ONCE
Status: CANCELLED
Start: 2023-01-26 | End: 2023-01-26

## 2023-01-26 RX ORDER — ONDANSETRON 2 MG/ML
8 INJECTION INTRAMUSCULAR; INTRAVENOUS
OUTPATIENT
Start: 2023-01-26

## 2023-01-26 RX ADMIN — ACETAMINOPHEN 650 MG: 325 TABLET ORAL at 10:09

## 2023-01-26 RX ADMIN — HYDROCORTISONE SODIUM SUCCINATE 100 MG: 100 INJECTION, POWDER, FOR SOLUTION INTRAMUSCULAR; INTRAVENOUS at 10:09

## 2023-01-26 RX ADMIN — DIPHENHYDRAMINE HYDROCHLORIDE 50 MG: 50 INJECTION, SOLUTION INTRAMUSCULAR; INTRAVENOUS at 10:09

## 2023-01-26 RX ADMIN — IMMUNE GLOBULIN (HUMAN) 35 G: 10 INJECTION INTRAVENOUS; SUBCUTANEOUS at 10:24

## 2023-01-26 RX ADMIN — SODIUM CHLORIDE, PRESERVATIVE FREE 20 ML: 5 INJECTION INTRAVENOUS at 12:08

## 2023-01-26 RX ADMIN — HEPARIN 500 UNITS: 100 SYRINGE at 12:08

## 2023-01-26 NOTE — PROGRESS NOTES
Patient tolerated GAMMUNEX-C infusion as ordered absent of s/s of adverse event. Patient given appointment schedule, pt states understanding. Patient discharged home.  Electronically signed by Iván Blanchard RN on 1/26/2023 at 12:10 PM

## 2023-01-26 NOTE — DISCHARGE INSTRUCTIONS
immune globulin (intravenous and subcutaneous)  Pronunciation:  dinora LACEY foley  Brand:  Gammagard Liquid, Gammaked, Gamunex-C  What is the most important information I should know about immune globulin? This medicine can cause blood clots. The risk is highest in older adults or in people who have had blood clots, heart problems, or blood circulation problems. Blood clots are also more likely during long-term bedrest, while using birth control pills or hormone replacement therapy, or while having a central intravenous (IV) catheter in place. Call your doctor at once if you have chest pain, trouble breathing, fast heartbeats, numbness or weakness, or swelling and warmth or discoloration in an arm or leg. This medicine can also harm your kidneys, especially if you have kidney disease or if you also use certain medicines. Tell your doctor right away if you have signs of kidney problems, such as swelling, rapid weight gain, and little or no urination. What is immune globulin? Immune globulin intravenous and subcutaneous (for injection into a vein or under the skin) is used to treat primary immunodeficiency. Immune globulin is also used to increase platelets (blood clotting cells) in people with idiopathic thrombocytopenic purpura. Immune globulin is also used to treat certain debilitating nerve disorders that cause muscle weakness and can affect daily activities. Immune globulin may also be used for purposes not listed in this medication guide. What should I discuss with my healthcare provider before using immune globulin? You should not use this medicine if:  you have had an allergic reaction to an immune globulin or blood product; or  you have immune globulin A (IgA) deficiency with antibody to IgA. Immune globulin can cause blood clots or kidney problems, especially in older adults or in people with certain conditions.  Tell your doctor if you have ever had:  heart problems, blood circulation problems, or \"thick blood\";  a stroke or blood clot;  kidney disease;  diabetes;  an infection called sepsis;  if you use estrogens (birth control pills or hormone replacement therapy);  if you have been on long-term bedrest; or  if you have a central intravenous (IV) catheter in place. You may need a dose adjustment if you are exposed to measles, or if you travel to an area where this disease is common. Tell your doctor if you are pregnant or breastfeeding. Immune globulin is made from donated human plasma and may contain viruses or other infectious agents. Donated plasma is tested and treated to reduce the risk of contamination, but there is still a small possibility it could transmit disease. Ask your doctor about any possible risk. How should I use immune globulin? Follow all directions on your prescription label and read all medication guides or instruction sheets. Your doctor may occasionally change your dose. Use the medicine exactly as directed. Immune globulin is given as an infusion into a vein, or injected under the skin using an infusion pump. A healthcare provider will give your first dose and may teach you how to properly use the medication by yourself. Do not inject immune globulin into a vein if you have been instructed to give the medicine as a subcutaneous injection (under the skin). How you give this medication, how often you use it, and the length of your infusion time will depend on the condition being treated. Read and carefully follow any Instructions for Use provided with your medicine. Ask your doctor or pharmacist if you don't understand all instructions. Prepare an injection only when you are ready to give it. Do not use if the medicine looks cloudy, has changed colors, or has particles in it. Call your pharmacist for new medicine. Do not shake the medication bottle or you may ruin the medicine. Immune globulin must be given slowly.  You may need to use several catheters to inject this medicine into different body areas at the same time. Your healthcare provider will show you the best places on your body to inject the medication. Keep a diary of the days and times you gave the injection and where you injected it on your body. Drink plenty of liquids  while you are using this medicine to help improve your blood flow and keep your kidneys working properly. You may need frequent blood or urine tests. This medicine can affect the results of certain medical tests. Tell any doctor who treats you that you are using immune globulin. Store this medicine in its original carton in the refrigerator. Do not freeze immune globulin, and throw the medicine away if it has frozen. Take the medicine out of the refrigerator and let it reach room temperature for up to 1 hour before injecting your dose. You may also store immune globulin at room temperature. You will need to use immune globulin within a certain number months. This will depend on the how you store the medicine (at room temperature, or in a refrigerator). Carefully follow the storage instructions provided with your medicine. Do not use the medicine after the expiration date on the label has passed. Each vial (bottle) is for one use only. Throw it away after one use, even if there is still medicine left inside. Use disposable injection items (needle, catheter, tubing) only once and then place them in a puncture-proof \"sharps\" container. Follow state or local laws about how to dispose of this container. Keep it out of the reach of children and pets. What happens if I miss a dose? Call your doctor for instructions if you miss a dose. What happens if I overdose? Seek emergency medical attention or call the Poison Help line at 1-710.730.2270. What should I avoid while using immune globulin? Do not receive a \"live\" vaccine while using immune globulin. The vaccine may not work as well and may not fully protect you from disease.  Live vaccines include measles, mumps, rubella (MMR), rotavirus, typhoid, yellow fever, varicella (chickenpox), zoster (shingles), and nasal flu (influenza) vaccine. What are the possible side effects of immune globulin? Get emergency medical help if you have signs of an allergic reaction: hives; wheezing, difficulty breathing; dizziness, feeling like you might pass out; swelling of your face, lips, tongue, or throat. Some side effects may occur during the injection. Tell your caregiver if you feel light-headed, itchy, chilled, sweaty, or have chest discomfort, fast heartbeats, severe headache, or pounding in your neck or ears. Call your doctor at once if you have:  a blood cell disorder --pale or yellowed skin, dark colored urine, fever, confusion or weakness;  dehydration symptoms --feeling very thirsty or hot, being unable to urinate, heavy sweating, or hot and dry skin;  kidney problems --little or no urination, swelling, rapid weight gain, feeling short of breath;  lung problems --chest pain, wheezing, trouble breathing, blue colored lips, fingers, or toes;  signs of a new infection --fever with a severe headache, neck stiffness, eye pain, and increased sensitivity to light; or  signs of a blood clot --shortness of breath, chest pain with deep breathing, rapid heart rate, numbness or weakness on one side of the body, swelling and warmth or discoloration in an arm or leg. Common side effects may include:  runny or stuffy nose, sinus pain, cough, sore throat;  fever, chills, weakness;  headache, back pain, muscle or joint pain;  dizziness, tiredness, depressed mood;  swelling in your hands or feet;  skin rash, redness, or bruising;  blisters or ulcers in your mouth, red or swollen gums, trouble swallowing;  nausea, diarrhea, stomach pain, upset stomach;  increased blood pressure; or  redness, swelling, or itching where an injection was given. This is not a complete list of side effects and others may occur.  Call your doctor for medical advice about side effects. You may report side effects to FDA at 6-351-HNW-8383. What other drugs will affect immune globulin? Immune globulin can harm your kidneys, especially if you also use certain medicines for infections, cancer, osteoporosis, organ transplant rejection, bowel disorders, high blood pressure, or pain or arthritis (including Advil, Motrin, and Aleve). Other drugs may affect immune globulin, including prescription and over-the-counter medicines, vitamins, and herbal products. Tell your doctor about all your current medicines and any medicine you start or stop using. Where can I get more information? Your doctor or pharmacist can provide more information about immune globulin. Remember, keep this and all other medicines out of the reach of children, never share your medicines with others, and use this medication only for the indication prescribed. Every effort has been made to ensure that the information provided by [facility] [address] is accurate, up-to-date, and complete, but no guarantee is made to that effect. Drug information contained herein may be time sensitive. [employer] information has been compiled for use by healthcare practitioners and consumers in the [place] and therefore [employer] does not warrant that uses outside of the [place] are appropriate, unless specifically indicated otherwise. [employer] drug information does not endorse drugs, diagnose patients or recommend therapy. [employer] drug information is an informational resource designed to assist licensed healthcare practitioners in caring for their patients and/or to serve consumers viewing this service as a supplement to, and not a substitute for, the expertise, skill, knowledge and judgment of healthcare practitioners.  The absence of a warning for a given drug or drug combination in no way should be construed to indicate that the drug or drug combination is safe, effective or appropriate for any given patient. [facility] does not assume any responsibility for any aspect of healthcare administered with the aid of information [facility] provides. The information contained herein is not intended to cover all possible uses, directions, precautions, warnings, drug interactions, allergic reactions, or adverse effects. If you have questions about the drugs you are taking, check with your doctor, nurse or pharmacist.  Copyright 1073-4040 [facility] [place]. Version: 4.02. Revision date: 2/26/2021. Care instructions adapted under license by [facility] ([facility]). If you have questions about a medical condition or this instruction, always ask your healthcare professional. [place] [address] any warranty or liability for your use of this information.

## 2023-02-03 ENCOUNTER — HOSPITAL ENCOUNTER (OUTPATIENT)
Dept: INFUSION THERAPY | Age: 48
Setting detail: INFUSION SERIES
Discharge: HOME | End: 2023-02-03
Payer: MEDICARE

## 2023-02-03 VITALS
WEIGHT: 183.06 LBS | RESPIRATION RATE: 18 BRPM | TEMPERATURE: 97.3 F | DIASTOLIC BLOOD PRESSURE: 97 MMHG | SYSTOLIC BLOOD PRESSURE: 146 MMHG | OXYGEN SATURATION: 98 % | HEART RATE: 56 BPM | BODY MASS INDEX: 30.46 KG/M2

## 2023-02-03 DIAGNOSIS — G35 MULTIPLE SCLEROSIS (HCC): Primary | ICD-10-CM

## 2023-02-03 PROCEDURE — 96375 TX/PRO/DX INJ NEW DRUG ADDON: CPT

## 2023-02-03 PROCEDURE — 2580000003 HC RX 258: Performed by: PSYCHIATRY & NEUROLOGY

## 2023-02-03 PROCEDURE — 6360000002 HC RX W HCPCS: Performed by: PSYCHIATRY & NEUROLOGY

## 2023-02-03 PROCEDURE — 96365 THER/PROPH/DIAG IV INF INIT: CPT

## 2023-02-03 PROCEDURE — 96366 THER/PROPH/DIAG IV INF ADDON: CPT

## 2023-02-03 PROCEDURE — 6370000000 HC RX 637 (ALT 250 FOR IP): Performed by: PSYCHIATRY & NEUROLOGY

## 2023-02-03 RX ORDER — ACETAMINOPHEN 325 MG/1
650 TABLET ORAL ONCE
Status: CANCELLED | OUTPATIENT
Start: 2023-02-03 | End: 2023-02-03

## 2023-02-03 RX ORDER — SODIUM CHLORIDE 0.9 % (FLUSH) 0.9 %
5-40 SYRINGE (ML) INJECTION PRN
Status: CANCELLED | OUTPATIENT
Start: 2023-02-03

## 2023-02-03 RX ORDER — HEPARIN SODIUM (PORCINE) LOCK FLUSH IV SOLN 100 UNIT/ML 100 UNIT/ML
500 SOLUTION INTRAVENOUS PRN
Status: DISCONTINUED | OUTPATIENT
Start: 2023-02-03 | End: 2023-02-05 | Stop reason: HOSPADM

## 2023-02-03 RX ORDER — DIPHENHYDRAMINE HYDROCHLORIDE 50 MG/ML
50 INJECTION INTRAMUSCULAR; INTRAVENOUS ONCE
Status: CANCELLED
Start: 2023-02-03 | End: 2023-02-03

## 2023-02-03 RX ORDER — SODIUM CHLORIDE 9 MG/ML
INJECTION, SOLUTION INTRAVENOUS CONTINUOUS
OUTPATIENT
Start: 2023-02-03

## 2023-02-03 RX ORDER — SODIUM CHLORIDE 0.9 % (FLUSH) 0.9 %
5-40 SYRINGE (ML) INJECTION PRN
Status: DISCONTINUED | OUTPATIENT
Start: 2023-02-03 | End: 2023-02-04 | Stop reason: HOSPADM

## 2023-02-03 RX ORDER — EPINEPHRINE 1 MG/ML
0.3 INJECTION, SOLUTION, CONCENTRATE INTRAVENOUS PRN
OUTPATIENT
Start: 2023-02-03

## 2023-02-03 RX ORDER — DIPHENHYDRAMINE HYDROCHLORIDE 50 MG/ML
50 INJECTION INTRAMUSCULAR; INTRAVENOUS ONCE
Status: COMPLETED | OUTPATIENT
Start: 2023-02-03 | End: 2023-02-03

## 2023-02-03 RX ORDER — DIPHENHYDRAMINE HYDROCHLORIDE 50 MG/ML
50 INJECTION INTRAMUSCULAR; INTRAVENOUS
OUTPATIENT
Start: 2023-02-03

## 2023-02-03 RX ORDER — MEPERIDINE HYDROCHLORIDE 25 MG/ML
12.5 INJECTION INTRAMUSCULAR; INTRAVENOUS; SUBCUTANEOUS PRN
OUTPATIENT
Start: 2023-02-03

## 2023-02-03 RX ORDER — ALBUTEROL SULFATE 90 UG/1
4 AEROSOL, METERED RESPIRATORY (INHALATION) PRN
OUTPATIENT
Start: 2023-02-03

## 2023-02-03 RX ORDER — ACETAMINOPHEN 325 MG/1
650 TABLET ORAL
OUTPATIENT
Start: 2023-02-03

## 2023-02-03 RX ORDER — HEPARIN SODIUM (PORCINE) LOCK FLUSH IV SOLN 100 UNIT/ML 100 UNIT/ML
500 SOLUTION INTRAVENOUS PRN
Status: CANCELLED
Start: 2023-02-03

## 2023-02-03 RX ORDER — ACETAMINOPHEN 325 MG/1
650 TABLET ORAL ONCE
Status: COMPLETED | OUTPATIENT
Start: 2023-02-03 | End: 2023-02-03

## 2023-02-03 RX ORDER — ONDANSETRON 2 MG/ML
8 INJECTION INTRAMUSCULAR; INTRAVENOUS
OUTPATIENT
Start: 2023-02-03

## 2023-02-03 RX ADMIN — DIPHENHYDRAMINE HYDROCHLORIDE 50 MG: 50 INJECTION, SOLUTION INTRAMUSCULAR; INTRAVENOUS at 13:33

## 2023-02-03 RX ADMIN — ACETAMINOPHEN 650 MG: 325 TABLET ORAL at 13:34

## 2023-02-03 RX ADMIN — HEPARIN 500 UNITS: 100 SYRINGE at 15:55

## 2023-02-03 RX ADMIN — IMMUNE GLOBULIN (HUMAN) 35 G: 10 INJECTION INTRAVENOUS; SUBCUTANEOUS at 13:55

## 2023-02-03 RX ADMIN — HYDROCORTISONE SODIUM SUCCINATE 100 MG: 100 INJECTION, POWDER, FOR SOLUTION INTRAMUSCULAR; INTRAVENOUS at 13:31

## 2023-02-03 RX ADMIN — SODIUM CHLORIDE, PRESERVATIVE FREE 20 ML: 5 INJECTION INTRAVENOUS at 15:55

## 2023-02-03 NOTE — DISCHARGE INSTRUCTIONS
immune globulin (intravenous and subcutaneous)  Pronunciation:  dinora LACEY foley  Brand:  Gammagard Liquid, Gammaked, Gamunex-C  What is the most important information I should know about immune globulin? This medicine can cause blood clots. The risk is highest in older adults or in people who have had blood clots, heart problems, or blood circulation problems. Blood clots are also more likely during long-term bedrest, while using birth control pills or hormone replacement therapy, or while having a central intravenous (IV) catheter in place. Call your doctor at once if you have chest pain, trouble breathing, fast heartbeats, numbness or weakness, or swelling and warmth or discoloration in an arm or leg. This medicine can also harm your kidneys, especially if you have kidney disease or if you also use certain medicines. Tell your doctor right away if you have signs of kidney problems, such as swelling, rapid weight gain, and little or no urination. What is immune globulin? Immune globulin intravenous and subcutaneous (for injection into a vein or under the skin) is used to treat primary immunodeficiency. Immune globulin is also used to increase platelets (blood clotting cells) in people with idiopathic thrombocytopenic purpura. Immune globulin is also used to treat certain debilitating nerve disorders that cause muscle weakness and can affect daily activities. Immune globulin may also be used for purposes not listed in this medication guide. What should I discuss with my healthcare provider before using immune globulin? You should not use this medicine if:  you have had an allergic reaction to an immune globulin or blood product; or  you have immune globulin A (IgA) deficiency with antibody to IgA. Immune globulin can cause blood clots or kidney problems, especially in older adults or in people with certain conditions.  Tell your doctor if you have ever had:  heart problems, blood circulation problems, or \"thick blood\";  a stroke or blood clot;  kidney disease;  diabetes;  an infection called sepsis;  if you use estrogens (birth control pills or hormone replacement therapy);  if you have been on long-term bedrest; or  if you have a central intravenous (IV) catheter in place. You may need a dose adjustment if you are exposed to measles, or if you travel to an area where this disease is common. Tell your doctor if you are pregnant or breastfeeding. Immune globulin is made from donated human plasma and may contain viruses or other infectious agents. Donated plasma is tested and treated to reduce the risk of contamination, but there is still a small possibility it could transmit disease. Ask your doctor about any possible risk. How should I use immune globulin? Follow all directions on your prescription label and read all medication guides or instruction sheets. Your doctor may occasionally change your dose. Use the medicine exactly as directed. Immune globulin is given as an infusion into a vein, or injected under the skin using an infusion pump. A healthcare provider will give your first dose and may teach you how to properly use the medication by yourself. Do not inject immune globulin into a vein if you have been instructed to give the medicine as a subcutaneous injection (under the skin). How you give this medication, how often you use it, and the length of your infusion time will depend on the condition being treated. Read and carefully follow any Instructions for Use provided with your medicine. Ask your doctor or pharmacist if you don't understand all instructions. Prepare an injection only when you are ready to give it. Do not use if the medicine looks cloudy, has changed colors, or has particles in it. Call your pharmacist for new medicine. Do not shake the medication bottle or you may ruin the medicine. Immune globulin must be given slowly.  You may need to use several catheters to inject this medicine into different body areas at the same time. Your healthcare provider will show you the best places on your body to inject the medication. Keep a diary of the days and times you gave the injection and where you injected it on your body. Drink plenty of liquids  while you are using this medicine to help improve your blood flow and keep your kidneys working properly. You may need frequent blood or urine tests. This medicine can affect the results of certain medical tests. Tell any doctor who treats you that you are using immune globulin. Store this medicine in its original carton in the refrigerator. Do not freeze immune globulin, and throw the medicine away if it has frozen. Take the medicine out of the refrigerator and let it reach room temperature for up to 1 hour before injecting your dose. You may also store immune globulin at room temperature. You will need to use immune globulin within a certain number months. This will depend on the how you store the medicine (at room temperature, or in a refrigerator). Carefully follow the storage instructions provided with your medicine. Do not use the medicine after the expiration date on the label has passed. Each vial (bottle) is for one use only. Throw it away after one use, even if there is still medicine left inside. Use disposable injection items (needle, catheter, tubing) only once and then place them in a puncture-proof \"sharps\" container. Follow state or local laws about how to dispose of this container. Keep it out of the reach of children and pets. What happens if I miss a dose? Call your doctor for instructions if you miss a dose. What happens if I overdose? Seek emergency medical attention or call the Poison Help line at 1-379.553.7303. What should I avoid while using immune globulin? Do not receive a \"live\" vaccine while using immune globulin. The vaccine may not work as well and may not fully protect you from disease.  Live vaccines include measles, mumps, rubella (MMR), rotavirus, typhoid, yellow fever, varicella (chickenpox), zoster (shingles), and nasal flu (influenza) vaccine. What are the possible side effects of immune globulin? Get emergency medical help if you have signs of an allergic reaction: hives; wheezing, difficulty breathing; dizziness, feeling like you might pass out; swelling of your face, lips, tongue, or throat. Some side effects may occur during the injection. Tell your caregiver if you feel light-headed, itchy, chilled, sweaty, or have chest discomfort, fast heartbeats, severe headache, or pounding in your neck or ears. Call your doctor at once if you have:  a blood cell disorder --pale or yellowed skin, dark colored urine, fever, confusion or weakness;  dehydration symptoms --feeling very thirsty or hot, being unable to urinate, heavy sweating, or hot and dry skin;  kidney problems --little or no urination, swelling, rapid weight gain, feeling short of breath;  lung problems --chest pain, wheezing, trouble breathing, blue colored lips, fingers, or toes;  signs of a new infection --fever with a severe headache, neck stiffness, eye pain, and increased sensitivity to light; or  signs of a blood clot --shortness of breath, chest pain with deep breathing, rapid heart rate, numbness or weakness on one side of the body, swelling and warmth or discoloration in an arm or leg. Common side effects may include:  runny or stuffy nose, sinus pain, cough, sore throat;  fever, chills, weakness;  headache, back pain, muscle or joint pain;  dizziness, tiredness, depressed mood;  swelling in your hands or feet;  skin rash, redness, or bruising;  blisters or ulcers in your mouth, red or swollen gums, trouble swallowing;  nausea, diarrhea, stomach pain, upset stomach;  increased blood pressure; or  redness, swelling, or itching where an injection was given. This is not a complete list of side effects and others may occur.  Call your doctor for medical advice about side effects. You may report side effects to FDA at 1-351-LDF-9994. What other drugs will affect immune globulin? Immune globulin can harm your kidneys, especially if you also use certain medicines for infections, cancer, osteoporosis, organ transplant rejection, bowel disorders, high blood pressure, or pain or arthritis (including Advil, Motrin, and Aleve). Other drugs may affect immune globulin, including prescription and over-the-counter medicines, vitamins, and herbal products. Tell your doctor about all your current medicines and any medicine you start or stop using. Where can I get more information? Your doctor or pharmacist can provide more information about immune globulin. Remember, keep this and all other medicines out of the reach of children, never share your medicines with others, and use this medication only for the indication prescribed. Every effort has been made to ensure that the information provided by [facility] [address] is accurate, up-to-date, and complete, but no guarantee is made to that effect. Drug information contained herein may be time sensitive. [employer] information has been compiled for use by healthcare practitioners and consumers in the [place] and therefore [employer] does not warrant that uses outside of the [place] are appropriate, unless specifically indicated otherwise. [employer] drug information does not endorse drugs, diagnose patients or recommend therapy. [employer] drug information is an informational resource designed to assist licensed healthcare practitioners in caring for their patients and/or to serve consumers viewing this service as a supplement to, and not a substitute for, the expertise, skill, knowledge and judgment of healthcare practitioners.  The absence of a warning for a given drug or drug combination in no way should be construed to indicate that the drug or drug combination is safe, effective or appropriate for any given patient. [facility] does not assume any responsibility for any aspect of healthcare administered with the aid of information [facility] provides. The information contained herein is not intended to cover all possible uses, directions, precautions, warnings, drug interactions, allergic reactions, or adverse effects. If you have questions about the drugs you are taking, check with your doctor, nurse or pharmacist.  Copyright 6821-5308 [facility] [place]. Version: 4.02. Revision date: 2/26/2021. Care instructions adapted under license by [facility] ([facility]). If you have questions about a medical condition or this instruction, always ask your healthcare professional. [place] [address] any warranty or liability for your use of this information.

## 2023-02-07 ENCOUNTER — TELEPHONE (OUTPATIENT)
Dept: NEUROLOGY | Age: 48
End: 2023-02-07

## 2023-02-07 NOTE — TELEPHONE ENCOUNTER
Patient called stating that she is getting IVIG every week.  And states that she is concerned that these are causeing her to have heart palpitations and functionating BP readings     Please advise

## 2023-02-08 NOTE — TELEPHONE ENCOUNTER
What does she think it is related to IVIG? I would hate to reduce her IVIG infusion.   Maybe she can have her PCP assess her for palpitations

## 2023-02-10 ENCOUNTER — HOSPITAL ENCOUNTER (OUTPATIENT)
Dept: INFUSION THERAPY | Age: 48
Setting detail: INFUSION SERIES
Discharge: HOME | End: 2023-02-10
Payer: MEDICARE

## 2023-02-10 VITALS
HEART RATE: 74 BPM | DIASTOLIC BLOOD PRESSURE: 82 MMHG | TEMPERATURE: 97.8 F | OXYGEN SATURATION: 97 % | RESPIRATION RATE: 17 BRPM | SYSTOLIC BLOOD PRESSURE: 124 MMHG

## 2023-02-10 DIAGNOSIS — G35 MULTIPLE SCLEROSIS (HCC): Primary | ICD-10-CM

## 2023-02-10 PROCEDURE — 6370000000 HC RX 637 (ALT 250 FOR IP): Performed by: PSYCHIATRY & NEUROLOGY

## 2023-02-10 PROCEDURE — 96365 THER/PROPH/DIAG IV INF INIT: CPT

## 2023-02-10 PROCEDURE — 2580000003 HC RX 258: Performed by: PSYCHIATRY & NEUROLOGY

## 2023-02-10 PROCEDURE — 96375 TX/PRO/DX INJ NEW DRUG ADDON: CPT

## 2023-02-10 PROCEDURE — 96366 THER/PROPH/DIAG IV INF ADDON: CPT

## 2023-02-10 PROCEDURE — 6360000002 HC RX W HCPCS: Performed by: PSYCHIATRY & NEUROLOGY

## 2023-02-10 RX ORDER — ACETAMINOPHEN 325 MG/1
650 TABLET ORAL ONCE
Status: COMPLETED | OUTPATIENT
Start: 2023-02-10 | End: 2023-02-10

## 2023-02-10 RX ORDER — DIPHENHYDRAMINE HYDROCHLORIDE 50 MG/ML
50 INJECTION INTRAMUSCULAR; INTRAVENOUS ONCE
Status: CANCELLED
Start: 2023-02-10 | End: 2023-02-10

## 2023-02-10 RX ORDER — SODIUM CHLORIDE 0.9 % (FLUSH) 0.9 %
5-40 SYRINGE (ML) INJECTION PRN
Status: DISCONTINUED | OUTPATIENT
Start: 2023-02-10 | End: 2023-02-11 | Stop reason: HOSPADM

## 2023-02-10 RX ORDER — HEPARIN SODIUM (PORCINE) LOCK FLUSH IV SOLN 100 UNIT/ML 100 UNIT/ML
500 SOLUTION INTRAVENOUS PRN
Status: CANCELLED
Start: 2023-02-10

## 2023-02-10 RX ORDER — ACETAMINOPHEN 325 MG/1
650 TABLET ORAL ONCE
Status: CANCELLED | OUTPATIENT
Start: 2023-02-10 | End: 2023-02-10

## 2023-02-10 RX ORDER — EPINEPHRINE 1 MG/ML
0.3 INJECTION, SOLUTION, CONCENTRATE INTRAVENOUS PRN
OUTPATIENT
Start: 2023-02-10

## 2023-02-10 RX ORDER — DIPHENHYDRAMINE HYDROCHLORIDE 50 MG/ML
50 INJECTION INTRAMUSCULAR; INTRAVENOUS ONCE
Status: COMPLETED | OUTPATIENT
Start: 2023-02-10 | End: 2023-02-10

## 2023-02-10 RX ORDER — SODIUM CHLORIDE 9 MG/ML
INJECTION, SOLUTION INTRAVENOUS CONTINUOUS
OUTPATIENT
Start: 2023-02-10

## 2023-02-10 RX ORDER — MEPERIDINE HYDROCHLORIDE 25 MG/ML
12.5 INJECTION INTRAMUSCULAR; INTRAVENOUS; SUBCUTANEOUS PRN
OUTPATIENT
Start: 2023-02-10

## 2023-02-10 RX ORDER — SODIUM CHLORIDE 0.9 % (FLUSH) 0.9 %
5-40 SYRINGE (ML) INJECTION PRN
Status: CANCELLED | OUTPATIENT
Start: 2023-02-10

## 2023-02-10 RX ORDER — HEPARIN SODIUM (PORCINE) LOCK FLUSH IV SOLN 100 UNIT/ML 100 UNIT/ML
500 SOLUTION INTRAVENOUS PRN
Status: DISCONTINUED | OUTPATIENT
Start: 2023-02-10 | End: 2023-02-12 | Stop reason: HOSPADM

## 2023-02-10 RX ORDER — DIPHENHYDRAMINE HYDROCHLORIDE 50 MG/ML
50 INJECTION INTRAMUSCULAR; INTRAVENOUS
OUTPATIENT
Start: 2023-02-10

## 2023-02-10 RX ORDER — ONDANSETRON 2 MG/ML
8 INJECTION INTRAMUSCULAR; INTRAVENOUS
OUTPATIENT
Start: 2023-02-10

## 2023-02-10 RX ORDER — ALBUTEROL SULFATE 90 UG/1
4 AEROSOL, METERED RESPIRATORY (INHALATION) PRN
OUTPATIENT
Start: 2023-02-10

## 2023-02-10 RX ORDER — ACETAMINOPHEN 325 MG/1
650 TABLET ORAL
OUTPATIENT
Start: 2023-02-10

## 2023-02-10 RX ADMIN — IMMUNE GLOBULIN (HUMAN) 35 G: 10 INJECTION INTRAVENOUS; SUBCUTANEOUS at 13:27

## 2023-02-10 RX ADMIN — HEPARIN 500 UNITS: 100 SYRINGE at 15:18

## 2023-02-10 RX ADMIN — HYDROCORTISONE SODIUM SUCCINATE 100 MG: 100 INJECTION, POWDER, FOR SOLUTION INTRAMUSCULAR; INTRAVENOUS at 13:03

## 2023-02-10 RX ADMIN — DIPHENHYDRAMINE HYDROCHLORIDE 50 MG: 50 INJECTION, SOLUTION INTRAMUSCULAR; INTRAVENOUS at 13:04

## 2023-02-10 RX ADMIN — SODIUM CHLORIDE, PRESERVATIVE FREE 20 ML: 5 INJECTION INTRAVENOUS at 15:18

## 2023-02-10 RX ADMIN — ACETAMINOPHEN 650 MG: 325 TABLET ORAL at 13:03

## 2023-02-10 NOTE — TELEPHONE ENCOUNTER
Called and spoke with patient. She stated she has done had the problem addressed and she is following up with her PCP next Thursday.

## 2023-02-10 NOTE — DISCHARGE INSTRUCTIONS
immune globulin (intravenous and subcutaneous)  Pronunciation:  dinora LACEY foley  Brand:  Gammagard Liquid, Gammaked, Gamunex-C  What is the most important information I should know about immune globulin? This medicine can cause blood clots. The risk is highest in older adults or in people who have had blood clots, heart problems, or blood circulation problems. Blood clots are also more likely during long-term bedrest, while using birth control pills or hormone replacement therapy, or while having a central intravenous (IV) catheter in place. Call your doctor at once if you have chest pain, trouble breathing, fast heartbeats, numbness or weakness, or swelling and warmth or discoloration in an arm or leg. This medicine can also harm your kidneys, especially if you have kidney disease or if you also use certain medicines. Tell your doctor right away if you have signs of kidney problems, such as swelling, rapid weight gain, and little or no urination. What is immune globulin? Immune globulin intravenous and subcutaneous (for injection into a vein or under the skin) is used to treat primary immunodeficiency. Immune globulin is also used to increase platelets (blood clotting cells) in people with idiopathic thrombocytopenic purpura. Immune globulin is also used to treat certain debilitating nerve disorders that cause muscle weakness and can affect daily activities. Immune globulin may also be used for purposes not listed in this medication guide. What should I discuss with my healthcare provider before using immune globulin? You should not use this medicine if:  you have had an allergic reaction to an immune globulin or blood product; or  you have immune globulin A (IgA) deficiency with antibody to IgA. Immune globulin can cause blood clots or kidney problems, especially in older adults or in people with certain conditions.  Tell your doctor if you have ever had:  heart problems, blood circulation problems, or \"thick blood\";  a stroke or blood clot;  kidney disease;  diabetes;  an infection called sepsis;  if you use estrogens (birth control pills or hormone replacement therapy);  if you have been on long-term bedrest; or  if you have a central intravenous (IV) catheter in place. You may need a dose adjustment if you are exposed to measles, or if you travel to an area where this disease is common. Tell your doctor if you are pregnant or breastfeeding. Immune globulin is made from donated human plasma and may contain viruses or other infectious agents. Donated plasma is tested and treated to reduce the risk of contamination, but there is still a small possibility it could transmit disease. Ask your doctor about any possible risk. How should I use immune globulin? Follow all directions on your prescription label and read all medication guides or instruction sheets. Your doctor may occasionally change your dose. Use the medicine exactly as directed. Immune globulin is given as an infusion into a vein, or injected under the skin using an infusion pump. A healthcare provider will give your first dose and may teach you how to properly use the medication by yourself. Do not inject immune globulin into a vein if you have been instructed to give the medicine as a subcutaneous injection (under the skin). How you give this medication, how often you use it, and the length of your infusion time will depend on the condition being treated. Read and carefully follow any Instructions for Use provided with your medicine. Ask your doctor or pharmacist if you don't understand all instructions. Prepare an injection only when you are ready to give it. Do not use if the medicine looks cloudy, has changed colors, or has particles in it. Call your pharmacist for new medicine. Do not shake the medication bottle or you may ruin the medicine. Immune globulin must be given slowly.  You may need to use several catheters to inject this medicine into different body areas at the same time. Your healthcare provider will show you the best places on your body to inject the medication. Keep a diary of the days and times you gave the injection and where you injected it on your body. Drink plenty of liquids  while you are using this medicine to help improve your blood flow and keep your kidneys working properly. You may need frequent blood or urine tests. This medicine can affect the results of certain medical tests. Tell any doctor who treats you that you are using immune globulin. Store this medicine in its original carton in the refrigerator. Do not freeze immune globulin, and throw the medicine away if it has frozen. Take the medicine out of the refrigerator and let it reach room temperature for up to 1 hour before injecting your dose. You may also store immune globulin at room temperature. You will need to use immune globulin within a certain number months. This will depend on the how you store the medicine (at room temperature, or in a refrigerator). Carefully follow the storage instructions provided with your medicine. Do not use the medicine after the expiration date on the label has passed. Each vial (bottle) is for one use only. Throw it away after one use, even if there is still medicine left inside. Use disposable injection items (needle, catheter, tubing) only once and then place them in a puncture-proof \"sharps\" container. Follow state or local laws about how to dispose of this container. Keep it out of the reach of children and pets. What happens if I miss a dose? Call your doctor for instructions if you miss a dose. What happens if I overdose? Seek emergency medical attention or call the Poison Help line at 1-603.520.8722. What should I avoid while using immune globulin? Do not receive a \"live\" vaccine while using immune globulin. The vaccine may not work as well and may not fully protect you from disease.  Live vaccines include measles, mumps, rubella (MMR), rotavirus, typhoid, yellow fever, varicella (chickenpox), zoster (shingles), and nasal flu (influenza) vaccine. What are the possible side effects of immune globulin? Get emergency medical help if you have signs of an allergic reaction: hives; wheezing, difficulty breathing; dizziness, feeling like you might pass out; swelling of your face, lips, tongue, or throat. Some side effects may occur during the injection. Tell your caregiver if you feel light-headed, itchy, chilled, sweaty, or have chest discomfort, fast heartbeats, severe headache, or pounding in your neck or ears. Call your doctor at once if you have:  a blood cell disorder --pale or yellowed skin, dark colored urine, fever, confusion or weakness;  dehydration symptoms --feeling very thirsty or hot, being unable to urinate, heavy sweating, or hot and dry skin;  kidney problems --little or no urination, swelling, rapid weight gain, feeling short of breath;  lung problems --chest pain, wheezing, trouble breathing, blue colored lips, fingers, or toes;  signs of a new infection --fever with a severe headache, neck stiffness, eye pain, and increased sensitivity to light; or  signs of a blood clot --shortness of breath, chest pain with deep breathing, rapid heart rate, numbness or weakness on one side of the body, swelling and warmth or discoloration in an arm or leg. Common side effects may include:  runny or stuffy nose, sinus pain, cough, sore throat;  fever, chills, weakness;  headache, back pain, muscle or joint pain;  dizziness, tiredness, depressed mood;  swelling in your hands or feet;  skin rash, redness, or bruising;  blisters or ulcers in your mouth, red or swollen gums, trouble swallowing;  nausea, diarrhea, stomach pain, upset stomach;  increased blood pressure; or  redness, swelling, or itching where an injection was given. This is not a complete list of side effects and others may occur.  Call your doctor for medical advice about side effects. You may report side effects to FDA at 6-138-MLX-7843. What other drugs will affect immune globulin? Immune globulin can harm your kidneys, especially if you also use certain medicines for infections, cancer, osteoporosis, organ transplant rejection, bowel disorders, high blood pressure, or pain or arthritis (including Advil, Motrin, and Aleve). Other drugs may affect immune globulin, including prescription and over-the-counter medicines, vitamins, and herbal products. Tell your doctor about all your current medicines and any medicine you start or stop using. Where can I get more information? Your doctor or pharmacist can provide more information about immune globulin. Remember, keep this and all other medicines out of the reach of children, never share your medicines with others, and use this medication only for the indication prescribed. Every effort has been made to ensure that the information provided by [address] is accurate, up-to-date, and complete, but no guarantee is made to that effect. Drug information contained herein may be time sensitive. [employer] information has been compiled for use by healthcare practitioners and consumers in the [place] and therefore [employer] does not warrant that uses outside of the [place] are appropriate, unless specifically indicated otherwise. [employer] drug information does not endorse drugs, diagnose patients or recommend therapy. [employer] drug information is an informational resource designed to assist licensed healthcare practitioners in caring for their patients and/or to serve consumers viewing this service as a supplement to, and not a substitute for, the expertise, skill, knowledge and judgment of healthcare practitioners.  The absence of a warning for a given drug or drug combination in no way should be construed to indicate that the drug or drug combination is safe, effective or appropriate for any given patient. [facility] does not assume any responsibility for any aspect of healthcare administered with the aid of information [facility] provides. The information contained herein is not intended to cover all possible uses, directions, precautions, warnings, drug interactions, allergic reactions, or adverse effects. If you have questions about the drugs you are taking, check with your doctor, nurse or pharmacist.  Copyright 4130-8137 [facility] [place]. Version: 4.02. Revision date: 2/26/2021. Care instructions adapted under license by [facility] ([facility]). If you have questions about a medical condition or this instruction, always ask your healthcare professional. [place] [address] any warranty or liability for your use of this information.

## 2023-02-17 ENCOUNTER — HOSPITAL ENCOUNTER (OUTPATIENT)
Dept: INFUSION THERAPY | Age: 48
Setting detail: INFUSION SERIES
Discharge: HOME | End: 2023-02-17
Payer: MEDICARE

## 2023-02-17 VITALS
SYSTOLIC BLOOD PRESSURE: 146 MMHG | OXYGEN SATURATION: 99 % | BODY MASS INDEX: 31.98 KG/M2 | WEIGHT: 192.19 LBS | DIASTOLIC BLOOD PRESSURE: 97 MMHG | TEMPERATURE: 97.2 F | HEART RATE: 53 BPM | RESPIRATION RATE: 17 BRPM

## 2023-02-17 DIAGNOSIS — G35 MULTIPLE SCLEROSIS (HCC): Primary | ICD-10-CM

## 2023-02-17 PROCEDURE — 6370000000 HC RX 637 (ALT 250 FOR IP): Performed by: PSYCHIATRY & NEUROLOGY

## 2023-02-17 PROCEDURE — 96375 TX/PRO/DX INJ NEW DRUG ADDON: CPT

## 2023-02-17 PROCEDURE — 6360000002 HC RX W HCPCS: Performed by: PSYCHIATRY & NEUROLOGY

## 2023-02-17 PROCEDURE — 96366 THER/PROPH/DIAG IV INF ADDON: CPT

## 2023-02-17 PROCEDURE — 96365 THER/PROPH/DIAG IV INF INIT: CPT

## 2023-02-17 PROCEDURE — 2580000003 HC RX 258: Performed by: PSYCHIATRY & NEUROLOGY

## 2023-02-17 RX ORDER — EPINEPHRINE 1 MG/ML
0.3 INJECTION, SOLUTION, CONCENTRATE INTRAVENOUS PRN
OUTPATIENT
Start: 2023-02-17

## 2023-02-17 RX ORDER — ACETAMINOPHEN 325 MG/1
650 TABLET ORAL ONCE
Status: CANCELLED | OUTPATIENT
Start: 2023-02-17 | End: 2023-02-17

## 2023-02-17 RX ORDER — DIPHENHYDRAMINE HYDROCHLORIDE 50 MG/ML
50 INJECTION INTRAMUSCULAR; INTRAVENOUS ONCE
Status: COMPLETED | OUTPATIENT
Start: 2023-02-17 | End: 2023-02-17

## 2023-02-17 RX ORDER — SODIUM CHLORIDE 0.9 % (FLUSH) 0.9 %
5-40 SYRINGE (ML) INJECTION PRN
Status: CANCELLED | OUTPATIENT
Start: 2023-02-17

## 2023-02-17 RX ORDER — ACETAMINOPHEN 325 MG/1
650 TABLET ORAL ONCE
Status: COMPLETED | OUTPATIENT
Start: 2023-02-17 | End: 2023-02-17

## 2023-02-17 RX ORDER — ACETAMINOPHEN 325 MG/1
650 TABLET ORAL
OUTPATIENT
Start: 2023-02-17

## 2023-02-17 RX ORDER — DIPHENHYDRAMINE HYDROCHLORIDE 50 MG/ML
50 INJECTION INTRAMUSCULAR; INTRAVENOUS
OUTPATIENT
Start: 2023-02-17

## 2023-02-17 RX ORDER — ALBUTEROL SULFATE 90 UG/1
4 AEROSOL, METERED RESPIRATORY (INHALATION) PRN
OUTPATIENT
Start: 2023-02-17

## 2023-02-17 RX ORDER — MEPERIDINE HYDROCHLORIDE 25 MG/ML
12.5 INJECTION INTRAMUSCULAR; INTRAVENOUS; SUBCUTANEOUS PRN
OUTPATIENT
Start: 2023-02-17

## 2023-02-17 RX ORDER — HEPARIN SODIUM (PORCINE) LOCK FLUSH IV SOLN 100 UNIT/ML 100 UNIT/ML
500 SOLUTION INTRAVENOUS PRN
Status: CANCELLED
Start: 2023-02-17

## 2023-02-17 RX ORDER — DIPHENHYDRAMINE HYDROCHLORIDE 50 MG/ML
50 INJECTION INTRAMUSCULAR; INTRAVENOUS ONCE
Status: CANCELLED
Start: 2023-02-17 | End: 2023-02-17

## 2023-02-17 RX ORDER — SODIUM CHLORIDE 9 MG/ML
INJECTION, SOLUTION INTRAVENOUS CONTINUOUS
OUTPATIENT
Start: 2023-02-17

## 2023-02-17 RX ORDER — ONDANSETRON 2 MG/ML
8 INJECTION INTRAMUSCULAR; INTRAVENOUS
OUTPATIENT
Start: 2023-02-17

## 2023-02-17 RX ORDER — HEPARIN SODIUM (PORCINE) LOCK FLUSH IV SOLN 100 UNIT/ML 100 UNIT/ML
500 SOLUTION INTRAVENOUS PRN
Status: DISCONTINUED | OUTPATIENT
Start: 2023-02-17 | End: 2023-02-19 | Stop reason: HOSPADM

## 2023-02-17 RX ORDER — SODIUM CHLORIDE 0.9 % (FLUSH) 0.9 %
5-40 SYRINGE (ML) INJECTION PRN
Status: DISCONTINUED | OUTPATIENT
Start: 2023-02-17 | End: 2023-02-18 | Stop reason: HOSPADM

## 2023-02-17 RX ADMIN — HYDROCORTISONE SODIUM SUCCINATE 100 MG: 100 INJECTION, POWDER, FOR SOLUTION INTRAMUSCULAR; INTRAVENOUS at 13:20

## 2023-02-17 RX ADMIN — Medication 500 UNITS: at 15:34

## 2023-02-17 RX ADMIN — DIPHENHYDRAMINE HYDROCHLORIDE 50 MG: 50 INJECTION, SOLUTION INTRAMUSCULAR; INTRAVENOUS at 13:20

## 2023-02-17 RX ADMIN — SODIUM CHLORIDE, PRESERVATIVE FREE 20 ML: 5 INJECTION INTRAVENOUS at 13:20

## 2023-02-17 RX ADMIN — IMMUNE GLOBULIN (HUMAN) 35 G: 10 INJECTION INTRAVENOUS; SUBCUTANEOUS at 13:56

## 2023-02-17 RX ADMIN — ACETAMINOPHEN 650 MG: 325 TABLET ORAL at 13:20

## 2023-02-17 ASSESSMENT — PAIN SCALES - GENERAL: PAINLEVEL_OUTOF10: 0

## 2023-02-20 RX ORDER — SUMATRIPTAN 100 MG/1
TABLET, FILM COATED ORAL
Qty: 9 TABLET | Refills: 5 | Status: SHIPPED | OUTPATIENT
Start: 2023-02-20

## 2023-02-20 NOTE — TELEPHONE ENCOUNTER
Requested Prescriptions     Pending Prescriptions Disp Refills    SUMAtriptan (IMITREX) 100 MG tablet [Pharmacy Med Name: SUMATRIPTAN 100MG TABLETS] 9 tablet 5     Sig: TAKE 1 TABLET BY MOUTH 1 TIME AS NEEDED FOR MIGRAINE.  MAX 2 IN 24 HOURS       Last Office Visit: 12/15/2022  Next Office Visit: 3/15/2023  Last Medication Refill: 12/30/2021 with 5 RF

## 2023-02-24 ENCOUNTER — HOSPITAL ENCOUNTER (OUTPATIENT)
Dept: INFUSION THERAPY | Age: 48
Setting detail: INFUSION SERIES
Discharge: HOME | End: 2023-02-24
Payer: MEDICARE

## 2023-02-24 VITALS
DIASTOLIC BLOOD PRESSURE: 83 MMHG | RESPIRATION RATE: 18 BRPM | SYSTOLIC BLOOD PRESSURE: 132 MMHG | HEART RATE: 61 BPM | TEMPERATURE: 98 F | OXYGEN SATURATION: 99 %

## 2023-02-24 DIAGNOSIS — G35 MULTIPLE SCLEROSIS (HCC): Primary | ICD-10-CM

## 2023-02-24 PROCEDURE — 96375 TX/PRO/DX INJ NEW DRUG ADDON: CPT

## 2023-02-24 PROCEDURE — 6370000000 HC RX 637 (ALT 250 FOR IP): Performed by: PSYCHIATRY & NEUROLOGY

## 2023-02-24 PROCEDURE — 6360000002 HC RX W HCPCS: Performed by: PSYCHIATRY & NEUROLOGY

## 2023-02-24 PROCEDURE — 96366 THER/PROPH/DIAG IV INF ADDON: CPT

## 2023-02-24 PROCEDURE — 96365 THER/PROPH/DIAG IV INF INIT: CPT

## 2023-02-24 PROCEDURE — 2580000003 HC RX 258: Performed by: PSYCHIATRY & NEUROLOGY

## 2023-02-24 RX ORDER — ONDANSETRON 2 MG/ML
8 INJECTION INTRAMUSCULAR; INTRAVENOUS
OUTPATIENT
Start: 2023-02-24

## 2023-02-24 RX ORDER — SODIUM CHLORIDE 9 MG/ML
INJECTION, SOLUTION INTRAVENOUS CONTINUOUS
OUTPATIENT
Start: 2023-02-24

## 2023-02-24 RX ORDER — DIPHENHYDRAMINE HYDROCHLORIDE 50 MG/ML
50 INJECTION INTRAMUSCULAR; INTRAVENOUS ONCE
Status: CANCELLED
Start: 2023-02-24 | End: 2023-02-24

## 2023-02-24 RX ORDER — ACETAMINOPHEN 325 MG/1
650 TABLET ORAL ONCE
Status: CANCELLED | OUTPATIENT
Start: 2023-02-24 | End: 2023-02-24

## 2023-02-24 RX ORDER — EPINEPHRINE 1 MG/ML
0.3 INJECTION, SOLUTION, CONCENTRATE INTRAVENOUS PRN
OUTPATIENT
Start: 2023-02-24

## 2023-02-24 RX ORDER — ACETAMINOPHEN 325 MG/1
650 TABLET ORAL
OUTPATIENT
Start: 2023-02-24

## 2023-02-24 RX ORDER — SODIUM CHLORIDE 0.9 % (FLUSH) 0.9 %
5-40 SYRINGE (ML) INJECTION PRN
Status: CANCELLED | OUTPATIENT
Start: 2023-02-24

## 2023-02-24 RX ORDER — HEPARIN SODIUM (PORCINE) LOCK FLUSH IV SOLN 100 UNIT/ML 100 UNIT/ML
500 SOLUTION INTRAVENOUS PRN
Status: CANCELLED
Start: 2023-02-24

## 2023-02-24 RX ORDER — SODIUM CHLORIDE 0.9 % (FLUSH) 0.9 %
5-40 SYRINGE (ML) INJECTION PRN
Status: DISCONTINUED | OUTPATIENT
Start: 2023-02-24 | End: 2023-02-25 | Stop reason: HOSPADM

## 2023-02-24 RX ORDER — ACETAMINOPHEN 325 MG/1
650 TABLET ORAL ONCE
Status: COMPLETED | OUTPATIENT
Start: 2023-02-24 | End: 2023-02-24

## 2023-02-24 RX ORDER — HEPARIN SODIUM (PORCINE) LOCK FLUSH IV SOLN 100 UNIT/ML 100 UNIT/ML
500 SOLUTION INTRAVENOUS PRN
Status: DISCONTINUED | OUTPATIENT
Start: 2023-02-24 | End: 2023-02-26 | Stop reason: HOSPADM

## 2023-02-24 RX ORDER — MEPERIDINE HYDROCHLORIDE 25 MG/ML
12.5 INJECTION INTRAMUSCULAR; INTRAVENOUS; SUBCUTANEOUS PRN
OUTPATIENT
Start: 2023-02-24

## 2023-02-24 RX ORDER — DIPHENHYDRAMINE HYDROCHLORIDE 50 MG/ML
50 INJECTION INTRAMUSCULAR; INTRAVENOUS
OUTPATIENT
Start: 2023-02-24

## 2023-02-24 RX ORDER — DIPHENHYDRAMINE HYDROCHLORIDE 50 MG/ML
50 INJECTION INTRAMUSCULAR; INTRAVENOUS ONCE
Status: COMPLETED | OUTPATIENT
Start: 2023-02-24 | End: 2023-02-24

## 2023-02-24 RX ORDER — ALBUTEROL SULFATE 90 UG/1
4 AEROSOL, METERED RESPIRATORY (INHALATION) PRN
OUTPATIENT
Start: 2023-02-24

## 2023-02-24 RX ADMIN — HYDROCORTISONE SODIUM SUCCINATE 100 MG: 100 INJECTION, POWDER, FOR SOLUTION INTRAMUSCULAR; INTRAVENOUS at 13:07

## 2023-02-24 RX ADMIN — SODIUM CHLORIDE, PRESERVATIVE FREE 20 ML: 5 INJECTION INTRAVENOUS at 15:28

## 2023-02-24 RX ADMIN — IMMUNE GLOBULIN (HUMAN) 35 G: 10 INJECTION INTRAVENOUS; SUBCUTANEOUS at 13:29

## 2023-02-24 RX ADMIN — Medication 500 UNITS: at 15:28

## 2023-02-24 RX ADMIN — ACETAMINOPHEN 650 MG: 325 TABLET ORAL at 13:07

## 2023-02-24 RX ADMIN — DIPHENHYDRAMINE HYDROCHLORIDE 50 MG: 50 INJECTION, SOLUTION INTRAMUSCULAR; INTRAVENOUS at 13:11

## 2023-02-24 NOTE — DISCHARGE INSTRUCTIONS
immune globulin (intravenous and subcutaneous)  Pronunciation:  dinora LACEY foley  Brand:  Gammagard Liquid, Gammaked, Gamunex-C  What is the most important information I should know about immune globulin? This medicine can cause blood clots. The risk is highest in older adults or in people who have had blood clots, heart problems, or blood circulation problems. Blood clots are also more likely during long-term bedrest, while using birth control pills or hormone replacement therapy, or while having a central intravenous (IV) catheter in place. Call your doctor at once if you have chest pain, trouble breathing, fast heartbeats, numbness or weakness, or swelling and warmth or discoloration in an arm or leg. This medicine can also harm your kidneys, especially if you have kidney disease or if you also use certain medicines. Tell your doctor right away if you have signs of kidney problems, such as swelling, rapid weight gain, and little or no urination. What is immune globulin? Immune globulin intravenous and subcutaneous (for injection into a vein or under the skin) is used to treat primary immunodeficiency. Immune globulin is also used to increase platelets (blood clotting cells) in people with idiopathic thrombocytopenic purpura. Immune globulin is also used to treat certain debilitating nerve disorders that cause muscle weakness and can affect daily activities. Immune globulin may also be used for purposes not listed in this medication guide. What should I discuss with my healthcare provider before using immune globulin? You should not use this medicine if:  you have had an allergic reaction to an immune globulin or blood product; or  you have immune globulin A (IgA) deficiency with antibody to IgA. Immune globulin can cause blood clots or kidney problems, especially in older adults or in people with certain conditions.  Tell your doctor if you have ever had:  heart problems, blood circulation problems, or \"thick blood\";  a stroke or blood clot;  kidney disease;  diabetes;  an infection called sepsis;  if you use estrogens (birth control pills or hormone replacement therapy);  if you have been on long-term bedrest; or  if you have a central intravenous (IV) catheter in place. You may need a dose adjustment if you are exposed to measles, or if you travel to an area where this disease is common. Tell your doctor if you are pregnant or breastfeeding. Immune globulin is made from donated human plasma and may contain viruses or other infectious agents. Donated plasma is tested and treated to reduce the risk of contamination, but there is still a small possibility it could transmit disease. Ask your doctor about any possible risk. How should I use immune globulin? Follow all directions on your prescription label and read all medication guides or instruction sheets. Your doctor may occasionally change your dose. Use the medicine exactly as directed. Immune globulin is given as an infusion into a vein, or injected under the skin using an infusion pump. A healthcare provider will give your first dose and may teach you how to properly use the medication by yourself. Do not inject immune globulin into a vein if you have been instructed to give the medicine as a subcutaneous injection (under the skin). How you give this medication, how often you use it, and the length of your infusion time will depend on the condition being treated. Read and carefully follow any Instructions for Use provided with your medicine. Ask your doctor or pharmacist if you don't understand all instructions. Prepare an injection only when you are ready to give it. Do not use if the medicine looks cloudy, has changed colors, or has particles in it. Call your pharmacist for new medicine. Do not shake the medication bottle or you may ruin the medicine. Immune globulin must be given slowly.  You may need to use several catheters to inject this medicine into different body areas at the same time. Your healthcare provider will show you the best places on your body to inject the medication. Keep a diary of the days and times you gave the injection and where you injected it on your body. Drink plenty of liquids  while you are using this medicine to help improve your blood flow and keep your kidneys working properly. You may need frequent blood or urine tests. This medicine can affect the results of certain medical tests. Tell any doctor who treats you that you are using immune globulin. Store this medicine in its original carton in the refrigerator. Do not freeze immune globulin, and throw the medicine away if it has frozen. Take the medicine out of the refrigerator and let it reach room temperature for up to 1 hour before injecting your dose. You may also store immune globulin at room temperature. You will need to use immune globulin within a certain number months. This will depend on the how you store the medicine (at room temperature, or in a refrigerator). Carefully follow the storage instructions provided with your medicine. Do not use the medicine after the expiration date on the label has passed. Each vial (bottle) is for one use only. Throw it away after one use, even if there is still medicine left inside. Use disposable injection items (needle, catheter, tubing) only once and then place them in a puncture-proof \"sharps\" container. Follow state or local laws about how to dispose of this container. Keep it out of the reach of children and pets. What happens if I miss a dose? Call your doctor for instructions if you miss a dose. What happens if I overdose? Seek emergency medical attention or call the Poison Help line at 1-837.269.7026. What should I avoid while using immune globulin? Do not receive a \"live\" vaccine while using immune globulin. The vaccine may not work as well and may not fully protect you from disease.  Live vaccines include measles, mumps, rubella (MMR), rotavirus, typhoid, yellow fever, varicella (chickenpox), zoster (shingles), and nasal flu (influenza) vaccine. What are the possible side effects of immune globulin? Get emergency medical help if you have signs of an allergic reaction: hives; wheezing, difficulty breathing; dizziness, feeling like you might pass out; swelling of your face, lips, tongue, or throat. Some side effects may occur during the injection. Tell your caregiver if you feel light-headed, itchy, chilled, sweaty, or have chest discomfort, fast heartbeats, severe headache, or pounding in your neck or ears. Call your doctor at once if you have:  a blood cell disorder --pale or yellowed skin, dark colored urine, fever, confusion or weakness;  dehydration symptoms --feeling very thirsty or hot, being unable to urinate, heavy sweating, or hot and dry skin;  kidney problems --little or no urination, swelling, rapid weight gain, feeling short of breath;  lung problems --chest pain, wheezing, trouble breathing, blue colored lips, fingers, or toes;  signs of a new infection --fever with a severe headache, neck stiffness, eye pain, and increased sensitivity to light; or  signs of a blood clot --shortness of breath, chest pain with deep breathing, rapid heart rate, numbness or weakness on one side of the body, swelling and warmth or discoloration in an arm or leg. Common side effects may include:  runny or stuffy nose, sinus pain, cough, sore throat;  fever, chills, weakness;  headache, back pain, muscle or joint pain;  dizziness, tiredness, depressed mood;  swelling in your hands or feet;  skin rash, redness, or bruising;  blisters or ulcers in your mouth, red or swollen gums, trouble swallowing;  nausea, diarrhea, stomach pain, upset stomach;  increased blood pressure; or  redness, swelling, or itching where an injection was given. This is not a complete list of side effects and others may occur.  Call your doctor for medical advice about side effects. You may report side effects to FDA at 2-763-TGK-2984. What other drugs will affect immune globulin? Immune globulin can harm your kidneys, especially if you also use certain medicines for infections, cancer, osteoporosis, organ transplant rejection, bowel disorders, high blood pressure, or pain or arthritis (including Advil, Motrin, and Aleve). Other drugs may affect immune globulin, including prescription and over-the-counter medicines, vitamins, and herbal products. Tell your doctor about all your current medicines and any medicine you start or stop using. Where can I get more information? Your doctor or pharmacist can provide more information about immune globulin. Remember, keep this and all other medicines out of the reach of children, never share your medicines with others, and use this medication only for the indication prescribed. Every effort has been made to ensure that the information provided by [facility] [address] is accurate, up-to-date, and complete, but no guarantee is made to that effect. Drug information contained herein may be time sensitive. [facility] information has been compiled for use by healthcare practitioners and consumers in the [place] and therefore [employer] does not warrant that uses outside of the [place] are appropriate, unless specifically indicated otherwise. [employer] drug information does not endorse drugs, diagnose patients or recommend therapy. [employer] drug information is an informational resource designed to assist licensed healthcare practitioners in caring for their patients and/or to serve consumers viewing this service as a supplement to, and not a substitute for, the expertise, skill, knowledge and judgment of healthcare practitioners.  The absence of a warning for a given drug or drug combination in no way should be construed to indicate that the drug or drug combination is safe, effective or appropriate for any given patient. [facility] does not assume any responsibility for any aspect of healthcare administered with the aid of information [facility] provides. The information contained herein is not intended to cover all possible uses, directions, precautions, warnings, drug interactions, allergic reactions, or adverse effects. If you have questions about the drugs you are taking, check with your doctor, nurse or pharmacist.  Copyright 4984-7967 [facility] [place]. Version: 4.02. Revision date: 2/26/2021. Care instructions adapted under license by [facility] ([facility]). If you have questions about a medical condition or this instruction, always ask your healthcare professional. [place] [address] any warranty or liability for your use of this information.

## 2023-03-02 RX ORDER — SODIUM CHLORIDE 0.9 % (FLUSH) 0.9 %
5-40 SYRINGE (ML) INJECTION PRN
Status: CANCELLED | OUTPATIENT
Start: 2023-03-03

## 2023-03-02 RX ORDER — ACETAMINOPHEN 325 MG/1
650 TABLET ORAL ONCE
Status: CANCELLED | OUTPATIENT
Start: 2023-03-03 | End: 2023-03-03

## 2023-03-02 RX ORDER — DIPHENHYDRAMINE HYDROCHLORIDE 50 MG/ML
50 INJECTION INTRAMUSCULAR; INTRAVENOUS ONCE
Status: CANCELLED
Start: 2023-03-03 | End: 2023-03-03

## 2023-03-02 RX ORDER — HEPARIN SODIUM (PORCINE) LOCK FLUSH IV SOLN 100 UNIT/ML 100 UNIT/ML
500 SOLUTION INTRAVENOUS PRN
Status: CANCELLED
Start: 2023-03-03

## 2023-03-03 ENCOUNTER — HOSPITAL ENCOUNTER (OUTPATIENT)
Dept: INFUSION THERAPY | Age: 48
Setting detail: INFUSION SERIES
Discharge: HOME | End: 2023-03-03
Payer: MEDICARE

## 2023-03-03 VITALS
DIASTOLIC BLOOD PRESSURE: 80 MMHG | RESPIRATION RATE: 17 BRPM | HEART RATE: 59 BPM | BODY MASS INDEX: 32.12 KG/M2 | WEIGHT: 193 LBS | SYSTOLIC BLOOD PRESSURE: 112 MMHG | TEMPERATURE: 98.2 F | OXYGEN SATURATION: 99 %

## 2023-03-03 DIAGNOSIS — G35 MULTIPLE SCLEROSIS (HCC): Primary | ICD-10-CM

## 2023-03-03 PROCEDURE — 96375 TX/PRO/DX INJ NEW DRUG ADDON: CPT

## 2023-03-03 PROCEDURE — 96413 CHEMO IV INFUSION 1 HR: CPT

## 2023-03-03 PROCEDURE — 6360000002 HC RX W HCPCS: Performed by: PSYCHIATRY & NEUROLOGY

## 2023-03-03 PROCEDURE — 96366 THER/PROPH/DIAG IV INF ADDON: CPT

## 2023-03-03 PROCEDURE — 2580000003 HC RX 258: Performed by: PSYCHIATRY & NEUROLOGY

## 2023-03-03 PROCEDURE — 96365 THER/PROPH/DIAG IV INF INIT: CPT

## 2023-03-03 PROCEDURE — 6370000000 HC RX 637 (ALT 250 FOR IP): Performed by: PSYCHIATRY & NEUROLOGY

## 2023-03-03 RX ORDER — EPINEPHRINE 1 MG/ML
0.3 INJECTION, SOLUTION, CONCENTRATE INTRAVENOUS PRN
OUTPATIENT
Start: 2023-03-10

## 2023-03-03 RX ORDER — ACETAMINOPHEN 325 MG/1
650 TABLET ORAL
OUTPATIENT
Start: 2023-03-10

## 2023-03-03 RX ORDER — ACETAMINOPHEN 325 MG/1
650 TABLET ORAL ONCE
Status: COMPLETED | OUTPATIENT
Start: 2023-03-03 | End: 2023-03-03

## 2023-03-03 RX ORDER — DIPHENHYDRAMINE HYDROCHLORIDE 50 MG/ML
50 INJECTION INTRAMUSCULAR; INTRAVENOUS
OUTPATIENT
Start: 2023-03-10

## 2023-03-03 RX ORDER — ALBUTEROL SULFATE 90 UG/1
4 AEROSOL, METERED RESPIRATORY (INHALATION) PRN
OUTPATIENT
Start: 2023-03-10

## 2023-03-03 RX ORDER — ACETAMINOPHEN 325 MG/1
650 TABLET ORAL ONCE
Status: CANCELLED | OUTPATIENT
Start: 2023-03-10 | End: 2023-03-10

## 2023-03-03 RX ORDER — HEPARIN SODIUM (PORCINE) LOCK FLUSH IV SOLN 100 UNIT/ML 100 UNIT/ML
500 SOLUTION INTRAVENOUS PRN
Status: DISCONTINUED | OUTPATIENT
Start: 2023-03-03 | End: 2023-03-05 | Stop reason: HOSPADM

## 2023-03-03 RX ORDER — MEPERIDINE HYDROCHLORIDE 25 MG/ML
12.5 INJECTION INTRAMUSCULAR; INTRAVENOUS; SUBCUTANEOUS PRN
OUTPATIENT
Start: 2023-03-10

## 2023-03-03 RX ORDER — DIPHENHYDRAMINE HYDROCHLORIDE 50 MG/ML
50 INJECTION INTRAMUSCULAR; INTRAVENOUS ONCE
Status: CANCELLED
Start: 2023-03-10 | End: 2023-03-10

## 2023-03-03 RX ORDER — HEPARIN SODIUM (PORCINE) LOCK FLUSH IV SOLN 100 UNIT/ML 100 UNIT/ML
500 SOLUTION INTRAVENOUS PRN
Status: CANCELLED
Start: 2023-03-10

## 2023-03-03 RX ORDER — ONDANSETRON 2 MG/ML
8 INJECTION INTRAMUSCULAR; INTRAVENOUS
OUTPATIENT
Start: 2023-03-10

## 2023-03-03 RX ORDER — SODIUM CHLORIDE 0.9 % (FLUSH) 0.9 %
5-40 SYRINGE (ML) INJECTION PRN
Status: DISCONTINUED | OUTPATIENT
Start: 2023-03-03 | End: 2023-03-04 | Stop reason: HOSPADM

## 2023-03-03 RX ORDER — SODIUM CHLORIDE 9 MG/ML
INJECTION, SOLUTION INTRAVENOUS CONTINUOUS
OUTPATIENT
Start: 2023-03-10

## 2023-03-03 RX ORDER — DIPHENHYDRAMINE HYDROCHLORIDE 50 MG/ML
50 INJECTION INTRAMUSCULAR; INTRAVENOUS ONCE
Status: COMPLETED | OUTPATIENT
Start: 2023-03-03 | End: 2023-03-03

## 2023-03-03 RX ORDER — SODIUM CHLORIDE 0.9 % (FLUSH) 0.9 %
5-40 SYRINGE (ML) INJECTION PRN
Status: CANCELLED | OUTPATIENT
Start: 2023-03-10

## 2023-03-03 RX ADMIN — IMMUNE GLOBULIN (HUMAN) 35 G: 10 INJECTION INTRAVENOUS; SUBCUTANEOUS at 07:32

## 2023-03-03 RX ADMIN — SODIUM CHLORIDE, PRESERVATIVE FREE 10 ML: 5 INJECTION INTRAVENOUS at 09:08

## 2023-03-03 RX ADMIN — HEPARIN 500 UNITS: 100 SYRINGE at 09:09

## 2023-03-03 RX ADMIN — HYDROCORTISONE SODIUM SUCCINATE 100 MG: 100 INJECTION, POWDER, FOR SOLUTION INTRAMUSCULAR; INTRAVENOUS at 07:08

## 2023-03-03 RX ADMIN — DIPHENHYDRAMINE HYDROCHLORIDE 50 MG: 50 INJECTION, SOLUTION INTRAMUSCULAR; INTRAVENOUS at 07:08

## 2023-03-03 RX ADMIN — ACETAMINOPHEN 650 MG: 325 TABLET ORAL at 07:08

## 2023-03-10 ENCOUNTER — HOSPITAL ENCOUNTER (OUTPATIENT)
Dept: INFUSION THERAPY | Age: 48
Setting detail: INFUSION SERIES
Discharge: HOME | End: 2023-03-10
Payer: MEDICARE

## 2023-03-10 VITALS
BODY MASS INDEX: 30.79 KG/M2 | HEART RATE: 65 BPM | SYSTOLIC BLOOD PRESSURE: 109 MMHG | OXYGEN SATURATION: 100 % | TEMPERATURE: 98.4 F | RESPIRATION RATE: 18 BRPM | DIASTOLIC BLOOD PRESSURE: 75 MMHG | WEIGHT: 185 LBS

## 2023-03-10 DIAGNOSIS — G35 MULTIPLE SCLEROSIS (HCC): Primary | ICD-10-CM

## 2023-03-10 PROCEDURE — 96366 THER/PROPH/DIAG IV INF ADDON: CPT

## 2023-03-10 PROCEDURE — 2580000003 HC RX 258: Performed by: PSYCHIATRY & NEUROLOGY

## 2023-03-10 PROCEDURE — 96375 TX/PRO/DX INJ NEW DRUG ADDON: CPT

## 2023-03-10 PROCEDURE — 96365 THER/PROPH/DIAG IV INF INIT: CPT

## 2023-03-10 PROCEDURE — 6360000002 HC RX W HCPCS: Performed by: PSYCHIATRY & NEUROLOGY

## 2023-03-10 PROCEDURE — 6370000000 HC RX 637 (ALT 250 FOR IP): Performed by: PSYCHIATRY & NEUROLOGY

## 2023-03-10 RX ORDER — SODIUM CHLORIDE 0.9 % (FLUSH) 0.9 %
5-40 SYRINGE (ML) INJECTION PRN
Status: DISCONTINUED | OUTPATIENT
Start: 2023-03-10 | End: 2023-03-11 | Stop reason: HOSPADM

## 2023-03-10 RX ORDER — ACETAMINOPHEN 325 MG/1
650 TABLET ORAL
OUTPATIENT
Start: 2023-03-17

## 2023-03-10 RX ORDER — DIPHENHYDRAMINE HYDROCHLORIDE 50 MG/ML
50 INJECTION INTRAMUSCULAR; INTRAVENOUS ONCE
Status: COMPLETED | OUTPATIENT
Start: 2023-03-10 | End: 2023-03-10

## 2023-03-10 RX ORDER — SODIUM CHLORIDE 0.9 % (FLUSH) 0.9 %
5-40 SYRINGE (ML) INJECTION PRN
Status: CANCELLED | OUTPATIENT
Start: 2023-03-17

## 2023-03-10 RX ORDER — ONDANSETRON 2 MG/ML
8 INJECTION INTRAMUSCULAR; INTRAVENOUS
OUTPATIENT
Start: 2023-03-17

## 2023-03-10 RX ORDER — SODIUM CHLORIDE 9 MG/ML
INJECTION, SOLUTION INTRAVENOUS CONTINUOUS
OUTPATIENT
Start: 2023-03-17

## 2023-03-10 RX ORDER — EPINEPHRINE 1 MG/ML
0.3 INJECTION, SOLUTION, CONCENTRATE INTRAVENOUS PRN
OUTPATIENT
Start: 2023-03-17

## 2023-03-10 RX ORDER — ACETAMINOPHEN 325 MG/1
650 TABLET ORAL ONCE
Status: CANCELLED | OUTPATIENT
Start: 2023-03-17 | End: 2023-03-17

## 2023-03-10 RX ORDER — ACETAMINOPHEN 325 MG/1
650 TABLET ORAL ONCE
Status: COMPLETED | OUTPATIENT
Start: 2023-03-10 | End: 2023-03-10

## 2023-03-10 RX ORDER — DIPHENHYDRAMINE HYDROCHLORIDE 50 MG/ML
50 INJECTION INTRAMUSCULAR; INTRAVENOUS
OUTPATIENT
Start: 2023-03-17

## 2023-03-10 RX ORDER — DIPHENHYDRAMINE HYDROCHLORIDE 50 MG/ML
50 INJECTION INTRAMUSCULAR; INTRAVENOUS ONCE
Status: CANCELLED
Start: 2023-03-17 | End: 2023-03-17

## 2023-03-10 RX ORDER — HEPARIN SODIUM (PORCINE) LOCK FLUSH IV SOLN 100 UNIT/ML 100 UNIT/ML
500 SOLUTION INTRAVENOUS PRN
Status: CANCELLED
Start: 2023-03-17

## 2023-03-10 RX ORDER — HEPARIN SODIUM (PORCINE) LOCK FLUSH IV SOLN 100 UNIT/ML 100 UNIT/ML
500 SOLUTION INTRAVENOUS PRN
Status: DISCONTINUED | OUTPATIENT
Start: 2023-03-10 | End: 2023-03-12 | Stop reason: HOSPADM

## 2023-03-10 RX ORDER — ALBUTEROL SULFATE 90 UG/1
4 AEROSOL, METERED RESPIRATORY (INHALATION) PRN
OUTPATIENT
Start: 2023-03-17

## 2023-03-10 RX ORDER — MEPERIDINE HYDROCHLORIDE 25 MG/ML
12.5 INJECTION INTRAMUSCULAR; INTRAVENOUS; SUBCUTANEOUS PRN
OUTPATIENT
Start: 2023-03-17

## 2023-03-10 RX ADMIN — ACETAMINOPHEN 650 MG: 325 TABLET ORAL at 06:36

## 2023-03-10 RX ADMIN — HYDROCORTISONE SODIUM SUCCINATE 100 MG: 100 INJECTION, POWDER, FOR SOLUTION INTRAMUSCULAR; INTRAVENOUS at 06:37

## 2023-03-10 RX ADMIN — DIPHENHYDRAMINE HYDROCHLORIDE 50 MG: 50 INJECTION, SOLUTION INTRAMUSCULAR; INTRAVENOUS at 06:37

## 2023-03-10 RX ADMIN — IMMUNE GLOBULIN (HUMAN) 35 G: 10 INJECTION INTRAVENOUS; SUBCUTANEOUS at 06:48

## 2023-03-10 RX ADMIN — Medication 500 UNITS: at 08:19

## 2023-03-10 RX ADMIN — SODIUM CHLORIDE, PRESERVATIVE FREE 20 ML: 5 INJECTION INTRAVENOUS at 08:19

## 2023-03-10 ASSESSMENT — PAIN SCALES - GENERAL: PAINLEVEL_OUTOF10: 0

## 2023-03-10 NOTE — PROGRESS NOTES
Port accessed with good blood return. Tylenol, bendaryl, and solucortef given as ordered for premeds. 35 g gamunex C given. Pt tolerated well. Blood return checked and present before, during, and after infusion.

## 2023-03-17 ENCOUNTER — HOSPITAL ENCOUNTER (OUTPATIENT)
Dept: INFUSION THERAPY | Age: 48
Setting detail: INFUSION SERIES
Discharge: HOME | End: 2023-03-17
Payer: MEDICARE

## 2023-03-17 ENCOUNTER — TELEPHONE (OUTPATIENT)
Dept: NEUROLOGY | Age: 48
End: 2023-03-17

## 2023-03-17 VITALS
OXYGEN SATURATION: 99 % | SYSTOLIC BLOOD PRESSURE: 119 MMHG | TEMPERATURE: 96.6 F | RESPIRATION RATE: 18 BRPM | HEART RATE: 54 BPM | DIASTOLIC BLOOD PRESSURE: 84 MMHG

## 2023-03-17 DIAGNOSIS — G35 MULTIPLE SCLEROSIS (HCC): Primary | ICD-10-CM

## 2023-03-17 PROCEDURE — 2580000003 HC RX 258: Performed by: PSYCHIATRY & NEUROLOGY

## 2023-03-17 PROCEDURE — 96365 THER/PROPH/DIAG IV INF INIT: CPT

## 2023-03-17 PROCEDURE — 6370000000 HC RX 637 (ALT 250 FOR IP): Performed by: PSYCHIATRY & NEUROLOGY

## 2023-03-17 PROCEDURE — 6360000002 HC RX W HCPCS: Performed by: PSYCHIATRY & NEUROLOGY

## 2023-03-17 PROCEDURE — 96366 THER/PROPH/DIAG IV INF ADDON: CPT

## 2023-03-17 RX ORDER — SODIUM CHLORIDE 9 MG/ML
INJECTION, SOLUTION INTRAVENOUS CONTINUOUS
OUTPATIENT
Start: 2023-03-24

## 2023-03-17 RX ORDER — HEPARIN SODIUM (PORCINE) LOCK FLUSH IV SOLN 100 UNIT/ML 100 UNIT/ML
500 SOLUTION INTRAVENOUS PRN
Status: CANCELLED
Start: 2023-03-24

## 2023-03-17 RX ORDER — DIPHENHYDRAMINE HYDROCHLORIDE 50 MG/ML
50 INJECTION INTRAMUSCULAR; INTRAVENOUS
OUTPATIENT
Start: 2023-03-24

## 2023-03-17 RX ORDER — HEPARIN SODIUM (PORCINE) LOCK FLUSH IV SOLN 100 UNIT/ML 100 UNIT/ML
500 SOLUTION INTRAVENOUS PRN
Status: DISCONTINUED | OUTPATIENT
Start: 2023-03-17 | End: 2023-03-19 | Stop reason: HOSPADM

## 2023-03-17 RX ORDER — ACETAMINOPHEN 325 MG/1
650 TABLET ORAL ONCE
Status: COMPLETED | OUTPATIENT
Start: 2023-03-17 | End: 2023-03-17

## 2023-03-17 RX ORDER — ACETAMINOPHEN 325 MG/1
650 TABLET ORAL ONCE
Status: CANCELLED | OUTPATIENT
Start: 2023-03-24 | End: 2023-03-24

## 2023-03-17 RX ORDER — MEPERIDINE HYDROCHLORIDE 25 MG/ML
12.5 INJECTION INTRAMUSCULAR; INTRAVENOUS; SUBCUTANEOUS PRN
OUTPATIENT
Start: 2023-03-24

## 2023-03-17 RX ORDER — SODIUM CHLORIDE 0.9 % (FLUSH) 0.9 %
5-40 SYRINGE (ML) INJECTION PRN
Status: CANCELLED | OUTPATIENT
Start: 2023-03-24

## 2023-03-17 RX ORDER — SODIUM CHLORIDE 0.9 % (FLUSH) 0.9 %
5-40 SYRINGE (ML) INJECTION PRN
Status: DISCONTINUED | OUTPATIENT
Start: 2023-03-17 | End: 2023-03-18 | Stop reason: HOSPADM

## 2023-03-17 RX ORDER — ONDANSETRON 2 MG/ML
8 INJECTION INTRAMUSCULAR; INTRAVENOUS
OUTPATIENT
Start: 2023-03-24

## 2023-03-17 RX ORDER — EPINEPHRINE 1 MG/ML
0.3 INJECTION, SOLUTION, CONCENTRATE INTRAVENOUS PRN
OUTPATIENT
Start: 2023-03-24

## 2023-03-17 RX ORDER — DIPHENHYDRAMINE HYDROCHLORIDE 50 MG/ML
50 INJECTION INTRAMUSCULAR; INTRAVENOUS ONCE
Status: COMPLETED | OUTPATIENT
Start: 2023-03-17 | End: 2023-03-17

## 2023-03-17 RX ORDER — ALBUTEROL SULFATE 90 UG/1
4 AEROSOL, METERED RESPIRATORY (INHALATION) PRN
OUTPATIENT
Start: 2023-03-24

## 2023-03-17 RX ORDER — ACETAMINOPHEN 325 MG/1
650 TABLET ORAL
OUTPATIENT
Start: 2023-03-24

## 2023-03-17 RX ORDER — DIPHENHYDRAMINE HYDROCHLORIDE 50 MG/ML
50 INJECTION INTRAMUSCULAR; INTRAVENOUS ONCE
Status: CANCELLED
Start: 2023-03-24 | End: 2023-03-24

## 2023-03-17 RX ADMIN — SODIUM CHLORIDE, PRESERVATIVE FREE 20 ML: 5 INJECTION INTRAVENOUS at 08:56

## 2023-03-17 RX ADMIN — DIPHENHYDRAMINE HYDROCHLORIDE 50 MG: 50 INJECTION, SOLUTION INTRAMUSCULAR; INTRAVENOUS at 06:47

## 2023-03-17 RX ADMIN — IMMUNE GLOBULIN (HUMAN) 35 G: 10 INJECTION INTRAVENOUS; SUBCUTANEOUS at 07:15

## 2023-03-17 RX ADMIN — Medication 500 UNITS: at 08:56

## 2023-03-17 RX ADMIN — HYDROCORTISONE SODIUM SUCCINATE 100 MG: 100 INJECTION, POWDER, FOR SOLUTION INTRAMUSCULAR; INTRAVENOUS at 06:46

## 2023-03-17 RX ADMIN — ACETAMINOPHEN 650 MG: 325 TABLET ORAL at 06:44

## 2023-03-17 ASSESSMENT — PAIN SCALES - GENERAL: PAINLEVEL_OUTOF10: 0

## 2023-03-17 NOTE — PROGRESS NOTES
Here today for IVIG infusion. Infused per orders, tolerated well. Future appointments reviewed and patient discharged home.

## 2023-03-17 NOTE — DISCHARGE INSTRUCTIONS
immune globulin (intravenous and subcutaneous)  Pronunciation:  dinora LACEY foley  Brand:  Gammagard Liquid, Gammaked, Gamunex-C  What is the most important information I should know about immune globulin? This medicine can cause blood clots. The risk is highest in older adults or in people who have had blood clots, heart problems, or blood circulation problems. Blood clots are also more likely during long-term bedrest, while using birth control pills or hormone replacement therapy, or while having a central intravenous (IV) catheter in place. Call your doctor at once if you have chest pain, trouble breathing, fast heartbeats, numbness or weakness, or swelling and warmth or discoloration in an arm or leg. This medicine can also harm your kidneys, especially if you have kidney disease or if you also use certain medicines. Tell your doctor right away if you have signs of kidney problems, such as swelling, rapid weight gain, and little or no urination. What is immune globulin? Immune globulin intravenous and subcutaneous (for injection into a vein or under the skin) is used to treat primary immunodeficiency. Immune globulin is also used to increase platelets (blood clotting cells) in people with idiopathic thrombocytopenic purpura. Immune globulin is also used to treat certain debilitating nerve disorders that cause muscle weakness and can affect daily activities. Immune globulin may also be used for purposes not listed in this medication guide. What should I discuss with my healthcare provider before using immune globulin? You should not use this medicine if:  you have had an allergic reaction to an immune globulin or blood product; or  you have immune globulin A (IgA) deficiency with antibody to IgA. Immune globulin can cause blood clots or kidney problems, especially in older adults or in people with certain conditions.  Tell your doctor if you have ever had:  heart problems, blood circulation problems, or \"thick blood\";  a stroke or blood clot;  kidney disease;  diabetes;  an infection called sepsis;  if you use estrogens (birth control pills or hormone replacement therapy);  if you have been on long-term bedrest; or  if you have a central intravenous (IV) catheter in place. You may need a dose adjustment if you are exposed to measles, or if you travel to an area where this disease is common. Tell your doctor if you are pregnant or breastfeeding. Immune globulin is made from donated human plasma and may contain viruses or other infectious agents. Donated plasma is tested and treated to reduce the risk of contamination, but there is still a small possibility it could transmit disease. Ask your doctor about any possible risk. How should I use immune globulin? Follow all directions on your prescription label and read all medication guides or instruction sheets. Your doctor may occasionally change your dose. Use the medicine exactly as directed. Immune globulin is given as an infusion into a vein, or injected under the skin using an infusion pump. A healthcare provider will give your first dose and may teach you how to properly use the medication by yourself. Do not inject immune globulin into a vein if you have been instructed to give the medicine as a subcutaneous injection (under the skin). How you give this medication, how often you use it, and the length of your infusion time will depend on the condition being treated. Read and carefully follow any Instructions for Use provided with your medicine. Ask your doctor or pharmacist if you don't understand all instructions. Prepare an injection only when you are ready to give it. Do not use if the medicine looks cloudy, has changed colors, or has particles in it. Call your pharmacist for new medicine. Do not shake the medication bottle or you may ruin the medicine. Immune globulin must be given slowly.  You may need to use several catheters to inject this medicine into different body areas at the same time. Your healthcare provider will show you the best places on your body to inject the medication. Keep a diary of the days and times you gave the injection and where you injected it on your body. Drink plenty of liquids  while you are using this medicine to help improve your blood flow and keep your kidneys working properly. You may need frequent blood or urine tests. This medicine can affect the results of certain medical tests. Tell any doctor who treats you that you are using immune globulin. Store this medicine in its original carton in the refrigerator. Do not freeze immune globulin, and throw the medicine away if it has frozen. Take the medicine out of the refrigerator and let it reach room temperature for up to 1 hour before injecting your dose. You may also store immune globulin at room temperature. You will need to use immune globulin within a certain number months. This will depend on the how you store the medicine (at room temperature, or in a refrigerator). Carefully follow the storage instructions provided with your medicine. Do not use the medicine after the expiration date on the label has passed. Each vial (bottle) is for one use only. Throw it away after one use, even if there is still medicine left inside. Use disposable injection items (needle, catheter, tubing) only once and then place them in a puncture-proof \"sharps\" container. Follow state or local laws about how to dispose of this container. Keep it out of the reach of children and pets. What happens if I miss a dose? Call your doctor for instructions if you miss a dose. What happens if I overdose? Seek emergency medical attention or call the Poison Help line at 1-553.401.9438. What should I avoid while using immune globulin? Do not receive a \"live\" vaccine while using immune globulin. The vaccine may not work as well and may not fully protect you from disease.  Live vaccines include measles, mumps, rubella (MMR), rotavirus, typhoid, yellow fever, varicella (chickenpox), zoster (shingles), and nasal flu (influenza) vaccine. What are the possible side effects of immune globulin? Get emergency medical help if you have signs of an allergic reaction: hives; wheezing, difficulty breathing; dizziness, feeling like you might pass out; swelling of your face, lips, tongue, or throat. Some side effects may occur during the injection. Tell your caregiver if you feel light-headed, itchy, chilled, sweaty, or have chest discomfort, fast heartbeats, severe headache, or pounding in your neck or ears. Call your doctor at once if you have:  a blood cell disorder --pale or yellowed skin, dark colored urine, fever, confusion or weakness;  dehydration symptoms --feeling very thirsty or hot, being unable to urinate, heavy sweating, or hot and dry skin;  kidney problems --little or no urination, swelling, rapid weight gain, feeling short of breath;  lung problems --chest pain, wheezing, trouble breathing, blue colored lips, fingers, or toes;  signs of a new infection --fever with a severe headache, neck stiffness, eye pain, and increased sensitivity to light; or  signs of a blood clot --shortness of breath, chest pain with deep breathing, rapid heart rate, numbness or weakness on one side of the body, swelling and warmth or discoloration in an arm or leg. Common side effects may include:  runny or stuffy nose, sinus pain, cough, sore throat;  fever, chills, weakness;  headache, back pain, muscle or joint pain;  dizziness, tiredness, depressed mood;  swelling in your hands or feet;  skin rash, redness, or bruising;  blisters or ulcers in your mouth, red or swollen gums, trouble swallowing;  nausea, diarrhea, stomach pain, upset stomach;  increased blood pressure; or  redness, swelling, or itching where an injection was given. This is not a complete list of side effects and others may occur.  Call your doctor for medical advice about side effects. You may report side effects to FDA at 4-014-PHE-5145. What other drugs will affect immune globulin? Immune globulin can harm your kidneys, especially if you also use certain medicines for infections, cancer, osteoporosis, organ transplant rejection, bowel disorders, high blood pressure, or pain or arthritis (including Advil, Motrin, and Aleve). Other drugs may affect immune globulin, including prescription and over-the-counter medicines, vitamins, and herbal products. Tell your doctor about all your current medicines and any medicine you start or stop using. Where can I get more information? Your doctor or pharmacist can provide more information about immune globulin. Remember, keep this and all other medicines out of the reach of children, never share your medicines with others, and use this medication only for the indication prescribed. Every effort has been made to ensure that the information provided by [address] is accurate, up-to-date, and complete, but no guarantee is made to that effect. Drug information contained herein may be time sensitive. [employer] information has been compiled for use by healthcare practitioners and consumers in the [place] and therefore [employer] does not warrant that uses outside of the [place] are appropriate, unless specifically indicated otherwise. [facility]'s drug information does not endorse drugs, diagnose patients or recommend therapy. [employer] drug information is an informational resource designed to assist licensed healthcare practitioners in caring for their patients and/or to serve consumers viewing this service as a supplement to, and not a substitute for, the expertise, skill, knowledge and judgment of healthcare practitioners.  The absence of a warning for a given drug or drug combination in no way should be construed to indicate that the drug or drug combination is safe, effective or appropriate for any given patient. [facility] does not assume any responsibility for any aspect of healthcare administered with the aid of information [facility] provides. The information contained herein is not intended to cover all possible uses, directions, precautions, warnings, drug interactions, allergic reactions, or adverse effects. If you have questions about the drugs you are taking, check with your doctor, nurse or pharmacist.  Copyright 9911-3731 167  Carlos: 4.02. Revision date: 2/26/2021. Care instructions adapted under license by [facility] ([facility]). If you have questions about a medical condition or this instruction, always ask your healthcare professional. [address] any warranty or liability for your use of this information.

## 2023-03-17 NOTE — TELEPHONE ENCOUNTER
Spoke with MICHAEL and the patient needs an updated order for IVIG. MICHAEL is aware that Dr. Brtet Olguin will not be back in the office until the 23rd.

## 2023-03-24 ENCOUNTER — HOSPITAL ENCOUNTER (OUTPATIENT)
Dept: INFUSION THERAPY | Age: 48
Setting detail: INFUSION SERIES
Discharge: HOME | End: 2023-03-24
Payer: MEDICARE

## 2023-03-24 VITALS
OXYGEN SATURATION: 100 % | DIASTOLIC BLOOD PRESSURE: 89 MMHG | WEIGHT: 188 LBS | RESPIRATION RATE: 16 BRPM | TEMPERATURE: 98 F | BODY MASS INDEX: 31.32 KG/M2 | SYSTOLIC BLOOD PRESSURE: 138 MMHG | HEIGHT: 65 IN | HEART RATE: 62 BPM

## 2023-03-24 DIAGNOSIS — G35 MULTIPLE SCLEROSIS (HCC): Primary | ICD-10-CM

## 2023-03-24 PROCEDURE — 96366 THER/PROPH/DIAG IV INF ADDON: CPT

## 2023-03-24 PROCEDURE — 6360000002 HC RX W HCPCS: Performed by: PSYCHIATRY & NEUROLOGY

## 2023-03-24 PROCEDURE — 2580000003 HC RX 258: Performed by: PSYCHIATRY & NEUROLOGY

## 2023-03-24 PROCEDURE — 6370000000 HC RX 637 (ALT 250 FOR IP): Performed by: PSYCHIATRY & NEUROLOGY

## 2023-03-24 PROCEDURE — 96365 THER/PROPH/DIAG IV INF INIT: CPT

## 2023-03-24 RX ORDER — SODIUM CHLORIDE 0.9 % (FLUSH) 0.9 %
5-40 SYRINGE (ML) INJECTION PRN
Status: DISCONTINUED | OUTPATIENT
Start: 2023-03-24 | End: 2023-03-25 | Stop reason: HOSPADM

## 2023-03-24 RX ORDER — DIPHENHYDRAMINE HYDROCHLORIDE 50 MG/ML
50 INJECTION INTRAMUSCULAR; INTRAVENOUS
Status: CANCELLED | OUTPATIENT
Start: 2023-03-31

## 2023-03-24 RX ORDER — HEPARIN SODIUM (PORCINE) LOCK FLUSH IV SOLN 100 UNIT/ML 100 UNIT/ML
500 SOLUTION INTRAVENOUS PRN
Status: CANCELLED
Start: 2023-03-31

## 2023-03-24 RX ORDER — ACETAMINOPHEN 325 MG/1
650 TABLET ORAL ONCE
Status: COMPLETED | OUTPATIENT
Start: 2023-03-24 | End: 2023-03-24

## 2023-03-24 RX ORDER — ONDANSETRON 2 MG/ML
8 INJECTION INTRAMUSCULAR; INTRAVENOUS
Status: CANCELLED | OUTPATIENT
Start: 2023-03-31

## 2023-03-24 RX ORDER — SODIUM CHLORIDE 9 MG/ML
INJECTION, SOLUTION INTRAVENOUS CONTINUOUS
Status: CANCELLED | OUTPATIENT
Start: 2023-03-31

## 2023-03-24 RX ORDER — MEPERIDINE HYDROCHLORIDE 25 MG/ML
12.5 INJECTION INTRAMUSCULAR; INTRAVENOUS; SUBCUTANEOUS PRN
Status: CANCELLED | OUTPATIENT
Start: 2023-03-31

## 2023-03-24 RX ORDER — ALBUTEROL SULFATE 90 UG/1
4 AEROSOL, METERED RESPIRATORY (INHALATION) PRN
Status: CANCELLED | OUTPATIENT
Start: 2023-03-31

## 2023-03-24 RX ORDER — EPINEPHRINE 1 MG/ML
0.3 INJECTION, SOLUTION, CONCENTRATE INTRAVENOUS PRN
Status: CANCELLED | OUTPATIENT
Start: 2023-03-31

## 2023-03-24 RX ORDER — SODIUM CHLORIDE 0.9 % (FLUSH) 0.9 %
5-40 SYRINGE (ML) INJECTION PRN
Status: CANCELLED | OUTPATIENT
Start: 2023-03-31

## 2023-03-24 RX ORDER — DIPHENHYDRAMINE HYDROCHLORIDE 50 MG/ML
50 INJECTION INTRAMUSCULAR; INTRAVENOUS ONCE
Status: CANCELLED
Start: 2023-03-31 | End: 2023-03-31

## 2023-03-24 RX ORDER — ACETAMINOPHEN 325 MG/1
650 TABLET ORAL
Status: CANCELLED | OUTPATIENT
Start: 2023-03-31

## 2023-03-24 RX ORDER — DIPHENHYDRAMINE HYDROCHLORIDE 50 MG/ML
50 INJECTION INTRAMUSCULAR; INTRAVENOUS ONCE
Status: COMPLETED | OUTPATIENT
Start: 2023-03-24 | End: 2023-03-24

## 2023-03-24 RX ORDER — HEPARIN SODIUM (PORCINE) LOCK FLUSH IV SOLN 100 UNIT/ML 100 UNIT/ML
500 SOLUTION INTRAVENOUS PRN
Status: DISCONTINUED | OUTPATIENT
Start: 2023-03-24 | End: 2023-03-26 | Stop reason: HOSPADM

## 2023-03-24 RX ORDER — ACETAMINOPHEN 325 MG/1
650 TABLET ORAL ONCE
Status: CANCELLED | OUTPATIENT
Start: 2023-03-31 | End: 2023-03-31

## 2023-03-24 RX ADMIN — IMMUNE GLOBULIN (HUMAN) 35 G: 10 INJECTION INTRAVENOUS; SUBCUTANEOUS at 07:26

## 2023-03-24 RX ADMIN — HYDROCORTISONE SODIUM SUCCINATE 100 MG: 100 INJECTION, POWDER, FOR SOLUTION INTRAMUSCULAR; INTRAVENOUS at 07:02

## 2023-03-24 RX ADMIN — Medication 500 UNITS: at 09:25

## 2023-03-24 RX ADMIN — ACETAMINOPHEN 650 MG: 325 TABLET ORAL at 07:01

## 2023-03-24 RX ADMIN — SODIUM CHLORIDE, PRESERVATIVE FREE 10 ML: 5 INJECTION INTRAVENOUS at 09:25

## 2023-03-24 RX ADMIN — DIPHENHYDRAMINE HYDROCHLORIDE 50 MG: 50 INJECTION, SOLUTION INTRAMUSCULAR; INTRAVENOUS at 07:02

## 2023-03-24 NOTE — DISCHARGE INSTRUCTIONS
immune globulin (intravenous) (IGIV)  Pronunciation:  dinora foley  Brand:  Bivigam, Carimune, Flebogamma, Gammagard S/D, Gammaplex, Octagam, Panzyga, Privigen  What is the most important information I should know about immune globulin intravenous? This medicine can cause blood clots. The risk is highest in older adults or in people who have had blood clots, heart problems, or blood circulation problems. Blood clots are also more likely during long-term bedrest, while using birth control pills or hormone replacement therapy, or while having a central intravenous (IV) catheter in place. Call your doctor at once if you have chest pain, trouble breathing, fast heartbeats, numbness or weakness, or swelling and warmth or discoloration in an arm or leg. This medicine can also harm your kidneys, especially if you have kidney disease or you also use certain medicines. Tell your doctor right away if you have signs of kidney problems, such as swelling, rapid weight gain, and little or no urination. What is immune globulin intravenous (IGIV)? Immune globulin intravenous (IGIV, for injection into a vein) is used to treat primary immunodeficiency. IGIV is also used to increase platelets (blood clotting cells) in people with immune thrombocytopenic purpura. IGIV is also used in to help prevent certain infections in people with B-cell chronic lymphocytic leukemia. IGIV is also used in people with Kawasaki syndrome, to prevent aneurysm caused by a weakening of the main artery in the heart. IGIV may also be used for purposes not listed in this medication guide. What should I discuss with my healthcare provider before using IGIV? You may not be able to use this medicine if:  you have had an allergic reaction to an immune globulin or blood product;  you have immune globulin A (IgA) deficiency with antibody to IgA; or  you are allergic to corn.   IGIV can cause blood clots or kidney problems, especially in pharmacist.  Copyright 9513-7495 Jesus Manuel Pratt. Version: 6.02. Revision date: 2/26/2021. Care instructions adapted under license by [facility] ([facility]). If you have questions about a medical condition or this instruction, always ask your healthcare professional. [address] any warranty or liability for your use of this information.

## 2023-03-28 RX ORDER — ONDANSETRON 2 MG/ML
8 INJECTION INTRAMUSCULAR; INTRAVENOUS
OUTPATIENT
Start: 2023-03-31

## 2023-03-28 RX ORDER — DIPHENHYDRAMINE HYDROCHLORIDE 50 MG/ML
50 INJECTION INTRAMUSCULAR; INTRAVENOUS
OUTPATIENT
Start: 2023-03-31

## 2023-03-28 RX ORDER — DIPHENHYDRAMINE HYDROCHLORIDE 50 MG/ML
50 INJECTION INTRAMUSCULAR; INTRAVENOUS ONCE
Status: CANCELLED
Start: 2023-03-31 | End: 2023-03-31

## 2023-03-28 RX ORDER — ACETAMINOPHEN 325 MG/1
650 TABLET ORAL
OUTPATIENT
Start: 2023-03-31

## 2023-03-28 RX ORDER — ACETAMINOPHEN 325 MG/1
650 TABLET ORAL ONCE
Status: CANCELLED | OUTPATIENT
Start: 2023-03-31 | End: 2023-03-31

## 2023-03-28 RX ORDER — SODIUM CHLORIDE 0.9 % (FLUSH) 0.9 %
5-40 SYRINGE (ML) INJECTION PRN
Status: CANCELLED | OUTPATIENT
Start: 2023-03-31

## 2023-03-28 RX ORDER — MEPERIDINE HYDROCHLORIDE 25 MG/ML
12.5 INJECTION INTRAMUSCULAR; INTRAVENOUS; SUBCUTANEOUS PRN
OUTPATIENT
Start: 2023-03-31

## 2023-03-28 RX ORDER — SODIUM CHLORIDE 9 MG/ML
INJECTION, SOLUTION INTRAVENOUS CONTINUOUS
OUTPATIENT
Start: 2023-03-31

## 2023-03-28 RX ORDER — ALBUTEROL SULFATE 90 UG/1
4 AEROSOL, METERED RESPIRATORY (INHALATION) PRN
OUTPATIENT
Start: 2023-03-31

## 2023-03-28 RX ORDER — EPINEPHRINE 1 MG/ML
0.3 INJECTION, SOLUTION, CONCENTRATE INTRAVENOUS PRN
OUTPATIENT
Start: 2023-03-31

## 2023-03-28 RX ORDER — HEPARIN SODIUM (PORCINE) LOCK FLUSH IV SOLN 100 UNIT/ML 100 UNIT/ML
500 SOLUTION INTRAVENOUS PRN
Status: CANCELLED
Start: 2023-03-31

## 2023-03-31 ENCOUNTER — HOSPITAL ENCOUNTER (OUTPATIENT)
Dept: INFUSION THERAPY | Age: 48
Setting detail: INFUSION SERIES
Discharge: HOME | End: 2023-03-31
Payer: MEDICARE

## 2023-03-31 VITALS
TEMPERATURE: 97.2 F | WEIGHT: 195 LBS | SYSTOLIC BLOOD PRESSURE: 112 MMHG | OXYGEN SATURATION: 100 % | HEART RATE: 69 BPM | BODY MASS INDEX: 32.45 KG/M2 | DIASTOLIC BLOOD PRESSURE: 78 MMHG | RESPIRATION RATE: 18 BRPM

## 2023-03-31 DIAGNOSIS — G35 MULTIPLE SCLEROSIS (HCC): Primary | ICD-10-CM

## 2023-03-31 PROCEDURE — 96366 THER/PROPH/DIAG IV INF ADDON: CPT

## 2023-03-31 PROCEDURE — 96375 TX/PRO/DX INJ NEW DRUG ADDON: CPT

## 2023-03-31 PROCEDURE — 96365 THER/PROPH/DIAG IV INF INIT: CPT

## 2023-03-31 PROCEDURE — 6360000002 HC RX W HCPCS: Performed by: PSYCHIATRY & NEUROLOGY

## 2023-03-31 PROCEDURE — 6370000000 HC RX 637 (ALT 250 FOR IP): Performed by: PSYCHIATRY & NEUROLOGY

## 2023-03-31 RX ORDER — ACETAMINOPHEN 325 MG/1
650 TABLET ORAL ONCE
Status: CANCELLED | OUTPATIENT
Start: 2023-04-07 | End: 2023-04-07

## 2023-03-31 RX ORDER — SODIUM CHLORIDE 0.9 % (FLUSH) 0.9 %
5-40 SYRINGE (ML) INJECTION PRN
Status: CANCELLED | OUTPATIENT
Start: 2023-04-07

## 2023-03-31 RX ORDER — ONDANSETRON 2 MG/ML
8 INJECTION INTRAMUSCULAR; INTRAVENOUS
OUTPATIENT
Start: 2023-04-07

## 2023-03-31 RX ORDER — DIPHENHYDRAMINE HYDROCHLORIDE 50 MG/ML
50 INJECTION INTRAMUSCULAR; INTRAVENOUS ONCE
Status: CANCELLED
Start: 2023-04-07 | End: 2023-04-07

## 2023-03-31 RX ORDER — DIPHENHYDRAMINE HYDROCHLORIDE 50 MG/ML
50 INJECTION INTRAMUSCULAR; INTRAVENOUS
OUTPATIENT
Start: 2023-04-07

## 2023-03-31 RX ORDER — SODIUM CHLORIDE 9 MG/ML
INJECTION, SOLUTION INTRAVENOUS CONTINUOUS
OUTPATIENT
Start: 2023-04-07

## 2023-03-31 RX ORDER — DIPHENHYDRAMINE HYDROCHLORIDE 50 MG/ML
50 INJECTION INTRAMUSCULAR; INTRAVENOUS ONCE
Status: COMPLETED | OUTPATIENT
Start: 2023-03-31 | End: 2023-03-31

## 2023-03-31 RX ORDER — ACETAMINOPHEN 325 MG/1
650 TABLET ORAL
OUTPATIENT
Start: 2023-04-07

## 2023-03-31 RX ORDER — MEPERIDINE HYDROCHLORIDE 25 MG/ML
12.5 INJECTION INTRAMUSCULAR; INTRAVENOUS; SUBCUTANEOUS PRN
OUTPATIENT
Start: 2023-04-07

## 2023-03-31 RX ORDER — ACETAMINOPHEN 325 MG/1
650 TABLET ORAL ONCE
Status: COMPLETED | OUTPATIENT
Start: 2023-03-31 | End: 2023-03-31

## 2023-03-31 RX ORDER — SODIUM CHLORIDE 0.9 % (FLUSH) 0.9 %
5-40 SYRINGE (ML) INJECTION PRN
Status: DISCONTINUED | OUTPATIENT
Start: 2023-03-31 | End: 2023-04-01 | Stop reason: HOSPADM

## 2023-03-31 RX ORDER — EPINEPHRINE 1 MG/ML
0.3 INJECTION, SOLUTION, CONCENTRATE INTRAVENOUS PRN
OUTPATIENT
Start: 2023-04-07

## 2023-03-31 RX ORDER — ALBUTEROL SULFATE 90 UG/1
4 AEROSOL, METERED RESPIRATORY (INHALATION) PRN
OUTPATIENT
Start: 2023-04-07

## 2023-03-31 RX ORDER — HEPARIN SODIUM (PORCINE) LOCK FLUSH IV SOLN 100 UNIT/ML 100 UNIT/ML
500 SOLUTION INTRAVENOUS PRN
Status: CANCELLED
Start: 2023-04-07

## 2023-03-31 RX ORDER — HEPARIN SODIUM (PORCINE) LOCK FLUSH IV SOLN 100 UNIT/ML 100 UNIT/ML
500 SOLUTION INTRAVENOUS PRN
Status: DISCONTINUED | OUTPATIENT
Start: 2023-03-31 | End: 2023-04-02 | Stop reason: HOSPADM

## 2023-03-31 RX ADMIN — Medication 500 UNITS: at 15:24

## 2023-03-31 RX ADMIN — ACETAMINOPHEN 650 MG: 325 TABLET ORAL at 13:20

## 2023-03-31 RX ADMIN — DIPHENHYDRAMINE HYDROCHLORIDE 50 MG: 50 INJECTION, SOLUTION INTRAMUSCULAR; INTRAVENOUS at 13:22

## 2023-03-31 RX ADMIN — IMMUNE GLOBULIN (HUMAN) 35 G: 10 INJECTION INTRAVENOUS; SUBCUTANEOUS at 13:35

## 2023-03-31 RX ADMIN — HYDROCORTISONE SODIUM SUCCINATE 100 MG: 100 INJECTION, POWDER, FOR SOLUTION INTRAMUSCULAR; INTRAVENOUS at 13:20

## 2023-03-31 NOTE — DISCHARGE INSTRUCTIONS
immune globulin (intravenous and subcutaneous)  Pronunciation:  dinora LACEY foley  Brand:  Gammagard Liquid, Gammaked, Gamunex-C  What is the most important information I should know about immune globulin? This medicine can cause blood clots. The risk is highest in older adults or in people who have had blood clots, heart problems, or blood circulation problems. Blood clots are also more likely during long-term bedrest, while using birth control pills or hormone replacement therapy, or while having a central intravenous (IV) catheter in place. Call your doctor at once if you have chest pain, trouble breathing, fast heartbeats, numbness or weakness, or swelling and warmth or discoloration in an arm or leg. This medicine can also harm your kidneys, especially if you have kidney disease or if you also use certain medicines. Tell your doctor right away if you have signs of kidney problems, such as swelling, rapid weight gain, and little or no urination. What is immune globulin? Immune globulin intravenous and subcutaneous (for injection into a vein or under the skin) is used to treat primary immunodeficiency. Immune globulin is also used to increase platelets (blood clotting cells) in people with idiopathic thrombocytopenic purpura. Immune globulin is also used to treat certain debilitating nerve disorders that cause muscle weakness and can affect daily activities. Immune globulin may also be used for purposes not listed in this medication guide. What should I discuss with my healthcare provider before using immune globulin? You should not use this medicine if:  you have had an allergic reaction to an immune globulin or blood product; or  you have immune globulin A (IgA) deficiency with antibody to IgA. Immune globulin can cause blood clots or kidney problems, especially in older adults or in people with certain conditions.  Tell your doctor if you have ever had:  heart problems, blood circulation appropriate for any given patient. [facility] does not assume any responsibility for any aspect of healthcare administered with the aid of information [facility] provides. The information contained herein is not intended to cover all possible uses, directions, precautions, warnings, drug interactions, allergic reactions, or adverse effects. If you have questions about the drugs you are taking, check with your doctor, nurse or pharmacist.  Copyright 1791-5199 167  Carlos: 4.02. Revision date: 2/26/2021. Care instructions adapted under license by [facility] ([facility]). If you have questions about a medical condition or this instruction, always ask your healthcare professional. [address] any warranty or liability for your use of this information.

## 2023-04-07 ENCOUNTER — HOSPITAL ENCOUNTER (OUTPATIENT)
Dept: INFUSION THERAPY | Age: 48
Setting detail: INFUSION SERIES
Discharge: HOME | End: 2023-04-07
Payer: MEDICARE

## 2023-04-07 VITALS
RESPIRATION RATE: 18 BRPM | SYSTOLIC BLOOD PRESSURE: 108 MMHG | OXYGEN SATURATION: 100 % | DIASTOLIC BLOOD PRESSURE: 78 MMHG | TEMPERATURE: 97.8 F | HEART RATE: 71 BPM

## 2023-04-07 DIAGNOSIS — G35 MULTIPLE SCLEROSIS (HCC): Primary | ICD-10-CM

## 2023-04-07 PROCEDURE — 2580000003 HC RX 258: Performed by: PSYCHIATRY & NEUROLOGY

## 2023-04-07 PROCEDURE — 6360000002 HC RX W HCPCS: Performed by: PSYCHIATRY & NEUROLOGY

## 2023-04-07 PROCEDURE — 96365 THER/PROPH/DIAG IV INF INIT: CPT

## 2023-04-07 PROCEDURE — 96375 TX/PRO/DX INJ NEW DRUG ADDON: CPT

## 2023-04-07 PROCEDURE — 6370000000 HC RX 637 (ALT 250 FOR IP): Performed by: PSYCHIATRY & NEUROLOGY

## 2023-04-07 RX ORDER — HEPARIN SODIUM (PORCINE) LOCK FLUSH IV SOLN 100 UNIT/ML 100 UNIT/ML
500 SOLUTION INTRAVENOUS PRN
Status: DISCONTINUED | OUTPATIENT
Start: 2023-04-07 | End: 2023-04-09 | Stop reason: HOSPADM

## 2023-04-07 RX ORDER — ACETAMINOPHEN 325 MG/1
650 TABLET ORAL ONCE
OUTPATIENT
Start: 2023-04-14 | End: 2023-04-14

## 2023-04-07 RX ORDER — HEPARIN SODIUM (PORCINE) LOCK FLUSH IV SOLN 100 UNIT/ML 100 UNIT/ML
500 SOLUTION INTRAVENOUS PRN
Start: 2023-04-14

## 2023-04-07 RX ORDER — MEPERIDINE HYDROCHLORIDE 25 MG/ML
12.5 INJECTION INTRAMUSCULAR; INTRAVENOUS; SUBCUTANEOUS PRN
OUTPATIENT
Start: 2023-04-14

## 2023-04-07 RX ORDER — ONDANSETRON 2 MG/ML
8 INJECTION INTRAMUSCULAR; INTRAVENOUS
OUTPATIENT
Start: 2023-04-14

## 2023-04-07 RX ORDER — EPINEPHRINE 1 MG/ML
0.3 INJECTION, SOLUTION, CONCENTRATE INTRAVENOUS PRN
OUTPATIENT
Start: 2023-04-14

## 2023-04-07 RX ORDER — DIPHENHYDRAMINE HYDROCHLORIDE 50 MG/ML
50 INJECTION INTRAMUSCULAR; INTRAVENOUS
OUTPATIENT
Start: 2023-04-14

## 2023-04-07 RX ORDER — SODIUM CHLORIDE 9 MG/ML
INJECTION, SOLUTION INTRAVENOUS CONTINUOUS
OUTPATIENT
Start: 2023-04-14

## 2023-04-07 RX ORDER — DIPHENHYDRAMINE HYDROCHLORIDE 50 MG/ML
50 INJECTION INTRAMUSCULAR; INTRAVENOUS ONCE
Start: 2023-04-14 | End: 2023-04-14

## 2023-04-07 RX ORDER — DIPHENHYDRAMINE HYDROCHLORIDE 50 MG/ML
50 INJECTION INTRAMUSCULAR; INTRAVENOUS ONCE
Status: COMPLETED | OUTPATIENT
Start: 2023-04-07 | End: 2023-04-07

## 2023-04-07 RX ORDER — ACETAMINOPHEN 325 MG/1
650 TABLET ORAL
OUTPATIENT
Start: 2023-04-14

## 2023-04-07 RX ORDER — ACETAMINOPHEN 325 MG/1
650 TABLET ORAL ONCE
Status: COMPLETED | OUTPATIENT
Start: 2023-04-07 | End: 2023-04-07

## 2023-04-07 RX ORDER — SODIUM CHLORIDE 0.9 % (FLUSH) 0.9 %
5-40 SYRINGE (ML) INJECTION PRN
Status: DISCONTINUED | OUTPATIENT
Start: 2023-04-07 | End: 2023-04-08 | Stop reason: HOSPADM

## 2023-04-07 RX ORDER — SODIUM CHLORIDE 0.9 % (FLUSH) 0.9 %
5-40 SYRINGE (ML) INJECTION PRN
OUTPATIENT
Start: 2023-04-14

## 2023-04-07 RX ORDER — ALBUTEROL SULFATE 90 UG/1
4 AEROSOL, METERED RESPIRATORY (INHALATION) PRN
OUTPATIENT
Start: 2023-04-14

## 2023-04-07 RX ADMIN — HEPARIN 500 UNITS: 100 SYRINGE at 08:53

## 2023-04-07 RX ADMIN — IMMUNE GLOBULIN (HUMAN) 35 G: 10 INJECTION INTRAVENOUS; SUBCUTANEOUS at 06:57

## 2023-04-07 RX ADMIN — ACETAMINOPHEN 650 MG: 325 TABLET ORAL at 06:43

## 2023-04-07 RX ADMIN — HYDROCORTISONE SODIUM SUCCINATE 100 MG: 100 INJECTION, POWDER, FOR SOLUTION INTRAMUSCULAR; INTRAVENOUS at 06:43

## 2023-04-07 RX ADMIN — DIPHENHYDRAMINE HYDROCHLORIDE 50 MG: 50 INJECTION, SOLUTION INTRAMUSCULAR; INTRAVENOUS at 06:44

## 2023-04-07 RX ADMIN — SODIUM CHLORIDE, PRESERVATIVE FREE 20 ML: 5 INJECTION INTRAVENOUS at 08:53

## 2023-04-07 NOTE — DISCHARGE INSTRUCTIONS
your doctor for medical advice about side effects. You may report side effects to FDA at 6-970-EHK-1341. What other drugs will affect immune globulin? Immune globulin can harm your kidneys, especially if you also use certain medicines for infections, cancer, osteoporosis, organ transplant rejection, bowel disorders, high blood pressure, or pain or arthritis (including Advil, Motrin, and Aleve). Other drugs may affect immune globulin, including prescription and over-the-counter medicines, vitamins, and herbal products. Tell your doctor about all your current medicines and any medicine you start or stop using. Where can I get more information? Your doctor or pharmacist can provide more information about immune globulin. Remember, keep this and all other medicines out of the reach of children, never share your medicines with others, and use this medication only for the indication prescribed. Every effort has been made to ensure that the information provided by Ana Laura Domínguez Dr is accurate, up-to-date, and complete, but no guarantee is made to that effect. Drug information contained herein may be time sensitive. [employer] information has been compiled for use by healthcare practitioners and consumers in the [place] and therefore [employer] does not warrant that uses outside of the [place] are appropriate, unless specifically indicated otherwise. "[employer]s drug information does not endorse drugs, diagnose patients or recommend therapy. [employer] drug information is an informational resource designed to assist licensed healthcare practitioners in caring for their patients and/or to serve consumers viewing this service as a supplement to, and not a substitute for, the expertise, skill, knowledge and judgment of healthcare practitioners.  The absence of a warning for a given drug or drug combination in no way should be construed to indicate that the drug or drug combination is safe, effective or

## 2023-04-07 NOTE — PROGRESS NOTES
Port accessed, pre medications given. IVIG infused pre orders. Tolerated well. Port de accessed. Future appts reviewed and discharged to home.

## 2023-04-14 ENCOUNTER — HOSPITAL ENCOUNTER (OUTPATIENT)
Dept: INFUSION THERAPY | Age: 48
Setting detail: INFUSION SERIES
Discharge: HOME | End: 2023-04-14
Payer: MEDICARE

## 2023-04-14 VITALS
SYSTOLIC BLOOD PRESSURE: 126 MMHG | OXYGEN SATURATION: 100 % | TEMPERATURE: 97 F | HEART RATE: 69 BPM | WEIGHT: 193 LBS | DIASTOLIC BLOOD PRESSURE: 84 MMHG | HEIGHT: 65 IN | RESPIRATION RATE: 18 BRPM | BODY MASS INDEX: 32.15 KG/M2

## 2023-04-14 DIAGNOSIS — G35 MULTIPLE SCLEROSIS (HCC): Primary | ICD-10-CM

## 2023-04-14 PROCEDURE — 6370000000 HC RX 637 (ALT 250 FOR IP): Performed by: PSYCHIATRY & NEUROLOGY

## 2023-04-14 PROCEDURE — 96365 THER/PROPH/DIAG IV INF INIT: CPT

## 2023-04-14 PROCEDURE — 2580000003 HC RX 258: Performed by: PSYCHIATRY & NEUROLOGY

## 2023-04-14 PROCEDURE — 6360000002 HC RX W HCPCS: Performed by: PSYCHIATRY & NEUROLOGY

## 2023-04-14 PROCEDURE — 96366 THER/PROPH/DIAG IV INF ADDON: CPT

## 2023-04-14 RX ORDER — ACETAMINOPHEN 325 MG/1
650 TABLET ORAL
OUTPATIENT
Start: 2023-04-21

## 2023-04-14 RX ORDER — ONDANSETRON 2 MG/ML
8 INJECTION INTRAMUSCULAR; INTRAVENOUS
OUTPATIENT
Start: 2023-04-21

## 2023-04-14 RX ORDER — SODIUM CHLORIDE 9 MG/ML
INJECTION, SOLUTION INTRAVENOUS CONTINUOUS
OUTPATIENT
Start: 2023-04-21

## 2023-04-14 RX ORDER — MEPERIDINE HYDROCHLORIDE 25 MG/ML
12.5 INJECTION INTRAMUSCULAR; INTRAVENOUS; SUBCUTANEOUS PRN
OUTPATIENT
Start: 2023-04-21

## 2023-04-14 RX ORDER — DIPHENHYDRAMINE HYDROCHLORIDE 50 MG/ML
50 INJECTION INTRAMUSCULAR; INTRAVENOUS
OUTPATIENT
Start: 2023-04-21

## 2023-04-14 RX ORDER — EPINEPHRINE 1 MG/ML
0.3 INJECTION, SOLUTION, CONCENTRATE INTRAVENOUS PRN
OUTPATIENT
Start: 2023-04-21

## 2023-04-14 RX ORDER — HEPARIN SODIUM (PORCINE) LOCK FLUSH IV SOLN 100 UNIT/ML 100 UNIT/ML
500 SOLUTION INTRAVENOUS PRN
Status: CANCELLED
Start: 2023-04-21

## 2023-04-14 RX ORDER — ACETAMINOPHEN 325 MG/1
650 TABLET ORAL ONCE
Status: COMPLETED | OUTPATIENT
Start: 2023-04-14 | End: 2023-04-14

## 2023-04-14 RX ORDER — DIPHENHYDRAMINE HYDROCHLORIDE 50 MG/ML
50 INJECTION INTRAMUSCULAR; INTRAVENOUS ONCE
Status: CANCELLED
Start: 2023-04-21 | End: 2023-04-21

## 2023-04-14 RX ORDER — SODIUM CHLORIDE 0.9 % (FLUSH) 0.9 %
5-40 SYRINGE (ML) INJECTION PRN
Status: CANCELLED | OUTPATIENT
Start: 2023-04-21

## 2023-04-14 RX ORDER — SODIUM CHLORIDE 0.9 % (FLUSH) 0.9 %
5-40 SYRINGE (ML) INJECTION PRN
Status: DISCONTINUED | OUTPATIENT
Start: 2023-04-14 | End: 2023-04-15 | Stop reason: HOSPADM

## 2023-04-14 RX ORDER — ACETAMINOPHEN 325 MG/1
650 TABLET ORAL ONCE
Status: CANCELLED | OUTPATIENT
Start: 2023-04-21 | End: 2023-04-21

## 2023-04-14 RX ORDER — ALBUTEROL SULFATE 90 UG/1
4 AEROSOL, METERED RESPIRATORY (INHALATION) PRN
OUTPATIENT
Start: 2023-04-21

## 2023-04-14 RX ORDER — HEPARIN SODIUM (PORCINE) LOCK FLUSH IV SOLN 100 UNIT/ML 100 UNIT/ML
500 SOLUTION INTRAVENOUS PRN
Status: DISCONTINUED | OUTPATIENT
Start: 2023-04-14 | End: 2023-04-16 | Stop reason: HOSPADM

## 2023-04-14 RX ORDER — DIPHENHYDRAMINE HYDROCHLORIDE 50 MG/ML
50 INJECTION INTRAMUSCULAR; INTRAVENOUS ONCE
Status: COMPLETED | OUTPATIENT
Start: 2023-04-14 | End: 2023-04-14

## 2023-04-14 RX ADMIN — SODIUM CHLORIDE, PRESERVATIVE FREE 20 ML: 5 INJECTION INTRAVENOUS at 09:15

## 2023-04-14 RX ADMIN — HYDROCORTISONE SODIUM SUCCINATE 100 MG: 100 INJECTION, POWDER, FOR SOLUTION INTRAMUSCULAR; INTRAVENOUS at 06:59

## 2023-04-14 RX ADMIN — ACETAMINOPHEN 650 MG: 325 TABLET ORAL at 06:58

## 2023-04-14 RX ADMIN — Medication 500 UNITS: at 09:15

## 2023-04-14 RX ADMIN — IMMUNE GLOBULIN (HUMAN) 35 G: 10 INJECTION INTRAVENOUS; SUBCUTANEOUS at 07:29

## 2023-04-14 RX ADMIN — DIPHENHYDRAMINE HYDROCHLORIDE 50 MG: 50 INJECTION, SOLUTION INTRAMUSCULAR; INTRAVENOUS at 06:59

## 2023-04-14 NOTE — DISCHARGE INSTRUCTIONS
IGIV?  Get emergency medical help if you have signs of an allergic reaction: hives; difficult breathing; swelling of your face, lips, tongue, or throat. Some side effects may occur during the injection. Tell your caregiver if you feel dizzy, nauseated, light-headed, sweaty, or have a headache, pounding in your neck or ears, fever, chills, chest tightness, or warmth or redness in your face. Call your doctor at once if you have:  a blood cell disorder --pale or yellowed skin, dark colored urine, fever, confusion or weakness;  dehydration symptoms --feeling very thirsty or hot, being unable to urinate, heavy sweating, or hot and dry skin;  kidney problems --little or no urination, swelling, rapid weight gain, feeling short of breath;  lung problems --chest pain, trouble breathing, blue colored lips, fingers, or toes;  signs of a new infection --fever with a severe headache, neck stiffness, eye pain, and increased sensitivity to light; or  signs of a blood clot --shortness of breath, chest pain with deep breathing, rapid heart rate, numbness or weakness on one side of the body, swelling and warmth or discoloration in an arm or leg. Common side effects may include:  headache, back pain, joint pain;  fever, chills, sweating, warmth or tingling;  stomach pain, nausea, diarrhea;  increased blood pressure, fast heartbeats;  dizziness, tiredness, lack of energy;  stuffy nose, sinus pain; or  pain, swelling, burning, or irritation around the IV needle. This is not a complete list of side effects and others may occur. Call your doctor for medical advice about side effects. You may report side effects to FDA at 0-957-FDA-4534. What other drugs will affect IGIV? IGIV can harm your kidneys, especially if you also use certain medicines for infections, cancer, osteoporosis, organ transplant rejection, bowel disorders, high blood pressure, or pain or arthritis (including Advil, Motrin, and Aleve).   Other drugs may affect IGIV,

## 2023-04-21 ENCOUNTER — HOSPITAL ENCOUNTER (OUTPATIENT)
Dept: INFUSION THERAPY | Age: 48
Setting detail: INFUSION SERIES
Discharge: HOME | End: 2023-04-21
Payer: MEDICARE

## 2023-04-21 VITALS
DIASTOLIC BLOOD PRESSURE: 65 MMHG | WEIGHT: 193 LBS | BODY MASS INDEX: 32.15 KG/M2 | RESPIRATION RATE: 18 BRPM | SYSTOLIC BLOOD PRESSURE: 100 MMHG | OXYGEN SATURATION: 100 % | TEMPERATURE: 98.3 F | HEART RATE: 73 BPM | HEIGHT: 65 IN

## 2023-04-21 DIAGNOSIS — G35 MULTIPLE SCLEROSIS (HCC): Primary | ICD-10-CM

## 2023-04-21 PROCEDURE — 2580000003 HC RX 258: Performed by: PSYCHIATRY & NEUROLOGY

## 2023-04-21 PROCEDURE — 96366 THER/PROPH/DIAG IV INF ADDON: CPT

## 2023-04-21 PROCEDURE — 96375 TX/PRO/DX INJ NEW DRUG ADDON: CPT

## 2023-04-21 PROCEDURE — 96365 THER/PROPH/DIAG IV INF INIT: CPT

## 2023-04-21 PROCEDURE — 6370000000 HC RX 637 (ALT 250 FOR IP): Performed by: PSYCHIATRY & NEUROLOGY

## 2023-04-21 PROCEDURE — 6360000002 HC RX W HCPCS: Performed by: PSYCHIATRY & NEUROLOGY

## 2023-04-21 RX ORDER — SODIUM CHLORIDE 0.9 % (FLUSH) 0.9 %
5-40 SYRINGE (ML) INJECTION PRN
Status: DISCONTINUED | OUTPATIENT
Start: 2023-04-21 | End: 2023-04-22 | Stop reason: HOSPADM

## 2023-04-21 RX ORDER — HEPARIN SODIUM (PORCINE) LOCK FLUSH IV SOLN 100 UNIT/ML 100 UNIT/ML
500 SOLUTION INTRAVENOUS PRN
Status: DISCONTINUED | OUTPATIENT
Start: 2023-04-21 | End: 2023-04-23 | Stop reason: HOSPADM

## 2023-04-21 RX ORDER — ACETAMINOPHEN 325 MG/1
650 TABLET ORAL
OUTPATIENT
Start: 2023-04-28

## 2023-04-21 RX ORDER — MEPERIDINE HYDROCHLORIDE 25 MG/ML
12.5 INJECTION INTRAMUSCULAR; INTRAVENOUS; SUBCUTANEOUS PRN
OUTPATIENT
Start: 2023-04-28

## 2023-04-21 RX ORDER — ACETAMINOPHEN 325 MG/1
650 TABLET ORAL ONCE
Status: COMPLETED | OUTPATIENT
Start: 2023-04-21 | End: 2023-04-21

## 2023-04-21 RX ORDER — EPINEPHRINE 1 MG/ML
0.3 INJECTION, SOLUTION, CONCENTRATE INTRAVENOUS PRN
OUTPATIENT
Start: 2023-04-28

## 2023-04-21 RX ORDER — ACETAMINOPHEN 325 MG/1
650 TABLET ORAL ONCE
Status: CANCELLED | OUTPATIENT
Start: 2023-04-28 | End: 2023-04-28

## 2023-04-21 RX ORDER — DIPHENHYDRAMINE HYDROCHLORIDE 50 MG/ML
50 INJECTION INTRAMUSCULAR; INTRAVENOUS
OUTPATIENT
Start: 2023-04-28

## 2023-04-21 RX ORDER — ALBUTEROL SULFATE 90 UG/1
4 AEROSOL, METERED RESPIRATORY (INHALATION) PRN
OUTPATIENT
Start: 2023-04-28

## 2023-04-21 RX ORDER — HEPARIN SODIUM (PORCINE) LOCK FLUSH IV SOLN 100 UNIT/ML 100 UNIT/ML
500 SOLUTION INTRAVENOUS PRN
Status: CANCELLED
Start: 2023-04-28

## 2023-04-21 RX ORDER — SODIUM CHLORIDE 9 MG/ML
INJECTION, SOLUTION INTRAVENOUS CONTINUOUS
OUTPATIENT
Start: 2023-04-28

## 2023-04-21 RX ORDER — ONDANSETRON 2 MG/ML
8 INJECTION INTRAMUSCULAR; INTRAVENOUS
OUTPATIENT
Start: 2023-04-28

## 2023-04-21 RX ORDER — DIPHENHYDRAMINE HYDROCHLORIDE 50 MG/ML
50 INJECTION INTRAMUSCULAR; INTRAVENOUS ONCE
Status: COMPLETED | OUTPATIENT
Start: 2023-04-21 | End: 2023-04-21

## 2023-04-21 RX ORDER — DIPHENHYDRAMINE HYDROCHLORIDE 50 MG/ML
50 INJECTION INTRAMUSCULAR; INTRAVENOUS ONCE
Status: CANCELLED
Start: 2023-04-28 | End: 2023-04-28

## 2023-04-21 RX ORDER — SODIUM CHLORIDE 0.9 % (FLUSH) 0.9 %
5-40 SYRINGE (ML) INJECTION PRN
Status: CANCELLED | OUTPATIENT
Start: 2023-04-28

## 2023-04-21 RX ADMIN — IMMUNE GLOBULIN (HUMAN) 35 G: 10 INJECTION INTRAVENOUS; SUBCUTANEOUS at 13:21

## 2023-04-21 RX ADMIN — HEPARIN 500 UNITS: 100 SYRINGE at 15:13

## 2023-04-21 RX ADMIN — DIPHENHYDRAMINE HYDROCHLORIDE 50 MG: 50 INJECTION, SOLUTION INTRAMUSCULAR; INTRAVENOUS at 13:04

## 2023-04-21 RX ADMIN — HYDROCORTISONE SODIUM SUCCINATE 100 MG: 100 INJECTION, POWDER, FOR SOLUTION INTRAMUSCULAR; INTRAVENOUS at 13:01

## 2023-04-21 RX ADMIN — SODIUM CHLORIDE, PRESERVATIVE FREE 10 ML: 5 INJECTION INTRAVENOUS at 13:06

## 2023-04-21 RX ADMIN — ACETAMINOPHEN 650 MG: 325 TABLET ORAL at 12:59

## 2023-04-21 RX ADMIN — SODIUM CHLORIDE, PRESERVATIVE FREE 10 ML: 5 INJECTION INTRAVENOUS at 13:02

## 2023-04-21 NOTE — DISCHARGE INSTRUCTIONS
vaccines include measles, mumps, rubella (MMR), rotavirus, typhoid, yellow fever, varicella (chickenpox), zoster (shingles), and nasal flu (influenza) vaccine. What are the possible side effects of immune globulin? Get emergency medical help if you have signs of an allergic reaction: hives; wheezing, difficulty breathing; dizziness, feeling like you might pass out; swelling of your face, lips, tongue, or throat. Some side effects may occur during the injection. Tell your caregiver if you feel light-headed, itchy, chilled, sweaty, or have chest discomfort, fast heartbeats, severe headache, or pounding in your neck or ears. Call your doctor at once if you have:  a blood cell disorder --pale or yellowed skin, dark colored urine, fever, confusion or weakness;  dehydration symptoms --feeling very thirsty or hot, being unable to urinate, heavy sweating, or hot and dry skin;  kidney problems --little or no urination, swelling, rapid weight gain, feeling short of breath;  lung problems --chest pain, wheezing, trouble breathing, blue colored lips, fingers, or toes;  signs of a new infection --fever with a severe headache, neck stiffness, eye pain, and increased sensitivity to light; or  signs of a blood clot --shortness of breath, chest pain with deep breathing, rapid heart rate, numbness or weakness on one side of the body, swelling and warmth or discoloration in an arm or leg. Common side effects may include:  runny or stuffy nose, sinus pain, cough, sore throat;  fever, chills, weakness;  headache, back pain, muscle or joint pain;  dizziness, tiredness, depressed mood;  swelling in your hands or feet;  skin rash, redness, or bruising;  blisters or ulcers in your mouth, red or swollen gums, trouble swallowing;  nausea, diarrhea, stomach pain, upset stomach;  increased blood pressure; or  redness, swelling, or itching where an injection was given. This is not a complete list of side effects and others may occur.  Call

## 2023-04-21 NOTE — PROGRESS NOTES
Pt received Tylenol, Benadryl, and Solu-cortef for premedication. Gamunex 10% 35g given. Pt tolerated well.

## 2023-04-27 ENCOUNTER — APPOINTMENT (OUTPATIENT)
Dept: INFUSION THERAPY | Age: 48
End: 2023-04-27
Payer: MEDICARE

## 2023-04-28 ENCOUNTER — HOSPITAL ENCOUNTER (OUTPATIENT)
Dept: INFUSION THERAPY | Age: 48
Setting detail: INFUSION SERIES
Discharge: HOME | End: 2023-04-28
Payer: MEDICARE

## 2023-04-28 VITALS
SYSTOLIC BLOOD PRESSURE: 113 MMHG | WEIGHT: 185 LBS | OXYGEN SATURATION: 100 % | TEMPERATURE: 97.6 F | RESPIRATION RATE: 17 BRPM | HEIGHT: 65 IN | BODY MASS INDEX: 30.82 KG/M2 | DIASTOLIC BLOOD PRESSURE: 79 MMHG | HEART RATE: 64 BPM

## 2023-04-28 DIAGNOSIS — G35 MULTIPLE SCLEROSIS (HCC): Primary | ICD-10-CM

## 2023-04-28 LAB
25(OH)D3 SERPL-MCNC: 47.9 NG/ML
ALBUMIN SERPL-MCNC: 3.5 G/DL (ref 3.5–5.2)
ALP SERPL-CCNC: 64 U/L (ref 35–104)
ALT SERPL-CCNC: 16 U/L (ref 5–33)
ANION GAP SERPL CALCULATED.3IONS-SCNC: 11 MMOL/L (ref 7–19)
AST SERPL-CCNC: 30 U/L (ref 5–32)
BILIRUB SERPL-MCNC: <0.2 MG/DL (ref 0.2–1.2)
BUN SERPL-MCNC: 11 MG/DL (ref 6–20)
CALCIUM SERPL-MCNC: 9 MG/DL (ref 8.6–10)
CHLORIDE SERPL-SCNC: 101 MMOL/L (ref 98–111)
CO2 SERPL-SCNC: 24 MMOL/L (ref 22–29)
CORTIS AM PEAK SERPL-MCNC: 9.9 UG/DL (ref 6.2–19.4)
CREAT SERPL-MCNC: 0.8 MG/DL (ref 0.5–0.9)
GLUCOSE SERPL-MCNC: 89 MG/DL (ref 74–109)
POTASSIUM SERPL-SCNC: 4.5 MMOL/L (ref 3.5–5)
PROT SERPL-MCNC: 7.4 G/DL (ref 6.6–8.7)
SODIUM SERPL-SCNC: 136 MMOL/L (ref 136–145)

## 2023-04-28 PROCEDURE — 96365 THER/PROPH/DIAG IV INF INIT: CPT

## 2023-04-28 PROCEDURE — 2580000003 HC RX 258: Performed by: PSYCHIATRY & NEUROLOGY

## 2023-04-28 PROCEDURE — 6370000000 HC RX 637 (ALT 250 FOR IP): Performed by: PSYCHIATRY & NEUROLOGY

## 2023-04-28 PROCEDURE — 82024 ASSAY OF ACTH: CPT

## 2023-04-28 PROCEDURE — 82306 VITAMIN D 25 HYDROXY: CPT

## 2023-04-28 PROCEDURE — 6360000002 HC RX W HCPCS: Performed by: PSYCHIATRY & NEUROLOGY

## 2023-04-28 PROCEDURE — 80053 COMPREHEN METABOLIC PANEL: CPT

## 2023-04-28 PROCEDURE — 96375 TX/PRO/DX INJ NEW DRUG ADDON: CPT

## 2023-04-28 PROCEDURE — 96366 THER/PROPH/DIAG IV INF ADDON: CPT

## 2023-04-28 PROCEDURE — 36591 DRAW BLOOD OFF VENOUS DEVICE: CPT

## 2023-04-28 PROCEDURE — 82533 TOTAL CORTISOL: CPT

## 2023-04-28 PROCEDURE — 82627 DEHYDROEPIANDROSTERONE: CPT

## 2023-04-28 RX ORDER — ACETAMINOPHEN 325 MG/1
650 TABLET ORAL
OUTPATIENT
Start: 2023-05-05

## 2023-04-28 RX ORDER — ALBUTEROL SULFATE 90 UG/1
4 AEROSOL, METERED RESPIRATORY (INHALATION) PRN
OUTPATIENT
Start: 2023-05-05

## 2023-04-28 RX ORDER — SODIUM CHLORIDE 0.9 % (FLUSH) 0.9 %
5-40 SYRINGE (ML) INJECTION PRN
Status: CANCELLED | OUTPATIENT
Start: 2023-05-05

## 2023-04-28 RX ORDER — ACETAMINOPHEN 325 MG/1
650 TABLET ORAL ONCE
Status: COMPLETED | OUTPATIENT
Start: 2023-04-28 | End: 2023-04-28

## 2023-04-28 RX ORDER — SODIUM CHLORIDE 0.9 % (FLUSH) 0.9 %
5-40 SYRINGE (ML) INJECTION PRN
Status: DISCONTINUED | OUTPATIENT
Start: 2023-04-28 | End: 2023-04-29 | Stop reason: HOSPADM

## 2023-04-28 RX ORDER — HEPARIN SODIUM (PORCINE) LOCK FLUSH IV SOLN 100 UNIT/ML 100 UNIT/ML
500 SOLUTION INTRAVENOUS PRN
Status: DISCONTINUED | OUTPATIENT
Start: 2023-04-28 | End: 2023-04-30 | Stop reason: HOSPADM

## 2023-04-28 RX ORDER — MEPERIDINE HYDROCHLORIDE 25 MG/ML
12.5 INJECTION INTRAMUSCULAR; INTRAVENOUS; SUBCUTANEOUS PRN
OUTPATIENT
Start: 2023-05-05

## 2023-04-28 RX ORDER — ACETAMINOPHEN 325 MG/1
650 TABLET ORAL ONCE
Status: CANCELLED | OUTPATIENT
Start: 2023-05-05 | End: 2023-05-05

## 2023-04-28 RX ORDER — ONDANSETRON 2 MG/ML
8 INJECTION INTRAMUSCULAR; INTRAVENOUS
OUTPATIENT
Start: 2023-05-05

## 2023-04-28 RX ORDER — LOSARTAN POTASSIUM 100 MG/1
100 TABLET ORAL DAILY
COMMUNITY

## 2023-04-28 RX ORDER — DIPHENHYDRAMINE HYDROCHLORIDE 50 MG/ML
50 INJECTION INTRAMUSCULAR; INTRAVENOUS ONCE
Status: COMPLETED | OUTPATIENT
Start: 2023-04-28 | End: 2023-04-28

## 2023-04-28 RX ORDER — DIPHENHYDRAMINE HYDROCHLORIDE 50 MG/ML
50 INJECTION INTRAMUSCULAR; INTRAVENOUS
OUTPATIENT
Start: 2023-05-05

## 2023-04-28 RX ORDER — EPINEPHRINE 1 MG/ML
0.3 INJECTION, SOLUTION, CONCENTRATE INTRAVENOUS PRN
OUTPATIENT
Start: 2023-05-05

## 2023-04-28 RX ORDER — DIPHENHYDRAMINE HYDROCHLORIDE 50 MG/ML
50 INJECTION INTRAMUSCULAR; INTRAVENOUS ONCE
Status: CANCELLED
Start: 2023-05-05 | End: 2023-05-05

## 2023-04-28 RX ORDER — HYDROXYCHLOROQUINE SULFATE 200 MG/1
TABLET, FILM COATED ORAL DAILY
COMMUNITY

## 2023-04-28 RX ORDER — HEPARIN SODIUM (PORCINE) LOCK FLUSH IV SOLN 100 UNIT/ML 100 UNIT/ML
500 SOLUTION INTRAVENOUS PRN
Status: CANCELLED
Start: 2023-05-05

## 2023-04-28 RX ORDER — SODIUM CHLORIDE 9 MG/ML
INJECTION, SOLUTION INTRAVENOUS CONTINUOUS
OUTPATIENT
Start: 2023-05-05

## 2023-04-28 RX ADMIN — DIPHENHYDRAMINE HYDROCHLORIDE 50 MG: 50 INJECTION, SOLUTION INTRAMUSCULAR; INTRAVENOUS at 07:04

## 2023-04-28 RX ADMIN — HYDROCORTISONE SODIUM SUCCINATE 100 MG: 100 INJECTION, POWDER, FOR SOLUTION INTRAMUSCULAR; INTRAVENOUS at 07:04

## 2023-04-28 RX ADMIN — ACETAMINOPHEN 650 MG: 325 TABLET ORAL at 07:04

## 2023-04-28 RX ADMIN — Medication 500 UNITS: at 09:10

## 2023-04-28 RX ADMIN — SODIUM CHLORIDE, PRESERVATIVE FREE 20 ML: 5 INJECTION INTRAVENOUS at 09:10

## 2023-04-28 RX ADMIN — IMMUNE GLOBULIN (HUMAN) 35 G: 10 INJECTION INTRAVENOUS; SUBCUTANEOUS at 07:27

## 2023-04-28 NOTE — DISCHARGE INSTRUCTIONS
IGIV?  Get emergency medical help if you have signs of an allergic reaction: hives; difficult breathing; swelling of your face, lips, tongue, or throat. Some side effects may occur during the injection. Tell your caregiver if you feel dizzy, nauseated, light-headed, sweaty, or have a headache, pounding in your neck or ears, fever, chills, chest tightness, or warmth or redness in your face. Call your doctor at once if you have:  a blood cell disorder --pale or yellowed skin, dark colored urine, fever, confusion or weakness;  dehydration symptoms --feeling very thirsty or hot, being unable to urinate, heavy sweating, or hot and dry skin;  kidney problems --little or no urination, swelling, rapid weight gain, feeling short of breath;  lung problems --chest pain, trouble breathing, blue colored lips, fingers, or toes;  signs of a new infection --fever with a severe headache, neck stiffness, eye pain, and increased sensitivity to light; or  signs of a blood clot --shortness of breath, chest pain with deep breathing, rapid heart rate, numbness or weakness on one side of the body, swelling and warmth or discoloration in an arm or leg. Common side effects may include:  headache, back pain, joint pain;  fever, chills, sweating, warmth or tingling;  stomach pain, nausea, diarrhea;  increased blood pressure, fast heartbeats;  dizziness, tiredness, lack of energy;  stuffy nose, sinus pain; or  pain, swelling, burning, or irritation around the IV needle. This is not a complete list of side effects and others may occur. Call your doctor for medical advice about side effects. You may report side effects to FDA at 4-902-FDA-9874. What other drugs will affect IGIV? IGIV can harm your kidneys, especially if you also use certain medicines for infections, cancer, osteoporosis, organ transplant rejection, bowel disorders, high blood pressure, or pain or arthritis (including Advil, Motrin, and Aleve).   Other drugs may affect IGIV,

## 2023-04-30 LAB — DHEA-S SERPL-MCNC: 12 UG/DL (ref 35–256)

## 2023-05-04 ENCOUNTER — APPOINTMENT (OUTPATIENT)
Dept: INFUSION THERAPY | Age: 48
End: 2023-05-04
Payer: MEDICARE

## 2023-05-05 ENCOUNTER — HOSPITAL ENCOUNTER (OUTPATIENT)
Dept: INFUSION THERAPY | Age: 48
Setting detail: INFUSION SERIES
Discharge: HOME | End: 2023-05-05
Payer: MEDICARE

## 2023-05-05 VITALS
OXYGEN SATURATION: 100 % | DIASTOLIC BLOOD PRESSURE: 74 MMHG | TEMPERATURE: 97.7 F | RESPIRATION RATE: 17 BRPM | SYSTOLIC BLOOD PRESSURE: 104 MMHG | HEART RATE: 60 BPM | WEIGHT: 182 LBS | BODY MASS INDEX: 30.29 KG/M2

## 2023-05-05 DIAGNOSIS — G35 MULTIPLE SCLEROSIS (HCC): Primary | ICD-10-CM

## 2023-05-05 PROCEDURE — 96366 THER/PROPH/DIAG IV INF ADDON: CPT

## 2023-05-05 PROCEDURE — 82024 ASSAY OF ACTH: CPT

## 2023-05-05 PROCEDURE — 96365 THER/PROPH/DIAG IV INF INIT: CPT

## 2023-05-05 PROCEDURE — 6370000000 HC RX 637 (ALT 250 FOR IP): Performed by: PSYCHIATRY & NEUROLOGY

## 2023-05-05 PROCEDURE — 36591 DRAW BLOOD OFF VENOUS DEVICE: CPT

## 2023-05-05 PROCEDURE — 2580000003 HC RX 258: Performed by: PSYCHIATRY & NEUROLOGY

## 2023-05-05 PROCEDURE — 6360000002 HC RX W HCPCS: Performed by: PSYCHIATRY & NEUROLOGY

## 2023-05-05 PROCEDURE — 96375 TX/PRO/DX INJ NEW DRUG ADDON: CPT

## 2023-05-05 RX ORDER — SODIUM CHLORIDE 0.9 % (FLUSH) 0.9 %
5-40 SYRINGE (ML) INJECTION PRN
Status: DISCONTINUED | OUTPATIENT
Start: 2023-05-05 | End: 2023-05-06 | Stop reason: HOSPADM

## 2023-05-05 RX ORDER — ACETAMINOPHEN 325 MG/1
650 TABLET ORAL ONCE
Status: CANCELLED | OUTPATIENT
Start: 2023-05-12 | End: 2023-05-12

## 2023-05-05 RX ORDER — ONDANSETRON 2 MG/ML
8 INJECTION INTRAMUSCULAR; INTRAVENOUS
OUTPATIENT
Start: 2023-05-12

## 2023-05-05 RX ORDER — DIPHENHYDRAMINE HYDROCHLORIDE 50 MG/ML
50 INJECTION INTRAMUSCULAR; INTRAVENOUS
OUTPATIENT
Start: 2023-05-12

## 2023-05-05 RX ORDER — ACETAMINOPHEN 325 MG/1
650 TABLET ORAL ONCE
Status: COMPLETED | OUTPATIENT
Start: 2023-05-05 | End: 2023-05-05

## 2023-05-05 RX ORDER — DIPHENHYDRAMINE HYDROCHLORIDE 50 MG/ML
50 INJECTION INTRAMUSCULAR; INTRAVENOUS ONCE
Status: CANCELLED
Start: 2023-05-12 | End: 2023-05-12

## 2023-05-05 RX ORDER — SODIUM CHLORIDE 9 MG/ML
INJECTION, SOLUTION INTRAVENOUS CONTINUOUS
OUTPATIENT
Start: 2023-05-12

## 2023-05-05 RX ORDER — MEPERIDINE HYDROCHLORIDE 25 MG/ML
12.5 INJECTION INTRAMUSCULAR; INTRAVENOUS; SUBCUTANEOUS PRN
OUTPATIENT
Start: 2023-05-12

## 2023-05-05 RX ORDER — ACETAMINOPHEN 325 MG/1
650 TABLET ORAL
OUTPATIENT
Start: 2023-05-12

## 2023-05-05 RX ORDER — HEPARIN SODIUM (PORCINE) LOCK FLUSH IV SOLN 100 UNIT/ML 100 UNIT/ML
500 SOLUTION INTRAVENOUS PRN
Status: DISCONTINUED | OUTPATIENT
Start: 2023-05-05 | End: 2023-05-07 | Stop reason: HOSPADM

## 2023-05-05 RX ORDER — DIPHENHYDRAMINE HYDROCHLORIDE 50 MG/ML
50 INJECTION INTRAMUSCULAR; INTRAVENOUS ONCE
Status: COMPLETED | OUTPATIENT
Start: 2023-05-05 | End: 2023-05-05

## 2023-05-05 RX ORDER — ALBUTEROL SULFATE 90 UG/1
4 AEROSOL, METERED RESPIRATORY (INHALATION) PRN
OUTPATIENT
Start: 2023-05-12

## 2023-05-05 RX ORDER — HEPARIN SODIUM (PORCINE) LOCK FLUSH IV SOLN 100 UNIT/ML 100 UNIT/ML
500 SOLUTION INTRAVENOUS PRN
Status: CANCELLED
Start: 2023-05-12

## 2023-05-05 RX ORDER — SODIUM CHLORIDE 0.9 % (FLUSH) 0.9 %
5-40 SYRINGE (ML) INJECTION PRN
Status: CANCELLED | OUTPATIENT
Start: 2023-05-12

## 2023-05-05 RX ORDER — EPINEPHRINE 1 MG/ML
0.3 INJECTION, SOLUTION, CONCENTRATE INTRAVENOUS PRN
OUTPATIENT
Start: 2023-05-12

## 2023-05-05 RX ADMIN — ACETAMINOPHEN 650 MG: 325 TABLET ORAL at 06:34

## 2023-05-05 RX ADMIN — IMMUNE GLOBULIN (HUMAN) 35 G: 10 INJECTION INTRAVENOUS; SUBCUTANEOUS at 07:07

## 2023-05-05 RX ADMIN — DIPHENHYDRAMINE HYDROCHLORIDE 50 MG: 50 INJECTION, SOLUTION INTRAMUSCULAR; INTRAVENOUS at 06:34

## 2023-05-05 RX ADMIN — HEPARIN 500 UNITS: 100 SYRINGE at 08:58

## 2023-05-05 RX ADMIN — HYDROCORTISONE SODIUM SUCCINATE 100 MG: 100 INJECTION, POWDER, FOR SOLUTION INTRAMUSCULAR; INTRAVENOUS at 06:35

## 2023-05-05 RX ADMIN — SODIUM CHLORIDE, PRESERVATIVE FREE 20 ML: 5 INJECTION INTRAVENOUS at 08:57

## 2023-05-05 NOTE — PROGRESS NOTES
Labs drawn via port on arrival to [facility]. Gamunex- C 35 g infusion complete. Pt tolerated well.

## 2023-05-05 NOTE — DISCHARGE INSTRUCTIONS
appropriate for any given patient. [facility] does not assume any responsibility for any aspect of healthcare administered with the aid of information [facility] provides. The information contained herein is not intended to cover all possible uses, directions, precautions, warnings, drug interactions, allergic reactions, or adverse effects. If you have questions about the drugs you are taking, check with your doctor, nurse or pharmacist.  Copyright 4362-0103 167  Carlos: 4.02. Revision date: 2/26/2021. Care instructions adapted under license by [facility] ([facility]). If you have questions about a medical condition or this instruction, always ask your healthcare professional. [address] any warranty or liability for your use of this information.

## 2023-05-06 LAB — ACTH PLAS-MCNC: 12.2 PG/ML (ref 7.2–63.3)

## 2023-05-11 ENCOUNTER — APPOINTMENT (OUTPATIENT)
Dept: INFUSION THERAPY | Age: 48
End: 2023-05-11
Payer: MEDICARE

## 2023-05-12 ENCOUNTER — HOSPITAL ENCOUNTER (OUTPATIENT)
Dept: INFUSION THERAPY | Age: 48
Setting detail: INFUSION SERIES
Discharge: HOME | End: 2023-05-12
Payer: MEDICARE

## 2023-05-12 VITALS
OXYGEN SATURATION: 100 % | BODY MASS INDEX: 30.79 KG/M2 | DIASTOLIC BLOOD PRESSURE: 82 MMHG | TEMPERATURE: 97 F | WEIGHT: 185 LBS | RESPIRATION RATE: 17 BRPM | HEART RATE: 57 BPM | SYSTOLIC BLOOD PRESSURE: 119 MMHG

## 2023-05-12 DIAGNOSIS — G35 MULTIPLE SCLEROSIS (HCC): Primary | ICD-10-CM

## 2023-05-12 PROCEDURE — 96375 TX/PRO/DX INJ NEW DRUG ADDON: CPT

## 2023-05-12 PROCEDURE — 96366 THER/PROPH/DIAG IV INF ADDON: CPT

## 2023-05-12 PROCEDURE — 6360000002 HC RX W HCPCS: Performed by: PSYCHIATRY & NEUROLOGY

## 2023-05-12 PROCEDURE — 96365 THER/PROPH/DIAG IV INF INIT: CPT

## 2023-05-12 PROCEDURE — 6370000000 HC RX 637 (ALT 250 FOR IP): Performed by: PSYCHIATRY & NEUROLOGY

## 2023-05-12 RX ORDER — SODIUM CHLORIDE 9 MG/ML
INJECTION, SOLUTION INTRAVENOUS CONTINUOUS
OUTPATIENT
Start: 2023-05-19

## 2023-05-12 RX ORDER — HEPARIN SODIUM (PORCINE) LOCK FLUSH IV SOLN 100 UNIT/ML 100 UNIT/ML
500 SOLUTION INTRAVENOUS PRN
Status: CANCELLED
Start: 2023-05-19

## 2023-05-12 RX ORDER — ACETAMINOPHEN 325 MG/1
650 TABLET ORAL ONCE
Status: CANCELLED | OUTPATIENT
Start: 2023-05-19 | End: 2023-05-19

## 2023-05-12 RX ORDER — DIPHENHYDRAMINE HYDROCHLORIDE 50 MG/ML
50 INJECTION INTRAMUSCULAR; INTRAVENOUS ONCE
Status: COMPLETED | OUTPATIENT
Start: 2023-05-12 | End: 2023-05-12

## 2023-05-12 RX ORDER — DIPHENHYDRAMINE HYDROCHLORIDE 50 MG/ML
50 INJECTION INTRAMUSCULAR; INTRAVENOUS
OUTPATIENT
Start: 2023-05-19

## 2023-05-12 RX ORDER — HEPARIN SODIUM (PORCINE) LOCK FLUSH IV SOLN 100 UNIT/ML 100 UNIT/ML
500 SOLUTION INTRAVENOUS PRN
Status: DISCONTINUED | OUTPATIENT
Start: 2023-05-12 | End: 2023-05-14 | Stop reason: HOSPADM

## 2023-05-12 RX ORDER — ACETAMINOPHEN 325 MG/1
650 TABLET ORAL
OUTPATIENT
Start: 2023-05-19

## 2023-05-12 RX ORDER — ALBUTEROL SULFATE 90 UG/1
4 AEROSOL, METERED RESPIRATORY (INHALATION) PRN
OUTPATIENT
Start: 2023-05-19

## 2023-05-12 RX ORDER — EPINEPHRINE 1 MG/ML
0.3 INJECTION, SOLUTION, CONCENTRATE INTRAVENOUS PRN
OUTPATIENT
Start: 2023-05-19

## 2023-05-12 RX ORDER — ONDANSETRON 2 MG/ML
8 INJECTION INTRAMUSCULAR; INTRAVENOUS
OUTPATIENT
Start: 2023-05-19

## 2023-05-12 RX ORDER — MEPERIDINE HYDROCHLORIDE 25 MG/ML
12.5 INJECTION INTRAMUSCULAR; INTRAVENOUS; SUBCUTANEOUS PRN
OUTPATIENT
Start: 2023-05-19

## 2023-05-12 RX ORDER — ACETAMINOPHEN 325 MG/1
650 TABLET ORAL ONCE
Status: COMPLETED | OUTPATIENT
Start: 2023-05-12 | End: 2023-05-12

## 2023-05-12 RX ORDER — SODIUM CHLORIDE 0.9 % (FLUSH) 0.9 %
5-40 SYRINGE (ML) INJECTION PRN
Status: DISCONTINUED | OUTPATIENT
Start: 2023-05-12 | End: 2023-05-13 | Stop reason: HOSPADM

## 2023-05-12 RX ORDER — DIPHENHYDRAMINE HYDROCHLORIDE 50 MG/ML
50 INJECTION INTRAMUSCULAR; INTRAVENOUS ONCE
Status: CANCELLED
Start: 2023-05-19 | End: 2023-05-19

## 2023-05-12 RX ORDER — SODIUM CHLORIDE 0.9 % (FLUSH) 0.9 %
5-40 SYRINGE (ML) INJECTION PRN
Status: CANCELLED | OUTPATIENT
Start: 2023-05-19

## 2023-05-12 RX ADMIN — ACETAMINOPHEN 650 MG: 325 TABLET ORAL at 06:38

## 2023-05-12 RX ADMIN — DIPHENHYDRAMINE HYDROCHLORIDE 50 MG: 50 INJECTION, SOLUTION INTRAMUSCULAR; INTRAVENOUS at 06:38

## 2023-05-12 RX ADMIN — HYDROCORTISONE SODIUM SUCCINATE 100 MG: 100 INJECTION, POWDER, FOR SOLUTION INTRAMUSCULAR; INTRAVENOUS at 06:38

## 2023-05-12 RX ADMIN — HEPARIN 500 UNITS: 100 SYRINGE at 08:36

## 2023-05-12 RX ADMIN — IMMUNE GLOBULIN (HUMAN) 35 G: 10 INJECTION INTRAVENOUS; SUBCUTANEOUS at 06:58

## 2023-05-12 ASSESSMENT — PAIN SCALES - GENERAL: PAINLEVEL_OUTOF10: 0

## 2023-05-12 NOTE — DISCHARGE INSTRUCTIONS
immune globulin (intravenous and subcutaneous)  Pronunciation:  dinora LACEY foley  Brand:  Gammagard Liquid, Gammaked, Gamunex-C  What is the most important information I should know about immune globulin? This medicine can cause blood clots. The risk is highest in older adults or in people who have had blood clots, heart problems, or blood circulation problems. Blood clots are also more likely during long-term bedrest, while using birth control pills or hormone replacement therapy, or while having a central intravenous (IV) catheter in place. Call your doctor at once if you have chest pain, trouble breathing, fast heartbeats, numbness or weakness, or swelling and warmth or discoloration in an arm or leg. This medicine can also harm your kidneys, especially if you have kidney disease or if you also use certain medicines. Tell your doctor right away if you have signs of kidney problems, such as swelling, rapid weight gain, and little or no urination. What is immune globulin? Immune globulin intravenous and subcutaneous (for injection into a vein or under the skin) is used to treat primary immunodeficiency. Immune globulin is also used to increase platelets (blood clotting cells) in people with idiopathic thrombocytopenic purpura. Immune globulin is also used to treat certain debilitating nerve disorders that cause muscle weakness and can affect daily activities. Immune globulin may also be used for purposes not listed in this medication guide. What should I discuss with my healthcare provider before using immune globulin? You should not use this medicine if:  you have had an allergic reaction to an immune globulin or blood product; or  you have immune globulin A (IgA) deficiency with antibody to IgA. Immune globulin can cause blood clots or kidney problems, especially in older adults or in people with certain conditions.  Tell your doctor if you have ever had:  heart problems, blood circulation

## 2023-05-12 NOTE — PROGRESS NOTES
Here today for IVIG. Premedications given per orders. IVIG infused per orders. Patient tolerated well. Future appointments reviewed with patient and discharged to home.

## 2023-05-15 RX ORDER — BACLOFEN 10 MG/1
TABLET ORAL
Qty: 90 TABLET | Refills: 5 | Status: SHIPPED | OUTPATIENT
Start: 2023-05-15

## 2023-05-15 NOTE — TELEPHONE ENCOUNTER
Requested Prescriptions     Pending Prescriptions Disp Refills    baclofen (LIORESAL) 10 MG tablet [Pharmacy Med Name: BACLOFEN 10MG TABLET] 90 tablet 5     Sig: TAKE ONE TABLET THREE TIMES DAILY       Last Office Visit: 12/15/2022  Next Office Visit: 6/7/2023  Last Medication Refill: 12/19/22 with 5 TAIWO Arredondo pt

## 2023-05-18 ENCOUNTER — APPOINTMENT (OUTPATIENT)
Dept: INFUSION THERAPY | Age: 48
End: 2023-05-18
Payer: MEDICARE

## 2023-05-19 ENCOUNTER — HOSPITAL ENCOUNTER (OUTPATIENT)
Dept: INFUSION THERAPY | Age: 48
Setting detail: INFUSION SERIES
Discharge: HOME | End: 2023-05-19
Payer: MEDICARE

## 2023-05-19 VITALS
DIASTOLIC BLOOD PRESSURE: 91 MMHG | WEIGHT: 182 LBS | OXYGEN SATURATION: 100 % | TEMPERATURE: 99.4 F | BODY MASS INDEX: 30.29 KG/M2 | SYSTOLIC BLOOD PRESSURE: 136 MMHG | RESPIRATION RATE: 17 BRPM | HEART RATE: 63 BPM

## 2023-05-19 DIAGNOSIS — G35 MULTIPLE SCLEROSIS (HCC): Primary | ICD-10-CM

## 2023-05-19 PROCEDURE — 6360000002 HC RX W HCPCS: Performed by: PSYCHIATRY & NEUROLOGY

## 2023-05-19 PROCEDURE — 96365 THER/PROPH/DIAG IV INF INIT: CPT

## 2023-05-19 PROCEDURE — 96366 THER/PROPH/DIAG IV INF ADDON: CPT

## 2023-05-19 PROCEDURE — 6370000000 HC RX 637 (ALT 250 FOR IP): Performed by: PSYCHIATRY & NEUROLOGY

## 2023-05-19 RX ORDER — EPINEPHRINE 1 MG/ML
0.3 INJECTION, SOLUTION, CONCENTRATE INTRAVENOUS PRN
OUTPATIENT
Start: 2023-05-26

## 2023-05-19 RX ORDER — ACETAMINOPHEN 325 MG/1
650 TABLET ORAL
OUTPATIENT
Start: 2023-05-26

## 2023-05-19 RX ORDER — SODIUM CHLORIDE 0.9 % (FLUSH) 0.9 %
5-40 SYRINGE (ML) INJECTION PRN
Status: CANCELLED | OUTPATIENT
Start: 2023-05-26

## 2023-05-19 RX ORDER — MEPERIDINE HYDROCHLORIDE 25 MG/ML
12.5 INJECTION INTRAMUSCULAR; INTRAVENOUS; SUBCUTANEOUS PRN
OUTPATIENT
Start: 2023-05-26

## 2023-05-19 RX ORDER — ACETAMINOPHEN 325 MG/1
650 TABLET ORAL ONCE
Status: CANCELLED | OUTPATIENT
Start: 2023-05-26 | End: 2023-05-26

## 2023-05-19 RX ORDER — DIPHENHYDRAMINE HYDROCHLORIDE 50 MG/ML
50 INJECTION INTRAMUSCULAR; INTRAVENOUS
OUTPATIENT
Start: 2023-05-26

## 2023-05-19 RX ORDER — ONDANSETRON 2 MG/ML
8 INJECTION INTRAMUSCULAR; INTRAVENOUS
OUTPATIENT
Start: 2023-05-26

## 2023-05-19 RX ORDER — DIPHENHYDRAMINE HYDROCHLORIDE 50 MG/ML
50 INJECTION INTRAMUSCULAR; INTRAVENOUS ONCE
Status: CANCELLED
Start: 2023-05-26 | End: 2023-05-26

## 2023-05-19 RX ORDER — DIPHENHYDRAMINE HYDROCHLORIDE 50 MG/ML
50 INJECTION INTRAMUSCULAR; INTRAVENOUS ONCE
Status: COMPLETED | OUTPATIENT
Start: 2023-05-19 | End: 2023-05-19

## 2023-05-19 RX ORDER — ALBUTEROL SULFATE 90 UG/1
4 AEROSOL, METERED RESPIRATORY (INHALATION) PRN
OUTPATIENT
Start: 2023-05-26

## 2023-05-19 RX ORDER — SODIUM CHLORIDE 0.9 % (FLUSH) 0.9 %
5-40 SYRINGE (ML) INJECTION PRN
Status: DISCONTINUED | OUTPATIENT
Start: 2023-05-19 | End: 2023-05-20 | Stop reason: HOSPADM

## 2023-05-19 RX ORDER — HEPARIN SODIUM (PORCINE) LOCK FLUSH IV SOLN 100 UNIT/ML 100 UNIT/ML
500 SOLUTION INTRAVENOUS PRN
Status: DISCONTINUED | OUTPATIENT
Start: 2023-05-19 | End: 2023-05-21 | Stop reason: HOSPADM

## 2023-05-19 RX ORDER — ACETAMINOPHEN 325 MG/1
650 TABLET ORAL ONCE
Status: COMPLETED | OUTPATIENT
Start: 2023-05-19 | End: 2023-05-19

## 2023-05-19 RX ORDER — SODIUM CHLORIDE 9 MG/ML
INJECTION, SOLUTION INTRAVENOUS CONTINUOUS
OUTPATIENT
Start: 2023-05-26

## 2023-05-19 RX ORDER — HEPARIN SODIUM (PORCINE) LOCK FLUSH IV SOLN 100 UNIT/ML 100 UNIT/ML
500 SOLUTION INTRAVENOUS PRN
Status: CANCELLED
Start: 2023-05-26

## 2023-05-19 RX ADMIN — HEPARIN 500 UNITS: 100 SYRINGE at 09:00

## 2023-05-19 RX ADMIN — IMMUNE GLOBULIN (HUMAN) 35 G: 10 INJECTION INTRAVENOUS; SUBCUTANEOUS at 07:11

## 2023-05-19 RX ADMIN — HYDROCORTISONE SODIUM SUCCINATE 100 MG: 100 INJECTION, POWDER, FOR SOLUTION INTRAMUSCULAR; INTRAVENOUS at 06:53

## 2023-05-19 RX ADMIN — ACETAMINOPHEN 650 MG: 325 TABLET ORAL at 06:53

## 2023-05-19 RX ADMIN — DIPHENHYDRAMINE HYDROCHLORIDE 50 MG: 50 INJECTION, SOLUTION INTRAMUSCULAR; INTRAVENOUS at 06:53

## 2023-05-19 NOTE — DISCHARGE INSTRUCTIONS
immune globulin (intravenous) (IGIV)  Pronunciation:  dinora foley  Brand:  Bivigam, Carimune, Flebogamma, Gammagard S/D, Gammaplex, Octagam, Panzyga, Privigen  What is the most important information I should know about immune globulin intravenous? This medicine can cause blood clots. The risk is highest in older adults or in people who have had blood clots, heart problems, or blood circulation problems. Blood clots are also more likely during long-term bedrest, while using birth control pills or hormone replacement therapy, or while having a central intravenous (IV) catheter in place. Call your doctor at once if you have chest pain, trouble breathing, fast heartbeats, numbness or weakness, or swelling and warmth or discoloration in an arm or leg. This medicine can also harm your kidneys, especially if you have kidney disease or you also use certain medicines. Tell your doctor right away if you have signs of kidney problems, such as swelling, rapid weight gain, and little or no urination. What is immune globulin intravenous (IGIV)? Immune globulin intravenous (IGIV, for injection into a vein) is used to treat primary immunodeficiency. IGIV is also used to increase platelets (blood clotting cells) in people with immune thrombocytopenic purpura. IGIV is also used in to help prevent certain infections in people with B-cell chronic lymphocytic leukemia. IGIV is also used in people with Kawasaki syndrome, to prevent aneurysm caused by a weakening of the main artery in the heart. IGIV may also be used for purposes not listed in this medication guide. What should I discuss with my healthcare provider before using IGIV? You may not be able to use this medicine if:  you have had an allergic reaction to an immune globulin or blood product;  you have immune globulin A (IgA) deficiency with antibody to IgA; or  you are allergic to corn.   IGIV can cause blood clots or kidney problems, especially in

## 2023-05-25 ENCOUNTER — APPOINTMENT (OUTPATIENT)
Dept: INFUSION THERAPY | Age: 48
End: 2023-05-25
Payer: MEDICARE

## 2023-05-26 ENCOUNTER — HOSPITAL ENCOUNTER (OUTPATIENT)
Dept: INFUSION THERAPY | Age: 48
Setting detail: INFUSION SERIES
Discharge: HOME | End: 2023-05-26
Payer: MEDICARE

## 2023-05-26 VITALS
BODY MASS INDEX: 30.49 KG/M2 | HEART RATE: 53 BPM | SYSTOLIC BLOOD PRESSURE: 144 MMHG | DIASTOLIC BLOOD PRESSURE: 95 MMHG | TEMPERATURE: 97.7 F | RESPIRATION RATE: 17 BRPM | OXYGEN SATURATION: 100 % | WEIGHT: 183 LBS | HEIGHT: 65 IN

## 2023-05-26 DIAGNOSIS — G35 MULTIPLE SCLEROSIS (HCC): Primary | ICD-10-CM

## 2023-05-26 LAB
ANION GAP SERPL CALCULATED.3IONS-SCNC: 9 MMOL/L (ref 7–19)
BNP BLD-MCNC: <36 PG/ML (ref 0–124)
BUN SERPL-MCNC: 8 MG/DL (ref 6–20)
CALCIUM SERPL-MCNC: 8.7 MG/DL (ref 8.6–10)
CHLORIDE SERPL-SCNC: 94 MMOL/L (ref 98–111)
CO2 SERPL-SCNC: 25 MMOL/L (ref 22–29)
CREAT SERPL-MCNC: 0.7 MG/DL (ref 0.5–0.9)
GLUCOSE SERPL-MCNC: 78 MG/DL (ref 74–109)
POTASSIUM SERPL-SCNC: 3.9 MMOL/L (ref 3.5–5)
SODIUM SERPL-SCNC: 128 MMOL/L (ref 136–145)

## 2023-05-26 PROCEDURE — 2580000003 HC RX 258: Performed by: PSYCHIATRY & NEUROLOGY

## 2023-05-26 PROCEDURE — 80048 BASIC METABOLIC PNL TOTAL CA: CPT

## 2023-05-26 PROCEDURE — 83880 ASSAY OF NATRIURETIC PEPTIDE: CPT

## 2023-05-26 PROCEDURE — 6360000002 HC RX W HCPCS: Performed by: PSYCHIATRY & NEUROLOGY

## 2023-05-26 PROCEDURE — 96375 TX/PRO/DX INJ NEW DRUG ADDON: CPT

## 2023-05-26 PROCEDURE — 36591 DRAW BLOOD OFF VENOUS DEVICE: CPT

## 2023-05-26 PROCEDURE — 6370000000 HC RX 637 (ALT 250 FOR IP): Performed by: PSYCHIATRY & NEUROLOGY

## 2023-05-26 PROCEDURE — 96366 THER/PROPH/DIAG IV INF ADDON: CPT

## 2023-05-26 PROCEDURE — 96365 THER/PROPH/DIAG IV INF INIT: CPT

## 2023-05-26 RX ORDER — DIPHENHYDRAMINE HYDROCHLORIDE 50 MG/ML
50 INJECTION INTRAMUSCULAR; INTRAVENOUS ONCE
Status: COMPLETED | OUTPATIENT
Start: 2023-05-26 | End: 2023-05-26

## 2023-05-26 RX ORDER — ALBUTEROL SULFATE 90 UG/1
4 AEROSOL, METERED RESPIRATORY (INHALATION) PRN
OUTPATIENT
Start: 2023-06-02

## 2023-05-26 RX ORDER — SODIUM CHLORIDE 9 MG/ML
INJECTION, SOLUTION INTRAVENOUS CONTINUOUS
OUTPATIENT
Start: 2023-06-02

## 2023-05-26 RX ORDER — EPINEPHRINE 1 MG/ML
0.3 INJECTION, SOLUTION, CONCENTRATE INTRAVENOUS PRN
OUTPATIENT
Start: 2023-06-02

## 2023-05-26 RX ORDER — ACETAMINOPHEN 325 MG/1
650 TABLET ORAL
OUTPATIENT
Start: 2023-06-02

## 2023-05-26 RX ORDER — MEPERIDINE HYDROCHLORIDE 25 MG/ML
12.5 INJECTION INTRAMUSCULAR; INTRAVENOUS; SUBCUTANEOUS PRN
OUTPATIENT
Start: 2023-06-02

## 2023-05-26 RX ORDER — ONDANSETRON 2 MG/ML
8 INJECTION INTRAMUSCULAR; INTRAVENOUS
OUTPATIENT
Start: 2023-06-02

## 2023-05-26 RX ORDER — HEPARIN SODIUM (PORCINE) LOCK FLUSH IV SOLN 100 UNIT/ML 100 UNIT/ML
500 SOLUTION INTRAVENOUS PRN
Status: DISCONTINUED | OUTPATIENT
Start: 2023-05-26 | End: 2023-05-28 | Stop reason: HOSPADM

## 2023-05-26 RX ORDER — ACETAMINOPHEN 325 MG/1
650 TABLET ORAL ONCE
Status: CANCELLED | OUTPATIENT
Start: 2023-06-02 | End: 2023-06-02

## 2023-05-26 RX ORDER — ACETAMINOPHEN 325 MG/1
650 TABLET ORAL ONCE
Status: COMPLETED | OUTPATIENT
Start: 2023-05-26 | End: 2023-05-26

## 2023-05-26 RX ORDER — HEPARIN SODIUM (PORCINE) LOCK FLUSH IV SOLN 100 UNIT/ML 100 UNIT/ML
500 SOLUTION INTRAVENOUS PRN
Status: CANCELLED
Start: 2023-06-02

## 2023-05-26 RX ORDER — DIPHENHYDRAMINE HYDROCHLORIDE 50 MG/ML
50 INJECTION INTRAMUSCULAR; INTRAVENOUS
OUTPATIENT
Start: 2023-06-02

## 2023-05-26 RX ORDER — DIPHENHYDRAMINE HYDROCHLORIDE 50 MG/ML
50 INJECTION INTRAMUSCULAR; INTRAVENOUS ONCE
Status: CANCELLED
Start: 2023-06-02 | End: 2023-06-02

## 2023-05-26 RX ORDER — SODIUM CHLORIDE 0.9 % (FLUSH) 0.9 %
5-40 SYRINGE (ML) INJECTION PRN
Status: DISCONTINUED | OUTPATIENT
Start: 2023-05-26 | End: 2023-05-27 | Stop reason: HOSPADM

## 2023-05-26 RX ORDER — SODIUM CHLORIDE 0.9 % (FLUSH) 0.9 %
5-40 SYRINGE (ML) INJECTION PRN
Status: CANCELLED | OUTPATIENT
Start: 2023-06-02

## 2023-05-26 RX ADMIN — Medication 500 UNITS: at 09:00

## 2023-05-26 RX ADMIN — IMMUNE GLOBULIN (HUMAN) 35 G: 10 INJECTION INTRAVENOUS; SUBCUTANEOUS at 07:06

## 2023-05-26 RX ADMIN — HYDROCORTISONE SODIUM SUCCINATE 100 MG: 100 INJECTION, POWDER, FOR SOLUTION INTRAMUSCULAR; INTRAVENOUS at 06:46

## 2023-05-26 RX ADMIN — SODIUM CHLORIDE, PRESERVATIVE FREE 20 ML: 5 INJECTION INTRAVENOUS at 09:00

## 2023-05-26 RX ADMIN — DIPHENHYDRAMINE HYDROCHLORIDE 50 MG: 50 INJECTION, SOLUTION INTRAMUSCULAR; INTRAVENOUS at 06:46

## 2023-05-26 RX ADMIN — ACETAMINOPHEN 650 MG: 325 TABLET ORAL at 06:46

## 2023-05-26 NOTE — DISCHARGE INSTRUCTIONS
IGIV?  Get emergency medical help if you have signs of an allergic reaction: hives; difficult breathing; swelling of your face, lips, tongue, or throat. Some side effects may occur during the injection. Tell your caregiver if you feel dizzy, nauseated, light-headed, sweaty, or have a headache, pounding in your neck or ears, fever, chills, chest tightness, or warmth or redness in your face. Call your doctor at once if you have:  a blood cell disorder --pale or yellowed skin, dark colored urine, fever, confusion or weakness;  dehydration symptoms --feeling very thirsty or hot, being unable to urinate, heavy sweating, or hot and dry skin;  kidney problems --little or no urination, swelling, rapid weight gain, feeling short of breath;  lung problems --chest pain, trouble breathing, blue colored lips, fingers, or toes;  signs of a new infection --fever with a severe headache, neck stiffness, eye pain, and increased sensitivity to light; or  signs of a blood clot --shortness of breath, chest pain with deep breathing, rapid heart rate, numbness or weakness on one side of the body, swelling and warmth or discoloration in an arm or leg. Common side effects may include:  headache, back pain, joint pain;  fever, chills, sweating, warmth or tingling;  stomach pain, nausea, diarrhea;  increased blood pressure, fast heartbeats;  dizziness, tiredness, lack of energy;  stuffy nose, sinus pain; or  pain, swelling, burning, or irritation around the IV needle. This is not a complete list of side effects and others may occur. Call your doctor for medical advice about side effects. You may report side effects to FDA at 3-528-FDA-4423. What other drugs will affect IGIV? IGIV can harm your kidneys, especially if you also use certain medicines for infections, cancer, osteoporosis, organ transplant rejection, bowel disorders, high blood pressure, or pain or arthritis (including Advil, Motrin, and Aleve).   Other drugs may affect IGIV,

## 2023-06-01 ENCOUNTER — APPOINTMENT (OUTPATIENT)
Dept: INFUSION THERAPY | Age: 48
End: 2023-06-01
Payer: MEDICARE

## 2023-06-02 ENCOUNTER — HOSPITAL ENCOUNTER (OUTPATIENT)
Dept: INFUSION THERAPY | Age: 48
Setting detail: INFUSION SERIES
Discharge: HOME | End: 2023-06-02
Payer: MEDICARE

## 2023-06-02 VITALS
OXYGEN SATURATION: 99 % | SYSTOLIC BLOOD PRESSURE: 100 MMHG | TEMPERATURE: 97.2 F | RESPIRATION RATE: 18 BRPM | HEART RATE: 55 BPM | WEIGHT: 183 LBS | BODY MASS INDEX: 30.45 KG/M2 | DIASTOLIC BLOOD PRESSURE: 68 MMHG

## 2023-06-02 DIAGNOSIS — G35 MULTIPLE SCLEROSIS (HCC): Primary | ICD-10-CM

## 2023-06-02 PROCEDURE — 96365 THER/PROPH/DIAG IV INF INIT: CPT

## 2023-06-02 PROCEDURE — 6370000000 HC RX 637 (ALT 250 FOR IP): Performed by: PSYCHIATRY & NEUROLOGY

## 2023-06-02 PROCEDURE — 96375 TX/PRO/DX INJ NEW DRUG ADDON: CPT

## 2023-06-02 PROCEDURE — 6360000002 HC RX W HCPCS: Performed by: PSYCHIATRY & NEUROLOGY

## 2023-06-02 PROCEDURE — 96366 THER/PROPH/DIAG IV INF ADDON: CPT

## 2023-06-02 RX ORDER — SODIUM CHLORIDE 9 MG/ML
INJECTION, SOLUTION INTRAVENOUS CONTINUOUS
OUTPATIENT
Start: 2023-06-09

## 2023-06-02 RX ORDER — SODIUM CHLORIDE 0.9 % (FLUSH) 0.9 %
5-40 SYRINGE (ML) INJECTION PRN
Status: DISCONTINUED | OUTPATIENT
Start: 2023-06-02 | End: 2023-06-03 | Stop reason: HOSPADM

## 2023-06-02 RX ORDER — ACETAMINOPHEN 325 MG/1
650 TABLET ORAL ONCE
Status: CANCELLED | OUTPATIENT
Start: 2023-06-09 | End: 2023-06-09

## 2023-06-02 RX ORDER — ACETAMINOPHEN 325 MG/1
650 TABLET ORAL
OUTPATIENT
Start: 2023-06-09

## 2023-06-02 RX ORDER — ACETAMINOPHEN 325 MG/1
650 TABLET ORAL ONCE
Status: COMPLETED | OUTPATIENT
Start: 2023-06-02 | End: 2023-06-02

## 2023-06-02 RX ORDER — DIPHENHYDRAMINE HYDROCHLORIDE 50 MG/ML
50 INJECTION INTRAMUSCULAR; INTRAVENOUS
OUTPATIENT
Start: 2023-06-09

## 2023-06-02 RX ORDER — SODIUM CHLORIDE 0.9 % (FLUSH) 0.9 %
5-40 SYRINGE (ML) INJECTION PRN
Status: CANCELLED | OUTPATIENT
Start: 2023-06-09

## 2023-06-02 RX ORDER — HEPARIN SODIUM (PORCINE) LOCK FLUSH IV SOLN 100 UNIT/ML 100 UNIT/ML
500 SOLUTION INTRAVENOUS PRN
Status: CANCELLED
Start: 2023-06-09

## 2023-06-02 RX ORDER — HEPARIN SODIUM (PORCINE) LOCK FLUSH IV SOLN 100 UNIT/ML 100 UNIT/ML
500 SOLUTION INTRAVENOUS PRN
Status: DISCONTINUED | OUTPATIENT
Start: 2023-06-02 | End: 2023-06-04 | Stop reason: HOSPADM

## 2023-06-02 RX ORDER — MEPERIDINE HYDROCHLORIDE 25 MG/ML
12.5 INJECTION INTRAMUSCULAR; INTRAVENOUS; SUBCUTANEOUS PRN
OUTPATIENT
Start: 2023-06-09

## 2023-06-02 RX ORDER — ALBUTEROL SULFATE 90 UG/1
4 AEROSOL, METERED RESPIRATORY (INHALATION) PRN
OUTPATIENT
Start: 2023-06-09

## 2023-06-02 RX ORDER — EPINEPHRINE 1 MG/ML
0.3 INJECTION, SOLUTION, CONCENTRATE INTRAVENOUS PRN
OUTPATIENT
Start: 2023-06-09

## 2023-06-02 RX ORDER — DIPHENHYDRAMINE HYDROCHLORIDE 50 MG/ML
50 INJECTION INTRAMUSCULAR; INTRAVENOUS ONCE
Status: COMPLETED | OUTPATIENT
Start: 2023-06-02 | End: 2023-06-02

## 2023-06-02 RX ORDER — ONDANSETRON 2 MG/ML
8 INJECTION INTRAMUSCULAR; INTRAVENOUS
OUTPATIENT
Start: 2023-06-09

## 2023-06-02 RX ORDER — DIPHENHYDRAMINE HYDROCHLORIDE 50 MG/ML
50 INJECTION INTRAMUSCULAR; INTRAVENOUS ONCE
Status: CANCELLED
Start: 2023-06-09 | End: 2023-06-09

## 2023-06-02 RX ADMIN — IMMUNE GLOBULIN (HUMAN) 35 G: 10 INJECTION INTRAVENOUS; SUBCUTANEOUS at 06:56

## 2023-06-02 RX ADMIN — ACETAMINOPHEN 650 MG: 325 TABLET ORAL at 06:47

## 2023-06-02 RX ADMIN — DIPHENHYDRAMINE HYDROCHLORIDE 50 MG: 50 INJECTION, SOLUTION INTRAMUSCULAR; INTRAVENOUS at 06:47

## 2023-06-02 RX ADMIN — HYDROCORTISONE SODIUM SUCCINATE 100 MG: 100 INJECTION, POWDER, FOR SOLUTION INTRAMUSCULAR; INTRAVENOUS at 06:47

## 2023-06-02 RX ADMIN — HEPARIN 500 UNITS: 100 SYRINGE at 08:37

## 2023-06-02 NOTE — DISCHARGE INSTRUCTIONS
appropriate for any given patient. [facility] does not assume any responsibility for any aspect of healthcare administered with the aid of information [facility] provides. The information contained herein is not intended to cover all possible uses, directions, precautions, warnings, drug interactions, allergic reactions, or adverse effects. If you have questions about the drugs you are taking, check with your doctor, nurse or pharmacist.  Copyright 8723-0056 167  Carlos: 4.02. Revision date: 2/26/2021. Care instructions adapted under license by [facility] ([facility]). If you have questions about a medical condition or this instruction, always ask your healthcare professional. [address] any warranty or liability for your use of this information.

## 2023-06-02 NOTE — PROGRESS NOTES
Pt arrived to [facility] and port accessed. Brisk blood return noted. Premedication given as ordered. IGG given. Pt tolerated well. Port de accessed.     Electronically signed by Dawood Huber RN on 6/2/2023 at 8:53 AM

## 2023-06-07 ENCOUNTER — OFFICE VISIT (OUTPATIENT)
Dept: NEUROLOGY | Age: 48
End: 2023-06-07
Payer: MEDICARE

## 2023-06-07 VITALS
DIASTOLIC BLOOD PRESSURE: 95 MMHG | HEIGHT: 65 IN | WEIGHT: 183 LBS | SYSTOLIC BLOOD PRESSURE: 146 MMHG | HEART RATE: 57 BPM | BODY MASS INDEX: 30.49 KG/M2

## 2023-06-07 DIAGNOSIS — M79.7 FIBROMYALGIA: ICD-10-CM

## 2023-06-07 DIAGNOSIS — G43.719 INTRACTABLE CHRONIC MIGRAINE WITHOUT AURA AND WITHOUT STATUS MIGRAINOSUS: ICD-10-CM

## 2023-06-07 DIAGNOSIS — R20.0 NUMBNESS: ICD-10-CM

## 2023-06-07 DIAGNOSIS — R25.1 TREMOR: ICD-10-CM

## 2023-06-07 DIAGNOSIS — H53.9 VISUAL DISTURBANCE: ICD-10-CM

## 2023-06-07 DIAGNOSIS — H46.9 OPTIC NEURITIS: ICD-10-CM

## 2023-06-07 DIAGNOSIS — M32.9 LUPUS (HCC): ICD-10-CM

## 2023-06-07 DIAGNOSIS — M54.30 SCIATICA, UNSPECIFIED LATERALITY: ICD-10-CM

## 2023-06-07 DIAGNOSIS — G35 MULTIPLE SCLEROSIS (HCC): Primary | ICD-10-CM

## 2023-06-07 DIAGNOSIS — G35 EXACERBATION OF MULTIPLE SCLEROSIS (HCC): ICD-10-CM

## 2023-06-07 DIAGNOSIS — R53.1 WEAKNESS: ICD-10-CM

## 2023-06-07 DIAGNOSIS — G43.819 OTHER MIGRAINE WITHOUT STATUS MIGRAINOSUS, INTRACTABLE: ICD-10-CM

## 2023-06-07 PROCEDURE — 3080F DIAST BP >= 90 MM HG: CPT | Performed by: PSYCHIATRY & NEUROLOGY

## 2023-06-07 PROCEDURE — G8417 CALC BMI ABV UP PARAM F/U: HCPCS | Performed by: PSYCHIATRY & NEUROLOGY

## 2023-06-07 PROCEDURE — 3077F SYST BP >= 140 MM HG: CPT | Performed by: PSYCHIATRY & NEUROLOGY

## 2023-06-07 PROCEDURE — 4004F PT TOBACCO SCREEN RCVD TLK: CPT | Performed by: PSYCHIATRY & NEUROLOGY

## 2023-06-07 PROCEDURE — 99214 OFFICE O/P EST MOD 30 MIN: CPT | Performed by: PSYCHIATRY & NEUROLOGY

## 2023-06-07 PROCEDURE — G8427 DOCREV CUR MEDS BY ELIG CLIN: HCPCS | Performed by: PSYCHIATRY & NEUROLOGY

## 2023-06-07 RX ORDER — EPINEPHRINE 1 MG/ML
0.3 INJECTION, SOLUTION, CONCENTRATE INTRAVENOUS PRN
OUTPATIENT
Start: 2023-06-08

## 2023-06-07 RX ORDER — DRONABINOL 10 MG/1
10 CAPSULE ORAL
COMMUNITY

## 2023-06-07 RX ORDER — DIPHENHYDRAMINE HCL 25 MG
25 TABLET ORAL ONCE
Status: CANCELLED | OUTPATIENT
Start: 2023-06-08 | End: 2023-06-08

## 2023-06-07 RX ORDER — ACETAMINOPHEN 325 MG/1
650 TABLET ORAL ONCE
Status: CANCELLED | OUTPATIENT
Start: 2023-06-08 | End: 2023-06-08

## 2023-06-07 RX ORDER — SODIUM CHLORIDE 0.9 % (FLUSH) 0.9 %
5-40 SYRINGE (ML) INJECTION PRN
OUTPATIENT
Start: 2023-06-08

## 2023-06-07 RX ORDER — SODIUM CHLORIDE 9 MG/ML
5-250 INJECTION, SOLUTION INTRAVENOUS PRN
OUTPATIENT
Start: 2023-06-08

## 2023-06-07 RX ORDER — FAMOTIDINE 10 MG/ML
20 INJECTION, SOLUTION INTRAVENOUS
OUTPATIENT
Start: 2023-06-08

## 2023-06-07 RX ORDER — MEPERIDINE HYDROCHLORIDE 50 MG/ML
12.5 INJECTION INTRAMUSCULAR; INTRAVENOUS; SUBCUTANEOUS PRN
OUTPATIENT
Start: 2023-06-08

## 2023-06-07 RX ORDER — DIPHENHYDRAMINE HYDROCHLORIDE 50 MG/ML
50 INJECTION INTRAMUSCULAR; INTRAVENOUS
OUTPATIENT
Start: 2023-06-08

## 2023-06-07 RX ORDER — HEPARIN SODIUM (PORCINE) LOCK FLUSH IV SOLN 100 UNIT/ML 100 UNIT/ML
500 SOLUTION INTRAVENOUS PRN
Status: CANCELLED | OUTPATIENT
Start: 2023-06-08

## 2023-06-07 RX ORDER — ALBUTEROL SULFATE 90 UG/1
4 AEROSOL, METERED RESPIRATORY (INHALATION) PRN
OUTPATIENT
Start: 2023-06-08

## 2023-06-07 RX ORDER — ONDANSETRON 2 MG/ML
8 INJECTION INTRAMUSCULAR; INTRAVENOUS
OUTPATIENT
Start: 2023-06-08

## 2023-06-07 RX ORDER — DIAZEPAM 5 MG/1
5 TABLET ORAL NIGHTLY PRN
COMMUNITY

## 2023-06-07 RX ORDER — SODIUM CHLORIDE 9 MG/ML
INJECTION, SOLUTION INTRAVENOUS CONTINUOUS
OUTPATIENT
Start: 2023-06-08

## 2023-06-07 RX ORDER — ACETAMINOPHEN 325 MG/1
650 TABLET ORAL
OUTPATIENT
Start: 2023-06-08

## 2023-06-07 RX ORDER — SODIUM CHLORIDE 9 MG/ML
5-250 INJECTION, SOLUTION INTRAVENOUS PRN
Status: CANCELLED | OUTPATIENT
Start: 2023-06-08

## 2023-06-07 NOTE — PROGRESS NOTES
Chief Complaint   Patient presents with    Multiple Sclerosis         Leslie Ortiz is a 41y.o. year old female who is seen for evaluation of follow for multiple sclerosis following an admission in 5/19 for optic neuritis treated with IV solumedrol. The patient has a history of multiple sclerosis, fibromyalgia and lupus along with chronic headaches was seen at that time for left visual change which began about 2 days prior to admission with eye pain. This was quite similar to her previous episodes of optic neuritis, of which she has had about 4 previous episodes treated with steroids. She was tried on oral steroids as an outpatient but this was not effective and was transferred from outside ER for further management. She has some chronic weakness and numbness and weakness on the left. She does have bowel and bladder incontinence. She was on Avonex. Had not followed up with Dr Yamila Mccurdy since 2017. She did not follow up in the office since 5/19. On 7/4/20 she was in the ER with generalized body aches. She apparently had some trembling on the right side. She had an MRI of the brain in the ER which revealed no acute ischemic change but did have scattered non enhancing white matter lesions in periventricular and white matter. She was given 1 gm Solumedrol for a possible flair up and told to follow up with neurology. She indicates that she typically does relatively well for a few weeks and then she feels severely fatigued or worsening vision. She had been in a wheelchair in 2017. She had her initial testing in 2017 at [facility] which was significant for non enhancing white matter lesions and a CSF with 6 oligoclonal bands seen only in the CSF. She still feels her face twitching and right sided numbness. He speech is not normal for her as she has to think about things carefully. Her rheumatologist started her on Tecfidera but she felt worse on this.  She had been on Avonex since her diagnosis prior to with no
Review of Systems    Constitutional - No fever or chills. No diaphoresis or significant fatigue. HENT -  No tinnitus or significant hearing loss. Eyes - no sudden vision change or eye pain  Respiratory - no significant shortness of breath or cough  Cardiovascular - no chest pain No palpitations or significant leg swelling  Gastrointestinal - no abdominal swelling or pain. Genitourinary - No difficulty urinating, dysuria  Musculoskeletal - no back pain or myalgia. Skin - no color change or rash  Neurologic - No seizures. No lateralizing weakness. Hematologic - no easy bruising or excessive bleeding. Psychiatric - no severe anxiety or nervousness. All other review of systems are negative.
1+1 PO) Take 1 tablet by mouth daily      traZODone (DESYREL) 100 MG tablet Take 150 mg by mouth nightly      TRINTELLIX 10 MG TABS tablet Take 10 mg by mouth daily      B-D 3CC LUER-ARNOLDO SYR 25GX1\" 25G X 1\" 3 ML MISC AS DIRECTED      sucralfate (CARAFATE) 1 GM tablet Take 1 g by mouth 4 times daily      omeprazole (PRILOSEC) 20 MG delayed release capsule Take 20 mg by mouth Daily      furosemide (LASIX) 20 MG tablet Take 20 mg by mouth daily as needed      azaTHIOprine (IMURAN) 50 MG tablet Take 50 mg by mouth daily      Calcium Carb-Cholecalciferol 600-500 MG-UNIT CAPS Take 1 capsule by mouth daily      potassium chloride (KLOR-CON M) 20 MEQ extended release tablet Take 20 mEq by mouth 2 times daily      hydrOXYzine (ATARAX) 50 MG tablet Take 50 mg by mouth 2 times daily      butalbital-acetaminophen-caffeine (FIORICET, ESGIC) -40 MG per tablet Take 1 tablet by mouth every 4 hours as needed for Headaches      cyanocobalamin 1000 MCG/ML injection Inject 1,000 mcg into the muscle every 21 days       pantoprazole sodium (PROTONIX) 40 MG PACK packet Take 40 mg by mouth 2 times daily      alendronate (FOSAMAX) 70 MG tablet Take 70 mg by mouth every 7 days       No current facility-administered medications for this visit.

## 2023-06-08 ENCOUNTER — APPOINTMENT (OUTPATIENT)
Dept: INFUSION THERAPY | Age: 48
End: 2023-06-08
Payer: MEDICARE

## 2023-06-08 DIAGNOSIS — G35 EXACERBATION OF MULTIPLE SCLEROSIS (HCC): Primary | ICD-10-CM

## 2023-06-08 RX ORDER — DEXAMETHASONE SODIUM PHOSPHATE 10 MG/ML
10 INJECTION, SOLUTION INTRAMUSCULAR; INTRAVENOUS ONCE
Start: 2023-06-08 | End: 2023-06-08

## 2023-06-08 RX ORDER — DIPHENHYDRAMINE HYDROCHLORIDE 50 MG/ML
25 INJECTION INTRAMUSCULAR; INTRAVENOUS ONCE
Start: 2023-06-08 | End: 2023-06-08

## 2023-06-08 RX ORDER — ACETAMINOPHEN 325 MG/1
650 TABLET ORAL ONCE
OUTPATIENT
Start: 2023-06-08 | End: 2023-06-08

## 2023-06-09 ENCOUNTER — HOSPITAL ENCOUNTER (OUTPATIENT)
Dept: INFUSION THERAPY | Age: 48
Setting detail: INFUSION SERIES
Discharge: HOME | End: 2023-06-09
Payer: MEDICARE

## 2023-06-09 VITALS
RESPIRATION RATE: 18 BRPM | BODY MASS INDEX: 30.66 KG/M2 | SYSTOLIC BLOOD PRESSURE: 100 MMHG | TEMPERATURE: 97.1 F | HEART RATE: 92 BPM | OXYGEN SATURATION: 98 % | HEIGHT: 65 IN | DIASTOLIC BLOOD PRESSURE: 71 MMHG | WEIGHT: 184 LBS

## 2023-06-09 DIAGNOSIS — G35 MULTIPLE SCLEROSIS (HCC): Primary | ICD-10-CM

## 2023-06-09 PROCEDURE — 96375 TX/PRO/DX INJ NEW DRUG ADDON: CPT

## 2023-06-09 PROCEDURE — 96365 THER/PROPH/DIAG IV INF INIT: CPT

## 2023-06-09 PROCEDURE — 6360000002 HC RX W HCPCS: Performed by: PSYCHIATRY & NEUROLOGY

## 2023-06-09 PROCEDURE — 6370000000 HC RX 637 (ALT 250 FOR IP): Performed by: PSYCHIATRY & NEUROLOGY

## 2023-06-09 PROCEDURE — 96366 THER/PROPH/DIAG IV INF ADDON: CPT

## 2023-06-09 RX ORDER — MEPERIDINE HYDROCHLORIDE 25 MG/ML
12.5 INJECTION INTRAMUSCULAR; INTRAVENOUS; SUBCUTANEOUS PRN
OUTPATIENT
Start: 2023-06-16

## 2023-06-09 RX ORDER — HEPARIN SODIUM (PORCINE) LOCK FLUSH IV SOLN 100 UNIT/ML 100 UNIT/ML
500 SOLUTION INTRAVENOUS PRN
Status: DISCONTINUED | OUTPATIENT
Start: 2023-06-09 | End: 2023-06-11 | Stop reason: HOSPADM

## 2023-06-09 RX ORDER — SODIUM CHLORIDE 9 MG/ML
INJECTION, SOLUTION INTRAVENOUS CONTINUOUS
OUTPATIENT
Start: 2023-06-16

## 2023-06-09 RX ORDER — SODIUM CHLORIDE 0.9 % (FLUSH) 0.9 %
5-40 SYRINGE (ML) INJECTION PRN
Status: DISCONTINUED | OUTPATIENT
Start: 2023-06-09 | End: 2023-06-10 | Stop reason: HOSPADM

## 2023-06-09 RX ORDER — HEPARIN SODIUM (PORCINE) LOCK FLUSH IV SOLN 100 UNIT/ML 100 UNIT/ML
500 SOLUTION INTRAVENOUS PRN
Status: DISCONTINUED | OUTPATIENT
Start: 2023-06-09 | End: 2023-06-09

## 2023-06-09 RX ORDER — ONDANSETRON 2 MG/ML
8 INJECTION INTRAMUSCULAR; INTRAVENOUS
OUTPATIENT
Start: 2023-06-16

## 2023-06-09 RX ORDER — ACETAMINOPHEN 325 MG/1
650 TABLET ORAL
OUTPATIENT
Start: 2023-06-16

## 2023-06-09 RX ORDER — EPINEPHRINE 1 MG/ML
0.3 INJECTION, SOLUTION, CONCENTRATE INTRAVENOUS PRN
OUTPATIENT
Start: 2023-06-16

## 2023-06-09 RX ORDER — HEPARIN SODIUM (PORCINE) LOCK FLUSH IV SOLN 100 UNIT/ML 100 UNIT/ML
500 SOLUTION INTRAVENOUS PRN
Start: 2023-06-16

## 2023-06-09 RX ORDER — ALBUTEROL SULFATE 90 UG/1
4 AEROSOL, METERED RESPIRATORY (INHALATION) PRN
OUTPATIENT
Start: 2023-06-16

## 2023-06-09 RX ORDER — DIPHENHYDRAMINE HYDROCHLORIDE 50 MG/ML
50 INJECTION INTRAMUSCULAR; INTRAVENOUS
OUTPATIENT
Start: 2023-06-16

## 2023-06-09 RX ORDER — DIPHENHYDRAMINE HYDROCHLORIDE 50 MG/ML
50 INJECTION INTRAMUSCULAR; INTRAVENOUS ONCE
Status: COMPLETED | OUTPATIENT
Start: 2023-06-09 | End: 2023-06-09

## 2023-06-09 RX ORDER — ACETAMINOPHEN 325 MG/1
650 TABLET ORAL ONCE
Status: COMPLETED | OUTPATIENT
Start: 2023-06-09 | End: 2023-06-09

## 2023-06-09 RX ORDER — SODIUM CHLORIDE 0.9 % (FLUSH) 0.9 %
5-40 SYRINGE (ML) INJECTION PRN
OUTPATIENT
Start: 2023-06-16

## 2023-06-09 RX ORDER — DIPHENHYDRAMINE HYDROCHLORIDE 50 MG/ML
50 INJECTION INTRAMUSCULAR; INTRAVENOUS ONCE
Start: 2023-06-16 | End: 2023-06-16

## 2023-06-09 RX ORDER — ACETAMINOPHEN 325 MG/1
650 TABLET ORAL ONCE
OUTPATIENT
Start: 2023-06-16 | End: 2023-06-16

## 2023-06-09 RX ADMIN — IMMUNE GLOBULIN (HUMAN) 35 G: 10 INJECTION INTRAVENOUS; SUBCUTANEOUS at 07:11

## 2023-06-09 RX ADMIN — HEPARIN 500 UNITS: 100 SYRINGE at 08:50

## 2023-06-09 RX ADMIN — ACETAMINOPHEN 650 MG: 325 TABLET ORAL at 06:39

## 2023-06-09 RX ADMIN — DIPHENHYDRAMINE HYDROCHLORIDE 50 MG: 50 INJECTION, SOLUTION INTRAMUSCULAR; INTRAVENOUS at 06:39

## 2023-06-09 RX ADMIN — HYDROCORTISONE SODIUM SUCCINATE 100 MG: 100 INJECTION, POWDER, FOR SOLUTION INTRAMUSCULAR; INTRAVENOUS at 06:39

## 2023-06-09 NOTE — PROGRESS NOTES
Here today for IVIG. Infused per orders. Patient tolerated well. Patient notified of need for IVIG for 5 days next week and scheduled times. Verbalized understanding. Discharged to home.

## 2023-06-09 NOTE — DISCHARGE INSTRUCTIONS
problems, or \"thick blood\";  a stroke or blood clot;  kidney disease;  diabetes;  an infection called sepsis;  if you use estrogens (birth control pills or hormone replacement therapy);  if you have been on long-term bedrest; or  if you have a central intravenous (IV) catheter in place. You may need a dose adjustment if you are exposed to measles, or if you travel to an area where this disease is common. Tell your doctor if you are pregnant or breastfeeding. Immune globulin is made from donated human plasma and may contain viruses or other infectious agents. Donated plasma is tested and treated to reduce the risk of contamination, but there is still a small possibility it could transmit disease. Ask your doctor about any possible risk. How should I use immune globulin? Follow all directions on your prescription label and read all medication guides or instruction sheets. Your doctor may occasionally change your dose. Use the medicine exactly as directed. Immune globulin is given as an infusion into a vein, or injected under the skin using an infusion pump. A healthcare provider will give your first dose and may teach you how to properly use the medication by yourself. Do not inject immune globulin into a vein if you have been instructed to give the medicine as a subcutaneous injection (under the skin). How you give this medication, how often you use it, and the length of your infusion time will depend on the condition being treated. Read and carefully follow any Instructions for Use provided with your medicine. Ask your doctor or pharmacist if you don't understand all instructions. Prepare an injection only when you are ready to give it. Do not use if the medicine looks cloudy, has changed colors, or has particles in it. Call your pharmacist for new medicine. Do not shake the medication bottle or you may ruin the medicine. Immune globulin must be given slowly.  You may need to use several catheters to

## 2023-06-12 ENCOUNTER — HOSPITAL ENCOUNTER (OUTPATIENT)
Dept: INFUSION THERAPY | Age: 48
Setting detail: INFUSION SERIES
Discharge: HOME | End: 2023-06-12
Payer: MEDICARE

## 2023-06-12 VITALS
SYSTOLIC BLOOD PRESSURE: 109 MMHG | TEMPERATURE: 98.2 F | OXYGEN SATURATION: 100 % | HEART RATE: 60 BPM | WEIGHT: 181.31 LBS | DIASTOLIC BLOOD PRESSURE: 70 MMHG | BODY MASS INDEX: 30.17 KG/M2 | RESPIRATION RATE: 18 BRPM

## 2023-06-12 DIAGNOSIS — G35 MULTIPLE SCLEROSIS (HCC): Primary | ICD-10-CM

## 2023-06-12 DIAGNOSIS — G35 EXACERBATION OF MULTIPLE SCLEROSIS (HCC): ICD-10-CM

## 2023-06-12 DIAGNOSIS — M32.9 LUPUS (HCC): ICD-10-CM

## 2023-06-12 PROCEDURE — 2580000003 HC RX 258: Performed by: PSYCHIATRY & NEUROLOGY

## 2023-06-12 PROCEDURE — 6360000002 HC RX W HCPCS: Performed by: PSYCHIATRY & NEUROLOGY

## 2023-06-12 PROCEDURE — 96366 THER/PROPH/DIAG IV INF ADDON: CPT

## 2023-06-12 PROCEDURE — 96365 THER/PROPH/DIAG IV INF INIT: CPT

## 2023-06-12 PROCEDURE — 6370000000 HC RX 637 (ALT 250 FOR IP): Performed by: PSYCHIATRY & NEUROLOGY

## 2023-06-12 PROCEDURE — 96375 TX/PRO/DX INJ NEW DRUG ADDON: CPT

## 2023-06-12 RX ORDER — ACETAMINOPHEN 325 MG/1
650 TABLET ORAL ONCE
Status: CANCELLED | OUTPATIENT
Start: 2023-06-12 | End: 2023-06-12

## 2023-06-12 RX ORDER — ACETAMINOPHEN 325 MG/1
650 TABLET ORAL
Status: CANCELLED | OUTPATIENT
Start: 2023-06-12

## 2023-06-12 RX ORDER — HEPARIN SODIUM (PORCINE) LOCK FLUSH IV SOLN 100 UNIT/ML 100 UNIT/ML
500 SOLUTION INTRAVENOUS PRN
Status: CANCELLED
Start: 2023-06-16

## 2023-06-12 RX ORDER — SODIUM CHLORIDE 0.9 % (FLUSH) 0.9 %
5-40 SYRINGE (ML) INJECTION PRN
Status: CANCELLED | OUTPATIENT
Start: 2023-06-16

## 2023-06-12 RX ORDER — HEPARIN SODIUM 100 [USP'U]/ML
500 INJECTION, SOLUTION INTRAVENOUS PRN
Status: CANCELLED | OUTPATIENT
Start: 2023-06-12

## 2023-06-12 RX ORDER — DEXAMETHASONE SODIUM PHOSPHATE 10 MG/ML
10 INJECTION, SOLUTION INTRAMUSCULAR; INTRAVENOUS ONCE
Status: CANCELLED
Start: 2023-06-12 | End: 2023-06-12

## 2023-06-12 RX ORDER — MEPERIDINE HYDROCHLORIDE 25 MG/ML
12.5 INJECTION INTRAMUSCULAR; INTRAVENOUS; SUBCUTANEOUS PRN
Status: CANCELLED | OUTPATIENT
Start: 2023-06-16

## 2023-06-12 RX ORDER — DIPHENHYDRAMINE HYDROCHLORIDE 50 MG/ML
50 INJECTION INTRAMUSCULAR; INTRAVENOUS
Status: CANCELLED | OUTPATIENT
Start: 2023-06-16

## 2023-06-12 RX ORDER — EPINEPHRINE 1 MG/ML
0.3 INJECTION, SOLUTION, CONCENTRATE INTRAVENOUS PRN
Status: CANCELLED | OUTPATIENT
Start: 2023-06-12

## 2023-06-12 RX ORDER — SODIUM CHLORIDE 9 MG/ML
INJECTION, SOLUTION INTRAVENOUS CONTINUOUS
Status: CANCELLED | OUTPATIENT
Start: 2023-06-12

## 2023-06-12 RX ORDER — SODIUM CHLORIDE 0.9 % (FLUSH) 0.9 %
5-40 SYRINGE (ML) INJECTION PRN
Status: DISCONTINUED | OUTPATIENT
Start: 2023-06-12 | End: 2023-06-13 | Stop reason: HOSPADM

## 2023-06-12 RX ORDER — DIPHENHYDRAMINE HYDROCHLORIDE 50 MG/ML
50 INJECTION INTRAMUSCULAR; INTRAVENOUS
Status: CANCELLED | OUTPATIENT
Start: 2023-06-12

## 2023-06-12 RX ORDER — ACETAMINOPHEN 325 MG/1
650 TABLET ORAL
Status: CANCELLED | OUTPATIENT
Start: 2023-06-16

## 2023-06-12 RX ORDER — EPINEPHRINE 1 MG/ML
0.3 INJECTION, SOLUTION, CONCENTRATE INTRAVENOUS PRN
Status: CANCELLED | OUTPATIENT
Start: 2023-06-16

## 2023-06-12 RX ORDER — HEPARIN SODIUM 100 [USP'U]/ML
500 INJECTION, SOLUTION INTRAVENOUS PRN
Status: DISCONTINUED | OUTPATIENT
Start: 2023-06-12 | End: 2023-06-13 | Stop reason: HOSPADM

## 2023-06-12 RX ORDER — ALBUTEROL SULFATE 90 UG/1
4 AEROSOL, METERED RESPIRATORY (INHALATION) PRN
Status: CANCELLED | OUTPATIENT
Start: 2023-06-12

## 2023-06-12 RX ORDER — SODIUM CHLORIDE 9 MG/ML
5-250 INJECTION, SOLUTION INTRAVENOUS PRN
Status: DISCONTINUED | OUTPATIENT
Start: 2023-06-12 | End: 2023-06-13 | Stop reason: HOSPADM

## 2023-06-12 RX ORDER — ONDANSETRON 2 MG/ML
8 INJECTION INTRAMUSCULAR; INTRAVENOUS
Status: CANCELLED | OUTPATIENT
Start: 2023-06-12

## 2023-06-12 RX ORDER — DIPHENHYDRAMINE HYDROCHLORIDE 50 MG/ML
50 INJECTION INTRAMUSCULAR; INTRAVENOUS ONCE
Status: CANCELLED
Start: 2023-06-16 | End: 2023-06-16

## 2023-06-12 RX ORDER — DEXAMETHASONE SODIUM PHOSPHATE 10 MG/ML
10 INJECTION, SOLUTION INTRAMUSCULAR; INTRAVENOUS ONCE
Status: COMPLETED | OUTPATIENT
Start: 2023-06-12 | End: 2023-06-12

## 2023-06-12 RX ORDER — SODIUM CHLORIDE 9 MG/ML
5-250 INJECTION, SOLUTION INTRAVENOUS PRN
Status: CANCELLED | OUTPATIENT
Start: 2023-06-12

## 2023-06-12 RX ORDER — SODIUM CHLORIDE 9 MG/ML
INJECTION, SOLUTION INTRAVENOUS CONTINUOUS
Status: CANCELLED | OUTPATIENT
Start: 2023-06-16

## 2023-06-12 RX ORDER — ACETAMINOPHEN 325 MG/1
650 TABLET ORAL ONCE
Status: CANCELLED | OUTPATIENT
Start: 2023-06-16 | End: 2023-06-16

## 2023-06-12 RX ORDER — DIPHENHYDRAMINE HYDROCHLORIDE 50 MG/ML
25 INJECTION INTRAMUSCULAR; INTRAVENOUS ONCE
Status: COMPLETED | OUTPATIENT
Start: 2023-06-12 | End: 2023-06-12

## 2023-06-12 RX ORDER — ONDANSETRON 2 MG/ML
8 INJECTION INTRAMUSCULAR; INTRAVENOUS
Status: CANCELLED | OUTPATIENT
Start: 2023-06-16

## 2023-06-12 RX ORDER — ALBUTEROL SULFATE 90 UG/1
4 AEROSOL, METERED RESPIRATORY (INHALATION) PRN
Status: CANCELLED | OUTPATIENT
Start: 2023-06-16

## 2023-06-12 RX ORDER — SODIUM CHLORIDE 0.9 % (FLUSH) 0.9 %
5-40 SYRINGE (ML) INJECTION PRN
Status: CANCELLED | OUTPATIENT
Start: 2023-06-12

## 2023-06-12 RX ORDER — MEPERIDINE HYDROCHLORIDE 25 MG/ML
12.5 INJECTION INTRAMUSCULAR; INTRAVENOUS; SUBCUTANEOUS PRN
Status: CANCELLED | OUTPATIENT
Start: 2023-06-12

## 2023-06-12 RX ORDER — ACETAMINOPHEN 325 MG/1
650 TABLET ORAL ONCE
Status: COMPLETED | OUTPATIENT
Start: 2023-06-12 | End: 2023-06-12

## 2023-06-12 RX ORDER — DIPHENHYDRAMINE HYDROCHLORIDE 50 MG/ML
25 INJECTION INTRAMUSCULAR; INTRAVENOUS ONCE
Status: CANCELLED
Start: 2023-06-12 | End: 2023-06-12

## 2023-06-12 RX ADMIN — SODIUM CHLORIDE 20 ML/HR: 9 INJECTION, SOLUTION INTRAVENOUS at 06:39

## 2023-06-12 RX ADMIN — HEPARIN 500 UNITS: 100 SYRINGE at 08:56

## 2023-06-12 RX ADMIN — DEXAMETHASONE SODIUM PHOSPHATE 10 MG: 10 INJECTION, SOLUTION INTRAMUSCULAR; INTRAVENOUS at 06:41

## 2023-06-12 RX ADMIN — SODIUM CHLORIDE, PRESERVATIVE FREE 20 ML: 5 INJECTION INTRAVENOUS at 08:55

## 2023-06-12 RX ADMIN — IMMUNE GLOBULIN (HUMAN) 35 G: 10 INJECTION INTRAVENOUS; SUBCUTANEOUS at 07:04

## 2023-06-12 RX ADMIN — DIPHENHYDRAMINE HYDROCHLORIDE 25 MG: 50 INJECTION, SOLUTION INTRAMUSCULAR; INTRAVENOUS at 06:41

## 2023-06-12 RX ADMIN — ACETAMINOPHEN 650 MG: 325 TABLET ORAL at 06:40

## 2023-06-12 ASSESSMENT — PAIN SCALES - GENERAL: PAINLEVEL_OUTOF10: 0

## 2023-06-12 NOTE — PROGRESS NOTES
[facility]. Premedicated for IVIG infusion. Pt tolerated IVIG well. Port deaccessed with brisk blood return.      Electronically signed by Abdullahi Geronimo RN on 6/12/2023 at 9:09 AM

## 2023-06-13 ENCOUNTER — HOSPITAL ENCOUNTER (OUTPATIENT)
Dept: INFUSION THERAPY | Age: 48
Setting detail: INFUSION SERIES
Discharge: HOME | End: 2023-06-13
Payer: MEDICARE

## 2023-06-13 VITALS
SYSTOLIC BLOOD PRESSURE: 102 MMHG | RESPIRATION RATE: 18 BRPM | DIASTOLIC BLOOD PRESSURE: 68 MMHG | TEMPERATURE: 97.2 F | OXYGEN SATURATION: 100 % | HEART RATE: 66 BPM

## 2023-06-13 DIAGNOSIS — M32.9 LUPUS (HCC): Primary | ICD-10-CM

## 2023-06-13 DIAGNOSIS — G35 EXACERBATION OF MULTIPLE SCLEROSIS (HCC): ICD-10-CM

## 2023-06-13 PROCEDURE — 2580000003 HC RX 258: Performed by: PSYCHIATRY & NEUROLOGY

## 2023-06-13 PROCEDURE — 6360000002 HC RX W HCPCS: Performed by: PSYCHIATRY & NEUROLOGY

## 2023-06-13 PROCEDURE — 6370000000 HC RX 637 (ALT 250 FOR IP): Performed by: PSYCHIATRY & NEUROLOGY

## 2023-06-13 PROCEDURE — 96366 THER/PROPH/DIAG IV INF ADDON: CPT

## 2023-06-13 PROCEDURE — 96375 TX/PRO/DX INJ NEW DRUG ADDON: CPT

## 2023-06-13 PROCEDURE — 96365 THER/PROPH/DIAG IV INF INIT: CPT

## 2023-06-13 RX ORDER — ONDANSETRON 2 MG/ML
8 INJECTION INTRAMUSCULAR; INTRAVENOUS
Status: CANCELLED | OUTPATIENT
Start: 2023-06-13

## 2023-06-13 RX ORDER — SODIUM CHLORIDE 9 MG/ML
5-250 INJECTION, SOLUTION INTRAVENOUS PRN
Status: DISCONTINUED | OUTPATIENT
Start: 2023-06-13 | End: 2023-06-14 | Stop reason: HOSPADM

## 2023-06-13 RX ORDER — DEXAMETHASONE SODIUM PHOSPHATE 10 MG/ML
10 INJECTION, SOLUTION INTRAMUSCULAR; INTRAVENOUS ONCE
Status: COMPLETED | OUTPATIENT
Start: 2023-06-13 | End: 2023-06-13

## 2023-06-13 RX ORDER — ACETAMINOPHEN 325 MG/1
650 TABLET ORAL
Status: CANCELLED | OUTPATIENT
Start: 2023-06-13

## 2023-06-13 RX ORDER — DEXAMETHASONE SODIUM PHOSPHATE 10 MG/ML
10 INJECTION, SOLUTION INTRAMUSCULAR; INTRAVENOUS ONCE
Status: CANCELLED
Start: 2023-06-13 | End: 2023-06-13

## 2023-06-13 RX ORDER — ALBUTEROL SULFATE 90 UG/1
4 AEROSOL, METERED RESPIRATORY (INHALATION) PRN
Status: CANCELLED | OUTPATIENT
Start: 2023-06-13

## 2023-06-13 RX ORDER — DIPHENHYDRAMINE HYDROCHLORIDE 50 MG/ML
25 INJECTION INTRAMUSCULAR; INTRAVENOUS ONCE
Status: COMPLETED | OUTPATIENT
Start: 2023-06-13 | End: 2023-06-13

## 2023-06-13 RX ORDER — MEPERIDINE HYDROCHLORIDE 25 MG/ML
12.5 INJECTION INTRAMUSCULAR; INTRAVENOUS; SUBCUTANEOUS PRN
Status: CANCELLED | OUTPATIENT
Start: 2023-06-13

## 2023-06-13 RX ORDER — SODIUM CHLORIDE 9 MG/ML
INJECTION, SOLUTION INTRAVENOUS CONTINUOUS
Status: CANCELLED | OUTPATIENT
Start: 2023-06-13

## 2023-06-13 RX ORDER — HEPARIN SODIUM 100 [USP'U]/ML
500 INJECTION, SOLUTION INTRAVENOUS PRN
Status: DISCONTINUED | OUTPATIENT
Start: 2023-06-13 | End: 2023-06-14 | Stop reason: HOSPADM

## 2023-06-13 RX ORDER — DIPHENHYDRAMINE HYDROCHLORIDE 50 MG/ML
25 INJECTION INTRAMUSCULAR; INTRAVENOUS ONCE
Status: CANCELLED
Start: 2023-06-13 | End: 2023-06-13

## 2023-06-13 RX ORDER — ACETAMINOPHEN 325 MG/1
650 TABLET ORAL ONCE
Status: COMPLETED | OUTPATIENT
Start: 2023-06-13 | End: 2023-06-13

## 2023-06-13 RX ORDER — SODIUM CHLORIDE 9 MG/ML
5-250 INJECTION, SOLUTION INTRAVENOUS PRN
Status: CANCELLED | OUTPATIENT
Start: 2023-06-13

## 2023-06-13 RX ORDER — HEPARIN SODIUM 100 [USP'U]/ML
500 INJECTION, SOLUTION INTRAVENOUS PRN
Status: CANCELLED | OUTPATIENT
Start: 2023-06-13

## 2023-06-13 RX ORDER — SODIUM CHLORIDE 0.9 % (FLUSH) 0.9 %
5-40 SYRINGE (ML) INJECTION PRN
Status: DISCONTINUED | OUTPATIENT
Start: 2023-06-13 | End: 2023-06-14 | Stop reason: HOSPADM

## 2023-06-13 RX ORDER — DIPHENHYDRAMINE HYDROCHLORIDE 50 MG/ML
50 INJECTION INTRAMUSCULAR; INTRAVENOUS
Status: CANCELLED | OUTPATIENT
Start: 2023-06-13

## 2023-06-13 RX ORDER — EPINEPHRINE 1 MG/ML
0.3 INJECTION, SOLUTION, CONCENTRATE INTRAVENOUS PRN
Status: CANCELLED | OUTPATIENT
Start: 2023-06-13

## 2023-06-13 RX ORDER — ACETAMINOPHEN 325 MG/1
650 TABLET ORAL ONCE
Status: CANCELLED | OUTPATIENT
Start: 2023-06-13 | End: 2023-06-13

## 2023-06-13 RX ORDER — SODIUM CHLORIDE 0.9 % (FLUSH) 0.9 %
5-40 SYRINGE (ML) INJECTION PRN
Status: CANCELLED | OUTPATIENT
Start: 2023-06-13

## 2023-06-13 RX ADMIN — HEPARIN 500 UNITS: 100 SYRINGE at 08:41

## 2023-06-13 RX ADMIN — ACETAMINOPHEN 650 MG: 325 TABLET ORAL at 06:51

## 2023-06-13 RX ADMIN — SODIUM CHLORIDE, PRESERVATIVE FREE 20 ML: 5 INJECTION INTRAVENOUS at 08:41

## 2023-06-13 RX ADMIN — DIPHENHYDRAMINE HYDROCHLORIDE 25 MG: 50 INJECTION, SOLUTION INTRAMUSCULAR; INTRAVENOUS at 06:48

## 2023-06-13 RX ADMIN — IMMUNE GLOBULIN (HUMAN) 35 G: 10 INJECTION INTRAVENOUS; SUBCUTANEOUS at 07:02

## 2023-06-13 RX ADMIN — DEXAMETHASONE SODIUM PHOSPHATE 10 MG: 10 INJECTION, SOLUTION INTRAMUSCULAR; INTRAVENOUS at 06:47

## 2023-06-13 NOTE — PROGRESS NOTES
Pt received IVIG. Pt tolerated well. Port deaccessed.      Electronically signed by Conchis Acosta RN on 6/13/2023 at 8:47 AM

## 2023-06-14 ENCOUNTER — HOSPITAL ENCOUNTER (OUTPATIENT)
Dept: INFUSION THERAPY | Age: 48
Setting detail: INFUSION SERIES
Discharge: HOME | End: 2023-06-14
Payer: MEDICARE

## 2023-06-14 VITALS
HEART RATE: 58 BPM | TEMPERATURE: 96.8 F | OXYGEN SATURATION: 100 % | RESPIRATION RATE: 18 BRPM | DIASTOLIC BLOOD PRESSURE: 77 MMHG | SYSTOLIC BLOOD PRESSURE: 115 MMHG

## 2023-06-14 DIAGNOSIS — M32.9 LUPUS (HCC): Primary | ICD-10-CM

## 2023-06-14 DIAGNOSIS — G35 EXACERBATION OF MULTIPLE SCLEROSIS (HCC): ICD-10-CM

## 2023-06-14 PROCEDURE — 6360000002 HC RX W HCPCS: Performed by: PSYCHIATRY & NEUROLOGY

## 2023-06-14 PROCEDURE — 6370000000 HC RX 637 (ALT 250 FOR IP): Performed by: PSYCHIATRY & NEUROLOGY

## 2023-06-14 PROCEDURE — 96365 THER/PROPH/DIAG IV INF INIT: CPT

## 2023-06-14 PROCEDURE — 96366 THER/PROPH/DIAG IV INF ADDON: CPT

## 2023-06-14 PROCEDURE — 96375 TX/PRO/DX INJ NEW DRUG ADDON: CPT

## 2023-06-14 PROCEDURE — 2580000003 HC RX 258: Performed by: PSYCHIATRY & NEUROLOGY

## 2023-06-14 RX ORDER — HEPARIN SODIUM (PORCINE) LOCK FLUSH IV SOLN 100 UNIT/ML 100 UNIT/ML
500 SOLUTION INTRAVENOUS PRN
Status: CANCELLED
Start: 2023-06-16

## 2023-06-14 RX ORDER — ACETAMINOPHEN 325 MG/1
650 TABLET ORAL ONCE
Status: CANCELLED | OUTPATIENT
Start: 2023-06-14 | End: 2023-06-14

## 2023-06-14 RX ORDER — ACETAMINOPHEN 325 MG/1
650 TABLET ORAL ONCE
Status: COMPLETED | OUTPATIENT
Start: 2023-06-14 | End: 2023-06-14

## 2023-06-14 RX ORDER — MEPERIDINE HYDROCHLORIDE 25 MG/ML
12.5 INJECTION INTRAMUSCULAR; INTRAVENOUS; SUBCUTANEOUS PRN
OUTPATIENT
Start: 2023-06-14

## 2023-06-14 RX ORDER — DIPHENHYDRAMINE HYDROCHLORIDE 50 MG/ML
50 INJECTION INTRAMUSCULAR; INTRAVENOUS
OUTPATIENT
Start: 2023-06-14

## 2023-06-14 RX ORDER — SODIUM CHLORIDE 9 MG/ML
5-250 INJECTION, SOLUTION INTRAVENOUS PRN
Status: CANCELLED | OUTPATIENT
Start: 2023-06-14

## 2023-06-14 RX ORDER — ACETAMINOPHEN 325 MG/1
650 TABLET ORAL
OUTPATIENT
Start: 2023-06-16

## 2023-06-14 RX ORDER — ACETAMINOPHEN 325 MG/1
650 TABLET ORAL
OUTPATIENT
Start: 2023-06-14

## 2023-06-14 RX ORDER — ALBUTEROL SULFATE 90 UG/1
4 AEROSOL, METERED RESPIRATORY (INHALATION) PRN
OUTPATIENT
Start: 2023-06-16

## 2023-06-14 RX ORDER — HEPARIN SODIUM 100 [USP'U]/ML
500 INJECTION, SOLUTION INTRAVENOUS PRN
Status: CANCELLED | OUTPATIENT
Start: 2023-06-14

## 2023-06-14 RX ORDER — DEXAMETHASONE SODIUM PHOSPHATE 10 MG/ML
10 INJECTION, SOLUTION INTRAMUSCULAR; INTRAVENOUS ONCE
Status: COMPLETED | OUTPATIENT
Start: 2023-06-14 | End: 2023-06-14

## 2023-06-14 RX ORDER — HEPARIN SODIUM 100 [USP'U]/ML
500 INJECTION, SOLUTION INTRAVENOUS PRN
Status: DISCONTINUED | OUTPATIENT
Start: 2023-06-14 | End: 2023-06-15 | Stop reason: HOSPADM

## 2023-06-14 RX ORDER — EPINEPHRINE 1 MG/ML
0.3 INJECTION, SOLUTION, CONCENTRATE INTRAVENOUS PRN
OUTPATIENT
Start: 2023-06-16

## 2023-06-14 RX ORDER — ACETAMINOPHEN 325 MG/1
650 TABLET ORAL ONCE
Status: CANCELLED | OUTPATIENT
Start: 2023-06-16 | End: 2023-06-16

## 2023-06-14 RX ORDER — SODIUM CHLORIDE 0.9 % (FLUSH) 0.9 %
5-40 SYRINGE (ML) INJECTION PRN
Status: DISCONTINUED | OUTPATIENT
Start: 2023-06-14 | End: 2023-06-15 | Stop reason: HOSPADM

## 2023-06-14 RX ORDER — SODIUM CHLORIDE 9 MG/ML
INJECTION, SOLUTION INTRAVENOUS CONTINUOUS
OUTPATIENT
Start: 2023-06-16

## 2023-06-14 RX ORDER — SODIUM CHLORIDE 0.9 % (FLUSH) 0.9 %
5-40 SYRINGE (ML) INJECTION PRN
Status: CANCELLED | OUTPATIENT
Start: 2023-06-16

## 2023-06-14 RX ORDER — ONDANSETRON 2 MG/ML
8 INJECTION INTRAMUSCULAR; INTRAVENOUS
OUTPATIENT
Start: 2023-06-14

## 2023-06-14 RX ORDER — SODIUM CHLORIDE 0.9 % (FLUSH) 0.9 %
5-40 SYRINGE (ML) INJECTION PRN
Status: CANCELLED | OUTPATIENT
Start: 2023-06-14

## 2023-06-14 RX ORDER — DIPHENHYDRAMINE HYDROCHLORIDE 50 MG/ML
25 INJECTION INTRAMUSCULAR; INTRAVENOUS ONCE
Status: COMPLETED | OUTPATIENT
Start: 2023-06-14 | End: 2023-06-14

## 2023-06-14 RX ORDER — DIPHENHYDRAMINE HYDROCHLORIDE 50 MG/ML
50 INJECTION INTRAMUSCULAR; INTRAVENOUS ONCE
Status: CANCELLED
Start: 2023-06-16 | End: 2023-06-16

## 2023-06-14 RX ORDER — DIPHENHYDRAMINE HYDROCHLORIDE 50 MG/ML
25 INJECTION INTRAMUSCULAR; INTRAVENOUS ONCE
Status: CANCELLED
Start: 2023-06-14 | End: 2023-06-14

## 2023-06-14 RX ORDER — DEXAMETHASONE SODIUM PHOSPHATE 10 MG/ML
10 INJECTION, SOLUTION INTRAMUSCULAR; INTRAVENOUS ONCE
Status: CANCELLED
Start: 2023-06-14 | End: 2023-06-14

## 2023-06-14 RX ORDER — SODIUM CHLORIDE 9 MG/ML
INJECTION, SOLUTION INTRAVENOUS CONTINUOUS
OUTPATIENT
Start: 2023-06-14

## 2023-06-14 RX ORDER — ALBUTEROL SULFATE 90 UG/1
4 AEROSOL, METERED RESPIRATORY (INHALATION) PRN
OUTPATIENT
Start: 2023-06-14

## 2023-06-14 RX ORDER — EPINEPHRINE 1 MG/ML
0.3 INJECTION, SOLUTION, CONCENTRATE INTRAVENOUS PRN
OUTPATIENT
Start: 2023-06-14

## 2023-06-14 RX ORDER — DIPHENHYDRAMINE HYDROCHLORIDE 50 MG/ML
50 INJECTION INTRAMUSCULAR; INTRAVENOUS
OUTPATIENT
Start: 2023-06-16

## 2023-06-14 RX ORDER — SODIUM CHLORIDE 9 MG/ML
5-250 INJECTION, SOLUTION INTRAVENOUS PRN
Status: DISCONTINUED | OUTPATIENT
Start: 2023-06-14 | End: 2023-06-15 | Stop reason: HOSPADM

## 2023-06-14 RX ORDER — MEPERIDINE HYDROCHLORIDE 25 MG/ML
12.5 INJECTION INTRAMUSCULAR; INTRAVENOUS; SUBCUTANEOUS PRN
OUTPATIENT
Start: 2023-06-16

## 2023-06-14 RX ORDER — ONDANSETRON 2 MG/ML
8 INJECTION INTRAMUSCULAR; INTRAVENOUS
OUTPATIENT
Start: 2023-06-16

## 2023-06-14 RX ADMIN — SODIUM CHLORIDE, PRESERVATIVE FREE 20 ML: 5 INJECTION INTRAVENOUS at 09:00

## 2023-06-14 RX ADMIN — DIPHENHYDRAMINE HYDROCHLORIDE 25 MG: 50 INJECTION, SOLUTION INTRAMUSCULAR; INTRAVENOUS at 06:57

## 2023-06-14 RX ADMIN — Medication 500 UNITS: at 09:00

## 2023-06-14 RX ADMIN — DEXAMETHASONE SODIUM PHOSPHATE 10 MG: 10 INJECTION, SOLUTION INTRAMUSCULAR; INTRAVENOUS at 06:57

## 2023-06-14 RX ADMIN — ACETAMINOPHEN 650 MG: 325 TABLET ORAL at 06:57

## 2023-06-14 RX ADMIN — IMMUNE GLOBULIN (HUMAN) 35 G: 10 INJECTION INTRAVENOUS; SUBCUTANEOUS at 07:27

## 2023-06-14 RX ADMIN — SODIUM CHLORIDE 20 ML/HR: 9 INJECTION, SOLUTION INTRAVENOUS at 06:56

## 2023-06-14 NOTE — DISCHARGE INSTRUCTIONS
IGIV?  Get emergency medical help if you have signs of an allergic reaction: hives; difficult breathing; swelling of your face, lips, tongue, or throat. Some side effects may occur during the injection. Tell your caregiver if you feel dizzy, nauseated, light-headed, sweaty, or have a headache, pounding in your neck or ears, fever, chills, chest tightness, or warmth or redness in your face. Call your doctor at once if you have:  a blood cell disorder --pale or yellowed skin, dark colored urine, fever, confusion or weakness;  dehydration symptoms --feeling very thirsty or hot, being unable to urinate, heavy sweating, or hot and dry skin;  kidney problems --little or no urination, swelling, rapid weight gain, feeling short of breath;  lung problems --chest pain, trouble breathing, blue colored lips, fingers, or toes;  signs of a new infection --fever with a severe headache, neck stiffness, eye pain, and increased sensitivity to light; or  signs of a blood clot --shortness of breath, chest pain with deep breathing, rapid heart rate, numbness or weakness on one side of the body, swelling and warmth or discoloration in an arm or leg. Common side effects may include:  headache, back pain, joint pain;  fever, chills, sweating, warmth or tingling;  stomach pain, nausea, diarrhea;  increased blood pressure, fast heartbeats;  dizziness, tiredness, lack of energy;  stuffy nose, sinus pain; or  pain, swelling, burning, or irritation around the IV needle. This is not a complete list of side effects and others may occur. Call your doctor for medical advice about side effects. You may report side effects to FDA at 3-881-FDA-3562. What other drugs will affect IGIV? IGIV can harm your kidneys, especially if you also use certain medicines for infections, cancer, osteoporosis, organ transplant rejection, bowel disorders, high blood pressure, or pain or arthritis (including Advil, Motrin, and Aleve).   Other drugs may affect IGIV,

## 2023-06-15 ENCOUNTER — APPOINTMENT (OUTPATIENT)
Dept: INFUSION THERAPY | Age: 48
End: 2023-06-15
Payer: MEDICARE

## 2023-06-16 ENCOUNTER — HOSPITAL ENCOUNTER (OUTPATIENT)
Dept: INFUSION THERAPY | Age: 48
Setting detail: INFUSION SERIES
Discharge: HOME | End: 2023-06-16
Payer: MEDICARE

## 2023-06-16 VITALS
OXYGEN SATURATION: 100 % | RESPIRATION RATE: 18 BRPM | DIASTOLIC BLOOD PRESSURE: 78 MMHG | HEART RATE: 62 BPM | TEMPERATURE: 97.6 F | SYSTOLIC BLOOD PRESSURE: 116 MMHG

## 2023-06-16 DIAGNOSIS — G35 EXACERBATION OF MULTIPLE SCLEROSIS (HCC): ICD-10-CM

## 2023-06-16 DIAGNOSIS — M32.9 LUPUS (HCC): Primary | ICD-10-CM

## 2023-06-16 PROCEDURE — 2580000003 HC RX 258: Performed by: PSYCHIATRY & NEUROLOGY

## 2023-06-16 PROCEDURE — 96365 THER/PROPH/DIAG IV INF INIT: CPT

## 2023-06-16 PROCEDURE — 6360000002 HC RX W HCPCS: Performed by: PSYCHIATRY & NEUROLOGY

## 2023-06-16 PROCEDURE — 96375 TX/PRO/DX INJ NEW DRUG ADDON: CPT

## 2023-06-16 PROCEDURE — 6370000000 HC RX 637 (ALT 250 FOR IP): Performed by: PSYCHIATRY & NEUROLOGY

## 2023-06-16 PROCEDURE — 96366 THER/PROPH/DIAG IV INF ADDON: CPT

## 2023-06-16 RX ORDER — SODIUM CHLORIDE 0.9 % (FLUSH) 0.9 %
5-40 SYRINGE (ML) INJECTION PRN
Status: DISCONTINUED | OUTPATIENT
Start: 2023-06-16 | End: 2023-06-17 | Stop reason: HOSPADM

## 2023-06-16 RX ORDER — ALBUTEROL SULFATE 90 UG/1
4 AEROSOL, METERED RESPIRATORY (INHALATION) PRN
OUTPATIENT
Start: 2023-06-16

## 2023-06-16 RX ORDER — SODIUM CHLORIDE 9 MG/ML
5-250 INJECTION, SOLUTION INTRAVENOUS PRN
OUTPATIENT
Start: 2023-06-16

## 2023-06-16 RX ORDER — SODIUM CHLORIDE 0.9 % (FLUSH) 0.9 %
5-40 SYRINGE (ML) INJECTION PRN
OUTPATIENT
Start: 2023-06-16

## 2023-06-16 RX ORDER — DIPHENHYDRAMINE HYDROCHLORIDE 50 MG/ML
25 INJECTION INTRAMUSCULAR; INTRAVENOUS ONCE
Start: 2023-06-16 | End: 2023-06-16

## 2023-06-16 RX ORDER — MEPERIDINE HYDROCHLORIDE 25 MG/ML
12.5 INJECTION INTRAMUSCULAR; INTRAVENOUS; SUBCUTANEOUS PRN
OUTPATIENT
Start: 2023-06-16

## 2023-06-16 RX ORDER — ACETAMINOPHEN 325 MG/1
650 TABLET ORAL
OUTPATIENT
Start: 2023-06-16

## 2023-06-16 RX ORDER — SODIUM CHLORIDE 9 MG/ML
5-250 INJECTION, SOLUTION INTRAVENOUS PRN
Status: DISCONTINUED | OUTPATIENT
Start: 2023-06-16 | End: 2023-06-17 | Stop reason: HOSPADM

## 2023-06-16 RX ORDER — DIPHENHYDRAMINE HYDROCHLORIDE 50 MG/ML
50 INJECTION INTRAMUSCULAR; INTRAVENOUS
OUTPATIENT
Start: 2023-06-16

## 2023-06-16 RX ORDER — DIPHENHYDRAMINE HYDROCHLORIDE 50 MG/ML
25 INJECTION INTRAMUSCULAR; INTRAVENOUS ONCE
Status: COMPLETED | OUTPATIENT
Start: 2023-06-16 | End: 2023-06-16

## 2023-06-16 RX ORDER — DEXAMETHASONE SODIUM PHOSPHATE 10 MG/ML
10 INJECTION, SOLUTION INTRAMUSCULAR; INTRAVENOUS ONCE
Start: 2023-06-16 | End: 2023-06-16

## 2023-06-16 RX ORDER — EPINEPHRINE 1 MG/ML
0.3 INJECTION, SOLUTION, CONCENTRATE INTRAVENOUS PRN
OUTPATIENT
Start: 2023-06-16

## 2023-06-16 RX ORDER — HEPARIN SODIUM 100 [USP'U]/ML
500 INJECTION, SOLUTION INTRAVENOUS PRN
Status: DISCONTINUED | OUTPATIENT
Start: 2023-06-16 | End: 2023-06-17 | Stop reason: HOSPADM

## 2023-06-16 RX ORDER — HEPARIN SODIUM 100 [USP'U]/ML
500 INJECTION, SOLUTION INTRAVENOUS PRN
OUTPATIENT
Start: 2023-06-16

## 2023-06-16 RX ORDER — ACETAMINOPHEN 325 MG/1
650 TABLET ORAL ONCE
OUTPATIENT
Start: 2023-06-16 | End: 2023-06-16

## 2023-06-16 RX ORDER — ONDANSETRON 2 MG/ML
8 INJECTION INTRAMUSCULAR; INTRAVENOUS
OUTPATIENT
Start: 2023-06-16

## 2023-06-16 RX ORDER — SODIUM CHLORIDE 9 MG/ML
INJECTION, SOLUTION INTRAVENOUS CONTINUOUS
OUTPATIENT
Start: 2023-06-16

## 2023-06-16 RX ORDER — ACETAMINOPHEN 325 MG/1
650 TABLET ORAL ONCE
Status: COMPLETED | OUTPATIENT
Start: 2023-06-16 | End: 2023-06-16

## 2023-06-16 RX ORDER — DEXAMETHASONE SODIUM PHOSPHATE 10 MG/ML
10 INJECTION, SOLUTION INTRAMUSCULAR; INTRAVENOUS ONCE
Status: COMPLETED | OUTPATIENT
Start: 2023-06-16 | End: 2023-06-16

## 2023-06-16 RX ADMIN — DIPHENHYDRAMINE HYDROCHLORIDE 25 MG: 50 INJECTION, SOLUTION INTRAMUSCULAR; INTRAVENOUS at 06:37

## 2023-06-16 RX ADMIN — ACETAMINOPHEN 650 MG: 325 TABLET ORAL at 06:37

## 2023-06-16 RX ADMIN — DEXAMETHASONE SODIUM PHOSPHATE 10 MG: 10 INJECTION, SOLUTION INTRAMUSCULAR; INTRAVENOUS at 06:37

## 2023-06-16 RX ADMIN — IMMUNE GLOBULIN (HUMAN) 35 G: 10 INJECTION INTRAVENOUS; SUBCUTANEOUS at 07:08

## 2023-06-16 RX ADMIN — HEPARIN 500 UNITS: 100 SYRINGE at 08:47

## 2023-06-16 RX ADMIN — SODIUM CHLORIDE, PRESERVATIVE FREE 10 ML: 5 INJECTION INTRAVENOUS at 08:47

## 2023-06-23 ENCOUNTER — HOSPITAL ENCOUNTER (OUTPATIENT)
Dept: INFUSION THERAPY | Age: 48
Setting detail: INFUSION SERIES
Discharge: HOME | End: 2023-06-23
Payer: MEDICARE

## 2023-06-23 VITALS
HEART RATE: 52 BPM | OXYGEN SATURATION: 99 % | TEMPERATURE: 98.5 F | RESPIRATION RATE: 17 BRPM | SYSTOLIC BLOOD PRESSURE: 125 MMHG | BODY MASS INDEX: 30.95 KG/M2 | WEIGHT: 186 LBS | DIASTOLIC BLOOD PRESSURE: 86 MMHG

## 2023-06-23 DIAGNOSIS — G35 MULTIPLE SCLEROSIS (HCC): Primary | ICD-10-CM

## 2023-06-23 PROCEDURE — 6360000002 HC RX W HCPCS: Performed by: PSYCHIATRY & NEUROLOGY

## 2023-06-23 PROCEDURE — 96366 THER/PROPH/DIAG IV INF ADDON: CPT

## 2023-06-23 PROCEDURE — 96365 THER/PROPH/DIAG IV INF INIT: CPT

## 2023-06-23 PROCEDURE — 2580000003 HC RX 258: Performed by: PSYCHIATRY & NEUROLOGY

## 2023-06-23 PROCEDURE — 96375 TX/PRO/DX INJ NEW DRUG ADDON: CPT

## 2023-06-23 PROCEDURE — 6370000000 HC RX 637 (ALT 250 FOR IP): Performed by: PSYCHIATRY & NEUROLOGY

## 2023-06-23 RX ORDER — SODIUM CHLORIDE 0.9 % (FLUSH) 0.9 %
5-40 SYRINGE (ML) INJECTION PRN
Status: CANCELLED | OUTPATIENT
Start: 2023-06-30

## 2023-06-23 RX ORDER — SODIUM CHLORIDE 9 MG/ML
INJECTION, SOLUTION INTRAVENOUS CONTINUOUS
OUTPATIENT
Start: 2023-06-30

## 2023-06-23 RX ORDER — ACETAMINOPHEN 325 MG/1
650 TABLET ORAL ONCE
Status: CANCELLED | OUTPATIENT
Start: 2023-06-30 | End: 2023-06-30

## 2023-06-23 RX ORDER — MEPERIDINE HYDROCHLORIDE 25 MG/ML
12.5 INJECTION INTRAMUSCULAR; INTRAVENOUS; SUBCUTANEOUS PRN
OUTPATIENT
Start: 2023-06-30

## 2023-06-23 RX ORDER — HEPARIN SODIUM 100 [USP'U]/ML
500 INJECTION, SOLUTION INTRAVENOUS PRN
Status: DISCONTINUED | OUTPATIENT
Start: 2023-06-23 | End: 2023-06-25 | Stop reason: HOSPADM

## 2023-06-23 RX ORDER — ONDANSETRON 2 MG/ML
8 INJECTION INTRAMUSCULAR; INTRAVENOUS
OUTPATIENT
Start: 2023-06-30

## 2023-06-23 RX ORDER — SODIUM CHLORIDE 0.9 % (FLUSH) 0.9 %
5-40 SYRINGE (ML) INJECTION PRN
Status: DISCONTINUED | OUTPATIENT
Start: 2023-06-23 | End: 2023-06-24 | Stop reason: HOSPADM

## 2023-06-23 RX ORDER — DIPHENHYDRAMINE HYDROCHLORIDE 50 MG/ML
50 INJECTION INTRAMUSCULAR; INTRAVENOUS ONCE
Status: COMPLETED | OUTPATIENT
Start: 2023-06-23 | End: 2023-06-23

## 2023-06-23 RX ORDER — DIPHENHYDRAMINE HYDROCHLORIDE 50 MG/ML
50 INJECTION INTRAMUSCULAR; INTRAVENOUS
OUTPATIENT
Start: 2023-06-30

## 2023-06-23 RX ORDER — HEPARIN SODIUM (PORCINE) LOCK FLUSH IV SOLN 100 UNIT/ML 100 UNIT/ML
500 SOLUTION INTRAVENOUS PRN
Status: CANCELLED
Start: 2023-06-30

## 2023-06-23 RX ORDER — DIPHENHYDRAMINE HYDROCHLORIDE 50 MG/ML
50 INJECTION INTRAMUSCULAR; INTRAVENOUS ONCE
Status: CANCELLED
Start: 2023-06-30 | End: 2023-06-30

## 2023-06-23 RX ORDER — EPINEPHRINE 1 MG/ML
0.3 INJECTION, SOLUTION, CONCENTRATE INTRAVENOUS PRN
OUTPATIENT
Start: 2023-06-30

## 2023-06-23 RX ORDER — ACETAMINOPHEN 325 MG/1
650 TABLET ORAL
OUTPATIENT
Start: 2023-06-30

## 2023-06-23 RX ORDER — ALBUTEROL SULFATE 90 UG/1
4 AEROSOL, METERED RESPIRATORY (INHALATION) PRN
OUTPATIENT
Start: 2023-06-30

## 2023-06-23 RX ORDER — ACETAMINOPHEN 325 MG/1
650 TABLET ORAL ONCE
Status: COMPLETED | OUTPATIENT
Start: 2023-06-23 | End: 2023-06-23

## 2023-06-23 RX ADMIN — IMMUNE GLOBULIN (HUMAN) 35 G: 10 INJECTION INTRAVENOUS; SUBCUTANEOUS at 07:07

## 2023-06-23 RX ADMIN — ACETAMINOPHEN 650 MG: 325 TABLET ORAL at 06:49

## 2023-06-23 RX ADMIN — HYDROCORTISONE SODIUM SUCCINATE 100 MG: 100 INJECTION, POWDER, FOR SOLUTION INTRAMUSCULAR; INTRAVENOUS at 06:49

## 2023-06-23 RX ADMIN — HEPARIN 500 UNITS: 100 SYRINGE at 08:51

## 2023-06-23 RX ADMIN — SODIUM CHLORIDE, PRESERVATIVE FREE 20 ML: 5 INJECTION INTRAVENOUS at 08:51

## 2023-06-23 RX ADMIN — DIPHENHYDRAMINE HYDROCHLORIDE 50 MG: 50 INJECTION, SOLUTION INTRAMUSCULAR; INTRAVENOUS at 06:48

## 2023-06-28 RX ORDER — HEPARIN SODIUM 100 [USP'U]/ML
500 INJECTION, SOLUTION INTRAVENOUS PRN
OUTPATIENT
Start: 2023-06-29

## 2023-06-28 RX ORDER — EPINEPHRINE 1 MG/ML
0.3 INJECTION, SOLUTION, CONCENTRATE INTRAVENOUS PRN
OUTPATIENT
Start: 2023-06-29

## 2023-06-28 RX ORDER — SODIUM CHLORIDE 0.9 % (FLUSH) 0.9 %
5-40 SYRINGE (ML) INJECTION PRN
OUTPATIENT
Start: 2023-06-29

## 2023-06-28 RX ORDER — SODIUM CHLORIDE 9 MG/ML
INJECTION, SOLUTION INTRAVENOUS CONTINUOUS
OUTPATIENT
Start: 2023-06-29

## 2023-06-28 RX ORDER — SODIUM CHLORIDE 9 MG/ML
5-250 INJECTION, SOLUTION INTRAVENOUS PRN
OUTPATIENT
Start: 2023-06-29

## 2023-06-28 RX ORDER — ONDANSETRON 2 MG/ML
8 INJECTION INTRAMUSCULAR; INTRAVENOUS
OUTPATIENT
Start: 2023-06-29

## 2023-06-28 RX ORDER — ALBUTEROL SULFATE 90 UG/1
4 AEROSOL, METERED RESPIRATORY (INHALATION) PRN
OUTPATIENT
Start: 2023-06-29

## 2023-06-28 RX ORDER — ACETAMINOPHEN 325 MG/1
650 TABLET ORAL
OUTPATIENT
Start: 2023-06-29

## 2023-06-28 RX ORDER — DIPHENHYDRAMINE HYDROCHLORIDE 50 MG/ML
50 INJECTION INTRAMUSCULAR; INTRAVENOUS
OUTPATIENT
Start: 2023-06-29

## 2023-06-30 ENCOUNTER — HOSPITAL ENCOUNTER (OUTPATIENT)
Dept: INFUSION THERAPY | Age: 48
Setting detail: INFUSION SERIES
Discharge: HOME | End: 2023-06-30
Payer: MEDICARE

## 2023-06-30 VITALS
BODY MASS INDEX: 31.78 KG/M2 | WEIGHT: 191 LBS | DIASTOLIC BLOOD PRESSURE: 88 MMHG | TEMPERATURE: 98 F | SYSTOLIC BLOOD PRESSURE: 132 MMHG | OXYGEN SATURATION: 99 % | HEART RATE: 62 BPM | RESPIRATION RATE: 18 BRPM

## 2023-06-30 DIAGNOSIS — G35 MULTIPLE SCLEROSIS (HCC): Primary | ICD-10-CM

## 2023-06-30 PROCEDURE — 96375 TX/PRO/DX INJ NEW DRUG ADDON: CPT

## 2023-06-30 PROCEDURE — 6370000000 HC RX 637 (ALT 250 FOR IP): Performed by: PSYCHIATRY & NEUROLOGY

## 2023-06-30 PROCEDURE — 96365 THER/PROPH/DIAG IV INF INIT: CPT

## 2023-06-30 PROCEDURE — 96366 THER/PROPH/DIAG IV INF ADDON: CPT

## 2023-06-30 PROCEDURE — 2580000003 HC RX 258: Performed by: PSYCHIATRY & NEUROLOGY

## 2023-06-30 PROCEDURE — 6360000002 HC RX W HCPCS: Performed by: PSYCHIATRY & NEUROLOGY

## 2023-06-30 RX ORDER — SODIUM CHLORIDE 0.9 % (FLUSH) 0.9 %
5-40 SYRINGE (ML) INJECTION PRN
Status: DISCONTINUED | OUTPATIENT
Start: 2023-06-30 | End: 2023-07-01 | Stop reason: HOSPADM

## 2023-06-30 RX ORDER — DIPHENHYDRAMINE HYDROCHLORIDE 50 MG/ML
50 INJECTION INTRAMUSCULAR; INTRAVENOUS ONCE
Status: CANCELLED
Start: 2023-07-07 | End: 2023-07-07

## 2023-06-30 RX ORDER — ACETAMINOPHEN 325 MG/1
650 TABLET ORAL
OUTPATIENT
Start: 2023-07-07

## 2023-06-30 RX ORDER — EPINEPHRINE 1 MG/ML
0.3 INJECTION, SOLUTION, CONCENTRATE INTRAVENOUS PRN
OUTPATIENT
Start: 2023-07-07

## 2023-06-30 RX ORDER — ONDANSETRON 2 MG/ML
8 INJECTION INTRAMUSCULAR; INTRAVENOUS
OUTPATIENT
Start: 2023-07-07

## 2023-06-30 RX ORDER — SODIUM CHLORIDE 9 MG/ML
INJECTION, SOLUTION INTRAVENOUS CONTINUOUS
OUTPATIENT
Start: 2023-07-07

## 2023-06-30 RX ORDER — HEPARIN SODIUM 100 [USP'U]/ML
500 INJECTION, SOLUTION INTRAVENOUS PRN
Status: DISCONTINUED | OUTPATIENT
Start: 2023-06-30 | End: 2023-07-02 | Stop reason: HOSPADM

## 2023-06-30 RX ORDER — DIPHENHYDRAMINE HYDROCHLORIDE 50 MG/ML
50 INJECTION INTRAMUSCULAR; INTRAVENOUS
OUTPATIENT
Start: 2023-07-07

## 2023-06-30 RX ORDER — ALBUTEROL SULFATE 90 UG/1
4 AEROSOL, METERED RESPIRATORY (INHALATION) PRN
OUTPATIENT
Start: 2023-07-07

## 2023-06-30 RX ORDER — ACETAMINOPHEN 325 MG/1
650 TABLET ORAL ONCE
Status: COMPLETED | OUTPATIENT
Start: 2023-06-30 | End: 2023-06-30

## 2023-06-30 RX ORDER — HEPARIN SODIUM (PORCINE) LOCK FLUSH IV SOLN 100 UNIT/ML 100 UNIT/ML
500 SOLUTION INTRAVENOUS PRN
Status: CANCELLED
Start: 2023-07-07

## 2023-06-30 RX ORDER — DIPHENHYDRAMINE HYDROCHLORIDE 50 MG/ML
50 INJECTION INTRAMUSCULAR; INTRAVENOUS ONCE
Status: COMPLETED | OUTPATIENT
Start: 2023-06-30 | End: 2023-06-30

## 2023-06-30 RX ORDER — ACETAMINOPHEN 325 MG/1
650 TABLET ORAL ONCE
Status: CANCELLED | OUTPATIENT
Start: 2023-07-07 | End: 2023-07-07

## 2023-06-30 RX ORDER — MEPERIDINE HYDROCHLORIDE 25 MG/ML
12.5 INJECTION INTRAMUSCULAR; INTRAVENOUS; SUBCUTANEOUS PRN
OUTPATIENT
Start: 2023-07-07

## 2023-06-30 RX ORDER — SODIUM CHLORIDE 0.9 % (FLUSH) 0.9 %
5-40 SYRINGE (ML) INJECTION PRN
Status: CANCELLED | OUTPATIENT
Start: 2023-07-07

## 2023-06-30 RX ADMIN — DIPHENHYDRAMINE HYDROCHLORIDE 50 MG: 50 INJECTION, SOLUTION INTRAMUSCULAR; INTRAVENOUS at 06:49

## 2023-06-30 RX ADMIN — IMMUNE GLOBULIN (HUMAN) 35 G: 10 INJECTION INTRAVENOUS; SUBCUTANEOUS at 07:00

## 2023-06-30 RX ADMIN — HEPARIN 500 UNITS: 100 SYRINGE at 08:34

## 2023-06-30 RX ADMIN — SODIUM CHLORIDE, PRESERVATIVE FREE 10 ML: 5 INJECTION INTRAVENOUS at 08:34

## 2023-06-30 RX ADMIN — HYDROCORTISONE SODIUM SUCCINATE 100 MG: 100 INJECTION, POWDER, FOR SOLUTION INTRAMUSCULAR; INTRAVENOUS at 06:49

## 2023-06-30 RX ADMIN — ACETAMINOPHEN 650 MG: 325 TABLET ORAL at 06:49

## 2023-06-30 ASSESSMENT — PAIN SCALES - GENERAL: PAINLEVEL_OUTOF10: 0

## 2023-07-03 ENCOUNTER — HOSPITAL ENCOUNTER (OUTPATIENT)
Dept: INFUSION THERAPY | Age: 48
Setting detail: INFUSION SERIES
Discharge: HOME | End: 2023-07-03
Payer: MEDICARE

## 2023-07-03 VITALS
SYSTOLIC BLOOD PRESSURE: 125 MMHG | HEART RATE: 70 BPM | OXYGEN SATURATION: 100 % | TEMPERATURE: 98.8 F | BODY MASS INDEX: 30.79 KG/M2 | DIASTOLIC BLOOD PRESSURE: 82 MMHG | RESPIRATION RATE: 18 BRPM | WEIGHT: 185 LBS

## 2023-07-03 DIAGNOSIS — G35 MULTIPLE SCLEROSIS (HCC): Primary | ICD-10-CM

## 2023-07-03 PROCEDURE — 6360000002 HC RX W HCPCS: Performed by: PSYCHIATRY & NEUROLOGY

## 2023-07-03 PROCEDURE — 96365 THER/PROPH/DIAG IV INF INIT: CPT

## 2023-07-03 PROCEDURE — 2580000003 HC RX 258: Performed by: PSYCHIATRY & NEUROLOGY

## 2023-07-03 RX ORDER — SODIUM CHLORIDE 9 MG/ML
5-250 INJECTION, SOLUTION INTRAVENOUS PRN
Status: CANCELLED | OUTPATIENT
Start: 2023-07-04

## 2023-07-03 RX ORDER — HEPARIN SODIUM 100 [USP'U]/ML
500 INJECTION, SOLUTION INTRAVENOUS PRN
Status: DISCONTINUED | OUTPATIENT
Start: 2023-07-03 | End: 2023-07-04 | Stop reason: HOSPADM

## 2023-07-03 RX ORDER — HEPARIN SODIUM 100 [USP'U]/ML
500 INJECTION, SOLUTION INTRAVENOUS PRN
Status: CANCELLED | OUTPATIENT
Start: 2023-07-04

## 2023-07-03 RX ORDER — EPINEPHRINE 1 MG/ML
0.3 INJECTION, SOLUTION, CONCENTRATE INTRAVENOUS PRN
Status: CANCELLED | OUTPATIENT
Start: 2023-07-04

## 2023-07-03 RX ORDER — ACETAMINOPHEN 325 MG/1
650 TABLET ORAL
Status: CANCELLED | OUTPATIENT
Start: 2023-07-04

## 2023-07-03 RX ORDER — ALBUTEROL SULFATE 90 UG/1
4 AEROSOL, METERED RESPIRATORY (INHALATION) PRN
Status: CANCELLED | OUTPATIENT
Start: 2023-07-04

## 2023-07-03 RX ORDER — DIPHENHYDRAMINE HYDROCHLORIDE 50 MG/ML
50 INJECTION INTRAMUSCULAR; INTRAVENOUS
Status: CANCELLED | OUTPATIENT
Start: 2023-07-04

## 2023-07-03 RX ORDER — ONDANSETRON 2 MG/ML
8 INJECTION INTRAMUSCULAR; INTRAVENOUS
Status: CANCELLED | OUTPATIENT
Start: 2023-07-04

## 2023-07-03 RX ORDER — SODIUM CHLORIDE 9 MG/ML
INJECTION, SOLUTION INTRAVENOUS CONTINUOUS
Status: CANCELLED | OUTPATIENT
Start: 2023-07-04

## 2023-07-03 RX ORDER — SODIUM CHLORIDE 9 MG/ML
5-250 INJECTION, SOLUTION INTRAVENOUS PRN
Status: DISCONTINUED | OUTPATIENT
Start: 2023-07-03 | End: 2023-07-04 | Stop reason: HOSPADM

## 2023-07-03 RX ORDER — SODIUM CHLORIDE 0.9 % (FLUSH) 0.9 %
5-40 SYRINGE (ML) INJECTION PRN
Status: DISCONTINUED | OUTPATIENT
Start: 2023-07-03 | End: 2023-07-04 | Stop reason: HOSPADM

## 2023-07-03 RX ORDER — SODIUM CHLORIDE 0.9 % (FLUSH) 0.9 %
5-40 SYRINGE (ML) INJECTION PRN
Status: CANCELLED | OUTPATIENT
Start: 2023-07-04

## 2023-07-03 RX ADMIN — METHYLPREDNISOLONE SODIUM SUCCINATE 1000 MG: 1 INJECTION, POWDER, LYOPHILIZED, FOR SOLUTION INTRAMUSCULAR; INTRAVENOUS at 08:03

## 2023-07-03 RX ADMIN — SODIUM CHLORIDE, PRESERVATIVE FREE 10 ML: 5 INJECTION INTRAVENOUS at 08:36

## 2023-07-03 RX ADMIN — HEPARIN 500 UNITS: 100 SYRINGE at 08:36

## 2023-07-03 NOTE — PROGRESS NOTES
[facility]. Brisk blood return noted. Received 1g Solu-medrol per order. Pt tolerated well.     Electronically signed by Shiv Saucedo RN on 7/3/2023 at 8:45 AM

## 2023-07-03 NOTE — DISCHARGE INSTRUCTIONS
tongue, or throat. Call your doctor at once if you have:  blurred vision, tunnel vision, eye pain, or seeing halos around lights;  shortness of breath (even with mild exertion), swelling, rapid weight gain;  severe depression, changes in personality, unusual thoughts or behavior;  new or unusual pain in an arm or leg or in your back;  severe pain in your upper stomach spreading to your back, nausea and vomiting;  bloody or tarry stools, coughing up blood or vomit that looks like coffee grounds;  a seizure (convulsions); or  low potassium --leg cramps, constipation, irregular heartbeats, fluttering in your chest, increased thirst or urination, numbness or tingling, muscle weakness or limp feeling. Methylprednisolone can affect growth in children. Tell your doctor if your child is not growing at a normal rate while using this medicine. Common side effects may include:  weight gain (especially in your face or your upper back and torso);  slow wound healing;  muscle pain or weakness;  thinning skin, increased sweating;  stomach discomfort, bloating;  headache; or  changes in your menstrual periods. This is not a complete list of side effects and others may occur. Call your doctor for medical advice about side effects. You may report side effects to FDA at 9-529-FDA-2113. What other drugs will affect methylprednisolone? Sometimes it is not safe to use certain medications at the same time. Some drugs can affect your blood levels of other drugs you take, which may increase side effects or make the medications less effective. Many drugs can affect methylprednisolone. This includes prescription and over-the-counter medicines, vitamins, and herbal products. Not all possible interactions are listed here. Tell your doctor about all your current medicines and any medicine you start or stop using. Where can I get more information? Your pharmacist can provide more information about methylprednisolone.   Remember, keep this

## 2023-07-05 ENCOUNTER — HOSPITAL ENCOUNTER (OUTPATIENT)
Dept: INFUSION THERAPY | Age: 48
Setting detail: INFUSION SERIES
Discharge: HOME | End: 2023-07-05
Payer: MEDICARE

## 2023-07-05 VITALS
SYSTOLIC BLOOD PRESSURE: 109 MMHG | HEART RATE: 64 BPM | TEMPERATURE: 96.6 F | RESPIRATION RATE: 18 BRPM | DIASTOLIC BLOOD PRESSURE: 70 MMHG | OXYGEN SATURATION: 100 %

## 2023-07-05 DIAGNOSIS — G35 MULTIPLE SCLEROSIS (HCC): Primary | ICD-10-CM

## 2023-07-05 PROCEDURE — 6360000002 HC RX W HCPCS: Performed by: PSYCHIATRY & NEUROLOGY

## 2023-07-05 PROCEDURE — 2580000003 HC RX 258: Performed by: PSYCHIATRY & NEUROLOGY

## 2023-07-05 PROCEDURE — 96365 THER/PROPH/DIAG IV INF INIT: CPT

## 2023-07-05 RX ORDER — ACETAMINOPHEN 325 MG/1
650 TABLET ORAL
OUTPATIENT
Start: 2023-07-06

## 2023-07-05 RX ORDER — SODIUM CHLORIDE 9 MG/ML
5-250 INJECTION, SOLUTION INTRAVENOUS PRN
Status: CANCELLED | OUTPATIENT
Start: 2023-07-06

## 2023-07-05 RX ORDER — HEPARIN SODIUM 100 [USP'U]/ML
500 INJECTION, SOLUTION INTRAVENOUS PRN
Status: DISCONTINUED | OUTPATIENT
Start: 2023-07-05 | End: 2023-07-06 | Stop reason: HOSPADM

## 2023-07-05 RX ORDER — SODIUM CHLORIDE 9 MG/ML
5-250 INJECTION, SOLUTION INTRAVENOUS PRN
Status: DISCONTINUED | OUTPATIENT
Start: 2023-07-05 | End: 2023-07-06 | Stop reason: HOSPADM

## 2023-07-05 RX ORDER — SODIUM CHLORIDE 0.9 % (FLUSH) 0.9 %
5-40 SYRINGE (ML) INJECTION PRN
Status: CANCELLED | OUTPATIENT
Start: 2023-07-06

## 2023-07-05 RX ORDER — SODIUM CHLORIDE 9 MG/ML
INJECTION, SOLUTION INTRAVENOUS CONTINUOUS
OUTPATIENT
Start: 2023-07-06

## 2023-07-05 RX ORDER — ONDANSETRON 2 MG/ML
8 INJECTION INTRAMUSCULAR; INTRAVENOUS
OUTPATIENT
Start: 2023-07-06

## 2023-07-05 RX ORDER — SODIUM CHLORIDE 9 MG/ML
5-250 INJECTION, SOLUTION INTRAVENOUS PRN
OUTPATIENT
Start: 2023-07-06

## 2023-07-05 RX ORDER — HEPARIN SODIUM 100 [USP'U]/ML
500 INJECTION, SOLUTION INTRAVENOUS PRN
Status: CANCELLED | OUTPATIENT
Start: 2023-07-06

## 2023-07-05 RX ORDER — EPINEPHRINE 1 MG/ML
0.3 INJECTION, SOLUTION, CONCENTRATE INTRAVENOUS PRN
OUTPATIENT
Start: 2023-07-06

## 2023-07-05 RX ORDER — ALBUTEROL SULFATE 90 UG/1
4 AEROSOL, METERED RESPIRATORY (INHALATION) PRN
OUTPATIENT
Start: 2023-07-06

## 2023-07-05 RX ORDER — SODIUM CHLORIDE 0.9 % (FLUSH) 0.9 %
5-40 SYRINGE (ML) INJECTION PRN
Status: DISCONTINUED | OUTPATIENT
Start: 2023-07-05 | End: 2023-07-06 | Stop reason: HOSPADM

## 2023-07-05 RX ORDER — DIPHENHYDRAMINE HYDROCHLORIDE 50 MG/ML
50 INJECTION INTRAMUSCULAR; INTRAVENOUS
OUTPATIENT
Start: 2023-07-06

## 2023-07-05 RX ADMIN — HEPARIN 500 UNITS: 100 SYRINGE at 08:42

## 2023-07-05 RX ADMIN — SODIUM CHLORIDE, PRESERVATIVE FREE 20 ML: 5 INJECTION INTRAVENOUS at 08:42

## 2023-07-05 RX ADMIN — SODIUM CHLORIDE 1000 MG: 9 INJECTION, SOLUTION INTRAVENOUS at 08:09

## 2023-07-05 NOTE — PROGRESS NOTES
Pt received Solumedrol 1000 mg as ordered. Tolerated well. Port remains accessed for tomorrow's infusion.

## 2023-07-06 ENCOUNTER — HOSPITAL ENCOUNTER (OUTPATIENT)
Dept: INFUSION THERAPY | Age: 48
Setting detail: INFUSION SERIES
Discharge: HOME | End: 2023-07-06
Payer: MEDICARE

## 2023-07-06 VITALS
HEART RATE: 71 BPM | OXYGEN SATURATION: 100 % | RESPIRATION RATE: 18 BRPM | DIASTOLIC BLOOD PRESSURE: 72 MMHG | TEMPERATURE: 97.6 F | SYSTOLIC BLOOD PRESSURE: 112 MMHG

## 2023-07-06 DIAGNOSIS — G35 MULTIPLE SCLEROSIS (HCC): Primary | ICD-10-CM

## 2023-07-06 PROCEDURE — 2580000003 HC RX 258: Performed by: PSYCHIATRY & NEUROLOGY

## 2023-07-06 PROCEDURE — 6360000002 HC RX W HCPCS: Performed by: PSYCHIATRY & NEUROLOGY

## 2023-07-06 PROCEDURE — 96365 THER/PROPH/DIAG IV INF INIT: CPT

## 2023-07-06 RX ORDER — SODIUM CHLORIDE 9 MG/ML
5-250 INJECTION, SOLUTION INTRAVENOUS PRN
Status: CANCELLED | OUTPATIENT
Start: 2023-07-09

## 2023-07-06 RX ORDER — HEPARIN SODIUM 100 [USP'U]/ML
500 INJECTION, SOLUTION INTRAVENOUS PRN
OUTPATIENT
Start: 2023-07-09

## 2023-07-06 RX ORDER — DIPHENHYDRAMINE HYDROCHLORIDE 50 MG/ML
50 INJECTION INTRAMUSCULAR; INTRAVENOUS
OUTPATIENT
Start: 2023-07-09

## 2023-07-06 RX ORDER — ACETAMINOPHEN 325 MG/1
650 TABLET ORAL
OUTPATIENT
Start: 2023-07-09

## 2023-07-06 RX ORDER — SODIUM CHLORIDE 9 MG/ML
5-250 INJECTION, SOLUTION INTRAVENOUS PRN
OUTPATIENT
Start: 2023-07-09

## 2023-07-06 RX ORDER — ONDANSETRON 2 MG/ML
8 INJECTION INTRAMUSCULAR; INTRAVENOUS
OUTPATIENT
Start: 2023-07-09

## 2023-07-06 RX ORDER — SODIUM CHLORIDE 0.9 % (FLUSH) 0.9 %
5-40 SYRINGE (ML) INJECTION PRN
Status: DISCONTINUED | OUTPATIENT
Start: 2023-07-06 | End: 2023-07-07 | Stop reason: HOSPADM

## 2023-07-06 RX ORDER — SODIUM CHLORIDE 9 MG/ML
5-250 INJECTION, SOLUTION INTRAVENOUS PRN
Status: DISCONTINUED | OUTPATIENT
Start: 2023-07-06 | End: 2023-07-07 | Stop reason: HOSPADM

## 2023-07-06 RX ORDER — EPINEPHRINE 1 MG/ML
0.3 INJECTION, SOLUTION, CONCENTRATE INTRAVENOUS PRN
OUTPATIENT
Start: 2023-07-09

## 2023-07-06 RX ORDER — ALBUTEROL SULFATE 90 UG/1
4 AEROSOL, METERED RESPIRATORY (INHALATION) PRN
OUTPATIENT
Start: 2023-07-09

## 2023-07-06 RX ORDER — SODIUM CHLORIDE 0.9 % (FLUSH) 0.9 %
5-40 SYRINGE (ML) INJECTION PRN
OUTPATIENT
Start: 2023-07-09

## 2023-07-06 RX ORDER — SODIUM CHLORIDE 9 MG/ML
INJECTION, SOLUTION INTRAVENOUS CONTINUOUS
OUTPATIENT
Start: 2023-07-09

## 2023-07-06 RX ORDER — HEPARIN SODIUM 100 [USP'U]/ML
500 INJECTION, SOLUTION INTRAVENOUS PRN
Status: DISCONTINUED | OUTPATIENT
Start: 2023-07-06 | End: 2023-07-07 | Stop reason: HOSPADM

## 2023-07-06 RX ADMIN — HEPARIN 500 UNITS: 100 SYRINGE at 08:38

## 2023-07-06 RX ADMIN — SODIUM CHLORIDE, PRESERVATIVE FREE 20 ML: 5 INJECTION INTRAVENOUS at 08:38

## 2023-07-06 RX ADMIN — SODIUM CHLORIDE 1000 MG: 9 INJECTION, SOLUTION INTRAVENOUS at 08:03

## 2023-07-07 ENCOUNTER — HOSPITAL ENCOUNTER (OUTPATIENT)
Dept: INFUSION THERAPY | Age: 48
Setting detail: INFUSION SERIES
Discharge: HOME | End: 2023-07-07
Payer: MEDICARE

## 2023-07-07 VITALS
TEMPERATURE: 98.2 F | SYSTOLIC BLOOD PRESSURE: 143 MMHG | WEIGHT: 183 LBS | BODY MASS INDEX: 30.45 KG/M2 | OXYGEN SATURATION: 100 % | DIASTOLIC BLOOD PRESSURE: 95 MMHG | RESPIRATION RATE: 17 BRPM | HEART RATE: 62 BPM

## 2023-07-07 DIAGNOSIS — G35 MULTIPLE SCLEROSIS (HCC): Primary | ICD-10-CM

## 2023-07-07 PROCEDURE — 2580000003 HC RX 258: Performed by: PSYCHIATRY & NEUROLOGY

## 2023-07-07 PROCEDURE — 96375 TX/PRO/DX INJ NEW DRUG ADDON: CPT

## 2023-07-07 PROCEDURE — 96365 THER/PROPH/DIAG IV INF INIT: CPT

## 2023-07-07 PROCEDURE — 96366 THER/PROPH/DIAG IV INF ADDON: CPT

## 2023-07-07 PROCEDURE — 6370000000 HC RX 637 (ALT 250 FOR IP): Performed by: PSYCHIATRY & NEUROLOGY

## 2023-07-07 PROCEDURE — 6360000002 HC RX W HCPCS: Performed by: PSYCHIATRY & NEUROLOGY

## 2023-07-07 RX ORDER — ACETAMINOPHEN 325 MG/1
650 TABLET ORAL ONCE
Status: CANCELLED | OUTPATIENT
Start: 2023-07-14 | End: 2023-07-14

## 2023-07-07 RX ORDER — HEPARIN SODIUM (PORCINE) LOCK FLUSH IV SOLN 100 UNIT/ML 100 UNIT/ML
500 SOLUTION INTRAVENOUS PRN
Status: CANCELLED
Start: 2023-07-14

## 2023-07-07 RX ORDER — ACETAMINOPHEN 325 MG/1
650 TABLET ORAL ONCE
Status: COMPLETED | OUTPATIENT
Start: 2023-07-07 | End: 2023-07-07

## 2023-07-07 RX ORDER — DIPHENHYDRAMINE HYDROCHLORIDE 50 MG/ML
50 INJECTION INTRAMUSCULAR; INTRAVENOUS
OUTPATIENT
Start: 2023-07-14

## 2023-07-07 RX ORDER — DIPHENHYDRAMINE HYDROCHLORIDE 50 MG/ML
50 INJECTION INTRAMUSCULAR; INTRAVENOUS ONCE
Status: COMPLETED | OUTPATIENT
Start: 2023-07-07 | End: 2023-07-07

## 2023-07-07 RX ORDER — DIPHENHYDRAMINE HYDROCHLORIDE 50 MG/ML
50 INJECTION INTRAMUSCULAR; INTRAVENOUS ONCE
Status: CANCELLED
Start: 2023-07-14 | End: 2023-07-14

## 2023-07-07 RX ORDER — SODIUM CHLORIDE 0.9 % (FLUSH) 0.9 %
5-40 SYRINGE (ML) INJECTION PRN
Status: CANCELLED | OUTPATIENT
Start: 2023-07-14

## 2023-07-07 RX ORDER — MEPERIDINE HYDROCHLORIDE 25 MG/ML
12.5 INJECTION INTRAMUSCULAR; INTRAVENOUS; SUBCUTANEOUS PRN
OUTPATIENT
Start: 2023-07-14

## 2023-07-07 RX ORDER — SODIUM CHLORIDE 9 MG/ML
INJECTION, SOLUTION INTRAVENOUS CONTINUOUS
OUTPATIENT
Start: 2023-07-14

## 2023-07-07 RX ORDER — ACETAMINOPHEN 325 MG/1
650 TABLET ORAL
OUTPATIENT
Start: 2023-07-14

## 2023-07-07 RX ORDER — ONDANSETRON 2 MG/ML
8 INJECTION INTRAMUSCULAR; INTRAVENOUS
OUTPATIENT
Start: 2023-07-14

## 2023-07-07 RX ORDER — EPINEPHRINE 1 MG/ML
0.3 INJECTION, SOLUTION, CONCENTRATE INTRAVENOUS PRN
OUTPATIENT
Start: 2023-07-14

## 2023-07-07 RX ORDER — SODIUM CHLORIDE 0.9 % (FLUSH) 0.9 %
5-40 SYRINGE (ML) INJECTION PRN
Status: DISCONTINUED | OUTPATIENT
Start: 2023-07-07 | End: 2023-07-08 | Stop reason: HOSPADM

## 2023-07-07 RX ORDER — HEPARIN SODIUM 100 [USP'U]/ML
500 INJECTION, SOLUTION INTRAVENOUS PRN
Status: DISCONTINUED | OUTPATIENT
Start: 2023-07-07 | End: 2023-07-09 | Stop reason: HOSPADM

## 2023-07-07 RX ORDER — ALBUTEROL SULFATE 90 UG/1
4 AEROSOL, METERED RESPIRATORY (INHALATION) PRN
OUTPATIENT
Start: 2023-07-14

## 2023-07-07 RX ADMIN — Medication 500 UNITS: at 09:00

## 2023-07-07 RX ADMIN — SODIUM CHLORIDE, PRESERVATIVE FREE 10 ML: 5 INJECTION INTRAVENOUS at 06:50

## 2023-07-07 RX ADMIN — DIPHENHYDRAMINE HYDROCHLORIDE 50 MG: 50 INJECTION, SOLUTION INTRAMUSCULAR; INTRAVENOUS at 06:51

## 2023-07-07 RX ADMIN — SODIUM CHLORIDE, PRESERVATIVE FREE 20 ML: 5 INJECTION INTRAVENOUS at 09:00

## 2023-07-07 RX ADMIN — ACETAMINOPHEN 650 MG: 325 TABLET ORAL at 06:50

## 2023-07-07 RX ADMIN — HYDROCORTISONE SODIUM SUCCINATE 100 MG: 100 INJECTION, POWDER, FOR SOLUTION INTRAMUSCULAR; INTRAVENOUS at 06:50

## 2023-07-07 RX ADMIN — IMMUNE GLOBULIN (HUMAN) 35 G: 10 INJECTION INTRAVENOUS; SUBCUTANEOUS at 07:20

## 2023-07-11 RX ORDER — RIZATRIPTAN BENZOATE 10 MG/1
TABLET ORAL
Qty: 9 TABLET | Refills: 5 | Status: SHIPPED | OUTPATIENT
Start: 2023-07-11

## 2023-07-11 RX ORDER — SUMATRIPTAN 100 MG/1
TABLET, FILM COATED ORAL
Qty: 9 TABLET | Refills: 5 | Status: SHIPPED | OUTPATIENT
Start: 2023-07-11

## 2023-07-11 NOTE — TELEPHONE ENCOUNTER
Enmanuel Varner has requested a refill on her medication. Last office visit : 6/7/2023   Next office visit : 9/7/2023         Requested Prescriptions     Pending Prescriptions Disp Refills    SUMAtriptan (IMITREX) 100 MG tablet 9 tablet 5     Sig: TAKE 1 TABLET BY MOUTH 1 TIME AS NEEDED FOR MIGRAINE. MAX 2 IN 24 HOURS    rizatriptan (MAXALT) 10 MG tablet 9 tablet 5     Sig: TAKE 1 TABLET BY MOUTH 1 TIME AS NEEDED FOR MIGRAINE. MAX 2 IN 24 HOURS.  MAY REPEAT IN 2 HOURS AS NEEDED

## 2023-07-14 ENCOUNTER — HOSPITAL ENCOUNTER (OUTPATIENT)
Dept: INFUSION THERAPY | Age: 48
Setting detail: INFUSION SERIES
Discharge: HOME | End: 2023-07-14
Payer: MEDICARE

## 2023-07-14 VITALS
RESPIRATION RATE: 18 BRPM | DIASTOLIC BLOOD PRESSURE: 70 MMHG | OXYGEN SATURATION: 100 % | WEIGHT: 185 LBS | BODY MASS INDEX: 30.79 KG/M2 | SYSTOLIC BLOOD PRESSURE: 122 MMHG | HEART RATE: 72 BPM | TEMPERATURE: 98 F

## 2023-07-14 DIAGNOSIS — G35 MULTIPLE SCLEROSIS (HCC): Primary | ICD-10-CM

## 2023-07-14 PROCEDURE — 6360000002 HC RX W HCPCS: Performed by: PSYCHIATRY & NEUROLOGY

## 2023-07-14 PROCEDURE — 96374 THER/PROPH/DIAG INJ IV PUSH: CPT

## 2023-07-14 PROCEDURE — 6370000000 HC RX 637 (ALT 250 FOR IP): Performed by: PSYCHIATRY & NEUROLOGY

## 2023-07-14 PROCEDURE — 2580000003 HC RX 258: Performed by: PSYCHIATRY & NEUROLOGY

## 2023-07-14 PROCEDURE — 96366 THER/PROPH/DIAG IV INF ADDON: CPT

## 2023-07-14 PROCEDURE — 96375 TX/PRO/DX INJ NEW DRUG ADDON: CPT

## 2023-07-14 PROCEDURE — 96365 THER/PROPH/DIAG IV INF INIT: CPT

## 2023-07-14 RX ORDER — SODIUM CHLORIDE 0.9 % (FLUSH) 0.9 %
5-40 SYRINGE (ML) INJECTION PRN
Status: DISCONTINUED | OUTPATIENT
Start: 2023-07-14 | End: 2023-07-15 | Stop reason: HOSPADM

## 2023-07-14 RX ORDER — EPINEPHRINE 1 MG/ML
0.3 INJECTION, SOLUTION, CONCENTRATE INTRAVENOUS PRN
OUTPATIENT
Start: 2023-07-21

## 2023-07-14 RX ORDER — DIPHENHYDRAMINE HYDROCHLORIDE 50 MG/ML
50 INJECTION INTRAMUSCULAR; INTRAVENOUS ONCE
Status: CANCELLED
Start: 2023-07-21 | End: 2023-07-21

## 2023-07-14 RX ORDER — DIPHENHYDRAMINE HYDROCHLORIDE 50 MG/ML
50 INJECTION INTRAMUSCULAR; INTRAVENOUS
OUTPATIENT
Start: 2023-07-21

## 2023-07-14 RX ORDER — ALBUTEROL SULFATE 90 UG/1
4 AEROSOL, METERED RESPIRATORY (INHALATION) PRN
OUTPATIENT
Start: 2023-07-21

## 2023-07-14 RX ORDER — HEPARIN SODIUM (PORCINE) LOCK FLUSH IV SOLN 100 UNIT/ML 100 UNIT/ML
500 SOLUTION INTRAVENOUS PRN
Status: CANCELLED
Start: 2023-07-21

## 2023-07-14 RX ORDER — ACETAMINOPHEN 325 MG/1
650 TABLET ORAL ONCE
Status: CANCELLED | OUTPATIENT
Start: 2023-07-21 | End: 2023-07-21

## 2023-07-14 RX ORDER — ACETAMINOPHEN 325 MG/1
650 TABLET ORAL
OUTPATIENT
Start: 2023-07-21

## 2023-07-14 RX ORDER — ONDANSETRON 2 MG/ML
8 INJECTION INTRAMUSCULAR; INTRAVENOUS
OUTPATIENT
Start: 2023-07-21

## 2023-07-14 RX ORDER — MEPERIDINE HYDROCHLORIDE 25 MG/ML
12.5 INJECTION INTRAMUSCULAR; INTRAVENOUS; SUBCUTANEOUS PRN
OUTPATIENT
Start: 2023-07-21

## 2023-07-14 RX ORDER — HEPARIN SODIUM 100 [USP'U]/ML
500 INJECTION, SOLUTION INTRAVENOUS PRN
Status: DISCONTINUED | OUTPATIENT
Start: 2023-07-14 | End: 2023-07-16 | Stop reason: HOSPADM

## 2023-07-14 RX ORDER — SODIUM CHLORIDE 9 MG/ML
INJECTION, SOLUTION INTRAVENOUS CONTINUOUS
OUTPATIENT
Start: 2023-07-21

## 2023-07-14 RX ORDER — DIPHENHYDRAMINE HYDROCHLORIDE 50 MG/ML
50 INJECTION INTRAMUSCULAR; INTRAVENOUS ONCE
Status: COMPLETED | OUTPATIENT
Start: 2023-07-14 | End: 2023-07-14

## 2023-07-14 RX ORDER — ACETAMINOPHEN 325 MG/1
650 TABLET ORAL ONCE
Status: COMPLETED | OUTPATIENT
Start: 2023-07-14 | End: 2023-07-14

## 2023-07-14 RX ORDER — SODIUM CHLORIDE 0.9 % (FLUSH) 0.9 %
5-40 SYRINGE (ML) INJECTION PRN
Status: CANCELLED | OUTPATIENT
Start: 2023-07-21

## 2023-07-14 RX ADMIN — HYDROCORTISONE SODIUM SUCCINATE 100 MG: 100 INJECTION, POWDER, FOR SOLUTION INTRAMUSCULAR; INTRAVENOUS at 07:18

## 2023-07-14 RX ADMIN — DIPHENHYDRAMINE HYDROCHLORIDE 50 MG: 50 INJECTION, SOLUTION INTRAMUSCULAR; INTRAVENOUS at 07:18

## 2023-07-14 RX ADMIN — HEPARIN 500 UNITS: 100 SYRINGE at 09:28

## 2023-07-14 RX ADMIN — SODIUM CHLORIDE, PRESERVATIVE FREE 20 ML: 5 INJECTION INTRAVENOUS at 09:28

## 2023-07-14 RX ADMIN — IMMUNE GLOBULIN (HUMAN) 35 G: 10 INJECTION INTRAVENOUS; SUBCUTANEOUS at 07:39

## 2023-07-14 RX ADMIN — ACETAMINOPHEN 650 MG: 325 TABLET ORAL at 07:15

## 2023-07-18 DIAGNOSIS — M79.7 FIBROMYALGIA: Primary | ICD-10-CM

## 2023-07-18 RX ORDER — CARISOPRODOL 350 MG/1
350 TABLET ORAL 3 TIMES DAILY PRN
Qty: 90 TABLET | Refills: 1 | Status: SHIPPED | OUTPATIENT
Start: 2023-07-18 | End: 2023-08-17

## 2023-07-21 ENCOUNTER — HOSPITAL ENCOUNTER (OUTPATIENT)
Dept: INFUSION THERAPY | Age: 48
Setting detail: INFUSION SERIES
Discharge: HOME | End: 2023-07-21
Payer: MEDICARE

## 2023-07-21 VITALS
RESPIRATION RATE: 18 BRPM | HEART RATE: 59 BPM | SYSTOLIC BLOOD PRESSURE: 142 MMHG | DIASTOLIC BLOOD PRESSURE: 90 MMHG | BODY MASS INDEX: 30.62 KG/M2 | TEMPERATURE: 98 F | WEIGHT: 184 LBS | OXYGEN SATURATION: 100 %

## 2023-07-21 DIAGNOSIS — G35 MULTIPLE SCLEROSIS (HCC): Primary | ICD-10-CM

## 2023-07-21 PROCEDURE — 6370000000 HC RX 637 (ALT 250 FOR IP): Performed by: PSYCHIATRY & NEUROLOGY

## 2023-07-21 PROCEDURE — 6360000002 HC RX W HCPCS: Performed by: PSYCHIATRY & NEUROLOGY

## 2023-07-21 PROCEDURE — 96365 THER/PROPH/DIAG IV INF INIT: CPT

## 2023-07-21 PROCEDURE — 2580000003 HC RX 258: Performed by: PSYCHIATRY & NEUROLOGY

## 2023-07-21 PROCEDURE — 96375 TX/PRO/DX INJ NEW DRUG ADDON: CPT

## 2023-07-21 PROCEDURE — 96366 THER/PROPH/DIAG IV INF ADDON: CPT

## 2023-07-21 RX ORDER — HEPARIN SODIUM (PORCINE) LOCK FLUSH IV SOLN 100 UNIT/ML 100 UNIT/ML
500 SOLUTION INTRAVENOUS PRN
Status: DISCONTINUED | OUTPATIENT
Start: 2023-07-21 | End: 2023-07-23 | Stop reason: HOSPADM

## 2023-07-21 RX ORDER — ACETAMINOPHEN 325 MG/1
650 TABLET ORAL ONCE
Status: COMPLETED | OUTPATIENT
Start: 2023-07-21 | End: 2023-07-21

## 2023-07-21 RX ORDER — ACETAMINOPHEN 325 MG/1
650 TABLET ORAL ONCE
Status: CANCELLED | OUTPATIENT
Start: 2023-07-28 | End: 2023-07-28

## 2023-07-21 RX ORDER — SODIUM CHLORIDE 9 MG/ML
INJECTION, SOLUTION INTRAVENOUS CONTINUOUS
OUTPATIENT
Start: 2023-07-28

## 2023-07-21 RX ORDER — DIPHENHYDRAMINE HYDROCHLORIDE 50 MG/ML
50 INJECTION INTRAMUSCULAR; INTRAVENOUS ONCE
Status: CANCELLED
Start: 2023-07-28 | End: 2023-07-28

## 2023-07-21 RX ORDER — SODIUM CHLORIDE 0.9 % (FLUSH) 0.9 %
5-40 SYRINGE (ML) INJECTION PRN
Status: CANCELLED | OUTPATIENT
Start: 2023-07-28

## 2023-07-21 RX ORDER — ALBUTEROL SULFATE 90 UG/1
4 AEROSOL, METERED RESPIRATORY (INHALATION) PRN
OUTPATIENT
Start: 2023-07-28

## 2023-07-21 RX ORDER — ONDANSETRON 2 MG/ML
8 INJECTION INTRAMUSCULAR; INTRAVENOUS
OUTPATIENT
Start: 2023-07-28

## 2023-07-21 RX ORDER — HEPARIN SODIUM (PORCINE) LOCK FLUSH IV SOLN 100 UNIT/ML 100 UNIT/ML
500 SOLUTION INTRAVENOUS PRN
Status: CANCELLED
Start: 2023-07-28

## 2023-07-21 RX ORDER — DIPHENHYDRAMINE HYDROCHLORIDE 50 MG/ML
50 INJECTION INTRAMUSCULAR; INTRAVENOUS ONCE
Status: COMPLETED | OUTPATIENT
Start: 2023-07-21 | End: 2023-07-21

## 2023-07-21 RX ORDER — MEPERIDINE HYDROCHLORIDE 25 MG/ML
12.5 INJECTION INTRAMUSCULAR; INTRAVENOUS; SUBCUTANEOUS PRN
OUTPATIENT
Start: 2023-07-28

## 2023-07-21 RX ORDER — DIPHENHYDRAMINE HYDROCHLORIDE 50 MG/ML
50 INJECTION INTRAMUSCULAR; INTRAVENOUS
OUTPATIENT
Start: 2023-07-28

## 2023-07-21 RX ORDER — EPINEPHRINE 1 MG/ML
0.3 INJECTION, SOLUTION, CONCENTRATE INTRAVENOUS PRN
OUTPATIENT
Start: 2023-07-28

## 2023-07-21 RX ORDER — SODIUM CHLORIDE 0.9 % (FLUSH) 0.9 %
5-40 SYRINGE (ML) INJECTION PRN
Status: DISCONTINUED | OUTPATIENT
Start: 2023-07-21 | End: 2023-07-22 | Stop reason: HOSPADM

## 2023-07-21 RX ORDER — ACETAMINOPHEN 325 MG/1
650 TABLET ORAL
OUTPATIENT
Start: 2023-07-28

## 2023-07-21 RX ADMIN — HYDROCORTISONE SODIUM SUCCINATE 100 MG: 100 INJECTION, POWDER, FOR SOLUTION INTRAMUSCULAR; INTRAVENOUS at 06:52

## 2023-07-21 RX ADMIN — SODIUM CHLORIDE, PRESERVATIVE FREE 10 ML: 5 INJECTION INTRAVENOUS at 06:58

## 2023-07-21 RX ADMIN — SODIUM CHLORIDE, PRESERVATIVE FREE 20 ML: 5 INJECTION INTRAVENOUS at 09:59

## 2023-07-21 RX ADMIN — HEPARIN 500 UNITS: 100 SYRINGE at 09:59

## 2023-07-21 RX ADMIN — IMMUNE GLOBULIN (HUMAN) 35 G: 10 INJECTION INTRAVENOUS; SUBCUTANEOUS at 07:13

## 2023-07-21 RX ADMIN — ACETAMINOPHEN 650 MG: 325 TABLET ORAL at 06:51

## 2023-07-21 RX ADMIN — DIPHENHYDRAMINE HYDROCHLORIDE 50 MG: 50 INJECTION, SOLUTION INTRAMUSCULAR; INTRAVENOUS at 06:57

## 2023-07-21 RX ADMIN — SODIUM CHLORIDE, PRESERVATIVE FREE 10 ML: 5 INJECTION INTRAVENOUS at 06:56

## 2023-07-21 NOTE — DISCHARGE INSTRUCTIONS
problems, or \"thick blood\";  a stroke or blood clot;  kidney disease;  diabetes;  an infection called sepsis;  if you use estrogens (birth control pills or hormone replacement therapy);  if you have been on long-term bedrest; or  if you have a central intravenous (IV) catheter in place. You may need a dose adjustment if you are exposed to measles, or if you travel to an area where this disease is common. Tell your doctor if you are pregnant or breastfeeding. Immune globulin is made from donated human plasma and may contain viruses or other infectious agents. Donated plasma is tested and treated to reduce the risk of contamination, but there is still a small possibility it could transmit disease. Ask your doctor about any possible risk. How should I use immune globulin? Follow all directions on your prescription label and read all medication guides or instruction sheets. Your doctor may occasionally change your dose. Use the medicine exactly as directed. Immune globulin is given as an infusion into a vein, or injected under the skin using an infusion pump. A healthcare provider will give your first dose and may teach you how to properly use the medication by yourself. Do not inject immune globulin into a vein if you have been instructed to give the medicine as a subcutaneous injection (under the skin). How you give this medication, how often you use it, and the length of your infusion time will depend on the condition being treated. Read and carefully follow any Instructions for Use provided with your medicine. Ask your doctor or pharmacist if you don't understand all instructions. Prepare an injection only when you are ready to give it. Do not use if the medicine looks cloudy, has changed colors, or has particles in it. Call your pharmacist for new medicine. Do not shake the medication bottle or you may ruin the medicine. Immune globulin must be given slowly.  You may need to use several catheters to appropriate for any given patient. [facility] does not assume any responsibility for any aspect of healthcare administered with the aid of information [facility] provides. The information contained herein is not intended to cover all possible uses, directions, precautions, warnings, drug interactions, allergic reactions, or adverse effects. If you have questions about the drugs you are taking, check with your doctor, nurse or pharmacist.  Copyright 1559-7979 [address]: 4.02. Revision date: 2/26/2021. Care instructions adapted under license by [employer]. If you have questions about a medical condition or this instruction, always ask your healthcare professional. [address] any warranty or liability for your use of this information.

## 2023-07-21 NOTE — PROGRESS NOTES
B/p reading at 0850 was 135/91. Pt took Clonidine 0.1mg home med at 4084. Repeat b/p at 0903 was 145/98. Pt took another Clonidine 0.1 mg dose at 0918. B/p at 0921 was 152/97. B/p reading at 0938 was 158/98. B/p reading at 0957 was 142/90. Pt advised to monitor b/p at home and rest. Also advised pt to discussed today's b/p readings with Kiya Goetz NP pt's PCP.

## 2023-07-28 ENCOUNTER — HOSPITAL ENCOUNTER (OUTPATIENT)
Dept: INFUSION THERAPY | Age: 48
Setting detail: INFUSION SERIES
Discharge: HOME | End: 2023-07-28
Payer: MEDICARE

## 2023-07-28 VITALS
HEART RATE: 60 BPM | SYSTOLIC BLOOD PRESSURE: 147 MMHG | TEMPERATURE: 99 F | RESPIRATION RATE: 18 BRPM | DIASTOLIC BLOOD PRESSURE: 98 MMHG | OXYGEN SATURATION: 100 %

## 2023-07-28 DIAGNOSIS — G35 MULTIPLE SCLEROSIS (HCC): Primary | ICD-10-CM

## 2023-07-28 PROCEDURE — 2580000003 HC RX 258: Performed by: PSYCHIATRY & NEUROLOGY

## 2023-07-28 PROCEDURE — 6360000002 HC RX W HCPCS: Performed by: PSYCHIATRY & NEUROLOGY

## 2023-07-28 PROCEDURE — 96365 THER/PROPH/DIAG IV INF INIT: CPT

## 2023-07-28 PROCEDURE — 96366 THER/PROPH/DIAG IV INF ADDON: CPT

## 2023-07-28 PROCEDURE — 96375 TX/PRO/DX INJ NEW DRUG ADDON: CPT

## 2023-07-28 PROCEDURE — 6370000000 HC RX 637 (ALT 250 FOR IP): Performed by: PSYCHIATRY & NEUROLOGY

## 2023-07-28 RX ORDER — ACETAMINOPHEN 325 MG/1
650 TABLET ORAL
OUTPATIENT
Start: 2023-08-04

## 2023-07-28 RX ORDER — DIPHENHYDRAMINE HYDROCHLORIDE 50 MG/ML
50 INJECTION INTRAMUSCULAR; INTRAVENOUS ONCE
Status: COMPLETED | OUTPATIENT
Start: 2023-07-28 | End: 2023-07-28

## 2023-07-28 RX ORDER — EPINEPHRINE 1 MG/ML
0.3 INJECTION, SOLUTION, CONCENTRATE INTRAVENOUS PRN
OUTPATIENT
Start: 2023-08-04

## 2023-07-28 RX ORDER — DIPHENHYDRAMINE HYDROCHLORIDE 50 MG/ML
50 INJECTION INTRAMUSCULAR; INTRAVENOUS ONCE
Status: CANCELLED
Start: 2023-08-04 | End: 2023-08-04

## 2023-07-28 RX ORDER — ONDANSETRON 2 MG/ML
8 INJECTION INTRAMUSCULAR; INTRAVENOUS
OUTPATIENT
Start: 2023-08-04

## 2023-07-28 RX ORDER — HEPARIN SODIUM (PORCINE) LOCK FLUSH IV SOLN 100 UNIT/ML 100 UNIT/ML
500 SOLUTION INTRAVENOUS PRN
Status: CANCELLED
Start: 2023-08-04

## 2023-07-28 RX ORDER — SODIUM CHLORIDE 0.9 % (FLUSH) 0.9 %
5-40 SYRINGE (ML) INJECTION PRN
Status: DISCONTINUED | OUTPATIENT
Start: 2023-07-28 | End: 2023-07-29 | Stop reason: HOSPADM

## 2023-07-28 RX ORDER — SODIUM CHLORIDE 9 MG/ML
INJECTION, SOLUTION INTRAVENOUS CONTINUOUS
OUTPATIENT
Start: 2023-08-04

## 2023-07-28 RX ORDER — MEPERIDINE HYDROCHLORIDE 25 MG/ML
12.5 INJECTION INTRAMUSCULAR; INTRAVENOUS; SUBCUTANEOUS PRN
OUTPATIENT
Start: 2023-08-04

## 2023-07-28 RX ORDER — DIPHENHYDRAMINE HYDROCHLORIDE 50 MG/ML
50 INJECTION INTRAMUSCULAR; INTRAVENOUS
OUTPATIENT
Start: 2023-08-04

## 2023-07-28 RX ORDER — ACETAMINOPHEN 325 MG/1
650 TABLET ORAL ONCE
Status: CANCELLED | OUTPATIENT
Start: 2023-08-04 | End: 2023-08-04

## 2023-07-28 RX ORDER — HEPARIN 100 UNIT/ML
500 SYRINGE INTRAVENOUS PRN
Status: DISCONTINUED | OUTPATIENT
Start: 2023-07-28 | End: 2023-07-30 | Stop reason: HOSPADM

## 2023-07-28 RX ORDER — ACETAMINOPHEN 325 MG/1
650 TABLET ORAL ONCE
Status: COMPLETED | OUTPATIENT
Start: 2023-07-28 | End: 2023-07-28

## 2023-07-28 RX ORDER — SODIUM CHLORIDE 0.9 % (FLUSH) 0.9 %
5-40 SYRINGE (ML) INJECTION PRN
Status: CANCELLED | OUTPATIENT
Start: 2023-08-04

## 2023-07-28 RX ORDER — ALBUTEROL SULFATE 90 UG/1
4 AEROSOL, METERED RESPIRATORY (INHALATION) PRN
OUTPATIENT
Start: 2023-08-04

## 2023-07-28 RX ADMIN — HYDROCORTISONE SODIUM SUCCINATE 100 MG: 100 INJECTION, POWDER, FOR SOLUTION INTRAMUSCULAR; INTRAVENOUS at 06:57

## 2023-07-28 RX ADMIN — SODIUM CHLORIDE, PRESERVATIVE FREE 20 ML: 5 INJECTION INTRAVENOUS at 09:20

## 2023-07-28 RX ADMIN — Medication 500 UNITS: at 09:20

## 2023-07-28 RX ADMIN — DIPHENHYDRAMINE HYDROCHLORIDE 50 MG: 50 INJECTION INTRAMUSCULAR; INTRAVENOUS at 06:57

## 2023-07-28 RX ADMIN — ACETAMINOPHEN 650 MG: 325 TABLET ORAL at 06:56

## 2023-07-28 RX ADMIN — IMMUNE GLOBULIN (HUMAN) 35 G: 10 INJECTION INTRAVENOUS; SUBCUTANEOUS at 07:27

## 2023-07-28 NOTE — DISCHARGE INSTRUCTIONS
pharmacist.  Copyright 6292-1342 [employer] Version: 6.02. Revision date: 2/26/2021. Care instructions adapted under license by [facility] ([facility]). If you have questions about a medical condition or this instruction, always ask your healthcare professional. [address] any warranty or liability for your use of this information.

## 2023-08-04 ENCOUNTER — HOSPITAL ENCOUNTER (OUTPATIENT)
Dept: INFUSION THERAPY | Age: 48
Setting detail: INFUSION SERIES
Discharge: HOME | End: 2023-08-04
Payer: MEDICARE

## 2023-08-04 VITALS
TEMPERATURE: 98.6 F | DIASTOLIC BLOOD PRESSURE: 88 MMHG | SYSTOLIC BLOOD PRESSURE: 133 MMHG | OXYGEN SATURATION: 100 % | RESPIRATION RATE: 18 BRPM | HEART RATE: 61 BPM | BODY MASS INDEX: 30.04 KG/M2 | WEIGHT: 180.5 LBS

## 2023-08-04 DIAGNOSIS — G35 MULTIPLE SCLEROSIS (HCC): Primary | ICD-10-CM

## 2023-08-04 PROCEDURE — 96366 THER/PROPH/DIAG IV INF ADDON: CPT

## 2023-08-04 PROCEDURE — 6370000000 HC RX 637 (ALT 250 FOR IP): Performed by: PSYCHIATRY & NEUROLOGY

## 2023-08-04 PROCEDURE — 96365 THER/PROPH/DIAG IV INF INIT: CPT

## 2023-08-04 PROCEDURE — 96375 TX/PRO/DX INJ NEW DRUG ADDON: CPT

## 2023-08-04 PROCEDURE — 6360000002 HC RX W HCPCS: Performed by: PSYCHIATRY & NEUROLOGY

## 2023-08-04 PROCEDURE — 2580000003 HC RX 258: Performed by: PSYCHIATRY & NEUROLOGY

## 2023-08-04 RX ORDER — HEPARIN SODIUM (PORCINE) LOCK FLUSH IV SOLN 100 UNIT/ML 100 UNIT/ML
500 SOLUTION INTRAVENOUS PRN
Start: 2023-08-11

## 2023-08-04 RX ORDER — SODIUM CHLORIDE 9 MG/ML
INJECTION, SOLUTION INTRAVENOUS CONTINUOUS
OUTPATIENT
Start: 2023-08-11

## 2023-08-04 RX ORDER — DIPHENHYDRAMINE HYDROCHLORIDE 50 MG/ML
50 INJECTION INTRAMUSCULAR; INTRAVENOUS
OUTPATIENT
Start: 2023-08-11

## 2023-08-04 RX ORDER — ALBUTEROL SULFATE 90 UG/1
4 AEROSOL, METERED RESPIRATORY (INHALATION) PRN
OUTPATIENT
Start: 2023-08-11

## 2023-08-04 RX ORDER — ACETAMINOPHEN 325 MG/1
650 TABLET ORAL
OUTPATIENT
Start: 2023-08-11

## 2023-08-04 RX ORDER — ONDANSETRON 2 MG/ML
8 INJECTION INTRAMUSCULAR; INTRAVENOUS
OUTPATIENT
Start: 2023-08-11

## 2023-08-04 RX ORDER — SODIUM CHLORIDE 0.9 % (FLUSH) 0.9 %
5-40 SYRINGE (ML) INJECTION PRN
Status: DISCONTINUED | OUTPATIENT
Start: 2023-08-04 | End: 2023-08-05 | Stop reason: HOSPADM

## 2023-08-04 RX ORDER — DIPHENHYDRAMINE HYDROCHLORIDE 50 MG/ML
50 INJECTION INTRAMUSCULAR; INTRAVENOUS ONCE
Start: 2023-08-11 | End: 2023-08-11

## 2023-08-04 RX ORDER — ACETAMINOPHEN 325 MG/1
650 TABLET ORAL ONCE
OUTPATIENT
Start: 2023-08-11 | End: 2023-08-11

## 2023-08-04 RX ORDER — SODIUM CHLORIDE 0.9 % (FLUSH) 0.9 %
5-40 SYRINGE (ML) INJECTION PRN
OUTPATIENT
Start: 2023-08-11

## 2023-08-04 RX ORDER — MEPERIDINE HYDROCHLORIDE 25 MG/ML
12.5 INJECTION INTRAMUSCULAR; INTRAVENOUS; SUBCUTANEOUS PRN
OUTPATIENT
Start: 2023-08-11

## 2023-08-04 RX ORDER — ACETAMINOPHEN 325 MG/1
650 TABLET ORAL ONCE
Status: COMPLETED | OUTPATIENT
Start: 2023-08-04 | End: 2023-08-04

## 2023-08-04 RX ORDER — DIPHENHYDRAMINE HYDROCHLORIDE 50 MG/ML
50 INJECTION INTRAMUSCULAR; INTRAVENOUS ONCE
Status: COMPLETED | OUTPATIENT
Start: 2023-08-04 | End: 2023-08-04

## 2023-08-04 RX ORDER — HEPARIN SODIUM (PORCINE) LOCK FLUSH IV SOLN 100 UNIT/ML 100 UNIT/ML
500 SOLUTION INTRAVENOUS PRN
Status: DISCONTINUED | OUTPATIENT
Start: 2023-08-04 | End: 2023-08-06 | Stop reason: HOSPADM

## 2023-08-04 RX ORDER — EPINEPHRINE 1 MG/ML
0.3 INJECTION, SOLUTION, CONCENTRATE INTRAVENOUS PRN
OUTPATIENT
Start: 2023-08-11

## 2023-08-04 RX ADMIN — HYDROCORTISONE SODIUM SUCCINATE 100 MG: 100 INJECTION, POWDER, FOR SOLUTION INTRAMUSCULAR; INTRAVENOUS at 06:55

## 2023-08-04 RX ADMIN — ACETAMINOPHEN 650 MG: 325 TABLET ORAL at 06:55

## 2023-08-04 RX ADMIN — Medication 500 UNITS: at 08:45

## 2023-08-04 RX ADMIN — IMMUNE GLOBULIN (HUMAN) 35 G: 10 INJECTION INTRAVENOUS; SUBCUTANEOUS at 07:14

## 2023-08-04 RX ADMIN — Medication 20 ML: at 09:00

## 2023-08-04 RX ADMIN — DIPHENHYDRAMINE HYDROCHLORIDE 50 MG: 50 INJECTION INTRAMUSCULAR; INTRAVENOUS at 06:54

## 2023-08-04 NOTE — DISCHARGE INSTRUCTIONS
immune globulin (intravenous and subcutaneous)  Pronunciation:  dinora LACEY foley  Brand:  Gammagard Liquid, Gammaked, Gamunex-C  What is the most important information I should know about immune globulin? This medicine can cause blood clots. The risk is highest in older adults or in people who have had blood clots, heart problems, or blood circulation problems. Blood clots are also more likely during long-term bedrest, while using birth control pills or hormone replacement therapy, or while having a central intravenous (IV) catheter in place. Call your doctor at once if you have chest pain, trouble breathing, fast heartbeats, numbness or weakness, or swelling and warmth or discoloration in an arm or leg. This medicine can also harm your kidneys, especially if you have kidney disease or if you also use certain medicines. Tell your doctor right away if you have signs of kidney problems, such as swelling, rapid weight gain, and little or no urination. What is immune globulin? Immune globulin intravenous and subcutaneous (for injection into a vein or under the skin) is used to treat primary immunodeficiency. Immune globulin is also used to increase platelets (blood clotting cells) in people with idiopathic thrombocytopenic purpura. Immune globulin is also used to treat certain debilitating nerve disorders that cause muscle weakness and can affect daily activities. Immune globulin may also be used for purposes not listed in this medication guide. What should I discuss with my healthcare provider before using immune globulin? You should not use this medicine if:  you have had an allergic reaction to an immune globulin or blood product; or  you have immune globulin A (IgA) deficiency with antibody to IgA. Immune globulin can cause blood clots or kidney problems, especially in older adults or in people with certain conditions.  Tell your doctor if you have ever had:  heart problems, blood circulation problems, or \"thick blood\";  a stroke or blood clot;  kidney disease;  diabetes;  an infection called sepsis;  if you use estrogens (birth control pills or hormone replacement therapy);  if you have been on long-term bedrest; or  if you have a central intravenous (IV) catheter in place. You may need a dose adjustment if you are exposed to measles, or if you travel to an area where this disease is common. Tell your doctor if you are pregnant or breastfeeding. Immune globulin is made from donated human plasma and may contain viruses or other infectious agents. Donated plasma is tested and treated to reduce the risk of contamination, but there is still a small possibility it could transmit disease. Ask your doctor about any possible risk. How should I use immune globulin? Follow all directions on your prescription label and read all medication guides or instruction sheets. Your doctor may occasionally change your dose. Use the medicine exactly as directed. Immune globulin is given as an infusion into a vein, or injected under the skin using an infusion pump. A healthcare provider will give your first dose and may teach you how to properly use the medication by yourself. Do not inject immune globulin into a vein if you have been instructed to give the medicine as a subcutaneous injection (under the skin). How you give this medication, how often you use it, and the length of your infusion time will depend on the condition being treated. Read and carefully follow any Instructions for Use provided with your medicine. Ask your doctor or pharmacist if you don't understand all instructions. Prepare an injection only when you are ready to give it. Do not use if the medicine looks cloudy, has changed colors, or has particles in it. Call your pharmacist for new medicine. Do not shake the medication bottle or you may ruin the medicine. Immune globulin must be given slowly.  You may need to use several catheters to

## 2023-08-04 NOTE — PROGRESS NOTES
[facility] Port accessed using sterile technique. Brisk blood return noted. Pt received Benadryl & Solu-Cortef for pre-meds. Pt then received 35g Gamunex-C. Tolerated well.     Electronically signed by Bronwyn Jeff RN on 8/4/2023 at 8:58 AM

## 2023-08-08 RX ORDER — LIDOCAINE AND PRILOCAINE 25; 25 MG/G; MG/G
CREAM TOPICAL
Qty: 2 EACH | Refills: 5 | Status: SHIPPED | OUTPATIENT
Start: 2023-08-08 | End: 2023-08-09 | Stop reason: SDUPTHER

## 2023-08-09 RX ORDER — LIDOCAINE AND PRILOCAINE 25; 25 MG/G; MG/G
CREAM TOPICAL
Qty: 2 EACH | Refills: 5 | Status: SHIPPED | OUTPATIENT
Start: 2023-08-09

## 2023-08-09 NOTE — TELEPHONE ENCOUNTER
The last script was sent to a specialty pharmacy and not her local pharmacy. They need a new script.

## 2023-08-12 ENCOUNTER — APPOINTMENT (OUTPATIENT)
Dept: CT IMAGING | Age: 48
End: 2023-08-12
Payer: MEDICARE

## 2023-08-12 ENCOUNTER — HOSPITAL ENCOUNTER (EMERGENCY)
Age: 48
Discharge: HOME | End: 2023-08-12
Payer: MEDICARE

## 2023-08-12 VITALS
BODY MASS INDEX: 29.95 KG/M2 | OXYGEN SATURATION: 96 % | RESPIRATION RATE: 18 BRPM | TEMPERATURE: 98.1 F | WEIGHT: 180 LBS | DIASTOLIC BLOOD PRESSURE: 84 MMHG | HEART RATE: 61 BPM | SYSTOLIC BLOOD PRESSURE: 142 MMHG

## 2023-08-12 DIAGNOSIS — R10.13 EPIGASTRIC PAIN: ICD-10-CM

## 2023-08-12 DIAGNOSIS — R11.0 NAUSEA: Primary | ICD-10-CM

## 2023-08-12 LAB
ALBUMIN SERPL-MCNC: 3.8 G/DL (ref 3.5–5.2)
ALP SERPL-CCNC: 63 U/L (ref 35–104)
ALT SERPL-CCNC: 15 U/L (ref 5–33)
ANION GAP SERPL CALCULATED.3IONS-SCNC: 12 MMOL/L (ref 7–19)
AST SERPL-CCNC: 33 U/L (ref 5–32)
B PARAP IS1001 DNA NPH QL NAA+NON-PROBE: NOT DETECTED
B PERT.PT PRMT NPH QL NAA+NON-PROBE: NOT DETECTED
BASOPHILS # BLD: 0 K/UL (ref 0–0.2)
BASOPHILS NFR BLD: 0.6 % (ref 0–1)
BILIRUB SERPL-MCNC: <0.2 MG/DL (ref 0.2–1.2)
BILIRUB UR QL STRIP: NEGATIVE
BUN SERPL-MCNC: 10 MG/DL (ref 6–20)
C PNEUM DNA NPH QL NAA+NON-PROBE: NOT DETECTED
CALCIUM SERPL-MCNC: 9 MG/DL (ref 8.6–10)
CHLORIDE SERPL-SCNC: 91 MMOL/L (ref 98–111)
CLARITY UR: CLEAR
CO2 SERPL-SCNC: 23 MMOL/L (ref 22–29)
COLOR UR: YELLOW
CREAT SERPL-MCNC: 0.7 MG/DL (ref 0.5–0.9)
EOSINOPHIL # BLD: 0 K/UL (ref 0–0.6)
EOSINOPHIL NFR BLD: 0.9 % (ref 0–5)
ERYTHROCYTE [DISTWIDTH] IN BLOOD BY AUTOMATED COUNT: 13.9 % (ref 11.5–14.5)
FLUAV RNA NPH QL NAA+NON-PROBE: NOT DETECTED
FLUBV RNA NPH QL NAA+NON-PROBE: NOT DETECTED
GLUCOSE SERPL-MCNC: 61 MG/DL (ref 74–109)
GLUCOSE UR STRIP.AUTO-MCNC: NEGATIVE MG/DL
HADV DNA NPH QL NAA+NON-PROBE: NOT DETECTED
HCG UR QL: NEGATIVE
HCOV 229E RNA NPH QL NAA+NON-PROBE: NOT DETECTED
HCOV HKU1 RNA NPH QL NAA+NON-PROBE: NOT DETECTED
HCOV NL63 RNA NPH QL NAA+NON-PROBE: NOT DETECTED
HCOV OC43 RNA NPH QL NAA+NON-PROBE: NOT DETECTED
HCT VFR BLD AUTO: 37.7 % (ref 37–47)
HGB BLD-MCNC: 13.2 G/DL (ref 12–16)
HGB UR STRIP.AUTO-MCNC: NEGATIVE MG/L
HMPV RNA NPH QL NAA+NON-PROBE: NOT DETECTED
HPIV1 RNA NPH QL NAA+NON-PROBE: NOT DETECTED
HPIV2 RNA NPH QL NAA+NON-PROBE: NOT DETECTED
HPIV3 RNA NPH QL NAA+NON-PROBE: NOT DETECTED
HPIV4 RNA NPH QL NAA+NON-PROBE: NOT DETECTED
IMM GRANULOCYTES # BLD: 0 K/UL
KETONES UR STRIP.AUTO-MCNC: NEGATIVE MG/DL
LEUKOCYTE ESTERASE UR QL STRIP.AUTO: NEGATIVE
LIPASE SERPL-CCNC: 35 U/L (ref 13–60)
LYMPHOCYTES # BLD: 1.4 K/UL (ref 1.1–4.5)
LYMPHOCYTES NFR BLD: 42 % (ref 20–40)
M PNEUMO DNA NPH QL NAA+NON-PROBE: NOT DETECTED
MCH RBC QN AUTO: 31.1 PG (ref 27–31)
MCHC RBC AUTO-ENTMCNC: 35 G/DL (ref 33–37)
MCV RBC AUTO: 88.7 FL (ref 81–99)
MONOCYTES # BLD: 0.4 K/UL (ref 0–0.9)
MONOCYTES NFR BLD: 12.1 % (ref 0–10)
NEUTROPHILS # BLD: 1.5 K/UL (ref 1.5–7.5)
NEUTS SEG NFR BLD: 44.1 % (ref 50–65)
NITRITE UR QL STRIP.AUTO: NEGATIVE
PH UR STRIP.AUTO: 7 [PH] (ref 5–8)
PLATELET # BLD AUTO: 187 K/UL (ref 130–400)
PMV BLD AUTO: 10.8 FL (ref 9.4–12.3)
POTASSIUM SERPL-SCNC: 4.2 MMOL/L (ref 3.5–5)
PROT SERPL-MCNC: 7.9 G/DL (ref 6.6–8.7)
PROT UR STRIP.AUTO-MCNC: NEGATIVE MG/DL
RBC # BLD AUTO: 4.25 M/UL (ref 4.2–5.4)
RSV RNA NPH QL NAA+NON-PROBE: NOT DETECTED
RV+EV RNA NPH QL NAA+NON-PROBE: NOT DETECTED
SARS-COV-2 RNA NPH QL NAA+NON-PROBE: NOT DETECTED
SODIUM SERPL-SCNC: 126 MMOL/L (ref 136–145)
SP GR UR STRIP.AUTO: 1.01 (ref 1–1.03)
UROBILINOGEN UR STRIP.AUTO-MCNC: 0.2 E.U./DL
WBC # BLD AUTO: 3.4 K/UL (ref 4.8–10.8)

## 2023-08-12 PROCEDURE — 2580000003 HC RX 258: Performed by: NURSE PRACTITIONER

## 2023-08-12 PROCEDURE — 36415 COLL VENOUS BLD VENIPUNCTURE: CPT

## 2023-08-12 PROCEDURE — 80053 COMPREHEN METABOLIC PANEL: CPT

## 2023-08-12 PROCEDURE — 85025 COMPLETE CBC W/AUTO DIFF WBC: CPT

## 2023-08-12 PROCEDURE — 0202U NFCT DS 22 TRGT SARS-COV-2: CPT

## 2023-08-12 PROCEDURE — 74177 CT ABD & PELVIS W/CONTRAST: CPT

## 2023-08-12 PROCEDURE — 6360000002 HC RX W HCPCS: Performed by: NURSE PRACTITIONER

## 2023-08-12 PROCEDURE — 96361 HYDRATE IV INFUSION ADD-ON: CPT

## 2023-08-12 PROCEDURE — 96375 TX/PRO/DX INJ NEW DRUG ADDON: CPT

## 2023-08-12 PROCEDURE — 96374 THER/PROPH/DIAG INJ IV PUSH: CPT

## 2023-08-12 PROCEDURE — C9113 INJ PANTOPRAZOLE SODIUM, VIA: HCPCS | Performed by: NURSE PRACTITIONER

## 2023-08-12 PROCEDURE — 81003 URINALYSIS AUTO W/O SCOPE: CPT

## 2023-08-12 PROCEDURE — 6360000004 HC RX CONTRAST MEDICATION: Performed by: NURSE PRACTITIONER

## 2023-08-12 PROCEDURE — 99285 EMERGENCY DEPT VISIT HI MDM: CPT

## 2023-08-12 PROCEDURE — 83690 ASSAY OF LIPASE: CPT

## 2023-08-12 PROCEDURE — 84703 CHORIONIC GONADOTROPIN ASSAY: CPT

## 2023-08-12 PROCEDURE — 93005 ELECTROCARDIOGRAM TRACING: CPT | Performed by: NURSE PRACTITIONER

## 2023-08-12 RX ORDER — 0.9 % SODIUM CHLORIDE 0.9 %
500 INTRAVENOUS SOLUTION INTRAVENOUS ONCE
Status: COMPLETED | OUTPATIENT
Start: 2023-08-12 | End: 2023-08-12

## 2023-08-12 RX ORDER — METOCLOPRAMIDE HYDROCHLORIDE 5 MG/ML
10 INJECTION INTRAMUSCULAR; INTRAVENOUS ONCE
Status: COMPLETED | OUTPATIENT
Start: 2023-08-12 | End: 2023-08-12

## 2023-08-12 RX ORDER — CLONIDINE HYDROCHLORIDE 0.1 MG/1
0.1 TABLET ORAL ONCE
Status: DISCONTINUED | OUTPATIENT
Start: 2023-08-12 | End: 2023-08-12

## 2023-08-12 RX ORDER — HYDROMORPHONE HYDROCHLORIDE 1 MG/ML
0.5 INJECTION, SOLUTION INTRAMUSCULAR; INTRAVENOUS; SUBCUTANEOUS ONCE
Status: COMPLETED | OUTPATIENT
Start: 2023-08-12 | End: 2023-08-12

## 2023-08-12 RX ORDER — ONDANSETRON 2 MG/ML
4 INJECTION INTRAMUSCULAR; INTRAVENOUS ONCE
Status: COMPLETED | OUTPATIENT
Start: 2023-08-12 | End: 2023-08-12

## 2023-08-12 RX ORDER — HYDROMORPHONE HYDROCHLORIDE 1 MG/ML
0.5 INJECTION, SOLUTION INTRAMUSCULAR; INTRAVENOUS; SUBCUTANEOUS ONCE
Status: DISCONTINUED | OUTPATIENT
Start: 2023-08-12 | End: 2023-08-12

## 2023-08-12 RX ADMIN — IOPAMIDOL 90 ML: 755 INJECTION, SOLUTION INTRAVENOUS at 20:34

## 2023-08-12 RX ADMIN — METOCLOPRAMIDE HYDROCHLORIDE 10 MG: 5 INJECTION INTRAMUSCULAR; INTRAVENOUS at 20:54

## 2023-08-12 RX ADMIN — SODIUM CHLORIDE 500 ML: 9 INJECTION, SOLUTION INTRAVENOUS at 19:27

## 2023-08-12 RX ADMIN — HYDROMORPHONE HYDROCHLORIDE 0.5 MG: 1 INJECTION, SOLUTION INTRAMUSCULAR; INTRAVENOUS; SUBCUTANEOUS at 21:18

## 2023-08-12 RX ADMIN — SODIUM CHLORIDE, PRESERVATIVE FREE 40 MG: 5 INJECTION INTRAVENOUS at 19:28

## 2023-08-12 RX ADMIN — ONDANSETRON 4 MG: 2 INJECTION INTRAMUSCULAR; INTRAVENOUS at 19:28

## 2023-08-12 ASSESSMENT — PAIN SCALES - GENERAL
PAINLEVEL_OUTOF10: 6
PAINLEVEL_OUTOF10: 9

## 2023-08-12 ASSESSMENT — PAIN DESCRIPTION - LOCATION: LOCATION: ABDOMEN;BACK

## 2023-08-12 ASSESSMENT — ENCOUNTER SYMPTOMS
SHORTNESS OF BREATH: 1
DIARRHEA: 0
NAUSEA: 1
ABDOMINAL PAIN: 1
VOMITING: 0

## 2023-08-12 ASSESSMENT — PAIN - FUNCTIONAL ASSESSMENT: PAIN_FUNCTIONAL_ASSESSMENT: 0-10

## 2023-08-13 NOTE — ED NOTES
PT given [place] [employer] per verbal order LYSSA Chambers   Blood glucose in [address] [place] [address]  08/12/23 2024

## 2023-08-13 NOTE — ED PROVIDER NOTES
[facility] EMERGENCY DEPT  eMERGENCY dEPARTMENT eNCOUnter      Pt Name: Shawn Valdes  MRN: [id number]  [facility] 1975  Date of evaluation: 8/12/2023  Provider: LYSSA Griffin    CHIEF COMPLAINT       Chief Complaint   Patient presents with    Abdominal Pain     Patient has history of abdominal surgeries, and hernia     Nausea     For 3 delgado, unable to really eat anything          HISTORY OF PRESENT ILLNESS   (Location/Symptom, Timing/Onset,Context/Setting, Quality, Duration, Modifying Factors, Severity)  Note limiting factors. Shawn Valdes is a 41 y.o. female who presents to the emergency department with upper abd and back pain x 2 days. + nausea. No vomiting. Not been eating. Increased urination. Has MS and gets IVIG infusions weekly. Missed last week. Is treated by Dr Keely Hartman. History of a gastric bypass and hernia repairs    The history is provided by the patient. Abdominal Pain  Pain location:  Epigastric  Pain quality: aching    Pain radiates to:  Back  Pain severity:  Moderate  Onset quality:  Sudden  Duration:  2 days  Timing:  Constant  Worsened by:  Eating  Associated symptoms: anorexia, nausea and shortness of breath    Associated symptoms: no diarrhea, no fever and no vomiting    Risk factors: multiple surgeries    Risk factors comment:  MS    NursingNotes were reviewed. REVIEW OF SYSTEMS    (2-9 systems for level 4, 10 or more for level 5)     Review of Systems   Constitutional:  Negative for fever. Respiratory:  Positive for shortness of breath. Gastrointestinal:  Positive for abdominal pain, anorexia and nausea. Negative for diarrhea and vomiting. Except as noted above the remainder of the review of systems was reviewed and negative.        PAST MEDICAL HISTORY     Past Medical History:   Diagnosis Date    Arthritis     Bipolar 1 disorder ([address])     Convulsions ([address]) 07/20/2020    Fibromyalgia     GERD (gastroesophageal reflux disease)     Hypertension     Lupus ([address]) [place]      RADIOLOGY:   Non-plain filmimages such as CT, Ultrasound and MRI are read by the radiologist. Plain radiographic images are visualized and preliminarily interpreted by the emergency physician with the below findings:      Interpretation per the Radiologist below, if available at the time of this note:    CT ABDOMEN PELVIS W IV CONTRAST Additional Contrast? None   Final Result   1. No hydronephrosis. Ureters are poorly seen. 2.  Atherosclerotic disease. 3.  Postop changes of the stomach and small bowel. A few portions of the appendix are seen and appear normal.  Scattered levels of both small and large bowel gaseous distension are present. There is no convincing evidence of bowel obstruction. No    ascites or free air. If symptoms persist or worsen, follow-up abdominal imaging can be considered. 4.  There is a fatty hernia which appears to be from the umbilical level having a sagittal neck of   about 1 cm and no signs of complication. - - - - -        All CT scans are performed using dose optimization techniques as appropriate to the performed exam and include    at least one of the following: Automated exposure control, adjustment of the mA and/or kV according to size, and the use of iterative reconstruction technique. ______________________________________    Electronically signed by: Frederick Maza M.D.    Date:     08/12/2023   Time:    20:42             ED BEDSIDEULTRASOUND:   Performed by ED Physician -none    LABS:  Labs Reviewed   CBC WITH AUTO DIFFERENTIAL - Abnormal; Notable for the following components:       Result Value    WBC 3.4 (*)     MCH 31.1 (*)     Neutrophils % 44.1 (*)     Lymphocytes % 42.0 (*)     Monocytes % 12.1 (*)     All other components within normal limits   COMPREHENSIVE METABOLIC PANEL - Abnormal; Notable for the following components:    Sodium 126 (*)     Chloride 91 (*)     Glucose 61 (*)     AST 33 (*)     All other components within normal limits

## 2023-08-14 LAB
EKG P AXIS: 74 DEGREES
EKG P-R INTERVAL: 152 MS
EKG Q-T INTERVAL: 434 MS
EKG QRS DURATION: 86 MS
EKG QTC CALCULATION (BAZETT): 427 MS
EKG T AXIS: 48 DEGREES

## 2023-08-14 PROCEDURE — 93010 ELECTROCARDIOGRAM REPORT: CPT | Performed by: INTERNAL MEDICINE

## 2023-08-15 ENCOUNTER — APPOINTMENT (OUTPATIENT)
Dept: GENERAL RADIOLOGY | Age: 48
End: 2023-08-15
Payer: MEDICARE

## 2023-08-15 ENCOUNTER — HOSPITAL ENCOUNTER (EMERGENCY)
Age: 48
Discharge: HOME | End: 2023-08-15
Attending: EMERGENCY MEDICINE
Payer: MEDICARE

## 2023-08-15 ENCOUNTER — HOSPITAL ENCOUNTER (OUTPATIENT)
Dept: INFUSION THERAPY | Age: 48
Setting detail: INFUSION SERIES
Discharge: HOME | End: 2023-08-15
Payer: MEDICARE

## 2023-08-15 ENCOUNTER — APPOINTMENT (OUTPATIENT)
Dept: CT IMAGING | Age: 48
End: 2023-08-15
Payer: MEDICARE

## 2023-08-15 VITALS
HEART RATE: 58 BPM | DIASTOLIC BLOOD PRESSURE: 89 MMHG | TEMPERATURE: 97.7 F | SYSTOLIC BLOOD PRESSURE: 143 MMHG | OXYGEN SATURATION: 99 % | RESPIRATION RATE: 17 BRPM

## 2023-08-15 VITALS
OXYGEN SATURATION: 100 % | DIASTOLIC BLOOD PRESSURE: 102 MMHG | RESPIRATION RATE: 18 BRPM | HEART RATE: 59 BPM | TEMPERATURE: 97.4 F | SYSTOLIC BLOOD PRESSURE: 157 MMHG

## 2023-08-15 DIAGNOSIS — E87.1 HYPONATREMIA: Primary | ICD-10-CM

## 2023-08-15 DIAGNOSIS — G35 MULTIPLE SCLEROSIS (HCC): Primary | ICD-10-CM

## 2023-08-15 DIAGNOSIS — R07.89 CHEST WALL PAIN: ICD-10-CM

## 2023-08-15 LAB
ALBUMIN SERPL-MCNC: 3.4 G/DL (ref 3.5–5.2)
ALP SERPL-CCNC: 60 U/L (ref 35–104)
ALT SERPL-CCNC: 13 U/L (ref 5–33)
ANION GAP SERPL CALCULATED.3IONS-SCNC: 10 MMOL/L (ref 7–19)
AST SERPL-CCNC: 29 U/L (ref 5–32)
BASOPHILS # BLD: 0 K/UL (ref 0–0.2)
BASOPHILS NFR BLD: 0.3 % (ref 0–1)
BILIRUB SERPL-MCNC: <0.2 MG/DL (ref 0.2–1.2)
BUN SERPL-MCNC: 9 MG/DL (ref 6–20)
CALCIUM SERPL-MCNC: 8.3 MG/DL (ref 8.6–10)
CHLORIDE SERPL-SCNC: 89 MMOL/L (ref 98–111)
CO2 SERPL-SCNC: 23 MMOL/L (ref 22–29)
CREAT SERPL-MCNC: 0.6 MG/DL (ref 0.5–0.9)
EOSINOPHIL # BLD: 0 K/UL (ref 0–0.6)
EOSINOPHIL NFR BLD: 1 % (ref 0–5)
ERYTHROCYTE [DISTWIDTH] IN BLOOD BY AUTOMATED COUNT: 13.5 % (ref 11.5–14.5)
GLUCOSE SERPL-MCNC: 85 MG/DL (ref 74–109)
HCT VFR BLD AUTO: 34.7 % (ref 37–47)
HGB BLD-MCNC: 12.4 G/DL (ref 12–16)
IMM GRANULOCYTES # BLD: 0 K/UL
LYMPHOCYTES # BLD: 1.4 K/UL (ref 1.1–4.5)
LYMPHOCYTES NFR BLD: 46.5 % (ref 20–40)
MCH RBC QN AUTO: 31.1 PG (ref 27–31)
MCHC RBC AUTO-ENTMCNC: 35.7 G/DL (ref 33–37)
MCV RBC AUTO: 87 FL (ref 81–99)
MONOCYTES # BLD: 0.3 K/UL (ref 0–0.9)
MONOCYTES NFR BLD: 8.7 % (ref 0–10)
NEUTROPHILS # BLD: 1.4 K/UL (ref 1.5–7.5)
NEUTS SEG NFR BLD: 43.5 % (ref 50–65)
PLATELET # BLD AUTO: 149 K/UL (ref 130–400)
PMV BLD AUTO: 10.3 FL (ref 9.4–12.3)
POTASSIUM SERPL-SCNC: 3.9 MMOL/L (ref 3.5–5)
PROT SERPL-MCNC: 7.3 G/DL (ref 6.6–8.7)
RBC # BLD AUTO: 3.99 M/UL (ref 4.2–5.4)
SODIUM SERPL-SCNC: 122 MMOL/L (ref 136–145)
WBC # BLD AUTO: 3.1 K/UL (ref 4.8–10.8)

## 2023-08-15 PROCEDURE — 85025 COMPLETE CBC W/AUTO DIFF WBC: CPT

## 2023-08-15 PROCEDURE — 96365 THER/PROPH/DIAG IV INF INIT: CPT

## 2023-08-15 PROCEDURE — 6360000002 HC RX W HCPCS: Performed by: PSYCHIATRY & NEUROLOGY

## 2023-08-15 PROCEDURE — 71045 X-RAY EXAM CHEST 1 VIEW: CPT

## 2023-08-15 PROCEDURE — 96375 TX/PRO/DX INJ NEW DRUG ADDON: CPT

## 2023-08-15 PROCEDURE — 2580000003 HC RX 258: Performed by: PSYCHIATRY & NEUROLOGY

## 2023-08-15 PROCEDURE — 80053 COMPREHEN METABOLIC PANEL: CPT

## 2023-08-15 PROCEDURE — 71260 CT THORAX DX C+: CPT

## 2023-08-15 PROCEDURE — 6370000000 HC RX 637 (ALT 250 FOR IP): Performed by: PSYCHIATRY & NEUROLOGY

## 2023-08-15 PROCEDURE — 36415 COLL VENOUS BLD VENIPUNCTURE: CPT

## 2023-08-15 PROCEDURE — 96366 THER/PROPH/DIAG IV INF ADDON: CPT

## 2023-08-15 PROCEDURE — 6360000004 HC RX CONTRAST MEDICATION: Performed by: EMERGENCY MEDICINE

## 2023-08-15 RX ORDER — SODIUM CHLORIDE 9 MG/ML
INJECTION, SOLUTION INTRAVENOUS CONTINUOUS
OUTPATIENT
Start: 2023-08-18

## 2023-08-15 RX ORDER — SODIUM CHLORIDE 0.9 % (FLUSH) 0.9 %
5-40 SYRINGE (ML) INJECTION PRN
Status: DISCONTINUED | OUTPATIENT
Start: 2023-08-15 | End: 2023-08-16 | Stop reason: HOSPADM

## 2023-08-15 RX ORDER — DIPHENHYDRAMINE HYDROCHLORIDE 50 MG/ML
50 INJECTION INTRAMUSCULAR; INTRAVENOUS
OUTPATIENT
Start: 2023-08-18

## 2023-08-15 RX ORDER — ACETAMINOPHEN 325 MG/1
650 TABLET ORAL ONCE
Status: COMPLETED | OUTPATIENT
Start: 2023-08-15 | End: 2023-08-15

## 2023-08-15 RX ORDER — ALBUTEROL SULFATE 90 UG/1
4 AEROSOL, METERED RESPIRATORY (INHALATION) PRN
OUTPATIENT
Start: 2023-08-18

## 2023-08-15 RX ORDER — DIPHENHYDRAMINE HYDROCHLORIDE 50 MG/ML
50 INJECTION INTRAMUSCULAR; INTRAVENOUS ONCE
Status: CANCELLED
Start: 2023-08-18 | End: 2023-08-18

## 2023-08-15 RX ORDER — HEPARIN SODIUM (PORCINE) LOCK FLUSH IV SOLN 100 UNIT/ML 100 UNIT/ML
500 SOLUTION INTRAVENOUS PRN
Status: DISCONTINUED | OUTPATIENT
Start: 2023-08-15 | End: 2023-08-17 | Stop reason: HOSPADM

## 2023-08-15 RX ORDER — ONDANSETRON 2 MG/ML
8 INJECTION INTRAMUSCULAR; INTRAVENOUS
OUTPATIENT
Start: 2023-08-18

## 2023-08-15 RX ORDER — SODIUM CHLORIDE 0.9 % (FLUSH) 0.9 %
5-40 SYRINGE (ML) INJECTION PRN
Status: CANCELLED | OUTPATIENT
Start: 2023-08-18

## 2023-08-15 RX ORDER — HEPARIN SODIUM (PORCINE) LOCK FLUSH IV SOLN 100 UNIT/ML 100 UNIT/ML
500 SOLUTION INTRAVENOUS PRN
Status: CANCELLED
Start: 2023-08-18

## 2023-08-15 RX ORDER — DIPHENHYDRAMINE HYDROCHLORIDE 50 MG/ML
50 INJECTION INTRAMUSCULAR; INTRAVENOUS ONCE
Status: COMPLETED | OUTPATIENT
Start: 2023-08-15 | End: 2023-08-15

## 2023-08-15 RX ORDER — ACETAMINOPHEN 325 MG/1
650 TABLET ORAL ONCE
Status: CANCELLED | OUTPATIENT
Start: 2023-08-18 | End: 2023-08-18

## 2023-08-15 RX ORDER — EPINEPHRINE 1 MG/ML
0.3 INJECTION, SOLUTION, CONCENTRATE INTRAVENOUS PRN
OUTPATIENT
Start: 2023-08-18

## 2023-08-15 RX ORDER — MEPERIDINE HYDROCHLORIDE 25 MG/ML
12.5 INJECTION INTRAMUSCULAR; INTRAVENOUS; SUBCUTANEOUS PRN
OUTPATIENT
Start: 2023-08-18

## 2023-08-15 RX ORDER — ACETAMINOPHEN 325 MG/1
650 TABLET ORAL
OUTPATIENT
Start: 2023-08-18

## 2023-08-15 RX ADMIN — HYDROCORTISONE SODIUM SUCCINATE 100 MG: 100 INJECTION, POWDER, FOR SOLUTION INTRAMUSCULAR; INTRAVENOUS at 07:52

## 2023-08-15 RX ADMIN — SODIUM CHLORIDE, PRESERVATIVE FREE 10 ML: 5 INJECTION INTRAVENOUS at 07:58

## 2023-08-15 RX ADMIN — HEPARIN 500 UNITS: 100 SYRINGE at 09:48

## 2023-08-15 RX ADMIN — ACETAMINOPHEN 650 MG: 325 TABLET ORAL at 07:52

## 2023-08-15 RX ADMIN — IOPAMIDOL 90 ML: 755 INJECTION, SOLUTION INTRAVENOUS at 04:31

## 2023-08-15 RX ADMIN — DIPHENHYDRAMINE HYDROCHLORIDE 50 MG: 50 INJECTION INTRAMUSCULAR; INTRAVENOUS at 07:57

## 2023-08-15 RX ADMIN — SODIUM CHLORIDE, PRESERVATIVE FREE 20 ML: 5 INJECTION INTRAVENOUS at 09:48

## 2023-08-15 RX ADMIN — SODIUM CHLORIDE, PRESERVATIVE FREE 10 ML: 5 INJECTION INTRAVENOUS at 07:56

## 2023-08-15 RX ADMIN — IMMUNE GLOBULIN (HUMAN) 35 G: 10 INJECTION INTRAVENOUS; SUBCUTANEOUS at 08:04

## 2023-08-15 NOTE — PROGRESS NOTES
[facility]. Port accessed using sterile technique. Brisk blood return noted. Pt received Benadryl, Solu-cortef, and Tylenol for pre-meds. Pt then received 35g Gamunex-C. Tolerated well.     Electronically signed by Chikis Jorge RN on 8/15/2023 at 9:55 AM

## 2023-08-15 NOTE — ED NOTES
Port below placement scar , port immovable and pt c/o of pain with any palpation to port or surrounding area      Jinny Luque [place]  08/15/23 7785

## 2023-08-17 ENCOUNTER — OFFICE VISIT (OUTPATIENT)
Dept: SURGERY | Age: 48
End: 2023-08-17

## 2023-08-17 VITALS
TEMPERATURE: 98.4 F | WEIGHT: 191 LBS | HEIGHT: 65 IN | OXYGEN SATURATION: 98 % | BODY MASS INDEX: 31.82 KG/M2 | HEART RATE: 84 BPM

## 2023-08-17 DIAGNOSIS — Z95.828 PORT-A-CATH IN PLACE: Primary | ICD-10-CM

## 2023-08-18 ENCOUNTER — APPOINTMENT (OUTPATIENT)
Dept: INFUSION THERAPY | Age: 48
End: 2023-08-18
Payer: MEDICARE

## 2023-08-25 ENCOUNTER — HOSPITAL ENCOUNTER (OUTPATIENT)
Dept: INFUSION THERAPY | Age: 48
Setting detail: INFUSION SERIES
Discharge: HOME | End: 2023-08-25
Payer: MEDICARE

## 2023-08-25 VITALS
OXYGEN SATURATION: 100 % | HEART RATE: 54 BPM | WEIGHT: 181 LBS | SYSTOLIC BLOOD PRESSURE: 151 MMHG | DIASTOLIC BLOOD PRESSURE: 98 MMHG | BODY MASS INDEX: 30.12 KG/M2 | TEMPERATURE: 97.2 F | RESPIRATION RATE: 18 BRPM

## 2023-08-25 DIAGNOSIS — G35 MULTIPLE SCLEROSIS (HCC): Primary | ICD-10-CM

## 2023-08-25 PROCEDURE — 96375 TX/PRO/DX INJ NEW DRUG ADDON: CPT

## 2023-08-25 PROCEDURE — 96365 THER/PROPH/DIAG IV INF INIT: CPT

## 2023-08-25 PROCEDURE — 6360000002 HC RX W HCPCS: Performed by: PSYCHIATRY & NEUROLOGY

## 2023-08-25 PROCEDURE — 96366 THER/PROPH/DIAG IV INF ADDON: CPT

## 2023-08-25 PROCEDURE — 6370000000 HC RX 637 (ALT 250 FOR IP): Performed by: PSYCHIATRY & NEUROLOGY

## 2023-08-25 PROCEDURE — 2580000003 HC RX 258: Performed by: PSYCHIATRY & NEUROLOGY

## 2023-08-25 RX ORDER — ALBUTEROL SULFATE 90 UG/1
4 AEROSOL, METERED RESPIRATORY (INHALATION) PRN
OUTPATIENT
Start: 2023-08-29

## 2023-08-25 RX ORDER — ONDANSETRON 2 MG/ML
8 INJECTION INTRAMUSCULAR; INTRAVENOUS
OUTPATIENT
Start: 2023-08-29

## 2023-08-25 RX ORDER — ACETAMINOPHEN 325 MG/1
650 TABLET ORAL ONCE
Status: CANCELLED | OUTPATIENT
Start: 2023-08-29 | End: 2023-08-29

## 2023-08-25 RX ORDER — SODIUM CHLORIDE 0.9 % (FLUSH) 0.9 %
5-40 SYRINGE (ML) INJECTION PRN
Status: DISCONTINUED | OUTPATIENT
Start: 2023-08-25 | End: 2023-08-26 | Stop reason: HOSPADM

## 2023-08-25 RX ORDER — SODIUM CHLORIDE 9 MG/ML
INJECTION, SOLUTION INTRAVENOUS CONTINUOUS
OUTPATIENT
Start: 2023-08-29

## 2023-08-25 RX ORDER — DIPHENHYDRAMINE HYDROCHLORIDE 50 MG/ML
50 INJECTION INTRAMUSCULAR; INTRAVENOUS ONCE
Status: CANCELLED
Start: 2023-08-29 | End: 2023-08-29

## 2023-08-25 RX ORDER — ACETAMINOPHEN 325 MG/1
650 TABLET ORAL ONCE
Status: COMPLETED | OUTPATIENT
Start: 2023-08-25 | End: 2023-08-25

## 2023-08-25 RX ORDER — EPINEPHRINE 1 MG/ML
0.3 INJECTION, SOLUTION, CONCENTRATE INTRAVENOUS PRN
OUTPATIENT
Start: 2023-08-29

## 2023-08-25 RX ORDER — HEPARIN 100 UNIT/ML
500 SYRINGE INTRAVENOUS PRN
Status: DISCONTINUED | OUTPATIENT
Start: 2023-08-25 | End: 2023-08-27 | Stop reason: HOSPADM

## 2023-08-25 RX ORDER — ACETAMINOPHEN 325 MG/1
650 TABLET ORAL
OUTPATIENT
Start: 2023-08-29

## 2023-08-25 RX ORDER — DIPHENHYDRAMINE HYDROCHLORIDE 50 MG/ML
50 INJECTION INTRAMUSCULAR; INTRAVENOUS ONCE
Status: COMPLETED | OUTPATIENT
Start: 2023-08-25 | End: 2023-08-25

## 2023-08-25 RX ORDER — MEPERIDINE HYDROCHLORIDE 25 MG/ML
12.5 INJECTION INTRAMUSCULAR; INTRAVENOUS; SUBCUTANEOUS PRN
OUTPATIENT
Start: 2023-08-29

## 2023-08-25 RX ORDER — SODIUM CHLORIDE 0.9 % (FLUSH) 0.9 %
5-40 SYRINGE (ML) INJECTION PRN
Status: CANCELLED | OUTPATIENT
Start: 2023-08-29

## 2023-08-25 RX ORDER — HEPARIN SODIUM (PORCINE) LOCK FLUSH IV SOLN 100 UNIT/ML 100 UNIT/ML
500 SOLUTION INTRAVENOUS PRN
Status: CANCELLED
Start: 2023-08-29

## 2023-08-25 RX ORDER — DIPHENHYDRAMINE HYDROCHLORIDE 50 MG/ML
50 INJECTION INTRAMUSCULAR; INTRAVENOUS
OUTPATIENT
Start: 2023-08-29

## 2023-08-25 RX ADMIN — HYDROCORTISONE SODIUM SUCCINATE 100 MG: 100 INJECTION, POWDER, FOR SOLUTION INTRAMUSCULAR; INTRAVENOUS at 06:44

## 2023-08-25 RX ADMIN — HEPARIN 500 UNITS: 100 SYRINGE at 09:02

## 2023-08-25 RX ADMIN — DIPHENHYDRAMINE HYDROCHLORIDE 50 MG: 50 INJECTION, SOLUTION INTRAMUSCULAR; INTRAVENOUS at 06:44

## 2023-08-25 RX ADMIN — ACETAMINOPHEN 650 MG: 325 TABLET ORAL at 06:45

## 2023-08-25 RX ADMIN — SODIUM CHLORIDE, PRESERVATIVE FREE 20 ML: 5 INJECTION INTRAVENOUS at 09:02

## 2023-08-25 RX ADMIN — IMMUNE GLOBULIN (HUMAN) 35 G: 10 INJECTION INTRAVENOUS; SUBCUTANEOUS at 07:21

## 2023-08-25 NOTE — PROGRESS NOTES
Pt arrived to [facility]. Port accessed using sterile technique. Pt received Tylenol, Benadryl, and Solu-Cortef for pre-meds. Pt then received 35g Gamunex-C per order. Tolerated well.     Electronically signed by Eugenio Watters RN on 8/25/2023 at 9:49 AM

## 2023-08-25 NOTE — DISCHARGE INSTRUCTIONS
immune globulin (intravenous and subcutaneous)  Pronunciation:  dinora LACEY foley  Brand:  Gammagard Liquid, Gammaked, Gamunex-C  What is the most important information I should know about immune globulin? This medicine can cause blood clots. The risk is highest in older adults or in people who have had blood clots, heart problems, or blood circulation problems. Blood clots are also more likely during long-term bedrest, while using birth control pills or hormone replacement therapy, or while having a central intravenous (IV) catheter in place. Call your doctor at once if you have chest pain, trouble breathing, fast heartbeats, numbness or weakness, or swelling and warmth or discoloration in an arm or leg. This medicine can also harm your kidneys, especially if you have kidney disease or if you also use certain medicines. Tell your doctor right away if you have signs of kidney problems, such as swelling, rapid weight gain, and little or no urination. What is immune globulin? Immune globulin intravenous and subcutaneous (for injection into a vein or under the skin) is used to treat primary immunodeficiency. Immune globulin is also used to increase platelets (blood clotting cells) in people with idiopathic thrombocytopenic purpura. Immune globulin is also used to treat certain debilitating nerve disorders that cause muscle weakness and can affect daily activities. Immune globulin may also be used for purposes not listed in this medication guide. What should I discuss with my healthcare provider before using immune globulin? You should not use this medicine if:  you have had an allergic reaction to an immune globulin or blood product; or  you have immune globulin A (IgA) deficiency with antibody to IgA. Immune globulin can cause blood clots or kidney problems, especially in older adults or in people with certain conditions.  Tell your doctor if you have ever had:  heart problems, blood circulation appropriate for any given patient. [facility] does not assume any responsibility for any aspect of healthcare administered with the aid of information [facility] provides. The information contained herein is not intended to cover all possible uses, directions, precautions, warnings, drug interactions, allergic reactions, or adverse effects. If you have questions about the drugs you are taking, check with your doctor, nurse or pharmacist.  Copyright 5012-6952 [address]: 4.02. Revision date: 2/26/2021. Care instructions adapted under license by [facility] ([facility]). If you have questions about a medical condition or this instruction, always ask your healthcare professional. [address] any warranty or liability for your use of this information.

## 2023-08-28 RX ORDER — ONDANSETRON 4 MG/1
TABLET, FILM COATED ORAL
Qty: 30 TABLET | Refills: 5 | Status: SHIPPED | OUTPATIENT
Start: 2023-08-28

## 2023-08-28 NOTE — TELEPHONE ENCOUNTER
Requested Prescriptions     Pending Prescriptions Disp Refills    ondansetron (ZOFRAN) 4 MG tablet [Pharmacy Med Name: ONDANSETRON HYDROCHLORIDE 4MG TABLET] 30 tablet 5     Sig: TAKE ONE TABLET DAILY AS NEEDED NAUSEA OR VOMITTING       Last Office Visit: 6/7/2023  Next Office Visit: 9/7/2023  Last Medication Refill: 1/26/23 with 5 TAIWO Arredondo pt

## 2023-08-29 RX ORDER — REPOSITORY CORTICOTROPIN 80 [USP'U]/ML
80 INJECTION INTRAMUSCULAR; SUBCUTANEOUS DAILY
Qty: 5 EACH | Refills: 0 | Status: SHIPPED | OUTPATIENT
Start: 2023-08-29 | End: 2023-09-03

## 2023-08-29 NOTE — TELEPHONE ENCOUNTER
Requested Prescriptions     Pending Prescriptions Disp Refills    corticotropin (ACTHAR) 80 UNIT/ML injectable gel 5 each 11     Sig: Inject 1 mL into the muscle daily for 5 days       Last Office Visit: 6/7/2023  Next Office Visit: 11/7/2023  Last Medication Refill: 9/6/22 with 11 RF    Arnulfo pt    Pt called and is having an MS flare up. She is asking for Acthar to be called in to see if it will help.

## 2023-08-31 ASSESSMENT — ENCOUNTER SYMPTOMS
NAUSEA: 0
ABDOMINAL DISTENTION: 0
SHORTNESS OF BREATH: 0
DIARRHEA: 0
VOMITING: 0
ABDOMINAL PAIN: 0

## 2023-09-01 ENCOUNTER — HOSPITAL ENCOUNTER (OUTPATIENT)
Dept: INFUSION THERAPY | Age: 48
Setting detail: INFUSION SERIES
Discharge: HOME | End: 2023-09-01
Payer: MEDICARE

## 2023-09-01 VITALS
TEMPERATURE: 96.9 F | HEART RATE: 58 BPM | SYSTOLIC BLOOD PRESSURE: 130 MMHG | OXYGEN SATURATION: 100 % | RESPIRATION RATE: 18 BRPM | DIASTOLIC BLOOD PRESSURE: 87 MMHG

## 2023-09-01 DIAGNOSIS — G35 MULTIPLE SCLEROSIS (HCC): Primary | ICD-10-CM

## 2023-09-01 PROCEDURE — 6360000002 HC RX W HCPCS: Performed by: PSYCHIATRY & NEUROLOGY

## 2023-09-01 PROCEDURE — 96365 THER/PROPH/DIAG IV INF INIT: CPT

## 2023-09-01 PROCEDURE — 2580000003 HC RX 258: Performed by: PSYCHIATRY & NEUROLOGY

## 2023-09-01 PROCEDURE — 6370000000 HC RX 637 (ALT 250 FOR IP): Performed by: PSYCHIATRY & NEUROLOGY

## 2023-09-01 PROCEDURE — 96375 TX/PRO/DX INJ NEW DRUG ADDON: CPT

## 2023-09-01 RX ORDER — DIPHENHYDRAMINE HYDROCHLORIDE 50 MG/ML
50 INJECTION INTRAMUSCULAR; INTRAVENOUS ONCE
Status: CANCELLED
Start: 2023-09-01 | End: 2023-09-01

## 2023-09-01 RX ORDER — ONDANSETRON 2 MG/ML
8 INJECTION INTRAMUSCULAR; INTRAVENOUS
Status: CANCELLED | OUTPATIENT
Start: 2023-09-01

## 2023-09-01 RX ORDER — HEPARIN SODIUM (PORCINE) LOCK FLUSH IV SOLN 100 UNIT/ML 100 UNIT/ML
500 SOLUTION INTRAVENOUS PRN
Status: CANCELLED
Start: 2023-09-01

## 2023-09-01 RX ORDER — SODIUM CHLORIDE 0.9 % (FLUSH) 0.9 %
5-40 SYRINGE (ML) INJECTION PRN
Status: DISCONTINUED | OUTPATIENT
Start: 2023-09-01 | End: 2023-09-02 | Stop reason: HOSPADM

## 2023-09-01 RX ORDER — ACETAMINOPHEN 325 MG/1
650 TABLET ORAL ONCE
Status: COMPLETED | OUTPATIENT
Start: 2023-09-01 | End: 2023-09-01

## 2023-09-01 RX ORDER — EPINEPHRINE 1 MG/ML
0.3 INJECTION, SOLUTION, CONCENTRATE INTRAVENOUS PRN
Status: CANCELLED | OUTPATIENT
Start: 2023-09-01

## 2023-09-01 RX ORDER — HEPARIN 100 UNIT/ML
500 SYRINGE INTRAVENOUS PRN
Status: DISCONTINUED | OUTPATIENT
Start: 2023-09-01 | End: 2023-09-03 | Stop reason: HOSPADM

## 2023-09-01 RX ORDER — ACETAMINOPHEN 325 MG/1
650 TABLET ORAL
Status: CANCELLED | OUTPATIENT
Start: 2023-09-01

## 2023-09-01 RX ORDER — DIPHENHYDRAMINE HYDROCHLORIDE 50 MG/ML
50 INJECTION INTRAMUSCULAR; INTRAVENOUS ONCE
Status: COMPLETED | OUTPATIENT
Start: 2023-09-01 | End: 2023-09-01

## 2023-09-01 RX ORDER — SODIUM CHLORIDE 0.9 % (FLUSH) 0.9 %
5-40 SYRINGE (ML) INJECTION PRN
Status: CANCELLED | OUTPATIENT
Start: 2023-09-01

## 2023-09-01 RX ORDER — ACETAMINOPHEN 325 MG/1
650 TABLET ORAL ONCE
Status: CANCELLED | OUTPATIENT
Start: 2023-09-01 | End: 2023-09-01

## 2023-09-01 RX ORDER — MEPERIDINE HYDROCHLORIDE 25 MG/ML
12.5 INJECTION INTRAMUSCULAR; INTRAVENOUS; SUBCUTANEOUS PRN
Status: CANCELLED | OUTPATIENT
Start: 2023-09-01

## 2023-09-01 RX ORDER — DIPHENHYDRAMINE HYDROCHLORIDE 50 MG/ML
50 INJECTION INTRAMUSCULAR; INTRAVENOUS
Status: CANCELLED | OUTPATIENT
Start: 2023-09-01

## 2023-09-01 RX ORDER — SODIUM CHLORIDE 9 MG/ML
INJECTION, SOLUTION INTRAVENOUS CONTINUOUS
Status: CANCELLED | OUTPATIENT
Start: 2023-09-01

## 2023-09-01 RX ORDER — ALBUTEROL SULFATE 90 UG/1
4 AEROSOL, METERED RESPIRATORY (INHALATION) PRN
Status: CANCELLED | OUTPATIENT
Start: 2023-09-01

## 2023-09-01 RX ADMIN — IMMUNE GLOBULIN (HUMAN) 35 G: 10 INJECTION INTRAVENOUS; SUBCUTANEOUS at 06:57

## 2023-09-01 RX ADMIN — DIPHENHYDRAMINE HYDROCHLORIDE 50 MG: 50 INJECTION, SOLUTION INTRAMUSCULAR; INTRAVENOUS at 06:44

## 2023-09-01 RX ADMIN — SODIUM CHLORIDE, PRESERVATIVE FREE 20 ML: 5 INJECTION INTRAVENOUS at 08:35

## 2023-09-01 RX ADMIN — HEPARIN 500 UNITS: 100 SYRINGE at 08:35

## 2023-09-01 RX ADMIN — HYDROCORTISONE SODIUM SUCCINATE 100 MG: 100 INJECTION, POWDER, FOR SOLUTION INTRAMUSCULAR; INTRAVENOUS at 06:44

## 2023-09-01 RX ADMIN — ACETAMINOPHEN 650 MG: 325 TABLET ORAL at 06:44

## 2023-09-01 NOTE — PROGRESS NOTES
Port accessed and tylenol, benadryl, and solu-cortef given as ordered for premeds. 35 g gamunex C given. Pt tolerated well.

## 2023-09-08 ENCOUNTER — HOSPITAL ENCOUNTER (OUTPATIENT)
Dept: INFUSION THERAPY | Age: 48
Setting detail: INFUSION SERIES
Discharge: HOME | End: 2023-09-08
Payer: MEDICARE

## 2023-09-08 ENCOUNTER — TELEPHONE (OUTPATIENT)
Dept: INFUSION THERAPY | Age: 48
End: 2023-09-08

## 2023-09-08 VITALS
BODY MASS INDEX: 29.62 KG/M2 | WEIGHT: 178 LBS | DIASTOLIC BLOOD PRESSURE: 99 MMHG | SYSTOLIC BLOOD PRESSURE: 138 MMHG | OXYGEN SATURATION: 100 % | HEART RATE: 57 BPM | TEMPERATURE: 97.9 F | RESPIRATION RATE: 18 BRPM

## 2023-09-08 DIAGNOSIS — G35 MULTIPLE SCLEROSIS (HCC): Primary | ICD-10-CM

## 2023-09-08 PROCEDURE — 6370000000 HC RX 637 (ALT 250 FOR IP): Performed by: PSYCHIATRY & NEUROLOGY

## 2023-09-08 PROCEDURE — 96366 THER/PROPH/DIAG IV INF ADDON: CPT

## 2023-09-08 PROCEDURE — 6360000002 HC RX W HCPCS: Performed by: PSYCHIATRY & NEUROLOGY

## 2023-09-08 PROCEDURE — 96365 THER/PROPH/DIAG IV INF INIT: CPT

## 2023-09-08 PROCEDURE — 96375 TX/PRO/DX INJ NEW DRUG ADDON: CPT

## 2023-09-08 PROCEDURE — 2580000003 HC RX 258: Performed by: PSYCHIATRY & NEUROLOGY

## 2023-09-08 RX ORDER — EPINEPHRINE 1 MG/ML
0.3 INJECTION, SOLUTION, CONCENTRATE INTRAVENOUS PRN
OUTPATIENT
Start: 2023-09-15

## 2023-09-08 RX ORDER — SODIUM CHLORIDE 9 MG/ML
INJECTION, SOLUTION INTRAVENOUS CONTINUOUS
OUTPATIENT
Start: 2023-09-15

## 2023-09-08 RX ORDER — MEPERIDINE HYDROCHLORIDE 25 MG/ML
12.5 INJECTION INTRAMUSCULAR; INTRAVENOUS; SUBCUTANEOUS PRN
OUTPATIENT
Start: 2023-09-08

## 2023-09-08 RX ORDER — ACETAMINOPHEN 325 MG/1
650 TABLET ORAL ONCE
Status: CANCELLED | OUTPATIENT
Start: 2023-09-08 | End: 2023-09-08

## 2023-09-08 RX ORDER — ONDANSETRON 2 MG/ML
8 INJECTION INTRAMUSCULAR; INTRAVENOUS
OUTPATIENT
Start: 2023-09-08

## 2023-09-08 RX ORDER — EPINEPHRINE 1 MG/ML
0.3 INJECTION, SOLUTION, CONCENTRATE INTRAVENOUS PRN
OUTPATIENT
Start: 2023-09-08

## 2023-09-08 RX ORDER — ONDANSETRON 2 MG/ML
8 INJECTION INTRAMUSCULAR; INTRAVENOUS
OUTPATIENT
Start: 2023-09-15

## 2023-09-08 RX ORDER — ACETAMINOPHEN 325 MG/1
650 TABLET ORAL
OUTPATIENT
Start: 2023-09-08

## 2023-09-08 RX ORDER — ACETAMINOPHEN 325 MG/1
650 TABLET ORAL ONCE
Status: COMPLETED | OUTPATIENT
Start: 2023-09-08 | End: 2023-09-08

## 2023-09-08 RX ORDER — MEPERIDINE HYDROCHLORIDE 25 MG/ML
12.5 INJECTION INTRAMUSCULAR; INTRAVENOUS; SUBCUTANEOUS PRN
OUTPATIENT
Start: 2023-09-15

## 2023-09-08 RX ORDER — SODIUM CHLORIDE 0.9 % (FLUSH) 0.9 %
5-40 SYRINGE (ML) INJECTION PRN
Status: CANCELLED | OUTPATIENT
Start: 2023-09-08

## 2023-09-08 RX ORDER — DIPHENHYDRAMINE HYDROCHLORIDE 50 MG/ML
50 INJECTION INTRAMUSCULAR; INTRAVENOUS ONCE
Status: CANCELLED
Start: 2023-09-08 | End: 2023-09-08

## 2023-09-08 RX ORDER — ALBUTEROL SULFATE 90 UG/1
4 AEROSOL, METERED RESPIRATORY (INHALATION) PRN
OUTPATIENT
Start: 2023-09-15

## 2023-09-08 RX ORDER — HEPARIN 100 UNIT/ML
500 SYRINGE INTRAVENOUS PRN
Status: CANCELLED | OUTPATIENT
Start: 2023-09-08

## 2023-09-08 RX ORDER — HEPARIN 100 UNIT/ML
500 SYRINGE INTRAVENOUS PRN
Status: DISCONTINUED | OUTPATIENT
Start: 2023-09-08 | End: 2023-09-09 | Stop reason: HOSPADM

## 2023-09-08 RX ORDER — ACETAMINOPHEN 325 MG/1
650 TABLET ORAL ONCE
Status: CANCELLED | OUTPATIENT
Start: 2023-09-15 | End: 2023-09-15

## 2023-09-08 RX ORDER — SODIUM CHLORIDE 9 MG/ML
5-250 INJECTION, SOLUTION INTRAVENOUS PRN
OUTPATIENT
Start: 2023-09-08

## 2023-09-08 RX ORDER — SODIUM CHLORIDE 9 MG/ML
5-250 INJECTION, SOLUTION INTRAVENOUS PRN
OUTPATIENT
Start: 2023-09-15

## 2023-09-08 RX ORDER — SODIUM CHLORIDE 9 MG/ML
INJECTION, SOLUTION INTRAVENOUS CONTINUOUS
OUTPATIENT
Start: 2023-09-08

## 2023-09-08 RX ORDER — DIPHENHYDRAMINE HYDROCHLORIDE 50 MG/ML
50 INJECTION INTRAMUSCULAR; INTRAVENOUS
OUTPATIENT
Start: 2023-09-08

## 2023-09-08 RX ORDER — ALBUTEROL SULFATE 90 UG/1
4 AEROSOL, METERED RESPIRATORY (INHALATION) PRN
OUTPATIENT
Start: 2023-09-08

## 2023-09-08 RX ORDER — HEPARIN 100 UNIT/ML
500 SYRINGE INTRAVENOUS PRN
Status: CANCELLED | OUTPATIENT
Start: 2023-09-15

## 2023-09-08 RX ORDER — SODIUM CHLORIDE 0.9 % (FLUSH) 0.9 %
5-40 SYRINGE (ML) INJECTION PRN
Status: CANCELLED | OUTPATIENT
Start: 2023-09-15

## 2023-09-08 RX ORDER — ACETAMINOPHEN 325 MG/1
650 TABLET ORAL
OUTPATIENT
Start: 2023-09-15

## 2023-09-08 RX ORDER — DIPHENHYDRAMINE HYDROCHLORIDE 50 MG/ML
50 INJECTION INTRAMUSCULAR; INTRAVENOUS ONCE
Status: COMPLETED | OUTPATIENT
Start: 2023-09-08 | End: 2023-09-08

## 2023-09-08 RX ORDER — SODIUM CHLORIDE 0.9 % (FLUSH) 0.9 %
5-40 SYRINGE (ML) INJECTION PRN
Status: DISCONTINUED | OUTPATIENT
Start: 2023-09-08 | End: 2023-09-09 | Stop reason: HOSPADM

## 2023-09-08 RX ORDER — DIPHENHYDRAMINE HYDROCHLORIDE 50 MG/ML
50 INJECTION INTRAMUSCULAR; INTRAVENOUS ONCE
Status: CANCELLED
Start: 2023-09-15 | End: 2023-09-15

## 2023-09-08 RX ORDER — DIPHENHYDRAMINE HYDROCHLORIDE 50 MG/ML
50 INJECTION INTRAMUSCULAR; INTRAVENOUS
OUTPATIENT
Start: 2023-09-15

## 2023-09-08 RX ADMIN — IMMUNE GLOBULIN (HUMAN) 30 G: 10 INJECTION INTRAVENOUS; SUBCUTANEOUS at 09:05

## 2023-09-08 RX ADMIN — HYDROCORTISONE SODIUM SUCCINATE 100 MG: 100 INJECTION, POWDER, FOR SOLUTION INTRAMUSCULAR; INTRAVENOUS at 09:00

## 2023-09-08 RX ADMIN — DIPHENHYDRAMINE HYDROCHLORIDE 50 MG: 50 INJECTION INTRAMUSCULAR; INTRAVENOUS at 09:00

## 2023-09-08 RX ADMIN — ACETAMINOPHEN 650 MG: 325 TABLET ORAL at 09:00

## 2023-09-08 RX ADMIN — SODIUM CHLORIDE, PRESERVATIVE FREE 20 ML: 5 INJECTION INTRAVENOUS at 10:34

## 2023-09-08 RX ADMIN — HEPARIN 500 UNITS: 100 SYRINGE at 10:34

## 2023-09-08 NOTE — TELEPHONE ENCOUNTER
Spoke with Frederick Rod at [facility] regarding needing a new order for IVIG 0.4g/kg weekly. Frederick Rod stated per MD Arnulfo laughlin to renew the order. New order placed.

## 2023-09-08 NOTE — PROGRESS NOTES
Pt arrived to [facility]. Port accessed using sterile technique. Brisk blood return noted. Pt received Tylenol, Benadryl, and Solu-cortef for pre-meds. Pt then received 30g Gamunex-C per order. Tolerated well.     Electronically signed by Genny Huang RN on 9/8/2023 at 11:03 AM

## 2023-09-15 ENCOUNTER — HOSPITAL ENCOUNTER (OUTPATIENT)
Dept: INFUSION THERAPY | Age: 48
Setting detail: INFUSION SERIES
Discharge: HOME | End: 2023-09-15
Payer: MEDICARE

## 2023-09-15 VITALS
HEART RATE: 58 BPM | SYSTOLIC BLOOD PRESSURE: 128 MMHG | DIASTOLIC BLOOD PRESSURE: 89 MMHG | RESPIRATION RATE: 18 BRPM | TEMPERATURE: 97.6 F | WEIGHT: 178 LBS | BODY MASS INDEX: 29.62 KG/M2 | OXYGEN SATURATION: 100 %

## 2023-09-15 DIAGNOSIS — G35 MULTIPLE SCLEROSIS (HCC): Primary | ICD-10-CM

## 2023-09-15 PROCEDURE — 6360000002 HC RX W HCPCS: Performed by: PSYCHIATRY & NEUROLOGY

## 2023-09-15 PROCEDURE — 96365 THER/PROPH/DIAG IV INF INIT: CPT

## 2023-09-15 PROCEDURE — 96366 THER/PROPH/DIAG IV INF ADDON: CPT

## 2023-09-15 PROCEDURE — 96375 TX/PRO/DX INJ NEW DRUG ADDON: CPT

## 2023-09-15 PROCEDURE — 2580000003 HC RX 258: Performed by: PSYCHIATRY & NEUROLOGY

## 2023-09-15 PROCEDURE — 6370000000 HC RX 637 (ALT 250 FOR IP): Performed by: PSYCHIATRY & NEUROLOGY

## 2023-09-15 RX ORDER — ALBUTEROL SULFATE 90 UG/1
4 AEROSOL, METERED RESPIRATORY (INHALATION) PRN
OUTPATIENT
Start: 2023-09-22

## 2023-09-15 RX ORDER — HEPARIN 100 UNIT/ML
500 SYRINGE INTRAVENOUS PRN
Status: CANCELLED | OUTPATIENT
Start: 2023-09-22

## 2023-09-15 RX ORDER — SODIUM CHLORIDE 9 MG/ML
5-250 INJECTION, SOLUTION INTRAVENOUS PRN
OUTPATIENT
Start: 2023-09-22

## 2023-09-15 RX ORDER — DIPHENHYDRAMINE HYDROCHLORIDE 50 MG/ML
50 INJECTION INTRAMUSCULAR; INTRAVENOUS ONCE
Status: CANCELLED
Start: 2023-09-22 | End: 2023-09-22

## 2023-09-15 RX ORDER — DIPHENHYDRAMINE HYDROCHLORIDE 50 MG/ML
50 INJECTION INTRAMUSCULAR; INTRAVENOUS ONCE
Status: COMPLETED | OUTPATIENT
Start: 2023-09-15 | End: 2023-09-15

## 2023-09-15 RX ORDER — HEPARIN 100 UNIT/ML
500 SYRINGE INTRAVENOUS PRN
Status: DISCONTINUED | OUTPATIENT
Start: 2023-09-15 | End: 2023-09-16 | Stop reason: HOSPADM

## 2023-09-15 RX ORDER — DIPHENHYDRAMINE HYDROCHLORIDE 50 MG/ML
50 INJECTION INTRAMUSCULAR; INTRAVENOUS
OUTPATIENT
Start: 2023-09-22

## 2023-09-15 RX ORDER — MEPERIDINE HYDROCHLORIDE 25 MG/ML
12.5 INJECTION INTRAMUSCULAR; INTRAVENOUS; SUBCUTANEOUS PRN
OUTPATIENT
Start: 2023-09-22

## 2023-09-15 RX ORDER — EPINEPHRINE 1 MG/ML
0.3 INJECTION, SOLUTION, CONCENTRATE INTRAVENOUS PRN
OUTPATIENT
Start: 2023-09-22

## 2023-09-15 RX ORDER — ONDANSETRON 2 MG/ML
8 INJECTION INTRAMUSCULAR; INTRAVENOUS
OUTPATIENT
Start: 2023-09-22

## 2023-09-15 RX ORDER — SODIUM CHLORIDE 0.9 % (FLUSH) 0.9 %
5-40 SYRINGE (ML) INJECTION PRN
Status: CANCELLED | OUTPATIENT
Start: 2023-09-22

## 2023-09-15 RX ORDER — SODIUM CHLORIDE 9 MG/ML
INJECTION, SOLUTION INTRAVENOUS CONTINUOUS
OUTPATIENT
Start: 2023-09-22

## 2023-09-15 RX ORDER — ACETAMINOPHEN 325 MG/1
650 TABLET ORAL ONCE
Status: COMPLETED | OUTPATIENT
Start: 2023-09-15 | End: 2023-09-15

## 2023-09-15 RX ORDER — SODIUM CHLORIDE 0.9 % (FLUSH) 0.9 %
5-40 SYRINGE (ML) INJECTION PRN
Status: DISCONTINUED | OUTPATIENT
Start: 2023-09-15 | End: 2023-09-16 | Stop reason: HOSPADM

## 2023-09-15 RX ORDER — ACETAMINOPHEN 325 MG/1
650 TABLET ORAL
OUTPATIENT
Start: 2023-09-22

## 2023-09-15 RX ORDER — ACETAMINOPHEN 325 MG/1
650 TABLET ORAL ONCE
Status: CANCELLED | OUTPATIENT
Start: 2023-09-22 | End: 2023-09-22

## 2023-09-15 RX ADMIN — IMMUNE GLOBULIN (HUMAN) 30 G: 10 INJECTION INTRAVENOUS; SUBCUTANEOUS at 07:33

## 2023-09-15 RX ADMIN — SODIUM CHLORIDE, PRESERVATIVE FREE 20 ML: 5 INJECTION INTRAVENOUS at 09:09

## 2023-09-15 RX ADMIN — HYDROCORTISONE SODIUM SUCCINATE 100 MG: 100 INJECTION, POWDER, FOR SOLUTION INTRAMUSCULAR; INTRAVENOUS at 07:02

## 2023-09-15 RX ADMIN — HEPARIN 500 UNITS: 100 SYRINGE at 09:09

## 2023-09-15 RX ADMIN — DIPHENHYDRAMINE HYDROCHLORIDE 50 MG: 50 INJECTION, SOLUTION INTRAMUSCULAR; INTRAVENOUS at 07:02

## 2023-09-15 RX ADMIN — ACETAMINOPHEN 650 MG: 325 TABLET ORAL at 07:24

## 2023-09-15 NOTE — DISCHARGE INSTRUCTIONS
immune globulin (intravenous and subcutaneous)  Pronunciation:  dinora LACEY foley  Brand:  Gammagard Liquid, Gammaked, Gamunex-C  What is the most important information I should know about immune globulin? This medicine can cause blood clots. The risk is highest in older adults or in people who have had blood clots, heart problems, or blood circulation problems. Blood clots are also more likely during long-term bedrest, while using birth control pills or hormone replacement therapy, or while having a central intravenous (IV) catheter in place. Call your doctor at once if you have chest pain, trouble breathing, fast heartbeats, numbness or weakness, or swelling and warmth or discoloration in an arm or leg. This medicine can also harm your kidneys, especially if you have kidney disease or if you also use certain medicines. Tell your doctor right away if you have signs of kidney problems, such as swelling, rapid weight gain, and little or no urination. What is immune globulin? Immune globulin intravenous and subcutaneous (for injection into a vein or under the skin) is used to treat primary immunodeficiency. Immune globulin is also used to increase platelets (blood clotting cells) in people with idiopathic thrombocytopenic purpura. Immune globulin is also used to treat certain debilitating nerve disorders that cause muscle weakness and can affect daily activities. Immune globulin may also be used for purposes not listed in this medication guide. What should I discuss with my healthcare provider before using immune globulin? You should not use this medicine if:  you have had an allergic reaction to an immune globulin or blood product; or  you have immune globulin A (IgA) deficiency with antibody to IgA. Immune globulin can cause blood clots or kidney problems, especially in older adults or in people with certain conditions.  Tell your doctor if you have ever had:  heart problems, blood circulation your doctor for medical advice about side effects. You may report side effects to FDA at 8-149-SGY-4823. What other drugs will affect immune globulin? Immune globulin can harm your kidneys, especially if you also use certain medicines for infections, cancer, osteoporosis, organ transplant rejection, bowel disorders, high blood pressure, or pain or arthritis (including Advil, Motrin, and Aleve). Other drugs may affect immune globulin, including prescription and over-the-counter medicines, vitamins, and herbal products. Tell your doctor about all your current medicines and any medicine you start or stop using. Where can I get more information? Your doctor or pharmacist can provide more information about immune globulin. Remember, keep this and all other medicines out of the reach of children, never share your medicines with others, and use this medication only for the indication prescribed. Every effort has been made to ensure that the information provided by [facility] is accurate, up-to-date, and complete, but no guarantee is made to that effect. Drug information contained herein may be time sensitive. [employer] information has been compiled for use by healthcare practitioners and consumers in the [facility] and therefore [employer] does not warrant that uses outside of the [facility] are appropriate, unless specifically indicated otherwise. [employer] drug information does not endorse drugs, diagnose patients or recommend therapy. [employer] drug information is an informational resource designed to assist licensed healthcare practitioners in caring for their patients and/or to serve consumers viewing this service as a supplement to, and not a substitute for, the expertise, skill, knowledge and judgment of healthcare practitioners.  The absence of a warning for a given drug or drug combination in no way should be construed to indicate that the drug or drug combination is safe, effective or

## 2023-09-19 DIAGNOSIS — M79.7 FIBROMYALGIA: ICD-10-CM

## 2023-09-19 RX ORDER — CARISOPRODOL 350 MG/1
350 TABLET ORAL 3 TIMES DAILY PRN
Qty: 90 TABLET | Refills: 5 | Status: SHIPPED | OUTPATIENT
Start: 2023-09-19 | End: 2024-03-17

## 2023-09-19 RX ORDER — CARISOPRODOL 350 MG/1
TABLET ORAL
Qty: 90 TABLET | Refills: 1 | OUTPATIENT
Start: 2023-09-19

## 2023-09-19 NOTE — TELEPHONE ENCOUNTER
Osmany Kumar has requested a refill on her medication. Last office visit : 6/7/2023   Next office visit : 11/7/2023   Last medication refill : 7/18/23 with 1 refill  Kirti Ardon : up to date       Requested Prescriptions     Pending Prescriptions Disp Refills    carisoprodol (SOMA) 350 MG tablet 90 tablet 1     Sig: Take 1 tablet by mouth 3 times daily as needed for Muscle spasms for up to 30 days.  Max Daily Amount: 1,050 mg

## 2023-09-22 ENCOUNTER — HOSPITAL ENCOUNTER (OUTPATIENT)
Dept: INFUSION THERAPY | Age: 48
Setting detail: INFUSION SERIES
Discharge: HOME | End: 2023-09-22
Payer: MEDICARE

## 2023-09-22 VITALS
SYSTOLIC BLOOD PRESSURE: 156 MMHG | HEART RATE: 54 BPM | DIASTOLIC BLOOD PRESSURE: 105 MMHG | WEIGHT: 181 LBS | TEMPERATURE: 97.5 F | RESPIRATION RATE: 18 BRPM | BODY MASS INDEX: 30.12 KG/M2 | OXYGEN SATURATION: 100 %

## 2023-09-22 DIAGNOSIS — G35 MULTIPLE SCLEROSIS (HCC): Primary | ICD-10-CM

## 2023-09-22 PROCEDURE — 96375 TX/PRO/DX INJ NEW DRUG ADDON: CPT

## 2023-09-22 PROCEDURE — 96365 THER/PROPH/DIAG IV INF INIT: CPT

## 2023-09-22 PROCEDURE — 96366 THER/PROPH/DIAG IV INF ADDON: CPT

## 2023-09-22 PROCEDURE — 6370000000 HC RX 637 (ALT 250 FOR IP): Performed by: PSYCHIATRY & NEUROLOGY

## 2023-09-22 PROCEDURE — 6360000002 HC RX W HCPCS: Performed by: PSYCHIATRY & NEUROLOGY

## 2023-09-22 RX ORDER — ACETAMINOPHEN 325 MG/1
650 TABLET ORAL ONCE
Status: CANCELLED | OUTPATIENT
Start: 2023-09-29 | End: 2023-09-29

## 2023-09-22 RX ORDER — ALBUTEROL SULFATE 90 UG/1
4 AEROSOL, METERED RESPIRATORY (INHALATION) PRN
OUTPATIENT
Start: 2023-09-29

## 2023-09-22 RX ORDER — SODIUM CHLORIDE 9 MG/ML
5-250 INJECTION, SOLUTION INTRAVENOUS PRN
OUTPATIENT
Start: 2023-09-29

## 2023-09-22 RX ORDER — ACETAMINOPHEN 325 MG/1
650 TABLET ORAL
OUTPATIENT
Start: 2023-09-29

## 2023-09-22 RX ORDER — EPINEPHRINE 1 MG/ML
0.3 INJECTION, SOLUTION, CONCENTRATE INTRAVENOUS PRN
OUTPATIENT
Start: 2023-09-29

## 2023-09-22 RX ORDER — HEPARIN 100 UNIT/ML
500 SYRINGE INTRAVENOUS PRN
Status: DISCONTINUED | OUTPATIENT
Start: 2023-09-22 | End: 2023-09-23 | Stop reason: HOSPADM

## 2023-09-22 RX ORDER — SODIUM CHLORIDE 0.9 % (FLUSH) 0.9 %
5-40 SYRINGE (ML) INJECTION PRN
Status: CANCELLED | OUTPATIENT
Start: 2023-09-29

## 2023-09-22 RX ORDER — MEPERIDINE HYDROCHLORIDE 25 MG/ML
12.5 INJECTION INTRAMUSCULAR; INTRAVENOUS; SUBCUTANEOUS PRN
OUTPATIENT
Start: 2023-09-29

## 2023-09-22 RX ORDER — HEPARIN 100 UNIT/ML
500 SYRINGE INTRAVENOUS PRN
Status: CANCELLED | OUTPATIENT
Start: 2023-09-29

## 2023-09-22 RX ORDER — DIPHENHYDRAMINE HYDROCHLORIDE 50 MG/ML
50 INJECTION INTRAMUSCULAR; INTRAVENOUS ONCE
Status: COMPLETED | OUTPATIENT
Start: 2023-09-22 | End: 2023-09-22

## 2023-09-22 RX ORDER — DIPHENHYDRAMINE HYDROCHLORIDE 50 MG/ML
50 INJECTION INTRAMUSCULAR; INTRAVENOUS
OUTPATIENT
Start: 2023-09-29

## 2023-09-22 RX ORDER — SODIUM CHLORIDE 0.9 % (FLUSH) 0.9 %
5-40 SYRINGE (ML) INJECTION PRN
Status: DISCONTINUED | OUTPATIENT
Start: 2023-09-22 | End: 2023-09-23 | Stop reason: HOSPADM

## 2023-09-22 RX ORDER — ACETAMINOPHEN 325 MG/1
650 TABLET ORAL ONCE
Status: COMPLETED | OUTPATIENT
Start: 2023-09-22 | End: 2023-09-22

## 2023-09-22 RX ORDER — DIPHENHYDRAMINE HYDROCHLORIDE 50 MG/ML
50 INJECTION INTRAMUSCULAR; INTRAVENOUS ONCE
Status: CANCELLED
Start: 2023-09-29 | End: 2023-09-29

## 2023-09-22 RX ORDER — ONDANSETRON 2 MG/ML
8 INJECTION INTRAMUSCULAR; INTRAVENOUS
OUTPATIENT
Start: 2023-09-29

## 2023-09-22 RX ORDER — SODIUM CHLORIDE 9 MG/ML
INJECTION, SOLUTION INTRAVENOUS CONTINUOUS
OUTPATIENT
Start: 2023-09-29

## 2023-09-22 RX ADMIN — ACETAMINOPHEN 650 MG: 325 TABLET ORAL at 06:49

## 2023-09-22 RX ADMIN — HYDROCORTISONE SODIUM SUCCINATE 100 MG: 100 INJECTION, POWDER, FOR SOLUTION INTRAMUSCULAR; INTRAVENOUS at 06:48

## 2023-09-22 RX ADMIN — IMMUNE GLOBULIN (HUMAN) 35 G: 10 INJECTION INTRAVENOUS; SUBCUTANEOUS at 07:10

## 2023-09-22 RX ADMIN — DIPHENHYDRAMINE HYDROCHLORIDE 50 MG: 50 INJECTION INTRAMUSCULAR; INTRAVENOUS at 06:48

## 2023-09-28 DIAGNOSIS — G35 MULTIPLE SCLEROSIS (HCC): Primary | ICD-10-CM

## 2023-09-29 ENCOUNTER — HOSPITAL ENCOUNTER (OUTPATIENT)
Dept: INFUSION THERAPY | Age: 48
Setting detail: INFUSION SERIES
Discharge: HOME | End: 2023-09-29
Payer: MEDICARE

## 2023-09-29 VITALS
SYSTOLIC BLOOD PRESSURE: 122 MMHG | WEIGHT: 176 LBS | OXYGEN SATURATION: 98 % | TEMPERATURE: 97.4 F | HEART RATE: 51 BPM | BODY MASS INDEX: 29.29 KG/M2 | DIASTOLIC BLOOD PRESSURE: 78 MMHG | RESPIRATION RATE: 18 BRPM

## 2023-09-29 DIAGNOSIS — G35 MULTIPLE SCLEROSIS (HCC): Primary | ICD-10-CM

## 2023-09-29 PROCEDURE — 96366 THER/PROPH/DIAG IV INF ADDON: CPT

## 2023-09-29 PROCEDURE — 6360000002 HC RX W HCPCS: Performed by: PSYCHIATRY & NEUROLOGY

## 2023-09-29 PROCEDURE — 96365 THER/PROPH/DIAG IV INF INIT: CPT

## 2023-09-29 PROCEDURE — 6370000000 HC RX 637 (ALT 250 FOR IP): Performed by: PSYCHIATRY & NEUROLOGY

## 2023-09-29 RX ORDER — ACETAMINOPHEN 325 MG/1
650 TABLET ORAL
OUTPATIENT
Start: 2023-10-06

## 2023-09-29 RX ORDER — DIPHENHYDRAMINE HYDROCHLORIDE 50 MG/ML
50 INJECTION INTRAMUSCULAR; INTRAVENOUS ONCE
Status: CANCELLED
Start: 2023-10-06 | End: 2023-10-06

## 2023-09-29 RX ORDER — ALBUTEROL SULFATE 90 UG/1
4 AEROSOL, METERED RESPIRATORY (INHALATION) PRN
OUTPATIENT
Start: 2023-10-06

## 2023-09-29 RX ORDER — MEPERIDINE HYDROCHLORIDE 25 MG/ML
12.5 INJECTION INTRAMUSCULAR; INTRAVENOUS; SUBCUTANEOUS PRN
OUTPATIENT
Start: 2023-10-06

## 2023-09-29 RX ORDER — SODIUM CHLORIDE 9 MG/ML
INJECTION, SOLUTION INTRAVENOUS CONTINUOUS
OUTPATIENT
Start: 2023-10-06

## 2023-09-29 RX ORDER — SODIUM CHLORIDE 0.9 % (FLUSH) 0.9 %
5-40 SYRINGE (ML) INJECTION PRN
Status: DISCONTINUED | OUTPATIENT
Start: 2023-09-29 | End: 2023-09-30 | Stop reason: HOSPADM

## 2023-09-29 RX ORDER — DIPHENHYDRAMINE HYDROCHLORIDE 50 MG/ML
50 INJECTION INTRAMUSCULAR; INTRAVENOUS ONCE
Status: COMPLETED | OUTPATIENT
Start: 2023-09-29 | End: 2023-09-29

## 2023-09-29 RX ORDER — ONDANSETRON 2 MG/ML
8 INJECTION INTRAMUSCULAR; INTRAVENOUS
OUTPATIENT
Start: 2023-10-06

## 2023-09-29 RX ORDER — ACETAMINOPHEN 325 MG/1
650 TABLET ORAL ONCE
Status: CANCELLED | OUTPATIENT
Start: 2023-10-06 | End: 2023-10-06

## 2023-09-29 RX ORDER — HEPARIN 100 UNIT/ML
500 SYRINGE INTRAVENOUS PRN
Status: DISCONTINUED | OUTPATIENT
Start: 2023-09-29 | End: 2023-09-30 | Stop reason: HOSPADM

## 2023-09-29 RX ORDER — EPINEPHRINE 1 MG/ML
0.3 INJECTION, SOLUTION, CONCENTRATE INTRAVENOUS PRN
OUTPATIENT
Start: 2023-10-06

## 2023-09-29 RX ORDER — SODIUM CHLORIDE 0.9 % (FLUSH) 0.9 %
5-40 SYRINGE (ML) INJECTION PRN
Status: CANCELLED | OUTPATIENT
Start: 2023-10-06

## 2023-09-29 RX ORDER — SODIUM CHLORIDE 9 MG/ML
5-250 INJECTION, SOLUTION INTRAVENOUS PRN
OUTPATIENT
Start: 2023-10-06

## 2023-09-29 RX ORDER — DIPHENHYDRAMINE HYDROCHLORIDE 50 MG/ML
50 INJECTION INTRAMUSCULAR; INTRAVENOUS
OUTPATIENT
Start: 2023-10-06

## 2023-09-29 RX ORDER — HEPARIN 100 UNIT/ML
500 SYRINGE INTRAVENOUS PRN
Status: CANCELLED | OUTPATIENT
Start: 2023-10-06

## 2023-09-29 RX ORDER — ACETAMINOPHEN 325 MG/1
650 TABLET ORAL ONCE
Status: COMPLETED | OUTPATIENT
Start: 2023-09-29 | End: 2023-09-29

## 2023-09-29 RX ADMIN — ACETAMINOPHEN 650 MG: 325 TABLET ORAL at 06:54

## 2023-09-29 RX ADMIN — HYDROCORTISONE SODIUM SUCCINATE 100 MG: 100 INJECTION, POWDER, FOR SOLUTION INTRAMUSCULAR; INTRAVENOUS at 06:55

## 2023-09-29 RX ADMIN — HEPARIN 500 UNITS: 100 SYRINGE at 09:02

## 2023-09-29 RX ADMIN — DIPHENHYDRAMINE HYDROCHLORIDE 50 MG: 50 INJECTION INTRAMUSCULAR; INTRAVENOUS at 06:55

## 2023-09-29 RX ADMIN — IMMUNE GLOBULIN (HUMAN) 30 G: 10 INJECTION INTRAVENOUS; SUBCUTANEOUS at 07:16

## 2023-09-29 ASSESSMENT — PAIN SCALES - GENERAL: PAINLEVEL_OUTOF10: 0

## 2023-09-29 NOTE — DISCHARGE INSTRUCTIONS
immune globulin (intravenous and subcutaneous)  Pronunciation:  dinora LACEY foley  Brand:  Gammagard Liquid, Gammaked, Gamunex-C  What is the most important information I should know about immune globulin? This medicine can cause blood clots. The risk is highest in older adults or in people who have had blood clots, heart problems, or blood circulation problems. Blood clots are also more likely during long-term bedrest, while using birth control pills or hormone replacement therapy, or while having a central intravenous (IV) catheter in place. Call your doctor at once if you have chest pain, trouble breathing, fast heartbeats, numbness or weakness, or swelling and warmth or discoloration in an arm or leg. This medicine can also harm your kidneys, especially if you have kidney disease or if you also use certain medicines. Tell your doctor right away if you have signs of kidney problems, such as swelling, rapid weight gain, and little or no urination. What is immune globulin? Immune globulin intravenous and subcutaneous (for injection into a vein or under the skin) is used to treat primary immunodeficiency. Immune globulin is also used to increase platelets (blood clotting cells) in people with idiopathic thrombocytopenic purpura. Immune globulin is also used to treat certain debilitating nerve disorders that cause muscle weakness and can affect daily activities. Immune globulin may also be used for purposes not listed in this medication guide. What should I discuss with my healthcare provider before using immune globulin? You should not use this medicine if:  you have had an allergic reaction to an immune globulin or blood product; or  you have immune globulin A (IgA) deficiency with antibody to IgA. Immune globulin can cause blood clots or kidney problems, especially in older adults or in people with certain conditions.  Tell your doctor if you have ever had:  heart problems, blood circulation appropriate for any given patient. [facility] does not assume any responsibility for any aspect of healthcare administered with the aid of information [facility] provides. The information contained herein is not intended to cover all possible uses, directions, precautions, warnings, drug interactions, allergic reactions, or adverse effects. If you have questions about the drugs you are taking, check with your doctor, nurse or pharmacist.  Copyright 1113-0537 [address]: 4.02. Revision date: 2/26/2021. Care instructions adapted under license by [facility] ([facility]). If you have questions about a medical condition or this instruction, always ask your healthcare professional. [address] any warranty or liability for your use of this information.

## 2023-09-29 NOTE — PROGRESS NOTES
Here today for Gammunex C. Premedications given. IVIG infused per orders. Tolerated well. Future appointments reviewed and patient discharged to home.

## 2023-10-06 ENCOUNTER — HOSPITAL ENCOUNTER (OUTPATIENT)
Dept: INFUSION THERAPY | Age: 48
Setting detail: INFUSION SERIES
Discharge: HOME | End: 2023-10-06
Payer: MEDICARE

## 2023-10-06 VITALS
RESPIRATION RATE: 18 BRPM | SYSTOLIC BLOOD PRESSURE: 107 MMHG | DIASTOLIC BLOOD PRESSURE: 71 MMHG | WEIGHT: 177 LBS | HEART RATE: 64 BPM | OXYGEN SATURATION: 100 % | TEMPERATURE: 97.8 F | BODY MASS INDEX: 29.45 KG/M2

## 2023-10-06 DIAGNOSIS — G35 MULTIPLE SCLEROSIS (HCC): Primary | ICD-10-CM

## 2023-10-06 PROCEDURE — 6360000002 HC RX W HCPCS: Performed by: PSYCHIATRY & NEUROLOGY

## 2023-10-06 PROCEDURE — 2580000003 HC RX 258: Performed by: PSYCHIATRY & NEUROLOGY

## 2023-10-06 PROCEDURE — 96365 THER/PROPH/DIAG IV INF INIT: CPT

## 2023-10-06 PROCEDURE — 96375 TX/PRO/DX INJ NEW DRUG ADDON: CPT

## 2023-10-06 PROCEDURE — 6370000000 HC RX 637 (ALT 250 FOR IP): Performed by: PSYCHIATRY & NEUROLOGY

## 2023-10-06 PROCEDURE — 96366 THER/PROPH/DIAG IV INF ADDON: CPT

## 2023-10-06 RX ORDER — HEPARIN 100 UNIT/ML
500 SYRINGE INTRAVENOUS PRN
Status: CANCELLED | OUTPATIENT
Start: 2023-10-13

## 2023-10-06 RX ORDER — ACETAMINOPHEN 325 MG/1
650 TABLET ORAL ONCE
Status: COMPLETED | OUTPATIENT
Start: 2023-10-06 | End: 2023-10-06

## 2023-10-06 RX ORDER — DIPHENHYDRAMINE HYDROCHLORIDE 50 MG/ML
50 INJECTION INTRAMUSCULAR; INTRAVENOUS ONCE
Status: CANCELLED
Start: 2023-10-13 | End: 2023-10-13

## 2023-10-06 RX ORDER — DIPHENHYDRAMINE HYDROCHLORIDE 50 MG/ML
50 INJECTION INTRAMUSCULAR; INTRAVENOUS ONCE
Status: COMPLETED | OUTPATIENT
Start: 2023-10-06 | End: 2023-10-06

## 2023-10-06 RX ORDER — SODIUM CHLORIDE 9 MG/ML
5-250 INJECTION, SOLUTION INTRAVENOUS PRN
OUTPATIENT
Start: 2023-10-13

## 2023-10-06 RX ORDER — DIPHENHYDRAMINE HYDROCHLORIDE 50 MG/ML
50 INJECTION INTRAMUSCULAR; INTRAVENOUS
OUTPATIENT
Start: 2023-10-13

## 2023-10-06 RX ORDER — ONDANSETRON 2 MG/ML
8 INJECTION INTRAMUSCULAR; INTRAVENOUS
OUTPATIENT
Start: 2023-10-13

## 2023-10-06 RX ORDER — ALBUTEROL SULFATE 90 UG/1
4 AEROSOL, METERED RESPIRATORY (INHALATION) PRN
OUTPATIENT
Start: 2023-10-13

## 2023-10-06 RX ORDER — ACETAMINOPHEN 325 MG/1
650 TABLET ORAL
OUTPATIENT
Start: 2023-10-13

## 2023-10-06 RX ORDER — SODIUM CHLORIDE 9 MG/ML
INJECTION, SOLUTION INTRAVENOUS CONTINUOUS
OUTPATIENT
Start: 2023-10-13

## 2023-10-06 RX ORDER — ACETAMINOPHEN 325 MG/1
650 TABLET ORAL ONCE
Status: CANCELLED | OUTPATIENT
Start: 2023-10-13 | End: 2023-10-13

## 2023-10-06 RX ORDER — SODIUM CHLORIDE 0.9 % (FLUSH) 0.9 %
5-40 SYRINGE (ML) INJECTION PRN
Status: DISCONTINUED | OUTPATIENT
Start: 2023-10-06 | End: 2023-10-07 | Stop reason: HOSPADM

## 2023-10-06 RX ORDER — EPINEPHRINE 1 MG/ML
0.3 INJECTION, SOLUTION, CONCENTRATE INTRAVENOUS PRN
OUTPATIENT
Start: 2023-10-13

## 2023-10-06 RX ORDER — MEPERIDINE HYDROCHLORIDE 25 MG/ML
12.5 INJECTION INTRAMUSCULAR; INTRAVENOUS; SUBCUTANEOUS PRN
OUTPATIENT
Start: 2023-10-13

## 2023-10-06 RX ORDER — SODIUM CHLORIDE 0.9 % (FLUSH) 0.9 %
5-40 SYRINGE (ML) INJECTION PRN
Status: CANCELLED | OUTPATIENT
Start: 2023-10-13

## 2023-10-06 RX ORDER — HEPARIN 100 UNIT/ML
500 SYRINGE INTRAVENOUS PRN
Status: DISCONTINUED | OUTPATIENT
Start: 2023-10-06 | End: 2023-10-07 | Stop reason: HOSPADM

## 2023-10-06 RX ADMIN — HEPARIN 500 UNITS: 100 SYRINGE at 09:24

## 2023-10-06 RX ADMIN — ACETAMINOPHEN 650 MG: 325 TABLET ORAL at 07:27

## 2023-10-06 RX ADMIN — DIPHENHYDRAMINE HYDROCHLORIDE 50 MG: 50 INJECTION, SOLUTION INTRAMUSCULAR; INTRAVENOUS at 07:27

## 2023-10-06 RX ADMIN — IMMUNE GLOBULIN (HUMAN) 30 G: 10 INJECTION INTRAVENOUS; SUBCUTANEOUS at 07:49

## 2023-10-06 RX ADMIN — HYDROCORTISONE SODIUM SUCCINATE 100 MG: 100 INJECTION, POWDER, FOR SOLUTION INTRAMUSCULAR; INTRAVENOUS at 07:27

## 2023-10-06 RX ADMIN — SODIUM CHLORIDE, PRESERVATIVE FREE 10 ML: 5 INJECTION INTRAVENOUS at 09:23

## 2023-10-09 ENCOUNTER — HOSPITAL ENCOUNTER (OUTPATIENT)
Dept: MRI IMAGING | Age: 48
Discharge: HOME | End: 2023-10-09
Attending: PSYCHIATRY & NEUROLOGY
Payer: MEDICARE

## 2023-10-09 DIAGNOSIS — G35 MULTIPLE SCLEROSIS (HCC): ICD-10-CM

## 2023-10-09 PROCEDURE — 70553 MRI BRAIN STEM W/O & W/DYE: CPT

## 2023-10-09 PROCEDURE — 72156 MRI NECK SPINE W/O & W/DYE: CPT

## 2023-10-09 PROCEDURE — 6360000004 HC RX CONTRAST MEDICATION: Performed by: PSYCHIATRY & NEUROLOGY

## 2023-10-09 PROCEDURE — A9577 INJ MULTIHANCE: HCPCS | Performed by: PSYCHIATRY & NEUROLOGY

## 2023-10-09 RX ADMIN — GADOBENATE DIMEGLUMINE 16 ML: 529 INJECTION, SOLUTION INTRAVENOUS at 14:34

## 2023-10-11 ENCOUNTER — OFFICE VISIT (OUTPATIENT)
Dept: FAMILY MEDICINE CLINIC | Facility: CLINIC | Age: 48
End: 2023-10-11
Payer: MEDICARE

## 2023-10-11 VITALS
SYSTOLIC BLOOD PRESSURE: 128 MMHG | BODY MASS INDEX: 28.61 KG/M2 | HEART RATE: 58 BPM | TEMPERATURE: 98.5 F | HEIGHT: 66 IN | WEIGHT: 178 LBS | DIASTOLIC BLOOD PRESSURE: 76 MMHG | OXYGEN SATURATION: 97 % | RESPIRATION RATE: 18 BRPM

## 2023-10-11 DIAGNOSIS — G35 MULTIPLE SCLEROSIS: Primary | ICD-10-CM

## 2023-10-11 RX ORDER — BUTALBITAL/ACETAMINOPHEN 50MG-325MG
1 TABLET ORAL 2 TIMES DAILY
COMMUNITY

## 2023-10-11 RX ORDER — CARISOPRODOL 350 MG/1
350 TABLET ORAL 3 TIMES DAILY PRN
COMMUNITY

## 2023-10-11 RX ORDER — LURASIDONE HYDROCHLORIDE 80 MG/1
20 TABLET, FILM COATED ORAL DAILY
COMMUNITY

## 2023-10-11 RX ORDER — LORAZEPAM 0.5 MG/1
0.5 TABLET ORAL
COMMUNITY
Start: 2023-10-05 | End: 2024-10-05

## 2023-10-11 RX ORDER — CLONIDINE HYDROCHLORIDE 0.1 MG/1
0.1 TABLET ORAL 2 TIMES DAILY
COMMUNITY

## 2023-10-11 RX ORDER — OMEPRAZOLE 20 MG/1
20 CAPSULE, DELAYED RELEASE ORAL DAILY
COMMUNITY

## 2023-10-11 RX ORDER — VIBEGRON 75 MG/1
75 TABLET, FILM COATED ORAL DAILY
COMMUNITY

## 2023-10-11 RX ORDER — SUMATRIPTAN 100 MG/1
100 TABLET, FILM COATED ORAL
COMMUNITY

## 2023-10-11 RX ORDER — DIPHENHYDRAMINE HCL 25 MG
25 CAPSULE ORAL EVERY 6 HOURS PRN
COMMUNITY

## 2023-10-11 RX ORDER — SOLIFENACIN SUCCINATE 10 MG/1
10 TABLET, FILM COATED ORAL DAILY
COMMUNITY

## 2023-10-11 RX ORDER — VORTIOXETINE 20 MG/1
20 TABLET, FILM COATED ORAL
COMMUNITY

## 2023-10-11 RX ORDER — ESTRADIOL 1 MG/1
1 TABLET ORAL DAILY
COMMUNITY

## 2023-10-11 RX ORDER — RIZATRIPTAN BENZOATE 10 MG/1
10 TABLET ORAL ONCE AS NEEDED
COMMUNITY

## 2023-10-11 NOTE — PROGRESS NOTES
Ms Swenson came into the office today to ask about us assuming her primary care. She has a complicated medical history including seeing multiple specialists for SLE and MS. I explained to her I felt her medical history was too complex for me to consider assuming her primary care. She thanked me for my time and left the office.

## 2023-10-13 ENCOUNTER — HOSPITAL ENCOUNTER (OUTPATIENT)
Dept: INFUSION THERAPY | Age: 48
Setting detail: INFUSION SERIES
Discharge: HOME | End: 2023-10-13
Payer: MEDICARE

## 2023-10-13 VITALS
BODY MASS INDEX: 29.12 KG/M2 | SYSTOLIC BLOOD PRESSURE: 138 MMHG | HEART RATE: 60 BPM | DIASTOLIC BLOOD PRESSURE: 93 MMHG | TEMPERATURE: 97.6 F | OXYGEN SATURATION: 100 % | RESPIRATION RATE: 20 BRPM | WEIGHT: 175 LBS

## 2023-10-13 DIAGNOSIS — G35 MULTIPLE SCLEROSIS (HCC): Primary | ICD-10-CM

## 2023-10-13 PROCEDURE — 2580000003 HC RX 258: Performed by: PSYCHIATRY & NEUROLOGY

## 2023-10-13 PROCEDURE — 96365 THER/PROPH/DIAG IV INF INIT: CPT

## 2023-10-13 PROCEDURE — 36593 DECLOT VASCULAR DEVICE: CPT

## 2023-10-13 PROCEDURE — 96366 THER/PROPH/DIAG IV INF ADDON: CPT

## 2023-10-13 PROCEDURE — 96375 TX/PRO/DX INJ NEW DRUG ADDON: CPT

## 2023-10-13 PROCEDURE — 6370000000 HC RX 637 (ALT 250 FOR IP): Performed by: PSYCHIATRY & NEUROLOGY

## 2023-10-13 PROCEDURE — 6360000002 HC RX W HCPCS: Performed by: PSYCHIATRY & NEUROLOGY

## 2023-10-13 RX ORDER — MEPERIDINE HYDROCHLORIDE 25 MG/ML
12.5 INJECTION INTRAMUSCULAR; INTRAVENOUS; SUBCUTANEOUS PRN
OUTPATIENT
Start: 2023-10-20

## 2023-10-13 RX ORDER — EPINEPHRINE 1 MG/ML
0.3 INJECTION, SOLUTION, CONCENTRATE INTRAVENOUS PRN
OUTPATIENT
Start: 2023-10-20

## 2023-10-13 RX ORDER — DIPHENHYDRAMINE HYDROCHLORIDE 50 MG/ML
50 INJECTION INTRAMUSCULAR; INTRAVENOUS ONCE
Status: COMPLETED | OUTPATIENT
Start: 2023-10-13 | End: 2023-10-13

## 2023-10-13 RX ORDER — ALBUTEROL SULFATE 90 UG/1
4 AEROSOL, METERED RESPIRATORY (INHALATION) PRN
OUTPATIENT
Start: 2023-10-20

## 2023-10-13 RX ORDER — HEPARIN 100 UNIT/ML
500 SYRINGE INTRAVENOUS PRN
Status: CANCELLED | OUTPATIENT
Start: 2023-10-20

## 2023-10-13 RX ORDER — HEPARIN 100 UNIT/ML
500 SYRINGE INTRAVENOUS PRN
Status: DISCONTINUED | OUTPATIENT
Start: 2023-10-13 | End: 2023-10-14 | Stop reason: HOSPADM

## 2023-10-13 RX ORDER — SODIUM CHLORIDE 9 MG/ML
5-250 INJECTION, SOLUTION INTRAVENOUS PRN
OUTPATIENT
Start: 2023-10-20

## 2023-10-13 RX ORDER — SODIUM CHLORIDE 0.9 % (FLUSH) 0.9 %
5-40 SYRINGE (ML) INJECTION PRN
Status: CANCELLED | OUTPATIENT
Start: 2023-10-20

## 2023-10-13 RX ORDER — ACETAMINOPHEN 325 MG/1
650 TABLET ORAL
OUTPATIENT
Start: 2023-10-20

## 2023-10-13 RX ORDER — DIPHENHYDRAMINE HYDROCHLORIDE 50 MG/ML
50 INJECTION INTRAMUSCULAR; INTRAVENOUS
OUTPATIENT
Start: 2023-10-20

## 2023-10-13 RX ORDER — ACETAMINOPHEN 325 MG/1
650 TABLET ORAL ONCE
Status: COMPLETED | OUTPATIENT
Start: 2023-10-13 | End: 2023-10-13

## 2023-10-13 RX ORDER — SODIUM CHLORIDE 0.9 % (FLUSH) 0.9 %
5-40 SYRINGE (ML) INJECTION PRN
Status: DISCONTINUED | OUTPATIENT
Start: 2023-10-13 | End: 2023-10-14 | Stop reason: HOSPADM

## 2023-10-13 RX ORDER — ONDANSETRON 2 MG/ML
8 INJECTION INTRAMUSCULAR; INTRAVENOUS
OUTPATIENT
Start: 2023-10-20

## 2023-10-13 RX ORDER — SODIUM CHLORIDE 9 MG/ML
INJECTION, SOLUTION INTRAVENOUS CONTINUOUS
OUTPATIENT
Start: 2023-10-20

## 2023-10-13 RX ORDER — ACETAMINOPHEN 325 MG/1
650 TABLET ORAL ONCE
Status: CANCELLED | OUTPATIENT
Start: 2023-10-20 | End: 2023-10-20

## 2023-10-13 RX ORDER — DIPHENHYDRAMINE HYDROCHLORIDE 50 MG/ML
50 INJECTION INTRAMUSCULAR; INTRAVENOUS ONCE
Status: CANCELLED
Start: 2023-10-20 | End: 2023-10-20

## 2023-10-13 RX ADMIN — ACETAMINOPHEN 650 MG: 325 TABLET ORAL at 08:08

## 2023-10-13 RX ADMIN — HEPARIN 500 UNITS: 100 SYRINGE at 09:52

## 2023-10-13 RX ADMIN — IMMUNE GLOBULIN (HUMAN) 30 G: 10 INJECTION INTRAVENOUS; SUBCUTANEOUS at 08:19

## 2023-10-13 RX ADMIN — HYDROCORTISONE SODIUM SUCCINATE 100 MG: 100 INJECTION, POWDER, FOR SOLUTION INTRAMUSCULAR; INTRAVENOUS at 08:08

## 2023-10-13 RX ADMIN — WATER 2 MG: 1 INJECTION INTRAMUSCULAR; INTRAVENOUS; SUBCUTANEOUS at 07:00

## 2023-10-13 RX ADMIN — DIPHENHYDRAMINE HYDROCHLORIDE 50 MG: 50 INJECTION INTRAMUSCULAR; INTRAVENOUS at 08:08

## 2023-10-13 ASSESSMENT — PAIN SCALES - GENERAL: PAINLEVEL_OUTOF10: 0

## 2023-10-13 NOTE — PROGRESS NOTES
Here today for IVIG. Infused per orders. No difficulty. Future appointments reveiwed and patient discharged to home.

## 2023-10-13 NOTE — PROGRESS NOTES
Port accessed with no blood return. Cathflo instilled. After 1 hour no blood return noted. Patient requested to reaccess. Port deaccessed and then accessed again. Did get tinge of blood return noted.

## 2023-10-20 ENCOUNTER — HOSPITAL ENCOUNTER (OUTPATIENT)
Dept: INFUSION THERAPY | Age: 48
Setting detail: INFUSION SERIES
Discharge: HOME | End: 2023-10-20
Payer: MEDICARE

## 2023-10-20 VITALS
OXYGEN SATURATION: 100 % | WEIGHT: 174 LBS | SYSTOLIC BLOOD PRESSURE: 133 MMHG | HEART RATE: 59 BPM | TEMPERATURE: 97.8 F | DIASTOLIC BLOOD PRESSURE: 88 MMHG | RESPIRATION RATE: 18 BRPM | BODY MASS INDEX: 28.96 KG/M2

## 2023-10-20 DIAGNOSIS — G35 MULTIPLE SCLEROSIS (HCC): Primary | ICD-10-CM

## 2023-10-20 PROCEDURE — 6370000000 HC RX 637 (ALT 250 FOR IP): Performed by: PSYCHIATRY & NEUROLOGY

## 2023-10-20 PROCEDURE — 96375 TX/PRO/DX INJ NEW DRUG ADDON: CPT

## 2023-10-20 PROCEDURE — 96365 THER/PROPH/DIAG IV INF INIT: CPT

## 2023-10-20 PROCEDURE — 96366 THER/PROPH/DIAG IV INF ADDON: CPT

## 2023-10-20 PROCEDURE — 6360000002 HC RX W HCPCS: Performed by: PSYCHIATRY & NEUROLOGY

## 2023-10-20 PROCEDURE — 2580000003 HC RX 258: Performed by: PSYCHIATRY & NEUROLOGY

## 2023-10-20 RX ORDER — SODIUM CHLORIDE 0.9 % (FLUSH) 0.9 %
5-40 SYRINGE (ML) INJECTION PRN
Status: DISCONTINUED | OUTPATIENT
Start: 2023-10-20 | End: 2023-10-21 | Stop reason: HOSPADM

## 2023-10-20 RX ORDER — SODIUM CHLORIDE 9 MG/ML
5-250 INJECTION, SOLUTION INTRAVENOUS PRN
OUTPATIENT
Start: 2023-10-27

## 2023-10-20 RX ORDER — DIPHENHYDRAMINE HYDROCHLORIDE 50 MG/ML
50 INJECTION INTRAMUSCULAR; INTRAVENOUS ONCE
Status: COMPLETED | OUTPATIENT
Start: 2023-10-20 | End: 2023-10-20

## 2023-10-20 RX ORDER — ACETAMINOPHEN 325 MG/1
650 TABLET ORAL
OUTPATIENT
Start: 2023-10-27

## 2023-10-20 RX ORDER — ALBUTEROL SULFATE 90 UG/1
4 AEROSOL, METERED RESPIRATORY (INHALATION) PRN
OUTPATIENT
Start: 2023-10-27

## 2023-10-20 RX ORDER — HEPARIN 100 UNIT/ML
500 SYRINGE INTRAVENOUS PRN
Status: CANCELLED | OUTPATIENT
Start: 2023-10-27

## 2023-10-20 RX ORDER — HEPARIN 100 UNIT/ML
500 SYRINGE INTRAVENOUS PRN
Status: DISCONTINUED | OUTPATIENT
Start: 2023-10-20 | End: 2023-10-21 | Stop reason: HOSPADM

## 2023-10-20 RX ORDER — ACETAMINOPHEN 325 MG/1
650 TABLET ORAL ONCE
Status: COMPLETED | OUTPATIENT
Start: 2023-10-20 | End: 2023-10-20

## 2023-10-20 RX ORDER — EPINEPHRINE 1 MG/ML
0.3 INJECTION, SOLUTION, CONCENTRATE INTRAVENOUS PRN
OUTPATIENT
Start: 2023-10-27

## 2023-10-20 RX ORDER — MEPERIDINE HYDROCHLORIDE 25 MG/ML
12.5 INJECTION INTRAMUSCULAR; INTRAVENOUS; SUBCUTANEOUS PRN
OUTPATIENT
Start: 2023-10-27

## 2023-10-20 RX ORDER — ONDANSETRON 2 MG/ML
8 INJECTION INTRAMUSCULAR; INTRAVENOUS
OUTPATIENT
Start: 2023-10-27

## 2023-10-20 RX ORDER — DIPHENHYDRAMINE HYDROCHLORIDE 50 MG/ML
50 INJECTION INTRAMUSCULAR; INTRAVENOUS ONCE
Status: CANCELLED
Start: 2023-10-27 | End: 2023-10-27

## 2023-10-20 RX ORDER — ACETAMINOPHEN 325 MG/1
650 TABLET ORAL ONCE
Status: CANCELLED | OUTPATIENT
Start: 2023-10-27 | End: 2023-10-27

## 2023-10-20 RX ORDER — SODIUM CHLORIDE 0.9 % (FLUSH) 0.9 %
5-40 SYRINGE (ML) INJECTION PRN
Status: CANCELLED | OUTPATIENT
Start: 2023-10-27

## 2023-10-20 RX ORDER — SODIUM CHLORIDE 9 MG/ML
INJECTION, SOLUTION INTRAVENOUS CONTINUOUS
OUTPATIENT
Start: 2023-10-27

## 2023-10-20 RX ORDER — DIPHENHYDRAMINE HYDROCHLORIDE 50 MG/ML
50 INJECTION INTRAMUSCULAR; INTRAVENOUS
OUTPATIENT
Start: 2023-10-27

## 2023-10-20 RX ADMIN — IMMUNE GLOBULIN (HUMAN) 30 G: 10 INJECTION INTRAVENOUS; SUBCUTANEOUS at 07:37

## 2023-10-20 RX ADMIN — HYDROCORTISONE SODIUM SUCCINATE 100 MG: 100 INJECTION, POWDER, FOR SOLUTION INTRAMUSCULAR; INTRAVENOUS at 07:04

## 2023-10-20 RX ADMIN — HEPARIN 500 UNITS: 100 SYRINGE at 09:15

## 2023-10-20 RX ADMIN — ACETAMINOPHEN 650 MG: 325 TABLET ORAL at 07:04

## 2023-10-20 RX ADMIN — DIPHENHYDRAMINE HYDROCHLORIDE 50 MG: 50 INJECTION INTRAMUSCULAR; INTRAVENOUS at 07:04

## 2023-10-20 RX ADMIN — SODIUM CHLORIDE, PRESERVATIVE FREE 20 ML: 5 INJECTION INTRAVENOUS at 09:15

## 2023-10-20 NOTE — DISCHARGE INSTRUCTIONS
including prescription and over-the-counter medicines, vitamins, and herbal products. Tell your doctor about all your current medicines and any medicine you start or stop using. Where can I get more information? Your pharmacist can provide more information about immune globulin intravenous. Remember, keep this and all other medicines out of the reach of children, never share your medicines with others, and use this medication only for the indication prescribed. Every effort has been made to ensure that the information provided by [address] [place] [address] is accurate, up-to-date, and complete, but no guarantee is made to that effect. Drug information contained herein may be time sensitive. [facility] information has been compiled for use by healthcare practitioners and consumers in the [facility] and therefore [facility] does not warrant that uses outside of the [facility] are appropriate, unless specifically indicated otherwise. [facility]'s drug information does not endorse drugs, diagnose patients or recommend therapy. [facility] drug information is an informational resource designed to assist licensed healthcare practitioners in caring for their patients and/or to serve consumers viewing this service as a supplement to, and not a substitute for, the expertise, skill, knowledge and judgment of healthcare practitioners. The absence of a warning for a given drug or drug combination in no way should be construed to indicate that the drug or drug combination is safe, effective or appropriate for any given patient. [facility] does not assume any responsibility for any aspect of healthcare administered with the aid of information [facility] provides. The information contained herein is not intended to cover all possible uses, directions, precautions, warnings, drug interactions, allergic reactions, or adverse effects.  If you have questions about the drugs you are taking, check with your doctor, nurse or

## 2023-10-23 NOTE — TELEPHONE ENCOUNTER
Requested Prescriptions     Pending Prescriptions Disp Refills    SUMAtriptan (IMITREX) 100 MG tablet [Pharmacy Med Name: SUMATRIPTAN SUCCINATE 100MG TABLET] 9 tablet 5     Sig: TAKE ONE TABLET DAILY AS NEEDED MIGRAINE, MAX OF 2 TABS IN 24HRS       Last Office Visit: 6/7/2023  Next Office Visit: 11/7/2023  Last Medication Refill:  7/11/23 with 5 RF

## 2023-10-24 RX ORDER — SUMATRIPTAN 100 MG/1
TABLET, FILM COATED ORAL
Qty: 9 TABLET | Refills: 5 | Status: SHIPPED | OUTPATIENT
Start: 2023-10-24

## 2023-10-27 ENCOUNTER — HOSPITAL ENCOUNTER (OUTPATIENT)
Dept: INFUSION THERAPY | Age: 48
Setting detail: INFUSION SERIES
Discharge: HOME | End: 2023-10-27
Payer: MEDICARE

## 2023-10-27 VITALS
WEIGHT: 174.4 LBS | OXYGEN SATURATION: 99 % | SYSTOLIC BLOOD PRESSURE: 131 MMHG | DIASTOLIC BLOOD PRESSURE: 88 MMHG | TEMPERATURE: 97.8 F | RESPIRATION RATE: 20 BRPM | HEART RATE: 68 BPM | BODY MASS INDEX: 29.02 KG/M2

## 2023-10-27 DIAGNOSIS — G35 MULTIPLE SCLEROSIS (HCC): Primary | ICD-10-CM

## 2023-10-27 PROCEDURE — 6360000002 HC RX W HCPCS: Performed by: PSYCHIATRY & NEUROLOGY

## 2023-10-27 PROCEDURE — 96366 THER/PROPH/DIAG IV INF ADDON: CPT

## 2023-10-27 PROCEDURE — 96365 THER/PROPH/DIAG IV INF INIT: CPT

## 2023-10-27 PROCEDURE — 6370000000 HC RX 637 (ALT 250 FOR IP): Performed by: PSYCHIATRY & NEUROLOGY

## 2023-10-27 PROCEDURE — 96375 TX/PRO/DX INJ NEW DRUG ADDON: CPT

## 2023-10-27 PROCEDURE — 2580000003 HC RX 258: Performed by: PSYCHIATRY & NEUROLOGY

## 2023-10-27 RX ORDER — EPINEPHRINE 1 MG/ML
0.3 INJECTION, SOLUTION, CONCENTRATE INTRAVENOUS PRN
OUTPATIENT
Start: 2023-11-03

## 2023-10-27 RX ORDER — ACETAMINOPHEN 325 MG/1
650 TABLET ORAL ONCE
Status: CANCELLED | OUTPATIENT
Start: 2023-11-03 | End: 2023-11-03

## 2023-10-27 RX ORDER — SODIUM CHLORIDE 9 MG/ML
5-250 INJECTION, SOLUTION INTRAVENOUS PRN
Status: CANCELLED | OUTPATIENT
Start: 2023-11-03

## 2023-10-27 RX ORDER — DIPHENHYDRAMINE HYDROCHLORIDE 50 MG/ML
50 INJECTION INTRAMUSCULAR; INTRAVENOUS ONCE
Status: COMPLETED | OUTPATIENT
Start: 2023-10-27 | End: 2023-10-27

## 2023-10-27 RX ORDER — DIPHENHYDRAMINE HYDROCHLORIDE 50 MG/ML
50 INJECTION INTRAMUSCULAR; INTRAVENOUS
OUTPATIENT
Start: 2023-11-03

## 2023-10-27 RX ORDER — ALBUTEROL SULFATE 90 UG/1
4 AEROSOL, METERED RESPIRATORY (INHALATION) PRN
OUTPATIENT
Start: 2023-11-03

## 2023-10-27 RX ORDER — HEPARIN 100 UNIT/ML
500 SYRINGE INTRAVENOUS PRN
Status: DISCONTINUED | OUTPATIENT
Start: 2023-10-27 | End: 2023-10-28 | Stop reason: HOSPADM

## 2023-10-27 RX ORDER — ACETAMINOPHEN 325 MG/1
650 TABLET ORAL
OUTPATIENT
Start: 2023-11-03

## 2023-10-27 RX ORDER — SODIUM CHLORIDE 9 MG/ML
INJECTION, SOLUTION INTRAVENOUS CONTINUOUS
OUTPATIENT
Start: 2023-11-03

## 2023-10-27 RX ORDER — ACETAMINOPHEN 325 MG/1
650 TABLET ORAL ONCE
Status: COMPLETED | OUTPATIENT
Start: 2023-10-27 | End: 2023-10-27

## 2023-10-27 RX ORDER — HEPARIN 100 UNIT/ML
500 SYRINGE INTRAVENOUS PRN
Status: CANCELLED | OUTPATIENT
Start: 2023-11-03

## 2023-10-27 RX ORDER — MEPERIDINE HYDROCHLORIDE 25 MG/ML
12.5 INJECTION INTRAMUSCULAR; INTRAVENOUS; SUBCUTANEOUS PRN
OUTPATIENT
Start: 2023-11-03

## 2023-10-27 RX ORDER — ONDANSETRON 2 MG/ML
8 INJECTION INTRAMUSCULAR; INTRAVENOUS
OUTPATIENT
Start: 2023-11-03

## 2023-10-27 RX ORDER — SODIUM CHLORIDE 0.9 % (FLUSH) 0.9 %
5-40 SYRINGE (ML) INJECTION PRN
Status: DISCONTINUED | OUTPATIENT
Start: 2023-10-27 | End: 2023-10-28 | Stop reason: HOSPADM

## 2023-10-27 RX ORDER — SODIUM CHLORIDE 0.9 % (FLUSH) 0.9 %
5-40 SYRINGE (ML) INJECTION PRN
Status: CANCELLED | OUTPATIENT
Start: 2023-11-03

## 2023-10-27 RX ORDER — DIPHENHYDRAMINE HYDROCHLORIDE 50 MG/ML
50 INJECTION INTRAMUSCULAR; INTRAVENOUS ONCE
Status: CANCELLED
Start: 2023-11-03 | End: 2023-11-03

## 2023-10-27 RX ADMIN — IMMUNE GLOBULIN (HUMAN) 30 G: 10 INJECTION INTRAVENOUS; SUBCUTANEOUS at 16:48

## 2023-10-27 RX ADMIN — ACETAMINOPHEN 650 MG: 325 TABLET ORAL at 16:45

## 2023-10-27 RX ADMIN — WATER 2 MG: 1 INJECTION INTRAMUSCULAR; INTRAVENOUS; SUBCUTANEOUS at 15:56

## 2023-10-27 RX ADMIN — DIPHENHYDRAMINE HYDROCHLORIDE 50 MG: 50 INJECTION INTRAMUSCULAR; INTRAVENOUS at 16:45

## 2023-10-27 RX ADMIN — HYDROCORTISONE SODIUM SUCCINATE 100 MG: 100 INJECTION, POWDER, FOR SOLUTION INTRAMUSCULAR; INTRAVENOUS at 16:45

## 2023-10-27 RX ADMIN — HEPARIN 500 UNITS: 100 SYRINGE at 18:12

## 2023-10-27 RX ADMIN — WATER 2 MG: 1 INJECTION INTRAMUSCULAR; INTRAVENOUS; SUBCUTANEOUS at 14:49

## 2023-10-27 NOTE — PROGRESS NOTES
Port accessed using sterile technique with no blood return. Cathflo instilled at [phone]. No blood return after one hour. Additional cathflo instilled at 1556. No blood return. Pt reaccessed. Pt states that she can smell and taste the saline. Flushes easily. IVIG 30g administered. Pt tolerated well.      Electronically signed by Court Dixon RN on 10/27/2023 at 6:01 PM

## 2023-10-27 NOTE — DISCHARGE INSTRUCTIONS
immune globulin (intravenous) (IGIV)  Pronunciation:  dinora foley  Brand:  Bivigam, Carimune, Flebogamma, Gammagard S/D, Gammaplex, Octagam, Panzyga, Privigen  What is the most important information I should know about immune globulin intravenous? This medicine can cause blood clots. The risk is highest in older adults or in people who have had blood clots, heart problems, or blood circulation problems. Blood clots are also more likely during long-term bedrest, while using birth control pills or hormone replacement therapy, or while having a central intravenous (IV) catheter in place. Call your doctor at once if you have chest pain, trouble breathing, fast heartbeats, numbness or weakness, or swelling and warmth or discoloration in an arm or leg. This medicine can also harm your kidneys, especially if you have kidney disease or you also use certain medicines. Tell your doctor right away if you have signs of kidney problems, such as swelling, rapid weight gain, and little or no urination. What is immune globulin intravenous (IGIV)? Immune globulin intravenous (IGIV, for injection into a vein) is used to treat primary immunodeficiency. IGIV is also used to increase platelets (blood clotting cells) in people with immune thrombocytopenic purpura. IGIV is also used in to help prevent certain infections in people with B-cell chronic lymphocytic leukemia. IGIV is also used in people with Kawasaki syndrome, to prevent aneurysm caused by a weakening of the main artery in the heart. IGIV may also be used for purposes not listed in this medication guide. What should I discuss with my healthcare provider before using IGIV? You may not be able to use this medicine if:  you have had an allergic reaction to an immune globulin or blood product;  you have immune globulin A (IgA) deficiency with antibody to IgA; or  you are allergic to corn.   IGIV can cause blood clots or kidney problems, especially in to assist licensed healthcare practitioners in caring for their patients and/or to serve consumers viewing this service as a supplement to, and not a substitute for, the expertise, skill, knowledge and judgment of healthcare practitioners. The absence of a warning for a given drug or drug combination in no way should be construed to indicate that the drug or drug combination is safe, effective or appropriate for any given patient. [facility] does not assume any responsibility for any aspect of healthcare administered with the aid of information [facility] provides. The information contained herein is not intended to cover all possible uses, directions, precautions, warnings, drug interactions, allergic reactions, or adverse effects. If you have questions about the drugs you are taking, check with your doctor, nurse or pharmacist.  Copyright 2474-8001 [address]. Version: 3.01. Revision date: 6/11/2018. Care instructions adapted under license by [facility] ([facility]). If you have questions about a medical condition or this instruction, always ask your healthcare professional. [address] any warranty or liability for your use of this information.

## 2023-11-03 ENCOUNTER — HOSPITAL ENCOUNTER (OUTPATIENT)
Dept: INFUSION THERAPY | Age: 48
Setting detail: INFUSION SERIES
Discharge: HOME | End: 2023-11-03
Payer: MEDICARE

## 2023-11-03 VITALS
TEMPERATURE: 97.6 F | OXYGEN SATURATION: 99 % | DIASTOLIC BLOOD PRESSURE: 80 MMHG | HEART RATE: 70 BPM | SYSTOLIC BLOOD PRESSURE: 124 MMHG | RESPIRATION RATE: 18 BRPM

## 2023-11-03 DIAGNOSIS — G35 MULTIPLE SCLEROSIS (HCC): Primary | ICD-10-CM

## 2023-11-03 PROCEDURE — 96365 THER/PROPH/DIAG IV INF INIT: CPT

## 2023-11-03 PROCEDURE — 2580000003 HC RX 258: Performed by: PSYCHIATRY & NEUROLOGY

## 2023-11-03 PROCEDURE — 6370000000 HC RX 637 (ALT 250 FOR IP): Performed by: PSYCHIATRY & NEUROLOGY

## 2023-11-03 PROCEDURE — 96375 TX/PRO/DX INJ NEW DRUG ADDON: CPT

## 2023-11-03 PROCEDURE — 96366 THER/PROPH/DIAG IV INF ADDON: CPT

## 2023-11-03 PROCEDURE — 6360000002 HC RX W HCPCS: Performed by: PSYCHIATRY & NEUROLOGY

## 2023-11-03 RX ORDER — DIPHENHYDRAMINE HYDROCHLORIDE 50 MG/ML
50 INJECTION INTRAMUSCULAR; INTRAVENOUS
OUTPATIENT
Start: 2023-11-10

## 2023-11-03 RX ORDER — EPINEPHRINE 1 MG/ML
0.3 INJECTION, SOLUTION, CONCENTRATE INTRAVENOUS PRN
OUTPATIENT
Start: 2023-11-10

## 2023-11-03 RX ORDER — SODIUM CHLORIDE 9 MG/ML
INJECTION, SOLUTION INTRAVENOUS CONTINUOUS
OUTPATIENT
Start: 2023-11-10

## 2023-11-03 RX ORDER — ALBUTEROL SULFATE 90 UG/1
4 AEROSOL, METERED RESPIRATORY (INHALATION) PRN
OUTPATIENT
Start: 2023-11-10

## 2023-11-03 RX ORDER — SODIUM CHLORIDE 0.9 % (FLUSH) 0.9 %
5-40 SYRINGE (ML) INJECTION PRN
Status: DISCONTINUED | OUTPATIENT
Start: 2023-11-03 | End: 2023-11-04 | Stop reason: HOSPADM

## 2023-11-03 RX ORDER — ACETAMINOPHEN 325 MG/1
650 TABLET ORAL ONCE
Status: CANCELLED | OUTPATIENT
Start: 2023-11-10 | End: 2023-11-10

## 2023-11-03 RX ORDER — DIPHENHYDRAMINE HYDROCHLORIDE 50 MG/ML
50 INJECTION INTRAMUSCULAR; INTRAVENOUS ONCE
Status: CANCELLED
Start: 2023-11-10 | End: 2023-11-10

## 2023-11-03 RX ORDER — ONDANSETRON 2 MG/ML
8 INJECTION INTRAMUSCULAR; INTRAVENOUS
OUTPATIENT
Start: 2023-11-10

## 2023-11-03 RX ORDER — SODIUM CHLORIDE 9 MG/ML
5-250 INJECTION, SOLUTION INTRAVENOUS PRN
OUTPATIENT
Start: 2023-11-10

## 2023-11-03 RX ORDER — ACETAMINOPHEN 325 MG/1
650 TABLET ORAL
OUTPATIENT
Start: 2023-11-10

## 2023-11-03 RX ORDER — ACETAMINOPHEN 325 MG/1
650 TABLET ORAL ONCE
Status: COMPLETED | OUTPATIENT
Start: 2023-11-03 | End: 2023-11-03

## 2023-11-03 RX ORDER — SODIUM CHLORIDE 9 MG/ML
5-250 INJECTION, SOLUTION INTRAVENOUS PRN
Status: DISCONTINUED | OUTPATIENT
Start: 2023-11-03 | End: 2023-11-04 | Stop reason: HOSPADM

## 2023-11-03 RX ORDER — DIPHENHYDRAMINE HYDROCHLORIDE 50 MG/ML
50 INJECTION INTRAMUSCULAR; INTRAVENOUS ONCE
Status: COMPLETED | OUTPATIENT
Start: 2023-11-03 | End: 2023-11-03

## 2023-11-03 RX ORDER — HEPARIN 100 UNIT/ML
500 SYRINGE INTRAVENOUS PRN
Status: DISCONTINUED | OUTPATIENT
Start: 2023-11-03 | End: 2023-11-04 | Stop reason: HOSPADM

## 2023-11-03 RX ORDER — SODIUM CHLORIDE 0.9 % (FLUSH) 0.9 %
5-40 SYRINGE (ML) INJECTION PRN
Status: CANCELLED | OUTPATIENT
Start: 2023-11-10

## 2023-11-03 RX ORDER — MEPERIDINE HYDROCHLORIDE 25 MG/ML
12.5 INJECTION INTRAMUSCULAR; INTRAVENOUS; SUBCUTANEOUS PRN
OUTPATIENT
Start: 2023-11-10

## 2023-11-03 RX ORDER — HEPARIN 100 UNIT/ML
500 SYRINGE INTRAVENOUS PRN
Status: CANCELLED | OUTPATIENT
Start: 2023-11-10

## 2023-11-03 RX ADMIN — DIPHENHYDRAMINE HYDROCHLORIDE 50 MG: 50 INJECTION INTRAMUSCULAR; INTRAVENOUS at 06:55

## 2023-11-03 RX ADMIN — SODIUM CHLORIDE, PRESERVATIVE FREE 10 ML: 5 INJECTION INTRAVENOUS at 06:54

## 2023-11-03 RX ADMIN — HEPARIN 500 UNITS: 100 SYRINGE at 09:01

## 2023-11-03 RX ADMIN — SODIUM CHLORIDE, PRESERVATIVE FREE 20 ML: 5 INJECTION INTRAVENOUS at 09:01

## 2023-11-03 RX ADMIN — HYDROCORTISONE SODIUM SUCCINATE 100 MG: 100 INJECTION, POWDER, FOR SOLUTION INTRAMUSCULAR; INTRAVENOUS at 06:53

## 2023-11-03 RX ADMIN — IMMUNE GLOBULIN (HUMAN) 30 G: 10 INJECTION INTRAVENOUS; SUBCUTANEOUS at 07:17

## 2023-11-03 RX ADMIN — ACETAMINOPHEN 650 MG: 325 TABLET ORAL at 06:52

## 2023-11-03 NOTE — DISCHARGE INSTRUCTIONS
immune globulin (intravenous) (IGIV)  Pronunciation:  dinora foley  Brand:  GAMUNEX-C  What is the most important information I should know about immune globulin intravenous? This medicine can cause blood clots. The risk is highest in older adults or in people who have had blood clots, heart problems, or blood circulation problems. Blood clots are also more likely during long-term bedrest, while using birth control pills or hormone replacement therapy, or while having a central intravenous (IV) catheter in place. Call your doctor at once if you have chest pain, trouble breathing, fast heartbeats, numbness or weakness, or swelling and warmth or discoloration in an arm or leg. This medicine can also harm your kidneys, especially if you have kidney disease or you also use certain medicines. Tell your doctor right away if you have signs of kidney problems, such as swelling, rapid weight gain, and little or no urination. What is immune globulin intravenous (IGIV)? Immune globulin intravenous (IGIV, for injection into a vein) is used to treat primary immunodeficiency. IGIV is also used to increase platelets (blood clotting cells) in people with immune thrombocytopenic purpura. IGIV is also used in to help prevent certain infections in people with B-cell chronic lymphocytic leukemia. IGIV is also used in people with Kawasaki syndrome, to prevent aneurysm caused by a weakening of the main artery in the heart. IGIV may also be used for purposes not listed in this medication guide. What should I discuss with my healthcare provider before using IGIV? You may not be able to use this medicine if:  you have had an allergic reaction to an immune globulin or blood product;  you have immune globulin A (IgA) deficiency with antibody to IgA; or  you are allergic to corn. IGIV can cause blood clots or kidney problems, especially in older adults or in people with certain conditions.  Tell your doctor if you hives; difficult breathing; swelling of your face, lips, tongue, or throat. Some side effects may occur during the injection. Tell your caregiver if you feel dizzy, nauseated, light-headed, sweaty, or have a headache, pounding in your neck or ears, fever, chills, chest tightness, or warmth or redness in your face. Call your doctor at once if you have:  a blood cell disorder --pale or yellowed skin, dark colored urine, fever, confusion or weakness;  dehydration symptoms --feeling very thirsty or hot, being unable to urinate, heavy sweating, or hot and dry skin;  kidney problems --little or no urination, swelling, rapid weight gain, feeling short of breath;  lung problems --chest pain, trouble breathing, blue colored lips, fingers, or toes;  signs of a new infection --fever with a severe headache, neck stiffness, eye pain, and increased sensitivity to light; or  signs of a blood clot --shortness of breath, chest pain with deep breathing, rapid heart rate, numbness or weakness on one side of the body, swelling and warmth or discoloration in an arm or leg. Common side effects may include:  headache, back pain, joint pain;  fever, chills, sweating, warmth or tingling;  stomach pain, nausea, diarrhea;  increased blood pressure, fast heartbeats;  dizziness, tiredness, lack of energy;  stuffy nose, sinus pain; or  pain, swelling, burning, or irritation around the IV needle. This is not a complete list of side effects and others may occur. Call your doctor for medical advice about side effects. You may report side effects to FDA at 1-367-FDA-2420. What other drugs will affect IGIV? IGIV can harm your kidneys, especially if you also use certain medicines for infections, cancer, osteoporosis, organ transplant rejection, bowel disorders, high blood pressure, or pain or arthritis (including Advil, Motrin, and Aleve).   Other drugs may affect IGIV, including prescription and over-the-counter medicines, vitamins, and herbal

## 2023-11-07 ENCOUNTER — TELEMEDICINE (OUTPATIENT)
Dept: NEUROLOGY | Age: 48
End: 2023-11-07
Payer: MEDICARE

## 2023-11-07 ENCOUNTER — TELEPHONE (OUTPATIENT)
Dept: NEUROLOGY | Age: 48
End: 2023-11-07

## 2023-11-07 DIAGNOSIS — G43.719 INTRACTABLE CHRONIC MIGRAINE WITHOUT AURA AND WITHOUT STATUS MIGRAINOSUS: ICD-10-CM

## 2023-11-07 DIAGNOSIS — R20.0 NUMBNESS: ICD-10-CM

## 2023-11-07 DIAGNOSIS — M79.7 FIBROMYALGIA: ICD-10-CM

## 2023-11-07 DIAGNOSIS — G43.819 OTHER MIGRAINE WITHOUT STATUS MIGRAINOSUS, INTRACTABLE: ICD-10-CM

## 2023-11-07 DIAGNOSIS — R53.1 WEAKNESS: ICD-10-CM

## 2023-11-07 DIAGNOSIS — M32.9 LUPUS (HCC): ICD-10-CM

## 2023-11-07 DIAGNOSIS — H53.9 VISUAL DISTURBANCE: ICD-10-CM

## 2023-11-07 DIAGNOSIS — R25.1 TREMOR: ICD-10-CM

## 2023-11-07 DIAGNOSIS — G35 MULTIPLE SCLEROSIS (HCC): Primary | ICD-10-CM

## 2023-11-07 DIAGNOSIS — M54.30 SCIATICA, UNSPECIFIED LATERALITY: ICD-10-CM

## 2023-11-07 DIAGNOSIS — H46.9 OPTIC NEURITIS: ICD-10-CM

## 2023-11-07 PROCEDURE — 99214 OFFICE O/P EST MOD 30 MIN: CPT | Performed by: PSYCHIATRY & NEUROLOGY

## 2023-11-07 PROCEDURE — G8428 CUR MEDS NOT DOCUMENT: HCPCS | Performed by: PSYCHIATRY & NEUROLOGY

## 2023-11-07 RX ORDER — RIZATRIPTAN BENZOATE 10 MG/1
TABLET ORAL
Qty: 9 TABLET | Refills: 5 | Status: SHIPPED | OUTPATIENT
Start: 2023-11-07

## 2023-11-07 RX ORDER — OXYCODONE HYDROCHLORIDE 10 MG/1
10 TABLET ORAL EVERY 8 HOURS PRN
Qty: 90 TABLET | Refills: 0 | Status: SHIPPED | OUTPATIENT
Start: 2023-11-07 | End: 2023-12-07

## 2023-11-07 NOTE — PROGRESS NOTES
Review of Systems    Constitutional - No fever or chills. No diaphoresis or significant fatigue. HENT -  No tinnitus or significant hearing loss. Eyes - no sudden vision change or eye pain  Respiratory - no significant shortness of breath or cough  Cardiovascular - no chest pain No palpitations or significant leg swelling  Gastrointestinal - no abdominal swelling or pain. Genitourinary - No difficulty urinating, dysuria  Musculoskeletal - no back pain or myalgia. Skin - no color change or rash  Neurologic - No seizures. No lateralizing weakness. Hematologic - no easy bruising or excessive bleeding. Psychiatric - no severe anxiety or nervousness. All other review of systems are negative.
clear benefit according to her. Having a lupus flair up. This usually feels pain in eyes, skin of in mouth may fall off. Felt since with 2  Ocrevus infusion October 2020 and a bit better but now off. Stefan Schwab with unclear benefit. Saw rheumatologist in [place] and plan a new infusion. Azathioprine started for her Lupus. had bowel obruction with gastric band. Stopped Ocrevus. Artane helped for about 2 days not help. Lupus flairs. Is doing well with Marinol and Acthar. Marinol only occasionally. Acthar 5 days month. Recurrent flair. Solumedrol intermittently. bad migraines recently. Multiple a week. Imitrex used to work. Planning on see cardiology heart attack. In dual flair up. Other than ACTHAR nothing else for immune modulation. Vision affected. Fatigue. finished steroid infusion. Gets it once a week and then wears off at the end week. Doing well with her weekly infusion of IVIG. Request port placement due to poor access. Had some flair up and treated with IVIG x 5 days. Gets IVIG once a week. Kathleen Master makes blood pressure go up. Had heart attack. Baclofen and Marinol helps. Has a lupus and MS flair. Feels bad lately. Insurance not coving ACTHAR.      Active Ambulatory Problems     Diagnosis Date Noted    Multiple sclerosis ([address])     Intractable chronic migraine without aura and without status migrainosus 05/01/2017    Major depressive disorder, recurrent, moderate ([address]) 09/12/2018    Exacerbation of multiple sclerosis ([address]) 05/10/2019    Optic neuritis 05/10/2019    Pain of left eye 05/10/2019    Bronchitis 05/11/2019    Visual disturbance 07/07/2020    Tremor 07/07/2020    Weakness 07/07/2020    Numbness 07/07/2020    Difficulty with speech 07/07/2020    Lupus ([address]) 07/07/2020    Fibromyalgia 07/07/2020    Convulsions ([address]) 07/20/2020    Bilateral myofascial pain 01/04/2021    Pain in joints 01/20/2021    Hypertension     Bipolar disorder ([address]) 08/18/2015    Pneumoperitoneum of unknown etiology 09/28/2021    Bloating

## 2023-11-07 NOTE — TELEPHONE ENCOUNTER
Nicolasa BAEZA requests that nurse return their call. The best time to reach her is  as soon as possible . Patient states she went to pharmacy to  her pain medication and was unable she states pharmacy told her it needs prior auth and a 7 day prescription would be $169. She is unsure what to do because she is in pain. Thank you.

## 2023-11-08 NOTE — TELEPHONE ENCOUNTER
jonas mckay ([facility])  PA Case ID #: [id number]  Need Help? Call us at [phone]  Status  sent iconSent to Plan today  Drug  oxyCODONE HCl 10MG tablets  ePA cloud logo  Form  [employer] Medicare Part D Electronic Prior Authorization Form (2017 [place])      Submitted an urgent request. Waiting on a determination. Sent the patient a [employer] message letting her know.

## 2023-11-09 RX ORDER — REPOSITORY CORTICOTROPIN 80 [USP'U]/ML
80 INJECTION INTRAMUSCULAR; SUBCUTANEOUS DAILY
Qty: 5 EACH | Refills: 5 | Status: SHIPPED | OUTPATIENT
Start: 2023-11-09 | End: 2023-11-14

## 2023-11-09 RX ORDER — LIDOCAINE AND PRILOCAINE 25; 25 MG/G; MG/G
CREAM TOPICAL
Qty: 2 EACH | Refills: 5 | Status: SHIPPED | OUTPATIENT
Start: 2023-11-09

## 2023-11-09 RX ORDER — ONDANSETRON 4 MG/1
4 TABLET, FILM COATED ORAL ONCE
Qty: 30 TABLET | Refills: 5 | Status: SHIPPED | OUTPATIENT
Start: 2023-11-09 | End: 2023-11-09

## 2023-11-09 RX ORDER — REPOSITORY CORTICOTROPIN 80 [USP'U]/ML
80 INJECTION INTRAMUSCULAR; SUBCUTANEOUS DAILY
Qty: 5 EACH | Refills: 5 | Status: SHIPPED | OUTPATIENT
Start: 2023-11-09 | End: 2023-11-09 | Stop reason: SDUPTHER

## 2023-11-09 NOTE — TELEPHONE ENCOUNTER
Requested Prescriptions     Pending Prescriptions Disp Refills    lidocaine-prilocaine (EMLA) 2.5-2.5 % cream 2 each 5     Sig: Apply topically as needed.     ondansetron (ZOFRAN) 4 MG tablet 30 tablet 5     Sig: Take 1 tablet by mouth once for 1 dose       Last Office Visit: 11/7/2023  Next Office Visit: 2/7/2024  Last Medication Refill: 8/28/2023 with 5 RF on both

## 2023-11-09 NOTE — TELEPHONE ENCOUNTER
Requested Prescriptions     Pending Prescriptions Disp Refills    corticotropin (ACTHAR) 80 UNIT/ML injectable gel 5 each 0     Sig: Inject 1 mL into the muscle daily for 5 days       Last Office Visit: 11/7/2023  Next Office Visit: 11/9/2023  Last Medication Refill: 8/29/2023 with 0 RF

## 2023-11-10 ENCOUNTER — HOSPITAL ENCOUNTER (OUTPATIENT)
Dept: INFUSION THERAPY | Age: 48
Setting detail: INFUSION SERIES
Discharge: HOME | End: 2023-11-10
Payer: MEDICARE

## 2023-11-10 VITALS
DIASTOLIC BLOOD PRESSURE: 81 MMHG | WEIGHT: 170.4 LBS | SYSTOLIC BLOOD PRESSURE: 128 MMHG | BODY MASS INDEX: 28.36 KG/M2 | TEMPERATURE: 97.7 F | HEART RATE: 64 BPM | RESPIRATION RATE: 17 BRPM | OXYGEN SATURATION: 97 %

## 2023-11-10 DIAGNOSIS — G35 MULTIPLE SCLEROSIS (HCC): Primary | ICD-10-CM

## 2023-11-10 PROCEDURE — 6360000002 HC RX W HCPCS: Performed by: PSYCHIATRY & NEUROLOGY

## 2023-11-10 PROCEDURE — 96375 TX/PRO/DX INJ NEW DRUG ADDON: CPT

## 2023-11-10 PROCEDURE — 96366 THER/PROPH/DIAG IV INF ADDON: CPT

## 2023-11-10 PROCEDURE — 96365 THER/PROPH/DIAG IV INF INIT: CPT

## 2023-11-10 PROCEDURE — 6370000000 HC RX 637 (ALT 250 FOR IP): Performed by: PSYCHIATRY & NEUROLOGY

## 2023-11-10 RX ORDER — SODIUM CHLORIDE 9 MG/ML
INJECTION, SOLUTION INTRAVENOUS CONTINUOUS
OUTPATIENT
Start: 2023-11-17

## 2023-11-10 RX ORDER — ACETAMINOPHEN 325 MG/1
650 TABLET ORAL
OUTPATIENT
Start: 2023-11-17

## 2023-11-10 RX ORDER — HEPARIN 100 UNIT/ML
500 SYRINGE INTRAVENOUS PRN
Status: DISCONTINUED | OUTPATIENT
Start: 2023-11-10 | End: 2023-11-11 | Stop reason: HOSPADM

## 2023-11-10 RX ORDER — MEPERIDINE HYDROCHLORIDE 25 MG/ML
12.5 INJECTION INTRAMUSCULAR; INTRAVENOUS; SUBCUTANEOUS PRN
OUTPATIENT
Start: 2023-11-17

## 2023-11-10 RX ORDER — SODIUM CHLORIDE 9 MG/ML
5-250 INJECTION, SOLUTION INTRAVENOUS PRN
OUTPATIENT
Start: 2023-11-17

## 2023-11-10 RX ORDER — SODIUM CHLORIDE 0.9 % (FLUSH) 0.9 %
5-40 SYRINGE (ML) INJECTION PRN
Status: DISCONTINUED | OUTPATIENT
Start: 2023-11-10 | End: 2023-11-11 | Stop reason: HOSPADM

## 2023-11-10 RX ORDER — SODIUM CHLORIDE 0.9 % (FLUSH) 0.9 %
5-40 SYRINGE (ML) INJECTION PRN
OUTPATIENT
Start: 2023-11-17

## 2023-11-10 RX ORDER — EPINEPHRINE 1 MG/ML
0.3 INJECTION, SOLUTION, CONCENTRATE INTRAVENOUS PRN
OUTPATIENT
Start: 2023-11-17

## 2023-11-10 RX ORDER — ALBUTEROL SULFATE 90 UG/1
4 AEROSOL, METERED RESPIRATORY (INHALATION) PRN
OUTPATIENT
Start: 2023-11-17

## 2023-11-10 RX ORDER — HEPARIN 100 UNIT/ML
500 SYRINGE INTRAVENOUS PRN
OUTPATIENT
Start: 2023-11-17

## 2023-11-10 RX ORDER — DIPHENHYDRAMINE HYDROCHLORIDE 50 MG/ML
50 INJECTION INTRAMUSCULAR; INTRAVENOUS ONCE
Start: 2023-11-17 | End: 2023-11-17

## 2023-11-10 RX ORDER — ACETAMINOPHEN 325 MG/1
650 TABLET ORAL ONCE
OUTPATIENT
Start: 2023-11-17 | End: 2023-11-17

## 2023-11-10 RX ORDER — DIPHENHYDRAMINE HYDROCHLORIDE 50 MG/ML
50 INJECTION INTRAMUSCULAR; INTRAVENOUS
OUTPATIENT
Start: 2023-11-17

## 2023-11-10 RX ORDER — DIPHENHYDRAMINE HYDROCHLORIDE 50 MG/ML
50 INJECTION INTRAMUSCULAR; INTRAVENOUS ONCE
Status: COMPLETED | OUTPATIENT
Start: 2023-11-10 | End: 2023-11-10

## 2023-11-10 RX ORDER — ACETAMINOPHEN 325 MG/1
650 TABLET ORAL ONCE
Status: COMPLETED | OUTPATIENT
Start: 2023-11-10 | End: 2023-11-10

## 2023-11-10 RX ORDER — ONDANSETRON 2 MG/ML
8 INJECTION INTRAMUSCULAR; INTRAVENOUS
OUTPATIENT
Start: 2023-11-17

## 2023-11-10 RX ADMIN — DIPHENHYDRAMINE HYDROCHLORIDE 50 MG: 50 INJECTION INTRAMUSCULAR; INTRAVENOUS at 06:57

## 2023-11-10 RX ADMIN — HEPARIN 500 UNITS: 100 SYRINGE at 09:03

## 2023-11-10 RX ADMIN — IMMUNE GLOBULIN (HUMAN) 30 G: 10 INJECTION INTRAVENOUS; SUBCUTANEOUS at 07:20

## 2023-11-10 RX ADMIN — ACETAMINOPHEN 650 MG: 325 TABLET ORAL at 06:58

## 2023-11-10 RX ADMIN — HYDROCORTISONE SODIUM SUCCINATE 100 MG: 100 INJECTION, POWDER, FOR SOLUTION INTRAMUSCULAR; INTRAVENOUS at 06:57

## 2023-11-10 ASSESSMENT — PAIN SCALES - GENERAL: PAINLEVEL_OUTOF10: 0

## 2023-11-10 NOTE — DISCHARGE INSTRUCTIONS
IGIV?  Get emergency medical help if you have signs of an allergic reaction: hives; difficult breathing; swelling of your face, lips, tongue, or throat. Some side effects may occur during the injection. Tell your caregiver if you feel dizzy, nauseated, light-headed, sweaty, or have a headache, pounding in your neck or ears, fever, chills, chest tightness, or warmth or redness in your face. Call your doctor at once if you have:  a blood cell disorder --pale or yellowed skin, dark colored urine, fever, confusion or weakness;  dehydration symptoms --feeling very thirsty or hot, being unable to urinate, heavy sweating, or hot and dry skin;  kidney problems --little or no urination, swelling, rapid weight gain, feeling short of breath;  lung problems --chest pain, trouble breathing, blue colored lips, fingers, or toes;  signs of a new infection --fever with a severe headache, neck stiffness, eye pain, and increased sensitivity to light; or  signs of a blood clot --shortness of breath, chest pain with deep breathing, rapid heart rate, numbness or weakness on one side of the body, swelling and warmth or discoloration in an arm or leg. Common side effects may include:  headache, back pain, joint pain;  fever, chills, sweating, warmth or tingling;  stomach pain, nausea, diarrhea;  increased blood pressure, fast heartbeats;  dizziness, tiredness, lack of energy;  stuffy nose, sinus pain; or  pain, swelling, burning, or irritation around the IV needle. This is not a complete list of side effects and others may occur. Call your doctor for medical advice about side effects. You may report side effects to FDA at 3-723-FDA-2252. What other drugs will affect IGIV? IGIV can harm your kidneys, especially if you also use certain medicines for infections, cancer, osteoporosis, organ transplant rejection, bowel disorders, high blood pressure, or pain or arthritis (including Advil, Motrin, and Aleve).   Other drugs may affect IGIV, including prescription and over-the-counter medicines, vitamins, and herbal products. Tell your doctor about all your current medicines and any medicine you start or stop using. Where can I get more information? Your pharmacist can provide more information about immune globulin intravenous. Remember, keep this and all other medicines out of the reach of children, never share your medicines with others, and use this medication only for the indication prescribed. Every effort has been made to ensure that the information provided by [address] [place] [address] [employer] is accurate, up-to-date, and complete, but no guarantee is made to that effect. Drug information contained herein may be time sensitive. [facility] information has been compiled for use by healthcare practitioners and consumers in the [facility] and therefore [facility] does not warrant that uses outside of the [facility] are appropriate, unless specifically indicated otherwise. [facility]'s drug information does not endorse drugs, diagnose patients or recommend therapy. [facility] drug information is an informational resource designed to assist licensed healthcare practitioners in caring for their patients and/or to serve consumers viewing this service as a supplement to, and not a substitute for, the expertise, skill, knowledge and judgment of healthcare practitioners. The absence of a warning for a given drug or drug combination in no way should be construed to indicate that the drug or drug combination is safe, effective or appropriate for any given patient. [facility] does not assume any responsibility for any aspect of healthcare administered with the aid of information [facility] provides. The information contained herein is not intended to cover all possible uses, directions, precautions, warnings, drug interactions, allergic reactions, or adverse effects.  If you have questions about the drugs you are taking, check with your doctor, nurse or

## 2023-11-17 ENCOUNTER — HOSPITAL ENCOUNTER (OUTPATIENT)
Dept: INFUSION THERAPY | Age: 48
Setting detail: INFUSION SERIES
Discharge: HOME | End: 2023-11-17
Payer: MEDICARE

## 2023-11-17 VITALS
OXYGEN SATURATION: 99 % | BODY MASS INDEX: 27.62 KG/M2 | TEMPERATURE: 98.6 F | WEIGHT: 166 LBS | RESPIRATION RATE: 18 BRPM | SYSTOLIC BLOOD PRESSURE: 113 MMHG | DIASTOLIC BLOOD PRESSURE: 79 MMHG | HEART RATE: 67 BPM

## 2023-11-17 DIAGNOSIS — G35 MULTIPLE SCLEROSIS (HCC): Primary | ICD-10-CM

## 2023-11-17 PROCEDURE — 6370000000 HC RX 637 (ALT 250 FOR IP): Performed by: PSYCHIATRY & NEUROLOGY

## 2023-11-17 PROCEDURE — 96365 THER/PROPH/DIAG IV INF INIT: CPT

## 2023-11-17 PROCEDURE — 96366 THER/PROPH/DIAG IV INF ADDON: CPT

## 2023-11-17 PROCEDURE — 2580000003 HC RX 258: Performed by: PSYCHIATRY & NEUROLOGY

## 2023-11-17 PROCEDURE — 96375 TX/PRO/DX INJ NEW DRUG ADDON: CPT

## 2023-11-17 PROCEDURE — 6360000002 HC RX W HCPCS: Performed by: PSYCHIATRY & NEUROLOGY

## 2023-11-17 RX ORDER — EPINEPHRINE 1 MG/ML
0.3 INJECTION, SOLUTION, CONCENTRATE INTRAVENOUS PRN
OUTPATIENT
Start: 2023-11-24

## 2023-11-17 RX ORDER — HEPARIN 100 UNIT/ML
500 SYRINGE INTRAVENOUS PRN
Status: DISCONTINUED | OUTPATIENT
Start: 2023-11-17 | End: 2023-11-18 | Stop reason: HOSPADM

## 2023-11-17 RX ORDER — SODIUM CHLORIDE 9 MG/ML
INJECTION, SOLUTION INTRAVENOUS CONTINUOUS
OUTPATIENT
Start: 2023-11-24

## 2023-11-17 RX ORDER — ACETAMINOPHEN 325 MG/1
650 TABLET ORAL
OUTPATIENT
Start: 2023-11-24

## 2023-11-17 RX ORDER — SODIUM CHLORIDE 0.9 % (FLUSH) 0.9 %
5-40 SYRINGE (ML) INJECTION PRN
Status: DISCONTINUED | OUTPATIENT
Start: 2023-11-17 | End: 2023-11-18 | Stop reason: HOSPADM

## 2023-11-17 RX ORDER — DIPHENHYDRAMINE HYDROCHLORIDE 50 MG/ML
50 INJECTION INTRAMUSCULAR; INTRAVENOUS ONCE
Status: COMPLETED | OUTPATIENT
Start: 2023-11-17 | End: 2023-11-17

## 2023-11-17 RX ORDER — MEPERIDINE HYDROCHLORIDE 25 MG/ML
12.5 INJECTION INTRAMUSCULAR; INTRAVENOUS; SUBCUTANEOUS PRN
OUTPATIENT
Start: 2023-11-24

## 2023-11-17 RX ORDER — HEPARIN 100 UNIT/ML
500 SYRINGE INTRAVENOUS PRN
OUTPATIENT
Start: 2023-11-24

## 2023-11-17 RX ORDER — ALBUTEROL SULFATE 90 UG/1
4 AEROSOL, METERED RESPIRATORY (INHALATION) PRN
OUTPATIENT
Start: 2023-11-24

## 2023-11-17 RX ORDER — DIPHENHYDRAMINE HYDROCHLORIDE 50 MG/ML
50 INJECTION INTRAMUSCULAR; INTRAVENOUS
OUTPATIENT
Start: 2023-11-24

## 2023-11-17 RX ORDER — SODIUM CHLORIDE 9 MG/ML
5-250 INJECTION, SOLUTION INTRAVENOUS PRN
OUTPATIENT
Start: 2023-11-24

## 2023-11-17 RX ORDER — DIPHENHYDRAMINE HYDROCHLORIDE 50 MG/ML
50 INJECTION INTRAMUSCULAR; INTRAVENOUS ONCE
Start: 2023-11-24 | End: 2023-11-24

## 2023-11-17 RX ORDER — ONDANSETRON 2 MG/ML
8 INJECTION INTRAMUSCULAR; INTRAVENOUS
OUTPATIENT
Start: 2023-11-24

## 2023-11-17 RX ORDER — ACETAMINOPHEN 325 MG/1
650 TABLET ORAL ONCE
Status: COMPLETED | OUTPATIENT
Start: 2023-11-17 | End: 2023-11-17

## 2023-11-17 RX ORDER — ACETAMINOPHEN 325 MG/1
650 TABLET ORAL ONCE
OUTPATIENT
Start: 2023-11-24 | End: 2023-11-24

## 2023-11-17 RX ORDER — SODIUM CHLORIDE 0.9 % (FLUSH) 0.9 %
5-40 SYRINGE (ML) INJECTION PRN
OUTPATIENT
Start: 2023-11-24

## 2023-11-17 RX ADMIN — SODIUM CHLORIDE, PRESERVATIVE FREE 10 ML: 5 INJECTION INTRAVENOUS at 08:48

## 2023-11-17 RX ADMIN — IMMUNE GLOBULIN (HUMAN) 30 G: 10 INJECTION INTRAVENOUS; SUBCUTANEOUS at 07:09

## 2023-11-17 RX ADMIN — HEPARIN 500 UNITS: 100 SYRINGE at 08:48

## 2023-11-17 RX ADMIN — HYDROCORTISONE SODIUM SUCCINATE 100 MG: 100 INJECTION, POWDER, FOR SOLUTION INTRAMUSCULAR; INTRAVENOUS at 06:54

## 2023-11-17 RX ADMIN — DIPHENHYDRAMINE HYDROCHLORIDE 50 MG: 50 INJECTION INTRAMUSCULAR; INTRAVENOUS at 06:54

## 2023-11-17 RX ADMIN — ACETAMINOPHEN 650 MG: 325 TABLET ORAL at 06:54

## 2023-11-17 ASSESSMENT — PAIN SCALES - GENERAL: PAINLEVEL_OUTOF10: 0

## 2023-11-17 NOTE — DISCHARGE INSTRUCTIONS
vaccines include measles, mumps, rubella (MMR), rotavirus, typhoid, yellow fever, varicella (chickenpox), zoster (shingles), and nasal flu (influenza) vaccine. What are the possible side effects of immune globulin? Get emergency medical help if you have signs of an allergic reaction: hives; wheezing, difficulty breathing; dizziness, feeling like you might pass out; swelling of your face, lips, tongue, or throat. Some side effects may occur during the injection. Tell your caregiver if you feel light-headed, itchy, chilled, sweaty, or have chest discomfort, fast heartbeats, severe headache, or pounding in your neck or ears. Call your doctor at once if you have:  a blood cell disorder --pale or yellowed skin, dark colored urine, fever, confusion or weakness;  dehydration symptoms --feeling very thirsty or hot, being unable to urinate, heavy sweating, or hot and dry skin;  kidney problems --little or no urination, swelling, rapid weight gain, feeling short of breath;  lung problems --chest pain, wheezing, trouble breathing, blue colored lips, fingers, or toes;  signs of a new infection --fever with a severe headache, neck stiffness, eye pain, and increased sensitivity to light; or  signs of a blood clot --shortness of breath, chest pain with deep breathing, rapid heart rate, numbness or weakness on one side of the body, swelling and warmth or discoloration in an arm or leg. Common side effects may include:  runny or stuffy nose, sinus pain, cough, sore throat;  fever, chills, weakness;  headache, back pain, muscle or joint pain;  dizziness, tiredness, depressed mood;  swelling in your hands or feet;  skin rash, redness, or bruising;  blisters or ulcers in your mouth, red or swollen gums, trouble swallowing;  nausea, diarrhea, stomach pain, upset stomach;  increased blood pressure; or  redness, swelling, or itching where an injection was given. This is not a complete list of side effects and others may occur.  Call appropriate for any given patient. [facility] does not assume any responsibility for any aspect of healthcare administered with the aid of information [facility] provides. The information contained herein is not intended to cover all possible uses, directions, precautions, warnings, drug interactions, allergic reactions, or adverse effects. If you have questions about the drugs you are taking, check with your doctor, nurse or pharmacist.  Copyright 1941-2576 [address]: 4.02. Revision date: 2/26/2021. Care instructions adapted under license by [facility] ([facility]). If you have questions about a medical condition or this instruction, always ask your healthcare professional. [address] any warranty or liability for your use of this information.

## 2023-11-17 NOTE — PROGRESS NOTES
Here today for IVIG, infused per orders. Patient tolerated well. Future appointments reviewed with patient and discharged to home.

## 2023-11-27 ENCOUNTER — TELEPHONE (OUTPATIENT)
Dept: NEUROLOGY | Age: 48
End: 2023-11-27

## 2023-11-27 RX ORDER — METHYLPREDNISOLONE 4 MG/1
TABLET ORAL
Qty: 1 KIT | Refills: 0 | Status: SHIPPED | OUTPATIENT
Start: 2023-11-27 | End: 2023-11-27

## 2023-11-27 NOTE — TELEPHONE ENCOUNTER
Called and spoke with Lisa Evans at [facility] she was getting the medrol dose pack ready for patient.  Patient has been advised script was sent to [address] [facility] [address] [facility], [address] [place] [address]

## 2023-11-29 ENCOUNTER — HOSPITAL ENCOUNTER (OUTPATIENT)
Dept: INFUSION THERAPY | Age: 48
Setting detail: INFUSION SERIES
Discharge: HOME | End: 2023-11-29
Payer: MEDICARE

## 2023-11-29 VITALS
BODY MASS INDEX: 27.99 KG/M2 | TEMPERATURE: 98.1 F | HEART RATE: 62 BPM | OXYGEN SATURATION: 100 % | RESPIRATION RATE: 18 BRPM | SYSTOLIC BLOOD PRESSURE: 122 MMHG | WEIGHT: 168.2 LBS | DIASTOLIC BLOOD PRESSURE: 69 MMHG

## 2023-11-29 DIAGNOSIS — G35 MULTIPLE SCLEROSIS (HCC): Primary | ICD-10-CM

## 2023-11-29 PROCEDURE — 6360000002 HC RX W HCPCS: Performed by: PSYCHIATRY & NEUROLOGY

## 2023-11-29 PROCEDURE — 2580000003 HC RX 258: Performed by: PSYCHIATRY & NEUROLOGY

## 2023-11-29 PROCEDURE — 96366 THER/PROPH/DIAG IV INF ADDON: CPT

## 2023-11-29 PROCEDURE — 6370000000 HC RX 637 (ALT 250 FOR IP): Performed by: PSYCHIATRY & NEUROLOGY

## 2023-11-29 PROCEDURE — 96375 TX/PRO/DX INJ NEW DRUG ADDON: CPT

## 2023-11-29 PROCEDURE — 96365 THER/PROPH/DIAG IV INF INIT: CPT

## 2023-11-29 RX ORDER — SODIUM CHLORIDE 9 MG/ML
INJECTION, SOLUTION INTRAVENOUS CONTINUOUS
OUTPATIENT
Start: 2023-12-01

## 2023-11-29 RX ORDER — ACETAMINOPHEN 325 MG/1
650 TABLET ORAL
OUTPATIENT
Start: 2023-12-01

## 2023-11-29 RX ORDER — SODIUM CHLORIDE 0.9 % (FLUSH) 0.9 %
5-40 SYRINGE (ML) INJECTION PRN
Status: CANCELLED | OUTPATIENT
Start: 2023-12-01

## 2023-11-29 RX ORDER — ACETAMINOPHEN 325 MG/1
650 TABLET ORAL ONCE
Status: COMPLETED | OUTPATIENT
Start: 2023-11-29 | End: 2023-11-29

## 2023-11-29 RX ORDER — ACETAMINOPHEN 325 MG/1
650 TABLET ORAL ONCE
Status: CANCELLED | OUTPATIENT
Start: 2023-12-01 | End: 2023-12-01

## 2023-11-29 RX ORDER — SODIUM CHLORIDE 9 MG/ML
5-250 INJECTION, SOLUTION INTRAVENOUS PRN
OUTPATIENT
Start: 2023-12-01

## 2023-11-29 RX ORDER — HEPARIN 100 UNIT/ML
500 SYRINGE INTRAVENOUS PRN
Status: CANCELLED | OUTPATIENT
Start: 2023-12-01

## 2023-11-29 RX ORDER — ALBUTEROL SULFATE 90 UG/1
4 AEROSOL, METERED RESPIRATORY (INHALATION) PRN
OUTPATIENT
Start: 2023-12-01

## 2023-11-29 RX ORDER — SODIUM CHLORIDE 9 MG/ML
5-250 INJECTION, SOLUTION INTRAVENOUS PRN
Status: CANCELLED | OUTPATIENT
Start: 2023-12-01

## 2023-11-29 RX ORDER — DIPHENHYDRAMINE HYDROCHLORIDE 50 MG/ML
50 INJECTION INTRAMUSCULAR; INTRAVENOUS
OUTPATIENT
Start: 2023-12-01

## 2023-11-29 RX ORDER — EPINEPHRINE 1 MG/ML
0.3 INJECTION, SOLUTION, CONCENTRATE INTRAVENOUS PRN
OUTPATIENT
Start: 2023-12-01

## 2023-11-29 RX ORDER — ONDANSETRON 2 MG/ML
8 INJECTION INTRAMUSCULAR; INTRAVENOUS
OUTPATIENT
Start: 2023-12-01

## 2023-11-29 RX ORDER — HEPARIN 100 UNIT/ML
500 SYRINGE INTRAVENOUS PRN
Status: DISCONTINUED | OUTPATIENT
Start: 2023-11-29 | End: 2023-11-30 | Stop reason: HOSPADM

## 2023-11-29 RX ORDER — DIPHENHYDRAMINE HYDROCHLORIDE 50 MG/ML
50 INJECTION INTRAMUSCULAR; INTRAVENOUS ONCE
Status: COMPLETED | OUTPATIENT
Start: 2023-11-29 | End: 2023-11-29

## 2023-11-29 RX ORDER — MEPERIDINE HYDROCHLORIDE 25 MG/ML
12.5 INJECTION INTRAMUSCULAR; INTRAVENOUS; SUBCUTANEOUS PRN
OUTPATIENT
Start: 2023-12-01

## 2023-11-29 RX ORDER — SODIUM CHLORIDE 0.9 % (FLUSH) 0.9 %
5-40 SYRINGE (ML) INJECTION PRN
Status: DISCONTINUED | OUTPATIENT
Start: 2023-11-29 | End: 2023-11-30 | Stop reason: HOSPADM

## 2023-11-29 RX ORDER — DIPHENHYDRAMINE HYDROCHLORIDE 50 MG/ML
50 INJECTION INTRAMUSCULAR; INTRAVENOUS ONCE
Status: CANCELLED
Start: 2023-12-01 | End: 2023-12-01

## 2023-11-29 RX ADMIN — SODIUM CHLORIDE, PRESERVATIVE FREE 20 ML: 5 INJECTION INTRAVENOUS at 09:12

## 2023-11-29 RX ADMIN — DIPHENHYDRAMINE HYDROCHLORIDE 50 MG: 50 INJECTION INTRAMUSCULAR; INTRAVENOUS at 07:23

## 2023-11-29 RX ADMIN — IMMUNE GLOBULIN (HUMAN) 30 G: 10 INJECTION INTRAVENOUS; SUBCUTANEOUS at 07:36

## 2023-11-29 RX ADMIN — HEPARIN 500 UNITS: 100 SYRINGE at 09:12

## 2023-11-29 RX ADMIN — ACETAMINOPHEN 650 MG: 325 TABLET ORAL at 07:23

## 2023-11-29 RX ADMIN — HYDROCORTISONE SODIUM SUCCINATE 100 MG: 100 INJECTION, POWDER, FOR SOLUTION INTRAMUSCULAR; INTRAVENOUS at 07:23

## 2023-11-29 NOTE — DISCHARGE INSTRUCTIONS
immune globulin (intravenous and subcutaneous)  Pronunciation:  dinora LACEY foley  Brand:  Gammagard Liquid, Gammaked, Gamunex-C  What is the most important information I should know about immune globulin? This medicine can cause blood clots. The risk is highest in older adults or in people who have had blood clots, heart problems, or blood circulation problems. Blood clots are also more likely during long-term bedrest, while using birth control pills or hormone replacement therapy, or while having a central intravenous (IV) catheter in place. Call your doctor at once if you have chest pain, trouble breathing, fast heartbeats, numbness or weakness, or swelling and warmth or discoloration in an arm or leg. This medicine can also harm your kidneys, especially if you have kidney disease or if you also use certain medicines. Tell your doctor right away if you have signs of kidney problems, such as swelling, rapid weight gain, and little or no urination. What is immune globulin? Immune globulin intravenous and subcutaneous (for injection into a vein or under the skin) is used to treat primary immunodeficiency. Immune globulin is also used to increase platelets (blood clotting cells) in people with idiopathic thrombocytopenic purpura. Immune globulin is also used to treat certain debilitating nerve disorders that cause muscle weakness and can affect daily activities. Immune globulin may also be used for purposes not listed in this medication guide. What should I discuss with my healthcare provider before using immune globulin? You should not use this medicine if:  you have had an allergic reaction to an immune globulin or blood product; or  you have immune globulin A (IgA) deficiency with antibody to IgA. Immune globulin can cause blood clots or kidney problems, especially in older adults or in people with certain conditions.  Tell your doctor if you have ever had:  heart problems, blood circulation appropriate for any given patient. [facility] does not assume any responsibility for any aspect of healthcare administered with the aid of information [facility] provides. The information contained herein is not intended to cover all possible uses, directions, precautions, warnings, drug interactions, allergic reactions, or adverse effects. If you have questions about the drugs you are taking, check with your doctor, nurse or pharmacist.  Copyright 7987-8927 [address]: 4.02. Revision date: 2/26/2021. Care instructions adapted under license by [facility] ([facility]). If you have questions about a medical condition or this instruction, always ask your healthcare professional. [address] any warranty or liability for your use of this information.

## 2023-12-05 DIAGNOSIS — G43.719 INTRACTABLE CHRONIC MIGRAINE WITHOUT AURA AND WITHOUT STATUS MIGRAINOSUS: ICD-10-CM

## 2023-12-05 DIAGNOSIS — G35 MULTIPLE SCLEROSIS (HCC): ICD-10-CM

## 2023-12-05 DIAGNOSIS — M79.7 FIBROMYALGIA: ICD-10-CM

## 2023-12-05 DIAGNOSIS — M54.30 SCIATICA, UNSPECIFIED LATERALITY: ICD-10-CM

## 2023-12-05 RX ORDER — OXYCODONE HYDROCHLORIDE 10 MG/1
10 TABLET ORAL EVERY 8 HOURS PRN
Qty: 90 TABLET | Refills: 0 | Status: SHIPPED | OUTPATIENT
Start: 2023-12-07 | End: 2024-01-06

## 2023-12-05 NOTE — TELEPHONE ENCOUNTER
Daniel El has requested a refill on her medication. Last office visit : 11/7/2023   Next office visit : 2/7/2024   Last medication refill : 11/7/23  Jesus Brooks : up to date       Requested Prescriptions     Pending Prescriptions Disp Refills    oxyCODONE HCl (OXY-IR) 10 MG immediate release tablet 90 tablet 0     Sig: Take 1 tablet by mouth every 8 hours as needed for Pain for up to 30 days.  Intended supply: 30 days Max Daily Amount: 30 mg

## 2023-12-08 ENCOUNTER — HOSPITAL ENCOUNTER (OUTPATIENT)
Dept: INFUSION THERAPY | Age: 48
Setting detail: INFUSION SERIES
Discharge: HOME | End: 2023-12-08
Payer: MEDICARE

## 2023-12-08 VITALS
HEART RATE: 51 BPM | OXYGEN SATURATION: 97 % | TEMPERATURE: 97.8 F | RESPIRATION RATE: 18 BRPM | WEIGHT: 165.2 LBS | SYSTOLIC BLOOD PRESSURE: 116 MMHG | BODY MASS INDEX: 27.49 KG/M2 | DIASTOLIC BLOOD PRESSURE: 82 MMHG

## 2023-12-08 DIAGNOSIS — G35 MULTIPLE SCLEROSIS (HCC): Primary | ICD-10-CM

## 2023-12-08 PROCEDURE — 96375 TX/PRO/DX INJ NEW DRUG ADDON: CPT

## 2023-12-08 PROCEDURE — 6370000000 HC RX 637 (ALT 250 FOR IP): Performed by: PSYCHIATRY & NEUROLOGY

## 2023-12-08 PROCEDURE — 6360000002 HC RX W HCPCS: Performed by: PSYCHIATRY & NEUROLOGY

## 2023-12-08 PROCEDURE — 2580000003 HC RX 258: Performed by: PSYCHIATRY & NEUROLOGY

## 2023-12-08 PROCEDURE — 96365 THER/PROPH/DIAG IV INF INIT: CPT

## 2023-12-08 RX ORDER — DIPHENHYDRAMINE HYDROCHLORIDE 50 MG/ML
50 INJECTION INTRAMUSCULAR; INTRAVENOUS ONCE
Start: 2023-12-15 | End: 2023-12-15

## 2023-12-08 RX ORDER — DIPHENHYDRAMINE HYDROCHLORIDE 50 MG/ML
50 INJECTION INTRAMUSCULAR; INTRAVENOUS
OUTPATIENT
Start: 2023-12-15

## 2023-12-08 RX ORDER — SODIUM CHLORIDE 9 MG/ML
5-250 INJECTION, SOLUTION INTRAVENOUS PRN
OUTPATIENT
Start: 2023-12-15

## 2023-12-08 RX ORDER — SODIUM CHLORIDE 9 MG/ML
INJECTION, SOLUTION INTRAVENOUS CONTINUOUS
OUTPATIENT
Start: 2023-12-15

## 2023-12-08 RX ORDER — HEPARIN 100 UNIT/ML
500 SYRINGE INTRAVENOUS PRN
Status: DISCONTINUED | OUTPATIENT
Start: 2023-12-08 | End: 2023-12-09 | Stop reason: HOSPADM

## 2023-12-08 RX ORDER — ONDANSETRON 2 MG/ML
8 INJECTION INTRAMUSCULAR; INTRAVENOUS
OUTPATIENT
Start: 2023-12-15

## 2023-12-08 RX ORDER — SODIUM CHLORIDE 0.9 % (FLUSH) 0.9 %
5-40 SYRINGE (ML) INJECTION PRN
Status: DISCONTINUED | OUTPATIENT
Start: 2023-12-08 | End: 2023-12-09 | Stop reason: HOSPADM

## 2023-12-08 RX ORDER — DIPHENHYDRAMINE HYDROCHLORIDE 50 MG/ML
50 INJECTION INTRAMUSCULAR; INTRAVENOUS ONCE
Status: COMPLETED | OUTPATIENT
Start: 2023-12-08 | End: 2023-12-08

## 2023-12-08 RX ORDER — ALBUTEROL SULFATE 90 UG/1
4 AEROSOL, METERED RESPIRATORY (INHALATION) PRN
OUTPATIENT
Start: 2023-12-15

## 2023-12-08 RX ORDER — ACETAMINOPHEN 325 MG/1
650 TABLET ORAL ONCE
Status: COMPLETED | OUTPATIENT
Start: 2023-12-08 | End: 2023-12-08

## 2023-12-08 RX ORDER — MEPERIDINE HYDROCHLORIDE 25 MG/ML
12.5 INJECTION INTRAMUSCULAR; INTRAVENOUS; SUBCUTANEOUS PRN
OUTPATIENT
Start: 2023-12-15

## 2023-12-08 RX ORDER — HEPARIN 100 UNIT/ML
500 SYRINGE INTRAVENOUS PRN
OUTPATIENT
Start: 2023-12-15

## 2023-12-08 RX ORDER — SODIUM CHLORIDE 0.9 % (FLUSH) 0.9 %
5-40 SYRINGE (ML) INJECTION PRN
OUTPATIENT
Start: 2023-12-15

## 2023-12-08 RX ORDER — ACETAMINOPHEN 325 MG/1
650 TABLET ORAL ONCE
OUTPATIENT
Start: 2023-12-15 | End: 2023-12-15

## 2023-12-08 RX ORDER — ACETAMINOPHEN 325 MG/1
650 TABLET ORAL
OUTPATIENT
Start: 2023-12-15

## 2023-12-08 RX ORDER — EPINEPHRINE 1 MG/ML
0.3 INJECTION, SOLUTION, CONCENTRATE INTRAVENOUS PRN
OUTPATIENT
Start: 2023-12-15

## 2023-12-08 RX ORDER — SODIUM CHLORIDE 9 MG/ML
5-250 INJECTION, SOLUTION INTRAVENOUS PRN
Status: DISCONTINUED | OUTPATIENT
Start: 2023-12-08 | End: 2023-12-09 | Stop reason: HOSPADM

## 2023-12-08 RX ADMIN — IMMUNE GLOBULIN (HUMAN) 30 G: 10 INJECTION INTRAVENOUS; SUBCUTANEOUS at 07:37

## 2023-12-08 RX ADMIN — HEPARIN 500 UNITS: 100 SYRINGE at 09:12

## 2023-12-08 RX ADMIN — ACETAMINOPHEN 650 MG: 325 TABLET ORAL at 07:03

## 2023-12-08 RX ADMIN — Medication 10 ML: at 09:12

## 2023-12-08 RX ADMIN — HYDROCORTISONE SODIUM SUCCINATE 100 MG: 100 INJECTION, POWDER, FOR SOLUTION INTRAMUSCULAR; INTRAVENOUS at 07:03

## 2023-12-08 RX ADMIN — DIPHENHYDRAMINE HYDROCHLORIDE 50 MG: 50 INJECTION INTRAMUSCULAR; INTRAVENOUS at 07:03

## 2023-12-08 NOTE — PROGRESS NOTES
IVIG 30g administered. Pt tolerated well. Port de accessed with brisk blood return.      Electronically signed by Renetta Robb RN on 12/8/2023 at 9:16 AM

## 2023-12-15 ENCOUNTER — HOSPITAL ENCOUNTER (OUTPATIENT)
Dept: INFUSION THERAPY | Age: 48
Setting detail: INFUSION SERIES
Discharge: HOME | End: 2023-12-15
Payer: MEDICARE

## 2023-12-15 VITALS
OXYGEN SATURATION: 97 % | SYSTOLIC BLOOD PRESSURE: 109 MMHG | TEMPERATURE: 97.6 F | WEIGHT: 169 LBS | HEART RATE: 53 BPM | RESPIRATION RATE: 18 BRPM | DIASTOLIC BLOOD PRESSURE: 73 MMHG | BODY MASS INDEX: 28.12 KG/M2

## 2023-12-15 DIAGNOSIS — G35 MULTIPLE SCLEROSIS (HCC): Primary | ICD-10-CM

## 2023-12-15 PROCEDURE — 2580000003 HC RX 258: Performed by: PSYCHIATRY & NEUROLOGY

## 2023-12-15 PROCEDURE — 6360000002 HC RX W HCPCS: Performed by: PSYCHIATRY & NEUROLOGY

## 2023-12-15 PROCEDURE — 96375 TX/PRO/DX INJ NEW DRUG ADDON: CPT

## 2023-12-15 PROCEDURE — 6370000000 HC RX 637 (ALT 250 FOR IP): Performed by: PSYCHIATRY & NEUROLOGY

## 2023-12-15 PROCEDURE — 96365 THER/PROPH/DIAG IV INF INIT: CPT

## 2023-12-15 RX ORDER — SODIUM CHLORIDE 9 MG/ML
5-250 INJECTION, SOLUTION INTRAVENOUS PRN
Status: DISCONTINUED | OUTPATIENT
Start: 2023-12-15 | End: 2023-12-16 | Stop reason: HOSPADM

## 2023-12-15 RX ORDER — DIPHENHYDRAMINE HYDROCHLORIDE 50 MG/ML
50 INJECTION INTRAMUSCULAR; INTRAVENOUS ONCE
Start: 2023-12-22 | End: 2023-12-22

## 2023-12-15 RX ORDER — ACETAMINOPHEN 325 MG/1
650 TABLET ORAL ONCE
Status: COMPLETED | OUTPATIENT
Start: 2023-12-15 | End: 2023-12-15

## 2023-12-15 RX ORDER — ACETAMINOPHEN 325 MG/1
650 TABLET ORAL ONCE
OUTPATIENT
Start: 2023-12-22 | End: 2023-12-22

## 2023-12-15 RX ORDER — SODIUM CHLORIDE 9 MG/ML
INJECTION, SOLUTION INTRAVENOUS CONTINUOUS
OUTPATIENT
Start: 2023-12-22

## 2023-12-15 RX ORDER — SODIUM CHLORIDE 9 MG/ML
5-250 INJECTION, SOLUTION INTRAVENOUS PRN
OUTPATIENT
Start: 2023-12-22

## 2023-12-15 RX ORDER — EPINEPHRINE 1 MG/ML
0.3 INJECTION, SOLUTION, CONCENTRATE INTRAVENOUS PRN
OUTPATIENT
Start: 2023-12-22

## 2023-12-15 RX ORDER — ALBUTEROL SULFATE 90 UG/1
4 AEROSOL, METERED RESPIRATORY (INHALATION) PRN
OUTPATIENT
Start: 2023-12-22

## 2023-12-15 RX ORDER — HEPARIN 100 UNIT/ML
500 SYRINGE INTRAVENOUS PRN
OUTPATIENT
Start: 2023-12-22

## 2023-12-15 RX ORDER — HEPARIN 100 UNIT/ML
500 SYRINGE INTRAVENOUS PRN
Status: DISCONTINUED | OUTPATIENT
Start: 2023-12-15 | End: 2023-12-16 | Stop reason: HOSPADM

## 2023-12-15 RX ORDER — DIPHENHYDRAMINE HYDROCHLORIDE 50 MG/ML
50 INJECTION INTRAMUSCULAR; INTRAVENOUS
OUTPATIENT
Start: 2023-12-22

## 2023-12-15 RX ORDER — SODIUM CHLORIDE 0.9 % (FLUSH) 0.9 %
5-40 SYRINGE (ML) INJECTION PRN
OUTPATIENT
Start: 2023-12-22

## 2023-12-15 RX ORDER — ONDANSETRON 2 MG/ML
8 INJECTION INTRAMUSCULAR; INTRAVENOUS
OUTPATIENT
Start: 2023-12-22

## 2023-12-15 RX ORDER — SODIUM CHLORIDE 0.9 % (FLUSH) 0.9 %
5-40 SYRINGE (ML) INJECTION PRN
Status: DISCONTINUED | OUTPATIENT
Start: 2023-12-15 | End: 2023-12-16 | Stop reason: HOSPADM

## 2023-12-15 RX ORDER — ACETAMINOPHEN 325 MG/1
650 TABLET ORAL
OUTPATIENT
Start: 2023-12-22

## 2023-12-15 RX ORDER — DIPHENHYDRAMINE HYDROCHLORIDE 50 MG/ML
50 INJECTION INTRAMUSCULAR; INTRAVENOUS ONCE
Status: COMPLETED | OUTPATIENT
Start: 2023-12-15 | End: 2023-12-15

## 2023-12-15 RX ORDER — MEPERIDINE HYDROCHLORIDE 25 MG/ML
12.5 INJECTION INTRAMUSCULAR; INTRAVENOUS; SUBCUTANEOUS PRN
OUTPATIENT
Start: 2023-12-22

## 2023-12-15 RX ADMIN — Medication 500 UNITS: at 09:01

## 2023-12-15 RX ADMIN — ACETAMINOPHEN 650 MG: 325 TABLET ORAL at 07:15

## 2023-12-15 RX ADMIN — SODIUM CHLORIDE, PRESERVATIVE FREE 20 ML: 5 INJECTION INTRAVENOUS at 09:01

## 2023-12-15 RX ADMIN — HYDROCORTISONE SODIUM SUCCINATE 100 MG: 100 INJECTION, POWDER, FOR SOLUTION INTRAMUSCULAR; INTRAVENOUS at 07:15

## 2023-12-15 RX ADMIN — IMMUNE GLOBULIN (HUMAN) 30 G: 10 INJECTION INTRAVENOUS; SUBCUTANEOUS at 07:35

## 2023-12-15 RX ADMIN — DIPHENHYDRAMINE HYDROCHLORIDE 50 MG: 50 INJECTION INTRAMUSCULAR; INTRAVENOUS at 07:14

## 2023-12-15 NOTE — DISCHARGE INSTRUCTIONS
problems, or \"thick blood\";  a stroke or blood clot;  kidney disease;  diabetes;  an infection called sepsis;  if you use estrogens (birth control pills or hormone replacement therapy);  if you have been on long-term bedrest; or  if you have a central intravenous (IV) catheter in place. You may need a dose adjustment if you are exposed to measles, or if you travel to an area where this disease is common. Tell your doctor if you are pregnant or breastfeeding. Immune globulin is made from donated human plasma and may contain viruses or other infectious agents. Donated plasma is tested and treated to reduce the risk of contamination, but there is still a small possibility it could transmit disease. Ask your doctor about any possible risk. How should I use immune globulin? Follow all directions on your prescription label and read all medication guides or instruction sheets. Your doctor may occasionally change your dose. Use the medicine exactly as directed. Immune globulin is given as an infusion into a vein, or injected under the skin using an infusion pump. A healthcare provider will give your first dose and may teach you how to properly use the medication by yourself. Do not inject immune globulin into a vein if you have been instructed to give the medicine as a subcutaneous injection (under the skin). How you give this medication, how often you use it, and the length of your infusion time will depend on the condition being treated. Read and carefully follow any Instructions for Use provided with your medicine. Ask your doctor or pharmacist if you don't understand all instructions. Prepare an injection only when you are ready to give it. Do not use if the medicine looks cloudy, has changed colors, or has particles in it. Call your pharmacist for new medicine. Do not shake the medication bottle or you may ruin the medicine. Immune globulin must be given slowly.  You may need to use several catheters to your doctor for medical advice about side effects. You may report side effects to FDA at 8-654-JAP-4131. What other drugs will affect immune globulin? Immune globulin can harm your kidneys, especially if you also use certain medicines for infections, cancer, osteoporosis, organ transplant rejection, bowel disorders, high blood pressure, or pain or arthritis (including Advil, Motrin, and Aleve). Other drugs may affect immune globulin, including prescription and over-the-counter medicines, vitamins, and herbal products. Tell your doctor about all your current medicines and any medicine you start or stop using. Where can I get more information? Your doctor or pharmacist can provide more information about immune globulin. Remember, keep this and all other medicines out of the reach of children, never share your medicines with others, and use this medication only for the indication prescribed. Every effort has been made to ensure that the information provided by [facility] [facility] is accurate, up-to-date, and complete, but no guarantee is made to that effect. Drug information contained herein may be time sensitive. [employer] information has been compiled for use by healthcare practitioners and consumers in the [employer] and therefore [employer] does not warrant that uses outside of the [employer] [facility] are appropriate, unless specifically indicated otherwise. [employer] drug information does not endorse drugs, diagnose patients or recommend therapy. [employer] drug information is an informational resource designed to assist licensed healthcare practitioners in caring for their patients and/or to serve consumers viewing this service as a supplement to, and not a substitute for, the expertise, skill, knowledge and judgment of healthcare practitioners.  The absence of a warning for a given drug or drug combination in no way should be construed to indicate that the drug or drug combination is safe, effective or

## 2023-12-22 ENCOUNTER — HOSPITAL ENCOUNTER (OUTPATIENT)
Dept: INFUSION THERAPY | Age: 48
Setting detail: INFUSION SERIES
Discharge: HOME | End: 2023-12-22
Payer: MEDICARE

## 2023-12-22 VITALS
OXYGEN SATURATION: 100 % | HEART RATE: 52 BPM | DIASTOLIC BLOOD PRESSURE: 82 MMHG | RESPIRATION RATE: 18 BRPM | SYSTOLIC BLOOD PRESSURE: 128 MMHG | BODY MASS INDEX: 28.59 KG/M2 | WEIGHT: 171.8 LBS | TEMPERATURE: 96.8 F

## 2023-12-22 DIAGNOSIS — G35 MULTIPLE SCLEROSIS (HCC): Primary | ICD-10-CM

## 2023-12-22 PROCEDURE — 96375 TX/PRO/DX INJ NEW DRUG ADDON: CPT

## 2023-12-22 PROCEDURE — 6370000000 HC RX 637 (ALT 250 FOR IP): Performed by: PSYCHIATRY & NEUROLOGY

## 2023-12-22 PROCEDURE — 6360000002 HC RX W HCPCS: Performed by: PSYCHIATRY & NEUROLOGY

## 2023-12-22 PROCEDURE — 96366 THER/PROPH/DIAG IV INF ADDON: CPT

## 2023-12-22 PROCEDURE — 2580000003 HC RX 258: Performed by: PSYCHIATRY & NEUROLOGY

## 2023-12-22 PROCEDURE — 96365 THER/PROPH/DIAG IV INF INIT: CPT

## 2023-12-22 RX ORDER — ACETAMINOPHEN 325 MG/1
650 TABLET ORAL ONCE
Status: CANCELLED | OUTPATIENT
Start: 2023-12-29 | End: 2023-12-29

## 2023-12-22 RX ORDER — HEPARIN 100 UNIT/ML
500 SYRINGE INTRAVENOUS PRN
Status: DISCONTINUED | OUTPATIENT
Start: 2023-12-22 | End: 2023-12-23 | Stop reason: HOSPADM

## 2023-12-22 RX ORDER — DIPHENHYDRAMINE HYDROCHLORIDE 50 MG/ML
50 INJECTION INTRAMUSCULAR; INTRAVENOUS
OUTPATIENT
Start: 2023-12-29

## 2023-12-22 RX ORDER — HEPARIN 100 UNIT/ML
500 SYRINGE INTRAVENOUS PRN
Status: CANCELLED | OUTPATIENT
Start: 2023-12-29

## 2023-12-22 RX ORDER — DIPHENHYDRAMINE HYDROCHLORIDE 50 MG/ML
50 INJECTION INTRAMUSCULAR; INTRAVENOUS ONCE
Status: COMPLETED | OUTPATIENT
Start: 2023-12-22 | End: 2023-12-22

## 2023-12-22 RX ORDER — DIPHENHYDRAMINE HYDROCHLORIDE 50 MG/ML
50 INJECTION INTRAMUSCULAR; INTRAVENOUS ONCE
Status: CANCELLED
Start: 2023-12-29 | End: 2023-12-29

## 2023-12-22 RX ORDER — MEPERIDINE HYDROCHLORIDE 25 MG/ML
12.5 INJECTION INTRAMUSCULAR; INTRAVENOUS; SUBCUTANEOUS PRN
OUTPATIENT
Start: 2023-12-29

## 2023-12-22 RX ORDER — SODIUM CHLORIDE 0.9 % (FLUSH) 0.9 %
5-40 SYRINGE (ML) INJECTION PRN
Status: DISCONTINUED | OUTPATIENT
Start: 2023-12-22 | End: 2023-12-23 | Stop reason: HOSPADM

## 2023-12-22 RX ORDER — ALBUTEROL SULFATE 90 UG/1
4 AEROSOL, METERED RESPIRATORY (INHALATION) PRN
OUTPATIENT
Start: 2023-12-29

## 2023-12-22 RX ORDER — ACETAMINOPHEN 325 MG/1
650 TABLET ORAL ONCE
Status: COMPLETED | OUTPATIENT
Start: 2023-12-22 | End: 2023-12-22

## 2023-12-22 RX ORDER — ONDANSETRON 2 MG/ML
8 INJECTION INTRAMUSCULAR; INTRAVENOUS
OUTPATIENT
Start: 2023-12-29

## 2023-12-22 RX ORDER — SODIUM CHLORIDE 9 MG/ML
INJECTION, SOLUTION INTRAVENOUS CONTINUOUS
OUTPATIENT
Start: 2023-12-29

## 2023-12-22 RX ORDER — SODIUM CHLORIDE 0.9 % (FLUSH) 0.9 %
5-40 SYRINGE (ML) INJECTION PRN
Status: CANCELLED | OUTPATIENT
Start: 2023-12-29

## 2023-12-22 RX ORDER — EPINEPHRINE 1 MG/ML
0.3 INJECTION, SOLUTION, CONCENTRATE INTRAVENOUS PRN
OUTPATIENT
Start: 2023-12-29

## 2023-12-22 RX ORDER — SODIUM CHLORIDE 9 MG/ML
5-250 INJECTION, SOLUTION INTRAVENOUS PRN
OUTPATIENT
Start: 2023-12-29

## 2023-12-22 RX ORDER — ACETAMINOPHEN 325 MG/1
650 TABLET ORAL
OUTPATIENT
Start: 2023-12-29

## 2023-12-22 RX ADMIN — SODIUM CHLORIDE, PRESERVATIVE FREE 10 ML: 5 INJECTION INTRAVENOUS at 09:25

## 2023-12-22 RX ADMIN — Medication 500 UNITS: at 09:25

## 2023-12-22 RX ADMIN — HYDROCORTISONE SODIUM SUCCINATE 100 MG: 100 INJECTION, POWDER, FOR SOLUTION INTRAMUSCULAR; INTRAVENOUS at 07:10

## 2023-12-22 RX ADMIN — DIPHENHYDRAMINE HYDROCHLORIDE 50 MG: 50 INJECTION INTRAMUSCULAR; INTRAVENOUS at 07:10

## 2023-12-22 RX ADMIN — IMMUNE GLOBULIN (HUMAN) 30 G: 10 INJECTION INTRAVENOUS; SUBCUTANEOUS at 07:31

## 2023-12-22 RX ADMIN — ACETAMINOPHEN 650 MG: 325 TABLET ORAL at 07:10

## 2023-12-22 NOTE — DISCHARGE INSTRUCTIONS
immune globulin (intravenous) (IGIV)  Pronunciation:  dinora foley  Brand:  Bivigam, Carimune, Flebogamma, Gammagard S/D, Gammaplex, Octagam, Panzyga, Privigen  What is the most important information I should know about immune globulin intravenous? This medicine can cause blood clots. The risk is highest in older adults or in people who have had blood clots, heart problems, or blood circulation problems. Blood clots are also more likely during long-term bedrest, while using birth control pills or hormone replacement therapy, or while having a central intravenous (IV) catheter in place. Call your doctor at once if you have chest pain, trouble breathing, fast heartbeats, numbness or weakness, or swelling and warmth or discoloration in an arm or leg. This medicine can also harm your kidneys, especially if you have kidney disease or you also use certain medicines. Tell your doctor right away if you have signs of kidney problems, such as swelling, rapid weight gain, and little or no urination. What is immune globulin intravenous (IGIV)? Immune globulin intravenous (IGIV, for injection into a vein) is used to treat primary immunodeficiency. IGIV is also used to increase platelets (blood clotting cells) in people with immune thrombocytopenic purpura. IGIV is also used in to help prevent certain infections in people with B-cell chronic lymphocytic leukemia. IGIV is also used in people with Kawasaki syndrome, to prevent aneurysm caused by a weakening of the main artery in the heart. IGIV may also be used for purposes not listed in this medication guide. What should I discuss with my healthcare provider before using IGIV? You may not be able to use this medicine if:  you have had an allergic reaction to an immune globulin or blood product;  you have immune globulin A (IgA) deficiency with antibody to IgA; or  you are allergic to corn.   IGIV can cause blood clots or kidney problems, especially in pharmacist.  Copyright 5574-7387 Jesus Manuel Pratt. Version: 6.02. Revision date: 2/26/2021. Care instructions adapted under license by [facility] ([facility]). If you have questions about a medical condition or this instruction, always ask your healthcare professional. [address] any warranty or liability for your use of this information. 07-Jan-2022 10:42

## 2023-12-22 NOTE — PROGRESS NOTES
Pt arrived to [facility]. Port accessed using sterile technique, brisk blood return noted. Pre-meds given as ordered. Recevied 30g Gamunex-c. Toelrated well.     Electronically signed by Aron Arreola RN on 12/22/2023 at 10:09 AM

## 2023-12-29 ENCOUNTER — HOSPITAL ENCOUNTER (OUTPATIENT)
Dept: INFUSION THERAPY | Age: 48
Setting detail: INFUSION SERIES
Discharge: HOME | End: 2023-12-29
Payer: MEDICARE

## 2023-12-29 VITALS
SYSTOLIC BLOOD PRESSURE: 125 MMHG | OXYGEN SATURATION: 99 % | RESPIRATION RATE: 18 BRPM | WEIGHT: 168 LBS | HEART RATE: 59 BPM | BODY MASS INDEX: 27.96 KG/M2 | DIASTOLIC BLOOD PRESSURE: 85 MMHG | TEMPERATURE: 97.8 F

## 2023-12-29 DIAGNOSIS — G35 MULTIPLE SCLEROSIS (HCC): Primary | ICD-10-CM

## 2023-12-29 PROCEDURE — 96365 THER/PROPH/DIAG IV INF INIT: CPT

## 2023-12-29 PROCEDURE — 6360000002 HC RX W HCPCS: Performed by: PSYCHIATRY & NEUROLOGY

## 2023-12-29 PROCEDURE — 6370000000 HC RX 637 (ALT 250 FOR IP): Performed by: PSYCHIATRY & NEUROLOGY

## 2023-12-29 PROCEDURE — 96366 THER/PROPH/DIAG IV INF ADDON: CPT

## 2023-12-29 PROCEDURE — 2580000003 HC RX 258: Performed by: PSYCHIATRY & NEUROLOGY

## 2023-12-29 PROCEDURE — 96375 TX/PRO/DX INJ NEW DRUG ADDON: CPT

## 2023-12-29 RX ORDER — ACETAMINOPHEN 325 MG/1
650 TABLET ORAL ONCE
Status: COMPLETED | OUTPATIENT
Start: 2023-12-29 | End: 2023-12-29

## 2023-12-29 RX ORDER — HEPARIN 100 UNIT/ML
500 SYRINGE INTRAVENOUS PRN
Status: CANCELLED | OUTPATIENT
Start: 2024-01-05

## 2023-12-29 RX ORDER — HEPARIN 100 UNIT/ML
500 SYRINGE INTRAVENOUS PRN
Status: DISCONTINUED | OUTPATIENT
Start: 2023-12-29 | End: 2023-12-30 | Stop reason: HOSPADM

## 2023-12-29 RX ORDER — MEPERIDINE HYDROCHLORIDE 25 MG/ML
12.5 INJECTION INTRAMUSCULAR; INTRAVENOUS; SUBCUTANEOUS PRN
OUTPATIENT
Start: 2024-01-05

## 2023-12-29 RX ORDER — ACETAMINOPHEN 325 MG/1
650 TABLET ORAL ONCE
Status: CANCELLED | OUTPATIENT
Start: 2024-01-05 | End: 2024-01-05

## 2023-12-29 RX ORDER — ACETAMINOPHEN 325 MG/1
650 TABLET ORAL
OUTPATIENT
Start: 2024-01-05

## 2023-12-29 RX ORDER — SODIUM CHLORIDE 9 MG/ML
5-250 INJECTION, SOLUTION INTRAVENOUS PRN
OUTPATIENT
Start: 2024-01-05

## 2023-12-29 RX ORDER — EPINEPHRINE 1 MG/ML
0.3 INJECTION, SOLUTION, CONCENTRATE INTRAVENOUS PRN
OUTPATIENT
Start: 2024-01-05

## 2023-12-29 RX ORDER — DIPHENHYDRAMINE HYDROCHLORIDE 50 MG/ML
50 INJECTION INTRAMUSCULAR; INTRAVENOUS ONCE
Status: COMPLETED | OUTPATIENT
Start: 2023-12-29 | End: 2023-12-29

## 2023-12-29 RX ORDER — ONDANSETRON 2 MG/ML
8 INJECTION INTRAMUSCULAR; INTRAVENOUS
OUTPATIENT
Start: 2024-01-05

## 2023-12-29 RX ORDER — DIPHENHYDRAMINE HYDROCHLORIDE 50 MG/ML
50 INJECTION INTRAMUSCULAR; INTRAVENOUS ONCE
Status: CANCELLED
Start: 2024-01-05 | End: 2024-01-05

## 2023-12-29 RX ORDER — ALBUTEROL SULFATE 90 UG/1
4 AEROSOL, METERED RESPIRATORY (INHALATION) PRN
OUTPATIENT
Start: 2024-01-05

## 2023-12-29 RX ORDER — SODIUM CHLORIDE 0.9 % (FLUSH) 0.9 %
5-40 SYRINGE (ML) INJECTION PRN
Status: CANCELLED | OUTPATIENT
Start: 2024-01-05

## 2023-12-29 RX ORDER — SODIUM CHLORIDE 0.9 % (FLUSH) 0.9 %
5-40 SYRINGE (ML) INJECTION PRN
Status: DISCONTINUED | OUTPATIENT
Start: 2023-12-29 | End: 2023-12-30 | Stop reason: HOSPADM

## 2023-12-29 RX ORDER — SODIUM CHLORIDE 9 MG/ML
INJECTION, SOLUTION INTRAVENOUS CONTINUOUS
OUTPATIENT
Start: 2024-01-05

## 2023-12-29 RX ORDER — DIPHENHYDRAMINE HYDROCHLORIDE 50 MG/ML
50 INJECTION INTRAMUSCULAR; INTRAVENOUS
OUTPATIENT
Start: 2024-01-05

## 2023-12-29 RX ADMIN — ACETAMINOPHEN 650 MG: 325 TABLET ORAL at 06:48

## 2023-12-29 RX ADMIN — Medication 500 UNITS: at 08:33

## 2023-12-29 RX ADMIN — SODIUM CHLORIDE, PRESERVATIVE FREE 20 ML: 5 INJECTION INTRAVENOUS at 08:33

## 2023-12-29 RX ADMIN — IMMUNE GLOBULIN (HUMAN) 30 G: 10 INJECTION INTRAVENOUS; SUBCUTANEOUS at 07:00

## 2023-12-29 RX ADMIN — DIPHENHYDRAMINE HYDROCHLORIDE 50 MG: 50 INJECTION INTRAMUSCULAR; INTRAVENOUS at 06:51

## 2023-12-29 RX ADMIN — HYDROCORTISONE SODIUM SUCCINATE 100 MG: 100 INJECTION, POWDER, FOR SOLUTION INTRAMUSCULAR; INTRAVENOUS at 06:49

## 2023-12-29 ASSESSMENT — PAIN SCALES - GENERAL: PAINLEVEL_OUTOF10: 0

## 2023-12-29 NOTE — PROGRESS NOTES
Pt arrived to [facility]". Port accessed using sterile technique. Brisk blood return noted. Pt received Tylenol, Benadryl, and Solu-cortef for pre-meds. Pt then received 30g Gamunex-C. Tolerated well.     Electronically signed by Chikis Jorge RN on 12/29/2023 at 8:38 AM

## 2023-12-29 NOTE — DISCHARGE INSTRUCTIONS
immune globulin (intravenous and subcutaneous)  Pronunciation:  dinora LACEY foley  Brand:  Gammagard Liquid, Gammaked, Gamunex-C  What is the most important information I should know about immune globulin?  This medicine can cause blood clots.  The risk is highest in older adults or in people who have had blood clots, heart problems, or blood circulation problems. Blood clots are also more likely during long-term bedrest, while using birth control pills or hormone replacement therapy, or while having a central intravenous (IV) catheter in place.  Call your doctor at once if you have chest pain, trouble breathing, fast heartbeats, numbness or weakness, or swelling and warmth or discoloration in an arm or leg.  This medicine can also harm your kidneys, especially if you have kidney disease or if you also use certain medicines. Tell your doctor right away if you have signs of kidney problems, such as swelling, rapid weight gain, and little or no urination.  What is immune globulin?  Immune globulin intravenous and subcutaneous (for injection into a vein or under the skin) is used to treat primary immunodeficiency.  Immune globulin is also used to increase platelets (blood clotting cells) in people with idiopathic thrombocytopenic purpura.  Immune globulin is also used to treat certain debilitating nerve disorders that cause muscle weakness and can affect daily activities.  Immune globulin may also be used for purposes not listed in this medication guide.  What should I discuss with my healthcare provider before using immune globulin?  You should not use this medicine if:  you have had an allergic reaction to an immune globulin or blood product; or  you have immune globulin A (IgA) deficiency with antibody to IgA.  Immune globulin can cause blood clots or kidney problems, especially in older adults or in people with certain conditions. Tell your doctor if you have ever had:  heart problems, blood circulation  your doctor for medical advice about side effects. You may report side effects to FDA at 2-997-VYY-3553. What other drugs will affect immune globulin? Immune globulin can harm your kidneys, especially if you also use certain medicines for infections, cancer, osteoporosis, organ transplant rejection, bowel disorders, high blood pressure, or pain or arthritis (including Advil, Motrin, and Aleve). Other drugs may affect immune globulin, including prescription and over-the-counter medicines, vitamins, and herbal products. Tell your doctor about all your current medicines and any medicine you start or stop using. Where can I get more information? Your doctor or pharmacist can provide more information about immune globulin. Remember, keep this and all other medicines out of the reach of children, never share your medicines with others, and use this medication only for the indication prescribed. Every effort has been made to ensure that the information provided by [facility] is accurate, up-to-date, and complete, but no guarantee is made to that effect. Drug information contained herein may be time sensitive. [employer] information has been compiled for use by healthcare practitioners and consumers in the [facility] and therefore [employer] does not warrant that uses outside of the [facility] are appropriate, unless specifically indicated otherwise. [employer] drug information does not endorse drugs, diagnose patients or recommend therapy. [employer] drug information is an informational resource designed to assist licensed healthcare practitioners in caring for their patients and/or to serve consumers viewing this service as a supplement to, and not a substitute for, the expertise, skill, knowledge and judgment of healthcare practitioners.  The absence of a warning for a given drug or drug combination in no way should be construed to indicate that the drug or drug combination is safe, effective or

## 2024-01-03 DIAGNOSIS — G35 MULTIPLE SCLEROSIS (HCC): ICD-10-CM

## 2024-01-03 DIAGNOSIS — M79.7 FIBROMYALGIA: ICD-10-CM

## 2024-01-03 DIAGNOSIS — M54.30 SCIATICA, UNSPECIFIED LATERALITY: ICD-10-CM

## 2024-01-03 DIAGNOSIS — G43.719 INTRACTABLE CHRONIC MIGRAINE WITHOUT AURA AND WITHOUT STATUS MIGRAINOSUS: ICD-10-CM

## 2024-01-03 RX ORDER — SUMATRIPTAN 100 MG/1
TABLET, FILM COATED ORAL
Qty: 9 TABLET | Refills: 5 | Status: SHIPPED | OUTPATIENT
Start: 2024-01-03

## 2024-01-03 RX ORDER — OXYCODONE HYDROCHLORIDE 10 MG/1
10 TABLET ORAL EVERY 8 HOURS PRN
Qty: 90 TABLET | Refills: 0 | Status: SHIPPED | OUTPATIENT
Start: 2024-01-06 | End: 2024-02-05

## 2024-01-03 NOTE — TELEPHONE ENCOUNTER
Requested Prescriptions     Pending Prescriptions Disp Refills    SUMAtriptan (IMITREX) 100 MG tablet 9 tablet 5     Sig: TAKE ONE TABLET DAILY AS NEEDED MIGRAINE, MAX OF 2 TABS IN 24HRS    oxyCODONE HCl (OXY-IR) 10 MG immediate release tablet 90 tablet 0     Sig: Take 1 tablet by mouth every 8 hours as needed for Pain for up to 30 days. Intended supply: 30 days Max Daily Amount: 30 mg       Last Office Visit:  11/7/2023  Next Office Visit:  2/7/2024  Last Medication Refill:   Imitrex 10/24/23 with 5 RF  Oxy 12/7/23    Daniel up to date: 1/3/24     *RX updated to reflect  fill date*  Imitrex 1/3/24  Oxy 1/6/24

## 2024-01-05 ENCOUNTER — HOSPITAL ENCOUNTER (OUTPATIENT)
Dept: INFUSION THERAPY | Age: 49
Setting detail: INFUSION SERIES
Discharge: HOME | End: 2024-01-05
Payer: MEDICARE

## 2024-01-05 VITALS
DIASTOLIC BLOOD PRESSURE: 82 MMHG | BODY MASS INDEX: 27.12 KG/M2 | SYSTOLIC BLOOD PRESSURE: 117 MMHG | HEART RATE: 50 BPM | WEIGHT: 163 LBS | RESPIRATION RATE: 18 BRPM | TEMPERATURE: 97.6 F | OXYGEN SATURATION: 96 %

## 2024-01-05 DIAGNOSIS — G35 MULTIPLE SCLEROSIS (HCC): Primary | ICD-10-CM

## 2024-01-05 PROCEDURE — 96375 TX/PRO/DX INJ NEW DRUG ADDON: CPT

## 2024-01-05 PROCEDURE — 96365 THER/PROPH/DIAG IV INF INIT: CPT

## 2024-01-05 PROCEDURE — 6370000000 HC RX 637 (ALT 250 FOR IP): Performed by: PSYCHIATRY & NEUROLOGY

## 2024-01-05 PROCEDURE — 2580000003 HC RX 258: Performed by: PSYCHIATRY & NEUROLOGY

## 2024-01-05 PROCEDURE — 96366 THER/PROPH/DIAG IV INF ADDON: CPT

## 2024-01-05 PROCEDURE — 6360000002 HC RX W HCPCS: Performed by: PSYCHIATRY & NEUROLOGY

## 2024-01-05 RX ORDER — HEPARIN 100 UNIT/ML
500 SYRINGE INTRAVENOUS PRN
Status: DISCONTINUED | OUTPATIENT
Start: 2024-01-05 | End: 2024-01-06 | Stop reason: HOSPADM

## 2024-01-05 RX ORDER — EPINEPHRINE 1 MG/ML
0.3 INJECTION, SOLUTION, CONCENTRATE INTRAVENOUS PRN
OUTPATIENT
Start: 2024-01-12

## 2024-01-05 RX ORDER — SODIUM CHLORIDE 0.9 % (FLUSH) 0.9 %
5-40 SYRINGE (ML) INJECTION PRN
Status: DISCONTINUED | OUTPATIENT
Start: 2024-01-05 | End: 2024-01-06 | Stop reason: HOSPADM

## 2024-01-05 RX ORDER — ONDANSETRON 2 MG/ML
8 INJECTION INTRAMUSCULAR; INTRAVENOUS
OUTPATIENT
Start: 2024-01-12

## 2024-01-05 RX ORDER — SODIUM CHLORIDE 9 MG/ML
INJECTION, SOLUTION INTRAVENOUS CONTINUOUS
OUTPATIENT
Start: 2024-01-12

## 2024-01-05 RX ORDER — ACETAMINOPHEN 325 MG/1
650 TABLET ORAL ONCE
Status: COMPLETED | OUTPATIENT
Start: 2024-01-05 | End: 2024-01-05

## 2024-01-05 RX ORDER — SODIUM CHLORIDE 9 MG/ML
5-250 INJECTION, SOLUTION INTRAVENOUS PRN
OUTPATIENT
Start: 2024-01-12

## 2024-01-05 RX ORDER — DIPHENHYDRAMINE HYDROCHLORIDE 50 MG/ML
50 INJECTION INTRAMUSCULAR; INTRAVENOUS
OUTPATIENT
Start: 2024-01-12

## 2024-01-05 RX ORDER — ACETAMINOPHEN 325 MG/1
650 TABLET ORAL
OUTPATIENT
Start: 2024-01-12

## 2024-01-05 RX ORDER — MEPERIDINE HYDROCHLORIDE 25 MG/ML
12.5 INJECTION INTRAMUSCULAR; INTRAVENOUS; SUBCUTANEOUS PRN
OUTPATIENT
Start: 2024-01-12

## 2024-01-05 RX ORDER — DIPHENHYDRAMINE HYDROCHLORIDE 50 MG/ML
50 INJECTION INTRAMUSCULAR; INTRAVENOUS ONCE
Status: COMPLETED | OUTPATIENT
Start: 2024-01-05 | End: 2024-01-05

## 2024-01-05 RX ORDER — HEPARIN 100 UNIT/ML
500 SYRINGE INTRAVENOUS PRN
OUTPATIENT
Start: 2024-01-12

## 2024-01-05 RX ORDER — SODIUM CHLORIDE 0.9 % (FLUSH) 0.9 %
5-40 SYRINGE (ML) INJECTION PRN
OUTPATIENT
Start: 2024-01-12

## 2024-01-05 RX ORDER — DIPHENHYDRAMINE HYDROCHLORIDE 50 MG/ML
50 INJECTION INTRAMUSCULAR; INTRAVENOUS ONCE
Start: 2024-01-12 | End: 2024-01-12

## 2024-01-05 RX ORDER — ALBUTEROL SULFATE 90 UG/1
4 AEROSOL, METERED RESPIRATORY (INHALATION) PRN
OUTPATIENT
Start: 2024-01-12

## 2024-01-05 RX ORDER — ACETAMINOPHEN 325 MG/1
650 TABLET ORAL ONCE
OUTPATIENT
Start: 2024-01-12 | End: 2024-01-12

## 2024-01-05 RX ADMIN — DIPHENHYDRAMINE HYDROCHLORIDE 50 MG: 50 INJECTION INTRAMUSCULAR; INTRAVENOUS at 07:11

## 2024-01-05 RX ADMIN — Medication 500 UNITS: at 09:01

## 2024-01-05 RX ADMIN — IMMUNE GLOBULIN (HUMAN) 30 G: 10 INJECTION INTRAVENOUS; SUBCUTANEOUS at 07:25

## 2024-01-05 RX ADMIN — SODIUM CHLORIDE, PRESERVATIVE FREE 20 ML: 5 INJECTION INTRAVENOUS at 09:01

## 2024-01-05 RX ADMIN — ACETAMINOPHEN 650 MG: 325 TABLET ORAL at 07:10

## 2024-01-05 RX ADMIN — HYDROCORTISONE SODIUM SUCCINATE 100 MG: 100 INJECTION, POWDER, FOR SOLUTION INTRAMUSCULAR; INTRAVENOUS at 07:11

## 2024-01-05 ASSESSMENT — PAIN SCALES - GENERAL: PAINLEVEL_OUTOF10: 0

## 2024-01-05 NOTE — PROGRESS NOTES
Here today for IVIG infusion.  Infused per orders.  Patient tolerated well.  Future appointments reviewed with patient and discharged to home.

## 2024-01-05 NOTE — DISCHARGE INSTRUCTIONS
inject this medicine into different body areas at the same time. Your healthcare provider will show you the best places on your body to inject the medication. Keep a diary of the days and times you gave the injection and where you injected it on your body.  Drink plenty of liquids  while you are using this medicine to help improve your blood flow and keep your kidneys working properly.  You may need frequent blood or urine tests.  This medicine can affect the results of certain medical tests. Tell any doctor who treats you that you are using immune globulin.  Store this medicine in its original carton in the refrigerator. Do not freeze immune globulin, and throw the medicine away if it has frozen.  Take the medicine out of the refrigerator and let it reach room temperature for up to 1 hour before injecting your dose.  You may also store immune globulin at room temperature.  You will need to use immune globulin within a certain number months. This will depend on the how you store the medicine (at room temperature, or in a refrigerator). Carefully follow the storage instructions provided with your medicine. Do not use the medicine after the expiration date on the label has passed.  Each vial (bottle) is for one use only. Throw it away after one use, even if there is still medicine left inside.  Use disposable injection items (needle, catheter, tubing) only once and then place them in a puncture-proof \"sharps\" container. Follow state or local laws about how to dispose of this container. Keep it out of the reach of children and pets.  What happens if I miss a dose?  Call your doctor for instructions if you miss a dose.  What happens if I overdose?  Seek emergency medical attention or call the Poison Help line at 1-671.501.2400.  What should I avoid while using immune globulin?  Do not receive a \"live\" vaccine while using immune globulin. The vaccine may not work as well and may not fully protect you from disease. Live  vaccines include measles, mumps, rubella (MMR), rotavirus, typhoid, yellow fever, varicella (chickenpox), zoster (shingles), and nasal flu (influenza) vaccine.  What are the possible side effects of immune globulin?  Get emergency medical help if you have signs of an allergic reaction: hives; wheezing, difficulty breathing; dizziness, feeling like you might pass out; swelling of your face, lips, tongue, or throat.  Some side effects may occur during the injection. Tell your caregiver if you feel light-headed, itchy, chilled, sweaty, or have chest discomfort, fast heartbeats, severe headache, or pounding in your neck or ears.  Call your doctor at once if you have:  a blood cell disorder --pale or yellowed skin, dark colored urine, fever, confusion or weakness;  dehydration symptoms --feeling very thirsty or hot, being unable to urinate, heavy sweating, or hot and dry skin;  kidney problems --little or no urination, swelling, rapid weight gain, feeling short of breath;  lung problems --chest pain, wheezing, trouble breathing, blue colored lips, fingers, or toes;  signs of a new infection --fever with a severe headache, neck stiffness, eye pain, and increased sensitivity to light; or  signs of a blood clot --shortness of breath, chest pain with deep breathing, rapid heart rate, numbness or weakness on one side of the body, swelling and warmth or discoloration in an arm or leg.  Common side effects may include:  runny or stuffy nose, sinus pain, cough, sore throat;  fever, chills, weakness;  headache, back pain, muscle or joint pain;  dizziness, tiredness, depressed mood;  swelling in your hands or feet;  skin rash, redness, or bruising;  blisters or ulcers in your mouth, red or swollen gums, trouble swallowing;  nausea, diarrhea, stomach pain, upset stomach;  increased blood pressure; or  redness, swelling, or itching where an injection was given.  This is not a complete list of side effects and others may occur. Call

## 2024-01-12 ENCOUNTER — HOSPITAL ENCOUNTER (OUTPATIENT)
Dept: INFUSION THERAPY | Age: 49
Setting detail: INFUSION SERIES
Discharge: HOME | End: 2024-01-12
Payer: MEDICARE

## 2024-01-12 VITALS
HEART RATE: 63 BPM | OXYGEN SATURATION: 97 % | DIASTOLIC BLOOD PRESSURE: 85 MMHG | BODY MASS INDEX: 26.79 KG/M2 | RESPIRATION RATE: 18 BRPM | WEIGHT: 161 LBS | TEMPERATURE: 97.6 F | SYSTOLIC BLOOD PRESSURE: 119 MMHG

## 2024-01-12 DIAGNOSIS — G35 MULTIPLE SCLEROSIS (HCC): Primary | ICD-10-CM

## 2024-01-12 PROCEDURE — 2580000003 HC RX 258: Performed by: PSYCHIATRY & NEUROLOGY

## 2024-01-12 PROCEDURE — 96366 THER/PROPH/DIAG IV INF ADDON: CPT

## 2024-01-12 PROCEDURE — 96365 THER/PROPH/DIAG IV INF INIT: CPT

## 2024-01-12 PROCEDURE — 6360000002 HC RX W HCPCS: Performed by: PSYCHIATRY & NEUROLOGY

## 2024-01-12 PROCEDURE — 96375 TX/PRO/DX INJ NEW DRUG ADDON: CPT

## 2024-01-12 PROCEDURE — 6370000000 HC RX 637 (ALT 250 FOR IP): Performed by: PSYCHIATRY & NEUROLOGY

## 2024-01-12 RX ORDER — ACETAMINOPHEN 325 MG/1
650 TABLET ORAL
OUTPATIENT
Start: 2024-01-19

## 2024-01-12 RX ORDER — ACETAMINOPHEN 325 MG/1
650 TABLET ORAL ONCE
Status: COMPLETED | OUTPATIENT
Start: 2024-01-12 | End: 2024-01-12

## 2024-01-12 RX ORDER — MEPERIDINE HYDROCHLORIDE 25 MG/ML
12.5 INJECTION INTRAMUSCULAR; INTRAVENOUS; SUBCUTANEOUS PRN
OUTPATIENT
Start: 2024-01-19

## 2024-01-12 RX ORDER — SODIUM CHLORIDE 0.9 % (FLUSH) 0.9 %
5-40 SYRINGE (ML) INJECTION PRN
Status: CANCELLED | OUTPATIENT
Start: 2024-01-19

## 2024-01-12 RX ORDER — HEPARIN 100 UNIT/ML
500 SYRINGE INTRAVENOUS PRN
Status: DISCONTINUED | OUTPATIENT
Start: 2024-01-12 | End: 2024-01-13 | Stop reason: HOSPADM

## 2024-01-12 RX ORDER — SODIUM CHLORIDE 9 MG/ML
5-250 INJECTION, SOLUTION INTRAVENOUS PRN
OUTPATIENT
Start: 2024-01-19

## 2024-01-12 RX ORDER — HEPARIN 100 UNIT/ML
500 SYRINGE INTRAVENOUS PRN
Status: CANCELLED | OUTPATIENT
Start: 2024-01-19

## 2024-01-12 RX ORDER — SODIUM CHLORIDE 9 MG/ML
INJECTION, SOLUTION INTRAVENOUS CONTINUOUS
OUTPATIENT
Start: 2024-01-19

## 2024-01-12 RX ORDER — ACETAMINOPHEN 325 MG/1
650 TABLET ORAL ONCE
Status: CANCELLED | OUTPATIENT
Start: 2024-01-19 | End: 2024-01-19

## 2024-01-12 RX ORDER — ONDANSETRON 2 MG/ML
8 INJECTION INTRAMUSCULAR; INTRAVENOUS
OUTPATIENT
Start: 2024-01-19

## 2024-01-12 RX ORDER — DIPHENHYDRAMINE HYDROCHLORIDE 50 MG/ML
50 INJECTION INTRAMUSCULAR; INTRAVENOUS
OUTPATIENT
Start: 2024-01-19

## 2024-01-12 RX ORDER — DIPHENHYDRAMINE HYDROCHLORIDE 50 MG/ML
50 INJECTION INTRAMUSCULAR; INTRAVENOUS ONCE
Status: CANCELLED
Start: 2024-01-19 | End: 2024-01-19

## 2024-01-12 RX ORDER — EPINEPHRINE 1 MG/ML
0.3 INJECTION, SOLUTION, CONCENTRATE INTRAVENOUS PRN
OUTPATIENT
Start: 2024-01-19

## 2024-01-12 RX ORDER — DIPHENHYDRAMINE HYDROCHLORIDE 50 MG/ML
50 INJECTION INTRAMUSCULAR; INTRAVENOUS ONCE
Status: COMPLETED | OUTPATIENT
Start: 2024-01-12 | End: 2024-01-12

## 2024-01-12 RX ORDER — SODIUM CHLORIDE 0.9 % (FLUSH) 0.9 %
5-40 SYRINGE (ML) INJECTION PRN
Status: DISCONTINUED | OUTPATIENT
Start: 2024-01-12 | End: 2024-01-13 | Stop reason: HOSPADM

## 2024-01-12 RX ORDER — ALBUTEROL SULFATE 90 UG/1
4 AEROSOL, METERED RESPIRATORY (INHALATION) PRN
OUTPATIENT
Start: 2024-01-19

## 2024-01-12 RX ADMIN — IMMUNE GLOBULIN (HUMAN) 30 G: 10 INJECTION INTRAVENOUS; SUBCUTANEOUS at 07:05

## 2024-01-12 RX ADMIN — HYDROCORTISONE SODIUM SUCCINATE 100 MG: 100 INJECTION, POWDER, FOR SOLUTION INTRAMUSCULAR; INTRAVENOUS at 06:54

## 2024-01-12 RX ADMIN — DIPHENHYDRAMINE HYDROCHLORIDE 50 MG: 50 INJECTION INTRAMUSCULAR; INTRAVENOUS at 06:54

## 2024-01-12 RX ADMIN — ACETAMINOPHEN 650 MG: 325 TABLET ORAL at 06:53

## 2024-01-12 RX ADMIN — HEPARIN 500 UNITS: 100 SYRINGE at 08:33

## 2024-01-12 RX ADMIN — SODIUM CHLORIDE, PRESERVATIVE FREE 10 ML: 5 INJECTION INTRAVENOUS at 08:32

## 2024-01-12 NOTE — PROGRESS NOTES
Port accessed using sterile technique. Brisk blood return noted. Pt premedicated as ordered. IVIG 30g administered. Pt tolerated well.     Electronically signed by Ciera George RN on 1/12/2024 at 8:38 AM

## 2024-01-19 ENCOUNTER — HOSPITAL ENCOUNTER (OUTPATIENT)
Dept: INFUSION THERAPY | Age: 49
Setting detail: INFUSION SERIES
Discharge: HOME | End: 2024-01-19
Payer: MEDICARE

## 2024-01-19 VITALS
HEART RATE: 52 BPM | BODY MASS INDEX: 26.96 KG/M2 | TEMPERATURE: 98.9 F | RESPIRATION RATE: 18 BRPM | SYSTOLIC BLOOD PRESSURE: 128 MMHG | WEIGHT: 162 LBS | OXYGEN SATURATION: 100 % | DIASTOLIC BLOOD PRESSURE: 79 MMHG

## 2024-01-19 DIAGNOSIS — G35 MULTIPLE SCLEROSIS (HCC): Primary | ICD-10-CM

## 2024-01-19 PROCEDURE — 96375 TX/PRO/DX INJ NEW DRUG ADDON: CPT

## 2024-01-19 PROCEDURE — 6370000000 HC RX 637 (ALT 250 FOR IP): Performed by: PSYCHIATRY & NEUROLOGY

## 2024-01-19 PROCEDURE — 2580000003 HC RX 258: Performed by: PSYCHIATRY & NEUROLOGY

## 2024-01-19 PROCEDURE — 96366 THER/PROPH/DIAG IV INF ADDON: CPT

## 2024-01-19 PROCEDURE — 6360000002 HC RX W HCPCS: Performed by: PSYCHIATRY & NEUROLOGY

## 2024-01-19 PROCEDURE — 96365 THER/PROPH/DIAG IV INF INIT: CPT

## 2024-01-19 RX ORDER — MEPERIDINE HYDROCHLORIDE 25 MG/ML
12.5 INJECTION INTRAMUSCULAR; INTRAVENOUS; SUBCUTANEOUS PRN
OUTPATIENT
Start: 2024-01-26

## 2024-01-19 RX ORDER — DIPHENHYDRAMINE HYDROCHLORIDE 50 MG/ML
50 INJECTION INTRAMUSCULAR; INTRAVENOUS
OUTPATIENT
Start: 2024-01-26

## 2024-01-19 RX ORDER — HEPARIN 100 UNIT/ML
500 SYRINGE INTRAVENOUS PRN
Status: CANCELLED | OUTPATIENT
Start: 2024-01-26

## 2024-01-19 RX ORDER — DIPHENHYDRAMINE HYDROCHLORIDE 50 MG/ML
50 INJECTION INTRAMUSCULAR; INTRAVENOUS ONCE
Status: CANCELLED
Start: 2024-01-26 | End: 2024-01-26

## 2024-01-19 RX ORDER — ONDANSETRON 2 MG/ML
8 INJECTION INTRAMUSCULAR; INTRAVENOUS
OUTPATIENT
Start: 2024-01-26

## 2024-01-19 RX ORDER — EPINEPHRINE 1 MG/ML
0.3 INJECTION, SOLUTION, CONCENTRATE INTRAVENOUS PRN
OUTPATIENT
Start: 2024-01-26

## 2024-01-19 RX ORDER — ACETAMINOPHEN 325 MG/1
650 TABLET ORAL ONCE
Status: CANCELLED | OUTPATIENT
Start: 2024-01-26 | End: 2024-01-26

## 2024-01-19 RX ORDER — HEPARIN 100 UNIT/ML
500 SYRINGE INTRAVENOUS PRN
Status: DISCONTINUED | OUTPATIENT
Start: 2024-01-19 | End: 2024-01-20 | Stop reason: HOSPADM

## 2024-01-19 RX ORDER — ALBUTEROL SULFATE 90 UG/1
4 AEROSOL, METERED RESPIRATORY (INHALATION) PRN
OUTPATIENT
Start: 2024-01-26

## 2024-01-19 RX ORDER — ACETAMINOPHEN 325 MG/1
650 TABLET ORAL
OUTPATIENT
Start: 2024-01-26

## 2024-01-19 RX ORDER — SODIUM CHLORIDE 9 MG/ML
5-250 INJECTION, SOLUTION INTRAVENOUS PRN
OUTPATIENT
Start: 2024-01-26

## 2024-01-19 RX ORDER — ACETAMINOPHEN 325 MG/1
650 TABLET ORAL ONCE
Status: COMPLETED | OUTPATIENT
Start: 2024-01-19 | End: 2024-01-19

## 2024-01-19 RX ORDER — SODIUM CHLORIDE 0.9 % (FLUSH) 0.9 %
5-40 SYRINGE (ML) INJECTION PRN
Status: CANCELLED | OUTPATIENT
Start: 2024-01-26

## 2024-01-19 RX ORDER — SODIUM CHLORIDE 0.9 % (FLUSH) 0.9 %
5-40 SYRINGE (ML) INJECTION PRN
Status: DISCONTINUED | OUTPATIENT
Start: 2024-01-19 | End: 2024-01-20 | Stop reason: HOSPADM

## 2024-01-19 RX ORDER — DIPHENHYDRAMINE HYDROCHLORIDE 50 MG/ML
50 INJECTION INTRAMUSCULAR; INTRAVENOUS ONCE
Status: COMPLETED | OUTPATIENT
Start: 2024-01-19 | End: 2024-01-19

## 2024-01-19 RX ORDER — SODIUM CHLORIDE 9 MG/ML
INJECTION, SOLUTION INTRAVENOUS CONTINUOUS
OUTPATIENT
Start: 2024-01-26

## 2024-01-19 RX ADMIN — HYDROCORTISONE SODIUM SUCCINATE 100 MG: 100 INJECTION, POWDER, FOR SOLUTION INTRAMUSCULAR; INTRAVENOUS at 06:53

## 2024-01-19 RX ADMIN — ACETAMINOPHEN 650 MG: 325 TABLET ORAL at 06:56

## 2024-01-19 RX ADMIN — IMMUNE GLOBULIN (HUMAN) 30 G: 10 INJECTION INTRAVENOUS; SUBCUTANEOUS at 06:58

## 2024-01-19 RX ADMIN — DIPHENHYDRAMINE HYDROCHLORIDE 50 MG: 50 INJECTION INTRAMUSCULAR; INTRAVENOUS at 06:48

## 2024-01-19 RX ADMIN — SODIUM CHLORIDE, PRESERVATIVE FREE 20 ML: 5 INJECTION INTRAVENOUS at 08:29

## 2024-01-19 RX ADMIN — SODIUM CHLORIDE, PRESERVATIVE FREE 10 ML: 5 INJECTION INTRAVENOUS at 06:57

## 2024-01-19 RX ADMIN — HEPARIN 500 UNITS: 100 SYRINGE at 08:29

## 2024-01-19 NOTE — PROGRESS NOTES
[facility].  Pre meds given, followed by Gamunex C as ordered.  Tolerated well.  Future appts reviewed with pt prior to discharge, states understanding.

## 2024-01-19 NOTE — DISCHARGE INSTRUCTIONS
vaccines include measles, mumps, rubella (MMR), rotavirus, typhoid, yellow fever, varicella (chickenpox), zoster (shingles), and nasal flu (influenza) vaccine.  What are the possible side effects of immune globulin?  Get emergency medical help if you have signs of an allergic reaction: hives; wheezing, difficulty breathing; dizziness, feeling like you might pass out; swelling of your face, lips, tongue, or throat.  Some side effects may occur during the injection. Tell your caregiver if you feel light-headed, itchy, chilled, sweaty, or have chest discomfort, fast heartbeats, severe headache, or pounding in your neck or ears.  Call your doctor at once if you have:  a blood cell disorder --pale or yellowed skin, dark colored urine, fever, confusion or weakness;  dehydration symptoms --feeling very thirsty or hot, being unable to urinate, heavy sweating, or hot and dry skin;  kidney problems --little or no urination, swelling, rapid weight gain, feeling short of breath;  lung problems --chest pain, wheezing, trouble breathing, blue colored lips, fingers, or toes;  signs of a new infection --fever with a severe headache, neck stiffness, eye pain, and increased sensitivity to light; or  signs of a blood clot --shortness of breath, chest pain with deep breathing, rapid heart rate, numbness or weakness on one side of the body, swelling and warmth or discoloration in an arm or leg.  Common side effects may include:  runny or stuffy nose, sinus pain, cough, sore throat;  fever, chills, weakness;  headache, back pain, muscle or joint pain;  dizziness, tiredness, depressed mood;  swelling in your hands or feet;  skin rash, redness, or bruising;  blisters or ulcers in your mouth, red or swollen gums, trouble swallowing;  nausea, diarrhea, stomach pain, upset stomach;  increased blood pressure; or  redness, swelling, or itching where an injection was given.  This is not a complete list of side effects and others may occur. Call  your doctor for medical advice about side effects. You may report side effects to FDA at 0-114-CKL-1681.  What other drugs will affect immune globulin?  Immune globulin can harm your kidneys, especially if you also use certain medicines for infections, cancer, osteoporosis, organ transplant rejection, bowel disorders, high blood pressure, or pain or arthritis (including Advil, Motrin, and Aleve).  Other drugs may affect immune globulin, including prescription and over-the-counter medicines, vitamins, and herbal products. Tell your doctor about all your current medicines and any medicine you start or stop using.  Where can I get more information?  Your doctor or pharmacist can provide more information about immune globulin.  Remember, keep this and all other medicines out of the reach of children, never share your medicines with others, and use this medication only for the indication prescribed.   Every effort has been made to ensure that the information provided by [employer]. [employer]') is accurate, up-to-date, and complete, but no guarantee is made to that effect. Drug information contained herein may be time sensitive. [employer] information has been compiled for use by healthcare practitioners and consumers in the [place] and therefore [employer] does not warrant that uses outside of the [place] are appropriate, unless specifically indicated otherwise. [employer] drug information does not endorse drugs, diagnose patients or recommend therapy. [employer] drug information is an informational resource designed to assist licensed healthcare practitioners in caring for their patients and/or to serve consumers viewing this service as a supplement to, and not a substitute for, the expertise, skill, knowledge and judgment of healthcare practitioners. The absence of a warning for a given drug or drug combination in no way should be construed to indicate that the drug or drug combination is safe, effective or

## 2024-01-26 ENCOUNTER — HOSPITAL ENCOUNTER (OUTPATIENT)
Dept: INFUSION THERAPY | Age: 49
Setting detail: INFUSION SERIES
Discharge: HOME | End: 2024-01-26
Payer: MEDICARE

## 2024-01-26 VITALS
RESPIRATION RATE: 18 BRPM | WEIGHT: 166 LBS | BODY MASS INDEX: 27.62 KG/M2 | HEART RATE: 58 BPM | DIASTOLIC BLOOD PRESSURE: 86 MMHG | TEMPERATURE: 97.8 F | OXYGEN SATURATION: 100 % | SYSTOLIC BLOOD PRESSURE: 125 MMHG

## 2024-01-26 DIAGNOSIS — G35 MULTIPLE SCLEROSIS (HCC): Primary | ICD-10-CM

## 2024-01-26 PROCEDURE — 6370000000 HC RX 637 (ALT 250 FOR IP): Performed by: PSYCHIATRY & NEUROLOGY

## 2024-01-26 PROCEDURE — 96365 THER/PROPH/DIAG IV INF INIT: CPT

## 2024-01-26 PROCEDURE — 96375 TX/PRO/DX INJ NEW DRUG ADDON: CPT

## 2024-01-26 PROCEDURE — 96366 THER/PROPH/DIAG IV INF ADDON: CPT

## 2024-01-26 PROCEDURE — 2580000003 HC RX 258: Performed by: PSYCHIATRY & NEUROLOGY

## 2024-01-26 PROCEDURE — 6360000002 HC RX W HCPCS: Performed by: PSYCHIATRY & NEUROLOGY

## 2024-01-26 RX ORDER — ACETAMINOPHEN 325 MG/1
650 TABLET ORAL ONCE
Status: CANCELLED | OUTPATIENT
Start: 2024-02-02 | End: 2024-02-02

## 2024-01-26 RX ORDER — SODIUM CHLORIDE 9 MG/ML
5-250 INJECTION, SOLUTION INTRAVENOUS PRN
OUTPATIENT
Start: 2024-02-02

## 2024-01-26 RX ORDER — DIPHENHYDRAMINE HYDROCHLORIDE 50 MG/ML
50 INJECTION INTRAMUSCULAR; INTRAVENOUS ONCE
Status: COMPLETED | OUTPATIENT
Start: 2024-01-26 | End: 2024-01-26

## 2024-01-26 RX ORDER — ACETAMINOPHEN 325 MG/1
650 TABLET ORAL ONCE
Status: COMPLETED | OUTPATIENT
Start: 2024-01-26 | End: 2024-01-26

## 2024-01-26 RX ORDER — SODIUM CHLORIDE 9 MG/ML
INJECTION, SOLUTION INTRAVENOUS CONTINUOUS
OUTPATIENT
Start: 2024-02-02

## 2024-01-26 RX ORDER — EPINEPHRINE 1 MG/ML
0.3 INJECTION, SOLUTION, CONCENTRATE INTRAVENOUS PRN
OUTPATIENT
Start: 2024-02-02

## 2024-01-26 RX ORDER — HEPARIN 100 UNIT/ML
500 SYRINGE INTRAVENOUS PRN
Status: DISCONTINUED | OUTPATIENT
Start: 2024-01-26 | End: 2024-01-27 | Stop reason: HOSPADM

## 2024-01-26 RX ORDER — ALBUTEROL SULFATE 90 UG/1
4 AEROSOL, METERED RESPIRATORY (INHALATION) PRN
OUTPATIENT
Start: 2024-02-02

## 2024-01-26 RX ORDER — SODIUM CHLORIDE 0.9 % (FLUSH) 0.9 %
5-40 SYRINGE (ML) INJECTION PRN
Status: DISCONTINUED | OUTPATIENT
Start: 2024-01-26 | End: 2024-01-27 | Stop reason: HOSPADM

## 2024-01-26 RX ORDER — MEPERIDINE HYDROCHLORIDE 25 MG/ML
12.5 INJECTION INTRAMUSCULAR; INTRAVENOUS; SUBCUTANEOUS PRN
OUTPATIENT
Start: 2024-02-02

## 2024-01-26 RX ORDER — DIPHENHYDRAMINE HYDROCHLORIDE 50 MG/ML
50 INJECTION INTRAMUSCULAR; INTRAVENOUS ONCE
Status: CANCELLED
Start: 2024-02-02 | End: 2024-02-02

## 2024-01-26 RX ORDER — ACETAMINOPHEN 325 MG/1
650 TABLET ORAL
OUTPATIENT
Start: 2024-02-02

## 2024-01-26 RX ORDER — HEPARIN 100 UNIT/ML
500 SYRINGE INTRAVENOUS PRN
Status: CANCELLED | OUTPATIENT
Start: 2024-02-02

## 2024-01-26 RX ORDER — ONDANSETRON 2 MG/ML
8 INJECTION INTRAMUSCULAR; INTRAVENOUS
OUTPATIENT
Start: 2024-02-02

## 2024-01-26 RX ORDER — DIPHENHYDRAMINE HYDROCHLORIDE 50 MG/ML
50 INJECTION INTRAMUSCULAR; INTRAVENOUS
OUTPATIENT
Start: 2024-02-02

## 2024-01-26 RX ORDER — SODIUM CHLORIDE 0.9 % (FLUSH) 0.9 %
5-40 SYRINGE (ML) INJECTION PRN
Status: CANCELLED | OUTPATIENT
Start: 2024-02-02

## 2024-01-26 RX ADMIN — SODIUM CHLORIDE, PRESERVATIVE FREE 10 ML: 5 INJECTION INTRAVENOUS at 09:03

## 2024-01-26 RX ADMIN — IMMUNE GLOBULIN (HUMAN) 30 G: 10 INJECTION INTRAVENOUS; SUBCUTANEOUS at 07:31

## 2024-01-26 RX ADMIN — HEPARIN 500 UNITS: 100 SYRINGE at 09:03

## 2024-01-26 RX ADMIN — HYDROCORTISONE SODIUM SUCCINATE 100 MG: 100 INJECTION, POWDER, FOR SOLUTION INTRAMUSCULAR; INTRAVENOUS at 06:56

## 2024-01-26 RX ADMIN — ACETAMINOPHEN 325MG 650 MG: 325 TABLET ORAL at 06:57

## 2024-01-26 RX ADMIN — SODIUM CHLORIDE, PRESERVATIVE FREE 10 ML: 5 INJECTION INTRAVENOUS at 06:57

## 2024-01-26 RX ADMIN — DIPHENHYDRAMINE HYDROCHLORIDE 50 MG: 50 INJECTION INTRAMUSCULAR; INTRAVENOUS at 06:56

## 2024-01-26 ASSESSMENT — PAIN SCALES - GENERAL: PAINLEVEL_OUTOF10: 0

## 2024-01-26 NOTE — PROGRESS NOTES
Here today for IVIG infusion.  Port accessed.  Premedications given.  IVIG infused per orders.  Port de accessed.  Future appointments reviewed with patient and discharged to home.

## 2024-01-26 NOTE — DISCHARGE INSTRUCTIONS
immune globulin (intravenous and subcutaneous)  Pronunciation:  dinora LACEY foley  Brand:  Gammagard Liquid, Gammaked, Gamunex-C  What is the most important information I should know about immune globulin?  This medicine can cause blood clots.  The risk is highest in older adults or in people who have had blood clots, heart problems, or blood circulation problems. Blood clots are also more likely during long-term bedrest, while using birth control pills or hormone replacement therapy, or while having a central intravenous (IV) catheter in place.  Call your doctor at once if you have chest pain, trouble breathing, fast heartbeats, numbness or weakness, or swelling and warmth or discoloration in an arm or leg.  This medicine can also harm your kidneys, especially if you have kidney disease or if you also use certain medicines. Tell your doctor right away if you have signs of kidney problems, such as swelling, rapid weight gain, and little or no urination.  What is immune globulin?  Immune globulin intravenous and subcutaneous (for injection into a vein or under the skin) is used to treat primary immunodeficiency.  Immune globulin is also used to increase platelets (blood clotting cells) in people with idiopathic thrombocytopenic purpura.  Immune globulin is also used to treat certain debilitating nerve disorders that cause muscle weakness and can affect daily activities.  Immune globulin may also be used for purposes not listed in this medication guide.  What should I discuss with my healthcare provider before using immune globulin?  You should not use this medicine if:  you have had an allergic reaction to an immune globulin or blood product; or  you have immune globulin A (IgA) deficiency with antibody to IgA.  Immune globulin can cause blood clots or kidney problems, especially in older adults or in people with certain conditions. Tell your doctor if you have ever had:  heart problems, blood circulation  vaccines include measles, mumps, rubella (MMR), rotavirus, typhoid, yellow fever, varicella (chickenpox), zoster (shingles), and nasal flu (influenza) vaccine.  What are the possible side effects of immune globulin?  Get emergency medical help if you have signs of an allergic reaction: hives; wheezing, difficulty breathing; dizziness, feeling like you might pass out; swelling of your face, lips, tongue, or throat.  Some side effects may occur during the injection. Tell your caregiver if you feel light-headed, itchy, chilled, sweaty, or have chest discomfort, fast heartbeats, severe headache, or pounding in your neck or ears.  Call your doctor at once if you have:  a blood cell disorder --pale or yellowed skin, dark colored urine, fever, confusion or weakness;  dehydration symptoms --feeling very thirsty or hot, being unable to urinate, heavy sweating, or hot and dry skin;  kidney problems --little or no urination, swelling, rapid weight gain, feeling short of breath;  lung problems --chest pain, wheezing, trouble breathing, blue colored lips, fingers, or toes;  signs of a new infection --fever with a severe headache, neck stiffness, eye pain, and increased sensitivity to light; or  signs of a blood clot --shortness of breath, chest pain with deep breathing, rapid heart rate, numbness or weakness on one side of the body, swelling and warmth or discoloration in an arm or leg.  Common side effects may include:  runny or stuffy nose, sinus pain, cough, sore throat;  fever, chills, weakness;  headache, back pain, muscle or joint pain;  dizziness, tiredness, depressed mood;  swelling in your hands or feet;  skin rash, redness, or bruising;  blisters or ulcers in your mouth, red or swollen gums, trouble swallowing;  nausea, diarrhea, stomach pain, upset stomach;  increased blood pressure; or  redness, swelling, or itching where an injection was given.  This is not a complete list of side effects and others may occur. Call

## 2024-01-30 ENCOUNTER — PATIENT MESSAGE (OUTPATIENT)
Dept: NEUROLOGY | Age: 49
End: 2024-01-30

## 2024-01-30 DIAGNOSIS — G43.719 INTRACTABLE CHRONIC MIGRAINE WITHOUT AURA AND WITHOUT STATUS MIGRAINOSUS: ICD-10-CM

## 2024-01-30 DIAGNOSIS — G35 MULTIPLE SCLEROSIS (HCC): ICD-10-CM

## 2024-01-30 DIAGNOSIS — M54.30 SCIATICA, UNSPECIFIED LATERALITY: ICD-10-CM

## 2024-01-30 DIAGNOSIS — M79.7 FIBROMYALGIA: ICD-10-CM

## 2024-01-30 RX ORDER — ONDANSETRON 4 MG/1
4 TABLET, FILM COATED ORAL ONCE
Qty: 30 TABLET | Refills: 5 | Status: SHIPPED | OUTPATIENT
Start: 2024-01-30 | End: 2024-01-30

## 2024-01-30 RX ORDER — OXYCODONE HYDROCHLORIDE 10 MG/1
10 TABLET ORAL EVERY 8 HOURS PRN
Qty: 90 TABLET | Refills: 0 | Status: SHIPPED | OUTPATIENT
Start: 2024-02-05 | End: 2024-03-06

## 2024-01-30 NOTE — TELEPHONE ENCOUNTER
Requested Prescriptions     Pending Prescriptions Disp Refills    oxyCODONE HCl (OXY-IR) 10 MG immediate release tablet 90 tablet 0     Sig: Take 1 tablet by mouth every 8 hours as needed for Pain for up to 30 days. Intended supply: 30 days Max Daily Amount: 30 mg    ondansetron (ZOFRAN) 4 MG tablet 30 tablet 5     Sig: Take 1 tablet by mouth once for 1 dose       Last Office Visit:  11/7/2023  Next Office Visit:  2/7/2024  Last Medication Refill:  1/6/2024 with 0 RF & 11/9/2023 with 5 RF   Daniel up to date:  1/3/2024     *RX updated to reflect   1/30/2024 & 2/6/2024  fill date*

## 2024-01-30 NOTE — TELEPHONE ENCOUNTER
From: Ramiro Mendoza  To: Dr. Heriberto Arredondo  Sent: 1/30/2024 8:39 AM CST  Subject: Zofran and oxycodone refills    I need to have a refill on my zofran. It is not on my list of meds but he prescribed it for IVIG nausea. Also I will run out of oxycodone on the feb 5 or 6 and my appt is on the 7th. Should I just wait for my appointment to get a refill or can u fill it so I don’t run out?

## 2024-02-02 ENCOUNTER — HOSPITAL ENCOUNTER (OUTPATIENT)
Dept: INFUSION THERAPY | Age: 49
Setting detail: INFUSION SERIES
Discharge: HOME | End: 2024-02-02
Payer: MEDICARE

## 2024-02-02 VITALS
TEMPERATURE: 98.4 F | SYSTOLIC BLOOD PRESSURE: 134 MMHG | DIASTOLIC BLOOD PRESSURE: 85 MMHG | WEIGHT: 162 LBS | RESPIRATION RATE: 18 BRPM | BODY MASS INDEX: 26.96 KG/M2 | OXYGEN SATURATION: 100 % | HEART RATE: 70 BPM

## 2024-02-02 DIAGNOSIS — G35 MULTIPLE SCLEROSIS (HCC): Primary | ICD-10-CM

## 2024-02-02 PROCEDURE — 2580000003 HC RX 258: Performed by: PSYCHIATRY & NEUROLOGY

## 2024-02-02 PROCEDURE — 6360000002 HC RX W HCPCS: Performed by: PSYCHIATRY & NEUROLOGY

## 2024-02-02 PROCEDURE — 96375 TX/PRO/DX INJ NEW DRUG ADDON: CPT

## 2024-02-02 PROCEDURE — 96365 THER/PROPH/DIAG IV INF INIT: CPT

## 2024-02-02 PROCEDURE — 6370000000 HC RX 637 (ALT 250 FOR IP): Performed by: PSYCHIATRY & NEUROLOGY

## 2024-02-02 PROCEDURE — 96366 THER/PROPH/DIAG IV INF ADDON: CPT

## 2024-02-02 RX ORDER — HEPARIN 100 UNIT/ML
500 SYRINGE INTRAVENOUS PRN
OUTPATIENT
Start: 2024-02-09

## 2024-02-02 RX ORDER — ACETAMINOPHEN 325 MG/1
650 TABLET ORAL
OUTPATIENT
Start: 2024-02-09

## 2024-02-02 RX ORDER — DIPHENHYDRAMINE HYDROCHLORIDE 50 MG/ML
50 INJECTION INTRAMUSCULAR; INTRAVENOUS ONCE
Start: 2024-02-09 | End: 2024-02-09

## 2024-02-02 RX ORDER — DIPHENHYDRAMINE HYDROCHLORIDE 50 MG/ML
50 INJECTION INTRAMUSCULAR; INTRAVENOUS ONCE
Status: COMPLETED | OUTPATIENT
Start: 2024-02-02 | End: 2024-02-02

## 2024-02-02 RX ORDER — SODIUM CHLORIDE 9 MG/ML
5-250 INJECTION, SOLUTION INTRAVENOUS PRN
OUTPATIENT
Start: 2024-02-09

## 2024-02-02 RX ORDER — DIPHENHYDRAMINE HYDROCHLORIDE 50 MG/ML
50 INJECTION INTRAMUSCULAR; INTRAVENOUS
OUTPATIENT
Start: 2024-02-09

## 2024-02-02 RX ORDER — ONDANSETRON 2 MG/ML
8 INJECTION INTRAMUSCULAR; INTRAVENOUS
OUTPATIENT
Start: 2024-02-09

## 2024-02-02 RX ORDER — HEPARIN 100 UNIT/ML
500 SYRINGE INTRAVENOUS PRN
Status: DISCONTINUED | OUTPATIENT
Start: 2024-02-02 | End: 2024-02-03 | Stop reason: HOSPADM

## 2024-02-02 RX ORDER — ACETAMINOPHEN 325 MG/1
650 TABLET ORAL ONCE
Status: COMPLETED | OUTPATIENT
Start: 2024-02-02 | End: 2024-02-02

## 2024-02-02 RX ORDER — ALBUTEROL SULFATE 90 UG/1
4 AEROSOL, METERED RESPIRATORY (INHALATION) PRN
OUTPATIENT
Start: 2024-02-09

## 2024-02-02 RX ORDER — SODIUM CHLORIDE 0.9 % (FLUSH) 0.9 %
5-40 SYRINGE (ML) INJECTION PRN
OUTPATIENT
Start: 2024-02-09

## 2024-02-02 RX ORDER — EPINEPHRINE 1 MG/ML
0.3 INJECTION, SOLUTION, CONCENTRATE INTRAVENOUS PRN
OUTPATIENT
Start: 2024-02-09

## 2024-02-02 RX ORDER — SODIUM CHLORIDE 9 MG/ML
INJECTION, SOLUTION INTRAVENOUS CONTINUOUS
OUTPATIENT
Start: 2024-02-09

## 2024-02-02 RX ORDER — SODIUM CHLORIDE 0.9 % (FLUSH) 0.9 %
5-40 SYRINGE (ML) INJECTION PRN
Status: DISCONTINUED | OUTPATIENT
Start: 2024-02-02 | End: 2024-02-03 | Stop reason: HOSPADM

## 2024-02-02 RX ORDER — MEPERIDINE HYDROCHLORIDE 25 MG/ML
12.5 INJECTION INTRAMUSCULAR; INTRAVENOUS; SUBCUTANEOUS PRN
OUTPATIENT
Start: 2024-02-09

## 2024-02-02 RX ORDER — ACETAMINOPHEN 325 MG/1
650 TABLET ORAL ONCE
OUTPATIENT
Start: 2024-02-09 | End: 2024-02-09

## 2024-02-02 RX ADMIN — HYDROCORTISONE SODIUM SUCCINATE 100 MG: 100 INJECTION, POWDER, FOR SOLUTION INTRAMUSCULAR; INTRAVENOUS at 07:08

## 2024-02-02 RX ADMIN — ACETAMINOPHEN 325MG 650 MG: 325 TABLET ORAL at 07:08

## 2024-02-02 RX ADMIN — IMMUNE GLOBULIN (HUMAN) 30 G: 10 INJECTION INTRAVENOUS; SUBCUTANEOUS at 07:27

## 2024-02-02 RX ADMIN — DIPHENHYDRAMINE HYDROCHLORIDE 50 MG: 50 INJECTION INTRAMUSCULAR; INTRAVENOUS at 07:09

## 2024-02-02 RX ADMIN — SODIUM CHLORIDE, PRESERVATIVE FREE 20 ML: 5 INJECTION INTRAVENOUS at 09:07

## 2024-02-02 RX ADMIN — HEPARIN 500 UNITS: 100 SYRINGE at 09:07

## 2024-02-02 ASSESSMENT — PAIN SCALES - GENERAL: PAINLEVEL_OUTOF10: 0

## 2024-02-02 NOTE — PROGRESS NOTES
Here today for IVIG infusion.  Premedications given.  IVIG infused per orders.  Patient tolerated well.  Future appointments reviewed with patient and discharged to home.

## 2024-02-07 ENCOUNTER — OFFICE VISIT (OUTPATIENT)
Dept: NEUROLOGY | Age: 49
End: 2024-02-07
Payer: MEDICARE

## 2024-02-07 VITALS
HEART RATE: 69 BPM | SYSTOLIC BLOOD PRESSURE: 119 MMHG | BODY MASS INDEX: 26.99 KG/M2 | HEIGHT: 65 IN | WEIGHT: 162 LBS | DIASTOLIC BLOOD PRESSURE: 76 MMHG

## 2024-02-07 DIAGNOSIS — G43.719 INTRACTABLE CHRONIC MIGRAINE WITHOUT AURA AND WITHOUT STATUS MIGRAINOSUS: ICD-10-CM

## 2024-02-07 DIAGNOSIS — M54.30 SCIATICA, UNSPECIFIED LATERALITY: ICD-10-CM

## 2024-02-07 DIAGNOSIS — R25.1 TREMOR: ICD-10-CM

## 2024-02-07 DIAGNOSIS — R20.0 NUMBNESS: ICD-10-CM

## 2024-02-07 DIAGNOSIS — M79.7 FIBROMYALGIA: ICD-10-CM

## 2024-02-07 DIAGNOSIS — H46.9 OPTIC NEURITIS: ICD-10-CM

## 2024-02-07 DIAGNOSIS — H53.9 VISUAL DISTURBANCE: ICD-10-CM

## 2024-02-07 DIAGNOSIS — G35 MULTIPLE SCLEROSIS (HCC): Primary | ICD-10-CM

## 2024-02-07 DIAGNOSIS — R53.1 WEAKNESS: ICD-10-CM

## 2024-02-07 DIAGNOSIS — M32.9 LUPUS (HCC): ICD-10-CM

## 2024-02-07 PROCEDURE — 99214 OFFICE O/P EST MOD 30 MIN: CPT | Performed by: PSYCHIATRY & NEUROLOGY

## 2024-02-07 PROCEDURE — G8427 DOCREV CUR MEDS BY ELIG CLIN: HCPCS | Performed by: PSYCHIATRY & NEUROLOGY

## 2024-02-07 PROCEDURE — 4004F PT TOBACCO SCREEN RCVD TLK: CPT | Performed by: PSYCHIATRY & NEUROLOGY

## 2024-02-07 PROCEDURE — 3074F SYST BP LT 130 MM HG: CPT | Performed by: PSYCHIATRY & NEUROLOGY

## 2024-02-07 PROCEDURE — G8417 CALC BMI ABV UP PARAM F/U: HCPCS | Performed by: PSYCHIATRY & NEUROLOGY

## 2024-02-07 PROCEDURE — G8484 FLU IMMUNIZE NO ADMIN: HCPCS | Performed by: PSYCHIATRY & NEUROLOGY

## 2024-02-07 PROCEDURE — 3078F DIAST BP <80 MM HG: CPT | Performed by: PSYCHIATRY & NEUROLOGY

## 2024-02-07 RX ORDER — FESOTERODINE FUMARATE 8 MG/1
1 TABLET, EXTENDED RELEASE ORAL DAILY
COMMUNITY
Start: 2023-09-05

## 2024-02-07 RX ORDER — CARIPRAZINE 1.5 MG/1
CAPSULE, GELATIN COATED ORAL
COMMUNITY
Start: 2024-01-11

## 2024-02-07 RX ORDER — CARISOPRODOL 350 MG/1
350 TABLET ORAL 4 TIMES DAILY PRN
Qty: 120 TABLET | Refills: 5 | Status: SHIPPED | OUTPATIENT
Start: 2024-02-07 | End: 2024-08-05

## 2024-02-07 RX ORDER — DIVALPROEX SODIUM 500 MG/1
500 TABLET, DELAYED RELEASE ORAL 2 TIMES DAILY
COMMUNITY
Start: 2024-01-02

## 2024-02-07 RX ORDER — DEXTROAMPHETAMINE SACCHARATE, AMPHETAMINE ASPARTATE MONOHYDRATE, DEXTROAMPHETAMINE SULFATE AND AMPHETAMINE SULFATE 2.5; 2.5; 2.5; 2.5 MG/1; MG/1; MG/1; MG/1
CAPSULE, EXTENDED RELEASE ORAL
COMMUNITY
Start: 2024-01-23

## 2024-02-07 RX ORDER — OXYCODONE HYDROCHLORIDE 10 MG/1
10 TABLET ORAL EVERY 6 HOURS PRN
Qty: 120 TABLET | Refills: 0 | Status: SHIPPED | OUTPATIENT
Start: 2024-02-07 | End: 2024-03-08

## 2024-02-07 RX ORDER — MYCOPHENOLATE MOFETIL 500 MG/1
1000 TABLET ORAL 2 TIMES DAILY
COMMUNITY
Start: 2024-01-30

## 2024-02-07 RX ORDER — ESTRADIOL 1 MG/1
TABLET ORAL
COMMUNITY
Start: 2024-02-03

## 2024-02-07 RX ORDER — ALPRAZOLAM 0.5 MG/1
TABLET ORAL
COMMUNITY
Start: 2024-01-18

## 2024-02-07 NOTE — PROGRESS NOTES
Review of Systems    Constitutional - No fever or chills.  No diaphoresis or significant fatigue.  HENT -  No tinnitus or significant hearing loss.  Eyes - no sudden vision change or eye pain  Respiratory - no significant shortness of breath or cough  Cardiovascular - no chest pain No palpitations or significant leg swelling  Gastrointestinal - no abdominal swelling or pain.    Genitourinary - No difficulty urinating, dysuria  Musculoskeletal - no back pain or myalgia.  Skin - no color change or rash  Neurologic - No seizures.  No lateralizing weakness.  Hematologic - no easy bruising or excessive bleeding.  Psychiatric - no severe anxiety or nervousness.   All other review of systems are negative.    
due to side effects. Her hepatitis panel was unremarkable. She is seeing  MS specialist in [facility].  Her video EEG x 3 days was unremarkable with no events captured. She is seeing another rheumatologist in [facility],She will have her Marinol increased to 20 mg tid. She will need something for fatigue. She plans to research long term side effects and benefits of more frequent ACTHAR. She will be tried on Ubrelvy, Imitrex, Maxalt and Qulipta for migraines. We talked about constipation with Qulipta.we may consider Tysabri. She is continue IVIG one a week.  She wasreferred to general surgery for port placement for IVIG.  The patient indicated understanding of the management plan.She will be continued  on Baclofen for spasms. She will be tried on Qulipta, Nurtec, and Ubrelvy. She is aware of the increased risk of MI with Maxalt and Imitrex.  At this time she will have Roxicodone for her intractable pain increased to 120 pills and Soma to 120 pills..  She is to follow with the rheumatologist.  She is to follow up with me in 3 months and call with any issues..            Plan    No orders of the defined types were placed in this encounter.          Orders Placed This Encounter   Medications    oxyCODONE HCl (OXY-IR) 10 MG immediate release tablet     Sig: Take 1 tablet by mouth every 6 hours as needed for Pain for up to 30 days. Intended supply: 30 days Max Daily Amount: 40 mg     Dispense:  120 tablet     Refill:  0     Reduce doses taken as pain becomes manageable    carisoprodol (SOMA) 350 MG tablet     Sig: Take 1 tablet by mouth 4 times daily as needed for Muscle spasms for up to 180 days. Max Daily Amount: 1,400 mg     Dispense:  120 tablet     Refill:  5         Return in about 3 months (around 5/7/2024).      EMR Dragon/transcription disclaimer:Significant part of this  encounter note is electronic transcription/translation of spoken language to printed text. The electronic translation of spoken language may be

## 2024-02-09 ENCOUNTER — HOSPITAL ENCOUNTER (OUTPATIENT)
Dept: INFUSION THERAPY | Age: 49
Setting detail: INFUSION SERIES
Discharge: HOME | End: 2024-02-09
Payer: MEDICARE

## 2024-02-09 VITALS
SYSTOLIC BLOOD PRESSURE: 114 MMHG | WEIGHT: 160 LBS | TEMPERATURE: 98.8 F | RESPIRATION RATE: 18 BRPM | BODY MASS INDEX: 26.63 KG/M2 | HEART RATE: 58 BPM | OXYGEN SATURATION: 100 % | DIASTOLIC BLOOD PRESSURE: 74 MMHG

## 2024-02-09 DIAGNOSIS — G35 MULTIPLE SCLEROSIS (HCC): Primary | ICD-10-CM

## 2024-02-09 PROCEDURE — 96365 THER/PROPH/DIAG IV INF INIT: CPT

## 2024-02-09 PROCEDURE — 6370000000 HC RX 637 (ALT 250 FOR IP): Performed by: PSYCHIATRY & NEUROLOGY

## 2024-02-09 PROCEDURE — 96366 THER/PROPH/DIAG IV INF ADDON: CPT

## 2024-02-09 PROCEDURE — 96375 TX/PRO/DX INJ NEW DRUG ADDON: CPT

## 2024-02-09 PROCEDURE — 2580000003 HC RX 258: Performed by: PSYCHIATRY & NEUROLOGY

## 2024-02-09 PROCEDURE — 6360000002 HC RX W HCPCS: Performed by: PSYCHIATRY & NEUROLOGY

## 2024-02-09 RX ORDER — SODIUM CHLORIDE 9 MG/ML
INJECTION, SOLUTION INTRAVENOUS CONTINUOUS
OUTPATIENT
Start: 2024-02-16

## 2024-02-09 RX ORDER — DIPHENHYDRAMINE HYDROCHLORIDE 50 MG/ML
50 INJECTION INTRAMUSCULAR; INTRAVENOUS ONCE
Status: CANCELLED
Start: 2024-02-16 | End: 2024-02-16

## 2024-02-09 RX ORDER — EPINEPHRINE 1 MG/ML
0.3 INJECTION, SOLUTION, CONCENTRATE INTRAVENOUS PRN
OUTPATIENT
Start: 2024-02-16

## 2024-02-09 RX ORDER — SODIUM CHLORIDE 9 MG/ML
5-250 INJECTION, SOLUTION INTRAVENOUS PRN
OUTPATIENT
Start: 2024-02-16

## 2024-02-09 RX ORDER — ALBUTEROL SULFATE 90 UG/1
4 AEROSOL, METERED RESPIRATORY (INHALATION) PRN
OUTPATIENT
Start: 2024-02-16

## 2024-02-09 RX ORDER — ACETAMINOPHEN 325 MG/1
650 TABLET ORAL
OUTPATIENT
Start: 2024-02-16

## 2024-02-09 RX ORDER — SODIUM CHLORIDE 9 MG/ML
5-250 INJECTION, SOLUTION INTRAVENOUS PRN
Status: CANCELLED | OUTPATIENT
Start: 2024-02-16

## 2024-02-09 RX ORDER — HEPARIN 100 UNIT/ML
500 SYRINGE INTRAVENOUS PRN
Status: DISCONTINUED | OUTPATIENT
Start: 2024-02-09 | End: 2024-02-10 | Stop reason: HOSPADM

## 2024-02-09 RX ORDER — ACETAMINOPHEN 325 MG/1
650 TABLET ORAL ONCE
Status: CANCELLED | OUTPATIENT
Start: 2024-02-16 | End: 2024-02-16

## 2024-02-09 RX ORDER — HEPARIN 100 UNIT/ML
500 SYRINGE INTRAVENOUS PRN
Status: CANCELLED | OUTPATIENT
Start: 2024-02-16

## 2024-02-09 RX ORDER — DIPHENHYDRAMINE HYDROCHLORIDE 50 MG/ML
50 INJECTION INTRAMUSCULAR; INTRAVENOUS ONCE
Status: COMPLETED | OUTPATIENT
Start: 2024-02-09 | End: 2024-02-09

## 2024-02-09 RX ORDER — DIPHENHYDRAMINE HYDROCHLORIDE 50 MG/ML
50 INJECTION INTRAMUSCULAR; INTRAVENOUS
OUTPATIENT
Start: 2024-02-16

## 2024-02-09 RX ORDER — ONDANSETRON 2 MG/ML
8 INJECTION INTRAMUSCULAR; INTRAVENOUS
OUTPATIENT
Start: 2024-02-16

## 2024-02-09 RX ORDER — SODIUM CHLORIDE 0.9 % (FLUSH) 0.9 %
5-40 SYRINGE (ML) INJECTION PRN
Status: DISCONTINUED | OUTPATIENT
Start: 2024-02-09 | End: 2024-02-10 | Stop reason: HOSPADM

## 2024-02-09 RX ORDER — SODIUM CHLORIDE 0.9 % (FLUSH) 0.9 %
5-40 SYRINGE (ML) INJECTION PRN
Status: CANCELLED | OUTPATIENT
Start: 2024-02-16

## 2024-02-09 RX ORDER — MEPERIDINE HYDROCHLORIDE 25 MG/ML
12.5 INJECTION INTRAMUSCULAR; INTRAVENOUS; SUBCUTANEOUS PRN
OUTPATIENT
Start: 2024-02-16

## 2024-02-09 RX ORDER — ACETAMINOPHEN 325 MG/1
650 TABLET ORAL ONCE
Status: COMPLETED | OUTPATIENT
Start: 2024-02-09 | End: 2024-02-09

## 2024-02-09 RX ADMIN — ACETAMINOPHEN 650 MG: 325 TABLET ORAL at 06:52

## 2024-02-09 RX ADMIN — DIPHENHYDRAMINE HYDROCHLORIDE 50 MG: 50 INJECTION INTRAMUSCULAR; INTRAVENOUS at 06:52

## 2024-02-09 RX ADMIN — SODIUM CHLORIDE, PRESERVATIVE FREE 20 ML: 5 INJECTION INTRAVENOUS at 08:34

## 2024-02-09 RX ADMIN — HEPARIN 500 UNITS: 100 SYRINGE at 08:34

## 2024-02-09 RX ADMIN — HYDROCORTISONE SODIUM SUCCINATE 100 MG: 100 INJECTION, POWDER, FOR SOLUTION INTRAMUSCULAR; INTRAVENOUS at 06:52

## 2024-02-09 RX ADMIN — IMMUNE GLOBULIN (HUMAN) 30 G: 10 INJECTION INTRAVENOUS; SUBCUTANEOUS at 07:01

## 2024-02-09 NOTE — PROGRESS NOTES
Port accessed and tylenol, benadryl, and solucortef given as ordered for premeds. 30 g Gamunex C given. Pt tolerated well.

## 2024-02-14 RX ORDER — DULOXETIN HYDROCHLORIDE 30 MG/1
30 CAPSULE, DELAYED RELEASE ORAL DAILY
Qty: 30 CAPSULE | Refills: 3 | Status: SHIPPED | OUTPATIENT
Start: 2024-02-14

## 2024-02-16 ENCOUNTER — HOSPITAL ENCOUNTER (OUTPATIENT)
Dept: INFUSION THERAPY | Age: 49
Setting detail: INFUSION SERIES
Discharge: HOME | End: 2024-02-16
Payer: MEDICARE

## 2024-02-16 VITALS
HEART RATE: 61 BPM | WEIGHT: 170 LBS | DIASTOLIC BLOOD PRESSURE: 88 MMHG | SYSTOLIC BLOOD PRESSURE: 123 MMHG | RESPIRATION RATE: 18 BRPM | TEMPERATURE: 97.6 F | OXYGEN SATURATION: 100 % | BODY MASS INDEX: 28.29 KG/M2

## 2024-02-16 DIAGNOSIS — G35 MULTIPLE SCLEROSIS (HCC): Primary | ICD-10-CM

## 2024-02-16 PROCEDURE — 2580000003 HC RX 258: Performed by: PSYCHIATRY & NEUROLOGY

## 2024-02-16 PROCEDURE — 96375 TX/PRO/DX INJ NEW DRUG ADDON: CPT

## 2024-02-16 PROCEDURE — 96366 THER/PROPH/DIAG IV INF ADDON: CPT

## 2024-02-16 PROCEDURE — 6370000000 HC RX 637 (ALT 250 FOR IP): Performed by: PSYCHIATRY & NEUROLOGY

## 2024-02-16 PROCEDURE — 96365 THER/PROPH/DIAG IV INF INIT: CPT

## 2024-02-16 PROCEDURE — 6360000002 HC RX W HCPCS: Performed by: PSYCHIATRY & NEUROLOGY

## 2024-02-16 RX ORDER — ONDANSETRON 2 MG/ML
8 INJECTION INTRAMUSCULAR; INTRAVENOUS
OUTPATIENT
Start: 2024-02-23

## 2024-02-16 RX ORDER — DIPHENHYDRAMINE HYDROCHLORIDE 50 MG/ML
50 INJECTION INTRAMUSCULAR; INTRAVENOUS ONCE
Status: COMPLETED | OUTPATIENT
Start: 2024-02-16 | End: 2024-02-16

## 2024-02-16 RX ORDER — SODIUM CHLORIDE 9 MG/ML
5-250 INJECTION, SOLUTION INTRAVENOUS PRN
Status: CANCELLED | OUTPATIENT
Start: 2024-02-23

## 2024-02-16 RX ORDER — SODIUM CHLORIDE 9 MG/ML
INJECTION, SOLUTION INTRAVENOUS CONTINUOUS
OUTPATIENT
Start: 2024-02-23

## 2024-02-16 RX ORDER — ACETAMINOPHEN 325 MG/1
650 TABLET ORAL ONCE
Status: COMPLETED | OUTPATIENT
Start: 2024-02-16 | End: 2024-02-16

## 2024-02-16 RX ORDER — MEPERIDINE HYDROCHLORIDE 25 MG/ML
12.5 INJECTION INTRAMUSCULAR; INTRAVENOUS; SUBCUTANEOUS PRN
OUTPATIENT
Start: 2024-02-23

## 2024-02-16 RX ORDER — SODIUM CHLORIDE 9 MG/ML
5-250 INJECTION, SOLUTION INTRAVENOUS PRN
OUTPATIENT
Start: 2024-02-23

## 2024-02-16 RX ORDER — ALBUTEROL SULFATE 90 UG/1
4 AEROSOL, METERED RESPIRATORY (INHALATION) PRN
OUTPATIENT
Start: 2024-02-23

## 2024-02-16 RX ORDER — SODIUM CHLORIDE 9 MG/ML
5-250 INJECTION, SOLUTION INTRAVENOUS PRN
Status: DISCONTINUED | OUTPATIENT
Start: 2024-02-16 | End: 2024-02-17 | Stop reason: HOSPADM

## 2024-02-16 RX ORDER — DIPHENHYDRAMINE HYDROCHLORIDE 50 MG/ML
50 INJECTION INTRAMUSCULAR; INTRAVENOUS ONCE
Status: CANCELLED
Start: 2024-02-23 | End: 2024-02-23

## 2024-02-16 RX ORDER — SODIUM CHLORIDE 0.9 % (FLUSH) 0.9 %
5-40 SYRINGE (ML) INJECTION PRN
Status: DISCONTINUED | OUTPATIENT
Start: 2024-02-16 | End: 2024-02-17 | Stop reason: HOSPADM

## 2024-02-16 RX ORDER — HEPARIN 100 UNIT/ML
500 SYRINGE INTRAVENOUS PRN
Status: CANCELLED | OUTPATIENT
Start: 2024-02-23

## 2024-02-16 RX ORDER — ACETAMINOPHEN 325 MG/1
650 TABLET ORAL
OUTPATIENT
Start: 2024-02-23

## 2024-02-16 RX ORDER — DIPHENHYDRAMINE HYDROCHLORIDE 50 MG/ML
50 INJECTION INTRAMUSCULAR; INTRAVENOUS
OUTPATIENT
Start: 2024-02-23

## 2024-02-16 RX ORDER — HEPARIN 100 UNIT/ML
500 SYRINGE INTRAVENOUS PRN
Status: DISCONTINUED | OUTPATIENT
Start: 2024-02-16 | End: 2024-02-17 | Stop reason: HOSPADM

## 2024-02-16 RX ORDER — EPINEPHRINE 1 MG/ML
0.3 INJECTION, SOLUTION, CONCENTRATE INTRAVENOUS PRN
OUTPATIENT
Start: 2024-02-23

## 2024-02-16 RX ORDER — ACETAMINOPHEN 325 MG/1
650 TABLET ORAL ONCE
Status: CANCELLED | OUTPATIENT
Start: 2024-02-23 | End: 2024-02-23

## 2024-02-16 RX ORDER — SODIUM CHLORIDE 0.9 % (FLUSH) 0.9 %
5-40 SYRINGE (ML) INJECTION PRN
Status: CANCELLED | OUTPATIENT
Start: 2024-02-23

## 2024-02-16 RX ADMIN — SODIUM CHLORIDE, PRESERVATIVE FREE 20 ML: 5 INJECTION INTRAVENOUS at 09:05

## 2024-02-16 RX ADMIN — SODIUM CHLORIDE 20 ML/HR: 9 INJECTION, SOLUTION INTRAVENOUS at 07:08

## 2024-02-16 RX ADMIN — Medication 500 UNITS: at 09:05

## 2024-02-16 RX ADMIN — SODIUM CHLORIDE, PRESERVATIVE FREE 10 ML: 5 INJECTION INTRAVENOUS at 07:12

## 2024-02-16 RX ADMIN — DIPHENHYDRAMINE HYDROCHLORIDE 50 MG: 50 INJECTION INTRAMUSCULAR; INTRAVENOUS at 07:12

## 2024-02-16 RX ADMIN — HYDROCORTISONE SODIUM SUCCINATE 100 MG: 100 INJECTION, POWDER, FOR SOLUTION INTRAMUSCULAR; INTRAVENOUS at 07:08

## 2024-02-16 RX ADMIN — IMMUNE GLOBULIN (HUMAN) 30 G: 10 INJECTION INTRAVENOUS; SUBCUTANEOUS at 07:31

## 2024-02-16 RX ADMIN — ACETAMINOPHEN 650 MG: 325 TABLET ORAL at 07:08

## 2024-02-16 NOTE — PROGRESS NOTES
Port accessed using sterile technique.  Pre meds given, followed by Gamunex C as ordered.  Tolerated well.

## 2024-02-23 ENCOUNTER — HOSPITAL ENCOUNTER (OUTPATIENT)
Dept: INFUSION THERAPY | Age: 49
Setting detail: INFUSION SERIES
Discharge: HOME | End: 2024-02-23
Payer: MEDICARE

## 2024-02-23 VITALS
HEIGHT: 65 IN | BODY MASS INDEX: 27.49 KG/M2 | WEIGHT: 165 LBS | RESPIRATION RATE: 18 BRPM | SYSTOLIC BLOOD PRESSURE: 129 MMHG | DIASTOLIC BLOOD PRESSURE: 90 MMHG | TEMPERATURE: 98 F | OXYGEN SATURATION: 100 % | HEART RATE: 66 BPM

## 2024-02-23 DIAGNOSIS — G35 MULTIPLE SCLEROSIS (HCC): Primary | ICD-10-CM

## 2024-02-23 PROCEDURE — 6370000000 HC RX 637 (ALT 250 FOR IP): Performed by: PSYCHIATRY & NEUROLOGY

## 2024-02-23 PROCEDURE — 2580000003 HC RX 258: Performed by: PSYCHIATRY & NEUROLOGY

## 2024-02-23 PROCEDURE — 96365 THER/PROPH/DIAG IV INF INIT: CPT

## 2024-02-23 PROCEDURE — 96366 THER/PROPH/DIAG IV INF ADDON: CPT

## 2024-02-23 PROCEDURE — 96375 TX/PRO/DX INJ NEW DRUG ADDON: CPT

## 2024-02-23 PROCEDURE — 6360000002 HC RX W HCPCS: Performed by: PSYCHIATRY & NEUROLOGY

## 2024-02-23 RX ORDER — ACETAMINOPHEN 325 MG/1
650 TABLET ORAL ONCE
Status: COMPLETED | OUTPATIENT
Start: 2024-02-23 | End: 2024-02-23

## 2024-02-23 RX ORDER — SODIUM CHLORIDE 9 MG/ML
5-250 INJECTION, SOLUTION INTRAVENOUS PRN
OUTPATIENT
Start: 2024-03-01

## 2024-02-23 RX ORDER — SODIUM CHLORIDE 9 MG/ML
5-250 INJECTION, SOLUTION INTRAVENOUS PRN
Status: DISCONTINUED | OUTPATIENT
Start: 2024-02-23 | End: 2024-02-24 | Stop reason: HOSPADM

## 2024-02-23 RX ORDER — HEPARIN 100 UNIT/ML
500 SYRINGE INTRAVENOUS PRN
Status: DISCONTINUED | OUTPATIENT
Start: 2024-02-23 | End: 2024-02-24 | Stop reason: HOSPADM

## 2024-02-23 RX ORDER — SODIUM CHLORIDE 0.9 % (FLUSH) 0.9 %
5-40 SYRINGE (ML) INJECTION PRN
Status: CANCELLED | OUTPATIENT
Start: 2024-03-01

## 2024-02-23 RX ORDER — EPINEPHRINE 1 MG/ML
0.3 INJECTION, SOLUTION, CONCENTRATE INTRAVENOUS PRN
OUTPATIENT
Start: 2024-03-01

## 2024-02-23 RX ORDER — MEPERIDINE HYDROCHLORIDE 25 MG/ML
12.5 INJECTION INTRAMUSCULAR; INTRAVENOUS; SUBCUTANEOUS PRN
OUTPATIENT
Start: 2024-03-01

## 2024-02-23 RX ORDER — ALBUTEROL SULFATE 90 UG/1
4 AEROSOL, METERED RESPIRATORY (INHALATION) PRN
OUTPATIENT
Start: 2024-03-01

## 2024-02-23 RX ORDER — DIPHENHYDRAMINE HYDROCHLORIDE 50 MG/ML
50 INJECTION INTRAMUSCULAR; INTRAVENOUS
OUTPATIENT
Start: 2024-03-01

## 2024-02-23 RX ORDER — SODIUM CHLORIDE 9 MG/ML
INJECTION, SOLUTION INTRAVENOUS CONTINUOUS
OUTPATIENT
Start: 2024-03-01

## 2024-02-23 RX ORDER — HEPARIN 100 UNIT/ML
500 SYRINGE INTRAVENOUS PRN
Status: CANCELLED | OUTPATIENT
Start: 2024-03-01

## 2024-02-23 RX ORDER — DIPHENHYDRAMINE HYDROCHLORIDE 50 MG/ML
50 INJECTION INTRAMUSCULAR; INTRAVENOUS ONCE
Status: COMPLETED | OUTPATIENT
Start: 2024-02-23 | End: 2024-02-23

## 2024-02-23 RX ORDER — SODIUM CHLORIDE 0.9 % (FLUSH) 0.9 %
5-40 SYRINGE (ML) INJECTION PRN
Status: DISCONTINUED | OUTPATIENT
Start: 2024-02-23 | End: 2024-02-24 | Stop reason: HOSPADM

## 2024-02-23 RX ORDER — ONDANSETRON 2 MG/ML
8 INJECTION INTRAMUSCULAR; INTRAVENOUS
OUTPATIENT
Start: 2024-03-01

## 2024-02-23 RX ORDER — ACETAMINOPHEN 325 MG/1
650 TABLET ORAL ONCE
Status: CANCELLED | OUTPATIENT
Start: 2024-03-01 | End: 2024-03-01

## 2024-02-23 RX ORDER — DIPHENHYDRAMINE HYDROCHLORIDE 50 MG/ML
50 INJECTION INTRAMUSCULAR; INTRAVENOUS ONCE
Status: CANCELLED
Start: 2024-03-01 | End: 2024-03-01

## 2024-02-23 RX ORDER — ACETAMINOPHEN 325 MG/1
650 TABLET ORAL
OUTPATIENT
Start: 2024-03-01

## 2024-02-23 RX ADMIN — IMMUNE GLOBULIN (HUMAN) 30 G: 10 INJECTION INTRAVENOUS; SUBCUTANEOUS at 07:32

## 2024-02-23 RX ADMIN — HEPARIN 500 UNITS: 100 SYRINGE at 09:15

## 2024-02-23 RX ADMIN — ACETAMINOPHEN 325MG 650 MG: 325 TABLET ORAL at 07:14

## 2024-02-23 RX ADMIN — SODIUM CHLORIDE, PRESERVATIVE FREE 20 ML: 5 INJECTION INTRAVENOUS at 09:15

## 2024-02-23 RX ADMIN — SODIUM CHLORIDE 20 ML/HR: 9 INJECTION, SOLUTION INTRAVENOUS at 07:15

## 2024-02-23 RX ADMIN — DIPHENHYDRAMINE HYDROCHLORIDE 50 MG: 50 INJECTION INTRAMUSCULAR; INTRAVENOUS at 07:16

## 2024-02-23 RX ADMIN — HYDROCORTISONE SODIUM SUCCINATE 100 MG: 100 INJECTION, POWDER, FOR SOLUTION INTRAMUSCULAR; INTRAVENOUS at 07:16

## 2024-03-01 ENCOUNTER — HOSPITAL ENCOUNTER (OUTPATIENT)
Dept: INFUSION THERAPY | Age: 49
Setting detail: INFUSION SERIES
Discharge: HOME | End: 2024-03-01
Payer: MEDICARE

## 2024-03-01 VITALS
OXYGEN SATURATION: 100 % | TEMPERATURE: 97.8 F | SYSTOLIC BLOOD PRESSURE: 122 MMHG | DIASTOLIC BLOOD PRESSURE: 74 MMHG | BODY MASS INDEX: 26.96 KG/M2 | WEIGHT: 162 LBS | RESPIRATION RATE: 18 BRPM | HEART RATE: 70 BPM

## 2024-03-01 DIAGNOSIS — G35 MULTIPLE SCLEROSIS (HCC): Primary | ICD-10-CM

## 2024-03-01 PROCEDURE — 2580000003 HC RX 258: Performed by: PSYCHIATRY & NEUROLOGY

## 2024-03-01 PROCEDURE — 96365 THER/PROPH/DIAG IV INF INIT: CPT

## 2024-03-01 PROCEDURE — 96375 TX/PRO/DX INJ NEW DRUG ADDON: CPT

## 2024-03-01 PROCEDURE — 6360000002 HC RX W HCPCS: Performed by: PSYCHIATRY & NEUROLOGY

## 2024-03-01 PROCEDURE — 6370000000 HC RX 637 (ALT 250 FOR IP): Performed by: PSYCHIATRY & NEUROLOGY

## 2024-03-01 PROCEDURE — 96366 THER/PROPH/DIAG IV INF ADDON: CPT

## 2024-03-01 RX ORDER — DIPHENHYDRAMINE HYDROCHLORIDE 50 MG/ML
50 INJECTION INTRAMUSCULAR; INTRAVENOUS
OUTPATIENT
Start: 2024-03-08

## 2024-03-01 RX ORDER — ONDANSETRON 2 MG/ML
8 INJECTION INTRAMUSCULAR; INTRAVENOUS
OUTPATIENT
Start: 2024-03-08

## 2024-03-01 RX ORDER — DIPHENHYDRAMINE HYDROCHLORIDE 50 MG/ML
50 INJECTION INTRAMUSCULAR; INTRAVENOUS ONCE
Status: CANCELLED
Start: 2024-03-08 | End: 2024-03-08

## 2024-03-01 RX ORDER — SODIUM CHLORIDE 9 MG/ML
5-250 INJECTION, SOLUTION INTRAVENOUS PRN
OUTPATIENT
Start: 2024-03-08

## 2024-03-01 RX ORDER — HEPARIN 100 UNIT/ML
500 SYRINGE INTRAVENOUS PRN
Status: CANCELLED | OUTPATIENT
Start: 2024-03-08

## 2024-03-01 RX ORDER — ACETAMINOPHEN 325 MG/1
650 TABLET ORAL
OUTPATIENT
Start: 2024-03-08

## 2024-03-01 RX ORDER — MEPERIDINE HYDROCHLORIDE 25 MG/ML
12.5 INJECTION INTRAMUSCULAR; INTRAVENOUS; SUBCUTANEOUS PRN
OUTPATIENT
Start: 2024-03-08

## 2024-03-01 RX ORDER — SODIUM CHLORIDE 0.9 % (FLUSH) 0.9 %
5-40 SYRINGE (ML) INJECTION PRN
Status: DISCONTINUED | OUTPATIENT
Start: 2024-03-01 | End: 2024-03-02 | Stop reason: HOSPADM

## 2024-03-01 RX ORDER — ACETAMINOPHEN 325 MG/1
650 TABLET ORAL ONCE
Status: CANCELLED | OUTPATIENT
Start: 2024-03-08 | End: 2024-03-08

## 2024-03-01 RX ORDER — EPINEPHRINE 1 MG/ML
0.3 INJECTION, SOLUTION, CONCENTRATE INTRAVENOUS PRN
OUTPATIENT
Start: 2024-03-08

## 2024-03-01 RX ORDER — SODIUM CHLORIDE 0.9 % (FLUSH) 0.9 %
5-40 SYRINGE (ML) INJECTION PRN
Status: CANCELLED | OUTPATIENT
Start: 2024-03-08

## 2024-03-01 RX ORDER — ACETAMINOPHEN 325 MG/1
650 TABLET ORAL ONCE
Status: COMPLETED | OUTPATIENT
Start: 2024-03-01 | End: 2024-03-01

## 2024-03-01 RX ORDER — DIPHENHYDRAMINE HYDROCHLORIDE 50 MG/ML
50 INJECTION INTRAMUSCULAR; INTRAVENOUS ONCE
Status: COMPLETED | OUTPATIENT
Start: 2024-03-01 | End: 2024-03-01

## 2024-03-01 RX ORDER — HEPARIN 100 UNIT/ML
500 SYRINGE INTRAVENOUS PRN
Status: DISCONTINUED | OUTPATIENT
Start: 2024-03-01 | End: 2024-03-02 | Stop reason: HOSPADM

## 2024-03-01 RX ORDER — ALBUTEROL SULFATE 90 UG/1
4 AEROSOL, METERED RESPIRATORY (INHALATION) PRN
OUTPATIENT
Start: 2024-03-08

## 2024-03-01 RX ORDER — SODIUM CHLORIDE 9 MG/ML
INJECTION, SOLUTION INTRAVENOUS CONTINUOUS
OUTPATIENT
Start: 2024-03-08

## 2024-03-01 RX ADMIN — DIPHENHYDRAMINE HYDROCHLORIDE 50 MG: 50 INJECTION INTRAMUSCULAR; INTRAVENOUS at 06:53

## 2024-03-01 RX ADMIN — SODIUM CHLORIDE, PRESERVATIVE FREE 20 ML: 5 INJECTION INTRAVENOUS at 08:33

## 2024-03-01 RX ADMIN — HYDROCORTISONE SODIUM SUCCINATE 100 MG: 100 INJECTION, POWDER, FOR SOLUTION INTRAMUSCULAR; INTRAVENOUS at 06:53

## 2024-03-01 RX ADMIN — ACETAMINOPHEN 325MG 650 MG: 325 TABLET ORAL at 06:53

## 2024-03-01 RX ADMIN — HEPARIN 500 UNITS: 100 SYRINGE at 08:33

## 2024-03-01 RX ADMIN — IMMUNE GLOBULIN (HUMAN) 30 G: 10 INJECTION INTRAVENOUS; SUBCUTANEOUS at 06:58

## 2024-03-01 NOTE — PROGRESS NOTES
Port accessed using sterile technique and brisk blood return noted. Pt premedicated as ordered. IVIG 30g administered. Pt tolerated well.     Electronically signed by Ciera George RN on 3/1/2024 at 8:38 AM

## 2024-03-08 ENCOUNTER — HOSPITAL ENCOUNTER (OUTPATIENT)
Dept: INFUSION THERAPY | Age: 49
Setting detail: INFUSION SERIES
Discharge: HOME | End: 2024-03-08
Payer: MEDICARE

## 2024-03-08 VITALS
BODY MASS INDEX: 26.82 KG/M2 | TEMPERATURE: 98.6 F | HEIGHT: 65 IN | WEIGHT: 161 LBS | RESPIRATION RATE: 18 BRPM | DIASTOLIC BLOOD PRESSURE: 93 MMHG | SYSTOLIC BLOOD PRESSURE: 135 MMHG | OXYGEN SATURATION: 100 % | HEART RATE: 67 BPM

## 2024-03-08 DIAGNOSIS — G35 MULTIPLE SCLEROSIS (HCC): Primary | ICD-10-CM

## 2024-03-08 PROCEDURE — 96365 THER/PROPH/DIAG IV INF INIT: CPT

## 2024-03-08 PROCEDURE — 6370000000 HC RX 637 (ALT 250 FOR IP): Performed by: PSYCHIATRY & NEUROLOGY

## 2024-03-08 PROCEDURE — 96366 THER/PROPH/DIAG IV INF ADDON: CPT

## 2024-03-08 PROCEDURE — 2580000003 HC RX 258: Performed by: PSYCHIATRY & NEUROLOGY

## 2024-03-08 PROCEDURE — 6360000002 HC RX W HCPCS: Performed by: PSYCHIATRY & NEUROLOGY

## 2024-03-08 PROCEDURE — 96375 TX/PRO/DX INJ NEW DRUG ADDON: CPT

## 2024-03-08 RX ORDER — EPINEPHRINE 1 MG/ML
0.3 INJECTION, SOLUTION, CONCENTRATE INTRAVENOUS PRN
OUTPATIENT
Start: 2024-03-15

## 2024-03-08 RX ORDER — ONDANSETRON 2 MG/ML
8 INJECTION INTRAMUSCULAR; INTRAVENOUS
OUTPATIENT
Start: 2024-03-15

## 2024-03-08 RX ORDER — HEPARIN 100 UNIT/ML
500 SYRINGE INTRAVENOUS PRN
Status: DISCONTINUED | OUTPATIENT
Start: 2024-03-08 | End: 2024-03-09 | Stop reason: HOSPADM

## 2024-03-08 RX ORDER — DIPHENHYDRAMINE HYDROCHLORIDE 50 MG/ML
50 INJECTION INTRAMUSCULAR; INTRAVENOUS ONCE
Status: COMPLETED | OUTPATIENT
Start: 2024-03-08 | End: 2024-03-08

## 2024-03-08 RX ORDER — MEPERIDINE HYDROCHLORIDE 25 MG/ML
12.5 INJECTION INTRAMUSCULAR; INTRAVENOUS; SUBCUTANEOUS PRN
OUTPATIENT
Start: 2024-03-15

## 2024-03-08 RX ORDER — SODIUM CHLORIDE 0.9 % (FLUSH) 0.9 %
5-40 SYRINGE (ML) INJECTION PRN
OUTPATIENT
Start: 2024-03-15

## 2024-03-08 RX ORDER — SODIUM CHLORIDE 9 MG/ML
INJECTION, SOLUTION INTRAVENOUS CONTINUOUS
OUTPATIENT
Start: 2024-03-15

## 2024-03-08 RX ORDER — SODIUM CHLORIDE 9 MG/ML
5-250 INJECTION, SOLUTION INTRAVENOUS PRN
OUTPATIENT
Start: 2024-03-15

## 2024-03-08 RX ORDER — ACETAMINOPHEN 325 MG/1
650 TABLET ORAL ONCE
Status: COMPLETED | OUTPATIENT
Start: 2024-03-08 | End: 2024-03-08

## 2024-03-08 RX ORDER — ALBUTEROL SULFATE 90 UG/1
4 AEROSOL, METERED RESPIRATORY (INHALATION) PRN
OUTPATIENT
Start: 2024-03-15

## 2024-03-08 RX ORDER — HEPARIN 100 UNIT/ML
500 SYRINGE INTRAVENOUS PRN
OUTPATIENT
Start: 2024-03-15

## 2024-03-08 RX ORDER — DIPHENHYDRAMINE HYDROCHLORIDE 50 MG/ML
50 INJECTION INTRAMUSCULAR; INTRAVENOUS
OUTPATIENT
Start: 2024-03-15

## 2024-03-08 RX ORDER — SODIUM CHLORIDE 0.9 % (FLUSH) 0.9 %
5-40 SYRINGE (ML) INJECTION PRN
Status: DISCONTINUED | OUTPATIENT
Start: 2024-03-08 | End: 2024-03-09 | Stop reason: HOSPADM

## 2024-03-08 RX ORDER — ACETAMINOPHEN 325 MG/1
650 TABLET ORAL ONCE
OUTPATIENT
Start: 2024-03-15 | End: 2024-03-15

## 2024-03-08 RX ORDER — ACETAMINOPHEN 325 MG/1
650 TABLET ORAL
OUTPATIENT
Start: 2024-03-15

## 2024-03-08 RX ORDER — DIPHENHYDRAMINE HYDROCHLORIDE 50 MG/ML
50 INJECTION INTRAMUSCULAR; INTRAVENOUS ONCE
Start: 2024-03-15 | End: 2024-03-15

## 2024-03-08 RX ADMIN — SODIUM CHLORIDE, PRESERVATIVE FREE 10 ML: 5 INJECTION INTRAVENOUS at 07:21

## 2024-03-08 RX ADMIN — IMMUNE GLOBULIN (HUMAN) 30 G: 10 INJECTION INTRAVENOUS; SUBCUTANEOUS at 07:27

## 2024-03-08 RX ADMIN — ACETAMINOPHEN 325MG 650 MG: 325 TABLET ORAL at 07:07

## 2024-03-08 RX ADMIN — DIPHENHYDRAMINE HYDROCHLORIDE 50 MG: 50 INJECTION INTRAMUSCULAR; INTRAVENOUS at 07:11

## 2024-03-08 RX ADMIN — HYDROCORTISONE SODIUM SUCCINATE 100 MG: 100 INJECTION, POWDER, FOR SOLUTION INTRAMUSCULAR; INTRAVENOUS at 07:08

## 2024-03-08 RX ADMIN — SODIUM CHLORIDE, PRESERVATIVE FREE 10 ML: 5 INJECTION INTRAVENOUS at 07:10

## 2024-03-08 RX ADMIN — SODIUM CHLORIDE, PRESERVATIVE FREE 20 ML: 5 INJECTION INTRAVENOUS at 09:11

## 2024-03-08 RX ADMIN — HEPARIN 500 UNITS: 100 SYRINGE at 09:11

## 2024-03-08 NOTE — DISCHARGE INSTRUCTIONS
immune globulin (intravenous) (IGIV)  Pronunciation:  dinora foley  Brand:  Bivigam, Carimune, Flebogamma, Gammagard S/D, Gammaplex, Octagam, Panzyga, Privigen  What is the most important information I should know about immune globulin intravenous?  This medicine can cause blood clots.  The risk is highest in older adults or in people who have had blood clots, heart problems, or blood circulation problems. Blood clots are also more likely during long-term bedrest, while using birth control pills or hormone replacement therapy, or while having a central intravenous (IV) catheter in place.  Call your doctor at once if you have chest pain, trouble breathing, fast heartbeats, numbness or weakness, or swelling and warmth or discoloration in an arm or leg.  This medicine can also harm your kidneys, especially if you have kidney disease or you also use certain medicines. Tell your doctor right away if you have signs of kidney problems, such as swelling, rapid weight gain, and little or no urination.  What is immune globulin intravenous (IGIV)?  Immune globulin intravenous (IGIV, for injection into a vein) is used to treat primary immunodeficiency.  IGIV is also used to increase platelets (blood clotting cells) in people with immune thrombocytopenic purpura.  IGIV is also used in to help prevent certain infections in people with B-cell chronic lymphocytic leukemia.  IGIV is also used in people with Kawasaki syndrome, to prevent aneurysm caused by a weakening of the main artery in the heart.  IGIV may also be used for purposes not listed in this medication guide.  What should I discuss with my healthcare provider before using IGIV?  You may not be able to use this medicine if:  you have had an allergic reaction to an immune globulin or blood product;  you have immune globulin A (IgA) deficiency with antibody to IgA; or  you are allergic to corn.  IGIV can cause blood clots or kidney problems, especially in  pharmacist.  Copyright 6781-5149 [employer] Version: 6.02. Revision date: 2/26/2021.  Care instructions adapted under license by [employer]. If you have questions about a medical condition or this instruction, always ask your healthcare professional. [employer] disclaims any warranty or liability for your use of this information.

## 2024-03-08 NOTE — PROGRESS NOTES
Tylenol 650mg po, Benadryl 50 mg IV , and Solu-cortef 100 mg IV as premeds. Gamunex-C 30 g infusion completed as ordered. At completion of infusion. Pt's b/p was 157/103. Pt took home med of clonidine 0.1 mg at that time. Recheck of blood pressure was 135/93. Pt was discharged home and will monitor b/p at home.

## 2024-03-15 ENCOUNTER — HOSPITAL ENCOUNTER (OUTPATIENT)
Dept: INFUSION THERAPY | Age: 49
Setting detail: INFUSION SERIES
Discharge: HOME | End: 2024-03-15
Payer: MEDICARE

## 2024-03-15 VITALS
TEMPERATURE: 98 F | SYSTOLIC BLOOD PRESSURE: 111 MMHG | OXYGEN SATURATION: 100 % | HEIGHT: 65 IN | WEIGHT: 159 LBS | RESPIRATION RATE: 18 BRPM | DIASTOLIC BLOOD PRESSURE: 75 MMHG | BODY MASS INDEX: 26.49 KG/M2 | HEART RATE: 63 BPM

## 2024-03-15 DIAGNOSIS — G35 MULTIPLE SCLEROSIS (HCC): Primary | ICD-10-CM

## 2024-03-15 PROCEDURE — 96365 THER/PROPH/DIAG IV INF INIT: CPT

## 2024-03-15 PROCEDURE — 2580000003 HC RX 258: Performed by: PSYCHIATRY & NEUROLOGY

## 2024-03-15 PROCEDURE — 96375 TX/PRO/DX INJ NEW DRUG ADDON: CPT

## 2024-03-15 PROCEDURE — 96366 THER/PROPH/DIAG IV INF ADDON: CPT

## 2024-03-15 PROCEDURE — 6370000000 HC RX 637 (ALT 250 FOR IP): Performed by: PSYCHIATRY & NEUROLOGY

## 2024-03-15 PROCEDURE — 6360000002 HC RX W HCPCS: Performed by: PSYCHIATRY & NEUROLOGY

## 2024-03-15 RX ORDER — DIPHENHYDRAMINE HYDROCHLORIDE 50 MG/ML
50 INJECTION INTRAMUSCULAR; INTRAVENOUS ONCE
Status: CANCELLED
Start: 2024-03-22 | End: 2024-03-22

## 2024-03-15 RX ORDER — SODIUM CHLORIDE 9 MG/ML
5-250 INJECTION, SOLUTION INTRAVENOUS PRN
Status: CANCELLED | OUTPATIENT
Start: 2024-03-22

## 2024-03-15 RX ORDER — HEPARIN 100 UNIT/ML
500 SYRINGE INTRAVENOUS PRN
Status: CANCELLED | OUTPATIENT
Start: 2024-03-22

## 2024-03-15 RX ORDER — SODIUM CHLORIDE 9 MG/ML
5-250 INJECTION, SOLUTION INTRAVENOUS PRN
OUTPATIENT
Start: 2024-03-22

## 2024-03-15 RX ORDER — SODIUM CHLORIDE 0.9 % (FLUSH) 0.9 %
5-40 SYRINGE (ML) INJECTION PRN
Status: DISCONTINUED | OUTPATIENT
Start: 2024-03-15 | End: 2024-03-16 | Stop reason: HOSPADM

## 2024-03-15 RX ORDER — EPINEPHRINE 1 MG/ML
0.3 INJECTION, SOLUTION, CONCENTRATE INTRAVENOUS PRN
OUTPATIENT
Start: 2024-03-22

## 2024-03-15 RX ORDER — DIPHENHYDRAMINE HYDROCHLORIDE 50 MG/ML
50 INJECTION INTRAMUSCULAR; INTRAVENOUS
OUTPATIENT
Start: 2024-03-22

## 2024-03-15 RX ORDER — MEPERIDINE HYDROCHLORIDE 25 MG/ML
12.5 INJECTION INTRAMUSCULAR; INTRAVENOUS; SUBCUTANEOUS PRN
OUTPATIENT
Start: 2024-03-22

## 2024-03-15 RX ORDER — HEPARIN 100 UNIT/ML
500 SYRINGE INTRAVENOUS PRN
Status: DISCONTINUED | OUTPATIENT
Start: 2024-03-15 | End: 2024-03-16 | Stop reason: HOSPADM

## 2024-03-15 RX ORDER — ACETAMINOPHEN 325 MG/1
650 TABLET ORAL ONCE
Status: CANCELLED | OUTPATIENT
Start: 2024-03-22 | End: 2024-03-22

## 2024-03-15 RX ORDER — ONDANSETRON 2 MG/ML
8 INJECTION INTRAMUSCULAR; INTRAVENOUS
OUTPATIENT
Start: 2024-03-22

## 2024-03-15 RX ORDER — SODIUM CHLORIDE 0.9 % (FLUSH) 0.9 %
5-40 SYRINGE (ML) INJECTION PRN
Status: CANCELLED | OUTPATIENT
Start: 2024-03-22

## 2024-03-15 RX ORDER — ACETAMINOPHEN 325 MG/1
650 TABLET ORAL
OUTPATIENT
Start: 2024-03-22

## 2024-03-15 RX ORDER — SODIUM CHLORIDE 9 MG/ML
5-250 INJECTION, SOLUTION INTRAVENOUS PRN
Status: DISCONTINUED | OUTPATIENT
Start: 2024-03-15 | End: 2024-03-16 | Stop reason: HOSPADM

## 2024-03-15 RX ORDER — SODIUM CHLORIDE 9 MG/ML
INJECTION, SOLUTION INTRAVENOUS CONTINUOUS
OUTPATIENT
Start: 2024-03-22

## 2024-03-15 RX ORDER — DIPHENHYDRAMINE HYDROCHLORIDE 50 MG/ML
50 INJECTION INTRAMUSCULAR; INTRAVENOUS ONCE
Status: COMPLETED | OUTPATIENT
Start: 2024-03-15 | End: 2024-03-15

## 2024-03-15 RX ORDER — ALBUTEROL SULFATE 90 UG/1
4 AEROSOL, METERED RESPIRATORY (INHALATION) PRN
OUTPATIENT
Start: 2024-03-22

## 2024-03-15 RX ORDER — ACETAMINOPHEN 325 MG/1
650 TABLET ORAL ONCE
Status: COMPLETED | OUTPATIENT
Start: 2024-03-15 | End: 2024-03-15

## 2024-03-15 RX ADMIN — IMMUNE GLOBULIN (HUMAN) 30 G: 10 INJECTION INTRAVENOUS; SUBCUTANEOUS at 07:36

## 2024-03-15 RX ADMIN — DIPHENHYDRAMINE HYDROCHLORIDE 50 MG: 50 INJECTION INTRAMUSCULAR; INTRAVENOUS at 07:08

## 2024-03-15 RX ADMIN — HYDROCORTISONE SODIUM SUCCINATE 100 MG: 100 INJECTION, POWDER, FOR SOLUTION INTRAMUSCULAR; INTRAVENOUS at 07:08

## 2024-03-15 RX ADMIN — ACETAMINOPHEN 325MG 650 MG: 325 TABLET ORAL at 07:07

## 2024-03-15 RX ADMIN — SODIUM CHLORIDE, PRESERVATIVE FREE 20 ML: 5 INJECTION INTRAVENOUS at 09:15

## 2024-03-15 RX ADMIN — HEPARIN 500 UNITS: 100 SYRINGE at 09:15

## 2024-03-15 RX ADMIN — SODIUM CHLORIDE 20 ML/HR: 9 INJECTION, SOLUTION INTRAVENOUS at 07:07

## 2024-03-15 NOTE — FLOWSHEET NOTE
03/15/24 0929   AVS Reviewed   AVS & discharge instructions reviewed with patient and/or representative? Yes   Reviewed instructions with Patient   Level of Understanding Questions answered;Verbalized understanding

## 2024-03-22 ENCOUNTER — HOSPITAL ENCOUNTER (EMERGENCY)
Age: 49
Discharge: HOME | End: 2024-03-22
Payer: MEDICARE

## 2024-03-22 ENCOUNTER — HOSPITAL ENCOUNTER (OUTPATIENT)
Dept: INFUSION THERAPY | Age: 49
Setting detail: INFUSION SERIES
Discharge: HOME | End: 2024-03-22
Payer: MEDICARE

## 2024-03-22 ENCOUNTER — APPOINTMENT (OUTPATIENT)
Dept: CT IMAGING | Age: 49
End: 2024-03-22
Payer: MEDICARE

## 2024-03-22 ENCOUNTER — APPOINTMENT (OUTPATIENT)
Dept: GENERAL RADIOLOGY | Age: 49
End: 2024-03-22
Payer: MEDICARE

## 2024-03-22 VITALS
BODY MASS INDEX: 25.33 KG/M2 | WEIGHT: 152 LBS | DIASTOLIC BLOOD PRESSURE: 80 MMHG | TEMPERATURE: 98.2 F | RESPIRATION RATE: 18 BRPM | SYSTOLIC BLOOD PRESSURE: 123 MMHG | HEIGHT: 65 IN | HEART RATE: 71 BPM | OXYGEN SATURATION: 100 %

## 2024-03-22 VITALS
HEART RATE: 69 BPM | BODY MASS INDEX: 25.33 KG/M2 | TEMPERATURE: 97.8 F | RESPIRATION RATE: 16 BRPM | SYSTOLIC BLOOD PRESSURE: 132 MMHG | WEIGHT: 152 LBS | DIASTOLIC BLOOD PRESSURE: 77 MMHG | HEIGHT: 65 IN | OXYGEN SATURATION: 98 %

## 2024-03-22 DIAGNOSIS — R42 LIGHTHEADEDNESS: Primary | ICD-10-CM

## 2024-03-22 DIAGNOSIS — B96.89 BACTERIAL VAGINOSIS: ICD-10-CM

## 2024-03-22 DIAGNOSIS — N76.0 BACTERIAL VAGINOSIS: ICD-10-CM

## 2024-03-22 DIAGNOSIS — N30.00 ACUTE CYSTITIS WITHOUT HEMATURIA: ICD-10-CM

## 2024-03-22 DIAGNOSIS — G35 MULTIPLE SCLEROSIS (HCC): Primary | ICD-10-CM

## 2024-03-22 LAB
ALBUMIN SERPL-MCNC: 3.3 G/DL (ref 3.5–5.2)
ALP SERPL-CCNC: 64 U/L (ref 35–104)
ALT SERPL-CCNC: 17 U/L (ref 5–33)
AMPHET UR QL SCN: NEGATIVE
ANION GAP SERPL CALCULATED.3IONS-SCNC: 8 MMOL/L (ref 7–19)
AST SERPL-CCNC: 23 U/L (ref 5–32)
BACTERIA SPEC QL WET PREP: ABNORMAL
BARBITURATES UR QL SCN: POSITIVE
BASOPHILS # BLD: 0 K/UL (ref 0–0.2)
BASOPHILS NFR BLD: 0.4 % (ref 0–1)
BENZODIAZ UR QL SCN: POSITIVE
BILIRUB SERPL-MCNC: <0.2 MG/DL (ref 0.2–1.2)
BILIRUB UR QL STRIP: NEGATIVE
BNP BLD-MCNC: <36 PG/ML (ref 0–124)
BUN SERPL-MCNC: 9 MG/DL (ref 6–20)
BUPRENORPHINE URINE: NEGATIVE
CALCIUM SERPL-MCNC: 7.6 MG/DL (ref 8.6–10)
CANNABINOIDS UR QL SCN: POSITIVE
CHLORIDE SERPL-SCNC: 98 MMOL/L (ref 98–111)
CLARITY UR: CLEAR
CLUE CELLS VAG QL WET PREP: ABNORMAL
CO2 SERPL-SCNC: 22 MMOL/L (ref 22–29)
COCAINE UR QL SCN: NEGATIVE
COLOR UR: YELLOW
CREAT SERPL-MCNC: 0.5 MG/DL (ref 0.5–0.9)
D DIMER PPP FEU-MCNC: 0.33 UG/ML FEU (ref 0–0.48)
DRUG SCREEN COMMENT UR-IMP: ABNORMAL
EOSINOPHIL # BLD: 0 K/UL (ref 0–0.6)
EOSINOPHIL NFR BLD: 0 % (ref 0–5)
EPI CELLS VAG QL WET PREP: ABNORMAL
ERYTHROCYTE [DISTWIDTH] IN BLOOD BY AUTOMATED COUNT: 13.2 % (ref 11.5–14.5)
ETHANOLAMINE SERPL-MCNC: <10 MG/DL (ref 0–0.08)
FENTANYL SCREEN, URINE: NEGATIVE
GLUCOSE SERPL-MCNC: 84 MG/DL (ref 74–109)
GLUCOSE UR STRIP.AUTO-MCNC: NEGATIVE MG/DL
HCT VFR BLD AUTO: 37 % (ref 37–47)
HGB BLD-MCNC: 12.5 G/DL (ref 12–16)
HGB UR STRIP.AUTO-MCNC: NEGATIVE MG/L
IMM GRANULOCYTES # BLD: 0 K/UL
KETONES UR STRIP.AUTO-MCNC: NEGATIVE MG/DL
LEUKOCYTE ESTERASE UR QL STRIP.AUTO: NEGATIVE
LYMPHOCYTES # BLD: 0.5 K/UL (ref 1.1–4.5)
LYMPHOCYTES NFR BLD: 10.1 % (ref 20–40)
MCH RBC QN AUTO: 30.9 PG (ref 27–31)
MCHC RBC AUTO-ENTMCNC: 33.8 G/DL (ref 33–37)
MCV RBC AUTO: 91.4 FL (ref 81–99)
METHADONE UR QL SCN: NEGATIVE
METHAMPHETAMINE, URINE: NEGATIVE
MONOCYTES # BLD: 0.2 K/UL (ref 0–0.9)
MONOCYTES NFR BLD: 4.7 % (ref 0–10)
NEUTROPHILS # BLD: 4.4 K/UL (ref 1.5–7.5)
NEUTS SEG NFR BLD: 84.4 % (ref 50–65)
NITRITE UR QL STRIP.AUTO: NEGATIVE
OPIATES UR QL SCN: NEGATIVE
OXYCODONE UR QL SCN: NEGATIVE
PCP UR QL SCN: NEGATIVE
PH UR STRIP.AUTO: 7.5 [PH] (ref 5–8)
PLATELET # BLD AUTO: 227 K/UL (ref 130–400)
PMV BLD AUTO: 9 FL (ref 9.4–12.3)
POTASSIUM SERPL-SCNC: 3.6 MMOL/L (ref 3.5–5)
PROT SERPL-MCNC: 7.6 G/DL (ref 6.6–8.7)
PROT UR STRIP.AUTO-MCNC: NEGATIVE MG/DL
RBC # BLD AUTO: 4.05 M/UL (ref 4.2–5.4)
RBC VAG QL: ABNORMAL
SODIUM SERPL-SCNC: 128 MMOL/L (ref 136–145)
SP GR UR STRIP.AUTO: 1.01 (ref 1–1.03)
SPECIMEN SOURCE FLD: ABNORMAL
T VAGINALIS VAG QL WET PREP: ABNORMAL
TRICYCLIC, URINE: NEGATIVE
TROPONIN, HIGH SENSITIVITY: <6 NG/L (ref 0–14)
UROBILINOGEN UR STRIP.AUTO-MCNC: 0.2 E.U./DL
VALPROATE SERPL-MCNC: <2.8 UG/ML (ref 50–100)
WBC # BLD AUTO: 5.2 K/UL (ref 4.8–10.8)
WBC VAG QL WET PREP: ABNORMAL
YEAST VAG QL WET PREP: ABNORMAL

## 2024-03-22 PROCEDURE — 2580000003 HC RX 258: Performed by: PHYSICIAN ASSISTANT

## 2024-03-22 PROCEDURE — 82077 ASSAY SPEC XCP UR&BREATH IA: CPT

## 2024-03-22 PROCEDURE — 85379 FIBRIN DEGRADATION QUANT: CPT

## 2024-03-22 PROCEDURE — 81003 URINALYSIS AUTO W/O SCOPE: CPT

## 2024-03-22 PROCEDURE — 96375 TX/PRO/DX INJ NEW DRUG ADDON: CPT

## 2024-03-22 PROCEDURE — 6360000004 HC RX CONTRAST MEDICATION: Performed by: PHYSICIAN ASSISTANT

## 2024-03-22 PROCEDURE — 96365 THER/PROPH/DIAG IV INF INIT: CPT

## 2024-03-22 PROCEDURE — 80053 COMPREHEN METABOLIC PANEL: CPT

## 2024-03-22 PROCEDURE — 6360000002 HC RX W HCPCS: Performed by: PSYCHIATRY & NEUROLOGY

## 2024-03-22 PROCEDURE — G0480 DRUG TEST DEF 1-7 CLASSES: HCPCS

## 2024-03-22 PROCEDURE — 80307 DRUG TEST PRSMV CHEM ANLYZR: CPT

## 2024-03-22 PROCEDURE — 93005 ELECTROCARDIOGRAM TRACING: CPT | Performed by: PHYSICIAN ASSISTANT

## 2024-03-22 PROCEDURE — 74177 CT ABD & PELVIS W/CONTRAST: CPT

## 2024-03-22 PROCEDURE — 36415 COLL VENOUS BLD VENIPUNCTURE: CPT

## 2024-03-22 PROCEDURE — 96366 THER/PROPH/DIAG IV INF ADDON: CPT

## 2024-03-22 PROCEDURE — 85025 COMPLETE CBC W/AUTO DIFF WBC: CPT

## 2024-03-22 PROCEDURE — 6360000002 HC RX W HCPCS: Performed by: PHYSICIAN ASSISTANT

## 2024-03-22 PROCEDURE — 99285 EMERGENCY DEPT VISIT HI MDM: CPT

## 2024-03-22 PROCEDURE — 2580000003 HC RX 258: Performed by: PSYCHIATRY & NEUROLOGY

## 2024-03-22 PROCEDURE — 80164 ASSAY DIPROPYLACETIC ACD TOT: CPT

## 2024-03-22 PROCEDURE — 6370000000 HC RX 637 (ALT 250 FOR IP): Performed by: PSYCHIATRY & NEUROLOGY

## 2024-03-22 PROCEDURE — 83880 ASSAY OF NATRIURETIC PEPTIDE: CPT

## 2024-03-22 PROCEDURE — 71045 X-RAY EXAM CHEST 1 VIEW: CPT

## 2024-03-22 PROCEDURE — 84484 ASSAY OF TROPONIN QUANT: CPT

## 2024-03-22 RX ORDER — SODIUM CHLORIDE 0.9 % (FLUSH) 0.9 %
5-40 SYRINGE (ML) INJECTION PRN
Status: CANCELLED | OUTPATIENT
Start: 2024-03-29

## 2024-03-22 RX ORDER — SODIUM CHLORIDE 9 MG/ML
INJECTION, SOLUTION INTRAVENOUS CONTINUOUS
OUTPATIENT
Start: 2024-03-29

## 2024-03-22 RX ORDER — EPINEPHRINE 1 MG/ML
0.3 INJECTION, SOLUTION, CONCENTRATE INTRAVENOUS PRN
OUTPATIENT
Start: 2024-03-29

## 2024-03-22 RX ORDER — DIPHENHYDRAMINE HYDROCHLORIDE 50 MG/ML
50 INJECTION INTRAMUSCULAR; INTRAVENOUS ONCE
Status: CANCELLED
Start: 2024-03-29 | End: 2024-03-29

## 2024-03-22 RX ORDER — ONDANSETRON 2 MG/ML
8 INJECTION INTRAMUSCULAR; INTRAVENOUS
OUTPATIENT
Start: 2024-03-29

## 2024-03-22 RX ORDER — DIPHENHYDRAMINE HYDROCHLORIDE 50 MG/ML
50 INJECTION INTRAMUSCULAR; INTRAVENOUS
OUTPATIENT
Start: 2024-03-29

## 2024-03-22 RX ORDER — MEPERIDINE HYDROCHLORIDE 25 MG/ML
12.5 INJECTION INTRAMUSCULAR; INTRAVENOUS; SUBCUTANEOUS PRN
OUTPATIENT
Start: 2024-03-29

## 2024-03-22 RX ORDER — HEPARIN 100 UNIT/ML
500 SYRINGE INTRAVENOUS PRN
Status: CANCELLED | OUTPATIENT
Start: 2024-03-29

## 2024-03-22 RX ORDER — ACETAMINOPHEN 325 MG/1
650 TABLET ORAL
OUTPATIENT
Start: 2024-03-29

## 2024-03-22 RX ORDER — METRONIDAZOLE 500 MG/100ML
500 INJECTION, SOLUTION INTRAVENOUS ONCE
Status: COMPLETED | OUTPATIENT
Start: 2024-03-22 | End: 2024-03-22

## 2024-03-22 RX ORDER — HEPARIN 100 UNIT/ML
500 SYRINGE INTRAVENOUS PRN
Status: DISCONTINUED | OUTPATIENT
Start: 2024-03-22 | End: 2024-03-23 | Stop reason: HOSPADM

## 2024-03-22 RX ORDER — SODIUM CHLORIDE 9 MG/ML
5-250 INJECTION, SOLUTION INTRAVENOUS PRN
Status: DISCONTINUED | OUTPATIENT
Start: 2024-03-22 | End: 2024-03-23 | Stop reason: HOSPADM

## 2024-03-22 RX ORDER — SODIUM CHLORIDE 9 MG/ML
5-250 INJECTION, SOLUTION INTRAVENOUS PRN
OUTPATIENT
Start: 2024-03-29

## 2024-03-22 RX ORDER — HEPARIN 100 UNIT/ML
300 SYRINGE INTRAVENOUS ONCE
Status: COMPLETED | OUTPATIENT
Start: 2024-03-22 | End: 2024-03-22

## 2024-03-22 RX ORDER — SODIUM CHLORIDE 0.9 % (FLUSH) 0.9 %
5-40 SYRINGE (ML) INJECTION PRN
Status: DISCONTINUED | OUTPATIENT
Start: 2024-03-22 | End: 2024-03-23 | Stop reason: HOSPADM

## 2024-03-22 RX ORDER — 0.9 % SODIUM CHLORIDE 0.9 %
1000 INTRAVENOUS SOLUTION INTRAVENOUS ONCE
Status: COMPLETED | OUTPATIENT
Start: 2024-03-22 | End: 2024-03-22

## 2024-03-22 RX ORDER — METRONIDAZOLE 500 MG/1
500 TABLET ORAL 3 TIMES DAILY
Qty: 30 TABLET | Refills: 0 | Status: SHIPPED | OUTPATIENT
Start: 2024-03-22 | End: 2024-04-01

## 2024-03-22 RX ORDER — CEFDINIR 300 MG/1
300 CAPSULE ORAL 2 TIMES DAILY
Qty: 14 CAPSULE | Refills: 0 | Status: SHIPPED | OUTPATIENT
Start: 2024-03-22 | End: 2024-03-29

## 2024-03-22 RX ORDER — DIPHENHYDRAMINE HYDROCHLORIDE 50 MG/ML
50 INJECTION INTRAMUSCULAR; INTRAVENOUS ONCE
Status: COMPLETED | OUTPATIENT
Start: 2024-03-22 | End: 2024-03-22

## 2024-03-22 RX ORDER — ACETAMINOPHEN 325 MG/1
650 TABLET ORAL ONCE
Status: CANCELLED | OUTPATIENT
Start: 2024-03-29 | End: 2024-03-29

## 2024-03-22 RX ORDER — ALBUTEROL SULFATE 90 UG/1
4 AEROSOL, METERED RESPIRATORY (INHALATION) PRN
OUTPATIENT
Start: 2024-03-29

## 2024-03-22 RX ORDER — ACETAMINOPHEN 325 MG/1
650 TABLET ORAL ONCE
Status: COMPLETED | OUTPATIENT
Start: 2024-03-22 | End: 2024-03-22

## 2024-03-22 RX ADMIN — IMMUNE GLOBULIN (HUMAN) 30 G: 10 INJECTION INTRAVENOUS; SUBCUTANEOUS at 07:20

## 2024-03-22 RX ADMIN — SODIUM CHLORIDE 1000 ML: 9 INJECTION, SOLUTION INTRAVENOUS at 11:28

## 2024-03-22 RX ADMIN — HYDROCORTISONE SODIUM SUCCINATE 100 MG: 100 INJECTION, POWDER, FOR SOLUTION INTRAMUSCULAR; INTRAVENOUS at 06:59

## 2024-03-22 RX ADMIN — ACETAMINOPHEN 650 MG: 325 TABLET ORAL at 06:56

## 2024-03-22 RX ADMIN — SODIUM CHLORIDE 100 ML/HR: 9 INJECTION, SOLUTION INTRAVENOUS at 06:58

## 2024-03-22 RX ADMIN — METRONIDAZOLE 500 MG: 500 INJECTION, SOLUTION INTRAVENOUS at 12:36

## 2024-03-22 RX ADMIN — SODIUM CHLORIDE, PRESERVATIVE FREE 20 ML: 5 INJECTION INTRAVENOUS at 09:00

## 2024-03-22 RX ADMIN — DIPHENHYDRAMINE HYDROCHLORIDE 50 MG: 50 INJECTION INTRAMUSCULAR; INTRAVENOUS at 06:59

## 2024-03-22 RX ADMIN — CEFTRIAXONE 1000 MG: 1 INJECTION, POWDER, FOR SOLUTION INTRAMUSCULAR; INTRAVENOUS at 12:25

## 2024-03-22 RX ADMIN — HEPARIN 300 UNITS: 100 SYRINGE at 13:56

## 2024-03-22 RX ADMIN — HEPARIN 500 UNITS: 100 SYRINGE at 09:00

## 2024-03-22 RX ADMIN — IOPAMIDOL 75 ML: 755 INJECTION, SOLUTION INTRAVENOUS at 11:37

## 2024-03-22 ASSESSMENT — ENCOUNTER SYMPTOMS
BACK PAIN: 0
COLOR CHANGE: 0
ABDOMINAL DISTENTION: 0
PHOTOPHOBIA: 0
NAUSEA: 0
ABDOMINAL PAIN: 0
RHINORRHEA: 0
APNEA: 0
EYE PAIN: 0
SHORTNESS OF BREATH: 0
EYE DISCHARGE: 0
COUGH: 0

## 2024-03-22 ASSESSMENT — PAIN - FUNCTIONAL ASSESSMENT: PAIN_FUNCTIONAL_ASSESSMENT: NONE - DENIES PAIN

## 2024-03-22 NOTE — ED PROVIDER NOTES
[facility] EMERGENCY DEPT  eMERGENCYdEPARTMENT eNCOUnter      Pt Name: Ramiro Mendoza  MRN: [id number]  Birthdate 1975  Date of evaluation: 3/22/2024  Provider:CELENA Mckee    CHIEF COMPLAINT       Chief Complaint   Patient presents with    Dizziness     X 3 weeks, 3 episodes of loss of consciousness, weight loss of 20 lb over the last 3 weeks         HISTORY OF PRESENT ILLNESS  (Location/Symptom, Timing/Onset, Context/Setting, Quality, Duration, Modifying Factors, Severity.)   Ramiro Mendoza is a 48 y.o. female who presents to the emergency department with complaints of light headed 3 episodes of LOC unwanted weight loss 20 lbs. She has lupus feels like flare with kidney pain suprapubic pain frequency. No stool changes; making flatus.     [facility]    Nursing Notes were reviewed and I agree.    REVIEW OF SYSTEMS    (2-9 systems for level 4, 10 or more for level 5)     Review of Systems   Constitutional:  Negative for activity change, appetite change, chills and fever.   HENT:  Negative for congestion, postnasal drip, rhinorrhea and sore throat.    Eyes:  Negative for photophobia, pain, discharge and visual disturbance.   Respiratory:  Negative for apnea, cough and shortness of breath.    Cardiovascular:  Negative for chest pain and leg swelling.   Gastrointestinal:  Negative for abdominal distention, abdominal pain and nausea.   Genitourinary:  Positive for flank pain. Negative for vaginal bleeding.   Musculoskeletal:  Negative for arthralgias, back pain, joint swelling, neck pain and neck stiffness.   Skin:  Negative for color change and rash.   Neurological:  Negative for dizziness, syncope, facial asymmetry and headaches.   Hematological:  Negative for adenopathy. Does not bruise/bleed easily.   Psychiatric/Behavioral:  Negative for agitation, behavioral problems and confusion.         Except as noted above the remainder of the review of systems was reviewed and negative.       PAST MEDICAL HISTORY     Past  mild diffuse bladder wall thickening, suggesting cystitis.  Please correlate with urinalysis.   2.  Gastric bypass surgery, again noted.   3.  Findings suggesting nonspecific enteritis or small bowel ileus.  Some degree of bowel obstruction cannot be excluded.   4.  Small to moderate-sized fat only containing umbilical hernia, stable.   5.  Hysterectomy, again noted.        All CT scans are performed using dose optimization techniques as appropriate to the performed exam and include    at least one of the following: Automated exposure control, adjustment of the mA and/or kV according to size, and the use of iterative reconstruction technique.        ______________________________________    Electronically signed by: ZEKE HUDSON M.D.   Date:     03/22/2024   Time:    11:41       XR CHEST PORTABLE   Final Result   Negative chest. No active cardiopulmonary disease           ______________________________________    Electronically signed by: SHERRY CABALLERO M.D.   Date:     03/22/2024   Time:    11:01           LABS:  Labs Reviewed   WET PREP, GENITAL - Abnormal; Notable for the following components:       Result Value    Clue Cells, Wet Prep 3+ (*)     All other components within normal limits   CBC WITH AUTO DIFFERENTIAL - Abnormal; Notable for the following components:    RBC 4.05 (*)     MPV 9.0 (*)     Neutrophils % 84.4 (*)     Lymphocytes % 10.1 (*)     Lymphocytes Absolute 0.5 (*)     All other components within normal limits   COMPREHENSIVE METABOLIC PANEL W/ REFLEX TO MG FOR LOW K - Abnormal; Notable for the following components:    Sodium 128 (*)     Calcium 7.6 (*)     Albumin 3.3 (*)     All other components within normal limits   VALPROIC ACID LEVEL, TOTAL - Abnormal; Notable for the following components:    Valproic Acid Lvl <2.8 (*)     All other components within normal limits   DRUG SCRN, BUPRENORPHINE - Abnormal; Notable for the following components:    Barbiturate Screen, Ur POSITIVE (*)

## 2024-03-22 NOTE — FLOWSHEET NOTE
03/22/24 0919   AVS Reviewed   AVS & discharge instructions reviewed with patient and/or representative? Yes   Reviewed instructions with Patient   Level of Understanding Questions answered;Verbalized understanding

## 2024-03-24 LAB
EKG P AXIS: 84 DEGREES
EKG P-R INTERVAL: 142 MS
EKG Q-T INTERVAL: 386 MS
EKG QRS DURATION: 84 MS
EKG QTC CALCULATION (BAZETT): 403 MS
EKG T AXIS: 29 DEGREES

## 2024-03-24 PROCEDURE — 93010 ELECTROCARDIOGRAM REPORT: CPT | Performed by: INTERNAL MEDICINE

## 2024-03-27 DIAGNOSIS — M79.7 FIBROMYALGIA: ICD-10-CM

## 2024-03-27 DIAGNOSIS — M54.30 SCIATICA, UNSPECIFIED LATERALITY: ICD-10-CM

## 2024-03-27 DIAGNOSIS — G35 MULTIPLE SCLEROSIS (HCC): ICD-10-CM

## 2024-03-27 DIAGNOSIS — G43.719 INTRACTABLE CHRONIC MIGRAINE WITHOUT AURA AND WITHOUT STATUS MIGRAINOSUS: ICD-10-CM

## 2024-03-27 RX ORDER — OXYCODONE HYDROCHLORIDE 10 MG/1
10 TABLET ORAL EVERY 6 HOURS PRN
Qty: 120 TABLET | Refills: 0 | Status: SHIPPED | OUTPATIENT
Start: 2024-03-27 | End: 2024-04-26

## 2024-03-27 NOTE — TELEPHONE ENCOUNTER
Ramiro Mendoza has requested a refill on her medication.      Last office visit : 2/7/2024   Next office visit : 5/15/2024   Last medication refill : 2/7/24  Daniel : up to date       Requested Prescriptions     Pending Prescriptions Disp Refills    oxyCODONE HCl (OXY-IR) 10 MG immediate release tablet 120 tablet 0     Sig: Take 1 tablet by mouth every 6 hours as needed for Pain for up to 30 days. Intended supply: 30 days Max Daily Amount: 40 mg

## 2024-03-29 ENCOUNTER — HOSPITAL ENCOUNTER (OUTPATIENT)
Dept: INFUSION THERAPY | Age: 49
Setting detail: INFUSION SERIES
Discharge: HOME | End: 2024-03-29
Payer: MEDICARE

## 2024-03-29 VITALS
HEART RATE: 70 BPM | DIASTOLIC BLOOD PRESSURE: 86 MMHG | RESPIRATION RATE: 18 BRPM | WEIGHT: 152 LBS | TEMPERATURE: 99.1 F | OXYGEN SATURATION: 100 % | BODY MASS INDEX: 25.29 KG/M2 | SYSTOLIC BLOOD PRESSURE: 126 MMHG

## 2024-03-29 DIAGNOSIS — G35 MULTIPLE SCLEROSIS (HCC): Primary | ICD-10-CM

## 2024-03-29 PROCEDURE — 96375 TX/PRO/DX INJ NEW DRUG ADDON: CPT

## 2024-03-29 PROCEDURE — 96366 THER/PROPH/DIAG IV INF ADDON: CPT

## 2024-03-29 PROCEDURE — 6370000000 HC RX 637 (ALT 250 FOR IP): Performed by: PSYCHIATRY & NEUROLOGY

## 2024-03-29 PROCEDURE — 2580000003 HC RX 258: Performed by: PSYCHIATRY & NEUROLOGY

## 2024-03-29 PROCEDURE — 96365 THER/PROPH/DIAG IV INF INIT: CPT

## 2024-03-29 PROCEDURE — 6360000002 HC RX W HCPCS: Performed by: PSYCHIATRY & NEUROLOGY

## 2024-03-29 RX ORDER — ACETAMINOPHEN 325 MG/1
650 TABLET ORAL
OUTPATIENT
Start: 2024-04-05

## 2024-03-29 RX ORDER — DIPHENHYDRAMINE HYDROCHLORIDE 50 MG/ML
50 INJECTION INTRAMUSCULAR; INTRAVENOUS ONCE
Status: COMPLETED | OUTPATIENT
Start: 2024-03-29 | End: 2024-03-29

## 2024-03-29 RX ORDER — HEPARIN 100 UNIT/ML
500 SYRINGE INTRAVENOUS PRN
Status: DISCONTINUED | OUTPATIENT
Start: 2024-03-29 | End: 2024-03-30 | Stop reason: HOSPADM

## 2024-03-29 RX ORDER — SODIUM CHLORIDE 9 MG/ML
INJECTION, SOLUTION INTRAVENOUS CONTINUOUS
OUTPATIENT
Start: 2024-04-05

## 2024-03-29 RX ORDER — ALBUTEROL SULFATE 90 UG/1
4 AEROSOL, METERED RESPIRATORY (INHALATION) PRN
OUTPATIENT
Start: 2024-04-05

## 2024-03-29 RX ORDER — DIPHENHYDRAMINE HYDROCHLORIDE 50 MG/ML
50 INJECTION INTRAMUSCULAR; INTRAVENOUS
OUTPATIENT
Start: 2024-04-05

## 2024-03-29 RX ORDER — DIPHENHYDRAMINE HYDROCHLORIDE 50 MG/ML
50 INJECTION INTRAMUSCULAR; INTRAVENOUS ONCE
Status: CANCELLED
Start: 2024-04-05 | End: 2024-04-05

## 2024-03-29 RX ORDER — SODIUM CHLORIDE 9 MG/ML
5-250 INJECTION, SOLUTION INTRAVENOUS PRN
Status: CANCELLED | OUTPATIENT
Start: 2024-04-05

## 2024-03-29 RX ORDER — HEPARIN 100 UNIT/ML
500 SYRINGE INTRAVENOUS PRN
Status: CANCELLED | OUTPATIENT
Start: 2024-04-05

## 2024-03-29 RX ORDER — MEPERIDINE HYDROCHLORIDE 25 MG/ML
12.5 INJECTION INTRAMUSCULAR; INTRAVENOUS; SUBCUTANEOUS PRN
OUTPATIENT
Start: 2024-04-05

## 2024-03-29 RX ORDER — ACETAMINOPHEN 325 MG/1
650 TABLET ORAL ONCE
Status: CANCELLED | OUTPATIENT
Start: 2024-04-05 | End: 2024-04-05

## 2024-03-29 RX ORDER — SODIUM CHLORIDE 0.9 % (FLUSH) 0.9 %
5-40 SYRINGE (ML) INJECTION PRN
Status: CANCELLED | OUTPATIENT
Start: 2024-04-05

## 2024-03-29 RX ORDER — SODIUM CHLORIDE 0.9 % (FLUSH) 0.9 %
5-40 SYRINGE (ML) INJECTION PRN
Status: DISCONTINUED | OUTPATIENT
Start: 2024-03-29 | End: 2024-03-30 | Stop reason: HOSPADM

## 2024-03-29 RX ORDER — ONDANSETRON 2 MG/ML
8 INJECTION INTRAMUSCULAR; INTRAVENOUS
OUTPATIENT
Start: 2024-04-05

## 2024-03-29 RX ORDER — ACETAMINOPHEN 325 MG/1
650 TABLET ORAL ONCE
Status: COMPLETED | OUTPATIENT
Start: 2024-03-29 | End: 2024-03-29

## 2024-03-29 RX ORDER — EPINEPHRINE 1 MG/ML
0.3 INJECTION, SOLUTION, CONCENTRATE INTRAVENOUS PRN
OUTPATIENT
Start: 2024-04-05

## 2024-03-29 RX ADMIN — IMMUNE GLOBULIN (HUMAN) 30 G: 10 INJECTION INTRAVENOUS; SUBCUTANEOUS at 07:24

## 2024-03-29 RX ADMIN — DIPHENHYDRAMINE HYDROCHLORIDE 50 MG: 50 INJECTION INTRAMUSCULAR; INTRAVENOUS at 06:57

## 2024-03-29 RX ADMIN — HEPARIN 500 UNITS: 100 SYRINGE at 08:59

## 2024-03-29 RX ADMIN — ACETAMINOPHEN 650 MG: 325 TABLET ORAL at 06:57

## 2024-03-29 RX ADMIN — HYDROCORTISONE SODIUM SUCCINATE 100 MG: 100 INJECTION, POWDER, FOR SOLUTION INTRAMUSCULAR; INTRAVENOUS at 06:56

## 2024-03-29 RX ADMIN — SODIUM CHLORIDE, PRESERVATIVE FREE 20 ML: 5 INJECTION INTRAVENOUS at 08:59

## 2024-04-05 ENCOUNTER — HOSPITAL ENCOUNTER (OUTPATIENT)
Dept: INFUSION THERAPY | Age: 49
Setting detail: INFUSION SERIES
Discharge: HOME | End: 2024-04-05
Payer: MEDICARE

## 2024-04-05 VITALS
HEART RATE: 70 BPM | BODY MASS INDEX: 25.13 KG/M2 | RESPIRATION RATE: 18 BRPM | WEIGHT: 151 LBS | DIASTOLIC BLOOD PRESSURE: 78 MMHG | TEMPERATURE: 98.8 F | SYSTOLIC BLOOD PRESSURE: 119 MMHG | OXYGEN SATURATION: 100 %

## 2024-04-05 DIAGNOSIS — G35 MULTIPLE SCLEROSIS (HCC): Primary | ICD-10-CM

## 2024-04-05 PROCEDURE — 96365 THER/PROPH/DIAG IV INF INIT: CPT

## 2024-04-05 PROCEDURE — 96366 THER/PROPH/DIAG IV INF ADDON: CPT

## 2024-04-05 PROCEDURE — 2580000003 HC RX 258: Performed by: PSYCHIATRY & NEUROLOGY

## 2024-04-05 PROCEDURE — 6370000000 HC RX 637 (ALT 250 FOR IP): Performed by: PSYCHIATRY & NEUROLOGY

## 2024-04-05 PROCEDURE — 6360000002 HC RX W HCPCS: Performed by: PSYCHIATRY & NEUROLOGY

## 2024-04-05 PROCEDURE — 96375 TX/PRO/DX INJ NEW DRUG ADDON: CPT

## 2024-04-05 RX ORDER — DIPHENHYDRAMINE HYDROCHLORIDE 50 MG/ML
50 INJECTION INTRAMUSCULAR; INTRAVENOUS ONCE
Start: 2024-04-12 | End: 2024-04-12

## 2024-04-05 RX ORDER — SODIUM CHLORIDE 9 MG/ML
5-250 INJECTION, SOLUTION INTRAVENOUS PRN
Status: DISCONTINUED | OUTPATIENT
Start: 2024-04-05 | End: 2024-04-06 | Stop reason: HOSPADM

## 2024-04-05 RX ORDER — ACETAMINOPHEN 325 MG/1
650 TABLET ORAL ONCE
OUTPATIENT
Start: 2024-04-12 | End: 2024-04-12

## 2024-04-05 RX ORDER — SODIUM CHLORIDE 9 MG/ML
5-250 INJECTION, SOLUTION INTRAVENOUS PRN
OUTPATIENT
Start: 2024-04-12

## 2024-04-05 RX ORDER — SODIUM CHLORIDE 0.9 % (FLUSH) 0.9 %
5-40 SYRINGE (ML) INJECTION PRN
OUTPATIENT
Start: 2024-04-12

## 2024-04-05 RX ORDER — ACETAMINOPHEN 325 MG/1
650 TABLET ORAL ONCE
Status: COMPLETED | OUTPATIENT
Start: 2024-04-05 | End: 2024-04-05

## 2024-04-05 RX ORDER — ALBUTEROL SULFATE 90 UG/1
4 AEROSOL, METERED RESPIRATORY (INHALATION) PRN
OUTPATIENT
Start: 2024-04-12

## 2024-04-05 RX ORDER — HEPARIN 100 UNIT/ML
500 SYRINGE INTRAVENOUS PRN
OUTPATIENT
Start: 2024-04-12

## 2024-04-05 RX ORDER — EPINEPHRINE 1 MG/ML
0.3 INJECTION, SOLUTION, CONCENTRATE INTRAVENOUS PRN
OUTPATIENT
Start: 2024-04-12

## 2024-04-05 RX ORDER — ACETAMINOPHEN 325 MG/1
650 TABLET ORAL
OUTPATIENT
Start: 2024-04-12

## 2024-04-05 RX ORDER — SODIUM CHLORIDE 0.9 % (FLUSH) 0.9 %
5-40 SYRINGE (ML) INJECTION PRN
Status: DISCONTINUED | OUTPATIENT
Start: 2024-04-05 | End: 2024-04-06 | Stop reason: HOSPADM

## 2024-04-05 RX ORDER — DIPHENHYDRAMINE HYDROCHLORIDE 50 MG/ML
50 INJECTION INTRAMUSCULAR; INTRAVENOUS
OUTPATIENT
Start: 2024-04-12

## 2024-04-05 RX ORDER — ONDANSETRON 2 MG/ML
8 INJECTION INTRAMUSCULAR; INTRAVENOUS
OUTPATIENT
Start: 2024-04-12

## 2024-04-05 RX ORDER — MEPERIDINE HYDROCHLORIDE 25 MG/ML
12.5 INJECTION INTRAMUSCULAR; INTRAVENOUS; SUBCUTANEOUS PRN
OUTPATIENT
Start: 2024-04-12

## 2024-04-05 RX ORDER — SODIUM CHLORIDE 9 MG/ML
INJECTION, SOLUTION INTRAVENOUS CONTINUOUS
OUTPATIENT
Start: 2024-04-12

## 2024-04-05 RX ORDER — DIPHENHYDRAMINE HYDROCHLORIDE 50 MG/ML
50 INJECTION INTRAMUSCULAR; INTRAVENOUS ONCE
Status: COMPLETED | OUTPATIENT
Start: 2024-04-05 | End: 2024-04-05

## 2024-04-05 RX ORDER — HEPARIN 100 UNIT/ML
500 SYRINGE INTRAVENOUS PRN
Status: DISCONTINUED | OUTPATIENT
Start: 2024-04-05 | End: 2024-04-06 | Stop reason: HOSPADM

## 2024-04-05 RX ADMIN — HEPARIN 500 UNITS: 100 SYRINGE at 09:15

## 2024-04-05 RX ADMIN — IMMUNE GLOBULIN (HUMAN) 30 G: 10 INJECTION INTRAVENOUS; SUBCUTANEOUS at 07:43

## 2024-04-05 RX ADMIN — ACETAMINOPHEN 650 MG: 325 TABLET ORAL at 07:35

## 2024-04-05 RX ADMIN — HYDROCORTISONE SODIUM SUCCINATE 100 MG: 100 INJECTION, POWDER, FOR SOLUTION INTRAMUSCULAR; INTRAVENOUS at 07:35

## 2024-04-05 RX ADMIN — DIPHENHYDRAMINE HYDROCHLORIDE 50 MG: 50 INJECTION INTRAMUSCULAR; INTRAVENOUS at 07:35

## 2024-04-05 RX ADMIN — Medication 10 ML: at 09:15

## 2024-04-05 NOTE — PROGRESS NOTES
Pt arrived to [facility]. Port accessed using sterile technique. Brisk blood return noted. Pt received Benadryl, Solu-cortef, and Tylenol for pre-meds. Pt then received 30g Gamunex-C. Tolerated well.     Electronically signed by Shabnam Huddleston RN on 4/5/2024 at 9:26 AM

## 2024-04-12 ENCOUNTER — HOSPITAL ENCOUNTER (OUTPATIENT)
Dept: INFUSION THERAPY | Age: 49
Setting detail: INFUSION SERIES
Discharge: HOME | End: 2024-04-12
Payer: MEDICARE

## 2024-04-12 VITALS
RESPIRATION RATE: 18 BRPM | TEMPERATURE: 98.3 F | HEART RATE: 67 BPM | OXYGEN SATURATION: 100 % | DIASTOLIC BLOOD PRESSURE: 81 MMHG | WEIGHT: 155 LBS | SYSTOLIC BLOOD PRESSURE: 121 MMHG | BODY MASS INDEX: 25.79 KG/M2

## 2024-04-12 DIAGNOSIS — G35 MULTIPLE SCLEROSIS (HCC): Primary | ICD-10-CM

## 2024-04-12 PROCEDURE — 6370000000 HC RX 637 (ALT 250 FOR IP): Performed by: PSYCHIATRY & NEUROLOGY

## 2024-04-12 PROCEDURE — 96375 TX/PRO/DX INJ NEW DRUG ADDON: CPT

## 2024-04-12 PROCEDURE — 2580000003 HC RX 258: Performed by: PSYCHIATRY & NEUROLOGY

## 2024-04-12 PROCEDURE — 96366 THER/PROPH/DIAG IV INF ADDON: CPT

## 2024-04-12 PROCEDURE — 96365 THER/PROPH/DIAG IV INF INIT: CPT

## 2024-04-12 PROCEDURE — 6360000002 HC RX W HCPCS: Performed by: PSYCHIATRY & NEUROLOGY

## 2024-04-12 RX ORDER — SODIUM CHLORIDE 0.9 % (FLUSH) 0.9 %
5-40 SYRINGE (ML) INJECTION PRN
Status: CANCELLED | OUTPATIENT
Start: 2024-04-19

## 2024-04-12 RX ORDER — MEPERIDINE HYDROCHLORIDE 25 MG/ML
12.5 INJECTION INTRAMUSCULAR; INTRAVENOUS; SUBCUTANEOUS PRN
Status: CANCELLED | OUTPATIENT
Start: 2024-04-19

## 2024-04-12 RX ORDER — HEPARIN 100 UNIT/ML
500 SYRINGE INTRAVENOUS PRN
Status: DISCONTINUED | OUTPATIENT
Start: 2024-04-12 | End: 2024-04-13 | Stop reason: HOSPADM

## 2024-04-12 RX ORDER — ALBUTEROL SULFATE 90 UG/1
4 AEROSOL, METERED RESPIRATORY (INHALATION) PRN
Status: CANCELLED | OUTPATIENT
Start: 2024-04-19

## 2024-04-12 RX ORDER — EPINEPHRINE 1 MG/ML
0.3 INJECTION, SOLUTION, CONCENTRATE INTRAVENOUS PRN
Status: CANCELLED | OUTPATIENT
Start: 2024-04-19

## 2024-04-12 RX ORDER — SODIUM CHLORIDE 0.9 % (FLUSH) 0.9 %
5-40 SYRINGE (ML) INJECTION PRN
Status: DISCONTINUED | OUTPATIENT
Start: 2024-04-12 | End: 2024-04-13 | Stop reason: HOSPADM

## 2024-04-12 RX ORDER — DIPHENHYDRAMINE HYDROCHLORIDE 50 MG/ML
50 INJECTION INTRAMUSCULAR; INTRAVENOUS ONCE
Status: COMPLETED | OUTPATIENT
Start: 2024-04-12 | End: 2024-04-12

## 2024-04-12 RX ORDER — ACETAMINOPHEN 325 MG/1
650 TABLET ORAL ONCE
Status: CANCELLED | OUTPATIENT
Start: 2024-04-19 | End: 2024-04-19

## 2024-04-12 RX ORDER — DIPHENHYDRAMINE HYDROCHLORIDE 50 MG/ML
50 INJECTION INTRAMUSCULAR; INTRAVENOUS ONCE
Status: CANCELLED
Start: 2024-04-19 | End: 2024-04-19

## 2024-04-12 RX ORDER — SODIUM CHLORIDE 9 MG/ML
INJECTION, SOLUTION INTRAVENOUS CONTINUOUS
Status: CANCELLED | OUTPATIENT
Start: 2024-04-19

## 2024-04-12 RX ORDER — ONDANSETRON 2 MG/ML
8 INJECTION INTRAMUSCULAR; INTRAVENOUS
Status: CANCELLED | OUTPATIENT
Start: 2024-04-19

## 2024-04-12 RX ORDER — SODIUM CHLORIDE 9 MG/ML
5-250 INJECTION, SOLUTION INTRAVENOUS PRN
Status: CANCELLED | OUTPATIENT
Start: 2024-04-19

## 2024-04-12 RX ORDER — DIPHENHYDRAMINE HYDROCHLORIDE 50 MG/ML
50 INJECTION INTRAMUSCULAR; INTRAVENOUS
Status: CANCELLED | OUTPATIENT
Start: 2024-04-19

## 2024-04-12 RX ORDER — ACETAMINOPHEN 325 MG/1
650 TABLET ORAL ONCE
Status: COMPLETED | OUTPATIENT
Start: 2024-04-12 | End: 2024-04-12

## 2024-04-12 RX ORDER — ACETAMINOPHEN 325 MG/1
650 TABLET ORAL
Status: CANCELLED | OUTPATIENT
Start: 2024-04-19

## 2024-04-12 RX ORDER — HEPARIN 100 UNIT/ML
500 SYRINGE INTRAVENOUS PRN
Status: CANCELLED | OUTPATIENT
Start: 2024-04-19

## 2024-04-12 RX ADMIN — HYDROCORTISONE SODIUM SUCCINATE 100 MG: 100 INJECTION, POWDER, FOR SOLUTION INTRAMUSCULAR; INTRAVENOUS at 07:06

## 2024-04-12 RX ADMIN — IMMUNE GLOBULIN (HUMAN) 30 G: 10 INJECTION INTRAVENOUS; SUBCUTANEOUS at 07:25

## 2024-04-12 RX ADMIN — Medication 20 ML: at 09:40

## 2024-04-12 RX ADMIN — DIPHENHYDRAMINE HYDROCHLORIDE 50 MG: 50 INJECTION INTRAMUSCULAR; INTRAVENOUS at 07:09

## 2024-04-12 RX ADMIN — HEPARIN 500 UNITS: 100 SYRINGE at 09:40

## 2024-04-12 RX ADMIN — HEPARIN 500 UNITS: 100 SYRINGE at 09:35

## 2024-04-12 RX ADMIN — ACETAMINOPHEN 650 MG: 325 TABLET ORAL at 07:05

## 2024-04-12 RX ADMIN — Medication 10 ML: at 07:09

## 2024-04-12 RX ADMIN — Medication 10 ML: at 07:22

## 2024-04-12 RX ADMIN — Medication 20 ML: at 09:35

## 2024-04-12 RX ADMIN — Medication 10 ML: at 07:06

## 2024-04-12 NOTE — PROGRESS NOTES
Port accessed on arrival to [facility]. Premeds given. Gamunex-C 30g infusion completed per order. Port de-accessed. Pt tolerated well.

## 2024-04-12 NOTE — DISCHARGE INSTRUCTIONS
immune globulin (intravenous and subcutaneous)  Pronunciation:  dinora LACEY foley  Brand:  Gammagard Liquid, Gammaked, Gamunex-C  What is the most important information I should know about immune globulin?  This medicine can cause blood clots.  The risk is highest in older adults or in people who have had blood clots, heart problems, or blood circulation problems. Blood clots are also more likely during long-term bedrest, while using birth control pills or hormone replacement therapy, or while having a central intravenous (IV) catheter in place.  Call your doctor at once if you have chest pain, trouble breathing, fast heartbeats, numbness or weakness, or swelling and warmth or discoloration in an arm or leg.  This medicine can also harm your kidneys, especially if you have kidney disease or if you also use certain medicines. Tell your doctor right away if you have signs of kidney problems, such as swelling, rapid weight gain, and little or no urination.  What is immune globulin?  Immune globulin intravenous and subcutaneous (for injection into a vein or under the skin) is used to treat primary immunodeficiency.  Immune globulin is also used to increase platelets (blood clotting cells) in people with idiopathic thrombocytopenic purpura.  Immune globulin is also used to treat certain debilitating nerve disorders that cause muscle weakness and can affect daily activities.  Immune globulin may also be used for purposes not listed in this medication guide.  What should I discuss with my healthcare provider before using immune globulin?  You should not use this medicine if:  you have had an allergic reaction to an immune globulin or blood product; or  you have immune globulin A (IgA) deficiency with antibody to IgA.  Immune globulin can cause blood clots or kidney problems, especially in older adults or in people with certain conditions. Tell your doctor if you have ever had:  heart problems, blood circulation  appropriate for any given patient. [facility] does not assume any responsibility for any aspect of healthcare administered with the aid of information [facility] provides. The information contained herein is not intended to cover all possible uses, directions, precautions, warnings, drug interactions, allergic reactions, or adverse effects. If you have questions about the drugs you are taking, check with your doctor, nurse or pharmacist.  Copyright 2351-4230 [employer]. Version: 4.02. Revision date: 2/26/2021.  Care instructions adapted under license by [employer]. If you have questions about a medical condition or this instruction, always ask your healthcare professional. [employer] disclaims any warranty or liability for your use of this information.

## 2024-04-19 ENCOUNTER — HOSPITAL ENCOUNTER (OUTPATIENT)
Dept: INFUSION THERAPY | Age: 49
Setting detail: INFUSION SERIES
Discharge: HOME | End: 2024-04-19
Payer: MEDICARE

## 2024-04-19 VITALS
DIASTOLIC BLOOD PRESSURE: 92 MMHG | WEIGHT: 152 LBS | RESPIRATION RATE: 18 BRPM | HEART RATE: 64 BPM | BODY MASS INDEX: 25.33 KG/M2 | HEIGHT: 65 IN | SYSTOLIC BLOOD PRESSURE: 142 MMHG | OXYGEN SATURATION: 99 % | TEMPERATURE: 98 F

## 2024-04-19 DIAGNOSIS — G35 MULTIPLE SCLEROSIS (HCC): Primary | ICD-10-CM

## 2024-04-19 PROCEDURE — 96375 TX/PRO/DX INJ NEW DRUG ADDON: CPT

## 2024-04-19 PROCEDURE — 96365 THER/PROPH/DIAG IV INF INIT: CPT

## 2024-04-19 PROCEDURE — 6360000002 HC RX W HCPCS: Performed by: PSYCHIATRY & NEUROLOGY

## 2024-04-19 PROCEDURE — 96366 THER/PROPH/DIAG IV INF ADDON: CPT

## 2024-04-19 PROCEDURE — 2580000003 HC RX 258: Performed by: PSYCHIATRY & NEUROLOGY

## 2024-04-19 PROCEDURE — 6370000000 HC RX 637 (ALT 250 FOR IP): Performed by: PSYCHIATRY & NEUROLOGY

## 2024-04-19 RX ORDER — EPINEPHRINE 1 MG/ML
0.3 INJECTION, SOLUTION, CONCENTRATE INTRAVENOUS PRN
Status: CANCELLED | OUTPATIENT
Start: 2024-04-26

## 2024-04-19 RX ORDER — SODIUM CHLORIDE 9 MG/ML
5-250 INJECTION, SOLUTION INTRAVENOUS PRN
Status: CANCELLED | OUTPATIENT
Start: 2024-04-26

## 2024-04-19 RX ORDER — DIPHENHYDRAMINE HYDROCHLORIDE 50 MG/ML
50 INJECTION INTRAMUSCULAR; INTRAVENOUS
Status: CANCELLED | OUTPATIENT
Start: 2024-04-26

## 2024-04-19 RX ORDER — ACETAMINOPHEN 325 MG/1
650 TABLET ORAL ONCE
Status: COMPLETED | OUTPATIENT
Start: 2024-04-19 | End: 2024-04-19

## 2024-04-19 RX ORDER — MEPERIDINE HYDROCHLORIDE 25 MG/ML
12.5 INJECTION INTRAMUSCULAR; INTRAVENOUS; SUBCUTANEOUS PRN
Status: CANCELLED | OUTPATIENT
Start: 2024-04-26

## 2024-04-19 RX ORDER — HEPARIN 100 UNIT/ML
500 SYRINGE INTRAVENOUS PRN
Status: DISCONTINUED | OUTPATIENT
Start: 2024-04-19 | End: 2024-04-20 | Stop reason: HOSPADM

## 2024-04-19 RX ORDER — SODIUM CHLORIDE 0.9 % (FLUSH) 0.9 %
5-40 SYRINGE (ML) INJECTION PRN
Status: CANCELLED | OUTPATIENT
Start: 2024-04-26

## 2024-04-19 RX ORDER — SODIUM CHLORIDE 9 MG/ML
INJECTION, SOLUTION INTRAVENOUS CONTINUOUS
Status: CANCELLED | OUTPATIENT
Start: 2024-04-26

## 2024-04-19 RX ORDER — ONDANSETRON 2 MG/ML
8 INJECTION INTRAMUSCULAR; INTRAVENOUS
Status: CANCELLED | OUTPATIENT
Start: 2024-04-26

## 2024-04-19 RX ORDER — ACETAMINOPHEN 325 MG/1
650 TABLET ORAL
Status: CANCELLED | OUTPATIENT
Start: 2024-04-26

## 2024-04-19 RX ORDER — ACETAMINOPHEN 325 MG/1
650 TABLET ORAL ONCE
Status: CANCELLED | OUTPATIENT
Start: 2024-04-26 | End: 2024-04-26

## 2024-04-19 RX ORDER — ALBUTEROL SULFATE 90 UG/1
4 AEROSOL, METERED RESPIRATORY (INHALATION) PRN
Status: CANCELLED | OUTPATIENT
Start: 2024-04-26

## 2024-04-19 RX ORDER — DIPHENHYDRAMINE HYDROCHLORIDE 50 MG/ML
50 INJECTION INTRAMUSCULAR; INTRAVENOUS ONCE
Status: COMPLETED | OUTPATIENT
Start: 2024-04-19 | End: 2024-04-19

## 2024-04-19 RX ORDER — DIPHENHYDRAMINE HYDROCHLORIDE 50 MG/ML
50 INJECTION INTRAMUSCULAR; INTRAVENOUS ONCE
Status: CANCELLED
Start: 2024-04-26 | End: 2024-04-26

## 2024-04-19 RX ORDER — SODIUM CHLORIDE 0.9 % (FLUSH) 0.9 %
5-40 SYRINGE (ML) INJECTION PRN
Status: DISCONTINUED | OUTPATIENT
Start: 2024-04-19 | End: 2024-04-20 | Stop reason: HOSPADM

## 2024-04-19 RX ORDER — HEPARIN 100 UNIT/ML
500 SYRINGE INTRAVENOUS PRN
Status: CANCELLED | OUTPATIENT
Start: 2024-04-26

## 2024-04-19 RX ADMIN — DIPHENHYDRAMINE HYDROCHLORIDE 50 MG: 50 INJECTION INTRAMUSCULAR; INTRAVENOUS at 07:12

## 2024-04-19 RX ADMIN — IMMUNE GLOBULIN (HUMAN) 30 G: 10 INJECTION INTRAVENOUS; SUBCUTANEOUS at 07:29

## 2024-04-19 RX ADMIN — HYDROCORTISONE SODIUM SUCCINATE 100 MG: 100 INJECTION, POWDER, FOR SOLUTION INTRAMUSCULAR; INTRAVENOUS at 07:11

## 2024-04-19 RX ADMIN — HEPARIN 500 UNITS: 100 SYRINGE at 09:15

## 2024-04-19 RX ADMIN — ACETAMINOPHEN 650 MG: 325 TABLET ORAL at 07:10

## 2024-04-19 RX ADMIN — SODIUM CHLORIDE, PRESERVATIVE FREE 20 ML: 5 INJECTION INTRAVENOUS at 09:15

## 2024-04-19 NOTE — FLOWSHEET NOTE
04/19/24 0924   AVS Reviewed   AVS & discharge instructions reviewed with patient and/or representative? Yes   Reviewed instructions with Patient   Level of Understanding Questions answered;Verbalized understanding

## 2024-04-23 DIAGNOSIS — M54.30 SCIATICA, UNSPECIFIED LATERALITY: ICD-10-CM

## 2024-04-23 DIAGNOSIS — G43.719 INTRACTABLE CHRONIC MIGRAINE WITHOUT AURA AND WITHOUT STATUS MIGRAINOSUS: ICD-10-CM

## 2024-04-23 DIAGNOSIS — M79.7 FIBROMYALGIA: ICD-10-CM

## 2024-04-23 DIAGNOSIS — G35 MULTIPLE SCLEROSIS (HCC): ICD-10-CM

## 2024-04-23 RX ORDER — OXYCODONE HYDROCHLORIDE 10 MG/1
10 TABLET ORAL EVERY 6 HOURS PRN
Qty: 120 TABLET | Refills: 0 | Status: SHIPPED | OUTPATIENT
Start: 2024-04-28 | End: 2024-05-28

## 2024-04-23 NOTE — TELEPHONE ENCOUNTER
Ramiro Mendoza has requested a refill on her medication.      Last office visit : 2/7/2024   Next office visit : 5/15/2024   Last medication refill : 3/29/24  Daniel : up to date       Requested Prescriptions     Pending Prescriptions Disp Refills    oxyCODONE HCl (OXY-IR) 10 MG immediate release tablet 120 tablet 0     Sig: Take 1 tablet by mouth every 6 hours as needed for Pain for up to 30 days. Intended supply: 30 days Max Daily Amount: 40 mg       Dr. Arredondo patient

## 2024-04-26 ENCOUNTER — HOSPITAL ENCOUNTER (OUTPATIENT)
Dept: INFUSION THERAPY | Age: 49
Setting detail: INFUSION SERIES
Discharge: HOME | End: 2024-04-26
Payer: MEDICARE

## 2024-04-26 VITALS
SYSTOLIC BLOOD PRESSURE: 112 MMHG | HEART RATE: 61 BPM | TEMPERATURE: 98.4 F | OXYGEN SATURATION: 100 % | DIASTOLIC BLOOD PRESSURE: 79 MMHG | RESPIRATION RATE: 18 BRPM | BODY MASS INDEX: 24.79 KG/M2 | WEIGHT: 149 LBS

## 2024-04-26 DIAGNOSIS — G35 MULTIPLE SCLEROSIS (HCC): Primary | ICD-10-CM

## 2024-04-26 PROCEDURE — 96366 THER/PROPH/DIAG IV INF ADDON: CPT

## 2024-04-26 PROCEDURE — 2580000003 HC RX 258: Performed by: PSYCHIATRY & NEUROLOGY

## 2024-04-26 PROCEDURE — 6360000002 HC RX W HCPCS: Performed by: PSYCHIATRY & NEUROLOGY

## 2024-04-26 PROCEDURE — 96375 TX/PRO/DX INJ NEW DRUG ADDON: CPT

## 2024-04-26 PROCEDURE — 6370000000 HC RX 637 (ALT 250 FOR IP): Performed by: PSYCHIATRY & NEUROLOGY

## 2024-04-26 PROCEDURE — 96365 THER/PROPH/DIAG IV INF INIT: CPT

## 2024-04-26 RX ORDER — DIPHENHYDRAMINE HYDROCHLORIDE 50 MG/ML
50 INJECTION INTRAMUSCULAR; INTRAVENOUS ONCE
Status: CANCELLED
Start: 2024-05-03 | End: 2024-05-03

## 2024-04-26 RX ORDER — SODIUM CHLORIDE 0.9 % (FLUSH) 0.9 %
5-40 SYRINGE (ML) INJECTION PRN
Status: CANCELLED | OUTPATIENT
Start: 2024-05-03

## 2024-04-26 RX ORDER — ACETAMINOPHEN 325 MG/1
650 TABLET ORAL ONCE
Status: CANCELLED | OUTPATIENT
Start: 2024-05-03 | End: 2024-05-03

## 2024-04-26 RX ORDER — ACETAMINOPHEN 325 MG/1
650 TABLET ORAL ONCE
Status: COMPLETED | OUTPATIENT
Start: 2024-04-26 | End: 2024-04-26

## 2024-04-26 RX ORDER — EPINEPHRINE 1 MG/ML
0.3 INJECTION, SOLUTION, CONCENTRATE INTRAVENOUS PRN
OUTPATIENT
Start: 2024-05-03

## 2024-04-26 RX ORDER — SODIUM CHLORIDE 9 MG/ML
INJECTION, SOLUTION INTRAVENOUS CONTINUOUS
OUTPATIENT
Start: 2024-05-03

## 2024-04-26 RX ORDER — SODIUM CHLORIDE 9 MG/ML
5-250 INJECTION, SOLUTION INTRAVENOUS PRN
OUTPATIENT
Start: 2024-05-03

## 2024-04-26 RX ORDER — ACETAMINOPHEN 325 MG/1
650 TABLET ORAL
OUTPATIENT
Start: 2024-05-03

## 2024-04-26 RX ORDER — HEPARIN 100 UNIT/ML
500 SYRINGE INTRAVENOUS PRN
Status: DISCONTINUED | OUTPATIENT
Start: 2024-04-26 | End: 2024-04-27 | Stop reason: HOSPADM

## 2024-04-26 RX ORDER — ONDANSETRON 2 MG/ML
8 INJECTION INTRAMUSCULAR; INTRAVENOUS
OUTPATIENT
Start: 2024-05-03

## 2024-04-26 RX ORDER — MEPERIDINE HYDROCHLORIDE 25 MG/ML
12.5 INJECTION INTRAMUSCULAR; INTRAVENOUS; SUBCUTANEOUS PRN
OUTPATIENT
Start: 2024-05-03

## 2024-04-26 RX ORDER — HEPARIN 100 UNIT/ML
500 SYRINGE INTRAVENOUS PRN
Status: CANCELLED | OUTPATIENT
Start: 2024-05-03

## 2024-04-26 RX ORDER — DIPHENHYDRAMINE HYDROCHLORIDE 50 MG/ML
50 INJECTION INTRAMUSCULAR; INTRAVENOUS ONCE
Status: COMPLETED | OUTPATIENT
Start: 2024-04-26 | End: 2024-04-26

## 2024-04-26 RX ORDER — DIPHENHYDRAMINE HYDROCHLORIDE 50 MG/ML
50 INJECTION INTRAMUSCULAR; INTRAVENOUS
OUTPATIENT
Start: 2024-05-03

## 2024-04-26 RX ORDER — ALBUTEROL SULFATE 90 UG/1
4 AEROSOL, METERED RESPIRATORY (INHALATION) PRN
OUTPATIENT
Start: 2024-05-03

## 2024-04-26 RX ORDER — SODIUM CHLORIDE 0.9 % (FLUSH) 0.9 %
5-40 SYRINGE (ML) INJECTION PRN
Status: DISCONTINUED | OUTPATIENT
Start: 2024-04-26 | End: 2024-04-27 | Stop reason: HOSPADM

## 2024-04-26 RX ADMIN — DIPHENHYDRAMINE HYDROCHLORIDE 50 MG: 50 INJECTION INTRAMUSCULAR; INTRAVENOUS at 07:05

## 2024-04-26 RX ADMIN — ACETAMINOPHEN 650 MG: 325 TABLET ORAL at 07:01

## 2024-04-26 RX ADMIN — HYDROCORTISONE SODIUM SUCCINATE 100 MG: 100 INJECTION, POWDER, FOR SOLUTION INTRAMUSCULAR; INTRAVENOUS at 07:02

## 2024-04-26 RX ADMIN — SODIUM CHLORIDE, PRESERVATIVE FREE 10 ML: 5 INJECTION INTRAVENOUS at 07:04

## 2024-04-26 RX ADMIN — HEPARIN 500 UNITS: 100 SYRINGE at 08:55

## 2024-04-26 RX ADMIN — IMMUNE GLOBULIN (HUMAN) 30 G: 10 INJECTION INTRAVENOUS; SUBCUTANEOUS at 07:22

## 2024-04-26 RX ADMIN — SODIUM CHLORIDE, PRESERVATIVE FREE 20 ML: 5 INJECTION INTRAVENOUS at 08:55

## 2024-04-26 NOTE — DISCHARGE INSTRUCTIONS
immune globulin (intravenous and subcutaneous)  Pronunciation:  dinora LACEY foley  Brand:  Gammagard Liquid, Gammaked, Gamunex-C  What is the most important information I should know about immune globulin?  This medicine can cause blood clots.  The risk is highest in older adults or in people who have had blood clots, heart problems, or blood circulation problems. Blood clots are also more likely during long-term bedrest, while using birth control pills or hormone replacement therapy, or while having a central intravenous (IV) catheter in place.  Call your doctor at once if you have chest pain, trouble breathing, fast heartbeats, numbness or weakness, or swelling and warmth or discoloration in an arm or leg.  This medicine can also harm your kidneys, especially if you have kidney disease or if you also use certain medicines. Tell your doctor right away if you have signs of kidney problems, such as swelling, rapid weight gain, and little or no urination.  What is immune globulin?  Immune globulin intravenous and subcutaneous (for injection into a vein or under the skin) is used to treat primary immunodeficiency.  Immune globulin is also used to increase platelets (blood clotting cells) in people with idiopathic thrombocytopenic purpura.  Immune globulin is also used to treat certain debilitating nerve disorders that cause muscle weakness and can affect daily activities.  Immune globulin may also be used for purposes not listed in this medication guide.  What should I discuss with my healthcare provider before using immune globulin?  You should not use this medicine if:  you have had an allergic reaction to an immune globulin or blood product; or  you have immune globulin A (IgA) deficiency with antibody to IgA.  Immune globulin can cause blood clots or kidney problems, especially in older adults or in people with certain conditions. Tell your doctor if you have ever had:  heart problems, blood circulation  Patient laid back, eyes rolled back and shook after finger stick for glucose.  Came too very shortly per NST.  NST came and got RN.  Patient was responsive but skin was clammy when I walked into room.  Oximeter placed.  Heart rate 52.  O2 sat 99%.  Patient felt normal after approximately 5 minutes.   appropriate for any given patient. [facility] does not assume any responsibility for any aspect of healthcare administered with the aid of information [facility] provides. The information contained herein is not intended to cover all possible uses, directions, precautions, warnings, drug interactions, allergic reactions, or adverse effects. If you have questions about the drugs you are taking, check with your doctor, nurse or pharmacist.  Copyright 6671-6784 [employer]. Version: 4.02. Revision date: 2/26/2021.  Care instructions adapted under license by [employer]. If you have questions about a medical condition or this instruction, always ask your healthcare professional. [employer], [employer] disclaims any warranty or liability for your use of this information.

## 2024-04-26 NOTE — PROGRESS NOTES
Port accessed using sterile technique (see flow sheet).  Pre meds given, followed by Gamunex C 30g as ordered.  Pt tolerated well.

## 2024-05-02 NOTE — DISCHARGE INSTRUCTIONS
Refill Request     CONFIRM preferred pharmacy with the patient.    If Mail Order Rx - Pend for 90 day refill.      Last Seen: Last Seen Department: 3/14/2024  Last Seen by PCP: 3/14/2024    Last Written:   7/24/2023  If no future appointment scheduled:  Review the last OV with PCP and review information for follow-up visit,  Route STAFF MESSAGE with patient name to the  Pool for scheduling with the following information:            -  Timing of next visit           -  Visit type ie Physical, OV, etc           -  Diagnoses/Reason ie. COPD, HTN - Do not use MEDICATION, Follow-up or CHECK UP - Give reason for visit      Next Appointment:   Future Appointments   Date Time Provider Department Center   6/17/2024 10:30 AM Heriberto Cornad DO [facility]       Message sent to  to schedule appt with patient?  NO      Requested Prescriptions     Pending Prescriptions Disp Refills    atorvastatin (LIPITOR) 20 MG tablet [Pharmacy Med Name: ATORVASTATIN 20 MG TABLET] 90 tablet 1     Sig: TAKE ONE TABLET BY MOUTH ONCE NIGHTLY        immune globulin (intravenous and subcutaneous)  Pronunciation:  dinora LACEY foley  Brand:  Gammagard Liquid, Gammaked, Gamunex-C  What is the most important information I should know about immune globulin? This medicine can cause blood clots. The risk is highest in older adults or in people who have had blood clots, heart problems, or blood circulation problems. Blood clots are also more likely during long-term bedrest, while using birth control pills or hormone replacement therapy, or while having a central intravenous (IV) catheter in place. Call your doctor at once if you have chest pain, trouble breathing, fast heartbeats, numbness or weakness, or swelling and warmth or discoloration in an arm or leg. This medicine can also harm your kidneys, especially if you have kidney disease or if you also use certain medicines. Tell your doctor right away if you have signs of kidney problems, such as swelling, rapid weight gain, and little or no urination. What is immune globulin? Immune globulin intravenous and subcutaneous (for injection into a vein or under the skin) is used to treat primary immunodeficiency. Immune globulin is also used to increase platelets (blood clotting cells) in people with idiopathic thrombocytopenic purpura. Immune globulin is also used to treat certain debilitating nerve disorders that cause muscle weakness and can affect daily activities. Immune globulin may also be used for purposes not listed in this medication guide. What should I discuss with my healthcare provider before using immune globulin? You should not use this medicine if:  you have had an allergic reaction to an immune globulin or blood product; or  you have immune globulin A (IgA) deficiency with antibody to IgA. Immune globulin can cause blood clots or kidney problems, especially in older adults or in people with certain conditions.  Tell your doctor if you have ever had:  heart problems, blood circulation problems, or \"thick blood\";  a stroke or blood clot;  kidney disease;  diabetes;  an infection called sepsis;  if you use estrogens (birth control pills or hormone replacement therapy);  if you have been on long-term bedrest; or  if you have a central intravenous (IV) catheter in place. You may need a dose adjustment if you are exposed to measles, or if you travel to an area where this disease is common. Tell your doctor if you are pregnant or breastfeeding. Immune globulin is made from donated human plasma and may contain viruses or other infectious agents. Donated plasma is tested and treated to reduce the risk of contamination, but there is still a small possibility it could transmit disease. Ask your doctor about any possible risk. How should I use immune globulin? Follow all directions on your prescription label and read all medication guides or instruction sheets. Your doctor may occasionally change your dose. Use the medicine exactly as directed. Immune globulin is given as an infusion into a vein, or injected under the skin using an infusion pump. A healthcare provider will give your first dose and may teach you how to properly use the medication by yourself. Do not inject immune globulin into a vein if you have been instructed to give the medicine as a subcutaneous injection (under the skin). How you give this medication, how often you use it, and the length of your infusion time will depend on the condition being treated. Read and carefully follow any Instructions for Use provided with your medicine. Ask your doctor or pharmacist if you don't understand all instructions. Prepare an injection only when you are ready to give it. Do not use if the medicine looks cloudy, has changed colors, or has particles in it. Call your pharmacist for new medicine. Do not shake the medication bottle or you may ruin the medicine. Immune globulin must be given slowly.  You may need to use several catheters to inject this medicine into different body areas at the same time. Your healthcare provider will show you the best places on your body to inject the medication. Keep a diary of the days and times you gave the injection and where you injected it on your body. Drink plenty of liquids  while you are using this medicine to help improve your blood flow and keep your kidneys working properly. You may need frequent blood or urine tests. This medicine can affect the results of certain medical tests. Tell any doctor who treats you that you are using immune globulin. Store this medicine in its original carton in the refrigerator. Do not freeze immune globulin, and throw the medicine away if it has frozen. Take the medicine out of the refrigerator and let it reach room temperature for up to 1 hour before injecting your dose. You may also store immune globulin at room temperature. You will need to use immune globulin within a certain number months. This will depend on the how you store the medicine (at room temperature, or in a refrigerator). Carefully follow the storage instructions provided with your medicine. Do not use the medicine after the expiration date on the label has passed. Each vial (bottle) is for one use only. Throw it away after one use, even if there is still medicine left inside. Use disposable injection items (needle, catheter, tubing) only once and then place them in a puncture-proof \"sharps\" container. Follow state or local laws about how to dispose of this container. Keep it out of the reach of children and pets. What happens if I miss a dose? Call your doctor for instructions if you miss a dose. What happens if I overdose? Seek emergency medical attention or call the Poison Help line at 1-634.318.1941. What should I avoid while using immune globulin? Do not receive a \"live\" vaccine while using immune globulin. The vaccine may not work as well and may not fully protect you from disease.  Live vaccines include measles, mumps, rubella (MMR), rotavirus, typhoid, yellow fever, varicella (chickenpox), zoster (shingles), and nasal flu (influenza) vaccine. What are the possible side effects of immune globulin? Get emergency medical help if you have signs of an allergic reaction: hives; wheezing, difficulty breathing; dizziness, feeling like you might pass out; swelling of your face, lips, tongue, or throat. Some side effects may occur during the injection. Tell your caregiver if you feel light-headed, itchy, chilled, sweaty, or have chest discomfort, fast heartbeats, severe headache, or pounding in your neck or ears. Call your doctor at once if you have:  a blood cell disorder --pale or yellowed skin, dark colored urine, fever, confusion or weakness;  dehydration symptoms --feeling very thirsty or hot, being unable to urinate, heavy sweating, or hot and dry skin;  kidney problems --little or no urination, swelling, rapid weight gain, feeling short of breath;  lung problems --chest pain, wheezing, trouble breathing, blue colored lips, fingers, or toes;  signs of a new infection --fever with a severe headache, neck stiffness, eye pain, and increased sensitivity to light; or  signs of a blood clot --shortness of breath, chest pain with deep breathing, rapid heart rate, numbness or weakness on one side of the body, swelling and warmth or discoloration in an arm or leg. Common side effects may include:  runny or stuffy nose, sinus pain, cough, sore throat;  fever, chills, weakness;  headache, back pain, muscle or joint pain;  dizziness, tiredness, depressed mood;  swelling in your hands or feet;  skin rash, redness, or bruising;  blisters or ulcers in your mouth, red or swollen gums, trouble swallowing;  nausea, diarrhea, stomach pain, upset stomach;  increased blood pressure; or  redness, swelling, or itching where an injection was given. This is not a complete list of side effects and others may occur.  Call your doctor for medical advice about side effects. You may report side effects to FDA at 5-235-GDP-4579. What other drugs will affect immune globulin? Immune globulin can harm your kidneys, especially if you also use certain medicines for infections, cancer, osteoporosis, organ transplant rejection, bowel disorders, high blood pressure, or pain or arthritis (including Advil, Motrin, and Aleve). Other drugs may affect immune globulin, including prescription and over-the-counter medicines, vitamins, and herbal products. Tell your doctor about all your current medicines and any medicine you start or stop using. Where can I get more information? Your doctor or pharmacist can provide more information about immune globulin. Remember, keep this and all other medicines out of the reach of children, never share your medicines with others, and use this medication only for the indication prescribed. Every effort has been made to ensure that the information provided by [facility] [address] [address] is accurate, up-to-date, and complete, but no guarantee is made to that effect. Drug information contained herein may be time sensitive. [employer] information has been compiled for use by healthcare practitioners and consumers in the [place] and therefore [employer] does not warrant that uses outside of the [place] are appropriate, unless specifically indicated otherwise. [employer] drug information does not endorse drugs, diagnose patients or recommend therapy. [employer] drug information is an informational resource designed to assist licensed healthcare practitioners in caring for their patients and/or to serve consumers viewing this service as a supplement to, and not a substitute for, the expertise, skill, knowledge and judgment of healthcare practitioners.  The absence of a warning for a given drug or drug combination in no way should be construed to indicate that the drug or drug combination is safe, effective or appropriate for any given patient. [facility] does not assume any responsibility for any aspect of healthcare administered with the aid of information [facility] provides. The information contained herein is not intended to cover all possible uses, directions, precautions, warnings, drug interactions, allergic reactions, or adverse effects. If you have questions about the drugs you are taking, check with your doctor, nurse or pharmacist.  Copyright 6566-2017 [facility] [address] [employer]. Version: 4.02. Revision date: 2/26/2021. Care instructions adapted under license by [facility] ([facility] [place]). If you have questions about a medical condition or this instruction, always ask your healthcare professional. [place] [address] any warranty or liability for your use of this information.

## 2024-05-03 ENCOUNTER — HOSPITAL ENCOUNTER (OUTPATIENT)
Dept: INFUSION THERAPY | Age: 49
Setting detail: INFUSION SERIES
Discharge: HOME | End: 2024-05-03
Payer: MEDICARE

## 2024-05-03 VITALS
BODY MASS INDEX: 24.46 KG/M2 | TEMPERATURE: 98 F | OXYGEN SATURATION: 100 % | WEIGHT: 147 LBS | DIASTOLIC BLOOD PRESSURE: 79 MMHG | RESPIRATION RATE: 18 BRPM | HEART RATE: 63 BPM | SYSTOLIC BLOOD PRESSURE: 112 MMHG

## 2024-05-03 DIAGNOSIS — G35 MULTIPLE SCLEROSIS (HCC): Primary | ICD-10-CM

## 2024-05-03 PROCEDURE — 6370000000 HC RX 637 (ALT 250 FOR IP): Performed by: PSYCHIATRY & NEUROLOGY

## 2024-05-03 PROCEDURE — 96365 THER/PROPH/DIAG IV INF INIT: CPT

## 2024-05-03 PROCEDURE — 2580000003 HC RX 258: Performed by: PSYCHIATRY & NEUROLOGY

## 2024-05-03 PROCEDURE — 6360000002 HC RX W HCPCS: Performed by: PSYCHIATRY & NEUROLOGY

## 2024-05-03 PROCEDURE — 96375 TX/PRO/DX INJ NEW DRUG ADDON: CPT

## 2024-05-03 RX ORDER — ONDANSETRON 2 MG/ML
8 INJECTION INTRAMUSCULAR; INTRAVENOUS
OUTPATIENT
Start: 2024-05-10

## 2024-05-03 RX ORDER — EPINEPHRINE 1 MG/ML
0.3 INJECTION, SOLUTION, CONCENTRATE INTRAVENOUS PRN
OUTPATIENT
Start: 2024-05-10

## 2024-05-03 RX ORDER — ACETAMINOPHEN 325 MG/1
650 TABLET ORAL ONCE
Status: COMPLETED | OUTPATIENT
Start: 2024-05-03 | End: 2024-05-03

## 2024-05-03 RX ORDER — DIPHENHYDRAMINE HYDROCHLORIDE 50 MG/ML
50 INJECTION INTRAMUSCULAR; INTRAVENOUS ONCE
Status: COMPLETED | OUTPATIENT
Start: 2024-05-03 | End: 2024-05-03

## 2024-05-03 RX ORDER — HEPARIN 100 UNIT/ML
500 SYRINGE INTRAVENOUS PRN
Status: DISCONTINUED | OUTPATIENT
Start: 2024-05-03 | End: 2024-05-04 | Stop reason: HOSPADM

## 2024-05-03 RX ORDER — HEPARIN 100 UNIT/ML
500 SYRINGE INTRAVENOUS PRN
Status: CANCELLED | OUTPATIENT
Start: 2024-05-10

## 2024-05-03 RX ORDER — SODIUM CHLORIDE 9 MG/ML
5-250 INJECTION, SOLUTION INTRAVENOUS PRN
OUTPATIENT
Start: 2024-05-10

## 2024-05-03 RX ORDER — SODIUM CHLORIDE 0.9 % (FLUSH) 0.9 %
5-40 SYRINGE (ML) INJECTION PRN
Status: DISCONTINUED | OUTPATIENT
Start: 2024-05-03 | End: 2024-05-04 | Stop reason: HOSPADM

## 2024-05-03 RX ORDER — MEPERIDINE HYDROCHLORIDE 25 MG/ML
12.5 INJECTION INTRAMUSCULAR; INTRAVENOUS; SUBCUTANEOUS PRN
OUTPATIENT
Start: 2024-05-10

## 2024-05-03 RX ORDER — DIPHENHYDRAMINE HYDROCHLORIDE 50 MG/ML
50 INJECTION INTRAMUSCULAR; INTRAVENOUS
OUTPATIENT
Start: 2024-05-10

## 2024-05-03 RX ORDER — SODIUM CHLORIDE 9 MG/ML
INJECTION, SOLUTION INTRAVENOUS CONTINUOUS
OUTPATIENT
Start: 2024-05-10

## 2024-05-03 RX ORDER — ACETAMINOPHEN 325 MG/1
650 TABLET ORAL
OUTPATIENT
Start: 2024-05-10

## 2024-05-03 RX ORDER — DIPHENHYDRAMINE HYDROCHLORIDE 50 MG/ML
50 INJECTION INTRAMUSCULAR; INTRAVENOUS ONCE
Status: CANCELLED
Start: 2024-05-10 | End: 2024-05-10

## 2024-05-03 RX ORDER — SODIUM CHLORIDE 0.9 % (FLUSH) 0.9 %
5-40 SYRINGE (ML) INJECTION PRN
Status: CANCELLED | OUTPATIENT
Start: 2024-05-10

## 2024-05-03 RX ORDER — ALBUTEROL SULFATE 90 UG/1
4 AEROSOL, METERED RESPIRATORY (INHALATION) PRN
OUTPATIENT
Start: 2024-05-10

## 2024-05-03 RX ORDER — ACETAMINOPHEN 325 MG/1
650 TABLET ORAL ONCE
Status: CANCELLED | OUTPATIENT
Start: 2024-05-10 | End: 2024-05-10

## 2024-05-03 RX ADMIN — HYDROCORTISONE SODIUM SUCCINATE 100 MG: 100 INJECTION, POWDER, FOR SOLUTION INTRAMUSCULAR; INTRAVENOUS at 07:45

## 2024-05-03 RX ADMIN — ACETAMINOPHEN 650 MG: 325 TABLET ORAL at 07:45

## 2024-05-03 RX ADMIN — IMMUNE GLOBULIN (HUMAN) 25 G: 10 INJECTION INTRAVENOUS; SUBCUTANEOUS at 07:59

## 2024-05-03 RX ADMIN — HEPARIN 500 UNITS: 100 SYRINGE at 09:25

## 2024-05-03 RX ADMIN — SODIUM CHLORIDE, PRESERVATIVE FREE 20 ML: 5 INJECTION INTRAVENOUS at 09:25

## 2024-05-03 RX ADMIN — DIPHENHYDRAMINE HYDROCHLORIDE 50 MG: 50 INJECTION INTRAMUSCULAR; INTRAVENOUS at 07:45

## 2024-05-03 NOTE — FLOWSHEET NOTE
05/03/24 0932   AVS Reviewed   AVS & discharge instructions reviewed with patient and/or representative? Yes   Reviewed instructions with Patient   Level of Understanding Questions answered;Verbalized understanding     PREMEDS GIVEN. IVIG 25 GRAMS GIVEN AS ORDERED AND PT TOLERATED WELL

## 2024-05-10 ENCOUNTER — HOSPITAL ENCOUNTER (OUTPATIENT)
Dept: INFUSION THERAPY | Age: 49
Setting detail: INFUSION SERIES
Discharge: HOME | End: 2024-05-10
Payer: MEDICARE

## 2024-05-10 VITALS
WEIGHT: 149 LBS | TEMPERATURE: 97.9 F | BODY MASS INDEX: 24.79 KG/M2 | HEART RATE: 71 BPM | RESPIRATION RATE: 18 BRPM | DIASTOLIC BLOOD PRESSURE: 67 MMHG | SYSTOLIC BLOOD PRESSURE: 104 MMHG | OXYGEN SATURATION: 99 %

## 2024-05-10 DIAGNOSIS — G35 MULTIPLE SCLEROSIS (HCC): Primary | ICD-10-CM

## 2024-05-10 PROCEDURE — 2580000003 HC RX 258: Performed by: PSYCHIATRY & NEUROLOGY

## 2024-05-10 PROCEDURE — 6360000002 HC RX W HCPCS: Performed by: PSYCHIATRY & NEUROLOGY

## 2024-05-10 PROCEDURE — 96365 THER/PROPH/DIAG IV INF INIT: CPT

## 2024-05-10 PROCEDURE — 96366 THER/PROPH/DIAG IV INF ADDON: CPT

## 2024-05-10 PROCEDURE — 96375 TX/PRO/DX INJ NEW DRUG ADDON: CPT

## 2024-05-10 PROCEDURE — 6370000000 HC RX 637 (ALT 250 FOR IP): Performed by: PSYCHIATRY & NEUROLOGY

## 2024-05-10 RX ORDER — ONDANSETRON 2 MG/ML
8 INJECTION INTRAMUSCULAR; INTRAVENOUS
OUTPATIENT
Start: 2024-05-17

## 2024-05-10 RX ORDER — SODIUM CHLORIDE 0.9 % (FLUSH) 0.9 %
5-40 SYRINGE (ML) INJECTION PRN
OUTPATIENT
Start: 2024-05-17

## 2024-05-10 RX ORDER — DIPHENHYDRAMINE HYDROCHLORIDE 50 MG/ML
50 INJECTION INTRAMUSCULAR; INTRAVENOUS ONCE
Status: COMPLETED | OUTPATIENT
Start: 2024-05-10 | End: 2024-05-10

## 2024-05-10 RX ORDER — EPINEPHRINE 1 MG/ML
0.3 INJECTION, SOLUTION, CONCENTRATE INTRAVENOUS PRN
OUTPATIENT
Start: 2024-05-17

## 2024-05-10 RX ORDER — SODIUM CHLORIDE 9 MG/ML
5-250 INJECTION, SOLUTION INTRAVENOUS PRN
OUTPATIENT
Start: 2024-05-17

## 2024-05-10 RX ORDER — DIPHENHYDRAMINE HYDROCHLORIDE 50 MG/ML
50 INJECTION INTRAMUSCULAR; INTRAVENOUS ONCE
Start: 2024-05-17 | End: 2024-05-17

## 2024-05-10 RX ORDER — ACETAMINOPHEN 325 MG/1
650 TABLET ORAL ONCE
OUTPATIENT
Start: 2024-05-17 | End: 2024-05-17

## 2024-05-10 RX ORDER — ALBUTEROL SULFATE 90 UG/1
4 AEROSOL, METERED RESPIRATORY (INHALATION) PRN
OUTPATIENT
Start: 2024-05-17

## 2024-05-10 RX ORDER — HEPARIN 100 UNIT/ML
500 SYRINGE INTRAVENOUS PRN
Status: DISCONTINUED | OUTPATIENT
Start: 2024-05-10 | End: 2024-05-11 | Stop reason: HOSPADM

## 2024-05-10 RX ORDER — ACETAMINOPHEN 325 MG/1
650 TABLET ORAL ONCE
Status: COMPLETED | OUTPATIENT
Start: 2024-05-10 | End: 2024-05-10

## 2024-05-10 RX ORDER — SODIUM CHLORIDE 9 MG/ML
INJECTION, SOLUTION INTRAVENOUS CONTINUOUS
OUTPATIENT
Start: 2024-05-17

## 2024-05-10 RX ORDER — SODIUM CHLORIDE 0.9 % (FLUSH) 0.9 %
5-40 SYRINGE (ML) INJECTION PRN
Status: DISCONTINUED | OUTPATIENT
Start: 2024-05-10 | End: 2024-05-11 | Stop reason: HOSPADM

## 2024-05-10 RX ORDER — ACETAMINOPHEN 325 MG/1
650 TABLET ORAL
OUTPATIENT
Start: 2024-05-17

## 2024-05-10 RX ORDER — MEPERIDINE HYDROCHLORIDE 25 MG/ML
12.5 INJECTION INTRAMUSCULAR; INTRAVENOUS; SUBCUTANEOUS PRN
OUTPATIENT
Start: 2024-05-17

## 2024-05-10 RX ORDER — HEPARIN 100 UNIT/ML
500 SYRINGE INTRAVENOUS PRN
OUTPATIENT
Start: 2024-05-17

## 2024-05-10 RX ORDER — DIPHENHYDRAMINE HYDROCHLORIDE 50 MG/ML
50 INJECTION INTRAMUSCULAR; INTRAVENOUS
OUTPATIENT
Start: 2024-05-17

## 2024-05-10 RX ADMIN — HYDROCORTISONE SODIUM SUCCINATE 100 MG: 100 INJECTION, POWDER, FOR SOLUTION INTRAMUSCULAR; INTRAVENOUS at 07:44

## 2024-05-10 RX ADMIN — ACETAMINOPHEN 650 MG: 325 TABLET ORAL at 07:44

## 2024-05-10 RX ADMIN — SODIUM CHLORIDE, PRESERVATIVE FREE 10 ML: 5 INJECTION INTRAVENOUS at 07:54

## 2024-05-10 RX ADMIN — IMMUNE GLOBULIN (HUMAN) 25 G: 10 INJECTION INTRAVENOUS; SUBCUTANEOUS at 07:56

## 2024-05-10 RX ADMIN — SODIUM CHLORIDE, PRESERVATIVE FREE 10 ML: 5 INJECTION INTRAVENOUS at 07:46

## 2024-05-10 RX ADMIN — HEPARIN 500 UNITS: 100 SYRINGE at 09:26

## 2024-05-10 RX ADMIN — DIPHENHYDRAMINE HYDROCHLORIDE 50 MG: 50 INJECTION INTRAMUSCULAR; INTRAVENOUS at 07:47

## 2024-05-10 RX ADMIN — SODIUM CHLORIDE, PRESERVATIVE FREE 20 ML: 5 INJECTION INTRAVENOUS at 09:26

## 2024-05-10 NOTE — DISCHARGE INSTRUCTIONS
immune globulin (intravenous and subcutaneous)  Pronunciation:  dinora LACEY foley  Brand:  Gammagard Liquid, Gammaked, Gamunex-C  What is the most important information I should know about immune globulin?  This medicine can cause blood clots.  The risk is highest in older adults or in people who have had blood clots, heart problems, or blood circulation problems. Blood clots are also more likely during long-term bedrest, while using birth control pills or hormone replacement therapy, or while having a central intravenous (IV) catheter in place.  Call your doctor at once if you have chest pain, trouble breathing, fast heartbeats, numbness or weakness, or swelling and warmth or discoloration in an arm or leg.  This medicine can also harm your kidneys, especially if you have kidney disease or if you also use certain medicines. Tell your doctor right away if you have signs of kidney problems, such as swelling, rapid weight gain, and little or no urination.  What is immune globulin?  Immune globulin intravenous and subcutaneous (for injection into a vein or under the skin) is used to treat primary immunodeficiency.  Immune globulin is also used to increase platelets (blood clotting cells) in people with idiopathic thrombocytopenic purpura.  Immune globulin is also used to treat certain debilitating nerve disorders that cause muscle weakness and can affect daily activities.  Immune globulin may also be used for purposes not listed in this medication guide.  What should I discuss with my healthcare provider before using immune globulin?  You should not use this medicine if:  you have had an allergic reaction to an immune globulin or blood product; or  you have immune globulin A (IgA) deficiency with antibody to IgA.  Immune globulin can cause blood clots or kidney problems, especially in older adults or in people with certain conditions. Tell your doctor if you have ever had:  heart problems, blood circulation  your doctor for medical advice about side effects. You may report side effects to FDA at 4-430-XLI-9046.  What other drugs will affect immune globulin?  Immune globulin can harm your kidneys, especially if you also use certain medicines for infections, cancer, osteoporosis, organ transplant rejection, bowel disorders, high blood pressure, or pain or arthritis (including Advil, Motrin, and Aleve).  Other drugs may affect immune globulin, including prescription and over-the-counter medicines, vitamins, and herbal products. Tell your doctor about all your current medicines and any medicine you start or stop using.  Where can I get more information?  Your doctor or pharmacist can provide more information about immune globulin.  Remember, keep this and all other medicines out of the reach of children, never share your medicines with others, and use this medication only for the indication prescribed.   Every effort has been made to ensure that the information provided by [facility]. ('[employer]') is accurate, up-to-date, and complete, but no guarantee is made to that effect. Drug information contained herein may be time sensitive. [employer] information has been compiled for use by healthcare practitioners and consumers in the [place] and therefore [employer] does not warrant that uses outside of the [place] are appropriate, unless specifically indicated otherwise. [employer] drug information does not endorse drugs, diagnose patients or recommend therapy. [employer] drug information is an informational resource designed to assist licensed healthcare practitioners in caring for their patients and/or to serve consumers viewing this service as a supplement to, and not a substitute for, the expertise, skill, knowledge and judgment of healthcare practitioners. The absence of a warning for a given drug or drug combination in no way should be construed to indicate that the drug or drug combination is safe, effective or

## 2024-05-10 NOTE — PROGRESS NOTES
Pre meds administered, folllowed by Gamunex C 25g as ordered. Pt tolerated well.   Next weeks appt changed to Thursday May 16 at 0700 per pt request.

## 2024-05-15 ENCOUNTER — OFFICE VISIT (OUTPATIENT)
Dept: NEUROLOGY | Age: 49
End: 2024-05-15
Payer: MEDICARE

## 2024-05-15 VITALS
HEART RATE: 68 BPM | WEIGHT: 149 LBS | SYSTOLIC BLOOD PRESSURE: 122 MMHG | HEIGHT: 65 IN | DIASTOLIC BLOOD PRESSURE: 80 MMHG | BODY MASS INDEX: 24.83 KG/M2

## 2024-05-15 DIAGNOSIS — M79.7 FIBROMYALGIA: ICD-10-CM

## 2024-05-15 DIAGNOSIS — H46.9 OPTIC NEURITIS: ICD-10-CM

## 2024-05-15 DIAGNOSIS — H53.9 VISUAL DISTURBANCE: ICD-10-CM

## 2024-05-15 DIAGNOSIS — G43.719 INTRACTABLE CHRONIC MIGRAINE WITHOUT AURA AND WITHOUT STATUS MIGRAINOSUS: ICD-10-CM

## 2024-05-15 DIAGNOSIS — G35 MULTIPLE SCLEROSIS (HCC): Primary | ICD-10-CM

## 2024-05-15 DIAGNOSIS — G43.819 OTHER MIGRAINE WITHOUT STATUS MIGRAINOSUS, INTRACTABLE: ICD-10-CM

## 2024-05-15 DIAGNOSIS — R25.1 TREMOR: ICD-10-CM

## 2024-05-15 DIAGNOSIS — G35 EXACERBATION OF MULTIPLE SCLEROSIS (HCC): ICD-10-CM

## 2024-05-15 DIAGNOSIS — M32.9 LUPUS (HCC): ICD-10-CM

## 2024-05-15 DIAGNOSIS — R20.0 NUMBNESS: ICD-10-CM

## 2024-05-15 DIAGNOSIS — R53.1 WEAKNESS: ICD-10-CM

## 2024-05-15 PROCEDURE — 3074F SYST BP LT 130 MM HG: CPT | Performed by: PSYCHIATRY & NEUROLOGY

## 2024-05-15 PROCEDURE — 4004F PT TOBACCO SCREEN RCVD TLK: CPT | Performed by: PSYCHIATRY & NEUROLOGY

## 2024-05-15 PROCEDURE — 99214 OFFICE O/P EST MOD 30 MIN: CPT | Performed by: PSYCHIATRY & NEUROLOGY

## 2024-05-15 PROCEDURE — G8427 DOCREV CUR MEDS BY ELIG CLIN: HCPCS | Performed by: PSYCHIATRY & NEUROLOGY

## 2024-05-15 PROCEDURE — G8420 CALC BMI NORM PARAMETERS: HCPCS | Performed by: PSYCHIATRY & NEUROLOGY

## 2024-05-15 PROCEDURE — 3079F DIAST BP 80-89 MM HG: CPT | Performed by: PSYCHIATRY & NEUROLOGY

## 2024-05-15 RX ORDER — EPINEPHRINE 1 MG/ML
0.3 INJECTION, SOLUTION, CONCENTRATE INTRAVENOUS PRN
OUTPATIENT
Start: 2024-05-15

## 2024-05-15 RX ORDER — DIPHENHYDRAMINE HCL 25 MG
25 TABLET ORAL ONCE
OUTPATIENT
Start: 2024-05-15 | End: 2024-05-15

## 2024-05-15 RX ORDER — ONDANSETRON 2 MG/ML
8 INJECTION INTRAMUSCULAR; INTRAVENOUS
OUTPATIENT
Start: 2024-05-15

## 2024-05-15 RX ORDER — MEPERIDINE HYDROCHLORIDE 50 MG/ML
12.5 INJECTION INTRAMUSCULAR; INTRAVENOUS; SUBCUTANEOUS PRN
OUTPATIENT
Start: 2024-05-15

## 2024-05-15 RX ORDER — ACETAMINOPHEN 325 MG/1
650 TABLET ORAL ONCE
OUTPATIENT
Start: 2024-05-15 | End: 2024-05-15

## 2024-05-15 RX ORDER — ACETAMINOPHEN 325 MG/1
650 TABLET ORAL
OUTPATIENT
Start: 2024-05-15

## 2024-05-15 RX ORDER — ALBUTEROL SULFATE 90 UG/1
4 AEROSOL, METERED RESPIRATORY (INHALATION) PRN
OUTPATIENT
Start: 2024-05-15

## 2024-05-15 RX ORDER — PREDNISONE 5 MG/1
TABLET ORAL
COMMUNITY
Start: 2024-05-07

## 2024-05-15 RX ORDER — DIPHENHYDRAMINE HYDROCHLORIDE 50 MG/ML
50 INJECTION INTRAMUSCULAR; INTRAVENOUS
OUTPATIENT
Start: 2024-05-15

## 2024-05-15 RX ORDER — HEPARIN SODIUM (PORCINE) LOCK FLUSH IV SOLN 100 UNIT/ML 100 UNIT/ML
500 SOLUTION INTRAVENOUS PRN
OUTPATIENT
Start: 2024-05-15

## 2024-05-15 RX ORDER — SODIUM CHLORIDE 0.9 % (FLUSH) 0.9 %
5-40 SYRINGE (ML) INJECTION PRN
OUTPATIENT
Start: 2024-05-15

## 2024-05-15 RX ORDER — SODIUM CHLORIDE 9 MG/ML
INJECTION, SOLUTION INTRAVENOUS CONTINUOUS
OUTPATIENT
Start: 2024-05-15

## 2024-05-15 RX ORDER — FAMOTIDINE 10 MG/ML
20 INJECTION, SOLUTION INTRAVENOUS
OUTPATIENT
Start: 2024-05-15

## 2024-05-15 RX ORDER — SODIUM CHLORIDE 9 MG/ML
5-250 INJECTION, SOLUTION INTRAVENOUS PRN
OUTPATIENT
Start: 2024-05-15

## 2024-05-15 RX ORDER — DRONABINOL 10 MG/1
CAPSULE ORAL
Qty: 180 CAPSULE | Refills: 5 | Status: SHIPPED | OUTPATIENT
Start: 2024-05-15 | End: 2024-06-12

## 2024-05-15 NOTE — PROGRESS NOTES
Chief Complaint   Patient presents with    Multiple Sclerosis     Pt states she has been in so much pain          Ramiro Mendoza is a 48 y.o. year old female who is seen for evaluation of follow for multiple sclerosis following an admission in 5/19 for optic neuritis treated with IV solumedrol. The patient has a history of multiple sclerosis, fibromyalgia and lupus along with chronic headaches was seen at that time for left visual change which began about 2 days prior to admission with eye pain. This was quite similar to her previous episodes of optic neuritis, of which she has had about 4 previous episodes treated with steroids. She was tried on oral steroids as an outpatient but this was not effective and was transferred from outside ER for further management. She has some chronic weakness and numbness and weakness on the left. She does have bowel and bladder incontinence. She was on Avonex. Had not followed up with Dr Chauhan since 2017. She did not follow up in the office since 5/19. On 7/4/20 she was in the ER with generalized body aches. She apparently had some trembling on the right side. She had an MRI of the brain in the ER which revealed no acute ischemic change but did have scattered non enhancing white matter lesions in periventricular and white matter. She was given 1 gm Solumedrol for a possible flair up and told to follow up with neurology. She indicates that she typically does relatively well for a few weeks and then she feels severely fatigued or worsening vision. She had been in a wheelchair in 2017. She had her initial testing in 2017 at [facility] which was significant for non enhancing white matter lesions and a CSF with 6 oligoclonal bands seen only in the CSF. She still feels her face twitching and right sided numbness. He speech is not normal for her as she has to think about things carefully. Her rheumatologist started her on Tecfidera but she felt worse on this. She had been on

## 2024-05-15 NOTE — PROGRESS NOTES
Review of Systems    Constitutional - No fever or chills.  No diaphoresis or significant fatigue.  HENT -  No tinnitus or significant hearing loss.  Eyes - yes sudden vision change or eye pain  Respiratory - no significant shortness of breath or cough  Cardiovascular - no chest pain No palpitations or significant leg swelling  Gastrointestinal - yes abdominal swelling or pain.    Genitourinary - No difficulty urinating, dysuria  Musculoskeletal - yes back pain or myalgia.  Skin - no color change or rash  Neurologic - No seizures.  No lateralizing weakness.  Hematologic - no easy bruising or excessive bleeding.  Psychiatric - no severe anxiety or nervousness.   All other review of systems are negative.

## 2024-05-16 ENCOUNTER — HOSPITAL ENCOUNTER (OUTPATIENT)
Dept: INFUSION THERAPY | Age: 49
Setting detail: INFUSION SERIES
Discharge: HOME | End: 2024-05-16
Payer: MEDICARE

## 2024-05-16 VITALS
HEART RATE: 71 BPM | TEMPERATURE: 97.6 F | WEIGHT: 149 LBS | DIASTOLIC BLOOD PRESSURE: 61 MMHG | OXYGEN SATURATION: 99 % | BODY MASS INDEX: 24.79 KG/M2 | RESPIRATION RATE: 18 BRPM | SYSTOLIC BLOOD PRESSURE: 111 MMHG

## 2024-05-16 DIAGNOSIS — G35 MULTIPLE SCLEROSIS (HCC): Primary | ICD-10-CM

## 2024-05-16 DIAGNOSIS — G35 EXACERBATION OF MULTIPLE SCLEROSIS (HCC): ICD-10-CM

## 2024-05-16 DIAGNOSIS — M32.9 LUPUS (HCC): ICD-10-CM

## 2024-05-16 LAB
ALBUMIN SERPL-MCNC: 3.3 G/DL (ref 3.5–5.2)
ALP SERPL-CCNC: 60 U/L (ref 35–104)
ALT SERPL-CCNC: 17 U/L (ref 5–33)
ANION GAP SERPL CALCULATED.3IONS-SCNC: 8 MMOL/L (ref 7–19)
AST SERPL-CCNC: 24 U/L (ref 5–32)
BASOPHILS # BLD: 0 K/UL (ref 0–0.2)
BASOPHILS NFR BLD: 0.3 % (ref 0–1)
BILIRUB SERPL-MCNC: <0.2 MG/DL (ref 0.2–1.2)
BUN SERPL-MCNC: 5 MG/DL (ref 6–20)
CALCIUM SERPL-MCNC: 8.3 MG/DL (ref 8.6–10)
CHLORIDE SERPL-SCNC: 97 MMOL/L (ref 98–111)
CO2 SERPL-SCNC: 27 MMOL/L (ref 22–29)
CREAT SERPL-MCNC: 0.6 MG/DL (ref 0.5–0.9)
EOSINOPHIL # BLD: 0 K/UL (ref 0–0.6)
EOSINOPHIL NFR BLD: 0.9 % (ref 0–5)
ERYTHROCYTE [DISTWIDTH] IN BLOOD BY AUTOMATED COUNT: 13.4 % (ref 11.5–14.5)
GLUCOSE SERPL-MCNC: 82 MG/DL (ref 74–109)
HCT VFR BLD AUTO: 34.7 % (ref 37–47)
HGB BLD-MCNC: 11.9 G/DL (ref 12–16)
IMM GRANULOCYTES # BLD: 0 K/UL
LYMPHOCYTES # BLD: 1.7 K/UL (ref 1.1–4.5)
LYMPHOCYTES NFR BLD: 52.5 % (ref 20–40)
MCH RBC QN AUTO: 30.7 PG (ref 27–31)
MCHC RBC AUTO-ENTMCNC: 34.3 G/DL (ref 33–37)
MCV RBC AUTO: 89.4 FL (ref 81–99)
MONOCYTES # BLD: 0.4 K/UL (ref 0–0.9)
MONOCYTES NFR BLD: 11.6 % (ref 0–10)
NEUTROPHILS # BLD: 1.1 K/UL (ref 1.5–7.5)
NEUTS SEG NFR BLD: 34.1 % (ref 50–65)
PLATELET # BLD AUTO: 204 K/UL (ref 130–400)
PMV BLD AUTO: 10 FL (ref 9.4–12.3)
POTASSIUM SERPL-SCNC: 3.9 MMOL/L (ref 3.5–5)
PROT SERPL-MCNC: 7.1 G/DL (ref 6.6–8.7)
RBC # BLD AUTO: 3.88 M/UL (ref 4.2–5.4)
SODIUM SERPL-SCNC: 132 MMOL/L (ref 136–145)
WBC # BLD AUTO: 3.2 K/UL (ref 4.8–10.8)

## 2024-05-16 PROCEDURE — 6360000002 HC RX W HCPCS: Performed by: PSYCHIATRY & NEUROLOGY

## 2024-05-16 PROCEDURE — 80053 COMPREHEN METABOLIC PANEL: CPT

## 2024-05-16 PROCEDURE — 6370000000 HC RX 637 (ALT 250 FOR IP): Performed by: PSYCHIATRY & NEUROLOGY

## 2024-05-16 PROCEDURE — 96375 TX/PRO/DX INJ NEW DRUG ADDON: CPT

## 2024-05-16 PROCEDURE — 2580000003 HC RX 258: Performed by: PSYCHIATRY & NEUROLOGY

## 2024-05-16 PROCEDURE — 96365 THER/PROPH/DIAG IV INF INIT: CPT

## 2024-05-16 PROCEDURE — 85025 COMPLETE CBC W/AUTO DIFF WBC: CPT

## 2024-05-16 RX ORDER — EPINEPHRINE 1 MG/ML
0.3 INJECTION, SOLUTION, CONCENTRATE INTRAVENOUS PRN
OUTPATIENT
Start: 2024-05-17

## 2024-05-16 RX ORDER — DIPHENHYDRAMINE HYDROCHLORIDE 50 MG/ML
50 INJECTION INTRAMUSCULAR; INTRAVENOUS ONCE
Start: 2024-05-17 | End: 2024-05-17

## 2024-05-16 RX ORDER — SODIUM CHLORIDE 9 MG/ML
5-250 INJECTION, SOLUTION INTRAVENOUS PRN
OUTPATIENT
Start: 2024-05-17

## 2024-05-16 RX ORDER — HEPARIN 100 UNIT/ML
500 SYRINGE INTRAVENOUS PRN
OUTPATIENT
Start: 2024-05-17

## 2024-05-16 RX ORDER — SODIUM CHLORIDE 0.9 % (FLUSH) 0.9 %
5-40 SYRINGE (ML) INJECTION PRN
Status: DISCONTINUED | OUTPATIENT
Start: 2024-05-16 | End: 2024-05-17 | Stop reason: HOSPADM

## 2024-05-16 RX ORDER — HEPARIN 100 UNIT/ML
500 SYRINGE INTRAVENOUS PRN
Status: DISCONTINUED | OUTPATIENT
Start: 2024-05-16 | End: 2024-05-17 | Stop reason: HOSPADM

## 2024-05-16 RX ORDER — SODIUM CHLORIDE 9 MG/ML
INJECTION, SOLUTION INTRAVENOUS CONTINUOUS
OUTPATIENT
Start: 2024-05-17

## 2024-05-16 RX ORDER — MEPERIDINE HYDROCHLORIDE 25 MG/ML
12.5 INJECTION INTRAMUSCULAR; INTRAVENOUS; SUBCUTANEOUS PRN
OUTPATIENT
Start: 2024-05-17

## 2024-05-16 RX ORDER — RIZATRIPTAN BENZOATE 10 MG/1
TABLET ORAL
Qty: 9 TABLET | Refills: 5 | Status: SHIPPED | OUTPATIENT
Start: 2024-05-16

## 2024-05-16 RX ORDER — ALBUTEROL SULFATE 90 UG/1
4 AEROSOL, METERED RESPIRATORY (INHALATION) PRN
OUTPATIENT
Start: 2024-05-17

## 2024-05-16 RX ORDER — SODIUM CHLORIDE 0.9 % (FLUSH) 0.9 %
5-40 SYRINGE (ML) INJECTION PRN
OUTPATIENT
Start: 2024-05-17

## 2024-05-16 RX ORDER — DIPHENHYDRAMINE HYDROCHLORIDE 50 MG/ML
50 INJECTION INTRAMUSCULAR; INTRAVENOUS ONCE
Status: COMPLETED | OUTPATIENT
Start: 2024-05-16 | End: 2024-05-16

## 2024-05-16 RX ORDER — ACETAMINOPHEN 325 MG/1
650 TABLET ORAL ONCE
Status: COMPLETED | OUTPATIENT
Start: 2024-05-16 | End: 2024-05-16

## 2024-05-16 RX ORDER — ACETAMINOPHEN 325 MG/1
650 TABLET ORAL ONCE
OUTPATIENT
Start: 2024-05-17 | End: 2024-05-17

## 2024-05-16 RX ORDER — DIPHENHYDRAMINE HYDROCHLORIDE 50 MG/ML
50 INJECTION INTRAMUSCULAR; INTRAVENOUS
OUTPATIENT
Start: 2024-05-17

## 2024-05-16 RX ORDER — ONDANSETRON 2 MG/ML
8 INJECTION INTRAMUSCULAR; INTRAVENOUS
OUTPATIENT
Start: 2024-05-17

## 2024-05-16 RX ORDER — ACETAMINOPHEN 325 MG/1
650 TABLET ORAL
OUTPATIENT
Start: 2024-05-17

## 2024-05-16 RX ADMIN — HYDROCORTISONE SODIUM SUCCINATE 100 MG: 100 INJECTION, POWDER, FOR SOLUTION INTRAMUSCULAR; INTRAVENOUS at 07:29

## 2024-05-16 RX ADMIN — ACETAMINOPHEN 650 MG: 325 TABLET ORAL at 07:29

## 2024-05-16 RX ADMIN — IMMUNE GLOBULIN (HUMAN) 25 G: 10 INJECTION INTRAVENOUS; SUBCUTANEOUS at 07:40

## 2024-05-16 RX ADMIN — DIPHENHYDRAMINE HYDROCHLORIDE 50 MG: 50 INJECTION INTRAMUSCULAR; INTRAVENOUS at 07:29

## 2024-05-16 RX ADMIN — HEPARIN 500 UNITS: 100 SYRINGE at 08:51

## 2024-05-16 RX ADMIN — SODIUM CHLORIDE, PRESERVATIVE FREE 20 ML: 5 INJECTION INTRAVENOUS at 08:51

## 2024-05-16 NOTE — TELEPHONE ENCOUNTER
Ramiro Mendoza has requested a refill on her medication.      Last office visit : 5/15/2024   Next office visit : 8/21/2024   Last medication refill :11/7/2023        Requested Prescriptions     Pending Prescriptions Disp Refills    rizatriptan (MAXALT) 10 MG tablet [Pharmacy Med Name: RIZATRIPTAN 10MG TABLETS] 9 tablet 5     Sig: TAKE 1 TABLET BY MOUTH 1 TIME AS NEEDED FOR MIGRAINE. MAX 2 IN 24 HOURS. MAY REPEAT IN 2 HOURS AS NEEDED

## 2024-05-16 NOTE — PROGRESS NOTES
Pt arrived to [facility] and port accessed using sterile technique. Labs drawn from port. Pt premedicated as ordered. IVIG 25g administered. Pt tolerated well.     Electronically signed by Ciera Geogre RN on 5/16/2024 at 8:57 AM

## 2024-05-17 ENCOUNTER — APPOINTMENT (OUTPATIENT)
Dept: INFUSION THERAPY | Age: 49
End: 2024-05-17
Payer: MEDICARE

## 2024-05-20 ENCOUNTER — APPOINTMENT (OUTPATIENT)
Dept: INFUSION THERAPY | Age: 49
End: 2024-05-20
Payer: MEDICARE

## 2024-05-20 ENCOUNTER — HOSPITAL ENCOUNTER (OUTPATIENT)
Dept: INFUSION THERAPY | Age: 49
Setting detail: INFUSION SERIES
Discharge: HOME | End: 2024-05-20
Payer: MEDICARE

## 2024-05-20 VITALS
HEART RATE: 68 BPM | OXYGEN SATURATION: 98 % | TEMPERATURE: 96.8 F | SYSTOLIC BLOOD PRESSURE: 131 MMHG | DIASTOLIC BLOOD PRESSURE: 88 MMHG | WEIGHT: 151 LBS | RESPIRATION RATE: 18 BRPM | BODY MASS INDEX: 25.13 KG/M2

## 2024-05-20 DIAGNOSIS — M32.9 LUPUS (HCC): ICD-10-CM

## 2024-05-20 DIAGNOSIS — G35 EXACERBATION OF MULTIPLE SCLEROSIS (HCC): Primary | ICD-10-CM

## 2024-05-20 PROCEDURE — 6370000000 HC RX 637 (ALT 250 FOR IP): Performed by: PSYCHIATRY & NEUROLOGY

## 2024-05-20 PROCEDURE — 96375 TX/PRO/DX INJ NEW DRUG ADDON: CPT

## 2024-05-20 PROCEDURE — 96366 THER/PROPH/DIAG IV INF ADDON: CPT

## 2024-05-20 PROCEDURE — 2580000003 HC RX 258: Performed by: PSYCHIATRY & NEUROLOGY

## 2024-05-20 PROCEDURE — 96365 THER/PROPH/DIAG IV INF INIT: CPT

## 2024-05-20 PROCEDURE — 6360000002 HC RX W HCPCS: Performed by: PSYCHIATRY & NEUROLOGY

## 2024-05-20 RX ORDER — SODIUM CHLORIDE 9 MG/ML
5-250 INJECTION, SOLUTION INTRAVENOUS PRN
Status: CANCELLED | OUTPATIENT
Start: 2024-05-21

## 2024-05-20 RX ORDER — DIPHENHYDRAMINE HCL 25 MG
25 TABLET ORAL ONCE
Status: CANCELLED | OUTPATIENT
Start: 2024-05-21 | End: 2024-05-21

## 2024-05-20 RX ORDER — MEPERIDINE HYDROCHLORIDE 25 MG/ML
12.5 INJECTION INTRAMUSCULAR; INTRAVENOUS; SUBCUTANEOUS PRN
Status: CANCELLED | OUTPATIENT
Start: 2024-05-21

## 2024-05-20 RX ORDER — ALBUTEROL SULFATE 90 UG/1
4 AEROSOL, METERED RESPIRATORY (INHALATION) PRN
Status: CANCELLED | OUTPATIENT
Start: 2024-05-21

## 2024-05-20 RX ORDER — SODIUM CHLORIDE 9 MG/ML
INJECTION, SOLUTION INTRAVENOUS CONTINUOUS
Status: CANCELLED | OUTPATIENT
Start: 2024-05-21

## 2024-05-20 RX ORDER — SODIUM CHLORIDE 0.9 % (FLUSH) 0.9 %
5-40 SYRINGE (ML) INJECTION PRN
Status: CANCELLED | OUTPATIENT
Start: 2024-05-21

## 2024-05-20 RX ORDER — ACETAMINOPHEN 325 MG/1
650 TABLET ORAL ONCE
Status: CANCELLED | OUTPATIENT
Start: 2024-05-21 | End: 2024-05-21

## 2024-05-20 RX ORDER — DIPHENHYDRAMINE HCL 25 MG
25 TABLET ORAL ONCE
Status: COMPLETED | OUTPATIENT
Start: 2024-05-20 | End: 2024-05-20

## 2024-05-20 RX ORDER — ACETAMINOPHEN 325 MG/1
650 TABLET ORAL ONCE
Status: COMPLETED | OUTPATIENT
Start: 2024-05-20 | End: 2024-05-20

## 2024-05-20 RX ORDER — SODIUM CHLORIDE 0.9 % (FLUSH) 0.9 %
5-40 SYRINGE (ML) INJECTION PRN
Status: DISCONTINUED | OUTPATIENT
Start: 2024-05-20 | End: 2024-05-21 | Stop reason: HOSPADM

## 2024-05-20 RX ORDER — EPINEPHRINE 1 MG/ML
0.3 INJECTION, SOLUTION, CONCENTRATE INTRAVENOUS PRN
Status: CANCELLED | OUTPATIENT
Start: 2024-05-21

## 2024-05-20 RX ORDER — ONDANSETRON 2 MG/ML
8 INJECTION INTRAMUSCULAR; INTRAVENOUS
Status: CANCELLED | OUTPATIENT
Start: 2024-05-21

## 2024-05-20 RX ORDER — HEPARIN 100 UNIT/ML
500 SYRINGE INTRAVENOUS PRN
Status: DISCONTINUED | OUTPATIENT
Start: 2024-05-20 | End: 2024-05-21 | Stop reason: HOSPADM

## 2024-05-20 RX ORDER — ACETAMINOPHEN 325 MG/1
650 TABLET ORAL
Status: CANCELLED | OUTPATIENT
Start: 2024-05-21

## 2024-05-20 RX ORDER — HEPARIN 100 UNIT/ML
500 SYRINGE INTRAVENOUS PRN
Status: CANCELLED | OUTPATIENT
Start: 2024-05-21

## 2024-05-20 RX ORDER — DIPHENHYDRAMINE HYDROCHLORIDE 50 MG/ML
50 INJECTION INTRAMUSCULAR; INTRAVENOUS
Status: CANCELLED | OUTPATIENT
Start: 2024-05-21

## 2024-05-20 RX ADMIN — DIPHENHYDRAMINE HYDROCHLORIDE 25 MG: 25 TABLET ORAL at 06:59

## 2024-05-20 RX ADMIN — SODIUM CHLORIDE, PRESERVATIVE FREE 20 ML: 5 INJECTION INTRAVENOUS at 08:38

## 2024-05-20 RX ADMIN — ACETAMINOPHEN 650 MG: 325 TABLET ORAL at 06:59

## 2024-05-20 RX ADMIN — HYDROCORTISONE SODIUM SUCCINATE 100 MG: 100 INJECTION, POWDER, FOR SOLUTION INTRAMUSCULAR; INTRAVENOUS at 06:59

## 2024-05-20 RX ADMIN — HEPARIN 500 UNITS: 100 SYRINGE at 08:38

## 2024-05-20 RX ADMIN — IMMUNE GLOBULIN (HUMAN) 25 G: 10 INJECTION INTRAVENOUS; SUBCUTANEOUS at 07:15

## 2024-05-20 ASSESSMENT — PAIN SCALES - GENERAL: PAINLEVEL_OUTOF10: 0

## 2024-05-20 NOTE — DISCHARGE INSTRUCTIONS
immune globulin (intravenous and subcutaneous)  Pronunciation:  dinora LACEY foley  Brand:  Gammagard Liquid, Gammaked, Gamunex-C  What is the most important information I should know about immune globulin?  This medicine can cause blood clots.  The risk is highest in older adults or in people who have had blood clots, heart problems, or blood circulation problems. Blood clots are also more likely during long-term bedrest, while using birth control pills or hormone replacement therapy, or while having a central intravenous (IV) catheter in place.  Call your doctor at once if you have chest pain, trouble breathing, fast heartbeats, numbness or weakness, or swelling and warmth or discoloration in an arm or leg.  This medicine can also harm your kidneys, especially if you have kidney disease or if you also use certain medicines. Tell your doctor right away if you have signs of kidney problems, such as swelling, rapid weight gain, and little or no urination.  What is immune globulin?  Immune globulin intravenous and subcutaneous (for injection into a vein or under the skin) is used to treat primary immunodeficiency.  Immune globulin is also used to increase platelets (blood clotting cells) in people with idiopathic thrombocytopenic purpura.  Immune globulin is also used to treat certain debilitating nerve disorders that cause muscle weakness and can affect daily activities.  Immune globulin may also be used for purposes not listed in this medication guide.  What should I discuss with my healthcare provider before using immune globulin?  You should not use this medicine if:  you have had an allergic reaction to an immune globulin or blood product; or  you have immune globulin A (IgA) deficiency with antibody to IgA.  Immune globulin can cause blood clots or kidney problems, especially in older adults or in people with certain conditions. Tell your doctor if you have ever had:  heart problems, blood circulation  appropriate for any given patient. [facility] does not assume any responsibility for any aspect of healthcare administered with the aid of information [facility] provides. The information contained herein is not intended to cover all possible uses, directions, precautions, warnings, drug interactions, allergic reactions, or adverse effects. If you have questions about the drugs you are taking, check with your doctor, nurse or pharmacist.  Copyright 9170-1565 [employer]. Version: 4.02. Revision date: 2/26/2021.  Care instructions adapted under license by [facility]. If you have questions about a medical condition or this instruction, always ask your healthcare professional. Healthwise, [employer] disclaims any warranty or liability for your use of this information.

## 2024-05-21 ENCOUNTER — HOSPITAL ENCOUNTER (OUTPATIENT)
Dept: INFUSION THERAPY | Age: 49
Setting detail: INFUSION SERIES
Discharge: HOME | End: 2024-05-21
Payer: MEDICARE

## 2024-05-21 VITALS
HEART RATE: 58 BPM | OXYGEN SATURATION: 100 % | DIASTOLIC BLOOD PRESSURE: 81 MMHG | SYSTOLIC BLOOD PRESSURE: 107 MMHG | TEMPERATURE: 98.4 F | RESPIRATION RATE: 18 BRPM | WEIGHT: 149 LBS | BODY MASS INDEX: 24.79 KG/M2

## 2024-05-21 DIAGNOSIS — M32.9 LUPUS (HCC): ICD-10-CM

## 2024-05-21 DIAGNOSIS — G35 EXACERBATION OF MULTIPLE SCLEROSIS (HCC): Primary | ICD-10-CM

## 2024-05-21 PROCEDURE — 6360000002 HC RX W HCPCS: Performed by: PSYCHIATRY & NEUROLOGY

## 2024-05-21 PROCEDURE — 96365 THER/PROPH/DIAG IV INF INIT: CPT

## 2024-05-21 PROCEDURE — 6370000000 HC RX 637 (ALT 250 FOR IP): Performed by: PSYCHIATRY & NEUROLOGY

## 2024-05-21 PROCEDURE — 96375 TX/PRO/DX INJ NEW DRUG ADDON: CPT

## 2024-05-21 PROCEDURE — 2580000003 HC RX 258: Performed by: PSYCHIATRY & NEUROLOGY

## 2024-05-21 RX ORDER — SODIUM CHLORIDE 9 MG/ML
5-250 INJECTION, SOLUTION INTRAVENOUS PRN
Status: DISCONTINUED | OUTPATIENT
Start: 2024-05-21 | End: 2024-05-22 | Stop reason: HOSPADM

## 2024-05-21 RX ORDER — SODIUM CHLORIDE 9 MG/ML
5-250 INJECTION, SOLUTION INTRAVENOUS PRN
Status: CANCELLED | OUTPATIENT
Start: 2024-05-22

## 2024-05-21 RX ORDER — DIPHENHYDRAMINE HYDROCHLORIDE 50 MG/ML
50 INJECTION INTRAMUSCULAR; INTRAVENOUS
Status: CANCELLED | OUTPATIENT
Start: 2024-05-22

## 2024-05-21 RX ORDER — HEPARIN 100 UNIT/ML
500 SYRINGE INTRAVENOUS PRN
Status: CANCELLED | OUTPATIENT
Start: 2024-05-22

## 2024-05-21 RX ORDER — DIPHENHYDRAMINE HCL 25 MG
25 TABLET ORAL ONCE
Status: CANCELLED | OUTPATIENT
Start: 2024-05-22 | End: 2024-05-22

## 2024-05-21 RX ORDER — ALBUTEROL SULFATE 90 UG/1
4 AEROSOL, METERED RESPIRATORY (INHALATION) PRN
Status: CANCELLED | OUTPATIENT
Start: 2024-05-22

## 2024-05-21 RX ORDER — HEPARIN 100 UNIT/ML
500 SYRINGE INTRAVENOUS PRN
Status: DISCONTINUED | OUTPATIENT
Start: 2024-05-21 | End: 2024-05-22 | Stop reason: HOSPADM

## 2024-05-21 RX ORDER — ACETAMINOPHEN 325 MG/1
650 TABLET ORAL ONCE
Status: COMPLETED | OUTPATIENT
Start: 2024-05-21 | End: 2024-05-21

## 2024-05-21 RX ORDER — SODIUM CHLORIDE 0.9 % (FLUSH) 0.9 %
5-40 SYRINGE (ML) INJECTION PRN
Status: CANCELLED | OUTPATIENT
Start: 2024-05-22

## 2024-05-21 RX ORDER — SODIUM CHLORIDE 0.9 % (FLUSH) 0.9 %
5-40 SYRINGE (ML) INJECTION PRN
Status: DISCONTINUED | OUTPATIENT
Start: 2024-05-21 | End: 2024-05-22 | Stop reason: HOSPADM

## 2024-05-21 RX ORDER — EPINEPHRINE 1 MG/ML
0.3 INJECTION, SOLUTION, CONCENTRATE INTRAVENOUS PRN
Status: CANCELLED | OUTPATIENT
Start: 2024-05-22

## 2024-05-21 RX ORDER — SODIUM CHLORIDE 9 MG/ML
INJECTION, SOLUTION INTRAVENOUS CONTINUOUS
Status: CANCELLED | OUTPATIENT
Start: 2024-05-22

## 2024-05-21 RX ORDER — ACETAMINOPHEN 325 MG/1
650 TABLET ORAL ONCE
Status: CANCELLED | OUTPATIENT
Start: 2024-05-22 | End: 2024-05-22

## 2024-05-21 RX ORDER — ONDANSETRON 2 MG/ML
8 INJECTION INTRAMUSCULAR; INTRAVENOUS
Status: CANCELLED | OUTPATIENT
Start: 2024-05-22

## 2024-05-21 RX ORDER — MEPERIDINE HYDROCHLORIDE 25 MG/ML
12.5 INJECTION INTRAMUSCULAR; INTRAVENOUS; SUBCUTANEOUS PRN
Status: CANCELLED | OUTPATIENT
Start: 2024-05-22

## 2024-05-21 RX ORDER — DIPHENHYDRAMINE HCL 25 MG
25 TABLET ORAL ONCE
Status: COMPLETED | OUTPATIENT
Start: 2024-05-21 | End: 2024-05-21

## 2024-05-21 RX ORDER — ACETAMINOPHEN 325 MG/1
650 TABLET ORAL
Status: CANCELLED | OUTPATIENT
Start: 2024-05-22

## 2024-05-21 RX ADMIN — DIPHENHYDRAMINE HYDROCHLORIDE 25 MG: 25 TABLET ORAL at 08:03

## 2024-05-21 RX ADMIN — ACETAMINOPHEN 650 MG: 325 TABLET ORAL at 08:03

## 2024-05-21 RX ADMIN — HYDROCORTISONE SODIUM SUCCINATE 100 MG: 100 INJECTION, POWDER, FOR SOLUTION INTRAMUSCULAR; INTRAVENOUS at 08:03

## 2024-05-21 RX ADMIN — IMMUNE GLOBULIN (HUMAN) 25 G: 10 INJECTION INTRAVENOUS; SUBCUTANEOUS at 08:21

## 2024-05-21 RX ADMIN — HEPARIN 500 UNITS: 100 SYRINGE at 09:42

## 2024-05-21 RX ADMIN — SODIUM CHLORIDE, PRESERVATIVE FREE 10 ML: 5 INJECTION INTRAVENOUS at 09:42

## 2024-05-21 NOTE — PROGRESS NOTES
Port accessed using sterile technique. Pt premedicated as ordered. IVIG administered. Pt tolerated well. Pt left accessed for tx tomorrow

## 2024-05-22 ENCOUNTER — TELEPHONE (OUTPATIENT)
Dept: NEUROLOGY | Age: 49
End: 2024-05-22

## 2024-05-22 ENCOUNTER — HOSPITAL ENCOUNTER (OUTPATIENT)
Dept: INFUSION THERAPY | Age: 49
Setting detail: INFUSION SERIES
Discharge: HOME | End: 2024-05-22
Payer: MEDICARE

## 2024-05-22 VITALS
RESPIRATION RATE: 18 BRPM | HEART RATE: 66 BPM | TEMPERATURE: 98.2 F | SYSTOLIC BLOOD PRESSURE: 117 MMHG | DIASTOLIC BLOOD PRESSURE: 73 MMHG | OXYGEN SATURATION: 98 %

## 2024-05-22 DIAGNOSIS — G35 MULTIPLE SCLEROSIS (HCC): ICD-10-CM

## 2024-05-22 DIAGNOSIS — M32.9 LUPUS (HCC): ICD-10-CM

## 2024-05-22 DIAGNOSIS — M79.7 FIBROMYALGIA: ICD-10-CM

## 2024-05-22 DIAGNOSIS — M54.30 SCIATICA, UNSPECIFIED LATERALITY: ICD-10-CM

## 2024-05-22 DIAGNOSIS — G35 EXACERBATION OF MULTIPLE SCLEROSIS (HCC): Primary | ICD-10-CM

## 2024-05-22 DIAGNOSIS — G43.719 INTRACTABLE CHRONIC MIGRAINE WITHOUT AURA AND WITHOUT STATUS MIGRAINOSUS: ICD-10-CM

## 2024-05-22 PROCEDURE — 96365 THER/PROPH/DIAG IV INF INIT: CPT

## 2024-05-22 PROCEDURE — 96366 THER/PROPH/DIAG IV INF ADDON: CPT

## 2024-05-22 PROCEDURE — 6370000000 HC RX 637 (ALT 250 FOR IP): Performed by: PSYCHIATRY & NEUROLOGY

## 2024-05-22 PROCEDURE — 2580000003 HC RX 258: Performed by: PSYCHIATRY & NEUROLOGY

## 2024-05-22 PROCEDURE — 96375 TX/PRO/DX INJ NEW DRUG ADDON: CPT

## 2024-05-22 PROCEDURE — 6360000002 HC RX W HCPCS: Performed by: PSYCHIATRY & NEUROLOGY

## 2024-05-22 RX ORDER — ACETAMINOPHEN 325 MG/1
650 TABLET ORAL ONCE
Status: CANCELLED | OUTPATIENT
Start: 2024-05-23 | End: 2024-05-23

## 2024-05-22 RX ORDER — DIPHENHYDRAMINE HCL 25 MG
25 TABLET ORAL ONCE
Status: COMPLETED | OUTPATIENT
Start: 2024-05-22 | End: 2024-05-22

## 2024-05-22 RX ORDER — SODIUM CHLORIDE 9 MG/ML
5-250 INJECTION, SOLUTION INTRAVENOUS PRN
Status: CANCELLED | OUTPATIENT
Start: 2024-05-23

## 2024-05-22 RX ORDER — OXYCODONE HYDROCHLORIDE 10 MG/1
10 TABLET ORAL EVERY 6 HOURS PRN
Qty: 120 TABLET | Refills: 0 | Status: SHIPPED | OUTPATIENT
Start: 2024-05-28 | End: 2024-06-27

## 2024-05-22 RX ORDER — DIPHENHYDRAMINE HYDROCHLORIDE 50 MG/ML
50 INJECTION INTRAMUSCULAR; INTRAVENOUS
Status: CANCELLED | OUTPATIENT
Start: 2024-05-23

## 2024-05-22 RX ORDER — SODIUM CHLORIDE 0.9 % (FLUSH) 0.9 %
5-40 SYRINGE (ML) INJECTION PRN
Status: DISCONTINUED | OUTPATIENT
Start: 2024-05-22 | End: 2024-05-23 | Stop reason: HOSPADM

## 2024-05-22 RX ORDER — HEPARIN 100 UNIT/ML
500 SYRINGE INTRAVENOUS PRN
Status: DISCONTINUED | OUTPATIENT
Start: 2024-05-22 | End: 2024-05-23 | Stop reason: HOSPADM

## 2024-05-22 RX ORDER — ONDANSETRON 2 MG/ML
8 INJECTION INTRAMUSCULAR; INTRAVENOUS
Status: CANCELLED | OUTPATIENT
Start: 2024-05-23

## 2024-05-22 RX ORDER — DIPHENHYDRAMINE HCL 25 MG
25 TABLET ORAL ONCE
Status: CANCELLED | OUTPATIENT
Start: 2024-05-23 | End: 2024-05-23

## 2024-05-22 RX ORDER — EPINEPHRINE 1 MG/ML
0.3 INJECTION, SOLUTION, CONCENTRATE INTRAVENOUS PRN
Status: CANCELLED | OUTPATIENT
Start: 2024-05-23

## 2024-05-22 RX ORDER — MEPERIDINE HYDROCHLORIDE 25 MG/ML
12.5 INJECTION INTRAMUSCULAR; INTRAVENOUS; SUBCUTANEOUS PRN
Status: CANCELLED | OUTPATIENT
Start: 2024-05-23

## 2024-05-22 RX ORDER — ACETAMINOPHEN 325 MG/1
650 TABLET ORAL
Status: CANCELLED | OUTPATIENT
Start: 2024-05-23

## 2024-05-22 RX ORDER — ACETAMINOPHEN 325 MG/1
650 TABLET ORAL ONCE
Status: COMPLETED | OUTPATIENT
Start: 2024-05-22 | End: 2024-05-22

## 2024-05-22 RX ORDER — ALBUTEROL SULFATE 90 UG/1
4 AEROSOL, METERED RESPIRATORY (INHALATION) PRN
Status: CANCELLED | OUTPATIENT
Start: 2024-05-23

## 2024-05-22 RX ORDER — SODIUM CHLORIDE 0.9 % (FLUSH) 0.9 %
5-40 SYRINGE (ML) INJECTION PRN
Status: CANCELLED | OUTPATIENT
Start: 2024-05-23

## 2024-05-22 RX ORDER — HEPARIN 100 UNIT/ML
500 SYRINGE INTRAVENOUS PRN
Status: CANCELLED | OUTPATIENT
Start: 2024-05-23

## 2024-05-22 RX ORDER — SODIUM CHLORIDE 9 MG/ML
INJECTION, SOLUTION INTRAVENOUS CONTINUOUS
Status: CANCELLED | OUTPATIENT
Start: 2024-05-23

## 2024-05-22 RX ADMIN — IMMUNE GLOBULIN (HUMAN) 25 G: 10 INJECTION INTRAVENOUS; SUBCUTANEOUS at 07:45

## 2024-05-22 RX ADMIN — DIPHENHYDRAMINE HYDROCHLORIDE 25 MG: 25 TABLET ORAL at 07:31

## 2024-05-22 RX ADMIN — SODIUM CHLORIDE, PRESERVATIVE FREE 10 ML: 5 INJECTION INTRAVENOUS at 07:25

## 2024-05-22 RX ADMIN — SODIUM CHLORIDE, PRESERVATIVE FREE 10 ML: 5 INJECTION INTRAVENOUS at 07:34

## 2024-05-22 RX ADMIN — SODIUM CHLORIDE, PRESERVATIVE FREE 20 ML: 5 INJECTION INTRAVENOUS at 09:13

## 2024-05-22 RX ADMIN — HYDROCORTISONE SODIUM SUCCINATE 100 MG: 100 INJECTION, POWDER, FOR SOLUTION INTRAMUSCULAR; INTRAVENOUS at 07:32

## 2024-05-22 RX ADMIN — ACETAMINOPHEN 650 MG: 325 TABLET ORAL at 07:31

## 2024-05-22 RX ADMIN — HEPARIN 500 UNITS: 100 SYRINGE at 09:13

## 2024-05-22 NOTE — TELEPHONE ENCOUNTER
Ramiro Mendoza has requested a refill on her medication.      Last office visit : 5/15/2024   Next office visit : 8/21/2024   Last medication refill : 4/28/24  Daniel : up to date       Requested Prescriptions     Pending Prescriptions Disp Refills    oxyCODONE HCl (OXY-IR) 10 MG immediate release tablet 120 tablet 0     Sig: Take 1 tablet by mouth every 6 hours as needed for Pain for up to 30 days. Intended supply: 30 days Max Daily Amount: 40 mg

## 2024-05-22 NOTE — PROGRESS NOTES
Pt arrived to [facility] with port accessed from yesterday's treatment. Port flushed and brisk blood return noted. Premeds given. Pt received Gamunex-C 25g per order. Port kept accessed for tomorrow's infusion. Pt tolerated well.

## 2024-05-22 NOTE — DISCHARGE INSTRUCTIONS
your doctor for medical advice about side effects. You may report side effects to FDA at 4-347-BVP-9063.  What other drugs will affect immune globulin?  Immune globulin can harm your kidneys, especially if you also use certain medicines for infections, cancer, osteoporosis, organ transplant rejection, bowel disorders, high blood pressure, or pain or arthritis (including Advil, Motrin, and Aleve).  Other drugs may affect immune globulin, including prescription and over-the-counter medicines, vitamins, and herbal products. Tell your doctor about all your current medicines and any medicine you start or stop using.  Where can I get more information?  Your doctor or pharmacist can provide more information about immune globulin.  Remember, keep this and all other medicines out of the reach of children, never share your medicines with others, and use this medication only for the indication prescribed.   Every effort has been made to ensure that the information provided by [employer]') is accurate, up-to-date, and complete, but no guarantee is made to that effect. Drug information contained herein may be time sensitive. [employer] information has been compiled for use by healthcare practitioners and consumers in the [place] and therefore [employer] does not warrant that uses outside of the [place] are appropriate, unless specifically indicated otherwise. [employer] drug information does not endorse drugs, diagnose patients or recommend therapy. [employer] drug information is an informational resource designed to assist licensed healthcare practitioners in caring for their patients and/or to serve consumers viewing this service as a supplement to, and not a substitute for, the expertise, skill, knowledge and judgment of healthcare practitioners. The absence of a warning for a given drug or drug combination in no way should be construed to indicate that the drug or drug combination is safe, effective or

## 2024-05-22 NOTE — TELEPHONE ENCOUNTER
jonas mckay (Key: [id number])  PA Case ID #: [id number]  Need Help? Call us at [phone]  Status  Sent to [facility] today  Drug  droNABinol 10MG capsules    Form  [employer] Medicare Part D Electronic Prior Authorization Form (2017 NCPDP)

## 2024-05-23 ENCOUNTER — HOSPITAL ENCOUNTER (OUTPATIENT)
Dept: INFUSION THERAPY | Age: 49
Setting detail: INFUSION SERIES
Discharge: HOME | End: 2024-05-23
Payer: MEDICARE

## 2024-05-23 VITALS
DIASTOLIC BLOOD PRESSURE: 81 MMHG | TEMPERATURE: 97.6 F | SYSTOLIC BLOOD PRESSURE: 122 MMHG | OXYGEN SATURATION: 100 % | HEART RATE: 55 BPM | RESPIRATION RATE: 18 BRPM

## 2024-05-23 DIAGNOSIS — G35 EXACERBATION OF MULTIPLE SCLEROSIS (HCC): Primary | ICD-10-CM

## 2024-05-23 DIAGNOSIS — M32.9 LUPUS (HCC): ICD-10-CM

## 2024-05-23 PROCEDURE — 6360000002 HC RX W HCPCS: Performed by: PSYCHIATRY & NEUROLOGY

## 2024-05-23 PROCEDURE — 6370000000 HC RX 637 (ALT 250 FOR IP): Performed by: PSYCHIATRY & NEUROLOGY

## 2024-05-23 PROCEDURE — 96365 THER/PROPH/DIAG IV INF INIT: CPT

## 2024-05-23 PROCEDURE — 96375 TX/PRO/DX INJ NEW DRUG ADDON: CPT

## 2024-05-23 RX ORDER — SODIUM CHLORIDE 9 MG/ML
5-250 INJECTION, SOLUTION INTRAVENOUS PRN
OUTPATIENT
Start: 2024-05-24

## 2024-05-23 RX ORDER — ALBUTEROL SULFATE 90 UG/1
4 AEROSOL, METERED RESPIRATORY (INHALATION) PRN
OUTPATIENT
Start: 2024-05-24

## 2024-05-23 RX ORDER — DIPHENHYDRAMINE HCL 25 MG
25 TABLET ORAL ONCE
Status: CANCELLED | OUTPATIENT
Start: 2024-05-24 | End: 2024-05-24

## 2024-05-23 RX ORDER — DIPHENHYDRAMINE HYDROCHLORIDE 50 MG/ML
50 INJECTION INTRAMUSCULAR; INTRAVENOUS
OUTPATIENT
Start: 2024-05-24

## 2024-05-23 RX ORDER — ACETAMINOPHEN 325 MG/1
650 TABLET ORAL ONCE
Status: CANCELLED | OUTPATIENT
Start: 2024-05-24 | End: 2024-05-24

## 2024-05-23 RX ORDER — MEPERIDINE HYDROCHLORIDE 25 MG/ML
12.5 INJECTION INTRAMUSCULAR; INTRAVENOUS; SUBCUTANEOUS PRN
OUTPATIENT
Start: 2024-05-24

## 2024-05-23 RX ORDER — ACETAMINOPHEN 325 MG/1
650 TABLET ORAL
OUTPATIENT
Start: 2024-05-24

## 2024-05-23 RX ORDER — HEPARIN 100 UNIT/ML
500 SYRINGE INTRAVENOUS PRN
Status: CANCELLED | OUTPATIENT
Start: 2024-05-24

## 2024-05-23 RX ORDER — SODIUM CHLORIDE 9 MG/ML
INJECTION, SOLUTION INTRAVENOUS CONTINUOUS
OUTPATIENT
Start: 2024-05-24

## 2024-05-23 RX ORDER — HEPARIN 100 UNIT/ML
500 SYRINGE INTRAVENOUS PRN
Status: DISCONTINUED | OUTPATIENT
Start: 2024-05-23 | End: 2024-05-24 | Stop reason: HOSPADM

## 2024-05-23 RX ORDER — SODIUM CHLORIDE 0.9 % (FLUSH) 0.9 %
5-40 SYRINGE (ML) INJECTION PRN
OUTPATIENT
Start: 2024-05-24

## 2024-05-23 RX ORDER — SODIUM CHLORIDE 9 MG/ML
5-250 INJECTION, SOLUTION INTRAVENOUS PRN
Status: CANCELLED | OUTPATIENT
Start: 2024-05-24

## 2024-05-23 RX ORDER — SODIUM CHLORIDE 9 MG/ML
5-250 INJECTION, SOLUTION INTRAVENOUS PRN
Status: DISCONTINUED | OUTPATIENT
Start: 2024-05-23 | End: 2024-05-24 | Stop reason: HOSPADM

## 2024-05-23 RX ORDER — ACETAMINOPHEN 325 MG/1
650 TABLET ORAL ONCE
Status: COMPLETED | OUTPATIENT
Start: 2024-05-23 | End: 2024-05-23

## 2024-05-23 RX ORDER — ONDANSETRON 2 MG/ML
8 INJECTION INTRAMUSCULAR; INTRAVENOUS
OUTPATIENT
Start: 2024-05-24

## 2024-05-23 RX ORDER — DIPHENHYDRAMINE HCL 25 MG
25 TABLET ORAL ONCE
Status: COMPLETED | OUTPATIENT
Start: 2024-05-23 | End: 2024-05-23

## 2024-05-23 RX ORDER — EPINEPHRINE 1 MG/ML
0.3 INJECTION, SOLUTION, CONCENTRATE INTRAVENOUS PRN
OUTPATIENT
Start: 2024-05-24

## 2024-05-23 RX ADMIN — DIPHENHYDRAMINE HYDROCHLORIDE 25 MG: 25 TABLET ORAL at 07:31

## 2024-05-23 RX ADMIN — HEPARIN 500 UNITS: 100 SYRINGE at 09:04

## 2024-05-23 RX ADMIN — ACETAMINOPHEN 650 MG: 325 TABLET ORAL at 07:30

## 2024-05-23 RX ADMIN — HYDROCORTISONE SODIUM SUCCINATE 100 MG: 100 INJECTION, POWDER, FOR SOLUTION INTRAMUSCULAR; INTRAVENOUS at 07:31

## 2024-05-23 RX ADMIN — IMMUNE GLOBULIN (HUMAN) 25 G: 10 INJECTION INTRAVENOUS; SUBCUTANEOUS at 07:38

## 2024-05-23 NOTE — PROGRESS NOTES
IVIG 25g administered. Pt tolerated well. Port left in place for tx tomorrow.     Electronically signed by Ciera George RN on 5/23/2024 at 9:30 AM

## 2024-05-24 ENCOUNTER — HOSPITAL ENCOUNTER (OUTPATIENT)
Dept: INFUSION THERAPY | Age: 49
Setting detail: INFUSION SERIES
Discharge: HOME | End: 2024-05-24
Payer: MEDICARE

## 2024-05-24 VITALS
TEMPERATURE: 97.2 F | HEART RATE: 59 BPM | DIASTOLIC BLOOD PRESSURE: 74 MMHG | SYSTOLIC BLOOD PRESSURE: 125 MMHG | OXYGEN SATURATION: 100 % | RESPIRATION RATE: 18 BRPM

## 2024-05-24 DIAGNOSIS — M32.9 LUPUS (HCC): ICD-10-CM

## 2024-05-24 DIAGNOSIS — G35 EXACERBATION OF MULTIPLE SCLEROSIS (HCC): Primary | ICD-10-CM

## 2024-05-24 PROCEDURE — 96375 TX/PRO/DX INJ NEW DRUG ADDON: CPT

## 2024-05-24 PROCEDURE — 6360000002 HC RX W HCPCS: Performed by: PSYCHIATRY & NEUROLOGY

## 2024-05-24 PROCEDURE — 6370000000 HC RX 637 (ALT 250 FOR IP): Performed by: PSYCHIATRY & NEUROLOGY

## 2024-05-24 PROCEDURE — 96365 THER/PROPH/DIAG IV INF INIT: CPT

## 2024-05-24 RX ORDER — EPINEPHRINE 1 MG/ML
0.3 INJECTION, SOLUTION, CONCENTRATE INTRAVENOUS PRN
OUTPATIENT
Start: 2024-06-13

## 2024-05-24 RX ORDER — MEPERIDINE HYDROCHLORIDE 25 MG/ML
12.5 INJECTION INTRAMUSCULAR; INTRAVENOUS; SUBCUTANEOUS PRN
OUTPATIENT
Start: 2024-06-13

## 2024-05-24 RX ORDER — SODIUM CHLORIDE 9 MG/ML
INJECTION, SOLUTION INTRAVENOUS CONTINUOUS
OUTPATIENT
Start: 2024-06-13

## 2024-05-24 RX ORDER — ACETAMINOPHEN 325 MG/1
650 TABLET ORAL ONCE
Status: COMPLETED | OUTPATIENT
Start: 2024-05-24 | End: 2024-05-24

## 2024-05-24 RX ORDER — DIPHENHYDRAMINE HCL 25 MG
25 TABLET ORAL ONCE
Status: CANCELLED | OUTPATIENT
Start: 2024-06-13 | End: 2024-06-13

## 2024-05-24 RX ORDER — SODIUM CHLORIDE 9 MG/ML
5-250 INJECTION, SOLUTION INTRAVENOUS PRN
OUTPATIENT
Start: 2024-06-13

## 2024-05-24 RX ORDER — SODIUM CHLORIDE 9 MG/ML
5-250 INJECTION, SOLUTION INTRAVENOUS PRN
Status: DISCONTINUED | OUTPATIENT
Start: 2024-05-24 | End: 2024-05-25 | Stop reason: HOSPADM

## 2024-05-24 RX ORDER — HEPARIN 100 UNIT/ML
500 SYRINGE INTRAVENOUS PRN
OUTPATIENT
Start: 2024-06-13

## 2024-05-24 RX ORDER — ALBUTEROL SULFATE 90 UG/1
4 AEROSOL, METERED RESPIRATORY (INHALATION) PRN
OUTPATIENT
Start: 2024-06-13

## 2024-05-24 RX ORDER — ONDANSETRON 2 MG/ML
8 INJECTION INTRAMUSCULAR; INTRAVENOUS
OUTPATIENT
Start: 2024-06-13

## 2024-05-24 RX ORDER — HEPARIN 100 UNIT/ML
500 SYRINGE INTRAVENOUS PRN
Status: DISCONTINUED | OUTPATIENT
Start: 2024-05-24 | End: 2024-05-25 | Stop reason: HOSPADM

## 2024-05-24 RX ORDER — SODIUM CHLORIDE 0.9 % (FLUSH) 0.9 %
5-40 SYRINGE (ML) INJECTION PRN
OUTPATIENT
Start: 2024-06-13

## 2024-05-24 RX ORDER — DIPHENHYDRAMINE HYDROCHLORIDE 50 MG/ML
50 INJECTION INTRAMUSCULAR; INTRAVENOUS
OUTPATIENT
Start: 2024-06-13

## 2024-05-24 RX ORDER — ACETAMINOPHEN 325 MG/1
650 TABLET ORAL ONCE
Status: CANCELLED | OUTPATIENT
Start: 2024-06-13 | End: 2024-06-13

## 2024-05-24 RX ORDER — DIPHENHYDRAMINE HCL 25 MG
25 TABLET ORAL ONCE
Status: COMPLETED | OUTPATIENT
Start: 2024-05-24 | End: 2024-05-24

## 2024-05-24 RX ORDER — ACETAMINOPHEN 325 MG/1
650 TABLET ORAL
OUTPATIENT
Start: 2024-06-13

## 2024-05-24 RX ADMIN — HYDROCORTISONE SODIUM SUCCINATE 100 MG: 100 INJECTION, POWDER, FOR SOLUTION INTRAMUSCULAR; INTRAVENOUS at 07:05

## 2024-05-24 RX ADMIN — ACETAMINOPHEN 650 MG: 325 TABLET ORAL at 07:05

## 2024-05-24 RX ADMIN — IMMUNE GLOBULIN (HUMAN) 25 G: 10 INJECTION INTRAVENOUS; SUBCUTANEOUS at 07:21

## 2024-05-24 RX ADMIN — HEPARIN 500 UNITS: 100 SYRINGE at 08:43

## 2024-05-24 RX ADMIN — DIPHENHYDRAMINE HYDROCHLORIDE 25 MG: 25 TABLET ORAL at 07:05

## 2024-05-24 NOTE — PROGRESS NOTES
IVIG 25g administered. Pt tolerated well.     Electronically signed by Ciera George RN on 5/24/2024 at 8:47 AM

## 2024-05-24 NOTE — TELEPHONE ENCOUNTER
jonas mckay (Key: [id number])  PA Case ID #: [id number]  Need Help? Call us at [phone]  Outcome  Approved on May 22  Request Reference Number: [id number]. DRONABINOL CAP 10MG is approved through 12/31/2024. Your patient may now fill this prescription and it will be covered.  Authorization Expiration Date: 12/31/2024  Drug  droNABinol 10MG capsules    Form  [employer] Medicare Part D Electronic Prior Authori

## 2024-05-31 ENCOUNTER — HOSPITAL ENCOUNTER (OUTPATIENT)
Dept: INFUSION THERAPY | Age: 49
Setting detail: INFUSION SERIES
Discharge: HOME | End: 2024-05-31
Payer: MEDICARE

## 2024-05-31 VITALS
WEIGHT: 155 LBS | HEART RATE: 65 BPM | SYSTOLIC BLOOD PRESSURE: 124 MMHG | DIASTOLIC BLOOD PRESSURE: 78 MMHG | OXYGEN SATURATION: 100 % | BODY MASS INDEX: 25.79 KG/M2 | TEMPERATURE: 97.4 F | RESPIRATION RATE: 18 BRPM

## 2024-05-31 DIAGNOSIS — G35 MULTIPLE SCLEROSIS (HCC): Primary | ICD-10-CM

## 2024-05-31 PROCEDURE — 96365 THER/PROPH/DIAG IV INF INIT: CPT

## 2024-05-31 PROCEDURE — 6360000002 HC RX W HCPCS: Performed by: PSYCHIATRY & NEUROLOGY

## 2024-05-31 PROCEDURE — 96366 THER/PROPH/DIAG IV INF ADDON: CPT

## 2024-05-31 PROCEDURE — 6370000000 HC RX 637 (ALT 250 FOR IP): Performed by: PSYCHIATRY & NEUROLOGY

## 2024-05-31 PROCEDURE — 96375 TX/PRO/DX INJ NEW DRUG ADDON: CPT

## 2024-05-31 RX ORDER — ONDANSETRON 2 MG/ML
8 INJECTION INTRAMUSCULAR; INTRAVENOUS
OUTPATIENT
Start: 2024-06-07

## 2024-05-31 RX ORDER — SODIUM CHLORIDE 9 MG/ML
5-250 INJECTION, SOLUTION INTRAVENOUS PRN
OUTPATIENT
Start: 2024-06-07

## 2024-05-31 RX ORDER — SODIUM CHLORIDE 9 MG/ML
INJECTION, SOLUTION INTRAVENOUS CONTINUOUS
OUTPATIENT
Start: 2024-06-07

## 2024-05-31 RX ORDER — ACETAMINOPHEN 325 MG/1
650 TABLET ORAL ONCE
Status: CANCELLED | OUTPATIENT
Start: 2024-06-07 | End: 2024-06-07

## 2024-05-31 RX ORDER — SODIUM CHLORIDE 0.9 % (FLUSH) 0.9 %
5-40 SYRINGE (ML) INJECTION PRN
Status: CANCELLED | OUTPATIENT
Start: 2024-06-07

## 2024-05-31 RX ORDER — DIPHENHYDRAMINE HYDROCHLORIDE 50 MG/ML
50 INJECTION INTRAMUSCULAR; INTRAVENOUS ONCE
Status: COMPLETED | OUTPATIENT
Start: 2024-05-31 | End: 2024-05-31

## 2024-05-31 RX ORDER — MEPERIDINE HYDROCHLORIDE 25 MG/ML
12.5 INJECTION INTRAMUSCULAR; INTRAVENOUS; SUBCUTANEOUS PRN
OUTPATIENT
Start: 2024-06-07

## 2024-05-31 RX ORDER — DIPHENHYDRAMINE HYDROCHLORIDE 50 MG/ML
50 INJECTION INTRAMUSCULAR; INTRAVENOUS ONCE
Status: CANCELLED
Start: 2024-06-07 | End: 2024-06-07

## 2024-05-31 RX ORDER — HEPARIN 100 UNIT/ML
500 SYRINGE INTRAVENOUS PRN
Status: DISCONTINUED | OUTPATIENT
Start: 2024-05-31 | End: 2024-06-01 | Stop reason: HOSPADM

## 2024-05-31 RX ORDER — HEPARIN 100 UNIT/ML
500 SYRINGE INTRAVENOUS PRN
Status: CANCELLED | OUTPATIENT
Start: 2024-06-07

## 2024-05-31 RX ORDER — DIPHENHYDRAMINE HYDROCHLORIDE 50 MG/ML
50 INJECTION INTRAMUSCULAR; INTRAVENOUS
OUTPATIENT
Start: 2024-06-07

## 2024-05-31 RX ORDER — ACETAMINOPHEN 325 MG/1
650 TABLET ORAL
OUTPATIENT
Start: 2024-06-07

## 2024-05-31 RX ORDER — ALBUTEROL SULFATE 90 UG/1
4 AEROSOL, METERED RESPIRATORY (INHALATION) PRN
OUTPATIENT
Start: 2024-06-07

## 2024-05-31 RX ORDER — SODIUM CHLORIDE 0.9 % (FLUSH) 0.9 %
5-40 SYRINGE (ML) INJECTION PRN
Status: DISCONTINUED | OUTPATIENT
Start: 2024-05-31 | End: 2024-06-01 | Stop reason: HOSPADM

## 2024-05-31 RX ORDER — EPINEPHRINE 1 MG/ML
0.3 INJECTION, SOLUTION, CONCENTRATE INTRAVENOUS PRN
OUTPATIENT
Start: 2024-06-07

## 2024-05-31 RX ORDER — ACETAMINOPHEN 325 MG/1
650 TABLET ORAL ONCE
Status: COMPLETED | OUTPATIENT
Start: 2024-05-31 | End: 2024-05-31

## 2024-05-31 RX ADMIN — HEPARIN 500 UNITS: 100 SYRINGE at 09:15

## 2024-05-31 RX ADMIN — IMMUNE GLOBULIN (HUMAN) 30 G: 10 INJECTION INTRAVENOUS; SUBCUTANEOUS at 07:59

## 2024-05-31 RX ADMIN — DIPHENHYDRAMINE HYDROCHLORIDE 50 MG: 50 INJECTION, SOLUTION INTRAMUSCULAR; INTRAVENOUS at 07:46

## 2024-05-31 RX ADMIN — ACETAMINOPHEN 650 MG: 325 TABLET ORAL at 07:45

## 2024-05-31 RX ADMIN — HYDROCORTISONE SODIUM SUCCINATE 100 MG: 100 INJECTION, POWDER, FOR SOLUTION INTRAMUSCULAR; INTRAVENOUS at 07:45

## 2024-05-31 NOTE — PROGRESS NOTES
Pt arrived to [facility] and port accessed. Pt premedicated as ordered. IVIG 25g administered. Pt tolerated well.     Electronically signed by Ciera George RN on 5/31/2024 at 9:40 AM

## 2024-06-07 ENCOUNTER — HOSPITAL ENCOUNTER (OUTPATIENT)
Dept: INFUSION THERAPY | Age: 49
Setting detail: INFUSION SERIES
Discharge: HOME | End: 2024-06-07
Payer: MEDICARE

## 2024-06-07 VITALS
DIASTOLIC BLOOD PRESSURE: 76 MMHG | BODY MASS INDEX: 25.46 KG/M2 | OXYGEN SATURATION: 100 % | WEIGHT: 153 LBS | HEART RATE: 60 BPM | TEMPERATURE: 98.2 F | RESPIRATION RATE: 16 BRPM | SYSTOLIC BLOOD PRESSURE: 112 MMHG

## 2024-06-07 DIAGNOSIS — E87.1 HYPONATREMIA: Primary | ICD-10-CM

## 2024-06-07 DIAGNOSIS — G35 MULTIPLE SCLEROSIS (HCC): ICD-10-CM

## 2024-06-07 LAB
ALBUMIN SERPL-MCNC: 3.4 G/DL (ref 3.5–5.2)
ALP SERPL-CCNC: 49 U/L (ref 35–104)
ALT SERPL-CCNC: 16 U/L (ref 5–33)
ANION GAP SERPL CALCULATED.3IONS-SCNC: 9 MMOL/L (ref 7–19)
AST SERPL-CCNC: 28 U/L (ref 5–32)
BASOPHILS # BLD: 0 K/UL (ref 0–0.2)
BASOPHILS NFR BLD: 0.4 % (ref 0–1)
BILIRUB SERPL-MCNC: 0.2 MG/DL (ref 0.2–1.2)
BUN SERPL-MCNC: 9 MG/DL (ref 6–20)
CALCIUM SERPL-MCNC: 8.8 MG/DL (ref 8.6–10)
CHLORIDE SERPL-SCNC: 96 MMOL/L (ref 98–111)
CO2 SERPL-SCNC: 25 MMOL/L (ref 22–29)
CREAT SERPL-MCNC: 0.6 MG/DL (ref 0.5–0.9)
EOSINOPHIL # BLD: 0 K/UL (ref 0–0.6)
EOSINOPHIL NFR BLD: 0 % (ref 0–5)
ERYTHROCYTE [DISTWIDTH] IN BLOOD BY AUTOMATED COUNT: 14.4 % (ref 11.5–14.5)
GLUCOSE SERPL-MCNC: 94 MG/DL (ref 74–109)
HCT VFR BLD AUTO: 33.9 % (ref 37–47)
HGB BLD-MCNC: 11.4 G/DL (ref 12–16)
IMM GRANULOCYTES # BLD: 0 K/UL
LYMPHOCYTES # BLD: 0.5 K/UL (ref 1.1–4.5)
LYMPHOCYTES NFR BLD: 18.7 % (ref 20–40)
MCH RBC QN AUTO: 30.6 PG (ref 27–31)
MCHC RBC AUTO-ENTMCNC: 33.6 G/DL (ref 33–37)
MCV RBC AUTO: 90.9 FL (ref 81–99)
MONOCYTES # BLD: 0.2 K/UL (ref 0–0.9)
MONOCYTES NFR BLD: 5.7 % (ref 0–10)
NEUTROPHILS # BLD: 2.1 K/UL (ref 1.5–7.5)
NEUTS SEG NFR BLD: 74.8 % (ref 50–65)
PLATELET # BLD AUTO: 174 K/UL (ref 130–400)
PMV BLD AUTO: 10.7 FL (ref 9.4–12.3)
POTASSIUM SERPL-SCNC: 4.6 MMOL/L (ref 3.5–5)
PROT SERPL-MCNC: 7.4 G/DL (ref 6.6–8.7)
RBC # BLD AUTO: 3.73 M/UL (ref 4.2–5.4)
SODIUM SERPL-SCNC: 130 MMOL/L (ref 136–145)
WBC # BLD AUTO: 2.8 K/UL (ref 4.8–10.8)

## 2024-06-07 PROCEDURE — 80053 COMPREHEN METABOLIC PANEL: CPT

## 2024-06-07 PROCEDURE — 6360000002 HC RX W HCPCS: Performed by: PSYCHIATRY & NEUROLOGY

## 2024-06-07 PROCEDURE — 85025 COMPLETE CBC W/AUTO DIFF WBC: CPT

## 2024-06-07 PROCEDURE — 96365 THER/PROPH/DIAG IV INF INIT: CPT

## 2024-06-07 PROCEDURE — 36591 DRAW BLOOD OFF VENOUS DEVICE: CPT

## 2024-06-07 PROCEDURE — 96366 THER/PROPH/DIAG IV INF ADDON: CPT

## 2024-06-07 PROCEDURE — 96375 TX/PRO/DX INJ NEW DRUG ADDON: CPT

## 2024-06-07 PROCEDURE — 6370000000 HC RX 637 (ALT 250 FOR IP): Performed by: PSYCHIATRY & NEUROLOGY

## 2024-06-07 PROCEDURE — 2580000003 HC RX 258: Performed by: PSYCHIATRY & NEUROLOGY

## 2024-06-07 RX ORDER — ONDANSETRON 2 MG/ML
8 INJECTION INTRAMUSCULAR; INTRAVENOUS
OUTPATIENT
Start: 2024-06-14

## 2024-06-07 RX ORDER — DIPHENHYDRAMINE HYDROCHLORIDE 50 MG/ML
50 INJECTION INTRAMUSCULAR; INTRAVENOUS ONCE
Status: COMPLETED | OUTPATIENT
Start: 2024-06-07 | End: 2024-06-07

## 2024-06-07 RX ORDER — HEPARIN 100 UNIT/ML
500 SYRINGE INTRAVENOUS PRN
Status: DISCONTINUED | OUTPATIENT
Start: 2024-06-07 | End: 2024-06-08 | Stop reason: HOSPADM

## 2024-06-07 RX ORDER — SODIUM CHLORIDE 9 MG/ML
5-250 INJECTION, SOLUTION INTRAVENOUS PRN
OUTPATIENT
Start: 2024-06-14

## 2024-06-07 RX ORDER — EPINEPHRINE 1 MG/ML
0.3 INJECTION, SOLUTION, CONCENTRATE INTRAVENOUS PRN
OUTPATIENT
Start: 2024-06-14

## 2024-06-07 RX ORDER — MEPERIDINE HYDROCHLORIDE 25 MG/ML
12.5 INJECTION INTRAMUSCULAR; INTRAVENOUS; SUBCUTANEOUS PRN
OUTPATIENT
Start: 2024-06-14

## 2024-06-07 RX ORDER — SODIUM CHLORIDE 9 MG/ML
INJECTION, SOLUTION INTRAVENOUS CONTINUOUS
OUTPATIENT
Start: 2024-06-14

## 2024-06-07 RX ORDER — SODIUM CHLORIDE 0.9 % (FLUSH) 0.9 %
5-40 SYRINGE (ML) INJECTION PRN
Status: DISCONTINUED | OUTPATIENT
Start: 2024-06-07 | End: 2024-06-08 | Stop reason: HOSPADM

## 2024-06-07 RX ORDER — ACETAMINOPHEN 325 MG/1
650 TABLET ORAL ONCE
Status: COMPLETED | OUTPATIENT
Start: 2024-06-07 | End: 2024-06-07

## 2024-06-07 RX ORDER — HEPARIN 100 UNIT/ML
500 SYRINGE INTRAVENOUS PRN
Status: CANCELLED | OUTPATIENT
Start: 2024-06-14

## 2024-06-07 RX ORDER — DIPHENHYDRAMINE HYDROCHLORIDE 50 MG/ML
50 INJECTION INTRAMUSCULAR; INTRAVENOUS
OUTPATIENT
Start: 2024-06-14

## 2024-06-07 RX ORDER — SODIUM CHLORIDE 0.9 % (FLUSH) 0.9 %
5-40 SYRINGE (ML) INJECTION PRN
Status: CANCELLED | OUTPATIENT
Start: 2024-06-14

## 2024-06-07 RX ORDER — ACETAMINOPHEN 325 MG/1
650 TABLET ORAL ONCE
Status: CANCELLED | OUTPATIENT
Start: 2024-06-14 | End: 2024-06-14

## 2024-06-07 RX ORDER — ALBUTEROL SULFATE 90 UG/1
4 AEROSOL, METERED RESPIRATORY (INHALATION) PRN
OUTPATIENT
Start: 2024-06-14

## 2024-06-07 RX ORDER — DIPHENHYDRAMINE HYDROCHLORIDE 50 MG/ML
50 INJECTION INTRAMUSCULAR; INTRAVENOUS ONCE
Status: CANCELLED
Start: 2024-06-14 | End: 2024-06-14

## 2024-06-07 RX ORDER — ACETAMINOPHEN 325 MG/1
650 TABLET ORAL
OUTPATIENT
Start: 2024-06-14

## 2024-06-07 RX ADMIN — SODIUM CHLORIDE, PRESERVATIVE FREE 20 ML: 5 INJECTION INTRAVENOUS at 09:28

## 2024-06-07 RX ADMIN — ACETAMINOPHEN 650 MG: 325 TABLET ORAL at 07:25

## 2024-06-07 RX ADMIN — IMMUNE GLOBULIN (HUMAN) 30 G: 10 INJECTION INTRAVENOUS; SUBCUTANEOUS at 07:49

## 2024-06-07 RX ADMIN — HEPARIN 500 UNITS: 100 SYRINGE at 09:28

## 2024-06-07 RX ADMIN — DIPHENHYDRAMINE HYDROCHLORIDE 50 MG: 50 INJECTION, SOLUTION INTRAMUSCULAR; INTRAVENOUS at 07:35

## 2024-06-07 RX ADMIN — SODIUM CHLORIDE, PRESERVATIVE FREE 10 ML: 5 INJECTION INTRAVENOUS at 07:44

## 2024-06-07 RX ADMIN — SODIUM CHLORIDE, PRESERVATIVE FREE 10 ML: 5 INJECTION INTRAVENOUS at 07:35

## 2024-06-07 RX ADMIN — HYDROCORTISONE SODIUM SUCCINATE 100 MG: 100 INJECTION, POWDER, FOR SOLUTION INTRAMUSCULAR; INTRAVENOUS at 07:32

## 2024-06-07 NOTE — PROGRESS NOTES
Port accessed and CBC[facility] Premeds given. Gamunex-C 30 g infusion completed. Port de-accessed. Pt tolerated well.

## 2024-06-07 NOTE — DISCHARGE INSTRUCTIONS
vaccines include measles, mumps, rubella (MMR), rotavirus, typhoid, yellow fever, varicella (chickenpox), zoster (shingles), and nasal flu (influenza) vaccine.  What are the possible side effects of immune globulin?  Get emergency medical help if you have signs of an allergic reaction: hives; wheezing, difficulty breathing; dizziness, feeling like you might pass out; swelling of your face, lips, tongue, or throat.  Some side effects may occur during the injection. Tell your caregiver if you feel light-headed, itchy, chilled, sweaty, or have chest discomfort, fast heartbeats, severe headache, or pounding in your neck or ears.  Call your doctor at once if you have:  a blood cell disorder --pale or yellowed skin, dark colored urine, fever, confusion or weakness;  dehydration symptoms --feeling very thirsty or hot, being unable to urinate, heavy sweating, or hot and dry skin;  kidney problems --little or no urination, swelling, rapid weight gain, feeling short of breath;  lung problems --chest pain, wheezing, trouble breathing, blue colored lips, fingers, or toes;  signs of a new infection --fever with a severe headache, neck stiffness, eye pain, and increased sensitivity to light; or  signs of a blood clot --shortness of breath, chest pain with deep breathing, rapid heart rate, numbness or weakness on one side of the body, swelling and warmth or discoloration in an arm or leg.  Common side effects may include:  runny or stuffy nose, sinus pain, cough, sore throat;  fever, chills, weakness;  headache, back pain, muscle or joint pain;  dizziness, tiredness, depressed mood;  swelling in your hands or feet;  skin rash, redness, or bruising;  blisters or ulcers in your mouth, red or swollen gums, trouble swallowing;  nausea, diarrhea, stomach pain, upset stomach;  increased blood pressure; or  redness, swelling, or itching where an injection was given.  This is not a complete list of side effects and others may occur. Call  your doctor for medical advice about side effects. You may report side effects to FDA at 2-970-WBZ-0996.  What other drugs will affect immune globulin?  Immune globulin can harm your kidneys, especially if you also use certain medicines for infections, cancer, osteoporosis, organ transplant rejection, bowel disorders, high blood pressure, or pain or arthritis (including Advil, Motrin, and Aleve).  Other drugs may affect immune globulin, including prescription and over-the-counter medicines, vitamins, and herbal products. Tell your doctor about all your current medicines and any medicine you start or stop using.  Where can I get more information?  Your doctor or pharmacist can provide more information about immune globulin.  Remember, keep this and all other medicines out of the reach of children, never share your medicines with others, and use this medication only for the indication prescribed.   Every effort has been made to ensure that the information provided by [employer] ('[employer]') is accurate, up-to-date, and complete, but no guarantee is made to that effect. Drug information contained herein may be time sensitive. [employer] information has been compiled for use by healthcare practitioners and consumers in the [place] and therefore [employer] does not warrant that uses outside of the [place] are appropriate, unless specifically indicated otherwise. [facility] drug information does not endorse drugs, diagnose patients or recommend therapy. [facility] drug information is an informational resource designed to assist licensed healthcare practitioners in caring for their patients and/or to serve consumers viewing this service as a supplement to, and not a substitute for, the expertise, skill, knowledge and judgment of healthcare practitioners. The absence of a warning for a given drug or drug combination in no way should be construed to indicate that the drug or drug combination is safe, effective or

## 2024-06-14 ENCOUNTER — HOSPITAL ENCOUNTER (OUTPATIENT)
Dept: INFUSION THERAPY | Age: 49
Setting detail: INFUSION SERIES
Discharge: HOME | End: 2024-06-14
Payer: MEDICARE

## 2024-06-14 VITALS
SYSTOLIC BLOOD PRESSURE: 128 MMHG | BODY MASS INDEX: 25.96 KG/M2 | WEIGHT: 156 LBS | OXYGEN SATURATION: 99 % | RESPIRATION RATE: 18 BRPM | HEART RATE: 61 BPM | TEMPERATURE: 97.3 F | DIASTOLIC BLOOD PRESSURE: 95 MMHG

## 2024-06-14 DIAGNOSIS — G35 MULTIPLE SCLEROSIS (HCC): Primary | ICD-10-CM

## 2024-06-14 PROCEDURE — 96366 THER/PROPH/DIAG IV INF ADDON: CPT

## 2024-06-14 PROCEDURE — 96365 THER/PROPH/DIAG IV INF INIT: CPT

## 2024-06-14 PROCEDURE — 2580000003 HC RX 258: Performed by: PSYCHIATRY & NEUROLOGY

## 2024-06-14 PROCEDURE — 6370000000 HC RX 637 (ALT 250 FOR IP): Performed by: PSYCHIATRY & NEUROLOGY

## 2024-06-14 PROCEDURE — 96375 TX/PRO/DX INJ NEW DRUG ADDON: CPT

## 2024-06-14 PROCEDURE — 6360000002 HC RX W HCPCS: Performed by: PSYCHIATRY & NEUROLOGY

## 2024-06-14 RX ORDER — SODIUM CHLORIDE 9 MG/ML
INJECTION, SOLUTION INTRAVENOUS CONTINUOUS
OUTPATIENT
Start: 2024-06-21

## 2024-06-14 RX ORDER — DIPHENHYDRAMINE HYDROCHLORIDE 50 MG/ML
50 INJECTION INTRAMUSCULAR; INTRAVENOUS
OUTPATIENT
Start: 2024-06-21

## 2024-06-14 RX ORDER — ACETAMINOPHEN 325 MG/1
650 TABLET ORAL ONCE
OUTPATIENT
Start: 2024-06-21 | End: 2024-06-21

## 2024-06-14 RX ORDER — ONDANSETRON 2 MG/ML
8 INJECTION INTRAMUSCULAR; INTRAVENOUS
OUTPATIENT
Start: 2024-06-21

## 2024-06-14 RX ORDER — HEPARIN 100 UNIT/ML
500 SYRINGE INTRAVENOUS PRN
Status: DISCONTINUED | OUTPATIENT
Start: 2024-06-14 | End: 2024-06-15 | Stop reason: HOSPADM

## 2024-06-14 RX ORDER — ACETAMINOPHEN 325 MG/1
650 TABLET ORAL
OUTPATIENT
Start: 2024-06-21

## 2024-06-14 RX ORDER — DIPHENHYDRAMINE HYDROCHLORIDE 50 MG/ML
50 INJECTION INTRAMUSCULAR; INTRAVENOUS ONCE
Status: COMPLETED | OUTPATIENT
Start: 2024-06-14 | End: 2024-06-14

## 2024-06-14 RX ORDER — ACETAMINOPHEN 325 MG/1
650 TABLET ORAL ONCE
Status: COMPLETED | OUTPATIENT
Start: 2024-06-14 | End: 2024-06-14

## 2024-06-14 RX ORDER — SODIUM CHLORIDE 0.9 % (FLUSH) 0.9 %
5-40 SYRINGE (ML) INJECTION PRN
OUTPATIENT
Start: 2024-06-21

## 2024-06-14 RX ORDER — HEPARIN 100 UNIT/ML
500 SYRINGE INTRAVENOUS PRN
OUTPATIENT
Start: 2024-06-21

## 2024-06-14 RX ORDER — SODIUM CHLORIDE 0.9 % (FLUSH) 0.9 %
5-40 SYRINGE (ML) INJECTION PRN
Status: DISCONTINUED | OUTPATIENT
Start: 2024-06-14 | End: 2024-06-15 | Stop reason: HOSPADM

## 2024-06-14 RX ORDER — SODIUM CHLORIDE 9 MG/ML
5-250 INJECTION, SOLUTION INTRAVENOUS PRN
OUTPATIENT
Start: 2024-06-21

## 2024-06-14 RX ORDER — ALBUTEROL SULFATE 90 UG/1
4 AEROSOL, METERED RESPIRATORY (INHALATION) PRN
OUTPATIENT
Start: 2024-06-21

## 2024-06-14 RX ORDER — MEPERIDINE HYDROCHLORIDE 25 MG/ML
12.5 INJECTION INTRAMUSCULAR; INTRAVENOUS; SUBCUTANEOUS PRN
OUTPATIENT
Start: 2024-06-21

## 2024-06-14 RX ORDER — EPINEPHRINE 1 MG/ML
0.3 INJECTION, SOLUTION, CONCENTRATE INTRAVENOUS PRN
OUTPATIENT
Start: 2024-06-21

## 2024-06-14 RX ORDER — DIPHENHYDRAMINE HYDROCHLORIDE 50 MG/ML
50 INJECTION INTRAMUSCULAR; INTRAVENOUS ONCE
Start: 2024-06-21 | End: 2024-06-21

## 2024-06-14 RX ADMIN — SODIUM CHLORIDE, PRESERVATIVE FREE 20 ML: 5 INJECTION INTRAVENOUS at 09:44

## 2024-06-14 RX ADMIN — SODIUM CHLORIDE, PRESERVATIVE FREE 10 ML: 5 INJECTION INTRAVENOUS at 07:32

## 2024-06-14 RX ADMIN — HYDROCORTISONE SODIUM SUCCINATE 100 MG: 100 INJECTION, POWDER, FOR SOLUTION INTRAMUSCULAR; INTRAVENOUS at 07:32

## 2024-06-14 RX ADMIN — ACETAMINOPHEN 650 MG: 325 TABLET ORAL at 07:30

## 2024-06-14 RX ADMIN — IMMUNE GLOBULIN (HUMAN) 30 G: 10 INJECTION INTRAVENOUS; SUBCUTANEOUS at 08:03

## 2024-06-14 RX ADMIN — HEPARIN 500 UNITS: 100 SYRINGE at 09:45

## 2024-06-14 RX ADMIN — DIPHENHYDRAMINE HYDROCHLORIDE 50 MG: 50 INJECTION, SOLUTION INTRAMUSCULAR; INTRAVENOUS at 07:33

## 2024-06-14 NOTE — DISCHARGE INSTRUCTIONS
immune globulin (intravenous and subcutaneous)  Pronunciation:  dinora LACEY foley  Brand:  Gammagard Liquid, Gammaked, Gamunex-C  What is the most important information I should know about immune globulin?  This medicine can cause blood clots.  The risk is highest in older adults or in people who have had blood clots, heart problems, or blood circulation problems. Blood clots are also more likely during long-term bedrest, while using birth control pills or hormone replacement therapy, or while having a central intravenous (IV) catheter in place.  Call your doctor at once if you have chest pain, trouble breathing, fast heartbeats, numbness or weakness, or swelling and warmth or discoloration in an arm or leg.  This medicine can also harm your kidneys, especially if you have kidney disease or if you also use certain medicines. Tell your doctor right away if you have signs of kidney problems, such as swelling, rapid weight gain, and little or no urination.  What is immune globulin?  Immune globulin intravenous and subcutaneous (for injection into a vein or under the skin) is used to treat primary immunodeficiency.  Immune globulin is also used to increase platelets (blood clotting cells) in people with idiopathic thrombocytopenic purpura.  Immune globulin is also used to treat certain debilitating nerve disorders that cause muscle weakness and can affect daily activities.  Immune globulin may also be used for purposes not listed in this medication guide.  What should I discuss with my healthcare provider before using immune globulin?  You should not use this medicine if:  you have had an allergic reaction to an immune globulin or blood product; or  you have immune globulin A (IgA) deficiency with antibody to IgA.  Immune globulin can cause blood clots or kidney problems, especially in older adults or in people with certain conditions. Tell your doctor if you have ever had:  heart problems, blood circulation     SUBJECTIVE:   CC: Elisabet is an 50 year old who presents for preventive health visit.       10/30/2023     7:28 AM   Additional Questions   Roomed by Sara MILLER       Healthy Habits:     Getting at least 3 servings of Calcium per day:  Yes    Bi-annual eye exam:  Yes    Dental care twice a year:  Yes    Sleep apnea or symptoms of sleep apnea:  None    Diet:  Carbohydrate counting    Frequency of exercise:  2-3 days/week    Duration of exercise:  15-30 minutes    Taking medications regularly:  Yes    Medication side effects:  None    Additional concerns today:  No      Social History     Tobacco Use     Smoking status: Former     Packs/day: 0.50     Years: 10.00     Additional pack years: 0.00     Total pack years: 5.00     Types: Cigarettes     Quit date: 2004     Years since quittin.4     Passive exposure: Never     Smokeless tobacco: Never     Tobacco comments:     total smoking about 5 pack years   Substance Use Topics     Alcohol use: Yes     Comment: less than monthly             10/26/2023     8:49 AM   Alcohol Use   Prescreen: >3 drinks/day or >7 drinks/week? No     Reviewed orders with patient.  Reviewed health maintenance and updated orders accordingly - Yes  Patient Active Problem List   Diagnosis     Class 1 obesity with serious comorbidity and body mass index (BMI) of 31.0 to 31.9 in adult, unspecified obesity type     Colon polyp     Skin mole     Personal history of irradiation, presenting hazards to health     Former smoker     Postsurgical hypothyroidism     Follicular carcinoma of thyroid (H)     Pulmonary nodules     on biotin in high dose     Fatigue, unspecified type     Past Surgical History:   Procedure Laterality Date     BIOPSY  2022    thyroid     THYROIDECTOMY Left 2023    Procedure: Left thyroid lobectomy and isthmusectomy;  Surgeon: Charley Humphreys MD;  Location: [facility] OR     THYROIDECTOMY N/A 2023    Procedure: Completion thyroidectomy;  Surgeon: Charley Humphreys  MD Diana;  Location: [facility] OR     TONSILLECTOMY  1980    unilateral (right) tonsillectomy       Social History     Tobacco Use     Smoking status: Former     Packs/day: 0.50     Years: 10.00     Additional pack years: 0.00     Total pack years: 5.00     Types: Cigarettes     Quit date: 2004     Years since quittin.4     Passive exposure: Never     Smokeless tobacco: Never     Tobacco comments:     total smoking about 5 pack years   Substance Use Topics     Alcohol use: Yes     Comment: less than monthly     Family History   Problem Relation Age of Onset     Heart Disease Mother         pacemaker     Thyroid nodules Mother      Thyroid Disease Mother      Heart Disease Father      Diabetes Father      C.A.D. Father         MI age 62     Alzheimer Disease Father      Hypertension Father      Hyperlipidemia Father      Lipids Brother      Breast Cancer Maternal Grandmother         and Ailyn had post-menopausal cancer     Neurologic Disorder Paternal Grandmother         Alzheimer's     Lymphoma Cousin      Cancer - colorectal No family hx of      Thyroid Cancer No family hx of            Breast Cancer Screenin/23/2022     5:13 PM 2022     8:07 AM 2022     7:34 AM   Breast CA Risk Assessment (FHS-7)   Do you have a family history of breast, colon, or ovarian cancer? Yes Yes No / Unknown       click delete button to remove this line now  Mammogram Screening: Recommended annual mammography  Pertinent mammograms are reviewed under the imaging tab.    History of abnormal Pap smear: NO - age 30-65 PAP every 5 years with negative HPV co-testing recommended      Latest Ref Rng & Units 2022     5:46 PM 2010    12:00 AM 10/10/2008    12:00 AM   PAP / HPV   PAP  Negative for Intraepithelial Lesion or Malignancy (NILM)      PAP (Historical)   NIL  NIL    HPV 16 DNA Negative Negative      HPV 18 DNA Negative Negative      Other HR HPV Negative Negative        Reviewed and updated as  "needed this visit by clinical staff   Tobacco  Allergies  Meds              Reviewed and updated as needed this visit by Provider     Meds                 Review of Systems   Constitutional:  Negative for chills and fever.   HENT:  Negative for congestion, ear pain, hearing loss and sore throat.    Eyes:  Negative for pain and visual disturbance.   Respiratory:  Negative for cough and shortness of breath.    Cardiovascular:  Negative for chest pain, palpitations and peripheral edema.   Gastrointestinal:  Negative for abdominal pain, constipation, diarrhea, heartburn, hematochezia and nausea.   Breasts:  Negative for tenderness, breast mass and discharge.   Genitourinary:  Negative for dysuria, frequency, genital sores, hematuria, pelvic pain, urgency, vaginal bleeding and vaginal discharge.   Musculoskeletal:  Negative for arthralgias, joint swelling and myalgias.   Skin:  Negative for rash.   Neurological:  Positive for headaches. Negative for dizziness, weakness and paresthesias.   Psychiatric/Behavioral:  Negative for mood changes. The patient is not nervous/anxious.         OBJECTIVE:   /82 (BP Location: Right arm, Patient Position: Sitting, Cuff Size: Adult Regular)   Pulse 78   Temp 98.6  F (37  C) (Oral)   Resp 16   Ht 1.626 m (5' 4\")   Wt 84.6 kg (186 lb 8 oz)   LMP 10/20/2023 (Exact Date)   SpO2 97%   BMI 32.01 kg/m    Physical Exam  GENERAL: healthy, alert and no distress  EYES: Eyes grossly normal to inspection, PERRL and conjunctivae and sclerae normal  HENT: ear canals and TM's normal, nose and mouth without ulcers or lesions  NECK: no adenopathy, no asymmetry, masses  RESP: lungs clear to auscultation - no rales, rhonchi or wheezes  BREAST: normal without masses, tenderness or nipple discharge and no palpable axillary masses or adenopathy  CV: regular rate and rhythm, normal S1 S2, no S3 or S4, no murmur, click or rub, no peripheral edema and peripheral pulses strong  ABDOMEN: soft, " "nontender, no hepatosplenomegaly, no masses and bowel sounds normal  MS: no gross musculoskeletal defects noted, no edema  SKIN: no suspicious lesions or rashes  NEURO: Normal strength and tone, mentation intact and speech normal  PSYCH: mentation appears normal, affect normal/bright    Diagnostic Test Results:  Labs reviewed in Epic    ASSESSMENT/PLAN:     1. Routine general medical examination at a health care facility  - Lipid panel reflex to direct LDL Fasting; Future  - Hemoglobin A1c; Future  - Lipid panel reflex to direct LDL Fasting  - Hemoglobin A1c    2. Follicular carcinoma of thyroid (H) - s/p complete thyroidectomy, requesting second opinion on her current symptoms.   - Adult Endocrinology  Referral; Future    3. History of partial thyroidectomy    4. Iron deficiency - surveillance labs, has been supplementing.   - Iron and iron binding capacity; Future  - Vitamin D Deficiency; Future  - Iron and iron binding capacity  - Vitamin D Deficiency      COUNSELING:  Reviewed preventive health counseling, as reflected in patient instructions      BMI:   Estimated body mass index is 32.01 kg/m  as calculated from the following:    Height as of this encounter: 1.626 m (5' 4\").    Weight as of this encounter: 84.6 kg (186 lb 8 oz).     She reports that she quit smoking about 19 years ago. Her smoking use included cigarettes. She has a 5.00 pack-year smoking history. She has never been exposed to tobacco smoke. She has never used smokeless tobacco.    Marielle Mancilla MD  [facility]  " appropriate for any given patient. [facility] does not assume any responsibility for any aspect of healthcare administered with the aid of information [facility] provides. The information contained herein is not intended to cover all possible uses, directions, precautions, warnings, drug interactions, allergic reactions, or adverse effects. If you have questions about the drugs you are taking, check with your doctor, nurse or pharmacist.  Copyright 6577-0248 [employer]. Version: 4.02. Revision date: 2/26/2021.  Care instructions adapted under license by [employer]. If you have questions about a medical condition or this instruction, always ask your healthcare professional. Healthwise, [email or web address] disclaims any warranty or liability for your use of this information.

## 2024-06-14 NOTE — PROGRESS NOTES
Port accessed per [facility] policy. Premeds given. Pt received Gamunex-C 30 g infusion per order. Port de-accessed. Pt tolerated well.

## 2024-06-17 ENCOUNTER — HOSPITAL ENCOUNTER (OUTPATIENT)
Dept: INFUSION THERAPY | Age: 49
Setting detail: INFUSION SERIES
Discharge: HOME | End: 2024-06-17
Payer: MEDICARE

## 2024-06-17 VITALS
RESPIRATION RATE: 18 BRPM | BODY MASS INDEX: 26.46 KG/M2 | HEART RATE: 60 BPM | SYSTOLIC BLOOD PRESSURE: 134 MMHG | TEMPERATURE: 96.8 F | WEIGHT: 159 LBS | DIASTOLIC BLOOD PRESSURE: 90 MMHG | OXYGEN SATURATION: 100 %

## 2024-06-17 DIAGNOSIS — G35 EXACERBATION OF MULTIPLE SCLEROSIS (HCC): Primary | ICD-10-CM

## 2024-06-17 DIAGNOSIS — M32.9 LUPUS (HCC): ICD-10-CM

## 2024-06-17 PROCEDURE — 96366 THER/PROPH/DIAG IV INF ADDON: CPT

## 2024-06-17 PROCEDURE — 96375 TX/PRO/DX INJ NEW DRUG ADDON: CPT

## 2024-06-17 PROCEDURE — 96365 THER/PROPH/DIAG IV INF INIT: CPT

## 2024-06-17 PROCEDURE — 2580000003 HC RX 258: Performed by: PSYCHIATRY & NEUROLOGY

## 2024-06-17 PROCEDURE — 6370000000 HC RX 637 (ALT 250 FOR IP): Performed by: PSYCHIATRY & NEUROLOGY

## 2024-06-17 PROCEDURE — 6360000002 HC RX W HCPCS: Performed by: PSYCHIATRY & NEUROLOGY

## 2024-06-17 RX ORDER — EPINEPHRINE 1 MG/ML
0.3 INJECTION, SOLUTION, CONCENTRATE INTRAVENOUS PRN
Status: CANCELLED | OUTPATIENT
Start: 2024-06-18

## 2024-06-17 RX ORDER — MEPERIDINE HYDROCHLORIDE 25 MG/ML
12.5 INJECTION INTRAMUSCULAR; INTRAVENOUS; SUBCUTANEOUS PRN
Status: CANCELLED | OUTPATIENT
Start: 2024-06-18

## 2024-06-17 RX ORDER — SODIUM CHLORIDE 0.9 % (FLUSH) 0.9 %
5-40 SYRINGE (ML) INJECTION PRN
Status: CANCELLED | OUTPATIENT
Start: 2024-06-18

## 2024-06-17 RX ORDER — SODIUM CHLORIDE 9 MG/ML
INJECTION, SOLUTION INTRAVENOUS CONTINUOUS
Status: CANCELLED | OUTPATIENT
Start: 2024-06-18

## 2024-06-17 RX ORDER — HEPARIN 100 UNIT/ML
500 SYRINGE INTRAVENOUS PRN
Status: DISCONTINUED | OUTPATIENT
Start: 2024-06-17 | End: 2024-06-18 | Stop reason: HOSPADM

## 2024-06-17 RX ORDER — DIPHENHYDRAMINE HYDROCHLORIDE 50 MG/ML
50 INJECTION INTRAMUSCULAR; INTRAVENOUS
Status: CANCELLED | OUTPATIENT
Start: 2024-06-18

## 2024-06-17 RX ORDER — ONDANSETRON 2 MG/ML
8 INJECTION INTRAMUSCULAR; INTRAVENOUS
Status: CANCELLED | OUTPATIENT
Start: 2024-06-18

## 2024-06-17 RX ORDER — ACETAMINOPHEN 325 MG/1
650 TABLET ORAL
Status: CANCELLED | OUTPATIENT
Start: 2024-06-18

## 2024-06-17 RX ORDER — DIPHENHYDRAMINE HCL 25 MG
25 TABLET ORAL ONCE
Status: CANCELLED | OUTPATIENT
Start: 2024-06-18 | End: 2024-06-18

## 2024-06-17 RX ORDER — ALBUTEROL SULFATE 90 UG/1
4 AEROSOL, METERED RESPIRATORY (INHALATION) PRN
Status: CANCELLED | OUTPATIENT
Start: 2024-06-18

## 2024-06-17 RX ORDER — ACETAMINOPHEN 325 MG/1
650 TABLET ORAL ONCE
Status: COMPLETED | OUTPATIENT
Start: 2024-06-17 | End: 2024-06-17

## 2024-06-17 RX ORDER — SODIUM CHLORIDE 0.9 % (FLUSH) 0.9 %
5-40 SYRINGE (ML) INJECTION PRN
Status: DISCONTINUED | OUTPATIENT
Start: 2024-06-17 | End: 2024-06-18 | Stop reason: HOSPADM

## 2024-06-17 RX ORDER — HEPARIN 100 UNIT/ML
500 SYRINGE INTRAVENOUS PRN
Status: CANCELLED | OUTPATIENT
Start: 2024-06-18

## 2024-06-17 RX ORDER — ACETAMINOPHEN 325 MG/1
650 TABLET ORAL ONCE
Status: CANCELLED | OUTPATIENT
Start: 2024-06-18 | End: 2024-06-18

## 2024-06-17 RX ORDER — SODIUM CHLORIDE 9 MG/ML
5-250 INJECTION, SOLUTION INTRAVENOUS PRN
Status: CANCELLED | OUTPATIENT
Start: 2024-06-18

## 2024-06-17 RX ORDER — DIPHENHYDRAMINE HCL 25 MG
25 TABLET ORAL ONCE
Status: COMPLETED | OUTPATIENT
Start: 2024-06-17 | End: 2024-06-17

## 2024-06-17 RX ORDER — SODIUM CHLORIDE 9 MG/ML
5-250 INJECTION, SOLUTION INTRAVENOUS PRN
Status: DISCONTINUED | OUTPATIENT
Start: 2024-06-17 | End: 2024-06-18 | Stop reason: HOSPADM

## 2024-06-17 RX ADMIN — ACETAMINOPHEN 650 MG: 325 TABLET ORAL at 07:44

## 2024-06-17 RX ADMIN — IMMUNE GLOBULIN (HUMAN) 30 G: 10 INJECTION INTRAVENOUS; SUBCUTANEOUS at 07:55

## 2024-06-17 RX ADMIN — HEPARIN 500 UNITS: 100 SYRINGE at 09:28

## 2024-06-17 RX ADMIN — HYDROCORTISONE SODIUM SUCCINATE 100 MG: 100 INJECTION, POWDER, FOR SOLUTION INTRAMUSCULAR; INTRAVENOUS at 07:45

## 2024-06-17 RX ADMIN — DIPHENHYDRAMINE HYDROCHLORIDE 25 MG: 25 TABLET ORAL at 07:45

## 2024-06-17 RX ADMIN — SODIUM CHLORIDE, PRESERVATIVE FREE 10 ML: 5 INJECTION INTRAVENOUS at 09:28

## 2024-06-17 NOTE — PROGRESS NOTES
Port accessed and premeds given as ordered. 30 g gamunex C given. Pt tolerated well. Port heparin locked and pt remaining accessed for infusion tomorrow.

## 2024-06-18 ENCOUNTER — HOSPITAL ENCOUNTER (OUTPATIENT)
Dept: INFUSION THERAPY | Age: 49
Setting detail: INFUSION SERIES
Discharge: HOME | End: 2024-06-18
Payer: MEDICARE

## 2024-06-18 VITALS
TEMPERATURE: 97.1 F | OXYGEN SATURATION: 100 % | DIASTOLIC BLOOD PRESSURE: 87 MMHG | SYSTOLIC BLOOD PRESSURE: 122 MMHG | HEART RATE: 71 BPM | RESPIRATION RATE: 18 BRPM

## 2024-06-18 DIAGNOSIS — M54.30 SCIATICA, UNSPECIFIED LATERALITY: ICD-10-CM

## 2024-06-18 DIAGNOSIS — G35 MULTIPLE SCLEROSIS (HCC): ICD-10-CM

## 2024-06-18 DIAGNOSIS — M32.9 LUPUS (HCC): Primary | ICD-10-CM

## 2024-06-18 DIAGNOSIS — G35 EXACERBATION OF MULTIPLE SCLEROSIS (HCC): ICD-10-CM

## 2024-06-18 DIAGNOSIS — M79.7 FIBROMYALGIA: ICD-10-CM

## 2024-06-18 DIAGNOSIS — G43.719 INTRACTABLE CHRONIC MIGRAINE WITHOUT AURA AND WITHOUT STATUS MIGRAINOSUS: ICD-10-CM

## 2024-06-18 PROCEDURE — 96366 THER/PROPH/DIAG IV INF ADDON: CPT

## 2024-06-18 PROCEDURE — 96365 THER/PROPH/DIAG IV INF INIT: CPT

## 2024-06-18 PROCEDURE — 96375 TX/PRO/DX INJ NEW DRUG ADDON: CPT

## 2024-06-18 PROCEDURE — 6370000000 HC RX 637 (ALT 250 FOR IP): Performed by: PSYCHIATRY & NEUROLOGY

## 2024-06-18 PROCEDURE — 6360000002 HC RX W HCPCS: Performed by: PSYCHIATRY & NEUROLOGY

## 2024-06-18 RX ORDER — DIPHENHYDRAMINE HCL 25 MG
25 TABLET ORAL ONCE
Status: COMPLETED | OUTPATIENT
Start: 2024-06-18 | End: 2024-06-18

## 2024-06-18 RX ORDER — HEPARIN 100 UNIT/ML
500 SYRINGE INTRAVENOUS PRN
Status: DISCONTINUED | OUTPATIENT
Start: 2024-06-18 | End: 2024-06-19 | Stop reason: HOSPADM

## 2024-06-18 RX ORDER — OXYCODONE HYDROCHLORIDE 10 MG/1
10 TABLET ORAL EVERY 6 HOURS PRN
Qty: 120 TABLET | Refills: 0 | OUTPATIENT
Start: 2024-06-18 | End: 2024-07-18

## 2024-06-18 RX ORDER — ACETAMINOPHEN 325 MG/1
650 TABLET ORAL ONCE
Status: COMPLETED | OUTPATIENT
Start: 2024-06-18 | End: 2024-06-18

## 2024-06-18 RX ORDER — SODIUM CHLORIDE 9 MG/ML
5-250 INJECTION, SOLUTION INTRAVENOUS PRN
Status: CANCELLED | OUTPATIENT
Start: 2024-07-16

## 2024-06-18 RX ADMIN — HEPARIN 500 UNITS: 100 SYRINGE at 09:37

## 2024-06-18 RX ADMIN — HYDROCORTISONE SODIUM SUCCINATE 100 MG: 100 INJECTION, POWDER, FOR SOLUTION INTRAMUSCULAR; INTRAVENOUS at 07:46

## 2024-06-18 RX ADMIN — ACETAMINOPHEN 650 MG: 325 TABLET ORAL at 07:46

## 2024-06-18 RX ADMIN — DIPHENHYDRAMINE HYDROCHLORIDE 25 MG: 25 TABLET ORAL at 07:46

## 2024-06-18 RX ADMIN — IMMUNE GLOBULIN (HUMAN) 30 G: 10 INJECTION INTRAVENOUS; SUBCUTANEOUS at 08:02

## 2024-06-18 NOTE — PROGRESS NOTES
[facility]. Flushed with brisk blood return noted. Pt premedicated as ordered. Gamunex C 30g administered. Pt tolerated well and remained accessed for tx tomorrow.     Electronically signed by Ciera George RN on 6/18/2024 at 9:42 AM

## 2024-06-19 ENCOUNTER — HOSPITAL ENCOUNTER (OUTPATIENT)
Dept: INFUSION THERAPY | Age: 49
Setting detail: INFUSION SERIES
Discharge: HOME | End: 2024-06-19
Payer: MEDICARE

## 2024-06-19 VITALS
SYSTOLIC BLOOD PRESSURE: 134 MMHG | TEMPERATURE: 99 F | DIASTOLIC BLOOD PRESSURE: 82 MMHG | WEIGHT: 155 LBS | HEART RATE: 74 BPM | RESPIRATION RATE: 18 BRPM | OXYGEN SATURATION: 100 % | BODY MASS INDEX: 25.79 KG/M2

## 2024-06-19 DIAGNOSIS — G35 EXACERBATION OF MULTIPLE SCLEROSIS (HCC): ICD-10-CM

## 2024-06-19 DIAGNOSIS — M32.9 LUPUS (HCC): Primary | ICD-10-CM

## 2024-06-19 PROCEDURE — 96375 TX/PRO/DX INJ NEW DRUG ADDON: CPT

## 2024-06-19 PROCEDURE — 2580000003 HC RX 258: Performed by: PSYCHIATRY & NEUROLOGY

## 2024-06-19 PROCEDURE — 6360000002 HC RX W HCPCS: Performed by: PSYCHIATRY & NEUROLOGY

## 2024-06-19 PROCEDURE — 6370000000 HC RX 637 (ALT 250 FOR IP): Performed by: PSYCHIATRY & NEUROLOGY

## 2024-06-19 PROCEDURE — 96365 THER/PROPH/DIAG IV INF INIT: CPT

## 2024-06-19 RX ORDER — ACETAMINOPHEN 325 MG/1
650 TABLET ORAL ONCE
Status: COMPLETED | OUTPATIENT
Start: 2024-06-19 | End: 2024-06-19

## 2024-06-19 RX ORDER — HEPARIN 100 UNIT/ML
500 SYRINGE INTRAVENOUS PRN
Status: DISCONTINUED | OUTPATIENT
Start: 2024-06-19 | End: 2024-06-20 | Stop reason: HOSPADM

## 2024-06-19 RX ORDER — SODIUM CHLORIDE 0.9 % (FLUSH) 0.9 %
5-40 SYRINGE (ML) INJECTION PRN
Status: DISCONTINUED | OUTPATIENT
Start: 2024-06-19 | End: 2024-06-20 | Stop reason: HOSPADM

## 2024-06-19 RX ORDER — SODIUM CHLORIDE 9 MG/ML
5-250 INJECTION, SOLUTION INTRAVENOUS PRN
Status: DISCONTINUED | OUTPATIENT
Start: 2024-06-19 | End: 2024-06-19 | Stop reason: SDUPTHER

## 2024-06-19 RX ORDER — DIPHENHYDRAMINE HCL 25 MG
25 TABLET ORAL ONCE
Status: COMPLETED | OUTPATIENT
Start: 2024-06-19 | End: 2024-06-19

## 2024-06-19 RX ORDER — SODIUM CHLORIDE 9 MG/ML
5-250 INJECTION, SOLUTION INTRAVENOUS PRN
Status: DISCONTINUED | OUTPATIENT
Start: 2024-06-19 | End: 2024-06-20 | Stop reason: HOSPADM

## 2024-06-19 RX ORDER — SODIUM CHLORIDE 9 MG/ML
5-250 INJECTION, SOLUTION INTRAVENOUS PRN
Status: CANCELLED | OUTPATIENT
Start: 2024-07-17

## 2024-06-19 RX ADMIN — HYDROCORTISONE SODIUM SUCCINATE 100 MG: 100 INJECTION, POWDER, FOR SOLUTION INTRAMUSCULAR; INTRAVENOUS at 07:51

## 2024-06-19 RX ADMIN — IMMUNE GLOBULIN (HUMAN) 30 G: 10 INJECTION INTRAVENOUS; SUBCUTANEOUS at 08:11

## 2024-06-19 RX ADMIN — Medication 20 ML: at 09:43

## 2024-06-19 RX ADMIN — HEPARIN 500 UNITS: 100 SYRINGE at 09:42

## 2024-06-19 RX ADMIN — DIPHENHYDRAMINE HYDROCHLORIDE 25 MG: 25 TABLET ORAL at 07:50

## 2024-06-19 RX ADMIN — ACETAMINOPHEN 650 MG: 325 TABLET ORAL at 07:51

## 2024-06-19 NOTE — PROGRESS NOTES
Pt arrived to [facility] with port accessed. Flushed and brisk blood return noted. Pt received Tylenol, Benadryl, and Solu-cortef for pre-meds. Pt then received 30g Gamunex-C. Tolerated well. Pt's port flushed and heparin locked for tomorrow's tx.    Electronically signed by Shabnam Huddleston RN on 6/19/2024 at 9:56 AM

## 2024-06-20 ENCOUNTER — HOSPITAL ENCOUNTER (OUTPATIENT)
Dept: INFUSION THERAPY | Age: 49
Setting detail: INFUSION SERIES
Discharge: HOME | End: 2024-06-20
Payer: MEDICARE

## 2024-06-20 VITALS
WEIGHT: 158 LBS | HEART RATE: 60 BPM | BODY MASS INDEX: 26.29 KG/M2 | SYSTOLIC BLOOD PRESSURE: 128 MMHG | TEMPERATURE: 97 F | OXYGEN SATURATION: 100 % | DIASTOLIC BLOOD PRESSURE: 80 MMHG | RESPIRATION RATE: 18 BRPM

## 2024-06-20 DIAGNOSIS — G35 EXACERBATION OF MULTIPLE SCLEROSIS (HCC): ICD-10-CM

## 2024-06-20 DIAGNOSIS — M32.9 LUPUS (HCC): Primary | ICD-10-CM

## 2024-06-20 PROCEDURE — 6360000002 HC RX W HCPCS: Performed by: PSYCHIATRY & NEUROLOGY

## 2024-06-20 PROCEDURE — 96366 THER/PROPH/DIAG IV INF ADDON: CPT

## 2024-06-20 PROCEDURE — 96375 TX/PRO/DX INJ NEW DRUG ADDON: CPT

## 2024-06-20 PROCEDURE — 96365 THER/PROPH/DIAG IV INF INIT: CPT

## 2024-06-20 PROCEDURE — 6370000000 HC RX 637 (ALT 250 FOR IP): Performed by: PSYCHIATRY & NEUROLOGY

## 2024-06-20 PROCEDURE — 2580000003 HC RX 258: Performed by: PSYCHIATRY & NEUROLOGY

## 2024-06-20 RX ORDER — ACETAMINOPHEN 325 MG/1
650 TABLET ORAL ONCE
Status: COMPLETED | OUTPATIENT
Start: 2024-06-20 | End: 2024-06-20

## 2024-06-20 RX ORDER — DIPHENHYDRAMINE HCL 25 MG
25 TABLET ORAL ONCE
Status: COMPLETED | OUTPATIENT
Start: 2024-06-20 | End: 2024-06-20

## 2024-06-20 RX ORDER — SODIUM CHLORIDE 9 MG/ML
5-250 INJECTION, SOLUTION INTRAVENOUS PRN
OUTPATIENT
Start: 2024-07-18

## 2024-06-20 RX ORDER — SODIUM CHLORIDE 0.9 % (FLUSH) 0.9 %
5-40 SYRINGE (ML) INJECTION PRN
Status: DISCONTINUED | OUTPATIENT
Start: 2024-06-20 | End: 2024-06-21 | Stop reason: HOSPADM

## 2024-06-20 RX ORDER — HEPARIN 100 UNIT/ML
500 SYRINGE INTRAVENOUS PRN
Status: DISCONTINUED | OUTPATIENT
Start: 2024-06-20 | End: 2024-06-21 | Stop reason: HOSPADM

## 2024-06-20 RX ADMIN — IMMUNE GLOBULIN (HUMAN) 30 G: 10 INJECTION INTRAVENOUS; SUBCUTANEOUS at 07:49

## 2024-06-20 RX ADMIN — SODIUM CHLORIDE, PRESERVATIVE FREE 20 ML: 5 INJECTION INTRAVENOUS at 09:26

## 2024-06-20 RX ADMIN — SODIUM CHLORIDE, PRESERVATIVE FREE 10 ML: 5 INJECTION INTRAVENOUS at 07:38

## 2024-06-20 RX ADMIN — SODIUM CHLORIDE, PRESERVATIVE FREE 10 ML: 5 INJECTION INTRAVENOUS at 07:34

## 2024-06-20 RX ADMIN — ACETAMINOPHEN 650 MG: 325 TABLET ORAL at 07:37

## 2024-06-20 RX ADMIN — HYDROCORTISONE SODIUM SUCCINATE 100 MG: 100 INJECTION, POWDER, FOR SOLUTION INTRAMUSCULAR; INTRAVENOUS at 07:38

## 2024-06-20 RX ADMIN — DIPHENHYDRAMINE HYDROCHLORIDE 25 MG: 25 TABLET ORAL at 07:37

## 2024-06-20 RX ADMIN — HEPARIN 500 UNITS: 100 SYRINGE at 09:26

## 2024-06-20 NOTE — PROGRESS NOTES
Pt arrived to [facility] with port accessed from 6/17/2024. Premeds given as ordered. Pt received Gamunex- C 30 g IV. Port flushed with saline and heparin. Kept in place for tomorrow's infusion.

## 2024-06-21 ENCOUNTER — HOSPITAL ENCOUNTER (OUTPATIENT)
Dept: INFUSION THERAPY | Age: 49
Setting detail: INFUSION SERIES
Discharge: HOME | End: 2024-06-21
Payer: MEDICARE

## 2024-06-21 ENCOUNTER — PATIENT MESSAGE (OUTPATIENT)
Dept: NEUROLOGY | Age: 49
End: 2024-06-21

## 2024-06-21 VITALS
WEIGHT: 157 LBS | HEART RATE: 61 BPM | DIASTOLIC BLOOD PRESSURE: 97 MMHG | OXYGEN SATURATION: 99 % | SYSTOLIC BLOOD PRESSURE: 173 MMHG | BODY MASS INDEX: 26.13 KG/M2 | RESPIRATION RATE: 18 BRPM | TEMPERATURE: 97.4 F

## 2024-06-21 DIAGNOSIS — G35 EXACERBATION OF MULTIPLE SCLEROSIS (HCC): ICD-10-CM

## 2024-06-21 DIAGNOSIS — M32.9 LUPUS (HCC): Primary | ICD-10-CM

## 2024-06-21 PROCEDURE — 96365 THER/PROPH/DIAG IV INF INIT: CPT

## 2024-06-21 PROCEDURE — 96375 TX/PRO/DX INJ NEW DRUG ADDON: CPT

## 2024-06-21 PROCEDURE — 6360000002 HC RX W HCPCS: Performed by: PSYCHIATRY & NEUROLOGY

## 2024-06-21 PROCEDURE — 2580000003 HC RX 258: Performed by: PSYCHIATRY & NEUROLOGY

## 2024-06-21 PROCEDURE — 6370000000 HC RX 637 (ALT 250 FOR IP): Performed by: PSYCHIATRY & NEUROLOGY

## 2024-06-21 RX ORDER — SODIUM CHLORIDE 0.9 % (FLUSH) 0.9 %
5-40 SYRINGE (ML) INJECTION PRN
Status: DISCONTINUED | OUTPATIENT
Start: 2024-06-21 | End: 2024-06-22 | Stop reason: HOSPADM

## 2024-06-21 RX ORDER — DIPHENHYDRAMINE HCL 25 MG
25 TABLET ORAL ONCE
Status: COMPLETED | OUTPATIENT
Start: 2024-06-21 | End: 2024-06-21

## 2024-06-21 RX ORDER — ACETAMINOPHEN 325 MG/1
650 TABLET ORAL ONCE
Status: COMPLETED | OUTPATIENT
Start: 2024-06-21 | End: 2024-06-21

## 2024-06-21 RX ORDER — HEPARIN 100 UNIT/ML
500 SYRINGE INTRAVENOUS PRN
Status: DISCONTINUED | OUTPATIENT
Start: 2024-06-21 | End: 2024-06-22 | Stop reason: HOSPADM

## 2024-06-21 RX ORDER — SODIUM CHLORIDE 9 MG/ML
5-250 INJECTION, SOLUTION INTRAVENOUS PRN
OUTPATIENT
Start: 2024-07-19

## 2024-06-21 RX ADMIN — DIPHENHYDRAMINE HYDROCHLORIDE 25 MG: 25 TABLET ORAL at 07:40

## 2024-06-21 RX ADMIN — HYDROCORTISONE SODIUM SUCCINATE 100 MG: 100 INJECTION, POWDER, FOR SOLUTION INTRAMUSCULAR; INTRAVENOUS at 07:45

## 2024-06-21 RX ADMIN — SODIUM CHLORIDE, PRESERVATIVE FREE 10 ML: 5 INJECTION INTRAVENOUS at 09:26

## 2024-06-21 RX ADMIN — SODIUM CHLORIDE, PRESERVATIVE FREE 10 ML: 5 INJECTION INTRAVENOUS at 07:44

## 2024-06-21 RX ADMIN — HEPARIN 500 UNITS: 100 SYRINGE at 09:26

## 2024-06-21 RX ADMIN — SODIUM CHLORIDE, PRESERVATIVE FREE 10 ML: 5 INJECTION INTRAVENOUS at 07:46

## 2024-06-21 RX ADMIN — ACETAMINOPHEN 650 MG: 325 TABLET ORAL at 07:39

## 2024-06-21 RX ADMIN — IMMUNE GLOBULIN (HUMAN) 30 G: 10 INJECTION INTRAVENOUS; SUBCUTANEOUS at 07:51

## 2024-06-21 NOTE — PROGRESS NOTES
Pt arrived to [facility] with port accessed. Pt premedicated as ordered. Gamunex-C 30g IV administered. Pt tolerated well. Port de accessed.     Electronically signed by Ciera George RN on 6/21/2024 at 9:32 AM

## 2024-06-21 NOTE — DISCHARGE INSTRUCTIONS
immune globulin (intravenous and subcutaneous)  Pronunciation:  dinora LACEY foley  Brand:  Gammagard Liquid, Gammaked, Gamunex-C  What is the most important information I should know about immune globulin?  This medicine can cause blood clots.  The risk is highest in older adults or in people who have had blood clots, heart problems, or blood circulation problems. Blood clots are also more likely during long-term bedrest, while using birth control pills or hormone replacement therapy, or while having a central intravenous (IV) catheter in place.  Call your doctor at once if you have chest pain, trouble breathing, fast heartbeats, numbness or weakness, or swelling and warmth or discoloration in an arm or leg.  This medicine can also harm your kidneys, especially if you have kidney disease or if you also use certain medicines. Tell your doctor right away if you have signs of kidney problems, such as swelling, rapid weight gain, and little or no urination.  What is immune globulin?  Immune globulin intravenous and subcutaneous (for injection into a vein or under the skin) is used to treat primary immunodeficiency.  Immune globulin is also used to increase platelets (blood clotting cells) in people with idiopathic thrombocytopenic purpura.  Immune globulin is also used to treat certain debilitating nerve disorders that cause muscle weakness and can affect daily activities.  Immune globulin may also be used for purposes not listed in this medication guide.  What should I discuss with my healthcare provider before using immune globulin?  You should not use this medicine if:  you have had an allergic reaction to an immune globulin or blood product; or  you have immune globulin A (IgA) deficiency with antibody to IgA.  Immune globulin can cause blood clots or kidney problems, especially in older adults or in people with certain conditions. Tell your doctor if you have ever had:  heart problems, blood circulation  your doctor for medical advice about side effects. You may report side effects to FDA at 7-704-BHM-6658.  What other drugs will affect immune globulin?  Immune globulin can harm your kidneys, especially if you also use certain medicines for infections, cancer, osteoporosis, organ transplant rejection, bowel disorders, high blood pressure, or pain or arthritis (including Advil, Motrin, and Aleve).  Other drugs may affect immune globulin, including prescription and over-the-counter medicines, vitamins, and herbal products. Tell your doctor about all your current medicines and any medicine you start or stop using.  Where can I get more information?  Your doctor or pharmacist can provide more information about immune globulin.  Remember, keep this and all other medicines out of the reach of children, never share your medicines with others, and use this medication only for the indication prescribed.   Every effort has been made to ensure that the information provided by [employer]. ('[employer]') is accurate, up-to-date, and complete, but no guarantee is made to that effect. Drug information contained herein may be time sensitive. [employer] information has been compiled for use by healthcare practitioners and consumers in the [place] and therefore [employer] does not warrant that uses outside of the [place] are appropriate, unless specifically indicated otherwise. [employer] drug information does not endorse drugs, diagnose patients or recommend therapy. [employer] drug information is an informational resource designed to assist licensed healthcare practitioners in caring for their patients and/or to serve consumers viewing this service as a supplement to, and not a substitute for, the expertise, skill, knowledge and judgment of healthcare practitioners. The absence of a warning for a given drug or drug combination in no way should be construed to indicate that the drug or drug combination is safe, effective or

## 2024-06-21 NOTE — TELEPHONE ENCOUNTER
Spoke with the pt and explained that Oxy was too early to fill not due until 6/27, and she would have to find a pharmacy locally that has the Marinol in stock. Pt voiced understanding.

## 2024-06-24 RX ORDER — AMITRIPTYLINE HYDROCHLORIDE 100 MG/1
100 TABLET ORAL NIGHTLY
Qty: 30 TABLET | Refills: 5 | Status: SHIPPED | OUTPATIENT
Start: 2024-06-24

## 2024-06-26 RX ORDER — RIZATRIPTAN BENZOATE 10 MG/1
TABLET ORAL
Qty: 9 TABLET | Refills: 5 | Status: CANCELLED | OUTPATIENT
Start: 2024-06-26

## 2024-06-27 ENCOUNTER — TELEPHONE (OUTPATIENT)
Dept: NEUROLOGY | Age: 49
End: 2024-06-27

## 2024-06-27 DIAGNOSIS — G43.719 INTRACTABLE CHRONIC MIGRAINE WITHOUT AURA AND WITHOUT STATUS MIGRAINOSUS: ICD-10-CM

## 2024-06-27 DIAGNOSIS — M54.30 SCIATICA, UNSPECIFIED LATERALITY: ICD-10-CM

## 2024-06-27 DIAGNOSIS — M79.7 FIBROMYALGIA: ICD-10-CM

## 2024-06-27 DIAGNOSIS — G35 MULTIPLE SCLEROSIS (HCC): ICD-10-CM

## 2024-06-27 RX ORDER — OXYCODONE HYDROCHLORIDE 10 MG/1
10 TABLET ORAL EVERY 6 HOURS PRN
Qty: 120 TABLET | Refills: 0 | Status: SHIPPED | OUTPATIENT
Start: 2024-06-27 | End: 2024-07-27

## 2024-06-27 RX ORDER — ONDANSETRON 4 MG/1
TABLET, FILM COATED ORAL
Qty: 30 TABLET | Refills: 5 | Status: SHIPPED | OUTPATIENT
Start: 2024-06-27

## 2024-06-27 NOTE — TELEPHONE ENCOUNTER
Requested Prescriptions     Pending Prescriptions Disp Refills    ondansetron (ZOFRAN) 4 MG tablet [Pharmacy Med Name: ONDANSETRON 4MG TABLETS] 30 tablet 5     Sig: TAKE 1 TABLET BY MOUTH 1 TIME FOR 1 DOSE       Last Office Visit: 5/15/2024  Next Office Visit: 6/26/2024  Last Medication Refill: 1/30/2024 with 5 RF

## 2024-06-27 NOTE — TELEPHONE ENCOUNTER
Ramiro Mendoza has requested a refill on her medication.      Last office visit : 5/15/2024   Next office visit : 8/21/2024   Last medication refill : 5/28/24  Daniel : up to date       Requested Prescriptions     Pending Prescriptions Disp Refills    oxyCODONE HCl (OXY-IR) 10 MG immediate release tablet 120 tablet 0     Sig: Take 1 tablet by mouth every 6 hours as needed for Pain for up to 30 days. Intended supply: 30 days Max Daily Amount: 40 mg

## 2024-06-27 NOTE — TELEPHONE ENCOUNTER
ASIF GRECO (Key: [id number])  PA Case ID #: [id number]  Need Help? Call us at [phone]  Status  Sent to Plan today  Drug  oxyCODONE HCl 10MG tablets    Form  [employer] Medicare Electronic PA Form (2017 NCPDP)

## 2024-06-28 ENCOUNTER — HOSPITAL ENCOUNTER (OUTPATIENT)
Dept: INFUSION THERAPY | Age: 49
Setting detail: INFUSION SERIES
Discharge: HOME | End: 2024-06-28
Payer: MEDICARE

## 2024-06-28 VITALS
SYSTOLIC BLOOD PRESSURE: 100 MMHG | OXYGEN SATURATION: 98 % | DIASTOLIC BLOOD PRESSURE: 65 MMHG | BODY MASS INDEX: 26.13 KG/M2 | TEMPERATURE: 97.3 F | WEIGHT: 157 LBS | RESPIRATION RATE: 18 BRPM | HEART RATE: 62 BPM

## 2024-06-28 DIAGNOSIS — G35 MULTIPLE SCLEROSIS (HCC): Primary | ICD-10-CM

## 2024-06-28 PROCEDURE — 2580000003 HC RX 258: Performed by: PSYCHIATRY & NEUROLOGY

## 2024-06-28 PROCEDURE — 96375 TX/PRO/DX INJ NEW DRUG ADDON: CPT

## 2024-06-28 PROCEDURE — 96365 THER/PROPH/DIAG IV INF INIT: CPT

## 2024-06-28 PROCEDURE — 6360000002 HC RX W HCPCS: Performed by: PSYCHIATRY & NEUROLOGY

## 2024-06-28 PROCEDURE — 6370000000 HC RX 637 (ALT 250 FOR IP): Performed by: PSYCHIATRY & NEUROLOGY

## 2024-06-28 PROCEDURE — 96366 THER/PROPH/DIAG IV INF ADDON: CPT

## 2024-06-28 RX ORDER — SODIUM CHLORIDE 0.9 % (FLUSH) 0.9 %
5-40 SYRINGE (ML) INJECTION PRN
Status: DISCONTINUED | OUTPATIENT
Start: 2024-06-28 | End: 2024-06-29 | Stop reason: HOSPADM

## 2024-06-28 RX ORDER — ACETAMINOPHEN 325 MG/1
650 TABLET ORAL
Status: CANCELLED | OUTPATIENT
Start: 2024-07-05

## 2024-06-28 RX ORDER — SODIUM CHLORIDE 9 MG/ML
5-250 INJECTION, SOLUTION INTRAVENOUS PRN
Status: CANCELLED | OUTPATIENT
Start: 2024-07-05

## 2024-06-28 RX ORDER — SODIUM CHLORIDE 0.9 % (FLUSH) 0.9 %
5-40 SYRINGE (ML) INJECTION PRN
Status: CANCELLED | OUTPATIENT
Start: 2024-07-05

## 2024-06-28 RX ORDER — EPINEPHRINE 1 MG/ML
0.3 INJECTION, SOLUTION, CONCENTRATE INTRAVENOUS PRN
Status: CANCELLED | OUTPATIENT
Start: 2024-07-05

## 2024-06-28 RX ORDER — SODIUM CHLORIDE 9 MG/ML
INJECTION, SOLUTION INTRAVENOUS CONTINUOUS
Status: CANCELLED | OUTPATIENT
Start: 2024-07-05

## 2024-06-28 RX ORDER — ONDANSETRON 2 MG/ML
8 INJECTION INTRAMUSCULAR; INTRAVENOUS
Status: CANCELLED | OUTPATIENT
Start: 2024-07-05

## 2024-06-28 RX ORDER — ACETAMINOPHEN 325 MG/1
650 TABLET ORAL ONCE
Status: CANCELLED | OUTPATIENT
Start: 2024-07-05 | End: 2024-07-05

## 2024-06-28 RX ORDER — DIPHENHYDRAMINE HYDROCHLORIDE 50 MG/ML
50 INJECTION INTRAMUSCULAR; INTRAVENOUS ONCE
Status: COMPLETED | OUTPATIENT
Start: 2024-06-28 | End: 2024-06-28

## 2024-06-28 RX ORDER — MEPERIDINE HYDROCHLORIDE 25 MG/ML
12.5 INJECTION INTRAMUSCULAR; INTRAVENOUS; SUBCUTANEOUS PRN
Status: CANCELLED | OUTPATIENT
Start: 2024-07-05

## 2024-06-28 RX ORDER — DIPHENHYDRAMINE HYDROCHLORIDE 50 MG/ML
50 INJECTION INTRAMUSCULAR; INTRAVENOUS
Status: CANCELLED | OUTPATIENT
Start: 2024-07-05

## 2024-06-28 RX ORDER — DIPHENHYDRAMINE HYDROCHLORIDE 50 MG/ML
50 INJECTION INTRAMUSCULAR; INTRAVENOUS ONCE
Status: CANCELLED
Start: 2024-07-05 | End: 2024-07-05

## 2024-06-28 RX ORDER — HEPARIN 100 UNIT/ML
500 SYRINGE INTRAVENOUS PRN
Status: DISCONTINUED | OUTPATIENT
Start: 2024-06-28 | End: 2024-06-29 | Stop reason: HOSPADM

## 2024-06-28 RX ORDER — ACETAMINOPHEN 325 MG/1
650 TABLET ORAL ONCE
Status: COMPLETED | OUTPATIENT
Start: 2024-06-28 | End: 2024-06-28

## 2024-06-28 RX ORDER — ALBUTEROL SULFATE 90 UG/1
4 AEROSOL, METERED RESPIRATORY (INHALATION) PRN
Status: CANCELLED | OUTPATIENT
Start: 2024-07-05

## 2024-06-28 RX ORDER — HEPARIN 100 UNIT/ML
500 SYRINGE INTRAVENOUS PRN
Status: CANCELLED | OUTPATIENT
Start: 2024-07-05

## 2024-06-28 RX ADMIN — IMMUNE GLOBULIN (HUMAN) 30 G: 10 INJECTION INTRAVENOUS; SUBCUTANEOUS at 08:12

## 2024-06-28 RX ADMIN — ACETAMINOPHEN 650 MG: 325 TABLET ORAL at 07:50

## 2024-06-28 RX ADMIN — HYDROCORTISONE SODIUM SUCCINATE 100 MG: 100 INJECTION, POWDER, FOR SOLUTION INTRAMUSCULAR; INTRAVENOUS at 07:52

## 2024-06-28 RX ADMIN — SODIUM CHLORIDE, PRESERVATIVE FREE 10 ML: 5 INJECTION INTRAVENOUS at 07:52

## 2024-06-28 RX ADMIN — DIPHENHYDRAMINE HYDROCHLORIDE 50 MG: 50 INJECTION INTRAMUSCULAR; INTRAVENOUS at 07:52

## 2024-06-28 RX ADMIN — HEPARIN 500 UNITS: 100 SYRINGE at 09:45

## 2024-06-28 RX ADMIN — SODIUM CHLORIDE, PRESERVATIVE FREE 20 ML: 5 INJECTION INTRAVENOUS at 09:45

## 2024-06-30 ENCOUNTER — APPOINTMENT (OUTPATIENT)
Dept: GENERAL RADIOLOGY | Age: 49
End: 2024-06-30
Payer: MEDICARE

## 2024-06-30 ENCOUNTER — APPOINTMENT (OUTPATIENT)
Dept: CT IMAGING | Age: 49
End: 2024-06-30
Payer: MEDICARE

## 2024-06-30 ENCOUNTER — HOSPITAL ENCOUNTER (EMERGENCY)
Age: 49
Discharge: HOME | End: 2024-06-30
Attending: EMERGENCY MEDICINE
Payer: MEDICARE

## 2024-06-30 VITALS
RESPIRATION RATE: 10 BRPM | WEIGHT: 154 LBS | TEMPERATURE: 97.9 F | SYSTOLIC BLOOD PRESSURE: 146 MMHG | HEIGHT: 65 IN | DIASTOLIC BLOOD PRESSURE: 90 MMHG | BODY MASS INDEX: 25.66 KG/M2 | HEART RATE: 63 BPM | OXYGEN SATURATION: 100 %

## 2024-06-30 DIAGNOSIS — R07.89 ATYPICAL CHEST PAIN: Primary | ICD-10-CM

## 2024-06-30 LAB
ALBUMIN SERPL-MCNC: 3.1 G/DL (ref 3.5–5.2)
ALP SERPL-CCNC: 50 U/L (ref 35–104)
ALT SERPL-CCNC: 16 U/L (ref 5–33)
ANION GAP SERPL CALCULATED.3IONS-SCNC: 8 MMOL/L (ref 7–19)
AST SERPL-CCNC: 29 U/L (ref 5–32)
BASOPHILS # BLD: 0 K/UL (ref 0–0.2)
BASOPHILS NFR BLD: 0.5 % (ref 0–1)
BILIRUB SERPL-MCNC: 0.2 MG/DL (ref 0.2–1.2)
BUN SERPL-MCNC: 9 MG/DL (ref 6–20)
CALCIUM SERPL-MCNC: 8.4 MG/DL (ref 8.6–10)
CHLORIDE SERPL-SCNC: 94 MMOL/L (ref 98–111)
CO2 SERPL-SCNC: 26 MMOL/L (ref 22–29)
CREAT SERPL-MCNC: 0.6 MG/DL (ref 0.5–0.9)
D DIMER PPP FEU-MCNC: 0.67 UG/ML FEU (ref 0–0.48)
EKG P AXIS: 71 DEGREES
EKG P-R INTERVAL: 148 MS
EKG Q-T INTERVAL: 372 MS
EKG QRS DURATION: 84 MS
EKG QTC CALCULATION (BAZETT): 401 MS
EKG T AXIS: 31 DEGREES
EOSINOPHIL # BLD: 0 K/UL (ref 0–0.6)
EOSINOPHIL NFR BLD: 0 % (ref 0–5)
ERYTHROCYTE [DISTWIDTH] IN BLOOD BY AUTOMATED COUNT: 14.2 % (ref 11.5–14.5)
GLUCOSE SERPL-MCNC: 88 MG/DL (ref 74–109)
HCT VFR BLD AUTO: 33.6 % (ref 37–47)
HGB BLD-MCNC: 10.9 G/DL (ref 12–16)
IMM GRANULOCYTES # BLD: 0 K/UL
LYMPHOCYTES # BLD: 0.7 K/UL (ref 1.1–4.5)
LYMPHOCYTES NFR BLD: 32.5 % (ref 20–40)
MAGNESIUM SERPL-MCNC: 1.9 MG/DL (ref 1.6–2.6)
MCH RBC QN AUTO: 29.6 PG (ref 27–31)
MCHC RBC AUTO-ENTMCNC: 32.4 G/DL (ref 33–37)
MCV RBC AUTO: 91.3 FL (ref 81–99)
MONOCYTES # BLD: 0.2 K/UL (ref 0–0.9)
MONOCYTES NFR BLD: 9.9 % (ref 0–10)
NEUTROPHILS # BLD: 1.2 K/UL (ref 1.5–7.5)
NEUTS SEG NFR BLD: 57.1 % (ref 50–65)
PLATELET # BLD AUTO: 145 K/UL (ref 130–400)
PMV BLD AUTO: 9.8 FL (ref 9.4–12.3)
POTASSIUM SERPL-SCNC: 4.3 MMOL/L (ref 3.5–5)
PROT SERPL-MCNC: 7.9 G/DL (ref 6.6–8.7)
RBC # BLD AUTO: 3.68 M/UL (ref 4.2–5.4)
SODIUM SERPL-SCNC: 128 MMOL/L (ref 136–145)
TROPONIN, HIGH SENSITIVITY: <6 NG/L (ref 0–14)
TROPONIN, HIGH SENSITIVITY: <6 NG/L (ref 0–14)
WBC # BLD AUTO: 2.1 K/UL (ref 4.8–10.8)

## 2024-06-30 PROCEDURE — 71045 X-RAY EXAM CHEST 1 VIEW: CPT

## 2024-06-30 PROCEDURE — 84484 ASSAY OF TROPONIN QUANT: CPT

## 2024-06-30 PROCEDURE — 80053 COMPREHEN METABOLIC PANEL: CPT

## 2024-06-30 PROCEDURE — 6360000004 HC RX CONTRAST MEDICATION: Performed by: EMERGENCY MEDICINE

## 2024-06-30 PROCEDURE — 6360000002 HC RX W HCPCS

## 2024-06-30 PROCEDURE — 99285 EMERGENCY DEPT VISIT HI MDM: CPT

## 2024-06-30 PROCEDURE — 93005 ELECTROCARDIOGRAM TRACING: CPT | Performed by: EMERGENCY MEDICINE

## 2024-06-30 PROCEDURE — 36415 COLL VENOUS BLD VENIPUNCTURE: CPT

## 2024-06-30 PROCEDURE — 85379 FIBRIN DEGRADATION QUANT: CPT

## 2024-06-30 PROCEDURE — 85025 COMPLETE CBC W/AUTO DIFF WBC: CPT

## 2024-06-30 PROCEDURE — 83735 ASSAY OF MAGNESIUM: CPT

## 2024-06-30 PROCEDURE — 71275 CT ANGIOGRAPHY CHEST: CPT

## 2024-06-30 RX ORDER — HEPARIN 100 UNIT/ML
SYRINGE INTRAVENOUS
Status: COMPLETED
Start: 2024-06-30 | End: 2024-06-30

## 2024-06-30 RX ORDER — HEPARIN 100 UNIT/ML
100 SYRINGE INTRAVENOUS ONCE
Status: COMPLETED | OUTPATIENT
Start: 2024-06-30 | End: 2024-06-30

## 2024-06-30 RX ADMIN — IOPAMIDOL 75 ML: 755 INJECTION, SOLUTION INTRAVENOUS at 15:22

## 2024-06-30 RX ADMIN — HEPARIN 100 UNITS: 100 SYRINGE at 16:14

## 2024-06-30 ASSESSMENT — PAIN DESCRIPTION - ORIENTATION: ORIENTATION: MID;UPPER

## 2024-06-30 ASSESSMENT — PAIN DESCRIPTION - DESCRIPTORS: DESCRIPTORS: TIGHTNESS

## 2024-06-30 ASSESSMENT — PAIN DESCRIPTION - LOCATION: LOCATION: CHEST

## 2024-06-30 ASSESSMENT — PAIN SCALES - GENERAL: PAINLEVEL_OUTOF10: 9

## 2024-06-30 ASSESSMENT — PAIN - FUNCTIONAL ASSESSMENT: PAIN_FUNCTIONAL_ASSESSMENT: 0-10

## 2024-06-30 NOTE — ED NOTES
Per EMS, pt took clonidine at home prior to EMS arrival and was given aspirin and one nitro by EMS. Pt reports minimal relief with meds

## 2024-07-01 ENCOUNTER — TELEPHONE (OUTPATIENT)
Dept: NEUROLOGY | Age: 49
End: 2024-07-01

## 2024-07-01 NOTE — TELEPHONE ENCOUNTER
jonas mckay (Key: [id number])  Need Help? Call us at [phone]  Status  Sent to Plan today  Drug  Soma 350MG tablets    Form  [employer] Medicare Part D Tier Exception Form  Phone: [phone]  Fax: [phone]

## 2024-07-05 ENCOUNTER — HOSPITAL ENCOUNTER (OUTPATIENT)
Dept: INFUSION THERAPY | Age: 49
Setting detail: INFUSION SERIES
Discharge: HOME | End: 2024-07-05
Payer: MEDICARE

## 2024-07-05 VITALS
BODY MASS INDEX: 24.96 KG/M2 | RESPIRATION RATE: 16 BRPM | DIASTOLIC BLOOD PRESSURE: 84 MMHG | SYSTOLIC BLOOD PRESSURE: 113 MMHG | TEMPERATURE: 97.4 F | OXYGEN SATURATION: 100 % | HEART RATE: 89 BPM | WEIGHT: 150 LBS

## 2024-07-05 DIAGNOSIS — G35 MULTIPLE SCLEROSIS (HCC): Primary | ICD-10-CM

## 2024-07-05 PROCEDURE — 96365 THER/PROPH/DIAG IV INF INIT: CPT

## 2024-07-05 PROCEDURE — 96375 TX/PRO/DX INJ NEW DRUG ADDON: CPT

## 2024-07-05 PROCEDURE — 96366 THER/PROPH/DIAG IV INF ADDON: CPT

## 2024-07-05 PROCEDURE — 2580000003 HC RX 258: Performed by: PSYCHIATRY & NEUROLOGY

## 2024-07-05 PROCEDURE — 6370000000 HC RX 637 (ALT 250 FOR IP): Performed by: PSYCHIATRY & NEUROLOGY

## 2024-07-05 PROCEDURE — 6360000002 HC RX W HCPCS: Performed by: PSYCHIATRY & NEUROLOGY

## 2024-07-05 RX ORDER — MEPERIDINE HYDROCHLORIDE 25 MG/ML
12.5 INJECTION INTRAMUSCULAR; INTRAVENOUS; SUBCUTANEOUS PRN
OUTPATIENT
Start: 2024-07-12

## 2024-07-05 RX ORDER — ALBUTEROL SULFATE 90 UG/1
4 AEROSOL, METERED RESPIRATORY (INHALATION) PRN
OUTPATIENT
Start: 2024-07-12

## 2024-07-05 RX ORDER — EPINEPHRINE 1 MG/ML
0.3 INJECTION, SOLUTION, CONCENTRATE INTRAVENOUS PRN
OUTPATIENT
Start: 2024-07-12

## 2024-07-05 RX ORDER — ONDANSETRON 2 MG/ML
8 INJECTION INTRAMUSCULAR; INTRAVENOUS
OUTPATIENT
Start: 2024-07-12

## 2024-07-05 RX ORDER — ACETAMINOPHEN 325 MG/1
650 TABLET ORAL
OUTPATIENT
Start: 2024-07-12

## 2024-07-05 RX ORDER — ACETAMINOPHEN 325 MG/1
650 TABLET ORAL ONCE
Status: CANCELLED | OUTPATIENT
Start: 2024-07-12 | End: 2024-07-12

## 2024-07-05 RX ORDER — DIPHENHYDRAMINE HYDROCHLORIDE 50 MG/ML
50 INJECTION INTRAMUSCULAR; INTRAVENOUS ONCE
Status: COMPLETED | OUTPATIENT
Start: 2024-07-05 | End: 2024-07-05

## 2024-07-05 RX ORDER — SODIUM CHLORIDE 9 MG/ML
INJECTION, SOLUTION INTRAVENOUS CONTINUOUS
OUTPATIENT
Start: 2024-07-12

## 2024-07-05 RX ORDER — SODIUM CHLORIDE 9 MG/ML
5-250 INJECTION, SOLUTION INTRAVENOUS PRN
OUTPATIENT
Start: 2024-07-12

## 2024-07-05 RX ORDER — DIPHENHYDRAMINE HYDROCHLORIDE 50 MG/ML
50 INJECTION INTRAMUSCULAR; INTRAVENOUS ONCE
Status: CANCELLED
Start: 2024-07-12 | End: 2024-07-12

## 2024-07-05 RX ORDER — ACETAMINOPHEN 325 MG/1
650 TABLET ORAL ONCE
Status: COMPLETED | OUTPATIENT
Start: 2024-07-05 | End: 2024-07-05

## 2024-07-05 RX ORDER — HEPARIN 100 UNIT/ML
500 SYRINGE INTRAVENOUS PRN
Status: DISCONTINUED | OUTPATIENT
Start: 2024-07-05 | End: 2024-07-06 | Stop reason: HOSPADM

## 2024-07-05 RX ORDER — SODIUM CHLORIDE 0.9 % (FLUSH) 0.9 %
5-40 SYRINGE (ML) INJECTION PRN
Status: CANCELLED | OUTPATIENT
Start: 2024-07-12

## 2024-07-05 RX ORDER — HEPARIN 100 UNIT/ML
500 SYRINGE INTRAVENOUS PRN
Status: CANCELLED | OUTPATIENT
Start: 2024-07-12

## 2024-07-05 RX ORDER — SODIUM CHLORIDE 0.9 % (FLUSH) 0.9 %
5-40 SYRINGE (ML) INJECTION PRN
Status: DISCONTINUED | OUTPATIENT
Start: 2024-07-05 | End: 2024-07-06 | Stop reason: HOSPADM

## 2024-07-05 RX ORDER — DIPHENHYDRAMINE HYDROCHLORIDE 50 MG/ML
50 INJECTION INTRAMUSCULAR; INTRAVENOUS
OUTPATIENT
Start: 2024-07-12

## 2024-07-05 RX ADMIN — HYDROCORTISONE SODIUM SUCCINATE 100 MG: 100 INJECTION, POWDER, FOR SOLUTION INTRAMUSCULAR; INTRAVENOUS at 07:51

## 2024-07-05 RX ADMIN — IMMUNE GLOBULIN (HUMAN) 25 G: 10 INJECTION INTRAVENOUS; SUBCUTANEOUS at 08:07

## 2024-07-05 RX ADMIN — SODIUM CHLORIDE, PRESERVATIVE FREE 20 ML: 5 INJECTION INTRAVENOUS at 09:23

## 2024-07-05 RX ADMIN — HEPARIN 500 UNITS: 100 SYRINGE at 09:24

## 2024-07-05 RX ADMIN — DIPHENHYDRAMINE HYDROCHLORIDE 50 MG: 50 INJECTION INTRAMUSCULAR; INTRAVENOUS at 07:51

## 2024-07-05 RX ADMIN — ACETAMINOPHEN 650 MG: 325 TABLET ORAL at 07:51

## 2024-07-05 NOTE — DISCHARGE INSTRUCTIONS
immune globulin (intravenous and subcutaneous)  Pronunciation:  dinroa LACEY foley  Brand:  Gammagard Liquid, Gammaked, Gamunex-C  What is the most important information I should know about immune globulin?  This medicine can cause blood clots.  The risk is highest in older adults or in people who have had blood clots, heart problems, or blood circulation problems. Blood clots are also more likely during long-term bedrest, while using birth control pills or hormone replacement therapy, or while having a central intravenous (IV) catheter in place.  Call your doctor at once if you have chest pain, trouble breathing, fast heartbeats, numbness or weakness, or swelling and warmth or discoloration in an arm or leg.  This medicine can also harm your kidneys, especially if you have kidney disease or if you also use certain medicines. Tell your doctor right away if you have signs of kidney problems, such as swelling, rapid weight gain, and little or no urination.  What is immune globulin?  Immune globulin intravenous and subcutaneous (for injection into a vein or under the skin) is used to treat primary immunodeficiency.  Immune globulin is also used to increase platelets (blood clotting cells) in people with idiopathic thrombocytopenic purpura.  Immune globulin is also used to treat certain debilitating nerve disorders that cause muscle weakness and can affect daily activities.  Immune globulin may also be used for purposes not listed in this medication guide.  What should I discuss with my healthcare provider before using immune globulin?  You should not use this medicine if:  you have had an allergic reaction to an immune globulin or blood product; or  you have immune globulin A (IgA) deficiency with antibody to IgA.  Immune globulin can cause blood clots or kidney problems, especially in older adults or in people with certain conditions. Tell your doctor if you have ever had:  heart problems, blood circulation

## 2024-07-08 RX ORDER — ONDANSETRON 4 MG/1
TABLET, FILM COATED ORAL
Qty: 30 TABLET | Refills: 5 | OUTPATIENT
Start: 2024-07-08

## 2024-07-12 ENCOUNTER — HOSPITAL ENCOUNTER (OUTPATIENT)
Dept: INFUSION THERAPY | Age: 49
Setting detail: INFUSION SERIES
Discharge: HOME | End: 2024-07-12
Payer: MEDICARE

## 2024-07-12 VITALS
HEART RATE: 85 BPM | SYSTOLIC BLOOD PRESSURE: 113 MMHG | TEMPERATURE: 98 F | DIASTOLIC BLOOD PRESSURE: 84 MMHG | WEIGHT: 151 LBS | BODY MASS INDEX: 25.13 KG/M2 | RESPIRATION RATE: 18 BRPM | OXYGEN SATURATION: 100 %

## 2024-07-12 DIAGNOSIS — G35 MULTIPLE SCLEROSIS (HCC): Primary | ICD-10-CM

## 2024-07-12 PROCEDURE — 6370000000 HC RX 637 (ALT 250 FOR IP): Performed by: PSYCHIATRY & NEUROLOGY

## 2024-07-12 PROCEDURE — 2580000003 HC RX 258: Performed by: PSYCHIATRY & NEUROLOGY

## 2024-07-12 PROCEDURE — 6360000002 HC RX W HCPCS: Performed by: PSYCHIATRY & NEUROLOGY

## 2024-07-12 PROCEDURE — 96365 THER/PROPH/DIAG IV INF INIT: CPT

## 2024-07-12 PROCEDURE — 96366 THER/PROPH/DIAG IV INF ADDON: CPT

## 2024-07-12 PROCEDURE — 96375 TX/PRO/DX INJ NEW DRUG ADDON: CPT

## 2024-07-12 RX ORDER — SODIUM CHLORIDE 9 MG/ML
INJECTION, SOLUTION INTRAVENOUS CONTINUOUS
OUTPATIENT
Start: 2024-07-19

## 2024-07-12 RX ORDER — MEPERIDINE HYDROCHLORIDE 25 MG/ML
12.5 INJECTION INTRAMUSCULAR; INTRAVENOUS; SUBCUTANEOUS PRN
OUTPATIENT
Start: 2024-07-19

## 2024-07-12 RX ORDER — DIPHENHYDRAMINE HYDROCHLORIDE 50 MG/ML
50 INJECTION INTRAMUSCULAR; INTRAVENOUS ONCE
Status: COMPLETED | OUTPATIENT
Start: 2024-07-12 | End: 2024-07-12

## 2024-07-12 RX ORDER — HEPARIN 100 UNIT/ML
500 SYRINGE INTRAVENOUS PRN
Status: DISCONTINUED | OUTPATIENT
Start: 2024-07-12 | End: 2024-07-13 | Stop reason: HOSPADM

## 2024-07-12 RX ORDER — SODIUM CHLORIDE 0.9 % (FLUSH) 0.9 %
5-40 SYRINGE (ML) INJECTION PRN
OUTPATIENT
Start: 2024-07-19

## 2024-07-12 RX ORDER — ACETAMINOPHEN 325 MG/1
650 TABLET ORAL
OUTPATIENT
Start: 2024-07-19

## 2024-07-12 RX ORDER — HEPARIN 100 UNIT/ML
500 SYRINGE INTRAVENOUS PRN
OUTPATIENT
Start: 2024-07-19

## 2024-07-12 RX ORDER — DIPHENHYDRAMINE HYDROCHLORIDE 50 MG/ML
50 INJECTION INTRAMUSCULAR; INTRAVENOUS
OUTPATIENT
Start: 2024-07-19

## 2024-07-12 RX ORDER — SODIUM CHLORIDE 9 MG/ML
5-250 INJECTION, SOLUTION INTRAVENOUS PRN
OUTPATIENT
Start: 2024-07-19

## 2024-07-12 RX ORDER — ACETAMINOPHEN 325 MG/1
650 TABLET ORAL ONCE
OUTPATIENT
Start: 2024-07-19 | End: 2024-07-19

## 2024-07-12 RX ORDER — ALBUTEROL SULFATE 90 UG/1
4 AEROSOL, METERED RESPIRATORY (INHALATION) PRN
OUTPATIENT
Start: 2024-07-19

## 2024-07-12 RX ORDER — EPINEPHRINE 1 MG/ML
0.3 INJECTION, SOLUTION, CONCENTRATE INTRAVENOUS PRN
OUTPATIENT
Start: 2024-07-19

## 2024-07-12 RX ORDER — DIPHENHYDRAMINE HYDROCHLORIDE 50 MG/ML
50 INJECTION INTRAMUSCULAR; INTRAVENOUS ONCE
Start: 2024-07-19 | End: 2024-07-19

## 2024-07-12 RX ORDER — ACETAMINOPHEN 325 MG/1
650 TABLET ORAL ONCE
Status: COMPLETED | OUTPATIENT
Start: 2024-07-12 | End: 2024-07-12

## 2024-07-12 RX ORDER — SODIUM CHLORIDE 0.9 % (FLUSH) 0.9 %
5-40 SYRINGE (ML) INJECTION PRN
Status: DISCONTINUED | OUTPATIENT
Start: 2024-07-12 | End: 2024-07-13 | Stop reason: HOSPADM

## 2024-07-12 RX ORDER — ONDANSETRON 2 MG/ML
8 INJECTION INTRAMUSCULAR; INTRAVENOUS
OUTPATIENT
Start: 2024-07-19

## 2024-07-12 RX ADMIN — DIPHENHYDRAMINE HYDROCHLORIDE 50 MG: 50 INJECTION INTRAMUSCULAR; INTRAVENOUS at 07:30

## 2024-07-12 RX ADMIN — SODIUM CHLORIDE, PRESERVATIVE FREE 10 ML: 5 INJECTION INTRAVENOUS at 07:29

## 2024-07-12 RX ADMIN — HEPARIN 500 UNITS: 100 SYRINGE at 09:06

## 2024-07-12 RX ADMIN — SODIUM CHLORIDE, PRESERVATIVE FREE 20 ML: 5 INJECTION INTRAVENOUS at 09:06

## 2024-07-12 RX ADMIN — ACETAMINOPHEN 650 MG: 325 TABLET ORAL at 07:29

## 2024-07-12 RX ADMIN — IMMUNE GLOBULIN (HUMAN) 25 G: 10 INJECTION INTRAVENOUS; SUBCUTANEOUS at 07:44

## 2024-07-12 RX ADMIN — HYDROCORTISONE SODIUM SUCCINATE 100 MG: 100 INJECTION, POWDER, FOR SOLUTION INTRAMUSCULAR; INTRAVENOUS at 07:29

## 2024-07-12 NOTE — PROGRESS NOTES
Mediport accessed using sterile technique on arrival to [facility] (see flow sheet).  Pre meds given, followed by Gamunex C 25 g as ordered.  Pt tolerated well.

## 2024-07-15 ENCOUNTER — HOSPITAL ENCOUNTER (OUTPATIENT)
Dept: INFUSION THERAPY | Age: 49
Setting detail: INFUSION SERIES
End: 2024-07-15
Payer: MEDICARE

## 2024-07-16 ENCOUNTER — HOSPITAL ENCOUNTER (OUTPATIENT)
Dept: INFUSION THERAPY | Age: 49
Setting detail: INFUSION SERIES
Discharge: HOME | End: 2024-07-16
Payer: MEDICARE

## 2024-07-16 VITALS
WEIGHT: 153 LBS | OXYGEN SATURATION: 100 % | HEART RATE: 70 BPM | TEMPERATURE: 98.3 F | RESPIRATION RATE: 18 BRPM | BODY MASS INDEX: 25.46 KG/M2 | SYSTOLIC BLOOD PRESSURE: 125 MMHG | DIASTOLIC BLOOD PRESSURE: 91 MMHG

## 2024-07-16 DIAGNOSIS — G35 EXACERBATION OF MULTIPLE SCLEROSIS (HCC): ICD-10-CM

## 2024-07-16 DIAGNOSIS — M32.9 LUPUS (HCC): Primary | ICD-10-CM

## 2024-07-16 LAB
ALBUMIN SERPL-MCNC: 3.2 G/DL (ref 3.5–5.2)
ALP SERPL-CCNC: 64 U/L (ref 35–104)
ALT SERPL-CCNC: 21 U/L (ref 5–33)
ANION GAP SERPL CALCULATED.3IONS-SCNC: 8 MMOL/L (ref 7–19)
AST SERPL-CCNC: 31 U/L (ref 5–32)
BASOPHILS # BLD: 0 K/UL (ref 0–0.2)
BASOPHILS NFR BLD: 0.5 % (ref 0–1)
BILIRUB SERPL-MCNC: <0.2 MG/DL (ref 0.2–1.2)
BUN SERPL-MCNC: 6 MG/DL (ref 6–20)
CALCIUM SERPL-MCNC: 8.7 MG/DL (ref 8.6–10)
CHLORIDE SERPL-SCNC: 95 MMOL/L (ref 98–111)
CO2 SERPL-SCNC: 27 MMOL/L (ref 22–29)
CREAT SERPL-MCNC: 0.6 MG/DL (ref 0.5–0.9)
EOSINOPHIL # BLD: 0 K/UL (ref 0–0.6)
EOSINOPHIL NFR BLD: 0.5 % (ref 0–5)
ERYTHROCYTE [DISTWIDTH] IN BLOOD BY AUTOMATED COUNT: 13.3 % (ref 11.5–14.5)
GLUCOSE SERPL-MCNC: 82 MG/DL (ref 74–109)
HCT VFR BLD AUTO: 34.5 % (ref 37–47)
HGB BLD-MCNC: 11.5 G/DL (ref 12–16)
IMM GRANULOCYTES # BLD: 0 K/UL
LYMPHOCYTES # BLD: 0.9 K/UL (ref 1.1–4.5)
LYMPHOCYTES NFR BLD: 39.6 % (ref 20–40)
MCH RBC QN AUTO: 29.9 PG (ref 27–31)
MCHC RBC AUTO-ENTMCNC: 33.3 G/DL (ref 33–37)
MCV RBC AUTO: 89.8 FL (ref 81–99)
MONOCYTES # BLD: 0.3 K/UL (ref 0–0.9)
MONOCYTES NFR BLD: 12.9 % (ref 0–10)
NEUTROPHILS # BLD: 1 K/UL (ref 1.5–7.5)
NEUTS SEG NFR BLD: 45.6 % (ref 50–65)
PLATELET # BLD AUTO: 183 K/UL (ref 130–400)
PMV BLD AUTO: 9.5 FL (ref 9.4–12.3)
POTASSIUM SERPL-SCNC: 4.4 MMOL/L (ref 3.5–5)
PROT SERPL-MCNC: 7.6 G/DL (ref 6.6–8.7)
RBC # BLD AUTO: 3.84 M/UL (ref 4.2–5.4)
SODIUM SERPL-SCNC: 130 MMOL/L (ref 136–145)
WBC # BLD AUTO: 2.2 K/UL (ref 4.8–10.8)

## 2024-07-16 PROCEDURE — 80053 COMPREHEN METABOLIC PANEL: CPT

## 2024-07-16 PROCEDURE — 96365 THER/PROPH/DIAG IV INF INIT: CPT

## 2024-07-16 PROCEDURE — 85025 COMPLETE CBC W/AUTO DIFF WBC: CPT

## 2024-07-16 PROCEDURE — 6360000002 HC RX W HCPCS: Performed by: PSYCHIATRY & NEUROLOGY

## 2024-07-16 PROCEDURE — 96375 TX/PRO/DX INJ NEW DRUG ADDON: CPT

## 2024-07-16 PROCEDURE — 6370000000 HC RX 637 (ALT 250 FOR IP): Performed by: PSYCHIATRY & NEUROLOGY

## 2024-07-16 PROCEDURE — 2580000003 HC RX 258: Performed by: PSYCHIATRY & NEUROLOGY

## 2024-07-16 RX ORDER — ACETAMINOPHEN 325 MG/1
650 TABLET ORAL ONCE
Status: COMPLETED | OUTPATIENT
Start: 2024-07-16 | End: 2024-07-16

## 2024-07-16 RX ORDER — SODIUM CHLORIDE 0.9 % (FLUSH) 0.9 %
5-40 SYRINGE (ML) INJECTION PRN
Status: DISCONTINUED | OUTPATIENT
Start: 2024-07-16 | End: 2024-07-17 | Stop reason: HOSPADM

## 2024-07-16 RX ORDER — ALBUTEROL SULFATE 90 UG/1
4 AEROSOL, METERED RESPIRATORY (INHALATION) PRN
OUTPATIENT
Start: 2024-07-19

## 2024-07-16 RX ORDER — SODIUM CHLORIDE 9 MG/ML
5-250 INJECTION, SOLUTION INTRAVENOUS PRN
OUTPATIENT
Start: 2024-07-19

## 2024-07-16 RX ORDER — SODIUM CHLORIDE 0.9 % (FLUSH) 0.9 %
5-40 SYRINGE (ML) INJECTION PRN
Status: CANCELLED | OUTPATIENT
Start: 2024-07-19

## 2024-07-16 RX ORDER — SODIUM CHLORIDE 9 MG/ML
INJECTION, SOLUTION INTRAVENOUS CONTINUOUS
OUTPATIENT
Start: 2024-07-19

## 2024-07-16 RX ORDER — DIPHENHYDRAMINE HYDROCHLORIDE 50 MG/ML
50 INJECTION INTRAMUSCULAR; INTRAVENOUS ONCE
Status: CANCELLED
Start: 2024-07-19 | End: 2024-07-19

## 2024-07-16 RX ORDER — MEPERIDINE HYDROCHLORIDE 25 MG/ML
12.5 INJECTION INTRAMUSCULAR; INTRAVENOUS; SUBCUTANEOUS PRN
OUTPATIENT
Start: 2024-07-19

## 2024-07-16 RX ORDER — ACETAMINOPHEN 325 MG/1
650 TABLET ORAL
OUTPATIENT
Start: 2024-07-19

## 2024-07-16 RX ORDER — DIPHENHYDRAMINE HCL 25 MG
25 TABLET ORAL ONCE
Status: COMPLETED | OUTPATIENT
Start: 2024-07-16 | End: 2024-07-16

## 2024-07-16 RX ORDER — ACETAMINOPHEN 325 MG/1
650 TABLET ORAL ONCE
Status: CANCELLED | OUTPATIENT
Start: 2024-07-19 | End: 2024-07-19

## 2024-07-16 RX ORDER — HEPARIN 100 UNIT/ML
500 SYRINGE INTRAVENOUS PRN
Status: CANCELLED | OUTPATIENT
Start: 2024-07-19

## 2024-07-16 RX ORDER — EPINEPHRINE 1 MG/ML
0.3 INJECTION, SOLUTION, CONCENTRATE INTRAVENOUS PRN
OUTPATIENT
Start: 2024-07-19

## 2024-07-16 RX ORDER — HEPARIN 100 UNIT/ML
500 SYRINGE INTRAVENOUS PRN
Status: DISCONTINUED | OUTPATIENT
Start: 2024-07-16 | End: 2024-07-17 | Stop reason: HOSPADM

## 2024-07-16 RX ORDER — SODIUM CHLORIDE 9 MG/ML
5-250 INJECTION, SOLUTION INTRAVENOUS PRN
Status: CANCELLED | OUTPATIENT
Start: 2024-08-13

## 2024-07-16 RX ORDER — ONDANSETRON 2 MG/ML
8 INJECTION INTRAMUSCULAR; INTRAVENOUS
OUTPATIENT
Start: 2024-07-19

## 2024-07-16 RX ORDER — SODIUM CHLORIDE 9 MG/ML
5-250 INJECTION, SOLUTION INTRAVENOUS PRN
Status: DISCONTINUED | OUTPATIENT
Start: 2024-07-16 | End: 2024-07-17 | Stop reason: HOSPADM

## 2024-07-16 RX ORDER — DIPHENHYDRAMINE HYDROCHLORIDE 50 MG/ML
50 INJECTION INTRAMUSCULAR; INTRAVENOUS
OUTPATIENT
Start: 2024-07-19

## 2024-07-16 RX ADMIN — SODIUM CHLORIDE, PRESERVATIVE FREE 20 ML: 5 INJECTION INTRAVENOUS at 09:25

## 2024-07-16 RX ADMIN — HEPARIN 500 UNITS: 100 SYRINGE at 09:25

## 2024-07-16 RX ADMIN — IMMUNE GLOBULIN (HUMAN) 25 G: 10 INJECTION INTRAVENOUS; SUBCUTANEOUS at 08:00

## 2024-07-16 RX ADMIN — HYDROCORTISONE SODIUM SUCCINATE 100 MG: 100 INJECTION, POWDER, FOR SOLUTION INTRAMUSCULAR; INTRAVENOUS at 07:42

## 2024-07-16 RX ADMIN — DIPHENHYDRAMINE HYDROCHLORIDE 25 MG: 25 TABLET ORAL at 07:42

## 2024-07-16 RX ADMIN — SODIUM CHLORIDE, PRESERVATIVE FREE 10 ML: 5 INJECTION INTRAVENOUS at 07:45

## 2024-07-16 RX ADMIN — ACETAMINOPHEN 650 MG: 325 TABLET ORAL at 07:42

## 2024-07-16 NOTE — PROGRESS NOTES
Mediport accessed using sterile technique (see flow sheet)  Pre meds given, followed by Gamunex C 25g as ordered.  Port remained accessed for tomorrow's appt. Pt tolerated infusion well.

## 2024-07-17 ENCOUNTER — HOSPITAL ENCOUNTER (OUTPATIENT)
Dept: INFUSION THERAPY | Age: 49
Setting detail: INFUSION SERIES
Discharge: HOME | End: 2024-07-17
Payer: MEDICARE

## 2024-07-17 VITALS
HEART RATE: 66 BPM | TEMPERATURE: 97.4 F | BODY MASS INDEX: 25.29 KG/M2 | RESPIRATION RATE: 18 BRPM | DIASTOLIC BLOOD PRESSURE: 70 MMHG | SYSTOLIC BLOOD PRESSURE: 101 MMHG | OXYGEN SATURATION: 100 % | WEIGHT: 152 LBS

## 2024-07-17 DIAGNOSIS — M32.9 LUPUS (HCC): Primary | ICD-10-CM

## 2024-07-17 DIAGNOSIS — G35 EXACERBATION OF MULTIPLE SCLEROSIS (HCC): ICD-10-CM

## 2024-07-17 PROCEDURE — 96366 THER/PROPH/DIAG IV INF ADDON: CPT

## 2024-07-17 PROCEDURE — 96375 TX/PRO/DX INJ NEW DRUG ADDON: CPT

## 2024-07-17 PROCEDURE — 6360000002 HC RX W HCPCS: Performed by: PSYCHIATRY & NEUROLOGY

## 2024-07-17 PROCEDURE — 6370000000 HC RX 637 (ALT 250 FOR IP): Performed by: PSYCHIATRY & NEUROLOGY

## 2024-07-17 PROCEDURE — 96365 THER/PROPH/DIAG IV INF INIT: CPT

## 2024-07-17 RX ORDER — HEPARIN 100 UNIT/ML
500 SYRINGE INTRAVENOUS PRN
Status: DISCONTINUED | OUTPATIENT
Start: 2024-07-17 | End: 2024-07-18 | Stop reason: HOSPADM

## 2024-07-17 RX ORDER — DIPHENHYDRAMINE HCL 25 MG
25 TABLET ORAL ONCE
Status: COMPLETED | OUTPATIENT
Start: 2024-07-17 | End: 2024-07-17

## 2024-07-17 RX ORDER — ACETAMINOPHEN 325 MG/1
650 TABLET ORAL ONCE
Status: COMPLETED | OUTPATIENT
Start: 2024-07-17 | End: 2024-07-17

## 2024-07-17 RX ORDER — SODIUM CHLORIDE 9 MG/ML
5-250 INJECTION, SOLUTION INTRAVENOUS PRN
Status: CANCELLED | OUTPATIENT
Start: 2024-08-14

## 2024-07-17 RX ADMIN — IMMUNE GLOBULIN (HUMAN) 30 G: 10 INJECTION INTRAVENOUS; SUBCUTANEOUS at 07:45

## 2024-07-17 RX ADMIN — HYDROCORTISONE SODIUM SUCCINATE 100 MG: 100 INJECTION, POWDER, FOR SOLUTION INTRAMUSCULAR; INTRAVENOUS at 07:31

## 2024-07-17 RX ADMIN — HEPARIN 500 UNITS: 100 SYRINGE at 09:20

## 2024-07-17 RX ADMIN — ACETAMINOPHEN 650 MG: 325 TABLET ORAL at 07:31

## 2024-07-17 RX ADMIN — DIPHENHYDRAMINE HYDROCHLORIDE 25 MG: 25 TABLET ORAL at 07:31

## 2024-07-17 NOTE — PROGRESS NOTES
PT arrived to [facility] with port accessed already. Port flushed and brisk blood return noted. Premedications give as ordered. Gamunex-C 30g administered. Pt tolerated well.     Electronically signed by Ciera George RN on 7/17/2024 at 9:26 AM

## 2024-07-18 ENCOUNTER — HOSPITAL ENCOUNTER (OUTPATIENT)
Dept: INFUSION THERAPY | Age: 49
Setting detail: INFUSION SERIES
Discharge: HOME | End: 2024-07-18
Payer: MEDICARE

## 2024-07-18 VITALS
DIASTOLIC BLOOD PRESSURE: 95 MMHG | TEMPERATURE: 97.6 F | HEART RATE: 73 BPM | RESPIRATION RATE: 18 BRPM | SYSTOLIC BLOOD PRESSURE: 129 MMHG | OXYGEN SATURATION: 99 %

## 2024-07-18 DIAGNOSIS — G35 EXACERBATION OF MULTIPLE SCLEROSIS (HCC): ICD-10-CM

## 2024-07-18 DIAGNOSIS — M32.9 LUPUS (HCC): Primary | ICD-10-CM

## 2024-07-18 PROCEDURE — 96365 THER/PROPH/DIAG IV INF INIT: CPT

## 2024-07-18 PROCEDURE — 96375 TX/PRO/DX INJ NEW DRUG ADDON: CPT

## 2024-07-18 PROCEDURE — 96366 THER/PROPH/DIAG IV INF ADDON: CPT

## 2024-07-18 PROCEDURE — 6370000000 HC RX 637 (ALT 250 FOR IP): Performed by: PSYCHIATRY & NEUROLOGY

## 2024-07-18 PROCEDURE — 6360000002 HC RX W HCPCS: Performed by: PSYCHIATRY & NEUROLOGY

## 2024-07-18 PROCEDURE — 2580000003 HC RX 258: Performed by: PSYCHIATRY & NEUROLOGY

## 2024-07-18 RX ORDER — SODIUM CHLORIDE 0.9 % (FLUSH) 0.9 %
5-40 SYRINGE (ML) INJECTION PRN
Status: DISCONTINUED | OUTPATIENT
Start: 2024-07-18 | End: 2024-07-19 | Stop reason: HOSPADM

## 2024-07-18 RX ORDER — SODIUM CHLORIDE 9 MG/ML
5-250 INJECTION, SOLUTION INTRAVENOUS PRN
Status: CANCELLED | OUTPATIENT
Start: 2024-08-15

## 2024-07-18 RX ORDER — ACETAMINOPHEN 325 MG/1
650 TABLET ORAL ONCE
Status: COMPLETED | OUTPATIENT
Start: 2024-07-18 | End: 2024-07-18

## 2024-07-18 RX ORDER — HEPARIN 100 UNIT/ML
500 SYRINGE INTRAVENOUS PRN
Status: DISCONTINUED | OUTPATIENT
Start: 2024-07-18 | End: 2024-07-19 | Stop reason: HOSPADM

## 2024-07-18 RX ORDER — DIPHENHYDRAMINE HCL 25 MG
25 TABLET ORAL ONCE
Status: COMPLETED | OUTPATIENT
Start: 2024-07-18 | End: 2024-07-18

## 2024-07-18 RX ADMIN — SODIUM CHLORIDE, PRESERVATIVE FREE 10 ML: 5 INJECTION INTRAVENOUS at 09:13

## 2024-07-18 RX ADMIN — HEPARIN 500 UNITS: 100 SYRINGE at 09:14

## 2024-07-18 RX ADMIN — HYDROCORTISONE SODIUM SUCCINATE 100 MG: 100 INJECTION, POWDER, FOR SOLUTION INTRAMUSCULAR; INTRAVENOUS at 07:26

## 2024-07-18 RX ADMIN — ACETAMINOPHEN 650 MG: 325 TABLET ORAL at 07:27

## 2024-07-18 RX ADMIN — IMMUNE GLOBULIN (HUMAN) 30 G: 10 INJECTION INTRAVENOUS; SUBCUTANEOUS at 07:43

## 2024-07-18 RX ADMIN — DIPHENHYDRAMINE HYDROCHLORIDE 25 MG: 25 TABLET ORAL at 07:27

## 2024-07-18 NOTE — PROGRESS NOTES
Gamunex-C 30g administered. Pt tolerated well.     Electronically signed by Ciera George RN on 7/18/2024 at 10:12 AM

## 2024-07-19 ENCOUNTER — HOSPITAL ENCOUNTER (OUTPATIENT)
Dept: INFUSION THERAPY | Age: 49
Setting detail: INFUSION SERIES
Discharge: HOME | End: 2024-07-19
Payer: MEDICARE

## 2024-07-19 VITALS
RESPIRATION RATE: 18 BRPM | DIASTOLIC BLOOD PRESSURE: 87 MMHG | OXYGEN SATURATION: 99 % | SYSTOLIC BLOOD PRESSURE: 128 MMHG | TEMPERATURE: 97.4 F | HEART RATE: 84 BPM

## 2024-07-19 DIAGNOSIS — M32.9 LUPUS (HCC): Primary | ICD-10-CM

## 2024-07-19 DIAGNOSIS — G35 EXACERBATION OF MULTIPLE SCLEROSIS (HCC): ICD-10-CM

## 2024-07-19 PROCEDURE — 6360000002 HC RX W HCPCS: Performed by: PSYCHIATRY & NEUROLOGY

## 2024-07-19 PROCEDURE — 96375 TX/PRO/DX INJ NEW DRUG ADDON: CPT

## 2024-07-19 PROCEDURE — 96366 THER/PROPH/DIAG IV INF ADDON: CPT

## 2024-07-19 PROCEDURE — 2580000003 HC RX 258: Performed by: PSYCHIATRY & NEUROLOGY

## 2024-07-19 PROCEDURE — 96365 THER/PROPH/DIAG IV INF INIT: CPT

## 2024-07-19 PROCEDURE — 6370000000 HC RX 637 (ALT 250 FOR IP): Performed by: PSYCHIATRY & NEUROLOGY

## 2024-07-19 RX ORDER — HEPARIN 100 UNIT/ML
500 SYRINGE INTRAVENOUS PRN
Status: DISCONTINUED | OUTPATIENT
Start: 2024-07-19 | End: 2024-07-20 | Stop reason: HOSPADM

## 2024-07-19 RX ORDER — SODIUM CHLORIDE 0.9 % (FLUSH) 0.9 %
5-40 SYRINGE (ML) INJECTION PRN
Status: DISCONTINUED | OUTPATIENT
Start: 2024-07-19 | End: 2024-07-20 | Stop reason: HOSPADM

## 2024-07-19 RX ORDER — DIPHENHYDRAMINE HCL 25 MG
25 TABLET ORAL ONCE
Status: COMPLETED | OUTPATIENT
Start: 2024-07-19 | End: 2024-07-19

## 2024-07-19 RX ORDER — SODIUM CHLORIDE 9 MG/ML
5-250 INJECTION, SOLUTION INTRAVENOUS PRN
OUTPATIENT
Start: 2024-08-16

## 2024-07-19 RX ORDER — ACETAMINOPHEN 325 MG/1
650 TABLET ORAL ONCE
Status: COMPLETED | OUTPATIENT
Start: 2024-07-19 | End: 2024-07-19

## 2024-07-19 RX ADMIN — ACETAMINOPHEN 650 MG: 325 TABLET ORAL at 07:22

## 2024-07-19 RX ADMIN — HEPARIN 500 UNITS: 100 SYRINGE at 09:06

## 2024-07-19 RX ADMIN — IMMUNE GLOBULIN (HUMAN) 30 G: 10 INJECTION INTRAVENOUS; SUBCUTANEOUS at 07:29

## 2024-07-19 RX ADMIN — DIPHENHYDRAMINE HYDROCHLORIDE 25 MG: 25 TABLET ORAL at 07:22

## 2024-07-19 RX ADMIN — SODIUM CHLORIDE, PRESERVATIVE FREE 10 ML: 5 INJECTION INTRAVENOUS at 09:06

## 2024-07-19 RX ADMIN — HYDROCORTISONE SODIUM SUCCINATE 100 MG: 100 INJECTION, POWDER, FOR SOLUTION INTRAMUSCULAR; INTRAVENOUS at 07:21

## 2024-07-19 NOTE — PROGRESS NOTES
Gamunex- C 30g administered. Pt tolerated well.    Electronically signed by Ciera George RN on 7/19/2024 at 9:11 AM

## 2024-07-22 ENCOUNTER — HOSPITAL ENCOUNTER (OUTPATIENT)
Dept: INFUSION THERAPY | Age: 49
Setting detail: INFUSION SERIES
Discharge: HOME | End: 2024-07-22
Payer: MEDICARE

## 2024-07-22 VITALS
TEMPERATURE: 97.2 F | RESPIRATION RATE: 18 BRPM | HEART RATE: 90 BPM | WEIGHT: 154 LBS | BODY MASS INDEX: 25.63 KG/M2 | OXYGEN SATURATION: 100 % | SYSTOLIC BLOOD PRESSURE: 128 MMHG | DIASTOLIC BLOOD PRESSURE: 90 MMHG

## 2024-07-22 DIAGNOSIS — M32.9 LUPUS (HCC): Primary | ICD-10-CM

## 2024-07-22 DIAGNOSIS — G35 EXACERBATION OF MULTIPLE SCLEROSIS (HCC): ICD-10-CM

## 2024-07-22 PROCEDURE — 96366 THER/PROPH/DIAG IV INF ADDON: CPT

## 2024-07-22 PROCEDURE — 6360000002 HC RX W HCPCS: Performed by: PSYCHIATRY & NEUROLOGY

## 2024-07-22 PROCEDURE — 6370000000 HC RX 637 (ALT 250 FOR IP): Performed by: PSYCHIATRY & NEUROLOGY

## 2024-07-22 PROCEDURE — 96365 THER/PROPH/DIAG IV INF INIT: CPT

## 2024-07-22 PROCEDURE — 96375 TX/PRO/DX INJ NEW DRUG ADDON: CPT

## 2024-07-22 RX ORDER — DIPHENHYDRAMINE HCL 25 MG
25 TABLET ORAL ONCE
Status: COMPLETED | OUTPATIENT
Start: 2024-07-22 | End: 2024-07-22

## 2024-07-22 RX ORDER — SODIUM CHLORIDE 9 MG/ML
5-250 INJECTION, SOLUTION INTRAVENOUS PRN
OUTPATIENT
Start: 2024-08-12

## 2024-07-22 RX ORDER — ACETAMINOPHEN 325 MG/1
650 TABLET ORAL ONCE
Status: COMPLETED | OUTPATIENT
Start: 2024-07-22 | End: 2024-07-22

## 2024-07-22 RX ORDER — HEPARIN 100 UNIT/ML
500 SYRINGE INTRAVENOUS PRN
Status: DISCONTINUED | OUTPATIENT
Start: 2024-07-22 | End: 2024-07-23 | Stop reason: HOSPADM

## 2024-07-22 RX ADMIN — DIPHENHYDRAMINE HYDROCHLORIDE 25 MG: 25 TABLET ORAL at 07:23

## 2024-07-22 RX ADMIN — HYDROCORTISONE SODIUM SUCCINATE 100 MG: 100 INJECTION, POWDER, FOR SOLUTION INTRAMUSCULAR; INTRAVENOUS at 07:23

## 2024-07-22 RX ADMIN — ACETAMINOPHEN 650 MG: 325 TABLET ORAL at 07:23

## 2024-07-22 RX ADMIN — HEPARIN 500 UNITS: 100 SYRINGE at 09:10

## 2024-07-22 RX ADMIN — IMMUNE GLOBULIN (HUMAN) 30 G: 10 INJECTION INTRAVENOUS; SUBCUTANEOUS at 07:30

## 2024-07-22 NOTE — PROGRESS NOTES
Gamunex c 30g administered. Pt tolerated well.     Electronically signed by Ciera George RN on 7/22/2024 at 9:14 AM

## 2024-07-23 DIAGNOSIS — G35 MULTIPLE SCLEROSIS (HCC): ICD-10-CM

## 2024-07-23 DIAGNOSIS — M79.7 FIBROMYALGIA: ICD-10-CM

## 2024-07-23 DIAGNOSIS — M54.30 SCIATICA, UNSPECIFIED LATERALITY: ICD-10-CM

## 2024-07-23 DIAGNOSIS — G43.719 INTRACTABLE CHRONIC MIGRAINE WITHOUT AURA AND WITHOUT STATUS MIGRAINOSUS: ICD-10-CM

## 2024-07-23 RX ORDER — OXYCODONE HYDROCHLORIDE 10 MG/1
10 TABLET ORAL EVERY 6 HOURS PRN
Qty: 120 TABLET | Refills: 0 | Status: SHIPPED | OUTPATIENT
Start: 2024-07-27 | End: 2024-08-26

## 2024-07-23 NOTE — TELEPHONE ENCOUNTER
Requested Prescriptions     Pending Prescriptions Disp Refills    oxyCODONE HCl (OXY-IR) 10 MG immediate release tablet 120 tablet 0     Sig: Take 1 tablet by mouth every 6 hours as needed for Pain for up to 30 days. Intended supply: 30 days Max Daily Amount: 40 mg       Last Office Visit:  5/15/2024  Next Office Visit:  8/21/2024  Last Medication Refill:  6/27/2024 with 0 TAIWO Sanchez up to date:  7/23/2024     *RX updated to reflect   7/27/2024  fill date*

## 2024-07-26 ENCOUNTER — HOSPITAL ENCOUNTER (OUTPATIENT)
Dept: INFUSION THERAPY | Age: 49
Setting detail: INFUSION SERIES
Discharge: HOME | End: 2024-07-26
Payer: MEDICARE

## 2024-07-26 VITALS
DIASTOLIC BLOOD PRESSURE: 92 MMHG | SYSTOLIC BLOOD PRESSURE: 147 MMHG | OXYGEN SATURATION: 100 % | TEMPERATURE: 97.4 F | WEIGHT: 158 LBS | HEART RATE: 68 BPM | BODY MASS INDEX: 26.29 KG/M2 | RESPIRATION RATE: 18 BRPM

## 2024-07-26 DIAGNOSIS — G35 MULTIPLE SCLEROSIS (HCC): Primary | ICD-10-CM

## 2024-07-26 PROCEDURE — 6370000000 HC RX 637 (ALT 250 FOR IP): Performed by: PSYCHIATRY & NEUROLOGY

## 2024-07-26 PROCEDURE — 96366 THER/PROPH/DIAG IV INF ADDON: CPT

## 2024-07-26 PROCEDURE — 6360000002 HC RX W HCPCS: Performed by: PSYCHIATRY & NEUROLOGY

## 2024-07-26 PROCEDURE — 96375 TX/PRO/DX INJ NEW DRUG ADDON: CPT

## 2024-07-26 PROCEDURE — 96365 THER/PROPH/DIAG IV INF INIT: CPT

## 2024-07-26 PROCEDURE — 2580000003 HC RX 258: Performed by: PSYCHIATRY & NEUROLOGY

## 2024-07-26 RX ORDER — ACETAMINOPHEN 325 MG/1
650 TABLET ORAL
OUTPATIENT
Start: 2024-08-02

## 2024-07-26 RX ORDER — DIPHENHYDRAMINE HYDROCHLORIDE 50 MG/ML
50 INJECTION INTRAMUSCULAR; INTRAVENOUS ONCE
Start: 2024-08-02 | End: 2024-08-02

## 2024-07-26 RX ORDER — HEPARIN 100 UNIT/ML
500 SYRINGE INTRAVENOUS PRN
Status: DISCONTINUED | OUTPATIENT
Start: 2024-07-26 | End: 2024-07-27 | Stop reason: HOSPADM

## 2024-07-26 RX ORDER — SODIUM CHLORIDE 9 MG/ML
5-250 INJECTION, SOLUTION INTRAVENOUS PRN
OUTPATIENT
Start: 2024-08-02

## 2024-07-26 RX ORDER — ALBUTEROL SULFATE 90 UG/1
4 AEROSOL, METERED RESPIRATORY (INHALATION) PRN
OUTPATIENT
Start: 2024-08-02

## 2024-07-26 RX ORDER — ACETAMINOPHEN 325 MG/1
650 TABLET ORAL ONCE
OUTPATIENT
Start: 2024-08-02 | End: 2024-08-02

## 2024-07-26 RX ORDER — DIPHENHYDRAMINE HYDROCHLORIDE 50 MG/ML
50 INJECTION INTRAMUSCULAR; INTRAVENOUS
OUTPATIENT
Start: 2024-08-02

## 2024-07-26 RX ORDER — HEPARIN 100 UNIT/ML
500 SYRINGE INTRAVENOUS PRN
OUTPATIENT
Start: 2024-08-02

## 2024-07-26 RX ORDER — EPINEPHRINE 1 MG/ML
0.3 INJECTION, SOLUTION, CONCENTRATE INTRAVENOUS PRN
OUTPATIENT
Start: 2024-08-02

## 2024-07-26 RX ORDER — ACETAMINOPHEN 325 MG/1
650 TABLET ORAL ONCE
Status: COMPLETED | OUTPATIENT
Start: 2024-07-26 | End: 2024-07-26

## 2024-07-26 RX ORDER — DIPHENHYDRAMINE HYDROCHLORIDE 50 MG/ML
50 INJECTION INTRAMUSCULAR; INTRAVENOUS ONCE
Status: COMPLETED | OUTPATIENT
Start: 2024-07-26 | End: 2024-07-26

## 2024-07-26 RX ORDER — ONDANSETRON 2 MG/ML
8 INJECTION INTRAMUSCULAR; INTRAVENOUS
OUTPATIENT
Start: 2024-08-02

## 2024-07-26 RX ORDER — SODIUM CHLORIDE 0.9 % (FLUSH) 0.9 %
5-40 SYRINGE (ML) INJECTION PRN
OUTPATIENT
Start: 2024-08-02

## 2024-07-26 RX ORDER — MEPERIDINE HYDROCHLORIDE 25 MG/ML
12.5 INJECTION INTRAMUSCULAR; INTRAVENOUS; SUBCUTANEOUS PRN
OUTPATIENT
Start: 2024-08-02

## 2024-07-26 RX ORDER — SODIUM CHLORIDE 0.9 % (FLUSH) 0.9 %
5-40 SYRINGE (ML) INJECTION PRN
Status: DISCONTINUED | OUTPATIENT
Start: 2024-07-26 | End: 2024-07-27 | Stop reason: HOSPADM

## 2024-07-26 RX ORDER — SODIUM CHLORIDE 9 MG/ML
INJECTION, SOLUTION INTRAVENOUS CONTINUOUS
OUTPATIENT
Start: 2024-08-02

## 2024-07-26 RX ADMIN — DIPHENHYDRAMINE HYDROCHLORIDE 50 MG: 50 INJECTION INTRAMUSCULAR; INTRAVENOUS at 07:09

## 2024-07-26 RX ADMIN — HEPARIN 500 UNITS: 100 SYRINGE at 09:06

## 2024-07-26 RX ADMIN — ACETAMINOPHEN 650 MG: 325 TABLET ORAL at 07:10

## 2024-07-26 RX ADMIN — SODIUM CHLORIDE, PRESERVATIVE FREE 10 ML: 5 INJECTION INTRAVENOUS at 09:06

## 2024-07-26 RX ADMIN — HYDROCORTISONE SODIUM SUCCINATE 100 MG: 100 INJECTION, POWDER, FOR SOLUTION INTRAMUSCULAR; INTRAVENOUS at 07:10

## 2024-07-26 RX ADMIN — IMMUNE GLOBULIN (HUMAN) 30 G: 10 INJECTION INTRAVENOUS; SUBCUTANEOUS at 07:35

## 2024-07-29 RX ORDER — ONDANSETRON 4 MG/1
TABLET, FILM COATED ORAL
Qty: 30 TABLET | Refills: 5 | OUTPATIENT
Start: 2024-07-29

## 2024-07-30 DIAGNOSIS — M79.7 FIBROMYALGIA: ICD-10-CM

## 2024-07-30 NOTE — TELEPHONE ENCOUNTER
Requested Prescriptions     Pending Prescriptions Disp Refills    carisoprodol (SOMA) 350 MG tablet [Pharmacy Med Name: CARISOPRODOL 350MG TABLETS] 120 tablet 5     Sig: TAKE 1 TABLET BY MOUTH FOUR TIMES DAILY AS NEEDED FOR MUSCLE SPASMS. MAX DAILY AMOUNT: 1400 MG       Last Office Visit:  5/15/2024  Next Office Visit:  8/21/2024  Last Medication Refill:  2/7/2024 with 5 TAIWO Sanchez up to date:  7/23/2024     *RX updated to reflect   7/30/2024  fill date*

## 2024-07-31 ENCOUNTER — HOSPITAL ENCOUNTER (EMERGENCY)
Age: 49
Discharge: HOME | End: 2024-07-31
Payer: MEDICARE

## 2024-07-31 ENCOUNTER — APPOINTMENT (OUTPATIENT)
Dept: CT IMAGING | Age: 49
End: 2024-07-31
Payer: MEDICARE

## 2024-07-31 VITALS
BODY MASS INDEX: 25.13 KG/M2 | RESPIRATION RATE: 20 BRPM | SYSTOLIC BLOOD PRESSURE: 149 MMHG | DIASTOLIC BLOOD PRESSURE: 101 MMHG | TEMPERATURE: 97.9 F | WEIGHT: 151 LBS | HEART RATE: 70 BPM | OXYGEN SATURATION: 99 %

## 2024-07-31 DIAGNOSIS — R14.0 ABDOMINAL DISTENSION (GASEOUS): Primary | ICD-10-CM

## 2024-07-31 LAB
ALBUMIN SERPL-MCNC: 3.2 G/DL (ref 3.5–5.2)
ALP SERPL-CCNC: 59 U/L (ref 35–104)
ALT SERPL-CCNC: 26 U/L (ref 5–33)
ANION GAP SERPL CALCULATED.3IONS-SCNC: 12 MMOL/L (ref 7–19)
AST SERPL-CCNC: 39 U/L (ref 5–32)
BASOPHILS # BLD: 0 K/UL (ref 0–0.2)
BASOPHILS NFR BLD: 0.3 % (ref 0–1)
BILIRUB SERPL-MCNC: 0.2 MG/DL (ref 0.2–1.2)
BILIRUB UR QL STRIP: NEGATIVE
BUN SERPL-MCNC: 12 MG/DL (ref 6–20)
CALCIUM SERPL-MCNC: 8.3 MG/DL (ref 8.6–10)
CHLORIDE SERPL-SCNC: 93 MMOL/L (ref 98–111)
CLARITY UR: CLEAR
CO2 SERPL-SCNC: 21 MMOL/L (ref 22–29)
COLOR UR: YELLOW
CREAT SERPL-MCNC: 0.7 MG/DL (ref 0.5–0.9)
EOSINOPHIL # BLD: 0 K/UL (ref 0–0.6)
EOSINOPHIL NFR BLD: 0 % (ref 0–5)
ERYTHROCYTE [DISTWIDTH] IN BLOOD BY AUTOMATED COUNT: 13.2 % (ref 11.5–14.5)
GLUCOSE SERPL-MCNC: 106 MG/DL (ref 74–109)
GLUCOSE UR STRIP.AUTO-MCNC: NEGATIVE MG/DL
HCT VFR BLD AUTO: 34.8 % (ref 37–47)
HGB BLD-MCNC: 11.7 G/DL (ref 12–16)
HGB UR STRIP.AUTO-MCNC: NEGATIVE MG/L
IMM GRANULOCYTES # BLD: 0 K/UL
KETONES UR STRIP.AUTO-MCNC: NEGATIVE MG/DL
LEUKOCYTE ESTERASE UR QL STRIP.AUTO: NEGATIVE
LIPASE SERPL-CCNC: 16 U/L (ref 13–60)
LYMPHOCYTES # BLD: 0.6 K/UL (ref 1.1–4.5)
LYMPHOCYTES NFR BLD: 20.6 % (ref 20–40)
MCH RBC QN AUTO: 30.8 PG (ref 27–31)
MCHC RBC AUTO-ENTMCNC: 33.6 G/DL (ref 33–37)
MCV RBC AUTO: 91.6 FL (ref 81–99)
MONOCYTES # BLD: 0.3 K/UL (ref 0–0.9)
MONOCYTES NFR BLD: 10.1 % (ref 0–10)
NEUTROPHILS # BLD: 2 K/UL (ref 1.5–7.5)
NEUTS SEG NFR BLD: 68.3 % (ref 50–65)
NITRITE UR QL STRIP.AUTO: NEGATIVE
PH UR STRIP.AUTO: 7 [PH] (ref 5–8)
PLATELET # BLD AUTO: 175 K/UL (ref 130–400)
PMV BLD AUTO: 10.4 FL (ref 9.4–12.3)
POTASSIUM SERPL-SCNC: 4.3 MMOL/L (ref 3.5–5)
PROT SERPL-MCNC: 7.9 G/DL (ref 6.6–8.7)
PROT UR STRIP.AUTO-MCNC: NEGATIVE MG/DL
RBC # BLD AUTO: 3.8 M/UL (ref 4.2–5.4)
SODIUM SERPL-SCNC: 126 MMOL/L (ref 136–145)
SP GR UR STRIP.AUTO: 1.01 (ref 1–1.03)
UROBILINOGEN UR STRIP.AUTO-MCNC: 0.2 E.U./DL
WBC # BLD AUTO: 2.9 K/UL (ref 4.8–10.8)

## 2024-07-31 PROCEDURE — 85025 COMPLETE CBC W/AUTO DIFF WBC: CPT

## 2024-07-31 PROCEDURE — 81003 URINALYSIS AUTO W/O SCOPE: CPT

## 2024-07-31 PROCEDURE — 74177 CT ABD & PELVIS W/CONTRAST: CPT

## 2024-07-31 PROCEDURE — 6360000004 HC RX CONTRAST MEDICATION: Performed by: NURSE PRACTITIONER

## 2024-07-31 PROCEDURE — 83690 ASSAY OF LIPASE: CPT

## 2024-07-31 PROCEDURE — 99285 EMERGENCY DEPT VISIT HI MDM: CPT

## 2024-07-31 PROCEDURE — 80053 COMPREHEN METABOLIC PANEL: CPT

## 2024-07-31 PROCEDURE — 36415 COLL VENOUS BLD VENIPUNCTURE: CPT

## 2024-07-31 RX ORDER — CARISOPRODOL 350 MG/1
TABLET ORAL
Qty: 120 TABLET | Refills: 5 | Status: SHIPPED | OUTPATIENT
Start: 2024-07-31 | End: 2024-08-30

## 2024-07-31 RX ADMIN — IOPAMIDOL 70 ML: 755 INJECTION, SOLUTION INTRAVENOUS at 17:02

## 2024-07-31 NOTE — ED PROVIDER NOTES
[facility] EMERGENCY DEPT  EMERGENCY DEPARTMENT ENCOUNTER      Pt Name: Ramiro Mendoza  MRN: [id number]  Birthdate 1975  Date of evaluation: 7/31/2024  Provider: LYSSA Maldonado NP    CHIEF COMPLAINT       Chief Complaint   Patient presents with    Abdominal Pain     Abdominal pain and bloating onset 4 days    Nausea         HISTORY OF PRESENT ILLNESS   (Location/Symptom, Timing/Onset,Context/Setting, Quality, Duration, Modifying Factors, Severity)  Note limiting factors.   Ramiro Mendoza is a 48 y.o. female who presents to the emergency department with complaint of abdominal pain, bloating, and nausea for four days.   She denies any vomiting or diarrhea and states that she has not had a bowel movement for 3 days.  Her past medical history includes arthritis, bipolar disorder, fibromyalgia, GERD, hypertension, lupus, migraines, MS.  Her surgical history includes gastric band, gastric bypass, hernia repair, small intestine surgery, and port placement.  She denies any fever or chills.  She currently receives infusions for her lupus and MS but reports no issues posttransfusion.      The history is provided by the patient.       NursingNotes were reviewed.    REVIEW OF SYSTEMS    (2-9 systems for level 4, 10 or more for level 5)     Review of Systems   Reason unable to perform ROS: As per HPI.       A complete review of systems was performed and is negative except as noted above in the HPI.       PAST MEDICAL HISTORY     Past Medical History:   Diagnosis Date    Arthritis     Bipolar 1 disorder (HCC)     Convulsions (HCC) 07/20/2020    Fibromyalgia     GERD (gastroesophageal reflux disease)     Hypertension     Lupus (HCC)     sees  in [facility]    Migraines     Movement disorder     Multiple sclerosis (HCC)     gets infusions weekly    Neuromuscular disorder ([facility])     Psychiatric problem          SURGICAL HISTORY       Past Surgical History:   Procedure Laterality Date    ABDOMINAL EXPLORATION

## 2024-07-31 NOTE — DISCHARGE INSTRUCTIONS
Try Gas-X.   Follow up with your provider if symptoms persist.  Increase your sodium intake the next few days.  Return to ER for any new, worsening, or change in condition.

## 2024-07-31 NOTE — ED NOTES
APRN notified of patient's BP. Patient states she takes Clonidine as needed. Patient see's cardiologist and states they have been working on her BP medication. APRN states patient is okay for BP. Patient asymptomatic with BP.

## 2024-08-02 ENCOUNTER — HOSPITAL ENCOUNTER (OUTPATIENT)
Dept: INFUSION THERAPY | Age: 49
Setting detail: INFUSION SERIES
End: 2024-08-02
Payer: MEDICARE

## 2024-08-05 ENCOUNTER — HOSPITAL ENCOUNTER (OUTPATIENT)
Dept: INFUSION THERAPY | Age: 49
Setting detail: INFUSION SERIES
Discharge: HOME | End: 2024-08-05
Payer: MEDICARE

## 2024-08-05 VITALS
HEART RATE: 78 BPM | WEIGHT: 158 LBS | BODY MASS INDEX: 26.29 KG/M2 | TEMPERATURE: 97.6 F | RESPIRATION RATE: 18 BRPM | SYSTOLIC BLOOD PRESSURE: 106 MMHG | DIASTOLIC BLOOD PRESSURE: 75 MMHG | OXYGEN SATURATION: 98 %

## 2024-08-05 DIAGNOSIS — G35 MULTIPLE SCLEROSIS (HCC): Primary | ICD-10-CM

## 2024-08-05 PROCEDURE — 96365 THER/PROPH/DIAG IV INF INIT: CPT

## 2024-08-05 PROCEDURE — 96375 TX/PRO/DX INJ NEW DRUG ADDON: CPT

## 2024-08-05 PROCEDURE — 6360000002 HC RX W HCPCS: Performed by: PSYCHIATRY & NEUROLOGY

## 2024-08-05 PROCEDURE — 2580000003 HC RX 258: Performed by: PSYCHIATRY & NEUROLOGY

## 2024-08-05 PROCEDURE — 6370000000 HC RX 637 (ALT 250 FOR IP): Performed by: PSYCHIATRY & NEUROLOGY

## 2024-08-05 RX ORDER — SODIUM CHLORIDE 9 MG/ML
INJECTION, SOLUTION INTRAVENOUS CONTINUOUS
Status: CANCELLED | OUTPATIENT
Start: 2024-08-09

## 2024-08-05 RX ORDER — HEPARIN 100 UNIT/ML
500 SYRINGE INTRAVENOUS PRN
Status: DISCONTINUED | OUTPATIENT
Start: 2024-08-05 | End: 2024-08-06 | Stop reason: HOSPADM

## 2024-08-05 RX ORDER — DIPHENHYDRAMINE HYDROCHLORIDE 50 MG/ML
50 INJECTION INTRAMUSCULAR; INTRAVENOUS ONCE
Status: CANCELLED
Start: 2024-08-09 | End: 2024-08-09

## 2024-08-05 RX ORDER — SODIUM CHLORIDE 9 MG/ML
5-250 INJECTION, SOLUTION INTRAVENOUS PRN
Status: CANCELLED | OUTPATIENT
Start: 2024-08-09

## 2024-08-05 RX ORDER — MEPERIDINE HYDROCHLORIDE 25 MG/ML
12.5 INJECTION INTRAMUSCULAR; INTRAVENOUS; SUBCUTANEOUS PRN
Status: CANCELLED | OUTPATIENT
Start: 2024-08-09

## 2024-08-05 RX ORDER — ONDANSETRON 2 MG/ML
8 INJECTION INTRAMUSCULAR; INTRAVENOUS
Status: CANCELLED | OUTPATIENT
Start: 2024-08-09

## 2024-08-05 RX ORDER — SODIUM CHLORIDE 0.9 % (FLUSH) 0.9 %
5-40 SYRINGE (ML) INJECTION PRN
Status: CANCELLED | OUTPATIENT
Start: 2024-08-09

## 2024-08-05 RX ORDER — ACETAMINOPHEN 325 MG/1
650 TABLET ORAL ONCE
Status: COMPLETED | OUTPATIENT
Start: 2024-08-05 | End: 2024-08-05

## 2024-08-05 RX ORDER — DIPHENHYDRAMINE HYDROCHLORIDE 50 MG/ML
50 INJECTION INTRAMUSCULAR; INTRAVENOUS ONCE
Status: COMPLETED | OUTPATIENT
Start: 2024-08-05 | End: 2024-08-05

## 2024-08-05 RX ORDER — DIPHENHYDRAMINE HYDROCHLORIDE 50 MG/ML
50 INJECTION INTRAMUSCULAR; INTRAVENOUS
Status: CANCELLED | OUTPATIENT
Start: 2024-08-09

## 2024-08-05 RX ORDER — HEPARIN 100 UNIT/ML
500 SYRINGE INTRAVENOUS PRN
Status: CANCELLED | OUTPATIENT
Start: 2024-08-09

## 2024-08-05 RX ORDER — ACETAMINOPHEN 325 MG/1
650 TABLET ORAL ONCE
Status: CANCELLED | OUTPATIENT
Start: 2024-08-09 | End: 2024-08-09

## 2024-08-05 RX ORDER — ALBUTEROL SULFATE 90 UG/1
4 AEROSOL, METERED RESPIRATORY (INHALATION) PRN
Status: CANCELLED | OUTPATIENT
Start: 2024-08-09

## 2024-08-05 RX ORDER — EPINEPHRINE 1 MG/ML
0.3 INJECTION, SOLUTION, CONCENTRATE INTRAVENOUS PRN
Status: CANCELLED | OUTPATIENT
Start: 2024-08-09

## 2024-08-05 RX ORDER — SODIUM CHLORIDE 0.9 % (FLUSH) 0.9 %
5-40 SYRINGE (ML) INJECTION PRN
Status: DISCONTINUED | OUTPATIENT
Start: 2024-08-05 | End: 2024-08-06 | Stop reason: HOSPADM

## 2024-08-05 RX ORDER — ACETAMINOPHEN 325 MG/1
650 TABLET ORAL
Status: CANCELLED | OUTPATIENT
Start: 2024-08-09

## 2024-08-05 RX ADMIN — HYDROCORTISONE SODIUM SUCCINATE 100 MG: 100 INJECTION, POWDER, FOR SOLUTION INTRAMUSCULAR; INTRAVENOUS at 07:44

## 2024-08-05 RX ADMIN — HEPARIN 500 UNITS: 100 SYRINGE at 09:29

## 2024-08-05 RX ADMIN — DIPHENHYDRAMINE HYDROCHLORIDE 50 MG: 50 INJECTION INTRAMUSCULAR; INTRAVENOUS at 07:44

## 2024-08-05 RX ADMIN — ACETAMINOPHEN 650 MG: 325 TABLET ORAL at 07:44

## 2024-08-05 RX ADMIN — IMMUNE GLOBULIN (HUMAN) 30 G: 10 INJECTION INTRAVENOUS; SUBCUTANEOUS at 07:59

## 2024-08-05 RX ADMIN — Medication 20 ML: at 09:29

## 2024-08-05 NOTE — DISCHARGE INSTRUCTIONS
hives; difficult breathing; swelling of your face, lips, tongue, or throat.  Some side effects may occur during the injection. Tell your caregiver if you feel dizzy, nauseated, light-headed, sweaty, or have a headache, pounding in your neck or ears, fever, chills, chest tightness, or warmth or redness in your face.  Call your doctor at once if you have:  a blood cell disorder --pale or yellowed skin, dark colored urine, fever, confusion or weakness;  dehydration symptoms --feeling very thirsty or hot, being unable to urinate, heavy sweating, or hot and dry skin;  kidney problems --little or no urination, swelling, rapid weight gain, feeling short of breath;  lung problems --chest pain, trouble breathing, blue colored lips, fingers, or toes;  signs of a new infection --fever with a severe headache, neck stiffness, eye pain, and increased sensitivity to light; or  signs of a blood clot --shortness of breath, chest pain with deep breathing, rapid heart rate, numbness or weakness on one side of the body, swelling and warmth or discoloration in an arm or leg.  Common side effects may include:  headache, back pain, joint pain;  fever, chills, sweating, warmth or tingling;  stomach pain, nausea, diarrhea;  increased blood pressure, fast heartbeats;  dizziness, tiredness, lack of energy;  stuffy nose, sinus pain; or  pain, swelling, burning, or irritation around the IV needle.  This is not a complete list of side effects and others may occur. Call your doctor for medical advice about side effects. You may report side effects to FDA at 7-553-FDA-4190.  What other drugs will affect IGIV?  IGIV can harm your kidneys, especially if you also use certain medicines for infections, cancer, osteoporosis, organ transplant rejection, bowel disorders, high blood pressure, or pain or arthritis (including Advil, Motrin, and Aleve).  Other drugs may affect IGIV, including prescription and over-the-counter medicines, vitamins, and herbal  2/26/2021.  Care instructions adapted under license by [employer]. If you have questions about a medical condition or this instruction, always ask your healthcare professional. [employer] disclaims any warranty or liability for your use of this information.

## 2024-08-05 NOTE — PROGRESS NOTES
Pt arrived to [facility]. Port accessed using sterile technique, brisk blood return noted. Pt received Benadryl, Solu-cortef, and Tylenol for premeds. Pt then received 30g Gamunex-C. Tolerated well.    Electronically signed by Shabnam Huddleston RN on 8/5/2024 at 9:33 AM

## 2024-08-12 ENCOUNTER — HOSPITAL ENCOUNTER (OUTPATIENT)
Dept: INFUSION THERAPY | Age: 49
Setting detail: INFUSION SERIES
Discharge: HOME | End: 2024-08-12
Payer: MEDICARE

## 2024-08-12 VITALS
SYSTOLIC BLOOD PRESSURE: 120 MMHG | BODY MASS INDEX: 26.96 KG/M2 | TEMPERATURE: 99 F | RESPIRATION RATE: 18 BRPM | WEIGHT: 162 LBS | OXYGEN SATURATION: 100 % | DIASTOLIC BLOOD PRESSURE: 78 MMHG | HEART RATE: 88 BPM

## 2024-08-12 DIAGNOSIS — G35 EXACERBATION OF MULTIPLE SCLEROSIS (HCC): ICD-10-CM

## 2024-08-12 DIAGNOSIS — G35 MULTIPLE SCLEROSIS (HCC): Primary | ICD-10-CM

## 2024-08-12 DIAGNOSIS — M32.9 LUPUS (HCC): ICD-10-CM

## 2024-08-12 LAB
ALBUMIN SERPL-MCNC: 3.4 G/DL (ref 3.5–5.2)
ALP SERPL-CCNC: 62 U/L (ref 35–104)
ALT SERPL-CCNC: 23 U/L (ref 5–33)
ANION GAP SERPL CALCULATED.3IONS-SCNC: 11 MMOL/L (ref 7–19)
AST SERPL-CCNC: 33 U/L (ref 5–32)
BASOPHILS # BLD: 0 K/UL (ref 0–0.2)
BASOPHILS NFR BLD: 0.6 % (ref 0–1)
BILIRUB SERPL-MCNC: 0.2 MG/DL (ref 0.2–1.2)
BUN SERPL-MCNC: 6 MG/DL (ref 6–20)
CALCIUM SERPL-MCNC: 8.2 MG/DL (ref 8.6–10)
CHLORIDE SERPL-SCNC: 89 MMOL/L (ref 98–111)
CO2 SERPL-SCNC: 23 MMOL/L (ref 22–29)
CREAT SERPL-MCNC: 0.6 MG/DL (ref 0.5–0.9)
EOSINOPHIL # BLD: 0 K/UL (ref 0–0.6)
EOSINOPHIL NFR BLD: 0.3 % (ref 0–5)
ERYTHROCYTE [DISTWIDTH] IN BLOOD BY AUTOMATED COUNT: 13.2 % (ref 11.5–14.5)
GLUCOSE SERPL-MCNC: 93 MG/DL (ref 74–109)
HCT VFR BLD AUTO: 32.4 % (ref 37–47)
HGB BLD-MCNC: 10.9 G/DL (ref 12–16)
IMM GRANULOCYTES # BLD: 0.1 K/UL
LYMPHOCYTES # BLD: 0.7 K/UL (ref 1.1–4.5)
LYMPHOCYTES NFR BLD: 22.9 % (ref 20–40)
MCH RBC QN AUTO: 30.4 PG (ref 27–31)
MCHC RBC AUTO-ENTMCNC: 33.6 G/DL (ref 33–37)
MCV RBC AUTO: 90.5 FL (ref 81–99)
MONOCYTES # BLD: 0.4 K/UL (ref 0–0.9)
MONOCYTES NFR BLD: 11.1 % (ref 0–10)
NEUTROPHILS # BLD: 2.1 K/UL (ref 1.5–7.5)
NEUTS SEG NFR BLD: 63.6 % (ref 50–65)
PLATELET # BLD AUTO: 192 K/UL (ref 130–400)
PMV BLD AUTO: 10 FL (ref 9.4–12.3)
POTASSIUM SERPL-SCNC: 4.9 MMOL/L (ref 3.5–5)
PROT SERPL-MCNC: 7.7 G/DL (ref 6.6–8.7)
RBC # BLD AUTO: 3.58 M/UL (ref 4.2–5.4)
SODIUM SERPL-SCNC: 123 MMOL/L (ref 136–145)
WBC # BLD AUTO: 3.2 K/UL (ref 4.8–10.8)

## 2024-08-12 PROCEDURE — 6370000000 HC RX 637 (ALT 250 FOR IP): Performed by: PSYCHIATRY & NEUROLOGY

## 2024-08-12 PROCEDURE — 6360000002 HC RX W HCPCS: Performed by: PSYCHIATRY & NEUROLOGY

## 2024-08-12 PROCEDURE — 96365 THER/PROPH/DIAG IV INF INIT: CPT

## 2024-08-12 PROCEDURE — 85025 COMPLETE CBC W/AUTO DIFF WBC: CPT

## 2024-08-12 PROCEDURE — 80053 COMPREHEN METABOLIC PANEL: CPT

## 2024-08-12 PROCEDURE — 2580000003 HC RX 258: Performed by: PSYCHIATRY & NEUROLOGY

## 2024-08-12 PROCEDURE — 96375 TX/PRO/DX INJ NEW DRUG ADDON: CPT

## 2024-08-12 RX ORDER — DIPHENHYDRAMINE HYDROCHLORIDE 50 MG/ML
50 INJECTION INTRAMUSCULAR; INTRAVENOUS ONCE
Status: DISCONTINUED | OUTPATIENT
Start: 2024-08-12 | End: 2024-08-12

## 2024-08-12 RX ORDER — ACETAMINOPHEN 325 MG/1
650 TABLET ORAL ONCE
Status: COMPLETED | OUTPATIENT
Start: 2024-08-12 | End: 2024-08-12

## 2024-08-12 RX ORDER — EPINEPHRINE 1 MG/ML
0.3 INJECTION, SOLUTION, CONCENTRATE INTRAVENOUS PRN
Status: CANCELLED | OUTPATIENT
Start: 2024-08-19

## 2024-08-12 RX ORDER — DIPHENHYDRAMINE HYDROCHLORIDE 50 MG/ML
50 INJECTION INTRAMUSCULAR; INTRAVENOUS ONCE
Status: CANCELLED
Start: 2024-08-19 | End: 2024-08-19

## 2024-08-12 RX ORDER — SODIUM CHLORIDE 9 MG/ML
INJECTION, SOLUTION INTRAVENOUS CONTINUOUS
Status: CANCELLED | OUTPATIENT
Start: 2024-08-19

## 2024-08-12 RX ORDER — DIPHENHYDRAMINE HCL 25 MG
25 TABLET ORAL ONCE
Status: COMPLETED | OUTPATIENT
Start: 2024-08-12 | End: 2024-08-12

## 2024-08-12 RX ORDER — DIPHENHYDRAMINE HYDROCHLORIDE 50 MG/ML
50 INJECTION INTRAMUSCULAR; INTRAVENOUS
Status: CANCELLED | OUTPATIENT
Start: 2024-08-19

## 2024-08-12 RX ORDER — ACETAMINOPHEN 325 MG/1
650 TABLET ORAL ONCE
Status: CANCELLED | OUTPATIENT
Start: 2024-08-19 | End: 2024-08-19

## 2024-08-12 RX ORDER — MEPERIDINE HYDROCHLORIDE 25 MG/ML
12.5 INJECTION INTRAMUSCULAR; INTRAVENOUS; SUBCUTANEOUS PRN
Status: CANCELLED | OUTPATIENT
Start: 2024-08-19

## 2024-08-12 RX ORDER — ACETAMINOPHEN 325 MG/1
650 TABLET ORAL
Status: CANCELLED | OUTPATIENT
Start: 2024-08-19

## 2024-08-12 RX ORDER — SODIUM CHLORIDE 9 MG/ML
5-250 INJECTION, SOLUTION INTRAVENOUS PRN
Status: DISCONTINUED | OUTPATIENT
Start: 2024-08-12 | End: 2024-08-13 | Stop reason: HOSPADM

## 2024-08-12 RX ORDER — SODIUM CHLORIDE 0.9 % (FLUSH) 0.9 %
5-40 SYRINGE (ML) INJECTION PRN
Status: DISCONTINUED | OUTPATIENT
Start: 2024-08-12 | End: 2024-08-12

## 2024-08-12 RX ORDER — SODIUM CHLORIDE 9 MG/ML
5-250 INJECTION, SOLUTION INTRAVENOUS PRN
Status: CANCELLED | OUTPATIENT
Start: 2024-08-19

## 2024-08-12 RX ORDER — HEPARIN 100 UNIT/ML
500 SYRINGE INTRAVENOUS PRN
Status: CANCELLED | OUTPATIENT
Start: 2024-08-19

## 2024-08-12 RX ORDER — ACETAMINOPHEN 325 MG/1
650 TABLET ORAL ONCE
Status: DISCONTINUED | OUTPATIENT
Start: 2024-08-12 | End: 2024-08-12

## 2024-08-12 RX ORDER — SODIUM CHLORIDE 0.9 % (FLUSH) 0.9 %
5-40 SYRINGE (ML) INJECTION PRN
Status: CANCELLED | OUTPATIENT
Start: 2024-08-19

## 2024-08-12 RX ORDER — HEPARIN 100 UNIT/ML
500 SYRINGE INTRAVENOUS PRN
Status: DISCONTINUED | OUTPATIENT
Start: 2024-08-12 | End: 2024-08-12

## 2024-08-12 RX ORDER — SODIUM CHLORIDE 9 MG/ML
5-250 INJECTION, SOLUTION INTRAVENOUS PRN
Status: CANCELLED | OUTPATIENT
Start: 2024-09-09

## 2024-08-12 RX ORDER — SODIUM CHLORIDE 0.9 % (FLUSH) 0.9 %
5-40 SYRINGE (ML) INJECTION PRN
Status: DISCONTINUED | OUTPATIENT
Start: 2024-08-12 | End: 2024-08-13 | Stop reason: HOSPADM

## 2024-08-12 RX ORDER — ALBUTEROL SULFATE 90 UG/1
4 AEROSOL, METERED RESPIRATORY (INHALATION) PRN
Status: CANCELLED | OUTPATIENT
Start: 2024-08-19

## 2024-08-12 RX ORDER — ONDANSETRON 2 MG/ML
8 INJECTION INTRAMUSCULAR; INTRAVENOUS
Status: CANCELLED | OUTPATIENT
Start: 2024-08-19

## 2024-08-12 RX ORDER — HEPARIN 100 UNIT/ML
500 SYRINGE INTRAVENOUS PRN
Status: DISCONTINUED | OUTPATIENT
Start: 2024-08-12 | End: 2024-08-13 | Stop reason: HOSPADM

## 2024-08-12 RX ADMIN — SODIUM CHLORIDE, PRESERVATIVE FREE 10 ML: 5 INJECTION INTRAVENOUS at 06:46

## 2024-08-12 RX ADMIN — SODIUM CHLORIDE, PRESERVATIVE FREE 20 ML: 5 INJECTION INTRAVENOUS at 08:32

## 2024-08-12 RX ADMIN — HYDROCORTISONE SODIUM SUCCINATE 100 MG: 100 INJECTION, POWDER, FOR SOLUTION INTRAMUSCULAR; INTRAVENOUS at 06:47

## 2024-08-12 RX ADMIN — HEPARIN 500 UNITS: 100 SYRINGE at 08:32

## 2024-08-12 RX ADMIN — DIPHENHYDRAMINE HYDROCHLORIDE 25 MG: 25 TABLET ORAL at 06:35

## 2024-08-12 RX ADMIN — ACETAMINOPHEN 650 MG: 325 TABLET ORAL at 06:35

## 2024-08-12 RX ADMIN — IMMUNE GLOBULIN (HUMAN) 30 G: 10 INJECTION INTRAVENOUS; SUBCUTANEOUS at 07:00

## 2024-08-12 NOTE — PROGRESS NOTES
Port accessed using sterile technique.  Pre meds administered followed by Gamunex C 30g as ordered.  Pt tolerated well.

## 2024-08-13 ENCOUNTER — HOSPITAL ENCOUNTER (OUTPATIENT)
Dept: INFUSION THERAPY | Age: 49
Setting detail: INFUSION SERIES
Discharge: HOME | End: 2024-08-13
Payer: MEDICARE

## 2024-08-13 VITALS
HEART RATE: 74 BPM | RESPIRATION RATE: 17 BRPM | DIASTOLIC BLOOD PRESSURE: 96 MMHG | SYSTOLIC BLOOD PRESSURE: 132 MMHG | TEMPERATURE: 98.5 F | OXYGEN SATURATION: 100 %

## 2024-08-13 DIAGNOSIS — M32.9 LUPUS (HCC): Primary | ICD-10-CM

## 2024-08-13 DIAGNOSIS — G35 EXACERBATION OF MULTIPLE SCLEROSIS (HCC): ICD-10-CM

## 2024-08-13 PROCEDURE — 6370000000 HC RX 637 (ALT 250 FOR IP): Performed by: PSYCHIATRY & NEUROLOGY

## 2024-08-13 PROCEDURE — 6360000002 HC RX W HCPCS: Performed by: PSYCHIATRY & NEUROLOGY

## 2024-08-13 PROCEDURE — 96365 THER/PROPH/DIAG IV INF INIT: CPT

## 2024-08-13 PROCEDURE — 96375 TX/PRO/DX INJ NEW DRUG ADDON: CPT

## 2024-08-13 PROCEDURE — 2580000003 HC RX 258: Performed by: PSYCHIATRY & NEUROLOGY

## 2024-08-13 PROCEDURE — 96366 THER/PROPH/DIAG IV INF ADDON: CPT

## 2024-08-13 RX ORDER — SODIUM CHLORIDE 9 MG/ML
5-250 INJECTION, SOLUTION INTRAVENOUS PRN
Status: DISCONTINUED | OUTPATIENT
Start: 2024-08-13 | End: 2024-08-14 | Stop reason: HOSPADM

## 2024-08-13 RX ORDER — ALBUTEROL SULFATE 90 UG/1
4 AEROSOL, METERED RESPIRATORY (INHALATION) PRN
Status: CANCELLED | OUTPATIENT
Start: 2024-08-13

## 2024-08-13 RX ORDER — SODIUM CHLORIDE 9 MG/ML
5-250 INJECTION, SOLUTION INTRAVENOUS PRN
OUTPATIENT
Start: 2024-08-19

## 2024-08-13 RX ORDER — SODIUM CHLORIDE 9 MG/ML
5-250 INJECTION, SOLUTION INTRAVENOUS PRN
Status: CANCELLED | OUTPATIENT
Start: 2024-08-13

## 2024-08-13 RX ORDER — ACETAMINOPHEN 325 MG/1
650 TABLET ORAL
OUTPATIENT
Start: 2024-08-19

## 2024-08-13 RX ORDER — MEPERIDINE HYDROCHLORIDE 25 MG/ML
12.5 INJECTION INTRAMUSCULAR; INTRAVENOUS; SUBCUTANEOUS PRN
Status: CANCELLED | OUTPATIENT
Start: 2024-08-13

## 2024-08-13 RX ORDER — DIPHENHYDRAMINE HYDROCHLORIDE 50 MG/ML
50 INJECTION INTRAMUSCULAR; INTRAVENOUS
Status: CANCELLED | OUTPATIENT
Start: 2024-08-13

## 2024-08-13 RX ORDER — SODIUM CHLORIDE 9 MG/ML
INJECTION, SOLUTION INTRAVENOUS CONTINUOUS
Status: CANCELLED | OUTPATIENT
Start: 2024-08-13

## 2024-08-13 RX ORDER — ACETAMINOPHEN 325 MG/1
650 TABLET ORAL
Status: CANCELLED | OUTPATIENT
Start: 2024-08-13

## 2024-08-13 RX ORDER — DIPHENHYDRAMINE HCL 25 MG
25 TABLET ORAL ONCE
Status: CANCELLED | OUTPATIENT
Start: 2024-08-13 | End: 2024-08-13

## 2024-08-13 RX ORDER — SODIUM CHLORIDE 9 MG/ML
5-250 INJECTION, SOLUTION INTRAVENOUS PRN
Status: CANCELLED | OUTPATIENT
Start: 2024-09-10

## 2024-08-13 RX ORDER — SODIUM CHLORIDE 9 MG/ML
INJECTION, SOLUTION INTRAVENOUS CONTINUOUS
OUTPATIENT
Start: 2024-08-19

## 2024-08-13 RX ORDER — ONDANSETRON 2 MG/ML
8 INJECTION INTRAMUSCULAR; INTRAVENOUS
Status: CANCELLED | OUTPATIENT
Start: 2024-08-13

## 2024-08-13 RX ORDER — EPINEPHRINE 1 MG/ML
0.3 INJECTION, SOLUTION, CONCENTRATE INTRAVENOUS PRN
Status: CANCELLED | OUTPATIENT
Start: 2024-08-13

## 2024-08-13 RX ORDER — ALBUTEROL SULFATE 90 UG/1
4 AEROSOL, METERED RESPIRATORY (INHALATION) PRN
OUTPATIENT
Start: 2024-08-19

## 2024-08-13 RX ORDER — EPINEPHRINE 1 MG/ML
0.3 INJECTION, SOLUTION, CONCENTRATE INTRAVENOUS PRN
OUTPATIENT
Start: 2024-08-19

## 2024-08-13 RX ORDER — DIPHENHYDRAMINE HYDROCHLORIDE 50 MG/ML
50 INJECTION INTRAMUSCULAR; INTRAVENOUS ONCE
Start: 2024-08-19 | End: 2024-08-19

## 2024-08-13 RX ORDER — ACETAMINOPHEN 325 MG/1
650 TABLET ORAL ONCE
Status: CANCELLED | OUTPATIENT
Start: 2024-08-13 | End: 2024-08-13

## 2024-08-13 RX ORDER — SODIUM CHLORIDE 0.9 % (FLUSH) 0.9 %
5-40 SYRINGE (ML) INJECTION PRN
Status: DISCONTINUED | OUTPATIENT
Start: 2024-08-13 | End: 2024-08-14 | Stop reason: HOSPADM

## 2024-08-13 RX ORDER — HEPARIN 100 UNIT/ML
500 SYRINGE INTRAVENOUS PRN
Status: DISCONTINUED | OUTPATIENT
Start: 2024-08-13 | End: 2024-08-14 | Stop reason: HOSPADM

## 2024-08-13 RX ORDER — HEPARIN 100 UNIT/ML
500 SYRINGE INTRAVENOUS PRN
Status: CANCELLED | OUTPATIENT
Start: 2024-08-13

## 2024-08-13 RX ORDER — DIPHENHYDRAMINE HCL 25 MG
25 TABLET ORAL ONCE
Status: COMPLETED | OUTPATIENT
Start: 2024-08-13 | End: 2024-08-13

## 2024-08-13 RX ORDER — MEPERIDINE HYDROCHLORIDE 25 MG/ML
12.5 INJECTION INTRAMUSCULAR; INTRAVENOUS; SUBCUTANEOUS PRN
OUTPATIENT
Start: 2024-08-19

## 2024-08-13 RX ORDER — ONDANSETRON 2 MG/ML
8 INJECTION INTRAMUSCULAR; INTRAVENOUS
OUTPATIENT
Start: 2024-08-19

## 2024-08-13 RX ORDER — HEPARIN 100 UNIT/ML
500 SYRINGE INTRAVENOUS PRN
OUTPATIENT
Start: 2024-08-19

## 2024-08-13 RX ORDER — SODIUM CHLORIDE 0.9 % (FLUSH) 0.9 %
5-40 SYRINGE (ML) INJECTION PRN
OUTPATIENT
Start: 2024-08-19

## 2024-08-13 RX ORDER — DIPHENHYDRAMINE HYDROCHLORIDE 50 MG/ML
50 INJECTION INTRAMUSCULAR; INTRAVENOUS
OUTPATIENT
Start: 2024-08-19

## 2024-08-13 RX ORDER — ACETAMINOPHEN 325 MG/1
650 TABLET ORAL ONCE
Status: COMPLETED | OUTPATIENT
Start: 2024-08-13 | End: 2024-08-13

## 2024-08-13 RX ORDER — SODIUM CHLORIDE 0.9 % (FLUSH) 0.9 %
5-40 SYRINGE (ML) INJECTION PRN
Status: CANCELLED | OUTPATIENT
Start: 2024-08-13

## 2024-08-13 RX ORDER — ACETAMINOPHEN 325 MG/1
650 TABLET ORAL ONCE
OUTPATIENT
Start: 2024-08-19 | End: 2024-08-19

## 2024-08-13 RX ADMIN — ACETAMINOPHEN 650 MG: 325 TABLET ORAL at 08:55

## 2024-08-13 RX ADMIN — HYDROCORTISONE SODIUM SUCCINATE 100 MG: 100 INJECTION, POWDER, FOR SOLUTION INTRAMUSCULAR; INTRAVENOUS at 08:55

## 2024-08-13 RX ADMIN — DIPHENHYDRAMINE HYDROCHLORIDE 25 MG: 25 TABLET ORAL at 08:55

## 2024-08-13 RX ADMIN — HEPARIN 500 UNITS: 100 SYRINGE at 10:51

## 2024-08-13 RX ADMIN — SODIUM CHLORIDE, PRESERVATIVE FREE 10 ML: 5 INJECTION INTRAVENOUS at 10:51

## 2024-08-13 RX ADMIN — IMMUNE GLOBULIN (HUMAN) 30 G: 10 INJECTION INTRAVENOUS; SUBCUTANEOUS at 09:13

## 2024-08-14 ENCOUNTER — HOSPITAL ENCOUNTER (OUTPATIENT)
Dept: INFUSION THERAPY | Age: 49
Setting detail: INFUSION SERIES
Discharge: HOME | End: 2024-08-14
Payer: MEDICARE

## 2024-08-14 VITALS
BODY MASS INDEX: 26.96 KG/M2 | HEART RATE: 82 BPM | RESPIRATION RATE: 18 BRPM | SYSTOLIC BLOOD PRESSURE: 128 MMHG | DIASTOLIC BLOOD PRESSURE: 94 MMHG | WEIGHT: 162 LBS | OXYGEN SATURATION: 96 % | TEMPERATURE: 97.8 F

## 2024-08-14 DIAGNOSIS — G35 EXACERBATION OF MULTIPLE SCLEROSIS (HCC): Primary | ICD-10-CM

## 2024-08-14 DIAGNOSIS — M32.9 LUPUS (HCC): ICD-10-CM

## 2024-08-14 PROCEDURE — 96365 THER/PROPH/DIAG IV INF INIT: CPT

## 2024-08-14 PROCEDURE — 96375 TX/PRO/DX INJ NEW DRUG ADDON: CPT

## 2024-08-14 PROCEDURE — 6360000002 HC RX W HCPCS: Performed by: PSYCHIATRY & NEUROLOGY

## 2024-08-14 PROCEDURE — 6370000000 HC RX 637 (ALT 250 FOR IP): Performed by: PSYCHIATRY & NEUROLOGY

## 2024-08-14 PROCEDURE — 96366 THER/PROPH/DIAG IV INF ADDON: CPT

## 2024-08-14 PROCEDURE — 2580000003 HC RX 258: Performed by: PSYCHIATRY & NEUROLOGY

## 2024-08-14 RX ORDER — SODIUM CHLORIDE 0.9 % (FLUSH) 0.9 %
5-40 SYRINGE (ML) INJECTION PRN
Status: DISCONTINUED | OUTPATIENT
Start: 2024-08-14 | End: 2024-08-15 | Stop reason: HOSPADM

## 2024-08-14 RX ORDER — SODIUM CHLORIDE 9 MG/ML
5-250 INJECTION, SOLUTION INTRAVENOUS PRN
Status: CANCELLED | OUTPATIENT
Start: 2024-08-14

## 2024-08-14 RX ORDER — ACETAMINOPHEN 325 MG/1
650 TABLET ORAL ONCE
Status: COMPLETED | OUTPATIENT
Start: 2024-08-14 | End: 2024-08-14

## 2024-08-14 RX ORDER — SODIUM CHLORIDE 0.9 % (FLUSH) 0.9 %
5-40 SYRINGE (ML) INJECTION PRN
Status: CANCELLED | OUTPATIENT
Start: 2024-08-14

## 2024-08-14 RX ORDER — HEPARIN 100 UNIT/ML
500 SYRINGE INTRAVENOUS PRN
Status: CANCELLED | OUTPATIENT
Start: 2024-08-14

## 2024-08-14 RX ORDER — DIPHENHYDRAMINE HCL 25 MG
25 TABLET ORAL ONCE
Status: CANCELLED | OUTPATIENT
Start: 2024-08-14 | End: 2024-08-14

## 2024-08-14 RX ORDER — EPINEPHRINE 1 MG/ML
0.3 INJECTION, SOLUTION, CONCENTRATE INTRAVENOUS PRN
Status: CANCELLED | OUTPATIENT
Start: 2024-08-14

## 2024-08-14 RX ORDER — DIPHENHYDRAMINE HCL 25 MG
25 TABLET ORAL ONCE
Status: COMPLETED | OUTPATIENT
Start: 2024-08-14 | End: 2024-08-14

## 2024-08-14 RX ORDER — ONDANSETRON 2 MG/ML
8 INJECTION INTRAMUSCULAR; INTRAVENOUS
Status: CANCELLED | OUTPATIENT
Start: 2024-08-14

## 2024-08-14 RX ORDER — MEPERIDINE HYDROCHLORIDE 25 MG/ML
12.5 INJECTION INTRAMUSCULAR; INTRAVENOUS; SUBCUTANEOUS PRN
Status: CANCELLED | OUTPATIENT
Start: 2024-08-14

## 2024-08-14 RX ORDER — ACETAMINOPHEN 325 MG/1
650 TABLET ORAL ONCE
Status: CANCELLED | OUTPATIENT
Start: 2024-08-14 | End: 2024-08-14

## 2024-08-14 RX ORDER — ALBUTEROL SULFATE 90 UG/1
4 AEROSOL, METERED RESPIRATORY (INHALATION) PRN
Status: CANCELLED | OUTPATIENT
Start: 2024-08-14

## 2024-08-14 RX ORDER — DIPHENHYDRAMINE HYDROCHLORIDE 50 MG/ML
50 INJECTION INTRAMUSCULAR; INTRAVENOUS
Status: CANCELLED | OUTPATIENT
Start: 2024-08-14

## 2024-08-14 RX ORDER — SODIUM CHLORIDE 9 MG/ML
INJECTION, SOLUTION INTRAVENOUS CONTINUOUS
Status: CANCELLED | OUTPATIENT
Start: 2024-08-14

## 2024-08-14 RX ORDER — ACETAMINOPHEN 325 MG/1
650 TABLET ORAL
Status: CANCELLED | OUTPATIENT
Start: 2024-08-14

## 2024-08-14 RX ORDER — HEPARIN 100 UNIT/ML
500 SYRINGE INTRAVENOUS PRN
Status: DISCONTINUED | OUTPATIENT
Start: 2024-08-14 | End: 2024-08-15 | Stop reason: HOSPADM

## 2024-08-14 RX ADMIN — SODIUM CHLORIDE, PRESERVATIVE FREE 10 ML: 5 INJECTION INTRAVENOUS at 08:24

## 2024-08-14 RX ADMIN — DIPHENHYDRAMINE HYDROCHLORIDE 25 MG: 25 TABLET ORAL at 08:26

## 2024-08-14 RX ADMIN — ACETAMINOPHEN 650 MG: 325 TABLET ORAL at 08:25

## 2024-08-14 RX ADMIN — HEPARIN 500 UNITS: 100 SYRINGE at 10:13

## 2024-08-14 RX ADMIN — SODIUM CHLORIDE, PRESERVATIVE FREE 10 ML: 5 INJECTION INTRAVENOUS at 08:26

## 2024-08-14 RX ADMIN — SODIUM CHLORIDE, PRESERVATIVE FREE 20 ML: 5 INJECTION INTRAVENOUS at 10:12

## 2024-08-14 RX ADMIN — HYDROCORTISONE SODIUM SUCCINATE 100 MG: 100 INJECTION, POWDER, FOR SOLUTION INTRAMUSCULAR; INTRAVENOUS at 08:26

## 2024-08-14 RX ADMIN — IMMUNE GLOBULIN (HUMAN) 30 G: 10 INJECTION INTRAVENOUS; SUBCUTANEOUS at 08:33

## 2024-08-14 NOTE — PROGRESS NOTES
Port accessed using sterile technique.  Pre meds administered, followed by Gamunex C 30g as ordered.  Pt tolerated well.

## 2024-08-15 ENCOUNTER — HOSPITAL ENCOUNTER (OUTPATIENT)
Dept: INFUSION THERAPY | Age: 49
Setting detail: INFUSION SERIES
Discharge: HOME | End: 2024-08-15
Payer: MEDICARE

## 2024-08-15 VITALS
TEMPERATURE: 97.1 F | DIASTOLIC BLOOD PRESSURE: 90 MMHG | HEART RATE: 84 BPM | SYSTOLIC BLOOD PRESSURE: 121 MMHG | RESPIRATION RATE: 18 BRPM | OXYGEN SATURATION: 100 %

## 2024-08-15 DIAGNOSIS — M32.9 LUPUS (HCC): ICD-10-CM

## 2024-08-15 DIAGNOSIS — G35 EXACERBATION OF MULTIPLE SCLEROSIS (HCC): Primary | ICD-10-CM

## 2024-08-15 PROCEDURE — 6360000002 HC RX W HCPCS: Performed by: PSYCHIATRY & NEUROLOGY

## 2024-08-15 PROCEDURE — 6370000000 HC RX 637 (ALT 250 FOR IP): Performed by: PSYCHIATRY & NEUROLOGY

## 2024-08-15 PROCEDURE — 96365 THER/PROPH/DIAG IV INF INIT: CPT

## 2024-08-15 PROCEDURE — 96366 THER/PROPH/DIAG IV INF ADDON: CPT

## 2024-08-15 PROCEDURE — 96375 TX/PRO/DX INJ NEW DRUG ADDON: CPT

## 2024-08-15 RX ORDER — SODIUM CHLORIDE 9 MG/ML
5-250 INJECTION, SOLUTION INTRAVENOUS PRN
Status: CANCELLED | OUTPATIENT
Start: 2024-08-15

## 2024-08-15 RX ORDER — ACETAMINOPHEN 325 MG/1
650 TABLET ORAL ONCE
Status: CANCELLED | OUTPATIENT
Start: 2024-08-15 | End: 2024-08-15

## 2024-08-15 RX ORDER — DIPHENHYDRAMINE HCL 25 MG
25 TABLET ORAL ONCE
Status: CANCELLED | OUTPATIENT
Start: 2024-08-15 | End: 2024-08-15

## 2024-08-15 RX ORDER — SODIUM CHLORIDE 0.9 % (FLUSH) 0.9 %
5-40 SYRINGE (ML) INJECTION PRN
Status: CANCELLED | OUTPATIENT
Start: 2024-08-15

## 2024-08-15 RX ORDER — HEPARIN 100 UNIT/ML
500 SYRINGE INTRAVENOUS PRN
Status: CANCELLED | OUTPATIENT
Start: 2024-08-15

## 2024-08-15 RX ORDER — SODIUM CHLORIDE 9 MG/ML
INJECTION, SOLUTION INTRAVENOUS CONTINUOUS
Status: CANCELLED | OUTPATIENT
Start: 2024-08-15

## 2024-08-15 RX ORDER — ACETAMINOPHEN 325 MG/1
650 TABLET ORAL
Status: CANCELLED | OUTPATIENT
Start: 2024-08-15

## 2024-08-15 RX ORDER — DIPHENHYDRAMINE HYDROCHLORIDE 50 MG/ML
50 INJECTION INTRAMUSCULAR; INTRAVENOUS
Status: CANCELLED | OUTPATIENT
Start: 2024-08-15

## 2024-08-15 RX ORDER — EPINEPHRINE 1 MG/ML
0.3 INJECTION, SOLUTION, CONCENTRATE INTRAVENOUS PRN
Status: CANCELLED | OUTPATIENT
Start: 2024-08-15

## 2024-08-15 RX ORDER — MEPERIDINE HYDROCHLORIDE 25 MG/ML
12.5 INJECTION INTRAMUSCULAR; INTRAVENOUS; SUBCUTANEOUS PRN
Status: CANCELLED | OUTPATIENT
Start: 2024-08-15

## 2024-08-15 RX ORDER — ACETAMINOPHEN 325 MG/1
650 TABLET ORAL ONCE
Status: COMPLETED | OUTPATIENT
Start: 2024-08-15 | End: 2024-08-15

## 2024-08-15 RX ORDER — DIPHENHYDRAMINE HCL 25 MG
25 TABLET ORAL ONCE
Status: COMPLETED | OUTPATIENT
Start: 2024-08-15 | End: 2024-08-15

## 2024-08-15 RX ORDER — ONDANSETRON 2 MG/ML
8 INJECTION INTRAMUSCULAR; INTRAVENOUS
Status: CANCELLED | OUTPATIENT
Start: 2024-08-15

## 2024-08-15 RX ORDER — HEPARIN 100 UNIT/ML
500 SYRINGE INTRAVENOUS PRN
Status: DISCONTINUED | OUTPATIENT
Start: 2024-08-15 | End: 2024-08-16 | Stop reason: HOSPADM

## 2024-08-15 RX ORDER — ALBUTEROL SULFATE 90 UG/1
4 AEROSOL, METERED RESPIRATORY (INHALATION) PRN
Status: CANCELLED | OUTPATIENT
Start: 2024-08-15

## 2024-08-15 RX ORDER — SODIUM CHLORIDE 9 MG/ML
5-250 INJECTION, SOLUTION INTRAVENOUS PRN
Status: DISCONTINUED | OUTPATIENT
Start: 2024-08-15 | End: 2024-08-16 | Stop reason: HOSPADM

## 2024-08-15 RX ADMIN — IMMUNE GLOBULIN (HUMAN) 30 G: 10 INJECTION INTRAVENOUS; SUBCUTANEOUS at 07:45

## 2024-08-15 RX ADMIN — HEPARIN 500 UNITS: 100 SYRINGE at 09:20

## 2024-08-15 RX ADMIN — DIPHENHYDRAMINE HYDROCHLORIDE 25 MG: 25 TABLET ORAL at 07:35

## 2024-08-15 RX ADMIN — ACETAMINOPHEN 650 MG: 325 TABLET ORAL at 07:35

## 2024-08-15 RX ADMIN — HYDROCORTISONE SODIUM SUCCINATE 100 MG: 100 INJECTION, POWDER, FOR SOLUTION INTRAMUSCULAR; INTRAVENOUS at 07:35

## 2024-08-15 NOTE — PROGRESS NOTES
Port accessed from tx yesterday. Port flushed with brisk blood return noted. Pt premedicated as ordered. Gamunex-C30G administered. Pt tolerated well.     Electronically signed by Ciera George RN on 8/15/2024 at 10:19 AM

## 2024-08-16 ENCOUNTER — HOSPITAL ENCOUNTER (OUTPATIENT)
Dept: INFUSION THERAPY | Age: 49
Setting detail: INFUSION SERIES
Discharge: HOME | End: 2024-08-16
Payer: MEDICARE

## 2024-08-16 VITALS
HEART RATE: 81 BPM | SYSTOLIC BLOOD PRESSURE: 117 MMHG | TEMPERATURE: 97.4 F | DIASTOLIC BLOOD PRESSURE: 83 MMHG | OXYGEN SATURATION: 100 % | RESPIRATION RATE: 18 BRPM

## 2024-08-16 DIAGNOSIS — M32.9 LUPUS (HCC): ICD-10-CM

## 2024-08-16 DIAGNOSIS — G35 EXACERBATION OF MULTIPLE SCLEROSIS (HCC): Primary | ICD-10-CM

## 2024-08-16 PROCEDURE — 6360000002 HC RX W HCPCS: Performed by: PSYCHIATRY & NEUROLOGY

## 2024-08-16 PROCEDURE — 6370000000 HC RX 637 (ALT 250 FOR IP): Performed by: PSYCHIATRY & NEUROLOGY

## 2024-08-16 PROCEDURE — 96365 THER/PROPH/DIAG IV INF INIT: CPT

## 2024-08-16 PROCEDURE — 96375 TX/PRO/DX INJ NEW DRUG ADDON: CPT

## 2024-08-16 PROCEDURE — 96366 THER/PROPH/DIAG IV INF ADDON: CPT

## 2024-08-16 RX ORDER — SODIUM CHLORIDE 9 MG/ML
INJECTION, SOLUTION INTRAVENOUS CONTINUOUS
OUTPATIENT
Start: 2024-08-16

## 2024-08-16 RX ORDER — SODIUM CHLORIDE 9 MG/ML
5-250 INJECTION, SOLUTION INTRAVENOUS PRN
Status: DISCONTINUED | OUTPATIENT
Start: 2024-08-16 | End: 2024-08-17 | Stop reason: HOSPADM

## 2024-08-16 RX ORDER — HEPARIN 100 UNIT/ML
500 SYRINGE INTRAVENOUS PRN
Status: DISCONTINUED | OUTPATIENT
Start: 2024-08-16 | End: 2024-08-17 | Stop reason: HOSPADM

## 2024-08-16 RX ORDER — DIPHENHYDRAMINE HCL 25 MG
25 TABLET ORAL ONCE
Status: CANCELLED | OUTPATIENT
Start: 2024-08-16 | End: 2024-08-16

## 2024-08-16 RX ORDER — ALBUTEROL SULFATE 90 UG/1
4 AEROSOL, METERED RESPIRATORY (INHALATION) PRN
OUTPATIENT
Start: 2024-08-16

## 2024-08-16 RX ORDER — DIPHENHYDRAMINE HYDROCHLORIDE 50 MG/ML
50 INJECTION INTRAMUSCULAR; INTRAVENOUS
OUTPATIENT
Start: 2024-08-16

## 2024-08-16 RX ORDER — SODIUM CHLORIDE 0.9 % (FLUSH) 0.9 %
5-40 SYRINGE (ML) INJECTION PRN
Status: CANCELLED | OUTPATIENT
Start: 2024-08-16

## 2024-08-16 RX ORDER — DIPHENHYDRAMINE HCL 25 MG
25 TABLET ORAL ONCE
Status: COMPLETED | OUTPATIENT
Start: 2024-08-16 | End: 2024-08-16

## 2024-08-16 RX ORDER — ACETAMINOPHEN 325 MG/1
650 TABLET ORAL
OUTPATIENT
Start: 2024-08-16

## 2024-08-16 RX ORDER — HEPARIN 100 UNIT/ML
500 SYRINGE INTRAVENOUS PRN
Status: CANCELLED | OUTPATIENT
Start: 2024-08-16

## 2024-08-16 RX ORDER — EPINEPHRINE 1 MG/ML
0.3 INJECTION, SOLUTION, CONCENTRATE INTRAVENOUS PRN
OUTPATIENT
Start: 2024-08-16

## 2024-08-16 RX ORDER — SODIUM CHLORIDE 9 MG/ML
5-250 INJECTION, SOLUTION INTRAVENOUS PRN
OUTPATIENT
Start: 2024-08-16

## 2024-08-16 RX ORDER — ONDANSETRON 2 MG/ML
8 INJECTION INTRAMUSCULAR; INTRAVENOUS
OUTPATIENT
Start: 2024-08-16

## 2024-08-16 RX ORDER — MEPERIDINE HYDROCHLORIDE 25 MG/ML
12.5 INJECTION INTRAMUSCULAR; INTRAVENOUS; SUBCUTANEOUS PRN
OUTPATIENT
Start: 2024-08-16

## 2024-08-16 RX ORDER — ACETAMINOPHEN 325 MG/1
650 TABLET ORAL ONCE
Status: CANCELLED | OUTPATIENT
Start: 2024-08-16 | End: 2024-08-16

## 2024-08-16 RX ORDER — ACETAMINOPHEN 325 MG/1
650 TABLET ORAL ONCE
Status: COMPLETED | OUTPATIENT
Start: 2024-08-16 | End: 2024-08-16

## 2024-08-16 RX ADMIN — DIPHENHYDRAMINE HYDROCHLORIDE 25 MG: 25 TABLET ORAL at 07:38

## 2024-08-16 RX ADMIN — ACETAMINOPHEN 650 MG: 325 TABLET ORAL at 07:38

## 2024-08-16 RX ADMIN — HEPARIN 500 UNITS: 100 SYRINGE at 09:50

## 2024-08-16 RX ADMIN — HYDROCORTISONE SODIUM SUCCINATE 100 MG: 100 INJECTION, POWDER, FOR SOLUTION INTRAMUSCULAR; INTRAVENOUS at 07:38

## 2024-08-16 RX ADMIN — IMMUNE GLOBULIN (HUMAN) 30 G: 10 INJECTION INTRAVENOUS; SUBCUTANEOUS at 08:10

## 2024-08-21 ENCOUNTER — OFFICE VISIT (OUTPATIENT)
Dept: NEUROLOGY | Age: 49
End: 2024-08-21
Payer: MEDICARE

## 2024-08-21 VITALS
SYSTOLIC BLOOD PRESSURE: 140 MMHG | BODY MASS INDEX: 26.99 KG/M2 | HEART RATE: 107 BPM | WEIGHT: 162 LBS | HEIGHT: 65 IN | DIASTOLIC BLOOD PRESSURE: 97 MMHG

## 2024-08-21 DIAGNOSIS — M54.30 SCIATICA, UNSPECIFIED LATERALITY: ICD-10-CM

## 2024-08-21 DIAGNOSIS — R53.1 WEAKNESS: ICD-10-CM

## 2024-08-21 DIAGNOSIS — M32.9 LUPUS (HCC): ICD-10-CM

## 2024-08-21 DIAGNOSIS — H46.9 OPTIC NEURITIS: ICD-10-CM

## 2024-08-21 DIAGNOSIS — R25.1 TREMOR: ICD-10-CM

## 2024-08-21 DIAGNOSIS — G43.719 INTRACTABLE CHRONIC MIGRAINE WITHOUT AURA AND WITHOUT STATUS MIGRAINOSUS: ICD-10-CM

## 2024-08-21 DIAGNOSIS — H53.9 VISUAL DISTURBANCE: ICD-10-CM

## 2024-08-21 DIAGNOSIS — M79.7 FIBROMYALGIA: ICD-10-CM

## 2024-08-21 DIAGNOSIS — G35 MULTIPLE SCLEROSIS (HCC): Primary | ICD-10-CM

## 2024-08-21 DIAGNOSIS — R20.0 NUMBNESS: ICD-10-CM

## 2024-08-21 PROCEDURE — 99214 OFFICE O/P EST MOD 30 MIN: CPT | Performed by: PSYCHIATRY & NEUROLOGY

## 2024-08-21 PROCEDURE — 3077F SYST BP >= 140 MM HG: CPT | Performed by: PSYCHIATRY & NEUROLOGY

## 2024-08-21 PROCEDURE — 3080F DIAST BP >= 90 MM HG: CPT | Performed by: PSYCHIATRY & NEUROLOGY

## 2024-08-21 RX ORDER — VIBEGRON 75 MG/1
TABLET, FILM COATED ORAL
COMMUNITY
Start: 2024-08-02

## 2024-08-21 RX ORDER — CARVEDILOL 12.5 MG/1
12.5 TABLET ORAL EVERY 12 HOURS SCHEDULED
COMMUNITY
Start: 2024-08-20 | End: 2025-08-21

## 2024-08-21 RX ORDER — AMLODIPINE BESYLATE 10 MG/1
TABLET ORAL
COMMUNITY
Start: 2024-08-19

## 2024-08-21 RX ORDER — OXYCODONE HYDROCHLORIDE 10 MG/1
10 TABLET ORAL EVERY 6 HOURS PRN
Qty: 120 TABLET | Refills: 0 | Status: SHIPPED | OUTPATIENT
Start: 2024-08-26 | End: 2024-09-25

## 2024-08-21 RX ORDER — LACTULOSE 10 G/15ML
SOLUTION ORAL
COMMUNITY
Start: 2024-08-05

## 2024-08-21 RX ORDER — NALOXEGOL OXALATE 25 MG/1
TABLET, FILM COATED ORAL
COMMUNITY
Start: 2024-07-10

## 2024-08-21 RX ORDER — MELOXICAM 15 MG/1
TABLET ORAL
COMMUNITY
Start: 2024-08-02

## 2024-08-21 NOTE — PROGRESS NOTES
Review of Systems    Constitutional - No fever or chills.  No diaphoresis or significant fatigue.  HENT -  No tinnitus or significant hearing loss.  Eyes - no sudden vision change or eye pain  Respiratory - no significant shortness of breath or cough  Cardiovascular - no chest pain No palpitations or significant leg swelling  Gastrointestinal - no abdominal swelling or pain.    Genitourinary - No difficulty urinating, dysuria  Musculoskeletal - no back pain or myalgia.  Skin - no color change or rash  Neurologic - No seizures.  No lateralizing weakness.  Hematologic - no easy bruising or excessive bleeding.  Psychiatric - no severe anxiety or nervousness.   All other review of systems are negative.    
Paliperidone Er Rash       Social History     Socioeconomic History    Marital status: Single     Spouse name: Not on file    Number of children: 3    Years of education: Some College    Highest education level: Not on file   Occupational History    Occupation: Disability   Tobacco Use    Smoking status: Some Days     Current packs/day: 1.00     Average packs/day: 1 pack/day for 15.0 years (15.0 ttl pk-yrs)     Types: Cigarettes    Smokeless tobacco: Never   Vaping Use    Vaping status: Never Used   Substance and Sexual Activity    Alcohol use: Not Currently     Alcohol/week: 2.0 standard drinks of alcohol     Types: 2 Shots of liquor per week    Drug use: Yes     Types: Marijuana (Weed)     Comment: occ.    Sexual activity: Never     Partners: Male   Other Topics Concern    Not on file   Social History Narrative    ** Merged History Encounter **          Social Determinants of Health     Financial Resource Strain: Low Risk  (9/29/2021)    Received from [facility], [facility]    Overall Financial Resource Strain (CARDIA)     Difficulty of Paying Living Expenses: Not very hard   Food Insecurity: Food Insecurity Present (2/21/2023)    Received from [facility]    Hunger Vital Sign     Worried About Running Out of Food in the Last Year: Sometimes true     Ran Out of Food in the Last Year: Sometimes true   Transportation Needs: Unmet Transportation Needs (2/21/2023)    Received from [facility]    PRAPARE - Transportation     Lack of Transportation (Medical): Yes     Lack of Transportation (Non-Medical): Yes   Physical Activity: Not on file   Stress: Not on file   Social Connections: Unknown (10/11/2023)    [facility]    Family and Community Support     Help with Day-to-Day Activities: Not on

## 2024-08-23 ENCOUNTER — HOSPITAL ENCOUNTER (OUTPATIENT)
Dept: INFUSION THERAPY | Age: 49
Setting detail: INFUSION SERIES
Discharge: HOME | End: 2024-08-23
Payer: MEDICARE

## 2024-08-23 VITALS
SYSTOLIC BLOOD PRESSURE: 130 MMHG | RESPIRATION RATE: 18 BRPM | HEART RATE: 81 BPM | OXYGEN SATURATION: 100 % | TEMPERATURE: 97.1 F | DIASTOLIC BLOOD PRESSURE: 91 MMHG

## 2024-08-23 DIAGNOSIS — M32.9 LUPUS (HCC): ICD-10-CM

## 2024-08-23 DIAGNOSIS — G35 EXACERBATION OF MULTIPLE SCLEROSIS (HCC): Primary | ICD-10-CM

## 2024-08-23 PROCEDURE — 96366 THER/PROPH/DIAG IV INF ADDON: CPT

## 2024-08-23 PROCEDURE — 96365 THER/PROPH/DIAG IV INF INIT: CPT

## 2024-08-23 PROCEDURE — 6360000002 HC RX W HCPCS: Performed by: PSYCHIATRY & NEUROLOGY

## 2024-08-23 PROCEDURE — 96375 TX/PRO/DX INJ NEW DRUG ADDON: CPT

## 2024-08-23 PROCEDURE — 6370000000 HC RX 637 (ALT 250 FOR IP): Performed by: PSYCHIATRY & NEUROLOGY

## 2024-08-23 PROCEDURE — 2580000003 HC RX 258: Performed by: PSYCHIATRY & NEUROLOGY

## 2024-08-23 RX ORDER — EPINEPHRINE 1 MG/ML
0.3 INJECTION, SOLUTION, CONCENTRATE INTRAVENOUS PRN
OUTPATIENT
Start: 2024-08-23

## 2024-08-23 RX ORDER — SODIUM CHLORIDE 9 MG/ML
INJECTION, SOLUTION INTRAVENOUS CONTINUOUS
OUTPATIENT
Start: 2024-08-23

## 2024-08-23 RX ORDER — HEPARIN 100 UNIT/ML
500 SYRINGE INTRAVENOUS PRN
Status: DISCONTINUED | OUTPATIENT
Start: 2024-08-23 | End: 2024-08-24 | Stop reason: HOSPADM

## 2024-08-23 RX ORDER — HEPARIN 100 UNIT/ML
500 SYRINGE INTRAVENOUS PRN
Status: CANCELLED | OUTPATIENT
Start: 2024-08-23

## 2024-08-23 RX ORDER — DIPHENHYDRAMINE HYDROCHLORIDE 50 MG/ML
50 INJECTION INTRAMUSCULAR; INTRAVENOUS
OUTPATIENT
Start: 2024-08-23

## 2024-08-23 RX ORDER — ACETAMINOPHEN 325 MG/1
650 TABLET ORAL ONCE
Status: COMPLETED | OUTPATIENT
Start: 2024-08-23 | End: 2024-08-23

## 2024-08-23 RX ORDER — MEPERIDINE HYDROCHLORIDE 25 MG/ML
12.5 INJECTION INTRAMUSCULAR; INTRAVENOUS; SUBCUTANEOUS PRN
OUTPATIENT
Start: 2024-08-23

## 2024-08-23 RX ORDER — ACETAMINOPHEN 325 MG/1
650 TABLET ORAL ONCE
Status: CANCELLED | OUTPATIENT
Start: 2024-08-23 | End: 2024-08-23

## 2024-08-23 RX ORDER — SODIUM CHLORIDE 9 MG/ML
5-250 INJECTION, SOLUTION INTRAVENOUS PRN
Status: CANCELLED | OUTPATIENT
Start: 2024-08-23

## 2024-08-23 RX ORDER — SODIUM CHLORIDE 0.9 % (FLUSH) 0.9 %
5-40 SYRINGE (ML) INJECTION PRN
Status: DISCONTINUED | OUTPATIENT
Start: 2024-08-23 | End: 2024-08-24 | Stop reason: HOSPADM

## 2024-08-23 RX ORDER — ALBUTEROL SULFATE 90 UG/1
4 AEROSOL, METERED RESPIRATORY (INHALATION) PRN
OUTPATIENT
Start: 2024-08-23

## 2024-08-23 RX ORDER — DIPHENHYDRAMINE HCL 25 MG
25 TABLET ORAL ONCE
Status: COMPLETED | OUTPATIENT
Start: 2024-08-23 | End: 2024-08-23

## 2024-08-23 RX ORDER — SODIUM CHLORIDE 9 MG/ML
5-250 INJECTION, SOLUTION INTRAVENOUS PRN
OUTPATIENT
Start: 2024-08-23

## 2024-08-23 RX ORDER — SODIUM CHLORIDE 0.9 % (FLUSH) 0.9 %
5-40 SYRINGE (ML) INJECTION PRN
Status: CANCELLED | OUTPATIENT
Start: 2024-08-23

## 2024-08-23 RX ORDER — ACETAMINOPHEN 325 MG/1
650 TABLET ORAL
OUTPATIENT
Start: 2024-08-23

## 2024-08-23 RX ORDER — ONDANSETRON 2 MG/ML
8 INJECTION INTRAMUSCULAR; INTRAVENOUS
OUTPATIENT
Start: 2024-08-23

## 2024-08-23 RX ORDER — DIPHENHYDRAMINE HCL 25 MG
25 TABLET ORAL ONCE
Status: CANCELLED | OUTPATIENT
Start: 2024-08-23 | End: 2024-08-23

## 2024-08-23 RX ADMIN — ACETAMINOPHEN 650 MG: 325 TABLET ORAL at 07:35

## 2024-08-23 RX ADMIN — IMMUNE GLOBULIN (HUMAN) 30 G: 10 INJECTION INTRAVENOUS; SUBCUTANEOUS at 07:59

## 2024-08-23 RX ADMIN — HYDROCORTISONE SODIUM SUCCINATE 100 MG: 100 INJECTION, POWDER, FOR SOLUTION INTRAMUSCULAR; INTRAVENOUS at 07:36

## 2024-08-23 RX ADMIN — HEPARIN 500 UNITS: 100 SYRINGE at 09:38

## 2024-08-23 RX ADMIN — DIPHENHYDRAMINE HYDROCHLORIDE 25 MG: 25 TABLET ORAL at 07:35

## 2024-08-23 RX ADMIN — SODIUM CHLORIDE, PRESERVATIVE FREE 20 ML: 5 INJECTION INTRAVENOUS at 09:38

## 2024-08-23 NOTE — PROGRESS NOTES
Pt arrived to [facility] and port accessed using sterile technique. Pt premedicated as ordered and 30g  Gamunex-C administered. Pt tolerated well.     Electronically signed by Ciera George RN on 8/23/2024 at 9:44 AM

## 2024-08-30 ENCOUNTER — HOSPITAL ENCOUNTER (OUTPATIENT)
Dept: INFUSION THERAPY | Age: 49
Setting detail: INFUSION SERIES
Discharge: HOME | End: 2024-08-30
Payer: MEDICARE

## 2024-08-30 VITALS
OXYGEN SATURATION: 100 % | DIASTOLIC BLOOD PRESSURE: 88 MMHG | BODY MASS INDEX: 27.96 KG/M2 | RESPIRATION RATE: 16 BRPM | HEART RATE: 85 BPM | TEMPERATURE: 97.4 F | SYSTOLIC BLOOD PRESSURE: 131 MMHG | WEIGHT: 168 LBS

## 2024-08-30 DIAGNOSIS — M32.9 LUPUS (HCC): ICD-10-CM

## 2024-08-30 DIAGNOSIS — G35 EXACERBATION OF MULTIPLE SCLEROSIS (HCC): Primary | ICD-10-CM

## 2024-08-30 PROCEDURE — 96365 THER/PROPH/DIAG IV INF INIT: CPT

## 2024-08-30 PROCEDURE — 96375 TX/PRO/DX INJ NEW DRUG ADDON: CPT

## 2024-08-30 PROCEDURE — 2580000003 HC RX 258: Performed by: PSYCHIATRY & NEUROLOGY

## 2024-08-30 PROCEDURE — 6370000000 HC RX 637 (ALT 250 FOR IP): Performed by: PSYCHIATRY & NEUROLOGY

## 2024-08-30 PROCEDURE — 6360000002 HC RX W HCPCS: Performed by: PSYCHIATRY & NEUROLOGY

## 2024-08-30 RX ORDER — HEPARIN 100 UNIT/ML
500 SYRINGE INTRAVENOUS PRN
Status: DISCONTINUED | OUTPATIENT
Start: 2024-08-30 | End: 2024-08-31 | Stop reason: HOSPADM

## 2024-08-30 RX ORDER — DIPHENHYDRAMINE HYDROCHLORIDE 50 MG/ML
50 INJECTION INTRAMUSCULAR; INTRAVENOUS
OUTPATIENT
Start: 2024-08-30

## 2024-08-30 RX ORDER — EPINEPHRINE 1 MG/ML
0.3 INJECTION, SOLUTION, CONCENTRATE INTRAVENOUS PRN
OUTPATIENT
Start: 2024-08-30

## 2024-08-30 RX ORDER — SODIUM CHLORIDE 0.9 % (FLUSH) 0.9 %
5-40 SYRINGE (ML) INJECTION PRN
OUTPATIENT
Start: 2024-08-30

## 2024-08-30 RX ORDER — SODIUM CHLORIDE 9 MG/ML
5-250 INJECTION, SOLUTION INTRAVENOUS PRN
OUTPATIENT
Start: 2024-08-30

## 2024-08-30 RX ORDER — SODIUM CHLORIDE 9 MG/ML
5-250 INJECTION, SOLUTION INTRAVENOUS PRN
Status: DISCONTINUED | OUTPATIENT
Start: 2024-08-30 | End: 2024-08-31 | Stop reason: HOSPADM

## 2024-08-30 RX ORDER — ACETAMINOPHEN 325 MG/1
650 TABLET ORAL
OUTPATIENT
Start: 2024-08-30

## 2024-08-30 RX ORDER — ACETAMINOPHEN 325 MG/1
650 TABLET ORAL ONCE
OUTPATIENT
Start: 2024-08-30 | End: 2024-08-30

## 2024-08-30 RX ORDER — ACETAMINOPHEN 325 MG/1
650 TABLET ORAL ONCE
Status: COMPLETED | OUTPATIENT
Start: 2024-08-30 | End: 2024-08-30

## 2024-08-30 RX ORDER — SODIUM CHLORIDE 0.9 % (FLUSH) 0.9 %
5-40 SYRINGE (ML) INJECTION PRN
Status: DISCONTINUED | OUTPATIENT
Start: 2024-08-30 | End: 2024-08-31 | Stop reason: HOSPADM

## 2024-08-30 RX ORDER — ALBUTEROL SULFATE 90 UG/1
4 AEROSOL, METERED RESPIRATORY (INHALATION) PRN
OUTPATIENT
Start: 2024-08-30

## 2024-08-30 RX ORDER — HEPARIN 100 UNIT/ML
500 SYRINGE INTRAVENOUS PRN
OUTPATIENT
Start: 2024-08-30

## 2024-08-30 RX ORDER — ONDANSETRON 2 MG/ML
8 INJECTION INTRAMUSCULAR; INTRAVENOUS
OUTPATIENT
Start: 2024-08-30

## 2024-08-30 RX ORDER — MEPERIDINE HYDROCHLORIDE 25 MG/ML
12.5 INJECTION INTRAMUSCULAR; INTRAVENOUS; SUBCUTANEOUS PRN
OUTPATIENT
Start: 2024-08-30

## 2024-08-30 RX ORDER — DIPHENHYDRAMINE HCL 25 MG
25 TABLET ORAL ONCE
Status: COMPLETED | OUTPATIENT
Start: 2024-08-30 | End: 2024-08-30

## 2024-08-30 RX ORDER — SODIUM CHLORIDE 9 MG/ML
INJECTION, SOLUTION INTRAVENOUS CONTINUOUS
OUTPATIENT
Start: 2024-08-30

## 2024-08-30 RX ORDER — DIPHENHYDRAMINE HCL 25 MG
25 TABLET ORAL ONCE
OUTPATIENT
Start: 2024-08-30 | End: 2024-08-30

## 2024-08-30 RX ADMIN — HEPARIN 500 UNITS: 100 SYRINGE at 09:02

## 2024-08-30 RX ADMIN — IMMUNE GLOBULIN (HUMAN) 25 G: 10 INJECTION INTRAVENOUS; SUBCUTANEOUS at 07:37

## 2024-08-30 RX ADMIN — SODIUM CHLORIDE, PRESERVATIVE FREE 20 ML: 5 INJECTION INTRAVENOUS at 09:02

## 2024-08-30 RX ADMIN — ACETAMINOPHEN 650 MG: 325 TABLET ORAL at 07:26

## 2024-08-30 RX ADMIN — DIPHENHYDRAMINE HYDROCHLORIDE 25 MG: 25 TABLET ORAL at 07:26

## 2024-08-30 RX ADMIN — HYDROCORTISONE SODIUM SUCCINATE 100 MG: 100 INJECTION, POWDER, FOR SOLUTION INTRAMUSCULAR; INTRAVENOUS at 07:26

## 2024-08-30 NOTE — PROGRESS NOTES
Pt arrived to [facility] and port accessed. Pt premedicated as ordered. Gamunex-c 30g administered. Pt tolerated well.     Electronically signed by Ciera George RN on 8/30/2024 at 9:07 AM

## 2024-08-31 ENCOUNTER — APPOINTMENT (OUTPATIENT)
Dept: CT IMAGING | Age: 49
End: 2024-08-31
Payer: MEDICARE

## 2024-08-31 ENCOUNTER — HOSPITAL ENCOUNTER (EMERGENCY)
Age: 49
Discharge: HOME | End: 2024-08-31
Payer: MEDICARE

## 2024-08-31 VITALS
SYSTOLIC BLOOD PRESSURE: 140 MMHG | RESPIRATION RATE: 16 BRPM | OXYGEN SATURATION: 98 % | DIASTOLIC BLOOD PRESSURE: 89 MMHG | HEART RATE: 72 BPM | TEMPERATURE: 98 F

## 2024-08-31 DIAGNOSIS — L03.115 CELLULITIS OF RIGHT LOWER EXTREMITY: Primary | ICD-10-CM

## 2024-08-31 LAB
ALBUMIN SERPL-MCNC: 3.3 G/DL (ref 3.5–5.2)
ALP SERPL-CCNC: 56 U/L (ref 35–104)
ALT SERPL-CCNC: 22 U/L (ref 5–33)
ANION GAP SERPL CALCULATED.3IONS-SCNC: 8 MMOL/L (ref 7–19)
AST SERPL-CCNC: 34 U/L (ref 5–32)
BASOPHILS # BLD: 0 K/UL (ref 0–0.2)
BASOPHILS NFR BLD: 0.4 % (ref 0–1)
BILIRUB SERPL-MCNC: 0.2 MG/DL (ref 0.2–1.2)
BUN SERPL-MCNC: 6 MG/DL (ref 6–20)
CALCIUM SERPL-MCNC: 8.7 MG/DL (ref 8.6–10)
CHLORIDE SERPL-SCNC: 90 MMOL/L (ref 98–111)
CO2 SERPL-SCNC: 26 MMOL/L (ref 22–29)
CREAT SERPL-MCNC: 0.7 MG/DL (ref 0.5–0.9)
EOSINOPHIL # BLD: 0 K/UL (ref 0–0.6)
EOSINOPHIL NFR BLD: 0.4 % (ref 0–5)
ERYTHROCYTE [DISTWIDTH] IN BLOOD BY AUTOMATED COUNT: 12.9 % (ref 11.5–14.5)
GLUCOSE SERPL-MCNC: 84 MG/DL (ref 70–99)
HCT VFR BLD AUTO: 34.6 % (ref 37–47)
HGB BLD-MCNC: 11.3 G/DL (ref 12–16)
IMM GRANULOCYTES # BLD: 0 K/UL
LACTATE BLDV-SCNC: 0.8 MMOL/L (ref 0.5–1.9)
LACTATE BLDV-SCNC: 2 MMOL/L (ref 0.5–1.9)
LYMPHOCYTES # BLD: 0.6 K/UL (ref 1.1–4.5)
LYMPHOCYTES NFR BLD: 22.3 % (ref 20–40)
MCH RBC QN AUTO: 29.7 PG (ref 27–31)
MCHC RBC AUTO-ENTMCNC: 32.7 G/DL (ref 33–37)
MCV RBC AUTO: 91.1 FL (ref 81–99)
MONOCYTES # BLD: 0.3 K/UL (ref 0–0.9)
MONOCYTES NFR BLD: 9.2 % (ref 0–10)
NEUTROPHILS # BLD: 1.8 K/UL (ref 1.5–7.5)
NEUTS SEG NFR BLD: 67 % (ref 50–65)
PLATELET # BLD AUTO: 190 K/UL (ref 130–400)
PMV BLD AUTO: 10.1 FL (ref 9.4–12.3)
POTASSIUM SERPL-SCNC: 5 MMOL/L (ref 3.5–5)
PROT SERPL-MCNC: 8.4 G/DL (ref 6.4–8.3)
RBC # BLD AUTO: 3.8 M/UL (ref 4.2–5.4)
REASON FOR REJECTION: NORMAL
REJECTED TEST: NORMAL
SODIUM SERPL-SCNC: 124 MMOL/L (ref 136–145)
WBC # BLD AUTO: 2.7 K/UL (ref 4.8–10.8)

## 2024-08-31 PROCEDURE — 99284 EMERGENCY DEPT VISIT MOD MDM: CPT

## 2024-08-31 PROCEDURE — 36415 COLL VENOUS BLD VENIPUNCTURE: CPT

## 2024-08-31 PROCEDURE — 6360000002 HC RX W HCPCS: Performed by: NURSE PRACTITIONER

## 2024-08-31 PROCEDURE — 96374 THER/PROPH/DIAG INJ IV PUSH: CPT

## 2024-08-31 PROCEDURE — 87040 BLOOD CULTURE FOR BACTERIA: CPT

## 2024-08-31 PROCEDURE — 85025 COMPLETE CBC W/AUTO DIFF WBC: CPT

## 2024-08-31 PROCEDURE — 73700 CT LOWER EXTREMITY W/O DYE: CPT

## 2024-08-31 PROCEDURE — 80053 COMPREHEN METABOLIC PANEL: CPT

## 2024-08-31 PROCEDURE — 2580000003 HC RX 258: Performed by: NURSE PRACTITIONER

## 2024-08-31 PROCEDURE — 83605 ASSAY OF LACTIC ACID: CPT

## 2024-08-31 RX ORDER — 0.9 % SODIUM CHLORIDE 0.9 %
500 INTRAVENOUS SOLUTION INTRAVENOUS ONCE
Status: COMPLETED | OUTPATIENT
Start: 2024-08-31 | End: 2024-08-31

## 2024-08-31 RX ORDER — CEPHALEXIN 500 MG/1
500 CAPSULE ORAL 2 TIMES DAILY
Qty: 14 CAPSULE | Refills: 0 | Status: SHIPPED | OUTPATIENT
Start: 2024-08-31 | End: 2024-09-07

## 2024-08-31 RX ADMIN — SODIUM CHLORIDE 500 ML: 9 INJECTION, SOLUTION INTRAVENOUS at 14:40

## 2024-08-31 RX ADMIN — WATER 1000 MG: 1 INJECTION INTRAMUSCULAR; INTRAVENOUS; SUBCUTANEOUS at 14:40

## 2024-08-31 NOTE — DISCHARGE INSTRUCTIONS
Take all of the antibiotic.  Continue your other current medications.  Follow-up with your doctor for a wound recheck.  Return to the emergency department for worsening symptoms.  Keep the wound clean, dry, and covered.

## 2024-09-01 LAB
BACTERIA BLD CULT ORG #2: NORMAL
BACTERIA BLD CULT: NORMAL

## 2024-09-05 LAB
BACTERIA BLD CULT ORG #2: NORMAL
BACTERIA BLD CULT: NORMAL

## 2024-09-06 ENCOUNTER — HOSPITAL ENCOUNTER (OUTPATIENT)
Dept: INFUSION THERAPY | Age: 49
Setting detail: INFUSION SERIES
Discharge: HOME | End: 2024-09-06
Payer: MEDICARE

## 2024-09-06 VITALS
OXYGEN SATURATION: 100 % | RESPIRATION RATE: 18 BRPM | SYSTOLIC BLOOD PRESSURE: 121 MMHG | HEART RATE: 81 BPM | TEMPERATURE: 97.6 F | DIASTOLIC BLOOD PRESSURE: 80 MMHG | BODY MASS INDEX: 27.96 KG/M2 | WEIGHT: 168 LBS

## 2024-09-06 DIAGNOSIS — G35 EXACERBATION OF MULTIPLE SCLEROSIS (HCC): Primary | ICD-10-CM

## 2024-09-06 DIAGNOSIS — M32.9 LUPUS (HCC): ICD-10-CM

## 2024-09-06 PROCEDURE — 96366 THER/PROPH/DIAG IV INF ADDON: CPT

## 2024-09-06 PROCEDURE — 96375 TX/PRO/DX INJ NEW DRUG ADDON: CPT

## 2024-09-06 PROCEDURE — 6360000002 HC RX W HCPCS: Performed by: PSYCHIATRY & NEUROLOGY

## 2024-09-06 PROCEDURE — 6370000000 HC RX 637 (ALT 250 FOR IP): Performed by: PSYCHIATRY & NEUROLOGY

## 2024-09-06 PROCEDURE — 96365 THER/PROPH/DIAG IV INF INIT: CPT

## 2024-09-06 RX ORDER — SODIUM CHLORIDE 9 MG/ML
INJECTION, SOLUTION INTRAVENOUS CONTINUOUS
Status: CANCELLED | OUTPATIENT
Start: 2024-09-06

## 2024-09-06 RX ORDER — ONDANSETRON 2 MG/ML
8 INJECTION INTRAMUSCULAR; INTRAVENOUS
Status: CANCELLED | OUTPATIENT
Start: 2024-09-06

## 2024-09-06 RX ORDER — SODIUM CHLORIDE 9 MG/ML
5-250 INJECTION, SOLUTION INTRAVENOUS PRN
Status: DISCONTINUED | OUTPATIENT
Start: 2024-09-06 | End: 2024-09-07 | Stop reason: HOSPADM

## 2024-09-06 RX ORDER — HEPARIN 100 UNIT/ML
500 SYRINGE INTRAVENOUS PRN
Status: DISCONTINUED | OUTPATIENT
Start: 2024-09-06 | End: 2024-09-07 | Stop reason: HOSPADM

## 2024-09-06 RX ORDER — DIPHENHYDRAMINE HCL 25 MG
25 TABLET ORAL ONCE
Status: COMPLETED | OUTPATIENT
Start: 2024-09-06 | End: 2024-09-06

## 2024-09-06 RX ORDER — MEPERIDINE HYDROCHLORIDE 25 MG/ML
12.5 INJECTION INTRAMUSCULAR; INTRAVENOUS; SUBCUTANEOUS PRN
Status: CANCELLED | OUTPATIENT
Start: 2024-09-06

## 2024-09-06 RX ORDER — DIPHENHYDRAMINE HYDROCHLORIDE 50 MG/ML
50 INJECTION INTRAMUSCULAR; INTRAVENOUS
Status: CANCELLED | OUTPATIENT
Start: 2024-09-06

## 2024-09-06 RX ORDER — ACETAMINOPHEN 325 MG/1
650 TABLET ORAL
Status: CANCELLED | OUTPATIENT
Start: 2024-09-06

## 2024-09-06 RX ORDER — DIPHENHYDRAMINE HCL 25 MG
25 TABLET ORAL ONCE
Status: CANCELLED | OUTPATIENT
Start: 2024-09-06 | End: 2024-09-06

## 2024-09-06 RX ORDER — ACETAMINOPHEN 325 MG/1
650 TABLET ORAL ONCE
Status: CANCELLED | OUTPATIENT
Start: 2024-09-06 | End: 2024-09-06

## 2024-09-06 RX ORDER — ALBUTEROL SULFATE 90 UG/1
4 AEROSOL, METERED RESPIRATORY (INHALATION) PRN
Status: CANCELLED | OUTPATIENT
Start: 2024-09-06

## 2024-09-06 RX ORDER — EPINEPHRINE 1 MG/ML
0.3 INJECTION, SOLUTION, CONCENTRATE INTRAVENOUS PRN
Status: CANCELLED | OUTPATIENT
Start: 2024-09-06

## 2024-09-06 RX ORDER — SODIUM CHLORIDE 9 MG/ML
5-250 INJECTION, SOLUTION INTRAVENOUS PRN
Status: CANCELLED | OUTPATIENT
Start: 2024-09-06

## 2024-09-06 RX ORDER — ACETAMINOPHEN 325 MG/1
650 TABLET ORAL ONCE
Status: COMPLETED | OUTPATIENT
Start: 2024-09-06 | End: 2024-09-06

## 2024-09-06 RX ORDER — HEPARIN 100 UNIT/ML
500 SYRINGE INTRAVENOUS PRN
Status: CANCELLED | OUTPATIENT
Start: 2024-09-06

## 2024-09-06 RX ORDER — SODIUM CHLORIDE 0.9 % (FLUSH) 0.9 %
5-40 SYRINGE (ML) INJECTION PRN
Status: CANCELLED | OUTPATIENT
Start: 2024-09-06

## 2024-09-06 RX ADMIN — IMMUNE GLOBULIN (HUMAN) 30 G: 10 INJECTION INTRAVENOUS; SUBCUTANEOUS at 07:41

## 2024-09-06 RX ADMIN — HEPARIN 500 UNITS: 100 SYRINGE at 09:12

## 2024-09-06 RX ADMIN — DIPHENHYDRAMINE HYDROCHLORIDE 25 MG: 25 TABLET ORAL at 07:24

## 2024-09-06 RX ADMIN — HYDROCORTISONE SODIUM SUCCINATE 100 MG: 100 INJECTION, POWDER, FOR SOLUTION INTRAMUSCULAR; INTRAVENOUS at 07:24

## 2024-09-06 RX ADMIN — ACETAMINOPHEN 650 MG: 325 TABLET ORAL at 07:24

## 2024-09-09 ENCOUNTER — HOSPITAL ENCOUNTER (OUTPATIENT)
Dept: INFUSION THERAPY | Age: 49
Setting detail: INFUSION SERIES
Discharge: HOME | End: 2024-09-09
Payer: MEDICARE

## 2024-09-09 ENCOUNTER — APPOINTMENT (OUTPATIENT)
Dept: INFUSION THERAPY | Age: 49
End: 2024-09-09
Payer: MEDICARE

## 2024-09-09 VITALS
RESPIRATION RATE: 18 BRPM | SYSTOLIC BLOOD PRESSURE: 118 MMHG | DIASTOLIC BLOOD PRESSURE: 84 MMHG | HEART RATE: 78 BPM | OXYGEN SATURATION: 98 % | TEMPERATURE: 98 F

## 2024-09-09 DIAGNOSIS — G35 EXACERBATION OF MULTIPLE SCLEROSIS (HCC): Primary | ICD-10-CM

## 2024-09-09 DIAGNOSIS — M32.9 LUPUS (HCC): ICD-10-CM

## 2024-09-09 PROCEDURE — 2580000003 HC RX 258: Performed by: PSYCHIATRY & NEUROLOGY

## 2024-09-09 PROCEDURE — 6360000002 HC RX W HCPCS: Performed by: PSYCHIATRY & NEUROLOGY

## 2024-09-09 PROCEDURE — 96375 TX/PRO/DX INJ NEW DRUG ADDON: CPT

## 2024-09-09 PROCEDURE — 96366 THER/PROPH/DIAG IV INF ADDON: CPT

## 2024-09-09 PROCEDURE — 6370000000 HC RX 637 (ALT 250 FOR IP): Performed by: PSYCHIATRY & NEUROLOGY

## 2024-09-09 PROCEDURE — 96365 THER/PROPH/DIAG IV INF INIT: CPT

## 2024-09-09 RX ORDER — DIPHENHYDRAMINE HCL 25 MG
25 TABLET ORAL ONCE
Status: CANCELLED | OUTPATIENT
Start: 2024-09-09 | End: 2024-09-09

## 2024-09-09 RX ORDER — HEPARIN 100 UNIT/ML
500 SYRINGE INTRAVENOUS PRN
Status: CANCELLED | OUTPATIENT
Start: 2024-09-09

## 2024-09-09 RX ORDER — SODIUM CHLORIDE 9 MG/ML
5-250 INJECTION, SOLUTION INTRAVENOUS PRN
Status: CANCELLED | OUTPATIENT
Start: 2024-09-09

## 2024-09-09 RX ORDER — ACETAMINOPHEN 325 MG/1
650 TABLET ORAL
Status: CANCELLED | OUTPATIENT
Start: 2024-09-09

## 2024-09-09 RX ORDER — SODIUM CHLORIDE 9 MG/ML
5-250 INJECTION, SOLUTION INTRAVENOUS PRN
Status: DISCONTINUED | OUTPATIENT
Start: 2024-09-09 | End: 2024-09-10 | Stop reason: HOSPADM

## 2024-09-09 RX ORDER — HEPARIN 100 UNIT/ML
500 SYRINGE INTRAVENOUS PRN
Status: DISCONTINUED | OUTPATIENT
Start: 2024-09-09 | End: 2024-09-10 | Stop reason: HOSPADM

## 2024-09-09 RX ORDER — SODIUM CHLORIDE 0.9 % (FLUSH) 0.9 %
5-40 SYRINGE (ML) INJECTION PRN
Status: DISCONTINUED | OUTPATIENT
Start: 2024-09-09 | End: 2024-09-10 | Stop reason: HOSPADM

## 2024-09-09 RX ORDER — DIPHENHYDRAMINE HCL 25 MG
25 TABLET ORAL ONCE
Status: COMPLETED | OUTPATIENT
Start: 2024-09-09 | End: 2024-09-09

## 2024-09-09 RX ORDER — DIPHENHYDRAMINE HYDROCHLORIDE 50 MG/ML
50 INJECTION INTRAMUSCULAR; INTRAVENOUS
Status: CANCELLED | OUTPATIENT
Start: 2024-09-09

## 2024-09-09 RX ORDER — ACETAMINOPHEN 325 MG/1
650 TABLET ORAL ONCE
Status: COMPLETED | OUTPATIENT
Start: 2024-09-09 | End: 2024-09-09

## 2024-09-09 RX ORDER — SODIUM CHLORIDE 9 MG/ML
INJECTION, SOLUTION INTRAVENOUS CONTINUOUS
Status: CANCELLED | OUTPATIENT
Start: 2024-09-09

## 2024-09-09 RX ORDER — SODIUM CHLORIDE 0.9 % (FLUSH) 0.9 %
5-40 SYRINGE (ML) INJECTION PRN
Status: CANCELLED | OUTPATIENT
Start: 2024-09-09

## 2024-09-09 RX ORDER — ALBUTEROL SULFATE 90 UG/1
4 INHALANT RESPIRATORY (INHALATION) PRN
Status: CANCELLED | OUTPATIENT
Start: 2024-09-09

## 2024-09-09 RX ORDER — ACETAMINOPHEN 325 MG/1
650 TABLET ORAL ONCE
Status: CANCELLED | OUTPATIENT
Start: 2024-09-09 | End: 2024-09-09

## 2024-09-09 RX ORDER — MEPERIDINE HYDROCHLORIDE 25 MG/ML
12.5 INJECTION INTRAMUSCULAR; INTRAVENOUS; SUBCUTANEOUS PRN
Status: CANCELLED | OUTPATIENT
Start: 2024-09-09

## 2024-09-09 RX ORDER — ONDANSETRON 2 MG/ML
8 INJECTION INTRAMUSCULAR; INTRAVENOUS
Status: CANCELLED | OUTPATIENT
Start: 2024-09-09

## 2024-09-09 RX ORDER — EPINEPHRINE 1 MG/ML
0.3 INJECTION, SOLUTION, CONCENTRATE INTRAVENOUS PRN
Status: CANCELLED | OUTPATIENT
Start: 2024-09-09

## 2024-09-09 RX ADMIN — SODIUM CHLORIDE, PRESERVATIVE FREE 20 ML: 5 INJECTION INTRAVENOUS at 10:10

## 2024-09-09 RX ADMIN — ACETAMINOPHEN 650 MG: 325 TABLET ORAL at 08:09

## 2024-09-09 RX ADMIN — DIPHENHYDRAMINE HYDROCHLORIDE 25 MG: 25 TABLET ORAL at 08:09

## 2024-09-09 RX ADMIN — HEPARIN 500 UNITS: 100 SYRINGE at 10:10

## 2024-09-09 RX ADMIN — IMMUNE GLOBULIN (HUMAN) 30 G: 10 INJECTION INTRAVENOUS; SUBCUTANEOUS at 08:31

## 2024-09-09 RX ADMIN — SODIUM CHLORIDE, PRESERVATIVE FREE 10 ML: 5 INJECTION INTRAVENOUS at 08:02

## 2024-09-09 RX ADMIN — HYDROCORTISONE SODIUM SUCCINATE 100 MG: 100 INJECTION, POWDER, FOR SOLUTION INTRAMUSCULAR; INTRAVENOUS at 08:09

## 2024-09-10 ENCOUNTER — APPOINTMENT (OUTPATIENT)
Dept: INFUSION THERAPY | Age: 49
End: 2024-09-10
Payer: MEDICARE

## 2024-09-10 ENCOUNTER — HOSPITAL ENCOUNTER (OUTPATIENT)
Dept: INFUSION THERAPY | Age: 49
Setting detail: INFUSION SERIES
Discharge: HOME | End: 2024-09-10
Payer: MEDICARE

## 2024-09-10 VITALS
DIASTOLIC BLOOD PRESSURE: 96 MMHG | OXYGEN SATURATION: 98 % | HEART RATE: 74 BPM | TEMPERATURE: 98 F | RESPIRATION RATE: 18 BRPM | SYSTOLIC BLOOD PRESSURE: 125 MMHG

## 2024-09-10 DIAGNOSIS — G35 EXACERBATION OF MULTIPLE SCLEROSIS (HCC): Primary | ICD-10-CM

## 2024-09-10 DIAGNOSIS — M32.9 LUPUS (HCC): ICD-10-CM

## 2024-09-10 PROCEDURE — 6360000002 HC RX W HCPCS: Performed by: PSYCHIATRY & NEUROLOGY

## 2024-09-10 PROCEDURE — 96366 THER/PROPH/DIAG IV INF ADDON: CPT

## 2024-09-10 PROCEDURE — 6370000000 HC RX 637 (ALT 250 FOR IP): Performed by: PSYCHIATRY & NEUROLOGY

## 2024-09-10 PROCEDURE — 2580000003 HC RX 258: Performed by: PSYCHIATRY & NEUROLOGY

## 2024-09-10 PROCEDURE — 96365 THER/PROPH/DIAG IV INF INIT: CPT

## 2024-09-10 PROCEDURE — 96375 TX/PRO/DX INJ NEW DRUG ADDON: CPT

## 2024-09-10 RX ORDER — HEPARIN 100 UNIT/ML
500 SYRINGE INTRAVENOUS PRN
Status: DISCONTINUED | OUTPATIENT
Start: 2024-09-10 | End: 2024-09-11 | Stop reason: HOSPADM

## 2024-09-10 RX ORDER — SODIUM CHLORIDE 9 MG/ML
5-250 INJECTION, SOLUTION INTRAVENOUS PRN
Status: CANCELLED | OUTPATIENT
Start: 2024-09-10

## 2024-09-10 RX ORDER — DIPHENHYDRAMINE HCL 25 MG
25 TABLET ORAL ONCE
Status: CANCELLED | OUTPATIENT
Start: 2024-09-10 | End: 2024-09-10

## 2024-09-10 RX ORDER — DIPHENHYDRAMINE HYDROCHLORIDE 50 MG/ML
50 INJECTION INTRAMUSCULAR; INTRAVENOUS
Status: CANCELLED | OUTPATIENT
Start: 2024-09-10

## 2024-09-10 RX ORDER — SODIUM CHLORIDE 0.9 % (FLUSH) 0.9 %
5-40 SYRINGE (ML) INJECTION PRN
Status: DISCONTINUED | OUTPATIENT
Start: 2024-09-10 | End: 2024-09-11 | Stop reason: HOSPADM

## 2024-09-10 RX ORDER — DIPHENHYDRAMINE HCL 25 MG
25 TABLET ORAL ONCE
Status: COMPLETED | OUTPATIENT
Start: 2024-09-10 | End: 2024-09-10

## 2024-09-10 RX ORDER — EPINEPHRINE 1 MG/ML
0.3 INJECTION, SOLUTION, CONCENTRATE INTRAVENOUS PRN
Status: CANCELLED | OUTPATIENT
Start: 2024-09-10

## 2024-09-10 RX ORDER — SODIUM CHLORIDE 9 MG/ML
INJECTION, SOLUTION INTRAVENOUS CONTINUOUS
Status: CANCELLED | OUTPATIENT
Start: 2024-09-10

## 2024-09-10 RX ORDER — ACETAMINOPHEN 325 MG/1
650 TABLET ORAL ONCE
Status: COMPLETED | OUTPATIENT
Start: 2024-09-10 | End: 2024-09-10

## 2024-09-10 RX ORDER — ACETAMINOPHEN 325 MG/1
650 TABLET ORAL ONCE
Status: CANCELLED | OUTPATIENT
Start: 2024-09-10 | End: 2024-09-10

## 2024-09-10 RX ORDER — SODIUM CHLORIDE 0.9 % (FLUSH) 0.9 %
5-40 SYRINGE (ML) INJECTION PRN
Status: CANCELLED | OUTPATIENT
Start: 2024-09-10

## 2024-09-10 RX ORDER — MEPERIDINE HYDROCHLORIDE 25 MG/ML
12.5 INJECTION INTRAMUSCULAR; INTRAVENOUS; SUBCUTANEOUS PRN
Status: CANCELLED | OUTPATIENT
Start: 2024-09-10

## 2024-09-10 RX ORDER — ACETAMINOPHEN 325 MG/1
650 TABLET ORAL
Status: CANCELLED | OUTPATIENT
Start: 2024-09-10

## 2024-09-10 RX ORDER — HEPARIN 100 UNIT/ML
500 SYRINGE INTRAVENOUS PRN
Status: CANCELLED | OUTPATIENT
Start: 2024-09-10

## 2024-09-10 RX ORDER — ONDANSETRON 2 MG/ML
8 INJECTION INTRAMUSCULAR; INTRAVENOUS
Status: CANCELLED | OUTPATIENT
Start: 2024-09-10

## 2024-09-10 RX ORDER — ALBUTEROL SULFATE 90 UG/1
4 INHALANT RESPIRATORY (INHALATION) PRN
Status: CANCELLED | OUTPATIENT
Start: 2024-09-10

## 2024-09-10 RX ADMIN — IMMUNE GLOBULIN (HUMAN) 30 G: 10 INJECTION INTRAVENOUS; SUBCUTANEOUS at 13:44

## 2024-09-10 RX ADMIN — HYDROCORTISONE SODIUM SUCCINATE 100 MG: 100 INJECTION, POWDER, FOR SOLUTION INTRAMUSCULAR; INTRAVENOUS at 13:24

## 2024-09-10 RX ADMIN — DIPHENHYDRAMINE HYDROCHLORIDE 25 MG: 25 TABLET ORAL at 13:24

## 2024-09-10 RX ADMIN — SODIUM CHLORIDE, PRESERVATIVE FREE 20 ML: 5 INJECTION INTRAVENOUS at 15:20

## 2024-09-10 RX ADMIN — HEPARIN 500 UNITS: 100 SYRINGE at 15:20

## 2024-09-10 RX ADMIN — ACETAMINOPHEN 650 MG: 325 TABLET ORAL at 13:24

## 2024-09-11 ENCOUNTER — HOSPITAL ENCOUNTER (OUTPATIENT)
Dept: INFUSION THERAPY | Age: 49
Setting detail: INFUSION SERIES
Discharge: HOME | End: 2024-09-11
Payer: MEDICARE

## 2024-09-11 ENCOUNTER — APPOINTMENT (OUTPATIENT)
Dept: INFUSION THERAPY | Age: 49
End: 2024-09-11
Payer: MEDICARE

## 2024-09-11 VITALS
OXYGEN SATURATION: 100 % | DIASTOLIC BLOOD PRESSURE: 73 MMHG | HEART RATE: 74 BPM | SYSTOLIC BLOOD PRESSURE: 107 MMHG | RESPIRATION RATE: 18 BRPM | TEMPERATURE: 98.1 F

## 2024-09-11 DIAGNOSIS — M32.9 LUPUS (HCC): ICD-10-CM

## 2024-09-11 DIAGNOSIS — G35 EXACERBATION OF MULTIPLE SCLEROSIS (HCC): Primary | ICD-10-CM

## 2024-09-11 PROCEDURE — 6370000000 HC RX 637 (ALT 250 FOR IP): Performed by: PSYCHIATRY & NEUROLOGY

## 2024-09-11 PROCEDURE — 96375 TX/PRO/DX INJ NEW DRUG ADDON: CPT

## 2024-09-11 PROCEDURE — 96366 THER/PROPH/DIAG IV INF ADDON: CPT

## 2024-09-11 PROCEDURE — 2580000003 HC RX 258: Performed by: PSYCHIATRY & NEUROLOGY

## 2024-09-11 PROCEDURE — 6360000002 HC RX W HCPCS: Performed by: PSYCHIATRY & NEUROLOGY

## 2024-09-11 PROCEDURE — 96365 THER/PROPH/DIAG IV INF INIT: CPT

## 2024-09-11 RX ORDER — SODIUM CHLORIDE 9 MG/ML
INJECTION, SOLUTION INTRAVENOUS CONTINUOUS
Status: CANCELLED | OUTPATIENT
Start: 2024-09-11

## 2024-09-11 RX ORDER — EPINEPHRINE 1 MG/ML
0.3 INJECTION, SOLUTION, CONCENTRATE INTRAVENOUS PRN
Status: CANCELLED | OUTPATIENT
Start: 2024-09-11

## 2024-09-11 RX ORDER — HEPARIN 100 UNIT/ML
500 SYRINGE INTRAVENOUS PRN
Status: DISCONTINUED | OUTPATIENT
Start: 2024-09-11 | End: 2024-09-12 | Stop reason: HOSPADM

## 2024-09-11 RX ORDER — DIPHENHYDRAMINE HYDROCHLORIDE 50 MG/ML
50 INJECTION INTRAMUSCULAR; INTRAVENOUS
Status: CANCELLED | OUTPATIENT
Start: 2024-09-11

## 2024-09-11 RX ORDER — HEPARIN 100 UNIT/ML
500 SYRINGE INTRAVENOUS PRN
Status: CANCELLED | OUTPATIENT
Start: 2024-09-11

## 2024-09-11 RX ORDER — DIPHENHYDRAMINE HCL 25 MG
25 TABLET ORAL ONCE
Status: COMPLETED | OUTPATIENT
Start: 2024-09-11 | End: 2024-09-11

## 2024-09-11 RX ORDER — SODIUM CHLORIDE 0.9 % (FLUSH) 0.9 %
5-40 SYRINGE (ML) INJECTION PRN
Status: DISCONTINUED | OUTPATIENT
Start: 2024-09-11 | End: 2024-09-12 | Stop reason: HOSPADM

## 2024-09-11 RX ORDER — ALBUTEROL SULFATE 90 UG/1
4 INHALANT RESPIRATORY (INHALATION) PRN
Status: CANCELLED | OUTPATIENT
Start: 2024-09-11

## 2024-09-11 RX ORDER — SODIUM CHLORIDE 0.9 % (FLUSH) 0.9 %
5-40 SYRINGE (ML) INJECTION PRN
Status: CANCELLED | OUTPATIENT
Start: 2024-09-11

## 2024-09-11 RX ORDER — ACETAMINOPHEN 325 MG/1
650 TABLET ORAL
Status: CANCELLED | OUTPATIENT
Start: 2024-09-11

## 2024-09-11 RX ORDER — ACETAMINOPHEN 325 MG/1
650 TABLET ORAL ONCE
Status: COMPLETED | OUTPATIENT
Start: 2024-09-11 | End: 2024-09-11

## 2024-09-11 RX ORDER — SODIUM CHLORIDE 9 MG/ML
5-250 INJECTION, SOLUTION INTRAVENOUS PRN
Status: CANCELLED | OUTPATIENT
Start: 2024-09-11

## 2024-09-11 RX ORDER — SODIUM CHLORIDE 9 MG/ML
5-250 INJECTION, SOLUTION INTRAVENOUS PRN
Status: DISCONTINUED | OUTPATIENT
Start: 2024-09-11 | End: 2024-09-12 | Stop reason: HOSPADM

## 2024-09-11 RX ORDER — ACETAMINOPHEN 325 MG/1
650 TABLET ORAL ONCE
Status: CANCELLED | OUTPATIENT
Start: 2024-09-11 | End: 2024-09-11

## 2024-09-11 RX ORDER — MEPERIDINE HYDROCHLORIDE 25 MG/ML
12.5 INJECTION INTRAMUSCULAR; INTRAVENOUS; SUBCUTANEOUS PRN
Status: CANCELLED | OUTPATIENT
Start: 2024-09-11

## 2024-09-11 RX ORDER — DIPHENHYDRAMINE HCL 25 MG
25 TABLET ORAL ONCE
Status: CANCELLED | OUTPATIENT
Start: 2024-09-11 | End: 2024-09-11

## 2024-09-11 RX ORDER — ONDANSETRON 2 MG/ML
8 INJECTION INTRAMUSCULAR; INTRAVENOUS
Status: CANCELLED | OUTPATIENT
Start: 2024-09-11

## 2024-09-11 RX ADMIN — SODIUM CHLORIDE, PRESERVATIVE FREE 10 ML: 5 INJECTION INTRAVENOUS at 07:16

## 2024-09-11 RX ADMIN — HEPARIN 500 UNITS: 100 SYRINGE at 09:12

## 2024-09-11 RX ADMIN — SODIUM CHLORIDE, PRESERVATIVE FREE 10 ML: 5 INJECTION INTRAVENOUS at 09:12

## 2024-09-11 RX ADMIN — SODIUM CHLORIDE, PRESERVATIVE FREE 10 ML: 5 INJECTION INTRAVENOUS at 07:21

## 2024-09-11 RX ADMIN — IMMUNE GLOBULIN (HUMAN) 30 G: 10 INJECTION INTRAVENOUS; SUBCUTANEOUS at 07:37

## 2024-09-11 RX ADMIN — DIPHENHYDRAMINE HYDROCHLORIDE 25 MG: 25 TABLET ORAL at 07:20

## 2024-09-11 RX ADMIN — ACETAMINOPHEN 650 MG: 325 TABLET ORAL at 07:20

## 2024-09-11 RX ADMIN — HYDROCORTISONE SODIUM SUCCINATE 100 MG: 100 INJECTION, POWDER, FOR SOLUTION INTRAMUSCULAR; INTRAVENOUS at 07:21

## 2024-09-12 ENCOUNTER — APPOINTMENT (OUTPATIENT)
Dept: INFUSION THERAPY | Age: 49
End: 2024-09-12
Payer: MEDICARE

## 2024-09-12 ENCOUNTER — HOSPITAL ENCOUNTER (OUTPATIENT)
Dept: INFUSION THERAPY | Age: 49
Setting detail: INFUSION SERIES
Discharge: HOME | End: 2024-09-12
Payer: MEDICARE

## 2024-09-12 VITALS
OXYGEN SATURATION: 100 % | TEMPERATURE: 97.6 F | HEART RATE: 78 BPM | DIASTOLIC BLOOD PRESSURE: 90 MMHG | RESPIRATION RATE: 18 BRPM | SYSTOLIC BLOOD PRESSURE: 127 MMHG

## 2024-09-12 DIAGNOSIS — G35 EXACERBATION OF MULTIPLE SCLEROSIS (HCC): Primary | ICD-10-CM

## 2024-09-12 DIAGNOSIS — M32.9 LUPUS (HCC): ICD-10-CM

## 2024-09-12 PROCEDURE — 96375 TX/PRO/DX INJ NEW DRUG ADDON: CPT

## 2024-09-12 PROCEDURE — 6370000000 HC RX 637 (ALT 250 FOR IP): Performed by: PSYCHIATRY & NEUROLOGY

## 2024-09-12 PROCEDURE — 96365 THER/PROPH/DIAG IV INF INIT: CPT

## 2024-09-12 PROCEDURE — 6360000002 HC RX W HCPCS: Performed by: PSYCHIATRY & NEUROLOGY

## 2024-09-12 PROCEDURE — 96366 THER/PROPH/DIAG IV INF ADDON: CPT

## 2024-09-12 RX ORDER — DIPHENHYDRAMINE HYDROCHLORIDE 50 MG/ML
50 INJECTION INTRAMUSCULAR; INTRAVENOUS
Status: CANCELLED | OUTPATIENT
Start: 2024-09-12

## 2024-09-12 RX ORDER — SODIUM CHLORIDE 9 MG/ML
INJECTION, SOLUTION INTRAVENOUS CONTINUOUS
Status: CANCELLED | OUTPATIENT
Start: 2024-09-12

## 2024-09-12 RX ORDER — ACETAMINOPHEN 325 MG/1
650 TABLET ORAL
Status: CANCELLED | OUTPATIENT
Start: 2024-09-12

## 2024-09-12 RX ORDER — HEPARIN 100 UNIT/ML
500 SYRINGE INTRAVENOUS PRN
Status: CANCELLED | OUTPATIENT
Start: 2024-09-12

## 2024-09-12 RX ORDER — DIPHENHYDRAMINE HCL 25 MG
25 TABLET ORAL ONCE
Status: CANCELLED | OUTPATIENT
Start: 2024-09-12 | End: 2024-09-12

## 2024-09-12 RX ORDER — SODIUM CHLORIDE 9 MG/ML
5-250 INJECTION, SOLUTION INTRAVENOUS PRN
Status: CANCELLED | OUTPATIENT
Start: 2024-09-12

## 2024-09-12 RX ORDER — MEPERIDINE HYDROCHLORIDE 25 MG/ML
12.5 INJECTION INTRAMUSCULAR; INTRAVENOUS; SUBCUTANEOUS PRN
Status: CANCELLED | OUTPATIENT
Start: 2024-09-12

## 2024-09-12 RX ORDER — ONDANSETRON 2 MG/ML
8 INJECTION INTRAMUSCULAR; INTRAVENOUS
Status: CANCELLED | OUTPATIENT
Start: 2024-09-12

## 2024-09-12 RX ORDER — SODIUM CHLORIDE 9 MG/ML
5-250 INJECTION, SOLUTION INTRAVENOUS PRN
Status: DISCONTINUED | OUTPATIENT
Start: 2024-09-12 | End: 2024-09-13 | Stop reason: HOSPADM

## 2024-09-12 RX ORDER — EPINEPHRINE 1 MG/ML
0.3 INJECTION, SOLUTION, CONCENTRATE INTRAVENOUS PRN
Status: CANCELLED | OUTPATIENT
Start: 2024-09-12

## 2024-09-12 RX ORDER — ACETAMINOPHEN 325 MG/1
650 TABLET ORAL ONCE
Status: COMPLETED | OUTPATIENT
Start: 2024-09-12 | End: 2024-09-12

## 2024-09-12 RX ORDER — ACETAMINOPHEN 325 MG/1
650 TABLET ORAL ONCE
Status: CANCELLED | OUTPATIENT
Start: 2024-09-12 | End: 2024-09-12

## 2024-09-12 RX ORDER — SODIUM CHLORIDE 0.9 % (FLUSH) 0.9 %
5-40 SYRINGE (ML) INJECTION PRN
Status: CANCELLED | OUTPATIENT
Start: 2024-09-12

## 2024-09-12 RX ORDER — DIPHENHYDRAMINE HCL 25 MG
25 TABLET ORAL ONCE
Status: COMPLETED | OUTPATIENT
Start: 2024-09-12 | End: 2024-09-12

## 2024-09-12 RX ORDER — HEPARIN 100 UNIT/ML
500 SYRINGE INTRAVENOUS PRN
Status: DISCONTINUED | OUTPATIENT
Start: 2024-09-12 | End: 2024-09-13 | Stop reason: HOSPADM

## 2024-09-12 RX ORDER — ALBUTEROL SULFATE 90 UG/1
4 INHALANT RESPIRATORY (INHALATION) PRN
Status: CANCELLED | OUTPATIENT
Start: 2024-09-12

## 2024-09-12 RX ADMIN — DIPHENHYDRAMINE HYDROCHLORIDE 25 MG: 25 TABLET ORAL at 10:11

## 2024-09-12 RX ADMIN — IMMUNE GLOBULIN (HUMAN) 30 G: 10 INJECTION INTRAVENOUS; SUBCUTANEOUS at 10:39

## 2024-09-12 RX ADMIN — HYDROCORTISONE SODIUM SUCCINATE 100 MG: 100 INJECTION, POWDER, FOR SOLUTION INTRAMUSCULAR; INTRAVENOUS at 10:11

## 2024-09-12 RX ADMIN — ACETAMINOPHEN 650 MG: 325 TABLET ORAL at 10:11

## 2024-09-13 ENCOUNTER — APPOINTMENT (OUTPATIENT)
Dept: INFUSION THERAPY | Age: 49
End: 2024-09-13
Payer: MEDICARE

## 2024-09-13 ENCOUNTER — HOSPITAL ENCOUNTER (OUTPATIENT)
Dept: INFUSION THERAPY | Age: 49
Setting detail: INFUSION SERIES
Discharge: HOME | End: 2024-09-13
Payer: MEDICARE

## 2024-09-13 VITALS
HEART RATE: 80 BPM | RESPIRATION RATE: 18 BRPM | TEMPERATURE: 98 F | SYSTOLIC BLOOD PRESSURE: 141 MMHG | DIASTOLIC BLOOD PRESSURE: 96 MMHG | OXYGEN SATURATION: 99 %

## 2024-09-13 DIAGNOSIS — M32.9 LUPUS (HCC): ICD-10-CM

## 2024-09-13 DIAGNOSIS — G35 EXACERBATION OF MULTIPLE SCLEROSIS (HCC): Primary | ICD-10-CM

## 2024-09-13 PROCEDURE — 96366 THER/PROPH/DIAG IV INF ADDON: CPT

## 2024-09-13 PROCEDURE — 6360000002 HC RX W HCPCS: Performed by: PSYCHIATRY & NEUROLOGY

## 2024-09-13 PROCEDURE — 6370000000 HC RX 637 (ALT 250 FOR IP): Performed by: PSYCHIATRY & NEUROLOGY

## 2024-09-13 PROCEDURE — 96375 TX/PRO/DX INJ NEW DRUG ADDON: CPT

## 2024-09-13 PROCEDURE — 96365 THER/PROPH/DIAG IV INF INIT: CPT

## 2024-09-13 RX ORDER — ONDANSETRON 2 MG/ML
8 INJECTION INTRAMUSCULAR; INTRAVENOUS
OUTPATIENT
Start: 2024-09-13

## 2024-09-13 RX ORDER — ACETAMINOPHEN 325 MG/1
650 TABLET ORAL ONCE
Status: CANCELLED | OUTPATIENT
Start: 2024-09-13 | End: 2024-09-13

## 2024-09-13 RX ORDER — DIPHENHYDRAMINE HCL 25 MG
25 TABLET ORAL ONCE
Status: CANCELLED | OUTPATIENT
Start: 2024-09-13 | End: 2024-09-13

## 2024-09-13 RX ORDER — HEPARIN 100 UNIT/ML
500 SYRINGE INTRAVENOUS PRN
Status: DISCONTINUED | OUTPATIENT
Start: 2024-09-13 | End: 2024-09-14 | Stop reason: HOSPADM

## 2024-09-13 RX ORDER — SODIUM CHLORIDE 0.9 % (FLUSH) 0.9 %
5-40 SYRINGE (ML) INJECTION PRN
OUTPATIENT
Start: 2024-09-13

## 2024-09-13 RX ORDER — SODIUM CHLORIDE 9 MG/ML
5-250 INJECTION, SOLUTION INTRAVENOUS PRN
OUTPATIENT
Start: 2024-09-13

## 2024-09-13 RX ORDER — SODIUM CHLORIDE 9 MG/ML
5-250 INJECTION, SOLUTION INTRAVENOUS PRN
Status: DISCONTINUED | OUTPATIENT
Start: 2024-09-13 | End: 2024-09-14 | Stop reason: HOSPADM

## 2024-09-13 RX ORDER — SODIUM CHLORIDE 9 MG/ML
5-250 INJECTION, SOLUTION INTRAVENOUS PRN
Status: CANCELLED | OUTPATIENT
Start: 2024-09-13

## 2024-09-13 RX ORDER — ACETAMINOPHEN 325 MG/1
650 TABLET ORAL ONCE
Status: COMPLETED | OUTPATIENT
Start: 2024-09-13 | End: 2024-09-13

## 2024-09-13 RX ORDER — EPINEPHRINE 1 MG/ML
0.3 INJECTION, SOLUTION, CONCENTRATE INTRAVENOUS PRN
OUTPATIENT
Start: 2024-09-13

## 2024-09-13 RX ORDER — DIPHENHYDRAMINE HYDROCHLORIDE 50 MG/ML
50 INJECTION INTRAMUSCULAR; INTRAVENOUS
OUTPATIENT
Start: 2024-09-13

## 2024-09-13 RX ORDER — MEPERIDINE HYDROCHLORIDE 25 MG/ML
12.5 INJECTION INTRAMUSCULAR; INTRAVENOUS; SUBCUTANEOUS PRN
OUTPATIENT
Start: 2024-09-13

## 2024-09-13 RX ORDER — ALBUTEROL SULFATE 90 UG/1
4 INHALANT RESPIRATORY (INHALATION) PRN
OUTPATIENT
Start: 2024-09-13

## 2024-09-13 RX ORDER — DIPHENHYDRAMINE HCL 25 MG
25 TABLET ORAL ONCE
Status: COMPLETED | OUTPATIENT
Start: 2024-09-13 | End: 2024-09-13

## 2024-09-13 RX ORDER — SODIUM CHLORIDE 9 MG/ML
INJECTION, SOLUTION INTRAVENOUS CONTINUOUS
OUTPATIENT
Start: 2024-09-13

## 2024-09-13 RX ORDER — HEPARIN 100 UNIT/ML
500 SYRINGE INTRAVENOUS PRN
Status: CANCELLED | OUTPATIENT
Start: 2024-09-13

## 2024-09-13 RX ORDER — ACETAMINOPHEN 325 MG/1
650 TABLET ORAL
OUTPATIENT
Start: 2024-09-13

## 2024-09-13 RX ADMIN — IMMUNE GLOBULIN (HUMAN) 30 G: 10 INJECTION INTRAVENOUS; SUBCUTANEOUS at 08:21

## 2024-09-13 RX ADMIN — DIPHENHYDRAMINE HYDROCHLORIDE 25 MG: 25 TABLET ORAL at 07:29

## 2024-09-13 RX ADMIN — HEPARIN 500 UNITS: 100 SYRINGE at 10:04

## 2024-09-13 RX ADMIN — ACETAMINOPHEN 650 MG: 325 TABLET ORAL at 07:29

## 2024-09-13 RX ADMIN — HYDROCORTISONE SODIUM SUCCINATE 100 MG: 100 INJECTION, POWDER, FOR SOLUTION INTRAMUSCULAR; INTRAVENOUS at 07:29

## 2024-09-18 RX ORDER — AMITRIPTYLINE HYDROCHLORIDE 100 MG/1
100 TABLET ORAL NIGHTLY
Qty: 30 TABLET | Refills: 5 | Status: SHIPPED | OUTPATIENT
Start: 2024-09-18 | End: 2024-09-20 | Stop reason: SDUPTHER

## 2024-09-20 ENCOUNTER — HOSPITAL ENCOUNTER (OUTPATIENT)
Dept: INFUSION THERAPY | Age: 49
Setting detail: INFUSION SERIES
Discharge: HOME | End: 2024-09-20
Payer: MEDICARE

## 2024-09-20 VITALS
WEIGHT: 170 LBS | DIASTOLIC BLOOD PRESSURE: 98 MMHG | OXYGEN SATURATION: 100 % | SYSTOLIC BLOOD PRESSURE: 134 MMHG | BODY MASS INDEX: 28.29 KG/M2 | HEART RATE: 80 BPM | RESPIRATION RATE: 17 BRPM | TEMPERATURE: 97.2 F

## 2024-09-20 DIAGNOSIS — M32.9 LUPUS: ICD-10-CM

## 2024-09-20 DIAGNOSIS — G35 EXACERBATION OF MULTIPLE SCLEROSIS (HCC): Primary | ICD-10-CM

## 2024-09-20 PROCEDURE — 6370000000 HC RX 637 (ALT 250 FOR IP): Performed by: PSYCHIATRY & NEUROLOGY

## 2024-09-20 PROCEDURE — 6360000002 HC RX W HCPCS

## 2024-09-20 PROCEDURE — 6360000002 HC RX W HCPCS: Performed by: PSYCHIATRY & NEUROLOGY

## 2024-09-20 PROCEDURE — 96365 THER/PROPH/DIAG IV INF INIT: CPT

## 2024-09-20 PROCEDURE — 96366 THER/PROPH/DIAG IV INF ADDON: CPT

## 2024-09-20 PROCEDURE — 96375 TX/PRO/DX INJ NEW DRUG ADDON: CPT

## 2024-09-20 RX ORDER — ACETAMINOPHEN 325 MG/1
650 TABLET ORAL
OUTPATIENT
Start: 2024-09-20

## 2024-09-20 RX ORDER — EPINEPHRINE 1 MG/ML
0.3 INJECTION, SOLUTION, CONCENTRATE INTRAVENOUS PRN
OUTPATIENT
Start: 2024-09-20

## 2024-09-20 RX ORDER — SODIUM CHLORIDE 0.9 % (FLUSH) 0.9 %
5-40 SYRINGE (ML) INJECTION PRN
OUTPATIENT
Start: 2024-09-20

## 2024-09-20 RX ORDER — SODIUM CHLORIDE 9 MG/ML
INJECTION, SOLUTION INTRAVENOUS CONTINUOUS
OUTPATIENT
Start: 2024-09-20

## 2024-09-20 RX ORDER — DIPHENHYDRAMINE HCL 25 MG
25 TABLET ORAL ONCE
Status: COMPLETED | OUTPATIENT
Start: 2024-09-20 | End: 2024-09-20

## 2024-09-20 RX ORDER — MEPERIDINE HYDROCHLORIDE 25 MG/ML
12.5 INJECTION INTRAMUSCULAR; INTRAVENOUS; SUBCUTANEOUS PRN
OUTPATIENT
Start: 2024-09-20

## 2024-09-20 RX ORDER — ALBUTEROL SULFATE 90 UG/1
4 INHALANT RESPIRATORY (INHALATION) PRN
OUTPATIENT
Start: 2024-09-20

## 2024-09-20 RX ORDER — HEPARIN 100 UNIT/ML
500 SYRINGE INTRAVENOUS PRN
OUTPATIENT
Start: 2024-09-20

## 2024-09-20 RX ORDER — HEPARIN 100 UNIT/ML
SYRINGE INTRAVENOUS
Status: COMPLETED
Start: 2024-09-20 | End: 2024-09-20

## 2024-09-20 RX ORDER — ACETAMINOPHEN 325 MG/1
650 TABLET ORAL ONCE
Status: COMPLETED | OUTPATIENT
Start: 2024-09-20 | End: 2024-09-20

## 2024-09-20 RX ORDER — DIPHENHYDRAMINE HYDROCHLORIDE 50 MG/ML
50 INJECTION INTRAMUSCULAR; INTRAVENOUS
OUTPATIENT
Start: 2024-09-20

## 2024-09-20 RX ORDER — AMITRIPTYLINE HYDROCHLORIDE 100 MG/1
100 TABLET ORAL NIGHTLY
Qty: 30 TABLET | Refills: 5 | Status: SHIPPED | OUTPATIENT
Start: 2024-09-20

## 2024-09-20 RX ORDER — DIPHENHYDRAMINE HCL 25 MG
25 TABLET ORAL ONCE
OUTPATIENT
Start: 2024-09-20 | End: 2024-09-20

## 2024-09-20 RX ORDER — SODIUM CHLORIDE 9 MG/ML
5-250 INJECTION, SOLUTION INTRAVENOUS PRN
Status: DISCONTINUED | OUTPATIENT
Start: 2024-09-20 | End: 2024-09-21 | Stop reason: HOSPADM

## 2024-09-20 RX ORDER — HEPARIN 100 UNIT/ML
500 SYRINGE INTRAVENOUS PRN
Status: DISCONTINUED | OUTPATIENT
Start: 2024-09-20 | End: 2024-09-21 | Stop reason: HOSPADM

## 2024-09-20 RX ORDER — ONDANSETRON 2 MG/ML
8 INJECTION INTRAMUSCULAR; INTRAVENOUS
OUTPATIENT
Start: 2024-09-20

## 2024-09-20 RX ORDER — SODIUM CHLORIDE 9 MG/ML
5-250 INJECTION, SOLUTION INTRAVENOUS PRN
OUTPATIENT
Start: 2024-09-20

## 2024-09-20 RX ORDER — ACETAMINOPHEN 325 MG/1
650 TABLET ORAL ONCE
OUTPATIENT
Start: 2024-09-20 | End: 2024-09-20

## 2024-09-20 RX ADMIN — DIPHENHYDRAMINE HYDROCHLORIDE 25 MG: 25 TABLET ORAL at 07:42

## 2024-09-20 RX ADMIN — ACETAMINOPHEN 650 MG: 325 TABLET ORAL at 07:42

## 2024-09-20 RX ADMIN — HEPARIN 500 UNITS: 100 SYRINGE at 10:04

## 2024-09-20 RX ADMIN — IMMUNE GLOBULIN (HUMAN) 30 G: 10 INJECTION INTRAVENOUS; SUBCUTANEOUS at 08:14

## 2024-09-20 RX ADMIN — HYDROCORTISONE SODIUM SUCCINATE 100 MG: 100 INJECTION, POWDER, FOR SOLUTION INTRAMUSCULAR; INTRAVENOUS at 07:42

## 2024-09-23 DIAGNOSIS — G35 MULTIPLE SCLEROSIS (HCC): ICD-10-CM

## 2024-09-23 DIAGNOSIS — M54.30 SCIATICA, UNSPECIFIED LATERALITY: ICD-10-CM

## 2024-09-23 DIAGNOSIS — M79.7 FIBROMYALGIA: ICD-10-CM

## 2024-09-23 DIAGNOSIS — G43.719 INTRACTABLE CHRONIC MIGRAINE WITHOUT AURA AND WITHOUT STATUS MIGRAINOSUS: ICD-10-CM

## 2024-09-23 RX ORDER — OXYCODONE HYDROCHLORIDE 10 MG/1
10 TABLET ORAL EVERY 6 HOURS PRN
Qty: 120 TABLET | Refills: 0 | Status: SHIPPED | OUTPATIENT
Start: 2024-09-23 | End: 2024-10-23

## 2024-09-27 ENCOUNTER — HOSPITAL ENCOUNTER (OUTPATIENT)
Dept: INFUSION THERAPY | Age: 49
Setting detail: INFUSION SERIES
Discharge: HOME | End: 2024-09-27
Payer: MEDICARE

## 2024-09-27 VITALS
TEMPERATURE: 97 F | BODY MASS INDEX: 26.96 KG/M2 | WEIGHT: 162 LBS | RESPIRATION RATE: 18 BRPM | OXYGEN SATURATION: 100 % | DIASTOLIC BLOOD PRESSURE: 90 MMHG | HEART RATE: 74 BPM | SYSTOLIC BLOOD PRESSURE: 120 MMHG

## 2024-09-27 DIAGNOSIS — G35 MULTIPLE SCLEROSIS (HCC): Primary | ICD-10-CM

## 2024-09-27 PROCEDURE — 96366 THER/PROPH/DIAG IV INF ADDON: CPT

## 2024-09-27 PROCEDURE — 96365 THER/PROPH/DIAG IV INF INIT: CPT

## 2024-09-27 PROCEDURE — 2580000003 HC RX 258: Performed by: PSYCHIATRY & NEUROLOGY

## 2024-09-27 PROCEDURE — 6370000000 HC RX 637 (ALT 250 FOR IP): Performed by: PSYCHIATRY & NEUROLOGY

## 2024-09-27 PROCEDURE — 6360000002 HC RX W HCPCS: Performed by: PSYCHIATRY & NEUROLOGY

## 2024-09-27 RX ORDER — ACETAMINOPHEN 325 MG/1
650 TABLET ORAL ONCE
Status: COMPLETED | OUTPATIENT
Start: 2024-09-27 | End: 2024-09-27

## 2024-09-27 RX ORDER — ONDANSETRON 2 MG/ML
8 INJECTION INTRAMUSCULAR; INTRAVENOUS
OUTPATIENT
Start: 2024-09-27

## 2024-09-27 RX ORDER — DIPHENHYDRAMINE HYDROCHLORIDE 50 MG/ML
50 INJECTION INTRAMUSCULAR; INTRAVENOUS
OUTPATIENT
Start: 2024-09-27

## 2024-09-27 RX ORDER — SODIUM CHLORIDE 0.9 % (FLUSH) 0.9 %
5-40 SYRINGE (ML) INJECTION PRN
OUTPATIENT
Start: 2024-09-27

## 2024-09-27 RX ORDER — ALBUTEROL SULFATE 90 UG/1
4 INHALANT RESPIRATORY (INHALATION) PRN
OUTPATIENT
Start: 2024-09-27

## 2024-09-27 RX ORDER — MEPERIDINE HYDROCHLORIDE 25 MG/ML
12.5 INJECTION INTRAMUSCULAR; INTRAVENOUS; SUBCUTANEOUS PRN
OUTPATIENT
Start: 2024-10-25

## 2024-09-27 RX ORDER — DIPHENHYDRAMINE HYDROCHLORIDE 50 MG/ML
50 INJECTION INTRAMUSCULAR; INTRAVENOUS
OUTPATIENT
Start: 2024-10-25

## 2024-09-27 RX ORDER — ACETAMINOPHEN 325 MG/1
650 TABLET ORAL
OUTPATIENT
Start: 2024-09-27

## 2024-09-27 RX ORDER — EPINEPHRINE 1 MG/ML
0.3 INJECTION, SOLUTION, CONCENTRATE INTRAVENOUS PRN
OUTPATIENT
Start: 2024-09-27

## 2024-09-27 RX ORDER — MEPERIDINE HYDROCHLORIDE 25 MG/ML
12.5 INJECTION INTRAMUSCULAR; INTRAVENOUS; SUBCUTANEOUS PRN
OUTPATIENT
Start: 2024-09-27

## 2024-09-27 RX ORDER — EPINEPHRINE 1 MG/ML
0.3 INJECTION, SOLUTION, CONCENTRATE INTRAVENOUS PRN
OUTPATIENT
Start: 2024-10-25

## 2024-09-27 RX ORDER — SODIUM CHLORIDE 0.9 % (FLUSH) 0.9 %
5-40 SYRINGE (ML) INJECTION PRN
OUTPATIENT
Start: 2024-10-25

## 2024-09-27 RX ORDER — SODIUM CHLORIDE 9 MG/ML
5-250 INJECTION, SOLUTION INTRAVENOUS PRN
OUTPATIENT
Start: 2024-09-27

## 2024-09-27 RX ORDER — HEPARIN 100 UNIT/ML
500 SYRINGE INTRAVENOUS PRN
OUTPATIENT
Start: 2024-09-27

## 2024-09-27 RX ORDER — HEPARIN 100 UNIT/ML
500 SYRINGE INTRAVENOUS PRN
Status: DISCONTINUED | OUTPATIENT
Start: 2024-09-27 | End: 2024-09-28 | Stop reason: HOSPADM

## 2024-09-27 RX ORDER — SODIUM CHLORIDE 9 MG/ML
INJECTION, SOLUTION INTRAVENOUS CONTINUOUS
OUTPATIENT
Start: 2024-10-25

## 2024-09-27 RX ORDER — DIPHENHYDRAMINE HCL 25 MG
25 TABLET ORAL ONCE
OUTPATIENT
Start: 2024-10-25 | End: 2024-10-25

## 2024-09-27 RX ORDER — ONDANSETRON 2 MG/ML
8 INJECTION INTRAMUSCULAR; INTRAVENOUS
OUTPATIENT
Start: 2024-10-25

## 2024-09-27 RX ORDER — ALBUTEROL SULFATE 90 UG/1
4 INHALANT RESPIRATORY (INHALATION) PRN
OUTPATIENT
Start: 2024-10-25

## 2024-09-27 RX ORDER — ACETAMINOPHEN 325 MG/1
650 TABLET ORAL ONCE
OUTPATIENT
Start: 2024-10-25 | End: 2024-10-25

## 2024-09-27 RX ORDER — ACETAMINOPHEN 325 MG/1
650 TABLET ORAL
OUTPATIENT
Start: 2024-10-25

## 2024-09-27 RX ORDER — HEPARIN 100 UNIT/ML
500 SYRINGE INTRAVENOUS PRN
OUTPATIENT
Start: 2024-10-25

## 2024-09-27 RX ORDER — DIPHENHYDRAMINE HCL 25 MG
25 TABLET ORAL ONCE
Status: COMPLETED | OUTPATIENT
Start: 2024-09-27 | End: 2024-09-27

## 2024-09-27 RX ORDER — DIPHENHYDRAMINE HCL 25 MG
25 TABLET ORAL ONCE
OUTPATIENT
Start: 2024-09-27 | End: 2024-09-27

## 2024-09-27 RX ORDER — ACETAMINOPHEN 325 MG/1
650 TABLET ORAL ONCE
OUTPATIENT
Start: 2024-09-27 | End: 2024-09-27

## 2024-09-27 RX ORDER — SODIUM CHLORIDE 9 MG/ML
INJECTION, SOLUTION INTRAVENOUS CONTINUOUS
OUTPATIENT
Start: 2024-09-27

## 2024-09-27 RX ORDER — SODIUM CHLORIDE 0.9 % (FLUSH) 0.9 %
5-40 SYRINGE (ML) INJECTION PRN
Status: DISCONTINUED | OUTPATIENT
Start: 2024-09-27 | End: 2024-09-28 | Stop reason: HOSPADM

## 2024-09-27 RX ORDER — SODIUM CHLORIDE 9 MG/ML
5-250 INJECTION, SOLUTION INTRAVENOUS PRN
OUTPATIENT
Start: 2024-10-25

## 2024-09-27 RX ADMIN — SODIUM CHLORIDE, PRESERVATIVE FREE 20 ML: 5 INJECTION INTRAVENOUS at 09:35

## 2024-09-27 RX ADMIN — IMMUNE GLOBULIN (HUMAN) 30 G: 10 INJECTION INTRAVENOUS; SUBCUTANEOUS at 07:55

## 2024-09-27 RX ADMIN — ACETAMINOPHEN 650 MG: 325 TABLET ORAL at 07:43

## 2024-09-27 RX ADMIN — DIPHENHYDRAMINE HYDROCHLORIDE 25 MG: 25 TABLET ORAL at 07:44

## 2024-09-27 RX ADMIN — HEPARIN 500 UNITS: 100 SYRINGE at 09:36

## 2024-09-27 NOTE — DISCHARGE INSTRUCTIONS
your doctor for medical advice about side effects. You may report side effects to FDA at 0-838-LIQ-6548.  What other drugs will affect immune globulin?  Immune globulin can harm your kidneys, especially if you also use certain medicines for infections, cancer, osteoporosis, organ transplant rejection, bowel disorders, high blood pressure, or pain or arthritis (including Advil, Motrin, and Aleve).  Other drugs may affect immune globulin, including prescription and over-the-counter medicines, vitamins, and herbal products. Tell your doctor about all your current medicines and any medicine you start or stop using.  Where can I get more information?  Your doctor or pharmacist can provide more information about immune globulin.  Remember, keep this and all other medicines out of the reach of children, never share your medicines with others, and use this medication only for the indication prescribed.   Every effort has been made to ensure that the information provided by [employer]. ('[employer]') is accurate, up-to-date, and complete, but no guarantee is made to that effect. Drug information contained herein may be time sensitive. [employer] information has been compiled for use by healthcare practitioners and consumers in the [place] and therefore [employer] does not warrant that uses outside of the [place] are appropriate, unless specifically indicated otherwise. [employer] drug information does not endorse drugs, diagnose patients or recommend therapy. [employer] drug information is an informational resource designed to assist licensed healthcare practitioners in caring for their patients and/or to serve consumers viewing this service as a supplement to, and not a substitute for, the expertise, skill, knowledge and judgment of healthcare practitioners. The absence of a warning for a given drug or drug combination in no way should be construed to indicate that the drug or drug combination is safe, effective or  appropriate for any given patient. [facility] does not assume any responsibility for any aspect of healthcare administered with the aid of information [facility] provides. The information contained herein is not intended to cover all possible uses, directions, precautions, warnings, drug interactions, allergic reactions, or adverse effects. If you have questions about the drugs you are taking, check with your doctor, nurse or pharmacist.  Copyright 1344-2657 [employer]. Version: 4.02. Revision date: 2/26/2021.  Care instructions adapted under license by [employer]. If you have questions about a medical condition or this instruction, always ask your healthcare professional. Healthwise, [employer] disclaims any warranty or liability for your use of this information.

## 2024-09-27 NOTE — PROGRESS NOTES
Port accessed on arrival to [facility]. Brisk blood return noted. Premeds given. Gamunex- C 30g infusion completed. Pt tolerated well.

## 2024-10-04 ENCOUNTER — HOSPITAL ENCOUNTER (OUTPATIENT)
Dept: INFUSION THERAPY | Age: 49
Setting detail: INFUSION SERIES
Discharge: HOME | End: 2024-10-04
Payer: MEDICARE

## 2024-10-04 VITALS
DIASTOLIC BLOOD PRESSURE: 98 MMHG | RESPIRATION RATE: 18 BRPM | HEART RATE: 79 BPM | BODY MASS INDEX: 27.79 KG/M2 | OXYGEN SATURATION: 99 % | TEMPERATURE: 98.2 F | SYSTOLIC BLOOD PRESSURE: 134 MMHG | WEIGHT: 167 LBS

## 2024-10-04 DIAGNOSIS — G35 MULTIPLE SCLEROSIS (HCC): Primary | ICD-10-CM

## 2024-10-04 PROCEDURE — 96366 THER/PROPH/DIAG IV INF ADDON: CPT

## 2024-10-04 PROCEDURE — 2580000003 HC RX 258: Performed by: PSYCHIATRY & NEUROLOGY

## 2024-10-04 PROCEDURE — 6370000000 HC RX 637 (ALT 250 FOR IP): Performed by: PSYCHIATRY & NEUROLOGY

## 2024-10-04 PROCEDURE — 6360000002 HC RX W HCPCS: Performed by: PSYCHIATRY & NEUROLOGY

## 2024-10-04 PROCEDURE — 96365 THER/PROPH/DIAG IV INF INIT: CPT

## 2024-10-04 PROCEDURE — 96375 TX/PRO/DX INJ NEW DRUG ADDON: CPT

## 2024-10-04 RX ORDER — DIPHENHYDRAMINE HCL 25 MG
25 TABLET ORAL ONCE
Status: CANCELLED | OUTPATIENT
Start: 2024-10-25 | End: 2024-10-25

## 2024-10-04 RX ORDER — ONDANSETRON 2 MG/ML
8 INJECTION INTRAMUSCULAR; INTRAVENOUS
Status: CANCELLED | OUTPATIENT
Start: 2024-10-25

## 2024-10-04 RX ORDER — DIPHENHYDRAMINE HCL 25 MG
25 TABLET ORAL ONCE
Status: COMPLETED | OUTPATIENT
Start: 2024-10-04 | End: 2024-10-04

## 2024-10-04 RX ORDER — SODIUM CHLORIDE 0.9 % (FLUSH) 0.9 %
5-40 SYRINGE (ML) INJECTION PRN
Status: DISCONTINUED | OUTPATIENT
Start: 2024-10-04 | End: 2024-10-05 | Stop reason: HOSPADM

## 2024-10-04 RX ORDER — ACETAMINOPHEN 325 MG/1
650 TABLET ORAL ONCE
Status: COMPLETED | OUTPATIENT
Start: 2024-10-04 | End: 2024-10-04

## 2024-10-04 RX ORDER — EPINEPHRINE 1 MG/ML
0.3 INJECTION, SOLUTION, CONCENTRATE INTRAVENOUS PRN
Status: CANCELLED | OUTPATIENT
Start: 2024-10-25

## 2024-10-04 RX ORDER — SODIUM CHLORIDE 9 MG/ML
INJECTION, SOLUTION INTRAVENOUS CONTINUOUS
Status: CANCELLED | OUTPATIENT
Start: 2024-10-25

## 2024-10-04 RX ORDER — MEPERIDINE HYDROCHLORIDE 25 MG/ML
12.5 INJECTION INTRAMUSCULAR; INTRAVENOUS; SUBCUTANEOUS PRN
Status: CANCELLED | OUTPATIENT
Start: 2024-10-25

## 2024-10-04 RX ORDER — HEPARIN 100 UNIT/ML
500 SYRINGE INTRAVENOUS PRN
Status: CANCELLED | OUTPATIENT
Start: 2024-10-25

## 2024-10-04 RX ORDER — SODIUM CHLORIDE 0.9 % (FLUSH) 0.9 %
5-40 SYRINGE (ML) INJECTION PRN
Status: CANCELLED | OUTPATIENT
Start: 2024-10-25

## 2024-10-04 RX ORDER — ALBUTEROL SULFATE 90 UG/1
4 INHALANT RESPIRATORY (INHALATION) PRN
Status: CANCELLED | OUTPATIENT
Start: 2024-10-25

## 2024-10-04 RX ORDER — HEPARIN 100 UNIT/ML
500 SYRINGE INTRAVENOUS PRN
Status: DISCONTINUED | OUTPATIENT
Start: 2024-10-04 | End: 2024-10-05 | Stop reason: HOSPADM

## 2024-10-04 RX ORDER — ACETAMINOPHEN 325 MG/1
650 TABLET ORAL
Status: CANCELLED | OUTPATIENT
Start: 2024-10-25

## 2024-10-04 RX ORDER — SODIUM CHLORIDE 9 MG/ML
5-250 INJECTION, SOLUTION INTRAVENOUS PRN
Status: CANCELLED | OUTPATIENT
Start: 2024-10-25

## 2024-10-04 RX ORDER — ACETAMINOPHEN 325 MG/1
650 TABLET ORAL ONCE
Status: CANCELLED | OUTPATIENT
Start: 2024-10-25 | End: 2024-10-25

## 2024-10-04 RX ORDER — DIPHENHYDRAMINE HYDROCHLORIDE 50 MG/ML
50 INJECTION INTRAMUSCULAR; INTRAVENOUS
Status: CANCELLED | OUTPATIENT
Start: 2024-10-25

## 2024-10-04 RX ADMIN — ACETAMINOPHEN 650 MG: 325 TABLET ORAL at 07:42

## 2024-10-04 RX ADMIN — DIPHENHYDRAMINE HYDROCHLORIDE 25 MG: 25 TABLET ORAL at 07:42

## 2024-10-04 RX ADMIN — HYDROCORTISONE SODIUM SUCCINATE 100 MG: 100 INJECTION, POWDER, FOR SOLUTION INTRAMUSCULAR; INTRAVENOUS at 07:42

## 2024-10-04 RX ADMIN — HEPARIN 500 UNITS: 100 SYRINGE at 09:34

## 2024-10-04 RX ADMIN — IMMUNE GLOBULIN (HUMAN) 30 G: 10 INJECTION INTRAVENOUS; SUBCUTANEOUS at 08:01

## 2024-10-04 RX ADMIN — SODIUM CHLORIDE, PRESERVATIVE FREE 20 ML: 5 INJECTION INTRAVENOUS at 09:34

## 2024-10-07 ENCOUNTER — APPOINTMENT (OUTPATIENT)
Dept: INFUSION THERAPY | Age: 49
End: 2024-10-07
Payer: MEDICARE

## 2024-10-07 RX ORDER — DIPHENHYDRAMINE HCL 25 MG
25 TABLET ORAL ONCE
Status: CANCELLED | OUTPATIENT
Start: 2024-10-07 | End: 2024-10-07

## 2024-10-07 RX ORDER — ACETAMINOPHEN 325 MG/1
650 TABLET ORAL ONCE
Status: CANCELLED | OUTPATIENT
Start: 2024-10-07 | End: 2024-10-07

## 2024-10-08 ENCOUNTER — APPOINTMENT (OUTPATIENT)
Dept: INFUSION THERAPY | Age: 49
End: 2024-10-08
Payer: MEDICARE

## 2024-10-10 ENCOUNTER — APPOINTMENT (OUTPATIENT)
Dept: INFUSION THERAPY | Age: 49
End: 2024-10-10
Payer: MEDICARE

## 2024-10-11 ENCOUNTER — HOSPITAL ENCOUNTER (OUTPATIENT)
Dept: INFUSION THERAPY | Age: 49
Setting detail: INFUSION SERIES
Discharge: HOME | End: 2024-10-11
Payer: MEDICARE

## 2024-10-11 ENCOUNTER — APPOINTMENT (OUTPATIENT)
Dept: INFUSION THERAPY | Age: 49
End: 2024-10-11
Payer: MEDICARE

## 2024-10-11 VITALS
DIASTOLIC BLOOD PRESSURE: 82 MMHG | SYSTOLIC BLOOD PRESSURE: 113 MMHG | TEMPERATURE: 98 F | BODY MASS INDEX: 28.29 KG/M2 | WEIGHT: 170 LBS | HEART RATE: 73 BPM | OXYGEN SATURATION: 100 % | RESPIRATION RATE: 18 BRPM

## 2024-10-11 DIAGNOSIS — G35 EXACERBATION OF MULTIPLE SCLEROSIS (HCC): Primary | ICD-10-CM

## 2024-10-11 DIAGNOSIS — M32.9 SYSTEMIC LUPUS ERYTHEMATOSUS, UNSPECIFIED SLE TYPE, UNSPECIFIED ORGAN INVOLVEMENT STATUS (HCC): ICD-10-CM

## 2024-10-11 PROCEDURE — 96366 THER/PROPH/DIAG IV INF ADDON: CPT

## 2024-10-11 PROCEDURE — 96365 THER/PROPH/DIAG IV INF INIT: CPT

## 2024-10-11 PROCEDURE — 6360000002 HC RX W HCPCS: Performed by: PSYCHIATRY & NEUROLOGY

## 2024-10-11 PROCEDURE — 6370000000 HC RX 637 (ALT 250 FOR IP): Performed by: PSYCHIATRY & NEUROLOGY

## 2024-10-11 PROCEDURE — 96375 TX/PRO/DX INJ NEW DRUG ADDON: CPT

## 2024-10-11 PROCEDURE — 2580000003 HC RX 258: Performed by: PSYCHIATRY & NEUROLOGY

## 2024-10-11 RX ORDER — SODIUM CHLORIDE 9 MG/ML
5-250 INJECTION, SOLUTION INTRAVENOUS PRN
Status: CANCELLED | OUTPATIENT
Start: 2024-10-11

## 2024-10-11 RX ORDER — ACETAMINOPHEN 325 MG/1
650 TABLET ORAL ONCE
Status: COMPLETED | OUTPATIENT
Start: 2024-10-11 | End: 2024-10-11

## 2024-10-11 RX ORDER — EPINEPHRINE 1 MG/ML
0.3 INJECTION, SOLUTION, CONCENTRATE INTRAVENOUS PRN
Status: CANCELLED | OUTPATIENT
Start: 2024-10-11

## 2024-10-11 RX ORDER — ALBUTEROL SULFATE 90 UG/1
4 INHALANT RESPIRATORY (INHALATION) PRN
Status: CANCELLED | OUTPATIENT
Start: 2024-10-11

## 2024-10-11 RX ORDER — ACETAMINOPHEN 325 MG/1
650 TABLET ORAL
Status: CANCELLED | OUTPATIENT
Start: 2024-10-11

## 2024-10-11 RX ORDER — DIPHENHYDRAMINE HYDROCHLORIDE 50 MG/ML
50 INJECTION INTRAMUSCULAR; INTRAVENOUS
Status: CANCELLED | OUTPATIENT
Start: 2024-10-11

## 2024-10-11 RX ORDER — HEPARIN 100 UNIT/ML
500 SYRINGE INTRAVENOUS PRN
Status: DISCONTINUED | OUTPATIENT
Start: 2024-10-11 | End: 2024-10-12 | Stop reason: HOSPADM

## 2024-10-11 RX ORDER — ONDANSETRON 2 MG/ML
8 INJECTION INTRAMUSCULAR; INTRAVENOUS
Status: CANCELLED | OUTPATIENT
Start: 2024-10-11

## 2024-10-11 RX ORDER — ACETAMINOPHEN 325 MG/1
650 TABLET ORAL ONCE
Status: CANCELLED | OUTPATIENT
Start: 2024-10-11 | End: 2024-10-11

## 2024-10-11 RX ORDER — MEPERIDINE HYDROCHLORIDE 25 MG/ML
12.5 INJECTION INTRAMUSCULAR; INTRAVENOUS; SUBCUTANEOUS PRN
Status: CANCELLED | OUTPATIENT
Start: 2024-10-11

## 2024-10-11 RX ORDER — HEPARIN 100 UNIT/ML
500 SYRINGE INTRAVENOUS PRN
Status: CANCELLED | OUTPATIENT
Start: 2024-10-11

## 2024-10-11 RX ORDER — DIPHENHYDRAMINE HCL 25 MG
25 TABLET ORAL ONCE
Status: CANCELLED | OUTPATIENT
Start: 2024-10-11 | End: 2024-10-11

## 2024-10-11 RX ORDER — SODIUM CHLORIDE 0.9 % (FLUSH) 0.9 %
5-40 SYRINGE (ML) INJECTION PRN
Status: DISCONTINUED | OUTPATIENT
Start: 2024-10-11 | End: 2024-10-12 | Stop reason: HOSPADM

## 2024-10-11 RX ORDER — SODIUM CHLORIDE 9 MG/ML
5-250 INJECTION, SOLUTION INTRAVENOUS PRN
Status: DISCONTINUED | OUTPATIENT
Start: 2024-10-11 | End: 2024-10-12 | Stop reason: HOSPADM

## 2024-10-11 RX ORDER — DIPHENHYDRAMINE HCL 25 MG
25 TABLET ORAL ONCE
Status: COMPLETED | OUTPATIENT
Start: 2024-10-11 | End: 2024-10-11

## 2024-10-11 RX ORDER — SODIUM CHLORIDE 9 MG/ML
INJECTION, SOLUTION INTRAVENOUS CONTINUOUS
Status: CANCELLED | OUTPATIENT
Start: 2024-10-11

## 2024-10-11 RX ORDER — SODIUM CHLORIDE 0.9 % (FLUSH) 0.9 %
5-40 SYRINGE (ML) INJECTION PRN
Status: CANCELLED | OUTPATIENT
Start: 2024-10-11

## 2024-10-11 RX ADMIN — HYDROCORTISONE SODIUM SUCCINATE 100 MG: 100 INJECTION, POWDER, FOR SOLUTION INTRAMUSCULAR; INTRAVENOUS at 08:05

## 2024-10-11 RX ADMIN — SODIUM CHLORIDE, PRESERVATIVE FREE 10 ML: 5 INJECTION INTRAVENOUS at 09:56

## 2024-10-11 RX ADMIN — ACETAMINOPHEN 650 MG: 325 TABLET ORAL at 08:04

## 2024-10-11 RX ADMIN — DIPHENHYDRAMINE HYDROCHLORIDE 25 MG: 25 TABLET ORAL at 08:04

## 2024-10-11 RX ADMIN — SODIUM CHLORIDE, PRESERVATIVE FREE 10 ML: 5 INJECTION INTRAVENOUS at 08:05

## 2024-10-11 RX ADMIN — IMMUNE GLOBULIN (HUMAN) 30 G: 10 INJECTION INTRAVENOUS; SUBCUTANEOUS at 08:19

## 2024-10-11 RX ADMIN — HEPARIN 500 UNITS: 100 SYRINGE at 09:56

## 2024-10-14 ENCOUNTER — HOSPITAL ENCOUNTER (OUTPATIENT)
Dept: INFUSION THERAPY | Age: 49
Setting detail: INFUSION SERIES
Discharge: HOME | End: 2024-10-14
Payer: MEDICARE

## 2024-10-14 VITALS
TEMPERATURE: 97.8 F | DIASTOLIC BLOOD PRESSURE: 92 MMHG | SYSTOLIC BLOOD PRESSURE: 122 MMHG | RESPIRATION RATE: 18 BRPM | HEART RATE: 79 BPM | OXYGEN SATURATION: 100 %

## 2024-10-14 DIAGNOSIS — M32.9 SYSTEMIC LUPUS ERYTHEMATOSUS, UNSPECIFIED SLE TYPE, UNSPECIFIED ORGAN INVOLVEMENT STATUS (HCC): ICD-10-CM

## 2024-10-14 DIAGNOSIS — G35 EXACERBATION OF MULTIPLE SCLEROSIS (HCC): Primary | ICD-10-CM

## 2024-10-14 PROCEDURE — 96366 THER/PROPH/DIAG IV INF ADDON: CPT

## 2024-10-14 PROCEDURE — 6370000000 HC RX 637 (ALT 250 FOR IP): Performed by: PSYCHIATRY & NEUROLOGY

## 2024-10-14 PROCEDURE — 2580000003 HC RX 258: Performed by: PSYCHIATRY & NEUROLOGY

## 2024-10-14 PROCEDURE — 96375 TX/PRO/DX INJ NEW DRUG ADDON: CPT

## 2024-10-14 PROCEDURE — 96365 THER/PROPH/DIAG IV INF INIT: CPT

## 2024-10-14 PROCEDURE — 6360000002 HC RX W HCPCS: Performed by: PSYCHIATRY & NEUROLOGY

## 2024-10-14 RX ORDER — SODIUM CHLORIDE 9 MG/ML
5-250 INJECTION, SOLUTION INTRAVENOUS PRN
Status: CANCELLED | OUTPATIENT
Start: 2024-10-14

## 2024-10-14 RX ORDER — DIPHENHYDRAMINE HCL 25 MG
25 TABLET ORAL ONCE
Status: CANCELLED | OUTPATIENT
Start: 2024-10-14 | End: 2024-10-14

## 2024-10-14 RX ORDER — DIPHENHYDRAMINE HYDROCHLORIDE 50 MG/ML
50 INJECTION INTRAMUSCULAR; INTRAVENOUS
Status: CANCELLED | OUTPATIENT
Start: 2024-10-14

## 2024-10-14 RX ORDER — ACETAMINOPHEN 325 MG/1
650 TABLET ORAL ONCE
Status: COMPLETED | OUTPATIENT
Start: 2024-10-14 | End: 2024-10-14

## 2024-10-14 RX ORDER — DIPHENHYDRAMINE HCL 25 MG
25 TABLET ORAL ONCE
Status: COMPLETED | OUTPATIENT
Start: 2024-10-14 | End: 2024-10-14

## 2024-10-14 RX ORDER — ACETAMINOPHEN 325 MG/1
650 TABLET ORAL
Status: CANCELLED | OUTPATIENT
Start: 2024-10-14

## 2024-10-14 RX ORDER — EPINEPHRINE 1 MG/ML
0.3 INJECTION, SOLUTION, CONCENTRATE INTRAVENOUS PRN
Status: CANCELLED | OUTPATIENT
Start: 2024-10-14

## 2024-10-14 RX ORDER — ALBUTEROL SULFATE 90 UG/1
4 INHALANT RESPIRATORY (INHALATION) PRN
Status: CANCELLED | OUTPATIENT
Start: 2024-10-14

## 2024-10-14 RX ORDER — SODIUM CHLORIDE 0.9 % (FLUSH) 0.9 %
5-40 SYRINGE (ML) INJECTION PRN
Status: DISCONTINUED | OUTPATIENT
Start: 2024-10-14 | End: 2024-10-15 | Stop reason: HOSPADM

## 2024-10-14 RX ORDER — HEPARIN 100 UNIT/ML
500 SYRINGE INTRAVENOUS PRN
Status: DISCONTINUED | OUTPATIENT
Start: 2024-10-14 | End: 2024-10-15 | Stop reason: HOSPADM

## 2024-10-14 RX ORDER — SODIUM CHLORIDE 0.9 % (FLUSH) 0.9 %
5-40 SYRINGE (ML) INJECTION PRN
Status: CANCELLED | OUTPATIENT
Start: 2024-10-14

## 2024-10-14 RX ORDER — MEPERIDINE HYDROCHLORIDE 25 MG/ML
12.5 INJECTION INTRAMUSCULAR; INTRAVENOUS; SUBCUTANEOUS PRN
Status: CANCELLED | OUTPATIENT
Start: 2024-10-14

## 2024-10-14 RX ORDER — SODIUM CHLORIDE 9 MG/ML
INJECTION, SOLUTION INTRAVENOUS CONTINUOUS
Status: CANCELLED | OUTPATIENT
Start: 2024-10-14

## 2024-10-14 RX ORDER — HEPARIN 100 UNIT/ML
500 SYRINGE INTRAVENOUS PRN
Status: CANCELLED | OUTPATIENT
Start: 2024-10-14

## 2024-10-14 RX ORDER — ACETAMINOPHEN 325 MG/1
650 TABLET ORAL ONCE
Status: CANCELLED | OUTPATIENT
Start: 2024-10-14 | End: 2024-10-14

## 2024-10-14 RX ORDER — ONDANSETRON 2 MG/ML
8 INJECTION INTRAMUSCULAR; INTRAVENOUS
Status: CANCELLED | OUTPATIENT
Start: 2024-10-14

## 2024-10-14 RX ADMIN — HEPARIN 500 UNITS: 100 SYRINGE at 09:20

## 2024-10-14 RX ADMIN — DIPHENHYDRAMINE HYDROCHLORIDE 25 MG: 25 TABLET ORAL at 07:24

## 2024-10-14 RX ADMIN — IMMUNE GLOBULIN (HUMAN) 30 G: 10 INJECTION INTRAVENOUS; SUBCUTANEOUS at 07:46

## 2024-10-14 RX ADMIN — HYDROCORTISONE SODIUM SUCCINATE 100 MG: 100 INJECTION, POWDER, FOR SOLUTION INTRAMUSCULAR; INTRAVENOUS at 07:24

## 2024-10-14 RX ADMIN — SODIUM CHLORIDE, PRESERVATIVE FREE 20 ML: 5 INJECTION INTRAVENOUS at 09:20

## 2024-10-14 RX ADMIN — ACETAMINOPHEN 650 MG: 325 TABLET ORAL at 07:24

## 2024-10-14 NOTE — FLOWSHEET NOTE
10/14/24 1110   AVS Reviewed   AVS & discharge instructions reviewed with patient and/or representative? Yes   Reviewed instructions with Patient   Level of Understanding Questions answered;Verbalized understanding     PORT ACCESSED AND PREMEDS GIVEN. IVIG 30 GRAMS GIVEN AS ORDERED AND PT TOLERATED WELL.

## 2024-10-15 ENCOUNTER — HOSPITAL ENCOUNTER (OUTPATIENT)
Dept: INFUSION THERAPY | Age: 49
Setting detail: INFUSION SERIES
Discharge: HOME | End: 2024-10-15
Payer: MEDICARE

## 2024-10-15 VITALS
TEMPERATURE: 98 F | SYSTOLIC BLOOD PRESSURE: 132 MMHG | OXYGEN SATURATION: 100 % | RESPIRATION RATE: 18 BRPM | HEART RATE: 72 BPM | DIASTOLIC BLOOD PRESSURE: 93 MMHG

## 2024-10-15 DIAGNOSIS — M32.9 SYSTEMIC LUPUS ERYTHEMATOSUS, UNSPECIFIED SLE TYPE, UNSPECIFIED ORGAN INVOLVEMENT STATUS (HCC): ICD-10-CM

## 2024-10-15 DIAGNOSIS — G35 EXACERBATION OF MULTIPLE SCLEROSIS (HCC): Primary | ICD-10-CM

## 2024-10-15 PROCEDURE — 96366 THER/PROPH/DIAG IV INF ADDON: CPT

## 2024-10-15 PROCEDURE — 6370000000 HC RX 637 (ALT 250 FOR IP): Performed by: PSYCHIATRY & NEUROLOGY

## 2024-10-15 PROCEDURE — 2580000003 HC RX 258: Performed by: PSYCHIATRY & NEUROLOGY

## 2024-10-15 PROCEDURE — 96375 TX/PRO/DX INJ NEW DRUG ADDON: CPT

## 2024-10-15 PROCEDURE — 6360000002 HC RX W HCPCS: Performed by: PSYCHIATRY & NEUROLOGY

## 2024-10-15 PROCEDURE — 96365 THER/PROPH/DIAG IV INF INIT: CPT

## 2024-10-15 RX ORDER — SODIUM CHLORIDE 9 MG/ML
5-250 INJECTION, SOLUTION INTRAVENOUS PRN
Status: CANCELLED | OUTPATIENT
Start: 2024-10-15

## 2024-10-15 RX ORDER — ACETAMINOPHEN 325 MG/1
650 TABLET ORAL
Status: CANCELLED | OUTPATIENT
Start: 2024-10-15

## 2024-10-15 RX ORDER — MEPERIDINE HYDROCHLORIDE 25 MG/ML
12.5 INJECTION INTRAMUSCULAR; INTRAVENOUS; SUBCUTANEOUS PRN
Status: CANCELLED | OUTPATIENT
Start: 2024-10-15

## 2024-10-15 RX ORDER — HEPARIN 100 UNIT/ML
500 SYRINGE INTRAVENOUS PRN
Status: CANCELLED | OUTPATIENT
Start: 2024-10-15

## 2024-10-15 RX ORDER — HEPARIN 100 UNIT/ML
500 SYRINGE INTRAVENOUS PRN
Status: DISCONTINUED | OUTPATIENT
Start: 2024-10-15 | End: 2024-10-16 | Stop reason: HOSPADM

## 2024-10-15 RX ORDER — DIPHENHYDRAMINE HCL 25 MG
25 TABLET ORAL ONCE
Status: CANCELLED | OUTPATIENT
Start: 2024-10-15 | End: 2024-10-15

## 2024-10-15 RX ORDER — DIPHENHYDRAMINE HYDROCHLORIDE 50 MG/ML
50 INJECTION INTRAMUSCULAR; INTRAVENOUS
Status: CANCELLED | OUTPATIENT
Start: 2024-10-15

## 2024-10-15 RX ORDER — SODIUM CHLORIDE 9 MG/ML
5-250 INJECTION, SOLUTION INTRAVENOUS PRN
Status: DISCONTINUED | OUTPATIENT
Start: 2024-10-15 | End: 2024-10-16 | Stop reason: HOSPADM

## 2024-10-15 RX ORDER — ONDANSETRON 2 MG/ML
8 INJECTION INTRAMUSCULAR; INTRAVENOUS
Status: CANCELLED | OUTPATIENT
Start: 2024-10-15

## 2024-10-15 RX ORDER — ALBUTEROL SULFATE 90 UG/1
4 INHALANT RESPIRATORY (INHALATION) PRN
Status: CANCELLED | OUTPATIENT
Start: 2024-10-15

## 2024-10-15 RX ORDER — DIPHENHYDRAMINE HCL 25 MG
25 TABLET ORAL ONCE
Status: COMPLETED | OUTPATIENT
Start: 2024-10-15 | End: 2024-10-15

## 2024-10-15 RX ORDER — EPINEPHRINE 1 MG/ML
0.3 INJECTION, SOLUTION, CONCENTRATE INTRAVENOUS PRN
Status: CANCELLED | OUTPATIENT
Start: 2024-10-15

## 2024-10-15 RX ORDER — ACETAMINOPHEN 325 MG/1
650 TABLET ORAL ONCE
Status: CANCELLED | OUTPATIENT
Start: 2024-10-15 | End: 2024-10-15

## 2024-10-15 RX ORDER — SODIUM CHLORIDE 9 MG/ML
INJECTION, SOLUTION INTRAVENOUS CONTINUOUS
Status: CANCELLED | OUTPATIENT
Start: 2024-10-15

## 2024-10-15 RX ORDER — ACETAMINOPHEN 325 MG/1
650 TABLET ORAL ONCE
Status: COMPLETED | OUTPATIENT
Start: 2024-10-15 | End: 2024-10-15

## 2024-10-15 RX ORDER — SODIUM CHLORIDE 0.9 % (FLUSH) 0.9 %
5-40 SYRINGE (ML) INJECTION PRN
Status: DISCONTINUED | OUTPATIENT
Start: 2024-10-15 | End: 2024-10-16 | Stop reason: HOSPADM

## 2024-10-15 RX ORDER — SODIUM CHLORIDE 0.9 % (FLUSH) 0.9 %
5-40 SYRINGE (ML) INJECTION PRN
Status: CANCELLED | OUTPATIENT
Start: 2024-10-15

## 2024-10-15 RX ADMIN — HYDROCORTISONE SODIUM SUCCINATE 100 MG: 100 INJECTION, POWDER, FOR SOLUTION INTRAMUSCULAR; INTRAVENOUS at 07:17

## 2024-10-15 RX ADMIN — SODIUM CHLORIDE, PRESERVATIVE FREE 20 ML: 5 INJECTION INTRAVENOUS at 09:18

## 2024-10-15 RX ADMIN — DIPHENHYDRAMINE HYDROCHLORIDE 25 MG: 25 TABLET ORAL at 07:16

## 2024-10-15 RX ADMIN — IMMUNE GLOBULIN (HUMAN) 30 G: 10 INJECTION INTRAVENOUS; SUBCUTANEOUS at 07:39

## 2024-10-15 RX ADMIN — ACETAMINOPHEN 650 MG: 325 TABLET ORAL at 07:16

## 2024-10-15 RX ADMIN — HEPARIN 500 UNITS: 100 SYRINGE at 09:18

## 2024-10-15 NOTE — FLOWSHEET NOTE
10/15/24 0931   AVS Reviewed   AVS & discharge instructions reviewed with patient and/or representative? Yes   Reviewed instructions with Patient   Level of Understanding Questions answered;Verbalized understanding     Premeds given. Day 2 of IVIG 30 grams given as ordered and pt tolerated well

## 2024-10-16 ENCOUNTER — HOSPITAL ENCOUNTER (OUTPATIENT)
Dept: INFUSION THERAPY | Age: 49
Setting detail: INFUSION SERIES
Discharge: HOME | End: 2024-10-16
Payer: MEDICARE

## 2024-10-16 VITALS
RESPIRATION RATE: 18 BRPM | DIASTOLIC BLOOD PRESSURE: 97 MMHG | OXYGEN SATURATION: 100 % | TEMPERATURE: 97 F | HEART RATE: 79 BPM | SYSTOLIC BLOOD PRESSURE: 142 MMHG

## 2024-10-16 DIAGNOSIS — M32.9 SYSTEMIC LUPUS ERYTHEMATOSUS, UNSPECIFIED SLE TYPE, UNSPECIFIED ORGAN INVOLVEMENT STATUS (HCC): ICD-10-CM

## 2024-10-16 DIAGNOSIS — G35 EXACERBATION OF MULTIPLE SCLEROSIS (HCC): Primary | ICD-10-CM

## 2024-10-16 PROCEDURE — 6360000002 HC RX W HCPCS: Performed by: PSYCHIATRY & NEUROLOGY

## 2024-10-16 PROCEDURE — 96375 TX/PRO/DX INJ NEW DRUG ADDON: CPT

## 2024-10-16 PROCEDURE — 96366 THER/PROPH/DIAG IV INF ADDON: CPT

## 2024-10-16 PROCEDURE — 2580000003 HC RX 258: Performed by: PSYCHIATRY & NEUROLOGY

## 2024-10-16 PROCEDURE — 6370000000 HC RX 637 (ALT 250 FOR IP): Performed by: PSYCHIATRY & NEUROLOGY

## 2024-10-16 PROCEDURE — 96365 THER/PROPH/DIAG IV INF INIT: CPT

## 2024-10-16 RX ORDER — DIPHENHYDRAMINE HCL 25 MG
25 TABLET ORAL ONCE
Status: COMPLETED | OUTPATIENT
Start: 2024-10-16 | End: 2024-10-16

## 2024-10-16 RX ORDER — ONDANSETRON 2 MG/ML
8 INJECTION INTRAMUSCULAR; INTRAVENOUS
Status: CANCELLED | OUTPATIENT
Start: 2024-10-16

## 2024-10-16 RX ORDER — DIPHENHYDRAMINE HYDROCHLORIDE 50 MG/ML
50 INJECTION INTRAMUSCULAR; INTRAVENOUS
Status: CANCELLED | OUTPATIENT
Start: 2024-10-16

## 2024-10-16 RX ORDER — ACETAMINOPHEN 325 MG/1
650 TABLET ORAL ONCE
Status: COMPLETED | OUTPATIENT
Start: 2024-10-16 | End: 2024-10-16

## 2024-10-16 RX ORDER — ACETAMINOPHEN 325 MG/1
650 TABLET ORAL
Status: CANCELLED | OUTPATIENT
Start: 2024-10-16

## 2024-10-16 RX ORDER — EPINEPHRINE 1 MG/ML
0.3 INJECTION, SOLUTION, CONCENTRATE INTRAVENOUS PRN
Status: CANCELLED | OUTPATIENT
Start: 2024-10-16

## 2024-10-16 RX ORDER — SODIUM CHLORIDE 0.9 % (FLUSH) 0.9 %
5-40 SYRINGE (ML) INJECTION PRN
Status: DISCONTINUED | OUTPATIENT
Start: 2024-10-16 | End: 2024-10-17 | Stop reason: HOSPADM

## 2024-10-16 RX ORDER — SODIUM CHLORIDE 9 MG/ML
5-250 INJECTION, SOLUTION INTRAVENOUS PRN
Status: CANCELLED | OUTPATIENT
Start: 2024-10-16

## 2024-10-16 RX ORDER — SODIUM CHLORIDE 9 MG/ML
INJECTION, SOLUTION INTRAVENOUS CONTINUOUS
Status: CANCELLED | OUTPATIENT
Start: 2024-10-16

## 2024-10-16 RX ORDER — SODIUM CHLORIDE 0.9 % (FLUSH) 0.9 %
5-40 SYRINGE (ML) INJECTION PRN
Status: CANCELLED | OUTPATIENT
Start: 2024-10-16

## 2024-10-16 RX ORDER — ACETAMINOPHEN 325 MG/1
650 TABLET ORAL ONCE
Status: CANCELLED | OUTPATIENT
Start: 2024-10-16 | End: 2024-10-16

## 2024-10-16 RX ORDER — DIPHENHYDRAMINE HCL 25 MG
25 TABLET ORAL ONCE
Status: CANCELLED | OUTPATIENT
Start: 2024-10-16 | End: 2024-10-16

## 2024-10-16 RX ORDER — HEPARIN 100 UNIT/ML
500 SYRINGE INTRAVENOUS PRN
Status: CANCELLED | OUTPATIENT
Start: 2024-10-16

## 2024-10-16 RX ORDER — HEPARIN 100 UNIT/ML
500 SYRINGE INTRAVENOUS PRN
Status: DISCONTINUED | OUTPATIENT
Start: 2024-10-16 | End: 2024-10-17 | Stop reason: HOSPADM

## 2024-10-16 RX ORDER — MEPERIDINE HYDROCHLORIDE 25 MG/ML
12.5 INJECTION INTRAMUSCULAR; INTRAVENOUS; SUBCUTANEOUS PRN
Status: CANCELLED | OUTPATIENT
Start: 2024-10-16

## 2024-10-16 RX ORDER — ALBUTEROL SULFATE 90 UG/1
4 INHALANT RESPIRATORY (INHALATION) PRN
Status: CANCELLED | OUTPATIENT
Start: 2024-10-16

## 2024-10-16 RX ORDER — SODIUM CHLORIDE 9 MG/ML
5-250 INJECTION, SOLUTION INTRAVENOUS PRN
Status: DISCONTINUED | OUTPATIENT
Start: 2024-10-16 | End: 2024-10-17 | Stop reason: HOSPADM

## 2024-10-16 RX ADMIN — ACETAMINOPHEN 650 MG: 325 TABLET ORAL at 06:46

## 2024-10-16 RX ADMIN — DIPHENHYDRAMINE HYDROCHLORIDE 25 MG: 25 TABLET ORAL at 06:46

## 2024-10-16 RX ADMIN — SODIUM CHLORIDE, PRESERVATIVE FREE 10 ML: 5 INJECTION INTRAVENOUS at 06:49

## 2024-10-16 RX ADMIN — IMMUNE GLOBULIN (HUMAN) 25 G: 10 INJECTION INTRAVENOUS; SUBCUTANEOUS at 07:31

## 2024-10-16 RX ADMIN — SODIUM CHLORIDE, PRESERVATIVE FREE 10 ML: 5 INJECTION INTRAVENOUS at 06:45

## 2024-10-16 RX ADMIN — SODIUM CHLORIDE, PRESERVATIVE FREE 20 ML: 5 INJECTION INTRAVENOUS at 08:59

## 2024-10-16 RX ADMIN — HYDROCORTISONE SODIUM SUCCINATE 100 MG: 100 INJECTION, POWDER, FOR SOLUTION INTRAMUSCULAR; INTRAVENOUS at 06:49

## 2024-10-16 RX ADMIN — HEPARIN 500 UNITS: 100 SYRINGE at 08:59

## 2024-10-16 NOTE — DISCHARGE INSTRUCTIONS
immune globulin (intravenous) (IGIV)  Pronunciation:  dinora foley  Brand:  Bivigam, Carimune, Flebogamma, Gammagard S/D, Gammaplex, Octagam, Panzyga, Privigen  What is the most important information I should know about immune globulin intravenous?  This medicine can cause blood clots.  The risk is highest in older adults or in people who have had blood clots, heart problems, or blood circulation problems. Blood clots are also more likely during long-term bedrest, while using birth control pills or hormone replacement therapy, or while having a central intravenous (IV) catheter in place.  Call your doctor at once if you have chest pain, trouble breathing, fast heartbeats, numbness or weakness, or swelling and warmth or discoloration in an arm or leg.  This medicine can also harm your kidneys, especially if you have kidney disease or you also use certain medicines. Tell your doctor right away if you have signs of kidney problems, such as swelling, rapid weight gain, and little or no urination.  What is immune globulin intravenous (IGIV)?  Immune globulin intravenous (IGIV, for injection into a vein) is used to treat primary immunodeficiency.  IGIV is also used to increase platelets (blood clotting cells) in people with immune thrombocytopenic purpura.  IGIV is also used in to help prevent certain infections in people with B-cell chronic lymphocytic leukemia.  IGIV is also used in people with Kawasaki syndrome, to prevent aneurysm caused by a weakening of the main artery in the heart.  IGIV may also be used for purposes not listed in this medication guide.  What should I discuss with my healthcare provider before using IGIV?  You may not be able to use this medicine if:  you have had an allergic reaction to an immune globulin or blood product;  you have immune globulin A (IgA) deficiency with antibody to IgA; or  you are allergic to corn.  IGIV can cause blood clots or kidney problems, especially in  IGIV?  Get emergency medical help if you have signs of an allergic reaction: hives; difficult breathing; swelling of your face, lips, tongue, or throat.  Some side effects may occur during the injection. Tell your caregiver if you feel dizzy, nauseated, light-headed, sweaty, or have a headache, pounding in your neck or ears, fever, chills, chest tightness, or warmth or redness in your face.  Call your doctor at once if you have:  a blood cell disorder --pale or yellowed skin, dark colored urine, fever, confusion or weakness;  dehydration symptoms --feeling very thirsty or hot, being unable to urinate, heavy sweating, or hot and dry skin;  kidney problems --little or no urination, swelling, rapid weight gain, feeling short of breath;  lung problems --chest pain, trouble breathing, blue colored lips, fingers, or toes;  signs of a new infection --fever with a severe headache, neck stiffness, eye pain, and increased sensitivity to light; or  signs of a blood clot --shortness of breath, chest pain with deep breathing, rapid heart rate, numbness or weakness on one side of the body, swelling and warmth or discoloration in an arm or leg.  Common side effects may include:  headache, back pain, joint pain;  fever, chills, sweating, warmth or tingling;  stomach pain, nausea, diarrhea;  increased blood pressure, fast heartbeats;  dizziness, tiredness, lack of energy;  stuffy nose, sinus pain; or  pain, swelling, burning, or irritation around the IV needle.  This is not a complete list of side effects and others may occur. Call your doctor for medical advice about side effects. You may report side effects to FDA at 5-098-FDA-9479.  What other drugs will affect IGIV?  IGIV can harm your kidneys, especially if you also use certain medicines for infections, cancer, osteoporosis, organ transplant rejection, bowel disorders, high blood pressure, or pain or arthritis (including Advil, Motrin, and Aleve).  Other drugs may affect IGIV,

## 2024-10-16 NOTE — PROGRESS NOTES
[facility]. Port flushed and brisk blood return noted. Premeds given. Gamunex- C 25 g IV given as protocol and pt tolerated well. Port kept accessed for tomorrow's treatment.

## 2024-10-17 ENCOUNTER — HOSPITAL ENCOUNTER (OUTPATIENT)
Dept: INFUSION THERAPY | Age: 49
Setting detail: INFUSION SERIES
Discharge: HOME | End: 2024-10-17
Payer: MEDICARE

## 2024-10-17 VITALS
OXYGEN SATURATION: 100 % | SYSTOLIC BLOOD PRESSURE: 122 MMHG | DIASTOLIC BLOOD PRESSURE: 82 MMHG | TEMPERATURE: 98.8 F | HEART RATE: 80 BPM | RESPIRATION RATE: 16 BRPM

## 2024-10-17 DIAGNOSIS — G35 EXACERBATION OF MULTIPLE SCLEROSIS (HCC): Primary | ICD-10-CM

## 2024-10-17 DIAGNOSIS — M32.9 SYSTEMIC LUPUS ERYTHEMATOSUS, UNSPECIFIED SLE TYPE, UNSPECIFIED ORGAN INVOLVEMENT STATUS (HCC): ICD-10-CM

## 2024-10-17 PROCEDURE — 6370000000 HC RX 637 (ALT 250 FOR IP): Performed by: PSYCHIATRY & NEUROLOGY

## 2024-10-17 PROCEDURE — 96366 THER/PROPH/DIAG IV INF ADDON: CPT

## 2024-10-17 PROCEDURE — 6360000002 HC RX W HCPCS: Performed by: PSYCHIATRY & NEUROLOGY

## 2024-10-17 PROCEDURE — 2580000003 HC RX 258: Performed by: PSYCHIATRY & NEUROLOGY

## 2024-10-17 PROCEDURE — 96365 THER/PROPH/DIAG IV INF INIT: CPT

## 2024-10-17 PROCEDURE — 96375 TX/PRO/DX INJ NEW DRUG ADDON: CPT

## 2024-10-17 RX ORDER — ACETAMINOPHEN 325 MG/1
650 TABLET ORAL ONCE
Status: COMPLETED | OUTPATIENT
Start: 2024-10-17 | End: 2024-10-17

## 2024-10-17 RX ORDER — ALBUTEROL SULFATE 90 UG/1
4 INHALANT RESPIRATORY (INHALATION) PRN
OUTPATIENT
Start: 2024-10-17

## 2024-10-17 RX ORDER — SODIUM CHLORIDE 0.9 % (FLUSH) 0.9 %
5-40 SYRINGE (ML) INJECTION PRN
OUTPATIENT
Start: 2024-10-17

## 2024-10-17 RX ORDER — DIPHENHYDRAMINE HCL 25 MG
25 TABLET ORAL ONCE
Status: CANCELLED | OUTPATIENT
Start: 2024-10-17 | End: 2024-10-17

## 2024-10-17 RX ORDER — DIPHENHYDRAMINE HYDROCHLORIDE 50 MG/ML
50 INJECTION INTRAMUSCULAR; INTRAVENOUS
OUTPATIENT
Start: 2024-10-17

## 2024-10-17 RX ORDER — DIPHENHYDRAMINE HCL 25 MG
25 TABLET ORAL ONCE
Status: COMPLETED | OUTPATIENT
Start: 2024-10-17 | End: 2024-10-17

## 2024-10-17 RX ORDER — SODIUM CHLORIDE 0.9 % (FLUSH) 0.9 %
5-40 SYRINGE (ML) INJECTION PRN
Status: DISCONTINUED | OUTPATIENT
Start: 2024-10-17 | End: 2024-10-18 | Stop reason: HOSPADM

## 2024-10-17 RX ORDER — ACETAMINOPHEN 325 MG/1
650 TABLET ORAL ONCE
Status: CANCELLED | OUTPATIENT
Start: 2024-10-17 | End: 2024-10-17

## 2024-10-17 RX ORDER — SODIUM CHLORIDE 9 MG/ML
5-250 INJECTION, SOLUTION INTRAVENOUS PRN
Status: CANCELLED | OUTPATIENT
Start: 2024-10-17

## 2024-10-17 RX ORDER — HEPARIN 100 UNIT/ML
500 SYRINGE INTRAVENOUS PRN
Status: CANCELLED | OUTPATIENT
Start: 2024-10-17

## 2024-10-17 RX ORDER — EPINEPHRINE 1 MG/ML
0.3 INJECTION, SOLUTION, CONCENTRATE INTRAVENOUS PRN
OUTPATIENT
Start: 2024-10-17

## 2024-10-17 RX ORDER — MEPERIDINE HYDROCHLORIDE 25 MG/ML
12.5 INJECTION INTRAMUSCULAR; INTRAVENOUS; SUBCUTANEOUS PRN
OUTPATIENT
Start: 2024-10-17

## 2024-10-17 RX ORDER — SODIUM CHLORIDE 9 MG/ML
5-250 INJECTION, SOLUTION INTRAVENOUS PRN
OUTPATIENT
Start: 2024-10-17

## 2024-10-17 RX ORDER — ONDANSETRON 2 MG/ML
8 INJECTION INTRAMUSCULAR; INTRAVENOUS
OUTPATIENT
Start: 2024-10-17

## 2024-10-17 RX ORDER — HEPARIN 100 UNIT/ML
500 SYRINGE INTRAVENOUS PRN
Status: DISCONTINUED | OUTPATIENT
Start: 2024-10-17 | End: 2024-10-18 | Stop reason: HOSPADM

## 2024-10-17 RX ORDER — ACETAMINOPHEN 325 MG/1
650 TABLET ORAL
OUTPATIENT
Start: 2024-10-17

## 2024-10-17 RX ORDER — SODIUM CHLORIDE 9 MG/ML
INJECTION, SOLUTION INTRAVENOUS CONTINUOUS
OUTPATIENT
Start: 2024-10-17

## 2024-10-17 RX ADMIN — DIPHENHYDRAMINE HYDROCHLORIDE 25 MG: 25 TABLET ORAL at 07:09

## 2024-10-17 RX ADMIN — IMMUNE GLOBULIN (HUMAN) 25 G: 10 INJECTION INTRAVENOUS; SUBCUTANEOUS at 07:25

## 2024-10-17 RX ADMIN — SODIUM CHLORIDE, PRESERVATIVE FREE 10 ML: 5 INJECTION INTRAVENOUS at 07:02

## 2024-10-17 RX ADMIN — HYDROCORTISONE SODIUM SUCCINATE 100 MG: 100 INJECTION, POWDER, FOR SOLUTION INTRAMUSCULAR; INTRAVENOUS at 07:11

## 2024-10-17 RX ADMIN — SODIUM CHLORIDE, PRESERVATIVE FREE 20 ML: 5 INJECTION INTRAVENOUS at 09:00

## 2024-10-17 RX ADMIN — HEPARIN 500 UNITS: 100 SYRINGE at 09:01

## 2024-10-17 RX ADMIN — SODIUM CHLORIDE, PRESERVATIVE FREE 10 ML: 5 INJECTION INTRAVENOUS at 07:12

## 2024-10-17 RX ADMIN — ACETAMINOPHEN 650 MG: 325 TABLET ORAL at 07:09

## 2024-10-17 NOTE — DISCHARGE INSTRUCTIONS
problems, or \"thick blood\";  a stroke or blood clot;  kidney disease;  diabetes;  an infection called sepsis;  if you use estrogens (birth control pills or hormone replacement therapy);  if you have been on long-term bedrest; or  if you have a central intravenous (IV) catheter in place.  You may need a dose adjustment if you are exposed to measles, or if you travel to an area where this disease is common.  Tell your doctor if you are pregnant or breastfeeding.  Immune globulin is made from donated human plasma and may contain viruses or other infectious agents. Donated plasma is tested and treated to reduce the risk of contamination, but there is still a small possibility it could transmit disease. Ask your doctor about any possible risk.  How should I use immune globulin?  Follow all directions on your prescription label and read all medication guides or instruction sheets. Your doctor may occasionally change your dose. Use the medicine exactly as directed.  Immune globulin is given as an infusion into a vein, or injected under the skin using an infusion pump. A healthcare provider will give your first dose and may teach you how to properly use the medication by yourself.  Do not inject immune globulin into a vein if you have been instructed to give the medicine as a subcutaneous injection (under the skin).   How you give this medication, how often you use it, and the length of your infusion time will depend on the condition being treated.  Read and carefully follow any Instructions for Use provided with your medicine. Ask your doctor or pharmacist if you don't understand all instructions.  Prepare an injection only when you are ready to give it. Do not use if the medicine looks cloudy, has changed colors, or has particles in it. Call your pharmacist for new medicine.  Do not shake the medication bottle or you may ruin the medicine.  Immune globulin must be given slowly. You may need to use several catheters to  appropriate for any given patient. [facility] does not assume any responsibility for any aspect of healthcare administered with the aid of information [facility] provides. The information contained herein is not intended to cover all possible uses, directions, precautions, warnings, drug interactions, allergic reactions, or adverse effects. If you have questions about the drugs you are taking, check with your doctor, nurse or pharmacist.  Copyright 1466-3453 [employer]. Version: 4.02. Revision date: 2/26/2021.  Care instructions adapted under license by [employer]. If you have questions about a medical condition or this instruction, always ask your healthcare professional. [employer] disclaims any warranty or liability for your use of this information.

## 2024-10-17 NOTE — PROGRESS NOTES
Pt arrived to [facility] with port accessed. Port flushed and brisk blood return noted. Benadryl po,Tylenol po, and Solu medrol IV given as premeds. Gamunex- C 25 g IV administered per protocol. Port kept accessed for tomorrow's treatment. Pt tolerated well.

## 2024-10-18 ENCOUNTER — HOSPITAL ENCOUNTER (OUTPATIENT)
Dept: INFUSION THERAPY | Age: 49
Setting detail: INFUSION SERIES
Discharge: HOME | End: 2024-10-18
Payer: MEDICARE

## 2024-10-18 VITALS
TEMPERATURE: 97.2 F | DIASTOLIC BLOOD PRESSURE: 85 MMHG | SYSTOLIC BLOOD PRESSURE: 135 MMHG | RESPIRATION RATE: 18 BRPM | OXYGEN SATURATION: 99 % | HEART RATE: 71 BPM

## 2024-10-18 DIAGNOSIS — G35 EXACERBATION OF MULTIPLE SCLEROSIS (HCC): Primary | ICD-10-CM

## 2024-10-18 DIAGNOSIS — M32.9 SYSTEMIC LUPUS ERYTHEMATOSUS, UNSPECIFIED SLE TYPE, UNSPECIFIED ORGAN INVOLVEMENT STATUS (HCC): ICD-10-CM

## 2024-10-18 PROCEDURE — 6370000000 HC RX 637 (ALT 250 FOR IP): Performed by: PSYCHIATRY & NEUROLOGY

## 2024-10-18 PROCEDURE — 96374 THER/PROPH/DIAG INJ IV PUSH: CPT

## 2024-10-18 PROCEDURE — 96375 TX/PRO/DX INJ NEW DRUG ADDON: CPT

## 2024-10-18 PROCEDURE — 6360000002 HC RX W HCPCS: Performed by: PSYCHIATRY & NEUROLOGY

## 2024-10-18 RX ORDER — ALBUTEROL SULFATE 90 UG/1
4 INHALANT RESPIRATORY (INHALATION) PRN
OUTPATIENT
Start: 2024-10-18

## 2024-10-18 RX ORDER — DIPHENHYDRAMINE HCL 25 MG
25 TABLET ORAL ONCE
OUTPATIENT
Start: 2024-10-18 | End: 2024-10-18

## 2024-10-18 RX ORDER — ACETAMINOPHEN 325 MG/1
650 TABLET ORAL ONCE
Status: COMPLETED | OUTPATIENT
Start: 2024-10-18 | End: 2024-10-18

## 2024-10-18 RX ORDER — EPINEPHRINE 1 MG/ML
0.3 INJECTION, SOLUTION, CONCENTRATE INTRAVENOUS PRN
OUTPATIENT
Start: 2024-10-18

## 2024-10-18 RX ORDER — MEPERIDINE HYDROCHLORIDE 25 MG/ML
12.5 INJECTION INTRAMUSCULAR; INTRAVENOUS; SUBCUTANEOUS PRN
OUTPATIENT
Start: 2024-10-18

## 2024-10-18 RX ORDER — DIPHENHYDRAMINE HYDROCHLORIDE 50 MG/ML
50 INJECTION INTRAMUSCULAR; INTRAVENOUS
OUTPATIENT
Start: 2024-10-18

## 2024-10-18 RX ORDER — ACETAMINOPHEN 325 MG/1
650 TABLET ORAL
OUTPATIENT
Start: 2024-10-18

## 2024-10-18 RX ORDER — ONDANSETRON 2 MG/ML
8 INJECTION INTRAMUSCULAR; INTRAVENOUS
OUTPATIENT
Start: 2024-10-18

## 2024-10-18 RX ORDER — SODIUM CHLORIDE 0.9 % (FLUSH) 0.9 %
5-40 SYRINGE (ML) INJECTION PRN
OUTPATIENT
Start: 2024-10-18

## 2024-10-18 RX ORDER — SODIUM CHLORIDE 9 MG/ML
5-250 INJECTION, SOLUTION INTRAVENOUS PRN
OUTPATIENT
Start: 2024-10-18

## 2024-10-18 RX ORDER — HEPARIN 100 UNIT/ML
500 SYRINGE INTRAVENOUS PRN
OUTPATIENT
Start: 2024-10-18

## 2024-10-18 RX ORDER — HEPARIN 100 UNIT/ML
500 SYRINGE INTRAVENOUS PRN
Status: DISCONTINUED | OUTPATIENT
Start: 2024-10-18 | End: 2024-10-19 | Stop reason: HOSPADM

## 2024-10-18 RX ORDER — DIPHENHYDRAMINE HCL 25 MG
25 TABLET ORAL ONCE
Status: COMPLETED | OUTPATIENT
Start: 2024-10-18 | End: 2024-10-18

## 2024-10-18 RX ORDER — SODIUM CHLORIDE 9 MG/ML
5-250 INJECTION, SOLUTION INTRAVENOUS PRN
Status: DISCONTINUED | OUTPATIENT
Start: 2024-10-18 | End: 2024-10-19 | Stop reason: HOSPADM

## 2024-10-18 RX ORDER — SODIUM CHLORIDE 9 MG/ML
INJECTION, SOLUTION INTRAVENOUS CONTINUOUS
OUTPATIENT
Start: 2024-10-18

## 2024-10-18 RX ORDER — ACETAMINOPHEN 325 MG/1
650 TABLET ORAL ONCE
OUTPATIENT
Start: 2024-10-18 | End: 2024-10-18

## 2024-10-18 RX ADMIN — HEPARIN 500 UNITS: 100 SYRINGE at 08:47

## 2024-10-18 RX ADMIN — DIPHENHYDRAMINE HYDROCHLORIDE 25 MG: 25 TABLET ORAL at 07:08

## 2024-10-18 RX ADMIN — HYDROCORTISONE SODIUM SUCCINATE 100 MG: 100 INJECTION, POWDER, FOR SOLUTION INTRAMUSCULAR; INTRAVENOUS at 07:09

## 2024-10-18 RX ADMIN — IMMUNE GLOBULIN (HUMAN) 25 G: 10 INJECTION INTRAVENOUS; SUBCUTANEOUS at 07:20

## 2024-10-18 RX ADMIN — ACETAMINOPHEN 650 MG: 325 TABLET ORAL at 07:08

## 2024-10-18 NOTE — PROGRESS NOTES
[facility]. Brisk blood return noted. Pt premedicated as ordered. Gamunex-C 25g administered. Pt tolerated well.   Electronically signed by Ciera George RN on 10/18/2024 at 8:55 AM

## 2024-10-19 DIAGNOSIS — M54.30 SCIATICA, UNSPECIFIED LATERALITY: ICD-10-CM

## 2024-10-19 DIAGNOSIS — G35 MULTIPLE SCLEROSIS (HCC): ICD-10-CM

## 2024-10-19 DIAGNOSIS — M79.7 FIBROMYALGIA: ICD-10-CM

## 2024-10-19 DIAGNOSIS — G43.719 INTRACTABLE CHRONIC MIGRAINE WITHOUT AURA AND WITHOUT STATUS MIGRAINOSUS: ICD-10-CM

## 2024-10-21 RX ORDER — OXYCODONE HYDROCHLORIDE 10 MG/1
10 TABLET ORAL EVERY 6 HOURS PRN
Qty: 120 TABLET | Refills: 0 | Status: SHIPPED | OUTPATIENT
Start: 2024-10-25 | End: 2024-11-24

## 2024-10-21 NOTE — TELEPHONE ENCOUNTER
Ramiro Mendoza has requested a refill on her medication.      Last office visit : 8/21/2024   Next office visit : 11/14/2024   Last medication refill : 9/25/24  Daniel : up to date       Requested Prescriptions     Pending Prescriptions Disp Refills    oxyCODONE HCl (OXY-IR) 10 MG immediate release tablet 120 tablet 0     Sig: Take 1 tablet by mouth every 6 hours as needed for Pain for up to 30 days. Intended supply: 30 days Max Daily Amount: 40 mg

## 2024-10-23 ENCOUNTER — HOSPITAL ENCOUNTER (EMERGENCY)
Age: 49
Discharge: HOME | End: 2024-10-23
Attending: EMERGENCY MEDICINE
Payer: MEDICARE

## 2024-10-23 VITALS
HEART RATE: 83 BPM | OXYGEN SATURATION: 100 % | DIASTOLIC BLOOD PRESSURE: 91 MMHG | WEIGHT: 175 LBS | RESPIRATION RATE: 20 BRPM | TEMPERATURE: 98.1 F | BODY MASS INDEX: 29.12 KG/M2 | SYSTOLIC BLOOD PRESSURE: 120 MMHG

## 2024-10-23 DIAGNOSIS — L03.115 CELLULITIS OF RIGHT LOWER EXTREMITY: Primary | ICD-10-CM

## 2024-10-23 PROCEDURE — 6370000000 HC RX 637 (ALT 250 FOR IP): Performed by: EMERGENCY MEDICINE

## 2024-10-23 PROCEDURE — 99283 EMERGENCY DEPT VISIT LOW MDM: CPT

## 2024-10-23 RX ORDER — CEPHALEXIN 500 MG/1
500 CAPSULE ORAL 4 TIMES DAILY
Qty: 28 CAPSULE | Refills: 0 | Status: SHIPPED | OUTPATIENT
Start: 2024-10-23 | End: 2024-10-30

## 2024-10-23 RX ADMIN — CEPHALEXIN 500 MG: 250 CAPSULE ORAL at 16:20

## 2024-10-23 NOTE — ED PROVIDER NOTES
needs close follow-up for recheck.    Return and follow up instructions given verbally and as written below. The patient verbalized understanding and agreement with the plan. All questions answered.     ED Course as of 10/23/24 2201   Wed Oct 23, 2024   1611 Recheck at patient bedside:  Resting comfortably [JS]      ED Course User Index  [JS] aZhra Cook DO         :  Unless otherwise noted below, none     Procedures    FINAL IMPRESSION      1. Cellulitis of right lower extremity          DISPOSITION/PLAN   DISPOSITION   home           PATIENT REFERRED TO:  Darion López MD  [address] [place] [id number]  [phone]    Schedule an appointment as soon as possible for a visit on 10/25/2024  For a recheck    [facility] EMERGENCY DEPT  [address] [place] [address]  [phone]    If symptoms worsen especially if there is no improvement after 48 hours of antibiotics, fever, spreading redness, increased swelling, severe/uncontrolled or worsening pain or with any worsening symptoms or concerns      DISCHARGE MEDICATIONS:  Discharge Medication List as of 10/23/2024  4:17 PM        START taking these medications    Details   cephALEXin (KEFLEX) 500 MG capsule Take 1 capsule by mouth 4 times daily for 7 days, Disp-28 capsule, R-0Normal                (Please note that portions of this note were completed with a voice recognition program.  Efforts were made to edit thedictations but occasionally words are mis-transcribed.)EMR Dragon/Transcription disclaimer:   Much of this encounter note is an electronic transcription/translation of spoken language to printed text. The electronic translation of spoken language may permit erroneous, or at times, nonsensical words or phrases to be inadvertently transcribed; although attempts have made to review the note for such errors, some may still exist.    Zahra Cook DO (electronically signed)Emergency Physician          Zahra Cook DO  10/23/24 2206

## 2024-10-23 NOTE — ED NOTES
This RN outlined a reddened area on pt right buttock/hip. Area was swollen and reddened with a noticeable hardened area. Pt instructed to return if redness spreads or if she develops any infection-like symptoms.

## 2024-11-01 ENCOUNTER — HOSPITAL ENCOUNTER (OUTPATIENT)
Dept: INFUSION THERAPY | Age: 49
Setting detail: INFUSION SERIES
Discharge: HOME | End: 2024-11-01
Payer: MEDICARE

## 2024-11-01 VITALS
SYSTOLIC BLOOD PRESSURE: 109 MMHG | RESPIRATION RATE: 18 BRPM | DIASTOLIC BLOOD PRESSURE: 75 MMHG | WEIGHT: 195.9 LBS | HEART RATE: 76 BPM | OXYGEN SATURATION: 98 % | BODY MASS INDEX: 32.6 KG/M2 | TEMPERATURE: 98.1 F

## 2024-11-01 DIAGNOSIS — M32.9 SYSTEMIC LUPUS ERYTHEMATOSUS, UNSPECIFIED SLE TYPE, UNSPECIFIED ORGAN INVOLVEMENT STATUS (HCC): ICD-10-CM

## 2024-11-01 DIAGNOSIS — G35 EXACERBATION OF MULTIPLE SCLEROSIS (HCC): Primary | ICD-10-CM

## 2024-11-01 PROCEDURE — 6370000000 HC RX 637 (ALT 250 FOR IP): Performed by: PSYCHIATRY & NEUROLOGY

## 2024-11-01 PROCEDURE — 6360000002 HC RX W HCPCS: Performed by: PSYCHIATRY & NEUROLOGY

## 2024-11-01 PROCEDURE — 96375 TX/PRO/DX INJ NEW DRUG ADDON: CPT

## 2024-11-01 PROCEDURE — 2580000003 HC RX 258: Performed by: PSYCHIATRY & NEUROLOGY

## 2024-11-01 PROCEDURE — 96365 THER/PROPH/DIAG IV INF INIT: CPT

## 2024-11-01 RX ORDER — SODIUM CHLORIDE 0.9 % (FLUSH) 0.9 %
5-40 SYRINGE (ML) INJECTION PRN
Status: DISCONTINUED | OUTPATIENT
Start: 2024-11-01 | End: 2024-11-02 | Stop reason: HOSPADM

## 2024-11-01 RX ORDER — MEPERIDINE HYDROCHLORIDE 25 MG/ML
12.5 INJECTION INTRAMUSCULAR; INTRAVENOUS; SUBCUTANEOUS PRN
Status: CANCELLED | OUTPATIENT
Start: 2024-11-01

## 2024-11-01 RX ORDER — DIPHENHYDRAMINE HCL 25 MG
25 TABLET ORAL ONCE
Status: COMPLETED | OUTPATIENT
Start: 2024-11-01 | End: 2024-11-01

## 2024-11-01 RX ORDER — ONDANSETRON 2 MG/ML
8 INJECTION INTRAMUSCULAR; INTRAVENOUS
Status: CANCELLED | OUTPATIENT
Start: 2024-11-01

## 2024-11-01 RX ORDER — HEPARIN 100 UNIT/ML
500 SYRINGE INTRAVENOUS PRN
Status: DISCONTINUED | OUTPATIENT
Start: 2024-11-01 | End: 2024-11-02 | Stop reason: HOSPADM

## 2024-11-01 RX ORDER — ALBUTEROL SULFATE 90 UG/1
4 INHALANT RESPIRATORY (INHALATION) PRN
Status: CANCELLED | OUTPATIENT
Start: 2024-11-01

## 2024-11-01 RX ORDER — HEPARIN 100 UNIT/ML
500 SYRINGE INTRAVENOUS PRN
Status: CANCELLED | OUTPATIENT
Start: 2024-11-01

## 2024-11-01 RX ORDER — DIPHENHYDRAMINE HYDROCHLORIDE 50 MG/ML
50 INJECTION INTRAMUSCULAR; INTRAVENOUS
Status: CANCELLED | OUTPATIENT
Start: 2024-11-01

## 2024-11-01 RX ORDER — SODIUM CHLORIDE 0.9 % (FLUSH) 0.9 %
5-40 SYRINGE (ML) INJECTION PRN
Status: CANCELLED | OUTPATIENT
Start: 2024-11-01

## 2024-11-01 RX ORDER — ACETAMINOPHEN 325 MG/1
650 TABLET ORAL ONCE
Status: COMPLETED | OUTPATIENT
Start: 2024-11-01 | End: 2024-11-01

## 2024-11-01 RX ORDER — DIPHENHYDRAMINE HCL 25 MG
25 TABLET ORAL ONCE
Status: CANCELLED | OUTPATIENT
Start: 2024-11-01 | End: 2024-11-01

## 2024-11-01 RX ORDER — EPINEPHRINE 1 MG/ML
0.3 INJECTION, SOLUTION, CONCENTRATE INTRAVENOUS PRN
Status: CANCELLED | OUTPATIENT
Start: 2024-11-01

## 2024-11-01 RX ORDER — SODIUM CHLORIDE 9 MG/ML
5-250 INJECTION, SOLUTION INTRAVENOUS PRN
Status: CANCELLED | OUTPATIENT
Start: 2024-11-01

## 2024-11-01 RX ORDER — ACETAMINOPHEN 325 MG/1
650 TABLET ORAL ONCE
Status: CANCELLED | OUTPATIENT
Start: 2024-11-01 | End: 2024-11-01

## 2024-11-01 RX ORDER — ACETAMINOPHEN 325 MG/1
650 TABLET ORAL
Status: CANCELLED | OUTPATIENT
Start: 2024-11-01

## 2024-11-01 RX ORDER — SODIUM CHLORIDE 9 MG/ML
INJECTION, SOLUTION INTRAVENOUS CONTINUOUS
Status: CANCELLED | OUTPATIENT
Start: 2024-11-01

## 2024-11-01 RX ORDER — SODIUM CHLORIDE 9 MG/ML
5-250 INJECTION, SOLUTION INTRAVENOUS PRN
Status: DISCONTINUED | OUTPATIENT
Start: 2024-11-01 | End: 2024-11-02 | Stop reason: HOSPADM

## 2024-11-01 RX ADMIN — DIPHENHYDRAMINE HYDROCHLORIDE 25 MG: 25 TABLET ORAL at 07:12

## 2024-11-01 RX ADMIN — HYDROCORTISONE SODIUM SUCCINATE 100 MG: 100 INJECTION, POWDER, FOR SOLUTION INTRAMUSCULAR; INTRAVENOUS at 07:12

## 2024-11-01 RX ADMIN — IMMUNE GLOBULIN (HUMAN) 25 G: 10 INJECTION INTRAVENOUS; SUBCUTANEOUS at 07:17

## 2024-11-01 RX ADMIN — ACETAMINOPHEN 650 MG: 325 TABLET ORAL at 07:12

## 2024-11-01 RX ADMIN — SODIUM CHLORIDE, PRESERVATIVE FREE 10 ML: 5 INJECTION INTRAVENOUS at 08:38

## 2024-11-01 RX ADMIN — HEPARIN 500 UNITS: 100 SYRINGE at 08:37

## 2024-11-01 NOTE — FLOWSHEET NOTE
Patient completed Gamunex-C infusion as ordered.  Port de accessed per policy at completion of infusion, patient discharged to home. Electronically signed by Herminia Dawson RN on 11/1/2024 at 8:43 AM     11/01/24 0843   Discharge Event   Discharged with Documented Belongings Yes   Departure Mode By self   Mobility at Departure Ambulatory   Discharged to Private Residence   Time of Discharge 0843

## 2024-11-08 ENCOUNTER — HOSPITAL ENCOUNTER (OUTPATIENT)
Dept: INFUSION THERAPY | Age: 49
Setting detail: INFUSION SERIES
Discharge: HOME | End: 2024-11-08
Payer: MEDICARE

## 2024-11-08 ENCOUNTER — TELEPHONE (OUTPATIENT)
Dept: NEUROLOGY | Age: 49
End: 2024-11-08

## 2024-11-08 VITALS
HEART RATE: 78 BPM | BODY MASS INDEX: 31.78 KG/M2 | RESPIRATION RATE: 18 BRPM | SYSTOLIC BLOOD PRESSURE: 118 MMHG | DIASTOLIC BLOOD PRESSURE: 79 MMHG | TEMPERATURE: 97 F | OXYGEN SATURATION: 100 % | WEIGHT: 191 LBS

## 2024-11-08 DIAGNOSIS — M32.9 SYSTEMIC LUPUS ERYTHEMATOSUS, UNSPECIFIED SLE TYPE, UNSPECIFIED ORGAN INVOLVEMENT STATUS (HCC): ICD-10-CM

## 2024-11-08 DIAGNOSIS — M54.30 SCIATICA, UNSPECIFIED LATERALITY: Primary | ICD-10-CM

## 2024-11-08 DIAGNOSIS — M79.7 FIBROMYALGIA: ICD-10-CM

## 2024-11-08 DIAGNOSIS — G35 EXACERBATION OF MULTIPLE SCLEROSIS (HCC): Primary | ICD-10-CM

## 2024-11-08 PROCEDURE — 6360000002 HC RX W HCPCS: Performed by: PSYCHIATRY & NEUROLOGY

## 2024-11-08 PROCEDURE — 96366 THER/PROPH/DIAG IV INF ADDON: CPT

## 2024-11-08 PROCEDURE — 6370000000 HC RX 637 (ALT 250 FOR IP): Performed by: PSYCHIATRY & NEUROLOGY

## 2024-11-08 PROCEDURE — 96375 TX/PRO/DX INJ NEW DRUG ADDON: CPT

## 2024-11-08 PROCEDURE — 96365 THER/PROPH/DIAG IV INF INIT: CPT

## 2024-11-08 PROCEDURE — 2580000003 HC RX 258: Performed by: PSYCHIATRY & NEUROLOGY

## 2024-11-08 RX ORDER — ONDANSETRON 2 MG/ML
8 INJECTION INTRAMUSCULAR; INTRAVENOUS
Status: CANCELLED | OUTPATIENT
Start: 2024-11-08

## 2024-11-08 RX ORDER — ACETAMINOPHEN 325 MG/1
650 TABLET ORAL ONCE
Status: CANCELLED | OUTPATIENT
Start: 2024-11-08 | End: 2024-11-08

## 2024-11-08 RX ORDER — MEPERIDINE HYDROCHLORIDE 25 MG/ML
12.5 INJECTION INTRAMUSCULAR; INTRAVENOUS; SUBCUTANEOUS PRN
Status: CANCELLED | OUTPATIENT
Start: 2024-11-08

## 2024-11-08 RX ORDER — ALBUTEROL SULFATE 90 UG/1
4 INHALANT RESPIRATORY (INHALATION) PRN
Status: CANCELLED | OUTPATIENT
Start: 2024-11-08

## 2024-11-08 RX ORDER — ACETAMINOPHEN 325 MG/1
650 TABLET ORAL
Status: CANCELLED | OUTPATIENT
Start: 2024-11-08

## 2024-11-08 RX ORDER — HEPARIN 100 UNIT/ML
500 SYRINGE INTRAVENOUS PRN
Status: DISCONTINUED | OUTPATIENT
Start: 2024-11-08 | End: 2024-11-09 | Stop reason: HOSPADM

## 2024-11-08 RX ORDER — DIPHENHYDRAMINE HCL 25 MG
25 TABLET ORAL ONCE
Status: COMPLETED | OUTPATIENT
Start: 2024-11-08 | End: 2024-11-08

## 2024-11-08 RX ORDER — DIPHENHYDRAMINE HYDROCHLORIDE 50 MG/ML
50 INJECTION INTRAMUSCULAR; INTRAVENOUS
Status: CANCELLED | OUTPATIENT
Start: 2024-11-08

## 2024-11-08 RX ORDER — SODIUM CHLORIDE 9 MG/ML
5-250 INJECTION, SOLUTION INTRAVENOUS PRN
Status: DISCONTINUED | OUTPATIENT
Start: 2024-11-08 | End: 2024-11-09 | Stop reason: HOSPADM

## 2024-11-08 RX ORDER — ACETAMINOPHEN 325 MG/1
650 TABLET ORAL ONCE
Status: COMPLETED | OUTPATIENT
Start: 2024-11-08 | End: 2024-11-08

## 2024-11-08 RX ORDER — SODIUM CHLORIDE 0.9 % (FLUSH) 0.9 %
5-40 SYRINGE (ML) INJECTION PRN
Status: CANCELLED | OUTPATIENT
Start: 2024-11-08

## 2024-11-08 RX ORDER — HYDROMORPHONE HYDROCHLORIDE 4 MG/1
4 TABLET ORAL EVERY 12 HOURS PRN
Qty: 60 TABLET | Refills: 0 | Status: SHIPPED | OUTPATIENT
Start: 2024-11-08 | End: 2024-12-08

## 2024-11-08 RX ORDER — SODIUM CHLORIDE 9 MG/ML
5-250 INJECTION, SOLUTION INTRAVENOUS PRN
Status: CANCELLED | OUTPATIENT
Start: 2024-11-08

## 2024-11-08 RX ORDER — EPINEPHRINE 1 MG/ML
0.3 INJECTION, SOLUTION, CONCENTRATE INTRAVENOUS PRN
Status: CANCELLED | OUTPATIENT
Start: 2024-11-08

## 2024-11-08 RX ORDER — SODIUM CHLORIDE 9 MG/ML
INJECTION, SOLUTION INTRAVENOUS CONTINUOUS
Status: CANCELLED | OUTPATIENT
Start: 2024-11-08

## 2024-11-08 RX ORDER — HEPARIN 100 UNIT/ML
500 SYRINGE INTRAVENOUS PRN
Status: CANCELLED | OUTPATIENT
Start: 2024-11-08

## 2024-11-08 RX ORDER — SODIUM CHLORIDE 0.9 % (FLUSH) 0.9 %
5-40 SYRINGE (ML) INJECTION PRN
Status: DISCONTINUED | OUTPATIENT
Start: 2024-11-08 | End: 2024-11-09 | Stop reason: HOSPADM

## 2024-11-08 RX ORDER — DIPHENHYDRAMINE HCL 25 MG
25 TABLET ORAL ONCE
Status: CANCELLED | OUTPATIENT
Start: 2024-11-08 | End: 2024-11-08

## 2024-11-08 RX ADMIN — ACETAMINOPHEN 650 MG: 325 TABLET ORAL at 07:29

## 2024-11-08 RX ADMIN — SODIUM CHLORIDE, PRESERVATIVE FREE 10 ML: 5 INJECTION INTRAVENOUS at 09:04

## 2024-11-08 RX ADMIN — IMMUNE GLOBULIN (HUMAN) 25 G: 10 INJECTION INTRAVENOUS; SUBCUTANEOUS at 07:41

## 2024-11-08 RX ADMIN — HYDROCORTISONE SODIUM SUCCINATE 100 MG: 100 INJECTION, POWDER, FOR SOLUTION INTRAMUSCULAR; INTRAVENOUS at 07:29

## 2024-11-08 RX ADMIN — DIPHENHYDRAMINE HYDROCHLORIDE 25 MG: 25 TABLET ORAL at 07:29

## 2024-11-08 RX ADMIN — HEPARIN 500 UNITS: 100 SYRINGE at 09:04

## 2024-11-08 NOTE — PROGRESS NOTES
[facility] using sterile technique. Pt received premedications as ordered. Gamunex 25g administered. Pt tolerated well.     Electronically signed by Ciera George RN on 11/8/2024 at 9:23 AM

## 2024-11-08 NOTE — TELEPHONE ENCOUNTER
Patient called about needing something for breakthrough pain, she states\"I have taken everything and the pain is still constant\". Patient requesting a call back.

## 2024-11-11 ENCOUNTER — APPOINTMENT (OUTPATIENT)
Dept: INFUSION THERAPY | Age: 49
End: 2024-11-11
Payer: MEDICARE

## 2024-11-11 ENCOUNTER — HOSPITAL ENCOUNTER (INPATIENT)
Age: 49
LOS: 2 days | Discharge: HOME | DRG: 644 | End: 2024-11-14
Attending: PEDIATRICS | Admitting: STUDENT IN AN ORGANIZED HEALTH CARE EDUCATION/TRAINING PROGRAM
Payer: MEDICARE

## 2024-11-11 DIAGNOSIS — Z94.5 H/O SKIN GRAFT: ICD-10-CM

## 2024-11-11 DIAGNOSIS — E87.1 HYPONATREMIA: Primary | ICD-10-CM

## 2024-11-11 DIAGNOSIS — R41.82 ALTERED MENTAL STATUS, UNSPECIFIED ALTERED MENTAL STATUS TYPE: ICD-10-CM

## 2024-11-11 LAB
ALBUMIN SERPL-MCNC: 3.5 G/DL (ref 3.5–5.2)
ALP SERPL-CCNC: 64 U/L (ref 35–104)
ALT SERPL-CCNC: 20 U/L (ref 5–33)
ANION GAP SERPL CALCULATED.3IONS-SCNC: 10 MMOL/L (ref 7–19)
AST SERPL-CCNC: 28 U/L (ref 5–32)
BASOPHILS # BLD: 0 K/UL (ref 0–0.2)
BASOPHILS NFR BLD: 1 % (ref 0–1)
BILIRUB SERPL-MCNC: 0.2 MG/DL (ref 0.2–1.2)
BUN SERPL-MCNC: 7 MG/DL (ref 6–20)
CALCIUM SERPL-MCNC: 8.8 MG/DL (ref 8.6–10)
CHLORIDE SERPL-SCNC: 84 MMOL/L (ref 98–111)
CO2 SERPL-SCNC: 24 MMOL/L (ref 22–29)
CREAT SERPL-MCNC: 0.6 MG/DL (ref 0.5–0.9)
EOSINOPHIL # BLD: 0 K/UL (ref 0–0.6)
EOSINOPHIL NFR BLD: 0 % (ref 0–5)
ERYTHROCYTE [DISTWIDTH] IN BLOOD BY AUTOMATED COUNT: 13.3 % (ref 11.5–14.5)
GLUCOSE SERPL-MCNC: 90 MG/DL (ref 70–99)
HCT VFR BLD AUTO: 36.7 % (ref 37–47)
HGB BLD-MCNC: 12.1 G/DL (ref 12–16)
IMM GRANULOCYTES # BLD: 0.1 K/UL
LACTATE BLDV-SCNC: 0.8 MMOL/L (ref 0.5–1.9)
LYMPHOCYTES # BLD: 2.1 K/UL (ref 1.1–4.5)
LYMPHOCYTES NFR BLD: 50 % (ref 20–40)
MAGNESIUM SERPL-MCNC: 1.9 MG/DL (ref 1.6–2.6)
MCH RBC QN AUTO: 29.6 PG (ref 27–31)
MCHC RBC AUTO-ENTMCNC: 33 G/DL (ref 33–37)
MCV RBC AUTO: 89.7 FL (ref 81–99)
METAMYELOCYTES NFR BLD MANUAL: 2 %
MONOCYTES # BLD: 0.2 K/UL (ref 0–0.9)
MONOCYTES NFR BLD: 5 % (ref 0–10)
NEUTROPHILS # BLD: 1.8 K/UL (ref 1.5–7.5)
NEUTS SEG NFR BLD: 41 % (ref 50–65)
OVALOCYTES BLD QL SMEAR: ABNORMAL
PLATELET # BLD AUTO: 238 K/UL (ref 130–400)
PMV BLD AUTO: 8.9 FL (ref 9.4–12.3)
POTASSIUM SERPL-SCNC: 3.9 MMOL/L (ref 3.5–5)
PROT SERPL-MCNC: 8 G/DL (ref 6.4–8.3)
RBC # BLD AUTO: 4.09 M/UL (ref 4.2–5.4)
SODIUM SERPL-SCNC: 118 MMOL/L (ref 136–145)
TROPONIN, HIGH SENSITIVITY: 9 NG/L (ref 0–14)
VARIANT LYMPHS NFR BLD: 1 % (ref 0–8)
WBC # BLD AUTO: 4.2 K/UL (ref 4.8–10.8)

## 2024-11-11 PROCEDURE — 80053 COMPREHEN METABOLIC PANEL: CPT

## 2024-11-11 PROCEDURE — 99285 EMERGENCY DEPT VISIT HI MDM: CPT

## 2024-11-11 PROCEDURE — 83735 ASSAY OF MAGNESIUM: CPT

## 2024-11-11 PROCEDURE — 85025 COMPLETE CBC W/AUTO DIFF WBC: CPT

## 2024-11-11 PROCEDURE — 2580000003 HC RX 258: Performed by: PEDIATRICS

## 2024-11-11 PROCEDURE — 36415 COLL VENOUS BLD VENIPUNCTURE: CPT

## 2024-11-11 PROCEDURE — 84484 ASSAY OF TROPONIN QUANT: CPT

## 2024-11-11 PROCEDURE — 96360 HYDRATION IV INFUSION INIT: CPT

## 2024-11-11 PROCEDURE — 93005 ELECTROCARDIOGRAM TRACING: CPT | Performed by: PEDIATRICS

## 2024-11-11 RX ORDER — 0.9 % SODIUM CHLORIDE 0.9 %
1000 INTRAVENOUS SOLUTION INTRAVENOUS ONCE
Status: COMPLETED | OUTPATIENT
Start: 2024-11-11 | End: 2024-11-12

## 2024-11-11 RX ADMIN — SODIUM CHLORIDE 1000 ML: 9 INJECTION, SOLUTION INTRAVENOUS at 22:32

## 2024-11-11 NOTE — TELEPHONE ENCOUNTER
Requested Prescriptions     Pending Prescriptions Disp Refills    ondansetron (ZOFRAN) 4 MG tablet [Pharmacy Med Name: ONDANSETRON HYDROCHLORIDE 4MG TABLET] 30 tablet 5     Sig: TAKE ONE TABLET DAILY AS NEEDED GENERIC FOR ZOFRAN       Last Office Visit: 8/21/2024  Next Office Visit: 11/14/2024  Last Medication Refill: 6/27/2024 with 5 RF

## 2024-11-12 ENCOUNTER — APPOINTMENT (OUTPATIENT)
Dept: INFUSION THERAPY | Age: 49
End: 2024-11-12
Payer: MEDICARE

## 2024-11-12 ENCOUNTER — APPOINTMENT (OUTPATIENT)
Dept: CT IMAGING | Age: 49
DRG: 644 | End: 2024-11-12
Payer: MEDICARE

## 2024-11-12 ENCOUNTER — TELEPHONE (OUTPATIENT)
Dept: NEUROLOGY | Age: 49
End: 2024-11-12

## 2024-11-12 ENCOUNTER — APPOINTMENT (OUTPATIENT)
Dept: ULTRASOUND IMAGING | Age: 49
DRG: 644 | End: 2024-11-12
Payer: MEDICARE

## 2024-11-12 DIAGNOSIS — M54.30 SCIATICA, UNSPECIFIED LATERALITY: Primary | ICD-10-CM

## 2024-11-12 PROBLEM — E87.1 HYPONATREMIA: Status: ACTIVE | Noted: 2024-11-12

## 2024-11-12 PROBLEM — E22.2 SIADH (SYNDROME OF INAPPROPRIATE ADH PRODUCTION) (HCC): Status: ACTIVE | Noted: 2024-11-12

## 2024-11-12 LAB
AMPHET UR QL SCN: NEGATIVE
ANION GAP SERPL CALCULATED.3IONS-SCNC: 7 MMOL/L (ref 7–19)
BARBITURATES UR QL SCN: NEGATIVE
BASOPHILS # BLD: 0 K/UL (ref 0–0.2)
BASOPHILS NFR BLD: 0.9 % (ref 0–1)
BENZODIAZ UR QL SCN: POSITIVE
BUN SERPL-MCNC: 11 MG/DL (ref 6–20)
BUPRENORPHINE URINE: NEGATIVE
CALCIUM SERPL-MCNC: 8.1 MG/DL (ref 8.6–10)
CANNABINOIDS UR QL SCN: POSITIVE
CHLORIDE SERPL-SCNC: 88 MMOL/L (ref 98–111)
CO2 SERPL-SCNC: 26 MMOL/L (ref 22–29)
COCAINE UR QL SCN: NEGATIVE
CREAT SERPL-MCNC: 0.5 MG/DL (ref 0.5–0.9)
DRUG SCREEN COMMENT UR-IMP: ABNORMAL
EKG P AXIS: 61 DEGREES
EKG P-R INTERVAL: 144 MS
EKG Q-T INTERVAL: 400 MS
EKG QRS DURATION: 102 MS
EKG QTC CALCULATION (BAZETT): 403 MS
EKG T AXIS: 14 DEGREES
EOSINOPHIL # BLD: 0 K/UL (ref 0–0.6)
EOSINOPHIL NFR BLD: 1.2 % (ref 0–5)
ERYTHROCYTE [DISTWIDTH] IN BLOOD BY AUTOMATED COUNT: 13.2 % (ref 11.5–14.5)
FENTANYL SCREEN, URINE: NEGATIVE
GLUCOSE SERPL-MCNC: 87 MG/DL (ref 70–99)
HCT VFR BLD AUTO: 32 % (ref 37–47)
HGB BLD-MCNC: 10.8 G/DL (ref 12–16)
IMM GRANULOCYTES # BLD: 0.1 K/UL
LYMPHOCYTES # BLD: 1.1 K/UL (ref 1.1–4.5)
LYMPHOCYTES NFR BLD: 31.5 % (ref 20–40)
MCH RBC QN AUTO: 30.2 PG (ref 27–31)
MCHC RBC AUTO-ENTMCNC: 33.8 G/DL (ref 33–37)
MCV RBC AUTO: 89.4 FL (ref 81–99)
METHADONE UR QL SCN: NEGATIVE
METHAMPHETAMINE, URINE: NEGATIVE
MONOCYTES # BLD: 0.5 K/UL (ref 0–0.9)
MONOCYTES NFR BLD: 13.7 % (ref 0–10)
NEUTROPHILS # BLD: 1.7 K/UL (ref 1.5–7.5)
NEUTS SEG NFR BLD: 50.7 % (ref 50–65)
OPIATES UR QL SCN: NEGATIVE
OSMOLALITY SERPL CALC.SUM OF ELEC: 267 MOSM/KG (ref 275–300)
OSMOLALITY URINE: 108 MOSM/KG (ref 250–1200)
OXYCODONE UR QL SCN: NEGATIVE
PCP UR QL SCN: NEGATIVE
PLATELET # BLD AUTO: 216 K/UL (ref 130–400)
PMV BLD AUTO: 8.8 FL (ref 9.4–12.3)
POTASSIUM SERPL-SCNC: 3.7 MMOL/L (ref 3.5–5)
RBC # BLD AUTO: 3.58 M/UL (ref 4.2–5.4)
SODIUM SERPL-SCNC: 121 MMOL/L (ref 136–145)
SODIUM SERPL-SCNC: 127 MMOL/L (ref 136–145)
SODIUM SERPL-SCNC: 129 MMOL/L (ref 136–145)
SODIUM SERPL-SCNC: 131 MMOL/L (ref 136–145)
SODIUM UR-SCNC: <20 MMOL/L
TRICYCLIC ANTIDEPRESSANTS, UR: POSITIVE
TSH SERPL DL<=0.005 MIU/L-ACNC: 3.59 UIU/ML (ref 0.27–4.2)
WBC # BLD AUTO: 3.4 K/UL (ref 4.8–10.8)

## 2024-11-12 PROCEDURE — 2580000003 HC RX 258: Performed by: STUDENT IN AN ORGANIZED HEALTH CARE EDUCATION/TRAINING PROGRAM

## 2024-11-12 PROCEDURE — 84295 ASSAY OF SERUM SODIUM: CPT

## 2024-11-12 PROCEDURE — 70450 CT HEAD/BRAIN W/O DYE: CPT

## 2024-11-12 PROCEDURE — G0480 DRUG TEST DEF 1-7 CLASSES: HCPCS

## 2024-11-12 PROCEDURE — 85025 COMPLETE CBC W/AUTO DIFF WBC: CPT

## 2024-11-12 PROCEDURE — 6360000002 HC RX W HCPCS: Performed by: STUDENT IN AN ORGANIZED HEALTH CARE EDUCATION/TRAINING PROGRAM

## 2024-11-12 PROCEDURE — 80307 DRUG TEST PRSMV CHEM ANLYZR: CPT

## 2024-11-12 PROCEDURE — 84443 ASSAY THYROID STIM HORMONE: CPT

## 2024-11-12 PROCEDURE — 84300 ASSAY OF URINE SODIUM: CPT

## 2024-11-12 PROCEDURE — 6370000000 HC RX 637 (ALT 250 FOR IP): Performed by: STUDENT IN AN ORGANIZED HEALTH CARE EDUCATION/TRAINING PROGRAM

## 2024-11-12 PROCEDURE — 83605 ASSAY OF LACTIC ACID: CPT

## 2024-11-12 PROCEDURE — 1200000000 HC SEMI PRIVATE

## 2024-11-12 PROCEDURE — 36415 COLL VENOUS BLD VENIPUNCTURE: CPT

## 2024-11-12 PROCEDURE — 83935 ASSAY OF URINE OSMOLALITY: CPT

## 2024-11-12 PROCEDURE — 94760 N-INVAS EAR/PLS OXIMETRY 1: CPT

## 2024-11-12 PROCEDURE — 96361 HYDRATE IV INFUSION ADD-ON: CPT

## 2024-11-12 PROCEDURE — 80048 BASIC METABOLIC PNL TOTAL CA: CPT

## 2024-11-12 PROCEDURE — 83930 ASSAY OF BLOOD OSMOLALITY: CPT

## 2024-11-12 PROCEDURE — 93010 ELECTROCARDIOGRAM REPORT: CPT | Performed by: INTERNAL MEDICINE

## 2024-11-12 RX ORDER — SODIUM CHLORIDE 9 MG/ML
INJECTION, SOLUTION INTRAVENOUS CONTINUOUS
Status: DISCONTINUED | OUTPATIENT
Start: 2024-11-12 | End: 2024-11-12

## 2024-11-12 RX ORDER — LOSARTAN POTASSIUM 100 MG/1
100 TABLET ORAL DAILY
Status: DISCONTINUED | OUTPATIENT
Start: 2024-11-12 | End: 2024-11-14 | Stop reason: HOSPADM

## 2024-11-12 RX ORDER — POLYETHYLENE GLYCOL 3350 17 G/17G
17 POWDER, FOR SOLUTION ORAL DAILY PRN
Status: DISCONTINUED | OUTPATIENT
Start: 2024-11-12 | End: 2024-11-14 | Stop reason: HOSPADM

## 2024-11-12 RX ORDER — ALPRAZOLAM 0.5 MG
0.5 TABLET ORAL NIGHTLY PRN
Status: DISCONTINUED | OUTPATIENT
Start: 2024-11-12 | End: 2024-11-14 | Stop reason: HOSPADM

## 2024-11-12 RX ORDER — PREDNISONE 1 MG/1
1 TABLET ORAL DAILY
Status: DISCONTINUED | OUTPATIENT
Start: 2024-11-12 | End: 2024-11-14 | Stop reason: HOSPADM

## 2024-11-12 RX ORDER — BUTALBITAL, ACETAMINOPHEN AND CAFFEINE 50; 325; 40 MG/1; MG/1; MG/1
1 TABLET ORAL EVERY 4 HOURS PRN
Status: DISCONTINUED | OUTPATIENT
Start: 2024-11-12 | End: 2024-11-14 | Stop reason: HOSPADM

## 2024-11-12 RX ORDER — CARVEDILOL 12.5 MG/1
12.5 TABLET ORAL EVERY 12 HOURS SCHEDULED
Status: DISCONTINUED | OUTPATIENT
Start: 2024-11-12 | End: 2024-11-14 | Stop reason: HOSPADM

## 2024-11-12 RX ORDER — ENOXAPARIN SODIUM 100 MG/ML
40 INJECTION SUBCUTANEOUS DAILY
Status: DISCONTINUED | OUTPATIENT
Start: 2024-11-12 | End: 2024-11-14 | Stop reason: HOSPADM

## 2024-11-12 RX ORDER — DIVALPROEX SODIUM 500 MG/1
500 TABLET, DELAYED RELEASE ORAL 2 TIMES DAILY
Status: DISCONTINUED | OUTPATIENT
Start: 2024-11-12 | End: 2024-11-14 | Stop reason: HOSPADM

## 2024-11-12 RX ORDER — CLONIDINE HYDROCHLORIDE 0.1 MG/1
0.1 TABLET ORAL 2 TIMES DAILY
Status: DISCONTINUED | OUTPATIENT
Start: 2024-11-12 | End: 2024-11-14 | Stop reason: HOSPADM

## 2024-11-12 RX ORDER — MORPHINE SULFATE 30 MG/1
30 TABLET ORAL EVERY 12 HOURS PRN
Qty: 60 TABLET | Refills: 0 | Status: SHIPPED | OUTPATIENT
Start: 2024-11-12 | End: 2024-12-12

## 2024-11-12 RX ORDER — SODIUM CHLORIDE 0.9 % (FLUSH) 0.9 %
5-40 SYRINGE (ML) INJECTION EVERY 12 HOURS SCHEDULED
Status: DISCONTINUED | OUTPATIENT
Start: 2024-11-12 | End: 2024-11-14 | Stop reason: HOSPADM

## 2024-11-12 RX ORDER — MORPHINE SULFATE 15 MG/1
30 TABLET ORAL EVERY 12 HOURS
Status: DISCONTINUED | OUTPATIENT
Start: 2024-11-12 | End: 2024-11-14 | Stop reason: HOSPADM

## 2024-11-12 RX ORDER — DRONABINOL 2.5 MG/1
10 CAPSULE ORAL
Status: DISCONTINUED | OUTPATIENT
Start: 2024-11-12 | End: 2024-11-14 | Stop reason: HOSPADM

## 2024-11-12 RX ORDER — MYCOPHENOLATE MOFETIL 250 MG/1
1500 CAPSULE ORAL 2 TIMES DAILY
Status: DISCONTINUED | OUTPATIENT
Start: 2024-11-12 | End: 2024-11-14 | Stop reason: HOSPADM

## 2024-11-12 RX ORDER — POTASSIUM CHLORIDE 1500 MG/1
40 TABLET, EXTENDED RELEASE ORAL PRN
Status: DISCONTINUED | OUTPATIENT
Start: 2024-11-12 | End: 2024-11-14 | Stop reason: HOSPADM

## 2024-11-12 RX ORDER — AMLODIPINE BESYLATE 10 MG/1
10 TABLET ORAL DAILY
Status: DISCONTINUED | OUTPATIENT
Start: 2024-11-12 | End: 2024-11-14 | Stop reason: HOSPADM

## 2024-11-12 RX ORDER — MAGNESIUM SULFATE IN WATER 40 MG/ML
2000 INJECTION, SOLUTION INTRAVENOUS PRN
Status: DISCONTINUED | OUTPATIENT
Start: 2024-11-12 | End: 2024-11-14 | Stop reason: HOSPADM

## 2024-11-12 RX ORDER — OXYCODONE HYDROCHLORIDE 10 MG/1
10 TABLET ORAL EVERY 6 HOURS PRN
Status: DISCONTINUED | OUTPATIENT
Start: 2024-11-12 | End: 2024-11-14 | Stop reason: HOSPADM

## 2024-11-12 RX ORDER — ONDANSETRON 4 MG/1
TABLET, FILM COATED ORAL
Qty: 30 TABLET | Refills: 5 | Status: SHIPPED | OUTPATIENT
Start: 2024-11-12

## 2024-11-12 RX ORDER — BISMUTH TRIBROMOPH/PETROLATUM 5"X9"
1 BANDAGE TOPICAL DAILY
Status: DISCONTINUED | OUTPATIENT
Start: 2024-11-12 | End: 2024-11-14 | Stop reason: HOSPADM

## 2024-11-12 RX ORDER — SUCRALFATE 1 G/1
1 TABLET ORAL
Status: DISCONTINUED | OUTPATIENT
Start: 2024-11-12 | End: 2024-11-14 | Stop reason: HOSPADM

## 2024-11-12 RX ORDER — ONDANSETRON 2 MG/ML
4 INJECTION INTRAMUSCULAR; INTRAVENOUS EVERY 6 HOURS PRN
Status: DISCONTINUED | OUTPATIENT
Start: 2024-11-12 | End: 2024-11-14 | Stop reason: HOSPADM

## 2024-11-12 RX ORDER — LACTULOSE 10 G/15ML
10 SOLUTION ORAL DAILY
Status: DISCONTINUED | OUTPATIENT
Start: 2024-11-12 | End: 2024-11-12

## 2024-11-12 RX ORDER — AMITRIPTYLINE HYDROCHLORIDE 50 MG/1
100 TABLET ORAL NIGHTLY
Status: DISCONTINUED | OUTPATIENT
Start: 2024-11-12 | End: 2024-11-13

## 2024-11-12 RX ORDER — SODIUM CHLORIDE 9 MG/ML
INJECTION, SOLUTION INTRAVENOUS PRN
Status: DISCONTINUED | OUTPATIENT
Start: 2024-11-12 | End: 2024-11-14 | Stop reason: HOSPADM

## 2024-11-12 RX ORDER — SODIUM CHLORIDE 0.9 % (FLUSH) 0.9 %
5-40 SYRINGE (ML) INJECTION PRN
Status: DISCONTINUED | OUTPATIENT
Start: 2024-11-12 | End: 2024-11-14 | Stop reason: HOSPADM

## 2024-11-12 RX ORDER — ACETAMINOPHEN 325 MG/1
650 TABLET ORAL EVERY 6 HOURS PRN
Status: DISCONTINUED | OUTPATIENT
Start: 2024-11-12 | End: 2024-11-14 | Stop reason: HOSPADM

## 2024-11-12 RX ORDER — POTASSIUM CHLORIDE 7.45 MG/ML
10 INJECTION INTRAVENOUS PRN
Status: DISCONTINUED | OUTPATIENT
Start: 2024-11-12 | End: 2024-11-14 | Stop reason: HOSPADM

## 2024-11-12 RX ADMIN — OXYCODONE HYDROCHLORIDE 10 MG: 10 TABLET ORAL at 05:59

## 2024-11-12 RX ADMIN — CARVEDILOL 12.5 MG: 12.5 TABLET, FILM COATED ORAL at 07:52

## 2024-11-12 RX ADMIN — MORPHINE SULFATE 30 MG: 15 TABLET ORAL at 20:21

## 2024-11-12 RX ADMIN — DRONABINOL 10 MG: 2.5 CAPSULE ORAL at 05:47

## 2024-11-12 RX ADMIN — LACTULOSE 10 G: 20 SOLUTION ORAL at 07:53

## 2024-11-12 RX ADMIN — MYCOPHENOLATE MOFETIL 1500 MG: 250 CAPSULE ORAL at 09:23

## 2024-11-12 RX ADMIN — PREDNISONE 1 MG: 1 TABLET ORAL at 09:23

## 2024-11-12 RX ADMIN — Medication 25 MG: at 09:27

## 2024-11-12 RX ADMIN — SUCRALFATE 1 G: 1 TABLET ORAL at 20:58

## 2024-11-12 RX ADMIN — CLONIDINE HYDROCHLORIDE 0.1 MG: 0.1 TABLET ORAL at 20:58

## 2024-11-12 RX ADMIN — LINACLOTIDE 145 MCG: 145 CAPSULE, GELATIN COATED ORAL at 05:47

## 2024-11-12 RX ADMIN — ACETAMINOPHEN 650 MG: 325 TABLET ORAL at 07:52

## 2024-11-12 RX ADMIN — DIVALPROEX SODIUM 500 MG: 500 TABLET, DELAYED RELEASE ORAL at 07:53

## 2024-11-12 RX ADMIN — DRONABINOL 10 MG: 2.5 CAPSULE ORAL at 15:55

## 2024-11-12 RX ADMIN — CARVEDILOL 12.5 MG: 12.5 TABLET, FILM COATED ORAL at 20:58

## 2024-11-12 RX ADMIN — MORPHINE SULFATE 30 MG: 15 TABLET ORAL at 09:24

## 2024-11-12 RX ADMIN — CLONIDINE HYDROCHLORIDE 0.1 MG: 0.1 TABLET ORAL at 07:52

## 2024-11-12 RX ADMIN — Medication 1 TABLET: at 07:53

## 2024-11-12 RX ADMIN — SODIUM CHLORIDE 75 ML/HR: 9 INJECTION, SOLUTION INTRAVENOUS at 03:14

## 2024-11-12 RX ADMIN — AMLODIPINE BESYLATE 10 MG: 10 TABLET ORAL at 07:53

## 2024-11-12 RX ADMIN — MYCOPHENOLATE MOFETIL 1500 MG: 250 CAPSULE ORAL at 20:58

## 2024-11-12 RX ADMIN — LOSARTAN POTASSIUM 100 MG: 100 TABLET, FILM COATED ORAL at 07:52

## 2024-11-12 RX ADMIN — ENOXAPARIN SODIUM 40 MG: 100 INJECTION SUBCUTANEOUS at 07:51

## 2024-11-12 RX ADMIN — Medication 15 G: at 09:22

## 2024-11-12 RX ADMIN — Medication 1 EACH: at 10:53

## 2024-11-12 RX ADMIN — SUCRALFATE 1 G: 1 TABLET ORAL at 15:55

## 2024-11-12 RX ADMIN — SUCRALFATE 1 G: 1 TABLET ORAL at 09:23

## 2024-11-12 SDOH — ECONOMIC STABILITY: FOOD INSECURITY: WITHIN THE PAST 12 MONTHS, YOU WORRIED THAT YOUR FOOD WOULD RUN OUT BEFORE YOU GOT MONEY TO BUY MORE.: NEVER TRUE

## 2024-11-12 SDOH — ECONOMIC STABILITY: INCOME INSECURITY: HOW HARD IS IT FOR YOU TO PAY FOR THE VERY BASICS LIKE FOOD, HOUSING, MEDICAL CARE, AND HEATING?: NOT VERY HARD

## 2024-11-12 SDOH — ECONOMIC STABILITY: INCOME INSECURITY: IN THE PAST 12 MONTHS, HAS THE ELECTRIC, GAS, OIL, OR WATER COMPANY THREATENED TO SHUT OFF SERVICE IN YOUR HOME?: NO

## 2024-11-12 ASSESSMENT — PAIN DESCRIPTION - ORIENTATION
ORIENTATION: LOWER;RIGHT
ORIENTATION: RIGHT

## 2024-11-12 ASSESSMENT — PAIN SCALES - GENERAL
PAINLEVEL_OUTOF10: 10
PAINLEVEL_OUTOF10: 7
PAINLEVEL_OUTOF10: 10
PAINLEVEL_OUTOF10: 9
PAINLEVEL_OUTOF10: 10

## 2024-11-12 ASSESSMENT — ENCOUNTER SYMPTOMS
DIARRHEA: 1
NAUSEA: 1

## 2024-11-12 ASSESSMENT — PATIENT HEALTH QUESTIONNAIRE - PHQ9
SUM OF ALL RESPONSES TO PHQ QUESTIONS 1-9: 0
2. FEELING DOWN, DEPRESSED OR HOPELESS: NOT AT ALL
SUM OF ALL RESPONSES TO PHQ9 QUESTIONS 1 & 2: 0
SUM OF ALL RESPONSES TO PHQ QUESTIONS 1-9: 0
1. LITTLE INTEREST OR PLEASURE IN DOING THINGS: NOT AT ALL
SUM OF ALL RESPONSES TO PHQ QUESTIONS 1-9: 0
SUM OF ALL RESPONSES TO PHQ QUESTIONS 1-9: 0

## 2024-11-12 ASSESSMENT — PAIN DESCRIPTION - LOCATION
LOCATION: HIP
LOCATION: HIP

## 2024-11-12 ASSESSMENT — PAIN DESCRIPTION - DESCRIPTORS
DESCRIPTORS: ACHING;BURNING;SORE;THROBBING
DESCRIPTORS: ACHING
DESCRIPTORS: ACHING;SHARP

## 2024-11-12 NOTE — ED PROVIDER NOTES
[facility] 4 ONCOLOGY UNIT  eMERGENCY dEPARTMENT eNCOUnter      Pt Name: Ramiro Mendoza  MRN: [id number]  Birthdate 1975  Date of evaluation: 11/11/2024  Provider: Aylin Krishna MD    CHIEF COMPLAINT       Chief Complaint   Patient presents with    Post-op Problem     Skin grafts last week r/t blisters from lupus per pt; reports new blisters have developed, reports some are turning black    Blister    Wound Check         HISTORY OF PRESENT ILLNESS   (Location/Symptom, Timing/Onset,Context/Setting, Quality, Duration, Modifying Factors, Severity)  Note limiting factors.   Ramiro Mendoza is a 49 y.o. female who presents to the emergency department for postoperative wound check.  Patient states that she had 2 skin grafts placed.  Patient had bullous lesions secondary to her lupus.  Patient also reports fatigued with generalized weakness.  Patient has been nauseated.  Patient denies vomiting, diarrhea, passing out, difficulty breathing, chest pain,    HPI    NursingNotes were reviewed.    REVIEW OF SYSTEMS    (2-9 systems for level 4, 10 or more for level 5)     Review of Systems   Constitutional:  Positive for fatigue.   Cardiovascular:  Positive for leg swelling.   Gastrointestinal:  Positive for diarrhea and nausea.   Skin:  Positive for wound.   Neurological:  Positive for weakness.   Psychiatric/Behavioral:  Positive for confusion.    All other systems reviewed and are negative.           PAST MEDICALHISTORY     Past Medical History:   Diagnosis Date    Arthritis     Bipolar 1 disorder (HCC)     Convulsions (HCC) 07/20/2020    Fibromyalgia     GERD (gastroesophageal reflux disease)     Hypertension     Lupus     sees  in [facility]    Migraines     Movement disorder     Multiple sclerosis (HCC)     gets infusions weekly    Neuromuscular disorder (HCC)     Psychiatric problem          SURGICAL HISTORY       Past Surgical History:   Procedure Laterality Date    ABDOMINAL EXPLORATION SURGERY  2021

## 2024-11-12 NOTE — PLAN OF CARE
Problem: Skin/Tissue Integrity  Goal: Absence of new skin breakdown  Description: 1.  Monitor for areas of redness and/or skin breakdown  2.  Assess vascular access sites hourly  3.  Every 4-6 hours minimum:  Change oxygen saturation probe site  4.  Every 4-6 hours:  If on nasal continuous positive airway pressure, respiratory therapy assess nares and determine need for appliance change or resting period.  11/12/2024 1015 by Aylin Flores, RN  Outcome: Progressing  11/12/2024 0628 by Yamilet Steiner, RN  Outcome: Progressing     Problem: Safety - Adult  Goal: Free from fall injury  11/12/2024 1015 by Aylin Flores, RN  Outcome: Progressing  11/12/2024 0628 by Yamilet Steiner, RN  Outcome: Progressing     Problem: Pain  Goal: Verbalizes/displays adequate comfort level or baseline comfort level  Outcome: Progressing

## 2024-11-12 NOTE — TELEPHONE ENCOUNTER
Spoke to Bijal at [facility] pharmacy about script for MSIR 30mg Q 12 hours PRN. Patient had just filled script for Oxycodone 10mg  Q 6 hours #120 on 10-25-24. Dr Arredondo did send in MSIR 30mg for break through pain. Patient is now in the hospital. Instructed to put the script for MSIR on hold. Bijal will place on hold for now.  
Detail Level: Simple

## 2024-11-12 NOTE — TELEPHONE ENCOUNTER
Patient couldn't find the Dilaudid anywhere so we called [facility] and [facility] and canceled both scripts.

## 2024-11-12 NOTE — H&P
Hospitalist: History and Physical    Date: 2024 Time: 4:21 AM    Name: Ramiro Mendoza : 1975 MRN: [id number]    Code Status: Full Code No additional code details    PCP: Darion López MD    Patient's Chief Complaint Is: weakness  HPI: Patient is a 49 y.o. female with a past medical history as listed below . Patient presented to [facility] ED due to nausea and generalized weakness. She has a history of Lupus and had skin graft procedure recently. Patient denies fever, chills, confusion, visual problem, dysphagia,  shortness of breath, chest pain, cough, bowel or bladder incontinence, abdominal pain, diarrhea or dysuria. She reported multiple episodes of falling in the past few weeks due to low Sodium. She is on Lasix for BP control. I recommended that she hold off on Lasix now due to concern for syncope. Her BP meds need to be readjusted  Blood work significant for hyponatremia of 118.   Urine drug screen positive for cannabinoid.     Past Medical History:   Diagnosis Date    Arthritis     Bipolar 1 disorder (HCC)     Convulsions (HCC) 2020    Fibromyalgia     GERD (gastroesophageal reflux disease)     Hypertension     Lupus     sees  in [facility]    Migraines     Movement disorder     Multiple sclerosis ([facility])     gets infusions weekly    Neuromuscular disorder ([facility])     Psychiatric problem      Past Surgical History:   Procedure Laterality Date    ABDOMINAL EXPLORATION SURGERY      ADENOIDECTOMY       SECTION      x 2     SECTION, CLASSIC N/A     COLONOSCOPY      DILATATION, ESOPHAGUS      EXTERNAL EAR SURGERY      lobe    GASTRIC BAND      and removed     GASTRIC BYPASS SURGERY      HERNIA REPAIR      HERNIA REPAIR N/A     HYSTERECTOMY (CERVIX STATUS UNKNOWN)      PORT SURGERY N/A 2022    PORT INSERTION WITH ULTRASOUND & FLUOROSCOPY performed by Caty Brown MD at [facility] OR    SEPTOPLASTY      SKIN GRAFT      SMALL INTESTINE SURGERY

## 2024-11-12 NOTE — DISCHARGE INSTRUCTIONS
Stress and Coping: Mindfulness Can Help (02:29)  Your health professional recommends that you watch this short online health video.  Learn how two people practice mindfulness to help them cope with stress or a health problem.   Purpose: Learn how to use mindfulness to reduce stress or live with a chronic condition.  Goal: Learn how to use mindfulness to reduce stress or live with a chronic condition.    Watch: Scan the QR code or visit the link to view video       [email or web address]  Current as of: June 24, 2023  Content Version: 14.2  © 2024 [employer].   Care instructions adapted under license by [employer]. If you have questions about a medical condition or this instruction, always ask your healthcare professional. [employer] disclaims any warranty or liability for your use of this information.

## 2024-11-13 ENCOUNTER — APPOINTMENT (OUTPATIENT)
Dept: INFUSION THERAPY | Age: 49
End: 2024-11-13
Payer: MEDICARE

## 2024-11-13 LAB
ANION GAP SERPL CALCULATED.3IONS-SCNC: 9 MMOL/L (ref 7–19)
BASOPHILS # BLD: 0 K/UL (ref 0–0.2)
BASOPHILS NFR BLD: 1 % (ref 0–1)
BUN SERPL-MCNC: 13 MG/DL (ref 6–20)
CALCIUM SERPL-MCNC: 8.7 MG/DL (ref 8.6–10)
CHLORIDE SERPL-SCNC: 96 MMOL/L (ref 98–111)
CO2 SERPL-SCNC: 27 MMOL/L (ref 22–29)
CREAT SERPL-MCNC: 0.7 MG/DL (ref 0.5–0.9)
EOSINOPHIL # BLD: 0 K/UL (ref 0–0.6)
EOSINOPHIL NFR BLD: 0.3 % (ref 0–5)
ERYTHROCYTE [DISTWIDTH] IN BLOOD BY AUTOMATED COUNT: 13.8 % (ref 11.5–14.5)
GLUCOSE SERPL-MCNC: 83 MG/DL (ref 70–99)
HCT VFR BLD AUTO: 33.4 % (ref 37–47)
HGB BLD-MCNC: 10.9 G/DL (ref 12–16)
IMM GRANULOCYTES # BLD: 0 K/UL
LYMPHOCYTES # BLD: 1.2 K/UL (ref 1.1–4.5)
LYMPHOCYTES NFR BLD: 41.7 % (ref 20–40)
MCH RBC QN AUTO: 29.9 PG (ref 27–31)
MCHC RBC AUTO-ENTMCNC: 32.6 G/DL (ref 33–37)
MCV RBC AUTO: 91.8 FL (ref 81–99)
MONOCYTES # BLD: 0.5 K/UL (ref 0–0.9)
MONOCYTES NFR BLD: 16.7 % (ref 0–10)
NEUTROPHILS # BLD: 1.1 K/UL (ref 1.5–7.5)
NEUTS SEG NFR BLD: 39.3 % (ref 50–65)
PLATELET # BLD AUTO: 253 K/UL (ref 130–400)
PMV BLD AUTO: 9.4 FL (ref 9.4–12.3)
POTASSIUM SERPL-SCNC: 4.3 MMOL/L (ref 3.5–5)
RBC # BLD AUTO: 3.64 M/UL (ref 4.2–5.4)
SODIUM SERPL-SCNC: 132 MMOL/L (ref 136–145)
SODIUM SERPL-SCNC: 132 MMOL/L (ref 136–145)
WBC # BLD AUTO: 2.9 K/UL (ref 4.8–10.8)

## 2024-11-13 PROCEDURE — 6360000002 HC RX W HCPCS: Performed by: STUDENT IN AN ORGANIZED HEALTH CARE EDUCATION/TRAINING PROGRAM

## 2024-11-13 PROCEDURE — 6370000000 HC RX 637 (ALT 250 FOR IP): Performed by: INTERNAL MEDICINE

## 2024-11-13 PROCEDURE — 94760 N-INVAS EAR/PLS OXIMETRY 1: CPT

## 2024-11-13 PROCEDURE — 6370000000 HC RX 637 (ALT 250 FOR IP): Performed by: STUDENT IN AN ORGANIZED HEALTH CARE EDUCATION/TRAINING PROGRAM

## 2024-11-13 PROCEDURE — 1200000000 HC SEMI PRIVATE

## 2024-11-13 PROCEDURE — 80048 BASIC METABOLIC PNL TOTAL CA: CPT

## 2024-11-13 PROCEDURE — 2580000003 HC RX 258: Performed by: STUDENT IN AN ORGANIZED HEALTH CARE EDUCATION/TRAINING PROGRAM

## 2024-11-13 PROCEDURE — 36415 COLL VENOUS BLD VENIPUNCTURE: CPT

## 2024-11-13 PROCEDURE — 97165 OT EVAL LOW COMPLEX 30 MIN: CPT

## 2024-11-13 PROCEDURE — 85025 COMPLETE CBC W/AUTO DIFF WBC: CPT

## 2024-11-13 PROCEDURE — 84295 ASSAY OF SERUM SODIUM: CPT

## 2024-11-13 RX ORDER — DIAZEPAM 10 MG/1
10 TABLET ORAL 2 TIMES DAILY
COMMUNITY

## 2024-11-13 RX ORDER — FUROSEMIDE 20 MG/1
20 TABLET ORAL 2 TIMES DAILY
Status: DISCONTINUED | OUTPATIENT
Start: 2024-11-13 | End: 2024-11-14 | Stop reason: HOSPADM

## 2024-11-13 RX ORDER — TOPIRAMATE 200 MG/1
200 TABLET, FILM COATED ORAL 4 TIMES DAILY
COMMUNITY

## 2024-11-13 RX ADMIN — SUCRALFATE 1 G: 1 TABLET ORAL at 16:29

## 2024-11-13 RX ADMIN — ONDANSETRON 4 MG: 2 INJECTION INTRAMUSCULAR; INTRAVENOUS at 06:14

## 2024-11-13 RX ADMIN — SUCRALFATE 1 G: 1 TABLET ORAL at 12:11

## 2024-11-13 RX ADMIN — MYCOPHENOLATE MOFETIL 1500 MG: 250 CAPSULE ORAL at 08:20

## 2024-11-13 RX ADMIN — FUROSEMIDE 20 MG: 20 TABLET ORAL at 16:29

## 2024-11-13 RX ADMIN — CARVEDILOL 12.5 MG: 12.5 TABLET, FILM COATED ORAL at 20:36

## 2024-11-13 RX ADMIN — CLONIDINE HYDROCHLORIDE 0.1 MG: 0.1 TABLET ORAL at 08:22

## 2024-11-13 RX ADMIN — Medication 1 EACH: at 08:21

## 2024-11-13 RX ADMIN — ONDANSETRON 4 MG: 2 INJECTION INTRAMUSCULAR; INTRAVENOUS at 20:34

## 2024-11-13 RX ADMIN — SUCRALFATE 1 G: 1 TABLET ORAL at 05:26

## 2024-11-13 RX ADMIN — OXYCODONE HYDROCHLORIDE 10 MG: 10 TABLET ORAL at 14:52

## 2024-11-13 RX ADMIN — AMLODIPINE BESYLATE 10 MG: 10 TABLET ORAL at 08:22

## 2024-11-13 RX ADMIN — DRONABINOL 10 MG: 2.5 CAPSULE ORAL at 14:46

## 2024-11-13 RX ADMIN — ONDANSETRON 4 MG: 2 INJECTION INTRAMUSCULAR; INTRAVENOUS at 14:46

## 2024-11-13 RX ADMIN — SUCRALFATE 1 G: 1 TABLET ORAL at 20:36

## 2024-11-13 RX ADMIN — MORPHINE SULFATE 30 MG: 15 TABLET ORAL at 08:22

## 2024-11-13 RX ADMIN — MORPHINE SULFATE 30 MG: 15 TABLET ORAL at 20:36

## 2024-11-13 RX ADMIN — PREDNISONE 1 MG: 1 TABLET ORAL at 08:22

## 2024-11-13 RX ADMIN — LINACLOTIDE 145 MCG: 145 CAPSULE, GELATIN COATED ORAL at 05:26

## 2024-11-13 RX ADMIN — CARVEDILOL 12.5 MG: 12.5 TABLET, FILM COATED ORAL at 08:22

## 2024-11-13 RX ADMIN — FUROSEMIDE 20 MG: 20 TABLET ORAL at 13:53

## 2024-11-13 RX ADMIN — Medication 25 MG: at 05:26

## 2024-11-13 RX ADMIN — SODIUM CHLORIDE, PRESERVATIVE FREE 10 ML: 5 INJECTION INTRAVENOUS at 20:38

## 2024-11-13 RX ADMIN — SODIUM CHLORIDE, PRESERVATIVE FREE 10 ML: 5 INJECTION INTRAVENOUS at 08:23

## 2024-11-13 RX ADMIN — BUTALBITAL, ACETAMINOPHEN, AND CAFFEINE 1 TABLET: 325; 50; 40 TABLET ORAL at 12:17

## 2024-11-13 RX ADMIN — Medication 1 TABLET: at 08:23

## 2024-11-13 RX ADMIN — BUTALBITAL, ACETAMINOPHEN, AND CAFFEINE 1 TABLET: 325; 50; 40 TABLET ORAL at 16:29

## 2024-11-13 RX ADMIN — DIVALPROEX SODIUM 500 MG: 500 TABLET, DELAYED RELEASE ORAL at 20:36

## 2024-11-13 RX ADMIN — OXYCODONE HYDROCHLORIDE 10 MG: 10 TABLET ORAL at 03:13

## 2024-11-13 RX ADMIN — DRONABINOL 10 MG: 2.5 CAPSULE ORAL at 05:25

## 2024-11-13 RX ADMIN — CLONIDINE HYDROCHLORIDE 0.1 MG: 0.1 TABLET ORAL at 20:36

## 2024-11-13 RX ADMIN — MYCOPHENOLATE MOFETIL 1500 MG: 250 CAPSULE ORAL at 20:35

## 2024-11-13 RX ADMIN — ENOXAPARIN SODIUM 40 MG: 100 INJECTION SUBCUTANEOUS at 08:20

## 2024-11-13 RX ADMIN — Medication 15 G: at 13:53

## 2024-11-13 ASSESSMENT — PAIN SCALES - GENERAL
PAINLEVEL_OUTOF10: 10
PAINLEVEL_OUTOF10: 7
PAINLEVEL_OUTOF10: 6

## 2024-11-13 ASSESSMENT — ENCOUNTER SYMPTOMS
VOICE CHANGE: 0
RHINORRHEA: 0
VOMITING: 0
COLOR CHANGE: 0
COUGH: 0
CONSTIPATION: 0
NAUSEA: 0
SHORTNESS OF BREATH: 0
DIARRHEA: 0
BACK PAIN: 1

## 2024-11-13 NOTE — PROGRESS NOTES
[facility]  Nurse  Consult Note       NAME:  Ramiro Mendoza  MEDICAL RECORD NUMBER:  [id number]  AGE: 49 y.o.   GENDER: female  : 1975  TODAY'S DATE:  2024    Subjective   Reason for Wound Nurse Evaluation and Assessment: right hip wound from ruptured blister - now with dry eschar. Right ankle skin graft site      Ramiro Mendoza is a 49 y.o. female referred by:   [x] Physician  [] Nursing  [] Other:     Wound Identification:  Wound Type:  blisters  Contributing Factors: immunosuppression, hx lupus, hx MS    Wound History: Patient is currently followed by outpatient wound care at [facility]. She recently had a skin graft to her right thigh. Patient states that she had a blister on her right hip that ruptured and has not healed properly. The wound bed is covered with dry eschar at this time. The patient states that the wound is painful      Current Wound Care Treatment:      RIGHT HIP: Cleanse wound with soap and water. Apply Opticell AG cut to fit the wound bed. Moisten Opticell AG with saline. Secure with silicone border. Change every 2 days and PRN    RIGHT ANKLE: Apply Xeroform cut slightly larger than the wound and doubled to the wound bed. Cover with dry dressing. Change daily and PRN    Patient Goal of Care:  [x] Wound Healing  [] Odor Control  [] Palliative Care  [] Pain Control   [] Other:         PAST MEDICAL HISTORY        Diagnosis Date    Arthritis     Bipolar 1 disorder (HCC)     Convulsions (HCC) 2020    Fibromyalgia     GERD (gastroesophageal reflux disease)     Hypertension     Lupus     sees  in [facility]    Migraines     Movement disorder     Multiple sclerosis (HCC)     gets infusions weekly    Neuromuscular disorder (HCC)     Psychiatric problem        PAST SURGICAL HISTORY    Past Surgical History:   Procedure Laterality Date    ABDOMINAL EXPLORATION SURGERY      ADENOIDECTOMY       SECTION      x 2     SECTION, CLASSIC N/A     COLONOSCOPY      
 Occupational Therapy Initial Assessment  Date: 2024   Patient Name: Ramiro Mendoza  MRN: [id number]     : 1975    Date of Service: 2024    Discharge Recommendations:  24 hour supervision or assist       General  Chart Reviewed: Yes  Patient assessed for rehabilitation services?: Yes    Assessment   Assessment: Pt agreeable to treatment.  Pt able to ambulate in room and hallway with SPC.  Recommend home with intermittent to 24 hour assist.  Prognosis: Good  Decision Making: Low Complexity  REQUIRES OT FOLLOW-UP: Yes  Activity Tolerance  Activity Tolerance: Patient Tolerated treatment well            Patient Diagnosis(es): The primary encounter diagnosis was Hyponatremia. Diagnoses of Altered mental status, unspecified altered mental status type and H/O skin graft were also pertinent to this visit.    Past Medical History:   Past Medical History:   Diagnosis Date    Arthritis     Bipolar 1 disorder (HCC)     Convulsions (HCC) 2020    Fibromyalgia     GERD (gastroesophageal reflux disease)     Hypertension     Lupus     sees  in [facility]    Migraines     Movement disorder     Multiple sclerosis (HCC)     gets infusions weekly    Neuromuscular disorder (HCC)     Psychiatric problem         Past Surgical History:   Past Surgical History:   Procedure Laterality Date    ABDOMINAL EXPLORATION SURGERY      ADENOIDECTOMY       SECTION      x 2     SECTION, CLASSIC N/A     COLONOSCOPY      DILATATION, ESOPHAGUS      EXTERNAL EAR SURGERY      lobe    GASTRIC BAND      and removed     GASTRIC BYPASS SURGERY      HERNIA REPAIR      HERNIA REPAIR N/A     HYSTERECTOMY (CERVIX STATUS UNKNOWN)      PORT SURGERY N/A 2022    PORT INSERTION WITH ULTRASOUND & FLUOROSCOPY performed by Caty Brown MD at [facility] OR    SEPTOPLASTY      SKIN GRAFT      SMALL INTESTINE SURGERY      TONSILLECTOMY      TONSILLECTOMY AND ADENOIDECTOMY      UPPER GASTROINTESTINAL ENDOSCOPY        
4 Eyes Skin Assessment     NAME:  Ramiro Mendoza  YOB: 1975  MEDICAL RECORD NUMBER:  [id number]    The patient is being assessed for  Admission    I agree that at least one RN has performed a thorough Head to Toe Skin Assessment on the patient. ALL assessment sites listed below have been assessed.      Areas assessed by both nurses:    Head, Face, Ears, Shoulders, Back, Chest, Arms, Elbows, Hands, Sacrum. Buttock, Coccyx, Ischium, Legs. Feet and Heels, and Under Medical Devices         Does the Patient have a Wound? Yes wound(s) were present on assessment. LDA wound assessment was Initiated and completed by RN       [place] Prevention initiated by RN: Yes  Wound Care Orders initiated by RN: Yes    Pressure Injury (Stage 3,4, Unstageable, DTI, NWPT, and Complex wounds) if present, place Wound referral order by RN under : No    New Ostomies, if present place, Ostomy referral order under : No     Nurse 1 eSignature: Electronically signed by Yamilet Steiner RN on 11/12/24 at 6:31 AM CST    **SHARE this note so that the co-signing nurse can place an eSignature**    Nurse 2 eSignature: Electronically signed by Sabas Reyes RN on 11/12/24 at 6:33 AM CST   
ED TO INPATIENT SBAR HANDOFF    Patient Name: Ramiro Mendoza   Preferred Name: Rissa  : 1975  49 y.o.   Family/Caregiver Present: no   Code Status Order: Full Code  PO Status: NPO:No  Telemetry Order:   C-SSRS: Risk of Suicide: No Risk  Sitter no     Restraints:     Sepsis Risk Score      Situation  Chief Complaint   Patient presents with    Post-op Problem     Skin grafts last week r/t blisters from lupus per pt; reports new blisters have developed, reports some are turning black    Blister    Wound Check     Brief Description of Patient's Condition: patient presented to ER due to blisters. States she has a history of lupus and had skin grafts last week due to existing blisters but states there are now more blisters present and some are turning black. Sodium was found to be low - 118 initially, 121 after fluids.   Mental Status: alert, coherent, logical, thought processes intact, and able to concentrate and follow conversation  Arrived from:Home  Imaging:   No orders to display     Abnormal labs:   Abnormal Labs Reviewed   CBC WITH AUTO DIFFERENTIAL - Abnormal; Notable for the following components:       Result Value    WBC 4.2 (*)     RBC 4.09 (*)     Hematocrit 36.7 (*)     MPV 8.9 (*)     Neutrophils % 41.0 (*)     Lymphocytes % 50.0 (*)     Metamyelocytes Relative 2 (*)     Ovalocytes Occasional (*)     All other components within normal limits   COMPREHENSIVE METABOLIC PANEL - Abnormal; Notable for the following components:    Sodium 118 (*)     Chloride 84 (*)     All other components within normal limits    Narrative:     [facility]  Chemistry results called to and read back by Carli Narvaez RN in ED,  2024 22:11, by MARSHALL   DRUG SCRN, BUPRENORPHINE - Abnormal; Notable for the following components:    Benzodiazepine Screen, Urine POSITIVE (*)     Cannabinoid Scrn, Ur POSITIVE (*)     Tricyclic Antidepressants, Urine POSITIVE (*)     All other components within normal limits 
History reviewed.  Patient presenting overnight 11/12 with generalized weakness and some recent falls, as well as a recent skin grafts and developed blisters in setting of lupus.  Admitted due to hyponatremia with sodium down to 118, appears acute on chronic, likely related to SIADH from multiple psychotropic medications in addition to excessive water intake due to reported dry mouth with Sicca symptoms (possible Portia syndrome versus Sjogren's).    Seen and examined today    Check serum and urine osmolality, urine sodium and TSH  Manage with p.o. fluid/water restriction  Hold offending agents including amitriptyline, Depakote and other psychotropic medication  Add Ure-Na packet  Serial sodium checks every 6 hours, avoid overcorrection  Neurochecks  
Physical Therapy  Pt states she has been up ad anisha in room with her cane and denies need for PT in this setting.  Reports no concern about dc home once medically stable and does not need additional DME.  Electronically signed by [place] PT on 11/12/2024 at 11:52 AM      
differentiation is preserved. No mass effect or midline shift. No hydrocephalus. The orbits have a normal appearance. The visualized mastoid air cells and paranasal sinuses are clear. No visible skull fracture.       1.  No acute intracranial findings. 2.  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  All CT scans are performed using dose optimization techniques as appropriate to the performed exam and include at least one of the following: Automated exposure control, adjustment of the mA and/or kV according to size, and the use of iterative reconstruction technique.  ______________________________________ Electronically signed by: ZEKE HUDSON M.D. Date:     11/12/2024 Time:    14:14        Assessment     Hyponatremia.  Clinically improving.  Add urea.  Add Lasix 20 mg twice daily.    SIADH (syndrome of inappropriate ADH production).  Management as above.    Chronic lupus.  Continue current immunosuppression.    Muscular dystrophy.  Continue supportive care.      Samuel Vazquez,

## 2024-11-14 ENCOUNTER — APPOINTMENT (OUTPATIENT)
Dept: INFUSION THERAPY | Age: 49
End: 2024-11-14
Payer: MEDICARE

## 2024-11-14 VITALS
TEMPERATURE: 97.6 F | DIASTOLIC BLOOD PRESSURE: 77 MMHG | RESPIRATION RATE: 18 BRPM | HEART RATE: 71 BPM | SYSTOLIC BLOOD PRESSURE: 115 MMHG | OXYGEN SATURATION: 97 %

## 2024-11-14 PROBLEM — E87.1 HYPONATREMIA: Status: RESOLVED | Noted: 2024-11-12 | Resolved: 2024-11-14

## 2024-11-14 PROBLEM — E22.2 SIADH (SYNDROME OF INAPPROPRIATE ADH PRODUCTION) (HCC): Status: RESOLVED | Noted: 2024-11-12 | Resolved: 2024-11-14

## 2024-11-14 LAB
ANION GAP SERPL CALCULATED.3IONS-SCNC: 7 MMOL/L (ref 7–19)
BASOPHILS # BLD: 0 K/UL (ref 0–0.2)
BASOPHILS NFR BLD: 1 % (ref 0–1)
BUN SERPL-MCNC: 17 MG/DL (ref 6–20)
CALCIUM SERPL-MCNC: 9.1 MG/DL (ref 8.6–10)
CHLORIDE SERPL-SCNC: 91 MMOL/L (ref 98–111)
CO2 SERPL-SCNC: 30 MMOL/L (ref 22–29)
CREAT SERPL-MCNC: 0.7 MG/DL (ref 0.5–0.9)
EOSINOPHIL # BLD: 0 K/UL (ref 0–0.6)
EOSINOPHIL NFR BLD: 0.8 % (ref 0–5)
ERYTHROCYTE [DISTWIDTH] IN BLOOD BY AUTOMATED COUNT: 13.5 % (ref 11.5–14.5)
GLUCOSE SERPL-MCNC: 75 MG/DL (ref 70–99)
HCT VFR BLD AUTO: 38.4 % (ref 37–47)
HGB BLD-MCNC: 12.2 G/DL (ref 12–16)
IMM GRANULOCYTES # BLD: 0.1 K/UL
LYMPHOCYTES # BLD: 1.4 K/UL (ref 1.1–4.5)
LYMPHOCYTES NFR BLD: 35.3 % (ref 20–40)
MCH RBC QN AUTO: 29.1 PG (ref 27–31)
MCHC RBC AUTO-ENTMCNC: 31.8 G/DL (ref 33–37)
MCV RBC AUTO: 91.6 FL (ref 81–99)
MONOCYTES # BLD: 0.6 K/UL (ref 0–0.9)
MONOCYTES NFR BLD: 15.4 % (ref 0–10)
NEUTROPHILS # BLD: 1.8 K/UL (ref 1.5–7.5)
NEUTS SEG NFR BLD: 46.2 % (ref 50–65)
PLATELET # BLD AUTO: 253 K/UL (ref 130–400)
PMV BLD AUTO: 8.9 FL (ref 9.4–12.3)
POTASSIUM SERPL-SCNC: 4.2 MMOL/L (ref 3.5–5)
RBC # BLD AUTO: 4.19 M/UL (ref 4.2–5.4)
SODIUM SERPL-SCNC: 128 MMOL/L (ref 136–145)
WBC # BLD AUTO: 3.8 K/UL (ref 4.8–10.8)

## 2024-11-14 PROCEDURE — 6360000002 HC RX W HCPCS: Performed by: STUDENT IN AN ORGANIZED HEALTH CARE EDUCATION/TRAINING PROGRAM

## 2024-11-14 PROCEDURE — 6370000000 HC RX 637 (ALT 250 FOR IP): Performed by: STUDENT IN AN ORGANIZED HEALTH CARE EDUCATION/TRAINING PROGRAM

## 2024-11-14 PROCEDURE — 6370000000 HC RX 637 (ALT 250 FOR IP): Performed by: INTERNAL MEDICINE

## 2024-11-14 PROCEDURE — 6360000002 HC RX W HCPCS: Performed by: HOSPITALIST

## 2024-11-14 PROCEDURE — 36415 COLL VENOUS BLD VENIPUNCTURE: CPT

## 2024-11-14 PROCEDURE — 2580000003 HC RX 258: Performed by: HOSPITALIST

## 2024-11-14 PROCEDURE — 94760 N-INVAS EAR/PLS OXIMETRY 1: CPT

## 2024-11-14 PROCEDURE — 80048 BASIC METABOLIC PNL TOTAL CA: CPT

## 2024-11-14 PROCEDURE — 85025 COMPLETE CBC W/AUTO DIFF WBC: CPT

## 2024-11-14 RX ORDER — CARISOPRODOL 350 MG/1
350 TABLET ORAL 4 TIMES DAILY PRN
Status: DISCONTINUED | OUTPATIENT
Start: 2024-11-14 | End: 2024-11-14 | Stop reason: HOSPADM

## 2024-11-14 RX ORDER — CARISOPRODOL 350 MG/1
350 TABLET ORAL 4 TIMES DAILY PRN
COMMUNITY

## 2024-11-14 RX ORDER — SUMATRIPTAN 100 MG/1
100 TABLET, FILM COATED ORAL DAILY PRN
COMMUNITY

## 2024-11-14 RX ADMIN — MYCOPHENOLATE MOFETIL 1500 MG: 250 CAPSULE ORAL at 10:01

## 2024-11-14 RX ADMIN — Medication 15 G: at 10:02

## 2024-11-14 RX ADMIN — SUCRALFATE 1 G: 1 TABLET ORAL at 05:30

## 2024-11-14 RX ADMIN — Medication 1 TABLET: at 10:02

## 2024-11-14 RX ADMIN — ENOXAPARIN SODIUM 40 MG: 100 INJECTION SUBCUTANEOUS at 10:03

## 2024-11-14 RX ADMIN — CLONIDINE HYDROCHLORIDE 0.1 MG: 0.1 TABLET ORAL at 10:01

## 2024-11-14 RX ADMIN — LOSARTAN POTASSIUM 100 MG: 100 TABLET, FILM COATED ORAL at 10:02

## 2024-11-14 RX ADMIN — AMLODIPINE BESYLATE 10 MG: 10 TABLET ORAL at 10:01

## 2024-11-14 RX ADMIN — FUROSEMIDE 20 MG: 20 TABLET ORAL at 10:01

## 2024-11-14 RX ADMIN — CARVEDILOL 12.5 MG: 12.5 TABLET, FILM COATED ORAL at 10:02

## 2024-11-14 RX ADMIN — WATER 1000 MG: 1 INJECTION INTRAMUSCULAR; INTRAVENOUS; SUBCUTANEOUS at 00:07

## 2024-11-14 RX ADMIN — OXYCODONE HYDROCHLORIDE 10 MG: 10 TABLET ORAL at 12:24

## 2024-11-14 RX ADMIN — Medication 25 MG: at 05:30

## 2024-11-14 RX ADMIN — BUTALBITAL, ACETAMINOPHEN, AND CAFFEINE 1 TABLET: 325; 50; 40 TABLET ORAL at 04:25

## 2024-11-14 RX ADMIN — Medication 1 EACH: at 10:03

## 2024-11-14 RX ADMIN — MORPHINE SULFATE 30 MG: 15 TABLET ORAL at 10:00

## 2024-11-14 RX ADMIN — SUCRALFATE 1 G: 1 TABLET ORAL at 12:24

## 2024-11-14 RX ADMIN — LINACLOTIDE 145 MCG: 145 CAPSULE, GELATIN COATED ORAL at 05:30

## 2024-11-14 RX ADMIN — DRONABINOL 10 MG: 2.5 CAPSULE ORAL at 05:30

## 2024-11-14 RX ADMIN — DIVALPROEX SODIUM 500 MG: 500 TABLET, DELAYED RELEASE ORAL at 10:01

## 2024-11-14 ASSESSMENT — PAIN DESCRIPTION - LOCATION: LOCATION: HEAD

## 2024-11-14 ASSESSMENT — PAIN SCALES - GENERAL
PAINLEVEL_OUTOF10: 10
PAINLEVEL_OUTOF10: 8
PAINLEVEL_OUTOF10: 10

## 2024-11-14 NOTE — CARE COORDINATION
11/14/24 1029   IMM Letter   IMM Letter given to Patient/Family/Significant other/Guardian/POA/by: ELVA Bates   IMM Letter date given: 11/14/24   IMM Letter time given: 1020     Second IMM given to patient and explained with patient verbalizing understanding.  All questions and concerns addressed   Patient declined waiting 4 hr period prior to discharge.   Signed letter placed in pt soft chart   Electronically signed by ELVA Bates on 11/14/2024 at 10:30 AM   
needs at this time    The Plan for Transition of Care is related to the following treatment goals of Hyponatremia [E87.1]    IF APPLICABLE: The Patient and/or patient representative Ramiro BAEZA and her family were provided with a choice of provider and agrees with the discharge plan. Freedom of choice list with basic dialogue that supports the patient's individualized plan of care/goals and shares the quality data associated with the providers was provided to: Patient   Patient Representative Name:       The Patient and/or Patient Representative Agree with the Discharge Plan? Yes    ELVA Bates  Case Management Department  Ph: [phone] Fax: [phone]   
Corinne Lima RDN, CDN - available via [place] TEAMS

## 2024-11-14 NOTE — DISCHARGE SUMMARY
Discharge Summary    Ramiro Mendoza  :  1975  MRN:  [id number]    Admit date:  2024  Discharge date: 2024     Admitting Physician:  No admitting provider for patient encounter.    Advance Directive: Full Code    Consults: none    Primary Care Physician:  Darion López MD    Discharge Diagnoses:  Principal Problem (Resolved):    Hyponatremia  Active Problems:    * No active hospital problems. *  Resolved Problems:    SIADH (syndrome of inappropriate ADH production) (HCC)      Significant Diagnostic Studies:   CT HEAD WO CONTRAST    Result Date: 2024  EXAMINATION: HEAD CT WITHOUT CONTRAST  HISTORY: Headaches.  Multiple falls.  TECHNIQUE: Noncontrast CT of the brain was performed with images acquired from skull base to vertex.  2-D coronal and sagittal reformatted images were obtained from the axial source images. Contrast Dose: None. CT Dose Reduction Techniques Performed: Yes.  COMPARISON: 10/23/2022  FINDINGS:   No intracranial hemorrhage or significant extra-axial fluid collection. 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 Grey white differentiation is preserved. No mass effect or midline shift. No hydrocephalus. The orbits have a normal appearance. The visualized mastoid air cells and paranasal sinuses are clear. No visible skull fracture.       1.  No acute intracranial findings. 2.  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  All CT scans are performed using dose optimization techniques as appropriate to the performed exam and include at least one of the following: Automated exposure control, adjustment of the mA and/or kV according to size, and the use of iterative reconstruction technique.  ______________________________________ Electronically signed by: ZEKE HUDSON M.D. Date:     2024 Time:    14:14       CBC:   Recent Labs     24  [id number]

## 2024-11-15 ENCOUNTER — HOSPITAL ENCOUNTER (OUTPATIENT)
Dept: INFUSION THERAPY | Age: 49
Setting detail: INFUSION SERIES
Discharge: HOME | End: 2024-11-15
Payer: MEDICARE

## 2024-11-15 VITALS
OXYGEN SATURATION: 99 % | TEMPERATURE: 97.7 F | HEART RATE: 79 BPM | DIASTOLIC BLOOD PRESSURE: 74 MMHG | RESPIRATION RATE: 18 BRPM | SYSTOLIC BLOOD PRESSURE: 111 MMHG

## 2024-11-15 DIAGNOSIS — M32.9 SYSTEMIC LUPUS ERYTHEMATOSUS, UNSPECIFIED SLE TYPE, UNSPECIFIED ORGAN INVOLVEMENT STATUS (HCC): ICD-10-CM

## 2024-11-15 DIAGNOSIS — G35 EXACERBATION OF MULTIPLE SCLEROSIS (HCC): Primary | ICD-10-CM

## 2024-11-15 PROCEDURE — 2580000003 HC RX 258: Performed by: PSYCHIATRY & NEUROLOGY

## 2024-11-15 PROCEDURE — 6360000002 HC RX W HCPCS: Performed by: PSYCHIATRY & NEUROLOGY

## 2024-11-15 PROCEDURE — 96375 TX/PRO/DX INJ NEW DRUG ADDON: CPT

## 2024-11-15 PROCEDURE — 96365 THER/PROPH/DIAG IV INF INIT: CPT

## 2024-11-15 PROCEDURE — 6370000000 HC RX 637 (ALT 250 FOR IP): Performed by: PSYCHIATRY & NEUROLOGY

## 2024-11-15 RX ORDER — HYDROCORTISONE SODIUM SUCCINATE 100 MG/2ML
100 INJECTION INTRAMUSCULAR; INTRAVENOUS ONCE
Status: CANCELLED
Start: 2024-11-15 | End: 2024-11-15

## 2024-11-15 RX ORDER — DIPHENHYDRAMINE HYDROCHLORIDE 50 MG/ML
50 INJECTION INTRAMUSCULAR; INTRAVENOUS
Status: CANCELLED | OUTPATIENT
Start: 2024-11-15

## 2024-11-15 RX ORDER — MEPERIDINE HYDROCHLORIDE 25 MG/ML
12.5 INJECTION INTRAMUSCULAR; INTRAVENOUS; SUBCUTANEOUS PRN
Status: CANCELLED | OUTPATIENT
Start: 2024-11-15

## 2024-11-15 RX ORDER — HYDROCORTISONE SODIUM SUCCINATE 100 MG/2ML
100 INJECTION INTRAMUSCULAR; INTRAVENOUS ONCE
Status: COMPLETED | OUTPATIENT
Start: 2024-11-15 | End: 2024-11-15

## 2024-11-15 RX ORDER — HYDROCORTISONE SODIUM SUCCINATE 100 MG/2ML
100 INJECTION INTRAMUSCULAR; INTRAVENOUS
Status: CANCELLED | OUTPATIENT
Start: 2024-11-15

## 2024-11-15 RX ORDER — SODIUM CHLORIDE 0.9 % (FLUSH) 0.9 %
5-40 SYRINGE (ML) INJECTION PRN
Status: CANCELLED | OUTPATIENT
Start: 2024-11-15

## 2024-11-15 RX ORDER — DIPHENHYDRAMINE HCL 25 MG
25 TABLET ORAL ONCE
Status: CANCELLED | OUTPATIENT
Start: 2024-11-15 | End: 2024-11-15

## 2024-11-15 RX ORDER — ALBUTEROL SULFATE 90 UG/1
4 INHALANT RESPIRATORY (INHALATION) PRN
Status: CANCELLED | OUTPATIENT
Start: 2024-11-15

## 2024-11-15 RX ORDER — EPINEPHRINE 1 MG/ML
0.3 INJECTION, SOLUTION, CONCENTRATE INTRAVENOUS PRN
Status: CANCELLED | OUTPATIENT
Start: 2024-11-15

## 2024-11-15 RX ORDER — ACETAMINOPHEN 325 MG/1
650 TABLET ORAL ONCE
Status: COMPLETED | OUTPATIENT
Start: 2024-11-15 | End: 2024-11-15

## 2024-11-15 RX ORDER — ACETAMINOPHEN 325 MG/1
650 TABLET ORAL ONCE
Status: CANCELLED | OUTPATIENT
Start: 2024-11-15 | End: 2024-11-15

## 2024-11-15 RX ORDER — SODIUM CHLORIDE 9 MG/ML
5-250 INJECTION, SOLUTION INTRAVENOUS PRN
Status: DISCONTINUED | OUTPATIENT
Start: 2024-11-15 | End: 2024-11-16 | Stop reason: HOSPADM

## 2024-11-15 RX ORDER — ACETAMINOPHEN 325 MG/1
650 TABLET ORAL
Status: CANCELLED | OUTPATIENT
Start: 2024-11-15

## 2024-11-15 RX ORDER — SODIUM CHLORIDE 9 MG/ML
5-250 INJECTION, SOLUTION INTRAVENOUS PRN
Status: CANCELLED | OUTPATIENT
Start: 2024-11-15

## 2024-11-15 RX ORDER — SODIUM CHLORIDE 9 MG/ML
INJECTION, SOLUTION INTRAVENOUS CONTINUOUS
Status: CANCELLED | OUTPATIENT
Start: 2024-11-15

## 2024-11-15 RX ORDER — SODIUM CHLORIDE 0.9 % (FLUSH) 0.9 %
5-40 SYRINGE (ML) INJECTION PRN
Status: DISCONTINUED | OUTPATIENT
Start: 2024-11-15 | End: 2024-11-16 | Stop reason: HOSPADM

## 2024-11-15 RX ORDER — HEPARIN 100 UNIT/ML
500 SYRINGE INTRAVENOUS PRN
Status: CANCELLED | OUTPATIENT
Start: 2024-11-15

## 2024-11-15 RX ORDER — ONDANSETRON 2 MG/ML
8 INJECTION INTRAMUSCULAR; INTRAVENOUS
Status: CANCELLED | OUTPATIENT
Start: 2024-11-15

## 2024-11-15 RX ORDER — DIPHENHYDRAMINE HCL 25 MG
25 TABLET ORAL ONCE
Status: COMPLETED | OUTPATIENT
Start: 2024-11-15 | End: 2024-11-15

## 2024-11-15 RX ORDER — HEPARIN 100 UNIT/ML
500 SYRINGE INTRAVENOUS PRN
Status: DISCONTINUED | OUTPATIENT
Start: 2024-11-15 | End: 2024-11-16 | Stop reason: HOSPADM

## 2024-11-15 RX ADMIN — ACETAMINOPHEN 650 MG: 325 TABLET ORAL at 07:20

## 2024-11-15 RX ADMIN — DIPHENHYDRAMINE HYDROCHLORIDE 25 MG: 25 TABLET ORAL at 07:20

## 2024-11-15 RX ADMIN — SODIUM CHLORIDE, PRESERVATIVE FREE 10 ML: 5 INJECTION INTRAVENOUS at 07:21

## 2024-11-15 RX ADMIN — HYDROCORTISONE SODIUM SUCCINATE 100 MG: 100 INJECTION, POWDER, FOR SOLUTION INTRAMUSCULAR; INTRAVENOUS at 07:20

## 2024-11-15 RX ADMIN — SODIUM CHLORIDE, PRESERVATIVE FREE 20 ML: 5 INJECTION INTRAVENOUS at 08:44

## 2024-11-15 RX ADMIN — IMMUNE GLOBULIN (HUMAN) 25 G: 10 INJECTION INTRAVENOUS; SUBCUTANEOUS at 07:31

## 2024-11-15 RX ADMIN — HEPARIN 500 UNITS: 100 SYRINGE at 08:44

## 2024-11-15 ASSESSMENT — PAIN SCALES - GENERAL: PAINLEVEL_OUTOF10: 0

## 2024-11-15 NOTE — FLOWSHEET NOTE
11/15/24 0852   Discharge Event   Discharged with Documented Belongings Yes   Departure Mode By self   Mobility at Departure Ambulatory   Discharged to Private Residence   Time of Discharge 0852

## 2024-11-15 NOTE — PROGRESS NOTES
Patient completed Gamunex-C infusion, as ordered. Patient tolerated infusion well without s/s of adverse event. R subclavion port flushed, heparin locked and deaccessed.  Patient discharged to home at completion of infusion. Electronically signed by Herminia Dawson RN on 11/15/2024 at 8:51 AM

## 2024-11-18 ENCOUNTER — HOSPITAL ENCOUNTER (OUTPATIENT)
Dept: INFUSION THERAPY | Age: 49
Setting detail: INFUSION SERIES
End: 2024-11-18
Payer: MEDICARE

## 2024-11-18 DIAGNOSIS — M79.7 FIBROMYALGIA: ICD-10-CM

## 2024-11-18 DIAGNOSIS — G43.719 INTRACTABLE CHRONIC MIGRAINE WITHOUT AURA AND WITHOUT STATUS MIGRAINOSUS: ICD-10-CM

## 2024-11-18 DIAGNOSIS — G35 MULTIPLE SCLEROSIS (HCC): ICD-10-CM

## 2024-11-18 DIAGNOSIS — M54.30 SCIATICA, UNSPECIFIED LATERALITY: ICD-10-CM

## 2024-11-18 RX ORDER — HYDROMORPHONE HYDROCHLORIDE 4 MG/1
4 TABLET ORAL EVERY 12 HOURS PRN
Qty: 60 TABLET | Refills: 0 | Status: CANCELLED | OUTPATIENT
Start: 2024-11-18 | End: 2024-12-18

## 2024-11-19 ENCOUNTER — HOSPITAL ENCOUNTER (OUTPATIENT)
Dept: INFUSION THERAPY | Age: 49
Setting detail: INFUSION SERIES
Discharge: HOME | End: 2024-11-19
Payer: MEDICARE

## 2024-11-19 VITALS
SYSTOLIC BLOOD PRESSURE: 102 MMHG | DIASTOLIC BLOOD PRESSURE: 67 MMHG | HEART RATE: 84 BPM | TEMPERATURE: 98.2 F | OXYGEN SATURATION: 96 % | BODY MASS INDEX: 29.29 KG/M2 | RESPIRATION RATE: 18 BRPM | WEIGHT: 176 LBS

## 2024-11-19 DIAGNOSIS — M32.9 SYSTEMIC LUPUS ERYTHEMATOSUS, UNSPECIFIED SLE TYPE, UNSPECIFIED ORGAN INVOLVEMENT STATUS (HCC): ICD-10-CM

## 2024-11-19 DIAGNOSIS — G35 EXACERBATION OF MULTIPLE SCLEROSIS (HCC): Primary | ICD-10-CM

## 2024-11-19 PROCEDURE — 96365 THER/PROPH/DIAG IV INF INIT: CPT

## 2024-11-19 PROCEDURE — 6360000002 HC RX W HCPCS: Performed by: PSYCHIATRY & NEUROLOGY

## 2024-11-19 PROCEDURE — 6370000000 HC RX 637 (ALT 250 FOR IP): Performed by: PSYCHIATRY & NEUROLOGY

## 2024-11-19 PROCEDURE — 96375 TX/PRO/DX INJ NEW DRUG ADDON: CPT

## 2024-11-19 RX ORDER — ACETAMINOPHEN 325 MG/1
650 TABLET ORAL ONCE
Status: CANCELLED | OUTPATIENT
Start: 2024-11-19 | End: 2024-11-19

## 2024-11-19 RX ORDER — OXYCODONE HYDROCHLORIDE 10 MG/1
10 TABLET ORAL EVERY 6 HOURS PRN
Qty: 120 TABLET | Refills: 0 | Status: SHIPPED | OUTPATIENT
Start: 2024-11-24 | End: 2024-12-13 | Stop reason: SDUPTHER

## 2024-11-19 RX ORDER — HYDROCORTISONE SODIUM SUCCINATE 100 MG/2ML
100 INJECTION INTRAMUSCULAR; INTRAVENOUS
Status: CANCELLED | OUTPATIENT
Start: 2024-11-19

## 2024-11-19 RX ORDER — HEPARIN 100 UNIT/ML
500 SYRINGE INTRAVENOUS PRN
Status: DISCONTINUED | OUTPATIENT
Start: 2024-11-19 | End: 2024-11-20 | Stop reason: HOSPADM

## 2024-11-19 RX ORDER — SODIUM CHLORIDE 9 MG/ML
5-250 INJECTION, SOLUTION INTRAVENOUS PRN
Status: CANCELLED | OUTPATIENT
Start: 2024-11-19

## 2024-11-19 RX ORDER — DIPHENHYDRAMINE HYDROCHLORIDE 50 MG/ML
50 INJECTION INTRAMUSCULAR; INTRAVENOUS
Status: CANCELLED | OUTPATIENT
Start: 2024-11-19

## 2024-11-19 RX ORDER — ACETAMINOPHEN 325 MG/1
650 TABLET ORAL
Status: CANCELLED | OUTPATIENT
Start: 2024-11-19

## 2024-11-19 RX ORDER — SODIUM CHLORIDE 0.9 % (FLUSH) 0.9 %
5-40 SYRINGE (ML) INJECTION PRN
Status: CANCELLED | OUTPATIENT
Start: 2024-11-19

## 2024-11-19 RX ORDER — ONDANSETRON 2 MG/ML
8 INJECTION INTRAMUSCULAR; INTRAVENOUS
Status: CANCELLED | OUTPATIENT
Start: 2024-11-19

## 2024-11-19 RX ORDER — HYDROCORTISONE SODIUM SUCCINATE 100 MG/2ML
100 INJECTION INTRAMUSCULAR; INTRAVENOUS ONCE
Status: COMPLETED | OUTPATIENT
Start: 2024-11-19 | End: 2024-11-19

## 2024-11-19 RX ORDER — SODIUM CHLORIDE 9 MG/ML
INJECTION, SOLUTION INTRAVENOUS CONTINUOUS
Status: CANCELLED | OUTPATIENT
Start: 2024-11-19

## 2024-11-19 RX ORDER — HYDROCORTISONE SODIUM SUCCINATE 100 MG/2ML
100 INJECTION INTRAMUSCULAR; INTRAVENOUS ONCE
Status: CANCELLED
Start: 2024-11-19 | End: 2024-11-19

## 2024-11-19 RX ORDER — ACETAMINOPHEN 325 MG/1
650 TABLET ORAL ONCE
Status: COMPLETED | OUTPATIENT
Start: 2024-11-19 | End: 2024-11-19

## 2024-11-19 RX ORDER — DIPHENHYDRAMINE HCL 25 MG
25 TABLET ORAL ONCE
Status: COMPLETED | OUTPATIENT
Start: 2024-11-19 | End: 2024-11-19

## 2024-11-19 RX ORDER — ALBUTEROL SULFATE 90 UG/1
4 INHALANT RESPIRATORY (INHALATION) PRN
Status: CANCELLED | OUTPATIENT
Start: 2024-11-19

## 2024-11-19 RX ORDER — EPINEPHRINE 1 MG/ML
0.3 INJECTION, SOLUTION, CONCENTRATE INTRAVENOUS PRN
Status: CANCELLED | OUTPATIENT
Start: 2024-11-19

## 2024-11-19 RX ORDER — DIPHENHYDRAMINE HCL 25 MG
25 TABLET ORAL ONCE
Status: CANCELLED | OUTPATIENT
Start: 2024-11-19 | End: 2024-11-19

## 2024-11-19 RX ORDER — SODIUM CHLORIDE 9 MG/ML
5-250 INJECTION, SOLUTION INTRAVENOUS PRN
Status: DISCONTINUED | OUTPATIENT
Start: 2024-11-19 | End: 2024-11-20 | Stop reason: HOSPADM

## 2024-11-19 RX ORDER — MEPERIDINE HYDROCHLORIDE 25 MG/ML
12.5 INJECTION INTRAMUSCULAR; INTRAVENOUS; SUBCUTANEOUS PRN
Status: CANCELLED | OUTPATIENT
Start: 2024-11-19

## 2024-11-19 RX ORDER — HEPARIN 100 UNIT/ML
500 SYRINGE INTRAVENOUS PRN
Status: CANCELLED | OUTPATIENT
Start: 2024-11-19

## 2024-11-19 RX ADMIN — IMMUNE GLOBULIN (HUMAN) 30 G: 10 INJECTION INTRAVENOUS; SUBCUTANEOUS at 07:25

## 2024-11-19 RX ADMIN — HEPARIN 500 UNITS: 100 SYRINGE at 08:51

## 2024-11-19 RX ADMIN — ACETAMINOPHEN 650 MG: 325 TABLET ORAL at 07:00

## 2024-11-19 RX ADMIN — HYDROCORTISONE SODIUM SUCCINATE 100 MG: 100 INJECTION, POWDER, FOR SOLUTION INTRAMUSCULAR; INTRAVENOUS at 07:01

## 2024-11-19 RX ADMIN — DIPHENHYDRAMINE HYDROCHLORIDE 25 MG: 25 TABLET ORAL at 07:00

## 2024-11-19 NOTE — TELEPHONE ENCOUNTER
Requested Prescriptions     Pending Prescriptions Disp Refills    oxyCODONE HCl (OXY-IR) 10 MG immediate release tablet 120 tablet 0     Sig: Take 1 tablet by mouth every 6 hours as needed for Pain for up to 30 days. Intended supply: 30 days Max Daily Amount: 40 mg       Last Office Visit:  8/21/2024  Next Office Visit:  Visit date not found  Last Medication Refill:  10/25/2024 with 0 TAIWO Sanchez up to date:  11/19/2024     *RX updated to reflect   11/24/2024   fill date*

## 2024-11-19 NOTE — PROGRESS NOTES
Gamunex-C 30g administered as ordered. Pt tolerated well.       Electronically signed by Ciera George RN on 11/19/2024 at 8:57 AM

## 2024-11-20 ENCOUNTER — HOSPITAL ENCOUNTER (OUTPATIENT)
Dept: INFUSION THERAPY | Age: 49
Setting detail: INFUSION SERIES
Discharge: HOME | End: 2024-11-20
Payer: MEDICARE

## 2024-11-20 ENCOUNTER — HOSPITAL ENCOUNTER (OUTPATIENT)
Dept: INFUSION THERAPY | Age: 49
Setting detail: INFUSION SERIES
End: 2024-11-20

## 2024-11-20 VITALS
HEART RATE: 67 BPM | TEMPERATURE: 97.6 F | DIASTOLIC BLOOD PRESSURE: 83 MMHG | SYSTOLIC BLOOD PRESSURE: 118 MMHG | OXYGEN SATURATION: 94 % | RESPIRATION RATE: 18 BRPM

## 2024-11-20 DIAGNOSIS — G35 EXACERBATION OF MULTIPLE SCLEROSIS (HCC): Primary | ICD-10-CM

## 2024-11-20 DIAGNOSIS — M32.9 SYSTEMIC LUPUS ERYTHEMATOSUS, UNSPECIFIED SLE TYPE, UNSPECIFIED ORGAN INVOLVEMENT STATUS (HCC): ICD-10-CM

## 2024-11-20 PROCEDURE — 2580000003 HC RX 258: Performed by: PSYCHIATRY & NEUROLOGY

## 2024-11-20 PROCEDURE — 6370000000 HC RX 637 (ALT 250 FOR IP): Performed by: PSYCHIATRY & NEUROLOGY

## 2024-11-20 PROCEDURE — 96375 TX/PRO/DX INJ NEW DRUG ADDON: CPT

## 2024-11-20 PROCEDURE — 96365 THER/PROPH/DIAG IV INF INIT: CPT

## 2024-11-20 PROCEDURE — 6360000002 HC RX W HCPCS: Performed by: PSYCHIATRY & NEUROLOGY

## 2024-11-20 RX ORDER — ACETAMINOPHEN 325 MG/1
650 TABLET ORAL ONCE
Status: COMPLETED | OUTPATIENT
Start: 2024-11-20 | End: 2024-11-20

## 2024-11-20 RX ORDER — DIPHENHYDRAMINE HCL 25 MG
25 TABLET ORAL ONCE
Status: CANCELLED | OUTPATIENT
Start: 2024-11-20 | End: 2024-11-20

## 2024-11-20 RX ORDER — MEPERIDINE HYDROCHLORIDE 25 MG/ML
12.5 INJECTION INTRAMUSCULAR; INTRAVENOUS; SUBCUTANEOUS PRN
Status: CANCELLED | OUTPATIENT
Start: 2024-11-20

## 2024-11-20 RX ORDER — SODIUM CHLORIDE 9 MG/ML
5-250 INJECTION, SOLUTION INTRAVENOUS PRN
Status: DISCONTINUED | OUTPATIENT
Start: 2024-11-20 | End: 2024-11-21 | Stop reason: HOSPADM

## 2024-11-20 RX ORDER — DIPHENHYDRAMINE HCL 25 MG
25 TABLET ORAL ONCE
Status: COMPLETED | OUTPATIENT
Start: 2024-11-20 | End: 2024-11-20

## 2024-11-20 RX ORDER — SODIUM CHLORIDE 0.9 % (FLUSH) 0.9 %
5-40 SYRINGE (ML) INJECTION PRN
Status: DISCONTINUED | OUTPATIENT
Start: 2024-11-20 | End: 2024-11-21 | Stop reason: HOSPADM

## 2024-11-20 RX ORDER — SODIUM CHLORIDE 9 MG/ML
INJECTION, SOLUTION INTRAVENOUS CONTINUOUS
Status: CANCELLED | OUTPATIENT
Start: 2024-11-20

## 2024-11-20 RX ORDER — SODIUM CHLORIDE 9 MG/ML
5-250 INJECTION, SOLUTION INTRAVENOUS PRN
Status: CANCELLED | OUTPATIENT
Start: 2024-11-20

## 2024-11-20 RX ORDER — HYDROCORTISONE SODIUM SUCCINATE 100 MG/2ML
100 INJECTION INTRAMUSCULAR; INTRAVENOUS ONCE
Status: CANCELLED
Start: 2024-11-20 | End: 2024-11-20

## 2024-11-20 RX ORDER — EPINEPHRINE 1 MG/ML
0.3 INJECTION, SOLUTION, CONCENTRATE INTRAVENOUS PRN
Status: CANCELLED | OUTPATIENT
Start: 2024-11-20

## 2024-11-20 RX ORDER — ONDANSETRON 2 MG/ML
8 INJECTION INTRAMUSCULAR; INTRAVENOUS
Status: CANCELLED | OUTPATIENT
Start: 2024-11-20

## 2024-11-20 RX ORDER — ALBUTEROL SULFATE 90 UG/1
4 INHALANT RESPIRATORY (INHALATION) PRN
Status: CANCELLED | OUTPATIENT
Start: 2024-11-20

## 2024-11-20 RX ORDER — HYDROCORTISONE SODIUM SUCCINATE 100 MG/2ML
100 INJECTION INTRAMUSCULAR; INTRAVENOUS ONCE
Status: COMPLETED | OUTPATIENT
Start: 2024-11-20 | End: 2024-11-20

## 2024-11-20 RX ORDER — HEPARIN 100 UNIT/ML
500 SYRINGE INTRAVENOUS PRN
Status: DISCONTINUED | OUTPATIENT
Start: 2024-11-20 | End: 2024-11-21 | Stop reason: HOSPADM

## 2024-11-20 RX ORDER — DIPHENHYDRAMINE HYDROCHLORIDE 50 MG/ML
50 INJECTION INTRAMUSCULAR; INTRAVENOUS
Status: CANCELLED | OUTPATIENT
Start: 2024-11-20

## 2024-11-20 RX ORDER — HEPARIN 100 UNIT/ML
500 SYRINGE INTRAVENOUS PRN
Status: CANCELLED | OUTPATIENT
Start: 2024-11-20

## 2024-11-20 RX ORDER — ACETAMINOPHEN 325 MG/1
650 TABLET ORAL
Status: CANCELLED | OUTPATIENT
Start: 2024-11-20

## 2024-11-20 RX ORDER — HYDROCORTISONE SODIUM SUCCINATE 100 MG/2ML
100 INJECTION INTRAMUSCULAR; INTRAVENOUS
Status: CANCELLED | OUTPATIENT
Start: 2024-11-20

## 2024-11-20 RX ORDER — SODIUM CHLORIDE 0.9 % (FLUSH) 0.9 %
5-40 SYRINGE (ML) INJECTION PRN
Status: CANCELLED | OUTPATIENT
Start: 2024-11-20

## 2024-11-20 RX ORDER — ACETAMINOPHEN 325 MG/1
650 TABLET ORAL ONCE
Status: CANCELLED | OUTPATIENT
Start: 2024-11-20 | End: 2024-11-20

## 2024-11-20 RX ADMIN — HYDROCORTISONE SODIUM SUCCINATE 100 MG: 100 INJECTION, POWDER, FOR SOLUTION INTRAMUSCULAR; INTRAVENOUS at 09:32

## 2024-11-20 RX ADMIN — HEPARIN 500 UNITS: 100 SYRINGE at 12:21

## 2024-11-20 RX ADMIN — Medication 20 ML: at 12:21

## 2024-11-20 RX ADMIN — IMMUNE GLOBULIN (HUMAN) 25 G: 10 INJECTION INTRAVENOUS; SUBCUTANEOUS at 09:49

## 2024-11-20 RX ADMIN — Medication 10 ML: at 09:35

## 2024-11-20 RX ADMIN — DIPHENHYDRAMINE HYDROCHLORIDE 25 MG: 25 TABLET ORAL at 09:33

## 2024-11-20 RX ADMIN — ACETAMINOPHEN 650 MG: 325 TABLET ORAL at 09:32

## 2024-11-20 NOTE — PROGRESS NOTES
Pt arrived to [facility] with port accessed from yesterday's tx. Brisk blood return noted. Pt received Tylenol, Benadryl, and Solu-cortef for pre-meds, followed by 25g Gamunex-C. Tolerated well.    Electronically signed by Shabnam Huddleston RN on 11/20/2024 at 12:01 PM

## 2024-11-21 ENCOUNTER — HOSPITAL ENCOUNTER (OUTPATIENT)
Dept: INFUSION THERAPY | Age: 49
Setting detail: INFUSION SERIES
Discharge: HOME | End: 2024-11-21
Payer: MEDICARE

## 2024-11-21 VITALS
TEMPERATURE: 98.2 F | SYSTOLIC BLOOD PRESSURE: 117 MMHG | BODY MASS INDEX: 29.45 KG/M2 | DIASTOLIC BLOOD PRESSURE: 71 MMHG | RESPIRATION RATE: 18 BRPM | OXYGEN SATURATION: 96 % | WEIGHT: 177 LBS | HEART RATE: 88 BPM

## 2024-11-21 DIAGNOSIS — M32.9 SYSTEMIC LUPUS ERYTHEMATOSUS, UNSPECIFIED SLE TYPE, UNSPECIFIED ORGAN INVOLVEMENT STATUS (HCC): ICD-10-CM

## 2024-11-21 DIAGNOSIS — G35 EXACERBATION OF MULTIPLE SCLEROSIS (HCC): Primary | ICD-10-CM

## 2024-11-21 PROCEDURE — 6370000000 HC RX 637 (ALT 250 FOR IP): Performed by: PSYCHIATRY & NEUROLOGY

## 2024-11-21 PROCEDURE — 96375 TX/PRO/DX INJ NEW DRUG ADDON: CPT

## 2024-11-21 PROCEDURE — 96365 THER/PROPH/DIAG IV INF INIT: CPT

## 2024-11-21 PROCEDURE — 6360000002 HC RX W HCPCS: Performed by: PSYCHIATRY & NEUROLOGY

## 2024-11-21 RX ORDER — HYDROCORTISONE SODIUM SUCCINATE 100 MG/2ML
100 INJECTION INTRAMUSCULAR; INTRAVENOUS ONCE
Status: COMPLETED | OUTPATIENT
Start: 2024-11-21 | End: 2024-11-21

## 2024-11-21 RX ORDER — EPINEPHRINE 1 MG/ML
0.3 INJECTION, SOLUTION, CONCENTRATE INTRAVENOUS PRN
OUTPATIENT
Start: 2024-11-21

## 2024-11-21 RX ORDER — DIPHENHYDRAMINE HYDROCHLORIDE 50 MG/ML
50 INJECTION INTRAMUSCULAR; INTRAVENOUS
OUTPATIENT
Start: 2024-11-21

## 2024-11-21 RX ORDER — DIPHENHYDRAMINE HCL 25 MG
25 TABLET ORAL ONCE
Status: CANCELLED | OUTPATIENT
Start: 2024-11-21 | End: 2024-11-21

## 2024-11-21 RX ORDER — HEPARIN 100 UNIT/ML
500 SYRINGE INTRAVENOUS PRN
Status: CANCELLED | OUTPATIENT
Start: 2024-11-21

## 2024-11-21 RX ORDER — ACETAMINOPHEN 325 MG/1
650 TABLET ORAL
OUTPATIENT
Start: 2024-11-21

## 2024-11-21 RX ORDER — SODIUM CHLORIDE 9 MG/ML
INJECTION, SOLUTION INTRAVENOUS CONTINUOUS
OUTPATIENT
Start: 2024-11-21

## 2024-11-21 RX ORDER — SODIUM CHLORIDE 0.9 % (FLUSH) 0.9 %
5-40 SYRINGE (ML) INJECTION PRN
Status: CANCELLED | OUTPATIENT
Start: 2024-11-21

## 2024-11-21 RX ORDER — ALBUTEROL SULFATE 90 UG/1
4 INHALANT RESPIRATORY (INHALATION) PRN
OUTPATIENT
Start: 2024-11-21

## 2024-11-21 RX ORDER — HYDROCORTISONE SODIUM SUCCINATE 100 MG/2ML
100 INJECTION INTRAMUSCULAR; INTRAVENOUS ONCE
Status: CANCELLED
Start: 2024-11-21 | End: 2024-11-21

## 2024-11-21 RX ORDER — ONDANSETRON 2 MG/ML
8 INJECTION INTRAMUSCULAR; INTRAVENOUS
OUTPATIENT
Start: 2024-11-21

## 2024-11-21 RX ORDER — MEPERIDINE HYDROCHLORIDE 25 MG/ML
12.5 INJECTION INTRAMUSCULAR; INTRAVENOUS; SUBCUTANEOUS PRN
OUTPATIENT
Start: 2024-11-21

## 2024-11-21 RX ORDER — SODIUM CHLORIDE 9 MG/ML
5-250 INJECTION, SOLUTION INTRAVENOUS PRN
OUTPATIENT
Start: 2024-11-21

## 2024-11-21 RX ORDER — HYDROCORTISONE SODIUM SUCCINATE 100 MG/2ML
100 INJECTION INTRAMUSCULAR; INTRAVENOUS
OUTPATIENT
Start: 2024-11-21

## 2024-11-21 RX ORDER — HEPARIN 100 UNIT/ML
500 SYRINGE INTRAVENOUS PRN
Status: DISCONTINUED | OUTPATIENT
Start: 2024-11-21 | End: 2024-11-22 | Stop reason: HOSPADM

## 2024-11-21 RX ORDER — ACETAMINOPHEN 325 MG/1
650 TABLET ORAL ONCE
Status: CANCELLED | OUTPATIENT
Start: 2024-11-21 | End: 2024-11-21

## 2024-11-21 RX ORDER — ACETAMINOPHEN 325 MG/1
650 TABLET ORAL ONCE
Status: COMPLETED | OUTPATIENT
Start: 2024-11-21 | End: 2024-11-21

## 2024-11-21 RX ORDER — SODIUM CHLORIDE 9 MG/ML
5-250 INJECTION, SOLUTION INTRAVENOUS PRN
Status: DISCONTINUED | OUTPATIENT
Start: 2024-11-21 | End: 2024-11-22 | Stop reason: HOSPADM

## 2024-11-21 RX ORDER — DIPHENHYDRAMINE HCL 25 MG
25 TABLET ORAL ONCE
Status: COMPLETED | OUTPATIENT
Start: 2024-11-21 | End: 2024-11-21

## 2024-11-21 RX ADMIN — ACETAMINOPHEN 650 MG: 325 TABLET ORAL at 07:05

## 2024-11-21 RX ADMIN — DIPHENHYDRAMINE HYDROCHLORIDE 25 MG: 25 TABLET ORAL at 07:05

## 2024-11-21 RX ADMIN — IMMUNE GLOBULIN (HUMAN) 25 G: 10 INJECTION INTRAVENOUS; SUBCUTANEOUS at 07:21

## 2024-11-21 RX ADMIN — HEPARIN 500 UNITS: 100 SYRINGE at 08:45

## 2024-11-21 RX ADMIN — HYDROCORTISONE SODIUM SUCCINATE 100 MG: 100 INJECTION, POWDER, FOR SOLUTION INTRAMUSCULAR; INTRAVENOUS at 07:05

## 2024-11-21 NOTE — PROGRESS NOTES
[facility]. Pt premedicated as ordered. Gamunex-C 25g administered. Pt tolerated well.     Electronically signed by Ciera George RN on 11/21/2024 at 11:08 AM

## 2024-11-22 ENCOUNTER — HOSPITAL ENCOUNTER (EMERGENCY)
Age: 49
Discharge: HOME | End: 2024-11-22
Attending: EMERGENCY MEDICINE
Payer: MEDICARE

## 2024-11-22 ENCOUNTER — TELEPHONE (OUTPATIENT)
Dept: INFUSION THERAPY | Age: 49
End: 2024-11-22

## 2024-11-22 ENCOUNTER — APPOINTMENT (OUTPATIENT)
Dept: INFUSION THERAPY | Age: 49
End: 2024-11-22
Payer: MEDICARE

## 2024-11-22 ENCOUNTER — HOSPITAL ENCOUNTER (OUTPATIENT)
Dept: INFUSION THERAPY | Age: 49
Setting detail: INFUSION SERIES
Discharge: HOME | End: 2024-11-22
Payer: MEDICARE

## 2024-11-22 VITALS
TEMPERATURE: 98.1 F | HEART RATE: 74 BPM | SYSTOLIC BLOOD PRESSURE: 127 MMHG | DIASTOLIC BLOOD PRESSURE: 81 MMHG | OXYGEN SATURATION: 94 % | RESPIRATION RATE: 18 BRPM

## 2024-11-22 VITALS
RESPIRATION RATE: 18 BRPM | TEMPERATURE: 97.8 F | DIASTOLIC BLOOD PRESSURE: 84 MMHG | HEART RATE: 70 BPM | SYSTOLIC BLOOD PRESSURE: 116 MMHG | OXYGEN SATURATION: 98 %

## 2024-11-22 DIAGNOSIS — E87.1 HYPONATREMIA: ICD-10-CM

## 2024-11-22 DIAGNOSIS — G35 EXACERBATION OF MULTIPLE SCLEROSIS (HCC): Primary | ICD-10-CM

## 2024-11-22 DIAGNOSIS — M32.9 SYSTEMIC LUPUS ERYTHEMATOSUS, UNSPECIFIED SLE TYPE, UNSPECIFIED ORGAN INVOLVEMENT STATUS (HCC): ICD-10-CM

## 2024-11-22 DIAGNOSIS — Z79.899 POLYPHARMACY: ICD-10-CM

## 2024-11-22 DIAGNOSIS — R53.1 GENERALIZED WEAKNESS: Primary | ICD-10-CM

## 2024-11-22 LAB
ALBUMIN SERPL-MCNC: 3.4 G/DL (ref 3.5–5.2)
ALP SERPL-CCNC: 59 U/L (ref 35–104)
ALT SERPL-CCNC: 16 U/L (ref 5–33)
AMPHET UR QL SCN: NEGATIVE
ANION GAP SERPL CALCULATED.3IONS-SCNC: 7 MMOL/L (ref 7–19)
AST SERPL-CCNC: 25 U/L (ref 5–32)
BACTERIA URNS QL MICRO: ABNORMAL /HPF
BARBITURATES UR QL SCN: NEGATIVE
BASOPHILS # BLD: 0 K/UL (ref 0–0.2)
BASOPHILS NFR BLD: 0.4 % (ref 0–1)
BENZODIAZ UR QL SCN: POSITIVE
BILIRUB SERPL-MCNC: 0.3 MG/DL (ref 0.2–1.2)
BILIRUB UR QL STRIP: NEGATIVE
BUN SERPL-MCNC: 13 MG/DL (ref 6–20)
BUPRENORPHINE URINE: NEGATIVE
CALCIUM SERPL-MCNC: 8.9 MG/DL (ref 8.6–10)
CANNABINOIDS UR QL SCN: POSITIVE
CHLORIDE SERPL-SCNC: 94 MMOL/L (ref 98–111)
CLARITY UR: ABNORMAL
CO2 SERPL-SCNC: 29 MMOL/L (ref 22–29)
COCAINE UR QL SCN: NEGATIVE
COLOR UR: YELLOW
CREAT SERPL-MCNC: 0.8 MG/DL (ref 0.5–0.9)
CRYSTALS URNS MICRO: ABNORMAL /HPF
DRUG SCREEN COMMENT UR-IMP: ABNORMAL
EOSINOPHIL # BLD: 0 K/UL (ref 0–0.6)
EOSINOPHIL NFR BLD: 0.2 % (ref 0–5)
EPI CELLS #/AREA URNS AUTO: 21 /HPF (ref 0–5)
ERYTHROCYTE [DISTWIDTH] IN BLOOD BY AUTOMATED COUNT: 13.3 % (ref 11.5–14.5)
FENTANYL SCREEN, URINE: NEGATIVE
GLUCOSE SERPL-MCNC: 106 MG/DL (ref 70–99)
GLUCOSE UR STRIP.AUTO-MCNC: NEGATIVE MG/DL
HCT VFR BLD AUTO: 35.5 % (ref 37–47)
HGB BLD-MCNC: 11 G/DL (ref 12–16)
HGB UR STRIP.AUTO-MCNC: NEGATIVE MG/L
HYALINE CASTS #/AREA URNS AUTO: 4 /HPF (ref 0–8)
IMM GRANULOCYTES # BLD: 0 K/UL
KETONES UR STRIP.AUTO-MCNC: NEGATIVE MG/DL
LEUKOCYTE ESTERASE UR QL STRIP.AUTO: ABNORMAL
LYMPHOCYTES # BLD: 0.7 K/UL (ref 1.1–4.5)
LYMPHOCYTES NFR BLD: 13 % (ref 20–40)
MCH RBC QN AUTO: 29.1 PG (ref 27–31)
MCHC RBC AUTO-ENTMCNC: 31 G/DL (ref 33–37)
MCV RBC AUTO: 93.9 FL (ref 81–99)
METHADONE UR QL SCN: NEGATIVE
METHAMPHETAMINE, URINE: NEGATIVE
MONOCYTES # BLD: 0.2 K/UL (ref 0–0.9)
MONOCYTES NFR BLD: 4 % (ref 0–10)
NEUTROPHILS # BLD: 4.3 K/UL (ref 1.5–7.5)
NEUTS SEG NFR BLD: 82 % (ref 50–65)
NITRITE UR QL STRIP.AUTO: NEGATIVE
OPIATES UR QL SCN: POSITIVE
OXYCODONE UR QL SCN: NEGATIVE
PCP UR QL SCN: NEGATIVE
PH UR STRIP.AUTO: 6.5 [PH] (ref 5–8)
PLATELET # BLD AUTO: 245 K/UL (ref 130–400)
PMV BLD AUTO: 9.1 FL (ref 9.4–12.3)
POTASSIUM SERPL-SCNC: 4.7 MMOL/L (ref 3.5–5)
PROT SERPL-MCNC: 9 G/DL (ref 6.4–8.3)
PROT UR STRIP.AUTO-MCNC: NEGATIVE MG/DL
RBC # BLD AUTO: 3.78 M/UL (ref 4.2–5.4)
RBC #/AREA URNS AUTO: 2 /HPF (ref 0–4)
SODIUM SERPL-SCNC: 130 MMOL/L (ref 136–145)
SP GR UR STRIP.AUTO: 1.01 (ref 1–1.03)
TRICYCLIC ANTIDEPRESSANTS, UR: POSITIVE
UROBILINOGEN UR STRIP.AUTO-MCNC: 0.2 E.U./DL
VALPROATE SERPL-MCNC: 27.3 UG/ML (ref 50–100)
WBC # BLD AUTO: 5.3 K/UL (ref 4.8–10.8)
WBC #/AREA URNS AUTO: 6 /HPF (ref 0–5)

## 2024-11-22 PROCEDURE — 96365 THER/PROPH/DIAG IV INF INIT: CPT

## 2024-11-22 PROCEDURE — 80164 ASSAY DIPROPYLACETIC ACD TOT: CPT

## 2024-11-22 PROCEDURE — 6360000002 HC RX W HCPCS: Performed by: PSYCHIATRY & NEUROLOGY

## 2024-11-22 PROCEDURE — G0480 DRUG TEST DEF 1-7 CLASSES: HCPCS

## 2024-11-22 PROCEDURE — 96375 TX/PRO/DX INJ NEW DRUG ADDON: CPT

## 2024-11-22 PROCEDURE — 36415 COLL VENOUS BLD VENIPUNCTURE: CPT

## 2024-11-22 PROCEDURE — 6370000000 HC RX 637 (ALT 250 FOR IP): Performed by: PSYCHIATRY & NEUROLOGY

## 2024-11-22 PROCEDURE — 85025 COMPLETE CBC W/AUTO DIFF WBC: CPT

## 2024-11-22 PROCEDURE — 2580000003 HC RX 258: Performed by: EMERGENCY MEDICINE

## 2024-11-22 PROCEDURE — 80053 COMPREHEN METABOLIC PANEL: CPT

## 2024-11-22 PROCEDURE — 6360000002 HC RX W HCPCS: Performed by: EMERGENCY MEDICINE

## 2024-11-22 PROCEDURE — 99284 EMERGENCY DEPT VISIT MOD MDM: CPT

## 2024-11-22 PROCEDURE — 81001 URINALYSIS AUTO W/SCOPE: CPT

## 2024-11-22 PROCEDURE — 80307 DRUG TEST PRSMV CHEM ANLYZR: CPT

## 2024-11-22 RX ORDER — 0.9 % SODIUM CHLORIDE 0.9 %
1000 INTRAVENOUS SOLUTION INTRAVENOUS ONCE
Status: COMPLETED | OUTPATIENT
Start: 2024-11-22 | End: 2024-11-22

## 2024-11-22 RX ORDER — DIPHENHYDRAMINE HCL 25 MG
25 TABLET ORAL ONCE
Status: DISCONTINUED | OUTPATIENT
Start: 2024-11-22 | End: 2024-11-24 | Stop reason: HOSPADM

## 2024-11-22 RX ORDER — DIPHENHYDRAMINE HYDROCHLORIDE 50 MG/ML
50 INJECTION INTRAMUSCULAR; INTRAVENOUS
OUTPATIENT
Start: 2024-11-22

## 2024-11-22 RX ORDER — SODIUM CHLORIDE 0.9 % (FLUSH) 0.9 %
5-40 SYRINGE (ML) INJECTION PRN
OUTPATIENT
Start: 2024-11-22

## 2024-11-22 RX ORDER — HEPARIN 100 UNIT/ML
500 SYRINGE INTRAVENOUS PRN
Status: DISCONTINUED | OUTPATIENT
Start: 2024-11-22 | End: 2024-11-23 | Stop reason: HOSPADM

## 2024-11-22 RX ORDER — ALBUTEROL SULFATE 90 UG/1
4 INHALANT RESPIRATORY (INHALATION) PRN
OUTPATIENT
Start: 2024-11-22

## 2024-11-22 RX ORDER — HEPARIN 100 UNIT/ML
500 SYRINGE INTRAVENOUS PRN
OUTPATIENT
Start: 2024-11-22

## 2024-11-22 RX ORDER — HEPARIN 100 UNIT/ML
300 SYRINGE INTRAVENOUS ONCE
Status: COMPLETED | OUTPATIENT
Start: 2024-11-22 | End: 2024-11-22

## 2024-11-22 RX ORDER — SODIUM CHLORIDE 9 MG/ML
5-250 INJECTION, SOLUTION INTRAVENOUS PRN
OUTPATIENT
Start: 2024-11-22

## 2024-11-22 RX ORDER — ACETAMINOPHEN 325 MG/1
650 TABLET ORAL ONCE
OUTPATIENT
Start: 2024-11-22 | End: 2024-11-22

## 2024-11-22 RX ORDER — SODIUM CHLORIDE 0.9 % (FLUSH) 0.9 %
5-40 SYRINGE (ML) INJECTION PRN
Status: DISCONTINUED | OUTPATIENT
Start: 2024-11-22 | End: 2024-11-23 | Stop reason: HOSPADM

## 2024-11-22 RX ORDER — MEPERIDINE HYDROCHLORIDE 25 MG/ML
12.5 INJECTION INTRAMUSCULAR; INTRAVENOUS; SUBCUTANEOUS PRN
OUTPATIENT
Start: 2024-11-22

## 2024-11-22 RX ORDER — HYDROCORTISONE SODIUM SUCCINATE 100 MG/2ML
100 INJECTION INTRAMUSCULAR; INTRAVENOUS
OUTPATIENT
Start: 2024-11-22

## 2024-11-22 RX ORDER — SODIUM CHLORIDE 9 MG/ML
INJECTION, SOLUTION INTRAVENOUS CONTINUOUS
OUTPATIENT
Start: 2024-11-22

## 2024-11-22 RX ORDER — DIPHENHYDRAMINE HCL 25 MG
25 TABLET ORAL ONCE
OUTPATIENT
Start: 2024-11-22 | End: 2024-11-22

## 2024-11-22 RX ORDER — HYDROCORTISONE SODIUM SUCCINATE 100 MG/2ML
100 INJECTION INTRAMUSCULAR; INTRAVENOUS ONCE
Status: COMPLETED | OUTPATIENT
Start: 2024-11-22 | End: 2024-11-22

## 2024-11-22 RX ORDER — EPINEPHRINE 1 MG/ML
0.3 INJECTION, SOLUTION, CONCENTRATE INTRAVENOUS PRN
OUTPATIENT
Start: 2024-11-22

## 2024-11-22 RX ORDER — ACETAMINOPHEN 325 MG/1
650 TABLET ORAL
OUTPATIENT
Start: 2024-11-22

## 2024-11-22 RX ORDER — HYDROCORTISONE SODIUM SUCCINATE 100 MG/2ML
100 INJECTION INTRAMUSCULAR; INTRAVENOUS ONCE
Start: 2024-11-22 | End: 2024-11-22

## 2024-11-22 RX ORDER — ONDANSETRON 2 MG/ML
8 INJECTION INTRAMUSCULAR; INTRAVENOUS
OUTPATIENT
Start: 2024-11-22

## 2024-11-22 RX ORDER — ACETAMINOPHEN 325 MG/1
650 TABLET ORAL ONCE
Status: COMPLETED | OUTPATIENT
Start: 2024-11-22 | End: 2024-11-22

## 2024-11-22 RX ADMIN — HEPARIN 300 UNITS: 100 SYRINGE at 19:02

## 2024-11-22 RX ADMIN — HYDROCORTISONE SODIUM SUCCINATE 100 MG: 100 INJECTION, POWDER, FOR SOLUTION INTRAMUSCULAR; INTRAVENOUS at 10:57

## 2024-11-22 RX ADMIN — ACETAMINOPHEN 650 MG: 325 TABLET ORAL at 10:58

## 2024-11-22 RX ADMIN — SODIUM CHLORIDE 1000 ML: 9 INJECTION, SOLUTION INTRAVENOUS at 15:07

## 2024-11-22 RX ADMIN — IMMUNE GLOBULIN (HUMAN) 25 G: 10 INJECTION INTRAVENOUS; SUBCUTANEOUS at 11:16

## 2024-11-22 NOTE — PROGRESS NOTES
Pt arrived to [facility] with port accessed and good blood return. Tylenol and solucortef given as ordered for premeds. Benadryl not given as pt was driving herself to and from her appointment. 25 g gamunex C given. Pt extremely lethargic after infusion. Counseled pt that it would not be safe for her to drive with her lethargy and she stated she felt she was having an MS and lupus flare and needs to be seen in ER. Pt transported to ER via wheelchair with this RN and Leidy ignacio RN. Port remaining accessed for use in ER. VSS throughout appointment.

## 2024-11-22 NOTE — ED PROVIDER NOTES
[facility] EMERGENCY DEPT  eMERGENCY dEPARTMENT eNCOUnter      Pt Name: Ramiro Mendoza  MRN: [id number]  Birthdate 1975  Date of evaluation: 11/22/2024  Provider: Jacky Bright MD    CHIEF COMPLAINT       Chief Complaint   Patient presents with    Lesion(s)     Pt reports new lesions to legs, recent skin grafts for similar, hx of lupus         HISTORY OF PRESENT ILLNESS   (Location/Symptom, Timing/Onset,Context/Setting, Quality, Duration, Modifying Factors, Severity)  Note limiting factors.   Ramiro Mendoza is a 49 y.o. female who presents to the emergency department generalized weakness     HPI    Patient is a 49-year-old -American female with history of osteoporosis; alcohol dependence; anxiety; ASD; ADHD; lower extremity edema; gastric band malfunction; reflux disease; insomnia; marijuana use;Major depression; nausea and vomiting; obesity; posttraumatic stress disorder;Chronic back pain; lupus; multiple sclerosis; bipolar disorder; who did had her infusion for her multiple sclerosis today and came in because the nurses thought she was too tired to drive herself home.  She says she has skin lesions on her leg but they are followed by dermatology no nausea or vomiting.  Mild dysuria.  Unusual for her to be that is somnolent she reports.    NursingNotes were reviewed.    REVIEW OF SYSTEMS    (2-9 systems for level 4, 10 or more for level 5)     Review of Systems   All other systems reviewed and are negative.           PAST MEDICALHISTORY     Past Medical History:   Diagnosis Date    Arthritis     Bipolar 1 disorder (HCC)     Convulsions (HCC) 07/20/2020    Fibromyalgia     GERD (gastroesophageal reflux disease)     Hypertension     Lupus     sees  in [facility]    Migraines     Movement disorder     Multiple sclerosis (HCC)     gets infusions weekly    Neuromuscular disorder (HCC)     Psychiatric problem          SURGICAL HISTORY       Past Surgical History:   Procedure Laterality Date    ABDOMINAL    Vitals:    11/22/24 1430 11/22/24 1501 11/22/24 1507 11/22/24 1605   BP: (!) 123/90 117/72  127/81   Pulse:   74    Resp:       Temp:       TempSrc:       SpO2: 93% 94%  94%       MDM    Patient is a generalized weakness with otherwise stable vital signs.  There is some concern for polypharmacy given all the medications that she has been on.  She just had infusion for her MS prior to admission to the ED.  Was recently admitted for hyponatremia.  He is still only mildly hyponatremic in the ED.  Responded well to 1 L of normal saline much more alert and awake and in no distress and is feels comfortable being discharged home.  Skin lesions do not appear to be infected.      CONSULTS:  None    PROCEDURES:  Unless otherwise noted below, none     Procedures    FINAL IMPRESSION      1. Generalized weakness    2. Hyponatremia    3. Polypharmacy          DISPOSITION/PLAN   DISPOSITION Decision To Discharge 11/22/2024 06:48:30 PM           PATIENT REFERRED TO:  Darion López MD  [address] [place] [id number]  [phone]            DISCHARGE MEDICATIONS:  Discharge Medication List as of 11/22/2024  7:01 PM             (Please note that portions of this note were completed with a voice recognition program.  Efforts were made to edit thedictations but occasionally words are mis-transcribed.)    Jacky Bright MD (electronically signed)  Attending Emergency Physician        Jacky Bright MD  11/22/24 2035

## 2024-11-22 NOTE — TELEPHONE ENCOUNTER
Called pt regarding missed Gamunex infusion this morning. L/m requesting call back. Also l/m informing pt that she will need her port de accessed today.

## 2024-11-23 NOTE — DISCHARGE INSTRUCTIONS
Avoid drinking excess water.  Take your medications with caution as they may be causing interactions that result in you feeling weak.  Follow-up with your primary care doctor and your dermatologist as we discussed and review all your medications with your primary care doctor.  Return immediately to the emergency room for any new or worsening symptoms.

## 2024-11-25 ENCOUNTER — HOSPITAL ENCOUNTER (OUTPATIENT)
Dept: INFUSION THERAPY | Age: 49
Setting detail: INFUSION SERIES
Discharge: HOME | End: 2024-11-25
Payer: MEDICARE

## 2024-11-25 VITALS
HEART RATE: 88 BPM | SYSTOLIC BLOOD PRESSURE: 124 MMHG | RESPIRATION RATE: 18 BRPM | OXYGEN SATURATION: 99 % | BODY MASS INDEX: 31.62 KG/M2 | WEIGHT: 190 LBS | TEMPERATURE: 97.8 F | DIASTOLIC BLOOD PRESSURE: 77 MMHG

## 2024-11-25 DIAGNOSIS — G35 EXACERBATION OF MULTIPLE SCLEROSIS (HCC): Primary | ICD-10-CM

## 2024-11-25 DIAGNOSIS — M32.9 SYSTEMIC LUPUS ERYTHEMATOSUS, UNSPECIFIED SLE TYPE, UNSPECIFIED ORGAN INVOLVEMENT STATUS (HCC): ICD-10-CM

## 2024-11-25 PROCEDURE — 2580000003 HC RX 258: Performed by: PSYCHIATRY & NEUROLOGY

## 2024-11-25 PROCEDURE — 96365 THER/PROPH/DIAG IV INF INIT: CPT

## 2024-11-25 PROCEDURE — 96375 TX/PRO/DX INJ NEW DRUG ADDON: CPT

## 2024-11-25 PROCEDURE — 6360000002 HC RX W HCPCS: Performed by: PSYCHIATRY & NEUROLOGY

## 2024-11-25 PROCEDURE — 6370000000 HC RX 637 (ALT 250 FOR IP): Performed by: PSYCHIATRY & NEUROLOGY

## 2024-11-25 RX ORDER — HEPARIN 100 UNIT/ML
500 SYRINGE INTRAVENOUS PRN
Status: DISCONTINUED | OUTPATIENT
Start: 2024-11-25 | End: 2024-11-26 | Stop reason: HOSPADM

## 2024-11-25 RX ORDER — SODIUM CHLORIDE 9 MG/ML
5-250 INJECTION, SOLUTION INTRAVENOUS PRN
Status: DISCONTINUED | OUTPATIENT
Start: 2024-11-25 | End: 2024-11-26 | Stop reason: HOSPADM

## 2024-11-25 RX ORDER — ALBUTEROL SULFATE 90 UG/1
4 INHALANT RESPIRATORY (INHALATION) PRN
OUTPATIENT
Start: 2024-11-25

## 2024-11-25 RX ORDER — ACETAMINOPHEN 325 MG/1
650 TABLET ORAL
OUTPATIENT
Start: 2024-11-25

## 2024-11-25 RX ORDER — HYDROCORTISONE SODIUM SUCCINATE 100 MG/2ML
100 INJECTION INTRAMUSCULAR; INTRAVENOUS ONCE
Status: COMPLETED | OUTPATIENT
Start: 2024-11-25 | End: 2024-11-25

## 2024-11-25 RX ORDER — ACETAMINOPHEN 325 MG/1
650 TABLET ORAL ONCE
Status: CANCELLED | OUTPATIENT
Start: 2024-11-25 | End: 2024-11-25

## 2024-11-25 RX ORDER — DIPHENHYDRAMINE HCL 25 MG
25 TABLET ORAL ONCE
Status: CANCELLED | OUTPATIENT
Start: 2024-11-25 | End: 2024-11-25

## 2024-11-25 RX ORDER — EPINEPHRINE 1 MG/ML
0.3 INJECTION, SOLUTION, CONCENTRATE INTRAVENOUS PRN
OUTPATIENT
Start: 2024-11-25

## 2024-11-25 RX ORDER — MEPERIDINE HYDROCHLORIDE 25 MG/ML
12.5 INJECTION INTRAMUSCULAR; INTRAVENOUS; SUBCUTANEOUS PRN
OUTPATIENT
Start: 2024-11-25

## 2024-11-25 RX ORDER — DIPHENHYDRAMINE HCL 25 MG
25 TABLET ORAL ONCE
Status: DISCONTINUED | OUTPATIENT
Start: 2024-11-25 | End: 2024-11-27 | Stop reason: HOSPADM

## 2024-11-25 RX ORDER — ACETAMINOPHEN 325 MG/1
650 TABLET ORAL ONCE
Status: COMPLETED | OUTPATIENT
Start: 2024-11-25 | End: 2024-11-25

## 2024-11-25 RX ORDER — SODIUM CHLORIDE 0.9 % (FLUSH) 0.9 %
5-40 SYRINGE (ML) INJECTION PRN
Status: CANCELLED | OUTPATIENT
Start: 2024-11-25

## 2024-11-25 RX ORDER — SODIUM CHLORIDE 9 MG/ML
5-250 INJECTION, SOLUTION INTRAVENOUS PRN
OUTPATIENT
Start: 2024-11-25

## 2024-11-25 RX ORDER — SODIUM CHLORIDE 0.9 % (FLUSH) 0.9 %
5-40 SYRINGE (ML) INJECTION PRN
Status: DISCONTINUED | OUTPATIENT
Start: 2024-11-25 | End: 2024-11-26 | Stop reason: HOSPADM

## 2024-11-25 RX ORDER — SODIUM CHLORIDE 9 MG/ML
5-250 INJECTION, SOLUTION INTRAVENOUS PRN
Status: CANCELLED | OUTPATIENT
Start: 2024-11-25

## 2024-11-25 RX ORDER — SODIUM CHLORIDE 9 MG/ML
INJECTION, SOLUTION INTRAVENOUS CONTINUOUS
OUTPATIENT
Start: 2024-11-25

## 2024-11-25 RX ORDER — HYDROCORTISONE SODIUM SUCCINATE 100 MG/2ML
100 INJECTION INTRAMUSCULAR; INTRAVENOUS ONCE
Status: CANCELLED
Start: 2024-11-25 | End: 2024-11-25

## 2024-11-25 RX ORDER — ONDANSETRON 2 MG/ML
8 INJECTION INTRAMUSCULAR; INTRAVENOUS
OUTPATIENT
Start: 2024-11-25

## 2024-11-25 RX ORDER — HYDROCORTISONE SODIUM SUCCINATE 100 MG/2ML
100 INJECTION INTRAMUSCULAR; INTRAVENOUS
OUTPATIENT
Start: 2024-11-25

## 2024-11-25 RX ORDER — DIPHENHYDRAMINE HYDROCHLORIDE 50 MG/ML
50 INJECTION INTRAMUSCULAR; INTRAVENOUS
OUTPATIENT
Start: 2024-11-25

## 2024-11-25 RX ORDER — HEPARIN 100 UNIT/ML
500 SYRINGE INTRAVENOUS PRN
Status: CANCELLED | OUTPATIENT
Start: 2024-11-25

## 2024-11-25 RX ADMIN — HYDROCORTISONE SODIUM SUCCINATE 100 MG: 100 INJECTION, POWDER, FOR SOLUTION INTRAMUSCULAR; INTRAVENOUS at 07:55

## 2024-11-25 RX ADMIN — ACETAMINOPHEN 650 MG: 325 TABLET ORAL at 07:55

## 2024-11-25 RX ADMIN — HEPARIN 500 UNITS: 100 SYRINGE at 09:37

## 2024-11-25 RX ADMIN — Medication 20 ML: at 09:37

## 2024-11-25 RX ADMIN — Medication 10 ML: at 07:56

## 2024-11-25 RX ADMIN — IMMUNE GLOBULIN (HUMAN) 25 G: 10 INJECTION INTRAVENOUS; SUBCUTANEOUS at 08:13

## 2024-11-25 NOTE — PROGRESS NOTES
[facility] Port accessed using sterile technique, brisk blood return noted. Pt received Tylenol and Solu-cortef for pre-meds. Pt then received 25g Gamunex-C. Tolerated well.    Electronically signed by Shabnam Huddleston RN on 11/25/2024 at 9:43 AM

## 2024-12-06 ENCOUNTER — HOSPITAL ENCOUNTER (OUTPATIENT)
Dept: INFUSION THERAPY | Age: 49
Setting detail: INFUSION SERIES
Discharge: HOME | End: 2024-12-06
Payer: MEDICARE

## 2024-12-06 VITALS
RESPIRATION RATE: 18 BRPM | DIASTOLIC BLOOD PRESSURE: 88 MMHG | BODY MASS INDEX: 33.03 KG/M2 | OXYGEN SATURATION: 100 % | SYSTOLIC BLOOD PRESSURE: 132 MMHG | WEIGHT: 198.5 LBS | TEMPERATURE: 97.8 F | HEART RATE: 87 BPM

## 2024-12-06 DIAGNOSIS — G35 EXACERBATION OF MULTIPLE SCLEROSIS (HCC): Primary | ICD-10-CM

## 2024-12-06 DIAGNOSIS — M32.9 SYSTEMIC LUPUS ERYTHEMATOSUS, UNSPECIFIED SLE TYPE, UNSPECIFIED ORGAN INVOLVEMENT STATUS (HCC): ICD-10-CM

## 2024-12-06 PROCEDURE — 6370000000 HC RX 637 (ALT 250 FOR IP): Performed by: PSYCHIATRY & NEUROLOGY

## 2024-12-06 PROCEDURE — 6360000002 HC RX W HCPCS: Performed by: PSYCHIATRY & NEUROLOGY

## 2024-12-06 PROCEDURE — 96366 THER/PROPH/DIAG IV INF ADDON: CPT

## 2024-12-06 PROCEDURE — 2580000003 HC RX 258: Performed by: PSYCHIATRY & NEUROLOGY

## 2024-12-06 PROCEDURE — 96365 THER/PROPH/DIAG IV INF INIT: CPT

## 2024-12-06 PROCEDURE — 96375 TX/PRO/DX INJ NEW DRUG ADDON: CPT

## 2024-12-06 RX ORDER — ACETAMINOPHEN 325 MG/1
650 TABLET ORAL ONCE
Status: CANCELLED | OUTPATIENT
Start: 2024-12-06 | End: 2024-12-06

## 2024-12-06 RX ORDER — ONDANSETRON 2 MG/ML
8 INJECTION INTRAMUSCULAR; INTRAVENOUS
OUTPATIENT
Start: 2024-12-06

## 2024-12-06 RX ORDER — HYDROCORTISONE SODIUM SUCCINATE 100 MG/2ML
100 INJECTION INTRAMUSCULAR; INTRAVENOUS ONCE
Status: CANCELLED
Start: 2024-12-06 | End: 2024-12-06

## 2024-12-06 RX ORDER — SODIUM CHLORIDE 0.9 % (FLUSH) 0.9 %
5-40 SYRINGE (ML) INJECTION PRN
OUTPATIENT
Start: 2024-12-06

## 2024-12-06 RX ORDER — ACETAMINOPHEN 325 MG/1
650 TABLET ORAL
OUTPATIENT
Start: 2024-12-06

## 2024-12-06 RX ORDER — EPINEPHRINE 1 MG/ML
0.3 INJECTION, SOLUTION, CONCENTRATE INTRAVENOUS PRN
OUTPATIENT
Start: 2024-12-06

## 2024-12-06 RX ORDER — SODIUM CHLORIDE 9 MG/ML
5-250 INJECTION, SOLUTION INTRAVENOUS PRN
OUTPATIENT
Start: 2024-12-06

## 2024-12-06 RX ORDER — ALBUTEROL SULFATE 90 UG/1
4 INHALANT RESPIRATORY (INHALATION) PRN
OUTPATIENT
Start: 2024-12-06

## 2024-12-06 RX ORDER — MEPERIDINE HYDROCHLORIDE 25 MG/ML
12.5 INJECTION INTRAMUSCULAR; INTRAVENOUS; SUBCUTANEOUS PRN
OUTPATIENT
Start: 2024-12-06

## 2024-12-06 RX ORDER — HEPARIN 100 UNIT/ML
500 SYRINGE INTRAVENOUS PRN
Status: CANCELLED | OUTPATIENT
Start: 2024-12-06

## 2024-12-06 RX ORDER — HYDROCORTISONE SODIUM SUCCINATE 100 MG/2ML
100 INJECTION INTRAMUSCULAR; INTRAVENOUS ONCE
Status: COMPLETED | OUTPATIENT
Start: 2024-12-06 | End: 2024-12-06

## 2024-12-06 RX ORDER — HYDROCORTISONE SODIUM SUCCINATE 100 MG/2ML
100 INJECTION INTRAMUSCULAR; INTRAVENOUS
OUTPATIENT
Start: 2024-12-06

## 2024-12-06 RX ORDER — SODIUM CHLORIDE 0.9 % (FLUSH) 0.9 %
5-40 SYRINGE (ML) INJECTION PRN
Status: DISCONTINUED | OUTPATIENT
Start: 2024-12-06 | End: 2024-12-07 | Stop reason: HOSPADM

## 2024-12-06 RX ORDER — HEPARIN 100 UNIT/ML
500 SYRINGE INTRAVENOUS PRN
Status: DISCONTINUED | OUTPATIENT
Start: 2024-12-06 | End: 2024-12-07 | Stop reason: HOSPADM

## 2024-12-06 RX ORDER — DIPHENHYDRAMINE HCL 25 MG
25 TABLET ORAL ONCE
Status: COMPLETED | OUTPATIENT
Start: 2024-12-06 | End: 2024-12-06

## 2024-12-06 RX ORDER — SODIUM CHLORIDE 9 MG/ML
5-250 INJECTION, SOLUTION INTRAVENOUS PRN
Status: CANCELLED | OUTPATIENT
Start: 2024-12-06

## 2024-12-06 RX ORDER — SODIUM CHLORIDE 9 MG/ML
5-250 INJECTION, SOLUTION INTRAVENOUS PRN
Status: DISCONTINUED | OUTPATIENT
Start: 2024-12-06 | End: 2024-12-07 | Stop reason: HOSPADM

## 2024-12-06 RX ORDER — DIPHENHYDRAMINE HYDROCHLORIDE 50 MG/ML
50 INJECTION INTRAMUSCULAR; INTRAVENOUS
OUTPATIENT
Start: 2024-12-06

## 2024-12-06 RX ORDER — SODIUM CHLORIDE 9 MG/ML
INJECTION, SOLUTION INTRAVENOUS CONTINUOUS
OUTPATIENT
Start: 2024-12-06

## 2024-12-06 RX ORDER — DIPHENHYDRAMINE HCL 25 MG
25 TABLET ORAL ONCE
Status: CANCELLED | OUTPATIENT
Start: 2024-12-06 | End: 2024-12-06

## 2024-12-06 RX ORDER — ACETAMINOPHEN 325 MG/1
650 TABLET ORAL ONCE
Status: COMPLETED | OUTPATIENT
Start: 2024-12-06 | End: 2024-12-06

## 2024-12-06 RX ADMIN — DIPHENHYDRAMINE HYDROCHLORIDE 25 MG: 25 TABLET ORAL at 07:43

## 2024-12-06 RX ADMIN — HYDROCORTISONE SODIUM SUCCINATE 100 MG: 100 INJECTION, POWDER, FOR SOLUTION INTRAMUSCULAR; INTRAVENOUS at 07:45

## 2024-12-06 RX ADMIN — ACETAMINOPHEN 650 MG: 325 TABLET ORAL at 07:42

## 2024-12-06 RX ADMIN — IMMUNE GLOBULIN (HUMAN) 25 G: 10 INJECTION INTRAVENOUS; SUBCUTANEOUS at 07:58

## 2024-12-06 RX ADMIN — HEPARIN 500 UNITS: 100 SYRINGE at 09:45

## 2024-12-06 RX ADMIN — SODIUM CHLORIDE, PRESERVATIVE FREE 10 ML: 5 INJECTION INTRAVENOUS at 09:44

## 2024-12-06 NOTE — DISCHARGE INSTRUCTIONS
immune globulin (intravenous and subcutaneous)  Pronunciation:  dinora LACEY foley  Brand:  Gammagard Liquid, Gammaked, Gamunex-C  What is the most important information I should know about immune globulin?  This medicine can cause blood clots.  The risk is highest in older adults or in people who have had blood clots, heart problems, or blood circulation problems. Blood clots are also more likely during long-term bedrest, while using birth control pills or hormone replacement therapy, or while having a central intravenous (IV) catheter in place.  Call your doctor at once if you have chest pain, trouble breathing, fast heartbeats, numbness or weakness, or swelling and warmth or discoloration in an arm or leg.  This medicine can also harm your kidneys, especially if you have kidney disease or if you also use certain medicines. Tell your doctor right away if you have signs of kidney problems, such as swelling, rapid weight gain, and little or no urination.  What is immune globulin?  Immune globulin intravenous and subcutaneous (for injection into a vein or under the skin) is used to treat primary immunodeficiency.  Immune globulin is also used to increase platelets (blood clotting cells) in people with idiopathic thrombocytopenic purpura.  Immune globulin is also used to treat certain debilitating nerve disorders that cause muscle weakness and can affect daily activities.  Immune globulin may also be used for purposes not listed in this medication guide.  What should I discuss with my healthcare provider before using immune globulin?  You should not use this medicine if:  you have had an allergic reaction to an immune globulin or blood product; or  you have immune globulin A (IgA) deficiency with antibody to IgA.  Immune globulin can cause blood clots or kidney problems, especially in older adults or in people with certain conditions. Tell your doctor if you have ever had:  heart problems, blood circulation  your doctor for medical advice about side effects. You may report side effects to FDA at 0-939-PJY-5624.  What other drugs will affect immune globulin?  Immune globulin can harm your kidneys, especially if you also use certain medicines for infections, cancer, osteoporosis, organ transplant rejection, bowel disorders, high blood pressure, or pain or arthritis (including Advil, Motrin, and Aleve).  Other drugs may affect immune globulin, including prescription and over-the-counter medicines, vitamins, and herbal products. Tell your doctor about all your current medicines and any medicine you start or stop using.  Where can I get more information?  Your doctor or pharmacist can provide more information about immune globulin.  Remember, keep this and all other medicines out of the reach of children, never share your medicines with others, and use this medication only for the indication prescribed.   Every effort has been made to ensure that the information provided by [employer] is accurate, up-to-date, and complete, but no guarantee is made to that effect. Drug information contained herein may be time sensitive. [employer] information has been compiled for use by healthcare practitioners and consumers in the [place] and therefore [employer] does not warrant that uses outside of the [place] are appropriate, unless specifically indicated otherwise. [employer] drug information does not endorse drugs, diagnose patients or recommend therapy. [employer] drug information is an informational resource designed to assist licensed healthcare practitioners in caring for their patients and/or to serve consumers viewing this service as a supplement to, and not a substitute for, the expertise, skill, knowledge and judgment of healthcare practitioners. The absence of a warning for a given drug or drug combination in no way should be construed to indicate that the drug or drug combination is safe, effective or

## 2024-12-08 ENCOUNTER — HOSPITAL ENCOUNTER (EMERGENCY)
Age: 49
Discharge: HOME | End: 2024-12-08

## 2024-12-09 ENCOUNTER — APPOINTMENT (OUTPATIENT)
Dept: INFUSION THERAPY | Age: 49
End: 2024-12-09
Payer: MEDICARE

## 2024-12-10 ENCOUNTER — APPOINTMENT (OUTPATIENT)
Dept: INFUSION THERAPY | Age: 49
End: 2024-12-10
Payer: MEDICARE

## 2024-12-11 ENCOUNTER — APPOINTMENT (OUTPATIENT)
Dept: INFUSION THERAPY | Age: 49
End: 2024-12-11
Payer: MEDICARE

## 2024-12-13 ENCOUNTER — HOSPITAL ENCOUNTER (OUTPATIENT)
Dept: INFUSION THERAPY | Age: 49
Setting detail: INFUSION SERIES
Discharge: HOME | End: 2024-12-13
Payer: MEDICARE

## 2024-12-13 VITALS
HEART RATE: 79 BPM | TEMPERATURE: 97.2 F | WEIGHT: 190 LBS | DIASTOLIC BLOOD PRESSURE: 73 MMHG | BODY MASS INDEX: 31.62 KG/M2 | SYSTOLIC BLOOD PRESSURE: 113 MMHG | OXYGEN SATURATION: 100 % | RESPIRATION RATE: 18 BRPM

## 2024-12-13 DIAGNOSIS — M32.9 SYSTEMIC LUPUS ERYTHEMATOSUS, UNSPECIFIED SLE TYPE, UNSPECIFIED ORGAN INVOLVEMENT STATUS (HCC): ICD-10-CM

## 2024-12-13 DIAGNOSIS — G43.719 INTRACTABLE CHRONIC MIGRAINE WITHOUT AURA AND WITHOUT STATUS MIGRAINOSUS: ICD-10-CM

## 2024-12-13 DIAGNOSIS — G35 EXACERBATION OF MULTIPLE SCLEROSIS (HCC): Primary | ICD-10-CM

## 2024-12-13 DIAGNOSIS — M79.7 FIBROMYALGIA: ICD-10-CM

## 2024-12-13 DIAGNOSIS — G35 MULTIPLE SCLEROSIS (HCC): ICD-10-CM

## 2024-12-13 DIAGNOSIS — M54.30 SCIATICA, UNSPECIFIED LATERALITY: ICD-10-CM

## 2024-12-13 PROCEDURE — 96365 THER/PROPH/DIAG IV INF INIT: CPT

## 2024-12-13 PROCEDURE — 6360000002 HC RX W HCPCS: Performed by: PSYCHIATRY & NEUROLOGY

## 2024-12-13 PROCEDURE — 6370000000 HC RX 637 (ALT 250 FOR IP): Performed by: PSYCHIATRY & NEUROLOGY

## 2024-12-13 PROCEDURE — 96375 TX/PRO/DX INJ NEW DRUG ADDON: CPT

## 2024-12-13 RX ORDER — HYDROCORTISONE SODIUM SUCCINATE 100 MG/2ML
100 INJECTION INTRAMUSCULAR; INTRAVENOUS
Status: CANCELLED | OUTPATIENT
Start: 2024-12-13

## 2024-12-13 RX ORDER — SODIUM CHLORIDE 9 MG/ML
5-250 INJECTION, SOLUTION INTRAVENOUS PRN
Status: DISCONTINUED | OUTPATIENT
Start: 2024-12-13 | End: 2024-12-14 | Stop reason: HOSPADM

## 2024-12-13 RX ORDER — HEPARIN 100 UNIT/ML
500 SYRINGE INTRAVENOUS PRN
Status: CANCELLED | OUTPATIENT
Start: 2024-12-13

## 2024-12-13 RX ORDER — SODIUM CHLORIDE 9 MG/ML
5-250 INJECTION, SOLUTION INTRAVENOUS PRN
Status: CANCELLED | OUTPATIENT
Start: 2024-12-13

## 2024-12-13 RX ORDER — SODIUM CHLORIDE 0.9 % (FLUSH) 0.9 %
5-40 SYRINGE (ML) INJECTION PRN
Status: CANCELLED | OUTPATIENT
Start: 2024-12-13

## 2024-12-13 RX ORDER — ACETAMINOPHEN 325 MG/1
650 TABLET ORAL ONCE
Status: CANCELLED | OUTPATIENT
Start: 2024-12-13 | End: 2024-12-13

## 2024-12-13 RX ORDER — HYDROCORTISONE SODIUM SUCCINATE 100 MG/2ML
100 INJECTION INTRAMUSCULAR; INTRAVENOUS ONCE
Status: COMPLETED | OUTPATIENT
Start: 2024-12-13 | End: 2024-12-13

## 2024-12-13 RX ORDER — DIPHENHYDRAMINE HCL 25 MG
25 TABLET ORAL ONCE
Status: COMPLETED | OUTPATIENT
Start: 2024-12-13 | End: 2024-12-13

## 2024-12-13 RX ORDER — SODIUM CHLORIDE 9 MG/ML
INJECTION, SOLUTION INTRAVENOUS CONTINUOUS
Status: CANCELLED | OUTPATIENT
Start: 2024-12-13

## 2024-12-13 RX ORDER — HYDROCORTISONE SODIUM SUCCINATE 100 MG/2ML
100 INJECTION INTRAMUSCULAR; INTRAVENOUS ONCE
Status: CANCELLED
Start: 2024-12-13 | End: 2024-12-13

## 2024-12-13 RX ORDER — DIPHENHYDRAMINE HYDROCHLORIDE 50 MG/ML
50 INJECTION INTRAMUSCULAR; INTRAVENOUS
Status: CANCELLED | OUTPATIENT
Start: 2024-12-13

## 2024-12-13 RX ORDER — ALBUTEROL SULFATE 90 UG/1
4 INHALANT RESPIRATORY (INHALATION) PRN
Status: CANCELLED | OUTPATIENT
Start: 2024-12-13

## 2024-12-13 RX ORDER — HEPARIN 100 UNIT/ML
500 SYRINGE INTRAVENOUS PRN
Status: DISCONTINUED | OUTPATIENT
Start: 2024-12-13 | End: 2024-12-14 | Stop reason: HOSPADM

## 2024-12-13 RX ORDER — ACETAMINOPHEN 325 MG/1
650 TABLET ORAL
Status: CANCELLED | OUTPATIENT
Start: 2024-12-13

## 2024-12-13 RX ORDER — ACETAMINOPHEN 325 MG/1
650 TABLET ORAL ONCE
Status: COMPLETED | OUTPATIENT
Start: 2024-12-13 | End: 2024-12-13

## 2024-12-13 RX ORDER — OXYCODONE HYDROCHLORIDE 10 MG/1
10 TABLET ORAL EVERY 6 HOURS PRN
Qty: 120 TABLET | Refills: 0 | Status: SHIPPED | OUTPATIENT
Start: 2024-12-23 | End: 2025-01-22

## 2024-12-13 RX ORDER — EPINEPHRINE 1 MG/ML
0.3 INJECTION, SOLUTION, CONCENTRATE INTRAVENOUS PRN
Status: CANCELLED | OUTPATIENT
Start: 2024-12-13

## 2024-12-13 RX ORDER — DIPHENHYDRAMINE HCL 25 MG
25 TABLET ORAL ONCE
Status: CANCELLED | OUTPATIENT
Start: 2024-12-13 | End: 2024-12-13

## 2024-12-13 RX ORDER — MEPERIDINE HYDROCHLORIDE 25 MG/ML
12.5 INJECTION INTRAMUSCULAR; INTRAVENOUS; SUBCUTANEOUS PRN
Status: CANCELLED | OUTPATIENT
Start: 2024-12-13

## 2024-12-13 RX ORDER — ONDANSETRON 2 MG/ML
8 INJECTION INTRAMUSCULAR; INTRAVENOUS
Status: CANCELLED | OUTPATIENT
Start: 2024-12-13

## 2024-12-13 RX ADMIN — IMMUNE GLOBULIN (HUMAN) 25 G: 10 INJECTION INTRAVENOUS; SUBCUTANEOUS at 07:28

## 2024-12-13 RX ADMIN — HYDROCORTISONE SODIUM SUCCINATE 100 MG: 100 INJECTION, POWDER, FOR SOLUTION INTRAMUSCULAR; INTRAVENOUS at 07:21

## 2024-12-13 RX ADMIN — HEPARIN 500 UNITS: 100 SYRINGE at 08:56

## 2024-12-13 RX ADMIN — ACETAMINOPHEN 650 MG: 325 TABLET ORAL at 07:21

## 2024-12-13 RX ADMIN — DIPHENHYDRAMINE HYDROCHLORIDE 25 MG: 25 TABLET ORAL at 07:21

## 2024-12-13 NOTE — TELEPHONE ENCOUNTER
Requested Prescriptions     Pending Prescriptions Disp Refills    oxyCODONE HCl (OXY-IR) 10 MG immediate release tablet 120 tablet 0     Sig: Take 1 tablet by mouth every 6 hours as needed for Pain for up to 30 days. Intended supply: 30 days Max Daily Amount: 40 mg       Last Office Visit:  8/21/2024  Next Office Visit:  3/18/2025  Last Medication Refill:  11/24/24  Daniel up to date:  11/2024    *RX updated to reflect   12/24/24  fill date*     Can patient fill on 12/23/24 due to the holiday?

## 2024-12-13 NOTE — TELEPHONE ENCOUNTER
Patient LM c/o toe nail falling off, tired and in pain. She is unable to get into wound care for a while. She stopped smoking in hopes the nail wouldn't fall off but it has not helped.     Returned call to patient, patient is wanting pain medication for pain. I let her know we can send in a refill for the oxy IR. Patient is requesting morphine, ,she thinks she is having a relapse.

## 2024-12-13 NOTE — PROGRESS NOTES
Pt arrived to [facility] and port accessed using sterile technique. Pt premedicated as ordered. Gamunex-C 25g administered. Pt tolerated well.     Electronically signed by Ciera George RN on 12/13/2024 at 8:59 AM

## 2024-12-16 ENCOUNTER — HOSPITAL ENCOUNTER (OUTPATIENT)
Dept: INFUSION THERAPY | Age: 49
Setting detail: INFUSION SERIES
Discharge: HOME | End: 2024-12-16
Payer: MEDICARE

## 2024-12-16 VITALS
DIASTOLIC BLOOD PRESSURE: 82 MMHG | SYSTOLIC BLOOD PRESSURE: 123 MMHG | HEART RATE: 80 BPM | OXYGEN SATURATION: 100 % | RESPIRATION RATE: 18 BRPM | WEIGHT: 188 LBS | TEMPERATURE: 98.2 F | BODY MASS INDEX: 31.28 KG/M2

## 2024-12-16 DIAGNOSIS — G35 EXACERBATION OF MULTIPLE SCLEROSIS (HCC): Primary | ICD-10-CM

## 2024-12-16 DIAGNOSIS — M32.9 SYSTEMIC LUPUS ERYTHEMATOSUS, UNSPECIFIED SLE TYPE, UNSPECIFIED ORGAN INVOLVEMENT STATUS (HCC): ICD-10-CM

## 2024-12-16 PROCEDURE — 2580000003 HC RX 258: Performed by: PSYCHIATRY & NEUROLOGY

## 2024-12-16 PROCEDURE — 6360000002 HC RX W HCPCS: Performed by: PSYCHIATRY & NEUROLOGY

## 2024-12-16 PROCEDURE — 96366 THER/PROPH/DIAG IV INF ADDON: CPT

## 2024-12-16 PROCEDURE — 6370000000 HC RX 637 (ALT 250 FOR IP): Performed by: PSYCHIATRY & NEUROLOGY

## 2024-12-16 PROCEDURE — 96375 TX/PRO/DX INJ NEW DRUG ADDON: CPT

## 2024-12-16 PROCEDURE — 96365 THER/PROPH/DIAG IV INF INIT: CPT

## 2024-12-16 RX ORDER — SODIUM CHLORIDE 9 MG/ML
5-250 INJECTION, SOLUTION INTRAVENOUS PRN
Status: CANCELLED | OUTPATIENT
Start: 2024-12-16

## 2024-12-16 RX ORDER — DIPHENHYDRAMINE HCL 25 MG
25 TABLET ORAL ONCE
Status: CANCELLED | OUTPATIENT
Start: 2024-12-16 | End: 2024-12-16

## 2024-12-16 RX ORDER — SODIUM CHLORIDE 0.9 % (FLUSH) 0.9 %
5-40 SYRINGE (ML) INJECTION PRN
Status: DISCONTINUED | OUTPATIENT
Start: 2024-12-16 | End: 2024-12-17 | Stop reason: HOSPADM

## 2024-12-16 RX ORDER — SODIUM CHLORIDE 9 MG/ML
5-250 INJECTION, SOLUTION INTRAVENOUS PRN
Status: DISCONTINUED | OUTPATIENT
Start: 2024-12-16 | End: 2024-12-17 | Stop reason: HOSPADM

## 2024-12-16 RX ORDER — DIPHENHYDRAMINE HYDROCHLORIDE 50 MG/ML
50 INJECTION INTRAMUSCULAR; INTRAVENOUS
Status: CANCELLED | OUTPATIENT
Start: 2024-12-16

## 2024-12-16 RX ORDER — ACETAMINOPHEN 325 MG/1
650 TABLET ORAL ONCE
Status: CANCELLED | OUTPATIENT
Start: 2024-12-16 | End: 2024-12-16

## 2024-12-16 RX ORDER — EPINEPHRINE 1 MG/ML
0.3 INJECTION, SOLUTION, CONCENTRATE INTRAVENOUS PRN
Status: CANCELLED | OUTPATIENT
Start: 2024-12-16

## 2024-12-16 RX ORDER — HYDROCORTISONE SODIUM SUCCINATE 100 MG/2ML
100 INJECTION INTRAMUSCULAR; INTRAVENOUS ONCE
Status: COMPLETED | OUTPATIENT
Start: 2024-12-16 | End: 2024-12-16

## 2024-12-16 RX ORDER — HYDROCORTISONE SODIUM SUCCINATE 100 MG/2ML
100 INJECTION INTRAMUSCULAR; INTRAVENOUS
Status: CANCELLED | OUTPATIENT
Start: 2024-12-16

## 2024-12-16 RX ORDER — MEPERIDINE HYDROCHLORIDE 25 MG/ML
12.5 INJECTION INTRAMUSCULAR; INTRAVENOUS; SUBCUTANEOUS PRN
Status: CANCELLED | OUTPATIENT
Start: 2024-12-16

## 2024-12-16 RX ORDER — HYDROCORTISONE SODIUM SUCCINATE 100 MG/2ML
100 INJECTION INTRAMUSCULAR; INTRAVENOUS ONCE
Status: CANCELLED
Start: 2024-12-16 | End: 2024-12-16

## 2024-12-16 RX ORDER — ALBUTEROL SULFATE 90 UG/1
4 INHALANT RESPIRATORY (INHALATION) PRN
Status: CANCELLED | OUTPATIENT
Start: 2024-12-16

## 2024-12-16 RX ORDER — HEPARIN 100 UNIT/ML
500 SYRINGE INTRAVENOUS PRN
Status: CANCELLED | OUTPATIENT
Start: 2024-12-16

## 2024-12-16 RX ORDER — DIPHENHYDRAMINE HCL 25 MG
25 TABLET ORAL ONCE
Status: COMPLETED | OUTPATIENT
Start: 2024-12-16 | End: 2024-12-16

## 2024-12-16 RX ORDER — ACETAMINOPHEN 325 MG/1
650 TABLET ORAL ONCE
Status: COMPLETED | OUTPATIENT
Start: 2024-12-16 | End: 2024-12-16

## 2024-12-16 RX ORDER — SODIUM CHLORIDE 9 MG/ML
INJECTION, SOLUTION INTRAVENOUS CONTINUOUS
Status: CANCELLED | OUTPATIENT
Start: 2024-12-16

## 2024-12-16 RX ORDER — SODIUM CHLORIDE 0.9 % (FLUSH) 0.9 %
5-40 SYRINGE (ML) INJECTION PRN
Status: CANCELLED | OUTPATIENT
Start: 2024-12-16

## 2024-12-16 RX ORDER — ONDANSETRON 2 MG/ML
8 INJECTION INTRAMUSCULAR; INTRAVENOUS
Status: CANCELLED | OUTPATIENT
Start: 2024-12-16

## 2024-12-16 RX ORDER — HEPARIN 100 UNIT/ML
500 SYRINGE INTRAVENOUS PRN
Status: DISCONTINUED | OUTPATIENT
Start: 2024-12-16 | End: 2024-12-17 | Stop reason: HOSPADM

## 2024-12-16 RX ORDER — ACETAMINOPHEN 325 MG/1
650 TABLET ORAL
Status: CANCELLED | OUTPATIENT
Start: 2024-12-16

## 2024-12-16 RX ADMIN — Medication 20 ML: at 09:35

## 2024-12-16 RX ADMIN — HYDROCORTISONE SODIUM SUCCINATE 100 MG: 100 INJECTION, POWDER, FOR SOLUTION INTRAMUSCULAR; INTRAVENOUS at 07:52

## 2024-12-16 RX ADMIN — HEPARIN 500 UNITS: 100 SYRINGE at 09:35

## 2024-12-16 RX ADMIN — ACETAMINOPHEN 650 MG: 325 TABLET ORAL at 07:51

## 2024-12-16 RX ADMIN — DIPHENHYDRAMINE HYDROCHLORIDE 25 MG: 25 TABLET ORAL at 07:51

## 2024-12-16 RX ADMIN — IMMUNE GLOBULIN (HUMAN) 25 G: 10 INJECTION INTRAVENOUS; SUBCUTANEOUS at 08:15

## 2024-12-16 RX ADMIN — Medication 10 ML: at 07:53

## 2024-12-16 NOTE — PROGRESS NOTES
Pt arrived to [facility]. Port accessed using sterile technique, brisk blood return noted. Pt received Tylenol, Benadryl, and Solu-cortef for pre-meds. Pt then received 25g Gamunex-C. Tolerated well.    Electronically signed by Shabnam Huddleston RN on 12/16/2024 at 9:46 AM

## 2024-12-17 ENCOUNTER — HOSPITAL ENCOUNTER (OUTPATIENT)
Dept: INFUSION THERAPY | Age: 49
Setting detail: INFUSION SERIES
Discharge: HOME | End: 2024-12-17
Payer: MEDICARE

## 2024-12-17 VITALS
OXYGEN SATURATION: 100 % | TEMPERATURE: 98.6 F | SYSTOLIC BLOOD PRESSURE: 128 MMHG | DIASTOLIC BLOOD PRESSURE: 87 MMHG | HEART RATE: 77 BPM | RESPIRATION RATE: 18 BRPM

## 2024-12-17 DIAGNOSIS — G35 EXACERBATION OF MULTIPLE SCLEROSIS (HCC): Primary | ICD-10-CM

## 2024-12-17 DIAGNOSIS — M32.9 SYSTEMIC LUPUS ERYTHEMATOSUS, UNSPECIFIED SLE TYPE, UNSPECIFIED ORGAN INVOLVEMENT STATUS (HCC): ICD-10-CM

## 2024-12-17 PROCEDURE — 2580000003 HC RX 258: Performed by: PSYCHIATRY & NEUROLOGY

## 2024-12-17 PROCEDURE — 6360000002 HC RX W HCPCS: Performed by: PSYCHIATRY & NEUROLOGY

## 2024-12-17 PROCEDURE — 96375 TX/PRO/DX INJ NEW DRUG ADDON: CPT

## 2024-12-17 PROCEDURE — 96366 THER/PROPH/DIAG IV INF ADDON: CPT

## 2024-12-17 PROCEDURE — 96365 THER/PROPH/DIAG IV INF INIT: CPT

## 2024-12-17 PROCEDURE — 6370000000 HC RX 637 (ALT 250 FOR IP): Performed by: PSYCHIATRY & NEUROLOGY

## 2024-12-17 RX ORDER — DIPHENHYDRAMINE HCL 25 MG
25 TABLET ORAL ONCE
Status: COMPLETED | OUTPATIENT
Start: 2024-12-17 | End: 2024-12-17

## 2024-12-17 RX ORDER — DIPHENHYDRAMINE HCL 25 MG
25 TABLET ORAL ONCE
Status: CANCELLED | OUTPATIENT
Start: 2024-12-17 | End: 2024-12-17

## 2024-12-17 RX ORDER — HYDROCORTISONE SODIUM SUCCINATE 100 MG/2ML
100 INJECTION INTRAMUSCULAR; INTRAVENOUS ONCE
Status: CANCELLED
Start: 2024-12-17 | End: 2024-12-17

## 2024-12-17 RX ORDER — SODIUM CHLORIDE 9 MG/ML
5-250 INJECTION, SOLUTION INTRAVENOUS PRN
Status: DISCONTINUED | OUTPATIENT
Start: 2024-12-17 | End: 2024-12-18 | Stop reason: HOSPADM

## 2024-12-17 RX ORDER — HYDROCORTISONE SODIUM SUCCINATE 100 MG/2ML
100 INJECTION INTRAMUSCULAR; INTRAVENOUS ONCE
Status: COMPLETED | OUTPATIENT
Start: 2024-12-17 | End: 2024-12-17

## 2024-12-17 RX ORDER — DIPHENHYDRAMINE HYDROCHLORIDE 50 MG/ML
50 INJECTION INTRAMUSCULAR; INTRAVENOUS
Status: CANCELLED | OUTPATIENT
Start: 2024-12-17

## 2024-12-17 RX ORDER — SODIUM CHLORIDE 9 MG/ML
5-250 INJECTION, SOLUTION INTRAVENOUS PRN
Status: CANCELLED | OUTPATIENT
Start: 2024-12-17

## 2024-12-17 RX ORDER — SODIUM CHLORIDE 0.9 % (FLUSH) 0.9 %
5-40 SYRINGE (ML) INJECTION PRN
Status: DISCONTINUED | OUTPATIENT
Start: 2024-12-17 | End: 2024-12-18 | Stop reason: HOSPADM

## 2024-12-17 RX ORDER — MEPERIDINE HYDROCHLORIDE 25 MG/ML
12.5 INJECTION INTRAMUSCULAR; INTRAVENOUS; SUBCUTANEOUS PRN
Status: CANCELLED | OUTPATIENT
Start: 2024-12-17

## 2024-12-17 RX ORDER — ALBUTEROL SULFATE 90 UG/1
4 INHALANT RESPIRATORY (INHALATION) PRN
Status: CANCELLED | OUTPATIENT
Start: 2024-12-17

## 2024-12-17 RX ORDER — ACETAMINOPHEN 325 MG/1
650 TABLET ORAL ONCE
Status: CANCELLED | OUTPATIENT
Start: 2024-12-17 | End: 2024-12-17

## 2024-12-17 RX ORDER — EPINEPHRINE 1 MG/ML
0.3 INJECTION, SOLUTION, CONCENTRATE INTRAVENOUS PRN
Status: CANCELLED | OUTPATIENT
Start: 2024-12-17

## 2024-12-17 RX ORDER — ONDANSETRON 2 MG/ML
8 INJECTION INTRAMUSCULAR; INTRAVENOUS
Status: CANCELLED | OUTPATIENT
Start: 2024-12-17

## 2024-12-17 RX ORDER — ACETAMINOPHEN 325 MG/1
650 TABLET ORAL ONCE
Status: COMPLETED | OUTPATIENT
Start: 2024-12-17 | End: 2024-12-17

## 2024-12-17 RX ORDER — HYDROCORTISONE SODIUM SUCCINATE 100 MG/2ML
100 INJECTION INTRAMUSCULAR; INTRAVENOUS
Status: CANCELLED | OUTPATIENT
Start: 2024-12-17

## 2024-12-17 RX ORDER — HEPARIN 100 UNIT/ML
500 SYRINGE INTRAVENOUS PRN
Status: DISCONTINUED | OUTPATIENT
Start: 2024-12-17 | End: 2024-12-18 | Stop reason: HOSPADM

## 2024-12-17 RX ORDER — HEPARIN 100 UNIT/ML
500 SYRINGE INTRAVENOUS PRN
Status: CANCELLED | OUTPATIENT
Start: 2024-12-17

## 2024-12-17 RX ORDER — SODIUM CHLORIDE 0.9 % (FLUSH) 0.9 %
5-40 SYRINGE (ML) INJECTION PRN
Status: CANCELLED | OUTPATIENT
Start: 2024-12-17

## 2024-12-17 RX ORDER — SODIUM CHLORIDE 9 MG/ML
INJECTION, SOLUTION INTRAVENOUS CONTINUOUS
Status: CANCELLED | OUTPATIENT
Start: 2024-12-17

## 2024-12-17 RX ORDER — ACETAMINOPHEN 325 MG/1
650 TABLET ORAL
Status: CANCELLED | OUTPATIENT
Start: 2024-12-17

## 2024-12-17 RX ADMIN — SODIUM CHLORIDE, PRESERVATIVE FREE 20 ML: 5 INJECTION INTRAVENOUS at 09:42

## 2024-12-17 RX ADMIN — ACETAMINOPHEN 650 MG: 325 TABLET ORAL at 07:40

## 2024-12-17 RX ADMIN — HEPARIN 500 UNITS: 100 SYRINGE at 09:42

## 2024-12-17 RX ADMIN — DIPHENHYDRAMINE HYDROCHLORIDE 25 MG: 25 TABLET ORAL at 07:40

## 2024-12-17 RX ADMIN — SODIUM CHLORIDE, PRESERVATIVE FREE 10 ML: 5 INJECTION INTRAVENOUS at 07:40

## 2024-12-17 RX ADMIN — SODIUM CHLORIDE, PRESERVATIVE FREE 20 ML: 5 INJECTION INTRAVENOUS at 07:57

## 2024-12-17 RX ADMIN — HYDROCORTISONE SODIUM SUCCINATE 100 MG: 100 INJECTION, POWDER, FOR SOLUTION INTRAMUSCULAR; INTRAVENOUS at 07:43

## 2024-12-17 RX ADMIN — IMMUNE GLOBULIN (HUMAN) 25 G: 10 INJECTION INTRAVENOUS; SUBCUTANEOUS at 08:01

## 2024-12-17 NOTE — DISCHARGE INSTRUCTIONS
vaccines include measles, mumps, rubella (MMR), rotavirus, typhoid, yellow fever, varicella (chickenpox), zoster (shingles), and nasal flu (influenza) vaccine.  What are the possible side effects of immune globulin?  Get emergency medical help if you have signs of an allergic reaction: hives; wheezing, difficulty breathing; dizziness, feeling like you might pass out; swelling of your face, lips, tongue, or throat.  Some side effects may occur during the injection. Tell your caregiver if you feel light-headed, itchy, chilled, sweaty, or have chest discomfort, fast heartbeats, severe headache, or pounding in your neck or ears.  Call your doctor at once if you have:  a blood cell disorder --pale or yellowed skin, dark colored urine, fever, confusion or weakness;  dehydration symptoms --feeling very thirsty or hot, being unable to urinate, heavy sweating, or hot and dry skin;  kidney problems --little or no urination, swelling, rapid weight gain, feeling short of breath;  lung problems --chest pain, wheezing, trouble breathing, blue colored lips, fingers, or toes;  signs of a new infection --fever with a severe headache, neck stiffness, eye pain, and increased sensitivity to light; or  signs of a blood clot --shortness of breath, chest pain with deep breathing, rapid heart rate, numbness or weakness on one side of the body, swelling and warmth or discoloration in an arm or leg.  Common side effects may include:  runny or stuffy nose, sinus pain, cough, sore throat;  fever, chills, weakness;  headache, back pain, muscle or joint pain;  dizziness, tiredness, depressed mood;  swelling in your hands or feet;  skin rash, redness, or bruising;  blisters or ulcers in your mouth, red or swollen gums, trouble swallowing;  nausea, diarrhea, stomach pain, upset stomach;  increased blood pressure; or  redness, swelling, or itching where an injection was given.  This is not a complete list of side effects and others may occur. Call  your doctor for medical advice about side effects. You may report side effects to FDA at 8-856-PSH-2735.  What other drugs will affect immune globulin?  Immune globulin can harm your kidneys, especially if you also use certain medicines for infections, cancer, osteoporosis, organ transplant rejection, bowel disorders, high blood pressure, or pain or arthritis (including Advil, Motrin, and Aleve).  Other drugs may affect immune globulin, including prescription and over-the-counter medicines, vitamins, and herbal products. Tell your doctor about all your current medicines and any medicine you start or stop using.  Where can I get more information?  Your doctor or pharmacist can provide more information about immune globulin.  Remember, keep this and all other medicines out of the reach of children, never share your medicines with others, and use this medication only for the indication prescribed.   Every effort has been made to ensure that the information provided by [employer] is accurate, up-to-date, and complete, but no guarantee is made to that effect. Drug information contained herein may be time sensitive. [employer] information has been compiled for use by healthcare practitioners and consumers in the [place] and therefore [employer] does not warrant that uses outside of the [place] are appropriate, unless specifically indicated otherwise. [employer] drug information does not endorse drugs, diagnose patients or recommend therapy. [employer] drug information is an informational resource designed to assist licensed healthcare practitioners in caring for their patients and/or to serve consumers viewing this service as a supplement to, and not a substitute for, the expertise, skill, knowledge and judgment of healthcare practitioners. The absence of a warning for a given drug or drug combination in no way should be construed to indicate that the drug or drug combination is safe, effective or

## 2024-12-17 NOTE — FLOWSHEET NOTE
12/17/24 1020   AVS Reviewed   AVS & discharge instructions reviewed with patient and/or representative? Yes   Reviewed instructions with Patient   Level of Understanding Questions answered;Verbalized understanding     GAMUNEX-C IV  ADMINISTERED PER ORDERS. VSS.  RIGHT CHEST PORT FLUSHED WITH NS AND HEPARIN, BRISK BLOOD RETURN. PT TO RETURN TOMORROW FOR TREATMENT.

## 2024-12-18 ENCOUNTER — HOSPITAL ENCOUNTER (OUTPATIENT)
Dept: INFUSION THERAPY | Age: 49
Setting detail: INFUSION SERIES
Discharge: HOME | End: 2024-12-18
Payer: MEDICARE

## 2024-12-18 VITALS
RESPIRATION RATE: 18 BRPM | DIASTOLIC BLOOD PRESSURE: 85 MMHG | SYSTOLIC BLOOD PRESSURE: 130 MMHG | HEART RATE: 89 BPM | OXYGEN SATURATION: 100 % | TEMPERATURE: 97.1 F | BODY MASS INDEX: 31.28 KG/M2 | WEIGHT: 188 LBS

## 2024-12-18 DIAGNOSIS — G35 EXACERBATION OF MULTIPLE SCLEROSIS (HCC): Primary | ICD-10-CM

## 2024-12-18 DIAGNOSIS — M32.9 SYSTEMIC LUPUS ERYTHEMATOSUS, UNSPECIFIED SLE TYPE, UNSPECIFIED ORGAN INVOLVEMENT STATUS (HCC): ICD-10-CM

## 2024-12-18 PROCEDURE — 6360000002 HC RX W HCPCS: Performed by: PSYCHIATRY & NEUROLOGY

## 2024-12-18 PROCEDURE — 2500000003 HC RX 250 WO HCPCS: Performed by: PSYCHIATRY & NEUROLOGY

## 2024-12-18 PROCEDURE — 96375 TX/PRO/DX INJ NEW DRUG ADDON: CPT

## 2024-12-18 PROCEDURE — 96366 THER/PROPH/DIAG IV INF ADDON: CPT

## 2024-12-18 PROCEDURE — 6370000000 HC RX 637 (ALT 250 FOR IP): Performed by: PSYCHIATRY & NEUROLOGY

## 2024-12-18 PROCEDURE — 96365 THER/PROPH/DIAG IV INF INIT: CPT

## 2024-12-18 RX ORDER — EPINEPHRINE 1 MG/ML
0.3 INJECTION, SOLUTION, CONCENTRATE INTRAVENOUS PRN
Status: CANCELLED | OUTPATIENT
Start: 2024-12-18

## 2024-12-18 RX ORDER — SODIUM CHLORIDE 9 MG/ML
5-250 INJECTION, SOLUTION INTRAVENOUS PRN
Status: CANCELLED | OUTPATIENT
Start: 2024-12-18

## 2024-12-18 RX ORDER — SODIUM CHLORIDE 0.9 % (FLUSH) 0.9 %
5-40 SYRINGE (ML) INJECTION PRN
Status: CANCELLED | OUTPATIENT
Start: 2024-12-18

## 2024-12-18 RX ORDER — ALBUTEROL SULFATE 90 UG/1
4 INHALANT RESPIRATORY (INHALATION) PRN
Status: CANCELLED | OUTPATIENT
Start: 2024-12-18

## 2024-12-18 RX ORDER — DIPHENHYDRAMINE HCL 25 MG
25 TABLET ORAL ONCE
Status: CANCELLED | OUTPATIENT
Start: 2024-12-18 | End: 2024-12-18

## 2024-12-18 RX ORDER — ACETAMINOPHEN 325 MG/1
650 TABLET ORAL
Status: CANCELLED | OUTPATIENT
Start: 2024-12-18

## 2024-12-18 RX ORDER — ACETAMINOPHEN 325 MG/1
650 TABLET ORAL ONCE
Status: COMPLETED | OUTPATIENT
Start: 2024-12-18 | End: 2024-12-18

## 2024-12-18 RX ORDER — SODIUM CHLORIDE 0.9 % (FLUSH) 0.9 %
5-40 SYRINGE (ML) INJECTION PRN
Status: DISCONTINUED | OUTPATIENT
Start: 2024-12-18 | End: 2024-12-19 | Stop reason: HOSPADM

## 2024-12-18 RX ORDER — HYDROCORTISONE SODIUM SUCCINATE 100 MG/2ML
100 INJECTION INTRAMUSCULAR; INTRAVENOUS ONCE
Status: CANCELLED
Start: 2024-12-18 | End: 2024-12-18

## 2024-12-18 RX ORDER — DIPHENHYDRAMINE HYDROCHLORIDE 50 MG/ML
50 INJECTION INTRAMUSCULAR; INTRAVENOUS
Status: CANCELLED | OUTPATIENT
Start: 2024-12-18

## 2024-12-18 RX ORDER — MEPERIDINE HYDROCHLORIDE 25 MG/ML
12.5 INJECTION INTRAMUSCULAR; INTRAVENOUS; SUBCUTANEOUS PRN
Status: CANCELLED | OUTPATIENT
Start: 2024-12-18

## 2024-12-18 RX ORDER — HYDROCORTISONE SODIUM SUCCINATE 100 MG/2ML
100 INJECTION INTRAMUSCULAR; INTRAVENOUS
Status: CANCELLED | OUTPATIENT
Start: 2024-12-18

## 2024-12-18 RX ORDER — ACETAMINOPHEN 325 MG/1
650 TABLET ORAL ONCE
Status: CANCELLED | OUTPATIENT
Start: 2024-12-18 | End: 2024-12-18

## 2024-12-18 RX ORDER — HEPARIN 100 UNIT/ML
500 SYRINGE INTRAVENOUS PRN
Status: DISCONTINUED | OUTPATIENT
Start: 2024-12-18 | End: 2024-12-19 | Stop reason: HOSPADM

## 2024-12-18 RX ORDER — HYDROCORTISONE SODIUM SUCCINATE 100 MG/2ML
100 INJECTION INTRAMUSCULAR; INTRAVENOUS ONCE
Status: COMPLETED | OUTPATIENT
Start: 2024-12-18 | End: 2024-12-18

## 2024-12-18 RX ORDER — SODIUM CHLORIDE 9 MG/ML
5-250 INJECTION, SOLUTION INTRAVENOUS PRN
Status: DISCONTINUED | OUTPATIENT
Start: 2024-12-18 | End: 2024-12-19 | Stop reason: HOSPADM

## 2024-12-18 RX ORDER — SODIUM CHLORIDE 9 MG/ML
INJECTION, SOLUTION INTRAVENOUS CONTINUOUS
Status: CANCELLED | OUTPATIENT
Start: 2024-12-18

## 2024-12-18 RX ORDER — DIPHENHYDRAMINE HCL 25 MG
25 TABLET ORAL ONCE
Status: COMPLETED | OUTPATIENT
Start: 2024-12-18 | End: 2024-12-18

## 2024-12-18 RX ORDER — HEPARIN 100 UNIT/ML
500 SYRINGE INTRAVENOUS PRN
Status: CANCELLED | OUTPATIENT
Start: 2024-12-18

## 2024-12-18 RX ORDER — ONDANSETRON 2 MG/ML
8 INJECTION INTRAMUSCULAR; INTRAVENOUS
Status: CANCELLED | OUTPATIENT
Start: 2024-12-18

## 2024-12-18 RX ADMIN — DIPHENHYDRAMINE HYDROCHLORIDE 25 MG: 25 TABLET ORAL at 07:18

## 2024-12-18 RX ADMIN — ACETAMINOPHEN 650 MG: 325 TABLET ORAL at 07:18

## 2024-12-18 RX ADMIN — HYDROCORTISONE SODIUM SUCCINATE 100 MG: 100 INJECTION, POWDER, FOR SOLUTION INTRAMUSCULAR; INTRAVENOUS at 07:18

## 2024-12-18 RX ADMIN — HEPARIN 500 UNITS: 100 SYRINGE at 09:09

## 2024-12-18 RX ADMIN — SODIUM CHLORIDE, PRESERVATIVE FREE 10 ML: 5 INJECTION INTRAVENOUS at 07:20

## 2024-12-18 RX ADMIN — SODIUM CHLORIDE, PRESERVATIVE FREE 10 ML: 5 INJECTION INTRAVENOUS at 07:15

## 2024-12-18 RX ADMIN — SODIUM CHLORIDE, PRESERVATIVE FREE 20 ML: 5 INJECTION INTRAVENOUS at 09:09

## 2024-12-18 RX ADMIN — IMMUNE GLOBULIN (HUMAN) 25 G: 10 INJECTION INTRAVENOUS; SUBCUTANEOUS at 07:47

## 2024-12-18 NOTE — PROGRESS NOTES
[facility] Port flushed and brisk blood return noted. Premeds given. Gamunex- C 25 g infusion completed. Port kept accessed for tomorrow's treatment. Brisk blood return noted with port flushing.

## 2024-12-19 ENCOUNTER — HOSPITAL ENCOUNTER (OUTPATIENT)
Dept: INFUSION THERAPY | Age: 49
Setting detail: INFUSION SERIES
Discharge: HOME | End: 2024-12-19
Payer: MEDICARE

## 2024-12-19 VITALS
HEART RATE: 78 BPM | RESPIRATION RATE: 18 BRPM | OXYGEN SATURATION: 99 % | DIASTOLIC BLOOD PRESSURE: 89 MMHG | TEMPERATURE: 97 F | SYSTOLIC BLOOD PRESSURE: 123 MMHG

## 2024-12-19 DIAGNOSIS — G35 EXACERBATION OF MULTIPLE SCLEROSIS (HCC): Primary | ICD-10-CM

## 2024-12-19 DIAGNOSIS — M32.9 SYSTEMIC LUPUS ERYTHEMATOSUS, UNSPECIFIED SLE TYPE, UNSPECIFIED ORGAN INVOLVEMENT STATUS (HCC): ICD-10-CM

## 2024-12-19 PROCEDURE — 2500000003 HC RX 250 WO HCPCS: Performed by: PSYCHIATRY & NEUROLOGY

## 2024-12-19 PROCEDURE — 6360000002 HC RX W HCPCS: Performed by: PSYCHIATRY & NEUROLOGY

## 2024-12-19 PROCEDURE — 96365 THER/PROPH/DIAG IV INF INIT: CPT

## 2024-12-19 PROCEDURE — 6370000000 HC RX 637 (ALT 250 FOR IP): Performed by: PSYCHIATRY & NEUROLOGY

## 2024-12-19 PROCEDURE — 96375 TX/PRO/DX INJ NEW DRUG ADDON: CPT

## 2024-12-19 RX ORDER — HEPARIN 100 UNIT/ML
500 SYRINGE INTRAVENOUS PRN
Status: CANCELLED | OUTPATIENT
Start: 2024-12-19

## 2024-12-19 RX ORDER — ACETAMINOPHEN 325 MG/1
650 TABLET ORAL ONCE
Status: CANCELLED | OUTPATIENT
Start: 2024-12-19 | End: 2024-12-19

## 2024-12-19 RX ORDER — SODIUM CHLORIDE 0.9 % (FLUSH) 0.9 %
5-40 SYRINGE (ML) INJECTION PRN
Status: CANCELLED | OUTPATIENT
Start: 2024-12-19

## 2024-12-19 RX ORDER — DIPHENHYDRAMINE HYDROCHLORIDE 50 MG/ML
50 INJECTION INTRAMUSCULAR; INTRAVENOUS
Status: CANCELLED | OUTPATIENT
Start: 2024-12-19

## 2024-12-19 RX ORDER — DIPHENHYDRAMINE HCL 25 MG
25 TABLET ORAL ONCE
Status: CANCELLED | OUTPATIENT
Start: 2024-12-19 | End: 2024-12-19

## 2024-12-19 RX ORDER — SODIUM CHLORIDE 9 MG/ML
INJECTION, SOLUTION INTRAVENOUS CONTINUOUS
Status: CANCELLED | OUTPATIENT
Start: 2024-12-19

## 2024-12-19 RX ORDER — HEPARIN 100 UNIT/ML
500 SYRINGE INTRAVENOUS PRN
Status: DISCONTINUED | OUTPATIENT
Start: 2024-12-19 | End: 2024-12-20 | Stop reason: HOSPADM

## 2024-12-19 RX ORDER — SODIUM CHLORIDE 9 MG/ML
5-250 INJECTION, SOLUTION INTRAVENOUS PRN
Status: DISCONTINUED | OUTPATIENT
Start: 2024-12-19 | End: 2024-12-20 | Stop reason: HOSPADM

## 2024-12-19 RX ORDER — EPINEPHRINE 1 MG/ML
0.3 INJECTION, SOLUTION, CONCENTRATE INTRAVENOUS PRN
Status: CANCELLED | OUTPATIENT
Start: 2024-12-19

## 2024-12-19 RX ORDER — ONDANSETRON 2 MG/ML
8 INJECTION INTRAMUSCULAR; INTRAVENOUS
Status: CANCELLED | OUTPATIENT
Start: 2024-12-19

## 2024-12-19 RX ORDER — ACETAMINOPHEN 325 MG/1
650 TABLET ORAL
Status: CANCELLED | OUTPATIENT
Start: 2024-12-19

## 2024-12-19 RX ORDER — DIPHENHYDRAMINE HCL 25 MG
25 TABLET ORAL ONCE
Status: COMPLETED | OUTPATIENT
Start: 2024-12-19 | End: 2024-12-19

## 2024-12-19 RX ORDER — HYDROCORTISONE SODIUM SUCCINATE 100 MG/2ML
100 INJECTION INTRAMUSCULAR; INTRAVENOUS ONCE
Status: CANCELLED
Start: 2024-12-19 | End: 2024-12-19

## 2024-12-19 RX ORDER — HYDROCORTISONE SODIUM SUCCINATE 100 MG/2ML
100 INJECTION INTRAMUSCULAR; INTRAVENOUS
Status: CANCELLED | OUTPATIENT
Start: 2024-12-19

## 2024-12-19 RX ORDER — ALBUTEROL SULFATE 90 UG/1
4 INHALANT RESPIRATORY (INHALATION) PRN
Status: CANCELLED | OUTPATIENT
Start: 2024-12-19

## 2024-12-19 RX ORDER — SODIUM CHLORIDE 9 MG/ML
5-250 INJECTION, SOLUTION INTRAVENOUS PRN
Status: CANCELLED | OUTPATIENT
Start: 2024-12-19

## 2024-12-19 RX ORDER — SODIUM CHLORIDE 0.9 % (FLUSH) 0.9 %
5-40 SYRINGE (ML) INJECTION PRN
Status: DISCONTINUED | OUTPATIENT
Start: 2024-12-19 | End: 2024-12-20 | Stop reason: HOSPADM

## 2024-12-19 RX ORDER — HYDROCORTISONE SODIUM SUCCINATE 100 MG/2ML
100 INJECTION INTRAMUSCULAR; INTRAVENOUS ONCE
Status: COMPLETED | OUTPATIENT
Start: 2024-12-19 | End: 2024-12-19

## 2024-12-19 RX ORDER — MEPERIDINE HYDROCHLORIDE 25 MG/ML
12.5 INJECTION INTRAMUSCULAR; INTRAVENOUS; SUBCUTANEOUS PRN
Status: CANCELLED | OUTPATIENT
Start: 2024-12-19

## 2024-12-19 RX ORDER — ACETAMINOPHEN 325 MG/1
650 TABLET ORAL ONCE
Status: COMPLETED | OUTPATIENT
Start: 2024-12-19 | End: 2024-12-19

## 2024-12-19 RX ADMIN — HYDROCORTISONE SODIUM SUCCINATE 100 MG: 100 INJECTION, POWDER, FOR SOLUTION INTRAMUSCULAR; INTRAVENOUS at 07:32

## 2024-12-19 RX ADMIN — DIPHENHYDRAMINE HYDROCHLORIDE 25 MG: 25 TABLET ORAL at 07:32

## 2024-12-19 RX ADMIN — HEPARIN 500 UNITS: 100 SYRINGE at 09:22

## 2024-12-19 RX ADMIN — SODIUM CHLORIDE, PRESERVATIVE FREE 10 ML: 5 INJECTION INTRAVENOUS at 07:15

## 2024-12-19 RX ADMIN — SODIUM CHLORIDE, PRESERVATIVE FREE 20 ML: 5 INJECTION INTRAVENOUS at 09:22

## 2024-12-19 RX ADMIN — SODIUM CHLORIDE, PRESERVATIVE FREE 10 ML: 5 INJECTION INTRAVENOUS at 07:34

## 2024-12-19 RX ADMIN — IMMUNE GLOBULIN (HUMAN) 25 G: 10 INJECTION INTRAVENOUS; SUBCUTANEOUS at 08:00

## 2024-12-19 RX ADMIN — ACETAMINOPHEN 650 MG: 325 TABLET ORAL at 07:32

## 2024-12-19 NOTE — PROGRESS NOTES
[facility] Port flushed and brisk blood return noted. Premeds given. Gamunex- C 25 g infusion completed. Pt tolerated well.

## 2024-12-20 ENCOUNTER — HOSPITAL ENCOUNTER (OUTPATIENT)
Dept: INFUSION THERAPY | Age: 49
Setting detail: INFUSION SERIES
Discharge: HOME | End: 2024-12-20
Payer: MEDICARE

## 2024-12-20 VITALS
DIASTOLIC BLOOD PRESSURE: 88 MMHG | HEART RATE: 70 BPM | OXYGEN SATURATION: 100 % | TEMPERATURE: 97 F | SYSTOLIC BLOOD PRESSURE: 113 MMHG | RESPIRATION RATE: 18 BRPM

## 2024-12-20 DIAGNOSIS — G35 EXACERBATION OF MULTIPLE SCLEROSIS (HCC): Primary | ICD-10-CM

## 2024-12-20 DIAGNOSIS — M32.9 SYSTEMIC LUPUS ERYTHEMATOSUS, UNSPECIFIED SLE TYPE, UNSPECIFIED ORGAN INVOLVEMENT STATUS (HCC): ICD-10-CM

## 2024-12-20 PROCEDURE — 96375 TX/PRO/DX INJ NEW DRUG ADDON: CPT

## 2024-12-20 PROCEDURE — 6360000002 HC RX W HCPCS: Performed by: PSYCHIATRY & NEUROLOGY

## 2024-12-20 PROCEDURE — 96365 THER/PROPH/DIAG IV INF INIT: CPT

## 2024-12-20 PROCEDURE — 6370000000 HC RX 637 (ALT 250 FOR IP): Performed by: PSYCHIATRY & NEUROLOGY

## 2024-12-20 PROCEDURE — 2500000003 HC RX 250 WO HCPCS: Performed by: PSYCHIATRY & NEUROLOGY

## 2024-12-20 RX ORDER — ALBUTEROL SULFATE 90 UG/1
4 INHALANT RESPIRATORY (INHALATION) PRN
Status: CANCELLED | OUTPATIENT
Start: 2024-12-20

## 2024-12-20 RX ORDER — HYDROCORTISONE SODIUM SUCCINATE 100 MG/2ML
100 INJECTION INTRAMUSCULAR; INTRAVENOUS
Status: CANCELLED | OUTPATIENT
Start: 2024-12-20

## 2024-12-20 RX ORDER — DIPHENHYDRAMINE HCL 25 MG
25 TABLET ORAL ONCE
Status: CANCELLED | OUTPATIENT
Start: 2024-12-20 | End: 2024-12-20

## 2024-12-20 RX ORDER — DIPHENHYDRAMINE HCL 25 MG
25 TABLET ORAL ONCE
Status: COMPLETED | OUTPATIENT
Start: 2024-12-20 | End: 2024-12-20

## 2024-12-20 RX ORDER — SODIUM CHLORIDE 9 MG/ML
5-250 INJECTION, SOLUTION INTRAVENOUS PRN
Status: DISCONTINUED | OUTPATIENT
Start: 2024-12-20 | End: 2024-12-21 | Stop reason: HOSPADM

## 2024-12-20 RX ORDER — SODIUM CHLORIDE 0.9 % (FLUSH) 0.9 %
5-40 SYRINGE (ML) INJECTION PRN
Status: DISCONTINUED | OUTPATIENT
Start: 2024-12-20 | End: 2024-12-21 | Stop reason: HOSPADM

## 2024-12-20 RX ORDER — ACETAMINOPHEN 325 MG/1
650 TABLET ORAL
Status: CANCELLED | OUTPATIENT
Start: 2024-12-20

## 2024-12-20 RX ORDER — HYDROCORTISONE SODIUM SUCCINATE 100 MG/2ML
100 INJECTION INTRAMUSCULAR; INTRAVENOUS ONCE
Status: COMPLETED | OUTPATIENT
Start: 2024-12-20 | End: 2024-12-20

## 2024-12-20 RX ORDER — EPINEPHRINE 1 MG/ML
0.3 INJECTION, SOLUTION, CONCENTRATE INTRAVENOUS PRN
Status: CANCELLED | OUTPATIENT
Start: 2024-12-20

## 2024-12-20 RX ORDER — MEPERIDINE HYDROCHLORIDE 25 MG/ML
12.5 INJECTION INTRAMUSCULAR; INTRAVENOUS; SUBCUTANEOUS PRN
Status: CANCELLED | OUTPATIENT
Start: 2024-12-20

## 2024-12-20 RX ORDER — HEPARIN 100 UNIT/ML
500 SYRINGE INTRAVENOUS PRN
Status: DISCONTINUED | OUTPATIENT
Start: 2024-12-20 | End: 2024-12-21 | Stop reason: HOSPADM

## 2024-12-20 RX ORDER — DIPHENHYDRAMINE HYDROCHLORIDE 50 MG/ML
50 INJECTION INTRAMUSCULAR; INTRAVENOUS
Status: CANCELLED | OUTPATIENT
Start: 2024-12-20

## 2024-12-20 RX ORDER — ACETAMINOPHEN 325 MG/1
650 TABLET ORAL ONCE
Status: COMPLETED | OUTPATIENT
Start: 2024-12-20 | End: 2024-12-20

## 2024-12-20 RX ORDER — SODIUM CHLORIDE 9 MG/ML
5-250 INJECTION, SOLUTION INTRAVENOUS PRN
Status: CANCELLED | OUTPATIENT
Start: 2024-12-20

## 2024-12-20 RX ORDER — SODIUM CHLORIDE 9 MG/ML
INJECTION, SOLUTION INTRAVENOUS CONTINUOUS
Status: CANCELLED | OUTPATIENT
Start: 2024-12-20

## 2024-12-20 RX ORDER — SODIUM CHLORIDE 0.9 % (FLUSH) 0.9 %
5-40 SYRINGE (ML) INJECTION PRN
Status: CANCELLED | OUTPATIENT
Start: 2024-12-20

## 2024-12-20 RX ORDER — ONDANSETRON 2 MG/ML
8 INJECTION INTRAMUSCULAR; INTRAVENOUS
Status: CANCELLED | OUTPATIENT
Start: 2024-12-20

## 2024-12-20 RX ORDER — HEPARIN 100 UNIT/ML
500 SYRINGE INTRAVENOUS PRN
Status: CANCELLED | OUTPATIENT
Start: 2024-12-20

## 2024-12-20 RX ORDER — ACETAMINOPHEN 325 MG/1
650 TABLET ORAL ONCE
Status: CANCELLED | OUTPATIENT
Start: 2024-12-20 | End: 2024-12-20

## 2024-12-20 RX ORDER — HYDROCORTISONE SODIUM SUCCINATE 100 MG/2ML
100 INJECTION INTRAMUSCULAR; INTRAVENOUS ONCE
Status: CANCELLED
Start: 2024-12-20 | End: 2024-12-20

## 2024-12-20 RX ADMIN — IMMUNE GLOBULIN (HUMAN) 25 G: 10 INJECTION INTRAVENOUS; SUBCUTANEOUS at 07:07

## 2024-12-20 RX ADMIN — SODIUM CHLORIDE, PRESERVATIVE FREE 10 ML: 5 INJECTION INTRAVENOUS at 06:46

## 2024-12-20 RX ADMIN — SODIUM CHLORIDE, PRESERVATIVE FREE 20 ML: 5 INJECTION INTRAVENOUS at 08:30

## 2024-12-20 RX ADMIN — SODIUM CHLORIDE, PRESERVATIVE FREE 10 ML: 5 INJECTION INTRAVENOUS at 06:38

## 2024-12-20 RX ADMIN — HEPARIN 500 UNITS: 100 SYRINGE at 08:31

## 2024-12-20 RX ADMIN — HYDROCORTISONE SODIUM SUCCINATE 100 MG: 100 INJECTION, POWDER, FOR SOLUTION INTRAMUSCULAR; INTRAVENOUS at 06:45

## 2024-12-20 RX ADMIN — DIPHENHYDRAMINE HYDROCHLORIDE 25 MG: 25 TABLET ORAL at 06:45

## 2024-12-20 RX ADMIN — ACETAMINOPHEN 650 MG: 325 TABLET ORAL at 06:45

## 2024-12-20 NOTE — DISCHARGE INSTRUCTIONS
your doctor for medical advice about side effects. You may report side effects to FDA at 1-598-MTL-7735.  What other drugs will affect immune globulin?  Immune globulin can harm your kidneys, especially if you also use certain medicines for infections, cancer, osteoporosis, organ transplant rejection, bowel disorders, high blood pressure, or pain or arthritis (including Advil, Motrin, and Aleve).  Other drugs may affect immune globulin, including prescription and over-the-counter medicines, vitamins, and herbal products. Tell your doctor about all your current medicines and any medicine you start or stop using.  Where can I get more information?  Your doctor or pharmacist can provide more information about immune globulin.  Remember, keep this and all other medicines out of the reach of children, never share your medicines with others, and use this medication only for the indication prescribed.   Every effort has been made to ensure that the information provided by [employer] is accurate, up-to-date, and complete, but no guarantee is made to that effect. Drug information contained herein may be time sensitive. [employer] information has been compiled for use by healthcare practitioners and consumers in the [place] and therefore [employer] does not warrant that uses outside of the [place] are appropriate, unless specifically indicated otherwise. [employer] drug information does not endorse drugs, diagnose patients or recommend therapy. [employer] drug information is an informational resource designed to assist licensed healthcare practitioners in caring for their patients and/or to serve consumers viewing this service as a supplement to, and not a substitute for, the expertise, skill, knowledge and judgment of healthcare practitioners. The absence of a warning for a given drug or drug combination in no way should be construed to indicate that the drug or drug combination is safe, effective or  appropriate for any given patient. [facility] does not assume any responsibility for any aspect of healthcare administered with the aid of information [facility] provides. The information contained herein is not intended to cover all possible uses, directions, precautions, warnings, drug interactions, allergic reactions, or adverse effects. If you have questions about the drugs you are taking, check with your doctor, nurse or pharmacist.  Copyright 3741-7743 [employer]. Version: 4.02. Revision date: 2/26/2021.  Care instructions adapted under license by [employer]. If you have questions about a medical condition or this instruction, always ask your healthcare professional. [employer], [employer] disclaims any warranty or liability for your use of this information.

## 2024-12-20 NOTE — PROGRESS NOTES
Pt arrived to [facility] with port accessed. Brisk blood return noted. Premeds given. Gamunex- C 25 g infusion completed. Port de accessed per protocol. Pt tolerated well.

## 2024-12-23 DIAGNOSIS — G43.719 INTRACTABLE CHRONIC MIGRAINE WITHOUT AURA AND WITHOUT STATUS MIGRAINOSUS: ICD-10-CM

## 2024-12-23 DIAGNOSIS — G35 MULTIPLE SCLEROSIS (HCC): ICD-10-CM

## 2024-12-23 DIAGNOSIS — M54.30 SCIATICA, UNSPECIFIED LATERALITY: ICD-10-CM

## 2024-12-23 DIAGNOSIS — M79.7 FIBROMYALGIA: ICD-10-CM

## 2024-12-23 RX ORDER — OXYCODONE HYDROCHLORIDE 10 MG/1
10 TABLET ORAL EVERY 6 HOURS PRN
Qty: 120 TABLET | Refills: 0 | Status: SHIPPED | OUTPATIENT
Start: 2024-12-23 | End: 2025-01-22

## 2024-12-23 NOTE — TELEPHONE ENCOUNTER
Refill was sent to [facility]. It should of been sent to [facility].

## 2024-12-27 ENCOUNTER — HOSPITAL ENCOUNTER (OUTPATIENT)
Dept: INFUSION THERAPY | Age: 49
Setting detail: INFUSION SERIES
Discharge: HOME | End: 2024-12-27
Payer: MEDICARE

## 2024-12-27 VITALS
TEMPERATURE: 98.8 F | HEART RATE: 83 BPM | SYSTOLIC BLOOD PRESSURE: 110 MMHG | DIASTOLIC BLOOD PRESSURE: 71 MMHG | WEIGHT: 194 LBS | BODY MASS INDEX: 32.28 KG/M2 | RESPIRATION RATE: 18 BRPM | OXYGEN SATURATION: 100 %

## 2024-12-27 DIAGNOSIS — G35 EXACERBATION OF MULTIPLE SCLEROSIS (HCC): Primary | ICD-10-CM

## 2024-12-27 DIAGNOSIS — M32.9 SYSTEMIC LUPUS ERYTHEMATOSUS, UNSPECIFIED SLE TYPE, UNSPECIFIED ORGAN INVOLVEMENT STATUS (HCC): ICD-10-CM

## 2024-12-27 PROCEDURE — 6360000002 HC RX W HCPCS: Performed by: PSYCHIATRY & NEUROLOGY

## 2024-12-27 PROCEDURE — 96365 THER/PROPH/DIAG IV INF INIT: CPT

## 2024-12-27 PROCEDURE — 96375 TX/PRO/DX INJ NEW DRUG ADDON: CPT

## 2024-12-27 PROCEDURE — 2500000003 HC RX 250 WO HCPCS: Performed by: PSYCHIATRY & NEUROLOGY

## 2024-12-27 PROCEDURE — 6370000000 HC RX 637 (ALT 250 FOR IP): Performed by: PSYCHIATRY & NEUROLOGY

## 2024-12-27 RX ORDER — SODIUM CHLORIDE 0.9 % (FLUSH) 0.9 %
5-40 SYRINGE (ML) INJECTION PRN
Status: DISCONTINUED | OUTPATIENT
Start: 2024-12-27 | End: 2024-12-28 | Stop reason: HOSPADM

## 2024-12-27 RX ORDER — DIPHENHYDRAMINE HYDROCHLORIDE 50 MG/ML
50 INJECTION INTRAMUSCULAR; INTRAVENOUS
OUTPATIENT
Start: 2024-12-27

## 2024-12-27 RX ORDER — HYDROCORTISONE SODIUM SUCCINATE 100 MG/2ML
100 INJECTION INTRAMUSCULAR; INTRAVENOUS
OUTPATIENT
Start: 2024-12-27

## 2024-12-27 RX ORDER — HEPARIN 100 UNIT/ML
500 SYRINGE INTRAVENOUS PRN
Status: DISCONTINUED | OUTPATIENT
Start: 2024-12-27 | End: 2024-12-28 | Stop reason: HOSPADM

## 2024-12-27 RX ORDER — ACETAMINOPHEN 325 MG/1
650 TABLET ORAL ONCE
Status: COMPLETED | OUTPATIENT
Start: 2024-12-27 | End: 2024-12-27

## 2024-12-27 RX ORDER — DIPHENHYDRAMINE HCL 25 MG
25 TABLET ORAL ONCE
Status: COMPLETED | OUTPATIENT
Start: 2024-12-27 | End: 2024-12-27

## 2024-12-27 RX ORDER — HYDROCORTISONE SODIUM SUCCINATE 100 MG/2ML
100 INJECTION INTRAMUSCULAR; INTRAVENOUS ONCE
Status: COMPLETED | OUTPATIENT
Start: 2024-12-27 | End: 2024-12-27

## 2024-12-27 RX ORDER — HEPARIN 100 UNIT/ML
500 SYRINGE INTRAVENOUS PRN
OUTPATIENT
Start: 2024-12-27

## 2024-12-27 RX ORDER — ACETAMINOPHEN 325 MG/1
650 TABLET ORAL
OUTPATIENT
Start: 2024-12-27

## 2024-12-27 RX ORDER — DIPHENHYDRAMINE HCL 25 MG
25 TABLET ORAL ONCE
OUTPATIENT
Start: 2024-12-27 | End: 2024-12-27

## 2024-12-27 RX ORDER — MEPERIDINE HYDROCHLORIDE 25 MG/ML
12.5 INJECTION INTRAMUSCULAR; INTRAVENOUS; SUBCUTANEOUS PRN
OUTPATIENT
Start: 2024-12-27

## 2024-12-27 RX ORDER — ALBUTEROL SULFATE 90 UG/1
4 INHALANT RESPIRATORY (INHALATION) PRN
OUTPATIENT
Start: 2024-12-27

## 2024-12-27 RX ORDER — ACETAMINOPHEN 325 MG/1
650 TABLET ORAL ONCE
OUTPATIENT
Start: 2024-12-27 | End: 2024-12-27

## 2024-12-27 RX ORDER — HYDROCORTISONE SODIUM SUCCINATE 100 MG/2ML
100 INJECTION INTRAMUSCULAR; INTRAVENOUS ONCE
Start: 2024-12-27 | End: 2024-12-27

## 2024-12-27 RX ORDER — SODIUM CHLORIDE 0.9 % (FLUSH) 0.9 %
5-40 SYRINGE (ML) INJECTION PRN
OUTPATIENT
Start: 2024-12-27

## 2024-12-27 RX ORDER — SODIUM CHLORIDE 9 MG/ML
5-250 INJECTION, SOLUTION INTRAVENOUS PRN
Status: DISCONTINUED | OUTPATIENT
Start: 2024-12-27 | End: 2024-12-28 | Stop reason: HOSPADM

## 2024-12-27 RX ORDER — EPINEPHRINE 1 MG/ML
0.3 INJECTION, SOLUTION, CONCENTRATE INTRAVENOUS PRN
OUTPATIENT
Start: 2024-12-27

## 2024-12-27 RX ORDER — SODIUM CHLORIDE 9 MG/ML
5-250 INJECTION, SOLUTION INTRAVENOUS PRN
OUTPATIENT
Start: 2024-12-27

## 2024-12-27 RX ORDER — SODIUM CHLORIDE 9 MG/ML
INJECTION, SOLUTION INTRAVENOUS CONTINUOUS
OUTPATIENT
Start: 2024-12-27

## 2024-12-27 RX ORDER — ONDANSETRON 2 MG/ML
8 INJECTION INTRAMUSCULAR; INTRAVENOUS
OUTPATIENT
Start: 2024-12-27

## 2024-12-27 RX ADMIN — ACETAMINOPHEN 650 MG: 325 TABLET ORAL at 07:08

## 2024-12-27 RX ADMIN — IMMUNE GLOBULIN (HUMAN) 25 G: 10 INJECTION INTRAVENOUS; SUBCUTANEOUS at 07:34

## 2024-12-27 RX ADMIN — DIPHENHYDRAMINE HYDROCHLORIDE 25 MG: 25 TABLET ORAL at 07:08

## 2024-12-27 RX ADMIN — HYDROCORTISONE SODIUM SUCCINATE 100 MG: 100 INJECTION, POWDER, FOR SOLUTION INTRAMUSCULAR; INTRAVENOUS at 07:08

## 2024-12-27 RX ADMIN — SODIUM CHLORIDE, PRESERVATIVE FREE 20 ML: 5 INJECTION INTRAVENOUS at 08:57

## 2024-12-27 RX ADMIN — HEPARIN 500 UNITS: 100 SYRINGE at 08:57

## 2024-12-27 NOTE — PROGRESS NOTES
[facility] accessed using sterile technique, brisk blood return noted. Pt received Tylenol, Benadryl, and Solu-cortef for pre-meds, followed by 25g Gamunex-C. Tolerated well.    Electronically signed by Shabnam Huddleston RN on 12/27/2024 at 9:11 AM

## 2024-12-29 NOTE — ED PROVIDER NOTES
tablets by mouth 2 times daily    OMEPRAZOLE (PRILOSEC) 20 MG DELAYED RELEASE CAPSULE    Take 1 capsule by mouth Daily    ONDANSETRON (ZOFRAN) 4 MG TABLET    TAKE 1 TABLET BY MOUTH 1 TIME FOR 1 DOSE    OXYCODONE HCL (OXY-IR) 10 MG IMMEDIATE RELEASE TABLET    Take 1 tablet by mouth every 6 hours as needed for Pain for up to 30 days. Intended supply: 30 days Max Daily Amount: 40 mg    PANTOPRAZOLE SODIUM (PROTONIX) 40 MG PACK PACKET    Take 1 packet by mouth 2 times daily    POTASSIUM CHLORIDE (KLOR-CON M) 20 MEQ EXTENDED RELEASE TABLET    Take 1 tablet by mouth 2 times daily    PRAZOSIN (MINIPRESS) 2 MG CAPSULE    Take 1 capsule by mouth nightly    PREDNISONE (DELTASONE) 5 MG TABLET        PRENATAL VIT-FE FUMARATE-FA (PRENATAL 1+1 PO)    Take 1 tablet by mouth daily    RIZATRIPTAN (MAXALT) 10 MG TABLET    TAKE 1 TABLET BY MOUTH 1 TIME AS NEEDED FOR MIGRAINE. MAX 2 IN 24 HOURS. MAY REPEAT IN 2 HOURS AS NEEDED    SUCRALFATE (CARAFATE) 1 GM TABLET    Take 1 tablet by mouth 4 times daily    TRINTELLIX 10 MG TABS TABLET    Take 1 tablet by mouth daily    VRAYLAR 1.5 MG CAPSULE           ALLERGIES     Avelox [moxifloxacin], Glatiramer, Sugar-protein-starch, Avelox [moxifloxacin], Copaxone [glatiramer acetate], Depakote [divalproex sodium], Gabapentin, Hydroxychloroquine, Invega [paliperidone], Lamictal [lamotrigine], Lithium, Pregabalin, Saquinavir, Varenicline tartrate, Lamictal [lamotrigine], Lithium, and Paliperidone er    FAMILY HISTORY       Family History   Problem Relation Age of Onset    Anemia Mother     Breast Cancer Mother     Depression Mother     High Blood Pressure Mother     Mental Illness Mother     Miscarriages / Stillbirths Mother     Colon Cancer Father     Depression Father     High Blood Pressure Father     Mental Illness Father     Breast Cancer Maternal Grandmother     Alcohol Abuse Maternal Grandfather     Anemia Paternal Grandmother     Alcohol Abuse Paternal Grandfather     Osteoporosis Paternal  specified.    DISCHARGE MEDICATIONS:  New Prescriptions    CEPHALEXIN (KEFLEX) 500 MG CAPSULE    Take 1 capsule by mouth 2 times daily for 7 days          (Please note that portions of this note were completed with a voice recognition program.  Efforts were made to edit the dictations butoccasionally words are mis-transcribed.)    LYSSA Maldonado NP (electronically signed)  AttendingEmergency Physician         Pablo Lancaster APRN - NP  08/31/24 3905     Imaging Studies/Medications

## 2025-01-10 ENCOUNTER — APPOINTMENT (OUTPATIENT)
Dept: INFUSION THERAPY | Age: 50
End: 2025-01-10
Payer: MEDICARE

## 2025-01-13 ENCOUNTER — HOSPITAL ENCOUNTER (OUTPATIENT)
Dept: INFUSION THERAPY | Age: 50
Setting detail: INFUSION SERIES
Discharge: HOME | End: 2025-01-13
Payer: MEDICARE

## 2025-01-13 ENCOUNTER — PATIENT MESSAGE (OUTPATIENT)
Dept: NEUROLOGY | Age: 50
End: 2025-01-13

## 2025-01-13 VITALS
RESPIRATION RATE: 18 BRPM | TEMPERATURE: 98.4 F | BODY MASS INDEX: 33.28 KG/M2 | DIASTOLIC BLOOD PRESSURE: 86 MMHG | WEIGHT: 200 LBS | HEART RATE: 70 BPM | OXYGEN SATURATION: 100 % | SYSTOLIC BLOOD PRESSURE: 129 MMHG

## 2025-01-13 DIAGNOSIS — M79.7 FIBROMYALGIA: ICD-10-CM

## 2025-01-13 DIAGNOSIS — M32.9 SYSTEMIC LUPUS ERYTHEMATOSUS, UNSPECIFIED SLE TYPE, UNSPECIFIED ORGAN INVOLVEMENT STATUS (HCC): ICD-10-CM

## 2025-01-13 DIAGNOSIS — G35 EXACERBATION OF MULTIPLE SCLEROSIS (HCC): Primary | ICD-10-CM

## 2025-01-13 PROCEDURE — 6370000000 HC RX 637 (ALT 250 FOR IP): Performed by: PSYCHIATRY & NEUROLOGY

## 2025-01-13 PROCEDURE — 96365 THER/PROPH/DIAG IV INF INIT: CPT

## 2025-01-13 PROCEDURE — 2500000003 HC RX 250 WO HCPCS: Performed by: PSYCHIATRY & NEUROLOGY

## 2025-01-13 PROCEDURE — 6360000002 HC RX W HCPCS: Performed by: PSYCHIATRY & NEUROLOGY

## 2025-01-13 PROCEDURE — 96375 TX/PRO/DX INJ NEW DRUG ADDON: CPT

## 2025-01-13 RX ORDER — ACETAMINOPHEN 325 MG/1
650 TABLET ORAL ONCE
Status: CANCELLED | OUTPATIENT
Start: 2025-01-13 | End: 2025-01-13

## 2025-01-13 RX ORDER — ACETAMINOPHEN 325 MG/1
650 TABLET ORAL ONCE
Status: COMPLETED | OUTPATIENT
Start: 2025-01-13 | End: 2025-01-13

## 2025-01-13 RX ORDER — SODIUM CHLORIDE 9 MG/ML
5-250 INJECTION, SOLUTION INTRAVENOUS PRN
Status: CANCELLED | OUTPATIENT
Start: 2025-01-13

## 2025-01-13 RX ORDER — SODIUM CHLORIDE 0.9 % (FLUSH) 0.9 %
5-40 SYRINGE (ML) INJECTION PRN
Status: DISCONTINUED | OUTPATIENT
Start: 2025-01-13 | End: 2025-01-14 | Stop reason: HOSPADM

## 2025-01-13 RX ORDER — HYDROCORTISONE SODIUM SUCCINATE 100 MG/2ML
100 INJECTION INTRAMUSCULAR; INTRAVENOUS ONCE
Status: CANCELLED
Start: 2025-01-13 | End: 2025-01-13

## 2025-01-13 RX ORDER — HEPARIN 100 UNIT/ML
500 SYRINGE INTRAVENOUS PRN
Status: DISCONTINUED | OUTPATIENT
Start: 2025-01-13 | End: 2025-01-14 | Stop reason: HOSPADM

## 2025-01-13 RX ORDER — HYDROCORTISONE SODIUM SUCCINATE 100 MG/2ML
100 INJECTION INTRAMUSCULAR; INTRAVENOUS ONCE
Status: COMPLETED | OUTPATIENT
Start: 2025-01-13 | End: 2025-01-13

## 2025-01-13 RX ORDER — SODIUM CHLORIDE 9 MG/ML
INJECTION, SOLUTION INTRAVENOUS CONTINUOUS
Status: CANCELLED | OUTPATIENT
Start: 2025-01-13

## 2025-01-13 RX ORDER — ONDANSETRON 2 MG/ML
8 INJECTION INTRAMUSCULAR; INTRAVENOUS
Status: CANCELLED | OUTPATIENT
Start: 2025-01-13

## 2025-01-13 RX ORDER — SODIUM CHLORIDE 9 MG/ML
5-250 INJECTION, SOLUTION INTRAVENOUS PRN
Status: DISCONTINUED | OUTPATIENT
Start: 2025-01-13 | End: 2025-01-14 | Stop reason: HOSPADM

## 2025-01-13 RX ORDER — DIPHENHYDRAMINE HYDROCHLORIDE 50 MG/ML
50 INJECTION INTRAMUSCULAR; INTRAVENOUS
Status: CANCELLED | OUTPATIENT
Start: 2025-01-13

## 2025-01-13 RX ORDER — SODIUM CHLORIDE 0.9 % (FLUSH) 0.9 %
5-40 SYRINGE (ML) INJECTION PRN
Status: CANCELLED | OUTPATIENT
Start: 2025-01-13

## 2025-01-13 RX ORDER — HYDROCORTISONE SODIUM SUCCINATE 100 MG/2ML
100 INJECTION INTRAMUSCULAR; INTRAVENOUS
Status: CANCELLED | OUTPATIENT
Start: 2025-01-13

## 2025-01-13 RX ORDER — MEPERIDINE HYDROCHLORIDE 25 MG/ML
12.5 INJECTION INTRAMUSCULAR; INTRAVENOUS; SUBCUTANEOUS PRN
Status: CANCELLED | OUTPATIENT
Start: 2025-01-13

## 2025-01-13 RX ORDER — HEPARIN 100 UNIT/ML
500 SYRINGE INTRAVENOUS PRN
Status: CANCELLED | OUTPATIENT
Start: 2025-01-13

## 2025-01-13 RX ORDER — ACETAMINOPHEN 325 MG/1
650 TABLET ORAL
Status: CANCELLED | OUTPATIENT
Start: 2025-01-13

## 2025-01-13 RX ORDER — DIPHENHYDRAMINE HCL 25 MG
25 TABLET ORAL ONCE
Status: CANCELLED | OUTPATIENT
Start: 2025-01-13 | End: 2025-01-13

## 2025-01-13 RX ORDER — DIPHENHYDRAMINE HCL 25 MG
25 TABLET ORAL ONCE
Status: COMPLETED | OUTPATIENT
Start: 2025-01-13 | End: 2025-01-13

## 2025-01-13 RX ORDER — ALBUTEROL SULFATE 90 UG/1
4 INHALANT RESPIRATORY (INHALATION) PRN
Status: CANCELLED | OUTPATIENT
Start: 2025-01-13

## 2025-01-13 RX ORDER — EPINEPHRINE 1 MG/ML
0.3 INJECTION, SOLUTION, CONCENTRATE INTRAVENOUS PRN
Status: CANCELLED | OUTPATIENT
Start: 2025-01-13

## 2025-01-13 RX ADMIN — HYDROCORTISONE SODIUM SUCCINATE 100 MG: 100 INJECTION, POWDER, FOR SOLUTION INTRAMUSCULAR; INTRAVENOUS at 07:29

## 2025-01-13 RX ADMIN — SODIUM CHLORIDE, PRESERVATIVE FREE 10 ML: 5 INJECTION INTRAVENOUS at 09:04

## 2025-01-13 RX ADMIN — ACETAMINOPHEN 650 MG: 325 TABLET ORAL at 07:29

## 2025-01-13 RX ADMIN — IMMUNE GLOBULIN (HUMAN) 25 G: 10 INJECTION INTRAVENOUS; SUBCUTANEOUS at 07:37

## 2025-01-13 RX ADMIN — DIPHENHYDRAMINE HYDROCHLORIDE 25 MG: 25 TABLET ORAL at 07:29

## 2025-01-13 RX ADMIN — HEPARIN 500 UNITS: 100 SYRINGE at 09:04

## 2025-01-13 NOTE — TELEPHONE ENCOUNTER
Requested Prescriptions     Pending Prescriptions Disp Refills    carisoprodol (SOMA) 350 MG tablet 120 tablet 5     Sig: Take 1 tablet by mouth 4 times daily as needed for Muscle spasms for up to 30 days. Max Daily Amount: 1,400 mg       Last Office Visit:  8/21/2024  Next Office Visit:  3/18/2025  Last Medication Refill:  7/31/2024 with 5 TAIWO Sanchez up to date:  11/19/2024    *RX updated to reflect   1/13/2025  fill date*

## 2025-01-13 NOTE — PROGRESS NOTES
Pt arrived to [facility] and Port accessed using sterile technique. Pt premedicated as ordered. IVIG 25g administered. Pt tolerated well.     Electronically signed by Ciera George RN on 1/13/2025 at 9:13 AM

## 2025-01-14 ENCOUNTER — HOSPITAL ENCOUNTER (OUTPATIENT)
Dept: INFUSION THERAPY | Age: 50
Setting detail: INFUSION SERIES
Discharge: HOME | End: 2025-01-14
Payer: MEDICARE

## 2025-01-14 VITALS
RESPIRATION RATE: 18 BRPM | DIASTOLIC BLOOD PRESSURE: 86 MMHG | OXYGEN SATURATION: 98 % | SYSTOLIC BLOOD PRESSURE: 122 MMHG | TEMPERATURE: 97.8 F | HEART RATE: 77 BPM

## 2025-01-14 DIAGNOSIS — G35 EXACERBATION OF MULTIPLE SCLEROSIS (HCC): Primary | ICD-10-CM

## 2025-01-14 DIAGNOSIS — M32.9 SYSTEMIC LUPUS ERYTHEMATOSUS, UNSPECIFIED SLE TYPE, UNSPECIFIED ORGAN INVOLVEMENT STATUS (HCC): ICD-10-CM

## 2025-01-14 PROCEDURE — 96375 TX/PRO/DX INJ NEW DRUG ADDON: CPT

## 2025-01-14 PROCEDURE — 2500000003 HC RX 250 WO HCPCS: Performed by: PSYCHIATRY & NEUROLOGY

## 2025-01-14 PROCEDURE — 6370000000 HC RX 637 (ALT 250 FOR IP): Performed by: PSYCHIATRY & NEUROLOGY

## 2025-01-14 PROCEDURE — 96365 THER/PROPH/DIAG IV INF INIT: CPT

## 2025-01-14 PROCEDURE — 6360000002 HC RX W HCPCS: Performed by: PSYCHIATRY & NEUROLOGY

## 2025-01-14 RX ORDER — DIPHENHYDRAMINE HCL 25 MG
25 TABLET ORAL ONCE
Status: CANCELLED | OUTPATIENT
Start: 2025-01-14 | End: 2025-01-14

## 2025-01-14 RX ORDER — ACETAMINOPHEN 325 MG/1
650 TABLET ORAL ONCE
Status: CANCELLED | OUTPATIENT
Start: 2025-01-14 | End: 2025-01-14

## 2025-01-14 RX ORDER — CARISOPRODOL 350 MG/1
350 TABLET ORAL 4 TIMES DAILY PRN
Qty: 120 TABLET | Refills: 5 | Status: SHIPPED | OUTPATIENT
Start: 2025-01-14 | End: 2025-02-13

## 2025-01-14 RX ORDER — ACETAMINOPHEN 325 MG/1
650 TABLET ORAL ONCE
Status: COMPLETED | OUTPATIENT
Start: 2025-01-14 | End: 2025-01-14

## 2025-01-14 RX ORDER — SODIUM CHLORIDE 9 MG/ML
5-250 INJECTION, SOLUTION INTRAVENOUS PRN
Status: CANCELLED | OUTPATIENT
Start: 2025-01-14

## 2025-01-14 RX ORDER — MEPERIDINE HYDROCHLORIDE 25 MG/ML
12.5 INJECTION INTRAMUSCULAR; INTRAVENOUS; SUBCUTANEOUS PRN
Status: CANCELLED | OUTPATIENT
Start: 2025-01-14

## 2025-01-14 RX ORDER — SODIUM CHLORIDE 9 MG/ML
INJECTION, SOLUTION INTRAVENOUS CONTINUOUS
Status: CANCELLED | OUTPATIENT
Start: 2025-01-14

## 2025-01-14 RX ORDER — SODIUM CHLORIDE 0.9 % (FLUSH) 0.9 %
5-40 SYRINGE (ML) INJECTION PRN
Status: CANCELLED | OUTPATIENT
Start: 2025-01-14

## 2025-01-14 RX ORDER — HYDROCORTISONE SODIUM SUCCINATE 100 MG/2ML
100 INJECTION INTRAMUSCULAR; INTRAVENOUS
Status: CANCELLED | OUTPATIENT
Start: 2025-01-14

## 2025-01-14 RX ORDER — ALBUTEROL SULFATE 90 UG/1
4 INHALANT RESPIRATORY (INHALATION) PRN
Status: CANCELLED | OUTPATIENT
Start: 2025-01-14

## 2025-01-14 RX ORDER — ACETAMINOPHEN 325 MG/1
650 TABLET ORAL
Status: CANCELLED | OUTPATIENT
Start: 2025-01-14

## 2025-01-14 RX ORDER — SODIUM CHLORIDE 0.9 % (FLUSH) 0.9 %
5-40 SYRINGE (ML) INJECTION PRN
Status: DISCONTINUED | OUTPATIENT
Start: 2025-01-14 | End: 2025-01-15 | Stop reason: HOSPADM

## 2025-01-14 RX ORDER — EPINEPHRINE 1 MG/ML
0.3 INJECTION, SOLUTION, CONCENTRATE INTRAVENOUS PRN
Status: CANCELLED | OUTPATIENT
Start: 2025-01-14

## 2025-01-14 RX ORDER — HEPARIN 100 UNIT/ML
500 SYRINGE INTRAVENOUS PRN
Status: DISCONTINUED | OUTPATIENT
Start: 2025-01-14 | End: 2025-01-15 | Stop reason: HOSPADM

## 2025-01-14 RX ORDER — HEPARIN 100 UNIT/ML
500 SYRINGE INTRAVENOUS PRN
Status: CANCELLED | OUTPATIENT
Start: 2025-01-14

## 2025-01-14 RX ORDER — ONDANSETRON 2 MG/ML
8 INJECTION INTRAMUSCULAR; INTRAVENOUS
Status: CANCELLED | OUTPATIENT
Start: 2025-01-14

## 2025-01-14 RX ORDER — HYDROCORTISONE SODIUM SUCCINATE 100 MG/2ML
100 INJECTION INTRAMUSCULAR; INTRAVENOUS ONCE
Status: CANCELLED
Start: 2025-01-14 | End: 2025-01-14

## 2025-01-14 RX ORDER — DIPHENHYDRAMINE HYDROCHLORIDE 50 MG/ML
50 INJECTION INTRAMUSCULAR; INTRAVENOUS
Status: CANCELLED | OUTPATIENT
Start: 2025-01-14

## 2025-01-14 RX ORDER — HYDROCORTISONE SODIUM SUCCINATE 100 MG/2ML
100 INJECTION INTRAMUSCULAR; INTRAVENOUS ONCE
Status: COMPLETED | OUTPATIENT
Start: 2025-01-14 | End: 2025-01-14

## 2025-01-14 RX ORDER — SODIUM CHLORIDE 9 MG/ML
5-250 INJECTION, SOLUTION INTRAVENOUS PRN
Status: DISCONTINUED | OUTPATIENT
Start: 2025-01-14 | End: 2025-01-15 | Stop reason: HOSPADM

## 2025-01-14 RX ORDER — DIPHENHYDRAMINE HCL 25 MG
25 TABLET ORAL ONCE
Status: COMPLETED | OUTPATIENT
Start: 2025-01-14 | End: 2025-01-14

## 2025-01-14 RX ADMIN — ACETAMINOPHEN 650 MG: 325 TABLET ORAL at 07:23

## 2025-01-14 RX ADMIN — HYDROCORTISONE SODIUM SUCCINATE 100 MG: 100 INJECTION, POWDER, FOR SOLUTION INTRAMUSCULAR; INTRAVENOUS at 07:23

## 2025-01-14 RX ADMIN — IMMUNE GLOBULIN (HUMAN) 25 G: 10 INJECTION INTRAVENOUS; SUBCUTANEOUS at 07:52

## 2025-01-14 RX ADMIN — SODIUM CHLORIDE, PRESERVATIVE FREE 10 ML: 5 INJECTION INTRAVENOUS at 09:12

## 2025-01-14 RX ADMIN — DIPHENHYDRAMINE HYDROCHLORIDE 25 MG: 25 TABLET ORAL at 07:23

## 2025-01-14 RX ADMIN — HEPARIN 500 UNITS: 100 SYRINGE at 09:12

## 2025-01-14 NOTE — PROGRESS NOTES
[facility] from [facility]. Flushed and brisk blood return noted. IVIG 25g administered. Pt tolerated well.     Electronically signed by Ciera George RN on 1/14/2025 at 9:19 AM

## 2025-01-15 ENCOUNTER — HOSPITAL ENCOUNTER (OUTPATIENT)
Dept: INFUSION THERAPY | Age: 50
Setting detail: INFUSION SERIES
Discharge: HOME | End: 2025-01-15
Payer: MEDICARE

## 2025-01-15 VITALS
RESPIRATION RATE: 18 BRPM | HEART RATE: 82 BPM | DIASTOLIC BLOOD PRESSURE: 73 MMHG | SYSTOLIC BLOOD PRESSURE: 112 MMHG | TEMPERATURE: 98.1 F | OXYGEN SATURATION: 100 %

## 2025-01-15 DIAGNOSIS — M54.30 SCIATICA, UNSPECIFIED LATERALITY: ICD-10-CM

## 2025-01-15 DIAGNOSIS — M32.9 SYSTEMIC LUPUS ERYTHEMATOSUS, UNSPECIFIED SLE TYPE, UNSPECIFIED ORGAN INVOLVEMENT STATUS (HCC): ICD-10-CM

## 2025-01-15 DIAGNOSIS — G43.719 INTRACTABLE CHRONIC MIGRAINE WITHOUT AURA AND WITHOUT STATUS MIGRAINOSUS: ICD-10-CM

## 2025-01-15 DIAGNOSIS — G35 EXACERBATION OF MULTIPLE SCLEROSIS (HCC): Primary | ICD-10-CM

## 2025-01-15 DIAGNOSIS — G35 MULTIPLE SCLEROSIS (HCC): ICD-10-CM

## 2025-01-15 DIAGNOSIS — M79.7 FIBROMYALGIA: ICD-10-CM

## 2025-01-15 PROCEDURE — 96366 THER/PROPH/DIAG IV INF ADDON: CPT

## 2025-01-15 PROCEDURE — 6360000002 HC RX W HCPCS: Performed by: PSYCHIATRY & NEUROLOGY

## 2025-01-15 PROCEDURE — 6370000000 HC RX 637 (ALT 250 FOR IP): Performed by: PSYCHIATRY & NEUROLOGY

## 2025-01-15 PROCEDURE — 2500000003 HC RX 250 WO HCPCS: Performed by: PSYCHIATRY & NEUROLOGY

## 2025-01-15 PROCEDURE — 96375 TX/PRO/DX INJ NEW DRUG ADDON: CPT

## 2025-01-15 PROCEDURE — 96365 THER/PROPH/DIAG IV INF INIT: CPT

## 2025-01-15 RX ORDER — HYDROCORTISONE SODIUM SUCCINATE 100 MG/2ML
100 INJECTION INTRAMUSCULAR; INTRAVENOUS ONCE
Status: CANCELLED
Start: 2025-01-15 | End: 2025-01-15

## 2025-01-15 RX ORDER — ONDANSETRON 2 MG/ML
8 INJECTION INTRAMUSCULAR; INTRAVENOUS
Status: CANCELLED | OUTPATIENT
Start: 2025-01-15

## 2025-01-15 RX ORDER — HYDROCORTISONE SODIUM SUCCINATE 100 MG/2ML
100 INJECTION INTRAMUSCULAR; INTRAVENOUS
Status: CANCELLED | OUTPATIENT
Start: 2025-01-15

## 2025-01-15 RX ORDER — DIPHENHYDRAMINE HCL 25 MG
25 TABLET ORAL ONCE
Status: CANCELLED | OUTPATIENT
Start: 2025-01-15 | End: 2025-01-15

## 2025-01-15 RX ORDER — SODIUM CHLORIDE 0.9 % (FLUSH) 0.9 %
5-40 SYRINGE (ML) INJECTION PRN
Status: DISCONTINUED | OUTPATIENT
Start: 2025-01-15 | End: 2025-01-16 | Stop reason: HOSPADM

## 2025-01-15 RX ORDER — SODIUM CHLORIDE 9 MG/ML
5-250 INJECTION, SOLUTION INTRAVENOUS PRN
Status: CANCELLED | OUTPATIENT
Start: 2025-01-15

## 2025-01-15 RX ORDER — ALBUTEROL SULFATE 90 UG/1
4 INHALANT RESPIRATORY (INHALATION) PRN
Status: CANCELLED | OUTPATIENT
Start: 2025-01-15

## 2025-01-15 RX ORDER — ACETAMINOPHEN 325 MG/1
650 TABLET ORAL ONCE
Status: COMPLETED | OUTPATIENT
Start: 2025-01-15 | End: 2025-01-15

## 2025-01-15 RX ORDER — HEPARIN 100 UNIT/ML
500 SYRINGE INTRAVENOUS PRN
Status: DISCONTINUED | OUTPATIENT
Start: 2025-01-15 | End: 2025-01-16 | Stop reason: HOSPADM

## 2025-01-15 RX ORDER — SODIUM CHLORIDE 9 MG/ML
INJECTION, SOLUTION INTRAVENOUS CONTINUOUS
Status: CANCELLED | OUTPATIENT
Start: 2025-01-15

## 2025-01-15 RX ORDER — SODIUM CHLORIDE 9 MG/ML
5-250 INJECTION, SOLUTION INTRAVENOUS PRN
Status: DISCONTINUED | OUTPATIENT
Start: 2025-01-15 | End: 2025-01-16 | Stop reason: HOSPADM

## 2025-01-15 RX ORDER — HYDROCORTISONE SODIUM SUCCINATE 100 MG/2ML
100 INJECTION INTRAMUSCULAR; INTRAVENOUS ONCE
Status: COMPLETED | OUTPATIENT
Start: 2025-01-15 | End: 2025-01-15

## 2025-01-15 RX ORDER — DIPHENHYDRAMINE HYDROCHLORIDE 50 MG/ML
50 INJECTION INTRAMUSCULAR; INTRAVENOUS
Status: CANCELLED | OUTPATIENT
Start: 2025-01-15

## 2025-01-15 RX ORDER — CARISOPRODOL 350 MG/1
350 TABLET ORAL 4 TIMES DAILY PRN
Qty: 120 TABLET | Refills: 5 | Status: CANCELLED | OUTPATIENT
Start: 2025-01-15 | End: 2025-02-14

## 2025-01-15 RX ORDER — MEPERIDINE HYDROCHLORIDE 25 MG/ML
12.5 INJECTION INTRAMUSCULAR; INTRAVENOUS; SUBCUTANEOUS PRN
Status: CANCELLED | OUTPATIENT
Start: 2025-01-15

## 2025-01-15 RX ORDER — DIPHENHYDRAMINE HCL 25 MG
25 TABLET ORAL ONCE
Status: COMPLETED | OUTPATIENT
Start: 2025-01-15 | End: 2025-01-15

## 2025-01-15 RX ORDER — EPINEPHRINE 1 MG/ML
0.3 INJECTION, SOLUTION, CONCENTRATE INTRAVENOUS PRN
Status: CANCELLED | OUTPATIENT
Start: 2025-01-15

## 2025-01-15 RX ORDER — HEPARIN 100 UNIT/ML
500 SYRINGE INTRAVENOUS PRN
Status: CANCELLED | OUTPATIENT
Start: 2025-01-15

## 2025-01-15 RX ORDER — ACETAMINOPHEN 325 MG/1
650 TABLET ORAL ONCE
Status: CANCELLED | OUTPATIENT
Start: 2025-01-15 | End: 2025-01-15

## 2025-01-15 RX ORDER — SODIUM CHLORIDE 0.9 % (FLUSH) 0.9 %
5-40 SYRINGE (ML) INJECTION PRN
Status: CANCELLED | OUTPATIENT
Start: 2025-01-15

## 2025-01-15 RX ORDER — ACETAMINOPHEN 325 MG/1
650 TABLET ORAL
Status: CANCELLED | OUTPATIENT
Start: 2025-01-15

## 2025-01-15 RX ADMIN — HEPARIN 500 UNITS: 100 SYRINGE at 08:34

## 2025-01-15 RX ADMIN — HYDROCORTISONE SODIUM SUCCINATE 100 MG: 100 INJECTION, POWDER, FOR SOLUTION INTRAMUSCULAR; INTRAVENOUS at 06:49

## 2025-01-15 RX ADMIN — SODIUM CHLORIDE, PRESERVATIVE FREE 20 ML: 5 INJECTION INTRAVENOUS at 08:34

## 2025-01-15 RX ADMIN — ACETAMINOPHEN 650 MG: 325 TABLET ORAL at 06:49

## 2025-01-15 RX ADMIN — DIPHENHYDRAMINE HYDROCHLORIDE 25 MG: 25 TABLET ORAL at 06:49

## 2025-01-15 RX ADMIN — IMMUNE GLOBULIN (HUMAN) 25 G: 10 INJECTION INTRAVENOUS; SUBCUTANEOUS at 06:57

## 2025-01-15 NOTE — PROGRESS NOTES
Pt arrived to \[facility]T with port accessed from infusion yesterday. Premeds given as ordered. 25 g gamunex C given. Pt tolerated well.

## 2025-01-16 ENCOUNTER — TELEPHONE (OUTPATIENT)
Dept: NEUROLOGY | Age: 50
End: 2025-01-16

## 2025-01-16 ENCOUNTER — HOSPITAL ENCOUNTER (OUTPATIENT)
Dept: INFUSION THERAPY | Age: 50
Setting detail: INFUSION SERIES
Discharge: HOME | End: 2025-01-16
Payer: MEDICARE

## 2025-01-16 VITALS
SYSTOLIC BLOOD PRESSURE: 125 MMHG | RESPIRATION RATE: 18 BRPM | TEMPERATURE: 97 F | HEART RATE: 80 BPM | OXYGEN SATURATION: 100 % | DIASTOLIC BLOOD PRESSURE: 88 MMHG

## 2025-01-16 DIAGNOSIS — M32.9 SYSTEMIC LUPUS ERYTHEMATOSUS, UNSPECIFIED SLE TYPE, UNSPECIFIED ORGAN INVOLVEMENT STATUS (HCC): ICD-10-CM

## 2025-01-16 DIAGNOSIS — G35 EXACERBATION OF MULTIPLE SCLEROSIS (HCC): Primary | ICD-10-CM

## 2025-01-16 PROCEDURE — 96365 THER/PROPH/DIAG IV INF INIT: CPT

## 2025-01-16 PROCEDURE — 6370000000 HC RX 637 (ALT 250 FOR IP): Performed by: PSYCHIATRY & NEUROLOGY

## 2025-01-16 PROCEDURE — 2500000003 HC RX 250 WO HCPCS: Performed by: PSYCHIATRY & NEUROLOGY

## 2025-01-16 PROCEDURE — 96375 TX/PRO/DX INJ NEW DRUG ADDON: CPT

## 2025-01-16 PROCEDURE — 6360000002 HC RX W HCPCS: Performed by: PSYCHIATRY & NEUROLOGY

## 2025-01-16 RX ORDER — DIPHENHYDRAMINE HYDROCHLORIDE 50 MG/ML
50 INJECTION INTRAMUSCULAR; INTRAVENOUS
OUTPATIENT
Start: 2025-01-16

## 2025-01-16 RX ORDER — ACETAMINOPHEN 325 MG/1
650 TABLET ORAL ONCE
Status: CANCELLED | OUTPATIENT
Start: 2025-01-16 | End: 2025-01-16

## 2025-01-16 RX ORDER — HEPARIN 100 UNIT/ML
500 SYRINGE INTRAVENOUS PRN
Status: CANCELLED | OUTPATIENT
Start: 2025-01-16

## 2025-01-16 RX ORDER — DIPHENHYDRAMINE HCL 25 MG
25 TABLET ORAL ONCE
Status: CANCELLED | OUTPATIENT
Start: 2025-01-16 | End: 2025-01-16

## 2025-01-16 RX ORDER — SODIUM CHLORIDE 9 MG/ML
5-250 INJECTION, SOLUTION INTRAVENOUS PRN
Status: CANCELLED | OUTPATIENT
Start: 2025-01-16

## 2025-01-16 RX ORDER — ACETAMINOPHEN 325 MG/1
650 TABLET ORAL ONCE
Status: COMPLETED | OUTPATIENT
Start: 2025-01-16 | End: 2025-01-16

## 2025-01-16 RX ORDER — EPINEPHRINE 1 MG/ML
0.3 INJECTION, SOLUTION, CONCENTRATE INTRAVENOUS PRN
OUTPATIENT
Start: 2025-01-16

## 2025-01-16 RX ORDER — ALBUTEROL SULFATE 90 UG/1
4 INHALANT RESPIRATORY (INHALATION) PRN
OUTPATIENT
Start: 2025-01-16

## 2025-01-16 RX ORDER — HYDROCORTISONE SODIUM SUCCINATE 100 MG/2ML
100 INJECTION INTRAMUSCULAR; INTRAVENOUS ONCE
Status: CANCELLED
Start: 2025-01-16 | End: 2025-01-16

## 2025-01-16 RX ORDER — SODIUM CHLORIDE 0.9 % (FLUSH) 0.9 %
5-40 SYRINGE (ML) INJECTION PRN
Status: DISCONTINUED | OUTPATIENT
Start: 2025-01-16 | End: 2025-01-17 | Stop reason: HOSPADM

## 2025-01-16 RX ORDER — SODIUM CHLORIDE 9 MG/ML
INJECTION, SOLUTION INTRAVENOUS CONTINUOUS
OUTPATIENT
Start: 2025-01-16

## 2025-01-16 RX ORDER — HYDROCORTISONE SODIUM SUCCINATE 100 MG/2ML
100 INJECTION INTRAMUSCULAR; INTRAVENOUS
OUTPATIENT
Start: 2025-01-16

## 2025-01-16 RX ORDER — OXYCODONE HYDROCHLORIDE 10 MG/1
10 TABLET ORAL EVERY 6 HOURS PRN
Qty: 120 TABLET | Refills: 0 | Status: SHIPPED | OUTPATIENT
Start: 2025-01-22 | End: 2025-02-21

## 2025-01-16 RX ORDER — ACETAMINOPHEN 325 MG/1
650 TABLET ORAL
OUTPATIENT
Start: 2025-01-16

## 2025-01-16 RX ORDER — MEPERIDINE HYDROCHLORIDE 25 MG/ML
12.5 INJECTION INTRAMUSCULAR; INTRAVENOUS; SUBCUTANEOUS PRN
OUTPATIENT
Start: 2025-01-16

## 2025-01-16 RX ORDER — ONDANSETRON 2 MG/ML
8 INJECTION INTRAMUSCULAR; INTRAVENOUS
OUTPATIENT
Start: 2025-01-16

## 2025-01-16 RX ORDER — SODIUM CHLORIDE 0.9 % (FLUSH) 0.9 %
5-40 SYRINGE (ML) INJECTION PRN
Status: CANCELLED | OUTPATIENT
Start: 2025-01-16

## 2025-01-16 RX ORDER — DIPHENHYDRAMINE HCL 25 MG
25 TABLET ORAL ONCE
Status: COMPLETED | OUTPATIENT
Start: 2025-01-16 | End: 2025-01-16

## 2025-01-16 RX ORDER — HYDROCORTISONE SODIUM SUCCINATE 100 MG/2ML
100 INJECTION INTRAMUSCULAR; INTRAVENOUS ONCE
Status: COMPLETED | OUTPATIENT
Start: 2025-01-16 | End: 2025-01-16

## 2025-01-16 RX ORDER — HEPARIN 100 UNIT/ML
500 SYRINGE INTRAVENOUS PRN
Status: DISCONTINUED | OUTPATIENT
Start: 2025-01-16 | End: 2025-01-17 | Stop reason: HOSPADM

## 2025-01-16 RX ORDER — SODIUM CHLORIDE 9 MG/ML
5-250 INJECTION, SOLUTION INTRAVENOUS PRN
Status: DISCONTINUED | OUTPATIENT
Start: 2025-01-16 | End: 2025-01-17 | Stop reason: HOSPADM

## 2025-01-16 RX ADMIN — ACETAMINOPHEN 650 MG: 325 TABLET ORAL at 07:31

## 2025-01-16 RX ADMIN — IMMUNE GLOBULIN (HUMAN) 25 G: 10 INJECTION INTRAVENOUS; SUBCUTANEOUS at 07:37

## 2025-01-16 RX ADMIN — HYDROCORTISONE SODIUM SUCCINATE 100 MG: 100 INJECTION, POWDER, FOR SOLUTION INTRAMUSCULAR; INTRAVENOUS at 07:31

## 2025-01-16 RX ADMIN — DIPHENHYDRAMINE HYDROCHLORIDE 25 MG: 25 TABLET ORAL at 07:31

## 2025-01-16 RX ADMIN — HEPARIN 500 UNITS: 100 SYRINGE at 08:58

## 2025-01-16 RX ADMIN — Medication 20 ML: at 08:58

## 2025-01-16 NOTE — PROGRESS NOTES
Pt arrived to [facility] with port accessed from yesterday's tx. Brisk blood return noted. Pt received Tylenol, Benadryl, and Solu-cortef for pre-meds, followed by 25g Gamunex-C. Tolerated well.    Electronically signed by Shabnam Huddleston RN on 1/16/2025 at 9:26 AM

## 2025-01-16 NOTE — TELEPHONE ENCOUNTER
The last sodium I see is 130. I wouldn't stop IVIG for 130 because IVIG gives her so much benefit and other treatment options can carry much more toxicity.

## 2025-01-16 NOTE — TELEPHONE ENCOUNTER
Requested Prescriptions     Pending Prescriptions Disp Refills    oxyCODONE HCl (OXY-IR) 10 MG immediate release tablet 120 tablet 0     Sig: Take 1 tablet by mouth every 6 hours as needed for Pain for up to 30 days. Intended supply: 30 days Max Daily Amount: 40 mg       Last Office Visit:  8/21/2024  Next Office Visit:  3/18/2025  Last Medication Refill:  12/23/24  Daniel up to date:  11/19/24    *RX updated to reflect   1/22/24  fill date*

## 2025-01-16 NOTE — TELEPHONE ENCOUNTER
Labs scanned into Media. I called the patient and let her know what Dr. Arredondo said. She gave a verbal understanding.

## 2025-01-16 NOTE — TELEPHONE ENCOUNTER
Called patient and voiced your recommendation she states that she's gained 20lbs mau  week due to taking the sodium supplement so patient is requesting advice. Patient also states that she had recent labs dram a couple days ago so she will have those faxed to our office as well.

## 2025-01-16 NOTE — TELEPHONE ENCOUNTER
Maybe she can stop the 5 days in a row and stick to once a week. There is really now substitute for IVIG. We would have to change to something else which may carry more risk of side effects and may not be as effective. If we have to change we will but I would suggest she try to stay with IVIG as well as she can because it works for her. Does she really believe it is IVIG causing low sodium. It can but she has been on this for a while.

## 2025-01-16 NOTE — TELEPHONE ENCOUNTER
Patient states that her PCP has been closely watching her sodium because it has bene dropping low so she has been on a sodium tablet supplement for about a month, however, her sodium is still low and her PCP is requesting an alternative to IVIG. She is currently dealing with an MS flare now. She is getting an IVIG infusion today.

## 2025-01-20 DIAGNOSIS — M79.7 FIBROMYALGIA: ICD-10-CM

## 2025-01-20 RX ORDER — CARISOPRODOL 350 MG/1
350 TABLET ORAL 4 TIMES DAILY PRN
Qty: 120 TABLET | Refills: 5 | Status: SHIPPED | OUTPATIENT
Start: 2025-01-20 | End: 2025-02-19

## 2025-01-20 NOTE — TELEPHONE ENCOUNTER
Please resend, was [facility]         Requested Prescriptions     Pending Prescriptions Disp Refills    carisoprodol (SOMA) 350 MG tablet 120 tablet 5     Sig: Take 1 tablet by mouth 4 times daily as needed for Muscle spasms for up to 30 days. Max Daily Amount: 1,400 mg       Last Office Visit:  8/21/2024  Next Office Visit:  3/18/2025    Daniel up to date:  up to date    *RX updated to reflect   1/20/2025  fill date*      Rx canceled for mail order pharmacy

## 2025-01-22 ENCOUNTER — TELEPHONE (OUTPATIENT)
Dept: NEUROLOGY | Age: 50
End: 2025-01-22

## 2025-01-24 ENCOUNTER — HOSPITAL ENCOUNTER (OUTPATIENT)
Dept: INFUSION THERAPY | Age: 50
Setting detail: INFUSION SERIES
End: 2025-01-24

## 2025-01-24 ENCOUNTER — HOSPITAL ENCOUNTER (OUTPATIENT)
Dept: INFUSION THERAPY | Age: 50
Setting detail: INFUSION SERIES
Discharge: HOME | End: 2025-01-24
Payer: MEDICARE

## 2025-01-24 VITALS
DIASTOLIC BLOOD PRESSURE: 92 MMHG | RESPIRATION RATE: 18 BRPM | OXYGEN SATURATION: 99 % | TEMPERATURE: 98.2 F | HEART RATE: 85 BPM | BODY MASS INDEX: 34.95 KG/M2 | WEIGHT: 210 LBS | SYSTOLIC BLOOD PRESSURE: 129 MMHG

## 2025-01-24 DIAGNOSIS — M32.9 SYSTEMIC LUPUS ERYTHEMATOSUS, UNSPECIFIED SLE TYPE, UNSPECIFIED ORGAN INVOLVEMENT STATUS (HCC): ICD-10-CM

## 2025-01-24 DIAGNOSIS — G35 EXACERBATION OF MULTIPLE SCLEROSIS (HCC): Primary | ICD-10-CM

## 2025-01-24 PROCEDURE — 96366 THER/PROPH/DIAG IV INF ADDON: CPT

## 2025-01-24 PROCEDURE — 6360000002 HC RX W HCPCS: Performed by: PSYCHIATRY & NEUROLOGY

## 2025-01-24 PROCEDURE — 96365 THER/PROPH/DIAG IV INF INIT: CPT

## 2025-01-24 PROCEDURE — 6370000000 HC RX 637 (ALT 250 FOR IP): Performed by: PSYCHIATRY & NEUROLOGY

## 2025-01-24 PROCEDURE — 96375 TX/PRO/DX INJ NEW DRUG ADDON: CPT

## 2025-01-24 PROCEDURE — 2500000003 HC RX 250 WO HCPCS: Performed by: PSYCHIATRY & NEUROLOGY

## 2025-01-24 RX ORDER — HEPARIN 100 UNIT/ML
500 SYRINGE INTRAVENOUS PRN
Status: DISCONTINUED | OUTPATIENT
Start: 2025-01-24 | End: 2025-01-25 | Stop reason: HOSPADM

## 2025-01-24 RX ORDER — ACETAMINOPHEN 325 MG/1
650 TABLET ORAL ONCE
Status: COMPLETED | OUTPATIENT
Start: 2025-01-24 | End: 2025-01-24

## 2025-01-24 RX ORDER — DIPHENHYDRAMINE HCL 25 MG
25 TABLET ORAL ONCE
Status: COMPLETED | OUTPATIENT
Start: 2025-01-24 | End: 2025-01-24

## 2025-01-24 RX ORDER — DIPHENHYDRAMINE HCL 25 MG
25 TABLET ORAL ONCE
Status: CANCELLED | OUTPATIENT
Start: 2025-01-24 | End: 2025-01-24

## 2025-01-24 RX ORDER — SODIUM CHLORIDE 9 MG/ML
5-250 INJECTION, SOLUTION INTRAVENOUS PRN
OUTPATIENT
Start: 2025-01-24

## 2025-01-24 RX ORDER — ALBUTEROL SULFATE 90 UG/1
4 INHALANT RESPIRATORY (INHALATION) PRN
OUTPATIENT
Start: 2025-01-24

## 2025-01-24 RX ORDER — HYDROCORTISONE SODIUM SUCCINATE 100 MG/2ML
100 INJECTION INTRAMUSCULAR; INTRAVENOUS ONCE
Status: CANCELLED
Start: 2025-01-24 | End: 2025-01-24

## 2025-01-24 RX ORDER — ACETAMINOPHEN 325 MG/1
650 TABLET ORAL
OUTPATIENT
Start: 2025-01-24

## 2025-01-24 RX ORDER — SODIUM CHLORIDE 9 MG/ML
INJECTION, SOLUTION INTRAVENOUS CONTINUOUS
OUTPATIENT
Start: 2025-01-24

## 2025-01-24 RX ORDER — EPINEPHRINE 1 MG/ML
0.3 INJECTION, SOLUTION, CONCENTRATE INTRAVENOUS PRN
OUTPATIENT
Start: 2025-01-24

## 2025-01-24 RX ORDER — SODIUM CHLORIDE 0.9 % (FLUSH) 0.9 %
5-40 SYRINGE (ML) INJECTION PRN
Status: DISCONTINUED | OUTPATIENT
Start: 2025-01-24 | End: 2025-01-25 | Stop reason: HOSPADM

## 2025-01-24 RX ORDER — ACETAMINOPHEN 325 MG/1
650 TABLET ORAL ONCE
Status: CANCELLED | OUTPATIENT
Start: 2025-01-24 | End: 2025-01-24

## 2025-01-24 RX ORDER — HYDROCORTISONE SODIUM SUCCINATE 100 MG/2ML
100 INJECTION INTRAMUSCULAR; INTRAVENOUS
OUTPATIENT
Start: 2025-01-24

## 2025-01-24 RX ORDER — HYDROCORTISONE SODIUM SUCCINATE 100 MG/2ML
100 INJECTION INTRAMUSCULAR; INTRAVENOUS ONCE
Status: COMPLETED | OUTPATIENT
Start: 2025-01-24 | End: 2025-01-24

## 2025-01-24 RX ORDER — SODIUM CHLORIDE 0.9 % (FLUSH) 0.9 %
5-40 SYRINGE (ML) INJECTION PRN
Status: CANCELLED | OUTPATIENT
Start: 2025-01-24

## 2025-01-24 RX ORDER — SODIUM CHLORIDE 9 MG/ML
5-250 INJECTION, SOLUTION INTRAVENOUS PRN
Status: DISCONTINUED | OUTPATIENT
Start: 2025-01-24 | End: 2025-01-25 | Stop reason: HOSPADM

## 2025-01-24 RX ORDER — MEPERIDINE HYDROCHLORIDE 25 MG/ML
12.5 INJECTION INTRAMUSCULAR; INTRAVENOUS; SUBCUTANEOUS PRN
OUTPATIENT
Start: 2025-01-24

## 2025-01-24 RX ORDER — DIPHENHYDRAMINE HYDROCHLORIDE 50 MG/ML
50 INJECTION INTRAMUSCULAR; INTRAVENOUS
OUTPATIENT
Start: 2025-01-24

## 2025-01-24 RX ORDER — ONDANSETRON 2 MG/ML
8 INJECTION INTRAMUSCULAR; INTRAVENOUS
OUTPATIENT
Start: 2025-01-24

## 2025-01-24 RX ORDER — HEPARIN 100 UNIT/ML
500 SYRINGE INTRAVENOUS PRN
Status: CANCELLED | OUTPATIENT
Start: 2025-01-24

## 2025-01-24 RX ADMIN — Medication 10 ML: at 10:07

## 2025-01-24 RX ADMIN — DIPHENHYDRAMINE HYDROCHLORIDE 25 MG: 25 TABLET ORAL at 10:13

## 2025-01-24 RX ADMIN — HYDROCORTISONE SODIUM SUCCINATE 100 MG: 100 INJECTION, POWDER, FOR SOLUTION INTRAMUSCULAR; INTRAVENOUS at 10:13

## 2025-01-24 RX ADMIN — IMMUNE GLOBULIN (HUMAN) 25 G: 10 INJECTION INTRAVENOUS; SUBCUTANEOUS at 10:59

## 2025-01-24 RX ADMIN — ACETAMINOPHEN 650 MG: 325 TABLET ORAL at 10:12

## 2025-01-24 RX ADMIN — Medication 20 ML: at 12:31

## 2025-01-24 RX ADMIN — HEPARIN 500 UNITS: 100 SYRINGE at 12:31

## 2025-01-24 NOTE — FLOWSHEET NOTE
01/24/25 1237   AVS Reviewed   AVS & discharge instructions reviewed with patient and/or representative? Yes   Reviewed instructions with Patient   Level of Understanding Questions answered;Verbalized understanding     VSS GAMUNEX-C 25G ADMINISTERED VIA RIGHT CHEST PORT. NO ADVERSE REACTION NOTED. NEXT WEEKS APPOINTMENT SCHEDULED. PT DISCHARGED HOME.
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
## 2025-01-31 ENCOUNTER — HOSPITAL ENCOUNTER (OUTPATIENT)
Dept: INFUSION THERAPY | Age: 50
Setting detail: INFUSION SERIES
Discharge: HOME | End: 2025-01-31
Payer: MEDICARE

## 2025-01-31 VITALS
HEART RATE: 71 BPM | SYSTOLIC BLOOD PRESSURE: 155 MMHG | RESPIRATION RATE: 18 BRPM | OXYGEN SATURATION: 100 % | DIASTOLIC BLOOD PRESSURE: 98 MMHG | TEMPERATURE: 97.5 F | WEIGHT: 207 LBS | BODY MASS INDEX: 34.45 KG/M2

## 2025-01-31 DIAGNOSIS — G35 EXACERBATION OF MULTIPLE SCLEROSIS (HCC): Primary | ICD-10-CM

## 2025-01-31 DIAGNOSIS — M32.9 SYSTEMIC LUPUS ERYTHEMATOSUS, UNSPECIFIED SLE TYPE, UNSPECIFIED ORGAN INVOLVEMENT STATUS (HCC): ICD-10-CM

## 2025-01-31 PROCEDURE — 96365 THER/PROPH/DIAG IV INF INIT: CPT

## 2025-01-31 PROCEDURE — 2500000003 HC RX 250 WO HCPCS: Performed by: PSYCHIATRY & NEUROLOGY

## 2025-01-31 PROCEDURE — 6360000002 HC RX W HCPCS: Performed by: PSYCHIATRY & NEUROLOGY

## 2025-01-31 PROCEDURE — 6370000000 HC RX 637 (ALT 250 FOR IP): Performed by: PSYCHIATRY & NEUROLOGY

## 2025-01-31 PROCEDURE — 96375 TX/PRO/DX INJ NEW DRUG ADDON: CPT

## 2025-01-31 RX ORDER — ONDANSETRON 2 MG/ML
8 INJECTION INTRAMUSCULAR; INTRAVENOUS
OUTPATIENT
Start: 2025-01-31

## 2025-01-31 RX ORDER — EPINEPHRINE 1 MG/ML
0.3 INJECTION, SOLUTION, CONCENTRATE INTRAVENOUS PRN
OUTPATIENT
Start: 2025-01-31

## 2025-01-31 RX ORDER — ALBUTEROL SULFATE 90 UG/1
4 INHALANT RESPIRATORY (INHALATION) PRN
OUTPATIENT
Start: 2025-01-31

## 2025-01-31 RX ORDER — HEPARIN 100 UNIT/ML
500 SYRINGE INTRAVENOUS PRN
OUTPATIENT
Start: 2025-01-31

## 2025-01-31 RX ORDER — MEPERIDINE HYDROCHLORIDE 25 MG/ML
12.5 INJECTION INTRAMUSCULAR; INTRAVENOUS; SUBCUTANEOUS PRN
OUTPATIENT
Start: 2025-01-31

## 2025-01-31 RX ORDER — HYDROCORTISONE SODIUM SUCCINATE 100 MG/2ML
100 INJECTION INTRAMUSCULAR; INTRAVENOUS ONCE
Status: COMPLETED | OUTPATIENT
Start: 2025-01-31 | End: 2025-01-31

## 2025-01-31 RX ORDER — SODIUM CHLORIDE 9 MG/ML
INJECTION, SOLUTION INTRAVENOUS CONTINUOUS
OUTPATIENT
Start: 2025-01-31

## 2025-01-31 RX ORDER — ACETAMINOPHEN 325 MG/1
650 TABLET ORAL
OUTPATIENT
Start: 2025-01-31

## 2025-01-31 RX ORDER — SODIUM CHLORIDE 0.9 % (FLUSH) 0.9 %
5-40 SYRINGE (ML) INJECTION PRN
Status: DISCONTINUED | OUTPATIENT
Start: 2025-01-31 | End: 2025-02-01 | Stop reason: HOSPADM

## 2025-01-31 RX ORDER — SODIUM CHLORIDE 9 MG/ML
5-250 INJECTION, SOLUTION INTRAVENOUS PRN
OUTPATIENT
Start: 2025-01-31

## 2025-01-31 RX ORDER — HYDROCORTISONE SODIUM SUCCINATE 100 MG/2ML
100 INJECTION INTRAMUSCULAR; INTRAVENOUS
OUTPATIENT
Start: 2025-01-31

## 2025-01-31 RX ORDER — ACETAMINOPHEN 325 MG/1
650 TABLET ORAL ONCE
OUTPATIENT
Start: 2025-01-31 | End: 2025-01-31

## 2025-01-31 RX ORDER — ACETAMINOPHEN 325 MG/1
650 TABLET ORAL ONCE
Status: COMPLETED | OUTPATIENT
Start: 2025-01-31 | End: 2025-01-31

## 2025-01-31 RX ORDER — DIPHENHYDRAMINE HCL 25 MG
25 TABLET ORAL ONCE
Status: COMPLETED | OUTPATIENT
Start: 2025-01-31 | End: 2025-01-31

## 2025-01-31 RX ORDER — HEPARIN 100 UNIT/ML
500 SYRINGE INTRAVENOUS PRN
Status: DISCONTINUED | OUTPATIENT
Start: 2025-01-31 | End: 2025-02-01 | Stop reason: HOSPADM

## 2025-01-31 RX ORDER — DIPHENHYDRAMINE HYDROCHLORIDE 50 MG/ML
50 INJECTION INTRAMUSCULAR; INTRAVENOUS
OUTPATIENT
Start: 2025-01-31

## 2025-01-31 RX ORDER — HYDROCORTISONE SODIUM SUCCINATE 100 MG/2ML
100 INJECTION INTRAMUSCULAR; INTRAVENOUS ONCE
Start: 2025-01-31 | End: 2025-01-31

## 2025-01-31 RX ORDER — DIPHENHYDRAMINE HCL 25 MG
25 TABLET ORAL ONCE
OUTPATIENT
Start: 2025-01-31 | End: 2025-01-31

## 2025-01-31 RX ORDER — SODIUM CHLORIDE 0.9 % (FLUSH) 0.9 %
5-40 SYRINGE (ML) INJECTION PRN
OUTPATIENT
Start: 2025-01-31

## 2025-01-31 RX ADMIN — HYDROCORTISONE SODIUM SUCCINATE 100 MG: 100 INJECTION, POWDER, FOR SOLUTION INTRAMUSCULAR; INTRAVENOUS at 07:24

## 2025-01-31 RX ADMIN — ACETAMINOPHEN 650 MG: 325 TABLET ORAL at 07:24

## 2025-01-31 RX ADMIN — SODIUM CHLORIDE, PRESERVATIVE FREE 10 ML: 5 INJECTION INTRAVENOUS at 09:28

## 2025-01-31 RX ADMIN — DIPHENHYDRAMINE HYDROCHLORIDE 25 MG: 25 TABLET ORAL at 07:24

## 2025-01-31 RX ADMIN — HEPARIN 500 UNITS: 100 SYRINGE at 09:29

## 2025-01-31 RX ADMIN — IMMUNE GLOBULIN (HUMAN) 25 G: 10 INJECTION INTRAVENOUS; SUBCUTANEOUS at 07:56

## 2025-02-04 RX ORDER — AMITRIPTYLINE HYDROCHLORIDE 100 MG/1
100 TABLET ORAL NIGHTLY
Qty: 30 TABLET | Refills: 5 | Status: SHIPPED | OUTPATIENT
Start: 2025-02-04

## 2025-02-04 RX ORDER — RIZATRIPTAN BENZOATE 10 MG/1
TABLET ORAL
Qty: 9 TABLET | Refills: 5 | Status: SHIPPED | OUTPATIENT
Start: 2025-02-04

## 2025-02-04 RX ORDER — AMITRIPTYLINE HYDROCHLORIDE 100 MG/1
100 TABLET ORAL NIGHTLY
Qty: 30 TABLET | Refills: 5 | Status: CANCELLED | OUTPATIENT
Start: 2025-02-04

## 2025-02-04 NOTE — TELEPHONE ENCOUNTER
Requested Prescriptions     Pending Prescriptions Disp Refills    amitriptyline (ELAVIL) 100 MG tablet [Pharmacy Med Name: AMITRIPTYLINE HYDROCHLORIDE 100MG TABLET] 30 tablet 5     Sig: TAKE 1 TABLET BY MOUTH NIGHTLY       Last Office Visit: 8/21/2024  Next Office Visit: 3/18/2025  Last Medication Refill: 9/20/2024 with 5 RF

## 2025-02-04 NOTE — TELEPHONE ENCOUNTER
Requested Prescriptions     Pending Prescriptions Disp Refills    rizatriptan (MAXALT) 10 MG tablet 9 tablet 5     Sig: TAKE 1 TABLET BY MOUTH 1 TIME AS NEEDED FOR MIGRAINE. MAX 2 IN 24 HOURS. MAY REPEAT IN 2 HOURS AS NEEDED       Last Office Visit: 8/21/2024  Next Office Visit: 2/4/2025  Last Medication Refill: 5/16/2024 with 5 RF

## 2025-02-07 ENCOUNTER — HOSPITAL ENCOUNTER (OUTPATIENT)
Dept: INFUSION THERAPY | Age: 50
Setting detail: INFUSION SERIES
Discharge: HOME | End: 2025-02-07
Payer: MEDICARE

## 2025-02-07 VITALS
SYSTOLIC BLOOD PRESSURE: 113 MMHG | WEIGHT: 207 LBS | RESPIRATION RATE: 18 BRPM | BODY MASS INDEX: 34.45 KG/M2 | HEART RATE: 79 BPM | OXYGEN SATURATION: 99 % | DIASTOLIC BLOOD PRESSURE: 71 MMHG | TEMPERATURE: 97.2 F

## 2025-02-07 DIAGNOSIS — G35 EXACERBATION OF MULTIPLE SCLEROSIS (HCC): Primary | ICD-10-CM

## 2025-02-07 DIAGNOSIS — M32.9 SYSTEMIC LUPUS ERYTHEMATOSUS, UNSPECIFIED SLE TYPE, UNSPECIFIED ORGAN INVOLVEMENT STATUS (HCC): ICD-10-CM

## 2025-02-07 PROCEDURE — 6370000000 HC RX 637 (ALT 250 FOR IP): Performed by: PSYCHIATRY & NEUROLOGY

## 2025-02-07 PROCEDURE — 6360000002 HC RX W HCPCS: Performed by: PSYCHIATRY & NEUROLOGY

## 2025-02-07 PROCEDURE — 96365 THER/PROPH/DIAG IV INF INIT: CPT

## 2025-02-07 PROCEDURE — 96375 TX/PRO/DX INJ NEW DRUG ADDON: CPT

## 2025-02-07 RX ORDER — ACETAMINOPHEN 325 MG/1
650 TABLET ORAL
OUTPATIENT
Start: 2025-02-07

## 2025-02-07 RX ORDER — DIPHENHYDRAMINE HCL 25 MG
25 TABLET ORAL ONCE
Status: CANCELLED | OUTPATIENT
Start: 2025-02-07 | End: 2025-02-07

## 2025-02-07 RX ORDER — ACETAMINOPHEN 325 MG/1
650 TABLET ORAL ONCE
Status: COMPLETED | OUTPATIENT
Start: 2025-02-07 | End: 2025-02-07

## 2025-02-07 RX ORDER — EPINEPHRINE 1 MG/ML
0.3 INJECTION, SOLUTION, CONCENTRATE INTRAVENOUS PRN
OUTPATIENT
Start: 2025-02-07

## 2025-02-07 RX ORDER — HEPARIN 100 UNIT/ML
500 SYRINGE INTRAVENOUS PRN
Status: CANCELLED | OUTPATIENT
Start: 2025-02-07

## 2025-02-07 RX ORDER — ALBUTEROL SULFATE 90 UG/1
4 INHALANT RESPIRATORY (INHALATION) PRN
OUTPATIENT
Start: 2025-02-07

## 2025-02-07 RX ORDER — SODIUM CHLORIDE 9 MG/ML
5-250 INJECTION, SOLUTION INTRAVENOUS PRN
Status: DISCONTINUED | OUTPATIENT
Start: 2025-02-07 | End: 2025-02-08 | Stop reason: HOSPADM

## 2025-02-07 RX ORDER — SODIUM CHLORIDE 0.9 % (FLUSH) 0.9 %
5-40 SYRINGE (ML) INJECTION PRN
Status: DISCONTINUED | OUTPATIENT
Start: 2025-02-07 | End: 2025-02-08 | Stop reason: HOSPADM

## 2025-02-07 RX ORDER — ONDANSETRON 2 MG/ML
8 INJECTION INTRAMUSCULAR; INTRAVENOUS
OUTPATIENT
Start: 2025-02-07

## 2025-02-07 RX ORDER — SODIUM CHLORIDE 9 MG/ML
INJECTION, SOLUTION INTRAVENOUS CONTINUOUS
OUTPATIENT
Start: 2025-02-07

## 2025-02-07 RX ORDER — HYDROCORTISONE SODIUM SUCCINATE 100 MG/2ML
100 INJECTION INTRAMUSCULAR; INTRAVENOUS ONCE
Status: CANCELLED
Start: 2025-02-07 | End: 2025-02-07

## 2025-02-07 RX ORDER — ACETAMINOPHEN 325 MG/1
650 TABLET ORAL ONCE
Status: CANCELLED | OUTPATIENT
Start: 2025-02-07 | End: 2025-02-07

## 2025-02-07 RX ORDER — HEPARIN 100 UNIT/ML
500 SYRINGE INTRAVENOUS PRN
Status: DISCONTINUED | OUTPATIENT
Start: 2025-02-07 | End: 2025-02-08 | Stop reason: HOSPADM

## 2025-02-07 RX ORDER — DIPHENHYDRAMINE HYDROCHLORIDE 50 MG/ML
50 INJECTION INTRAMUSCULAR; INTRAVENOUS
OUTPATIENT
Start: 2025-02-07

## 2025-02-07 RX ORDER — SODIUM CHLORIDE 9 MG/ML
5-250 INJECTION, SOLUTION INTRAVENOUS PRN
OUTPATIENT
Start: 2025-02-07

## 2025-02-07 RX ORDER — HYDROCORTISONE SODIUM SUCCINATE 100 MG/2ML
100 INJECTION INTRAMUSCULAR; INTRAVENOUS
OUTPATIENT
Start: 2025-02-07

## 2025-02-07 RX ORDER — MEPERIDINE HYDROCHLORIDE 25 MG/ML
12.5 INJECTION INTRAMUSCULAR; INTRAVENOUS; SUBCUTANEOUS PRN
OUTPATIENT
Start: 2025-02-07

## 2025-02-07 RX ORDER — HYDROCORTISONE SODIUM SUCCINATE 100 MG/2ML
100 INJECTION INTRAMUSCULAR; INTRAVENOUS ONCE
Status: COMPLETED | OUTPATIENT
Start: 2025-02-07 | End: 2025-02-07

## 2025-02-07 RX ORDER — SODIUM CHLORIDE 0.9 % (FLUSH) 0.9 %
5-40 SYRINGE (ML) INJECTION PRN
Status: CANCELLED | OUTPATIENT
Start: 2025-02-07

## 2025-02-07 RX ORDER — DIPHENHYDRAMINE HCL 25 MG
25 TABLET ORAL ONCE
Status: COMPLETED | OUTPATIENT
Start: 2025-02-07 | End: 2025-02-07

## 2025-02-07 RX ADMIN — HEPARIN 500 UNITS: 100 SYRINGE at 09:00

## 2025-02-07 RX ADMIN — HYDROCORTISONE SODIUM SUCCINATE 100 MG: 100 INJECTION, POWDER, FOR SOLUTION INTRAMUSCULAR; INTRAVENOUS at 07:11

## 2025-02-07 RX ADMIN — ACETAMINOPHEN 650 MG: 325 TABLET ORAL at 07:11

## 2025-02-07 RX ADMIN — IMMUNE GLOBULIN (HUMAN) 25 G: 10 INJECTION INTRAVENOUS; SUBCUTANEOUS at 07:39

## 2025-02-07 RX ADMIN — DIPHENHYDRAMINE HYDROCHLORIDE 25 MG: 25 TABLET ORAL at 07:11

## 2025-02-14 ENCOUNTER — HOSPITAL ENCOUNTER (OUTPATIENT)
Dept: INFUSION THERAPY | Age: 50
Setting detail: INFUSION SERIES
Discharge: HOME | End: 2025-02-14
Payer: MEDICARE

## 2025-02-14 VITALS
OXYGEN SATURATION: 100 % | RESPIRATION RATE: 18 BRPM | DIASTOLIC BLOOD PRESSURE: 87 MMHG | HEART RATE: 72 BPM | WEIGHT: 220 LBS | SYSTOLIC BLOOD PRESSURE: 122 MMHG | BODY MASS INDEX: 36.61 KG/M2 | TEMPERATURE: 98.4 F

## 2025-02-14 DIAGNOSIS — M32.9 SYSTEMIC LUPUS ERYTHEMATOSUS, UNSPECIFIED SLE TYPE, UNSPECIFIED ORGAN INVOLVEMENT STATUS (HCC): ICD-10-CM

## 2025-02-14 DIAGNOSIS — G35 EXACERBATION OF MULTIPLE SCLEROSIS (HCC): Primary | ICD-10-CM

## 2025-02-14 PROCEDURE — 96365 THER/PROPH/DIAG IV INF INIT: CPT

## 2025-02-14 PROCEDURE — 2500000003 HC RX 250 WO HCPCS: Performed by: PSYCHIATRY & NEUROLOGY

## 2025-02-14 PROCEDURE — 6360000002 HC RX W HCPCS: Performed by: PSYCHIATRY & NEUROLOGY

## 2025-02-14 PROCEDURE — 6370000000 HC RX 637 (ALT 250 FOR IP): Performed by: PSYCHIATRY & NEUROLOGY

## 2025-02-14 PROCEDURE — 96375 TX/PRO/DX INJ NEW DRUG ADDON: CPT

## 2025-02-14 RX ORDER — EPINEPHRINE 1 MG/ML
0.3 INJECTION, SOLUTION, CONCENTRATE INTRAVENOUS PRN
OUTPATIENT
Start: 2025-02-14

## 2025-02-14 RX ORDER — HEPARIN 100 UNIT/ML
500 SYRINGE INTRAVENOUS PRN
Status: CANCELLED | OUTPATIENT
Start: 2025-02-14

## 2025-02-14 RX ORDER — SODIUM CHLORIDE 9 MG/ML
5-250 INJECTION, SOLUTION INTRAVENOUS PRN
OUTPATIENT
Start: 2025-02-14

## 2025-02-14 RX ORDER — SODIUM CHLORIDE 0.9 % (FLUSH) 0.9 %
5-40 SYRINGE (ML) INJECTION PRN
Status: CANCELLED | OUTPATIENT
Start: 2025-02-14

## 2025-02-14 RX ORDER — DIPHENHYDRAMINE HCL 25 MG
25 TABLET ORAL ONCE
Status: COMPLETED | OUTPATIENT
Start: 2025-02-14 | End: 2025-02-14

## 2025-02-14 RX ORDER — SODIUM CHLORIDE 0.9 % (FLUSH) 0.9 %
5-40 SYRINGE (ML) INJECTION PRN
Status: DISCONTINUED | OUTPATIENT
Start: 2025-02-14 | End: 2025-02-15 | Stop reason: HOSPADM

## 2025-02-14 RX ORDER — DIPHENHYDRAMINE HYDROCHLORIDE 50 MG/ML
50 INJECTION INTRAMUSCULAR; INTRAVENOUS
OUTPATIENT
Start: 2025-02-14

## 2025-02-14 RX ORDER — ACETAMINOPHEN 325 MG/1
650 TABLET ORAL ONCE
Status: CANCELLED | OUTPATIENT
Start: 2025-02-14 | End: 2025-02-14

## 2025-02-14 RX ORDER — MEPERIDINE HYDROCHLORIDE 25 MG/ML
12.5 INJECTION INTRAMUSCULAR; INTRAVENOUS; SUBCUTANEOUS PRN
OUTPATIENT
Start: 2025-02-14

## 2025-02-14 RX ORDER — DIPHENHYDRAMINE HCL 25 MG
25 TABLET ORAL ONCE
Status: CANCELLED | OUTPATIENT
Start: 2025-02-14 | End: 2025-02-14

## 2025-02-14 RX ORDER — HYDROCORTISONE SODIUM SUCCINATE 100 MG/2ML
100 INJECTION INTRAMUSCULAR; INTRAVENOUS ONCE
Status: CANCELLED
Start: 2025-02-14 | End: 2025-02-14

## 2025-02-14 RX ORDER — ACETAMINOPHEN 325 MG/1
650 TABLET ORAL ONCE
Status: COMPLETED | OUTPATIENT
Start: 2025-02-14 | End: 2025-02-14

## 2025-02-14 RX ORDER — ACETAMINOPHEN 325 MG/1
650 TABLET ORAL
OUTPATIENT
Start: 2025-02-14

## 2025-02-14 RX ORDER — SODIUM CHLORIDE 9 MG/ML
INJECTION, SOLUTION INTRAVENOUS CONTINUOUS
OUTPATIENT
Start: 2025-02-14

## 2025-02-14 RX ORDER — HYDROCORTISONE SODIUM SUCCINATE 100 MG/2ML
100 INJECTION INTRAMUSCULAR; INTRAVENOUS
OUTPATIENT
Start: 2025-02-14

## 2025-02-14 RX ORDER — HEPARIN 100 UNIT/ML
500 SYRINGE INTRAVENOUS PRN
Status: DISCONTINUED | OUTPATIENT
Start: 2025-02-14 | End: 2025-02-15 | Stop reason: HOSPADM

## 2025-02-14 RX ORDER — HYDROCORTISONE SODIUM SUCCINATE 100 MG/2ML
100 INJECTION INTRAMUSCULAR; INTRAVENOUS ONCE
Status: COMPLETED | OUTPATIENT
Start: 2025-02-14 | End: 2025-02-14

## 2025-02-14 RX ORDER — ONDANSETRON 2 MG/ML
8 INJECTION INTRAMUSCULAR; INTRAVENOUS
OUTPATIENT
Start: 2025-02-14

## 2025-02-14 RX ORDER — ALBUTEROL SULFATE 90 UG/1
4 INHALANT RESPIRATORY (INHALATION) PRN
OUTPATIENT
Start: 2025-02-14

## 2025-02-14 RX ADMIN — HEPARIN 500 UNITS: 100 SYRINGE at 09:08

## 2025-02-14 RX ADMIN — ACETAMINOPHEN 650 MG: 325 TABLET ORAL at 07:31

## 2025-02-14 RX ADMIN — SODIUM CHLORIDE, PRESERVATIVE FREE 10 ML: 5 INJECTION INTRAVENOUS at 07:33

## 2025-02-14 RX ADMIN — HYDROCORTISONE SODIUM SUCCINATE 100 MG: 100 INJECTION, POWDER, FOR SOLUTION INTRAMUSCULAR; INTRAVENOUS at 07:32

## 2025-02-14 RX ADMIN — IMMUNE GLOBULIN (HUMAN) 25 G: 10 INJECTION INTRAVENOUS; SUBCUTANEOUS at 07:47

## 2025-02-14 RX ADMIN — SODIUM CHLORIDE, PRESERVATIVE FREE 20 ML: 5 INJECTION INTRAVENOUS at 09:08

## 2025-02-14 RX ADMIN — DIPHENHYDRAMINE HYDROCHLORIDE 25 MG: 25 TABLET ORAL at 07:31

## 2025-02-14 NOTE — PROGRESS NOTES
Port accessed on arrival to hospitals. Brisk blood return noted. Premeds given as ordered. Gamunex- C 25 g infusion completed. Pt tolerated well.

## 2025-02-14 NOTE — DISCHARGE INSTRUCTIONS
immune globulin (intravenous and subcutaneous)  Pronunciation:  dinora LACEY foley  Brand:  Gammagard Liquid, Gammaked, Gamunex-C  What is the most important information I should know about immune globulin?  This medicine can cause blood clots.  The risk is highest in older adults or in people who have had blood clots, heart problems, or blood circulation problems. Blood clots are also more likely during long-term bedrest, while using birth control pills or hormone replacement therapy, or while having a central intravenous (IV) catheter in place.  Call your doctor at once if you have chest pain, trouble breathing, fast heartbeats, numbness or weakness, or swelling and warmth or discoloration in an arm or leg.  This medicine can also harm your kidneys, especially if you have kidney disease or if you also use certain medicines. Tell your doctor right away if you have signs of kidney problems, such as swelling, rapid weight gain, and little or no urination.  What is immune globulin?  Immune globulin intravenous and subcutaneous (for injection into a vein or under the skin) is used to treat primary immunodeficiency.  Immune globulin is also used to increase platelets (blood clotting cells) in people with idiopathic thrombocytopenic purpura.  Immune globulin is also used to treat certain debilitating nerve disorders that cause muscle weakness and can affect daily activities.  Immune globulin may also be used for purposes not listed in this medication guide.  What should I discuss with my healthcare provider before using immune globulin?  You should not use this medicine if:  you have had an allergic reaction to an immune globulin or blood product; or  you have immune globulin A (IgA) deficiency with antibody to IgA.  Immune globulin can cause blood clots or kidney problems, especially in older adults or in people with certain conditions. Tell your doctor if you have ever had:  heart problems, blood circulation  your doctor for medical advice about side effects. You may report side effects to FDA at 1-574-LQH-7143.  What other drugs will affect immune globulin?  Immune globulin can harm your kidneys, especially if you also use certain medicines for infections, cancer, osteoporosis, organ transplant rejection, bowel disorders, high blood pressure, or pain or arthritis (including Advil, Motrin, and Aleve).  Other drugs may affect immune globulin, including prescription and over-the-counter medicines, vitamins, and herbal products. Tell your doctor about all your current medicines and any medicine you start or stop using.  Where can I get more information?  Your doctor or pharmacist can provide more information about immune globulin.  Remember, keep this and all other medicines out of the reach of children, never share your medicines with others, and use this medication only for the indication prescribed.   Every effort has been made to ensure that the information provided by [employer]') is accurate, up-to-date, and complete, but no guarantee is made to that effect. Drug information contained herein may be time sensitive. [employer] information has been compiled for use by healthcare practitioners and consumers in the [place] and therefore [employer] does not warrant that uses outside of the [place] are appropriate, unless specifically indicated otherwise. [employer] drug information does not endorse drugs, diagnose patients or recommend therapy. [employer] drug information is an informational resource designed to assist licensed healthcare practitioners in caring for their patients and/or to serve consumers viewing this service as a supplement to, and not a substitute for, the expertise, skill, knowledge and judgment of healthcare practitioners. The absence of a warning for a given drug or drug combination in no way should be construed to indicate that the drug or drug combination is safe, effective or

## 2025-02-17 DIAGNOSIS — M54.30 SCIATICA, UNSPECIFIED LATERALITY: ICD-10-CM

## 2025-02-17 DIAGNOSIS — G43.719 INTRACTABLE CHRONIC MIGRAINE WITHOUT AURA AND WITHOUT STATUS MIGRAINOSUS: ICD-10-CM

## 2025-02-17 DIAGNOSIS — M79.7 FIBROMYALGIA: ICD-10-CM

## 2025-02-17 DIAGNOSIS — G35 MULTIPLE SCLEROSIS (HCC): ICD-10-CM

## 2025-02-18 RX ORDER — OXYCODONE HYDROCHLORIDE 10 MG/1
10 TABLET ORAL EVERY 6 HOURS PRN
Qty: 120 TABLET | Refills: 0 | Status: SHIPPED | OUTPATIENT
Start: 2025-02-18 | End: 2025-03-20

## 2025-02-21 ENCOUNTER — HOSPITAL ENCOUNTER (OUTPATIENT)
Dept: INFUSION THERAPY | Age: 50
Setting detail: INFUSION SERIES
Discharge: HOME | End: 2025-02-21
Payer: MEDICARE

## 2025-02-21 VITALS
BODY MASS INDEX: 34.78 KG/M2 | OXYGEN SATURATION: 100 % | WEIGHT: 209 LBS | SYSTOLIC BLOOD PRESSURE: 140 MMHG | HEART RATE: 80 BPM | RESPIRATION RATE: 17 BRPM | TEMPERATURE: 97.2 F | DIASTOLIC BLOOD PRESSURE: 87 MMHG

## 2025-02-21 DIAGNOSIS — G35 EXACERBATION OF MULTIPLE SCLEROSIS (HCC): Primary | ICD-10-CM

## 2025-02-21 DIAGNOSIS — M32.9 SYSTEMIC LUPUS ERYTHEMATOSUS, UNSPECIFIED SLE TYPE, UNSPECIFIED ORGAN INVOLVEMENT STATUS (HCC): ICD-10-CM

## 2025-02-21 PROCEDURE — 96375 TX/PRO/DX INJ NEW DRUG ADDON: CPT

## 2025-02-21 PROCEDURE — 2500000003 HC RX 250 WO HCPCS: Performed by: PSYCHIATRY & NEUROLOGY

## 2025-02-21 PROCEDURE — 6360000002 HC RX W HCPCS: Performed by: PSYCHIATRY & NEUROLOGY

## 2025-02-21 PROCEDURE — 6370000000 HC RX 637 (ALT 250 FOR IP): Performed by: PSYCHIATRY & NEUROLOGY

## 2025-02-21 PROCEDURE — 96365 THER/PROPH/DIAG IV INF INIT: CPT

## 2025-02-21 RX ORDER — DIPHENHYDRAMINE HYDROCHLORIDE 50 MG/ML
50 INJECTION INTRAMUSCULAR; INTRAVENOUS
OUTPATIENT
Start: 2025-02-21

## 2025-02-21 RX ORDER — DIPHENHYDRAMINE HCL 25 MG
25 TABLET ORAL ONCE
Status: CANCELLED | OUTPATIENT
Start: 2025-02-21 | End: 2025-02-21

## 2025-02-21 RX ORDER — HYDROCORTISONE SODIUM SUCCINATE 100 MG/2ML
100 INJECTION INTRAMUSCULAR; INTRAVENOUS ONCE
Status: COMPLETED | OUTPATIENT
Start: 2025-02-21 | End: 2025-02-21

## 2025-02-21 RX ORDER — EPINEPHRINE 1 MG/ML
0.3 INJECTION, SOLUTION, CONCENTRATE INTRAVENOUS PRN
OUTPATIENT
Start: 2025-02-21

## 2025-02-21 RX ORDER — SODIUM CHLORIDE 0.9 % (FLUSH) 0.9 %
5-40 SYRINGE (ML) INJECTION PRN
Status: CANCELLED | OUTPATIENT
Start: 2025-02-21

## 2025-02-21 RX ORDER — HEPARIN 100 UNIT/ML
500 SYRINGE INTRAVENOUS PRN
Status: DISCONTINUED | OUTPATIENT
Start: 2025-02-21 | End: 2025-02-22 | Stop reason: HOSPADM

## 2025-02-21 RX ORDER — ACETAMINOPHEN 325 MG/1
650 TABLET ORAL ONCE
Status: COMPLETED | OUTPATIENT
Start: 2025-02-21 | End: 2025-02-21

## 2025-02-21 RX ORDER — SODIUM CHLORIDE 9 MG/ML
5-250 INJECTION, SOLUTION INTRAVENOUS PRN
Status: CANCELLED | OUTPATIENT
Start: 2025-02-21

## 2025-02-21 RX ORDER — SODIUM CHLORIDE 9 MG/ML
5-250 INJECTION, SOLUTION INTRAVENOUS PRN
OUTPATIENT
Start: 2025-02-21

## 2025-02-21 RX ORDER — DIPHENHYDRAMINE HCL 25 MG
25 TABLET ORAL ONCE
Status: COMPLETED | OUTPATIENT
Start: 2025-02-21 | End: 2025-02-21

## 2025-02-21 RX ORDER — HEPARIN 100 UNIT/ML
500 SYRINGE INTRAVENOUS PRN
Status: CANCELLED | OUTPATIENT
Start: 2025-02-21

## 2025-02-21 RX ORDER — SODIUM CHLORIDE 9 MG/ML
INJECTION, SOLUTION INTRAVENOUS CONTINUOUS
OUTPATIENT
Start: 2025-02-21

## 2025-02-21 RX ORDER — MEPERIDINE HYDROCHLORIDE 25 MG/ML
12.5 INJECTION INTRAMUSCULAR; INTRAVENOUS; SUBCUTANEOUS PRN
OUTPATIENT
Start: 2025-02-21

## 2025-02-21 RX ORDER — SODIUM CHLORIDE 0.9 % (FLUSH) 0.9 %
5-40 SYRINGE (ML) INJECTION PRN
Status: DISCONTINUED | OUTPATIENT
Start: 2025-02-21 | End: 2025-02-22 | Stop reason: HOSPADM

## 2025-02-21 RX ORDER — ACETAMINOPHEN 325 MG/1
650 TABLET ORAL ONCE
Status: CANCELLED | OUTPATIENT
Start: 2025-02-21 | End: 2025-02-21

## 2025-02-21 RX ORDER — HYDROCORTISONE SODIUM SUCCINATE 100 MG/2ML
100 INJECTION INTRAMUSCULAR; INTRAVENOUS ONCE
Status: CANCELLED
Start: 2025-02-21 | End: 2025-02-21

## 2025-02-21 RX ORDER — ALBUTEROL SULFATE 90 UG/1
4 INHALANT RESPIRATORY (INHALATION) PRN
OUTPATIENT
Start: 2025-02-21

## 2025-02-21 RX ORDER — HYDROCORTISONE SODIUM SUCCINATE 100 MG/2ML
100 INJECTION INTRAMUSCULAR; INTRAVENOUS
OUTPATIENT
Start: 2025-02-21

## 2025-02-21 RX ORDER — ACETAMINOPHEN 325 MG/1
650 TABLET ORAL
OUTPATIENT
Start: 2025-02-21

## 2025-02-21 RX ORDER — ONDANSETRON 2 MG/ML
8 INJECTION INTRAMUSCULAR; INTRAVENOUS
OUTPATIENT
Start: 2025-02-21

## 2025-02-21 RX ADMIN — IMMUNE GLOBULIN (HUMAN) 25 G: 10 INJECTION INTRAVENOUS; SUBCUTANEOUS at 07:51

## 2025-02-21 RX ADMIN — DIPHENHYDRAMINE HYDROCHLORIDE 25 MG: 25 TABLET ORAL at 07:32

## 2025-02-21 RX ADMIN — SODIUM CHLORIDE, PRESERVATIVE FREE 10 ML: 5 INJECTION INTRAVENOUS at 07:34

## 2025-02-21 RX ADMIN — HEPARIN 500 UNITS: 100 SYRINGE at 09:15

## 2025-02-21 RX ADMIN — SODIUM CHLORIDE, PRESERVATIVE FREE 20 ML: 5 INJECTION INTRAVENOUS at 09:15

## 2025-02-21 RX ADMIN — HYDROCORTISONE SODIUM SUCCINATE 100 MG: 100 INJECTION, POWDER, FOR SOLUTION INTRAMUSCULAR; INTRAVENOUS at 07:33

## 2025-02-21 RX ADMIN — ACETAMINOPHEN 650 MG: 325 TABLET ORAL at 07:32

## 2025-02-21 NOTE — PROGRESS NOTES
Port accessed on arrival to [facility]. Brisk blood return noted. Premeds given as ordered. Gamunex- C 25 g IV administered. Pt tolerated well.

## 2025-02-24 DIAGNOSIS — M79.7 FIBROMYALGIA: Primary | ICD-10-CM

## 2025-02-25 RX ORDER — AMITRIPTYLINE HYDROCHLORIDE 100 MG/1
100 TABLET ORAL NIGHTLY
Qty: 30 TABLET | Refills: 5 | Status: SHIPPED | OUTPATIENT
Start: 2025-02-25

## 2025-02-25 NOTE — TELEPHONE ENCOUNTER
Requested Prescriptions     Pending Prescriptions Disp Refills    amitriptyline (ELAVIL) 100 MG tablet 30 tablet 5     Sig: Take 1 tablet by mouth nightly       Last Office Visit: 8/21/2024  Next Office Visit: 3/18/2025  Last Medication Refill: 2/24/25

## 2025-02-28 ENCOUNTER — HOSPITAL ENCOUNTER (OUTPATIENT)
Dept: INFUSION THERAPY | Age: 50
Setting detail: INFUSION SERIES
Discharge: HOME | End: 2025-02-28
Payer: MEDICARE

## 2025-02-28 VITALS
RESPIRATION RATE: 18 BRPM | TEMPERATURE: 97 F | DIASTOLIC BLOOD PRESSURE: 88 MMHG | OXYGEN SATURATION: 100 % | HEART RATE: 87 BPM | SYSTOLIC BLOOD PRESSURE: 133 MMHG

## 2025-02-28 DIAGNOSIS — G35 EXACERBATION OF MULTIPLE SCLEROSIS (HCC): Primary | ICD-10-CM

## 2025-02-28 DIAGNOSIS — M32.9 SYSTEMIC LUPUS ERYTHEMATOSUS, UNSPECIFIED SLE TYPE, UNSPECIFIED ORGAN INVOLVEMENT STATUS (HCC): ICD-10-CM

## 2025-02-28 PROCEDURE — 6370000000 HC RX 637 (ALT 250 FOR IP): Performed by: PSYCHIATRY & NEUROLOGY

## 2025-02-28 PROCEDURE — 96365 THER/PROPH/DIAG IV INF INIT: CPT

## 2025-02-28 PROCEDURE — 96366 THER/PROPH/DIAG IV INF ADDON: CPT

## 2025-02-28 PROCEDURE — 6360000002 HC RX W HCPCS: Performed by: PSYCHIATRY & NEUROLOGY

## 2025-02-28 PROCEDURE — 96375 TX/PRO/DX INJ NEW DRUG ADDON: CPT

## 2025-02-28 PROCEDURE — 2500000003 HC RX 250 WO HCPCS: Performed by: PSYCHIATRY & NEUROLOGY

## 2025-02-28 RX ORDER — ACETAMINOPHEN 325 MG/1
650 TABLET ORAL ONCE
Status: COMPLETED | OUTPATIENT
Start: 2025-02-28 | End: 2025-02-28

## 2025-02-28 RX ORDER — ACETAMINOPHEN 325 MG/1
650 TABLET ORAL
OUTPATIENT
Start: 2025-02-28

## 2025-02-28 RX ORDER — ACETAMINOPHEN 325 MG/1
650 TABLET ORAL ONCE
OUTPATIENT
Start: 2025-02-28 | End: 2025-02-28

## 2025-02-28 RX ORDER — SODIUM CHLORIDE 0.9 % (FLUSH) 0.9 %
5-40 SYRINGE (ML) INJECTION PRN
OUTPATIENT
Start: 2025-02-28

## 2025-02-28 RX ORDER — SODIUM CHLORIDE 9 MG/ML
5-250 INJECTION, SOLUTION INTRAVENOUS PRN
OUTPATIENT
Start: 2025-02-28

## 2025-02-28 RX ORDER — HEPARIN 100 UNIT/ML
500 SYRINGE INTRAVENOUS PRN
OUTPATIENT
Start: 2025-02-28

## 2025-02-28 RX ORDER — ALBUTEROL SULFATE 90 UG/1
4 INHALANT RESPIRATORY (INHALATION) PRN
OUTPATIENT
Start: 2025-02-28

## 2025-02-28 RX ORDER — HYDROCORTISONE SODIUM SUCCINATE 100 MG/2ML
100 INJECTION INTRAMUSCULAR; INTRAVENOUS ONCE
Start: 2025-02-28 | End: 2025-02-28

## 2025-02-28 RX ORDER — DIPHENHYDRAMINE HCL 25 MG
25 TABLET ORAL ONCE
OUTPATIENT
Start: 2025-02-28 | End: 2025-02-28

## 2025-02-28 RX ORDER — SODIUM CHLORIDE 0.9 % (FLUSH) 0.9 %
5-40 SYRINGE (ML) INJECTION PRN
Status: DISCONTINUED | OUTPATIENT
Start: 2025-02-28 | End: 2025-03-01 | Stop reason: HOSPADM

## 2025-02-28 RX ORDER — DIPHENHYDRAMINE HYDROCHLORIDE 50 MG/ML
50 INJECTION INTRAMUSCULAR; INTRAVENOUS
OUTPATIENT
Start: 2025-02-28

## 2025-02-28 RX ORDER — HEPARIN 100 UNIT/ML
500 SYRINGE INTRAVENOUS PRN
Status: DISCONTINUED | OUTPATIENT
Start: 2025-02-28 | End: 2025-03-01 | Stop reason: HOSPADM

## 2025-02-28 RX ORDER — HYDROCORTISONE SODIUM SUCCINATE 100 MG/2ML
100 INJECTION INTRAMUSCULAR; INTRAVENOUS ONCE
Status: COMPLETED | OUTPATIENT
Start: 2025-02-28 | End: 2025-02-28

## 2025-02-28 RX ORDER — SODIUM CHLORIDE 9 MG/ML
5-250 INJECTION, SOLUTION INTRAVENOUS PRN
Status: DISCONTINUED | OUTPATIENT
Start: 2025-02-28 | End: 2025-03-01 | Stop reason: HOSPADM

## 2025-02-28 RX ORDER — EPINEPHRINE 1 MG/ML
0.3 INJECTION, SOLUTION, CONCENTRATE INTRAVENOUS PRN
OUTPATIENT
Start: 2025-02-28

## 2025-02-28 RX ORDER — ONDANSETRON 2 MG/ML
8 INJECTION INTRAMUSCULAR; INTRAVENOUS
OUTPATIENT
Start: 2025-02-28

## 2025-02-28 RX ORDER — DIPHENHYDRAMINE HCL 25 MG
25 TABLET ORAL ONCE
Status: COMPLETED | OUTPATIENT
Start: 2025-02-28 | End: 2025-02-28

## 2025-02-28 RX ORDER — HYDROCORTISONE SODIUM SUCCINATE 100 MG/2ML
100 INJECTION INTRAMUSCULAR; INTRAVENOUS
OUTPATIENT
Start: 2025-02-28

## 2025-02-28 RX ORDER — SODIUM CHLORIDE 9 MG/ML
INJECTION, SOLUTION INTRAVENOUS CONTINUOUS
OUTPATIENT
Start: 2025-02-28

## 2025-02-28 RX ORDER — MEPERIDINE HYDROCHLORIDE 25 MG/ML
12.5 INJECTION INTRAMUSCULAR; INTRAVENOUS; SUBCUTANEOUS PRN
OUTPATIENT
Start: 2025-02-28

## 2025-02-28 RX ADMIN — ACETAMINOPHEN 650 MG: 325 TABLET ORAL at 07:24

## 2025-02-28 RX ADMIN — DIPHENHYDRAMINE HYDROCHLORIDE 25 MG: 25 TABLET ORAL at 07:24

## 2025-02-28 RX ADMIN — HYDROCORTISONE SODIUM SUCCINATE 100 MG: 100 INJECTION, POWDER, FOR SOLUTION INTRAMUSCULAR; INTRAVENOUS at 07:33

## 2025-02-28 RX ADMIN — Medication 20 ML: at 09:33

## 2025-02-28 RX ADMIN — IMMUNE GLOBULIN (HUMAN) 25 G: 10 INJECTION INTRAVENOUS; SUBCUTANEOUS at 07:56

## 2025-02-28 RX ADMIN — HEPARIN 500 UNITS: 100 SYRINGE at 09:19

## 2025-02-28 ASSESSMENT — PAIN SCALES - GENERAL: PAINLEVEL_OUTOF10: 0

## 2025-03-07 ENCOUNTER — HOSPITAL ENCOUNTER (OUTPATIENT)
Dept: INFUSION THERAPY | Age: 50
Setting detail: INFUSION SERIES
Discharge: HOME | End: 2025-03-07
Payer: MEDICARE

## 2025-03-07 VITALS
HEART RATE: 71 BPM | RESPIRATION RATE: 16 BRPM | DIASTOLIC BLOOD PRESSURE: 81 MMHG | BODY MASS INDEX: 38.11 KG/M2 | WEIGHT: 229 LBS | OXYGEN SATURATION: 97 % | SYSTOLIC BLOOD PRESSURE: 130 MMHG

## 2025-03-07 DIAGNOSIS — M32.9 SYSTEMIC LUPUS ERYTHEMATOSUS, UNSPECIFIED SLE TYPE, UNSPECIFIED ORGAN INVOLVEMENT STATUS (HCC): ICD-10-CM

## 2025-03-07 DIAGNOSIS — G35 EXACERBATION OF MULTIPLE SCLEROSIS (HCC): Primary | ICD-10-CM

## 2025-03-07 PROCEDURE — 96375 TX/PRO/DX INJ NEW DRUG ADDON: CPT

## 2025-03-07 PROCEDURE — 2500000003 HC RX 250 WO HCPCS: Performed by: PSYCHIATRY & NEUROLOGY

## 2025-03-07 PROCEDURE — 6360000002 HC RX W HCPCS: Performed by: PSYCHIATRY & NEUROLOGY

## 2025-03-07 PROCEDURE — 6370000000 HC RX 637 (ALT 250 FOR IP): Performed by: PSYCHIATRY & NEUROLOGY

## 2025-03-07 PROCEDURE — 96366 THER/PROPH/DIAG IV INF ADDON: CPT

## 2025-03-07 PROCEDURE — 96365 THER/PROPH/DIAG IV INF INIT: CPT

## 2025-03-07 RX ORDER — HEPARIN 100 UNIT/ML
500 SYRINGE INTRAVENOUS PRN
Status: DISCONTINUED | OUTPATIENT
Start: 2025-03-07 | End: 2025-03-08 | Stop reason: HOSPADM

## 2025-03-07 RX ORDER — SODIUM CHLORIDE 9 MG/ML
5-250 INJECTION, SOLUTION INTRAVENOUS PRN
Status: CANCELLED | OUTPATIENT
Start: 2025-03-14

## 2025-03-07 RX ORDER — SODIUM CHLORIDE 9 MG/ML
5-250 INJECTION, SOLUTION INTRAVENOUS PRN
Status: DISCONTINUED | OUTPATIENT
Start: 2025-03-07 | End: 2025-03-08 | Stop reason: HOSPADM

## 2025-03-07 RX ORDER — HYDROCORTISONE SODIUM SUCCINATE 100 MG/2ML
100 INJECTION INTRAMUSCULAR; INTRAVENOUS ONCE
Status: COMPLETED | OUTPATIENT
Start: 2025-03-07 | End: 2025-03-07

## 2025-03-07 RX ORDER — ACETAMINOPHEN 325 MG/1
650 TABLET ORAL ONCE
Status: CANCELLED | OUTPATIENT
Start: 2025-03-14 | End: 2025-03-14

## 2025-03-07 RX ORDER — HYDROCORTISONE SODIUM SUCCINATE 100 MG/2ML
100 INJECTION INTRAMUSCULAR; INTRAVENOUS ONCE
Status: CANCELLED
Start: 2025-03-14 | End: 2025-03-14

## 2025-03-07 RX ORDER — SODIUM CHLORIDE 9 MG/ML
5-250 INJECTION, SOLUTION INTRAVENOUS PRN
OUTPATIENT
Start: 2025-03-14

## 2025-03-07 RX ORDER — MEPERIDINE HYDROCHLORIDE 25 MG/ML
12.5 INJECTION INTRAMUSCULAR; INTRAVENOUS; SUBCUTANEOUS PRN
OUTPATIENT
Start: 2025-03-14

## 2025-03-07 RX ORDER — HEPARIN 100 UNIT/ML
500 SYRINGE INTRAVENOUS PRN
Status: CANCELLED | OUTPATIENT
Start: 2025-03-14

## 2025-03-07 RX ORDER — EPINEPHRINE 1 MG/ML
0.3 INJECTION, SOLUTION, CONCENTRATE INTRAVENOUS PRN
OUTPATIENT
Start: 2025-03-14

## 2025-03-07 RX ORDER — DIPHENHYDRAMINE HCL 25 MG
25 TABLET ORAL ONCE
Status: COMPLETED | OUTPATIENT
Start: 2025-03-07 | End: 2025-03-07

## 2025-03-07 RX ORDER — ALBUTEROL SULFATE 90 UG/1
4 INHALANT RESPIRATORY (INHALATION) PRN
OUTPATIENT
Start: 2025-03-14

## 2025-03-07 RX ORDER — SODIUM CHLORIDE 9 MG/ML
INJECTION, SOLUTION INTRAVENOUS CONTINUOUS
OUTPATIENT
Start: 2025-03-14

## 2025-03-07 RX ORDER — SODIUM CHLORIDE 0.9 % (FLUSH) 0.9 %
5-40 SYRINGE (ML) INJECTION PRN
Status: DISCONTINUED | OUTPATIENT
Start: 2025-03-07 | End: 2025-03-08 | Stop reason: HOSPADM

## 2025-03-07 RX ORDER — ACETAMINOPHEN 325 MG/1
650 TABLET ORAL
OUTPATIENT
Start: 2025-03-14

## 2025-03-07 RX ORDER — ONDANSETRON 2 MG/ML
8 INJECTION INTRAMUSCULAR; INTRAVENOUS
OUTPATIENT
Start: 2025-03-14

## 2025-03-07 RX ORDER — HYDROCORTISONE SODIUM SUCCINATE 100 MG/2ML
100 INJECTION INTRAMUSCULAR; INTRAVENOUS
OUTPATIENT
Start: 2025-03-14

## 2025-03-07 RX ORDER — DIPHENHYDRAMINE HYDROCHLORIDE 50 MG/ML
50 INJECTION INTRAMUSCULAR; INTRAVENOUS
OUTPATIENT
Start: 2025-03-14

## 2025-03-07 RX ORDER — DIPHENHYDRAMINE HCL 25 MG
25 TABLET ORAL ONCE
Status: CANCELLED | OUTPATIENT
Start: 2025-03-14 | End: 2025-03-14

## 2025-03-07 RX ORDER — SODIUM CHLORIDE 0.9 % (FLUSH) 0.9 %
5-40 SYRINGE (ML) INJECTION PRN
Status: CANCELLED | OUTPATIENT
Start: 2025-03-14

## 2025-03-07 RX ORDER — ACETAMINOPHEN 325 MG/1
650 TABLET ORAL ONCE
Status: COMPLETED | OUTPATIENT
Start: 2025-03-07 | End: 2025-03-07

## 2025-03-07 RX ADMIN — DIPHENHYDRAMINE HYDROCHLORIDE 25 MG: 25 TABLET ORAL at 07:34

## 2025-03-07 RX ADMIN — ACETAMINOPHEN 650 MG: 325 TABLET ORAL at 07:34

## 2025-03-07 RX ADMIN — HEPARIN 500 UNITS: 100 SYRINGE at 09:13

## 2025-03-07 RX ADMIN — HYDROCORTISONE SODIUM SUCCINATE 100 MG: 100 INJECTION, POWDER, FOR SOLUTION INTRAMUSCULAR; INTRAVENOUS at 07:36

## 2025-03-07 RX ADMIN — IMMUNE GLOBULIN (HUMAN) 25 G: 10 INJECTION INTRAVENOUS; SUBCUTANEOUS at 07:47

## 2025-03-07 RX ADMIN — SODIUM CHLORIDE, PRESERVATIVE FREE 20 ML: 5 INJECTION INTRAVENOUS at 09:13

## 2025-03-07 NOTE — PROGRESS NOTES
Port accessed on arrival to [facility]. Brisk blood return noted. Premeds given as ordered. Gamunex- C 25 g IV administered. Pt tolerated well.

## 2025-03-14 ENCOUNTER — HOSPITAL ENCOUNTER (OUTPATIENT)
Dept: INFUSION THERAPY | Age: 50
Setting detail: INFUSION SERIES
Discharge: HOME | End: 2025-03-14
Payer: MEDICARE

## 2025-03-14 ENCOUNTER — HOSPITAL ENCOUNTER (EMERGENCY)
Age: 50
Discharge: LEFT BEFORE DISCHARGE | End: 2025-03-14

## 2025-03-14 VITALS
WEIGHT: 223 LBS | SYSTOLIC BLOOD PRESSURE: 138 MMHG | TEMPERATURE: 97.8 F | OXYGEN SATURATION: 98 % | HEART RATE: 78 BPM | BODY MASS INDEX: 37.11 KG/M2 | RESPIRATION RATE: 18 BRPM | DIASTOLIC BLOOD PRESSURE: 101 MMHG

## 2025-03-14 VITALS
HEART RATE: 71 BPM | DIASTOLIC BLOOD PRESSURE: 87 MMHG | TEMPERATURE: 97 F | RESPIRATION RATE: 18 BRPM | OXYGEN SATURATION: 99 % | SYSTOLIC BLOOD PRESSURE: 122 MMHG

## 2025-03-14 DIAGNOSIS — M32.9 SYSTEMIC LUPUS ERYTHEMATOSUS, UNSPECIFIED SLE TYPE, UNSPECIFIED ORGAN INVOLVEMENT STATUS (HCC): ICD-10-CM

## 2025-03-14 DIAGNOSIS — G35 EXACERBATION OF MULTIPLE SCLEROSIS (HCC): Primary | ICD-10-CM

## 2025-03-14 PROCEDURE — 6360000002 HC RX W HCPCS: Performed by: PSYCHIATRY & NEUROLOGY

## 2025-03-14 PROCEDURE — 96375 TX/PRO/DX INJ NEW DRUG ADDON: CPT

## 2025-03-14 PROCEDURE — 6370000000 HC RX 637 (ALT 250 FOR IP): Performed by: PSYCHIATRY & NEUROLOGY

## 2025-03-14 PROCEDURE — 2500000003 HC RX 250 WO HCPCS: Performed by: PSYCHIATRY & NEUROLOGY

## 2025-03-14 PROCEDURE — 96365 THER/PROPH/DIAG IV INF INIT: CPT

## 2025-03-14 RX ORDER — SODIUM CHLORIDE 9 MG/ML
5-250 INJECTION, SOLUTION INTRAVENOUS PRN
Status: CANCELLED | OUTPATIENT
Start: 2025-03-21

## 2025-03-14 RX ORDER — HYDROCORTISONE SODIUM SUCCINATE 100 MG/2ML
100 INJECTION INTRAMUSCULAR; INTRAVENOUS ONCE
Status: CANCELLED
Start: 2025-03-21 | End: 2025-03-21

## 2025-03-14 RX ORDER — SODIUM CHLORIDE 9 MG/ML
INJECTION, SOLUTION INTRAVENOUS CONTINUOUS
Status: CANCELLED | OUTPATIENT
Start: 2025-03-21

## 2025-03-14 RX ORDER — HYDROCORTISONE SODIUM SUCCINATE 100 MG/2ML
100 INJECTION INTRAMUSCULAR; INTRAVENOUS ONCE
Status: COMPLETED | OUTPATIENT
Start: 2025-03-14 | End: 2025-03-14

## 2025-03-14 RX ORDER — DIPHENHYDRAMINE HCL 25 MG
25 TABLET ORAL ONCE
Status: CANCELLED | OUTPATIENT
Start: 2025-03-21 | End: 2025-03-21

## 2025-03-14 RX ORDER — DIPHENHYDRAMINE HYDROCHLORIDE 50 MG/ML
50 INJECTION, SOLUTION INTRAMUSCULAR; INTRAVENOUS
Status: CANCELLED | OUTPATIENT
Start: 2025-03-21

## 2025-03-14 RX ORDER — HYDROCORTISONE SODIUM SUCCINATE 100 MG/2ML
100 INJECTION INTRAMUSCULAR; INTRAVENOUS
Status: CANCELLED | OUTPATIENT
Start: 2025-03-21

## 2025-03-14 RX ORDER — ACETAMINOPHEN 325 MG/1
650 TABLET ORAL
Status: CANCELLED | OUTPATIENT
Start: 2025-03-21

## 2025-03-14 RX ORDER — SODIUM CHLORIDE 0.9 % (FLUSH) 0.9 %
5-40 SYRINGE (ML) INJECTION PRN
Status: CANCELLED | OUTPATIENT
Start: 2025-03-21

## 2025-03-14 RX ORDER — SODIUM CHLORIDE 9 MG/ML
5-250 INJECTION, SOLUTION INTRAVENOUS PRN
Status: DISCONTINUED | OUTPATIENT
Start: 2025-03-14 | End: 2025-03-15 | Stop reason: HOSPADM

## 2025-03-14 RX ORDER — HEPARIN 100 UNIT/ML
500 SYRINGE INTRAVENOUS PRN
Status: CANCELLED | OUTPATIENT
Start: 2025-03-21

## 2025-03-14 RX ORDER — ACETAMINOPHEN 325 MG/1
650 TABLET ORAL ONCE
Status: COMPLETED | OUTPATIENT
Start: 2025-03-14 | End: 2025-03-14

## 2025-03-14 RX ORDER — ALBUTEROL SULFATE 90 UG/1
4 INHALANT RESPIRATORY (INHALATION) PRN
Status: CANCELLED | OUTPATIENT
Start: 2025-03-21

## 2025-03-14 RX ORDER — EPINEPHRINE 1 MG/ML
0.3 INJECTION, SOLUTION, CONCENTRATE INTRAVENOUS PRN
Status: CANCELLED | OUTPATIENT
Start: 2025-03-21

## 2025-03-14 RX ORDER — ACETAMINOPHEN 325 MG/1
650 TABLET ORAL ONCE
Status: CANCELLED | OUTPATIENT
Start: 2025-03-21 | End: 2025-03-21

## 2025-03-14 RX ORDER — SODIUM CHLORIDE 0.9 % (FLUSH) 0.9 %
5-40 SYRINGE (ML) INJECTION PRN
Status: DISCONTINUED | OUTPATIENT
Start: 2025-03-14 | End: 2025-03-15 | Stop reason: HOSPADM

## 2025-03-14 RX ORDER — HEPARIN 100 UNIT/ML
500 SYRINGE INTRAVENOUS PRN
Status: DISCONTINUED | OUTPATIENT
Start: 2025-03-14 | End: 2025-03-15 | Stop reason: HOSPADM

## 2025-03-14 RX ORDER — DIPHENHYDRAMINE HCL 25 MG
25 TABLET ORAL ONCE
Status: COMPLETED | OUTPATIENT
Start: 2025-03-14 | End: 2025-03-14

## 2025-03-14 RX ORDER — MEPERIDINE HYDROCHLORIDE 25 MG/ML
12.5 INJECTION INTRAMUSCULAR; INTRAVENOUS; SUBCUTANEOUS PRN
Status: CANCELLED | OUTPATIENT
Start: 2025-03-21

## 2025-03-14 RX ORDER — ONDANSETRON 2 MG/ML
8 INJECTION INTRAMUSCULAR; INTRAVENOUS
Status: CANCELLED | OUTPATIENT
Start: 2025-03-21

## 2025-03-14 RX ADMIN — IMMUNE GLOBULIN (HUMAN) 25 G: 10 INJECTION INTRAVENOUS; SUBCUTANEOUS at 07:13

## 2025-03-14 RX ADMIN — HEPARIN 500 UNITS: 100 SYRINGE at 08:36

## 2025-03-14 RX ADMIN — SODIUM CHLORIDE, PRESERVATIVE FREE 10 ML: 5 INJECTION INTRAVENOUS at 08:36

## 2025-03-14 RX ADMIN — ACETAMINOPHEN 650 MG: 325 TABLET ORAL at 06:48

## 2025-03-14 RX ADMIN — HYDROCORTISONE SODIUM SUCCINATE 100 MG: 100 INJECTION, POWDER, FOR SOLUTION INTRAMUSCULAR; INTRAVENOUS at 06:47

## 2025-03-14 RX ADMIN — DIPHENHYDRAMINE HYDROCHLORIDE 25 MG: 25 TABLET ORAL at 06:48

## 2025-03-14 NOTE — PROGRESS NOTES
Port accessed and tylenol, benadryl, and solucortef given for premeds. Gamunex C given as ordered. Pt tolerated well.

## 2025-03-14 NOTE — DISCHARGE INSTRUCTIONS
immune globulin (intravenous and subcutaneous)  Pronunciation:  dinora LACEY foley  Brand:  Gammagard Liquid, Gammaked, Gamunex-C  What is the most important information I should know about immune globulin?  This medicine can cause blood clots.  The risk is highest in older adults or in people who have had blood clots, heart problems, or blood circulation problems. Blood clots are also more likely during long-term bedrest, while using birth control pills or hormone replacement therapy, or while having a central intravenous (IV) catheter in place.  Call your doctor at once if you have chest pain, trouble breathing, fast heartbeats, numbness or weakness, or swelling and warmth or discoloration in an arm or leg.  This medicine can also harm your kidneys, especially if you have kidney disease or if you also use certain medicines. Tell your doctor right away if you have signs of kidney problems, such as swelling, rapid weight gain, and little or no urination.  What is immune globulin?  Immune globulin intravenous and subcutaneous (for injection into a vein or under the skin) is used to treat primary immunodeficiency.  Immune globulin is also used to increase platelets (blood clotting cells) in people with idiopathic thrombocytopenic purpura.  Immune globulin is also used to treat certain debilitating nerve disorders that cause muscle weakness and can affect daily activities.  Immune globulin may also be used for purposes not listed in this medication guide.  What should I discuss with my healthcare provider before using immune globulin?  You should not use this medicine if:  you have had an allergic reaction to an immune globulin or blood product; or  you have immune globulin A (IgA) deficiency with antibody to IgA.  Immune globulin can cause blood clots or kidney problems, especially in older adults or in people with certain conditions. Tell your doctor if you have ever had:  heart problems, blood circulation  your doctor for medical advice about side effects. You may report side effects to FDA at 0-148-JVP-7396.  What other drugs will affect immune globulin?  Immune globulin can harm your kidneys, especially if you also use certain medicines for infections, cancer, osteoporosis, organ transplant rejection, bowel disorders, high blood pressure, or pain or arthritis (including Advil, Motrin, and Aleve).  Other drugs may affect immune globulin, including prescription and over-the-counter medicines, vitamins, and herbal products. Tell your doctor about all your current medicines and any medicine you start or stop using.  Where can I get more information?  Your doctor or pharmacist can provide more information about immune globulin.  Remember, keep this and all other medicines out of the reach of children, never share your medicines with others, and use this medication only for the indication prescribed.   Every effort has been made to ensure that the information provided by [employer]') is accurate, up-to-date, and complete, but no guarantee is made to that effect. Drug information contained herein may be time sensitive. [employer] information has been compiled for use by healthcare practitioners and consumers in the [place] and therefore [employer] does not warrant that uses outside of the [place] are appropriate, unless specifically indicated otherwise. [employer] drug information does not endorse drugs, diagnose patients or recommend therapy. [employer] drug information is an informational resource designed to assist licensed healthcare practitioners in caring for their patients and/or to serve consumers viewing this service as a supplement to, and not a substitute for, the expertise, skill, knowledge and judgment of healthcare practitioners. The absence of a warning for a given drug or drug combination in no way should be construed to indicate that the drug or drug combination is safe, effective or

## 2025-03-18 ENCOUNTER — OFFICE VISIT (OUTPATIENT)
Dept: NEUROLOGY | Age: 50
End: 2025-03-18
Payer: MEDICARE

## 2025-03-18 VITALS
WEIGHT: 223 LBS | HEIGHT: 65 IN | SYSTOLIC BLOOD PRESSURE: 135 MMHG | BODY MASS INDEX: 37.15 KG/M2 | HEART RATE: 76 BPM | DIASTOLIC BLOOD PRESSURE: 94 MMHG

## 2025-03-18 DIAGNOSIS — G35 MULTIPLE SCLEROSIS (HCC): ICD-10-CM

## 2025-03-18 DIAGNOSIS — M79.7 FIBROMYALGIA: ICD-10-CM

## 2025-03-18 DIAGNOSIS — G43.819 OTHER MIGRAINE WITHOUT STATUS MIGRAINOSUS, INTRACTABLE: ICD-10-CM

## 2025-03-18 DIAGNOSIS — M54.30 SCIATICA, UNSPECIFIED LATERALITY: ICD-10-CM

## 2025-03-18 DIAGNOSIS — R53.1 WEAKNESS: ICD-10-CM

## 2025-03-18 DIAGNOSIS — R25.1 TREMOR: ICD-10-CM

## 2025-03-18 DIAGNOSIS — Z79.899 ON LONG TERM DRUG THERAPY: Primary | ICD-10-CM

## 2025-03-18 DIAGNOSIS — H46.9 OPTIC NEURITIS: ICD-10-CM

## 2025-03-18 DIAGNOSIS — H53.9 VISUAL DISTURBANCE: ICD-10-CM

## 2025-03-18 DIAGNOSIS — R20.0 NUMBNESS: ICD-10-CM

## 2025-03-18 DIAGNOSIS — G43.719 INTRACTABLE CHRONIC MIGRAINE WITHOUT AURA AND WITHOUT STATUS MIGRAINOSUS: ICD-10-CM

## 2025-03-18 LAB
ALBUMIN SERPL-MCNC: 3.8 G/DL (ref 3.5–5.2)
ALCOHOL URINE: ABNORMAL
ALP SERPL-CCNC: 69 U/L (ref 35–104)
ALT SERPL-CCNC: 17 U/L (ref 10–35)
AMPHETAMINE SCREEN URINE: ABNORMAL
ANION GAP SERPL CALCULATED.3IONS-SCNC: 7 MMOL/L (ref 8–16)
AST SERPL-CCNC: 29 U/L (ref 10–35)
BARBITURATE SCREEN URINE: ABNORMAL
BASOPHILS # BLD: 0 K/UL (ref 0–0.2)
BASOPHILS NFR BLD: 0.9 % (ref 0–1)
BENZODIAZEPINE SCREEN, URINE: ABNORMAL
BILIRUB SERPL-MCNC: <0.2 MG/DL (ref 0.2–1.2)
BUN SERPL-MCNC: 8 MG/DL (ref 6–20)
BUPRENORPHINE URINE: ABNORMAL
CALCIUM SERPL-MCNC: 9 MG/DL (ref 8.6–10)
CHLORIDE SERPL-SCNC: 99 MMOL/L (ref 98–107)
CO2 SERPL-SCNC: 25 MMOL/L (ref 22–29)
COCAINE METABOLITE SCREEN URINE: ABNORMAL
CREAT SERPL-MCNC: 0.6 MG/DL (ref 0.5–0.9)
EOSINOPHIL # BLD: 0 K/UL (ref 0–0.6)
EOSINOPHIL NFR BLD: 0.7 % (ref 0–5)
ERYTHROCYTE [DISTWIDTH] IN BLOOD BY AUTOMATED COUNT: 14.6 % (ref 11.5–14.5)
FENTANYL SCREEN, URINE: ABNORMAL
GABAPENTIN SCREEN, URINE: ABNORMAL
GLUCOSE SERPL-MCNC: 79 MG/DL (ref 70–99)
HCT VFR BLD AUTO: 35.6 % (ref 37–47)
HGB BLD-MCNC: 11.6 G/DL (ref 12–16)
IMM GRANULOCYTES # BLD: 0 K/UL
LYMPHOCYTES # BLD: 1.1 K/UL (ref 1.1–4.5)
LYMPHOCYTES NFR BLD: 24.4 % (ref 20–40)
MCH RBC QN AUTO: 28.8 PG (ref 27–31)
MCHC RBC AUTO-ENTMCNC: 32.6 G/DL (ref 33–37)
MCV RBC AUTO: 88.3 FL (ref 81–99)
MDMA, URINE: ABNORMAL
METHADONE SCREEN, URINE: ABNORMAL
METHAMPHETAMINE, URINE: ABNORMAL
MONOCYTES # BLD: 0.4 K/UL (ref 0–0.9)
MONOCYTES NFR BLD: 8.1 % (ref 0–10)
NEUTROPHILS # BLD: 3 K/UL (ref 1.5–7.5)
NEUTS SEG NFR BLD: 65.5 % (ref 50–65)
OPIATE SCREEN URINE: ABNORMAL
OXYCODONE SCREEN URINE: ABNORMAL
PHENCYCLIDINE SCREEN URINE: ABNORMAL
PLATELET # BLD AUTO: 248 K/UL (ref 130–400)
PMV BLD AUTO: 8.7 FL (ref 9.4–12.3)
POTASSIUM SERPL-SCNC: 4.7 MMOL/L (ref 3.5–5.1)
PROPOXYPHENE SCREEN, URINE: ABNORMAL
PROT SERPL-MCNC: 7.7 G/DL (ref 6.4–8.3)
RBC # BLD AUTO: 4.03 M/UL (ref 4.2–5.4)
SODIUM SERPL-SCNC: 131 MMOL/L (ref 136–145)
SYNTHETIC CANNABINOIDS(K2) SCREEN, URINE: ABNORMAL
THC SCREEN, URINE: ABNORMAL
TRAMADOL SCREEN URINE: ABNORMAL
TRICYCLIC ANTIDEPRESSANTS, UR: ABNORMAL
WBC # BLD AUTO: 4.6 K/UL (ref 4.8–10.8)

## 2025-03-18 PROCEDURE — 3075F SYST BP GE 130 - 139MM HG: CPT | Performed by: PSYCHIATRY & NEUROLOGY

## 2025-03-18 PROCEDURE — 80305 DRUG TEST PRSMV DIR OPT OBS: CPT | Performed by: PSYCHIATRY & NEUROLOGY

## 2025-03-18 PROCEDURE — 3080F DIAST BP >= 90 MM HG: CPT | Performed by: PSYCHIATRY & NEUROLOGY

## 2025-03-18 PROCEDURE — 99214 OFFICE O/P EST MOD 30 MIN: CPT | Performed by: PSYCHIATRY & NEUROLOGY

## 2025-03-18 PROCEDURE — G8427 DOCREV CUR MEDS BY ELIG CLIN: HCPCS | Performed by: PSYCHIATRY & NEUROLOGY

## 2025-03-18 PROCEDURE — G8417 CALC BMI ABV UP PARAM F/U: HCPCS | Performed by: PSYCHIATRY & NEUROLOGY

## 2025-03-18 PROCEDURE — 4004F PT TOBACCO SCREEN RCVD TLK: CPT | Performed by: PSYCHIATRY & NEUROLOGY

## 2025-03-18 RX ORDER — DIPHENHYDRAMINE HYDROCHLORIDE 50 MG/ML
50 INJECTION, SOLUTION INTRAMUSCULAR; INTRAVENOUS
OUTPATIENT
Start: 2025-03-18

## 2025-03-18 RX ORDER — DIPHENHYDRAMINE HCL 25 MG
25 TABLET ORAL ONCE
Status: CANCELLED | OUTPATIENT
Start: 2025-03-21 | End: 2025-03-21

## 2025-03-18 RX ORDER — ACETAMINOPHEN 325 MG/1
650 TABLET ORAL
OUTPATIENT
Start: 2025-03-18

## 2025-03-18 RX ORDER — SODIUM CHLORIDE 0.9 % (FLUSH) 0.9 %
5-40 SYRINGE (ML) INJECTION PRN
OUTPATIENT
Start: 2025-03-18

## 2025-03-18 RX ORDER — SODIUM CHLORIDE 9 MG/ML
5-250 INJECTION, SOLUTION INTRAVENOUS PRN
Status: CANCELLED | OUTPATIENT
Start: 2025-03-18

## 2025-03-18 RX ORDER — DIPHENHYDRAMINE HYDROCHLORIDE 50 MG/ML
50 INJECTION, SOLUTION INTRAMUSCULAR; INTRAVENOUS
OUTPATIENT
Start: 2025-03-21

## 2025-03-18 RX ORDER — SODIUM CHLORIDE 0.9 % (FLUSH) 0.9 %
5-40 SYRINGE (ML) INJECTION PRN
Status: CANCELLED | OUTPATIENT
Start: 2025-03-21

## 2025-03-18 RX ORDER — SODIUM CHLORIDE 9 MG/ML
INJECTION, SOLUTION INTRAVENOUS CONTINUOUS
OUTPATIENT
Start: 2025-03-21

## 2025-03-18 RX ORDER — MEPERIDINE HYDROCHLORIDE 50 MG/ML
12.5 INJECTION INTRAMUSCULAR; INTRAVENOUS; SUBCUTANEOUS PRN
OUTPATIENT
Start: 2025-03-18

## 2025-03-18 RX ORDER — FAMOTIDINE 10 MG/ML
20 INJECTION, SOLUTION INTRAVENOUS
OUTPATIENT
Start: 2025-03-18

## 2025-03-18 RX ORDER — ONDANSETRON 2 MG/ML
8 INJECTION INTRAMUSCULAR; INTRAVENOUS
OUTPATIENT
Start: 2025-03-21

## 2025-03-18 RX ORDER — SODIUM CHLORIDE 9 MG/ML
5-250 INJECTION, SOLUTION INTRAVENOUS PRN
OUTPATIENT
Start: 2025-03-21

## 2025-03-18 RX ORDER — DIPHENHYDRAMINE HCL 25 MG
25 TABLET ORAL ONCE
Status: CANCELLED | OUTPATIENT
Start: 2025-03-18 | End: 2025-03-18

## 2025-03-18 RX ORDER — SODIUM CHLORIDE 9 MG/ML
5-250 INJECTION, SOLUTION INTRAVENOUS PRN
Status: CANCELLED | OUTPATIENT
Start: 2025-03-21

## 2025-03-18 RX ORDER — MEPERIDINE HYDROCHLORIDE 50 MG/ML
12.5 INJECTION INTRAMUSCULAR; INTRAVENOUS; SUBCUTANEOUS PRN
OUTPATIENT
Start: 2025-03-21

## 2025-03-18 RX ORDER — ACETAMINOPHEN 325 MG/1
650 TABLET ORAL ONCE
Status: CANCELLED | OUTPATIENT
Start: 2025-03-18 | End: 2025-03-18

## 2025-03-18 RX ORDER — FAMOTIDINE 10 MG/ML
20 INJECTION, SOLUTION INTRAVENOUS
OUTPATIENT
Start: 2025-03-21

## 2025-03-18 RX ORDER — HEPARIN SODIUM (PORCINE) LOCK FLUSH IV SOLN 100 UNIT/ML 100 UNIT/ML
500 SOLUTION INTRAVENOUS PRN
OUTPATIENT
Start: 2025-03-18

## 2025-03-18 RX ORDER — HYDROCORTISONE SODIUM SUCCINATE 100 MG/2ML
100 INJECTION INTRAMUSCULAR; INTRAVENOUS
OUTPATIENT
Start: 2025-03-21

## 2025-03-18 RX ORDER — ONDANSETRON 2 MG/ML
8 INJECTION INTRAMUSCULAR; INTRAVENOUS
OUTPATIENT
Start: 2025-03-18

## 2025-03-18 RX ORDER — ALBUTEROL SULFATE 90 UG/1
4 INHALANT RESPIRATORY (INHALATION) PRN
OUTPATIENT
Start: 2025-03-18

## 2025-03-18 RX ORDER — EPINEPHRINE 1 MG/ML
0.3 INJECTION, SOLUTION, CONCENTRATE INTRAVENOUS PRN
OUTPATIENT
Start: 2025-03-18

## 2025-03-18 RX ORDER — HYDROCORTISONE SODIUM SUCCINATE 100 MG/2ML
100 INJECTION INTRAMUSCULAR; INTRAVENOUS ONCE
Status: CANCELLED
Start: 2025-03-21 | End: 2025-03-21

## 2025-03-18 RX ORDER — SODIUM CHLORIDE 9 MG/ML
INJECTION, SOLUTION INTRAVENOUS CONTINUOUS
OUTPATIENT
Start: 2025-03-18

## 2025-03-18 RX ORDER — HYDROCORTISONE SODIUM SUCCINATE 100 MG/2ML
100 INJECTION INTRAMUSCULAR; INTRAVENOUS
OUTPATIENT
Start: 2025-03-18

## 2025-03-18 RX ORDER — HEPARIN SODIUM (PORCINE) LOCK FLUSH IV SOLN 100 UNIT/ML 100 UNIT/ML
500 SOLUTION INTRAVENOUS PRN
Status: CANCELLED | OUTPATIENT
Start: 2025-03-21

## 2025-03-18 RX ORDER — ACETAMINOPHEN 325 MG/1
650 TABLET ORAL ONCE
Status: CANCELLED | OUTPATIENT
Start: 2025-03-21 | End: 2025-03-21

## 2025-03-18 RX ORDER — EPINEPHRINE 1 MG/ML
0.3 INJECTION, SOLUTION, CONCENTRATE INTRAVENOUS PRN
OUTPATIENT
Start: 2025-03-21

## 2025-03-18 RX ORDER — ACETAMINOPHEN 325 MG/1
650 TABLET ORAL
OUTPATIENT
Start: 2025-03-21

## 2025-03-18 RX ORDER — ALBUTEROL SULFATE 90 UG/1
4 INHALANT RESPIRATORY (INHALATION) PRN
OUTPATIENT
Start: 2025-03-21

## 2025-03-18 NOTE — PROGRESS NOTES
Review of Systems    Constitutional - No fever or chills.  No diaphoresis or significant fatigue.  HENT -  No tinnitus or significant hearing loss.  Eyes - no sudden vision change or eye pain  Respiratory - no significant shortness of breath or cough  Cardiovascular - no chest pain No palpitations or significant leg swelling  Gastrointestinal - no abdominal swelling or pain.    Genitourinary - No difficulty urinating, dysuria  Musculoskeletal - no back pain or myalgia.  Skin - no color change or rash  Neurologic - No seizures.  No lateralizing weakness.  Hematologic - no easy bruising or excessive bleeding.  Psychiatric - no severe anxiety or nervousness.   All other review of systems are negative.    
  Diagnosis Date    Bipolar 1 disorder (HCC)     GERD (gastroesophageal reflux disease)     Migraines     Movement disorder     Neuromuscular disorder (HCC)     Psychiatric problem        Past Surgical History:   Procedure Laterality Date    ABDOMINAL EXPLORATION SURGERY      ADENOIDECTOMY       SECTION      x 2     SECTION, CLASSIC N/A     COLONOSCOPY      DILATATION, ESOPHAGUS      EXTERNAL EAR SURGERY      lobe    GASTRIC BAND      and removed     GASTRIC BYPASS SURGERY      HERNIA REPAIR      HERNIA REPAIR N/A     HYSTERECTOMY (CERVIX STATUS UNKNOWN)      PORT SURGERY N/A 2022    PORT INSERTION WITH ULTRASOUND & FLUOROSCOPY performed by Caty Brown MD at [facility] OR    SEPTOPLASTY      SKIN GRAFT      SMALL INTESTINE SURGERY      TONSILLECTOMY      TONSILLECTOMY AND ADENOIDECTOMY      UPPER GASTROINTESTINAL ENDOSCOPY         Family History   Problem Relation Age of Onset    Anemia Mother     Breast Cancer Mother     Depression Mother     High Blood Pressure Mother     Mental Illness Mother     Miscarriages / Stillbirths Mother     Colon Cancer Father     Depression Father     High Blood Pressure Father     Mental Illness Father     Breast Cancer Maternal Grandmother     Alcohol Abuse Maternal Grandfather     Anemia Paternal Grandmother     Alcohol Abuse Paternal Grandfather     Osteoporosis Paternal Grandfather     Stroke Paternal Grandfather     Substance Abuse Maternal Cousin     Substance Abuse Paternal Cousin     Asthma Other        Allergies   Allergen Reactions    Avelox [Moxifloxacin] Anaphylaxis    Glatiramer Rash    Sugar-Protein-Starch Other (See Comments)    Avelox [Moxifloxacin]     Copaxone [Glatiramer Acetate]     Depakote [Divalproex Sodium]     Gabapentin Itching    Hydroxychloroquine      Other reaction(s): Other (See Comments)  Vision loss    Invega [Paliperidone]     Lamictal [Lamotrigine]     Lithium     Pregabalin      Other reaction(s): Mental Status

## 2025-03-19 DIAGNOSIS — G43.719 INTRACTABLE CHRONIC MIGRAINE WITHOUT AURA AND WITHOUT STATUS MIGRAINOSUS: ICD-10-CM

## 2025-03-19 DIAGNOSIS — M79.7 FIBROMYALGIA: ICD-10-CM

## 2025-03-19 DIAGNOSIS — G35 MULTIPLE SCLEROSIS (HCC): ICD-10-CM

## 2025-03-19 DIAGNOSIS — M54.30 SCIATICA, UNSPECIFIED LATERALITY: ICD-10-CM

## 2025-03-19 RX ORDER — LIDOCAINE AND PRILOCAINE 25; 25 MG/G; MG/G
CREAM TOPICAL
Qty: 2 EACH | Refills: 5 | Status: SHIPPED | OUTPATIENT
Start: 2025-03-19

## 2025-03-19 RX ORDER — OXYCODONE HYDROCHLORIDE 10 MG/1
10 TABLET ORAL EVERY 6 HOURS PRN
Qty: 120 TABLET | Refills: 0 | Status: SHIPPED | OUTPATIENT
Start: 2025-03-19 | End: 2025-04-18

## 2025-03-19 NOTE — TELEPHONE ENCOUNTER
Requested Prescriptions     Pending Prescriptions Disp Refills    lidocaine-prilocaine (EMLA) 2.5-2.5 % cream 2 each 5     Sig: Apply topically as needed.    oxyCODONE HCl (OXY-IR) 10 MG immediate release tablet 120 tablet 0     Sig: Take 1 tablet by mouth every 6 hours as needed for Pain for up to 30 days. Intended supply: 30 days Max Daily Amount: 40 mg       Last Office Visit:  3/18/2025  Next Office Visit:  6/26/2025  Last Medication Refill: 2/15/2025   Daniel up to date:  up to date    *RX updated to reflect   3/19/2025  fill date*

## 2025-03-20 RX ORDER — SODIUM CHLORIDE 9 MG/ML
5-250 INJECTION, SOLUTION INTRAVENOUS ONCE
OUTPATIENT
Start: 2025-03-24 | End: 2025-03-24

## 2025-03-20 RX ORDER — ACETAMINOPHEN 325 MG/1
650 TABLET ORAL ONCE
OUTPATIENT
Start: 2025-03-24 | End: 2025-03-24

## 2025-03-20 RX ORDER — DIPHENHYDRAMINE HCL 25 MG
25 TABLET ORAL ONCE
OUTPATIENT
Start: 2025-03-24 | End: 2025-03-24

## 2025-03-21 ENCOUNTER — HOSPITAL ENCOUNTER (OUTPATIENT)
Dept: INFUSION THERAPY | Age: 50
Setting detail: INFUSION SERIES
Discharge: HOME | End: 2025-03-21
Payer: MEDICARE

## 2025-03-21 VITALS
HEART RATE: 69 BPM | SYSTOLIC BLOOD PRESSURE: 128 MMHG | OXYGEN SATURATION: 100 % | DIASTOLIC BLOOD PRESSURE: 91 MMHG | TEMPERATURE: 97.5 F | RESPIRATION RATE: 18 BRPM

## 2025-03-21 DIAGNOSIS — G35 EXACERBATION OF MULTIPLE SCLEROSIS (HCC): Primary | ICD-10-CM

## 2025-03-21 DIAGNOSIS — M32.9 SYSTEMIC LUPUS ERYTHEMATOSUS, UNSPECIFIED SLE TYPE, UNSPECIFIED ORGAN INVOLVEMENT STATUS (HCC): ICD-10-CM

## 2025-03-21 PROCEDURE — 6370000000 HC RX 637 (ALT 250 FOR IP): Performed by: PSYCHIATRY & NEUROLOGY

## 2025-03-21 PROCEDURE — 96365 THER/PROPH/DIAG IV INF INIT: CPT

## 2025-03-21 PROCEDURE — 96375 TX/PRO/DX INJ NEW DRUG ADDON: CPT

## 2025-03-21 PROCEDURE — 2500000003 HC RX 250 WO HCPCS: Performed by: PSYCHIATRY & NEUROLOGY

## 2025-03-21 PROCEDURE — 6360000002 HC RX W HCPCS: Performed by: PSYCHIATRY & NEUROLOGY

## 2025-03-21 RX ORDER — DIPHENHYDRAMINE HCL 25 MG
25 TABLET ORAL ONCE
OUTPATIENT
Start: 2025-03-28 | End: 2025-03-28

## 2025-03-21 RX ORDER — EPINEPHRINE 1 MG/ML
0.3 INJECTION, SOLUTION, CONCENTRATE INTRAVENOUS PRN
OUTPATIENT
Start: 2025-03-28

## 2025-03-21 RX ORDER — HYDROCORTISONE SODIUM SUCCINATE 100 MG/2ML
100 INJECTION INTRAMUSCULAR; INTRAVENOUS ONCE
Start: 2025-03-28 | End: 2025-03-28

## 2025-03-21 RX ORDER — SODIUM CHLORIDE 9 MG/ML
5-250 INJECTION, SOLUTION INTRAVENOUS PRN
OUTPATIENT
Start: 2025-03-28

## 2025-03-21 RX ORDER — ACETAMINOPHEN 325 MG/1
650 TABLET ORAL ONCE
OUTPATIENT
Start: 2025-03-28 | End: 2025-03-28

## 2025-03-21 RX ORDER — SODIUM CHLORIDE 0.9 % (FLUSH) 0.9 %
5-40 SYRINGE (ML) INJECTION PRN
OUTPATIENT
Start: 2025-03-28

## 2025-03-21 RX ORDER — HEPARIN 100 UNIT/ML
500 SYRINGE INTRAVENOUS PRN
Status: DISCONTINUED | OUTPATIENT
Start: 2025-03-21 | End: 2025-03-22 | Stop reason: HOSPADM

## 2025-03-21 RX ORDER — HEPARIN 100 UNIT/ML
500 SYRINGE INTRAVENOUS PRN
OUTPATIENT
Start: 2025-03-28

## 2025-03-21 RX ORDER — DIPHENHYDRAMINE HYDROCHLORIDE 50 MG/ML
50 INJECTION, SOLUTION INTRAMUSCULAR; INTRAVENOUS
OUTPATIENT
Start: 2025-03-28

## 2025-03-21 RX ORDER — ALBUTEROL SULFATE 90 UG/1
4 INHALANT RESPIRATORY (INHALATION) PRN
OUTPATIENT
Start: 2025-03-28

## 2025-03-21 RX ORDER — SODIUM CHLORIDE 0.9 % (FLUSH) 0.9 %
5-40 SYRINGE (ML) INJECTION PRN
Status: DISCONTINUED | OUTPATIENT
Start: 2025-03-21 | End: 2025-03-22 | Stop reason: HOSPADM

## 2025-03-21 RX ORDER — ONDANSETRON 2 MG/ML
8 INJECTION INTRAMUSCULAR; INTRAVENOUS
OUTPATIENT
Start: 2025-03-28

## 2025-03-21 RX ORDER — ACETAMINOPHEN 325 MG/1
650 TABLET ORAL ONCE
Status: COMPLETED | OUTPATIENT
Start: 2025-03-21 | End: 2025-03-21

## 2025-03-21 RX ORDER — DIPHENHYDRAMINE HCL 25 MG
25 TABLET ORAL ONCE
Status: COMPLETED | OUTPATIENT
Start: 2025-03-21 | End: 2025-03-21

## 2025-03-21 RX ORDER — SODIUM CHLORIDE 9 MG/ML
INJECTION, SOLUTION INTRAVENOUS CONTINUOUS
OUTPATIENT
Start: 2025-03-28

## 2025-03-21 RX ORDER — MEPERIDINE HYDROCHLORIDE 25 MG/ML
12.5 INJECTION INTRAMUSCULAR; INTRAVENOUS; SUBCUTANEOUS PRN
OUTPATIENT
Start: 2025-03-28

## 2025-03-21 RX ORDER — HYDROCORTISONE SODIUM SUCCINATE 100 MG/2ML
100 INJECTION INTRAMUSCULAR; INTRAVENOUS
OUTPATIENT
Start: 2025-03-28

## 2025-03-21 RX ORDER — HYDROCORTISONE SODIUM SUCCINATE 100 MG/2ML
100 INJECTION INTRAMUSCULAR; INTRAVENOUS ONCE
Status: COMPLETED | OUTPATIENT
Start: 2025-03-21 | End: 2025-03-21

## 2025-03-21 RX ORDER — ACETAMINOPHEN 325 MG/1
650 TABLET ORAL
OUTPATIENT
Start: 2025-03-28

## 2025-03-21 RX ORDER — SODIUM CHLORIDE 9 MG/ML
5-250 INJECTION, SOLUTION INTRAVENOUS PRN
Status: DISCONTINUED | OUTPATIENT
Start: 2025-03-21 | End: 2025-03-22 | Stop reason: HOSPADM

## 2025-03-21 RX ADMIN — HEPARIN 500 UNITS: 100 SYRINGE at 08:59

## 2025-03-21 RX ADMIN — SODIUM CHLORIDE, PRESERVATIVE FREE 10 ML: 5 INJECTION INTRAVENOUS at 08:59

## 2025-03-21 RX ADMIN — DIPHENHYDRAMINE HYDROCHLORIDE 25 MG: 25 TABLET ORAL at 07:24

## 2025-03-21 RX ADMIN — HYDROCORTISONE SODIUM SUCCINATE 100 MG: 100 INJECTION, POWDER, FOR SOLUTION INTRAMUSCULAR; INTRAVENOUS at 07:24

## 2025-03-21 RX ADMIN — IMMUNE GLOBULIN (HUMAN) 25 G: 10 INJECTION INTRAVENOUS; SUBCUTANEOUS at 07:35

## 2025-03-21 RX ADMIN — ACETAMINOPHEN 650 MG: 325 TABLET ORAL at 07:24

## 2025-03-24 ENCOUNTER — HOSPITAL ENCOUNTER (OUTPATIENT)
Dept: INFUSION THERAPY | Age: 50
Setting detail: INFUSION SERIES
Discharge: HOME | End: 2025-03-24
Payer: MEDICARE

## 2025-03-24 VITALS
DIASTOLIC BLOOD PRESSURE: 78 MMHG | WEIGHT: 229 LBS | RESPIRATION RATE: 18 BRPM | TEMPERATURE: 97.7 F | HEART RATE: 71 BPM | BODY MASS INDEX: 38.11 KG/M2 | OXYGEN SATURATION: 100 % | SYSTOLIC BLOOD PRESSURE: 117 MMHG

## 2025-03-24 DIAGNOSIS — G35 MULTIPLE SCLEROSIS (HCC): Primary | ICD-10-CM

## 2025-03-24 PROCEDURE — 96366 THER/PROPH/DIAG IV INF ADDON: CPT

## 2025-03-24 PROCEDURE — 6360000002 HC RX W HCPCS: Performed by: PSYCHIATRY & NEUROLOGY

## 2025-03-24 PROCEDURE — 6370000000 HC RX 637 (ALT 250 FOR IP): Performed by: PSYCHIATRY & NEUROLOGY

## 2025-03-24 PROCEDURE — 2500000003 HC RX 250 WO HCPCS: Performed by: PSYCHIATRY & NEUROLOGY

## 2025-03-24 PROCEDURE — 96365 THER/PROPH/DIAG IV INF INIT: CPT

## 2025-03-24 RX ORDER — SODIUM CHLORIDE 9 MG/ML
5-250 INJECTION, SOLUTION INTRAVENOUS ONCE
Status: CANCELLED | OUTPATIENT
Start: 2025-03-25 | End: 2025-03-25

## 2025-03-24 RX ORDER — EPINEPHRINE 1 MG/ML
0.3 INJECTION, SOLUTION, CONCENTRATE INTRAVENOUS PRN
Status: CANCELLED | OUTPATIENT
Start: 2025-03-25

## 2025-03-24 RX ORDER — DIPHENHYDRAMINE HYDROCHLORIDE 50 MG/ML
50 INJECTION, SOLUTION INTRAMUSCULAR; INTRAVENOUS
Status: CANCELLED | OUTPATIENT
Start: 2025-03-25

## 2025-03-24 RX ORDER — SODIUM CHLORIDE 9 MG/ML
INJECTION, SOLUTION INTRAVENOUS CONTINUOUS
Status: CANCELLED | OUTPATIENT
Start: 2025-03-25

## 2025-03-24 RX ORDER — DIPHENHYDRAMINE HCL 25 MG
25 TABLET ORAL ONCE
Status: COMPLETED | OUTPATIENT
Start: 2025-03-24 | End: 2025-03-24

## 2025-03-24 RX ORDER — ACETAMINOPHEN 325 MG/1
650 TABLET ORAL ONCE
Status: COMPLETED | OUTPATIENT
Start: 2025-03-24 | End: 2025-03-24

## 2025-03-24 RX ORDER — HEPARIN 100 UNIT/ML
500 SYRINGE INTRAVENOUS PRN
Status: DISCONTINUED | OUTPATIENT
Start: 2025-03-24 | End: 2025-03-25 | Stop reason: HOSPADM

## 2025-03-24 RX ORDER — SODIUM CHLORIDE 0.9 % (FLUSH) 0.9 %
5-40 SYRINGE (ML) INJECTION PRN
Status: CANCELLED | OUTPATIENT
Start: 2025-03-25

## 2025-03-24 RX ORDER — ONDANSETRON 2 MG/ML
8 INJECTION INTRAMUSCULAR; INTRAVENOUS
Status: CANCELLED | OUTPATIENT
Start: 2025-03-25

## 2025-03-24 RX ORDER — SODIUM CHLORIDE 0.9 % (FLUSH) 0.9 %
5-40 SYRINGE (ML) INJECTION PRN
Status: DISCONTINUED | OUTPATIENT
Start: 2025-03-24 | End: 2025-03-25 | Stop reason: HOSPADM

## 2025-03-24 RX ORDER — DIPHENHYDRAMINE HCL 25 MG
25 TABLET ORAL ONCE
Status: CANCELLED | OUTPATIENT
Start: 2025-03-25 | End: 2025-03-25

## 2025-03-24 RX ORDER — HEPARIN 100 UNIT/ML
500 SYRINGE INTRAVENOUS PRN
Status: CANCELLED | OUTPATIENT
Start: 2025-03-25

## 2025-03-24 RX ORDER — ACETAMINOPHEN 325 MG/1
650 TABLET ORAL ONCE
Status: CANCELLED | OUTPATIENT
Start: 2025-03-25 | End: 2025-03-25

## 2025-03-24 RX ORDER — ACETAMINOPHEN 325 MG/1
650 TABLET ORAL
Status: CANCELLED | OUTPATIENT
Start: 2025-03-25

## 2025-03-24 RX ORDER — MEPERIDINE HYDROCHLORIDE 25 MG/ML
12.5 INJECTION INTRAMUSCULAR; INTRAVENOUS; SUBCUTANEOUS PRN
Status: CANCELLED | OUTPATIENT
Start: 2025-03-25

## 2025-03-24 RX ORDER — HYDROCORTISONE SODIUM SUCCINATE 100 MG/2ML
100 INJECTION INTRAMUSCULAR; INTRAVENOUS
Status: CANCELLED | OUTPATIENT
Start: 2025-03-25

## 2025-03-24 RX ORDER — ALBUTEROL SULFATE 90 UG/1
4 INHALANT RESPIRATORY (INHALATION) PRN
Status: CANCELLED | OUTPATIENT
Start: 2025-03-25

## 2025-03-24 RX ADMIN — DIPHENHYDRAMINE HYDROCHLORIDE 25 MG: 25 TABLET ORAL at 07:07

## 2025-03-24 RX ADMIN — IMMUNE GLOBULIN (HUMAN) 25 G: 10 INJECTION INTRAVENOUS; SUBCUTANEOUS at 07:20

## 2025-03-24 RX ADMIN — Medication 500 UNITS: at 08:49

## 2025-03-24 RX ADMIN — ACETAMINOPHEN 650 MG: 325 TABLET ORAL at 07:07

## 2025-03-24 RX ADMIN — SODIUM CHLORIDE, PRESERVATIVE FREE 20 ML: 5 INJECTION INTRAVENOUS at 08:49

## 2025-03-25 ENCOUNTER — HOSPITAL ENCOUNTER (OUTPATIENT)
Dept: INFUSION THERAPY | Age: 50
Setting detail: INFUSION SERIES
Discharge: HOME | End: 2025-03-25
Payer: MEDICARE

## 2025-03-25 VITALS
DIASTOLIC BLOOD PRESSURE: 79 MMHG | TEMPERATURE: 98 F | RESPIRATION RATE: 18 BRPM | OXYGEN SATURATION: 100 % | BODY MASS INDEX: 37.94 KG/M2 | SYSTOLIC BLOOD PRESSURE: 124 MMHG | HEART RATE: 75 BPM | WEIGHT: 228 LBS

## 2025-03-25 DIAGNOSIS — G35 MULTIPLE SCLEROSIS (HCC): Primary | ICD-10-CM

## 2025-03-25 PROCEDURE — 96365 THER/PROPH/DIAG IV INF INIT: CPT

## 2025-03-25 PROCEDURE — 6370000000 HC RX 637 (ALT 250 FOR IP): Performed by: PSYCHIATRY & NEUROLOGY

## 2025-03-25 PROCEDURE — 6360000002 HC RX W HCPCS: Performed by: PSYCHIATRY & NEUROLOGY

## 2025-03-25 PROCEDURE — 2500000003 HC RX 250 WO HCPCS: Performed by: PSYCHIATRY & NEUROLOGY

## 2025-03-25 RX ORDER — SODIUM CHLORIDE 0.9 % (FLUSH) 0.9 %
5-40 SYRINGE (ML) INJECTION PRN
Status: CANCELLED | OUTPATIENT
Start: 2025-03-26

## 2025-03-25 RX ORDER — EPINEPHRINE 1 MG/ML
0.3 INJECTION, SOLUTION, CONCENTRATE INTRAVENOUS PRN
Status: CANCELLED | OUTPATIENT
Start: 2025-03-26

## 2025-03-25 RX ORDER — DIPHENHYDRAMINE HCL 25 MG
25 TABLET ORAL ONCE
Status: COMPLETED | OUTPATIENT
Start: 2025-03-25 | End: 2025-03-25

## 2025-03-25 RX ORDER — HEPARIN 100 UNIT/ML
500 SYRINGE INTRAVENOUS PRN
Status: DISCONTINUED | OUTPATIENT
Start: 2025-03-25 | End: 2025-03-26 | Stop reason: HOSPADM

## 2025-03-25 RX ORDER — HEPARIN 100 UNIT/ML
500 SYRINGE INTRAVENOUS PRN
Status: CANCELLED | OUTPATIENT
Start: 2025-03-26

## 2025-03-25 RX ORDER — ACETAMINOPHEN 325 MG/1
650 TABLET ORAL ONCE
Status: CANCELLED | OUTPATIENT
Start: 2025-03-26 | End: 2025-03-26

## 2025-03-25 RX ORDER — ACETAMINOPHEN 325 MG/1
650 TABLET ORAL ONCE
Status: COMPLETED | OUTPATIENT
Start: 2025-03-25 | End: 2025-03-25

## 2025-03-25 RX ORDER — SODIUM CHLORIDE 9 MG/ML
INJECTION, SOLUTION INTRAVENOUS CONTINUOUS
Status: CANCELLED | OUTPATIENT
Start: 2025-03-26

## 2025-03-25 RX ORDER — DIPHENHYDRAMINE HCL 25 MG
25 TABLET ORAL ONCE
Status: CANCELLED | OUTPATIENT
Start: 2025-03-26 | End: 2025-03-26

## 2025-03-25 RX ORDER — SODIUM CHLORIDE 0.9 % (FLUSH) 0.9 %
5-40 SYRINGE (ML) INJECTION PRN
Status: DISCONTINUED | OUTPATIENT
Start: 2025-03-25 | End: 2025-03-26 | Stop reason: HOSPADM

## 2025-03-25 RX ORDER — ACETAMINOPHEN 325 MG/1
650 TABLET ORAL
Status: CANCELLED | OUTPATIENT
Start: 2025-03-26

## 2025-03-25 RX ORDER — ALBUTEROL SULFATE 90 UG/1
4 INHALANT RESPIRATORY (INHALATION) PRN
Status: CANCELLED | OUTPATIENT
Start: 2025-03-26

## 2025-03-25 RX ORDER — HYDROCORTISONE SODIUM SUCCINATE 100 MG/2ML
100 INJECTION INTRAMUSCULAR; INTRAVENOUS
Status: CANCELLED | OUTPATIENT
Start: 2025-03-26

## 2025-03-25 RX ORDER — DIPHENHYDRAMINE HYDROCHLORIDE 50 MG/ML
50 INJECTION, SOLUTION INTRAMUSCULAR; INTRAVENOUS
Status: CANCELLED | OUTPATIENT
Start: 2025-03-26

## 2025-03-25 RX ORDER — MEPERIDINE HYDROCHLORIDE 25 MG/ML
12.5 INJECTION INTRAMUSCULAR; INTRAVENOUS; SUBCUTANEOUS PRN
Status: CANCELLED | OUTPATIENT
Start: 2025-03-26

## 2025-03-25 RX ORDER — ONDANSETRON 2 MG/ML
8 INJECTION INTRAMUSCULAR; INTRAVENOUS
Status: CANCELLED | OUTPATIENT
Start: 2025-03-26

## 2025-03-25 RX ORDER — SODIUM CHLORIDE 9 MG/ML
5-250 INJECTION, SOLUTION INTRAVENOUS ONCE
Status: CANCELLED | OUTPATIENT
Start: 2025-03-26 | End: 2025-03-26

## 2025-03-25 RX ADMIN — SODIUM CHLORIDE, PRESERVATIVE FREE 20 ML: 5 INJECTION INTRAVENOUS at 08:34

## 2025-03-25 RX ADMIN — ACETAMINOPHEN 650 MG: 325 TABLET ORAL at 07:06

## 2025-03-25 RX ADMIN — DIPHENHYDRAMINE HYDROCHLORIDE 25 MG: 25 TABLET ORAL at 07:06

## 2025-03-25 RX ADMIN — IMMUNE GLOBULIN (HUMAN) 25 G: 10 INJECTION INTRAVENOUS; SUBCUTANEOUS at 07:14

## 2025-03-25 RX ADMIN — HEPARIN 500 UNITS: 100 SYRINGE at 08:34

## 2025-03-25 NOTE — PROGRESS NOTES
Pt arrived to [facility] with port accessed from yesterday's tx. Brisk blood return noted. Pt received Tylenol and Benadryl for pre-meds, followed by 25g Gamunex-C. Tolerated well.    Electronically signed by Shabnam Huddleston RN on 3/25/2025 at 8:48 AM

## 2025-03-26 ENCOUNTER — HOSPITAL ENCOUNTER (OUTPATIENT)
Dept: INFUSION THERAPY | Age: 50
Setting detail: INFUSION SERIES
Discharge: HOME | End: 2025-03-26
Payer: MEDICARE

## 2025-03-26 VITALS
OXYGEN SATURATION: 100 % | TEMPERATURE: 97.5 F | HEART RATE: 76 BPM | DIASTOLIC BLOOD PRESSURE: 83 MMHG | RESPIRATION RATE: 16 BRPM | SYSTOLIC BLOOD PRESSURE: 111 MMHG

## 2025-03-26 DIAGNOSIS — G35 MULTIPLE SCLEROSIS (HCC): Primary | ICD-10-CM

## 2025-03-26 PROCEDURE — 6370000000 HC RX 637 (ALT 250 FOR IP): Performed by: PSYCHIATRY & NEUROLOGY

## 2025-03-26 PROCEDURE — 96365 THER/PROPH/DIAG IV INF INIT: CPT

## 2025-03-26 PROCEDURE — 2500000003 HC RX 250 WO HCPCS: Performed by: PSYCHIATRY & NEUROLOGY

## 2025-03-26 PROCEDURE — 6360000002 HC RX W HCPCS: Performed by: PSYCHIATRY & NEUROLOGY

## 2025-03-26 RX ORDER — SODIUM CHLORIDE 0.9 % (FLUSH) 0.9 %
5-40 SYRINGE (ML) INJECTION PRN
Status: DISCONTINUED | OUTPATIENT
Start: 2025-03-26 | End: 2025-03-27 | Stop reason: HOSPADM

## 2025-03-26 RX ORDER — DIPHENHYDRAMINE HCL 25 MG
25 TABLET ORAL ONCE
Status: CANCELLED | OUTPATIENT
Start: 2025-03-27 | End: 2025-03-27

## 2025-03-26 RX ORDER — EPINEPHRINE 1 MG/ML
0.3 INJECTION, SOLUTION, CONCENTRATE INTRAVENOUS PRN
Status: CANCELLED | OUTPATIENT
Start: 2025-03-27

## 2025-03-26 RX ORDER — ACETAMINOPHEN 325 MG/1
650 TABLET ORAL
Status: CANCELLED | OUTPATIENT
Start: 2025-03-27

## 2025-03-26 RX ORDER — DIPHENHYDRAMINE HCL 25 MG
25 TABLET ORAL ONCE
Status: COMPLETED | OUTPATIENT
Start: 2025-03-26 | End: 2025-03-26

## 2025-03-26 RX ORDER — HEPARIN 100 UNIT/ML
500 SYRINGE INTRAVENOUS PRN
Status: DISCONTINUED | OUTPATIENT
Start: 2025-03-26 | End: 2025-03-27 | Stop reason: HOSPADM

## 2025-03-26 RX ORDER — DIPHENHYDRAMINE HYDROCHLORIDE 50 MG/ML
50 INJECTION, SOLUTION INTRAMUSCULAR; INTRAVENOUS
Status: CANCELLED | OUTPATIENT
Start: 2025-03-27

## 2025-03-26 RX ORDER — ONDANSETRON 2 MG/ML
8 INJECTION INTRAMUSCULAR; INTRAVENOUS
Status: CANCELLED | OUTPATIENT
Start: 2025-03-27

## 2025-03-26 RX ORDER — SODIUM CHLORIDE 0.9 % (FLUSH) 0.9 %
5-40 SYRINGE (ML) INJECTION PRN
Status: CANCELLED | OUTPATIENT
Start: 2025-03-27

## 2025-03-26 RX ORDER — ALBUTEROL SULFATE 90 UG/1
4 INHALANT RESPIRATORY (INHALATION) PRN
Status: CANCELLED | OUTPATIENT
Start: 2025-03-27

## 2025-03-26 RX ORDER — ACETAMINOPHEN 325 MG/1
650 TABLET ORAL ONCE
Status: CANCELLED | OUTPATIENT
Start: 2025-03-27 | End: 2025-03-27

## 2025-03-26 RX ORDER — SODIUM CHLORIDE 9 MG/ML
5-250 INJECTION, SOLUTION INTRAVENOUS ONCE
Status: CANCELLED | OUTPATIENT
Start: 2025-03-27 | End: 2025-03-27

## 2025-03-26 RX ORDER — SODIUM CHLORIDE 9 MG/ML
INJECTION, SOLUTION INTRAVENOUS CONTINUOUS
Status: CANCELLED | OUTPATIENT
Start: 2025-03-27

## 2025-03-26 RX ORDER — HEPARIN 100 UNIT/ML
500 SYRINGE INTRAVENOUS PRN
Status: CANCELLED | OUTPATIENT
Start: 2025-03-27

## 2025-03-26 RX ORDER — MEPERIDINE HYDROCHLORIDE 25 MG/ML
12.5 INJECTION INTRAMUSCULAR; INTRAVENOUS; SUBCUTANEOUS PRN
Status: CANCELLED | OUTPATIENT
Start: 2025-03-27

## 2025-03-26 RX ORDER — ACETAMINOPHEN 325 MG/1
650 TABLET ORAL ONCE
Status: COMPLETED | OUTPATIENT
Start: 2025-03-26 | End: 2025-03-26

## 2025-03-26 RX ORDER — HYDROCORTISONE SODIUM SUCCINATE 100 MG/2ML
100 INJECTION INTRAMUSCULAR; INTRAVENOUS
Status: CANCELLED | OUTPATIENT
Start: 2025-03-27

## 2025-03-26 RX ADMIN — HEPARIN 500 UNITS: 100 SYRINGE at 08:46

## 2025-03-26 RX ADMIN — ACETAMINOPHEN 650 MG: 325 TABLET ORAL at 07:15

## 2025-03-26 RX ADMIN — IMMUNE GLOBULIN (HUMAN) 25 G: 10 INJECTION INTRAVENOUS; SUBCUTANEOUS at 07:29

## 2025-03-26 RX ADMIN — SODIUM CHLORIDE, PRESERVATIVE FREE 10 ML: 5 INJECTION INTRAVENOUS at 08:46

## 2025-03-26 RX ADMIN — DIPHENHYDRAMINE HYDROCHLORIDE 25 MG: 25 TABLET ORAL at 07:15

## 2025-03-26 NOTE — PROGRESS NOTES
Pt arrived with port accessed with good blood return. Tylenol and benadryl given as ordered for premeds. Gamunex C 25 g given. Pt tolerated well. Pt remaining accessed for infusion tomorrow.    n/a

## 2025-03-27 ENCOUNTER — HOSPITAL ENCOUNTER (OUTPATIENT)
Dept: INFUSION THERAPY | Age: 50
Setting detail: INFUSION SERIES
Discharge: HOME | End: 2025-03-27
Payer: MEDICARE

## 2025-03-27 VITALS
BODY MASS INDEX: 38.27 KG/M2 | HEART RATE: 84 BPM | SYSTOLIC BLOOD PRESSURE: 145 MMHG | DIASTOLIC BLOOD PRESSURE: 90 MMHG | RESPIRATION RATE: 18 BRPM | WEIGHT: 230 LBS | TEMPERATURE: 98 F | OXYGEN SATURATION: 100 %

## 2025-03-27 DIAGNOSIS — G35 MULTIPLE SCLEROSIS (HCC): Primary | ICD-10-CM

## 2025-03-27 PROCEDURE — 6370000000 HC RX 637 (ALT 250 FOR IP): Performed by: PSYCHIATRY & NEUROLOGY

## 2025-03-27 PROCEDURE — 6360000002 HC RX W HCPCS: Performed by: PSYCHIATRY & NEUROLOGY

## 2025-03-27 PROCEDURE — 96365 THER/PROPH/DIAG IV INF INIT: CPT

## 2025-03-27 PROCEDURE — 2500000003 HC RX 250 WO HCPCS: Performed by: PSYCHIATRY & NEUROLOGY

## 2025-03-27 RX ORDER — DIPHENHYDRAMINE HCL 25 MG
25 TABLET ORAL ONCE
Status: CANCELLED | OUTPATIENT
Start: 2025-03-28 | End: 2025-03-28

## 2025-03-27 RX ORDER — SODIUM CHLORIDE 9 MG/ML
5-250 INJECTION, SOLUTION INTRAVENOUS ONCE
OUTPATIENT
Start: 2025-03-28 | End: 2025-03-28

## 2025-03-27 RX ORDER — ACETAMINOPHEN 325 MG/1
650 TABLET ORAL ONCE
Status: CANCELLED | OUTPATIENT
Start: 2025-03-28 | End: 2025-03-28

## 2025-03-27 RX ORDER — HEPARIN 100 UNIT/ML
500 SYRINGE INTRAVENOUS PRN
Status: DISCONTINUED | OUTPATIENT
Start: 2025-03-27 | End: 2025-03-28 | Stop reason: HOSPADM

## 2025-03-27 RX ORDER — SODIUM CHLORIDE 0.9 % (FLUSH) 0.9 %
5-40 SYRINGE (ML) INJECTION PRN
Status: DISCONTINUED | OUTPATIENT
Start: 2025-03-27 | End: 2025-03-28 | Stop reason: HOSPADM

## 2025-03-27 RX ORDER — HYDROCORTISONE SODIUM SUCCINATE 100 MG/2ML
100 INJECTION INTRAMUSCULAR; INTRAVENOUS
OUTPATIENT
Start: 2025-03-28

## 2025-03-27 RX ORDER — SODIUM CHLORIDE 9 MG/ML
INJECTION, SOLUTION INTRAVENOUS CONTINUOUS
OUTPATIENT
Start: 2025-03-28

## 2025-03-27 RX ORDER — ACETAMINOPHEN 325 MG/1
650 TABLET ORAL ONCE
Status: COMPLETED | OUTPATIENT
Start: 2025-03-27 | End: 2025-03-27

## 2025-03-27 RX ORDER — MEPERIDINE HYDROCHLORIDE 25 MG/ML
12.5 INJECTION INTRAMUSCULAR; INTRAVENOUS; SUBCUTANEOUS PRN
OUTPATIENT
Start: 2025-03-28

## 2025-03-27 RX ORDER — ACETAMINOPHEN 325 MG/1
650 TABLET ORAL
OUTPATIENT
Start: 2025-03-28

## 2025-03-27 RX ORDER — HEPARIN 100 UNIT/ML
500 SYRINGE INTRAVENOUS PRN
Status: CANCELLED | OUTPATIENT
Start: 2025-03-28

## 2025-03-27 RX ORDER — DIPHENHYDRAMINE HYDROCHLORIDE 50 MG/ML
50 INJECTION, SOLUTION INTRAMUSCULAR; INTRAVENOUS
OUTPATIENT
Start: 2025-03-28

## 2025-03-27 RX ORDER — EPINEPHRINE 1 MG/ML
0.3 INJECTION, SOLUTION, CONCENTRATE INTRAVENOUS PRN
OUTPATIENT
Start: 2025-03-28

## 2025-03-27 RX ORDER — ALBUTEROL SULFATE 90 UG/1
4 INHALANT RESPIRATORY (INHALATION) PRN
OUTPATIENT
Start: 2025-03-28

## 2025-03-27 RX ORDER — SODIUM CHLORIDE 0.9 % (FLUSH) 0.9 %
5-40 SYRINGE (ML) INJECTION PRN
Status: CANCELLED | OUTPATIENT
Start: 2025-03-28

## 2025-03-27 RX ORDER — ONDANSETRON 2 MG/ML
8 INJECTION INTRAMUSCULAR; INTRAVENOUS
OUTPATIENT
Start: 2025-03-28

## 2025-03-27 RX ORDER — DIPHENHYDRAMINE HCL 25 MG
25 TABLET ORAL ONCE
Status: COMPLETED | OUTPATIENT
Start: 2025-03-27 | End: 2025-03-27

## 2025-03-27 RX ADMIN — IMMUNE GLOBULIN (HUMAN) 25 G: 10 INJECTION INTRAVENOUS; SUBCUTANEOUS at 07:46

## 2025-03-27 RX ADMIN — Medication 10 ML: at 09:05

## 2025-03-27 RX ADMIN — Medication 10 ML: at 07:45

## 2025-03-27 RX ADMIN — DIPHENHYDRAMINE HYDROCHLORIDE 25 MG: 25 TABLET ORAL at 07:20

## 2025-03-27 RX ADMIN — ACETAMINOPHEN 650 MG: 325 TABLET ORAL at 07:20

## 2025-03-27 RX ADMIN — HEPARIN 500 UNITS: 100 SYRINGE at 09:05

## 2025-03-27 NOTE — PROGRESS NOTES
Pt arrived with port accessed with good blood return. Tylenol and benadryl given as ordered for premeds. Gamunex C 25 g given. Pt tolerated well. Pt remaining accessed for infusion tomorrow.         
no

## 2025-03-28 ENCOUNTER — HOSPITAL ENCOUNTER (OUTPATIENT)
Dept: INFUSION THERAPY | Age: 50
Setting detail: INFUSION SERIES
Discharge: HOME | End: 2025-03-28
Payer: MEDICARE

## 2025-03-28 VITALS
HEART RATE: 71 BPM | TEMPERATURE: 97.2 F | DIASTOLIC BLOOD PRESSURE: 75 MMHG | SYSTOLIC BLOOD PRESSURE: 123 MMHG | OXYGEN SATURATION: 100 % | RESPIRATION RATE: 17 BRPM

## 2025-03-28 DIAGNOSIS — G35 MULTIPLE SCLEROSIS (HCC): Primary | ICD-10-CM

## 2025-03-28 PROCEDURE — 96365 THER/PROPH/DIAG IV INF INIT: CPT

## 2025-03-28 PROCEDURE — 2500000003 HC RX 250 WO HCPCS: Performed by: PSYCHIATRY & NEUROLOGY

## 2025-03-28 PROCEDURE — 96366 THER/PROPH/DIAG IV INF ADDON: CPT

## 2025-03-28 PROCEDURE — 6370000000 HC RX 637 (ALT 250 FOR IP): Performed by: PSYCHIATRY & NEUROLOGY

## 2025-03-28 PROCEDURE — 6360000002 HC RX W HCPCS: Performed by: PSYCHIATRY & NEUROLOGY

## 2025-03-28 RX ORDER — SODIUM CHLORIDE 9 MG/ML
5-250 INJECTION, SOLUTION INTRAVENOUS ONCE
OUTPATIENT
Start: 2025-04-21 | End: 2025-04-21

## 2025-03-28 RX ORDER — MEPERIDINE HYDROCHLORIDE 25 MG/ML
12.5 INJECTION INTRAMUSCULAR; INTRAVENOUS; SUBCUTANEOUS PRN
OUTPATIENT
Start: 2025-04-21

## 2025-03-28 RX ORDER — DIPHENHYDRAMINE HCL 25 MG
25 TABLET ORAL ONCE
OUTPATIENT
Start: 2025-04-21 | End: 2025-04-21

## 2025-03-28 RX ORDER — EPINEPHRINE 1 MG/ML
0.3 INJECTION, SOLUTION, CONCENTRATE INTRAVENOUS PRN
OUTPATIENT
Start: 2025-04-21

## 2025-03-28 RX ORDER — ACETAMINOPHEN 325 MG/1
650 TABLET ORAL
OUTPATIENT
Start: 2025-04-21

## 2025-03-28 RX ORDER — ACETAMINOPHEN 325 MG/1
650 TABLET ORAL ONCE
Status: COMPLETED | OUTPATIENT
Start: 2025-03-28 | End: 2025-03-28

## 2025-03-28 RX ORDER — HEPARIN 100 UNIT/ML
500 SYRINGE INTRAVENOUS PRN
OUTPATIENT
Start: 2025-04-21

## 2025-03-28 RX ORDER — ALBUTEROL SULFATE 90 UG/1
4 INHALANT RESPIRATORY (INHALATION) PRN
OUTPATIENT
Start: 2025-04-21

## 2025-03-28 RX ORDER — SODIUM CHLORIDE 0.9 % (FLUSH) 0.9 %
5-40 SYRINGE (ML) INJECTION PRN
OUTPATIENT
Start: 2025-04-21

## 2025-03-28 RX ORDER — ACETAMINOPHEN 325 MG/1
650 TABLET ORAL ONCE
OUTPATIENT
Start: 2025-04-21 | End: 2025-04-21

## 2025-03-28 RX ORDER — HYDROCORTISONE SODIUM SUCCINATE 100 MG/2ML
100 INJECTION INTRAMUSCULAR; INTRAVENOUS
OUTPATIENT
Start: 2025-04-21

## 2025-03-28 RX ORDER — DIPHENHYDRAMINE HCL 25 MG
25 TABLET ORAL ONCE
Status: COMPLETED | OUTPATIENT
Start: 2025-03-28 | End: 2025-03-28

## 2025-03-28 RX ORDER — DIPHENHYDRAMINE HYDROCHLORIDE 50 MG/ML
50 INJECTION, SOLUTION INTRAMUSCULAR; INTRAVENOUS
OUTPATIENT
Start: 2025-04-21

## 2025-03-28 RX ORDER — ONDANSETRON 2 MG/ML
8 INJECTION INTRAMUSCULAR; INTRAVENOUS
OUTPATIENT
Start: 2025-04-21

## 2025-03-28 RX ORDER — SODIUM CHLORIDE 0.9 % (FLUSH) 0.9 %
5-40 SYRINGE (ML) INJECTION PRN
Status: DISCONTINUED | OUTPATIENT
Start: 2025-03-28 | End: 2025-03-29 | Stop reason: HOSPADM

## 2025-03-28 RX ORDER — SODIUM CHLORIDE 9 MG/ML
INJECTION, SOLUTION INTRAVENOUS CONTINUOUS
OUTPATIENT
Start: 2025-04-21

## 2025-03-28 RX ORDER — HEPARIN 100 UNIT/ML
500 SYRINGE INTRAVENOUS PRN
Status: DISCONTINUED | OUTPATIENT
Start: 2025-03-28 | End: 2025-03-29 | Stop reason: HOSPADM

## 2025-03-28 RX ADMIN — ACETAMINOPHEN 650 MG: 325 TABLET ORAL at 07:29

## 2025-03-28 RX ADMIN — DIPHENHYDRAMINE HYDROCHLORIDE 25 MG: 25 TABLET ORAL at 07:29

## 2025-03-28 RX ADMIN — SODIUM CHLORIDE, PRESERVATIVE FREE 20 ML: 5 INJECTION INTRAVENOUS at 09:20

## 2025-03-28 RX ADMIN — IMMUNE GLOBULIN (HUMAN) 25 G: 10 INJECTION INTRAVENOUS; SUBCUTANEOUS at 07:54

## 2025-03-28 RX ADMIN — HEPARIN 500 UNITS: 100 SYRINGE at 09:20

## 2025-03-28 NOTE — PROGRESS NOTES
Pt arrived with port accessed, brisk blood return. Tylenol and benadryl given as ordered for premeds. Gamunex C 25 g given. Pt tolerated well.

## 2025-04-04 ENCOUNTER — HOSPITAL ENCOUNTER (OUTPATIENT)
Dept: INFUSION THERAPY | Age: 50
Setting detail: INFUSION SERIES
Discharge: HOME | End: 2025-04-04
Payer: MEDICARE

## 2025-04-04 VITALS
TEMPERATURE: 98 F | RESPIRATION RATE: 18 BRPM | OXYGEN SATURATION: 100 % | HEART RATE: 75 BPM | SYSTOLIC BLOOD PRESSURE: 135 MMHG | BODY MASS INDEX: 39.61 KG/M2 | DIASTOLIC BLOOD PRESSURE: 88 MMHG | WEIGHT: 238 LBS

## 2025-04-04 DIAGNOSIS — M32.9 SYSTEMIC LUPUS ERYTHEMATOSUS, UNSPECIFIED SLE TYPE, UNSPECIFIED ORGAN INVOLVEMENT STATUS (HCC): ICD-10-CM

## 2025-04-04 DIAGNOSIS — G35 EXACERBATION OF MULTIPLE SCLEROSIS (HCC): Primary | ICD-10-CM

## 2025-04-04 PROCEDURE — 96365 THER/PROPH/DIAG IV INF INIT: CPT

## 2025-04-04 PROCEDURE — 6370000000 HC RX 637 (ALT 250 FOR IP): Performed by: PSYCHIATRY & NEUROLOGY

## 2025-04-04 PROCEDURE — 96375 TX/PRO/DX INJ NEW DRUG ADDON: CPT

## 2025-04-04 PROCEDURE — 6360000002 HC RX W HCPCS: Performed by: PSYCHIATRY & NEUROLOGY

## 2025-04-04 PROCEDURE — 2500000003 HC RX 250 WO HCPCS: Performed by: PSYCHIATRY & NEUROLOGY

## 2025-04-04 RX ORDER — ACETAMINOPHEN 325 MG/1
650 TABLET ORAL ONCE
Status: COMPLETED | OUTPATIENT
Start: 2025-04-04 | End: 2025-04-04

## 2025-04-04 RX ORDER — ONDANSETRON 2 MG/ML
8 INJECTION INTRAMUSCULAR; INTRAVENOUS
OUTPATIENT
Start: 2025-04-11

## 2025-04-04 RX ORDER — DIPHENHYDRAMINE HCL 25 MG
25 TABLET ORAL ONCE
OUTPATIENT
Start: 2025-04-11 | End: 2025-04-11

## 2025-04-04 RX ORDER — HYDROCORTISONE SODIUM SUCCINATE 100 MG/2ML
100 INJECTION INTRAMUSCULAR; INTRAVENOUS ONCE
Start: 2025-04-11 | End: 2025-04-11

## 2025-04-04 RX ORDER — SODIUM CHLORIDE 9 MG/ML
5-250 INJECTION, SOLUTION INTRAVENOUS PRN
Status: DISCONTINUED | OUTPATIENT
Start: 2025-04-04 | End: 2025-04-05 | Stop reason: HOSPADM

## 2025-04-04 RX ORDER — DIPHENHYDRAMINE HCL 25 MG
25 TABLET ORAL ONCE
Status: COMPLETED | OUTPATIENT
Start: 2025-04-04 | End: 2025-04-04

## 2025-04-04 RX ORDER — SODIUM CHLORIDE 9 MG/ML
5-250 INJECTION, SOLUTION INTRAVENOUS PRN
OUTPATIENT
Start: 2025-04-11

## 2025-04-04 RX ORDER — ALBUTEROL SULFATE 90 UG/1
4 INHALANT RESPIRATORY (INHALATION) PRN
OUTPATIENT
Start: 2025-04-11

## 2025-04-04 RX ORDER — HEPARIN 100 UNIT/ML
500 SYRINGE INTRAVENOUS PRN
Status: DISCONTINUED | OUTPATIENT
Start: 2025-04-04 | End: 2025-04-05 | Stop reason: HOSPADM

## 2025-04-04 RX ORDER — ACETAMINOPHEN 325 MG/1
650 TABLET ORAL ONCE
OUTPATIENT
Start: 2025-04-11 | End: 2025-04-11

## 2025-04-04 RX ORDER — SODIUM CHLORIDE 0.9 % (FLUSH) 0.9 %
5-40 SYRINGE (ML) INJECTION PRN
OUTPATIENT
Start: 2025-04-11

## 2025-04-04 RX ORDER — SODIUM CHLORIDE 9 MG/ML
INJECTION, SOLUTION INTRAVENOUS CONTINUOUS
OUTPATIENT
Start: 2025-04-11

## 2025-04-04 RX ORDER — DIPHENHYDRAMINE HYDROCHLORIDE 50 MG/ML
50 INJECTION, SOLUTION INTRAMUSCULAR; INTRAVENOUS
OUTPATIENT
Start: 2025-04-11

## 2025-04-04 RX ORDER — MEPERIDINE HYDROCHLORIDE 25 MG/ML
12.5 INJECTION INTRAMUSCULAR; INTRAVENOUS; SUBCUTANEOUS PRN
OUTPATIENT
Start: 2025-04-11

## 2025-04-04 RX ORDER — SODIUM CHLORIDE 0.9 % (FLUSH) 0.9 %
5-40 SYRINGE (ML) INJECTION PRN
Status: DISCONTINUED | OUTPATIENT
Start: 2025-04-04 | End: 2025-04-05 | Stop reason: HOSPADM

## 2025-04-04 RX ORDER — ACETAMINOPHEN 325 MG/1
650 TABLET ORAL
OUTPATIENT
Start: 2025-04-11

## 2025-04-04 RX ORDER — EPINEPHRINE 1 MG/ML
0.3 INJECTION, SOLUTION, CONCENTRATE INTRAVENOUS PRN
OUTPATIENT
Start: 2025-04-11

## 2025-04-04 RX ORDER — HYDROCORTISONE SODIUM SUCCINATE 100 MG/2ML
100 INJECTION INTRAMUSCULAR; INTRAVENOUS
OUTPATIENT
Start: 2025-04-11

## 2025-04-04 RX ORDER — HYDROCORTISONE SODIUM SUCCINATE 100 MG/2ML
100 INJECTION INTRAMUSCULAR; INTRAVENOUS ONCE
Status: COMPLETED | OUTPATIENT
Start: 2025-04-04 | End: 2025-04-04

## 2025-04-04 RX ORDER — HEPARIN 100 UNIT/ML
500 SYRINGE INTRAVENOUS PRN
OUTPATIENT
Start: 2025-04-11

## 2025-04-04 RX ADMIN — IMMUNE GLOBULIN (HUMAN) 25 G: 10 INJECTION INTRAVENOUS; SUBCUTANEOUS at 07:39

## 2025-04-04 RX ADMIN — Medication 10 ML: at 07:35

## 2025-04-04 RX ADMIN — Medication 500 UNITS: at 08:54

## 2025-04-04 RX ADMIN — Medication 20 ML: at 08:54

## 2025-04-04 RX ADMIN — DIPHENHYDRAMINE HYDROCHLORIDE 25 MG: 25 TABLET ORAL at 07:32

## 2025-04-04 RX ADMIN — HYDROCORTISONE SODIUM SUCCINATE 100 MG: 100 INJECTION, POWDER, FOR SOLUTION INTRAMUSCULAR; INTRAVENOUS at 07:32

## 2025-04-04 RX ADMIN — ACETAMINOPHEN 650 MG: 325 TABLET ORAL at 07:31

## 2025-04-04 NOTE — PROGRESS NOTES
[facility] accessed using sterile technique and brisk blood return noted. Pt received Tylenol, Benadryl, and Solu-cortef for pre-meds, followed by 25g Gamunex-C.     Electronically signed by Shabnam Huddleston RN on 4/4/2025 at 9:31 AM

## 2025-04-11 ENCOUNTER — HOSPITAL ENCOUNTER (OUTPATIENT)
Dept: INFUSION THERAPY | Age: 50
Setting detail: INFUSION SERIES
Discharge: HOME | End: 2025-04-11
Payer: MEDICARE

## 2025-04-11 VITALS
HEART RATE: 74 BPM | SYSTOLIC BLOOD PRESSURE: 141 MMHG | RESPIRATION RATE: 18 BRPM | WEIGHT: 228 LBS | DIASTOLIC BLOOD PRESSURE: 98 MMHG | BODY MASS INDEX: 37.94 KG/M2 | TEMPERATURE: 97.9 F | OXYGEN SATURATION: 100 %

## 2025-04-11 DIAGNOSIS — G35 EXACERBATION OF MULTIPLE SCLEROSIS (HCC): Primary | ICD-10-CM

## 2025-04-11 DIAGNOSIS — M32.9 SYSTEMIC LUPUS ERYTHEMATOSUS, UNSPECIFIED SLE TYPE, UNSPECIFIED ORGAN INVOLVEMENT STATUS (HCC): ICD-10-CM

## 2025-04-11 PROCEDURE — 6370000000 HC RX 637 (ALT 250 FOR IP): Performed by: PSYCHIATRY & NEUROLOGY

## 2025-04-11 PROCEDURE — 2500000003 HC RX 250 WO HCPCS: Performed by: PSYCHIATRY & NEUROLOGY

## 2025-04-11 PROCEDURE — 96365 THER/PROPH/DIAG IV INF INIT: CPT

## 2025-04-11 PROCEDURE — 6360000002 HC RX W HCPCS: Performed by: PSYCHIATRY & NEUROLOGY

## 2025-04-11 PROCEDURE — 96375 TX/PRO/DX INJ NEW DRUG ADDON: CPT

## 2025-04-11 RX ORDER — ONDANSETRON 2 MG/ML
8 INJECTION INTRAMUSCULAR; INTRAVENOUS
OUTPATIENT
Start: 2025-04-18

## 2025-04-11 RX ORDER — EPINEPHRINE 1 MG/ML
0.3 INJECTION, SOLUTION, CONCENTRATE INTRAVENOUS PRN
OUTPATIENT
Start: 2025-04-18

## 2025-04-11 RX ORDER — HEPARIN 100 UNIT/ML
500 SYRINGE INTRAVENOUS PRN
Status: DISCONTINUED | OUTPATIENT
Start: 2025-04-11 | End: 2025-04-12 | Stop reason: HOSPADM

## 2025-04-11 RX ORDER — SODIUM CHLORIDE 9 MG/ML
INJECTION, SOLUTION INTRAVENOUS CONTINUOUS
OUTPATIENT
Start: 2025-04-18

## 2025-04-11 RX ORDER — HEPARIN 100 UNIT/ML
500 SYRINGE INTRAVENOUS PRN
Status: CANCELLED | OUTPATIENT
Start: 2025-04-18

## 2025-04-11 RX ORDER — HYDROCORTISONE SODIUM SUCCINATE 100 MG/2ML
100 INJECTION INTRAMUSCULAR; INTRAVENOUS
OUTPATIENT
Start: 2025-04-18

## 2025-04-11 RX ORDER — MEPERIDINE HYDROCHLORIDE 25 MG/ML
12.5 INJECTION INTRAMUSCULAR; INTRAVENOUS; SUBCUTANEOUS PRN
OUTPATIENT
Start: 2025-04-18

## 2025-04-11 RX ORDER — HYDROCORTISONE SODIUM SUCCINATE 100 MG/2ML
100 INJECTION INTRAMUSCULAR; INTRAVENOUS ONCE
Status: CANCELLED
Start: 2025-04-18 | End: 2025-04-18

## 2025-04-11 RX ORDER — ACETAMINOPHEN 325 MG/1
650 TABLET ORAL ONCE
Status: COMPLETED | OUTPATIENT
Start: 2025-04-11 | End: 2025-04-11

## 2025-04-11 RX ORDER — DIPHENHYDRAMINE HCL 25 MG
25 TABLET ORAL ONCE
Status: CANCELLED | OUTPATIENT
Start: 2025-04-18 | End: 2025-04-18

## 2025-04-11 RX ORDER — ALBUTEROL SULFATE 90 UG/1
4 INHALANT RESPIRATORY (INHALATION) PRN
OUTPATIENT
Start: 2025-04-18

## 2025-04-11 RX ORDER — SODIUM CHLORIDE 0.9 % (FLUSH) 0.9 %
5-40 SYRINGE (ML) INJECTION PRN
Status: CANCELLED | OUTPATIENT
Start: 2025-04-18

## 2025-04-11 RX ORDER — DIPHENHYDRAMINE HCL 25 MG
25 TABLET ORAL ONCE
Status: COMPLETED | OUTPATIENT
Start: 2025-04-11 | End: 2025-04-11

## 2025-04-11 RX ORDER — SODIUM CHLORIDE 9 MG/ML
5-250 INJECTION, SOLUTION INTRAVENOUS PRN
Status: CANCELLED | OUTPATIENT
Start: 2025-04-18

## 2025-04-11 RX ORDER — SODIUM CHLORIDE 9 MG/ML
5-250 INJECTION, SOLUTION INTRAVENOUS PRN
OUTPATIENT
Start: 2025-04-18

## 2025-04-11 RX ORDER — DIPHENHYDRAMINE HYDROCHLORIDE 50 MG/ML
50 INJECTION, SOLUTION INTRAMUSCULAR; INTRAVENOUS
OUTPATIENT
Start: 2025-04-18

## 2025-04-11 RX ORDER — ACETAMINOPHEN 325 MG/1
650 TABLET ORAL
OUTPATIENT
Start: 2025-04-18

## 2025-04-11 RX ORDER — HYDROCORTISONE SODIUM SUCCINATE 100 MG/2ML
100 INJECTION INTRAMUSCULAR; INTRAVENOUS ONCE
Status: COMPLETED | OUTPATIENT
Start: 2025-04-11 | End: 2025-04-11

## 2025-04-11 RX ORDER — SODIUM CHLORIDE 0.9 % (FLUSH) 0.9 %
5-40 SYRINGE (ML) INJECTION PRN
Status: DISCONTINUED | OUTPATIENT
Start: 2025-04-11 | End: 2025-04-12 | Stop reason: HOSPADM

## 2025-04-11 RX ORDER — ACETAMINOPHEN 325 MG/1
650 TABLET ORAL ONCE
Status: CANCELLED | OUTPATIENT
Start: 2025-04-18 | End: 2025-04-18

## 2025-04-11 RX ADMIN — HEPARIN 500 UNITS: 100 SYRINGE at 09:35

## 2025-04-11 RX ADMIN — HYDROCORTISONE SODIUM SUCCINATE 100 MG: 100 INJECTION, POWDER, FOR SOLUTION INTRAMUSCULAR; INTRAVENOUS at 08:00

## 2025-04-11 RX ADMIN — DIPHENHYDRAMINE HYDROCHLORIDE 25 MG: 25 TABLET ORAL at 08:00

## 2025-04-11 RX ADMIN — ACETAMINOPHEN 650 MG: 325 TABLET ORAL at 07:59

## 2025-04-11 RX ADMIN — SODIUM CHLORIDE, PRESERVATIVE FREE 20 ML: 5 INJECTION INTRAVENOUS at 09:36

## 2025-04-11 RX ADMIN — IMMUNE GLOBULIN (HUMAN) 25 G: 10 INJECTION INTRAVENOUS; SUBCUTANEOUS at 08:19

## 2025-04-11 NOTE — FLOWSHEET NOTE
04/11/25 1041   AVS Reviewed   AVS & discharge instructions reviewed with patient and/or representative? Yes   Reviewed instructions with Patient   Level of Understanding Questions answered;Verbalized understanding     PORT ACCESSED AND PREMEDS GIVEN. IVIG 25GRAMS GIVEN AS ORDERED AND PT TOLERATED WELL

## 2025-04-16 DIAGNOSIS — M54.30 SCIATICA, UNSPECIFIED LATERALITY: ICD-10-CM

## 2025-04-16 DIAGNOSIS — G35 MULTIPLE SCLEROSIS (HCC): ICD-10-CM

## 2025-04-16 DIAGNOSIS — M79.7 FIBROMYALGIA: ICD-10-CM

## 2025-04-16 DIAGNOSIS — G43.719 INTRACTABLE CHRONIC MIGRAINE WITHOUT AURA AND WITHOUT STATUS MIGRAINOSUS: ICD-10-CM

## 2025-04-16 RX ORDER — ONDANSETRON 4 MG/1
4 TABLET, FILM COATED ORAL DAILY PRN
Qty: 30 TABLET | Refills: 5 | Status: SHIPPED | OUTPATIENT
Start: 2025-04-16

## 2025-04-16 RX ORDER — RIZATRIPTAN BENZOATE 10 MG/1
TABLET ORAL
Qty: 9 TABLET | Refills: 5 | Status: SHIPPED | OUTPATIENT
Start: 2025-04-16

## 2025-04-16 RX ORDER — OXYCODONE HYDROCHLORIDE 10 MG/1
10 TABLET ORAL EVERY 6 HOURS PRN
Qty: 120 TABLET | Refills: 0 | Status: SHIPPED | OUTPATIENT
Start: 2025-04-16 | End: 2025-05-16

## 2025-04-16 NOTE — TELEPHONE ENCOUNTER
Ramiro Mendoza has requested a refill on her medication.      Last office visit : 3/18/2025   Next office visit : 6/26/2025   Last medication refill : 11/12/24 zofran 03/19/25 oxy-ir  Daniel : 03/13/2025      Requested Prescriptions     Pending Prescriptions Disp Refills    ondansetron (ZOFRAN) 4 MG tablet 30 tablet 5     Sig: Take 1 tablet by mouth daily as needed for Nausea or Vomiting    oxyCODONE HCl (OXY-IR) 10 MG immediate release tablet 120 tablet 0     Sig: Take 1 tablet by mouth every 6 hours as needed for Pain for up to 30 days. Intended supply: 30 days Max Daily Amount: 40 mg

## 2025-04-16 NOTE — TELEPHONE ENCOUNTER
Ramiro Mendoza has requested a refill on her medication.      Last office visit : 3/18/2025   Next office visit : 4/16/2025   Last medication refill : 02/04/2025 R5        Requested Prescriptions     Pending Prescriptions Disp Refills    rizatriptan (MAXALT) 10 MG tablet 9 tablet 5     Sig: TAKE 1 TABLET BY MOUTH 1 TIME AS NEEDED FOR MIGRAINE. MAX 2 IN 24 HOURS. MAY REPEAT IN 2 HOURS AS NEEDED

## 2025-04-18 ENCOUNTER — HOSPITAL ENCOUNTER (OUTPATIENT)
Dept: INFUSION THERAPY | Age: 50
Setting detail: INFUSION SERIES
Discharge: HOME | End: 2025-04-18
Payer: MEDICARE

## 2025-04-18 VITALS
SYSTOLIC BLOOD PRESSURE: 132 MMHG | OXYGEN SATURATION: 100 % | HEART RATE: 66 BPM | DIASTOLIC BLOOD PRESSURE: 92 MMHG | TEMPERATURE: 98.3 F | RESPIRATION RATE: 18 BRPM | BODY MASS INDEX: 37.94 KG/M2 | WEIGHT: 228 LBS

## 2025-04-18 DIAGNOSIS — G35 EXACERBATION OF MULTIPLE SCLEROSIS (HCC): Primary | ICD-10-CM

## 2025-04-18 DIAGNOSIS — M32.9 SYSTEMIC LUPUS ERYTHEMATOSUS, UNSPECIFIED SLE TYPE, UNSPECIFIED ORGAN INVOLVEMENT STATUS (HCC): ICD-10-CM

## 2025-04-18 PROCEDURE — 6370000000 HC RX 637 (ALT 250 FOR IP): Performed by: PSYCHIATRY & NEUROLOGY

## 2025-04-18 PROCEDURE — 6360000002 HC RX W HCPCS: Performed by: PSYCHIATRY & NEUROLOGY

## 2025-04-18 PROCEDURE — 2500000003 HC RX 250 WO HCPCS: Performed by: PSYCHIATRY & NEUROLOGY

## 2025-04-18 PROCEDURE — 96375 TX/PRO/DX INJ NEW DRUG ADDON: CPT

## 2025-04-18 PROCEDURE — 96365 THER/PROPH/DIAG IV INF INIT: CPT

## 2025-04-18 RX ORDER — MEPERIDINE HYDROCHLORIDE 25 MG/ML
12.5 INJECTION INTRAMUSCULAR; INTRAVENOUS; SUBCUTANEOUS PRN
OUTPATIENT
Start: 2025-04-25

## 2025-04-18 RX ORDER — ACETAMINOPHEN 325 MG/1
650 TABLET ORAL
OUTPATIENT
Start: 2025-04-25

## 2025-04-18 RX ORDER — HYDROCORTISONE SODIUM SUCCINATE 100 MG/2ML
100 INJECTION INTRAMUSCULAR; INTRAVENOUS ONCE
Status: COMPLETED | OUTPATIENT
Start: 2025-04-18 | End: 2025-04-18

## 2025-04-18 RX ORDER — DIPHENHYDRAMINE HCL 25 MG
25 TABLET ORAL ONCE
Status: COMPLETED | OUTPATIENT
Start: 2025-04-18 | End: 2025-04-18

## 2025-04-18 RX ORDER — SODIUM CHLORIDE 9 MG/ML
INJECTION, SOLUTION INTRAVENOUS CONTINUOUS
OUTPATIENT
Start: 2025-04-25

## 2025-04-18 RX ORDER — ACETAMINOPHEN 325 MG/1
650 TABLET ORAL ONCE
Status: CANCELLED | OUTPATIENT
Start: 2025-04-25 | End: 2025-04-25

## 2025-04-18 RX ORDER — HYDROCORTISONE SODIUM SUCCINATE 100 MG/2ML
100 INJECTION INTRAMUSCULAR; INTRAVENOUS
OUTPATIENT
Start: 2025-04-25

## 2025-04-18 RX ORDER — EPINEPHRINE 1 MG/ML
0.3 INJECTION, SOLUTION, CONCENTRATE INTRAVENOUS PRN
OUTPATIENT
Start: 2025-04-25

## 2025-04-18 RX ORDER — HYDROCORTISONE SODIUM SUCCINATE 100 MG/2ML
100 INJECTION INTRAMUSCULAR; INTRAVENOUS ONCE
Status: CANCELLED
Start: 2025-04-25 | End: 2025-04-25

## 2025-04-18 RX ORDER — SODIUM CHLORIDE 0.9 % (FLUSH) 0.9 %
5-40 SYRINGE (ML) INJECTION PRN
Status: DISCONTINUED | OUTPATIENT
Start: 2025-04-18 | End: 2025-04-19 | Stop reason: HOSPADM

## 2025-04-18 RX ORDER — SODIUM CHLORIDE 0.9 % (FLUSH) 0.9 %
5-40 SYRINGE (ML) INJECTION PRN
Status: CANCELLED | OUTPATIENT
Start: 2025-04-25

## 2025-04-18 RX ORDER — DIPHENHYDRAMINE HYDROCHLORIDE 50 MG/ML
50 INJECTION, SOLUTION INTRAMUSCULAR; INTRAVENOUS
OUTPATIENT
Start: 2025-04-25

## 2025-04-18 RX ORDER — DIPHENHYDRAMINE HCL 25 MG
25 TABLET ORAL ONCE
Status: CANCELLED | OUTPATIENT
Start: 2025-04-25 | End: 2025-04-25

## 2025-04-18 RX ORDER — HEPARIN 100 UNIT/ML
500 SYRINGE INTRAVENOUS PRN
Status: CANCELLED | OUTPATIENT
Start: 2025-04-25

## 2025-04-18 RX ORDER — SODIUM CHLORIDE 9 MG/ML
5-250 INJECTION, SOLUTION INTRAVENOUS PRN
OUTPATIENT
Start: 2025-04-25

## 2025-04-18 RX ORDER — SODIUM CHLORIDE 9 MG/ML
5-250 INJECTION, SOLUTION INTRAVENOUS PRN
Status: DISCONTINUED | OUTPATIENT
Start: 2025-04-18 | End: 2025-04-19 | Stop reason: HOSPADM

## 2025-04-18 RX ORDER — ONDANSETRON 2 MG/ML
8 INJECTION INTRAMUSCULAR; INTRAVENOUS
OUTPATIENT
Start: 2025-04-25

## 2025-04-18 RX ORDER — HEPARIN 100 UNIT/ML
500 SYRINGE INTRAVENOUS PRN
Status: DISCONTINUED | OUTPATIENT
Start: 2025-04-18 | End: 2025-04-19 | Stop reason: HOSPADM

## 2025-04-18 RX ORDER — ACETAMINOPHEN 325 MG/1
650 TABLET ORAL ONCE
Status: COMPLETED | OUTPATIENT
Start: 2025-04-18 | End: 2025-04-18

## 2025-04-18 RX ORDER — ALBUTEROL SULFATE 90 UG/1
4 INHALANT RESPIRATORY (INHALATION) PRN
OUTPATIENT
Start: 2025-04-25

## 2025-04-18 RX ADMIN — IMMUNE GLOBULIN (HUMAN) 25 G: 10 INJECTION INTRAVENOUS; SUBCUTANEOUS at 08:23

## 2025-04-18 RX ADMIN — HEPARIN 500 UNITS: 100 SYRINGE at 09:44

## 2025-04-18 RX ADMIN — DIPHENHYDRAMINE HYDROCHLORIDE 25 MG: 25 TABLET ORAL at 07:58

## 2025-04-18 RX ADMIN — Medication 10 ML: at 07:59

## 2025-04-18 RX ADMIN — ACETAMINOPHEN 650 MG: 325 TABLET ORAL at 07:57

## 2025-04-18 RX ADMIN — HYDROCORTISONE SODIUM SUCCINATE 100 MG: 100 INJECTION, POWDER, FOR SOLUTION INTRAMUSCULAR; INTRAVENOUS at 07:58

## 2025-04-18 RX ADMIN — Medication 20 ML: at 09:44

## 2025-04-18 NOTE — PROGRESS NOTES
Pt arrived to \[facility]\""T. Port accessed using sterile technique, brisk blood return noted. Pt received Tylenol, Benadryl, and Solu-cortef for pre-meds, followed by 25g Gamunex-C. Tolerated well.    Electronically signed by Shabnam Huddleston RN on 4/18/2025 at 9:56 AM

## 2025-04-21 ENCOUNTER — HOSPITAL ENCOUNTER (OUTPATIENT)
Dept: INFUSION THERAPY | Age: 50
Setting detail: INFUSION SERIES
Discharge: HOME | End: 2025-04-21
Payer: MEDICARE

## 2025-04-21 VITALS
HEART RATE: 75 BPM | WEIGHT: 229 LBS | OXYGEN SATURATION: 100 % | RESPIRATION RATE: 18 BRPM | BODY MASS INDEX: 38.11 KG/M2 | TEMPERATURE: 97.8 F | SYSTOLIC BLOOD PRESSURE: 122 MMHG | DIASTOLIC BLOOD PRESSURE: 67 MMHG

## 2025-04-21 DIAGNOSIS — G35 MULTIPLE SCLEROSIS (HCC): Primary | ICD-10-CM

## 2025-04-21 PROCEDURE — 96365 THER/PROPH/DIAG IV INF INIT: CPT

## 2025-04-21 PROCEDURE — 6370000000 HC RX 637 (ALT 250 FOR IP): Performed by: PSYCHIATRY & NEUROLOGY

## 2025-04-21 PROCEDURE — 2500000003 HC RX 250 WO HCPCS: Performed by: PSYCHIATRY & NEUROLOGY

## 2025-04-21 PROCEDURE — 6360000002 HC RX W HCPCS: Performed by: PSYCHIATRY & NEUROLOGY

## 2025-04-21 RX ORDER — SODIUM CHLORIDE 0.9 % (FLUSH) 0.9 %
5-40 SYRINGE (ML) INJECTION PRN
Status: DISCONTINUED | OUTPATIENT
Start: 2025-04-21 | End: 2025-04-22 | Stop reason: HOSPADM

## 2025-04-21 RX ORDER — ONDANSETRON 2 MG/ML
8 INJECTION INTRAMUSCULAR; INTRAVENOUS
Status: CANCELLED | OUTPATIENT
Start: 2025-04-22

## 2025-04-21 RX ORDER — SODIUM CHLORIDE 0.9 % (FLUSH) 0.9 %
5-40 SYRINGE (ML) INJECTION PRN
Status: CANCELLED | OUTPATIENT
Start: 2025-04-22

## 2025-04-21 RX ORDER — DIPHENHYDRAMINE HCL 25 MG
25 TABLET ORAL ONCE
Status: COMPLETED | OUTPATIENT
Start: 2025-04-21 | End: 2025-04-21

## 2025-04-21 RX ORDER — ACETAMINOPHEN 325 MG/1
650 TABLET ORAL ONCE
Status: CANCELLED | OUTPATIENT
Start: 2025-04-22 | End: 2025-04-22

## 2025-04-21 RX ORDER — HYDROCORTISONE SODIUM SUCCINATE 100 MG/2ML
100 INJECTION INTRAMUSCULAR; INTRAVENOUS
Status: CANCELLED | OUTPATIENT
Start: 2025-04-22

## 2025-04-21 RX ORDER — SODIUM CHLORIDE 9 MG/ML
5-250 INJECTION, SOLUTION INTRAVENOUS ONCE
Status: CANCELLED | OUTPATIENT
Start: 2025-04-22 | End: 2025-04-22

## 2025-04-21 RX ORDER — HEPARIN 100 UNIT/ML
500 SYRINGE INTRAVENOUS PRN
Status: CANCELLED | OUTPATIENT
Start: 2025-04-22

## 2025-04-21 RX ORDER — HEPARIN 100 UNIT/ML
500 SYRINGE INTRAVENOUS PRN
Status: DISCONTINUED | OUTPATIENT
Start: 2025-04-21 | End: 2025-04-22 | Stop reason: HOSPADM

## 2025-04-21 RX ORDER — ACETAMINOPHEN 325 MG/1
650 TABLET ORAL ONCE
Status: COMPLETED | OUTPATIENT
Start: 2025-04-21 | End: 2025-04-21

## 2025-04-21 RX ORDER — ACETAMINOPHEN 325 MG/1
650 TABLET ORAL
Status: CANCELLED | OUTPATIENT
Start: 2025-04-22

## 2025-04-21 RX ORDER — DIPHENHYDRAMINE HYDROCHLORIDE 50 MG/ML
50 INJECTION, SOLUTION INTRAMUSCULAR; INTRAVENOUS
Status: CANCELLED | OUTPATIENT
Start: 2025-04-22

## 2025-04-21 RX ORDER — MEPERIDINE HYDROCHLORIDE 25 MG/ML
12.5 INJECTION INTRAMUSCULAR; INTRAVENOUS; SUBCUTANEOUS PRN
Status: CANCELLED | OUTPATIENT
Start: 2025-04-22

## 2025-04-21 RX ORDER — SODIUM CHLORIDE 9 MG/ML
INJECTION, SOLUTION INTRAVENOUS CONTINUOUS
Status: CANCELLED | OUTPATIENT
Start: 2025-04-22

## 2025-04-21 RX ORDER — EPINEPHRINE 1 MG/ML
0.3 INJECTION, SOLUTION, CONCENTRATE INTRAVENOUS PRN
Status: CANCELLED | OUTPATIENT
Start: 2025-04-22

## 2025-04-21 RX ORDER — ALBUTEROL SULFATE 90 UG/1
4 INHALANT RESPIRATORY (INHALATION) PRN
Status: CANCELLED | OUTPATIENT
Start: 2025-04-22

## 2025-04-21 RX ORDER — DIPHENHYDRAMINE HCL 25 MG
25 TABLET ORAL ONCE
Status: CANCELLED | OUTPATIENT
Start: 2025-04-22 | End: 2025-04-22

## 2025-04-21 RX ADMIN — HEPARIN 500 UNITS: 100 SYRINGE at 09:03

## 2025-04-21 RX ADMIN — ACETAMINOPHEN 650 MG: 325 TABLET ORAL at 07:38

## 2025-04-21 RX ADMIN — IMMUNE GLOBULIN (HUMAN) 25 G: 10 INJECTION INTRAVENOUS; SUBCUTANEOUS at 07:50

## 2025-04-21 RX ADMIN — DIPHENHYDRAMINE HCL 25 MG: 25 TABLET, COATED ORAL at 07:38

## 2025-04-21 RX ADMIN — SODIUM CHLORIDE, PRESERVATIVE FREE 10 ML: 5 INJECTION INTRAVENOUS at 09:04

## 2025-04-21 NOTE — PROGRESS NOTES
Pt arrived to [facility] and port accessed using sterile technique. Pt premedicated as ordered. Gamunex-C 25g administered. Pt tolerated well. Port left accessed for tx this week.    Electronically signed by Ciera George RN on 4/21/2025 at 9:13 AM

## 2025-04-22 ENCOUNTER — HOSPITAL ENCOUNTER (OUTPATIENT)
Dept: INFUSION THERAPY | Age: 50
Setting detail: INFUSION SERIES
Discharge: HOME | End: 2025-04-22
Payer: MEDICARE

## 2025-04-22 VITALS
DIASTOLIC BLOOD PRESSURE: 79 MMHG | HEART RATE: 74 BPM | RESPIRATION RATE: 18 BRPM | SYSTOLIC BLOOD PRESSURE: 131 MMHG | TEMPERATURE: 97.8 F | OXYGEN SATURATION: 100 %

## 2025-04-22 DIAGNOSIS — G35 MULTIPLE SCLEROSIS (HCC): Primary | ICD-10-CM

## 2025-04-22 PROCEDURE — 6370000000 HC RX 637 (ALT 250 FOR IP): Performed by: PSYCHIATRY & NEUROLOGY

## 2025-04-22 PROCEDURE — 96365 THER/PROPH/DIAG IV INF INIT: CPT

## 2025-04-22 PROCEDURE — 2500000003 HC RX 250 WO HCPCS: Performed by: PSYCHIATRY & NEUROLOGY

## 2025-04-22 PROCEDURE — 6360000002 HC RX W HCPCS: Performed by: PSYCHIATRY & NEUROLOGY

## 2025-04-22 RX ORDER — HEPARIN 100 UNIT/ML
500 SYRINGE INTRAVENOUS PRN
Status: CANCELLED | OUTPATIENT
Start: 2025-04-23

## 2025-04-22 RX ORDER — DIPHENHYDRAMINE HCL 25 MG
25 TABLET ORAL ONCE
Status: CANCELLED | OUTPATIENT
Start: 2025-04-23 | End: 2025-04-23

## 2025-04-22 RX ORDER — ACETAMINOPHEN 325 MG/1
650 TABLET ORAL ONCE
Status: CANCELLED | OUTPATIENT
Start: 2025-04-23 | End: 2025-04-23

## 2025-04-22 RX ORDER — DIPHENHYDRAMINE HYDROCHLORIDE 50 MG/ML
50 INJECTION, SOLUTION INTRAMUSCULAR; INTRAVENOUS
Status: CANCELLED | OUTPATIENT
Start: 2025-04-23

## 2025-04-22 RX ORDER — SODIUM CHLORIDE 9 MG/ML
INJECTION, SOLUTION INTRAVENOUS CONTINUOUS
Status: CANCELLED | OUTPATIENT
Start: 2025-04-23

## 2025-04-22 RX ORDER — HEPARIN 100 UNIT/ML
500 SYRINGE INTRAVENOUS PRN
Status: DISCONTINUED | OUTPATIENT
Start: 2025-04-22 | End: 2025-04-23 | Stop reason: HOSPADM

## 2025-04-22 RX ORDER — ALBUTEROL SULFATE 90 UG/1
4 INHALANT RESPIRATORY (INHALATION) PRN
Status: CANCELLED | OUTPATIENT
Start: 2025-04-23

## 2025-04-22 RX ORDER — EPINEPHRINE 1 MG/ML
0.3 INJECTION, SOLUTION, CONCENTRATE INTRAVENOUS PRN
Status: CANCELLED | OUTPATIENT
Start: 2025-04-23

## 2025-04-22 RX ORDER — DIPHENHYDRAMINE HCL 25 MG
25 TABLET ORAL ONCE
Status: COMPLETED | OUTPATIENT
Start: 2025-04-22 | End: 2025-04-22

## 2025-04-22 RX ORDER — HYDROCORTISONE SODIUM SUCCINATE 100 MG/2ML
100 INJECTION INTRAMUSCULAR; INTRAVENOUS
Status: CANCELLED | OUTPATIENT
Start: 2025-04-23

## 2025-04-22 RX ORDER — ACETAMINOPHEN 325 MG/1
650 TABLET ORAL
Status: CANCELLED | OUTPATIENT
Start: 2025-04-23

## 2025-04-22 RX ORDER — SODIUM CHLORIDE 0.9 % (FLUSH) 0.9 %
5-40 SYRINGE (ML) INJECTION PRN
Status: CANCELLED | OUTPATIENT
Start: 2025-04-23

## 2025-04-22 RX ORDER — ACETAMINOPHEN 325 MG/1
650 TABLET ORAL ONCE
Status: COMPLETED | OUTPATIENT
Start: 2025-04-22 | End: 2025-04-22

## 2025-04-22 RX ORDER — ONDANSETRON 2 MG/ML
8 INJECTION INTRAMUSCULAR; INTRAVENOUS
Status: CANCELLED | OUTPATIENT
Start: 2025-04-23

## 2025-04-22 RX ORDER — SODIUM CHLORIDE 0.9 % (FLUSH) 0.9 %
5-40 SYRINGE (ML) INJECTION PRN
Status: DISCONTINUED | OUTPATIENT
Start: 2025-04-22 | End: 2025-04-23 | Stop reason: HOSPADM

## 2025-04-22 RX ORDER — MEPERIDINE HYDROCHLORIDE 25 MG/ML
12.5 INJECTION INTRAMUSCULAR; INTRAVENOUS; SUBCUTANEOUS PRN
Status: CANCELLED | OUTPATIENT
Start: 2025-04-23

## 2025-04-22 RX ORDER — SODIUM CHLORIDE 9 MG/ML
5-250 INJECTION, SOLUTION INTRAVENOUS ONCE
Status: CANCELLED | OUTPATIENT
Start: 2025-04-23 | End: 2025-04-23

## 2025-04-22 RX ADMIN — SODIUM CHLORIDE, PRESERVATIVE FREE 10 ML: 5 INJECTION INTRAVENOUS at 09:47

## 2025-04-22 RX ADMIN — ACETAMINOPHEN 650 MG: 325 TABLET ORAL at 07:44

## 2025-04-22 RX ADMIN — IMMUNE GLOBULIN (HUMAN) 25 G: 10 INJECTION INTRAVENOUS; SUBCUTANEOUS at 08:18

## 2025-04-22 RX ADMIN — HEPARIN 500 UNITS: 100 SYRINGE at 09:47

## 2025-04-22 RX ADMIN — DIPHENHYDRAMINE HCL 25 MG: 25 TABLET ORAL at 07:44

## 2025-04-22 NOTE — PROGRESS NOTES
Pt arrived to [facility]. Port already accessed. Brisk blood return noted. Pt premedicated as ordered. Gamunex-C 25g administered. Pt tolerated well     Electronically signed by Ciera George RN on 4/22/2025 at 9:58 AM

## 2025-04-23 ENCOUNTER — HOSPITAL ENCOUNTER (OUTPATIENT)
Dept: INFUSION THERAPY | Age: 50
Setting detail: INFUSION SERIES
Discharge: HOME | End: 2025-04-23
Payer: MEDICARE

## 2025-04-23 VITALS
DIASTOLIC BLOOD PRESSURE: 67 MMHG | HEART RATE: 60 BPM | RESPIRATION RATE: 16 BRPM | SYSTOLIC BLOOD PRESSURE: 105 MMHG | OXYGEN SATURATION: 97 %

## 2025-04-23 DIAGNOSIS — G35 MULTIPLE SCLEROSIS (HCC): Primary | ICD-10-CM

## 2025-04-23 PROCEDURE — 96366 THER/PROPH/DIAG IV INF ADDON: CPT

## 2025-04-23 PROCEDURE — 6360000002 HC RX W HCPCS: Performed by: PSYCHIATRY & NEUROLOGY

## 2025-04-23 PROCEDURE — 2500000003 HC RX 250 WO HCPCS: Performed by: PSYCHIATRY & NEUROLOGY

## 2025-04-23 PROCEDURE — 6370000000 HC RX 637 (ALT 250 FOR IP): Performed by: PSYCHIATRY & NEUROLOGY

## 2025-04-23 PROCEDURE — 96365 THER/PROPH/DIAG IV INF INIT: CPT

## 2025-04-23 RX ORDER — DIPHENHYDRAMINE HYDROCHLORIDE 50 MG/ML
50 INJECTION, SOLUTION INTRAMUSCULAR; INTRAVENOUS
OUTPATIENT
Start: 2025-04-24

## 2025-04-23 RX ORDER — ACETAMINOPHEN 325 MG/1
650 TABLET ORAL ONCE
Status: COMPLETED | OUTPATIENT
Start: 2025-04-23 | End: 2025-04-23

## 2025-04-23 RX ORDER — SODIUM CHLORIDE 9 MG/ML
INJECTION, SOLUTION INTRAVENOUS CONTINUOUS
OUTPATIENT
Start: 2025-04-24

## 2025-04-23 RX ORDER — HYDROCORTISONE SODIUM SUCCINATE 100 MG/2ML
100 INJECTION INTRAMUSCULAR; INTRAVENOUS
OUTPATIENT
Start: 2025-04-24

## 2025-04-23 RX ORDER — HEPARIN 100 UNIT/ML
500 SYRINGE INTRAVENOUS PRN
Status: CANCELLED | OUTPATIENT
Start: 2025-04-24

## 2025-04-23 RX ORDER — ACETAMINOPHEN 325 MG/1
650 TABLET ORAL
OUTPATIENT
Start: 2025-04-24

## 2025-04-23 RX ORDER — DIPHENHYDRAMINE HCL 25 MG
25 TABLET ORAL ONCE
Status: CANCELLED | OUTPATIENT
Start: 2025-04-24 | End: 2025-04-24

## 2025-04-23 RX ORDER — SODIUM CHLORIDE 0.9 % (FLUSH) 0.9 %
5-40 SYRINGE (ML) INJECTION PRN
Status: DISCONTINUED | OUTPATIENT
Start: 2025-04-23 | End: 2025-04-24 | Stop reason: HOSPADM

## 2025-04-23 RX ORDER — SODIUM CHLORIDE 9 MG/ML
5-250 INJECTION, SOLUTION INTRAVENOUS ONCE
OUTPATIENT
Start: 2025-04-24 | End: 2025-04-24

## 2025-04-23 RX ORDER — HEPARIN 100 UNIT/ML
500 SYRINGE INTRAVENOUS PRN
Status: DISCONTINUED | OUTPATIENT
Start: 2025-04-23 | End: 2025-04-24 | Stop reason: HOSPADM

## 2025-04-23 RX ORDER — SODIUM CHLORIDE 0.9 % (FLUSH) 0.9 %
5-40 SYRINGE (ML) INJECTION PRN
Status: CANCELLED | OUTPATIENT
Start: 2025-04-24

## 2025-04-23 RX ORDER — MEPERIDINE HYDROCHLORIDE 25 MG/ML
12.5 INJECTION INTRAMUSCULAR; INTRAVENOUS; SUBCUTANEOUS PRN
OUTPATIENT
Start: 2025-04-24

## 2025-04-23 RX ORDER — ACETAMINOPHEN 325 MG/1
650 TABLET ORAL ONCE
Status: CANCELLED | OUTPATIENT
Start: 2025-04-24 | End: 2025-04-24

## 2025-04-23 RX ORDER — ONDANSETRON 2 MG/ML
8 INJECTION INTRAMUSCULAR; INTRAVENOUS
OUTPATIENT
Start: 2025-04-24

## 2025-04-23 RX ORDER — ALBUTEROL SULFATE 90 UG/1
4 INHALANT RESPIRATORY (INHALATION) PRN
OUTPATIENT
Start: 2025-04-24

## 2025-04-23 RX ORDER — EPINEPHRINE 1 MG/ML
0.3 INJECTION, SOLUTION, CONCENTRATE INTRAVENOUS PRN
OUTPATIENT
Start: 2025-04-24

## 2025-04-23 RX ORDER — DIPHENHYDRAMINE HCL 25 MG
25 TABLET ORAL ONCE
Status: COMPLETED | OUTPATIENT
Start: 2025-04-23 | End: 2025-04-23

## 2025-04-23 RX ADMIN — SODIUM CHLORIDE, PRESERVATIVE FREE 10 ML: 5 INJECTION INTRAVENOUS at 09:06

## 2025-04-23 RX ADMIN — HEPARIN 500 UNITS: 100 SYRINGE at 09:06

## 2025-04-23 RX ADMIN — DIPHENHYDRAMINE HCL 25 MG: 25 TABLET, COATED ORAL at 07:15

## 2025-04-23 RX ADMIN — IMMUNE GLOBULIN (HUMAN) 25 G: 10 INJECTION INTRAVENOUS; SUBCUTANEOUS at 07:43

## 2025-04-23 RX ADMIN — ACETAMINOPHEN 650 MG: 325 TABLET ORAL at 07:15

## 2025-04-23 NOTE — PROGRESS NOTES
Pt arrived to [facility]. Port already accessed. Brisk blood return noted. Pt premedicated as ordered. Gamunex-C 25g administered. Pt tolerated well

## 2025-04-24 ENCOUNTER — HOSPITAL ENCOUNTER (OUTPATIENT)
Dept: INFUSION THERAPY | Age: 50
Setting detail: INFUSION SERIES
Discharge: HOME | End: 2025-04-24
Payer: MEDICARE

## 2025-04-24 VITALS
HEART RATE: 80 BPM | DIASTOLIC BLOOD PRESSURE: 75 MMHG | TEMPERATURE: 97.7 F | RESPIRATION RATE: 18 BRPM | SYSTOLIC BLOOD PRESSURE: 116 MMHG | OXYGEN SATURATION: 98 %

## 2025-04-24 DIAGNOSIS — M32.9 SYSTEMIC LUPUS ERYTHEMATOSUS, UNSPECIFIED SLE TYPE, UNSPECIFIED ORGAN INVOLVEMENT STATUS (HCC): ICD-10-CM

## 2025-04-24 DIAGNOSIS — G35 EXACERBATION OF MULTIPLE SCLEROSIS (HCC): Primary | ICD-10-CM

## 2025-04-24 PROCEDURE — 96365 THER/PROPH/DIAG IV INF INIT: CPT

## 2025-04-24 PROCEDURE — 6370000000 HC RX 637 (ALT 250 FOR IP): Performed by: PSYCHIATRY & NEUROLOGY

## 2025-04-24 PROCEDURE — 96375 TX/PRO/DX INJ NEW DRUG ADDON: CPT

## 2025-04-24 PROCEDURE — 6360000002 HC RX W HCPCS: Performed by: PSYCHIATRY & NEUROLOGY

## 2025-04-24 PROCEDURE — 2500000003 HC RX 250 WO HCPCS: Performed by: PSYCHIATRY & NEUROLOGY

## 2025-04-24 RX ORDER — SODIUM CHLORIDE 0.9 % (FLUSH) 0.9 %
5-40 SYRINGE (ML) INJECTION PRN
Status: CANCELLED | OUTPATIENT
Start: 2025-04-25

## 2025-04-24 RX ORDER — SODIUM CHLORIDE 9 MG/ML
INJECTION, SOLUTION INTRAVENOUS CONTINUOUS
OUTPATIENT
Start: 2025-04-25

## 2025-04-24 RX ORDER — ONDANSETRON 2 MG/ML
8 INJECTION INTRAMUSCULAR; INTRAVENOUS
OUTPATIENT
Start: 2025-04-25

## 2025-04-24 RX ORDER — ACETAMINOPHEN 325 MG/1
650 TABLET ORAL ONCE
Status: COMPLETED | OUTPATIENT
Start: 2025-04-24 | End: 2025-04-24

## 2025-04-24 RX ORDER — SODIUM CHLORIDE 0.9 % (FLUSH) 0.9 %
5-40 SYRINGE (ML) INJECTION PRN
Status: DISCONTINUED | OUTPATIENT
Start: 2025-04-24 | End: 2025-04-25 | Stop reason: HOSPADM

## 2025-04-24 RX ORDER — ALBUTEROL SULFATE 90 UG/1
4 INHALANT RESPIRATORY (INHALATION) PRN
OUTPATIENT
Start: 2025-04-25

## 2025-04-24 RX ORDER — MEPERIDINE HYDROCHLORIDE 25 MG/ML
12.5 INJECTION INTRAMUSCULAR; INTRAVENOUS; SUBCUTANEOUS PRN
OUTPATIENT
Start: 2025-04-25

## 2025-04-24 RX ORDER — DIPHENHYDRAMINE HCL 25 MG
25 TABLET ORAL ONCE
Status: COMPLETED | OUTPATIENT
Start: 2025-04-24 | End: 2025-04-24

## 2025-04-24 RX ORDER — HYDROCORTISONE SODIUM SUCCINATE 100 MG/2ML
100 INJECTION INTRAMUSCULAR; INTRAVENOUS ONCE
Status: COMPLETED | OUTPATIENT
Start: 2025-04-24 | End: 2025-04-24

## 2025-04-24 RX ORDER — SODIUM CHLORIDE 9 MG/ML
5-250 INJECTION, SOLUTION INTRAVENOUS PRN
OUTPATIENT
Start: 2025-04-25

## 2025-04-24 RX ORDER — HYDROCORTISONE SODIUM SUCCINATE 100 MG/2ML
100 INJECTION INTRAMUSCULAR; INTRAVENOUS
OUTPATIENT
Start: 2025-04-25

## 2025-04-24 RX ORDER — ACETAMINOPHEN 325 MG/1
650 TABLET ORAL
OUTPATIENT
Start: 2025-04-25

## 2025-04-24 RX ORDER — DIPHENHYDRAMINE HCL 25 MG
25 TABLET ORAL ONCE
Status: CANCELLED | OUTPATIENT
Start: 2025-04-25 | End: 2025-04-25

## 2025-04-24 RX ORDER — HEPARIN 100 UNIT/ML
500 SYRINGE INTRAVENOUS PRN
Status: CANCELLED | OUTPATIENT
Start: 2025-04-25

## 2025-04-24 RX ORDER — HEPARIN 100 UNIT/ML
500 SYRINGE INTRAVENOUS PRN
Status: DISCONTINUED | OUTPATIENT
Start: 2025-04-24 | End: 2025-04-25 | Stop reason: HOSPADM

## 2025-04-24 RX ORDER — DIPHENHYDRAMINE HYDROCHLORIDE 50 MG/ML
50 INJECTION, SOLUTION INTRAMUSCULAR; INTRAVENOUS
OUTPATIENT
Start: 2025-04-25

## 2025-04-24 RX ORDER — EPINEPHRINE 1 MG/ML
0.3 INJECTION, SOLUTION, CONCENTRATE INTRAVENOUS PRN
OUTPATIENT
Start: 2025-04-25

## 2025-04-24 RX ORDER — ACETAMINOPHEN 325 MG/1
650 TABLET ORAL ONCE
Status: CANCELLED | OUTPATIENT
Start: 2025-04-25 | End: 2025-04-25

## 2025-04-24 RX ORDER — HYDROCORTISONE SODIUM SUCCINATE 100 MG/2ML
100 INJECTION INTRAMUSCULAR; INTRAVENOUS ONCE
Status: CANCELLED
Start: 2025-04-25 | End: 2025-04-25

## 2025-04-24 RX ADMIN — SODIUM CHLORIDE, PRESERVATIVE FREE 10 ML: 5 INJECTION INTRAVENOUS at 06:54

## 2025-04-24 RX ADMIN — DIPHENHYDRAMINE HCL 25 MG: 25 TABLET, COATED ORAL at 06:58

## 2025-04-24 RX ADMIN — SODIUM CHLORIDE, PRESERVATIVE FREE 10 ML: 5 INJECTION INTRAVENOUS at 07:01

## 2025-04-24 RX ADMIN — SODIUM CHLORIDE, PRESERVATIVE FREE 20 ML: 5 INJECTION INTRAVENOUS at 08:30

## 2025-04-24 RX ADMIN — HEPARIN 500 UNITS: 100 SYRINGE at 08:30

## 2025-04-24 RX ADMIN — HYDROCORTISONE SODIUM SUCCINATE 100 MG: 100 INJECTION, POWDER, FOR SOLUTION INTRAMUSCULAR; INTRAVENOUS at 06:59

## 2025-04-24 RX ADMIN — ACETAMINOPHEN 650 MG: 325 TABLET ORAL at 06:58

## 2025-04-24 RX ADMIN — IMMUNE GLOBULIN (HUMAN) 25 G: 10 INJECTION INTRAVENOUS; SUBCUTANEOUS at 07:14

## 2025-04-24 NOTE — PROGRESS NOTES
Pt arrived to [facility] with port accessed. Brisk blood return noted. Premeds given as ordered. Gamunex- C 25g infusion completed. Pt tolerated well. Port kept accessed for tomorrow's infusion.

## 2025-04-25 ENCOUNTER — HOSPITAL ENCOUNTER (OUTPATIENT)
Dept: INFUSION THERAPY | Age: 50
Setting detail: INFUSION SERIES
Discharge: HOME | End: 2025-04-25
Payer: MEDICARE

## 2025-04-25 VITALS
TEMPERATURE: 97.8 F | RESPIRATION RATE: 18 BRPM | SYSTOLIC BLOOD PRESSURE: 101 MMHG | OXYGEN SATURATION: 100 % | HEART RATE: 74 BPM | DIASTOLIC BLOOD PRESSURE: 78 MMHG

## 2025-04-25 DIAGNOSIS — G35 MULTIPLE SCLEROSIS (HCC): Primary | ICD-10-CM

## 2025-04-25 PROCEDURE — 6360000002 HC RX W HCPCS: Performed by: PSYCHIATRY & NEUROLOGY

## 2025-04-25 PROCEDURE — 6370000000 HC RX 637 (ALT 250 FOR IP): Performed by: PSYCHIATRY & NEUROLOGY

## 2025-04-25 PROCEDURE — 2500000003 HC RX 250 WO HCPCS: Performed by: PSYCHIATRY & NEUROLOGY

## 2025-04-25 PROCEDURE — 96365 THER/PROPH/DIAG IV INF INIT: CPT

## 2025-04-25 RX ORDER — HEPARIN 100 UNIT/ML
500 SYRINGE INTRAVENOUS PRN
Status: DISCONTINUED | OUTPATIENT
Start: 2025-04-25 | End: 2025-04-26 | Stop reason: HOSPADM

## 2025-04-25 RX ORDER — ACETAMINOPHEN 325 MG/1
650 TABLET ORAL ONCE
OUTPATIENT
Start: 2025-05-19 | End: 2025-05-19

## 2025-04-25 RX ORDER — ALBUTEROL SULFATE 90 UG/1
4 INHALANT RESPIRATORY (INHALATION) PRN
OUTPATIENT
Start: 2025-05-19

## 2025-04-25 RX ORDER — SODIUM CHLORIDE 0.9 % (FLUSH) 0.9 %
5-40 SYRINGE (ML) INJECTION PRN
Status: DISCONTINUED | OUTPATIENT
Start: 2025-04-25 | End: 2025-04-26 | Stop reason: HOSPADM

## 2025-04-25 RX ORDER — HYDROCORTISONE SODIUM SUCCINATE 100 MG/2ML
100 INJECTION INTRAMUSCULAR; INTRAVENOUS
OUTPATIENT
Start: 2025-05-19

## 2025-04-25 RX ORDER — SODIUM CHLORIDE 9 MG/ML
5-250 INJECTION, SOLUTION INTRAVENOUS ONCE
OUTPATIENT
Start: 2025-05-19 | End: 2025-05-19

## 2025-04-25 RX ORDER — DIPHENHYDRAMINE HCL 25 MG
25 TABLET ORAL ONCE
OUTPATIENT
Start: 2025-05-19 | End: 2025-05-19

## 2025-04-25 RX ORDER — DIPHENHYDRAMINE HCL 25 MG
25 TABLET ORAL ONCE
Status: COMPLETED | OUTPATIENT
Start: 2025-04-25 | End: 2025-04-25

## 2025-04-25 RX ORDER — ONDANSETRON 2 MG/ML
8 INJECTION INTRAMUSCULAR; INTRAVENOUS
OUTPATIENT
Start: 2025-05-19

## 2025-04-25 RX ORDER — DIPHENHYDRAMINE HYDROCHLORIDE 50 MG/ML
50 INJECTION, SOLUTION INTRAMUSCULAR; INTRAVENOUS
OUTPATIENT
Start: 2025-05-19

## 2025-04-25 RX ORDER — SODIUM CHLORIDE 0.9 % (FLUSH) 0.9 %
5-40 SYRINGE (ML) INJECTION PRN
OUTPATIENT
Start: 2025-05-19

## 2025-04-25 RX ORDER — ACETAMINOPHEN 325 MG/1
650 TABLET ORAL
OUTPATIENT
Start: 2025-05-19

## 2025-04-25 RX ORDER — MEPERIDINE HYDROCHLORIDE 25 MG/ML
12.5 INJECTION INTRAMUSCULAR; INTRAVENOUS; SUBCUTANEOUS PRN
OUTPATIENT
Start: 2025-05-19

## 2025-04-25 RX ORDER — EPINEPHRINE 1 MG/ML
0.3 INJECTION, SOLUTION, CONCENTRATE INTRAVENOUS PRN
OUTPATIENT
Start: 2025-05-19

## 2025-04-25 RX ORDER — SODIUM CHLORIDE 9 MG/ML
INJECTION, SOLUTION INTRAVENOUS CONTINUOUS
OUTPATIENT
Start: 2025-05-19

## 2025-04-25 RX ORDER — HEPARIN 100 UNIT/ML
500 SYRINGE INTRAVENOUS PRN
OUTPATIENT
Start: 2025-05-19

## 2025-04-25 RX ORDER — ACETAMINOPHEN 325 MG/1
650 TABLET ORAL ONCE
Status: COMPLETED | OUTPATIENT
Start: 2025-04-25 | End: 2025-04-25

## 2025-04-25 RX ADMIN — SODIUM CHLORIDE, PRESERVATIVE FREE 10 ML: 5 INJECTION INTRAVENOUS at 09:19

## 2025-04-25 RX ADMIN — DIPHENHYDRAMINE HYDROCHLORIDE 25 MG: 25 TABLET ORAL at 07:50

## 2025-04-25 RX ADMIN — HEPARIN 500 UNITS: 100 SYRINGE at 09:19

## 2025-04-25 RX ADMIN — IMMUNE GLOBULIN (HUMAN) 25 G: 10 INJECTION INTRAVENOUS; SUBCUTANEOUS at 08:00

## 2025-04-25 RX ADMIN — ACETAMINOPHEN 650 MG: 325 TABLET ORAL at 07:50

## 2025-04-25 NOTE — DISCHARGE INSTRUCTIONS
immune globulin (intravenous and subcutaneous)  Pronunciation:  dinora LACEY foley  Brand:  Gammagard Liquid, Gammaked, Gamunex-C  What is the most important information I should know about immune globulin?  This medicine can cause blood clots.  The risk is highest in older adults or in people who have had blood clots, heart problems, or blood circulation problems. Blood clots are also more likely during long-term bedrest, while using birth control pills or hormone replacement therapy, or while having a central intravenous (IV) catheter in place.  Call your doctor at once if you have chest pain, trouble breathing, fast heartbeats, numbness or weakness, or swelling and warmth or discoloration in an arm or leg.  This medicine can also harm your kidneys, especially if you have kidney disease or if you also use certain medicines. Tell your doctor right away if you have signs of kidney problems, such as swelling, rapid weight gain, and little or no urination.  What is immune globulin?  Immune globulin intravenous and subcutaneous (for injection into a vein or under the skin) is used to treat primary immunodeficiency.  Immune globulin is also used to increase platelets (blood clotting cells) in people with idiopathic thrombocytopenic purpura.  Immune globulin is also used to treat certain debilitating nerve disorders that cause muscle weakness and can affect daily activities.  Immune globulin may also be used for purposes not listed in this medication guide.  What should I discuss with my healthcare provider before using immune globulin?  You should not use this medicine if:  you have had an allergic reaction to an immune globulin or blood product; or  you have immune globulin A (IgA) deficiency with antibody to IgA.  Immune globulin can cause blood clots or kidney problems, especially in older adults or in people with certain conditions. Tell your doctor if you have ever had:  heart problems, blood circulation  your doctor for medical advice about side effects. You may report side effects to FDA at 8-273-EOO-9666.  What other drugs will affect immune globulin?  Immune globulin can harm your kidneys, especially if you also use certain medicines for infections, cancer, osteoporosis, organ transplant rejection, bowel disorders, high blood pressure, or pain or arthritis (including Advil, Motrin, and Aleve).  Other drugs may affect immune globulin, including prescription and over-the-counter medicines, vitamins, and herbal products. Tell your doctor about all your current medicines and any medicine you start or stop using.  Where can I get more information?  Your doctor or pharmacist can provide more information about immune globulin.  Remember, keep this and all other medicines out of the reach of children, never share your medicines with others, and use this medication only for the indication prescribed.   Every effort has been made to ensure that the information provided by [employer]') is accurate, up-to-date, and complete, but no guarantee is made to that effect. Drug information contained herein may be time sensitive. [employer] information has been compiled for use by healthcare practitioners and consumers in the [place] and therefore [employer] does not warrant that uses outside of the [place] are appropriate, unless specifically indicated otherwise. [employer] drug information does not endorse drugs, diagnose patients or recommend therapy. [employer] drug information is an informational resource designed to assist licensed healthcare practitioners in caring for their patients and/or to serve consumers viewing this service as a supplement to, and not a substitute for, the expertise, skill, knowledge and judgment of healthcare practitioners. The absence of a warning for a given drug or drug combination in no way should be construed to indicate that the drug or drug combination is safe, effective or

## 2025-04-25 NOTE — PROGRESS NOTES
Pt arrived to \[facility]\""T with port accessed. Brisk blood return noted. Premeds given. Gamunex- C 25g infusion completed. Port de-accessed. Pt tolerated well.

## 2025-05-02 ENCOUNTER — HOSPITAL ENCOUNTER (OUTPATIENT)
Dept: INFUSION THERAPY | Age: 50
Setting detail: INFUSION SERIES
Discharge: HOME | End: 2025-05-02
Payer: MEDICARE

## 2025-05-02 VITALS
OXYGEN SATURATION: 100 % | WEIGHT: 240 LBS | SYSTOLIC BLOOD PRESSURE: 117 MMHG | RESPIRATION RATE: 17 BRPM | DIASTOLIC BLOOD PRESSURE: 75 MMHG | HEART RATE: 70 BPM | BODY MASS INDEX: 39.94 KG/M2 | TEMPERATURE: 97 F

## 2025-05-02 DIAGNOSIS — M32.9 SYSTEMIC LUPUS ERYTHEMATOSUS, UNSPECIFIED SLE TYPE, UNSPECIFIED ORGAN INVOLVEMENT STATUS (HCC): ICD-10-CM

## 2025-05-02 DIAGNOSIS — G35 EXACERBATION OF MULTIPLE SCLEROSIS (HCC): Primary | ICD-10-CM

## 2025-05-02 PROCEDURE — 96375 TX/PRO/DX INJ NEW DRUG ADDON: CPT

## 2025-05-02 PROCEDURE — 6360000002 HC RX W HCPCS: Performed by: PSYCHIATRY & NEUROLOGY

## 2025-05-02 PROCEDURE — 6370000000 HC RX 637 (ALT 250 FOR IP): Performed by: PSYCHIATRY & NEUROLOGY

## 2025-05-02 PROCEDURE — 2500000003 HC RX 250 WO HCPCS: Performed by: PSYCHIATRY & NEUROLOGY

## 2025-05-02 PROCEDURE — 96365 THER/PROPH/DIAG IV INF INIT: CPT

## 2025-05-02 RX ORDER — ALBUTEROL SULFATE 90 UG/1
4 INHALANT RESPIRATORY (INHALATION) PRN
OUTPATIENT
Start: 2025-05-09

## 2025-05-02 RX ORDER — SODIUM CHLORIDE 9 MG/ML
5-250 INJECTION, SOLUTION INTRAVENOUS PRN
OUTPATIENT
Start: 2025-05-09

## 2025-05-02 RX ORDER — DIPHENHYDRAMINE HCL 25 MG
25 TABLET ORAL ONCE
OUTPATIENT
Start: 2025-05-09 | End: 2025-05-09

## 2025-05-02 RX ORDER — ACETAMINOPHEN 325 MG/1
650 TABLET ORAL ONCE
OUTPATIENT
Start: 2025-05-09 | End: 2025-05-09

## 2025-05-02 RX ORDER — SODIUM CHLORIDE 0.9 % (FLUSH) 0.9 %
5-40 SYRINGE (ML) INJECTION PRN
OUTPATIENT
Start: 2025-05-09

## 2025-05-02 RX ORDER — DIPHENHYDRAMINE HYDROCHLORIDE 50 MG/ML
50 INJECTION, SOLUTION INTRAMUSCULAR; INTRAVENOUS
OUTPATIENT
Start: 2025-05-09

## 2025-05-02 RX ORDER — DIPHENHYDRAMINE HCL 25 MG
25 TABLET ORAL ONCE
Status: COMPLETED | OUTPATIENT
Start: 2025-05-02 | End: 2025-05-02

## 2025-05-02 RX ORDER — HYDROCORTISONE SODIUM SUCCINATE 100 MG/2ML
100 INJECTION INTRAMUSCULAR; INTRAVENOUS
OUTPATIENT
Start: 2025-05-09

## 2025-05-02 RX ORDER — HEPARIN 100 UNIT/ML
500 SYRINGE INTRAVENOUS PRN
OUTPATIENT
Start: 2025-05-09

## 2025-05-02 RX ORDER — HYDROCORTISONE SODIUM SUCCINATE 100 MG/2ML
100 INJECTION INTRAMUSCULAR; INTRAVENOUS ONCE
Start: 2025-05-09 | End: 2025-05-09

## 2025-05-02 RX ORDER — ACETAMINOPHEN 325 MG/1
650 TABLET ORAL
OUTPATIENT
Start: 2025-05-09

## 2025-05-02 RX ORDER — ONDANSETRON 2 MG/ML
8 INJECTION INTRAMUSCULAR; INTRAVENOUS
OUTPATIENT
Start: 2025-05-09

## 2025-05-02 RX ORDER — SODIUM CHLORIDE 9 MG/ML
INJECTION, SOLUTION INTRAVENOUS CONTINUOUS
OUTPATIENT
Start: 2025-05-09

## 2025-05-02 RX ORDER — HEPARIN 100 UNIT/ML
500 SYRINGE INTRAVENOUS PRN
Status: DISCONTINUED | OUTPATIENT
Start: 2025-05-02 | End: 2025-05-03 | Stop reason: HOSPADM

## 2025-05-02 RX ORDER — MEPERIDINE HYDROCHLORIDE 25 MG/ML
12.5 INJECTION INTRAMUSCULAR; INTRAVENOUS; SUBCUTANEOUS PRN
OUTPATIENT
Start: 2025-05-09

## 2025-05-02 RX ORDER — ACETAMINOPHEN 325 MG/1
650 TABLET ORAL ONCE
Status: COMPLETED | OUTPATIENT
Start: 2025-05-02 | End: 2025-05-02

## 2025-05-02 RX ORDER — HYDROCORTISONE SODIUM SUCCINATE 100 MG/2ML
100 INJECTION INTRAMUSCULAR; INTRAVENOUS ONCE
Status: COMPLETED | OUTPATIENT
Start: 2025-05-02 | End: 2025-05-02

## 2025-05-02 RX ORDER — EPINEPHRINE 1 MG/ML
0.3 INJECTION, SOLUTION, CONCENTRATE INTRAVENOUS PRN
OUTPATIENT
Start: 2025-05-09

## 2025-05-02 RX ORDER — SODIUM CHLORIDE 0.9 % (FLUSH) 0.9 %
5-40 SYRINGE (ML) INJECTION PRN
Status: DISCONTINUED | OUTPATIENT
Start: 2025-05-02 | End: 2025-05-03 | Stop reason: HOSPADM

## 2025-05-02 RX ADMIN — IMMUNE GLOBULIN (HUMAN) 25 G: 10 INJECTION INTRAVENOUS; SUBCUTANEOUS at 08:16

## 2025-05-02 RX ADMIN — SODIUM CHLORIDE, PRESERVATIVE FREE 20 ML: 5 INJECTION INTRAVENOUS at 09:24

## 2025-05-02 RX ADMIN — HEPARIN 500 UNITS: 100 SYRINGE at 09:24

## 2025-05-02 RX ADMIN — HYDROCORTISONE SODIUM SUCCINATE 100 MG: 100 INJECTION, POWDER, FOR SOLUTION INTRAMUSCULAR; INTRAVENOUS at 07:50

## 2025-05-02 RX ADMIN — SODIUM CHLORIDE, PRESERVATIVE FREE 10 ML: 5 INJECTION INTRAVENOUS at 07:52

## 2025-05-02 RX ADMIN — ACETAMINOPHEN 650 MG: 325 TABLET ORAL at 07:49

## 2025-05-02 RX ADMIN — DIPHENHYDRAMINE HYDROCHLORIDE 25 MG: 25 TABLET ORAL at 07:49

## 2025-05-02 NOTE — PROGRESS NOTES
Port accessed on arrival to [facility]. Premeds given as ordered. Gamunex- C 25g infusion completed. Port de accessed per [facility] protocol. Pt tolerated well.

## 2025-05-02 NOTE — DISCHARGE INSTRUCTIONS
immune globulin (intravenous and subcutaneous)  Pronunciation:  dinora LACEY foley  Brand:  Gammagard Liquid, Gammaked, Gamunex-C  What is the most important information I should know about immune globulin?  This medicine can cause blood clots.  The risk is highest in older adults or in people who have had blood clots, heart problems, or blood circulation problems. Blood clots are also more likely during long-term bedrest, while using birth control pills or hormone replacement therapy, or while having a central intravenous (IV) catheter in place.  Call your doctor at once if you have chest pain, trouble breathing, fast heartbeats, numbness or weakness, or swelling and warmth or discoloration in an arm or leg.  This medicine can also harm your kidneys, especially if you have kidney disease or if you also use certain medicines. Tell your doctor right away if you have signs of kidney problems, such as swelling, rapid weight gain, and little or no urination.  What is immune globulin?  Immune globulin intravenous and subcutaneous (for injection into a vein or under the skin) is used to treat primary immunodeficiency.  Immune globulin is also used to increase platelets (blood clotting cells) in people with idiopathic thrombocytopenic purpura.  Immune globulin is also used to treat certain debilitating nerve disorders that cause muscle weakness and can affect daily activities.  Immune globulin may also be used for purposes not listed in this medication guide.  What should I discuss with my healthcare provider before using immune globulin?  You should not use this medicine if:  you have had an allergic reaction to an immune globulin or blood product; or  you have immune globulin A (IgA) deficiency with antibody to IgA.  Immune globulin can cause blood clots or kidney problems, especially in older adults or in people with certain conditions. Tell your doctor if you have ever had:  heart problems, blood circulation  appropriate for any given patient. [facility] does not assume any responsibility for any aspect of healthcare administered with the aid of information [facility] provides. The information contained herein is not intended to cover all possible uses, directions, precautions, warnings, drug interactions, allergic reactions, or adverse effects. If you have questions about the drugs you are taking, check with your doctor, nurse or pharmacist.  Copyright [id number] [employer]. Version: 4.02. Revision date: 2/26/2021.  Care instructions adapted under license by [employer]. If you have questions about a medical condition or this instruction, always ask your healthcare professional. [employer], [employer] disclaims any warranty or liability for your use of this information.

## 2025-05-05 ENCOUNTER — APPOINTMENT (OUTPATIENT)
Dept: GENERAL RADIOLOGY | Age: 50
End: 2025-05-05
Payer: MEDICARE

## 2025-05-05 ENCOUNTER — APPOINTMENT (OUTPATIENT)
Dept: CT IMAGING | Age: 50
End: 2025-05-05
Payer: MEDICARE

## 2025-05-05 ENCOUNTER — HOSPITAL ENCOUNTER (EMERGENCY)
Age: 50
Discharge: HOME | End: 2025-05-05
Attending: PEDIATRICS
Payer: MEDICARE

## 2025-05-05 VITALS
RESPIRATION RATE: 23 BRPM | DIASTOLIC BLOOD PRESSURE: 75 MMHG | HEART RATE: 62 BPM | SYSTOLIC BLOOD PRESSURE: 107 MMHG | WEIGHT: 228 LBS | OXYGEN SATURATION: 97 % | BODY MASS INDEX: 37.94 KG/M2 | TEMPERATURE: 98.9 F

## 2025-05-05 DIAGNOSIS — E87.1 HYPONATREMIA: ICD-10-CM

## 2025-05-05 DIAGNOSIS — L72.3 SEBACEOUS CYST: Primary | ICD-10-CM

## 2025-05-05 DIAGNOSIS — E16.2 HYPOGLYCEMIA: ICD-10-CM

## 2025-05-05 LAB
ALBUMIN SERPL-MCNC: 3.4 G/DL (ref 3.5–5.2)
ALP SERPL-CCNC: 54 U/L (ref 35–104)
ALT SERPL-CCNC: 17 U/L (ref 10–35)
ANION GAP SERPL CALCULATED.3IONS-SCNC: 10 MMOL/L (ref 8–16)
AST SERPL-CCNC: 34 U/L (ref 10–35)
BASOPHILS # BLD: 0 K/UL (ref 0–0.2)
BASOPHILS NFR BLD: 0.5 % (ref 0–1)
BILIRUB SERPL-MCNC: <0.2 MG/DL (ref 0.2–1.2)
BILIRUB UR QL STRIP: NEGATIVE
BNP BLD-MCNC: 131 PG/ML (ref 0–124)
BUN SERPL-MCNC: 12 MG/DL (ref 6–20)
CALCIUM SERPL-MCNC: 8.3 MG/DL (ref 8.6–10)
CHLORIDE SERPL-SCNC: 89 MMOL/L (ref 98–107)
CLARITY UR: CLEAR
CO2 SERPL-SCNC: 24 MMOL/L (ref 22–29)
COLOR UR: YELLOW
CREAT SERPL-MCNC: 0.9 MG/DL (ref 0.5–0.9)
CRP SERPL-MCNC: <3 MG/L (ref 0–5)
EOSINOPHIL # BLD: 0 K/UL (ref 0–0.6)
EOSINOPHIL NFR BLD: 0.7 % (ref 0–5)
ERYTHROCYTE [DISTWIDTH] IN BLOOD BY AUTOMATED COUNT: 13.6 % (ref 11.5–14.5)
ERYTHROCYTE [SEDIMENTATION RATE] IN BLOOD BY WESTERGREN METHOD: 23 MM/HR (ref 0–20)
GLUCOSE BLD-MCNC: 122 MG/DL (ref 70–99)
GLUCOSE SERPL-MCNC: 57 MG/DL (ref 70–99)
GLUCOSE UR STRIP.AUTO-MCNC: NEGATIVE MG/DL
HCT VFR BLD AUTO: 33.2 % (ref 37–47)
HGB BLD-MCNC: 11.4 G/DL (ref 12–16)
HGB UR STRIP.AUTO-MCNC: NEGATIVE MG/L
IMM GRANULOCYTES # BLD: 0 K/UL
KETONES UR STRIP.AUTO-MCNC: NEGATIVE MG/DL
LEUKOCYTE ESTERASE UR QL STRIP.AUTO: NEGATIVE
LYMPHOCYTES # BLD: 1.7 K/UL (ref 1.1–4.5)
LYMPHOCYTES NFR BLD: 42.3 % (ref 20–40)
MAGNESIUM SERPL-MCNC: 1.8 MG/DL (ref 1.6–2.6)
MCH RBC QN AUTO: 29 PG (ref 27–31)
MCHC RBC AUTO-ENTMCNC: 34.3 G/DL (ref 33–37)
MCV RBC AUTO: 84.5 FL (ref 81–99)
MONOCYTES # BLD: 0.5 K/UL (ref 0–0.9)
MONOCYTES NFR BLD: 11.3 % (ref 0–10)
NEUTROPHILS # BLD: 1.8 K/UL (ref 1.5–7.5)
NEUTS SEG NFR BLD: 44.5 % (ref 50–65)
NITRITE UR QL STRIP.AUTO: NEGATIVE
PERFORMED ON: ABNORMAL
PH UR STRIP.AUTO: 7.5 [PH] (ref 5–8)
PLATELET # BLD AUTO: 190 K/UL (ref 130–400)
PMV BLD AUTO: 9.6 FL (ref 9.4–12.3)
POTASSIUM SERPL-SCNC: 4.9 MMOL/L (ref 3.5–5.1)
PROCALCITONIN: <0.02 NG/ML (ref 0–0.09)
PROT SERPL-MCNC: 7.6 G/DL (ref 6.4–8.3)
PROT UR STRIP.AUTO-MCNC: NEGATIVE MG/DL
RBC # BLD AUTO: 3.93 M/UL (ref 4.2–5.4)
SODIUM SERPL-SCNC: 123 MMOL/L (ref 136–145)
SP GR UR STRIP.AUTO: 1.02 (ref 1–1.03)
TROPONIN, HIGH SENSITIVITY: <6 NG/L (ref 0–14)
UROBILINOGEN UR STRIP.AUTO-MCNC: 0.2 E.U./DL
VALPROATE SERPL-MCNC: 39.3 UG/ML (ref 50–100)
WBC # BLD AUTO: 4.1 K/UL (ref 4.8–10.8)

## 2025-05-05 PROCEDURE — 81003 URINALYSIS AUTO W/O SCOPE: CPT

## 2025-05-05 PROCEDURE — 80053 COMPREHEN METABOLIC PANEL: CPT

## 2025-05-05 PROCEDURE — 86140 C-REACTIVE PROTEIN: CPT

## 2025-05-05 PROCEDURE — 99285 EMERGENCY DEPT VISIT HI MDM: CPT

## 2025-05-05 PROCEDURE — 87040 BLOOD CULTURE FOR BACTERIA: CPT

## 2025-05-05 PROCEDURE — 83880 ASSAY OF NATRIURETIC PEPTIDE: CPT

## 2025-05-05 PROCEDURE — 80164 ASSAY DIPROPYLACETIC ACD TOT: CPT

## 2025-05-05 PROCEDURE — 82962 GLUCOSE BLOOD TEST: CPT

## 2025-05-05 PROCEDURE — 93005 ELECTROCARDIOGRAM TRACING: CPT | Performed by: PEDIATRICS

## 2025-05-05 PROCEDURE — 84145 PROCALCITONIN (PCT): CPT

## 2025-05-05 PROCEDURE — 85025 COMPLETE CBC W/AUTO DIFF WBC: CPT

## 2025-05-05 PROCEDURE — 85652 RBC SED RATE AUTOMATED: CPT

## 2025-05-05 PROCEDURE — 83735 ASSAY OF MAGNESIUM: CPT

## 2025-05-05 PROCEDURE — 36415 COLL VENOUS BLD VENIPUNCTURE: CPT

## 2025-05-05 PROCEDURE — 6360000004 HC RX CONTRAST MEDICATION: Performed by: PEDIATRICS

## 2025-05-05 PROCEDURE — 71045 X-RAY EXAM CHEST 1 VIEW: CPT

## 2025-05-05 PROCEDURE — 84484 ASSAY OF TROPONIN QUANT: CPT

## 2025-05-05 PROCEDURE — 74177 CT ABD & PELVIS W/CONTRAST: CPT

## 2025-05-05 RX ORDER — IOPAMIDOL 755 MG/ML
75 INJECTION, SOLUTION INTRAVASCULAR
Status: COMPLETED | OUTPATIENT
Start: 2025-05-05 | End: 2025-05-05

## 2025-05-05 RX ADMIN — IOPAMIDOL 75 ML: 755 INJECTION, SOLUTION INTRAVENOUS at 20:17

## 2025-05-06 LAB
EKG P AXIS: 48 DEGREES
EKG P-R INTERVAL: 160 MS
EKG Q-T INTERVAL: 428 MS
EKG QRS DURATION: 90 MS
EKG QTC CALCULATION (BAZETT): 418 MS
EKG T AXIS: 19 DEGREES

## 2025-05-06 PROCEDURE — 93010 ELECTROCARDIOGRAM REPORT: CPT | Performed by: INTERNAL MEDICINE

## 2025-05-06 NOTE — ED NOTES
This RN and Provider assessed Pt's abd. wound while providing privacy. Pt's wound was photographed and swabbed for cultures by this RN.

## 2025-05-06 NOTE — DISCHARGE INSTRUCTIONS
Eat multiple small meals throughout the day.  Carry candy or glucose in your purse in case you become faint, nauseated or sweaty.  Check your blood glucose twice daily and if you should become symptomatic.  Return or seek medical attention with passing out, persistent vomiting, or other concerns

## 2025-05-06 NOTE — ED PROVIDER NOTES
[facility] EMERGENCY DEPARTMENT  eMERGENCY dEPARTMENT eNCOUnter      Pt Name: Ramiro Mendoza  MRN: [id number]  Birthdate 1975  Date of evaluation: 2025  Provider: Aylin Krishna MD    CHIEF COMPLAINT       Chief Complaint   Patient presents with    Wound Infection     Sent by PCP for wound infection to wound on abd         HISTORY OF PRESENT ILLNESS   (Location/Symptom, Timing/Onset,Context/Setting, Quality, Duration, Modifying Factors, Severity)  Note limiting factors.   Ramiro Mendoza is a 49 y.o. female who presents to the emergency department ***     HPI    NursingNotes were reviewed.    REVIEW OF SYSTEMS    (2-9 systems for level 4, 10 or more for level 5)     Review of Systems         PAST MEDICALHISTORY     Past Medical History:   Diagnosis Date    Arthritis     Bipolar 1 disorder (HCC)     Convulsions (HCC) 2020    Fibromyalgia     GERD (gastroesophageal reflux disease)     Hypertension     Lupus     sees  in [facility]    Migraines     Movement disorder     Multiple sclerosis (HCC)     gets infusions weekly    Neuromuscular disorder (HCC)     Psychiatric problem          SURGICAL HISTORY       Past Surgical History:   Procedure Laterality Date    ABDOMINAL EXPLORATION SURGERY      ADENOIDECTOMY       SECTION      x 2     SECTION, CLASSIC N/A     COLONOSCOPY      DILATATION, ESOPHAGUS      EXTERNAL EAR SURGERY      lobe    GASTRIC BAND      and removed     GASTRIC BYPASS SURGERY      HERNIA REPAIR      HERNIA REPAIR N/A     HYSTERECTOMY (CERVIX STATUS UNKNOWN)      PORT SURGERY N/A 2022    PORT INSERTION WITH ULTRASOUND & FLUOROSCOPY performed by Caty Brown MD at [facility] OR    SEPTOPLASTY      SKIN GRAFT      SMALL INTESTINE SURGERY      TONSILLECTOMY      TONSILLECTOMY AND ADENOIDECTOMY      UPPER GASTROINTESTINAL ENDOSCOPY           CURRENT MEDICATIONS     Discharge Medication List as of 2025 10:32 PM        CONTINUE these medications

## 2025-05-09 ENCOUNTER — HOSPITAL ENCOUNTER (OUTPATIENT)
Dept: INFUSION THERAPY | Age: 50
Setting detail: INFUSION SERIES
Discharge: HOME | End: 2025-05-09
Payer: MEDICARE

## 2025-05-09 VITALS
BODY MASS INDEX: 38.61 KG/M2 | TEMPERATURE: 97.8 F | DIASTOLIC BLOOD PRESSURE: 84 MMHG | OXYGEN SATURATION: 100 % | SYSTOLIC BLOOD PRESSURE: 126 MMHG | RESPIRATION RATE: 16 BRPM | HEART RATE: 72 BPM | WEIGHT: 232 LBS

## 2025-05-09 DIAGNOSIS — M32.9 SYSTEMIC LUPUS ERYTHEMATOSUS, UNSPECIFIED SLE TYPE, UNSPECIFIED ORGAN INVOLVEMENT STATUS (HCC): ICD-10-CM

## 2025-05-09 DIAGNOSIS — G35 EXACERBATION OF MULTIPLE SCLEROSIS (HCC): Primary | ICD-10-CM

## 2025-05-09 PROCEDURE — 96375 TX/PRO/DX INJ NEW DRUG ADDON: CPT

## 2025-05-09 PROCEDURE — 96365 THER/PROPH/DIAG IV INF INIT: CPT

## 2025-05-09 PROCEDURE — 2500000003 HC RX 250 WO HCPCS: Performed by: PSYCHIATRY & NEUROLOGY

## 2025-05-09 PROCEDURE — 6360000002 HC RX W HCPCS: Performed by: PSYCHIATRY & NEUROLOGY

## 2025-05-09 PROCEDURE — 6370000000 HC RX 637 (ALT 250 FOR IP): Performed by: PSYCHIATRY & NEUROLOGY

## 2025-05-09 RX ORDER — SODIUM CHLORIDE 9 MG/ML
5-250 INJECTION, SOLUTION INTRAVENOUS PRN
Status: CANCELLED | OUTPATIENT
Start: 2025-05-09

## 2025-05-09 RX ORDER — ACETAMINOPHEN 325 MG/1
650 TABLET ORAL ONCE
Status: CANCELLED | OUTPATIENT
Start: 2025-05-09 | End: 2025-05-09

## 2025-05-09 RX ORDER — ACETAMINOPHEN 325 MG/1
650 TABLET ORAL
Status: CANCELLED | OUTPATIENT
Start: 2025-05-09

## 2025-05-09 RX ORDER — ACETAMINOPHEN 325 MG/1
650 TABLET ORAL ONCE
Status: COMPLETED | OUTPATIENT
Start: 2025-05-09 | End: 2025-05-09

## 2025-05-09 RX ORDER — ALBUTEROL SULFATE 90 UG/1
4 INHALANT RESPIRATORY (INHALATION) PRN
Status: CANCELLED | OUTPATIENT
Start: 2025-05-09

## 2025-05-09 RX ORDER — HYDROCORTISONE SODIUM SUCCINATE 100 MG/2ML
100 INJECTION INTRAMUSCULAR; INTRAVENOUS
Status: CANCELLED | OUTPATIENT
Start: 2025-05-09

## 2025-05-09 RX ORDER — DIPHENHYDRAMINE HYDROCHLORIDE 50 MG/ML
50 INJECTION, SOLUTION INTRAMUSCULAR; INTRAVENOUS
Status: CANCELLED | OUTPATIENT
Start: 2025-05-09

## 2025-05-09 RX ORDER — DIPHENHYDRAMINE HCL 25 MG
25 TABLET ORAL ONCE
Status: COMPLETED | OUTPATIENT
Start: 2025-05-09 | End: 2025-05-09

## 2025-05-09 RX ORDER — HEPARIN 100 UNIT/ML
500 SYRINGE INTRAVENOUS PRN
Status: CANCELLED | OUTPATIENT
Start: 2025-05-09

## 2025-05-09 RX ORDER — HYDROCORTISONE SODIUM SUCCINATE 100 MG/2ML
100 INJECTION INTRAMUSCULAR; INTRAVENOUS ONCE
Status: CANCELLED
Start: 2025-05-09 | End: 2025-05-09

## 2025-05-09 RX ORDER — EPINEPHRINE 1 MG/ML
0.3 INJECTION, SOLUTION, CONCENTRATE INTRAVENOUS PRN
Status: CANCELLED | OUTPATIENT
Start: 2025-05-09

## 2025-05-09 RX ORDER — SODIUM CHLORIDE 0.9 % (FLUSH) 0.9 %
5-40 SYRINGE (ML) INJECTION PRN
Status: DISCONTINUED | OUTPATIENT
Start: 2025-05-09 | End: 2025-05-10 | Stop reason: HOSPADM

## 2025-05-09 RX ORDER — SODIUM CHLORIDE 0.9 % (FLUSH) 0.9 %
5-40 SYRINGE (ML) INJECTION PRN
Status: CANCELLED | OUTPATIENT
Start: 2025-05-09

## 2025-05-09 RX ORDER — DIPHENHYDRAMINE HCL 25 MG
25 TABLET ORAL ONCE
Status: CANCELLED | OUTPATIENT
Start: 2025-05-09 | End: 2025-05-09

## 2025-05-09 RX ORDER — SODIUM CHLORIDE 9 MG/ML
INJECTION, SOLUTION INTRAVENOUS CONTINUOUS
Status: CANCELLED | OUTPATIENT
Start: 2025-05-09

## 2025-05-09 RX ORDER — HEPARIN 100 UNIT/ML
500 SYRINGE INTRAVENOUS PRN
Status: DISCONTINUED | OUTPATIENT
Start: 2025-05-09 | End: 2025-05-10 | Stop reason: HOSPADM

## 2025-05-09 RX ORDER — HYDROCORTISONE SODIUM SUCCINATE 100 MG/2ML
100 INJECTION INTRAMUSCULAR; INTRAVENOUS ONCE
Status: COMPLETED | OUTPATIENT
Start: 2025-05-09 | End: 2025-05-09

## 2025-05-09 RX ORDER — ONDANSETRON 2 MG/ML
8 INJECTION INTRAMUSCULAR; INTRAVENOUS
Status: CANCELLED | OUTPATIENT
Start: 2025-05-09

## 2025-05-09 RX ORDER — MEPERIDINE HYDROCHLORIDE 25 MG/ML
12.5 INJECTION INTRAMUSCULAR; INTRAVENOUS; SUBCUTANEOUS PRN
Status: CANCELLED | OUTPATIENT
Start: 2025-05-09

## 2025-05-09 RX ADMIN — DIPHENHYDRAMINE HYDROCHLORIDE 25 MG: 25 TABLET ORAL at 07:48

## 2025-05-09 RX ADMIN — ACETAMINOPHEN 650 MG: 325 TABLET ORAL at 07:48

## 2025-05-09 RX ADMIN — HEPARIN 500 UNITS: 100 SYRINGE at 09:20

## 2025-05-09 RX ADMIN — HYDROCORTISONE SODIUM SUCCINATE 100 MG: 100 INJECTION, POWDER, FOR SOLUTION INTRAMUSCULAR; INTRAVENOUS at 07:49

## 2025-05-09 RX ADMIN — IMMUNE GLOBULIN (HUMAN) 25 G: 10 INJECTION INTRAVENOUS; SUBCUTANEOUS at 08:10

## 2025-05-09 RX ADMIN — SODIUM CHLORIDE, PRESERVATIVE FREE 20 ML: 5 INJECTION INTRAVENOUS at 09:20

## 2025-05-09 NOTE — PROGRESS NOTES
Port accessed on arrival to [facility] using sterile technique. Brisk blood return noted. Premeds given as ordered. Gamunex- C 25g infusion completed. Port de-accessed. Pt tolerated well.

## 2025-05-10 LAB
BACTERIA BLD CULT ORG #2: NORMAL
BACTERIA BLD CULT: NORMAL

## 2025-05-12 RX ORDER — MEPERIDINE HYDROCHLORIDE 25 MG/ML
12.5 INJECTION INTRAMUSCULAR; INTRAVENOUS; SUBCUTANEOUS PRN
OUTPATIENT
Start: 2025-05-16

## 2025-05-12 RX ORDER — EPINEPHRINE 1 MG/ML
0.3 INJECTION, SOLUTION, CONCENTRATE INTRAVENOUS PRN
OUTPATIENT
Start: 2025-05-16

## 2025-05-12 RX ORDER — DIPHENHYDRAMINE HYDROCHLORIDE 50 MG/ML
50 INJECTION, SOLUTION INTRAMUSCULAR; INTRAVENOUS
OUTPATIENT
Start: 2025-05-16

## 2025-05-12 RX ORDER — ACETAMINOPHEN 325 MG/1
650 TABLET ORAL
OUTPATIENT
Start: 2025-05-16

## 2025-05-12 RX ORDER — HYDROCORTISONE SODIUM SUCCINATE 100 MG/2ML
100 INJECTION INTRAMUSCULAR; INTRAVENOUS ONCE
Status: CANCELLED
Start: 2025-05-16 | End: 2025-05-16

## 2025-05-12 RX ORDER — SODIUM CHLORIDE 9 MG/ML
INJECTION, SOLUTION INTRAVENOUS CONTINUOUS
OUTPATIENT
Start: 2025-05-16

## 2025-05-12 RX ORDER — ONDANSETRON 2 MG/ML
8 INJECTION INTRAMUSCULAR; INTRAVENOUS
OUTPATIENT
Start: 2025-05-16

## 2025-05-12 RX ORDER — SODIUM CHLORIDE 0.9 % (FLUSH) 0.9 %
5-40 SYRINGE (ML) INJECTION PRN
Status: CANCELLED | OUTPATIENT
Start: 2025-05-16

## 2025-05-12 RX ORDER — DIPHENHYDRAMINE HCL 25 MG
25 TABLET ORAL ONCE
Status: CANCELLED | OUTPATIENT
Start: 2025-05-16 | End: 2025-05-16

## 2025-05-12 RX ORDER — SODIUM CHLORIDE 9 MG/ML
5-250 INJECTION, SOLUTION INTRAVENOUS PRN
Status: CANCELLED | OUTPATIENT
Start: 2025-05-16

## 2025-05-12 RX ORDER — HEPARIN 100 UNIT/ML
500 SYRINGE INTRAVENOUS PRN
Status: CANCELLED | OUTPATIENT
Start: 2025-05-16

## 2025-05-12 RX ORDER — ACETAMINOPHEN 325 MG/1
650 TABLET ORAL ONCE
Status: CANCELLED | OUTPATIENT
Start: 2025-05-16 | End: 2025-05-16

## 2025-05-12 RX ORDER — SODIUM CHLORIDE 9 MG/ML
5-250 INJECTION, SOLUTION INTRAVENOUS PRN
OUTPATIENT
Start: 2025-05-16

## 2025-05-12 RX ORDER — ALBUTEROL SULFATE 90 UG/1
4 INHALANT RESPIRATORY (INHALATION) PRN
OUTPATIENT
Start: 2025-05-16

## 2025-05-12 RX ORDER — HYDROCORTISONE SODIUM SUCCINATE 100 MG/2ML
100 INJECTION INTRAMUSCULAR; INTRAVENOUS
OUTPATIENT
Start: 2025-05-16

## 2025-05-13 DIAGNOSIS — G43.719 INTRACTABLE CHRONIC MIGRAINE WITHOUT AURA AND WITHOUT STATUS MIGRAINOSUS: ICD-10-CM

## 2025-05-13 DIAGNOSIS — M54.30 SCIATICA, UNSPECIFIED LATERALITY: ICD-10-CM

## 2025-05-13 DIAGNOSIS — M79.7 FIBROMYALGIA: ICD-10-CM

## 2025-05-13 DIAGNOSIS — G35 MULTIPLE SCLEROSIS (HCC): ICD-10-CM

## 2025-05-14 RX ORDER — OXYCODONE HYDROCHLORIDE 10 MG/1
10 TABLET ORAL EVERY 6 HOURS PRN
Qty: 120 TABLET | Refills: 0 | Status: SHIPPED | OUTPATIENT
Start: 2025-05-15 | End: 2025-05-16 | Stop reason: SDUPTHER

## 2025-05-14 NOTE — TELEPHONE ENCOUNTER
Ramiro Mendoza has requested a refill on her medication.      Last office visit : 3/18/2025   Next office visit : 6/26/2025   Last medication refill : 04/16/2025  Daniel : 03/13/2025      Requested Prescriptions     Pending Prescriptions Disp Refills    oxyCODONE HCl (OXY-IR) 10 MG immediate release tablet 120 tablet 0     Sig: Take 1 tablet by mouth every 6 hours as needed for Pain for up to 30 days. Intended supply: 30 days Max Daily Amount: 40 mg

## 2025-05-15 ASSESSMENT — ENCOUNTER SYMPTOMS
COLOR CHANGE: 0
NAUSEA: 0
SHORTNESS OF BREATH: 0
DIARRHEA: 0
RHINORRHEA: 0
COUGH: 0
ABDOMINAL PAIN: 0
VOMITING: 0
BACK PAIN: 0

## 2025-05-16 ENCOUNTER — HOSPITAL ENCOUNTER (OUTPATIENT)
Dept: INFUSION THERAPY | Age: 50
Setting detail: INFUSION SERIES
Discharge: HOME | End: 2025-05-16
Payer: MEDICARE

## 2025-05-16 VITALS
WEIGHT: 225 LBS | OXYGEN SATURATION: 100 % | SYSTOLIC BLOOD PRESSURE: 129 MMHG | DIASTOLIC BLOOD PRESSURE: 82 MMHG | HEART RATE: 82 BPM | RESPIRATION RATE: 18 BRPM | BODY MASS INDEX: 37.44 KG/M2

## 2025-05-16 DIAGNOSIS — G35 EXACERBATION OF MULTIPLE SCLEROSIS (HCC): Primary | ICD-10-CM

## 2025-05-16 DIAGNOSIS — G35 MULTIPLE SCLEROSIS (HCC): ICD-10-CM

## 2025-05-16 DIAGNOSIS — G43.719 INTRACTABLE CHRONIC MIGRAINE WITHOUT AURA AND WITHOUT STATUS MIGRAINOSUS: ICD-10-CM

## 2025-05-16 DIAGNOSIS — M32.9 SYSTEMIC LUPUS ERYTHEMATOSUS, UNSPECIFIED SLE TYPE, UNSPECIFIED ORGAN INVOLVEMENT STATUS (HCC): ICD-10-CM

## 2025-05-16 DIAGNOSIS — M79.7 FIBROMYALGIA: ICD-10-CM

## 2025-05-16 DIAGNOSIS — M54.30 SCIATICA, UNSPECIFIED LATERALITY: ICD-10-CM

## 2025-05-16 PROCEDURE — 96375 TX/PRO/DX INJ NEW DRUG ADDON: CPT

## 2025-05-16 PROCEDURE — 6370000000 HC RX 637 (ALT 250 FOR IP): Performed by: PSYCHIATRY & NEUROLOGY

## 2025-05-16 PROCEDURE — 96365 THER/PROPH/DIAG IV INF INIT: CPT

## 2025-05-16 PROCEDURE — 6360000002 HC RX W HCPCS: Performed by: PSYCHIATRY & NEUROLOGY

## 2025-05-16 PROCEDURE — 2500000003 HC RX 250 WO HCPCS: Performed by: PSYCHIATRY & NEUROLOGY

## 2025-05-16 RX ORDER — ACETAMINOPHEN 325 MG/1
650 TABLET ORAL
OUTPATIENT
Start: 2025-05-23

## 2025-05-16 RX ORDER — SODIUM CHLORIDE 0.9 % (FLUSH) 0.9 %
5-40 SYRINGE (ML) INJECTION PRN
Status: DISCONTINUED | OUTPATIENT
Start: 2025-05-16 | End: 2025-05-17 | Stop reason: HOSPADM

## 2025-05-16 RX ORDER — EPINEPHRINE 1 MG/ML
0.3 INJECTION, SOLUTION, CONCENTRATE INTRAVENOUS PRN
OUTPATIENT
Start: 2025-05-23

## 2025-05-16 RX ORDER — ACETAMINOPHEN 325 MG/1
650 TABLET ORAL ONCE
OUTPATIENT
Start: 2025-05-23 | End: 2025-05-23

## 2025-05-16 RX ORDER — OXYCODONE HYDROCHLORIDE 10 MG/1
10 TABLET ORAL EVERY 6 HOURS PRN
Qty: 120 TABLET | Refills: 0 | Status: SHIPPED | OUTPATIENT
Start: 2025-05-16 | End: 2025-06-15

## 2025-05-16 RX ORDER — ALBUTEROL SULFATE 90 UG/1
4 INHALANT RESPIRATORY (INHALATION) PRN
OUTPATIENT
Start: 2025-05-23

## 2025-05-16 RX ORDER — HYDROCORTISONE SODIUM SUCCINATE 100 MG/2ML
100 INJECTION INTRAMUSCULAR; INTRAVENOUS ONCE
Status: COMPLETED | OUTPATIENT
Start: 2025-05-16 | End: 2025-05-16

## 2025-05-16 RX ORDER — DIPHENHYDRAMINE HYDROCHLORIDE 50 MG/ML
50 INJECTION, SOLUTION INTRAMUSCULAR; INTRAVENOUS
OUTPATIENT
Start: 2025-05-23

## 2025-05-16 RX ORDER — MEPERIDINE HYDROCHLORIDE 25 MG/ML
12.5 INJECTION INTRAMUSCULAR; INTRAVENOUS; SUBCUTANEOUS PRN
OUTPATIENT
Start: 2025-05-23

## 2025-05-16 RX ORDER — RIZATRIPTAN BENZOATE 10 MG/1
TABLET ORAL
Qty: 9 TABLET | Refills: 5 | Status: SHIPPED | OUTPATIENT
Start: 2025-05-16

## 2025-05-16 RX ORDER — HYDROCORTISONE SODIUM SUCCINATE 100 MG/2ML
100 INJECTION INTRAMUSCULAR; INTRAVENOUS
OUTPATIENT
Start: 2025-05-23

## 2025-05-16 RX ORDER — DIPHENHYDRAMINE HCL 25 MG
25 TABLET ORAL ONCE
Status: COMPLETED | OUTPATIENT
Start: 2025-05-16 | End: 2025-05-16

## 2025-05-16 RX ORDER — SODIUM CHLORIDE 9 MG/ML
5-250 INJECTION, SOLUTION INTRAVENOUS PRN
OUTPATIENT
Start: 2025-05-23

## 2025-05-16 RX ORDER — SODIUM CHLORIDE 0.9 % (FLUSH) 0.9 %
5-40 SYRINGE (ML) INJECTION PRN
OUTPATIENT
Start: 2025-05-23

## 2025-05-16 RX ORDER — HYDROCORTISONE SODIUM SUCCINATE 100 MG/2ML
100 INJECTION INTRAMUSCULAR; INTRAVENOUS ONCE
Start: 2025-05-23 | End: 2025-05-23

## 2025-05-16 RX ORDER — ONDANSETRON 2 MG/ML
8 INJECTION INTRAMUSCULAR; INTRAVENOUS
OUTPATIENT
Start: 2025-05-23

## 2025-05-16 RX ORDER — ACETAMINOPHEN 325 MG/1
650 TABLET ORAL ONCE
Status: COMPLETED | OUTPATIENT
Start: 2025-05-16 | End: 2025-05-16

## 2025-05-16 RX ORDER — SODIUM CHLORIDE 9 MG/ML
5-250 INJECTION, SOLUTION INTRAVENOUS PRN
Status: DISCONTINUED | OUTPATIENT
Start: 2025-05-16 | End: 2025-05-17 | Stop reason: HOSPADM

## 2025-05-16 RX ORDER — HEPARIN 100 UNIT/ML
500 SYRINGE INTRAVENOUS PRN
OUTPATIENT
Start: 2025-05-23

## 2025-05-16 RX ORDER — DIPHENHYDRAMINE HCL 25 MG
25 TABLET ORAL ONCE
OUTPATIENT
Start: 2025-05-23 | End: 2025-05-23

## 2025-05-16 RX ORDER — SODIUM CHLORIDE 9 MG/ML
INJECTION, SOLUTION INTRAVENOUS CONTINUOUS
OUTPATIENT
Start: 2025-05-23

## 2025-05-16 RX ORDER — HEPARIN 100 UNIT/ML
500 SYRINGE INTRAVENOUS PRN
Status: DISCONTINUED | OUTPATIENT
Start: 2025-05-16 | End: 2025-05-17 | Stop reason: HOSPADM

## 2025-05-16 RX ADMIN — HYDROCORTISONE SODIUM SUCCINATE 100 MG: 100 INJECTION, POWDER, FOR SOLUTION INTRAMUSCULAR; INTRAVENOUS at 14:54

## 2025-05-16 RX ADMIN — SODIUM CHLORIDE, PRESERVATIVE FREE 10 ML: 5 INJECTION INTRAVENOUS at 14:55

## 2025-05-16 RX ADMIN — DIPHENHYDRAMINE HYDROCHLORIDE 25 MG: 25 TABLET ORAL at 14:31

## 2025-05-16 RX ADMIN — ACETAMINOPHEN 650 MG: 325 TABLET ORAL at 14:31

## 2025-05-16 RX ADMIN — IMMUNE GLOBULIN (HUMAN) 25 G: 10 INJECTION INTRAVENOUS; SUBCUTANEOUS at 14:59

## 2025-05-16 RX ADMIN — SODIUM CHLORIDE, PRESERVATIVE FREE 20 ML: 5 INJECTION INTRAVENOUS at 16:14

## 2025-05-16 RX ADMIN — HEPARIN 500 UNITS: 100 SYRINGE at 16:14

## 2025-05-16 NOTE — TELEPHONE ENCOUNTER
Ramiro Mendoza has requested a refill on her medication.      Last office visit : 3/18/2025   Next office visit : 6/26/2025   Last medication refill : oxy 05/15/25 maxalt 04/16/25 R5  Daniel : 03/13/2025      Requested Prescriptions     Pending Prescriptions Disp Refills    rizatriptan (MAXALT) 10 MG tablet 9 tablet 5     Sig: TAKE 1 TABLET BY MOUTH 1 TIME AS NEEDED FOR MIGRAINE. MAX 2 IN 24 HOURS. MAY REPEAT IN 2 HOURS AS NEEDED    oxyCODONE HCl (OXY-IR) 10 MG immediate release tablet 120 tablet 0     Sig: Take 1 tablet by mouth every 6 hours as needed for Pain for up to 30 days. Intended supply: 30 days Max Daily Amount: 40 mg

## 2025-05-19 ENCOUNTER — HOSPITAL ENCOUNTER (OUTPATIENT)
Dept: INFUSION THERAPY | Age: 50
Setting detail: INFUSION SERIES
Discharge: HOME | End: 2025-05-19
Payer: MEDICARE

## 2025-05-19 VITALS
DIASTOLIC BLOOD PRESSURE: 81 MMHG | TEMPERATURE: 98.1 F | HEART RATE: 81 BPM | SYSTOLIC BLOOD PRESSURE: 126 MMHG | OXYGEN SATURATION: 100 % | RESPIRATION RATE: 16 BRPM

## 2025-05-19 DIAGNOSIS — G35 MULTIPLE SCLEROSIS (HCC): Primary | ICD-10-CM

## 2025-05-19 PROCEDURE — 2500000003 HC RX 250 WO HCPCS: Performed by: PSYCHIATRY & NEUROLOGY

## 2025-05-19 PROCEDURE — 6360000002 HC RX W HCPCS: Performed by: PSYCHIATRY & NEUROLOGY

## 2025-05-19 PROCEDURE — 6370000000 HC RX 637 (ALT 250 FOR IP): Performed by: PSYCHIATRY & NEUROLOGY

## 2025-05-19 PROCEDURE — 96366 THER/PROPH/DIAG IV INF ADDON: CPT

## 2025-05-19 PROCEDURE — 96365 THER/PROPH/DIAG IV INF INIT: CPT

## 2025-05-19 RX ORDER — HEPARIN 100 UNIT/ML
500 SYRINGE INTRAVENOUS PRN
Status: CANCELLED | OUTPATIENT
Start: 2025-05-20

## 2025-05-19 RX ORDER — RIZATRIPTAN BENZOATE 10 MG/1
TABLET ORAL
Qty: 9 TABLET | Refills: 5 | Status: SHIPPED | OUTPATIENT
Start: 2025-05-19

## 2025-05-19 RX ORDER — ACETAMINOPHEN 325 MG/1
650 TABLET ORAL
Status: CANCELLED | OUTPATIENT
Start: 2025-05-20

## 2025-05-19 RX ORDER — DIPHENHYDRAMINE HCL 25 MG
25 TABLET ORAL ONCE
Status: CANCELLED | OUTPATIENT
Start: 2025-05-20 | End: 2025-05-20

## 2025-05-19 RX ORDER — SODIUM CHLORIDE 0.9 % (FLUSH) 0.9 %
5-40 SYRINGE (ML) INJECTION PRN
Status: CANCELLED | OUTPATIENT
Start: 2025-05-20

## 2025-05-19 RX ORDER — EPINEPHRINE 1 MG/ML
0.3 INJECTION, SOLUTION, CONCENTRATE INTRAVENOUS PRN
Status: CANCELLED | OUTPATIENT
Start: 2025-05-20

## 2025-05-19 RX ORDER — DIPHENHYDRAMINE HCL 25 MG
25 TABLET ORAL ONCE
Status: COMPLETED | OUTPATIENT
Start: 2025-05-19 | End: 2025-05-19

## 2025-05-19 RX ORDER — ACETAMINOPHEN 325 MG/1
650 TABLET ORAL ONCE
Status: COMPLETED | OUTPATIENT
Start: 2025-05-19 | End: 2025-05-19

## 2025-05-19 RX ORDER — SODIUM CHLORIDE 0.9 % (FLUSH) 0.9 %
5-40 SYRINGE (ML) INJECTION PRN
Status: DISCONTINUED | OUTPATIENT
Start: 2025-05-19 | End: 2025-05-20 | Stop reason: HOSPADM

## 2025-05-19 RX ORDER — HYDROCORTISONE SODIUM SUCCINATE 100 MG/2ML
100 INJECTION INTRAMUSCULAR; INTRAVENOUS
Status: CANCELLED | OUTPATIENT
Start: 2025-05-20

## 2025-05-19 RX ORDER — HEPARIN 100 UNIT/ML
500 SYRINGE INTRAVENOUS PRN
Status: DISCONTINUED | OUTPATIENT
Start: 2025-05-19 | End: 2025-05-20 | Stop reason: HOSPADM

## 2025-05-19 RX ORDER — SODIUM CHLORIDE 9 MG/ML
5-250 INJECTION, SOLUTION INTRAVENOUS ONCE
Status: CANCELLED | OUTPATIENT
Start: 2025-05-20 | End: 2025-05-20

## 2025-05-19 RX ORDER — ACETAMINOPHEN 325 MG/1
650 TABLET ORAL ONCE
Status: CANCELLED | OUTPATIENT
Start: 2025-05-20 | End: 2025-05-20

## 2025-05-19 RX ORDER — ONDANSETRON 2 MG/ML
8 INJECTION INTRAMUSCULAR; INTRAVENOUS
Status: CANCELLED | OUTPATIENT
Start: 2025-05-20

## 2025-05-19 RX ORDER — DIPHENHYDRAMINE HYDROCHLORIDE 50 MG/ML
50 INJECTION, SOLUTION INTRAMUSCULAR; INTRAVENOUS
Status: CANCELLED | OUTPATIENT
Start: 2025-05-20

## 2025-05-19 RX ORDER — ALBUTEROL SULFATE 90 UG/1
4 INHALANT RESPIRATORY (INHALATION) PRN
Status: CANCELLED | OUTPATIENT
Start: 2025-05-20

## 2025-05-19 RX ORDER — MEPERIDINE HYDROCHLORIDE 25 MG/ML
12.5 INJECTION INTRAMUSCULAR; INTRAVENOUS; SUBCUTANEOUS PRN
Status: CANCELLED | OUTPATIENT
Start: 2025-05-20

## 2025-05-19 RX ORDER — SODIUM CHLORIDE 9 MG/ML
INJECTION, SOLUTION INTRAVENOUS CONTINUOUS
Status: CANCELLED | OUTPATIENT
Start: 2025-05-20

## 2025-05-19 RX ADMIN — HEPARIN 500 UNITS: 100 SYRINGE at 09:39

## 2025-05-19 RX ADMIN — DIPHENHYDRAMINE HYDROCHLORIDE 25 MG: 25 TABLET ORAL at 08:01

## 2025-05-19 RX ADMIN — SODIUM CHLORIDE, PRESERVATIVE FREE 20 ML: 5 INJECTION INTRAVENOUS at 09:39

## 2025-05-19 RX ADMIN — IMMUNE GLOBULIN (HUMAN) 25 G: 10 INJECTION INTRAVENOUS; SUBCUTANEOUS at 08:07

## 2025-05-19 RX ADMIN — ACETAMINOPHEN 650 MG: 325 TABLET ORAL at 08:02

## 2025-05-19 NOTE — PROGRESS NOTES
Pt arrived to [facility] with port accessed. Brisk blood return noted. Premeds given as ordered. Gamunex- C 25g infusion completed. Pt tolerated well. Port left accessed for tomorrow's infusion.

## 2025-05-19 NOTE — TELEPHONE ENCOUNTER
Ramiro Mendoza has requested a refill on her medication.      Last office visit : 3/18/2025   Next office visit : 6/26/2025   Last medication refill : 05/16/2025 R5      Requested Prescriptions     Pending Prescriptions Disp Refills    rizatriptan (MAXALT) 10 MG tablet [Pharmacy Med Name: RIZATRIPTAN BENZOATE 10MG TABLET] 9 tablet 5     Sig: TAKE 1 TABLET BY MOUTH 1 TIME AS NEEDED FOR MIGRAINE. MAX 2 IN 24 HOURS. MAY REPEAT IN 2 HOURS AS NEEDED

## 2025-05-20 ENCOUNTER — HOSPITAL ENCOUNTER (OUTPATIENT)
Dept: INFUSION THERAPY | Age: 50
Setting detail: INFUSION SERIES
Discharge: HOME | End: 2025-05-20
Payer: MEDICARE

## 2025-05-20 VITALS
DIASTOLIC BLOOD PRESSURE: 76 MMHG | HEART RATE: 83 BPM | SYSTOLIC BLOOD PRESSURE: 126 MMHG | TEMPERATURE: 97.7 F | OXYGEN SATURATION: 100 % | RESPIRATION RATE: 18 BRPM

## 2025-05-20 DIAGNOSIS — G35 MULTIPLE SCLEROSIS (HCC): Primary | ICD-10-CM

## 2025-05-20 PROCEDURE — 96365 THER/PROPH/DIAG IV INF INIT: CPT

## 2025-05-20 PROCEDURE — 6370000000 HC RX 637 (ALT 250 FOR IP): Performed by: PSYCHIATRY & NEUROLOGY

## 2025-05-20 PROCEDURE — 6360000002 HC RX W HCPCS: Performed by: PSYCHIATRY & NEUROLOGY

## 2025-05-20 PROCEDURE — 2500000003 HC RX 250 WO HCPCS: Performed by: PSYCHIATRY & NEUROLOGY

## 2025-05-20 RX ORDER — ACETAMINOPHEN 325 MG/1
650 TABLET ORAL ONCE
Status: COMPLETED | OUTPATIENT
Start: 2025-05-20 | End: 2025-05-20

## 2025-05-20 RX ORDER — DIPHENHYDRAMINE HCL 25 MG
25 TABLET ORAL ONCE
Status: CANCELLED | OUTPATIENT
Start: 2025-05-21 | End: 2025-05-21

## 2025-05-20 RX ORDER — ACETAMINOPHEN 325 MG/1
650 TABLET ORAL ONCE
Status: CANCELLED | OUTPATIENT
Start: 2025-05-21 | End: 2025-05-21

## 2025-05-20 RX ORDER — HYDROCORTISONE SODIUM SUCCINATE 100 MG/2ML
100 INJECTION INTRAMUSCULAR; INTRAVENOUS
Status: CANCELLED | OUTPATIENT
Start: 2025-05-21

## 2025-05-20 RX ORDER — HEPARIN 100 UNIT/ML
500 SYRINGE INTRAVENOUS PRN
Status: CANCELLED | OUTPATIENT
Start: 2025-05-21

## 2025-05-20 RX ORDER — ALBUTEROL SULFATE 90 UG/1
4 INHALANT RESPIRATORY (INHALATION) PRN
Status: CANCELLED | OUTPATIENT
Start: 2025-05-21

## 2025-05-20 RX ORDER — SODIUM CHLORIDE 0.9 % (FLUSH) 0.9 %
5-40 SYRINGE (ML) INJECTION PRN
Status: CANCELLED | OUTPATIENT
Start: 2025-05-21

## 2025-05-20 RX ORDER — EPINEPHRINE 1 MG/ML
0.3 INJECTION, SOLUTION, CONCENTRATE INTRAVENOUS PRN
Status: CANCELLED | OUTPATIENT
Start: 2025-05-21

## 2025-05-20 RX ORDER — DIPHENHYDRAMINE HCL 25 MG
25 TABLET ORAL ONCE
Status: COMPLETED | OUTPATIENT
Start: 2025-05-20 | End: 2025-05-20

## 2025-05-20 RX ORDER — SODIUM CHLORIDE 0.9 % (FLUSH) 0.9 %
5-40 SYRINGE (ML) INJECTION PRN
Status: DISCONTINUED | OUTPATIENT
Start: 2025-05-20 | End: 2025-05-21 | Stop reason: HOSPADM

## 2025-05-20 RX ORDER — MEPERIDINE HYDROCHLORIDE 25 MG/ML
12.5 INJECTION INTRAMUSCULAR; INTRAVENOUS; SUBCUTANEOUS PRN
Status: CANCELLED | OUTPATIENT
Start: 2025-05-21

## 2025-05-20 RX ORDER — ACETAMINOPHEN 325 MG/1
650 TABLET ORAL
Status: CANCELLED | OUTPATIENT
Start: 2025-05-21

## 2025-05-20 RX ORDER — ONDANSETRON 2 MG/ML
8 INJECTION INTRAMUSCULAR; INTRAVENOUS
Status: CANCELLED | OUTPATIENT
Start: 2025-05-21

## 2025-05-20 RX ORDER — HEPARIN 100 UNIT/ML
500 SYRINGE INTRAVENOUS PRN
Status: DISCONTINUED | OUTPATIENT
Start: 2025-05-20 | End: 2025-05-21 | Stop reason: HOSPADM

## 2025-05-20 RX ORDER — SODIUM CHLORIDE 9 MG/ML
INJECTION, SOLUTION INTRAVENOUS CONTINUOUS
Status: CANCELLED | OUTPATIENT
Start: 2025-05-21

## 2025-05-20 RX ORDER — DIPHENHYDRAMINE HYDROCHLORIDE 50 MG/ML
50 INJECTION, SOLUTION INTRAMUSCULAR; INTRAVENOUS
Status: CANCELLED | OUTPATIENT
Start: 2025-05-21

## 2025-05-20 RX ORDER — SODIUM CHLORIDE 9 MG/ML
5-250 INJECTION, SOLUTION INTRAVENOUS ONCE
Status: CANCELLED | OUTPATIENT
Start: 2025-05-21 | End: 2025-05-21

## 2025-05-20 RX ADMIN — DIPHENHYDRAMINE HYDROCHLORIDE 25 MG: 25 TABLET ORAL at 08:36

## 2025-05-20 RX ADMIN — HEPARIN 500 UNITS: 100 SYRINGE at 10:28

## 2025-05-20 RX ADMIN — SODIUM CHLORIDE, PRESERVATIVE FREE 20 ML: 5 INJECTION INTRAVENOUS at 10:28

## 2025-05-20 RX ADMIN — ACETAMINOPHEN 650 MG: 325 TABLET ORAL at 08:36

## 2025-05-20 RX ADMIN — IMMUNE GLOBULIN (HUMAN) 25 G: 10 INJECTION INTRAVENOUS; SUBCUTANEOUS at 09:10

## 2025-05-20 NOTE — PROGRESS NOTES
Pt arrived to [facility] with port accessed from yesterday's tx. Brisk blood return noted. Pt received Tylenol and Benadryl for pre-meds, followed by 25g Gamunex-C. Tolerated well.    Electronically signed by Shabnam Huddleston RN on 5/20/2025 at 12:52 PM

## 2025-05-21 ENCOUNTER — HOSPITAL ENCOUNTER (OUTPATIENT)
Dept: INFUSION THERAPY | Age: 50
Setting detail: INFUSION SERIES
Discharge: HOME | End: 2025-05-21
Payer: MEDICARE

## 2025-05-21 VITALS
HEART RATE: 87 BPM | OXYGEN SATURATION: 100 % | TEMPERATURE: 97.8 F | DIASTOLIC BLOOD PRESSURE: 76 MMHG | RESPIRATION RATE: 18 BRPM | SYSTOLIC BLOOD PRESSURE: 122 MMHG

## 2025-05-21 DIAGNOSIS — G35 MULTIPLE SCLEROSIS (HCC): Primary | ICD-10-CM

## 2025-05-21 PROCEDURE — 6360000002 HC RX W HCPCS: Performed by: PSYCHIATRY & NEUROLOGY

## 2025-05-21 PROCEDURE — 96365 THER/PROPH/DIAG IV INF INIT: CPT

## 2025-05-21 PROCEDURE — 2500000003 HC RX 250 WO HCPCS: Performed by: PSYCHIATRY & NEUROLOGY

## 2025-05-21 PROCEDURE — 6370000000 HC RX 637 (ALT 250 FOR IP): Performed by: PSYCHIATRY & NEUROLOGY

## 2025-05-21 RX ORDER — SODIUM CHLORIDE 0.9 % (FLUSH) 0.9 %
5-40 SYRINGE (ML) INJECTION PRN
Status: CANCELLED | OUTPATIENT
Start: 2025-05-22

## 2025-05-21 RX ORDER — MEPERIDINE HYDROCHLORIDE 25 MG/ML
12.5 INJECTION INTRAMUSCULAR; INTRAVENOUS; SUBCUTANEOUS PRN
Status: CANCELLED | OUTPATIENT
Start: 2025-05-22

## 2025-05-21 RX ORDER — SODIUM CHLORIDE 0.9 % (FLUSH) 0.9 %
5-40 SYRINGE (ML) INJECTION PRN
Status: DISCONTINUED | OUTPATIENT
Start: 2025-05-21 | End: 2025-05-22 | Stop reason: HOSPADM

## 2025-05-21 RX ORDER — DIPHENHYDRAMINE HCL 25 MG
25 TABLET ORAL ONCE
Status: CANCELLED | OUTPATIENT
Start: 2025-05-22 | End: 2025-05-22

## 2025-05-21 RX ORDER — DIPHENHYDRAMINE HYDROCHLORIDE 50 MG/ML
50 INJECTION, SOLUTION INTRAMUSCULAR; INTRAVENOUS
Status: CANCELLED | OUTPATIENT
Start: 2025-05-22

## 2025-05-21 RX ORDER — SODIUM CHLORIDE 9 MG/ML
5-250 INJECTION, SOLUTION INTRAVENOUS ONCE
Status: CANCELLED | OUTPATIENT
Start: 2025-05-22 | End: 2025-05-22

## 2025-05-21 RX ORDER — ONDANSETRON 2 MG/ML
8 INJECTION INTRAMUSCULAR; INTRAVENOUS
Status: CANCELLED | OUTPATIENT
Start: 2025-05-22

## 2025-05-21 RX ORDER — EPINEPHRINE 1 MG/ML
0.3 INJECTION, SOLUTION, CONCENTRATE INTRAVENOUS PRN
Status: CANCELLED | OUTPATIENT
Start: 2025-05-22

## 2025-05-21 RX ORDER — ACETAMINOPHEN 325 MG/1
650 TABLET ORAL ONCE
Status: CANCELLED | OUTPATIENT
Start: 2025-05-22 | End: 2025-05-22

## 2025-05-21 RX ORDER — SODIUM CHLORIDE 9 MG/ML
INJECTION, SOLUTION INTRAVENOUS CONTINUOUS
Status: CANCELLED | OUTPATIENT
Start: 2025-05-22

## 2025-05-21 RX ORDER — HYDROCORTISONE SODIUM SUCCINATE 100 MG/2ML
100 INJECTION INTRAMUSCULAR; INTRAVENOUS
Status: CANCELLED | OUTPATIENT
Start: 2025-05-22

## 2025-05-21 RX ORDER — HEPARIN 100 UNIT/ML
500 SYRINGE INTRAVENOUS PRN
Status: CANCELLED | OUTPATIENT
Start: 2025-05-22

## 2025-05-21 RX ORDER — ACETAMINOPHEN 325 MG/1
650 TABLET ORAL
Status: CANCELLED | OUTPATIENT
Start: 2025-05-22

## 2025-05-21 RX ORDER — ALBUTEROL SULFATE 90 UG/1
4 INHALANT RESPIRATORY (INHALATION) PRN
Status: CANCELLED | OUTPATIENT
Start: 2025-05-22

## 2025-05-21 RX ORDER — HEPARIN 100 UNIT/ML
500 SYRINGE INTRAVENOUS PRN
Status: DISCONTINUED | OUTPATIENT
Start: 2025-05-21 | End: 2025-05-22 | Stop reason: HOSPADM

## 2025-05-21 RX ORDER — DIPHENHYDRAMINE HCL 25 MG
25 TABLET ORAL ONCE
Status: COMPLETED | OUTPATIENT
Start: 2025-05-21 | End: 2025-05-21

## 2025-05-21 RX ORDER — ACETAMINOPHEN 325 MG/1
650 TABLET ORAL ONCE
Status: COMPLETED | OUTPATIENT
Start: 2025-05-21 | End: 2025-05-21

## 2025-05-21 RX ADMIN — SODIUM CHLORIDE, PRESERVATIVE FREE 10 ML: 5 INJECTION INTRAVENOUS at 08:37

## 2025-05-21 RX ADMIN — DIPHENHYDRAMINE HYDROCHLORIDE 25 MG: 25 TABLET ORAL at 07:15

## 2025-05-21 RX ADMIN — ACETAMINOPHEN 650 MG: 325 TABLET ORAL at 07:15

## 2025-05-21 RX ADMIN — IMMUNE GLOBULIN (HUMAN) 25 G: 10 INJECTION INTRAVENOUS; SUBCUTANEOUS at 07:19

## 2025-05-21 RX ADMIN — HEPARIN 500 UNITS: 100 SYRINGE at 08:37

## 2025-05-21 NOTE — PROGRESS NOTES
Pt arrived to [facility] with port accessed from yesterday's tx. Brisk blood return noted. Pt received Tylenol and Benadryl for pre-meds, followed by 25g Gamunex-C. Tolerated well.

## 2025-05-22 ENCOUNTER — HOSPITAL ENCOUNTER (OUTPATIENT)
Dept: INFUSION THERAPY | Age: 50
Setting detail: INFUSION SERIES
Discharge: HOME | End: 2025-05-22
Payer: MEDICARE

## 2025-05-22 VITALS
SYSTOLIC BLOOD PRESSURE: 130 MMHG | DIASTOLIC BLOOD PRESSURE: 89 MMHG | RESPIRATION RATE: 18 BRPM | OXYGEN SATURATION: 100 % | TEMPERATURE: 97.8 F | HEART RATE: 78 BPM

## 2025-05-22 DIAGNOSIS — G35 MULTIPLE SCLEROSIS (HCC): Primary | ICD-10-CM

## 2025-05-22 PROCEDURE — 6370000000 HC RX 637 (ALT 250 FOR IP): Performed by: PSYCHIATRY & NEUROLOGY

## 2025-05-22 PROCEDURE — 6360000002 HC RX W HCPCS: Performed by: PSYCHIATRY & NEUROLOGY

## 2025-05-22 PROCEDURE — 96365 THER/PROPH/DIAG IV INF INIT: CPT

## 2025-05-22 RX ORDER — HEPARIN 100 UNIT/ML
500 SYRINGE INTRAVENOUS PRN
Status: DISCONTINUED | OUTPATIENT
Start: 2025-05-22 | End: 2025-05-23 | Stop reason: HOSPADM

## 2025-05-22 RX ORDER — DIPHENHYDRAMINE HCL 25 MG
25 TABLET ORAL ONCE
Status: COMPLETED | OUTPATIENT
Start: 2025-05-22 | End: 2025-05-22

## 2025-05-22 RX ORDER — DIPHENHYDRAMINE HCL 25 MG
25 TABLET ORAL ONCE
Status: CANCELLED | OUTPATIENT
Start: 2025-05-23 | End: 2025-05-23

## 2025-05-22 RX ORDER — ALBUTEROL SULFATE 90 UG/1
4 INHALANT RESPIRATORY (INHALATION) PRN
Status: CANCELLED | OUTPATIENT
Start: 2025-05-23

## 2025-05-22 RX ORDER — MEPERIDINE HYDROCHLORIDE 25 MG/ML
12.5 INJECTION INTRAMUSCULAR; INTRAVENOUS; SUBCUTANEOUS PRN
Status: CANCELLED | OUTPATIENT
Start: 2025-05-23

## 2025-05-22 RX ORDER — ACETAMINOPHEN 325 MG/1
650 TABLET ORAL ONCE
Status: COMPLETED | OUTPATIENT
Start: 2025-05-22 | End: 2025-05-22

## 2025-05-22 RX ORDER — ACETAMINOPHEN 325 MG/1
650 TABLET ORAL ONCE
Status: CANCELLED | OUTPATIENT
Start: 2025-05-23 | End: 2025-05-23

## 2025-05-22 RX ORDER — SODIUM CHLORIDE 9 MG/ML
INJECTION, SOLUTION INTRAVENOUS CONTINUOUS
Status: CANCELLED | OUTPATIENT
Start: 2025-05-23

## 2025-05-22 RX ORDER — EPINEPHRINE 1 MG/ML
0.3 INJECTION, SOLUTION, CONCENTRATE INTRAVENOUS PRN
Status: CANCELLED | OUTPATIENT
Start: 2025-05-23

## 2025-05-22 RX ORDER — ONDANSETRON 2 MG/ML
8 INJECTION INTRAMUSCULAR; INTRAVENOUS
Status: CANCELLED | OUTPATIENT
Start: 2025-05-23

## 2025-05-22 RX ORDER — SODIUM CHLORIDE 9 MG/ML
5-250 INJECTION, SOLUTION INTRAVENOUS ONCE
Status: CANCELLED | OUTPATIENT
Start: 2025-05-23 | End: 2025-05-23

## 2025-05-22 RX ORDER — HYDROCORTISONE SODIUM SUCCINATE 100 MG/2ML
100 INJECTION INTRAMUSCULAR; INTRAVENOUS
Status: CANCELLED | OUTPATIENT
Start: 2025-05-23

## 2025-05-22 RX ORDER — SODIUM CHLORIDE 0.9 % (FLUSH) 0.9 %
5-40 SYRINGE (ML) INJECTION PRN
Status: CANCELLED | OUTPATIENT
Start: 2025-05-23

## 2025-05-22 RX ORDER — ACETAMINOPHEN 325 MG/1
650 TABLET ORAL
Status: CANCELLED | OUTPATIENT
Start: 2025-05-23

## 2025-05-22 RX ORDER — SODIUM CHLORIDE 0.9 % (FLUSH) 0.9 %
5-40 SYRINGE (ML) INJECTION PRN
Status: DISCONTINUED | OUTPATIENT
Start: 2025-05-22 | End: 2025-05-23 | Stop reason: HOSPADM

## 2025-05-22 RX ORDER — HEPARIN 100 UNIT/ML
500 SYRINGE INTRAVENOUS PRN
Status: CANCELLED | OUTPATIENT
Start: 2025-05-23

## 2025-05-22 RX ORDER — DIPHENHYDRAMINE HYDROCHLORIDE 50 MG/ML
50 INJECTION, SOLUTION INTRAMUSCULAR; INTRAVENOUS
Status: CANCELLED | OUTPATIENT
Start: 2025-05-23

## 2025-05-22 RX ADMIN — ACETAMINOPHEN 650 MG: 325 TABLET ORAL at 07:50

## 2025-05-22 RX ADMIN — IMMUNE GLOBULIN (HUMAN) 25 G: 10 INJECTION INTRAVENOUS; SUBCUTANEOUS at 08:03

## 2025-05-22 RX ADMIN — DIPHENHYDRAMINE HCL 25 MG: 25 TABLET ORAL at 07:51

## 2025-05-23 ENCOUNTER — HOSPITAL ENCOUNTER (OUTPATIENT)
Dept: INFUSION THERAPY | Age: 50
Setting detail: INFUSION SERIES
Discharge: HOME | End: 2025-05-23
Payer: MEDICARE

## 2025-05-23 VITALS
DIASTOLIC BLOOD PRESSURE: 79 MMHG | RESPIRATION RATE: 16 BRPM | TEMPERATURE: 97.3 F | OXYGEN SATURATION: 100 % | HEART RATE: 80 BPM | SYSTOLIC BLOOD PRESSURE: 136 MMHG

## 2025-05-23 DIAGNOSIS — G35 MULTIPLE SCLEROSIS (HCC): Primary | ICD-10-CM

## 2025-05-23 PROCEDURE — 96365 THER/PROPH/DIAG IV INF INIT: CPT

## 2025-05-23 PROCEDURE — 2500000003 HC RX 250 WO HCPCS: Performed by: PSYCHIATRY & NEUROLOGY

## 2025-05-23 PROCEDURE — 6370000000 HC RX 637 (ALT 250 FOR IP): Performed by: PSYCHIATRY & NEUROLOGY

## 2025-05-23 PROCEDURE — 6360000002 HC RX W HCPCS: Performed by: PSYCHIATRY & NEUROLOGY

## 2025-05-23 RX ORDER — SODIUM CHLORIDE 0.9 % (FLUSH) 0.9 %
5-40 SYRINGE (ML) INJECTION PRN
OUTPATIENT
Start: 2025-06-16

## 2025-05-23 RX ORDER — ACETAMINOPHEN 325 MG/1
650 TABLET ORAL ONCE
OUTPATIENT
Start: 2025-06-16 | End: 2025-06-16

## 2025-05-23 RX ORDER — HEPARIN 100 UNIT/ML
500 SYRINGE INTRAVENOUS PRN
OUTPATIENT
Start: 2025-06-16

## 2025-05-23 RX ORDER — SODIUM CHLORIDE 9 MG/ML
5-250 INJECTION, SOLUTION INTRAVENOUS ONCE
OUTPATIENT
Start: 2025-06-16 | End: 2025-06-16

## 2025-05-23 RX ORDER — SODIUM CHLORIDE 0.9 % (FLUSH) 0.9 %
5-40 SYRINGE (ML) INJECTION PRN
Status: DISCONTINUED | OUTPATIENT
Start: 2025-05-23 | End: 2025-05-24 | Stop reason: HOSPADM

## 2025-05-23 RX ORDER — ONDANSETRON 2 MG/ML
8 INJECTION INTRAMUSCULAR; INTRAVENOUS
OUTPATIENT
Start: 2025-06-16

## 2025-05-23 RX ORDER — EPINEPHRINE 1 MG/ML
0.3 INJECTION, SOLUTION, CONCENTRATE INTRAVENOUS PRN
OUTPATIENT
Start: 2025-06-16

## 2025-05-23 RX ORDER — DIPHENHYDRAMINE HYDROCHLORIDE 50 MG/ML
50 INJECTION, SOLUTION INTRAMUSCULAR; INTRAVENOUS
OUTPATIENT
Start: 2025-06-16

## 2025-05-23 RX ORDER — ACETAMINOPHEN 325 MG/1
650 TABLET ORAL
OUTPATIENT
Start: 2025-06-16

## 2025-05-23 RX ORDER — HYDROCORTISONE SODIUM SUCCINATE 100 MG/2ML
100 INJECTION INTRAMUSCULAR; INTRAVENOUS
OUTPATIENT
Start: 2025-06-16

## 2025-05-23 RX ORDER — DIPHENHYDRAMINE HCL 25 MG
25 TABLET ORAL ONCE
Status: COMPLETED | OUTPATIENT
Start: 2025-05-23 | End: 2025-05-23

## 2025-05-23 RX ORDER — ACETAMINOPHEN 325 MG/1
650 TABLET ORAL ONCE
Status: COMPLETED | OUTPATIENT
Start: 2025-05-23 | End: 2025-05-23

## 2025-05-23 RX ORDER — HEPARIN 100 UNIT/ML
500 SYRINGE INTRAVENOUS PRN
Status: DISCONTINUED | OUTPATIENT
Start: 2025-05-23 | End: 2025-05-24 | Stop reason: HOSPADM

## 2025-05-23 RX ORDER — ALBUTEROL SULFATE 90 UG/1
4 INHALANT RESPIRATORY (INHALATION) PRN
OUTPATIENT
Start: 2025-06-16

## 2025-05-23 RX ORDER — DIPHENHYDRAMINE HCL 25 MG
25 TABLET ORAL ONCE
OUTPATIENT
Start: 2025-06-16 | End: 2025-06-16

## 2025-05-23 RX ORDER — SODIUM CHLORIDE 9 MG/ML
INJECTION, SOLUTION INTRAVENOUS CONTINUOUS
OUTPATIENT
Start: 2025-06-16

## 2025-05-23 RX ORDER — MEPERIDINE HYDROCHLORIDE 25 MG/ML
12.5 INJECTION INTRAMUSCULAR; INTRAVENOUS; SUBCUTANEOUS PRN
OUTPATIENT
Start: 2025-06-16

## 2025-05-23 RX ADMIN — HEPARIN 500 UNITS: 100 SYRINGE at 11:16

## 2025-05-23 RX ADMIN — ACETAMINOPHEN 650 MG: 325 TABLET ORAL at 09:16

## 2025-05-23 RX ADMIN — IMMUNE GLOBULIN (HUMAN) 25 G: 10 INJECTION INTRAVENOUS; SUBCUTANEOUS at 09:54

## 2025-05-23 RX ADMIN — DIPHENHYDRAMINE HYDROCHLORIDE 25 MG: 25 TABLET ORAL at 09:16

## 2025-05-23 RX ADMIN — SODIUM CHLORIDE, PRESERVATIVE FREE 20 ML: 5 INJECTION INTRAVENOUS at 11:03

## 2025-05-23 NOTE — PROGRESS NOTES
Pt arrived to [facility] with port accessed. Brisk blood return noted. Premeds given as ordered. Gamunex- C 25g infusion completed. Pt tolerated well.

## 2025-05-30 ENCOUNTER — HOSPITAL ENCOUNTER (OUTPATIENT)
Dept: INFUSION THERAPY | Age: 50
Setting detail: INFUSION SERIES
Discharge: HOME | End: 2025-05-30
Payer: MEDICARE

## 2025-05-30 VITALS
RESPIRATION RATE: 16 BRPM | SYSTOLIC BLOOD PRESSURE: 111 MMHG | DIASTOLIC BLOOD PRESSURE: 71 MMHG | HEART RATE: 75 BPM | TEMPERATURE: 97.9 F | OXYGEN SATURATION: 99 %

## 2025-05-30 DIAGNOSIS — G35 EXACERBATION OF MULTIPLE SCLEROSIS (HCC): Primary | ICD-10-CM

## 2025-05-30 DIAGNOSIS — M32.9 SYSTEMIC LUPUS ERYTHEMATOSUS, UNSPECIFIED SLE TYPE, UNSPECIFIED ORGAN INVOLVEMENT STATUS (HCC): ICD-10-CM

## 2025-05-30 PROCEDURE — 96366 THER/PROPH/DIAG IV INF ADDON: CPT

## 2025-05-30 PROCEDURE — 96365 THER/PROPH/DIAG IV INF INIT: CPT

## 2025-05-30 PROCEDURE — 6370000000 HC RX 637 (ALT 250 FOR IP): Performed by: PSYCHIATRY & NEUROLOGY

## 2025-05-30 PROCEDURE — 6360000002 HC RX W HCPCS

## 2025-05-30 PROCEDURE — 6360000002 HC RX W HCPCS: Performed by: PSYCHIATRY & NEUROLOGY

## 2025-05-30 PROCEDURE — 2500000003 HC RX 250 WO HCPCS: Performed by: PSYCHIATRY & NEUROLOGY

## 2025-05-30 PROCEDURE — 96375 TX/PRO/DX INJ NEW DRUG ADDON: CPT

## 2025-05-30 RX ORDER — ACETAMINOPHEN 325 MG/1
650 TABLET ORAL
OUTPATIENT
Start: 2025-06-06

## 2025-05-30 RX ORDER — SODIUM CHLORIDE 0.9 % (FLUSH) 0.9 %
5-40 SYRINGE (ML) INJECTION PRN
OUTPATIENT
Start: 2025-06-16

## 2025-05-30 RX ORDER — SODIUM CHLORIDE 9 MG/ML
INJECTION, SOLUTION INTRAVENOUS CONTINUOUS
OUTPATIENT
Start: 2025-06-16

## 2025-05-30 RX ORDER — ACETAMINOPHEN 325 MG/1
650 TABLET ORAL ONCE
Status: COMPLETED | OUTPATIENT
Start: 2025-05-30 | End: 2025-05-30

## 2025-05-30 RX ORDER — SODIUM CHLORIDE 0.9 % (FLUSH) 0.9 %
5-40 SYRINGE (ML) INJECTION PRN
Status: DISCONTINUED | OUTPATIENT
Start: 2025-05-30 | End: 2025-05-31 | Stop reason: HOSPADM

## 2025-05-30 RX ORDER — ALBUTEROL SULFATE 90 UG/1
4 INHALANT RESPIRATORY (INHALATION) PRN
OUTPATIENT
Start: 2025-06-16

## 2025-05-30 RX ORDER — ACETAMINOPHEN 325 MG/1
650 TABLET ORAL ONCE
OUTPATIENT
Start: 2025-06-16 | End: 2025-06-16

## 2025-05-30 RX ORDER — MEPERIDINE HYDROCHLORIDE 25 MG/ML
12.5 INJECTION INTRAMUSCULAR; INTRAVENOUS; SUBCUTANEOUS PRN
OUTPATIENT
Start: 2025-06-16

## 2025-05-30 RX ORDER — SODIUM CHLORIDE 0.9 % (FLUSH) 0.9 %
5-40 SYRINGE (ML) INJECTION PRN
OUTPATIENT
Start: 2025-06-06

## 2025-05-30 RX ORDER — SODIUM CHLORIDE 9 MG/ML
5-250 INJECTION, SOLUTION INTRAVENOUS ONCE
OUTPATIENT
Start: 2025-06-16 | End: 2025-06-16

## 2025-05-30 RX ORDER — ONDANSETRON 2 MG/ML
8 INJECTION INTRAMUSCULAR; INTRAVENOUS
OUTPATIENT
Start: 2025-06-16

## 2025-05-30 RX ORDER — SODIUM CHLORIDE 9 MG/ML
5-250 INJECTION, SOLUTION INTRAVENOUS PRN
Status: DISCONTINUED | OUTPATIENT
Start: 2025-05-30 | End: 2025-05-31 | Stop reason: HOSPADM

## 2025-05-30 RX ORDER — MEPERIDINE HYDROCHLORIDE 25 MG/ML
12.5 INJECTION INTRAMUSCULAR; INTRAVENOUS; SUBCUTANEOUS PRN
OUTPATIENT
Start: 2025-06-06

## 2025-05-30 RX ORDER — SODIUM CHLORIDE 9 MG/ML
INJECTION, SOLUTION INTRAVENOUS CONTINUOUS
OUTPATIENT
Start: 2025-06-06

## 2025-05-30 RX ORDER — HYDROCORTISONE SODIUM SUCCINATE 100 MG/2ML
INJECTION INTRAMUSCULAR; INTRAVENOUS
Status: COMPLETED
Start: 2025-05-30 | End: 2025-05-30

## 2025-05-30 RX ORDER — HEPARIN 100 UNIT/ML
500 SYRINGE INTRAVENOUS PRN
OUTPATIENT
Start: 2025-06-06

## 2025-05-30 RX ORDER — SODIUM CHLORIDE 9 MG/ML
5-250 INJECTION, SOLUTION INTRAVENOUS PRN
OUTPATIENT
Start: 2025-06-06

## 2025-05-30 RX ORDER — HYDROCORTISONE SODIUM SUCCINATE 100 MG/2ML
100 INJECTION INTRAMUSCULAR; INTRAVENOUS
OUTPATIENT
Start: 2025-06-16

## 2025-05-30 RX ORDER — DIPHENHYDRAMINE HCL 25 MG
25 TABLET ORAL ONCE
Status: COMPLETED | OUTPATIENT
Start: 2025-05-30 | End: 2025-05-30

## 2025-05-30 RX ORDER — EPINEPHRINE 1 MG/ML
0.3 INJECTION, SOLUTION, CONCENTRATE INTRAVENOUS PRN
OUTPATIENT
Start: 2025-06-06

## 2025-05-30 RX ORDER — HYDROCORTISONE SODIUM SUCCINATE 100 MG/2ML
100 INJECTION INTRAMUSCULAR; INTRAVENOUS ONCE
Start: 2025-06-06 | End: 2025-06-06

## 2025-05-30 RX ORDER — EPINEPHRINE 1 MG/ML
0.3 INJECTION, SOLUTION, CONCENTRATE INTRAVENOUS PRN
OUTPATIENT
Start: 2025-06-16

## 2025-05-30 RX ORDER — ALBUTEROL SULFATE 90 UG/1
4 INHALANT RESPIRATORY (INHALATION) PRN
OUTPATIENT
Start: 2025-06-06

## 2025-05-30 RX ORDER — DIPHENHYDRAMINE HCL 25 MG
25 TABLET ORAL ONCE
OUTPATIENT
Start: 2025-06-16 | End: 2025-06-16

## 2025-05-30 RX ORDER — HEPARIN 100 UNIT/ML
500 SYRINGE INTRAVENOUS PRN
Status: DISCONTINUED | OUTPATIENT
Start: 2025-05-30 | End: 2025-05-31 | Stop reason: HOSPADM

## 2025-05-30 RX ORDER — DIPHENHYDRAMINE HCL 25 MG
25 TABLET ORAL ONCE
OUTPATIENT
Start: 2025-06-06 | End: 2025-06-06

## 2025-05-30 RX ORDER — ONDANSETRON 2 MG/ML
8 INJECTION INTRAMUSCULAR; INTRAVENOUS
OUTPATIENT
Start: 2025-06-06

## 2025-05-30 RX ORDER — ACETAMINOPHEN 325 MG/1
650 TABLET ORAL
OUTPATIENT
Start: 2025-06-16

## 2025-05-30 RX ORDER — ACETAMINOPHEN 325 MG/1
650 TABLET ORAL ONCE
OUTPATIENT
Start: 2025-06-06 | End: 2025-06-06

## 2025-05-30 RX ORDER — DIPHENHYDRAMINE HYDROCHLORIDE 50 MG/ML
50 INJECTION, SOLUTION INTRAMUSCULAR; INTRAVENOUS
OUTPATIENT
Start: 2025-06-06

## 2025-05-30 RX ORDER — HEPARIN 100 UNIT/ML
500 SYRINGE INTRAVENOUS PRN
OUTPATIENT
Start: 2025-06-16

## 2025-05-30 RX ORDER — HYDROCORTISONE SODIUM SUCCINATE 100 MG/2ML
100 INJECTION INTRAMUSCULAR; INTRAVENOUS
OUTPATIENT
Start: 2025-06-06

## 2025-05-30 RX ORDER — HYDROCORTISONE SODIUM SUCCINATE 100 MG/2ML
100 INJECTION INTRAMUSCULAR; INTRAVENOUS ONCE
Status: COMPLETED | OUTPATIENT
Start: 2025-05-30 | End: 2025-05-30

## 2025-05-30 RX ORDER — DIPHENHYDRAMINE HYDROCHLORIDE 50 MG/ML
50 INJECTION, SOLUTION INTRAMUSCULAR; INTRAVENOUS
OUTPATIENT
Start: 2025-06-16

## 2025-05-30 RX ADMIN — SODIUM CHLORIDE, PRESERVATIVE FREE 20 ML: 5 INJECTION INTRAVENOUS at 09:41

## 2025-05-30 RX ADMIN — HYDROCORTISONE SODIUM SUCCINATE 100 MG: 100 INJECTION, POWDER, FOR SOLUTION INTRAMUSCULAR; INTRAVENOUS at 08:04

## 2025-05-30 RX ADMIN — DIPHENHYDRAMINE HYDROCHLORIDE 25 MG: 25 TABLET ORAL at 08:03

## 2025-05-30 RX ADMIN — HEPARIN 500 UNITS: 100 SYRINGE at 09:41

## 2025-05-30 RX ADMIN — ACETAMINOPHEN 650 MG: 325 TABLET ORAL at 08:03

## 2025-05-30 RX ADMIN — HYDROCORTISONE SODIUM SUCCINATE 100 MG: 100 INJECTION INTRAMUSCULAR; INTRAVENOUS at 08:04

## 2025-05-30 RX ADMIN — IMMUNE GLOBULIN (HUMAN) 25 G: 10 INJECTION INTRAVENOUS; SUBCUTANEOUS at 08:18

## 2025-06-06 ENCOUNTER — HOSPITAL ENCOUNTER (OUTPATIENT)
Dept: INFUSION THERAPY | Age: 50
Setting detail: INFUSION SERIES
Discharge: HOME | End: 2025-06-06
Payer: MEDICARE

## 2025-06-06 VITALS
HEART RATE: 83 BPM | BODY MASS INDEX: 41.6 KG/M2 | RESPIRATION RATE: 18 BRPM | TEMPERATURE: 98 F | SYSTOLIC BLOOD PRESSURE: 126 MMHG | DIASTOLIC BLOOD PRESSURE: 85 MMHG | WEIGHT: 250 LBS | OXYGEN SATURATION: 100 %

## 2025-06-06 DIAGNOSIS — G35 EXACERBATION OF MULTIPLE SCLEROSIS (HCC): Primary | ICD-10-CM

## 2025-06-06 DIAGNOSIS — M32.9 SYSTEMIC LUPUS ERYTHEMATOSUS, UNSPECIFIED SLE TYPE, UNSPECIFIED ORGAN INVOLVEMENT STATUS (HCC): ICD-10-CM

## 2025-06-06 PROCEDURE — 96375 TX/PRO/DX INJ NEW DRUG ADDON: CPT

## 2025-06-06 PROCEDURE — 2500000003 HC RX 250 WO HCPCS: Performed by: PSYCHIATRY & NEUROLOGY

## 2025-06-06 PROCEDURE — 6370000000 HC RX 637 (ALT 250 FOR IP): Performed by: PSYCHIATRY & NEUROLOGY

## 2025-06-06 PROCEDURE — 6360000002 HC RX W HCPCS: Performed by: PSYCHIATRY & NEUROLOGY

## 2025-06-06 PROCEDURE — 96365 THER/PROPH/DIAG IV INF INIT: CPT

## 2025-06-06 RX ORDER — SODIUM CHLORIDE 0.9 % (FLUSH) 0.9 %
5-40 SYRINGE (ML) INJECTION PRN
OUTPATIENT
Start: 2025-06-13

## 2025-06-06 RX ORDER — ACETAMINOPHEN 325 MG/1
650 TABLET ORAL ONCE
OUTPATIENT
Start: 2025-06-13 | End: 2025-06-13

## 2025-06-06 RX ORDER — SODIUM CHLORIDE 0.9 % (FLUSH) 0.9 %
5-40 SYRINGE (ML) INJECTION PRN
Status: DISCONTINUED | OUTPATIENT
Start: 2025-06-06 | End: 2025-06-07 | Stop reason: HOSPADM

## 2025-06-06 RX ORDER — HYDROCORTISONE SODIUM SUCCINATE 100 MG/2ML
100 INJECTION INTRAMUSCULAR; INTRAVENOUS
OUTPATIENT
Start: 2025-06-13

## 2025-06-06 RX ORDER — DIPHENHYDRAMINE HCL 25 MG
25 TABLET ORAL ONCE
OUTPATIENT
Start: 2025-06-13 | End: 2025-06-13

## 2025-06-06 RX ORDER — DIPHENHYDRAMINE HYDROCHLORIDE 50 MG/ML
50 INJECTION, SOLUTION INTRAMUSCULAR; INTRAVENOUS
OUTPATIENT
Start: 2025-06-13

## 2025-06-06 RX ORDER — SODIUM CHLORIDE 9 MG/ML
5-250 INJECTION, SOLUTION INTRAVENOUS PRN
OUTPATIENT
Start: 2025-06-13

## 2025-06-06 RX ORDER — ONDANSETRON 2 MG/ML
8 INJECTION INTRAMUSCULAR; INTRAVENOUS
OUTPATIENT
Start: 2025-06-13

## 2025-06-06 RX ORDER — ACETAMINOPHEN 325 MG/1
650 TABLET ORAL ONCE
Status: COMPLETED | OUTPATIENT
Start: 2025-06-06 | End: 2025-06-06

## 2025-06-06 RX ORDER — HYDROCORTISONE SODIUM SUCCINATE 100 MG/2ML
100 INJECTION INTRAMUSCULAR; INTRAVENOUS ONCE
Status: COMPLETED | OUTPATIENT
Start: 2025-06-06 | End: 2025-06-06

## 2025-06-06 RX ORDER — DIPHENHYDRAMINE HCL 25 MG
25 TABLET ORAL ONCE
Status: COMPLETED | OUTPATIENT
Start: 2025-06-06 | End: 2025-06-06

## 2025-06-06 RX ORDER — SODIUM CHLORIDE 9 MG/ML
5-250 INJECTION, SOLUTION INTRAVENOUS PRN
Status: DISCONTINUED | OUTPATIENT
Start: 2025-06-06 | End: 2025-06-07 | Stop reason: HOSPADM

## 2025-06-06 RX ORDER — SODIUM CHLORIDE 9 MG/ML
INJECTION, SOLUTION INTRAVENOUS CONTINUOUS
OUTPATIENT
Start: 2025-06-13

## 2025-06-06 RX ORDER — ACETAMINOPHEN 325 MG/1
650 TABLET ORAL
OUTPATIENT
Start: 2025-06-13

## 2025-06-06 RX ORDER — MEPERIDINE HYDROCHLORIDE 25 MG/ML
12.5 INJECTION INTRAMUSCULAR; INTRAVENOUS; SUBCUTANEOUS PRN
OUTPATIENT
Start: 2025-06-13

## 2025-06-06 RX ORDER — HEPARIN 100 UNIT/ML
500 SYRINGE INTRAVENOUS PRN
Status: DISCONTINUED | OUTPATIENT
Start: 2025-06-06 | End: 2025-06-07 | Stop reason: HOSPADM

## 2025-06-06 RX ORDER — ALBUTEROL SULFATE 90 UG/1
4 INHALANT RESPIRATORY (INHALATION) PRN
OUTPATIENT
Start: 2025-06-13

## 2025-06-06 RX ORDER — EPINEPHRINE 1 MG/ML
0.3 INJECTION, SOLUTION, CONCENTRATE INTRAVENOUS PRN
OUTPATIENT
Start: 2025-06-13

## 2025-06-06 RX ORDER — HEPARIN 100 UNIT/ML
500 SYRINGE INTRAVENOUS PRN
OUTPATIENT
Start: 2025-06-13

## 2025-06-06 RX ORDER — HYDROCORTISONE SODIUM SUCCINATE 100 MG/2ML
100 INJECTION INTRAMUSCULAR; INTRAVENOUS ONCE
Start: 2025-06-13 | End: 2025-06-13

## 2025-06-06 RX ADMIN — IMMUNE GLOBULIN (HUMAN) 25 G: 10 INJECTION INTRAVENOUS; SUBCUTANEOUS at 07:45

## 2025-06-06 RX ADMIN — HYDROCORTISONE SODIUM SUCCINATE 100 MG: 100 INJECTION, POWDER, FOR SOLUTION INTRAMUSCULAR; INTRAVENOUS at 07:31

## 2025-06-06 RX ADMIN — HEPARIN 500 UNITS: 100 SYRINGE at 08:54

## 2025-06-06 RX ADMIN — ACETAMINOPHEN 650 MG: 325 TABLET ORAL at 07:30

## 2025-06-06 RX ADMIN — DIPHENHYDRAMINE HYDROCHLORIDE 25 MG: 25 TABLET ORAL at 07:31

## 2025-06-06 RX ADMIN — SODIUM CHLORIDE, PRESERVATIVE FREE 10 ML: 5 INJECTION INTRAVENOUS at 07:32

## 2025-06-06 RX ADMIN — SODIUM CHLORIDE, PRESERVATIVE FREE 20 ML: 5 INJECTION INTRAVENOUS at 08:54

## 2025-06-06 NOTE — DISCHARGE INSTRUCTIONS
immune globulin (intravenous and subcutaneous)  Pronunciation:  dinora LACEY foley  Brand:  Gammagard Liquid, Gammaked, Gamunex-C  What is the most important information I should know about immune globulin?  This medicine can cause blood clots.  The risk is highest in older adults or in people who have had blood clots, heart problems, or blood circulation problems. Blood clots are also more likely during long-term bedrest, while using birth control pills or hormone replacement therapy, or while having a central intravenous (IV) catheter in place.  Call your doctor at once if you have chest pain, trouble breathing, fast heartbeats, numbness or weakness, or swelling and warmth or discoloration in an arm or leg.  This medicine can also harm your kidneys, especially if you have kidney disease or if you also use certain medicines. Tell your doctor right away if you have signs of kidney problems, such as swelling, rapid weight gain, and little or no urination.  What is immune globulin?  Immune globulin intravenous and subcutaneous (for injection into a vein or under the skin) is used to treat primary immunodeficiency.  Immune globulin is also used to increase platelets (blood clotting cells) in people with idiopathic thrombocytopenic purpura.  Immune globulin is also used to treat certain debilitating nerve disorders that cause muscle weakness and can affect daily activities.  Immune globulin may also be used for purposes not listed in this medication guide.  What should I discuss with my healthcare provider before using immune globulin?  You should not use this medicine if:  you have had an allergic reaction to an immune globulin or blood product; or  you have immune globulin A (IgA) deficiency with antibody to IgA.  Immune globulin can cause blood clots or kidney problems, especially in older adults or in people with certain conditions. Tell your doctor if you have ever had:  heart problems, blood circulation  your doctor for medical advice about side effects. You may report side effects to FDA at 6-473-BMH-4064.  What other drugs will affect immune globulin?  Immune globulin can harm your kidneys, especially if you also use certain medicines for infections, cancer, osteoporosis, organ transplant rejection, bowel disorders, high blood pressure, or pain or arthritis (including Advil, Motrin, and Aleve).  Other drugs may affect immune globulin, including prescription and over-the-counter medicines, vitamins, and herbal products. Tell your doctor about all your current medicines and any medicine you start or stop using.  Where can I get more information?  Your doctor or pharmacist can provide more information about immune globulin.  Remember, keep this and all other medicines out of the reach of children, never share your medicines with others, and use this medication only for the indication prescribed.   Every effort has been made to ensure that the information provided by [employer] is accurate, up-to-date, and complete, but no guarantee is made to that effect. Drug information contained herein may be time sensitive. [employer] information has been compiled for use by healthcare practitioners and consumers in the [place] and therefore [employer] does not warrant that uses outside of the [place] are appropriate, unless specifically indicated otherwise. [employer] drug information does not endorse drugs, diagnose patients or recommend therapy. [employer] drug information is an informational resource designed to assist licensed healthcare practitioners in caring for their patients and/or to serve consumers viewing this service as a supplement to, and not a substitute for, the expertise, skill, knowledge and judgment of healthcare practitioners. The absence of a warning for a given drug or drug combination in no way should be construed to indicate that the drug or drug combination is safe, effective or

## 2025-06-12 DIAGNOSIS — G43.719 INTRACTABLE CHRONIC MIGRAINE WITHOUT AURA AND WITHOUT STATUS MIGRAINOSUS: ICD-10-CM

## 2025-06-12 DIAGNOSIS — G35 MULTIPLE SCLEROSIS (HCC): ICD-10-CM

## 2025-06-12 DIAGNOSIS — M79.7 FIBROMYALGIA: ICD-10-CM

## 2025-06-12 DIAGNOSIS — M54.30 SCIATICA, UNSPECIFIED LATERALITY: ICD-10-CM

## 2025-06-13 ENCOUNTER — HOSPITAL ENCOUNTER (OUTPATIENT)
Dept: INFUSION THERAPY | Age: 50
Setting detail: INFUSION SERIES
End: 2025-06-13

## 2025-06-13 RX ORDER — OXYCODONE HYDROCHLORIDE 10 MG/1
10 TABLET ORAL EVERY 6 HOURS PRN
Qty: 120 TABLET | Refills: 0 | Status: SHIPPED | OUTPATIENT
Start: 2025-06-22 | End: 2025-07-22

## 2025-06-13 NOTE — TELEPHONE ENCOUNTER
Ramiro Mendoza has requested a refill on her medication.      Last office visit : 3/18/2025   Next office visit : 6/26/2025   Last medication refill : 5/23/25  Rebeca : up to date     Called Pharmacy to verify when she last filled since it doesn't show on her rebeca    Requested Prescriptions     Pending Prescriptions Disp Refills    oxyCODONE HCl (OXY-IR) 10 MG immediate release tablet 120 tablet 0     Sig: Take 1 tablet by mouth every 6 hours as needed for Pain for up to 30 days. Intended supply: 30 days Max Daily Amount: 40 mg       Dr. Arredondo patient

## 2025-06-16 ENCOUNTER — HOSPITAL ENCOUNTER (OUTPATIENT)
Dept: INFUSION THERAPY | Age: 50
Setting detail: INFUSION SERIES
Discharge: HOME | End: 2025-06-16
Payer: MEDICARE

## 2025-06-16 VITALS
DIASTOLIC BLOOD PRESSURE: 72 MMHG | SYSTOLIC BLOOD PRESSURE: 124 MMHG | RESPIRATION RATE: 16 BRPM | OXYGEN SATURATION: 100 % | WEIGHT: 240 LBS | HEART RATE: 76 BPM | TEMPERATURE: 97.7 F | BODY MASS INDEX: 39.94 KG/M2

## 2025-06-16 DIAGNOSIS — G35 MULTIPLE SCLEROSIS (HCC): Primary | ICD-10-CM

## 2025-06-16 PROCEDURE — 2500000003 HC RX 250 WO HCPCS: Performed by: PSYCHIATRY & NEUROLOGY

## 2025-06-16 PROCEDURE — 6360000002 HC RX W HCPCS: Performed by: PSYCHIATRY & NEUROLOGY

## 2025-06-16 PROCEDURE — 96365 THER/PROPH/DIAG IV INF INIT: CPT

## 2025-06-16 PROCEDURE — 6370000000 HC RX 637 (ALT 250 FOR IP): Performed by: PSYCHIATRY & NEUROLOGY

## 2025-06-16 RX ORDER — ACETAMINOPHEN 325 MG/1
650 TABLET ORAL ONCE
Status: COMPLETED | OUTPATIENT
Start: 2025-06-16 | End: 2025-06-16

## 2025-06-16 RX ORDER — ALBUTEROL SULFATE 90 UG/1
4 INHALANT RESPIRATORY (INHALATION) PRN
Status: CANCELLED | OUTPATIENT
Start: 2025-06-17

## 2025-06-16 RX ORDER — DIPHENHYDRAMINE HCL 25 MG
25 TABLET ORAL ONCE
Status: COMPLETED | OUTPATIENT
Start: 2025-06-16 | End: 2025-06-16

## 2025-06-16 RX ORDER — SODIUM CHLORIDE 0.9 % (FLUSH) 0.9 %
5-40 SYRINGE (ML) INJECTION PRN
Status: CANCELLED | OUTPATIENT
Start: 2025-06-17

## 2025-06-16 RX ORDER — EPINEPHRINE 1 MG/ML
0.3 INJECTION, SOLUTION, CONCENTRATE INTRAVENOUS PRN
Status: CANCELLED | OUTPATIENT
Start: 2025-06-17

## 2025-06-16 RX ORDER — SODIUM CHLORIDE 9 MG/ML
INJECTION, SOLUTION INTRAVENOUS CONTINUOUS
Status: CANCELLED | OUTPATIENT
Start: 2025-06-17

## 2025-06-16 RX ORDER — HEPARIN 100 UNIT/ML
500 SYRINGE INTRAVENOUS PRN
Status: CANCELLED | OUTPATIENT
Start: 2025-06-17

## 2025-06-16 RX ORDER — ACETAMINOPHEN 325 MG/1
650 TABLET ORAL
Status: CANCELLED | OUTPATIENT
Start: 2025-06-17

## 2025-06-16 RX ORDER — SODIUM CHLORIDE 9 MG/ML
5-250 INJECTION, SOLUTION INTRAVENOUS ONCE
Status: CANCELLED | OUTPATIENT
Start: 2025-06-17 | End: 2025-06-17

## 2025-06-16 RX ORDER — ONDANSETRON 2 MG/ML
8 INJECTION INTRAMUSCULAR; INTRAVENOUS
Status: CANCELLED | OUTPATIENT
Start: 2025-06-17

## 2025-06-16 RX ORDER — DIPHENHYDRAMINE HYDROCHLORIDE 50 MG/ML
50 INJECTION, SOLUTION INTRAMUSCULAR; INTRAVENOUS
Status: CANCELLED | OUTPATIENT
Start: 2025-06-17

## 2025-06-16 RX ORDER — SODIUM CHLORIDE 0.9 % (FLUSH) 0.9 %
5-40 SYRINGE (ML) INJECTION PRN
Status: DISCONTINUED | OUTPATIENT
Start: 2025-06-16 | End: 2025-06-17 | Stop reason: HOSPADM

## 2025-06-16 RX ORDER — MEPERIDINE HYDROCHLORIDE 25 MG/ML
12.5 INJECTION INTRAMUSCULAR; INTRAVENOUS; SUBCUTANEOUS PRN
Status: CANCELLED | OUTPATIENT
Start: 2025-06-17

## 2025-06-16 RX ORDER — DIPHENHYDRAMINE HCL 25 MG
25 TABLET ORAL ONCE
Status: CANCELLED | OUTPATIENT
Start: 2025-06-17 | End: 2025-06-17

## 2025-06-16 RX ORDER — ACETAMINOPHEN 325 MG/1
650 TABLET ORAL ONCE
Status: CANCELLED | OUTPATIENT
Start: 2025-06-17 | End: 2025-06-17

## 2025-06-16 RX ORDER — HYDROCORTISONE SODIUM SUCCINATE 100 MG/2ML
100 INJECTION INTRAMUSCULAR; INTRAVENOUS
Status: CANCELLED | OUTPATIENT
Start: 2025-06-17

## 2025-06-16 RX ORDER — HEPARIN 100 UNIT/ML
500 SYRINGE INTRAVENOUS PRN
Status: DISCONTINUED | OUTPATIENT
Start: 2025-06-16 | End: 2025-06-17 | Stop reason: HOSPADM

## 2025-06-16 RX ADMIN — SODIUM CHLORIDE, PRESERVATIVE FREE 20 ML: 5 INJECTION INTRAVENOUS at 09:31

## 2025-06-16 RX ADMIN — IMMUNE GLOBULIN (HUMAN) 25 G: 10 INJECTION INTRAVENOUS; SUBCUTANEOUS at 08:02

## 2025-06-16 RX ADMIN — ACETAMINOPHEN 650 MG: 325 TABLET ORAL at 07:43

## 2025-06-16 RX ADMIN — DIPHENHYDRAMINE HYDROCHLORIDE 25 MG: 25 TABLET ORAL at 07:43

## 2025-06-16 RX ADMIN — HEPARIN 500 UNITS: 100 SYRINGE at 09:31

## 2025-06-17 ENCOUNTER — HOSPITAL ENCOUNTER (OUTPATIENT)
Dept: INFUSION THERAPY | Age: 50
Setting detail: INFUSION SERIES
Discharge: HOME | End: 2025-06-17
Payer: MEDICARE

## 2025-06-17 VITALS
TEMPERATURE: 97.8 F | SYSTOLIC BLOOD PRESSURE: 119 MMHG | DIASTOLIC BLOOD PRESSURE: 67 MMHG | OXYGEN SATURATION: 99 % | RESPIRATION RATE: 18 BRPM | HEART RATE: 79 BPM

## 2025-06-17 DIAGNOSIS — G35 MULTIPLE SCLEROSIS (HCC): Primary | ICD-10-CM

## 2025-06-17 PROCEDURE — 2500000003 HC RX 250 WO HCPCS: Performed by: PSYCHIATRY & NEUROLOGY

## 2025-06-17 PROCEDURE — 96365 THER/PROPH/DIAG IV INF INIT: CPT

## 2025-06-17 PROCEDURE — 96366 THER/PROPH/DIAG IV INF ADDON: CPT

## 2025-06-17 PROCEDURE — 6370000000 HC RX 637 (ALT 250 FOR IP): Performed by: PSYCHIATRY & NEUROLOGY

## 2025-06-17 PROCEDURE — 6360000002 HC RX W HCPCS: Performed by: PSYCHIATRY & NEUROLOGY

## 2025-06-17 RX ORDER — ACETAMINOPHEN 325 MG/1
650 TABLET ORAL ONCE
Status: COMPLETED | OUTPATIENT
Start: 2025-06-17 | End: 2025-06-17

## 2025-06-17 RX ORDER — DIPHENHYDRAMINE HCL 25 MG
25 TABLET ORAL ONCE
Status: COMPLETED | OUTPATIENT
Start: 2025-06-17 | End: 2025-06-17

## 2025-06-17 RX ORDER — MEPERIDINE HYDROCHLORIDE 25 MG/ML
12.5 INJECTION INTRAMUSCULAR; INTRAVENOUS; SUBCUTANEOUS PRN
Status: CANCELLED | OUTPATIENT
Start: 2025-06-18

## 2025-06-17 RX ORDER — ONDANSETRON 2 MG/ML
8 INJECTION INTRAMUSCULAR; INTRAVENOUS
Status: CANCELLED | OUTPATIENT
Start: 2025-06-18

## 2025-06-17 RX ORDER — HYDROCORTISONE SODIUM SUCCINATE 100 MG/2ML
100 INJECTION INTRAMUSCULAR; INTRAVENOUS
Status: CANCELLED | OUTPATIENT
Start: 2025-06-18

## 2025-06-17 RX ORDER — SODIUM CHLORIDE 0.9 % (FLUSH) 0.9 %
5-40 SYRINGE (ML) INJECTION PRN
Status: CANCELLED | OUTPATIENT
Start: 2025-06-18

## 2025-06-17 RX ORDER — DIPHENHYDRAMINE HCL 25 MG
25 TABLET ORAL ONCE
Status: CANCELLED | OUTPATIENT
Start: 2025-06-18 | End: 2025-06-18

## 2025-06-17 RX ORDER — SODIUM CHLORIDE 9 MG/ML
5-250 INJECTION, SOLUTION INTRAVENOUS ONCE
Status: CANCELLED | OUTPATIENT
Start: 2025-06-18 | End: 2025-06-18

## 2025-06-17 RX ORDER — ACETAMINOPHEN 325 MG/1
650 TABLET ORAL ONCE
Status: CANCELLED | OUTPATIENT
Start: 2025-06-18 | End: 2025-06-18

## 2025-06-17 RX ORDER — ACETAMINOPHEN 325 MG/1
650 TABLET ORAL
Status: CANCELLED | OUTPATIENT
Start: 2025-06-18

## 2025-06-17 RX ORDER — HEPARIN 100 UNIT/ML
500 SYRINGE INTRAVENOUS PRN
Status: DISCONTINUED | OUTPATIENT
Start: 2025-06-17 | End: 2025-06-18 | Stop reason: HOSPADM

## 2025-06-17 RX ORDER — EPINEPHRINE 1 MG/ML
0.3 INJECTION, SOLUTION, CONCENTRATE INTRAVENOUS PRN
Status: CANCELLED | OUTPATIENT
Start: 2025-06-18

## 2025-06-17 RX ORDER — HEPARIN 100 UNIT/ML
500 SYRINGE INTRAVENOUS PRN
Status: CANCELLED | OUTPATIENT
Start: 2025-06-18

## 2025-06-17 RX ORDER — DIPHENHYDRAMINE HYDROCHLORIDE 50 MG/ML
50 INJECTION, SOLUTION INTRAMUSCULAR; INTRAVENOUS
Status: CANCELLED | OUTPATIENT
Start: 2025-06-18

## 2025-06-17 RX ORDER — ALBUTEROL SULFATE 90 UG/1
4 INHALANT RESPIRATORY (INHALATION) PRN
Status: CANCELLED | OUTPATIENT
Start: 2025-06-18

## 2025-06-17 RX ORDER — SODIUM CHLORIDE 0.9 % (FLUSH) 0.9 %
5-40 SYRINGE (ML) INJECTION PRN
Status: DISCONTINUED | OUTPATIENT
Start: 2025-06-17 | End: 2025-06-18 | Stop reason: HOSPADM

## 2025-06-17 RX ORDER — SODIUM CHLORIDE 9 MG/ML
INJECTION, SOLUTION INTRAVENOUS CONTINUOUS
Status: CANCELLED | OUTPATIENT
Start: 2025-06-18

## 2025-06-17 RX ADMIN — IMMUNE GLOBULIN (HUMAN) 25 G: 10 INJECTION INTRAVENOUS; SUBCUTANEOUS at 08:19

## 2025-06-17 RX ADMIN — DIPHENHYDRAMINE HYDROCHLORIDE 25 MG: 25 TABLET ORAL at 07:52

## 2025-06-17 RX ADMIN — SODIUM CHLORIDE, PRESERVATIVE FREE 20 ML: 5 INJECTION INTRAVENOUS at 09:33

## 2025-06-17 RX ADMIN — HEPARIN 500 UNITS: 100 SYRINGE at 09:33

## 2025-06-17 RX ADMIN — ACETAMINOPHEN 650 MG: 325 TABLET ORAL at 07:51

## 2025-06-17 NOTE — FLOWSHEET NOTE
06/17/25 1009   AVS Reviewed   AVS & discharge instructions reviewed with patient and/or representative? Yes   Reviewed instructions with Patient   Level of Understanding Questions answered;Verbalized understanding     PT ARRIVED WITH PORT ACCESSED AND EXCELLENT BLOOD RETURN NOTED. PREMEDS GIVEN AND IVIG 25 GRAMS GIVEN AS ORDERED AND PT TOLERATED WELL

## 2025-06-18 ENCOUNTER — HOSPITAL ENCOUNTER (OUTPATIENT)
Dept: INFUSION THERAPY | Age: 50
Setting detail: INFUSION SERIES
Discharge: HOME | End: 2025-06-18
Payer: MEDICARE

## 2025-06-18 VITALS
SYSTOLIC BLOOD PRESSURE: 107 MMHG | DIASTOLIC BLOOD PRESSURE: 66 MMHG | TEMPERATURE: 98.3 F | OXYGEN SATURATION: 99 % | HEART RATE: 80 BPM | RESPIRATION RATE: 18 BRPM

## 2025-06-18 DIAGNOSIS — G35 MULTIPLE SCLEROSIS (HCC): Primary | ICD-10-CM

## 2025-06-18 PROCEDURE — 6360000002 HC RX W HCPCS: Performed by: PSYCHIATRY & NEUROLOGY

## 2025-06-18 PROCEDURE — 6370000000 HC RX 637 (ALT 250 FOR IP): Performed by: PSYCHIATRY & NEUROLOGY

## 2025-06-18 PROCEDURE — 96365 THER/PROPH/DIAG IV INF INIT: CPT

## 2025-06-18 PROCEDURE — 2500000003 HC RX 250 WO HCPCS: Performed by: PSYCHIATRY & NEUROLOGY

## 2025-06-18 PROCEDURE — 96366 THER/PROPH/DIAG IV INF ADDON: CPT

## 2025-06-18 RX ORDER — SODIUM CHLORIDE 9 MG/ML
INJECTION, SOLUTION INTRAVENOUS CONTINUOUS
Status: CANCELLED | OUTPATIENT
Start: 2025-06-19

## 2025-06-18 RX ORDER — SODIUM CHLORIDE 0.9 % (FLUSH) 0.9 %
5-40 SYRINGE (ML) INJECTION PRN
Status: CANCELLED | OUTPATIENT
Start: 2025-06-19

## 2025-06-18 RX ORDER — ACETAMINOPHEN 325 MG/1
650 TABLET ORAL
Status: CANCELLED | OUTPATIENT
Start: 2025-06-19

## 2025-06-18 RX ORDER — ONDANSETRON 2 MG/ML
8 INJECTION INTRAMUSCULAR; INTRAVENOUS
Status: CANCELLED | OUTPATIENT
Start: 2025-06-19

## 2025-06-18 RX ORDER — MEPERIDINE HYDROCHLORIDE 25 MG/ML
12.5 INJECTION INTRAMUSCULAR; INTRAVENOUS; SUBCUTANEOUS PRN
Status: CANCELLED | OUTPATIENT
Start: 2025-06-19

## 2025-06-18 RX ORDER — ALBUTEROL SULFATE 90 UG/1
4 INHALANT RESPIRATORY (INHALATION) PRN
Status: CANCELLED | OUTPATIENT
Start: 2025-06-19

## 2025-06-18 RX ORDER — SODIUM CHLORIDE 0.9 % (FLUSH) 0.9 %
5-40 SYRINGE (ML) INJECTION PRN
Status: DISCONTINUED | OUTPATIENT
Start: 2025-06-18 | End: 2025-06-19 | Stop reason: HOSPADM

## 2025-06-18 RX ORDER — DIPHENHYDRAMINE HCL 25 MG
25 TABLET ORAL ONCE
Status: COMPLETED | OUTPATIENT
Start: 2025-06-18 | End: 2025-06-18

## 2025-06-18 RX ORDER — EPINEPHRINE 1 MG/ML
0.3 INJECTION, SOLUTION, CONCENTRATE INTRAVENOUS PRN
Status: CANCELLED | OUTPATIENT
Start: 2025-06-19

## 2025-06-18 RX ORDER — ACETAMINOPHEN 325 MG/1
650 TABLET ORAL ONCE
Status: CANCELLED | OUTPATIENT
Start: 2025-06-19 | End: 2025-06-19

## 2025-06-18 RX ORDER — HYDROCORTISONE SODIUM SUCCINATE 100 MG/2ML
100 INJECTION INTRAMUSCULAR; INTRAVENOUS
Status: CANCELLED | OUTPATIENT
Start: 2025-06-19

## 2025-06-18 RX ORDER — DIPHENHYDRAMINE HCL 25 MG
25 TABLET ORAL ONCE
Status: CANCELLED | OUTPATIENT
Start: 2025-06-19 | End: 2025-06-19

## 2025-06-18 RX ORDER — SODIUM CHLORIDE 9 MG/ML
5-250 INJECTION, SOLUTION INTRAVENOUS ONCE
Status: CANCELLED | OUTPATIENT
Start: 2025-06-19 | End: 2025-06-19

## 2025-06-18 RX ORDER — HEPARIN 100 UNIT/ML
500 SYRINGE INTRAVENOUS PRN
Status: CANCELLED | OUTPATIENT
Start: 2025-06-19

## 2025-06-18 RX ORDER — HEPARIN 100 UNIT/ML
500 SYRINGE INTRAVENOUS PRN
Status: DISCONTINUED | OUTPATIENT
Start: 2025-06-18 | End: 2025-06-19 | Stop reason: HOSPADM

## 2025-06-18 RX ORDER — ACETAMINOPHEN 325 MG/1
650 TABLET ORAL ONCE
Status: COMPLETED | OUTPATIENT
Start: 2025-06-18 | End: 2025-06-18

## 2025-06-18 RX ORDER — DIPHENHYDRAMINE HYDROCHLORIDE 50 MG/ML
50 INJECTION, SOLUTION INTRAMUSCULAR; INTRAVENOUS
Status: CANCELLED | OUTPATIENT
Start: 2025-06-19

## 2025-06-18 RX ADMIN — SODIUM CHLORIDE, PRESERVATIVE FREE 20 ML: 5 INJECTION INTRAVENOUS at 09:04

## 2025-06-18 RX ADMIN — SODIUM CHLORIDE, PRESERVATIVE FREE 10 ML: 5 INJECTION INTRAVENOUS at 07:50

## 2025-06-18 RX ADMIN — DIPHENHYDRAMINE HYDROCHLORIDE 25 MG: 25 TABLET ORAL at 07:35

## 2025-06-18 RX ADMIN — ACETAMINOPHEN 650 MG: 325 TABLET ORAL at 07:35

## 2025-06-18 RX ADMIN — IMMUNE GLOBULIN (HUMAN) 25 G: 10 INJECTION INTRAVENOUS; SUBCUTANEOUS at 07:51

## 2025-06-18 RX ADMIN — HEPARIN 500 UNITS: 100 SYRINGE at 09:04

## 2025-06-18 NOTE — PROGRESS NOTES
Pt arrived to [facility] with port accessed from yesterday's tx, brisk blood return noted. Pt received Tylenol and Benadryl for pre-meds, followed by 25g Gamunex-C. Tolerated well.     Electronically signed by Shabnam Huddleston RN on 6/18/2025 at 9:51 AM

## 2025-06-19 ENCOUNTER — HOSPITAL ENCOUNTER (OUTPATIENT)
Dept: INFUSION THERAPY | Age: 50
Setting detail: INFUSION SERIES
Discharge: HOME | End: 2025-06-19
Payer: MEDICARE

## 2025-06-19 VITALS
BODY MASS INDEX: 39.94 KG/M2 | WEIGHT: 240 LBS | DIASTOLIC BLOOD PRESSURE: 86 MMHG | HEART RATE: 82 BPM | SYSTOLIC BLOOD PRESSURE: 136 MMHG | RESPIRATION RATE: 18 BRPM | OXYGEN SATURATION: 100 % | TEMPERATURE: 98 F

## 2025-06-19 DIAGNOSIS — G35 MULTIPLE SCLEROSIS (HCC): Primary | ICD-10-CM

## 2025-06-19 PROCEDURE — 2500000003 HC RX 250 WO HCPCS: Performed by: PSYCHIATRY & NEUROLOGY

## 2025-06-19 PROCEDURE — 96365 THER/PROPH/DIAG IV INF INIT: CPT

## 2025-06-19 PROCEDURE — 6370000000 HC RX 637 (ALT 250 FOR IP): Performed by: PSYCHIATRY & NEUROLOGY

## 2025-06-19 PROCEDURE — 6360000002 HC RX W HCPCS: Performed by: PSYCHIATRY & NEUROLOGY

## 2025-06-19 RX ORDER — SODIUM CHLORIDE 0.9 % (FLUSH) 0.9 %
5-40 SYRINGE (ML) INJECTION PRN
Status: CANCELLED | OUTPATIENT
Start: 2025-06-20

## 2025-06-19 RX ORDER — DIPHENHYDRAMINE HCL 25 MG
25 TABLET ORAL ONCE
Status: COMPLETED | OUTPATIENT
Start: 2025-06-19 | End: 2025-06-19

## 2025-06-19 RX ORDER — ACETAMINOPHEN 325 MG/1
650 TABLET ORAL
OUTPATIENT
Start: 2025-06-20

## 2025-06-19 RX ORDER — ONDANSETRON 2 MG/ML
8 INJECTION INTRAMUSCULAR; INTRAVENOUS
OUTPATIENT
Start: 2025-06-20

## 2025-06-19 RX ORDER — MEPERIDINE HYDROCHLORIDE 25 MG/ML
12.5 INJECTION INTRAMUSCULAR; INTRAVENOUS; SUBCUTANEOUS PRN
OUTPATIENT
Start: 2025-06-20

## 2025-06-19 RX ORDER — SODIUM CHLORIDE 9 MG/ML
INJECTION, SOLUTION INTRAVENOUS CONTINUOUS
OUTPATIENT
Start: 2025-06-20

## 2025-06-19 RX ORDER — HYDROCORTISONE SODIUM SUCCINATE 100 MG/2ML
100 INJECTION INTRAMUSCULAR; INTRAVENOUS
OUTPATIENT
Start: 2025-06-20

## 2025-06-19 RX ORDER — ACETAMINOPHEN 325 MG/1
650 TABLET ORAL ONCE
Status: CANCELLED | OUTPATIENT
Start: 2025-06-20 | End: 2025-06-20

## 2025-06-19 RX ORDER — SODIUM CHLORIDE 9 MG/ML
5-250 INJECTION, SOLUTION INTRAVENOUS ONCE
OUTPATIENT
Start: 2025-06-20 | End: 2025-06-20

## 2025-06-19 RX ORDER — ACETAMINOPHEN 325 MG/1
650 TABLET ORAL ONCE
Status: COMPLETED | OUTPATIENT
Start: 2025-06-19 | End: 2025-06-19

## 2025-06-19 RX ORDER — SODIUM CHLORIDE 0.9 % (FLUSH) 0.9 %
5-40 SYRINGE (ML) INJECTION PRN
Status: DISCONTINUED | OUTPATIENT
Start: 2025-06-19 | End: 2025-06-20 | Stop reason: HOSPADM

## 2025-06-19 RX ORDER — HEPARIN 100 UNIT/ML
500 SYRINGE INTRAVENOUS PRN
Status: CANCELLED | OUTPATIENT
Start: 2025-06-20

## 2025-06-19 RX ORDER — ALBUTEROL SULFATE 90 UG/1
4 INHALANT RESPIRATORY (INHALATION) PRN
OUTPATIENT
Start: 2025-06-20

## 2025-06-19 RX ORDER — HEPARIN 100 UNIT/ML
500 SYRINGE INTRAVENOUS PRN
Status: DISCONTINUED | OUTPATIENT
Start: 2025-06-19 | End: 2025-06-20 | Stop reason: HOSPADM

## 2025-06-19 RX ORDER — EPINEPHRINE 1 MG/ML
0.3 INJECTION, SOLUTION, CONCENTRATE INTRAVENOUS PRN
OUTPATIENT
Start: 2025-06-20

## 2025-06-19 RX ORDER — DIPHENHYDRAMINE HCL 25 MG
25 TABLET ORAL ONCE
Status: CANCELLED | OUTPATIENT
Start: 2025-06-20 | End: 2025-06-20

## 2025-06-19 RX ORDER — DIPHENHYDRAMINE HYDROCHLORIDE 50 MG/ML
50 INJECTION, SOLUTION INTRAMUSCULAR; INTRAVENOUS
OUTPATIENT
Start: 2025-06-20

## 2025-06-19 RX ADMIN — IMMUNE GLOBULIN (HUMAN) 25 G: 10 INJECTION INTRAVENOUS; SUBCUTANEOUS at 10:27

## 2025-06-19 RX ADMIN — HEPARIN 500 UNITS: 100 SYRINGE at 11:46

## 2025-06-19 RX ADMIN — SODIUM CHLORIDE, PRESERVATIVE FREE 10 ML: 5 INJECTION INTRAVENOUS at 10:17

## 2025-06-19 RX ADMIN — DIPHENHYDRAMINE HYDROCHLORIDE 25 MG: 25 TABLET ORAL at 10:10

## 2025-06-19 RX ADMIN — ACETAMINOPHEN 650 MG: 325 TABLET ORAL at 10:10

## 2025-06-19 RX ADMIN — SODIUM CHLORIDE, PRESERVATIVE FREE 10 ML: 5 INJECTION INTRAVENOUS at 11:45

## 2025-06-19 NOTE — FLOWSHEET NOTE
06/19/25 1226   AVS Reviewed   AVS & discharge instructions reviewed with patient and/or representative? Yes   Reviewed instructions with Patient   Level of Understanding Questions answered;Verbalized understanding     UPON ARRIVAL FLUSHED RIGHT CHEST PORT. BRISK BLOOD RETURN NOTED. VSS. PT PREMEDICATED PER ORDERS. GAMUNEX-C 25G IV ADMINISTERED THROUGH PORT.  PORT DRESSING CHANGED PRIOR TO DISCHARGE. SLIGHTLY WET AROUND TOP EDGE.  PT STATES SHE GOT EDGES WET WHILE SHOWERING. PT PORT FLUSHED UPON COMPLETION OF INFUSION. BRISK BLOOD RETURN AND FLUSHED WITH NS AND HEPARIN PER ORDERS. PT TO RETURN TOMORROW FOR INFUSION. PT DISCHARGED HOME .

## 2025-06-19 NOTE — DISCHARGE INSTRUCTIONS
immune globulin (intravenous and subcutaneous)  Pronunciation:  dinora LACEY foley  Brand:  Gammagard Liquid, Gammaked, Gamunex-C  What is the most important information I should know about immune globulin?  This medicine can cause blood clots.  The risk is highest in older adults or in people who have had blood clots, heart problems, or blood circulation problems. Blood clots are also more likely during long-term bedrest, while using birth control pills or hormone replacement therapy, or while having a central intravenous (IV) catheter in place.  Call your doctor at once if you have chest pain, trouble breathing, fast heartbeats, numbness or weakness, or swelling and warmth or discoloration in an arm or leg.  This medicine can also harm your kidneys, especially if you have kidney disease or if you also use certain medicines. Tell your doctor right away if you have signs of kidney problems, such as swelling, rapid weight gain, and little or no urination.  What is immune globulin?  Immune globulin intravenous and subcutaneous (for injection into a vein or under the skin) is used to treat primary immunodeficiency.  Immune globulin is also used to increase platelets (blood clotting cells) in people with idiopathic thrombocytopenic purpura.  Immune globulin is also used to treat certain debilitating nerve disorders that cause muscle weakness and can affect daily activities.  Immune globulin may also be used for purposes not listed in this medication guide.  What should I discuss with my healthcare provider before using immune globulin?  You should not use this medicine if:  you have had an allergic reaction to an immune globulin or blood product; or  you have immune globulin A (IgA) deficiency with antibody to IgA.  Immune globulin can cause blood clots or kidney problems, especially in older adults or in people with certain conditions. Tell your doctor if you have ever had:  heart problems, blood circulation  appropriate for any given patient. [facility] does not assume any responsibility for any aspect of healthcare administered with the aid of information [facility] provides. The information contained herein is not intended to cover all possible uses, directions, precautions, warnings, drug interactions, allergic reactions, or adverse effects. If you have questions about the drugs you are taking, check with your doctor, nurse or pharmacist.  Copyright 0773-2028 [email or web address]. Version: 4.02. Revision date: 2/26/2021.  Care instructions adapted under license by "[employer]. If you have questions about a medical condition or this instruction, always ask your healthcare professional. [employer] disclaims any warranty or liability for your use of this information.

## 2025-06-20 ENCOUNTER — HOSPITAL ENCOUNTER (OUTPATIENT)
Dept: INFUSION THERAPY | Age: 50
Setting detail: INFUSION SERIES
Discharge: HOME | End: 2025-06-20
Payer: MEDICARE

## 2025-06-20 VITALS
DIASTOLIC BLOOD PRESSURE: 72 MMHG | OXYGEN SATURATION: 99 % | SYSTOLIC BLOOD PRESSURE: 114 MMHG | HEART RATE: 81 BPM | TEMPERATURE: 97.7 F | RESPIRATION RATE: 16 BRPM

## 2025-06-20 DIAGNOSIS — G35 MULTIPLE SCLEROSIS (HCC): Primary | ICD-10-CM

## 2025-06-20 PROCEDURE — 96366 THER/PROPH/DIAG IV INF ADDON: CPT

## 2025-06-20 PROCEDURE — 96365 THER/PROPH/DIAG IV INF INIT: CPT

## 2025-06-20 PROCEDURE — 6370000000 HC RX 637 (ALT 250 FOR IP): Performed by: PSYCHIATRY & NEUROLOGY

## 2025-06-20 PROCEDURE — 6360000002 HC RX W HCPCS: Performed by: PSYCHIATRY & NEUROLOGY

## 2025-06-20 PROCEDURE — 2500000003 HC RX 250 WO HCPCS: Performed by: PSYCHIATRY & NEUROLOGY

## 2025-06-20 RX ORDER — ACETAMINOPHEN 325 MG/1
650 TABLET ORAL ONCE
Status: COMPLETED | OUTPATIENT
Start: 2025-06-20 | End: 2025-06-20

## 2025-06-20 RX ORDER — ONDANSETRON 2 MG/ML
8 INJECTION INTRAMUSCULAR; INTRAVENOUS
OUTPATIENT
Start: 2025-07-14

## 2025-06-20 RX ORDER — DIPHENHYDRAMINE HCL 25 MG
25 TABLET ORAL ONCE
OUTPATIENT
Start: 2025-07-14 | End: 2025-07-14

## 2025-06-20 RX ORDER — SODIUM CHLORIDE 0.9 % (FLUSH) 0.9 %
5-40 SYRINGE (ML) INJECTION PRN
OUTPATIENT
Start: 2025-07-14

## 2025-06-20 RX ORDER — HEPARIN 100 UNIT/ML
500 SYRINGE INTRAVENOUS PRN
Status: DISCONTINUED | OUTPATIENT
Start: 2025-06-20 | End: 2025-06-21 | Stop reason: HOSPADM

## 2025-06-20 RX ORDER — ACETAMINOPHEN 325 MG/1
650 TABLET ORAL ONCE
OUTPATIENT
Start: 2025-07-14 | End: 2025-07-14

## 2025-06-20 RX ORDER — SODIUM CHLORIDE 9 MG/ML
INJECTION, SOLUTION INTRAVENOUS CONTINUOUS
OUTPATIENT
Start: 2025-07-14

## 2025-06-20 RX ORDER — ALBUTEROL SULFATE 90 UG/1
4 INHALANT RESPIRATORY (INHALATION) PRN
OUTPATIENT
Start: 2025-07-14

## 2025-06-20 RX ORDER — ACETAMINOPHEN 325 MG/1
650 TABLET ORAL
OUTPATIENT
Start: 2025-07-14

## 2025-06-20 RX ORDER — SODIUM CHLORIDE 0.9 % (FLUSH) 0.9 %
5-40 SYRINGE (ML) INJECTION PRN
Status: DISCONTINUED | OUTPATIENT
Start: 2025-06-20 | End: 2025-06-21 | Stop reason: HOSPADM

## 2025-06-20 RX ORDER — MEPERIDINE HYDROCHLORIDE 25 MG/ML
12.5 INJECTION INTRAMUSCULAR; INTRAVENOUS; SUBCUTANEOUS PRN
OUTPATIENT
Start: 2025-07-14

## 2025-06-20 RX ORDER — DIPHENHYDRAMINE HYDROCHLORIDE 50 MG/ML
50 INJECTION, SOLUTION INTRAMUSCULAR; INTRAVENOUS
OUTPATIENT
Start: 2025-07-14

## 2025-06-20 RX ORDER — SODIUM CHLORIDE 9 MG/ML
5-250 INJECTION, SOLUTION INTRAVENOUS ONCE
OUTPATIENT
Start: 2025-07-14 | End: 2025-07-14

## 2025-06-20 RX ORDER — EPINEPHRINE 1 MG/ML
0.3 INJECTION, SOLUTION, CONCENTRATE INTRAVENOUS PRN
OUTPATIENT
Start: 2025-07-14

## 2025-06-20 RX ORDER — HYDROCORTISONE SODIUM SUCCINATE 100 MG/2ML
100 INJECTION INTRAMUSCULAR; INTRAVENOUS
OUTPATIENT
Start: 2025-07-14

## 2025-06-20 RX ORDER — HEPARIN 100 UNIT/ML
500 SYRINGE INTRAVENOUS PRN
OUTPATIENT
Start: 2025-07-14

## 2025-06-20 RX ORDER — DIPHENHYDRAMINE HCL 25 MG
25 TABLET ORAL ONCE
Status: COMPLETED | OUTPATIENT
Start: 2025-06-20 | End: 2025-06-20

## 2025-06-20 RX ADMIN — IMMUNE GLOBULIN (HUMAN) 25 G: 10 INJECTION INTRAVENOUS; SUBCUTANEOUS at 08:12

## 2025-06-20 RX ADMIN — HEPARIN 500 UNITS: 100 SYRINGE at 09:56

## 2025-06-20 RX ADMIN — DIPHENHYDRAMINE HYDROCHLORIDE 25 MG: 25 TABLET ORAL at 07:58

## 2025-06-20 RX ADMIN — ACETAMINOPHEN 650 MG: 325 TABLET ORAL at 07:58

## 2025-06-20 RX ADMIN — SODIUM CHLORIDE, PRESERVATIVE FREE 20 ML: 5 INJECTION INTRAVENOUS at 09:55

## 2025-06-20 NOTE — PROGRESS NOTES
Pt arrived to [facility] with port accessed from yesterday's tx, brisk blood return noted. Pt received Tylenol and Benadryl for pre-meds, followed by 25g Gamunex-C. Tolerated well.

## 2025-06-26 ENCOUNTER — OFFICE VISIT (OUTPATIENT)
Dept: NEUROLOGY | Age: 50
End: 2025-06-26
Payer: MEDICARE

## 2025-06-26 VITALS
HEART RATE: 85 BPM | WEIGHT: 230 LBS | HEIGHT: 66 IN | SYSTOLIC BLOOD PRESSURE: 134 MMHG | BODY MASS INDEX: 36.96 KG/M2 | DIASTOLIC BLOOD PRESSURE: 92 MMHG

## 2025-06-26 DIAGNOSIS — R53.1 WEAKNESS: ICD-10-CM

## 2025-06-26 DIAGNOSIS — M54.30 SCIATICA, UNSPECIFIED LATERALITY: ICD-10-CM

## 2025-06-26 DIAGNOSIS — R20.0 NUMBNESS: ICD-10-CM

## 2025-06-26 DIAGNOSIS — G43.819 OTHER MIGRAINE WITHOUT STATUS MIGRAINOSUS, INTRACTABLE: ICD-10-CM

## 2025-06-26 DIAGNOSIS — H53.9 VISUAL DISTURBANCE: ICD-10-CM

## 2025-06-26 DIAGNOSIS — R25.1 TREMOR: ICD-10-CM

## 2025-06-26 DIAGNOSIS — M79.7 FIBROMYALGIA: ICD-10-CM

## 2025-06-26 DIAGNOSIS — G35 MULTIPLE SCLEROSIS (HCC): Primary | ICD-10-CM

## 2025-06-26 DIAGNOSIS — H46.9 OPTIC NEURITIS: ICD-10-CM

## 2025-06-26 DIAGNOSIS — G43.719 INTRACTABLE CHRONIC MIGRAINE WITHOUT AURA AND WITHOUT STATUS MIGRAINOSUS: ICD-10-CM

## 2025-06-26 PROCEDURE — 3075F SYST BP GE 130 - 139MM HG: CPT | Performed by: PSYCHIATRY & NEUROLOGY

## 2025-06-26 PROCEDURE — 3080F DIAST BP >= 90 MM HG: CPT | Performed by: PSYCHIATRY & NEUROLOGY

## 2025-06-26 PROCEDURE — 99214 OFFICE O/P EST MOD 30 MIN: CPT | Performed by: PSYCHIATRY & NEUROLOGY

## 2025-06-26 PROCEDURE — G8417 CALC BMI ABV UP PARAM F/U: HCPCS | Performed by: PSYCHIATRY & NEUROLOGY

## 2025-06-26 PROCEDURE — 4004F PT TOBACCO SCREEN RCVD TLK: CPT | Performed by: PSYCHIATRY & NEUROLOGY

## 2025-06-26 PROCEDURE — G8427 DOCREV CUR MEDS BY ELIG CLIN: HCPCS | Performed by: PSYCHIATRY & NEUROLOGY

## 2025-06-26 RX ORDER — GABAPENTIN 300 MG/1
300 CAPSULE ORAL 2 TIMES DAILY
COMMUNITY
Start: 2025-05-14 | End: 2026-05-15

## 2025-06-26 RX ORDER — DRONABINOL 10 MG/1
10 CAPSULE ORAL 3 TIMES DAILY
Qty: 90 CAPSULE | Refills: 5 | Status: SHIPPED | OUTPATIENT
Start: 2025-06-26 | End: 2025-07-26

## 2025-06-26 RX ORDER — HYDROXYCHLOROQUINE SULFATE 200 MG/1
TABLET, FILM COATED ORAL
COMMUNITY
Start: 2025-06-17

## 2025-06-26 NOTE — PROGRESS NOTES
Review of Systems    Constitutional - No fever or chills.  No diaphoresis or significant fatigue.  HENT -  No tinnitus or significant hearing loss.  Eyes - no sudden vision change or eye pain  Respiratory - no significant shortness of breath or cough  Cardiovascular - no chest pain No palpitations or significant leg swelling  Gastrointestinal - no abdominal swelling or pain.    Genitourinary - No difficulty urinating, dysuria  Musculoskeletal - yes back pain or myalgia.  Skin - no color change or rash  Neurologic - No seizures.  No lateralizing weakness.  Hematologic - no easy bruising or excessive bleeding.  Psychiatric - no severe anxiety or nervousness.   All other review of systems are negative.    
accessory muscles  Musculoskeletal - no significant wasting of muscles noted, no bony deformities  Extremities-no clubbing, cyanosis or edema  Skin - warm, dry, and intact.  No rash, erythema, or pallor.  Psychiatric - mood, affect, and behavior appear normal.      Neurological exam  Awake, alert, fluent oriented  appropriate affect  Attention and concentration appear appropriate  Recent and remote memory appears unremarkable  Speech normal without dysarthria  No clear issues with language of fund of knowledge    Cranial Nerve Exam     CN III, IV,VI-EOMI, No nystagmus, conjugate eye movements, no ptosis  CN VII-no facial assymetry        Motor Exam  antigravity throughout upper and lower extremities bilaterally    Tremors- no tremors in hands or head noted    Gait  Normal base and speed  No ataxia    Lab Results   Component Value Date    SHZBJUEC30 753 09/12/2018     Lab Results   Component Value Date    WBC 4.1 (L) 05/05/2025    HGB 11.4 (L) 05/05/2025    HCT 33.2 (L) 05/05/2025    MCV 84.5 05/05/2025     05/05/2025     Lab Results   Component Value Date     (L) 05/05/2025    K 4.9 05/05/2025    CL 89 (L) 05/05/2025    CO2 24 05/05/2025    BUN 12 05/05/2025    CREATININE 0.9 05/05/2025    GLUCOSE 57 (LL) 05/05/2025    CALCIUM 8.3 (L) 05/05/2025    BILITOT <0.2 05/05/2025    ALKPHOS 54 05/05/2025    AST 34 05/05/2025    ALT 17 05/05/2025    LABGLOM 79 05/05/2025    GFRAA >59 09/01/2022    GLOB 3.0 11/14/2016       Impression   1. No diffusion restriction to indicate acute ischemia.   2. Scattered nonenhancing hyperintense T2/FLAIR signal changes within   the periventricular and white matter regions favorable for MS plaques.   No abnormal enhancement to indicate active demyelination.   3. Partial opacification inferior right mastoid air cells, mastoid   effusion considered.   Signed by Dr Emily Chaves on 7/4/2020 7:10 PM         Assessment    ICD-10-CM    1. Multiple sclerosis (HCC)  G35       2.

## 2025-06-27 ENCOUNTER — HOSPITAL ENCOUNTER (OUTPATIENT)
Dept: INFUSION THERAPY | Age: 50
Setting detail: INFUSION SERIES
End: 2025-06-27

## 2025-07-03 ENCOUNTER — HOSPITAL ENCOUNTER (OUTPATIENT)
Dept: INFUSION THERAPY | Age: 50
Setting detail: INFUSION SERIES
Discharge: HOME | End: 2025-07-03
Payer: MEDICARE

## 2025-07-03 VITALS
RESPIRATION RATE: 18 BRPM | BODY MASS INDEX: 38.88 KG/M2 | TEMPERATURE: 97.6 F | OXYGEN SATURATION: 100 % | HEART RATE: 78 BPM | WEIGHT: 238 LBS | SYSTOLIC BLOOD PRESSURE: 140 MMHG | DIASTOLIC BLOOD PRESSURE: 83 MMHG

## 2025-07-03 DIAGNOSIS — M32.9 SYSTEMIC LUPUS ERYTHEMATOSUS, UNSPECIFIED SLE TYPE, UNSPECIFIED ORGAN INVOLVEMENT STATUS (HCC): ICD-10-CM

## 2025-07-03 DIAGNOSIS — G35 EXACERBATION OF MULTIPLE SCLEROSIS (HCC): Primary | ICD-10-CM

## 2025-07-03 PROCEDURE — 96365 THER/PROPH/DIAG IV INF INIT: CPT

## 2025-07-03 PROCEDURE — 6360000002 HC RX W HCPCS: Performed by: PSYCHIATRY & NEUROLOGY

## 2025-07-03 PROCEDURE — 96375 TX/PRO/DX INJ NEW DRUG ADDON: CPT

## 2025-07-03 PROCEDURE — 6370000000 HC RX 637 (ALT 250 FOR IP): Performed by: PSYCHIATRY & NEUROLOGY

## 2025-07-03 RX ORDER — SODIUM CHLORIDE 9 MG/ML
INJECTION, SOLUTION INTRAVENOUS CONTINUOUS
OUTPATIENT
Start: 2025-07-10

## 2025-07-03 RX ORDER — SODIUM CHLORIDE 0.9 % (FLUSH) 0.9 %
5-40 SYRINGE (ML) INJECTION PRN
OUTPATIENT
Start: 2025-07-10

## 2025-07-03 RX ORDER — HEPARIN 100 UNIT/ML
500 SYRINGE INTRAVENOUS PRN
OUTPATIENT
Start: 2025-07-10

## 2025-07-03 RX ORDER — ACETAMINOPHEN 325 MG/1
650 TABLET ORAL ONCE
Status: COMPLETED | OUTPATIENT
Start: 2025-07-03 | End: 2025-07-03

## 2025-07-03 RX ORDER — SODIUM CHLORIDE 0.9 % (FLUSH) 0.9 %
5-40 SYRINGE (ML) INJECTION PRN
Status: DISCONTINUED | OUTPATIENT
Start: 2025-07-03 | End: 2025-07-04 | Stop reason: HOSPADM

## 2025-07-03 RX ORDER — SODIUM CHLORIDE 9 MG/ML
5-250 INJECTION, SOLUTION INTRAVENOUS PRN
OUTPATIENT
Start: 2025-07-10

## 2025-07-03 RX ORDER — HYDROCORTISONE SODIUM SUCCINATE 100 MG/2ML
100 INJECTION INTRAMUSCULAR; INTRAVENOUS ONCE
Status: COMPLETED | OUTPATIENT
Start: 2025-07-03 | End: 2025-07-03

## 2025-07-03 RX ORDER — HYDROCORTISONE SODIUM SUCCINATE 100 MG/2ML
100 INJECTION INTRAMUSCULAR; INTRAVENOUS
OUTPATIENT
Start: 2025-07-10

## 2025-07-03 RX ORDER — HEPARIN 100 UNIT/ML
500 SYRINGE INTRAVENOUS PRN
Status: DISCONTINUED | OUTPATIENT
Start: 2025-07-03 | End: 2025-07-04 | Stop reason: HOSPADM

## 2025-07-03 RX ORDER — ALBUTEROL SULFATE 90 UG/1
4 INHALANT RESPIRATORY (INHALATION) PRN
OUTPATIENT
Start: 2025-07-10

## 2025-07-03 RX ORDER — ONDANSETRON 2 MG/ML
8 INJECTION INTRAMUSCULAR; INTRAVENOUS
OUTPATIENT
Start: 2025-07-10

## 2025-07-03 RX ORDER — DIPHENHYDRAMINE HCL 25 MG
25 TABLET ORAL ONCE
OUTPATIENT
Start: 2025-07-10 | End: 2025-07-10

## 2025-07-03 RX ORDER — DIPHENHYDRAMINE HCL 25 MG
25 TABLET ORAL ONCE
Status: COMPLETED | OUTPATIENT
Start: 2025-07-03 | End: 2025-07-03

## 2025-07-03 RX ORDER — DIPHENHYDRAMINE HYDROCHLORIDE 50 MG/ML
50 INJECTION, SOLUTION INTRAMUSCULAR; INTRAVENOUS
OUTPATIENT
Start: 2025-07-10

## 2025-07-03 RX ORDER — EPINEPHRINE 1 MG/ML
0.3 INJECTION, SOLUTION, CONCENTRATE INTRAVENOUS PRN
OUTPATIENT
Start: 2025-07-10

## 2025-07-03 RX ORDER — ACETAMINOPHEN 325 MG/1
650 TABLET ORAL ONCE
OUTPATIENT
Start: 2025-07-10 | End: 2025-07-10

## 2025-07-03 RX ORDER — HYDROCORTISONE SODIUM SUCCINATE 100 MG/2ML
100 INJECTION INTRAMUSCULAR; INTRAVENOUS ONCE
Start: 2025-07-10 | End: 2025-07-10

## 2025-07-03 RX ORDER — SODIUM CHLORIDE 9 MG/ML
5-250 INJECTION, SOLUTION INTRAVENOUS PRN
Status: DISCONTINUED | OUTPATIENT
Start: 2025-07-03 | End: 2025-07-04 | Stop reason: HOSPADM

## 2025-07-03 RX ORDER — ACETAMINOPHEN 325 MG/1
650 TABLET ORAL
OUTPATIENT
Start: 2025-07-10

## 2025-07-03 RX ORDER — MEPERIDINE HYDROCHLORIDE 25 MG/ML
12.5 INJECTION INTRAMUSCULAR; INTRAVENOUS; SUBCUTANEOUS PRN
OUTPATIENT
Start: 2025-07-10

## 2025-07-03 RX ADMIN — IMMUNE GLOBULIN (HUMAN) 25 G: 10 INJECTION INTRAVENOUS; SUBCUTANEOUS at 11:21

## 2025-07-03 RX ADMIN — DIPHENHYDRAMINE HYDROCHLORIDE 25 MG: 25 TABLET ORAL at 11:09

## 2025-07-03 RX ADMIN — ACETAMINOPHEN 650 MG: 325 TABLET ORAL at 11:09

## 2025-07-03 RX ADMIN — HEPARIN 500 UNITS: 100 SYRINGE at 12:41

## 2025-07-03 RX ADMIN — HYDROCORTISONE SODIUM SUCCINATE 100 MG: 100 INJECTION, POWDER, FOR SOLUTION INTRAMUSCULAR; INTRAVENOUS at 11:09

## 2025-07-03 NOTE — PROGRESS NOTES
Pt arrived to [facility] and port accessed using sterile technique. Pt premedicated as ordered. IVIG  25g administered. Pt tolerated well.     Electronically signed by Ciera George RN on 7/3/2025 at 12:57 PM

## 2025-07-04 ENCOUNTER — HOSPITAL ENCOUNTER (EMERGENCY)
Age: 50
Discharge: HOME | End: 2025-07-04
Payer: MEDICARE

## 2025-07-04 ENCOUNTER — APPOINTMENT (OUTPATIENT)
Dept: CT IMAGING | Age: 50
End: 2025-07-04
Payer: MEDICARE

## 2025-07-04 ENCOUNTER — APPOINTMENT (OUTPATIENT)
Dept: GENERAL RADIOLOGY | Age: 50
End: 2025-07-04
Payer: MEDICARE

## 2025-07-04 VITALS
HEART RATE: 74 BPM | DIASTOLIC BLOOD PRESSURE: 88 MMHG | BODY MASS INDEX: 38.88 KG/M2 | WEIGHT: 238 LBS | TEMPERATURE: 98.5 F | SYSTOLIC BLOOD PRESSURE: 131 MMHG | RESPIRATION RATE: 18 BRPM | OXYGEN SATURATION: 100 %
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
## 2025-07-04 DIAGNOSIS — M25.562 ACUTE PAIN OF LEFT KNEE: Primary | ICD-10-CM

## 2025-07-04 DIAGNOSIS — E87.1 CHRONIC HYPONATREMIA: ICD-10-CM

## 2025-07-04 LAB
ALBUMIN SERPL-MCNC: 3.5 G/DL (ref 3.5–5.2)
ALP SERPL-CCNC: 68 U/L (ref 35–104)
ALT SERPL-CCNC: 11 U/L (ref 10–35)
ANION GAP SERPL CALCULATED.3IONS-SCNC: 10 MMOL/L (ref 8–16)
AST SERPL-CCNC: 28 U/L (ref 10–35)
BASOPHILS # BLD: 0 K/UL (ref 0–0.2)
BASOPHILS NFR BLD: 0.7 % (ref 0–1)
BILIRUB SERPL-MCNC: <0.2 MG/DL (ref 0.2–1.2)
BILIRUB UR QL STRIP: NEGATIVE
BUN SERPL-MCNC: 6 MG/DL (ref 6–20)
CALCIUM SERPL-MCNC: 8.6 MG/DL (ref 8.6–10)
CHLORIDE SERPL-SCNC: 93 MMOL/L (ref 98–107)
CLARITY UR: CLEAR
CO2 SERPL-SCNC: 24 MMOL/L (ref 22–29)
COLOR UR: YELLOW
CREAT SERPL-MCNC: 0.7 MG/DL (ref 0.5–0.9)
EOSINOPHIL # BLD: 0.1 K/UL (ref 0–0.6)
EOSINOPHIL NFR BLD: 1.2 % (ref 0–5)
ERYTHROCYTE [DISTWIDTH] IN BLOOD BY AUTOMATED COUNT: 14.2 % (ref 11.5–14.5)
GLUCOSE SERPL-MCNC: 75 MG/DL (ref 70–99)
GLUCOSE UR STRIP.AUTO-MCNC: NEGATIVE MG/DL
HCG SERPL QL: NEGATIVE
HCT VFR BLD AUTO: 32.7 % (ref 37–47)
HGB BLD-MCNC: 11 G/DL (ref 12–16)
HGB UR STRIP.AUTO-MCNC: NEGATIVE MG/L
IMM GRANULOCYTES # BLD: 0 K/UL
KETONES UR STRIP.AUTO-MCNC: NEGATIVE MG/DL
LEUKOCYTE ESTERASE UR QL STRIP.AUTO: NEGATIVE
LIPASE SERPL-CCNC: 23 U/L (ref 13–60)
LYMPHOCYTES # BLD: 1.5 K/UL (ref 1.1–4.5)
LYMPHOCYTES NFR BLD: 34.3 % (ref 20–40)
MCH RBC QN AUTO: 28.7 PG (ref 27–31)
MCHC RBC AUTO-ENTMCNC: 33.6 G/DL (ref 33–37)
MCV RBC AUTO: 85.4 FL (ref 81–99)
MONOCYTES # BLD: 0.4 K/UL (ref 0–0.9)
MONOCYTES NFR BLD: 8.5 % (ref 0–10)
NEUTROPHILS # BLD: 2.4 K/UL (ref 1.5–7.5)
NEUTS SEG NFR BLD: 54.8 % (ref 50–65)
NITRITE UR QL STRIP.AUTO: NEGATIVE
PH UR STRIP.AUTO: 6 [PH] (ref 5–8)
PLATELET # BLD AUTO: 314 K/UL (ref 130–400)
PMV BLD AUTO: 8.4 FL (ref 9.4–12.3)
POTASSIUM SERPL-SCNC: 4.1 MMOL/L (ref 3.5–5)
POTASSIUM SERPL-SCNC: 4.4 MMOL/L (ref 3.5–5.1)
PROT SERPL-MCNC: 8.1 G/DL (ref 6.4–8.3)
PROT UR STRIP.AUTO-MCNC: NEGATIVE MG/DL
RBC # BLD AUTO: 3.83 M/UL (ref 4.2–5.4)
SODIUM SERPL-SCNC: 127 MMOL/L (ref 136–145)
SP GR UR STRIP.AUTO: 1.01 (ref 1–1.03)
UROBILINOGEN UR STRIP.AUTO-MCNC: 0.2 E.U./DL
WBC # BLD AUTO: 4.3 K/UL (ref 4.8–10.8)

## 2025-07-04 PROCEDURE — 84703 CHORIONIC GONADOTROPIN ASSAY: CPT

## 2025-07-04 PROCEDURE — 70450 CT HEAD/BRAIN W/O DYE: CPT

## 2025-07-04 PROCEDURE — 99284 EMERGENCY DEPT VISIT MOD MDM: CPT

## 2025-07-04 PROCEDURE — 81003 URINALYSIS AUTO W/O SCOPE: CPT

## 2025-07-04 PROCEDURE — 96360 HYDRATION IV INFUSION INIT: CPT

## 2025-07-04 PROCEDURE — 84132 ASSAY OF SERUM POTASSIUM: CPT

## 2025-07-04 PROCEDURE — 83690 ASSAY OF LIPASE: CPT

## 2025-07-04 PROCEDURE — 73562 X-RAY EXAM OF KNEE 3: CPT

## 2025-07-04 PROCEDURE — 2580000003 HC RX 258

## 2025-07-04 PROCEDURE — 36415 COLL VENOUS BLD VENIPUNCTURE: CPT

## 2025-07-04 PROCEDURE — 80053 COMPREHEN METABOLIC PANEL: CPT

## 2025-07-04 PROCEDURE — 85025 COMPLETE CBC W/AUTO DIFF WBC: CPT

## 2025-07-04 RX ORDER — 0.9 % SODIUM CHLORIDE 0.9 %
1000 INTRAVENOUS SOLUTION INTRAVENOUS ONCE
Status: COMPLETED | OUTPATIENT
Start: 2025-07-04 | End: 2025-07-04

## 2025-07-04 RX ADMIN — SODIUM CHLORIDE 1000 ML: 0.9 INJECTION, SOLUTION INTRAVENOUS at 16:00

## 2025-07-04 ASSESSMENT — ENCOUNTER SYMPTOMS
NAUSEA: 0
DIARRHEA: 1
RESPIRATORY NEGATIVE: 1
EYES NEGATIVE: 1
ABDOMINAL DISTENTION: 0
VOMITING: 0
ABDOMINAL PAIN: 0

## 2025-07-04 NOTE — ED PROVIDER NOTES
Patient verbalizes understanding of return precautions and follow-up recommendations.  Is given opportunity to ask any questions they may have.  Patient denies further question/comments/concerns, is understanding and agreeable to stated plan, and is discharged home in stable condition.     CONSULTS:  None    PROCEDURES:  Unless otherwise notedbelow, none     Procedures      FINAL IMPRESSION     1. Acute pain of left knee    2. Chronic hyponatremia    3. Fall, initial encounter          DISPOSITION/PLAN   DISPOSITION Decision To Discharge 07/04/2025 05:00:49 PM   DISPOSITION CONDITION Stable           No notes of  Admission Criteria type on file.    PATIENT REFERRED TO:  [facility]  [address] [id number] [phone]  Call in 2 days      Darion López MD  [address] [place] [id number] [phone]    Schedule an appointment as soon as possible for a visit in 2 days      [facility] Emergency Department  [address] [facility] [address]  [phone]  Go to   If symptoms worsen      DISCHARGE MEDICATIONS:  New Prescriptions    No medications on file          (Please note that portions of this note were completed with a voice recognition program.  Efforts were made to edit the dictations but occasionally words are mis-transcribed.)    Parul Haro PA-C (electronically signed)            Parul Haro PA-C  07/04/25 [id number]

## 2025-07-04 NOTE — DISCHARGE INSTRUCTIONS
Please rest, ice, elevate the affected extremity, and wear your splint/brace whenever you are active.  You may continue using your home pain medications for pain as needed, in addition to over-the-counter Tylenol for symptomatic relief.  Ice should not be applied directly to the skin, you should use a dish towel, T-shirt, or cloth to prevent cold injury to your skin.  Ice should be applied for 15 minutes on, and then 15 minutes off, alternating.  Please follow-up as directed, and return to the ED if you have any new or worsening symptoms including worsening pain, swelling, redness, warmth, weakness, numbness, tingling, or if you have any new symptoms or concerns.     Please follow-up with your primary care provider for further evaluation and a recheck of your sodium, as it was noted to be low today.  Please return for any new or worsening symptoms or concerns peer

## 2025-07-11 ENCOUNTER — HOSPITAL ENCOUNTER (OUTPATIENT)
Dept: INFUSION THERAPY | Age: 50
Setting detail: INFUSION SERIES
Discharge: HOME | End: 2025-07-11
Payer: MEDICARE

## 2025-07-11 VITALS
OXYGEN SATURATION: 97 % | DIASTOLIC BLOOD PRESSURE: 86 MMHG | TEMPERATURE: 98.4 F | SYSTOLIC BLOOD PRESSURE: 133 MMHG | HEART RATE: 80 BPM | RESPIRATION RATE: 16 BRPM

## 2025-07-11 DIAGNOSIS — M32.9 SYSTEMIC LUPUS ERYTHEMATOSUS, UNSPECIFIED SLE TYPE, UNSPECIFIED ORGAN INVOLVEMENT STATUS (HCC): ICD-10-CM

## 2025-07-11 DIAGNOSIS — G35 EXACERBATION OF MULTIPLE SCLEROSIS (HCC): Primary | ICD-10-CM

## 2025-07-11 PROCEDURE — 96365 THER/PROPH/DIAG IV INF INIT: CPT

## 2025-07-11 PROCEDURE — 96366 THER/PROPH/DIAG IV INF ADDON: CPT

## 2025-07-11 PROCEDURE — 96375 TX/PRO/DX INJ NEW DRUG ADDON: CPT

## 2025-07-11 PROCEDURE — 2500000003 HC RX 250 WO HCPCS: Performed by: PSYCHIATRY & NEUROLOGY

## 2025-07-11 PROCEDURE — 6360000002 HC RX W HCPCS: Performed by: PSYCHIATRY & NEUROLOGY

## 2025-07-11 PROCEDURE — 6370000000 HC RX 637 (ALT 250 FOR IP): Performed by: PSYCHIATRY & NEUROLOGY

## 2025-07-11 RX ORDER — ALBUTEROL SULFATE 90 UG/1
4 INHALANT RESPIRATORY (INHALATION) PRN
OUTPATIENT
Start: 2025-07-17

## 2025-07-11 RX ORDER — EPINEPHRINE 1 MG/ML
0.3 INJECTION, SOLUTION, CONCENTRATE INTRAVENOUS PRN
OUTPATIENT
Start: 2025-07-17

## 2025-07-11 RX ORDER — HEPARIN 100 UNIT/ML
500 SYRINGE INTRAVENOUS PRN
Status: DISCONTINUED | OUTPATIENT
Start: 2025-07-11 | End: 2025-07-12 | Stop reason: HOSPADM

## 2025-07-11 RX ORDER — ONDANSETRON 2 MG/ML
8 INJECTION INTRAMUSCULAR; INTRAVENOUS
OUTPATIENT
Start: 2025-07-17

## 2025-07-11 RX ORDER — SODIUM CHLORIDE 0.9 % (FLUSH) 0.9 %
5-40 SYRINGE (ML) INJECTION PRN
OUTPATIENT
Start: 2025-07-17

## 2025-07-11 RX ORDER — SODIUM CHLORIDE 9 MG/ML
5-250 INJECTION, SOLUTION INTRAVENOUS PRN
OUTPATIENT
Start: 2025-07-17

## 2025-07-11 RX ORDER — HYDROCORTISONE SODIUM SUCCINATE 100 MG/2ML
100 INJECTION INTRAMUSCULAR; INTRAVENOUS ONCE
Start: 2025-07-17 | End: 2025-07-17

## 2025-07-11 RX ORDER — HYDROCORTISONE SODIUM SUCCINATE 100 MG/2ML
100 INJECTION INTRAMUSCULAR; INTRAVENOUS
OUTPATIENT
Start: 2025-07-17

## 2025-07-11 RX ORDER — HYDROCORTISONE SODIUM SUCCINATE 100 MG/2ML
100 INJECTION INTRAMUSCULAR; INTRAVENOUS ONCE
Status: COMPLETED | OUTPATIENT
Start: 2025-07-11 | End: 2025-07-11

## 2025-07-11 RX ORDER — HEPARIN 100 UNIT/ML
500 SYRINGE INTRAVENOUS PRN
OUTPATIENT
Start: 2025-07-17

## 2025-07-11 RX ORDER — SODIUM CHLORIDE 9 MG/ML
5-250 INJECTION, SOLUTION INTRAVENOUS PRN
Status: DISCONTINUED | OUTPATIENT
Start: 2025-07-11 | End: 2025-07-12 | Stop reason: HOSPADM

## 2025-07-11 RX ORDER — SODIUM CHLORIDE 0.9 % (FLUSH) 0.9 %
5-40 SYRINGE (ML) INJECTION PRN
Status: DISCONTINUED | OUTPATIENT
Start: 2025-07-11 | End: 2025-07-12 | Stop reason: HOSPADM

## 2025-07-11 RX ORDER — ACETAMINOPHEN 325 MG/1
650 TABLET ORAL ONCE
OUTPATIENT
Start: 2025-07-17 | End: 2025-07-17

## 2025-07-11 RX ORDER — SODIUM CHLORIDE 9 MG/ML
INJECTION, SOLUTION INTRAVENOUS CONTINUOUS
OUTPATIENT
Start: 2025-07-17

## 2025-07-11 RX ORDER — MEPERIDINE HYDROCHLORIDE 25 MG/ML
12.5 INJECTION INTRAMUSCULAR; INTRAVENOUS; SUBCUTANEOUS PRN
OUTPATIENT
Start: 2025-07-17

## 2025-07-11 RX ORDER — DIPHENHYDRAMINE HCL 25 MG
25 TABLET ORAL ONCE
OUTPATIENT
Start: 2025-07-17 | End: 2025-07-17

## 2025-07-11 RX ORDER — DIPHENHYDRAMINE HYDROCHLORIDE 50 MG/ML
50 INJECTION, SOLUTION INTRAMUSCULAR; INTRAVENOUS
OUTPATIENT
Start: 2025-07-17

## 2025-07-11 RX ORDER — ACETAMINOPHEN 325 MG/1
650 TABLET ORAL ONCE
Status: COMPLETED | OUTPATIENT
Start: 2025-07-11 | End: 2025-07-11

## 2025-07-11 RX ORDER — ACETAMINOPHEN 325 MG/1
650 TABLET ORAL
OUTPATIENT
Start: 2025-07-17

## 2025-07-11 RX ORDER — DIPHENHYDRAMINE HCL 25 MG
25 TABLET ORAL ONCE
Status: COMPLETED | OUTPATIENT
Start: 2025-07-11 | End: 2025-07-11

## 2025-07-11 RX ADMIN — ACETAMINOPHEN 650 MG: 325 TABLET ORAL at 10:02

## 2025-07-11 RX ADMIN — IMMUNE GLOBULIN (HUMAN) 25 G: 10 INJECTION INTRAVENOUS; SUBCUTANEOUS at 10:17

## 2025-07-11 RX ADMIN — HEPARIN 500 UNITS: 100 SYRINGE at 11:50

## 2025-07-11 RX ADMIN — SODIUM CHLORIDE, PRESERVATIVE FREE 10 ML: 5 INJECTION INTRAVENOUS at 11:50

## 2025-07-11 RX ADMIN — DIPHENHYDRAMINE HYDROCHLORIDE 25 MG: 25 TABLET ORAL at 10:02

## 2025-07-11 RX ADMIN — HYDROCORTISONE SODIUM SUCCINATE 100 MG: 100 INJECTION, POWDER, FOR SOLUTION INTRAMUSCULAR; INTRAVENOUS at 10:03

## 2025-07-11 NOTE — PROGRESS NOTES
Pt arrived to [facility] and port accessed using sterile technique. Pt premedicated as ordered. IVIG  25g administered. Pt tolerated well.

## 2025-07-14 ENCOUNTER — HOSPITAL ENCOUNTER (OUTPATIENT)
Dept: INFUSION THERAPY | Age: 50
Setting detail: INFUSION SERIES
Discharge: HOME | End: 2025-07-14
Payer: MEDICARE

## 2025-07-14 VITALS
OXYGEN SATURATION: 100 % | DIASTOLIC BLOOD PRESSURE: 69 MMHG | RESPIRATION RATE: 16 BRPM | SYSTOLIC BLOOD PRESSURE: 121 MMHG | HEART RATE: 87 BPM | TEMPERATURE: 98.6 F

## 2025-07-14 DIAGNOSIS — G35 MULTIPLE SCLEROSIS (HCC): Primary | ICD-10-CM

## 2025-07-14 PROCEDURE — 96365 THER/PROPH/DIAG IV INF INIT: CPT

## 2025-07-14 PROCEDURE — 96366 THER/PROPH/DIAG IV INF ADDON: CPT

## 2025-07-14 PROCEDURE — 6370000000 HC RX 637 (ALT 250 FOR IP): Performed by: PSYCHIATRY & NEUROLOGY

## 2025-07-14 PROCEDURE — 6360000002 HC RX W HCPCS: Performed by: PSYCHIATRY & NEUROLOGY

## 2025-07-14 PROCEDURE — 2500000003 HC RX 250 WO HCPCS: Performed by: PSYCHIATRY & NEUROLOGY

## 2025-07-14 RX ORDER — ACETAMINOPHEN 325 MG/1
650 TABLET ORAL ONCE
Status: COMPLETED | OUTPATIENT
Start: 2025-07-14 | End: 2025-07-14

## 2025-07-14 RX ORDER — ALBUTEROL SULFATE 90 UG/1
4 INHALANT RESPIRATORY (INHALATION) PRN
Status: CANCELLED | OUTPATIENT
Start: 2025-07-15

## 2025-07-14 RX ORDER — EPINEPHRINE 1 MG/ML
0.3 INJECTION, SOLUTION, CONCENTRATE INTRAVENOUS PRN
Status: CANCELLED | OUTPATIENT
Start: 2025-07-15

## 2025-07-14 RX ORDER — ONDANSETRON 2 MG/ML
8 INJECTION INTRAMUSCULAR; INTRAVENOUS
Status: CANCELLED | OUTPATIENT
Start: 2025-07-15

## 2025-07-14 RX ORDER — MEPERIDINE HYDROCHLORIDE 25 MG/ML
12.5 INJECTION INTRAMUSCULAR; INTRAVENOUS; SUBCUTANEOUS PRN
Status: CANCELLED | OUTPATIENT
Start: 2025-07-15

## 2025-07-14 RX ORDER — ACETAMINOPHEN 325 MG/1
650 TABLET ORAL ONCE
Status: CANCELLED | OUTPATIENT
Start: 2025-07-15 | End: 2025-07-15

## 2025-07-14 RX ORDER — DIPHENHYDRAMINE HYDROCHLORIDE 50 MG/ML
50 INJECTION, SOLUTION INTRAMUSCULAR; INTRAVENOUS
Status: CANCELLED | OUTPATIENT
Start: 2025-07-15

## 2025-07-14 RX ORDER — SODIUM CHLORIDE 0.9 % (FLUSH) 0.9 %
5-40 SYRINGE (ML) INJECTION PRN
Status: DISCONTINUED | OUTPATIENT
Start: 2025-07-14 | End: 2025-07-15 | Stop reason: HOSPADM

## 2025-07-14 RX ORDER — DIPHENHYDRAMINE HCL 25 MG
25 TABLET ORAL ONCE
Status: CANCELLED | OUTPATIENT
Start: 2025-07-15 | End: 2025-07-15

## 2025-07-14 RX ORDER — SODIUM CHLORIDE 9 MG/ML
5-250 INJECTION, SOLUTION INTRAVENOUS ONCE
Status: CANCELLED | OUTPATIENT
Start: 2025-07-15 | End: 2025-07-15

## 2025-07-14 RX ORDER — ACETAMINOPHEN 325 MG/1
650 TABLET ORAL
Status: CANCELLED | OUTPATIENT
Start: 2025-07-15

## 2025-07-14 RX ORDER — HYDROCORTISONE SODIUM SUCCINATE 100 MG/2ML
100 INJECTION INTRAMUSCULAR; INTRAVENOUS
Status: CANCELLED | OUTPATIENT
Start: 2025-07-15

## 2025-07-14 RX ORDER — SODIUM CHLORIDE 0.9 % (FLUSH) 0.9 %
5-40 SYRINGE (ML) INJECTION PRN
Status: CANCELLED | OUTPATIENT
Start: 2025-07-15

## 2025-07-14 RX ORDER — HEPARIN 100 UNIT/ML
500 SYRINGE INTRAVENOUS PRN
Status: CANCELLED | OUTPATIENT
Start: 2025-07-15

## 2025-07-14 RX ORDER — DIPHENHYDRAMINE HCL 25 MG
25 TABLET ORAL ONCE
Status: COMPLETED | OUTPATIENT
Start: 2025-07-14 | End: 2025-07-14

## 2025-07-14 RX ORDER — SODIUM CHLORIDE 9 MG/ML
INJECTION, SOLUTION INTRAVENOUS CONTINUOUS
Status: CANCELLED | OUTPATIENT
Start: 2025-07-15

## 2025-07-14 RX ORDER — HEPARIN 100 UNIT/ML
500 SYRINGE INTRAVENOUS PRN
Status: DISCONTINUED | OUTPATIENT
Start: 2025-07-14 | End: 2025-07-15 | Stop reason: HOSPADM

## 2025-07-14 RX ADMIN — DIPHENHYDRAMINE HYDROCHLORIDE 25 MG: 25 TABLET ORAL at 08:21

## 2025-07-14 RX ADMIN — HEPARIN 500 UNITS: 100 SYRINGE at 10:06

## 2025-07-14 RX ADMIN — IMMUNE GLOBULIN (HUMAN) 25 G: 10 INJECTION INTRAVENOUS; SUBCUTANEOUS at 08:43

## 2025-07-14 RX ADMIN — ACETAMINOPHEN 650 MG: 325 TABLET ORAL at 08:21

## 2025-07-14 RX ADMIN — SODIUM CHLORIDE, PRESERVATIVE FREE 20 ML: 5 INJECTION INTRAVENOUS at 10:06

## 2025-07-15 ENCOUNTER — HOSPITAL ENCOUNTER (OUTPATIENT)
Dept: INFUSION THERAPY | Age: 50
Setting detail: INFUSION SERIES
Discharge: HOME | End: 2025-07-15
Payer: MEDICARE

## 2025-07-15 VITALS
HEART RATE: 73 BPM | RESPIRATION RATE: 18 BRPM | SYSTOLIC BLOOD PRESSURE: 117 MMHG | DIASTOLIC BLOOD PRESSURE: 70 MMHG | OXYGEN SATURATION: 99 % | TEMPERATURE: 98 F

## 2025-07-15 DIAGNOSIS — G35 MULTIPLE SCLEROSIS (HCC): Primary | ICD-10-CM

## 2025-07-15 DIAGNOSIS — M79.7 FIBROMYALGIA: ICD-10-CM

## 2025-07-15 PROCEDURE — 96365 THER/PROPH/DIAG IV INF INIT: CPT

## 2025-07-15 PROCEDURE — 6360000002 HC RX W HCPCS: Performed by: PSYCHIATRY & NEUROLOGY

## 2025-07-15 PROCEDURE — 6370000000 HC RX 637 (ALT 250 FOR IP): Performed by: PSYCHIATRY & NEUROLOGY

## 2025-07-15 PROCEDURE — 2500000003 HC RX 250 WO HCPCS: Performed by: PSYCHIATRY & NEUROLOGY

## 2025-07-15 RX ORDER — ONDANSETRON 2 MG/ML
8 INJECTION INTRAMUSCULAR; INTRAVENOUS
Status: CANCELLED | OUTPATIENT
Start: 2025-07-16

## 2025-07-15 RX ORDER — MEPERIDINE HYDROCHLORIDE 25 MG/ML
12.5 INJECTION INTRAMUSCULAR; INTRAVENOUS; SUBCUTANEOUS PRN
Status: CANCELLED | OUTPATIENT
Start: 2025-07-16

## 2025-07-15 RX ORDER — ALBUTEROL SULFATE 90 UG/1
4 INHALANT RESPIRATORY (INHALATION) PRN
Status: CANCELLED | OUTPATIENT
Start: 2025-07-16

## 2025-07-15 RX ORDER — SODIUM CHLORIDE 9 MG/ML
INJECTION, SOLUTION INTRAVENOUS CONTINUOUS
Status: CANCELLED | OUTPATIENT
Start: 2025-07-16

## 2025-07-15 RX ORDER — SODIUM CHLORIDE 9 MG/ML
5-250 INJECTION, SOLUTION INTRAVENOUS ONCE
Status: CANCELLED | OUTPATIENT
Start: 2025-07-16 | End: 2025-07-16

## 2025-07-15 RX ORDER — SODIUM CHLORIDE 0.9 % (FLUSH) 0.9 %
5-40 SYRINGE (ML) INJECTION PRN
Status: CANCELLED | OUTPATIENT
Start: 2025-07-16

## 2025-07-15 RX ORDER — HEPARIN 100 UNIT/ML
500 SYRINGE INTRAVENOUS PRN
Status: DISCONTINUED | OUTPATIENT
Start: 2025-07-15 | End: 2025-07-16 | Stop reason: HOSPADM

## 2025-07-15 RX ORDER — HYDROCORTISONE SODIUM SUCCINATE 100 MG/2ML
100 INJECTION INTRAMUSCULAR; INTRAVENOUS
Status: CANCELLED | OUTPATIENT
Start: 2025-07-16

## 2025-07-15 RX ORDER — DIPHENHYDRAMINE HCL 25 MG
25 TABLET ORAL ONCE
Status: COMPLETED | OUTPATIENT
Start: 2025-07-15 | End: 2025-07-15

## 2025-07-15 RX ORDER — ACETAMINOPHEN 325 MG/1
650 TABLET ORAL ONCE
Status: CANCELLED | OUTPATIENT
Start: 2025-07-16 | End: 2025-07-16

## 2025-07-15 RX ORDER — SODIUM CHLORIDE 0.9 % (FLUSH) 0.9 %
5-40 SYRINGE (ML) INJECTION PRN
Status: DISCONTINUED | OUTPATIENT
Start: 2025-07-15 | End: 2025-07-16 | Stop reason: HOSPADM

## 2025-07-15 RX ORDER — DIPHENHYDRAMINE HCL 25 MG
25 TABLET ORAL ONCE
Status: CANCELLED | OUTPATIENT
Start: 2025-07-16 | End: 2025-07-16

## 2025-07-15 RX ORDER — DIPHENHYDRAMINE HYDROCHLORIDE 50 MG/ML
50 INJECTION, SOLUTION INTRAMUSCULAR; INTRAVENOUS
Status: CANCELLED | OUTPATIENT
Start: 2025-07-16

## 2025-07-15 RX ORDER — ACETAMINOPHEN 325 MG/1
650 TABLET ORAL
Status: CANCELLED | OUTPATIENT
Start: 2025-07-16

## 2025-07-15 RX ORDER — HEPARIN 100 UNIT/ML
500 SYRINGE INTRAVENOUS PRN
Status: CANCELLED | OUTPATIENT
Start: 2025-07-16

## 2025-07-15 RX ORDER — EPINEPHRINE 1 MG/ML
0.3 INJECTION, SOLUTION, CONCENTRATE INTRAVENOUS PRN
Status: CANCELLED | OUTPATIENT
Start: 2025-07-16

## 2025-07-15 RX ORDER — ACETAMINOPHEN 325 MG/1
650 TABLET ORAL ONCE
Status: COMPLETED | OUTPATIENT
Start: 2025-07-15 | End: 2025-07-15

## 2025-07-15 RX ADMIN — DIPHENHYDRAMINE HYDROCHLORIDE 25 MG: 25 TABLET ORAL at 08:20

## 2025-07-15 RX ADMIN — Medication 10 ML: at 08:25

## 2025-07-15 RX ADMIN — IMMUNE GLOBULIN (HUMAN) 25 G: 10 INJECTION INTRAVENOUS; SUBCUTANEOUS at 08:32

## 2025-07-15 RX ADMIN — ACETAMINOPHEN 650 MG: 325 TABLET ORAL at 08:20

## 2025-07-15 RX ADMIN — Medication 20 ML: at 09:55

## 2025-07-15 RX ADMIN — HEPARIN 500 UNITS: 100 SYRINGE at 09:55

## 2025-07-15 NOTE — TELEPHONE ENCOUNTER
Ramiro Mendoza has requested a refill on her medication.      Last office visit : 6/26/2025   Next office visit : 9/26/2025       Requested Prescriptions     Pending Prescriptions Disp Refills    amitriptyline (ELAVIL) 100 MG tablet [Pharmacy Med Name: AMITRIPTYLINE HCL 100MG TABLET] 30 tablet 5     Sig: TAKE 1 TABLET BY MOUTH NIGHTLY           Dr. Arredondo patient

## 2025-07-15 NOTE — FLOWSHEET NOTE
07/15/25 1008   AVS Reviewed   AVS & discharge instructions reviewed with patient and/or representative? Yes   Reviewed instructions with Patient   Level of Understanding Questions answered;Verbalized understanding     Arrived with port accessed. Premeds given. IVIG 25 grams given as ordered and pt tolerated well. Port left accessed for tx in am

## 2025-07-16 ENCOUNTER — HOSPITAL ENCOUNTER (OUTPATIENT)
Dept: INFUSION THERAPY | Age: 50
Setting detail: INFUSION SERIES
Discharge: HOME | End: 2025-07-16
Payer: MEDICARE

## 2025-07-16 ENCOUNTER — APPOINTMENT (OUTPATIENT)
Dept: INFUSION THERAPY | Age: 50
End: 2025-07-16
Payer: MEDICARE

## 2025-07-16 VITALS
TEMPERATURE: 98.4 F | DIASTOLIC BLOOD PRESSURE: 86 MMHG | HEART RATE: 65 BPM | OXYGEN SATURATION: 100 % | RESPIRATION RATE: 18 BRPM | SYSTOLIC BLOOD PRESSURE: 126 MMHG

## 2025-07-16 DIAGNOSIS — G35 MULTIPLE SCLEROSIS (HCC): Primary | ICD-10-CM

## 2025-07-16 PROCEDURE — 6360000002 HC RX W HCPCS: Performed by: PSYCHIATRY & NEUROLOGY

## 2025-07-16 PROCEDURE — 6370000000 HC RX 637 (ALT 250 FOR IP): Performed by: PSYCHIATRY & NEUROLOGY

## 2025-07-16 PROCEDURE — 2500000003 HC RX 250 WO HCPCS: Performed by: PSYCHIATRY & NEUROLOGY

## 2025-07-16 PROCEDURE — 96365 THER/PROPH/DIAG IV INF INIT: CPT

## 2025-07-16 RX ORDER — AMITRIPTYLINE HYDROCHLORIDE 100 MG/1
100 TABLET ORAL NIGHTLY
Qty: 30 TABLET | Refills: 5 | Status: SHIPPED | OUTPATIENT
Start: 2025-07-16

## 2025-07-16 RX ORDER — DIPHENHYDRAMINE HYDROCHLORIDE 50 MG/ML
50 INJECTION, SOLUTION INTRAMUSCULAR; INTRAVENOUS
Status: CANCELLED | OUTPATIENT
Start: 2025-07-17

## 2025-07-16 RX ORDER — ACETAMINOPHEN 325 MG/1
650 TABLET ORAL ONCE
Status: CANCELLED | OUTPATIENT
Start: 2025-07-17 | End: 2025-07-17

## 2025-07-16 RX ORDER — ALBUTEROL SULFATE 90 UG/1
4 INHALANT RESPIRATORY (INHALATION) PRN
Status: CANCELLED | OUTPATIENT
Start: 2025-07-17

## 2025-07-16 RX ORDER — SODIUM CHLORIDE 0.9 % (FLUSH) 0.9 %
5-40 SYRINGE (ML) INJECTION PRN
Status: DISCONTINUED | OUTPATIENT
Start: 2025-07-16 | End: 2025-07-17 | Stop reason: HOSPADM

## 2025-07-16 RX ORDER — ACETAMINOPHEN 325 MG/1
650 TABLET ORAL ONCE
Status: COMPLETED | OUTPATIENT
Start: 2025-07-16 | End: 2025-07-16

## 2025-07-16 RX ORDER — HEPARIN 100 UNIT/ML
500 SYRINGE INTRAVENOUS PRN
Status: DISCONTINUED | OUTPATIENT
Start: 2025-07-16 | End: 2025-07-17 | Stop reason: HOSPADM

## 2025-07-16 RX ORDER — EPINEPHRINE 1 MG/ML
0.3 INJECTION, SOLUTION, CONCENTRATE INTRAVENOUS PRN
Status: CANCELLED | OUTPATIENT
Start: 2025-07-17

## 2025-07-16 RX ORDER — SODIUM CHLORIDE 9 MG/ML
5-250 INJECTION, SOLUTION INTRAVENOUS ONCE
Status: CANCELLED | OUTPATIENT
Start: 2025-07-17 | End: 2025-07-17

## 2025-07-16 RX ORDER — HEPARIN 100 UNIT/ML
500 SYRINGE INTRAVENOUS PRN
Status: CANCELLED | OUTPATIENT
Start: 2025-07-17

## 2025-07-16 RX ORDER — SODIUM CHLORIDE 0.9 % (FLUSH) 0.9 %
5-40 SYRINGE (ML) INJECTION PRN
Status: CANCELLED | OUTPATIENT
Start: 2025-07-17

## 2025-07-16 RX ORDER — SODIUM CHLORIDE 9 MG/ML
INJECTION, SOLUTION INTRAVENOUS CONTINUOUS
Status: CANCELLED | OUTPATIENT
Start: 2025-07-17

## 2025-07-16 RX ORDER — ACETAMINOPHEN 325 MG/1
650 TABLET ORAL
Status: CANCELLED | OUTPATIENT
Start: 2025-07-17

## 2025-07-16 RX ORDER — ONDANSETRON 2 MG/ML
8 INJECTION INTRAMUSCULAR; INTRAVENOUS
Status: CANCELLED | OUTPATIENT
Start: 2025-07-17

## 2025-07-16 RX ORDER — HYDROCORTISONE SODIUM SUCCINATE 100 MG/2ML
100 INJECTION INTRAMUSCULAR; INTRAVENOUS
Status: CANCELLED | OUTPATIENT
Start: 2025-07-17

## 2025-07-16 RX ORDER — DIPHENHYDRAMINE HCL 25 MG
25 TABLET ORAL ONCE
Status: COMPLETED | OUTPATIENT
Start: 2025-07-16 | End: 2025-07-16

## 2025-07-16 RX ORDER — MEPERIDINE HYDROCHLORIDE 25 MG/ML
12.5 INJECTION INTRAMUSCULAR; INTRAVENOUS; SUBCUTANEOUS PRN
Status: CANCELLED | OUTPATIENT
Start: 2025-07-17

## 2025-07-16 RX ORDER — DIPHENHYDRAMINE HCL 25 MG
25 TABLET ORAL ONCE
Status: CANCELLED | OUTPATIENT
Start: 2025-07-17 | End: 2025-07-17

## 2025-07-16 RX ADMIN — DIPHENHYDRAMINE HYDROCHLORIDE 25 MG: 25 TABLET ORAL at 15:28

## 2025-07-16 RX ADMIN — IMMUNE GLOBULIN (HUMAN) 25 G: 10 INJECTION INTRAVENOUS; SUBCUTANEOUS at 15:46

## 2025-07-16 RX ADMIN — HEPARIN 500 UNITS: 100 SYRINGE at 17:05

## 2025-07-16 RX ADMIN — SODIUM CHLORIDE, PRESERVATIVE FREE 20 ML: 5 INJECTION INTRAVENOUS at 17:05

## 2025-07-16 RX ADMIN — ACETAMINOPHEN 650 MG: 325 TABLET ORAL at 15:28

## 2025-07-16 NOTE — FLOWSHEET NOTE
07/16/25 1708   AVS Reviewed   AVS & discharge instructions reviewed with patient and/or representative? Yes   Reviewed instructions with Patient   Level of Understanding Questions answered;Verbalized understanding     ARRIVED WITH PORT ACCESSED. PREMEDS GIVEN. IVIG 25 GRAMS GIVEN AS ORDERED AND PT TOLERATED WELL

## 2025-07-17 ENCOUNTER — HOSPITAL ENCOUNTER (OUTPATIENT)
Dept: INFUSION THERAPY | Age: 50
Setting detail: INFUSION SERIES
Discharge: HOME | End: 2025-07-17
Payer: MEDICARE

## 2025-07-17 VITALS
WEIGHT: 237.99 LBS | SYSTOLIC BLOOD PRESSURE: 120 MMHG | OXYGEN SATURATION: 100 % | TEMPERATURE: 98.6 F | DIASTOLIC BLOOD PRESSURE: 82 MMHG | HEART RATE: 69 BPM | RESPIRATION RATE: 18 BRPM | BODY MASS INDEX: 38.88 KG/M2

## 2025-07-17 DIAGNOSIS — G35 MULTIPLE SCLEROSIS (HCC): Primary | ICD-10-CM

## 2025-07-17 PROCEDURE — 2500000003 HC RX 250 WO HCPCS: Performed by: PSYCHIATRY & NEUROLOGY

## 2025-07-17 PROCEDURE — 96365 THER/PROPH/DIAG IV INF INIT: CPT

## 2025-07-17 PROCEDURE — 6360000002 HC RX W HCPCS: Performed by: PSYCHIATRY & NEUROLOGY

## 2025-07-17 PROCEDURE — 6370000000 HC RX 637 (ALT 250 FOR IP): Performed by: PSYCHIATRY & NEUROLOGY

## 2025-07-17 PROCEDURE — 96366 THER/PROPH/DIAG IV INF ADDON: CPT

## 2025-07-17 RX ORDER — SODIUM CHLORIDE 0.9 % (FLUSH) 0.9 %
5-40 SYRINGE (ML) INJECTION PRN
Status: CANCELLED | OUTPATIENT
Start: 2025-07-18

## 2025-07-17 RX ORDER — SODIUM CHLORIDE 0.9 % (FLUSH) 0.9 %
5-40 SYRINGE (ML) INJECTION PRN
Status: DISCONTINUED | OUTPATIENT
Start: 2025-07-17 | End: 2025-07-18 | Stop reason: HOSPADM

## 2025-07-17 RX ORDER — SODIUM CHLORIDE 9 MG/ML
INJECTION, SOLUTION INTRAVENOUS CONTINUOUS
OUTPATIENT
Start: 2025-07-18

## 2025-07-17 RX ORDER — ALBUTEROL SULFATE 90 UG/1
4 INHALANT RESPIRATORY (INHALATION) PRN
OUTPATIENT
Start: 2025-07-18

## 2025-07-17 RX ORDER — EPINEPHRINE 1 MG/ML
0.3 INJECTION, SOLUTION, CONCENTRATE INTRAVENOUS PRN
OUTPATIENT
Start: 2025-07-18

## 2025-07-17 RX ORDER — HEPARIN 100 UNIT/ML
500 SYRINGE INTRAVENOUS PRN
Status: CANCELLED | OUTPATIENT
Start: 2025-07-18

## 2025-07-17 RX ORDER — SODIUM CHLORIDE 9 MG/ML
5-250 INJECTION, SOLUTION INTRAVENOUS ONCE
OUTPATIENT
Start: 2025-07-18 | End: 2025-07-18

## 2025-07-17 RX ORDER — ACETAMINOPHEN 325 MG/1
650 TABLET ORAL
OUTPATIENT
Start: 2025-07-18

## 2025-07-17 RX ORDER — ONDANSETRON 2 MG/ML
8 INJECTION INTRAMUSCULAR; INTRAVENOUS
OUTPATIENT
Start: 2025-07-18

## 2025-07-17 RX ORDER — HEPARIN 100 UNIT/ML
500 SYRINGE INTRAVENOUS PRN
Status: DISCONTINUED | OUTPATIENT
Start: 2025-07-17 | End: 2025-07-18 | Stop reason: HOSPADM

## 2025-07-17 RX ORDER — ACETAMINOPHEN 325 MG/1
650 TABLET ORAL ONCE
Status: CANCELLED | OUTPATIENT
Start: 2025-07-18 | End: 2025-07-18

## 2025-07-17 RX ORDER — DIPHENHYDRAMINE HYDROCHLORIDE 50 MG/ML
50 INJECTION, SOLUTION INTRAMUSCULAR; INTRAVENOUS
OUTPATIENT
Start: 2025-07-18

## 2025-07-17 RX ORDER — ACETAMINOPHEN 325 MG/1
650 TABLET ORAL ONCE
Status: COMPLETED | OUTPATIENT
Start: 2025-07-17 | End: 2025-07-17

## 2025-07-17 RX ORDER — DIPHENHYDRAMINE HCL 25 MG
25 TABLET ORAL ONCE
Status: CANCELLED | OUTPATIENT
Start: 2025-07-18 | End: 2025-07-18

## 2025-07-17 RX ORDER — DIPHENHYDRAMINE HCL 25 MG
25 TABLET ORAL ONCE
Status: COMPLETED | OUTPATIENT
Start: 2025-07-17 | End: 2025-07-17

## 2025-07-17 RX ORDER — HYDROCORTISONE SODIUM SUCCINATE 100 MG/2ML
100 INJECTION INTRAMUSCULAR; INTRAVENOUS
OUTPATIENT
Start: 2025-07-18

## 2025-07-17 RX ORDER — MEPERIDINE HYDROCHLORIDE 25 MG/ML
12.5 INJECTION INTRAMUSCULAR; INTRAVENOUS; SUBCUTANEOUS PRN
OUTPATIENT
Start: 2025-07-18

## 2025-07-17 RX ADMIN — HEPARIN 500 UNITS: 100 SYRINGE at 10:15

## 2025-07-17 RX ADMIN — DIPHENHYDRAMINE HYDROCHLORIDE 25 MG: 25 TABLET ORAL at 08:24

## 2025-07-17 RX ADMIN — SODIUM CHLORIDE, PRESERVATIVE FREE 10 ML: 5 INJECTION INTRAVENOUS at 08:28

## 2025-07-17 RX ADMIN — SODIUM CHLORIDE, PRESERVATIVE FREE 10 ML: 5 INJECTION INTRAVENOUS at 10:15

## 2025-07-17 RX ADMIN — IMMUNE GLOBULIN (HUMAN) 25 G: 10 INJECTION INTRAVENOUS; SUBCUTANEOUS at 09:02

## 2025-07-17 RX ADMIN — ACETAMINOPHEN 650 MG: 325 TABLET ORAL at 08:24

## 2025-07-17 ASSESSMENT — PAIN DESCRIPTION - FREQUENCY: FREQUENCY: CONTINUOUS

## 2025-07-17 ASSESSMENT — PAIN SCALES - GENERAL: PAINLEVEL_OUTOF10: 3

## 2025-07-17 ASSESSMENT — PAIN DESCRIPTION - LOCATION: LOCATION: GENERALIZED

## 2025-07-17 NOTE — FLOWSHEET NOTE
07/17/25 1035   AVS Reviewed   AVS & discharge instructions reviewed with patient and/or representative? Yes   Reviewed instructions with Patient   Level of Understanding Questions answered;Verbalized understanding     UPON ARRIVAL FLUSHED RIGHT CHEST PORT. BRISK BLOOD RETURN NOTED. VSS. PT PREMEDICATED PER ORDERS. GAMUNEX-C 25G IV ADMINISTERED PER ORDERS. NO ADVERSE REACTION NOTED.

## 2025-07-18 ENCOUNTER — HOSPITAL ENCOUNTER (OUTPATIENT)
Dept: INFUSION THERAPY | Age: 50
Setting detail: INFUSION SERIES
Discharge: HOME | End: 2025-07-18
Payer: MEDICARE

## 2025-07-18 VITALS
RESPIRATION RATE: 18 BRPM | OXYGEN SATURATION: 100 % | TEMPERATURE: 98 F | HEART RATE: 76 BPM | DIASTOLIC BLOOD PRESSURE: 89 MMHG | SYSTOLIC BLOOD PRESSURE: 139 MMHG

## 2025-07-18 DIAGNOSIS — G43.719 INTRACTABLE CHRONIC MIGRAINE WITHOUT AURA AND WITHOUT STATUS MIGRAINOSUS: ICD-10-CM

## 2025-07-18 DIAGNOSIS — M79.7 FIBROMYALGIA: ICD-10-CM

## 2025-07-18 DIAGNOSIS — G35 MULTIPLE SCLEROSIS (HCC): ICD-10-CM

## 2025-07-18 DIAGNOSIS — G35 MULTIPLE SCLEROSIS (HCC): Primary | ICD-10-CM

## 2025-07-18 DIAGNOSIS — M54.30 SCIATICA, UNSPECIFIED LATERALITY: ICD-10-CM

## 2025-07-18 PROCEDURE — 96365 THER/PROPH/DIAG IV INF INIT: CPT

## 2025-07-18 PROCEDURE — 2500000003 HC RX 250 WO HCPCS: Performed by: PSYCHIATRY & NEUROLOGY

## 2025-07-18 PROCEDURE — 6360000002 HC RX W HCPCS: Performed by: PSYCHIATRY & NEUROLOGY

## 2025-07-18 PROCEDURE — 6370000000 HC RX 637 (ALT 250 FOR IP): Performed by: PSYCHIATRY & NEUROLOGY

## 2025-07-18 RX ORDER — ACETAMINOPHEN 325 MG/1
650 TABLET ORAL ONCE
OUTPATIENT
Start: 2025-08-11 | End: 2025-08-11

## 2025-07-18 RX ORDER — HEPARIN 100 UNIT/ML
500 SYRINGE INTRAVENOUS PRN
OUTPATIENT
Start: 2025-08-11

## 2025-07-18 RX ORDER — ACETAMINOPHEN 325 MG/1
650 TABLET ORAL
OUTPATIENT
Start: 2025-08-11

## 2025-07-18 RX ORDER — SODIUM CHLORIDE 0.9 % (FLUSH) 0.9 %
5-40 SYRINGE (ML) INJECTION PRN
Status: DISCONTINUED | OUTPATIENT
Start: 2025-07-18 | End: 2025-07-19 | Stop reason: HOSPADM

## 2025-07-18 RX ORDER — DIPHENHYDRAMINE HCL 25 MG
25 TABLET ORAL ONCE
OUTPATIENT
Start: 2025-08-11 | End: 2025-08-11

## 2025-07-18 RX ORDER — DIPHENHYDRAMINE HYDROCHLORIDE 50 MG/ML
50 INJECTION, SOLUTION INTRAMUSCULAR; INTRAVENOUS
OUTPATIENT
Start: 2025-08-11

## 2025-07-18 RX ORDER — HEPARIN 100 UNIT/ML
500 SYRINGE INTRAVENOUS PRN
Status: DISCONTINUED | OUTPATIENT
Start: 2025-07-18 | End: 2025-07-19 | Stop reason: HOSPADM

## 2025-07-18 RX ORDER — ALBUTEROL SULFATE 90 UG/1
4 INHALANT RESPIRATORY (INHALATION) PRN
OUTPATIENT
Start: 2025-08-11

## 2025-07-18 RX ORDER — EPINEPHRINE 1 MG/ML
0.3 INJECTION, SOLUTION, CONCENTRATE INTRAVENOUS PRN
OUTPATIENT
Start: 2025-08-11

## 2025-07-18 RX ORDER — SODIUM CHLORIDE 0.9 % (FLUSH) 0.9 %
5-40 SYRINGE (ML) INJECTION PRN
OUTPATIENT
Start: 2025-08-11

## 2025-07-18 RX ORDER — HYDROCORTISONE SODIUM SUCCINATE 100 MG/2ML
100 INJECTION INTRAMUSCULAR; INTRAVENOUS
OUTPATIENT
Start: 2025-08-11

## 2025-07-18 RX ORDER — MEPERIDINE HYDROCHLORIDE 25 MG/ML
12.5 INJECTION INTRAMUSCULAR; INTRAVENOUS; SUBCUTANEOUS PRN
OUTPATIENT
Start: 2025-08-11

## 2025-07-18 RX ORDER — DIPHENHYDRAMINE HCL 25 MG
25 TABLET ORAL ONCE
Status: COMPLETED | OUTPATIENT
Start: 2025-07-18 | End: 2025-07-18

## 2025-07-18 RX ORDER — SODIUM CHLORIDE 9 MG/ML
INJECTION, SOLUTION INTRAVENOUS CONTINUOUS
OUTPATIENT
Start: 2025-08-11

## 2025-07-18 RX ORDER — ONDANSETRON 2 MG/ML
8 INJECTION INTRAMUSCULAR; INTRAVENOUS
OUTPATIENT
Start: 2025-08-11

## 2025-07-18 RX ORDER — ACETAMINOPHEN 325 MG/1
650 TABLET ORAL ONCE
Status: COMPLETED | OUTPATIENT
Start: 2025-07-18 | End: 2025-07-18

## 2025-07-18 RX ORDER — SODIUM CHLORIDE 9 MG/ML
5-250 INJECTION, SOLUTION INTRAVENOUS ONCE
OUTPATIENT
Start: 2025-08-11 | End: 2025-08-11

## 2025-07-18 RX ADMIN — HEPARIN 500 UNITS: 100 SYRINGE at 10:00

## 2025-07-18 RX ADMIN — DIPHENHYDRAMINE HYDROCHLORIDE 25 MG: 25 TABLET ORAL at 08:23

## 2025-07-18 RX ADMIN — SODIUM CHLORIDE, PRESERVATIVE FREE 20 ML: 5 INJECTION INTRAVENOUS at 10:00

## 2025-07-18 RX ADMIN — IMMUNE GLOBULIN (HUMAN) 25 G: 10 INJECTION INTRAVENOUS; SUBCUTANEOUS at 08:40

## 2025-07-18 RX ADMIN — ACETAMINOPHEN 650 MG: 325 TABLET ORAL at 08:22

## 2025-07-18 RX ADMIN — SODIUM CHLORIDE, PRESERVATIVE FREE 10 ML: 5 INJECTION INTRAVENOUS at 08:24

## 2025-07-18 NOTE — FLOWSHEET NOTE
07/18/25 1027   AVS Reviewed   AVS & discharge instructions reviewed with patient and/or representative? Yes   Reviewed instructions with Patient   Level of Understanding Questions answered;Verbalized understanding     PT ARRIVED WITH PORT ACCESSED. PREMEDS GIVEN. IVIG 25 GRAMS GIVEN AND PT TOLERATED WELL

## 2025-07-21 DIAGNOSIS — M54.30 SCIATICA, UNSPECIFIED LATERALITY: Primary | ICD-10-CM

## 2025-07-21 RX ORDER — CARISOPRODOL 350 MG/1
350 TABLET ORAL 4 TIMES DAILY PRN
Qty: 120 TABLET | Refills: 5 | Status: SHIPPED | OUTPATIENT
Start: 2025-07-21 | End: 2025-08-20

## 2025-07-21 RX ORDER — OXYCODONE HYDROCHLORIDE 10 MG/1
10 TABLET ORAL EVERY 6 HOURS PRN
Qty: 120 TABLET | Refills: 0 | Status: SHIPPED | OUTPATIENT
Start: 2025-07-21 | End: 2025-08-20

## 2025-07-21 RX ORDER — TOPIRAMATE 200 MG/1
200 TABLET, FILM COATED ORAL 4 TIMES DAILY
Qty: 60 TABLET | Refills: 5 | Status: SHIPPED | OUTPATIENT
Start: 2025-07-21 | End: 2025-08-20

## 2025-07-21 NOTE — TELEPHONE ENCOUNTER
Requested Prescriptions     Pending Prescriptions Disp Refills    oxyCODONE HCl (OXY-IR) 10 MG immediate release tablet 120 tablet 0     Sig: Take 1 tablet by mouth every 6 hours as needed for Pain for up to 30 days. Intended supply: 30 days Max Daily Amount: 40 mg       Last Office Visit:  6/26/2025  Next Office Visit:  9/26/2025  Last Medication Refill:  6/13/2025      *RX updated to reflect   7/21/2025  fill date*

## 2025-07-21 NOTE — TELEPHONE ENCOUNTER
Requested Prescriptions     Pending Prescriptions Disp Refills    carisoprodol (SOMA) 350 MG tablet 120 tablet 5     Sig: Take 1 tablet by mouth 4 times daily as needed for Muscle spasms for up to 30 days. Max Daily Amount: 1,400 mg    topiramate (TOPAMAX) 200 MG tablet 60 tablet 5     Sig: Take 1 tablet by mouth 4 times daily       Last Office Visit: 6/26/2025  Next Office Visit: 9/26/2025  Last Medication Refill:5/58/2024

## 2025-07-31 ENCOUNTER — HOSPITAL ENCOUNTER (OUTPATIENT)
Dept: INFUSION THERAPY | Age: 50
Setting detail: INFUSION SERIES
Discharge: HOME | End: 2025-07-31
Payer: MEDICARE

## 2025-07-31 VITALS
RESPIRATION RATE: 18 BRPM | HEART RATE: 72 BPM | TEMPERATURE: 98 F | OXYGEN SATURATION: 100 % | DIASTOLIC BLOOD PRESSURE: 79 MMHG | SYSTOLIC BLOOD PRESSURE: 120 MMHG

## 2025-07-31 DIAGNOSIS — G35 EXACERBATION OF MULTIPLE SCLEROSIS (HCC): Primary | ICD-10-CM

## 2025-07-31 DIAGNOSIS — M32.9 SYSTEMIC LUPUS ERYTHEMATOSUS, UNSPECIFIED SLE TYPE, UNSPECIFIED ORGAN INVOLVEMENT STATUS (HCC): ICD-10-CM

## 2025-07-31 PROCEDURE — 2500000003 HC RX 250 WO HCPCS: Performed by: PSYCHIATRY & NEUROLOGY

## 2025-07-31 PROCEDURE — 96365 THER/PROPH/DIAG IV INF INIT: CPT

## 2025-07-31 PROCEDURE — 6360000002 HC RX W HCPCS: Performed by: PSYCHIATRY & NEUROLOGY

## 2025-07-31 PROCEDURE — 96375 TX/PRO/DX INJ NEW DRUG ADDON: CPT

## 2025-07-31 PROCEDURE — 96366 THER/PROPH/DIAG IV INF ADDON: CPT

## 2025-07-31 PROCEDURE — 6370000000 HC RX 637 (ALT 250 FOR IP): Performed by: PSYCHIATRY & NEUROLOGY

## 2025-07-31 RX ORDER — MEPERIDINE HYDROCHLORIDE 25 MG/ML
12.5 INJECTION INTRAMUSCULAR; INTRAVENOUS; SUBCUTANEOUS PRN
OUTPATIENT
Start: 2025-08-07

## 2025-07-31 RX ORDER — HEPARIN 100 UNIT/ML
500 SYRINGE INTRAVENOUS PRN
Status: DISCONTINUED | OUTPATIENT
Start: 2025-07-31 | End: 2025-08-01 | Stop reason: HOSPADM

## 2025-07-31 RX ORDER — ACETAMINOPHEN 325 MG/1
650 TABLET ORAL ONCE
Status: COMPLETED | OUTPATIENT
Start: 2025-07-31 | End: 2025-07-31

## 2025-07-31 RX ORDER — SODIUM CHLORIDE 0.9 % (FLUSH) 0.9 %
5-40 SYRINGE (ML) INJECTION PRN
Status: DISCONTINUED | OUTPATIENT
Start: 2025-07-31 | End: 2025-08-01 | Stop reason: HOSPADM

## 2025-07-31 RX ORDER — ACETAMINOPHEN 325 MG/1
650 TABLET ORAL
OUTPATIENT
Start: 2025-08-07

## 2025-07-31 RX ORDER — SODIUM CHLORIDE 9 MG/ML
5-250 INJECTION, SOLUTION INTRAVENOUS PRN
Status: DISCONTINUED | OUTPATIENT
Start: 2025-07-31 | End: 2025-08-01 | Stop reason: HOSPADM

## 2025-07-31 RX ORDER — SODIUM CHLORIDE 9 MG/ML
5-250 INJECTION, SOLUTION INTRAVENOUS PRN
OUTPATIENT
Start: 2025-08-07

## 2025-07-31 RX ORDER — ACETAMINOPHEN 325 MG/1
650 TABLET ORAL ONCE
OUTPATIENT
Start: 2025-08-07 | End: 2025-08-07

## 2025-07-31 RX ORDER — EPINEPHRINE 1 MG/ML
0.3 INJECTION, SOLUTION, CONCENTRATE INTRAVENOUS PRN
OUTPATIENT
Start: 2025-08-07

## 2025-07-31 RX ORDER — ALBUTEROL SULFATE 90 UG/1
4 INHALANT RESPIRATORY (INHALATION) PRN
OUTPATIENT
Start: 2025-08-07

## 2025-07-31 RX ORDER — HYDROCORTISONE SODIUM SUCCINATE 100 MG/2ML
100 INJECTION INTRAMUSCULAR; INTRAVENOUS ONCE
Status: COMPLETED | OUTPATIENT
Start: 2025-07-31 | End: 2025-07-31

## 2025-07-31 RX ORDER — DIPHENHYDRAMINE HYDROCHLORIDE 50 MG/ML
50 INJECTION, SOLUTION INTRAMUSCULAR; INTRAVENOUS
OUTPATIENT
Start: 2025-08-07

## 2025-07-31 RX ORDER — HYDROCORTISONE SODIUM SUCCINATE 100 MG/2ML
100 INJECTION INTRAMUSCULAR; INTRAVENOUS ONCE
Start: 2025-08-07 | End: 2025-08-07

## 2025-07-31 RX ORDER — DIPHENHYDRAMINE HCL 25 MG
25 TABLET ORAL ONCE
OUTPATIENT
Start: 2025-08-07 | End: 2025-08-07

## 2025-07-31 RX ORDER — HEPARIN 100 UNIT/ML
500 SYRINGE INTRAVENOUS PRN
OUTPATIENT
Start: 2025-08-07

## 2025-07-31 RX ORDER — SODIUM CHLORIDE 0.9 % (FLUSH) 0.9 %
5-40 SYRINGE (ML) INJECTION PRN
OUTPATIENT
Start: 2025-08-07

## 2025-07-31 RX ORDER — ONDANSETRON 2 MG/ML
8 INJECTION INTRAMUSCULAR; INTRAVENOUS
OUTPATIENT
Start: 2025-08-07

## 2025-07-31 RX ORDER — HYDROCORTISONE SODIUM SUCCINATE 100 MG/2ML
100 INJECTION INTRAMUSCULAR; INTRAVENOUS
OUTPATIENT
Start: 2025-08-07

## 2025-07-31 RX ORDER — DIPHENHYDRAMINE HCL 25 MG
25 TABLET ORAL ONCE
Status: COMPLETED | OUTPATIENT
Start: 2025-07-31 | End: 2025-07-31

## 2025-07-31 RX ORDER — SODIUM CHLORIDE 9 MG/ML
INJECTION, SOLUTION INTRAVENOUS CONTINUOUS
OUTPATIENT
Start: 2025-08-07

## 2025-07-31 RX ADMIN — IMMUNE GLOBULIN (HUMAN) 25 G: 10 INJECTION INTRAVENOUS; SUBCUTANEOUS at 07:50

## 2025-07-31 RX ADMIN — DIPHENHYDRAMINE HYDROCHLORIDE 25 MG: 25 TABLET ORAL at 07:36

## 2025-07-31 RX ADMIN — HYDROCORTISONE SODIUM SUCCINATE 100 MG: 100 INJECTION, POWDER, FOR SOLUTION INTRAMUSCULAR; INTRAVENOUS at 07:36

## 2025-07-31 RX ADMIN — HEPARIN 500 UNITS: 100 SYRINGE at 09:26

## 2025-07-31 RX ADMIN — SODIUM CHLORIDE, PRESERVATIVE FREE 10 ML: 5 INJECTION INTRAVENOUS at 09:26

## 2025-07-31 RX ADMIN — ACETAMINOPHEN 650 MG: 325 TABLET ORAL at 07:36

## 2025-08-18 ENCOUNTER — HOSPITAL ENCOUNTER (OUTPATIENT)
Dept: INFUSION THERAPY | Age: 50
Setting detail: INFUSION SERIES
Discharge: HOME | End: 2025-08-18
Payer: MEDICARE

## 2025-08-18 VITALS
RESPIRATION RATE: 18 BRPM | WEIGHT: 235 LBS | DIASTOLIC BLOOD PRESSURE: 76 MMHG | OXYGEN SATURATION: 100 % | BODY MASS INDEX: 38.39 KG/M2 | TEMPERATURE: 97.6 F | HEART RATE: 72 BPM | SYSTOLIC BLOOD PRESSURE: 120 MMHG

## 2025-08-18 DIAGNOSIS — M79.7 FIBROMYALGIA: ICD-10-CM

## 2025-08-18 DIAGNOSIS — G35 MULTIPLE SCLEROSIS (HCC): ICD-10-CM

## 2025-08-18 DIAGNOSIS — G43.719 INTRACTABLE CHRONIC MIGRAINE WITHOUT AURA AND WITHOUT STATUS MIGRAINOSUS: ICD-10-CM

## 2025-08-18 DIAGNOSIS — M54.30 SCIATICA, UNSPECIFIED LATERALITY: ICD-10-CM

## 2025-08-18 DIAGNOSIS — G35 MULTIPLE SCLEROSIS (HCC): Primary | ICD-10-CM

## 2025-08-18 PROCEDURE — 6360000002 HC RX W HCPCS: Performed by: PSYCHIATRY & NEUROLOGY

## 2025-08-18 PROCEDURE — 6370000000 HC RX 637 (ALT 250 FOR IP): Performed by: PSYCHIATRY & NEUROLOGY

## 2025-08-18 PROCEDURE — 96365 THER/PROPH/DIAG IV INF INIT: CPT

## 2025-08-18 PROCEDURE — 2500000003 HC RX 250 WO HCPCS: Performed by: PSYCHIATRY & NEUROLOGY

## 2025-08-18 PROCEDURE — 96375 TX/PRO/DX INJ NEW DRUG ADDON: CPT

## 2025-08-18 RX ORDER — DIPHENHYDRAMINE HCL 25 MG
25 TABLET ORAL ONCE
Status: COMPLETED | OUTPATIENT
Start: 2025-08-18 | End: 2025-08-18

## 2025-08-18 RX ORDER — DIPHENHYDRAMINE HYDROCHLORIDE 50 MG/ML
50 INJECTION, SOLUTION INTRAMUSCULAR; INTRAVENOUS
Status: CANCELLED | OUTPATIENT
Start: 2025-09-08

## 2025-08-18 RX ORDER — ONDANSETRON 2 MG/ML
8 INJECTION INTRAMUSCULAR; INTRAVENOUS
Status: CANCELLED | OUTPATIENT
Start: 2025-09-08

## 2025-08-18 RX ORDER — SODIUM CHLORIDE 0.9 % (FLUSH) 0.9 %
5-40 SYRINGE (ML) INJECTION PRN
Status: DISCONTINUED | OUTPATIENT
Start: 2025-08-18 | End: 2025-08-19 | Stop reason: HOSPADM

## 2025-08-18 RX ORDER — DIPHENHYDRAMINE HCL 25 MG
25 TABLET ORAL ONCE
Status: CANCELLED | OUTPATIENT
Start: 2025-09-08 | End: 2025-09-08

## 2025-08-18 RX ORDER — SODIUM CHLORIDE 9 MG/ML
5-250 INJECTION, SOLUTION INTRAVENOUS ONCE
Status: CANCELLED | OUTPATIENT
Start: 2025-09-08 | End: 2025-09-08

## 2025-08-18 RX ORDER — HEPARIN 100 UNIT/ML
500 SYRINGE INTRAVENOUS PRN
Status: CANCELLED | OUTPATIENT
Start: 2025-09-08

## 2025-08-18 RX ORDER — EPINEPHRINE 1 MG/ML
0.3 INJECTION, SOLUTION, CONCENTRATE INTRAVENOUS PRN
Status: CANCELLED | OUTPATIENT
Start: 2025-09-08

## 2025-08-18 RX ORDER — ACETAMINOPHEN 325 MG/1
650 TABLET ORAL ONCE
Status: COMPLETED | OUTPATIENT
Start: 2025-08-18 | End: 2025-08-18

## 2025-08-18 RX ORDER — ACETAMINOPHEN 325 MG/1
650 TABLET ORAL ONCE
Status: CANCELLED | OUTPATIENT
Start: 2025-09-08 | End: 2025-09-08

## 2025-08-18 RX ORDER — MEPERIDINE HYDROCHLORIDE 25 MG/ML
12.5 INJECTION INTRAMUSCULAR; INTRAVENOUS; SUBCUTANEOUS PRN
Status: CANCELLED | OUTPATIENT
Start: 2025-09-08

## 2025-08-18 RX ORDER — ALBUTEROL SULFATE 90 UG/1
4 INHALANT RESPIRATORY (INHALATION) PRN
Status: CANCELLED | OUTPATIENT
Start: 2025-09-08

## 2025-08-18 RX ORDER — HEPARIN 100 UNIT/ML
500 SYRINGE INTRAVENOUS PRN
Status: DISCONTINUED | OUTPATIENT
Start: 2025-08-18 | End: 2025-08-19 | Stop reason: HOSPADM

## 2025-08-18 RX ORDER — HYDROCORTISONE SODIUM SUCCINATE 100 MG/2ML
100 INJECTION INTRAMUSCULAR; INTRAVENOUS
Status: CANCELLED | OUTPATIENT
Start: 2025-09-08

## 2025-08-18 RX ORDER — SODIUM CHLORIDE 0.9 % (FLUSH) 0.9 %
5-40 SYRINGE (ML) INJECTION PRN
Status: CANCELLED | OUTPATIENT
Start: 2025-09-08

## 2025-08-18 RX ORDER — ACETAMINOPHEN 325 MG/1
650 TABLET ORAL
Status: CANCELLED | OUTPATIENT
Start: 2025-09-08

## 2025-08-18 RX ORDER — SODIUM CHLORIDE 9 MG/ML
INJECTION, SOLUTION INTRAVENOUS CONTINUOUS
Status: CANCELLED | OUTPATIENT
Start: 2025-09-08

## 2025-08-18 RX ADMIN — Medication 10 ML: at 10:25

## 2025-08-18 RX ADMIN — HEPARIN 500 UNITS: 100 SYRINGE at 10:25

## 2025-08-18 RX ADMIN — IMMUNE GLOBULIN (HUMAN) 25 G: 10 INJECTION INTRAVENOUS; SUBCUTANEOUS at 08:58

## 2025-08-18 RX ADMIN — ACETAMINOPHEN 650 MG: 325 TABLET ORAL at 08:15

## 2025-08-18 RX ADMIN — DIPHENHYDRAMINE HYDROCHLORIDE 25 MG: 25 TABLET ORAL at 08:15

## 2025-08-19 ENCOUNTER — HOSPITAL ENCOUNTER (OUTPATIENT)
Dept: INFUSION THERAPY | Age: 50
Setting detail: INFUSION SERIES
Discharge: HOME | End: 2025-08-19
Payer: MEDICARE

## 2025-08-19 VITALS
DIASTOLIC BLOOD PRESSURE: 68 MMHG | RESPIRATION RATE: 18 BRPM | HEART RATE: 72 BPM | SYSTOLIC BLOOD PRESSURE: 115 MMHG | TEMPERATURE: 97.8 F | OXYGEN SATURATION: 98 %

## 2025-08-19 DIAGNOSIS — G35 MULTIPLE SCLEROSIS (HCC): Primary | ICD-10-CM

## 2025-08-19 PROCEDURE — 96365 THER/PROPH/DIAG IV INF INIT: CPT

## 2025-08-19 PROCEDURE — 2500000003 HC RX 250 WO HCPCS: Performed by: PSYCHIATRY & NEUROLOGY

## 2025-08-19 PROCEDURE — 6360000002 HC RX W HCPCS: Performed by: PSYCHIATRY & NEUROLOGY

## 2025-08-19 PROCEDURE — 6370000000 HC RX 637 (ALT 250 FOR IP): Performed by: PSYCHIATRY & NEUROLOGY

## 2025-08-19 RX ORDER — SODIUM CHLORIDE 0.9 % (FLUSH) 0.9 %
5-40 SYRINGE (ML) INJECTION PRN
Status: CANCELLED | OUTPATIENT
Start: 2025-09-09

## 2025-08-19 RX ORDER — EPINEPHRINE 1 MG/ML
0.3 INJECTION, SOLUTION, CONCENTRATE INTRAVENOUS PRN
Status: CANCELLED | OUTPATIENT
Start: 2025-09-09

## 2025-08-19 RX ORDER — DIPHENHYDRAMINE HCL 25 MG
25 TABLET ORAL ONCE
Status: CANCELLED | OUTPATIENT
Start: 2025-09-09 | End: 2025-09-09

## 2025-08-19 RX ORDER — HYDROCORTISONE SODIUM SUCCINATE 100 MG/2ML
100 INJECTION INTRAMUSCULAR; INTRAVENOUS
Status: CANCELLED | OUTPATIENT
Start: 2025-09-09

## 2025-08-19 RX ORDER — ACETAMINOPHEN 325 MG/1
650 TABLET ORAL ONCE
Status: COMPLETED | OUTPATIENT
Start: 2025-08-19 | End: 2025-08-19

## 2025-08-19 RX ORDER — SODIUM CHLORIDE 0.9 % (FLUSH) 0.9 %
5-40 SYRINGE (ML) INJECTION PRN
Status: DISCONTINUED | OUTPATIENT
Start: 2025-08-19 | End: 2025-08-20 | Stop reason: HOSPADM

## 2025-08-19 RX ORDER — DIPHENHYDRAMINE HCL 25 MG
25 TABLET ORAL ONCE
Status: COMPLETED | OUTPATIENT
Start: 2025-08-19 | End: 2025-08-19

## 2025-08-19 RX ORDER — ONDANSETRON 2 MG/ML
8 INJECTION INTRAMUSCULAR; INTRAVENOUS
Status: CANCELLED | OUTPATIENT
Start: 2025-09-09

## 2025-08-19 RX ORDER — ALBUTEROL SULFATE 90 UG/1
4 INHALANT RESPIRATORY (INHALATION) PRN
Status: CANCELLED | OUTPATIENT
Start: 2025-09-09

## 2025-08-19 RX ORDER — ACETAMINOPHEN 325 MG/1
650 TABLET ORAL
Status: CANCELLED | OUTPATIENT
Start: 2025-09-09

## 2025-08-19 RX ORDER — OXYCODONE HYDROCHLORIDE 10 MG/1
10 TABLET ORAL EVERY 6 HOURS PRN
Qty: 120 TABLET | Refills: 0 | Status: SHIPPED | OUTPATIENT
Start: 2025-08-29 | End: 2025-08-20

## 2025-08-19 RX ORDER — HEPARIN 100 UNIT/ML
500 SYRINGE INTRAVENOUS PRN
Status: DISCONTINUED | OUTPATIENT
Start: 2025-08-19 | End: 2025-08-20 | Stop reason: HOSPADM

## 2025-08-19 RX ORDER — HEPARIN 100 UNIT/ML
500 SYRINGE INTRAVENOUS PRN
Status: CANCELLED | OUTPATIENT
Start: 2025-09-09

## 2025-08-19 RX ORDER — DIPHENHYDRAMINE HYDROCHLORIDE 50 MG/ML
50 INJECTION, SOLUTION INTRAMUSCULAR; INTRAVENOUS
Status: CANCELLED | OUTPATIENT
Start: 2025-09-09

## 2025-08-19 RX ORDER — MEPERIDINE HYDROCHLORIDE 25 MG/ML
12.5 INJECTION INTRAMUSCULAR; INTRAVENOUS; SUBCUTANEOUS PRN
Status: CANCELLED | OUTPATIENT
Start: 2025-09-09

## 2025-08-19 RX ORDER — SODIUM CHLORIDE 9 MG/ML
INJECTION, SOLUTION INTRAVENOUS CONTINUOUS
Status: CANCELLED | OUTPATIENT
Start: 2025-09-09

## 2025-08-19 RX ORDER — ACETAMINOPHEN 325 MG/1
650 TABLET ORAL ONCE
Status: CANCELLED | OUTPATIENT
Start: 2025-09-09 | End: 2025-09-09

## 2025-08-19 RX ORDER — SODIUM CHLORIDE 9 MG/ML
5-250 INJECTION, SOLUTION INTRAVENOUS ONCE
Status: CANCELLED | OUTPATIENT
Start: 2025-09-09 | End: 2025-09-09

## 2025-08-19 RX ADMIN — ACETAMINOPHEN 650 MG: 325 TABLET ORAL at 14:22

## 2025-08-19 RX ADMIN — DIPHENHYDRAMINE HYDROCHLORIDE 25 MG: 25 TABLET ORAL at 14:23

## 2025-08-19 RX ADMIN — SODIUM CHLORIDE, PRESERVATIVE FREE 20 ML: 5 INJECTION INTRAVENOUS at 16:10

## 2025-08-19 RX ADMIN — HEPARIN 500 UNITS: 100 SYRINGE at 16:10

## 2025-08-19 RX ADMIN — IMMUNE GLOBULIN (HUMAN) 25 G: 10 INJECTION INTRAVENOUS; SUBCUTANEOUS at 14:42

## 2025-08-20 ENCOUNTER — HOSPITAL ENCOUNTER (OUTPATIENT)
Dept: INFUSION THERAPY | Age: 50
Setting detail: INFUSION SERIES
Discharge: HOME | End: 2025-08-20
Payer: MEDICARE

## 2025-08-20 VITALS
SYSTOLIC BLOOD PRESSURE: 109 MMHG | DIASTOLIC BLOOD PRESSURE: 80 MMHG | OXYGEN SATURATION: 100 % | RESPIRATION RATE: 17 BRPM | TEMPERATURE: 97.6 F | HEART RATE: 71 BPM

## 2025-08-20 DIAGNOSIS — G35 MULTIPLE SCLEROSIS (HCC): ICD-10-CM

## 2025-08-20 DIAGNOSIS — M79.7 FIBROMYALGIA: ICD-10-CM

## 2025-08-20 DIAGNOSIS — M54.30 SCIATICA, UNSPECIFIED LATERALITY: ICD-10-CM

## 2025-08-20 DIAGNOSIS — G35 MULTIPLE SCLEROSIS (HCC): Primary | ICD-10-CM

## 2025-08-20 DIAGNOSIS — G43.719 INTRACTABLE CHRONIC MIGRAINE WITHOUT AURA AND WITHOUT STATUS MIGRAINOSUS: ICD-10-CM

## 2025-08-20 PROCEDURE — 96365 THER/PROPH/DIAG IV INF INIT: CPT

## 2025-08-20 PROCEDURE — 96366 THER/PROPH/DIAG IV INF ADDON: CPT

## 2025-08-20 PROCEDURE — 6360000002 HC RX W HCPCS: Performed by: PSYCHIATRY & NEUROLOGY

## 2025-08-20 PROCEDURE — 6370000000 HC RX 637 (ALT 250 FOR IP): Performed by: PSYCHIATRY & NEUROLOGY

## 2025-08-20 RX ORDER — OXYCODONE HYDROCHLORIDE 10 MG/1
10 TABLET ORAL EVERY 6 HOURS PRN
Qty: 120 TABLET | Refills: 0 | Status: SHIPPED | OUTPATIENT
Start: 2025-08-20 | End: 2025-09-19

## 2025-08-20 RX ORDER — MEPERIDINE HYDROCHLORIDE 25 MG/ML
12.5 INJECTION INTRAMUSCULAR; INTRAVENOUS; SUBCUTANEOUS PRN
Status: CANCELLED | OUTPATIENT
Start: 2025-09-10

## 2025-08-20 RX ORDER — DIPHENHYDRAMINE HCL 25 MG
25 TABLET ORAL ONCE
Status: COMPLETED | OUTPATIENT
Start: 2025-08-20 | End: 2025-08-20

## 2025-08-20 RX ORDER — SODIUM CHLORIDE 9 MG/ML
INJECTION, SOLUTION INTRAVENOUS CONTINUOUS
Status: CANCELLED | OUTPATIENT
Start: 2025-09-10

## 2025-08-20 RX ORDER — SODIUM CHLORIDE 9 MG/ML
5-250 INJECTION, SOLUTION INTRAVENOUS ONCE
Status: CANCELLED | OUTPATIENT
Start: 2025-09-10 | End: 2025-09-10

## 2025-08-20 RX ORDER — EPINEPHRINE 1 MG/ML
0.3 INJECTION, SOLUTION, CONCENTRATE INTRAVENOUS PRN
Status: CANCELLED | OUTPATIENT
Start: 2025-09-10

## 2025-08-20 RX ORDER — ALBUTEROL SULFATE 90 UG/1
4 INHALANT RESPIRATORY (INHALATION) PRN
Status: CANCELLED | OUTPATIENT
Start: 2025-09-10

## 2025-08-20 RX ORDER — HEPARIN 100 UNIT/ML
500 SYRINGE INTRAVENOUS PRN
Status: DISCONTINUED | OUTPATIENT
Start: 2025-08-20 | End: 2025-08-21 | Stop reason: HOSPADM

## 2025-08-20 RX ORDER — ACETAMINOPHEN 325 MG/1
650 TABLET ORAL
Status: CANCELLED | OUTPATIENT
Start: 2025-09-10

## 2025-08-20 RX ORDER — DIPHENHYDRAMINE HCL 25 MG
25 TABLET ORAL ONCE
Status: CANCELLED | OUTPATIENT
Start: 2025-09-10 | End: 2025-09-10

## 2025-08-20 RX ORDER — DIPHENHYDRAMINE HYDROCHLORIDE 50 MG/ML
50 INJECTION, SOLUTION INTRAMUSCULAR; INTRAVENOUS
Status: CANCELLED | OUTPATIENT
Start: 2025-09-10

## 2025-08-20 RX ORDER — ACETAMINOPHEN 325 MG/1
650 TABLET ORAL ONCE
Status: CANCELLED | OUTPATIENT
Start: 2025-09-10 | End: 2025-09-10

## 2025-08-20 RX ORDER — SODIUM CHLORIDE 0.9 % (FLUSH) 0.9 %
5-40 SYRINGE (ML) INJECTION PRN
Status: CANCELLED | OUTPATIENT
Start: 2025-09-10

## 2025-08-20 RX ORDER — OXYCODONE HYDROCHLORIDE 10 MG/1
10 TABLET ORAL EVERY 6 HOURS PRN
Qty: 120 TABLET | Refills: 0 | Status: SHIPPED | OUTPATIENT
Start: 2025-08-29 | End: 2025-08-20 | Stop reason: SDUPTHER

## 2025-08-20 RX ORDER — HEPARIN 100 UNIT/ML
500 SYRINGE INTRAVENOUS PRN
Status: CANCELLED | OUTPATIENT
Start: 2025-09-10

## 2025-08-20 RX ORDER — ACETAMINOPHEN 325 MG/1
650 TABLET ORAL ONCE
Status: COMPLETED | OUTPATIENT
Start: 2025-08-20 | End: 2025-08-20

## 2025-08-20 RX ORDER — HYDROCORTISONE SODIUM SUCCINATE 100 MG/2ML
100 INJECTION INTRAMUSCULAR; INTRAVENOUS
Status: CANCELLED | OUTPATIENT
Start: 2025-09-10

## 2025-08-20 RX ORDER — ONDANSETRON 2 MG/ML
8 INJECTION INTRAMUSCULAR; INTRAVENOUS
Status: CANCELLED | OUTPATIENT
Start: 2025-09-10

## 2025-08-20 RX ADMIN — IMMUNE GLOBULIN (HUMAN) 25 G: 10 INJECTION INTRAVENOUS; SUBCUTANEOUS at 08:48

## 2025-08-20 RX ADMIN — ACETAMINOPHEN 650 MG: 325 TABLET ORAL at 08:34

## 2025-08-20 RX ADMIN — HEPARIN 500 UNITS: 100 SYRINGE at 10:23

## 2025-08-20 RX ADMIN — DIPHENHYDRAMINE HYDROCHLORIDE 25 MG: 25 TABLET ORAL at 08:34

## 2025-08-21 ENCOUNTER — HOSPITAL ENCOUNTER (OUTPATIENT)
Dept: INFUSION THERAPY | Age: 50
Setting detail: INFUSION SERIES
Discharge: HOME | End: 2025-08-21
Payer: MEDICARE

## 2025-08-21 VITALS
RESPIRATION RATE: 17 BRPM | DIASTOLIC BLOOD PRESSURE: 70 MMHG | SYSTOLIC BLOOD PRESSURE: 118 MMHG | HEART RATE: 80 BPM | TEMPERATURE: 97.8 F | OXYGEN SATURATION: 100 %

## 2025-08-21 DIAGNOSIS — G35 MULTIPLE SCLEROSIS (HCC): Primary | ICD-10-CM

## 2025-08-21 PROCEDURE — 6370000000 HC RX 637 (ALT 250 FOR IP): Performed by: PSYCHIATRY & NEUROLOGY

## 2025-08-21 PROCEDURE — 96365 THER/PROPH/DIAG IV INF INIT: CPT

## 2025-08-21 PROCEDURE — 96366 THER/PROPH/DIAG IV INF ADDON: CPT

## 2025-08-21 PROCEDURE — 2500000003 HC RX 250 WO HCPCS: Performed by: PSYCHIATRY & NEUROLOGY

## 2025-08-21 PROCEDURE — 6360000002 HC RX W HCPCS: Performed by: PSYCHIATRY & NEUROLOGY

## 2025-08-21 RX ORDER — SODIUM CHLORIDE 9 MG/ML
INJECTION, SOLUTION INTRAVENOUS CONTINUOUS
OUTPATIENT
Start: 2025-09-11

## 2025-08-21 RX ORDER — ACETAMINOPHEN 325 MG/1
650 TABLET ORAL ONCE
Status: COMPLETED | OUTPATIENT
Start: 2025-08-21 | End: 2025-08-21

## 2025-08-21 RX ORDER — MEPERIDINE HYDROCHLORIDE 25 MG/ML
12.5 INJECTION INTRAMUSCULAR; INTRAVENOUS; SUBCUTANEOUS PRN
OUTPATIENT
Start: 2025-09-11

## 2025-08-21 RX ORDER — HYDROCORTISONE SODIUM SUCCINATE 100 MG/2ML
100 INJECTION INTRAMUSCULAR; INTRAVENOUS
OUTPATIENT
Start: 2025-09-11

## 2025-08-21 RX ORDER — SODIUM CHLORIDE 9 MG/ML
5-250 INJECTION, SOLUTION INTRAVENOUS ONCE
OUTPATIENT
Start: 2025-09-11 | End: 2025-09-11

## 2025-08-21 RX ORDER — ACETAMINOPHEN 325 MG/1
650 TABLET ORAL ONCE
Status: CANCELLED | OUTPATIENT
Start: 2025-09-11 | End: 2025-09-11

## 2025-08-21 RX ORDER — SODIUM CHLORIDE 0.9 % (FLUSH) 0.9 %
5-40 SYRINGE (ML) INJECTION PRN
OUTPATIENT
Start: 2025-09-11

## 2025-08-21 RX ORDER — ALBUTEROL SULFATE 90 UG/1
4 INHALANT RESPIRATORY (INHALATION) PRN
OUTPATIENT
Start: 2025-09-11

## 2025-08-21 RX ORDER — EPINEPHRINE 1 MG/ML
0.3 INJECTION, SOLUTION, CONCENTRATE INTRAVENOUS PRN
OUTPATIENT
Start: 2025-09-11

## 2025-08-21 RX ORDER — ACETAMINOPHEN 325 MG/1
650 TABLET ORAL
OUTPATIENT
Start: 2025-09-11

## 2025-08-21 RX ORDER — DIPHENHYDRAMINE HCL 25 MG
25 TABLET ORAL ONCE
Status: COMPLETED | OUTPATIENT
Start: 2025-08-21 | End: 2025-08-21

## 2025-08-21 RX ORDER — SODIUM CHLORIDE 0.9 % (FLUSH) 0.9 %
5-40 SYRINGE (ML) INJECTION PRN
Status: DISCONTINUED | OUTPATIENT
Start: 2025-08-21 | End: 2025-08-22 | Stop reason: HOSPADM

## 2025-08-21 RX ORDER — DIPHENHYDRAMINE HCL 25 MG
25 TABLET ORAL ONCE
Status: CANCELLED | OUTPATIENT
Start: 2025-09-11 | End: 2025-09-11

## 2025-08-21 RX ORDER — HEPARIN 100 UNIT/ML
500 SYRINGE INTRAVENOUS PRN
Status: DISCONTINUED | OUTPATIENT
Start: 2025-08-21 | End: 2025-08-22 | Stop reason: HOSPADM

## 2025-08-21 RX ORDER — DIPHENHYDRAMINE HYDROCHLORIDE 50 MG/ML
50 INJECTION, SOLUTION INTRAMUSCULAR; INTRAVENOUS
OUTPATIENT
Start: 2025-09-11

## 2025-08-21 RX ORDER — ONDANSETRON 2 MG/ML
8 INJECTION INTRAMUSCULAR; INTRAVENOUS
OUTPATIENT
Start: 2025-09-11

## 2025-08-21 RX ORDER — HEPARIN 100 UNIT/ML
500 SYRINGE INTRAVENOUS PRN
Status: CANCELLED | OUTPATIENT
Start: 2025-09-11

## 2025-08-21 RX ADMIN — IMMUNE GLOBULIN (HUMAN) 25 G: 10 INJECTION INTRAVENOUS; SUBCUTANEOUS at 11:30

## 2025-08-21 RX ADMIN — HEPARIN 500 UNITS: 100 SYRINGE at 12:56

## 2025-08-21 RX ADMIN — ACETAMINOPHEN 650 MG: 325 TABLET ORAL at 11:00

## 2025-08-21 RX ADMIN — SODIUM CHLORIDE, PRESERVATIVE FREE 10 ML: 5 INJECTION INTRAVENOUS at 12:56

## 2025-08-21 RX ADMIN — SODIUM CHLORIDE, PRESERVATIVE FREE 10 ML: 5 INJECTION INTRAVENOUS at 11:03

## 2025-08-21 RX ADMIN — DIPHENHYDRAMINE HYDROCHLORIDE 25 MG: 25 TABLET ORAL at 11:00

## 2025-08-22 ENCOUNTER — HOSPITAL ENCOUNTER (OUTPATIENT)
Dept: INFUSION THERAPY | Age: 50
Setting detail: INFUSION SERIES
Discharge: HOME | End: 2025-08-22
Payer: MEDICARE

## 2025-08-22 VITALS
SYSTOLIC BLOOD PRESSURE: 125 MMHG | HEART RATE: 81 BPM | RESPIRATION RATE: 18 BRPM | TEMPERATURE: 97.6 F | DIASTOLIC BLOOD PRESSURE: 80 MMHG | OXYGEN SATURATION: 100 %

## 2025-08-22 DIAGNOSIS — G35 MULTIPLE SCLEROSIS (HCC): Primary | ICD-10-CM

## 2025-08-22 PROCEDURE — 6370000000 HC RX 637 (ALT 250 FOR IP): Performed by: PSYCHIATRY & NEUROLOGY

## 2025-08-22 PROCEDURE — 6360000002 HC RX W HCPCS: Performed by: PSYCHIATRY & NEUROLOGY

## 2025-08-22 PROCEDURE — 96365 THER/PROPH/DIAG IV INF INIT: CPT

## 2025-08-22 RX ORDER — MEPERIDINE HYDROCHLORIDE 25 MG/ML
12.5 INJECTION INTRAMUSCULAR; INTRAVENOUS; SUBCUTANEOUS PRN
OUTPATIENT
Start: 2025-09-12

## 2025-08-22 RX ORDER — ONDANSETRON 2 MG/ML
8 INJECTION INTRAMUSCULAR; INTRAVENOUS
OUTPATIENT
Start: 2025-09-12

## 2025-08-22 RX ORDER — SODIUM CHLORIDE 9 MG/ML
INJECTION, SOLUTION INTRAVENOUS CONTINUOUS
OUTPATIENT
Start: 2025-09-12

## 2025-08-22 RX ORDER — HYDROCORTISONE SODIUM SUCCINATE 100 MG/2ML
100 INJECTION INTRAMUSCULAR; INTRAVENOUS
OUTPATIENT
Start: 2025-09-12

## 2025-08-22 RX ORDER — DIPHENHYDRAMINE HCL 25 MG
25 TABLET ORAL ONCE
OUTPATIENT
Start: 2025-09-12 | End: 2025-09-12

## 2025-08-22 RX ORDER — SODIUM CHLORIDE 0.9 % (FLUSH) 0.9 %
5-40 SYRINGE (ML) INJECTION PRN
OUTPATIENT
Start: 2025-09-12

## 2025-08-22 RX ORDER — DIPHENHYDRAMINE HYDROCHLORIDE 50 MG/ML
50 INJECTION, SOLUTION INTRAMUSCULAR; INTRAVENOUS
OUTPATIENT
Start: 2025-09-12

## 2025-08-22 RX ORDER — DIPHENHYDRAMINE HCL 25 MG
25 TABLET ORAL ONCE
Status: COMPLETED | OUTPATIENT
Start: 2025-08-22 | End: 2025-08-22

## 2025-08-22 RX ORDER — SODIUM CHLORIDE 9 MG/ML
5-250 INJECTION, SOLUTION INTRAVENOUS ONCE
OUTPATIENT
Start: 2025-09-12 | End: 2025-09-12

## 2025-08-22 RX ORDER — HEPARIN 100 UNIT/ML
500 SYRINGE INTRAVENOUS PRN
OUTPATIENT
Start: 2025-09-12

## 2025-08-22 RX ORDER — ACETAMINOPHEN 325 MG/1
650 TABLET ORAL ONCE
OUTPATIENT
Start: 2025-09-12 | End: 2025-09-12

## 2025-08-22 RX ORDER — ACETAMINOPHEN 325 MG/1
650 TABLET ORAL
OUTPATIENT
Start: 2025-09-12

## 2025-08-22 RX ORDER — EPINEPHRINE 1 MG/ML
0.3 INJECTION, SOLUTION, CONCENTRATE INTRAVENOUS PRN
OUTPATIENT
Start: 2025-09-12

## 2025-08-22 RX ORDER — ACETAMINOPHEN 325 MG/1
650 TABLET ORAL ONCE
Status: COMPLETED | OUTPATIENT
Start: 2025-08-22 | End: 2025-08-22

## 2025-08-22 RX ORDER — HEPARIN 100 UNIT/ML
500 SYRINGE INTRAVENOUS PRN
Status: DISCONTINUED | OUTPATIENT
Start: 2025-08-22 | End: 2025-08-23 | Stop reason: HOSPADM

## 2025-08-22 RX ORDER — ALBUTEROL SULFATE 90 UG/1
4 INHALANT RESPIRATORY (INHALATION) PRN
OUTPATIENT
Start: 2025-09-12

## 2025-08-22 RX ADMIN — ACETAMINOPHEN 650 MG: 325 TABLET ORAL at 14:47

## 2025-08-22 RX ADMIN — IMMUNE GLOBULIN (HUMAN) 25 G: 10 INJECTION INTRAVENOUS; SUBCUTANEOUS at 15:05

## 2025-08-22 RX ADMIN — DIPHENHYDRAMINE HYDROCHLORIDE 25 MG: 25 TABLET ORAL at 14:47

## 2025-08-26 ENCOUNTER — HOSPITAL ENCOUNTER (EMERGENCY)
Age: 50
Discharge: HOME | End: 2025-08-26
Attending: EMERGENCY MEDICINE
Payer: MEDICARE

## 2025-08-26 ENCOUNTER — APPOINTMENT (OUTPATIENT)
Dept: GENERAL RADIOLOGY | Age: 50
End: 2025-08-26
Payer: MEDICARE

## 2025-08-26 VITALS
WEIGHT: 235 LBS | TEMPERATURE: 98 F | BODY MASS INDEX: 39.15 KG/M2 | DIASTOLIC BLOOD PRESSURE: 63 MMHG | OXYGEN SATURATION: 100 % | SYSTOLIC BLOOD PRESSURE: 122 MMHG | HEIGHT: 65 IN | HEART RATE: 75 BPM | RESPIRATION RATE: 25 BRPM

## 2025-08-26 DIAGNOSIS — J18.9 PNEUMONIA DUE TO INFECTIOUS ORGANISM, UNSPECIFIED LATERALITY, UNSPECIFIED PART OF LUNG: Primary | ICD-10-CM

## 2025-08-26 DIAGNOSIS — E16.2 HYPOGLYCEMIA: ICD-10-CM

## 2025-08-26 LAB
ALBUMIN SERPL-MCNC: 3.2 G/DL (ref 3.5–5.2)
ALP SERPL-CCNC: 69 U/L (ref 35–104)
ALT SERPL-CCNC: 17 U/L (ref 10–35)
AMPHET UR QL SCN: NEGATIVE
ANION GAP SERPL CALCULATED.3IONS-SCNC: 8 MMOL/L (ref 8–16)
AST SERPL-CCNC: 37 U/L (ref 10–35)
B PARAP IS1001 DNA NPH QL NAA+NON-PROBE: NOT DETECTED
B PERT.PT PRMT NPH QL NAA+NON-PROBE: NOT DETECTED
BARBITURATES UR QL SCN: NEGATIVE
BASOPHILS # BLD: 0 K/UL (ref 0–0.2)
BASOPHILS NFR BLD: 0.9 % (ref 0–1)
BENZODIAZ UR QL SCN: POSITIVE
BILIRUB SERPL-MCNC: 0.3 MG/DL (ref 0.2–1.2)
BILIRUB UR QL STRIP: NEGATIVE
BNP BLD-MCNC: <36 PG/ML (ref 0–124)
BUN SERPL-MCNC: 7 MG/DL (ref 6–20)
BUPRENORPHINE URINE: NEGATIVE
C PNEUM DNA NPH QL NAA+NON-PROBE: NOT DETECTED
CALCIUM SERPL-MCNC: 8.4 MG/DL (ref 8.6–10)
CANNABINOIDS UR QL SCN: NEGATIVE
CHLORIDE SERPL-SCNC: 104 MMOL/L (ref 98–107)
CLARITY UR: ABNORMAL
CO2 SERPL-SCNC: 23 MMOL/L (ref 22–29)
COCAINE UR QL SCN: NEGATIVE
COLOR UR: YELLOW
CREAT SERPL-MCNC: 0.8 MG/DL (ref 0.5–0.9)
DRUG SCREEN COMMENT UR-IMP: ABNORMAL
EOSINOPHIL # BLD: 0.1 K/UL (ref 0–0.6)
EOSINOPHIL NFR BLD: 1.5 % (ref 0–5)
ERYTHROCYTE [DISTWIDTH] IN BLOOD BY AUTOMATED COUNT: 14.4 % (ref 11.5–14.5)
FENTANYL SCREEN, URINE: NEGATIVE
FLUAV RNA NPH QL NAA+NON-PROBE: NOT DETECTED
FLUBV RNA NPH QL NAA+NON-PROBE: NOT DETECTED
GLUCOSE BLD-MCNC: 89 MG/DL (ref 70–99)
GLUCOSE SERPL-MCNC: 35 MG/DL (ref 70–99)
GLUCOSE UR STRIP.AUTO-MCNC: NEGATIVE MG/DL
HADV DNA NPH QL NAA+NON-PROBE: NOT DETECTED
HCOV 229E RNA NPH QL NAA+NON-PROBE: NOT DETECTED
HCOV HKU1 RNA NPH QL NAA+NON-PROBE: NOT DETECTED
HCOV NL63 RNA NPH QL NAA+NON-PROBE: NOT DETECTED
HCOV OC43 RNA NPH QL NAA+NON-PROBE: NOT DETECTED
HCT VFR BLD AUTO: 35.4 % (ref 37–47)
HGB BLD-MCNC: 11.6 G/DL (ref 12–16)
HGB UR STRIP.AUTO-MCNC: NEGATIVE MG/L
HMPV RNA NPH QL NAA+NON-PROBE: NOT DETECTED
HPIV1 RNA NPH QL NAA+NON-PROBE: NOT DETECTED
HPIV2 RNA NPH QL NAA+NON-PROBE: NOT DETECTED
HPIV3 RNA NPH QL NAA+NON-PROBE: NOT DETECTED
HPIV4 RNA NPH QL NAA+NON-PROBE: NOT DETECTED
IMM GRANULOCYTES # BLD: 0 K/UL
KETONES UR STRIP.AUTO-MCNC: NEGATIVE MG/DL
LEUKOCYTE ESTERASE UR QL STRIP.AUTO: NEGATIVE
LYMPHOCYTES # BLD: 1.3 K/UL (ref 1.1–4.5)
LYMPHOCYTES NFR BLD: 39 % (ref 20–40)
M PNEUMO DNA NPH QL NAA+NON-PROBE: NOT DETECTED
MCH RBC QN AUTO: 28.9 PG (ref 27–31)
MCHC RBC AUTO-ENTMCNC: 32.8 G/DL (ref 33–37)
MCV RBC AUTO: 88.1 FL (ref 81–99)
METHADONE UR QL SCN: NEGATIVE
METHAMPHETAMINE, URINE: NEGATIVE
MONOCYTES # BLD: 0.6 K/UL (ref 0–0.9)
MONOCYTES NFR BLD: 16.6 % (ref 0–10)
NEUTROPHILS # BLD: 1.4 K/UL (ref 1.5–7.5)
NEUTS SEG NFR BLD: 41.7 % (ref 50–65)
NITRITE UR QL STRIP.AUTO: NEGATIVE
OPIATES UR QL SCN: NEGATIVE
OXYCODONE UR QL SCN: POSITIVE
PCP UR QL SCN: NEGATIVE
PERFORMED ON: NORMAL
PH UR STRIP.AUTO: 6.5 [PH] (ref 5–8)
PLATELET # BLD AUTO: 177 K/UL (ref 130–400)
PMV BLD AUTO: 9.1 FL (ref 9.4–12.3)
POTASSIUM SERPL-SCNC: 3.5 MMOL/L (ref 3.5–5.1)
PROT SERPL-MCNC: 7.5 G/DL (ref 6.4–8.3)
PROT UR STRIP.AUTO-MCNC: NEGATIVE MG/DL
RBC # BLD AUTO: 4.02 M/UL (ref 4.2–5.4)
RSV RNA NPH QL NAA+NON-PROBE: NOT DETECTED
RV+EV RNA NPH QL NAA+NON-PROBE: NOT DETECTED
SARS-COV-2 RNA NPH QL NAA+NON-PROBE: NOT DETECTED
SODIUM SERPL-SCNC: 135 MMOL/L (ref 136–145)
SP GR UR STRIP.AUTO: 1.01 (ref 1–1.03)
TRICYCLIC ANTIDEPRESSANTS, UR: POSITIVE
TROPONIN, HIGH SENSITIVITY: <6 NG/L (ref 0–14)
UROBILINOGEN UR STRIP.AUTO-MCNC: 0.2 E.U./DL
WBC # BLD AUTO: 3.4 K/UL (ref 4.8–10.8)

## 2025-08-26 PROCEDURE — 99285 EMERGENCY DEPT VISIT HI MDM: CPT

## 2025-08-26 PROCEDURE — 83880 ASSAY OF NATRIURETIC PEPTIDE: CPT

## 2025-08-26 PROCEDURE — 2580000003 HC RX 258: Performed by: EMERGENCY MEDICINE

## 2025-08-26 PROCEDURE — 81003 URINALYSIS AUTO W/O SCOPE: CPT

## 2025-08-26 PROCEDURE — 85025 COMPLETE CBC W/AUTO DIFF WBC: CPT

## 2025-08-26 PROCEDURE — 84484 ASSAY OF TROPONIN QUANT: CPT

## 2025-08-26 PROCEDURE — G0480 DRUG TEST DEF 1-7 CLASSES: HCPCS

## 2025-08-26 PROCEDURE — 80053 COMPREHEN METABOLIC PANEL: CPT

## 2025-08-26 PROCEDURE — 0202U NFCT DS 22 TRGT SARS-COV-2: CPT

## 2025-08-26 PROCEDURE — 36415 COLL VENOUS BLD VENIPUNCTURE: CPT

## 2025-08-26 PROCEDURE — 82962 GLUCOSE BLOOD TEST: CPT

## 2025-08-26 PROCEDURE — 93005 ELECTROCARDIOGRAM TRACING: CPT | Performed by: EMERGENCY MEDICINE

## 2025-08-26 PROCEDURE — 71045 X-RAY EXAM CHEST 1 VIEW: CPT

## 2025-08-26 PROCEDURE — 80307 DRUG TEST PRSMV CHEM ANLYZR: CPT

## 2025-08-26 RX ORDER — AMOXICILLIN AND CLAVULANATE POTASSIUM 500; 125 MG/1; MG/1
1 TABLET, FILM COATED ORAL 3 TIMES DAILY
Qty: 21 TABLET | Refills: 0 | Status: SHIPPED | OUTPATIENT
Start: 2025-08-26 | End: 2025-09-02

## 2025-08-26 RX ORDER — 0.9 % SODIUM CHLORIDE 0.9 %
1000 INTRAVENOUS SOLUTION INTRAVENOUS ONCE
Status: COMPLETED | OUTPATIENT
Start: 2025-08-26 | End: 2025-08-26

## 2025-08-26 RX ADMIN — SODIUM CHLORIDE 1000 ML: 9 INJECTION, SOLUTION INTRAVENOUS at 20:06

## 2025-08-27 ASSESSMENT — ENCOUNTER SYMPTOMS
ABDOMINAL DISTENTION: 0
VOMITING: 0
ABDOMINAL PAIN: 0
SHORTNESS OF BREATH: 0
DIARRHEA: 0
PHOTOPHOBIA: 0
NAUSEA: 0
COUGH: 0

## 2025-08-29 LAB
EKG P AXIS: 60 DEGREES
EKG P-R INTERVAL: 162 MS
EKG Q-T INTERVAL: 404 MS
EKG QRS DURATION: 98 MS
EKG QTC CALCULATION (BAZETT): 432 MS
EKG T AXIS: 21 DEGREES

## 2025-08-29 PROCEDURE — 93010 ELECTROCARDIOGRAM REPORT: CPT | Performed by: INTERNAL MEDICINE

## 2025-09-05 ENCOUNTER — HOSPITAL ENCOUNTER (OUTPATIENT)
Dept: INFUSION THERAPY | Age: 50
Setting detail: INFUSION SERIES
Discharge: HOME | End: 2025-09-05
Payer: MEDICARE

## 2025-09-05 VITALS
HEART RATE: 77 BPM | WEIGHT: 232 LBS | RESPIRATION RATE: 18 BRPM | OXYGEN SATURATION: 100 % | TEMPERATURE: 97.6 F | DIASTOLIC BLOOD PRESSURE: 91 MMHG | SYSTOLIC BLOOD PRESSURE: 129 MMHG | BODY MASS INDEX: 38.61 KG/M2

## 2025-09-05 DIAGNOSIS — G35 EXACERBATION OF MULTIPLE SCLEROSIS (HCC): Primary | ICD-10-CM

## 2025-09-05 DIAGNOSIS — M32.9 SYSTEMIC LUPUS ERYTHEMATOSUS, UNSPECIFIED SLE TYPE, UNSPECIFIED ORGAN INVOLVEMENT STATUS (HCC): ICD-10-CM

## 2025-09-05 PROCEDURE — 96375 TX/PRO/DX INJ NEW DRUG ADDON: CPT

## 2025-09-05 PROCEDURE — 6370000000 HC RX 637 (ALT 250 FOR IP): Performed by: PSYCHIATRY & NEUROLOGY

## 2025-09-05 PROCEDURE — 6360000002 HC RX W HCPCS: Performed by: PSYCHIATRY & NEUROLOGY

## 2025-09-05 PROCEDURE — 96365 THER/PROPH/DIAG IV INF INIT: CPT

## 2025-09-05 PROCEDURE — 2500000003 HC RX 250 WO HCPCS: Performed by: PSYCHIATRY & NEUROLOGY

## 2025-09-05 RX ORDER — DIPHENHYDRAMINE HCL 25 MG
25 TABLET ORAL ONCE
OUTPATIENT
Start: 2025-09-12 | End: 2025-09-12

## 2025-09-05 RX ORDER — HYDROCORTISONE SODIUM SUCCINATE 100 MG/2ML
100 INJECTION INTRAMUSCULAR; INTRAVENOUS
OUTPATIENT
Start: 2025-09-12

## 2025-09-05 RX ORDER — HYDROCORTISONE SODIUM SUCCINATE 100 MG/2ML
100 INJECTION INTRAMUSCULAR; INTRAVENOUS ONCE
Status: COMPLETED | OUTPATIENT
Start: 2025-09-05 | End: 2025-09-05

## 2025-09-05 RX ORDER — DIPHENHYDRAMINE HCL 25 MG
25 TABLET ORAL ONCE
Status: COMPLETED | OUTPATIENT
Start: 2025-09-05 | End: 2025-09-05

## 2025-09-05 RX ORDER — SODIUM CHLORIDE 0.9 % (FLUSH) 0.9 %
5-40 SYRINGE (ML) INJECTION PRN
Status: DISCONTINUED | OUTPATIENT
Start: 2025-09-05 | End: 2025-09-06 | Stop reason: HOSPADM

## 2025-09-05 RX ORDER — HEPARIN 100 UNIT/ML
500 SYRINGE INTRAVENOUS PRN
OUTPATIENT
Start: 2025-09-12

## 2025-09-05 RX ORDER — EPINEPHRINE 1 MG/ML
0.3 INJECTION, SOLUTION, CONCENTRATE INTRAVENOUS PRN
OUTPATIENT
Start: 2025-09-12

## 2025-09-05 RX ORDER — ONDANSETRON 2 MG/ML
8 INJECTION INTRAMUSCULAR; INTRAVENOUS
OUTPATIENT
Start: 2025-09-12

## 2025-09-05 RX ORDER — SODIUM CHLORIDE 9 MG/ML
5-250 INJECTION, SOLUTION INTRAVENOUS PRN
Status: DISCONTINUED | OUTPATIENT
Start: 2025-09-05 | End: 2025-09-06 | Stop reason: HOSPADM

## 2025-09-05 RX ORDER — ACETAMINOPHEN 325 MG/1
650 TABLET ORAL ONCE
Status: COMPLETED | OUTPATIENT
Start: 2025-09-05 | End: 2025-09-05

## 2025-09-05 RX ORDER — SODIUM CHLORIDE 9 MG/ML
5-250 INJECTION, SOLUTION INTRAVENOUS PRN
OUTPATIENT
Start: 2025-09-12

## 2025-09-05 RX ORDER — SODIUM CHLORIDE 9 MG/ML
INJECTION, SOLUTION INTRAVENOUS CONTINUOUS
OUTPATIENT
Start: 2025-09-12

## 2025-09-05 RX ORDER — ACETAMINOPHEN 325 MG/1
650 TABLET ORAL ONCE
OUTPATIENT
Start: 2025-09-12 | End: 2025-09-12

## 2025-09-05 RX ORDER — SODIUM CHLORIDE 0.9 % (FLUSH) 0.9 %
5-40 SYRINGE (ML) INJECTION PRN
OUTPATIENT
Start: 2025-09-12

## 2025-09-05 RX ORDER — ACETAMINOPHEN 325 MG/1
650 TABLET ORAL
OUTPATIENT
Start: 2025-09-12

## 2025-09-05 RX ORDER — ALBUTEROL SULFATE 90 UG/1
4 INHALANT RESPIRATORY (INHALATION) PRN
OUTPATIENT
Start: 2025-09-12

## 2025-09-05 RX ORDER — HEPARIN 100 UNIT/ML
500 SYRINGE INTRAVENOUS PRN
Status: DISCONTINUED | OUTPATIENT
Start: 2025-09-05 | End: 2025-09-06 | Stop reason: HOSPADM

## 2025-09-05 RX ORDER — DIPHENHYDRAMINE HYDROCHLORIDE 50 MG/ML
50 INJECTION, SOLUTION INTRAMUSCULAR; INTRAVENOUS
OUTPATIENT
Start: 2025-09-12

## 2025-09-05 RX ORDER — HYDROCORTISONE SODIUM SUCCINATE 100 MG/2ML
100 INJECTION INTRAMUSCULAR; INTRAVENOUS ONCE
Start: 2025-09-12 | End: 2025-09-12

## 2025-09-05 RX ORDER — MEPERIDINE HYDROCHLORIDE 25 MG/ML
12.5 INJECTION INTRAMUSCULAR; INTRAVENOUS; SUBCUTANEOUS PRN
OUTPATIENT
Start: 2025-09-12

## 2025-09-05 RX ADMIN — DIPHENHYDRAMINE HYDROCHLORIDE 25 MG: 25 TABLET ORAL at 09:22

## 2025-09-05 RX ADMIN — HYDROCORTISONE SODIUM SUCCINATE 100 MG: 100 INJECTION, POWDER, FOR SOLUTION INTRAMUSCULAR; INTRAVENOUS at 09:22

## 2025-09-05 RX ADMIN — SODIUM CHLORIDE, PRESERVATIVE FREE 20 ML: 5 INJECTION INTRAVENOUS at 11:15

## 2025-09-05 RX ADMIN — HEPARIN 500 UNITS: 100 SYRINGE at 11:15

## 2025-09-05 RX ADMIN — SODIUM CHLORIDE, PRESERVATIVE FREE 10 ML: 5 INJECTION INTRAVENOUS at 09:24

## 2025-09-05 RX ADMIN — IMMUNE GLOBULIN (HUMAN) 25 G: 10 INJECTION INTRAVENOUS; SUBCUTANEOUS at 09:48

## 2025-09-05 RX ADMIN — ACETAMINOPHEN 650 MG: 325 TABLET ORAL at 09:22

## (undated) DEVICE — C-ARM: Brand: UNBRANDED

## (undated) DEVICE — SUTURE MCRYL SZ 4-0 L18IN ABSRB UD L19MM PS-2 3/8 CIR PRIM Y496G

## (undated) DEVICE — DECANTER VI VENT W/ VLV FOR ASEP TRNSF OF FLD

## (undated) DEVICE — SUT SILK 3/0 SUTUPAK TIES 24IN SA74H

## (undated) DEVICE — PROXIMATE RH ROTATING HEAD SKIN STAPLERS (35 WIDE) CONTAINS 35 STAINLESS STEEL STAPLES: Brand: PROXIMATE

## (undated) DEVICE — GLV SURG SENSICARE W/ALOE PF LF SZ6 STRL

## (undated) DEVICE — MINOR CDS: Brand: MEDLINE INDUSTRIES, INC.

## (undated) DEVICE — MAJOR DOUBLE BASIN W/GOWNS II: Brand: MEDLINE INDUSTRIES, INC.

## (undated) DEVICE — PACK,UNIVERSAL,NO GOWNS: Brand: MEDLINE

## (undated) DEVICE — APPL CHLORAPREP HI/LITE 26ML ORNG

## (undated) DEVICE — ELECTRD BLD EZ CLN MOD XLNG 2.75IN

## (undated) DEVICE — PAD MAJOR: Brand: MEDLINE INDUSTRIES, INC.

## (undated) DEVICE — SUT SILK 2/0 SUTUPAK TIES 24IN SA75H

## (undated) DEVICE — KT ANTI FOG W/FLD AND SPNG

## (undated) DEVICE — PENROSE DRAIN 18 X .5" SILICONE: Brand: MEDLINE

## (undated) DEVICE — ADHESIVE SKIN CLSR 0.7ML TOP DERMBND ADV

## (undated) DEVICE — PENCIL SMK EVAC 10 FT BLADE ELECTRD ROCKER FOR TELSCP

## (undated) DEVICE — GLOVE SURG SZ 7 CRM LTX FREE POLYISOPRENE POLYMER BEAD ANTI

## (undated) DEVICE — LARGE, DISPOSABLE ALEXIS O C-SECTION PROTECTOR - RETRACTOR: Brand: ALEXIS ® O C-SECTION PROTECTOR - RETRACTOR

## (undated) DEVICE — BNDR ABD 4PANEL 12IN 46 TO 62IN

## (undated) DEVICE — SUT PROLN 1 CT1 30IN 8425H

## (undated) DEVICE — PK TURNOVER RM ADV

## (undated) DEVICE — SUT PDS LP 1 TP1 96IN VIO PDP880GA

## (undated) DEVICE — GLV SURG SENSICARE W/ALOE PF LF 6.5 STRL

## (undated) DEVICE — SUTURE VCRL SZ 3-0 L27IN ABSRB UD L26MM SH 1/2 CIR J416H

## (undated) DEVICE — ROYAL SILK SURGICAL GOWN, XXL: Brand: CONVERTORS

## (undated) DEVICE — STANDARD HYPODERMIC NEEDLE,POLYPROPYLENE HUB: Brand: MONOJECT

## (undated) DEVICE — SUTURE PDS II SZ 3-0 L27IN ABSRB CLR SH L26MM 1/2 CIR TAPR Z416H

## (undated) DEVICE — PROVE COVER: Brand: UNBRANDED

## (undated) DEVICE — SYRINGE, LUER LOCK, 10ML: Brand: MEDLINE

## (undated) DEVICE — DRAPE,UTILITY,XL,4/PK,STERILE: Brand: MEDLINE

## (undated) DEVICE — ELECTRD BLD EZ CLN MOD 4IN

## (undated) DEVICE — CLTH CLENS READYCLEANSE PERI CARE PK/5

## (undated) DEVICE — Device: Brand: ENDO SMARTCAP

## (undated) DEVICE — DRESSING MEPILEX BORDER FLEX LITE 5X12.5CM